# Patient Record
Sex: FEMALE | Race: WHITE | NOT HISPANIC OR LATINO | Employment: FULL TIME | URBAN - METROPOLITAN AREA
[De-identification: names, ages, dates, MRNs, and addresses within clinical notes are randomized per-mention and may not be internally consistent; named-entity substitution may affect disease eponyms.]

---

## 2017-01-01 ENCOUNTER — APPOINTMENT (OUTPATIENT)
Dept: LAB | Facility: CLINIC | Age: 62
End: 2017-01-01
Attending: INTERNAL MEDICINE
Payer: COMMERCIAL

## 2017-01-02 ENCOUNTER — APPOINTMENT (OUTPATIENT)
Dept: LAB | Facility: CLINIC | Age: 62
End: 2017-01-02
Attending: INTERNAL MEDICINE
Payer: COMMERCIAL

## 2017-01-03 ENCOUNTER — APPOINTMENT (OUTPATIENT)
Dept: LAB | Facility: CLINIC | Age: 62
End: 2017-01-03
Attending: INTERNAL MEDICINE
Payer: COMMERCIAL

## 2017-01-04 ENCOUNTER — APPOINTMENT (OUTPATIENT)
Dept: LAB | Facility: CLINIC | Age: 62
End: 2017-01-04
Attending: INTERNAL MEDICINE
Payer: COMMERCIAL

## 2017-01-04 ENCOUNTER — TELEPHONE (OUTPATIENT)
Dept: PHARMACY | Facility: CLINIC | Age: 62
End: 2017-01-04

## 2017-01-05 ENCOUNTER — RECORDS - HEALTHEAST (OUTPATIENT)
Dept: ADMINISTRATIVE | Facility: OTHER | Age: 62
End: 2017-01-05

## 2017-01-06 DIAGNOSIS — Z94.4 LIVER TRANSPLANTED (H): Primary | ICD-10-CM

## 2017-01-06 DIAGNOSIS — R19.5 LOOSE STOOLS: ICD-10-CM

## 2017-01-06 RX ORDER — SULFAMETHOXAZOLE AND TRIMETHOPRIM 400; 80 MG/1; MG/1
1 TABLET ORAL
Qty: 15 TABLET | Refills: 0 | Status: SHIPPED | OUTPATIENT
Start: 2017-01-09 | End: 2017-01-26

## 2017-01-06 NOTE — TELEPHONE ENCOUNTER
Homecare with pt this morning.  Pt was discharged without bactrim and psyllium (takes daily for bulk stools, and to decrease diarrhea).    Please send the following prescriptions to [facility] pharmacy.   Homecare reports that she has all of rest of medications.    Bactrim SS take 1 tablet on Monday, 1 tablet on Thursday   (this is dose reduction but pt is > 3months post liver tx)  Psyllium  .52 gms/cap,  Take 2 caps daily, take with a full glass of water.

## 2017-01-09 ENCOUNTER — TELEPHONE (OUTPATIENT)
Dept: PHARMACY | Facility: CLINIC | Age: 62
End: 2017-01-09

## 2017-01-09 ENCOUNTER — TELEPHONE (OUTPATIENT)
Dept: TRANSPLANT | Facility: CLINIC | Age: 62
End: 2017-01-09

## 2017-01-09 ENCOUNTER — OFFICE VISIT (OUTPATIENT)
Dept: INFUSION THERAPY | Facility: CLINIC | Age: 62
End: 2017-01-09
Attending: TRANSPLANT SURGERY
Payer: COMMERCIAL

## 2017-01-09 ENCOUNTER — TELEPHONE (OUTPATIENT)
Dept: INFUSION THERAPY | Facility: CLINIC | Age: 62
End: 2017-01-09

## 2017-01-09 VITALS
DIASTOLIC BLOOD PRESSURE: 80 MMHG | BODY MASS INDEX: 25.59 KG/M2 | TEMPERATURE: 97.9 F | RESPIRATION RATE: 18 BRPM | HEART RATE: 100 BPM | WEIGHT: 163.4 LBS | SYSTOLIC BLOOD PRESSURE: 152 MMHG | OXYGEN SATURATION: 99 %

## 2017-01-09 DIAGNOSIS — Z94.4 LIVER REPLACED BY TRANSPLANT (H): ICD-10-CM

## 2017-01-09 DIAGNOSIS — Z94.4 LIVER REPLACED BY TRANSPLANT (H): Primary | ICD-10-CM

## 2017-01-09 LAB
ALBUMIN SERPL-MCNC: 4.2 G/DL (ref 3.4–5)
ALP SERPL-CCNC: 83 U/L (ref 40–150)
ALT SERPL W P-5'-P-CCNC: 15 U/L (ref 0–50)
ANION GAP SERPL CALCULATED.3IONS-SCNC: 9 MMOL/L (ref 3–14)
AST SERPL W P-5'-P-CCNC: 15 U/L (ref 0–45)
BILIRUB DIRECT SERPL-MCNC: 0.1 MG/DL (ref 0–0.2)
BILIRUB SERPL-MCNC: 0.6 MG/DL (ref 0.2–1.3)
BUN SERPL-MCNC: 23 MG/DL (ref 7–30)
CALCIUM SERPL-MCNC: 9.3 MG/DL (ref 8.5–10.1)
CHLORIDE SERPL-SCNC: 103 MMOL/L (ref 94–109)
CO2 SERPL-SCNC: 22 MMOL/L (ref 20–32)
CREAT SERPL-MCNC: 3.03 MG/DL (ref 0.52–1.04)
ERYTHROCYTE [DISTWIDTH] IN BLOOD BY AUTOMATED COUNT: 14.4 % (ref 10–15)
GFR SERPL CREATININE-BSD FRML MDRD: 16 ML/MIN/1.7M2
GLUCOSE SERPL-MCNC: 119 MG/DL (ref 70–99)
HCT VFR BLD AUTO: 28.8 % (ref 35–47)
HGB BLD-MCNC: 9.7 G/DL (ref 11.7–15.7)
MAGNESIUM SERPL-MCNC: 2 MG/DL (ref 1.6–2.3)
MCH RBC QN AUTO: 32.9 PG (ref 26.5–33)
MCHC RBC AUTO-ENTMCNC: 33.7 G/DL (ref 31.5–36.5)
MCV RBC AUTO: 98 FL (ref 78–100)
PHOSPHATE SERPL-MCNC: 4.3 MG/DL (ref 2.5–4.5)
PLATELET # BLD AUTO: 342 10E9/L (ref 150–450)
POTASSIUM SERPL-SCNC: 4.3 MMOL/L (ref 3.4–5.3)
PROT SERPL-MCNC: 7.4 G/DL (ref 6.8–8.8)
RBC # BLD AUTO: 2.95 10E12/L (ref 3.8–5.2)
SODIUM SERPL-SCNC: 135 MMOL/L (ref 133–144)
TACROLIMUS BLD-MCNC: 12.7 UG/L (ref 5–15)
TME LAST DOSE: NORMAL H
WBC # BLD AUTO: 6.6 10E9/L (ref 4–11)

## 2017-01-09 PROCEDURE — 80076 HEPATIC FUNCTION PANEL: CPT | Performed by: INTERNAL MEDICINE

## 2017-01-09 PROCEDURE — 85027 COMPLETE CBC AUTOMATED: CPT | Performed by: INTERNAL MEDICINE

## 2017-01-09 PROCEDURE — 83735 ASSAY OF MAGNESIUM: CPT | Performed by: INTERNAL MEDICINE

## 2017-01-09 PROCEDURE — 80048 BASIC METABOLIC PNL TOTAL CA: CPT | Performed by: INTERNAL MEDICINE

## 2017-01-09 PROCEDURE — 84100 ASSAY OF PHOSPHORUS: CPT | Performed by: INTERNAL MEDICINE

## 2017-01-09 PROCEDURE — 80197 ASSAY OF TACROLIMUS: CPT | Performed by: INTERNAL MEDICINE

## 2017-01-09 PROCEDURE — 97802 MEDICAL NUTRITION INDIV IN: CPT | Mod: XU

## 2017-01-09 PROCEDURE — 99215 OFFICE O/P EST HI 40 MIN: CPT | Mod: ZF

## 2017-01-09 RX ORDER — TACROLIMUS 1 MG/1
1 CAPSULE, GELATIN COATED ORAL 2 TIMES DAILY
Qty: 240 CAPSULE | Refills: 11 | Status: SHIPPED | OUTPATIENT
Start: 2017-01-09 | End: 2017-01-17

## 2017-01-09 ASSESSMENT — PAIN SCALES - GENERAL: PAINLEVEL: SEVERE PAIN (6)

## 2017-01-09 NOTE — TELEPHONE ENCOUNTER
Spoke with patient and verified current tacrolimus dose is 4.5 mg every 12 hours, and the extra rx in the system was entered today, but it should not have been, (patient takes generic, and the 0.5 mg would be for the dose changes, and patient's insurance needs the tacrolimus to go to [facility].    Since the patient's tracrolimus level is 12.7, and the patient is 3 months post, Arlyn Duvall RN Liver Transplant Coordinator states to decrease dose to 2.5 mg every 12 hours.  Patient inquired about the continued diarrhea, and she was directed to see her PCP.  Patient voiced understanding.  Patient will call us when she is getting low on her tacrolimus supply for reorder.

## 2017-01-09 NOTE — PROGRESS NOTES
Physician Attestation  I did not see the patient but discussed the details with Tatiana; elevated creatinine ? Dehydration needs IVF    Transplant Surgery -OUTPATIENT IMMUNOSUPPRESSION PROGRESS NOTE    Date of Visit: 01/09/2017    Transplants:  8/25/2016 (Liver); Postoperative day:  137  ASSESMENT AND PLAN:  1.Graft Function: Functioning well  2.Immunosuppression Management: No change monitor labs  3.Hypertension: Well controlled stay on current meds  4.Renal Function: Creatine 3.03.  Will see nephrology.  Patient should drink more water  5.Lab frequency: twice weekly  6.Other: Diarrhea:  Patient informed to stop stop psyllium seed or imodium.  Should continue current medications.  Stool cultures completed previously and all are negative  7. Pain:  Patient was not given narcotics. Patient should take tylenol sparingly.   8. Headache:  Will monitor headache.     Date: January 9, 2017    Transplant:  [x]                             Liver [x]                              Kidney []                             Pancreas []                              Other:             Chief Complaint:Eval/Assessment    History of Present Illness: patient is a 61 year old liver transplant patient who was recently discharged from her TCU and is now at home.    Patient states she has been feeling well.  However, she has a few complaints.      Patient states she has not been sleeping well at night.  She also states she has pain all over body from doing more activity and is wondering if there stronger she can take for her pain. She also notes she has a headache and would like something stronger than tylenol for it.     Additionally patient has stopped taking her psyllium seed and imodium and now has loose stools.  She states they are small in amount.  She had multiple stool samples taken previously and there was no infection noted.      Finally, patient has not been drinking many fluids and states she has been trying to increase the amount of  water she drinks.        Patient Active Problem List   Diagnosis     Decompensation of cirrhosis of liver (H)     Liver transplanted (H)     Alcoholic hepatitis     Immunosuppression (H)     Acute kidney injury (H)     Anxiety     Alcoholic hepatitis without ascites     Enterococcus UTI     Liver transplant status (H)     Nausea & vomiting     Malnutrition (H)     Candidiasis of skin     Anemia     ACP (advance care planning)     Diarrhea     Hypothyroidism     Esophageal reflux     Major depression     Insomnia     SOCIAL /FAMILY HISTORY: [x]                  No recent change    Past Medical History   Diagnosis Date     Osteoporosis      Migraines      Asthma      Spinal stenosis in cervical region      End stage liver disease (H)      2/2 Alcohol Abuse     Liver transplanted (H)      Past Surgical History   Procedure Laterality Date     Appendectomy            Tubal ligation       laparoscopic     Sphincteroplasty       Esophagoscopy, gastroscopy, duodenoscopy (egd), combined N/A 2016     Procedure: COMBINED ESOPHAGOSCOPY, GASTROSCOPY, DUODENOSCOPY (EGD);  Surgeon: Tao Alfonso MD;  Location:  GI     Transplant liver recipient  donor N/A 2016     Procedure: TRANSPLANT LIVER RECIPIENT  DONOR;  Surgeon: Larry Dhillon MD;  Location:  OR     Bench liver N/A 2016     Procedure: BENCH LIVER;  Surgeon: Larry Dhillon MD;  Location: [facility] OR     Colonoscopy       Esophagoscopy, gastroscopy, duodenoscopy (egd), combined N/A 10/31/2016     Procedure: COMBINED ESOPHAGOSCOPY, GASTROSCOPY, DUODENOSCOPY (EGD), BIOPSY SINGLE OR MULTIPLE;  Surgeon: Ronald Bojorquez MD;  Location:  GI     Social History     Social History     Marital Status:      Spouse Name: N/A     Number of Children: N/A     Years of Education: N/A     Occupational History     Not on file.     Social History Main Topics     Smoking status: Former Smoker     Smokeless tobacco: Not on file      Alcohol Use: 4.2 oz/week     7 Standard drinks or equivalent per week      Comment: heavy use (750ml/2 days) up until 1 year ago; had cut down to 1 drink/day; none since 7/18/16     Drug Use: No     Sexual Activity: Not on file     Other Topics Concern     Not on file     Social History Narrative     Prescription Medications as of 1/9/2017             PROGRAF 1 MG PO CAPSULE Take 1 capsule (1 mg) by mouth 2 times daily Use for dose titrations.    sulfamethoxazole-trimethoprim (BACTRIM/SEPTRA) 400-80 MG per tablet Take 1 tablet by mouth twice a week Every Monday and Thursday    psyllium 0.52 G capsule Take 2 capsules (1.04 g) by mouth daily With a full glass of water.    magnesium oxide (MAG-OX) 400 MG tablet Take 1 tablet (400 mg) by mouth 2 times daily    busPIRone (BUSPAR) 10 MG tablet Take 1 tablet (10 mg) by mouth 2 times daily    sertraline (ZOLOFT) 100 MG tablet Take 1.5 tablets (150 mg) by mouth daily    traZODone (DESYREL) 100 MG tablet Take 1 tablet (100 mg) by mouth At Bedtime    azaTHIOprine 100 MG TABS Take 150 mg by mouth every evening    tacrolimus (PROGRAF - GENERIC EQUIVALENT) 0.5 MG capsule Take 9 capsules (4.5 mg) by mouth 2 times daily    cyanocobalamin 1000 MCG TABS Take 1,000 mcg by mouth daily    folic acid (FOLVITE) 1 MG tablet Take 1 tablet (1 mg) by mouth daily    simethicone (MYLICON) 80 MG chewable tablet Take 1 tablet (80 mg) by mouth every 6 hours as needed for flatulence or cramping    ursodiol (ACTIGALL) 300 MG capsule Take 1 capsule (300 mg) by mouth 2 times daily    multivitamin, therapeutic (THERA-VIT) TABS tablet Take 1 tablet by mouth every 24 hours    levothyroxine (SYNTHROID/LEVOTHROID) 88 MCG tablet Take 1 tablet (88 mcg) by mouth every morning (before breakfast)    pantoprazole (PROTONIX) 40 MG EC tablet Take 1 tablet (40 mg) by mouth every morning (before breakfast)    acetaminophen (TYLENOL) 325 MG tablet Take 2 tablets (650 mg) by mouth every 6 hours as needed for mild pain  Or fevers. Use sparingly. Limit use to no more than 2 grams (2000 mg) in 24 hours.        Benadryl; Cefaclor; Hydrocodone-acetaminophen; Oxycodone; Penicillins; and Sulfa drugs   REVIEW OF SYSTEMS (check box if normal)  [x]               GENERAL  [x]                 PULMONARY [x]                GENITOURINARY  [x]                CNS                 [x]                 CARDIAC  [x]                 ENDOCRINE  [x]                EARS,NOSE,THROAT [x]                 GASTROINTESTINAL [x]                 NEUROLOGIC    [x]                MUSCLOSKELTAL  [x]                  HEMATOLOGY      PHYSICAL EXAM (check box if normal)/78 mmHg  Pulse 107  Temp(Src) 97.9  F (36.6  C) (Oral)  Resp 18  Wt 74.118 kg (163 lb 6.4 oz)  SpO2 99%        [x]            GENERAL:    [x]       EYES:  ICTERIC   []        YES  []                    NO  [x]           EXTREMITIES: Clubbing []       Y     [x]           N    [x]           EARS, NOSE, THROAT: Membranes Moist    YES   [x]                   NO []                  [x]           LUNGS:  CLEAR    YES       [x]                  NO    []                                [x]           SKIN: Jaundice           YES       []                  NO    [x]                   Rash: YES       []                  NO    [x]                                     [x]             HEART: Regular Rate          YES       [x]                  NO    []                   Incision Clean:  YES       [x]                  NO    []                                [x]                    ABDOMEN: Organomegaly YES       []                  NO    [x]                       [x]                    NEUROLOGICAL:  Nonfocal  YES       [x]                  NO    []                       [x]                    Hernia YES       []                  NO    [x]                   PSYCHIATRIC:  Appropriate  YES       [x]                  NO    []                       OTHER:                                                                                                    PAIN SCALE:: 3

## 2017-01-09 NOTE — PROGRESS NOTES
"Phan Luque came to [facility] today for a lab and assess following a DDLT transplant on 8/25/16.      Discharge date: 9/14/16 to TCU. Patient discharged from TCU on 1/5/16  Transplant coordinator: Lea Moody  Phone number patient can be reached at: [phone]      Physical Assessment:  See physical assessment located under \"Document Flowsheets\".  Incision site: Healed, c/d/i.  Lines: N/A  Talbert: N/A  Urine clarity: Patient reports urinary frequency, normal color, No odor or burning with urination.   Hydration: Patient can't recall exact amount of fluids she has been drinking but she is trying to drink the recommended amount. Encouraged patient to drink lots of fluids.  Nutrition: Patient reports poor appetite, eating very small amounts.    Last BM: 1/8/16. Ongoing diarrhea. Restarted up after not taking the imodium and psyllium this week. Patient had multiple stool studies done that were all negative.   Pain: Patient reporting pain 6/10- generalized. \" It's not too bad right now\" per patient even though rating pain 6/10. Patient states she has been getting headaches, complains of generalized body aches and soreness. Patient taking prescribed tylenol as needed for pain.    Laboratory tests: Standard labs drawn. Cr elevated at 3.03- up from 1.39 on 1/3.     Plan of care for today:   Reviewed labs and assessment with Dr. Dhillon.   1.) Transplant checklist completed  2.) Medication card and medication list reviewed. Noted discrepancies with bactrim dosing. Patient also missing 2 prescriptions: Prograf 0.5mg caps and Psyllium Caps.  3.) Specialty pharmacy up to see patient.  4.) Dietician Xiomara up to see patient.  5.) Patient's coordinator called: Lea not here today. Palak following her patient's; Patient unable to be seen today. Patient given the coordinator phone line. Message sent via [employer] to Lea updating her on patient status and prescription issues.  6.) NP- Tatiana up to assess patient.  7.) " Patient has upcoming appointment with nephrology and was instructed to drink more fluids by surgery NP. Patient has appointment with Dr. Banegas on Wednesday 1/11 and will have labs completed then. Patient on twice weekly labs.  8.) Patient wondering about flexeril prescription. Patient states it is currently on hold until clarified with her CD treatment that she can be taking this.  9.) Prograf 1mg prescription sent- patient states that only [facility] with refill these scripts for her based out of [place]- she was going to figure out the process of getting her prescriptions.    Paged Dr. Erazo to discuss elevated Cr. Due to concern. Dr. Erazo wanted patient to go to the ER for fluids. Patient called and unable to reach her at the phone number given to writer. Patient does have a f/u appointment and labs with her liver doctor.    Medication changes: Clarified Bactrim dosing as Monday and Thursday.    Medications administered: None    Patient education:    The following teaching topics were addressed: Importance of drinking 2L of non-caffeinated fluids daily, Signs/symptoms of infection, Good handwashing, Medications (purposes, doses and times of administration), Phone numbers to call with concenrs (Transplant coordinator, [facility] [address] and [facility]) and Plan of care   Patient verbalized understanding and all questions answered.    Drug level:  Prograf level today drawn. This will be reviewed by Care Coordinator. She is on 2x weekly labs and currently receiving homecare    Discharge Plan    Pt will follow up with Primary Medical Doctor  Discharge instructions reviewed with patient: YES  Patient/Representative verbalized understanding, all questions answered: YES    Discharged from unit at 1215 with whom: self to home.    Ericka Severson, RN

## 2017-01-09 NOTE — PROGRESS NOTES
POST LIVER TRANSPLANT NUTRITION ASSESSMENT  Medical Nutrition Therapy    Visit Type: Initial Assessment    referred by Dr. Dhillon for MNT related to Liver Transplant 8/25/16    Patient accompanied by self    Nutrition Assessment:  Anthropometrics  Height: 67 in    BMI: 25.7 (Overweight)      Weight: 164 lbs       IBW: 135 lbs  % IBW: 121  Adj/Dosing wt: 142 lbs/65 kg Wt hx: Wt pre-txp was in 200's 2/2 fluid status. Pre-txp wt (stable) for several years was in the 140's.        Nutrition Prescription  Energy:  8033-5971 kcal (25-30 kcal/kg)  Justification: post txp   Protein:  65-78 grams protein (1-1.2 g/kg)  Justification: maintenance post txp with kidney disease         Fluid:   1ml/kcal or per MD        Diet Recall:  Pt d/c'd from [facility] and has been home since Thursday, ~3 days. Her appetite is improving overall, especially without a feeding tube, but still not the greatest. She ate only 1 meal yesterday. She has been instructed to follow a low phos, low K+ diet for elevated levels with kidney issues. She has been eating out a lot, at [employer], ordering pizza, etc. Yesterday she ate a frosty from [employer] but vomited shortly after eating. She has Ensure/Boost at home but hasn't had any since being home. She has been trying to drink ginger ale and water, but could benefit from higher fluid intake.      Nutrition Diagnosis:  Inadequate protein intake related to increased protein needs s/p transplant AEB diet recall.    Nutrition Intervention/Recommendations:  1. Although pt has passed the 2 month ambrose (higher calorie and protein needs), recommended drink 1-2 Ensure/day to help with getting adequate nutrition, as her eating is very irregular.   2. Discussed goal to avoid excessive weight gain or weight loss at this time and effects on the liver. Encouraged eating every few hours and avoid eating only 1-2 times/day. Provided examples and encouraged batch cooking/meal prep to prevent having to run out to fast food  restaurants. Pt reports she has SIBO (treated with abx) and MD recommended avoiding snacking/grazing for this (at least 4 hours b/t meals). Pt reports she hasn't been following this. Encouraged pairing protein with carbs and avoiding carbs by themselves (gave examples).    3. Reviewed importance of food safety and increased risk for food-borne illness post-txp. Also discussed potential need to monitor K+, CHO, and Na+ intakes d/t medication side effects.   4. Instructed pt to avoid herbal supplements and alternative medicine therapies d/t counteraction with immunosuppression and risk for rejection.     Nutrition Goals:  1. Eat >1-2x/day   2. 1-2 Ensure (or equivalent) daily  3. Weight maintenance     Nutrition Follow Up / Monitorin. Ongoing PO intake adequacy  2. Weight trends     Patient has RD contact information to call/email if needed.  Time spent with patient: 15  minutes    Bonita Peters RD, LD

## 2017-01-09 NOTE — TELEPHONE ENCOUNTER
Please call Phan about result of tacrolimus level today 12.7,   Verify her current dose, there are 2 tacrolimus doses listed on her medication list.   Decrease tacrolimus to 1/2 of current dose both morning and evening doses.  Labs repeated on Thursday with homecare.

## 2017-01-10 ENCOUNTER — TELEPHONE (OUTPATIENT)
Dept: TRANSPLANT | Facility: CLINIC | Age: 62
End: 2017-01-10

## 2017-01-10 NOTE — TELEPHONE ENCOUNTER
Pt currently admitted at [facility] with elevated creatinine. Pt's creat 3.67 on admission. Yesterday was 3.03 in clinic. Patient received IV fluids overnight and creatinine 3.11 today. KANA Serrano, from [facility] wanting plan. Per Dr Dhillon Pt to hold prograf for 2 days and continue IVF. Dr Alvarez prefers to touch base with Dr Dhillon to further discuss. Dr Alvarez will page.

## 2017-01-12 ENCOUNTER — COMMUNICATION - HEALTHEAST (OUTPATIENT)
Dept: INTERNAL MEDICINE | Facility: CLINIC | Age: 62
End: 2017-01-12

## 2017-01-12 ENCOUNTER — TELEPHONE (OUTPATIENT)
Dept: TRANSPLANT | Facility: CLINIC | Age: 62
End: 2017-01-12

## 2017-01-12 NOTE — TELEPHONE ENCOUNTER
Spoke with Phan who is being discharged currently from [facility]. Pt instructed to start prograf now at 1 mg every 12 hours. Patient asked for a dose before she leaves hospital. Pt reports that she is feeling good and ready to be discharged. Pt encouraged to drink plenty of water and not only sips.

## 2017-01-16 LAB
ALBUMIN SERPL-MCNC: 4 G/DL (ref 3.4–5)
ALP SERPL-CCNC: 71 U/L (ref 40–150)
ALT SERPL W P-5'-P-CCNC: 15 U/L (ref 0–50)
ANION GAP SERPL CALCULATED.3IONS-SCNC: 9 MMOL/L (ref 3–14)
AST SERPL W P-5'-P-CCNC: 13 U/L (ref 0–45)
BASOPHILS # BLD AUTO: 0 10E9/L (ref 0–0.2)
BASOPHILS NFR BLD AUTO: 0.1 %
BILIRUB SERPL-MCNC: 0.5 MG/DL (ref 0.2–1.3)
BUN SERPL-MCNC: 14 MG/DL (ref 7–30)
CALCIUM SERPL-MCNC: 8.8 MG/DL (ref 8.5–10.1)
CHLORIDE SERPL-SCNC: 108 MMOL/L (ref 94–109)
CO2 SERPL-SCNC: 22 MMOL/L (ref 20–32)
CREAT SERPL-MCNC: 1.48 MG/DL (ref 0.52–1.04)
DIFFERENTIAL METHOD BLD: ABNORMAL
EOSINOPHIL # BLD AUTO: 0.1 10E9/L (ref 0–0.7)
EOSINOPHIL NFR BLD AUTO: 1.9 %
ERYTHROCYTE [DISTWIDTH] IN BLOOD BY AUTOMATED COUNT: 14.5 % (ref 10–15)
GFR SERPL CREATININE-BSD FRML MDRD: 36 ML/MIN/1.7M2
GLUCOSE SERPL-MCNC: 106 MG/DL (ref 70–99)
HCT VFR BLD AUTO: 29 % (ref 35–47)
HGB BLD-MCNC: 9.4 G/DL (ref 11.7–15.7)
IMM GRANULOCYTES # BLD: 0 10E9/L (ref 0–0.4)
IMM GRANULOCYTES NFR BLD: 0.1 %
LYMPHOCYTES # BLD AUTO: 1.6 10E9/L (ref 0.8–5.3)
LYMPHOCYTES NFR BLD AUTO: 23.6 %
MCH RBC QN AUTO: 32.6 PG (ref 26.5–33)
MCHC RBC AUTO-ENTMCNC: 32.4 G/DL (ref 31.5–36.5)
MCV RBC AUTO: 101 FL (ref 78–100)
MONOCYTES # BLD AUTO: 0.3 10E9/L (ref 0–1.3)
MONOCYTES NFR BLD AUTO: 4.1 %
NEUTROPHILS # BLD AUTO: 4.8 10E9/L (ref 1.6–8.3)
NEUTROPHILS NFR BLD AUTO: 70.2 %
NRBC # BLD AUTO: 0 10*3/UL
NRBC BLD AUTO-RTO: 0 /100
PLATELET # BLD AUTO: 330 10E9/L (ref 150–450)
POTASSIUM SERPL-SCNC: 4.9 MMOL/L (ref 3.4–5.3)
PROT SERPL-MCNC: 7.3 G/DL (ref 6.8–8.8)
RBC # BLD AUTO: 2.88 10E12/L (ref 3.8–5.2)
SODIUM SERPL-SCNC: 139 MMOL/L (ref 133–144)
TACROLIMUS BLD-MCNC: ABNORMAL UG/L (ref 5–15)
TME LAST DOSE: ABNORMAL H
WBC # BLD AUTO: 6.9 10E9/L (ref 4–11)

## 2017-01-16 PROCEDURE — 80197 ASSAY OF TACROLIMUS: CPT | Performed by: TRANSPLANT SURGERY

## 2017-01-16 PROCEDURE — 80053 COMPREHEN METABOLIC PANEL: CPT | Performed by: TRANSPLANT SURGERY

## 2017-01-16 PROCEDURE — 85025 COMPLETE CBC W/AUTO DIFF WBC: CPT | Performed by: TRANSPLANT SURGERY

## 2017-01-17 ENCOUNTER — TELEPHONE (OUTPATIENT)
Dept: TRANSPLANT | Facility: CLINIC | Age: 62
End: 2017-01-17

## 2017-01-17 DIAGNOSIS — Z94.4 LIVER TRANSPLANTED (H): Primary | ICD-10-CM

## 2017-01-17 RX ORDER — TACROLIMUS 0.5 MG/1
1.5 CAPSULE ORAL 2 TIMES DAILY
Qty: 180 CAPSULE | Refills: 3 | Status: SHIPPED | OUTPATIENT
Start: 2017-01-17 | End: 2017-01-25

## 2017-01-17 NOTE — TELEPHONE ENCOUNTER
Spoke with patient who had slightly longer than 12 hour trough. Patient will increase to 1.5 mg BID. Pt repeated dose change and wrote it down.

## 2017-01-19 ENCOUNTER — OFFICE VISIT - HEALTHEAST (OUTPATIENT)
Dept: INTERNAL MEDICINE | Facility: CLINIC | Age: 62
End: 2017-01-19

## 2017-01-19 DIAGNOSIS — N18.30 ACUTE RENAL FAILURE SUPERIMPOSED ON STAGE 3 CHRONIC KIDNEY DISEASE (H): ICD-10-CM

## 2017-01-19 DIAGNOSIS — Z94.4 LIVER TRANSPLANT RECIPIENT (H): ICD-10-CM

## 2017-01-19 DIAGNOSIS — N17.9 ACUTE RENAL FAILURE SUPERIMPOSED ON STAGE 3 CHRONIC KIDNEY DISEASE (H): ICD-10-CM

## 2017-01-20 ENCOUNTER — MEDICAL CORRESPONDENCE (OUTPATIENT)
Dept: HEALTH INFORMATION MANAGEMENT | Facility: CLINIC | Age: 62
End: 2017-01-20

## 2017-01-20 LAB
ALBUMIN SERPL-MCNC: 3.9 G/DL (ref 3.4–5)
ALP SERPL-CCNC: 72 U/L (ref 40–150)
ALT SERPL W P-5'-P-CCNC: 12 U/L (ref 0–50)
ANION GAP SERPL CALCULATED.3IONS-SCNC: 8 MMOL/L (ref 3–14)
AST SERPL W P-5'-P-CCNC: 10 U/L (ref 0–45)
BILIRUB DIRECT SERPL-MCNC: 0.1 MG/DL (ref 0–0.2)
BILIRUB SERPL-MCNC: 0.4 MG/DL (ref 0.2–1.3)
BUN SERPL-MCNC: 17 MG/DL (ref 7–30)
CALCIUM SERPL-MCNC: 8.9 MG/DL (ref 8.5–10.1)
CHLORIDE SERPL-SCNC: 110 MMOL/L (ref 94–109)
CO2 SERPL-SCNC: 24 MMOL/L (ref 20–32)
CREAT SERPL-MCNC: 1.51 MG/DL (ref 0.52–1.04)
ERYTHROCYTE [DISTWIDTH] IN BLOOD BY AUTOMATED COUNT: 14.6 % (ref 10–15)
GFR SERPL CREATININE-BSD FRML MDRD: 35 ML/MIN/1.7M2
GLUCOSE SERPL-MCNC: 115 MG/DL (ref 70–99)
HCT VFR BLD AUTO: 28.5 % (ref 35–47)
HGB BLD-MCNC: 9.5 G/DL (ref 11.7–15.7)
MAGNESIUM SERPL-MCNC: 2.1 MG/DL (ref 1.6–2.3)
MCH RBC QN AUTO: 33.6 PG (ref 26.5–33)
MCHC RBC AUTO-ENTMCNC: 33.3 G/DL (ref 31.5–36.5)
MCV RBC AUTO: 101 FL (ref 78–100)
PHOSPHATE SERPL-MCNC: 4.2 MG/DL (ref 2.5–4.5)
PLATELET # BLD AUTO: 314 10E9/L (ref 150–450)
POTASSIUM SERPL-SCNC: 4.6 MMOL/L (ref 3.4–5.3)
PROT SERPL-MCNC: 7.3 G/DL (ref 6.8–8.8)
RBC # BLD AUTO: 2.83 10E12/L (ref 3.8–5.2)
SODIUM SERPL-SCNC: 142 MMOL/L (ref 133–144)
TACROLIMUS BLD-MCNC: ABNORMAL UG/L (ref 5–15)
TME LAST DOSE: ABNORMAL H
WBC # BLD AUTO: 5.8 10E9/L (ref 4–11)

## 2017-01-20 PROCEDURE — 84100 ASSAY OF PHOSPHORUS: CPT | Performed by: TRANSPLANT SURGERY

## 2017-01-20 PROCEDURE — 83735 ASSAY OF MAGNESIUM: CPT | Performed by: TRANSPLANT SURGERY

## 2017-01-20 PROCEDURE — 80197 ASSAY OF TACROLIMUS: CPT | Performed by: TRANSPLANT SURGERY

## 2017-01-20 PROCEDURE — 80076 HEPATIC FUNCTION PANEL: CPT | Performed by: TRANSPLANT SURGERY

## 2017-01-20 PROCEDURE — 85027 COMPLETE CBC AUTOMATED: CPT | Performed by: TRANSPLANT SURGERY

## 2017-01-20 PROCEDURE — 80048 BASIC METABOLIC PNL TOTAL CA: CPT | Performed by: TRANSPLANT SURGERY

## 2017-01-23 LAB
ALBUMIN SERPL-MCNC: 4 G/DL (ref 3.4–5)
ALP SERPL-CCNC: 70 U/L (ref 40–150)
ALT SERPL W P-5'-P-CCNC: 14 U/L (ref 0–50)
ANION GAP SERPL CALCULATED.3IONS-SCNC: 5 MMOL/L (ref 3–14)
AST SERPL W P-5'-P-CCNC: 13 U/L (ref 0–45)
BASOPHILS # BLD AUTO: 0 10E9/L (ref 0–0.2)
BASOPHILS NFR BLD AUTO: 0.2 %
BILIRUB DIRECT SERPL-MCNC: <0.1 MG/DL (ref 0–0.2)
BILIRUB SERPL-MCNC: 0.3 MG/DL (ref 0.2–1.3)
BUN SERPL-MCNC: 15 MG/DL (ref 7–30)
CALCIUM SERPL-MCNC: 9.1 MG/DL (ref 8.5–10.1)
CHLORIDE SERPL-SCNC: 110 MMOL/L (ref 94–109)
CO2 SERPL-SCNC: 25 MMOL/L (ref 20–32)
CREAT SERPL-MCNC: 1.23 MG/DL (ref 0.52–1.04)
DIFFERENTIAL METHOD BLD: ABNORMAL
EOSINOPHIL # BLD AUTO: 0.2 10E9/L (ref 0–0.7)
EOSINOPHIL NFR BLD AUTO: 4.9 %
ERYTHROCYTE [DISTWIDTH] IN BLOOD BY AUTOMATED COUNT: 14.7 % (ref 10–15)
GFR SERPL CREATININE-BSD FRML MDRD: 45 ML/MIN/1.7M2
GLUCOSE SERPL-MCNC: 112 MG/DL (ref 70–99)
HCT VFR BLD AUTO: 30.6 % (ref 35–47)
HGB BLD-MCNC: 10.2 G/DL (ref 11.7–15.7)
IMM GRANULOCYTES # BLD: 0 10E9/L (ref 0–0.4)
IMM GRANULOCYTES NFR BLD: 0.4 %
LYMPHOCYTES # BLD AUTO: 1 10E9/L (ref 0.8–5.3)
LYMPHOCYTES NFR BLD AUTO: 21.3 %
MCH RBC QN AUTO: 33.7 PG (ref 26.5–33)
MCHC RBC AUTO-ENTMCNC: 33.3 G/DL (ref 31.5–36.5)
MCV RBC AUTO: 101 FL (ref 78–100)
MONOCYTES # BLD AUTO: 0.4 10E9/L (ref 0–1.3)
MONOCYTES NFR BLD AUTO: 8.6 %
NEUTROPHILS # BLD AUTO: 3.2 10E9/L (ref 1.6–8.3)
NEUTROPHILS NFR BLD AUTO: 64.6 %
NRBC # BLD AUTO: 0 10*3/UL
NRBC BLD AUTO-RTO: 0 /100
PLATELET # BLD AUTO: 323 10E9/L (ref 150–450)
POTASSIUM SERPL-SCNC: 4.8 MMOL/L (ref 3.4–5.3)
PROT SERPL-MCNC: 7.6 G/DL (ref 6.8–8.8)
RBC # BLD AUTO: 3.03 10E12/L (ref 3.8–5.2)
SODIUM SERPL-SCNC: 140 MMOL/L (ref 133–144)
TACROLIMUS BLD-MCNC: ABNORMAL UG/L (ref 5–15)
TME LAST DOSE: ABNORMAL H
WBC # BLD AUTO: 4.9 10E9/L (ref 4–11)

## 2017-01-23 PROCEDURE — 85025 COMPLETE CBC W/AUTO DIFF WBC: CPT | Performed by: TRANSPLANT SURGERY

## 2017-01-23 PROCEDURE — 80048 BASIC METABOLIC PNL TOTAL CA: CPT | Performed by: TRANSPLANT SURGERY

## 2017-01-23 PROCEDURE — 80197 ASSAY OF TACROLIMUS: CPT | Performed by: TRANSPLANT SURGERY

## 2017-01-23 PROCEDURE — 80076 HEPATIC FUNCTION PANEL: CPT | Performed by: TRANSPLANT SURGERY

## 2017-01-24 ENCOUNTER — TELEPHONE (OUTPATIENT)
Dept: GASTROENTEROLOGY | Facility: CLINIC | Age: 62
End: 2017-01-24

## 2017-01-24 ENCOUNTER — TELEPHONE (OUTPATIENT)
Dept: TRANSPLANT | Facility: CLINIC | Age: 62
End: 2017-01-24

## 2017-01-24 DIAGNOSIS — Z94.4 LIVER TRANSPLANTED (H): Primary | ICD-10-CM

## 2017-01-24 NOTE — TELEPHONE ENCOUNTER
Patient contacted and reminded of upcoming appointment.  Patient confirmed they will be attending.  Patient instructed to bring updated medications list to appointment.  Patient instructed to arrive early for check-in  Yakov Mancera CMA

## 2017-01-25 RX ORDER — TACROLIMUS 0.5 MG/1
2 CAPSULE ORAL 2 TIMES DAILY
Qty: 240 CAPSULE | Refills: 11 | Status: SHIPPED | OUTPATIENT
Start: 2017-01-25 | End: 2017-01-26

## 2017-01-25 NOTE — TELEPHONE ENCOUNTER
Left detailed message for patient to increase prograf to 2 mg BID. Requested patient to call back.

## 2017-01-26 DIAGNOSIS — R14.1 FLATULENCE, ERUCTATION, AND GAS PAIN: ICD-10-CM

## 2017-01-26 DIAGNOSIS — K21.9 GASTROESOPHAGEAL REFLUX DISEASE, ESOPHAGITIS PRESENCE NOT SPECIFIED: ICD-10-CM

## 2017-01-26 DIAGNOSIS — Z94.9 TRANSPLANT, ORGAN: ICD-10-CM

## 2017-01-26 DIAGNOSIS — F41.9 ANXIETY: ICD-10-CM

## 2017-01-26 DIAGNOSIS — E46 MALNUTRITION (H): ICD-10-CM

## 2017-01-26 DIAGNOSIS — E83.42 HYPOMAGNESEMIA: ICD-10-CM

## 2017-01-26 DIAGNOSIS — Z94.4 LIVER TRANSPLANTED (H): Primary | ICD-10-CM

## 2017-01-26 DIAGNOSIS — R14.3 FLATULENCE, ERUCTATION, AND GAS PAIN: ICD-10-CM

## 2017-01-26 DIAGNOSIS — R94.6 THYROID FUNCTION TEST ABNORMAL: ICD-10-CM

## 2017-01-26 DIAGNOSIS — R14.2 FLATULENCE, ERUCTATION, AND GAS PAIN: ICD-10-CM

## 2017-01-26 DIAGNOSIS — R19.5 LOOSE STOOLS: ICD-10-CM

## 2017-01-26 LAB
ALBUMIN SERPL-MCNC: 4 G/DL (ref 3.4–5)
ALP SERPL-CCNC: 69 U/L (ref 40–150)
ALT SERPL W P-5'-P-CCNC: 14 U/L (ref 0–50)
ANION GAP SERPL CALCULATED.3IONS-SCNC: 5 MMOL/L (ref 3–14)
AST SERPL W P-5'-P-CCNC: 16 U/L (ref 0–45)
BASOPHILS # BLD AUTO: 0 10E9/L (ref 0–0.2)
BASOPHILS NFR BLD AUTO: 0 %
BILIRUB DIRECT SERPL-MCNC: <0.1 MG/DL (ref 0–0.2)
BILIRUB SERPL-MCNC: 0.3 MG/DL (ref 0.2–1.3)
BUN SERPL-MCNC: 15 MG/DL (ref 7–30)
CALCIUM SERPL-MCNC: 9.1 MG/DL (ref 8.5–10.1)
CHLORIDE SERPL-SCNC: 110 MMOL/L (ref 94–109)
CO2 SERPL-SCNC: 25 MMOL/L (ref 20–32)
CREAT SERPL-MCNC: 1.29 MG/DL (ref 0.52–1.04)
DIFFERENTIAL METHOD BLD: ABNORMAL
EOSINOPHIL # BLD AUTO: 0.3 10E9/L (ref 0–0.7)
EOSINOPHIL NFR BLD AUTO: 5.1 %
ERYTHROCYTE [DISTWIDTH] IN BLOOD BY AUTOMATED COUNT: 14.8 % (ref 10–15)
GFR SERPL CREATININE-BSD FRML MDRD: 42 ML/MIN/1.7M2
GLUCOSE SERPL-MCNC: 113 MG/DL (ref 70–99)
HCT VFR BLD AUTO: 31.1 % (ref 35–47)
HGB BLD-MCNC: 10.3 G/DL (ref 11.7–15.7)
IMM GRANULOCYTES # BLD: 0 10E9/L (ref 0–0.4)
IMM GRANULOCYTES NFR BLD: 0.3 %
LYMPHOCYTES # BLD AUTO: 1.4 10E9/L (ref 0.8–5.3)
LYMPHOCYTES NFR BLD AUTO: 21.8 %
MCH RBC QN AUTO: 33.8 PG (ref 26.5–33)
MCHC RBC AUTO-ENTMCNC: 33.1 G/DL (ref 31.5–36.5)
MCV RBC AUTO: 102 FL (ref 78–100)
MONOCYTES # BLD AUTO: 0.5 10E9/L (ref 0–1.3)
MONOCYTES NFR BLD AUTO: 8.1 %
NEUTROPHILS # BLD AUTO: 4.1 10E9/L (ref 1.6–8.3)
NEUTROPHILS NFR BLD AUTO: 64.7 %
NRBC # BLD AUTO: 0 10*3/UL
NRBC BLD AUTO-RTO: 0 /100
PLATELET # BLD AUTO: 333 10E9/L (ref 150–450)
POTASSIUM SERPL-SCNC: 4.9 MMOL/L (ref 3.4–5.3)
PROT SERPL-MCNC: 7.3 G/DL (ref 6.8–8.8)
RBC # BLD AUTO: 3.05 10E12/L (ref 3.8–5.2)
SODIUM SERPL-SCNC: 140 MMOL/L (ref 133–144)
TACROLIMUS BLD-MCNC: 4 UG/L (ref 5–15)
TME LAST DOSE: ABNORMAL H
WBC # BLD AUTO: 6.3 10E9/L (ref 4–11)

## 2017-01-26 PROCEDURE — 80048 BASIC METABOLIC PNL TOTAL CA: CPT | Performed by: TRANSPLANT SURGERY

## 2017-01-26 PROCEDURE — 80197 ASSAY OF TACROLIMUS: CPT | Performed by: TRANSPLANT SURGERY

## 2017-01-26 PROCEDURE — 85025 COMPLETE CBC W/AUTO DIFF WBC: CPT | Performed by: TRANSPLANT SURGERY

## 2017-01-26 PROCEDURE — 80076 HEPATIC FUNCTION PANEL: CPT | Performed by: TRANSPLANT SURGERY

## 2017-01-26 RX ORDER — URSODIOL 300 MG/1
300 CAPSULE ORAL 2 TIMES DAILY
Qty: 60 CAPSULE | Refills: 11 | Status: SHIPPED | OUTPATIENT
Start: 2017-01-26 | End: 2017-11-03

## 2017-01-26 RX ORDER — FOLIC ACID 1 MG/1
1 TABLET ORAL DAILY
Qty: 30 TABLET | Refills: 1 | Status: SHIPPED | OUTPATIENT
Start: 2017-01-26

## 2017-01-26 RX ORDER — TACROLIMUS 0.5 MG/1
2 CAPSULE ORAL 2 TIMES DAILY
Qty: 240 CAPSULE | Refills: 11 | Status: CANCELLED | OUTPATIENT
Start: 2017-01-26

## 2017-01-26 RX ORDER — SULFAMETHOXAZOLE AND TRIMETHOPRIM 400; 80 MG/1; MG/1
1 TABLET ORAL
Qty: 15 TABLET | Refills: 0 | Status: CANCELLED | OUTPATIENT
Start: 2017-01-26

## 2017-01-26 RX ORDER — BUSPIRONE HYDROCHLORIDE 10 MG/1
10 TABLET ORAL 2 TIMES DAILY
Qty: 60 TABLET | Refills: 1 | Status: SHIPPED | OUTPATIENT
Start: 2017-01-26 | End: 2017-08-14

## 2017-01-26 RX ORDER — SIMETHICONE 80 MG
80 TABLET,CHEWABLE ORAL EVERY 6 HOURS PRN
Qty: 180 TABLET | Refills: 1 | Status: SHIPPED | OUTPATIENT
Start: 2017-01-26 | End: 2018-04-02

## 2017-01-26 RX ORDER — LEVOTHYROXINE SODIUM 88 UG/1
88 TABLET ORAL
Qty: 30 TABLET | Refills: 1 | Status: SHIPPED | OUTPATIENT
Start: 2017-01-26 | End: 2018-03-01

## 2017-01-26 RX ORDER — MAGNESIUM OXIDE 400 MG/1
400 TABLET ORAL 2 TIMES DAILY
Qty: 60 TABLET | Refills: 11 | Status: SHIPPED
Start: 2017-01-26 | End: 2017-02-27

## 2017-01-26 RX ORDER — MULTIVITAMIN,THERAPEUTIC
1 TABLET ORAL EVERY 24 HOURS
Qty: 30 TABLET | Refills: 11 | Status: SHIPPED | OUTPATIENT
Start: 2017-01-26

## 2017-01-26 RX ORDER — SULFAMETHOXAZOLE AND TRIMETHOPRIM 400; 80 MG/1; MG/1
1 TABLET ORAL
Qty: 15 TABLET | Refills: 3 | Status: SHIPPED | OUTPATIENT
Start: 2017-01-26 | End: 2017-02-27

## 2017-01-26 RX ORDER — AZATHIOPRINE 100 MG/1
150 TABLET ORAL EVERY EVENING
Qty: 30 TABLET | Refills: 11 | Status: SHIPPED | OUTPATIENT
Start: 2017-01-26 | End: 2017-04-10

## 2017-01-26 RX ORDER — PANTOPRAZOLE SODIUM 40 MG/1
40 TABLET, DELAYED RELEASE ORAL
Qty: 30 TABLET | Refills: 11 | Status: SHIPPED | OUTPATIENT
Start: 2017-01-26 | End: 2017-11-07

## 2017-01-26 RX ORDER — AZATHIOPRINE 100 MG/1
150 TABLET ORAL EVERY EVENING
Qty: 45 TABLET | Refills: 0 | Status: CANCELLED | OUTPATIENT
Start: 2017-01-26

## 2017-01-26 RX ORDER — TACROLIMUS 0.5 MG/1
2 CAPSULE ORAL 2 TIMES DAILY
Qty: 240 CAPSULE | Refills: 11 | Status: SHIPPED | OUTPATIENT
Start: 2017-01-26 | End: 2018-02-02

## 2017-01-26 RX ORDER — SERTRALINE HYDROCHLORIDE 100 MG/1
150 TABLET, FILM COATED ORAL DAILY
Qty: 45 TABLET | Refills: 1 | Status: SHIPPED | OUTPATIENT
Start: 2017-01-26 | End: 2017-08-14

## 2017-01-26 NOTE — TELEPHONE ENCOUNTER
Pt called to determine how to order her medications because she has her meds at 3 different pharmacies and [facility] told her that she doesn't need to fill with them. Patient is asking for clarification. Had our pharmacy run a test claim and patient was able to fill at our pharmacy. Patient agreeable to use our pharmacy.

## 2017-01-26 NOTE — TELEPHONE ENCOUNTER
Drug Name: Tacrolimus 0.5mg caps  Last Fill Date: 01/04/2017  Quantity: 540    Drug Name: smz/tmp 400-80mg tabs  Last Fill Date: 01/04/2017  Quantity: 30    Drug Name: azathioprine 50mg tabs  Last Fill Date: 01/04/2017  Quantity: 90    Rhea Brandt Cpht  [facility]  [phone]

## 2017-01-27 ENCOUNTER — OFFICE VISIT (OUTPATIENT)
Dept: GASTROENTEROLOGY | Facility: CLINIC | Age: 62
End: 2017-01-27
Attending: INTERNAL MEDICINE
Payer: COMMERCIAL

## 2017-01-27 ENCOUNTER — OFFICE VISIT - HEALTHEAST (OUTPATIENT)
Dept: INTERNAL MEDICINE | Facility: CLINIC | Age: 62
End: 2017-01-27

## 2017-01-27 ENCOUNTER — RECORDS - HEALTHEAST (OUTPATIENT)
Dept: ADMINISTRATIVE | Facility: OTHER | Age: 62
End: 2017-01-27

## 2017-01-27 VITALS
DIASTOLIC BLOOD PRESSURE: 66 MMHG | HEIGHT: 67 IN | SYSTOLIC BLOOD PRESSURE: 115 MMHG | TEMPERATURE: 98.7 F | HEART RATE: 74 BPM | WEIGHT: 158.4 LBS | BODY MASS INDEX: 24.86 KG/M2

## 2017-01-27 DIAGNOSIS — Z94.4 LIVER TRANSPLANT RECIPIENT (H): ICD-10-CM

## 2017-01-27 DIAGNOSIS — R10.13 ABDOMINAL PAIN, EPIGASTRIC: ICD-10-CM

## 2017-01-27 DIAGNOSIS — Z94.4 LIVER TRANSPLANT STATUS (H): Primary | ICD-10-CM

## 2017-01-27 PROCEDURE — 99213 OFFICE O/P EST LOW 20 MIN: CPT | Mod: ZF

## 2017-01-27 RX ORDER — TRAMADOL HYDROCHLORIDE 50 MG/1
50-100 TABLET ORAL EVERY 6 HOURS PRN
Qty: 50 TABLET | Refills: 0 | Status: SHIPPED | OUTPATIENT
Start: 2017-01-27 | End: 2017-03-21

## 2017-01-27 RX ORDER — LOPERAMIDE HCL 2 MG
2 CAPSULE ORAL 4 TIMES DAILY PRN
COMMUNITY
End: 2018-03-01

## 2017-01-27 ASSESSMENT — PAIN SCALES - GENERAL: PAINLEVEL: NO PAIN (1)

## 2017-01-27 NOTE — PROGRESS NOTES
I had the pleasure of seeing Megan Luque for followup in the Liver Transplantation Clinic at the [facility] on 01/27/2017.  Ms. Luque returns for followup now 5 months status post liver transplantation for alcoholic hepatitis.  She was the first patient we transplanted for alcoholic hepatitis.      She is doing quite well, I would say, at this point in time.  She is a bit inpatient and would like to be feeling better.  She does have diffuse aches and pains that limit her activity.  She also did develop an unusual complication post-transplant of retinopathy that has not really reversed, and she has poor eyesight in her left eye.  It does affect her depth perception as well.      She denies any right upper quadrant pain.  Her incision is intact.  She does complain of some itching.  She had a past history of eczema which she feels may have to come back.  She does have fatigue.  She denies any increased abdominal girth or lower extremity edema.  She denies any fevers or chills, cough or shortness of breath.  She denies any nausea, vomiting, diarrhea or constipation.  Her appetite is improving, and her weight has been relatively stable.     Current Outpatient Prescriptions   Medication     loperamide (IMODIUM) 2 MG capsule     traMADol (ULTRAM) 50 MG tablet     ursodiol (ACTIGALL) 300 MG capsule     tacrolimus (PROGRAF - GENERIC EQUIVALENT) 0.5 MG capsule     sulfamethoxazole-trimethoprim (BACTRIM/SEPTRA) 400-80 MG per tablet     simethicone (MYLICON) 80 MG chewable tablet     sertraline (ZOLOFT) 100 MG tablet     psyllium 0.52 G capsule     pantoprazole (PROTONIX) 40 MG EC tablet     multivitamin, therapeutic (THERA-VIT) TABS tablet     magnesium oxide (MAG-OX) 400 MG tablet     levothyroxine (SYNTHROID/LEVOTHROID) 88 MCG tablet     folic acid (FOLVITE) 1 MG tablet     cyanocobalamin 1000 MCG TABS     busPIRone (BUSPAR) 10 MG tablet     azaTHIOprine 100 MG TABS     traZODone (DESYREL)  100 MG tablet     acetaminophen (TYLENOL) 325 MG tablet     No current facility-administered medications for this visit.     B/P: 115/66, T: 98.7, P: 74, R: Data Unavailable    HEENT exam shows no scleral icterus and no temporal muscle wasting.  Chest is clear.  Abdominal exam shows her incision is intact.  Her liver is 10 cm in span without left lobe enlargement.  No spleen tip is palpable, and extremity exam shows no edema.  Skin shows no stigmata of chronic pruritus, and neurologic exam is within normal limits.     Recent Results (from the past 168 hour(s))   Hepatic panel    Collection Time: 01/23/17  9:40 AM   Result Value Ref Range    Bilirubin Direct <0.1 0.0 - 0.2 mg/dL    Bilirubin Total 0.3 0.2 - 1.3 mg/dL    Albumin 4.0 3.4 - 5.0 g/dL    Protein Total 7.6 6.8 - 8.8 g/dL    Alkaline Phosphatase 70 40 - 150 U/L    ALT 14 0 - 50 U/L    AST 13 0 - 45 U/L   Basic metabolic panel    Collection Time: 01/23/17  9:40 AM   Result Value Ref Range    Sodium 140 133 - 144 mmol/L    Potassium 4.8 3.4 - 5.3 mmol/L    Chloride 110 (H) 94 - 109 mmol/L    Carbon Dioxide 25 20 - 32 mmol/L    Anion Gap 5 3 - 14 mmol/L    Glucose 112 (H) 70 - 99 mg/dL    Urea Nitrogen 15 7 - 30 mg/dL    Creatinine 1.23 (H) 0.52 - 1.04 mg/dL    GFR Estimate 45 (L) >60 mL/min/1.7m2    GFR Estimate If Black 54 (L) >60 mL/min/1.7m2    Calcium 9.1 8.5 - 10.1 mg/dL   CBC with platelets differential    Collection Time: 01/23/17  9:40 AM   Result Value Ref Range    WBC 4.9 4.0 - 11.0 10e9/L    RBC Count 3.03 (L) 3.8 - 5.2 10e12/L    Hemoglobin 10.2 (L) 11.7 - 15.7 g/dL    Hematocrit 30.6 (L) 35.0 - 47.0 %     (H) 78 - 100 fl    MCH 33.7 (H) 26.5 - 33.0 pg    MCHC 33.3 31.5 - 36.5 g/dL    RDW 14.7 10.0 - 15.0 %    Platelet Count 323 150 - 450 10e9/L    Diff Method Automated Method     % Neutrophils 64.6 %    % Lymphocytes 21.3 %    % Monocytes 8.6 %    % Eosinophils 4.9 %    % Basophils 0.2 %    % Immature Granulocytes 0.4 %    Nucleated RBCs 0  0 /100    Absolute Neutrophil 3.2 1.6 - 8.3 10e9/L    Absolute Lymphocytes 1.0 0.8 - 5.3 10e9/L    Absolute Monocytes 0.4 0.0 - 1.3 10e9/L    Absolute Eosinophils 0.2 0.0 - 0.7 10e9/L    Absolute Basophils 0.0 0.0 - 0.2 10e9/L    Abs Immature Granulocytes 0.0 0 - 0.4 10e9/L    Absolute Nucleated RBC 0.0    Tacrolimus level    Collection Time: 01/23/17  9:40 AM   Result Value Ref Range    Tacrolimus Last Dose LAST DOSE 1.22.17 AT 2200     Tacrolimus Level (L) 5.0 - 15.0 ug/L     <3.0  Tacrolimus Reference Range   Kidney Transplant   Pediatric                      ug/L     0-3 months post transplant   10-12     3-6 months post transplant   8-10     6-12 months post transplant  6-8     >12 months post transplant   4-7   Adult     0-6 months post transplant   8-10     6-12 months post transplant  6-8     >12 months post transplant   4-6     >5 years post transplant     3-5   Heart Transplant   Pediatric     0-12 months post transplant  10-15     >12 months post transplant   5-10   Adult     0-3 months post transplant   10-15     3-6 months post transplant   8-12     6-12 months post transplant  6-12     >12 months post transplant   6-10   Lung Transplant     0-12 months post transplant  10-15     >12 months post transplant   8-12   Liver Transplant   Pediatric     0-3 months post transplant   10-15     3-6 months post transplant   8-10     >6 months post transplant    6-8   Adult     0-3 months post transplant   10-12     3-6 months post transplant   8-10     >6  months post transplant    6-8   Pancreas Transplant     0-6 months post transplant   8-10     >6 months post transplant    5-8   This test was developed and its performance characteristics determined by the   [facility],  [facility]. It has   not been cleared or approved by the FDA. The laboratory is regulated under CLIA   as qualified to perform high-complexity testing. This test is used for clinical   purposes. It  should not be regarded as investigational or for research.     Hepatic panel    Collection Time: 01/26/17  9:30 AM   Result Value Ref Range    Bilirubin Direct <0.1 0.0 - 0.2 mg/dL    Bilirubin Total 0.3 0.2 - 1.3 mg/dL    Albumin 4.0 3.4 - 5.0 g/dL    Protein Total 7.3 6.8 - 8.8 g/dL    Alkaline Phosphatase 69 40 - 150 U/L    ALT 14 0 - 50 U/L    AST 16 0 - 45 U/L   Basic metabolic panel    Collection Time: 01/26/17  9:30 AM   Result Value Ref Range    Sodium 140 133 - 144 mmol/L    Potassium 4.9 3.4 - 5.3 mmol/L    Chloride 110 (H) 94 - 109 mmol/L    Carbon Dioxide 25 20 - 32 mmol/L    Anion Gap 5 3 - 14 mmol/L    Glucose 113 (H) 70 - 99 mg/dL    Urea Nitrogen 15 7 - 30 mg/dL    Creatinine 1.29 (H) 0.52 - 1.04 mg/dL    GFR Estimate 42 (L) >60 mL/min/1.7m2    GFR Estimate If Black 51 (L) >60 mL/min/1.7m2    Calcium 9.1 8.5 - 10.1 mg/dL   CBC with platelets differential    Collection Time: 01/26/17  9:30 AM   Result Value Ref Range    WBC 6.3 4.0 - 11.0 10e9/L    RBC Count 3.05 (L) 3.8 - 5.2 10e12/L    Hemoglobin 10.3 (L) 11.7 - 15.7 g/dL    Hematocrit 31.1 (L) 35.0 - 47.0 %     (H) 78 - 100 fl    MCH 33.8 (H) 26.5 - 33.0 pg    MCHC 33.1 31.5 - 36.5 g/dL    RDW 14.8 10.0 - 15.0 %    Platelet Count 333 150 - 450 10e9/L    Diff Method Automated Method     % Neutrophils 64.7 %    % Lymphocytes 21.8 %    % Monocytes 8.1 %    % Eosinophils 5.1 %    % Basophils 0.0 %    % Immature Granulocytes 0.3 %    Nucleated RBCs 0 0 /100    Absolute Neutrophil 4.1 1.6 - 8.3 10e9/L    Absolute Lymphocytes 1.4 0.8 - 5.3 10e9/L    Absolute Monocytes 0.5 0.0 - 1.3 10e9/L    Absolute Eosinophils 0.3 0.0 - 0.7 10e9/L    Absolute Basophils 0.0 0.0 - 0.2 10e9/L    Abs Immature Granulocytes 0.0 0 - 0.4 10e9/L    Absolute Nucleated RBC 0.0    Tacrolimus level    Collection Time: 01/26/17  9:30 AM   Result Value Ref Range    Tacrolimus Last Dose 01/25/2017 2200     Tacrolimus Level 4.0 (L) 5.0 - 15.0 ug/L      My impression is that Ms.  Ene is almost 5 months status post liver transplantation for alcoholic cirrhosis.  She also had severe acute kidney injury, and her kidneys now have recovered to the where her current creatinine is 1.23, which is excellent.  I will cut her atenolol in half because of some orthostatic dizziness.  We may be able to stop it altogether based on her blood pressure today.  I, otherwise, will not be making any change to her medical regimen.  She is on tacrolimus and Imuran, and we will try to continue to run her tacrolimus low while her acute kidney injury heals.  My plan will be to see her back in the clinic in 6 weeks.      Thank you very much for allowing me to participate in the care of this patient.  If you have any questions regarding my recommendations, please do not hesitate to contact me.       Hussein Banegsa MD    Professor of Medicine  [employer]    Executive Medical Director, Solid Organ Transplant Program  [facility]

## 2017-01-27 NOTE — NURSING NOTE
"Chief Complaint   Patient presents with     RECHECK     follow up with liver transplant, tzimmer cma       Initial /66 mmHg  Pulse 74  Temp(Src) 98.7  F (37.1  C) (Oral)  Ht 1.702 m (5' 7\")  Wt 71.85 kg (158 lb 6.4 oz)  BMI 24.80 kg/m2 Estimated body mass index is 24.8 kg/(m^2) as calculated from the following:    Height as of this encounter: 1.702 m (5' 7\").    Weight as of this encounter: 71.85 kg (158 lb 6.4 oz).  BP completed using cuff size: regular    "

## 2017-01-27 NOTE — MR AVS SNAPSHOT
After Visit Summary   1/27/2017    Phan Luque    MRN: [id number]           Patient Information     Date Of Birth          1955        Visit Information        Provider Department      1/27/2017 10:45 AM Hussein Banegas MD [facility] Hepatology        Today's Diagnoses     Liver transplant status (H)    -  1     Abdominal pain, epigastric            Follow-ups after your visit        Follow-up notes from your care team     Return in about 6 weeks (around 3/10/2017).      Your next 10 appointments already scheduled     Feb 13, 2017 12:00 PM   (Arrive by 11:30 AM)   RETURN ENDOCRINE with Ruth Oakley MD   [facility] Endocrinology ([facility])    [address] [place] [address]  3rd [facility] [id number] [phone]            Mar 06, 2017  3:45 PM   (Arrive by 3:30 PM)   Return Liver Transplant with Hussein Banegas MD   [facility] Hepatology ([facility])    [address] [place] [address]  3rd [facility] [id number] [phone]            Mar 13, 2017  3:30 PM   Lab with  LAB   [facility] Lab ([facility])    [address] [place] [id number] [phone]            Mar 13, 2017  4:30 PM   (Arrive by 4:00 PM)   Return Visit with Ruth Ann Erazo MD   [facility] Nephrology ([facility])    [address] [place] [id number] [phone]              Who to contact     If you have questions or need follow up information about today's clinic visit or your schedule please contact [facility] HEPATOLOGY directly at [phone].  Normal or non-critical lab and imaging results will be communicated to you by MyChart, letter or phone within 4 business days after the clinic has received the results. If you do not hear from us within 7 days, please contact the clinic through MyChart or phone. If you have a critical or abnormal lab result, we will notify  "you by phone as soon as possible.  Submit refill requests through [employer] or call your pharmacy and they will forward the refill request to us. Please allow 3 business days for your refill to be completed.          Additional Information About Your Visit        [employer] Information     [employer] gives you secure access to your electronic health record. If you see a primary care provider, you can also send messages to your care team and make appointments. If you have questions, please call your primary care clinic.  If you do not have a primary care provider, please call [phone] and they will assist you.        Care EveryWhere ID     This is your Care EveryWhere ID. This could be used by other organizations to access your [place] medical records  [id number]        Your Vitals Were     Pulse Temperature Height BMI (Body Mass Index)          74 98.7  F (37.1  C) (Oral) 1.702 m (5' 7\") 24.80 kg/m2         Blood Pressure from Last 3 Encounters:   01/27/17 115/66   01/09/17 152/80   01/05/17 146/76    Weight from Last 3 Encounters:   01/27/17 71.85 kg (158 lb 6.4 oz)   01/09/17 74.118 kg (163 lb 6.4 oz)   01/05/17 74.435 kg (164 lb 1.6 oz)              Today, you had the following     No orders found for display         Today's Medication Changes          These changes are accurate as of: 1/27/17 11:17 AM.  If you have any questions, ask your nurse or doctor.               Start taking these medicines.        Dose/Directions    traMADol 50 MG tablet   Commonly known as:  ULTRAM   Used for:  Abdominal pain, epigastric   Started by:  Hussein Banegas MD        Dose:   mg   Take 1-2 tablets ( mg) by mouth every 6 hours as needed for pain   Quantity:  50 tablet   Refills:  0            Where to get your medicines      Some of these will need a paper prescription and others can be bought over the counter.  Ask your nurse if you have questions.     Bring a paper prescription for each of these medications    - " traMADol 50 MG tablet             Primary Care Provider Office Phone # Fax #    Jahaira Mayberry [phone]       [address] [place] [address]        Thank you!     Thank you for choosing [facility]PAT[facility]  for your care. Our goal is always to provide you with excellent care. Hearing back from our patients is one way we can continue to improve our services. Please take a few minutes to complete the written survey that you may receive in the mail after your visit with us. Thank you!             Your Updated Medication List - Protect others around you: Learn how to safely use, store and throw away your medicines at www.disposemymeds.org.          This list is accurate as of: 1/27/17 11:17 AM.  Always use your most recent med list.                   Brand Name Dispense Instructions for use    acetaminophen 325 MG tablet    TYLENOL    100 tablet    Take 2 tablets (650 mg) by mouth every 6 hours as needed for mild pain Or fevers. Use sparingly. Limit use to no more than 2 grams (2000 mg) in 24 hours.       azaTHIOprine 100 MG Tabs     30 tablet    Take 150 mg by mouth every evening       busPIRone 10 MG tablet    BUSPAR    60 tablet    Take 1 tablet (10 mg) by mouth 2 times daily       cyanocobalamin 1000 MCG Tabs     30 tablet    Take 1,000 mcg by mouth daily       folic acid 1 MG tablet    FOLVITE    30 tablet    Take 1 tablet (1 mg) by mouth daily       levothyroxine 88 MCG tablet    SYNTHROID/LEVOTHROID    30 tablet    Take 1 tablet (88 mcg) by mouth every morning (before breakfast)       loperamide 2 MG capsule    IMODIUM     Take 2 mg by mouth 4 times daily as needed for diarrhea       magnesium oxide 400 MG tablet    MAG-OX    60 tablet    Take 1 tablet (400 mg) by mouth 2 times daily       multivitamin, therapeutic Tabs tablet     30 tablet    Take 1 tablet by mouth every 24 hours       pantoprazole 40 MG EC tablet    PROTONIX    30 tablet    Take 1 tablet (40 mg) by  mouth every morning (before breakfast)       psyllium 0.52 G capsule     60 capsule    Take 2 capsules (1.04 g) by mouth daily With a full glass of water.       sertraline 100 MG tablet    ZOLOFT    45 tablet    Take 1.5 tablets (150 mg) by mouth daily       simethicone 80 MG chewable tablet    MYLICON    180 tablet    Take 1 tablet (80 mg) by mouth every 6 hours as needed for flatulence or cramping       sulfamethoxazole-trimethoprim 400-80 MG per tablet    BACTRIM/SEPTRA    15 tablet    Take 1 tablet by mouth twice a week Every Monday and Thursday       tacrolimus 0.5 MG capsule    PROGRAF - GENERIC EQUIVALENT    240 capsule    Take 4 capsules (2 mg) by mouth 2 times daily       traMADol 50 MG tablet    ULTRAM    50 tablet    Take 1-2 tablets ( mg) by mouth every 6 hours as needed for pain       traZODone 100 MG tablet    DESYREL    30 tablet    Take 1 tablet (100 mg) by mouth At Bedtime       ursodiol 300 MG capsule    ACTIGALL    60 capsule    Take 1 capsule (300 mg) by mouth 2 times daily

## 2017-01-27 NOTE — Clinical Note
1/27/2017       RE: Phan Luque  [address] [facility] [address]     Dear Colleague,    Thank you for referring your patient, Phan Luque, to the [facility] HEPATOLOGY at [facility] Please see a copy of my visit note below.    I had the pleasure of seeing Phan Luque for followup in the Liver Transplantation Clinic at the [facility] on 01/27/2017.  Ms. Luque returns for followup now 5 months status post liver transplantation for alcoholic hepatitis.  She was the first patient we transplanted for alcoholic hepatitis.      She is doing quite well, I would say, at this point in time.  She is a bit inpatient and would like to be feeling better.  She does have diffuse aches and pains that limit her activity.  She also did develop an unusual complication post-transplant of retinopathy that has not really reversed, and she has poor eyesight in her left eye.  It does affect her depth perception as well.      She denies any right upper quadrant pain.  Her incision is intact.  She does complain of some itching.  She had a past history of eczema which she feels may have to come back.  She does have fatigue.  She denies any increased abdominal girth or lower extremity edema.  She denies any fevers or chills, cough or shortness of breath.  She denies any nausea, vomiting, diarrhea or constipation.  Her appetite is improving, and her weight has been relatively stable.     Current Outpatient Prescriptions   Medication     loperamide (IMODIUM) 2 MG capsule     traMADol (ULTRAM) 50 MG tablet     ursodiol (ACTIGALL) 300 MG capsule     tacrolimus (PROGRAF - GENERIC EQUIVALENT) 0.5 MG capsule     sulfamethoxazole-trimethoprim (BACTRIM/SEPTRA) 400-80 MG per tablet     simethicone (MYLICON) 80 MG chewable tablet     sertraline (ZOLOFT) 100 MG tablet     psyllium 0.52 G capsule     pantoprazole (PROTONIX) 40 MG EC tablet     multivitamin,  therapeutic (THERA-VIT) TABS tablet     magnesium oxide (MAG-OX) 400 MG tablet     levothyroxine (SYNTHROID/LEVOTHROID) 88 MCG tablet     folic acid (FOLVITE) 1 MG tablet     cyanocobalamin 1000 MCG TABS     busPIRone (BUSPAR) 10 MG tablet     azaTHIOprine 100 MG TABS     traZODone (DESYREL) 100 MG tablet     acetaminophen (TYLENOL) 325 MG tablet     No current facility-administered medications for this visit.     B/P: 115/66, T: 98.7, P: 74, R: Data Unavailable    HEENT exam shows no scleral icterus and no temporal muscle wasting.  Chest is clear.  Abdominal exam shows her incision is intact.  Her liver is 10 cm in span without left lobe enlargement.  No spleen tip is palpable, and extremity exam shows no edema.  Skin shows no stigmata of chronic pruritus, and neurologic exam is within normal limits.     Recent Results (from the past 168 hour(s))   Hepatic panel    Collection Time: 01/23/17  9:40 AM   Result Value Ref Range    Bilirubin Direct <0.1 0.0 - 0.2 mg/dL    Bilirubin Total 0.3 0.2 - 1.3 mg/dL    Albumin 4.0 3.4 - 5.0 g/dL    Protein Total 7.6 6.8 - 8.8 g/dL    Alkaline Phosphatase 70 40 - 150 U/L    ALT 14 0 - 50 U/L    AST 13 0 - 45 U/L   Basic metabolic panel    Collection Time: 01/23/17  9:40 AM   Result Value Ref Range    Sodium 140 133 - 144 mmol/L    Potassium 4.8 3.4 - 5.3 mmol/L    Chloride 110 (H) 94 - 109 mmol/L    Carbon Dioxide 25 20 - 32 mmol/L    Anion Gap 5 3 - 14 mmol/L    Glucose 112 (H) 70 - 99 mg/dL    Urea Nitrogen 15 7 - 30 mg/dL    Creatinine 1.23 (H) 0.52 - 1.04 mg/dL    GFR Estimate 45 (L) >60 mL/min/1.7m2    GFR Estimate If Black 54 (L) >60 mL/min/1.7m2    Calcium 9.1 8.5 - 10.1 mg/dL   CBC with platelets differential    Collection Time: 01/23/17  9:40 AM   Result Value Ref Range    WBC 4.9 4.0 - 11.0 10e9/L    RBC Count 3.03 (L) 3.8 - 5.2 10e12/L    Hemoglobin 10.2 (L) 11.7 - 15.7 g/dL    Hematocrit 30.6 (L) 35.0 - 47.0 %     (H) 78 - 100 fl    MCH 33.7 (H) 26.5 - 33.0 pg     MCHC 33.3 31.5 - 36.5 g/dL    RDW 14.7 10.0 - 15.0 %    Platelet Count 323 150 - 450 10e9/L    Diff Method Automated Method     % Neutrophils 64.6 %    % Lymphocytes 21.3 %    % Monocytes 8.6 %    % Eosinophils 4.9 %    % Basophils 0.2 %    % Immature Granulocytes 0.4 %    Nucleated RBCs 0 0 /100    Absolute Neutrophil 3.2 1.6 - 8.3 10e9/L    Absolute Lymphocytes 1.0 0.8 - 5.3 10e9/L    Absolute Monocytes 0.4 0.0 - 1.3 10e9/L    Absolute Eosinophils 0.2 0.0 - 0.7 10e9/L    Absolute Basophils 0.0 0.0 - 0.2 10e9/L    Abs Immature Granulocytes 0.0 0 - 0.4 10e9/L    Absolute Nucleated RBC 0.0    Tacrolimus level    Collection Time: 01/23/17  9:40 AM   Result Value Ref Range    Tacrolimus Last Dose LAST DOSE 1.22.17 AT 2200     Tacrolimus Level (L) 5.0 - 15.0 ug/L     <3.0  Tacrolimus Reference Range   Kidney Transplant   Pediatric                      ug/L     0-3 months post transplant   10-12     3-6 months post transplant   8-10     6-12 months post transplant  6-8     >12 months post transplant   4-7   Adult     0-6 months post transplant   8-10     6-12 months post transplant  6-8     >12 months post transplant   4-6     >5 years post transplant     3-5   Heart Transplant   Pediatric     0-12 months post transplant  10-15     >12 months post transplant   5-10   Adult     0-3 months post transplant   10-15     3-6 months post transplant   8-12     6-12 months post transplant  6-12     >12 months post transplant   6-10   Lung Transplant     0-12 months post transplant  10-15     >12 months post transplant   8-12   Liver Transplant   Pediatric     0-3 months post transplant   10-15     3-6 months post transplant   8-10     >6 months post transplant    6-8   Adult     0-3 months post transplant   10-12     3-6 months post transplant   8-10     >6  months post transplant    6-8   Pancreas Transplant     0-6 months post transplant   8-10     >6 months post transplant    5-8   This test was developed and its  performance characteristics determined by the   [facility],  Special Chemistry Laboratory. It has   not been cleared or approved by the FDA. The laboratory is regulated under CLIA   as qualified to perform high-complexity testing. This test is used for clinical   purposes. It should not be regarded as investigational or for research.     Hepatic panel    Collection Time: 01/26/17  9:30 AM   Result Value Ref Range    Bilirubin Direct <0.1 0.0 - 0.2 mg/dL    Bilirubin Total 0.3 0.2 - 1.3 mg/dL    Albumin 4.0 3.4 - 5.0 g/dL    Protein Total 7.3 6.8 - 8.8 g/dL    Alkaline Phosphatase 69 40 - 150 U/L    ALT 14 0 - 50 U/L    AST 16 0 - 45 U/L   Basic metabolic panel    Collection Time: 01/26/17  9:30 AM   Result Value Ref Range    Sodium 140 133 - 144 mmol/L    Potassium 4.9 3.4 - 5.3 mmol/L    Chloride 110 (H) 94 - 109 mmol/L    Carbon Dioxide 25 20 - 32 mmol/L    Anion Gap 5 3 - 14 mmol/L    Glucose 113 (H) 70 - 99 mg/dL    Urea Nitrogen 15 7 - 30 mg/dL    Creatinine 1.29 (H) 0.52 - 1.04 mg/dL    GFR Estimate 42 (L) >60 mL/min/1.7m2    GFR Estimate If Black 51 (L) >60 mL/min/1.7m2    Calcium 9.1 8.5 - 10.1 mg/dL   CBC with platelets differential    Collection Time: 01/26/17  9:30 AM   Result Value Ref Range    WBC 6.3 4.0 - 11.0 10e9/L    RBC Count 3.05 (L) 3.8 - 5.2 10e12/L    Hemoglobin 10.3 (L) 11.7 - 15.7 g/dL    Hematocrit 31.1 (L) 35.0 - 47.0 %     (H) 78 - 100 fl    MCH 33.8 (H) 26.5 - 33.0 pg    MCHC 33.1 31.5 - 36.5 g/dL    RDW 14.8 10.0 - 15.0 %    Platelet Count 333 150 - 450 10e9/L    Diff Method Automated Method     % Neutrophils 64.7 %    % Lymphocytes 21.8 %    % Monocytes 8.1 %    % Eosinophils 5.1 %    % Basophils 0.0 %    % Immature Granulocytes 0.3 %    Nucleated RBCs 0 0 /100    Absolute Neutrophil 4.1 1.6 - 8.3 10e9/L    Absolute Lymphocytes 1.4 0.8 - 5.3 10e9/L    Absolute Monocytes 0.5 0.0 - 1.3 10e9/L    Absolute Eosinophils 0.3 0.0 - 0.7 10e9/L    Absolute  Basophils 0.0 0.0 - 0.2 10e9/L    Abs Immature Granulocytes 0.0 0 - 0.4 10e9/L    Absolute Nucleated RBC 0.0    Tacrolimus level    Collection Time: 01/26/17  9:30 AM   Result Value Ref Range    Tacrolimus Last Dose 01/25/2017 2200     Tacrolimus Level 4.0 (L) 5.0 - 15.0 ug/L      My impression is that Ms. Luque is almost 5 months status post liver transplantation for alcoholic cirrhosis.  She also had severe acute kidney injury, and her kidneys now have recovered to the where her current creatinine is 1.23, which is excellent.  I will cut her atenolol in half because of some orthostatic dizziness.  We may be able to stop it altogether based on her blood pressure today.  I, otherwise, will not be making any change to her medical regimen.  She is on tacrolimus and Imuran, and we will try to continue to run her tacrolimus low while her acute kidney injury heals.  My plan will be to see her back in the clinic in 6 weeks.      Thank you very much for allowing me to participate in the care of this patient.  If you have any questions regarding my recommendations, please do not hesitate to contact me.       Hussein Banegas MD    Professor of Medicine  [employer]    Executive Medical Director, Solid Organ Transplant Program  [facility]

## 2017-01-30 LAB
ALBUMIN SERPL-MCNC: 4 G/DL (ref 3.4–5)
ALP SERPL-CCNC: 64 U/L (ref 40–150)
ALT SERPL W P-5'-P-CCNC: 14 U/L (ref 0–50)
ANION GAP SERPL CALCULATED.3IONS-SCNC: 8 MMOL/L (ref 3–14)
AST SERPL W P-5'-P-CCNC: 14 U/L (ref 0–45)
BASOPHILS # BLD AUTO: 0 10E9/L (ref 0–0.2)
BASOPHILS NFR BLD AUTO: 0.2 %
BILIRUB DIRECT SERPL-MCNC: 0.1 MG/DL (ref 0–0.2)
BILIRUB SERPL-MCNC: 0.5 MG/DL (ref 0.2–1.3)
BUN SERPL-MCNC: 19 MG/DL (ref 7–30)
CALCIUM SERPL-MCNC: 9.3 MG/DL (ref 8.5–10.1)
CHLORIDE SERPL-SCNC: 110 MMOL/L (ref 94–109)
CO2 SERPL-SCNC: 24 MMOL/L (ref 20–32)
CREAT SERPL-MCNC: 1.37 MG/DL (ref 0.52–1.04)
DIFFERENTIAL METHOD BLD: ABNORMAL
EOSINOPHIL # BLD AUTO: 0.2 10E9/L (ref 0–0.7)
EOSINOPHIL NFR BLD AUTO: 3 %
ERYTHROCYTE [DISTWIDTH] IN BLOOD BY AUTOMATED COUNT: 14.9 % (ref 10–15)
GFR SERPL CREATININE-BSD FRML MDRD: 39 ML/MIN/1.7M2
GLUCOSE SERPL-MCNC: 122 MG/DL (ref 70–99)
HCT VFR BLD AUTO: 31.9 % (ref 35–47)
HGB BLD-MCNC: 10.6 G/DL (ref 11.7–15.7)
IMM GRANULOCYTES # BLD: 0 10E9/L (ref 0–0.4)
IMM GRANULOCYTES NFR BLD: 0.2 %
LYMPHOCYTES # BLD AUTO: 1.5 10E9/L (ref 0.8–5.3)
LYMPHOCYTES NFR BLD AUTO: 25.9 %
MAGNESIUM SERPL-MCNC: 2.2 MG/DL (ref 1.6–2.3)
MCH RBC QN AUTO: 33.8 PG (ref 26.5–33)
MCHC RBC AUTO-ENTMCNC: 33.2 G/DL (ref 31.5–36.5)
MCV RBC AUTO: 102 FL (ref 78–100)
MONOCYTES # BLD AUTO: 0.4 10E9/L (ref 0–1.3)
MONOCYTES NFR BLD AUTO: 7.7 %
NEUTROPHILS # BLD AUTO: 3.6 10E9/L (ref 1.6–8.3)
NEUTROPHILS NFR BLD AUTO: 63 %
NRBC # BLD AUTO: 0 10*3/UL
NRBC BLD AUTO-RTO: 0 /100
PHOSPHATE SERPL-MCNC: 4 MG/DL (ref 2.5–4.5)
PLATELET # BLD AUTO: 355 10E9/L (ref 150–450)
POTASSIUM SERPL-SCNC: 4.9 MMOL/L (ref 3.4–5.3)
PROT SERPL-MCNC: 7.5 G/DL (ref 6.8–8.8)
RBC # BLD AUTO: 3.14 10E12/L (ref 3.8–5.2)
SODIUM SERPL-SCNC: 142 MMOL/L (ref 133–144)
TACROLIMUS BLD-MCNC: 4.1 UG/L (ref 5–15)
TME LAST DOSE: ABNORMAL H
WBC # BLD AUTO: 5.7 10E9/L (ref 4–11)

## 2017-01-30 PROCEDURE — 83735 ASSAY OF MAGNESIUM: CPT | Performed by: TRANSPLANT SURGERY

## 2017-01-30 PROCEDURE — 80048 BASIC METABOLIC PNL TOTAL CA: CPT | Performed by: TRANSPLANT SURGERY

## 2017-01-30 PROCEDURE — 80197 ASSAY OF TACROLIMUS: CPT | Performed by: TRANSPLANT SURGERY

## 2017-01-30 PROCEDURE — 84100 ASSAY OF PHOSPHORUS: CPT | Performed by: TRANSPLANT SURGERY

## 2017-01-30 PROCEDURE — 80076 HEPATIC FUNCTION PANEL: CPT | Performed by: TRANSPLANT SURGERY

## 2017-01-30 PROCEDURE — 85025 COMPLETE CBC W/AUTO DIFF WBC: CPT | Performed by: TRANSPLANT SURGERY

## 2017-01-31 ENCOUNTER — COMMUNICATION - HEALTHEAST (OUTPATIENT)
Dept: INTERNAL MEDICINE | Facility: CLINIC | Age: 62
End: 2017-01-31

## 2017-02-02 ENCOUNTER — COMMUNICATION - HEALTHEAST (OUTPATIENT)
Dept: INTERNAL MEDICINE | Facility: CLINIC | Age: 62
End: 2017-02-02

## 2017-02-02 ENCOUNTER — HOSPITAL ENCOUNTER (OUTPATIENT)
Facility: CLINIC | Age: 62
Setting detail: SPECIMEN
Discharge: HOME | End: 2017-02-02
Admitting: TRANSPLANT SURGERY
Payer: COMMERCIAL

## 2017-02-02 ENCOUNTER — TRANSFERRED RECORDS (OUTPATIENT)
Dept: HEALTH INFORMATION MANAGEMENT | Facility: CLINIC | Age: 62
End: 2017-02-02

## 2017-02-02 DIAGNOSIS — G47.00 INSOMNIA, UNSPECIFIED TYPE: Primary | ICD-10-CM

## 2017-02-02 DIAGNOSIS — G89.18 ACUTE POST-OPERATIVE PAIN: ICD-10-CM

## 2017-02-02 DIAGNOSIS — G47.00 INSOMNIA: ICD-10-CM

## 2017-02-02 LAB
ALBUMIN SERPL-MCNC: 3.7 G/DL (ref 3.4–5)
ALP SERPL-CCNC: 66 U/L (ref 40–150)
ALT SERPL W P-5'-P-CCNC: 15 U/L (ref 0–50)
ANION GAP SERPL CALCULATED.3IONS-SCNC: 5 MMOL/L (ref 3–14)
AST SERPL W P-5'-P-CCNC: 8 U/L (ref 0–45)
BASOPHILS # BLD AUTO: 0 10E9/L (ref 0–0.2)
BASOPHILS NFR BLD AUTO: 0.2 %
BILIRUB DIRECT SERPL-MCNC: <0.1 MG/DL (ref 0–0.2)
BILIRUB SERPL-MCNC: 0.4 MG/DL (ref 0.2–1.3)
BUN SERPL-MCNC: 15 MG/DL (ref 7–30)
CALCIUM SERPL-MCNC: 9.2 MG/DL (ref 8.5–10.1)
CHLORIDE SERPL-SCNC: 111 MMOL/L (ref 94–109)
CO2 SERPL-SCNC: 25 MMOL/L (ref 20–32)
CREAT SERPL-MCNC: 1.39 MG/DL (ref 0.52–1.04)
DIFFERENTIAL METHOD BLD: ABNORMAL
EOSINOPHIL # BLD AUTO: 0.1 10E9/L (ref 0–0.7)
EOSINOPHIL NFR BLD AUTO: 3.3 %
ERYTHROCYTE [DISTWIDTH] IN BLOOD BY AUTOMATED COUNT: 14.9 % (ref 10–15)
GFR SERPL CREATININE-BSD FRML MDRD: 39 ML/MIN/1.7M2
GLUCOSE SERPL-MCNC: 84 MG/DL (ref 70–99)
HCT VFR BLD AUTO: 30.7 % (ref 35–47)
HGB BLD-MCNC: 10.1 G/DL (ref 11.7–15.7)
IMM GRANULOCYTES # BLD: 0 10E9/L (ref 0–0.4)
IMM GRANULOCYTES NFR BLD: 0.5 %
LYMPHOCYTES # BLD AUTO: 1.4 10E9/L (ref 0.8–5.3)
LYMPHOCYTES NFR BLD AUTO: 32.3 %
MAGNESIUM SERPL-MCNC: 2.1 MG/DL (ref 1.6–2.3)
MCH RBC QN AUTO: 33.7 PG (ref 26.5–33)
MCHC RBC AUTO-ENTMCNC: 32.9 G/DL (ref 31.5–36.5)
MCV RBC AUTO: 102 FL (ref 78–100)
MONOCYTES # BLD AUTO: 0.4 10E9/L (ref 0–1.3)
MONOCYTES NFR BLD AUTO: 9.4 %
NEUTROPHILS # BLD AUTO: 2.3 10E9/L (ref 1.6–8.3)
NEUTROPHILS NFR BLD AUTO: 54.3 %
NRBC # BLD AUTO: 0 10*3/UL
NRBC BLD AUTO-RTO: 0 /100
PHOSPHATE SERPL-MCNC: 4 MG/DL (ref 2.5–4.5)
PLATELET # BLD AUTO: 321 10E9/L (ref 150–450)
POTASSIUM SERPL-SCNC: 4.5 MMOL/L (ref 3.4–5.3)
PROT SERPL-MCNC: 6.9 G/DL (ref 6.8–8.8)
RBC # BLD AUTO: 3 10E12/L (ref 3.8–5.2)
SODIUM SERPL-SCNC: 141 MMOL/L (ref 133–144)
TACROLIMUS BLD-MCNC: 4.2 UG/L (ref 5–15)
TME LAST DOSE: ABNORMAL H
WBC # BLD AUTO: 4.3 10E9/L (ref 4–11)

## 2017-02-02 PROCEDURE — 80197 ASSAY OF TACROLIMUS: CPT | Performed by: TRANSPLANT SURGERY

## 2017-02-02 PROCEDURE — 80076 HEPATIC FUNCTION PANEL: CPT | Performed by: TRANSPLANT SURGERY

## 2017-02-02 PROCEDURE — 83735 ASSAY OF MAGNESIUM: CPT | Performed by: TRANSPLANT SURGERY

## 2017-02-02 PROCEDURE — 85025 COMPLETE CBC W/AUTO DIFF WBC: CPT | Performed by: TRANSPLANT SURGERY

## 2017-02-02 PROCEDURE — 80048 BASIC METABOLIC PNL TOTAL CA: CPT | Performed by: TRANSPLANT SURGERY

## 2017-02-02 PROCEDURE — 84100 ASSAY OF PHOSPHORUS: CPT | Performed by: TRANSPLANT SURGERY

## 2017-02-02 RX ORDER — ACETAMINOPHEN 325 MG/1
650 TABLET ORAL EVERY 6 HOURS PRN
Qty: 100 TABLET | Refills: 0 | Status: SHIPPED
Start: 2017-02-02

## 2017-02-02 RX ORDER — TRAZODONE HYDROCHLORIDE 100 MG/1
100 TABLET ORAL AT BEDTIME
Qty: 30 TABLET | Refills: 0 | Status: SHIPPED
Start: 2017-02-02 | End: 2017-12-14

## 2017-02-02 NOTE — TELEPHONE ENCOUNTER
Drug Name: mapap 325mg  Last Fill Date: 1/4/17  Quantity: 100    Drug Name: trazodone 100mg  Last Fill Date: 1/4/17  Quantity: 30    Adalgisa Church   [facility]  [phone]

## 2017-02-06 ENCOUNTER — DOCUMENTATION ONLY (OUTPATIENT)
Dept: TRANSPLANT | Facility: CLINIC | Age: 62
End: 2017-02-06

## 2017-02-06 NOTE — PROGRESS NOTES
Transplant Social Work: Substance Use Treatment  D: Ms. Luque's transplant coordinator forwarded me a copy of the letter from [facility] in [place], confirming that Ms. Luque has begun their outpatient CD/Dual Diagnosis treatment program as of 1/5/2017. She is in their medium intensity program.   A: Ms. Luque was unable to attend treatment immediately after her transplant due to her extensive stay at [facility]. Her ongoing medical needs also interfered with admission and full participation. She should now be able to attend and complete this medically needed intervention.  P: A copy of the letter is being [facility] to [facility] for [facility]. I remain available for any needed follow up related to the above. ([phone])

## 2017-02-07 ENCOUNTER — TELEPHONE (OUTPATIENT)
Dept: TRANSPLANT | Facility: CLINIC | Age: 62
End: 2017-02-07

## 2017-02-07 DIAGNOSIS — L29.9 ITCHING: Primary | ICD-10-CM

## 2017-02-07 RX ORDER — HYDROXYZINE HYDROCHLORIDE 25 MG/1
25-50 TABLET, FILM COATED ORAL EVERY 6 HOURS PRN
Qty: 60 TABLET | Refills: 1 | Status: SHIPPED | OUTPATIENT
Start: 2017-02-07 | End: 2017-10-25

## 2017-02-07 NOTE — TELEPHONE ENCOUNTER
Pt having horrible itching. Dr Dhillon reports patient can have atarax. Pt agreeable to try and will pick it up at local pharmacy.

## 2017-02-09 ENCOUNTER — TELEPHONE (OUTPATIENT)
Dept: PHARMACY | Facility: CLINIC | Age: 62
End: 2017-02-09

## 2017-02-12 ASSESSMENT — ENCOUNTER SYMPTOMS
LEG PAIN: 1
DECREASED APPETITE: 0
BLOOD IN STOOL: 0
POOR WOUND HEALING: 0
JOINT SWELLING: 0
RECTAL PAIN: 0
BACK PAIN: 1
NECK MASS: 0
SORE THROAT: 1
MUSCLE CRAMPS: 1
SPEECH CHANGE: 0
EXERCISE INTOLERANCE: 1
POLYPHAGIA: 0
JAUNDICE: 0
CHILLS: 0
SMELL DISTURBANCE: 0
SEIZURES: 0
BOWEL INCONTINENCE: 1
ALTERED TEMPERATURE REGULATION: 1
LIGHT-HEADEDNESS: 1
HEARTBURN: 1
TACHYCARDIA: 1
WEIGHT LOSS: 0
WEAKNESS: 1
BLOATING: 0
ABDOMINAL PAIN: 1
CONSTIPATION: 0
FATIGUE: 1
PALPITATIONS: 1
NIGHT SWEATS: 0
RECTAL BLEEDING: 0
DIZZINESS: 1
NAIL CHANGES: 1
DEPRESSION: 1
HYPOTENSION: 0
ARTHRALGIAS: 1
NECK PAIN: 1
NAUSEA: 1
HALLUCINATIONS: 0
STIFFNESS: 1
INSOMNIA: 1
HEADACHES: 1
ORTHOPNEA: 0
TROUBLE SWALLOWING: 0
NUMBNESS: 1
WEIGHT GAIN: 0
VOMITING: 1
TINGLING: 1
MUSCLE WEAKNESS: 1
INCREASED ENERGY: 1
HOARSE VOICE: 1
MYALGIAS: 1
DECREASED CONCENTRATION: 0
PANIC: 0
DISTURBANCES IN COORDINATION: 0
NERVOUS/ANXIOUS: 1
FEVER: 0
SYNCOPE: 0
POLYDIPSIA: 0
TREMORS: 1
SINUS CONGESTION: 1
SKIN CHANGES: 1
LOSS OF CONSCIOUSNESS: 0
CLAUDICATION: 1
TASTE DISTURBANCE: 1
LEG SWELLING: 0
HYPERTENSION: 0
SLEEP DISTURBANCES DUE TO BREATHING: 0
SINUS PAIN: 1
PARALYSIS: 0
DIARRHEA: 1
MEMORY LOSS: 0

## 2017-02-13 ENCOUNTER — OFFICE VISIT (OUTPATIENT)
Dept: ENDOCRINOLOGY | Facility: CLINIC | Age: 62
End: 2017-02-13

## 2017-02-13 ENCOUNTER — RECORDS - HEALTHEAST (OUTPATIENT)
Dept: ADMINISTRATIVE | Facility: OTHER | Age: 62
End: 2017-02-13

## 2017-02-13 VITALS
DIASTOLIC BLOOD PRESSURE: 60 MMHG | HEART RATE: 93 BPM | BODY MASS INDEX: 25.33 KG/M2 | HEIGHT: 67 IN | SYSTOLIC BLOOD PRESSURE: 99 MMHG | WEIGHT: 161.4 LBS

## 2017-02-13 DIAGNOSIS — E03.9 ACQUIRED HYPOTHYROIDISM: Primary | ICD-10-CM

## 2017-02-13 DIAGNOSIS — E83.52 HYPERCALCEMIA: ICD-10-CM

## 2017-02-13 LAB
ALBUMIN SERPL-MCNC: 3.6 G/DL (ref 3.4–5)
ALP SERPL-CCNC: 62 U/L (ref 40–150)
ALT SERPL W P-5'-P-CCNC: 12 U/L (ref 0–50)
ANION GAP SERPL CALCULATED.3IONS-SCNC: 5 MMOL/L (ref 3–14)
AST SERPL W P-5'-P-CCNC: 16 U/L (ref 0–45)
BASOPHILS # BLD AUTO: 0 10E9/L (ref 0–0.2)
BASOPHILS NFR BLD AUTO: 0.2 %
BILIRUB DIRECT SERPL-MCNC: 0.1 MG/DL (ref 0–0.2)
BILIRUB SERPL-MCNC: 0.6 MG/DL (ref 0.2–1.3)
BUN SERPL-MCNC: 14 MG/DL (ref 7–30)
CALCIUM SERPL-MCNC: 9.1 MG/DL (ref 8.5–10.1)
CHLORIDE SERPL-SCNC: 109 MMOL/L (ref 94–109)
CO2 SERPL-SCNC: 28 MMOL/L (ref 20–32)
CREAT SERPL-MCNC: 1.29 MG/DL (ref 0.52–1.04)
DIFFERENTIAL METHOD BLD: ABNORMAL
EOSINOPHIL # BLD AUTO: 0.2 10E9/L (ref 0–0.7)
EOSINOPHIL NFR BLD AUTO: 3.8 %
ERYTHROCYTE [DISTWIDTH] IN BLOOD BY AUTOMATED COUNT: 14.7 % (ref 10–15)
GFR SERPL CREATININE-BSD FRML MDRD: 42 ML/MIN/1.7M2
GLUCOSE SERPL-MCNC: 90 MG/DL (ref 70–99)
HCT VFR BLD AUTO: 32.4 % (ref 35–47)
HGB BLD-MCNC: 10.6 G/DL (ref 11.7–15.7)
IMM GRANULOCYTES # BLD: 0 10E9/L (ref 0–0.4)
IMM GRANULOCYTES NFR BLD: 0 %
LYMPHOCYTES # BLD AUTO: 1.4 10E9/L (ref 0.8–5.3)
LYMPHOCYTES NFR BLD AUTO: 28.6 %
MAGNESIUM SERPL-MCNC: 2 MG/DL (ref 1.6–2.3)
MCH RBC QN AUTO: 33.2 PG (ref 26.5–33)
MCHC RBC AUTO-ENTMCNC: 32.7 G/DL (ref 31.5–36.5)
MCV RBC AUTO: 102 FL (ref 78–100)
MONOCYTES # BLD AUTO: 0.6 10E9/L (ref 0–1.3)
MONOCYTES NFR BLD AUTO: 11.8 %
NEUTROPHILS # BLD AUTO: 2.7 10E9/L (ref 1.6–8.3)
NEUTROPHILS NFR BLD AUTO: 55.6 %
NRBC # BLD AUTO: 0 10*3/UL
NRBC BLD AUTO-RTO: 0 /100
PHOSPHATE SERPL-MCNC: 4.5 MG/DL (ref 2.5–4.5)
PLATELET # BLD AUTO: 312 10E9/L (ref 150–450)
POTASSIUM SERPL-SCNC: 4.5 MMOL/L (ref 3.4–5.3)
PROT SERPL-MCNC: 7.1 G/DL (ref 6.8–8.8)
RBC # BLD AUTO: 3.19 10E12/L (ref 3.8–5.2)
SODIUM SERPL-SCNC: 142 MMOL/L (ref 133–144)
WBC # BLD AUTO: 4.8 10E9/L (ref 4–11)

## 2017-02-13 PROCEDURE — 84100 ASSAY OF PHOSPHORUS: CPT | Performed by: NURSE PRACTITIONER

## 2017-02-13 PROCEDURE — 80197 ASSAY OF TACROLIMUS: CPT | Performed by: NURSE PRACTITIONER

## 2017-02-13 PROCEDURE — 83735 ASSAY OF MAGNESIUM: CPT | Performed by: NURSE PRACTITIONER

## 2017-02-13 PROCEDURE — 85025 COMPLETE CBC W/AUTO DIFF WBC: CPT | Performed by: NURSE PRACTITIONER

## 2017-02-13 PROCEDURE — 80048 BASIC METABOLIC PNL TOTAL CA: CPT | Performed by: NURSE PRACTITIONER

## 2017-02-13 PROCEDURE — 80076 HEPATIC FUNCTION PANEL: CPT | Performed by: NURSE PRACTITIONER

## 2017-02-13 ASSESSMENT — PAIN SCALES - GENERAL: PAINLEVEL: NO PAIN (0)

## 2017-02-13 NOTE — MR AVS SNAPSHOT
After Visit Summary   2/13/2017    Phan Luque    MRN: [id number]           Patient Information     Date Of Birth          1955        Visit Information        Provider Department      2/13/2017 12:00 PM Ruth Oakley MD [facility]        Today's Diagnoses     Acquired hypothyroidism    -  1    Hypercalcemia          Care Instructions    Lab work with your next blood draw.   We will follow up the results.   Normal calcium intake - 2 -3 servings of diary a day.   Add Calcium Carbonate 500 mg + Vit D 400 IU oral daily (OTC)           Follow-ups after your visit        Follow-up notes from your care team     Return in about 6 months (around 8/13/2017).      Your next 10 appointments already scheduled     Mar 06, 2017  3:45 PM CST   (Arrive by 3:30 PM)   Return Liver Transplant with Hussein Banegas MD   [facility] Hepatology ([facility])    [address] [place] [address]   [phone]            Mar 13, 2017  3:30 PM CDT   Lab with  LAB   [facility] Lab ([facility])    [address] [place] [id number]   [phone]            Mar 13, 2017  4:30 PM CDT   (Arrive by 4:00 PM)   Return Visit with Ruth Ann Erazo MD   [facility] Nephrology ([facility])    [address] [place] [address]   [phone]            Aug 14, 2017  4:00 PM CDT   (Arrive by 3:45 PM)   RETURN ENDOCRINE with Ruth Oakley MD   [facility] Endocrinology ([facility])    [address] [place] [id number] [phone]              Future tests that were ordered for you today     Open Future Orders        Priority Expected Expires Ordered    TSH with free T4 reflex Routine  2/13/2018 2/13/2017    25 Hydroxyvitamin D2 and D3 Routine  2/13/2018 2/13/2017    Creatinine Routine  2/13/2018 2/13/2017  "   Calcium Routine  2/13/2018 2/13/2017    Phosphorus Routine  2/13/2018 2/13/2017    Albumin level Routine  2/13/2018 2/13/2017    Urea nitrogen Routine  2/13/2018 2/13/2017            Who to contact     Please call your clinic at [phone] to:    Ask questions about your health    Make or cancel appointments    Discuss your medicines    Learn about your test results    Speak to your doctor   If you have compliments or concerns about an experience at your clinic, or if you wish to file a complaint, please contact [facility] Patient Relations at [phone] or email us at [email or web address]         Additional Information About Your Visit        [employer] Information     [employer] gives you secure access to your electronic health record. If you see a primary care provider, you can also send messages to your care team and make appointments. If you have questions, please call your primary care clinic.  If you do not have a primary care provider, please call [phone] and they will assist you.      [employer] is an electronic gateway that provides easy, online access to your medical records. With [employer], you can request a clinic appointment, read your test results, renew a prescription or communicate with your care team.     To access your existing account, please contact your [facility] Clinic or call [phone] for assistance.        Care EveryWhere ID     This is your Care EveryWhere ID. This could be used by other organizations to access your [facility] medical records  [id number]        Your Vitals Were     Pulse Height BMI (Body Mass Index)             93 1.702 m (5' 7\") 25.28 kg/m2          Blood Pressure from Last 3 Encounters:   02/13/17 99/60   01/27/17 115/66   01/09/17 152/80    Weight from Last 3 Encounters:   02/13/17 73.2 kg (161 lb 6.4 oz)   01/27/17 71.8 kg (158 lb 6.4 oz)   01/09/17 74.1 kg (163 lb 6.4 oz)               Primary Care " Provider Office Phone # Fax #    Jahaira Mayberry [phone]       [address] [place] [address]        Thank you!     Thank you for choosing [facility]  for your care. Our goal is always to provide you with excellent care. Hearing back from our patients is one way we can continue to improve our services. Please take a few minutes to complete the written survey that you may receive in the mail after your visit with us. Thank you!             Your Updated Medication List - Protect others around you: Learn how to safely use, store and throw away your medicines at www.disposemymeds.org.          This list is accurate as of: 2/13/17  6:35 PM.  Always use your most recent med list.                   Brand Name Dispense Instructions for use    acetaminophen 325 MG tablet    TYLENOL    100 tablet    Take 2 tablets (650 mg) by mouth every 6 hours as needed for mild pain Or fevers. Use sparingly. Limit use to no more than 2 grams (2000 mg) in 24 hours. **Further refills must be obtained by primary care provider**       azaTHIOprine 100 MG Tabs     30 tablet    Take 150 mg by mouth every evening       busPIRone 10 MG tablet    BUSPAR    60 tablet    Take 1 tablet (10 mg) by mouth 2 times daily       cyanocobalamin 1000 MCG Tabs     30 tablet    Take 1,000 mcg by mouth daily       folic acid 1 MG tablet    FOLVITE    30 tablet    Take 1 tablet (1 mg) by mouth daily       hydrOXYzine 25 MG tablet    ATARAX    60 tablet    Take 1-2 tablets (25-50 mg) by mouth every 6 hours as needed for itching       levothyroxine 88 MCG tablet    SYNTHROID/LEVOTHROID    30 tablet    Take 1 tablet (88 mcg) by mouth every morning (before breakfast)       loperamide 2 MG capsule    IMODIUM     Take 2 mg by mouth 4 times daily as needed for diarrhea       magnesium oxide 400 MG tablet    MAG-OX    60 tablet    Take 1 tablet (400 mg) by mouth 2 times daily       multivitamin, therapeutic Tabs tablet      30 tablet    Take 1 tablet by mouth every 24 hours       pantoprazole 40 MG EC tablet    PROTONIX    30 tablet    Take 1 tablet (40 mg) by mouth every morning (before breakfast)       psyllium 0.52 G capsule     60 capsule    Take 2 capsules (1.04 g) by mouth daily With a full glass of water.       sertraline 100 MG tablet    ZOLOFT    45 tablet    Take 1.5 tablets (150 mg) by mouth daily       simethicone 80 MG chewable tablet    MYLICON    180 tablet    Take 1 tablet (80 mg) by mouth every 6 hours as needed for flatulence or cramping       sulfamethoxazole-trimethoprim 400-80 MG per tablet    BACTRIM/SEPTRA    15 tablet    Take 1 tablet by mouth twice a week Every Monday and Thursday       tacrolimus 0.5 MG capsule    PROGRAF - GENERIC EQUIVALENT    240 capsule    Take 4 capsules (2 mg) by mouth 2 times daily       traMADol 50 MG tablet    ULTRAM    50 tablet    Take 1-2 tablets ( mg) by mouth every 6 hours as needed for pain       traZODone 100 MG tablet    DESYREL    30 tablet    Take 1 tablet (100 mg) by mouth At Bedtime **Further refills must be obtained by primary care provider**       ursodiol 300 MG capsule    ACTIGALL    60 capsule    Take 1 capsule (300 mg) by mouth 2 times daily

## 2017-02-13 NOTE — PROGRESS NOTES
Endocrine Clinic Consult:     Reason for consult:  Date: November 14, 2016  Referring Physician: Jahaira Mayberry      HPI:     Phan Luque is a 61 year old year old female who presents for evaluation of hypercalcemia.  She has a complicated past medical history with liver transplant on immunosuppressant and malnutrition for which she has been admitted to the acute rehabilitation for tube feedings overnight.  Her Calcium levels have been mildly elevated intermittently. An evaluation performed at the time showed mild elevation of Ca 10.7.     ENDO CALCIUM LABS-[facility] Latest Ref Rng 10/6/2016   CALCIUM 8.5 - 10.1 mg/dL 10.4 (H)   CALCIUM IONIZED 4.4 - 5.2 mg/dL    CALCIUM IONIZED WHOLE BLOOD 4.4 - 5.2 mg/dL    PHOSPHOROUS 2.5 - 4.5 mg/dL 5.5 (H)   MAGNESIUM 1.6 - 2.3 mg/dL 2.2   ALBUMIN 3.4 - 5.0 g/dL 3.4   BUN 7 - 30 mg/dL 48 (H)   CREATININE 0.52 - 1.04 mg/dL 2.28 (H)   PARATHYROID HORMONE INTACT 12 - 72 pg/mL    ALKPHOS 40 - 150 U/L 68     ENDO CALCIUM LABS-UMP Latest Ref Rng 10/10/2016   CALCIUM 8.5 - 10.1 mg/dL 9.8   CALCIUM IONIZED 4.4 - 5.2 mg/dL    CALCIUM IONIZED WHOLE BLOOD 4.4 - 5.2 mg/dL    PHOSPHOROUS 2.5 - 4.5 mg/dL 3.8   MAGNESIUM 1.6 - 2.3 mg/dL    ALBUMIN 3.4 - 5.0 g/dL 3.1 (L)   BUN 7 - 30 mg/dL 22   CREATININE 0.52 - 1.04 mg/dL 1.30 (H)   PARATHYROID HORMONE INTACT 12 - 72 pg/mL 3 (L)     During this time she was seen and found to have low PTH, acute on Chronic CKD, mild hypercalcemia, mild elevation of PTHrp. The most likely reason for Ca elevation would be increased intake in the setting of acute on chronic ckd.     Her levels normalized after discontinuing calcium supplements.     Denies having felt a goiter, neck pain, difficulty in swallowing, difficulty in breathing and changes in voice.     Risk Factors:    No Family history of hyperparathyroidism or high calcium or MEN, No diagnosis of sarcoidosis, No history of prolonged immobilization, No history of thiazide diuretics use, Not on Calcium  and Vit D supplements, Not on Vit A supplements and No history of thyroid disorders or supplementation    Calcium and Vit D supplements : None  Other supplements: Off tube feeding.     ROS  Feels tired, poor sleep  No lethargy, confusion, stupor or coma associated with high calcium  She complains of Anorexia and chronic diarrhea, feels nauseous chronically.   no history of Peptic ulcers or pancreatitis.  No renal symptoms- polyuria, polydipsia  No history of renal stones  H/O Renal failure    No palpitations    No muscle weakness   Has generalized aches and pain.     Other ROS:   Vision loss on the left due to Optic neuropathy  No headache, change in vision, loss of peripheral vision  No neck pain, no other lumps in the neck  No change in breathing   No change in swallowing.   No swelling in extremities  No change in mental status.   Other ROS that were obtained were negative.     Past Medical History   Diagnosis Date     Asthma      End stage liver disease (H)      2/2 Alcohol Abuse     Liver transplanted (H)      Migraines      Osteoporosis      Spinal stenosis in cervical region      Past Surgical History   Procedure Laterality Date     Appendectomy            Tubal ligation       laparoscopic     Sphincteroplasty       Esophagoscopy, gastroscopy, duodenoscopy (egd), combined N/A 2016     Procedure: COMBINED ESOPHAGOSCOPY, GASTROSCOPY, DUODENOSCOPY (EGD);  Surgeon: Tao Alfonso MD;  Location: [facility]     Transplant liver recipient  donor N/A 2016     Procedure: TRANSPLANT LIVER RECIPIENT  DONOR;  Surgeon: Larry Dhillon MD;  Location: [facility]     Bench liver N/A 2016     Procedure: BENCH LIVER;  Surgeon: Larry Dhillon MD;  Location: [facility]     Colonoscopy       Esophagoscopy, gastroscopy, duodenoscopy (egd), combined N/A 10/31/2016     Procedure: COMBINED ESOPHAGOSCOPY, GASTROSCOPY, DUODENOSCOPY (EGD), BIOPSY SINGLE OR MULTIPLE;  Surgeon: Ronald Bojorquez MD;   "Location: [facility]     Family History   Problem Relation Age of Onset     Family History Negative       Social History     Social History     Marital status:      Spouse name: N/A     Number of children: N/A     Years of education: N/A     Occupational History     Not on file.     Social History Main Topics     Smoking status: Former Smoker     Smokeless tobacco: Not on file     Alcohol use 4.2 oz/week     7 Standard drinks or equivalent per week      Comment: heavy use (750ml/2 days) up until 1 year ago; had cut down to 1 drink/day; none since 7/18/16     Drug use: No     Sexual activity: Not on file     Other Topics Concern     Not on file     Social History Narrative        Allergies   Allergen Reactions     Benadryl [Diphenhydramine] Hives     Cefaclor Hives     Hydrocodone-Acetaminophen Itching     Oxycodone Itching     Penicillins Hives     Sulfa Drugs Hives     Current Outpatient Prescriptions   Medication     hydrOXYzine (ATARAX) 25 MG tablet     traZODone (DESYREL) 100 MG tablet     acetaminophen (TYLENOL) 325 MG tablet     loperamide (IMODIUM) 2 MG capsule     traMADol (ULTRAM) 50 MG tablet     ursodiol (ACTIGALL) 300 MG capsule     tacrolimus (PROGRAF - GENERIC EQUIVALENT) 0.5 MG capsule     sulfamethoxazole-trimethoprim (BACTRIM/SEPTRA) 400-80 MG per tablet     simethicone (MYLICON) 80 MG chewable tablet     sertraline (ZOLOFT) 100 MG tablet     psyllium 0.52 G capsule     pantoprazole (PROTONIX) 40 MG EC tablet     multivitamin, therapeutic (THERA-VIT) TABS tablet     magnesium oxide (MAG-OX) 400 MG tablet     levothyroxine (SYNTHROID/LEVOTHROID) 88 MCG tablet     folic acid (FOLVITE) 1 MG tablet     cyanocobalamin 1000 MCG TABS     busPIRone (BUSPAR) 10 MG tablet     azaTHIOprine 100 MG TABS     No current facility-administered medications for this visit.            Exam:  BP 99/60  Pulse 93  Ht 1.702 m (5' 7\")  Wt 73.2 kg (161 lb 6.4 oz)  BMI 25.28 kg/m2   Constitutional: TF in place no acute " distress, on wheelchair   Head: Normocephalic. No masses, lesions, tenderness or abnormalities  Eye loss of peripheral vision on the left +, right side is normal.   Neck: Neck supple. No adenopathy. Thyroid symmetric, normal size, Carotids without bruits.  ENT: No throat congestion, no neck nodes or sinus tenderness  Cardiovascular: regular rhythm, no tachycardia  Respiratory: Lungs clear  Gastrointestinal: Abdomen soft, non-tender. BS normal. No striae  Musculoskeletal: gait normal and normal muscle tone  Neurologic: Normal speech, reflexes normal. Tremor is present (Chronic).  Slow Lora  Psychiatric: mentation appears normal     Last Basic Metabolic Panel:  PHOS      4.9   11/14/2016  CBC RESULTS:   Recent Labs   Lab Test  02/02/17   0915   WBC  4.3   RBC  3.00*   HGB  10.1*   HCT  30.7*   MCV  102*   MCH  33.7*   MCHC  32.9   RDW  14.9   PLT  321     Recent Labs   Lab Test  02/02/17   0915  01/30/17   0920   NA  141  142   POTASSIUM  4.5  4.9   CHLORIDE  111*  110*   CO2  25  24   ANIONGAP  5  8   GLC  84  122*   BUN  15  19   CR  1.39*  1.37*   ERIC  9.2  9.3     TSH   Date Value Ref Range Status   12/15/2016 2.18 0.40 - 4.00 mU/L Final   ]      Assessment   Phan Luque is a 61 year old years old female who is here for evaluation of Intermittent hypercalcemia during rehab stay. At that time, she had history of acute on chronic renal failure which has been improving, was on cycled feeds at night [Nepro] and received calcium supplements.  She has no prior history of calcium disorders.  Workup at the time showed slight elevation of Ca at 10.4 and PTH-related protein 4.1, creatinine 1.58, suppressed PTH level.     Intermittent hypercalcemia in the setting of acute on chronic renal failure now resolved  Elevated PTH-related protein likely due to renal failure  Most likely due to high calcium intake.  Labs over the months show normal calcium levels.     History of osteopenia   Was treated with HRT in 40s for 5  years  DXA not performed yet   Normal Ca intake recommended.     Plan: Test Calcium, phosphorous and albumin, , Bone Density, TSH, Vitamin D levels.    Normal Ca intake (her intake 1-2 servings a day (milk / cheese / yogurt) but limited due to renal diet.   Add Ca + D (500/400) daily    Hypothyroidism - Acquired  Obtain thyroid function test  Continue LT4 88 mcg daily for now.     Follow up plan  Follow DXA  Maintain normal calcium intake.   Follow TFT (Question of euthyroid sick vs true hypothryoidism, however, normal tsh values after replacement may indicate true hypothyroidism)     Ruth Oakley MD  [id number]  Endocrinology Service

## 2017-02-13 NOTE — PATIENT INSTRUCTIONS
Lab work with your next blood draw.   We will follow up the results.   Normal calcium intake - 2 -3 servings of diary a day.   Add Calcium Carbonate 500 mg + Vit D 400 IU oral daily (OTC)

## 2017-02-13 NOTE — LETTER
2/13/2017       RE: Phan Luque  [address] [facility] [address]     Dear Colleague,    Thank you for referring your patient, Phan Luque, to the [facility] ENDOCRINOLOGY at [facility]. Please see a copy of my visit note below.    Endocrine Clinic Consult:     Reason for consult:  Date: November 14, 2016  Referring Physician: Jahaira Mayberry      HPI:     Phan Luque is a 61 year old year old female who presents for evaluation of hypercalcemia.  She has a complicated past medical history with liver transplant on immunosuppressant and malnutrition for which she has been admitted to the acute rehabilitation for tube feedings overnight.  Her Calcium levels have been mildly elevated intermittently. An evaluation performed at the time showed mild elevation of Ca 10.7.     ENDO CALCIUM LABS-UMP Latest Ref Rng 10/6/2016   CALCIUM 8.5 - 10.1 mg/dL 10.4 (H)   CALCIUM IONIZED 4.4 - 5.2 mg/dL    CALCIUM IONIZED WHOLE BLOOD 4.4 - 5.2 mg/dL    PHOSPHOROUS 2.5 - 4.5 mg/dL 5.5 (H)   MAGNESIUM 1.6 - 2.3 mg/dL 2.2   ALBUMIN 3.4 - 5.0 g/dL 3.4   BUN 7 - 30 mg/dL 48 (H)   CREATININE 0.52 - 1.04 mg/dL 2.28 (H)   PARATHYROID HORMONE INTACT 12 - 72 pg/mL    ALKPHOS 40 - 150 U/L 68     ENDO CALCIUM LABS-[facility] Latest Ref Rng 10/10/2016   CALCIUM 8.5 - 10.1 mg/dL 9.8   CALCIUM IONIZED 4.4 - 5.2 mg/dL    CALCIUM IONIZED WHOLE BLOOD 4.4 - 5.2 mg/dL    PHOSPHOROUS 2.5 - 4.5 mg/dL 3.8   MAGNESIUM 1.6 - 2.3 mg/dL    ALBUMIN 3.4 - 5.0 g/dL 3.1 (L)   BUN 7 - 30 mg/dL 22   CREATININE 0.52 - 1.04 mg/dL 1.30 (H)   PARATHYROID HORMONE INTACT 12 - 72 pg/mL 3 (L)     During this time she was seen and found to have low PTH, acute on Chronic CKD, mild hypercalcemia, mild elevation of PTHrp. The most likely reason for Ca elevation would be increased intake in the setting of acute on chronic ckd.     Her levels normalized after discontinuing calcium supplements.     Denies having felt a goiter, neck  pain, difficulty in swallowing, difficulty in breathing and changes in voice.     Risk Factors:    No Family history of hyperparathyroidism or high calcium or MEN, No diagnosis of sarcoidosis, No history of prolonged immobilization, No history of thiazide diuretics use, Not on Calcium and Vit D supplements, Not on Vit A supplements and No history of thyroid disorders or supplementation    Calcium and Vit D supplements : None  Other supplements: Off tube feeding.     ROS  Feels tired, poor sleep  No lethargy, confusion, stupor or coma associated with high calcium  She complains of Anorexia and chronic diarrhea, feels nauseous chronically.   no history of Peptic ulcers or pancreatitis.  No renal symptoms- polyuria, polydipsia  No history of renal stones  H/O Renal failure    No palpitations    No muscle weakness   Has generalized aches and pain.     Other ROS:   Vision loss on the left due to Optic neuropathy  No headache, change in vision, loss of peripheral vision  No neck pain, no other lumps in the neck  No change in breathing   No change in swallowing.   No swelling in extremities  No change in mental status.   Other ROS that were obtained were negative.     Past Medical History   Diagnosis Date     Asthma      End stage liver disease (H)      2/2 Alcohol Abuse     Liver transplanted (H)      Migraines      Osteoporosis      Spinal stenosis in cervical region      Past Surgical History   Procedure Laterality Date     Appendectomy            Tubal ligation       laparoscopic     Sphincteroplasty       Esophagoscopy, gastroscopy, duodenoscopy (egd), combined N/A 2016     Procedure: COMBINED ESOPHAGOSCOPY, GASTROSCOPY, DUODENOSCOPY (EGD);  Surgeon: Tao Alfonso MD;  Location:  GI     Transplant liver recipient  donor N/A 2016     Procedure: TRANSPLANT LIVER RECIPIENT  DONOR;  Surgeon: Larry Dhillon MD;  Location:  OR     Bench liver N/A 2016     Procedure: BENCH  LIVER;  Surgeon: Larry Dhillon MD;  Location: [facility]     Colonoscopy       Esophagoscopy, gastroscopy, duodenoscopy (egd), combined N/A 10/31/2016     Procedure: COMBINED ESOPHAGOSCOPY, GASTROSCOPY, DUODENOSCOPY (EGD), BIOPSY SINGLE OR MULTIPLE;  Surgeon: Ronald Bojorquez MD;  Location: [facility]     Family History   Problem Relation Age of Onset     Family History Negative       Social History     Social History     Marital status:      Spouse name: N/A     Number of children: N/A     Years of education: N/A     Occupational History     Not on file.     Social History Main Topics     Smoking status: Former Smoker     Smokeless tobacco: Not on file     Alcohol use 4.2 oz/week     7 Standard drinks or equivalent per week      Comment: heavy use (750ml/2 days) up until 1 year ago; had cut down to 1 drink/day; none since 7/18/16     Drug use: No     Sexual activity: Not on file     Other Topics Concern     Not on file     Social History Narrative        Allergies   Allergen Reactions     Benadryl [Diphenhydramine] Hives     Cefaclor Hives     Hydrocodone-Acetaminophen Itching     Oxycodone Itching     Penicillins Hives     Sulfa Drugs Hives     Current Outpatient Prescriptions   Medication     hydrOXYzine (ATARAX) 25 MG tablet     traZODone (DESYREL) 100 MG tablet     acetaminophen (TYLENOL) 325 MG tablet     loperamide (IMODIUM) 2 MG capsule     traMADol (ULTRAM) 50 MG tablet     ursodiol (ACTIGALL) 300 MG capsule     tacrolimus (PROGRAF - GENERIC EQUIVALENT) 0.5 MG capsule     sulfamethoxazole-trimethoprim (BACTRIM/SEPTRA) 400-80 MG per tablet     simethicone (MYLICON) 80 MG chewable tablet     sertraline (ZOLOFT) 100 MG tablet     psyllium 0.52 G capsule     pantoprazole (PROTONIX) 40 MG EC tablet     multivitamin, therapeutic (THERA-VIT) TABS tablet     magnesium oxide (MAG-OX) 400 MG tablet     levothyroxine (SYNTHROID/LEVOTHROID) 88 MCG tablet     folic acid (FOLVITE) 1 MG tablet      "cyanocobalamin 1000 MCG TABS     busPIRone (BUSPAR) 10 MG tablet     azaTHIOprine 100 MG TABS     No current facility-administered medications for this visit.            Exam:  BP 99/60  Pulse 93  Ht 1.702 m (5' 7\")  Wt 73.2 kg (161 lb 6.4 oz)  BMI 25.28 kg/m2   Constitutional: TF in place no acute distress, on wheelchair   Head: Normocephalic. No masses, lesions, tenderness or abnormalities  Eye loss of peripheral vision on the left +, right side is normal.   Neck: Neck supple. No adenopathy. Thyroid symmetric, normal size, Carotids without bruits.  ENT: No throat congestion, no neck nodes or sinus tenderness  Cardiovascular: regular rhythm, no tachycardia  Respiratory: Lungs clear  Gastrointestinal: Abdomen soft, non-tender. BS normal. No striae  Musculoskeletal: gait normal and normal muscle tone  Neurologic: Normal speech, reflexes normal. Tremor is present (Chronic).  Slow Lora  Psychiatric: mentation appears normal     Last Basic Metabolic Panel:  PHOS      4.9   11/14/2016  CBC RESULTS:   Recent Labs   Lab Test  02/02/17   0915   WBC  4.3   RBC  3.00*   HGB  10.1*   HCT  30.7*   MCV  102*   MCH  33.7*   MCHC  32.9   RDW  14.9   PLT  321     Recent Labs   Lab Test  02/02/17   0915  01/30/17   0920   NA  141  142   POTASSIUM  4.5  4.9   CHLORIDE  111*  110*   CO2  25  24   ANIONGAP  5  8   GLC  84  122*   BUN  15  19   CR  1.39*  1.37*   ERIC  9.2  9.3     TSH   Date Value Ref Range Status   12/15/2016 2.18 0.40 - 4.00 mU/L Final   ]      Assessment   Phan Luque is a 61 year old years old female who is here for evaluation of Intermittent hypercalcemia during rehab stay. At that time, she had history of acute on chronic renal failure which has been improving, was on cycled feeds at night [Nepro] and received calcium supplements.  She has no prior history of calcium disorders.  Workup at the time showed slight elevation of Ca at 10.4 and PTH-related protein 4.1, creatinine 1.58, suppressed PTH level. "     Intermittent hypercalcemia in the setting of acute on chronic renal failure now resolved  Elevated PTH-related protein likely due to renal failure  Most likely due to high calcium intake.  Labs over the months show normal calcium levels.     History of osteopenia   Was treated with HRT in 40s for 5 years  DXA not performed yet   Normal Ca intake recommended.     Plan: Test Calcium, phosphorous and albumin, , Bone Density, TSH, Vitamin D levels.    Normal Ca intake (her intake 1-2 servings a day (milk / cheese / yogurt) but limited due to renal diet.   Add Ca + D (500/400) daily    Hypothyroidism - Acquired  Obtain thyroid function test  Continue LT4 88 mcg daily for now.     Follow up plan  Follow DXA  Maintain normal calcium intake.   Follow TFT (Question of euthyroid sick vs true hypothryoidism, however, normal tsh values after replacement may indicate true hypothyroidism)     Ruth Oakley MD  [id number]  Endocrinology Service

## 2017-02-13 NOTE — NURSING NOTE
Chief Complaint   Patient presents with     RECHECK     F/U HYPERCALCEMIA     Jimena Castorena, [facility]  Endocrinology & Diabetes 3G

## 2017-02-14 LAB
TACROLIMUS BLD-MCNC: 4.4 UG/L (ref 5–15)
TME LAST DOSE: ABNORMAL H

## 2017-02-16 LAB
ALBUMIN SERPL-MCNC: 3.6 G/DL (ref 3.4–5)
ALP SERPL-CCNC: 70 U/L (ref 40–150)
ALT SERPL W P-5'-P-CCNC: 14 U/L (ref 0–50)
ANION GAP SERPL CALCULATED.3IONS-SCNC: 7 MMOL/L (ref 3–14)
AST SERPL W P-5'-P-CCNC: 18 U/L (ref 0–45)
BILIRUB DIRECT SERPL-MCNC: <0.1 MG/DL (ref 0–0.2)
BILIRUB SERPL-MCNC: 0.3 MG/DL (ref 0.2–1.3)
BUN SERPL-MCNC: 16 MG/DL (ref 7–30)
CALCIUM SERPL-MCNC: 8.9 MG/DL (ref 8.5–10.1)
CHLORIDE SERPL-SCNC: 108 MMOL/L (ref 94–109)
CO2 SERPL-SCNC: 27 MMOL/L (ref 20–32)
CREAT SERPL-MCNC: 1.28 MG/DL (ref 0.52–1.04)
ERYTHROCYTE [DISTWIDTH] IN BLOOD BY AUTOMATED COUNT: 14.6 % (ref 10–15)
GFR SERPL CREATININE-BSD FRML MDRD: 42 ML/MIN/1.7M2
GLUCOSE SERPL-MCNC: 82 MG/DL (ref 70–99)
HCT VFR BLD AUTO: 31.8 % (ref 35–47)
HGB BLD-MCNC: 10.6 G/DL (ref 11.7–15.7)
LDLC SERPL DIRECT ASSAY-MCNC: 88 MG/DL
MAGNESIUM SERPL-MCNC: 2.2 MG/DL (ref 1.6–2.3)
MCH RBC QN AUTO: 33.7 PG (ref 26.5–33)
MCHC RBC AUTO-ENTMCNC: 33.3 G/DL (ref 31.5–36.5)
MCV RBC AUTO: 101 FL (ref 78–100)
PHOSPHATE SERPL-MCNC: 4.1 MG/DL (ref 2.5–4.5)
PLATELET # BLD AUTO: 328 10E9/L (ref 150–450)
POTASSIUM SERPL-SCNC: 4.5 MMOL/L (ref 3.4–5.3)
PROT SERPL-MCNC: 7.1 G/DL (ref 6.8–8.8)
RBC # BLD AUTO: 3.15 10E12/L (ref 3.8–5.2)
SODIUM SERPL-SCNC: 142 MMOL/L (ref 133–144)
T4 FREE SERPL-MCNC: 0.87 NG/DL (ref 0.76–1.46)
TACROLIMUS BLD-MCNC: 5.2 UG/L (ref 5–15)
TME LAST DOSE: NORMAL H
WBC # BLD AUTO: 4.7 10E9/L (ref 4–11)

## 2017-02-16 PROCEDURE — 80076 HEPATIC FUNCTION PANEL: CPT | Performed by: TRANSPLANT SURGERY

## 2017-02-16 PROCEDURE — 84439 ASSAY OF FREE THYROXINE: CPT | Performed by: TRANSPLANT SURGERY

## 2017-02-16 PROCEDURE — 80048 BASIC METABOLIC PNL TOTAL CA: CPT | Performed by: TRANSPLANT SURGERY

## 2017-02-16 PROCEDURE — 84100 ASSAY OF PHOSPHORUS: CPT | Performed by: TRANSPLANT SURGERY

## 2017-02-16 PROCEDURE — 85027 COMPLETE CBC AUTOMATED: CPT | Performed by: TRANSPLANT SURGERY

## 2017-02-16 PROCEDURE — 83721 ASSAY OF BLOOD LIPOPROTEIN: CPT | Performed by: TRANSPLANT SURGERY

## 2017-02-16 PROCEDURE — 82306 VITAMIN D 25 HYDROXY: CPT | Performed by: TRANSPLANT SURGERY

## 2017-02-16 PROCEDURE — 83735 ASSAY OF MAGNESIUM: CPT | Performed by: TRANSPLANT SURGERY

## 2017-02-16 PROCEDURE — 80197 ASSAY OF TACROLIMUS: CPT | Performed by: TRANSPLANT SURGERY

## 2017-02-20 LAB
ALBUMIN SERPL-MCNC: 3.7 G/DL (ref 3.4–5)
ALP SERPL-CCNC: 69 U/L (ref 40–150)
ALT SERPL W P-5'-P-CCNC: 14 U/L (ref 0–50)
ANION GAP SERPL CALCULATED.3IONS-SCNC: 7 MMOL/L (ref 3–14)
AST SERPL W P-5'-P-CCNC: 17 U/L (ref 0–45)
BASOPHILS # BLD AUTO: 0 10E9/L (ref 0–0.2)
BASOPHILS NFR BLD AUTO: 0.2 %
BILIRUB DIRECT SERPL-MCNC: <0.1 MG/DL (ref 0–0.2)
BILIRUB SERPL-MCNC: 0.5 MG/DL (ref 0.2–1.3)
BUN SERPL-MCNC: 15 MG/DL (ref 7–30)
CALCIUM SERPL-MCNC: 9.5 MG/DL (ref 8.5–10.1)
CHLORIDE SERPL-SCNC: 107 MMOL/L (ref 94–109)
CO2 SERPL-SCNC: 27 MMOL/L (ref 20–32)
CREAT SERPL-MCNC: 1.36 MG/DL (ref 0.52–1.04)
DIFFERENTIAL METHOD BLD: ABNORMAL
EOSINOPHIL # BLD AUTO: 0.2 10E9/L (ref 0–0.7)
EOSINOPHIL NFR BLD AUTO: 3.2 %
ERYTHROCYTE [DISTWIDTH] IN BLOOD BY AUTOMATED COUNT: 14.3 % (ref 10–15)
GFR SERPL CREATININE-BSD FRML MDRD: 40 ML/MIN/1.7M2
GLUCOSE SERPL-MCNC: 80 MG/DL (ref 70–99)
HCT VFR BLD AUTO: 32.9 % (ref 35–47)
HGB BLD-MCNC: 10.8 G/DL (ref 11.7–15.7)
IMM GRANULOCYTES # BLD: 0 10E9/L (ref 0–0.4)
IMM GRANULOCYTES NFR BLD: 0.2 %
LYMPHOCYTES # BLD AUTO: 1.7 10E9/L (ref 0.8–5.3)
LYMPHOCYTES NFR BLD AUTO: 32.5 %
MAGNESIUM SERPL-MCNC: 2.1 MG/DL (ref 1.6–2.3)
MCH RBC QN AUTO: 32.9 PG (ref 26.5–33)
MCHC RBC AUTO-ENTMCNC: 32.8 G/DL (ref 31.5–36.5)
MCV RBC AUTO: 100 FL (ref 78–100)
MONOCYTES # BLD AUTO: 0.5 10E9/L (ref 0–1.3)
MONOCYTES NFR BLD AUTO: 9.6 %
NEUTROPHILS # BLD AUTO: 2.9 10E9/L (ref 1.6–8.3)
NEUTROPHILS NFR BLD AUTO: 54.3 %
NRBC # BLD AUTO: 0 10*3/UL
NRBC BLD AUTO-RTO: 0 /100
PHOSPHATE SERPL-MCNC: 4.6 MG/DL (ref 2.5–4.5)
PLATELET # BLD AUTO: 324 10E9/L (ref 150–450)
POTASSIUM SERPL-SCNC: 4.8 MMOL/L (ref 3.4–5.3)
PROT SERPL-MCNC: 7 G/DL (ref 6.8–8.8)
RBC # BLD AUTO: 3.28 10E12/L (ref 3.8–5.2)
SODIUM SERPL-SCNC: 141 MMOL/L (ref 133–144)
TACROLIMUS BLD-MCNC: 5.2 UG/L (ref 5–15)
TME LAST DOSE: NORMAL H
WBC # BLD AUTO: 5.3 10E9/L (ref 4–11)

## 2017-02-20 PROCEDURE — 80048 BASIC METABOLIC PNL TOTAL CA: CPT | Performed by: TRANSPLANT SURGERY

## 2017-02-20 PROCEDURE — 80197 ASSAY OF TACROLIMUS: CPT | Performed by: TRANSPLANT SURGERY

## 2017-02-20 PROCEDURE — 83735 ASSAY OF MAGNESIUM: CPT | Performed by: TRANSPLANT SURGERY

## 2017-02-20 PROCEDURE — 80076 HEPATIC FUNCTION PANEL: CPT | Performed by: TRANSPLANT SURGERY

## 2017-02-20 PROCEDURE — 84100 ASSAY OF PHOSPHORUS: CPT | Performed by: TRANSPLANT SURGERY

## 2017-02-20 PROCEDURE — 85025 COMPLETE CBC W/AUTO DIFF WBC: CPT | Performed by: TRANSPLANT SURGERY

## 2017-02-22 LAB
DEPRECATED CALCIDIOL+CALCIFEROL SERPL-MC: NORMAL UG/L (ref 20–75)
VITAMIN D2 SERPL-MCNC: <5 UG/L
VITAMIN D3 SERPL-MCNC: 37 UG/L

## 2017-02-23 ENCOUNTER — MYC MEDICAL ADVICE (OUTPATIENT)
Dept: ENDOCRINOLOGY | Facility: CLINIC | Age: 62
End: 2017-02-23

## 2017-02-23 LAB
ALBUMIN SERPL-MCNC: 3.7 G/DL (ref 3.4–5)
ALP SERPL-CCNC: 69 U/L (ref 40–150)
ALT SERPL W P-5'-P-CCNC: 14 U/L (ref 0–50)
ANION GAP SERPL CALCULATED.3IONS-SCNC: 8 MMOL/L (ref 3–14)
AST SERPL W P-5'-P-CCNC: 17 U/L (ref 0–45)
BILIRUB DIRECT SERPL-MCNC: <0.1 MG/DL (ref 0–0.2)
BILIRUB SERPL-MCNC: 0.6 MG/DL (ref 0.2–1.3)
BUN SERPL-MCNC: 15 MG/DL (ref 7–30)
CALCIUM SERPL-MCNC: 9.4 MG/DL (ref 8.5–10.1)
CHLORIDE SERPL-SCNC: 106 MMOL/L (ref 94–109)
CK SERPL-CCNC: 32 U/L (ref 30–225)
CO2 SERPL-SCNC: 27 MMOL/L (ref 20–32)
CREAT SERPL-MCNC: 1.46 MG/DL (ref 0.52–1.04)
ERYTHROCYTE [DISTWIDTH] IN BLOOD BY AUTOMATED COUNT: 14.2 % (ref 10–15)
GFR SERPL CREATININE-BSD FRML MDRD: 37 ML/MIN/1.7M2
GLUCOSE SERPL-MCNC: 102 MG/DL (ref 70–99)
HCT VFR BLD AUTO: 33.4 % (ref 35–47)
HGB BLD-MCNC: 11 G/DL (ref 11.7–15.7)
MCH RBC QN AUTO: 33 PG (ref 26.5–33)
MCHC RBC AUTO-ENTMCNC: 32.9 G/DL (ref 31.5–36.5)
MCV RBC AUTO: 100 FL (ref 78–100)
PHOSPHATE SERPL-MCNC: 4.4 MG/DL (ref 2.5–4.5)
PLATELET # BLD AUTO: 339 10E9/L (ref 150–450)
POTASSIUM SERPL-SCNC: 4.5 MMOL/L (ref 3.4–5.3)
PROT SERPL-MCNC: 7.4 G/DL (ref 6.8–8.8)
RBC # BLD AUTO: 3.33 10E12/L (ref 3.8–5.2)
SODIUM SERPL-SCNC: 141 MMOL/L (ref 133–144)
TACROLIMUS BLD-MCNC: 6.8 UG/L (ref 5–15)
TME LAST DOSE: NORMAL H
WBC # BLD AUTO: 5.3 10E9/L (ref 4–11)

## 2017-02-23 PROCEDURE — 82550 ASSAY OF CK (CPK): CPT | Performed by: TRANSPLANT SURGERY

## 2017-02-23 PROCEDURE — 85027 COMPLETE CBC AUTOMATED: CPT | Performed by: TRANSPLANT SURGERY

## 2017-02-23 PROCEDURE — 80197 ASSAY OF TACROLIMUS: CPT | Performed by: TRANSPLANT SURGERY

## 2017-02-23 PROCEDURE — 84100 ASSAY OF PHOSPHORUS: CPT | Performed by: TRANSPLANT SURGERY

## 2017-02-23 PROCEDURE — 80076 HEPATIC FUNCTION PANEL: CPT | Performed by: TRANSPLANT SURGERY

## 2017-02-23 PROCEDURE — 83721 ASSAY OF BLOOD LIPOPROTEIN: CPT | Performed by: TRANSPLANT SURGERY

## 2017-02-23 PROCEDURE — 80048 BASIC METABOLIC PNL TOTAL CA: CPT | Performed by: TRANSPLANT SURGERY

## 2017-02-24 ENCOUNTER — TELEPHONE (OUTPATIENT)
Dept: TRANSPLANT | Facility: CLINIC | Age: 62
End: 2017-02-24

## 2017-02-24 LAB — LDLC SERPL DIRECT ASSAY-MCNC: 108 MG/DL

## 2017-02-27 ENCOUNTER — OFFICE VISIT - HEALTHEAST (OUTPATIENT)
Dept: INTERNAL MEDICINE | Facility: CLINIC | Age: 62
End: 2017-02-27

## 2017-02-27 ENCOUNTER — TELEPHONE (OUTPATIENT)
Dept: TRANSPLANT | Facility: CLINIC | Age: 62
End: 2017-02-27

## 2017-02-27 DIAGNOSIS — M25.50 POLYARTHRALGIA: ICD-10-CM

## 2017-02-27 DIAGNOSIS — R53.83 FATIGUE: ICD-10-CM

## 2017-02-27 LAB
ALBUMIN SERPL-MCNC: 3.7 G/DL (ref 3.4–5)
ALP SERPL-CCNC: 62 U/L (ref 40–150)
ALT SERPL W P-5'-P-CCNC: 15 U/L (ref 0–50)
ANION GAP SERPL CALCULATED.3IONS-SCNC: 6 MMOL/L (ref 3–14)
AST SERPL W P-5'-P-CCNC: 16 U/L (ref 0–45)
BILIRUB DIRECT SERPL-MCNC: 0.2 MG/DL (ref 0–0.2)
BILIRUB SERPL-MCNC: 0.7 MG/DL (ref 0.2–1.3)
BUN SERPL-MCNC: 20 MG/DL (ref 7–30)
CALCIUM SERPL-MCNC: 9.1 MG/DL (ref 8.5–10.1)
CHLORIDE SERPL-SCNC: 109 MMOL/L (ref 94–109)
CO2 SERPL-SCNC: 28 MMOL/L (ref 20–32)
CREAT SERPL-MCNC: 1.4 MG/DL (ref 0.52–1.04)
ERYTHROCYTE [DISTWIDTH] IN BLOOD BY AUTOMATED COUNT: 13.8 % (ref 10–15)
GFR SERPL CREATININE-BSD FRML MDRD: 38 ML/MIN/1.7M2
GLUCOSE SERPL-MCNC: 82 MG/DL (ref 70–99)
HCT VFR BLD AUTO: 32 % (ref 35–47)
HGB BLD-MCNC: 10.7 G/DL (ref 11.7–15.7)
MAGNESIUM SERPL-MCNC: 1.9 MG/DL (ref 1.6–2.3)
MCH RBC QN AUTO: 33.4 PG (ref 26.5–33)
MCHC RBC AUTO-ENTMCNC: 33.4 G/DL (ref 31.5–36.5)
MCV RBC AUTO: 100 FL (ref 78–100)
PHOSPHATE SERPL-MCNC: 4.4 MG/DL (ref 2.5–4.5)
PLATELET # BLD AUTO: 303 10E9/L (ref 150–450)
POTASSIUM SERPL-SCNC: 4.4 MMOL/L (ref 3.4–5.3)
PROT SERPL-MCNC: 7.3 G/DL (ref 6.8–8.8)
RBC # BLD AUTO: 3.2 10E12/L (ref 3.8–5.2)
SODIUM SERPL-SCNC: 143 MMOL/L (ref 133–144)
TACROLIMUS BLD-MCNC: 4.9 UG/L (ref 5–15)
TME LAST DOSE: ABNORMAL H
WBC # BLD AUTO: 6.1 10E9/L (ref 4–11)

## 2017-02-27 PROCEDURE — 80076 HEPATIC FUNCTION PANEL: CPT | Performed by: TRANSPLANT SURGERY

## 2017-02-27 PROCEDURE — 80197 ASSAY OF TACROLIMUS: CPT | Performed by: TRANSPLANT SURGERY

## 2017-02-27 PROCEDURE — 83735 ASSAY OF MAGNESIUM: CPT | Performed by: TRANSPLANT SURGERY

## 2017-02-27 PROCEDURE — 80048 BASIC METABOLIC PNL TOTAL CA: CPT | Performed by: TRANSPLANT SURGERY

## 2017-02-27 PROCEDURE — 85027 COMPLETE CBC AUTOMATED: CPT | Performed by: TRANSPLANT SURGERY

## 2017-02-27 PROCEDURE — 84100 ASSAY OF PHOSPHORUS: CPT | Performed by: TRANSPLANT SURGERY

## 2017-02-28 ENCOUNTER — COMMUNICATION - HEALTHEAST (OUTPATIENT)
Dept: INTERNAL MEDICINE | Facility: CLINIC | Age: 62
End: 2017-02-28

## 2017-02-28 DIAGNOSIS — G47.00 INSOMNIA: ICD-10-CM

## 2017-02-28 LAB — ANA SER QL: 0.3 U

## 2017-03-02 ENCOUNTER — HOSPITAL ENCOUNTER (OUTPATIENT)
Facility: CLINIC | Age: 62
Setting detail: SPECIMEN
Discharge: HOME | End: 2017-03-02
Admitting: NURSE PRACTITIONER
Payer: COMMERCIAL

## 2017-03-02 LAB
ALBUMIN SERPL-MCNC: 4 G/DL (ref 3.4–5)
ALP SERPL-CCNC: 68 U/L (ref 40–150)
ALT SERPL W P-5'-P-CCNC: 15 U/L (ref 0–50)
ANION GAP SERPL CALCULATED.3IONS-SCNC: 9 MMOL/L (ref 3–14)
AST SERPL W P-5'-P-CCNC: 16 U/L (ref 0–45)
BASOPHILS # BLD AUTO: 0 10E9/L (ref 0–0.2)
BASOPHILS NFR BLD AUTO: 0.2 %
BILIRUB DIRECT SERPL-MCNC: 0.1 MG/DL (ref 0–0.2)
BILIRUB SERPL-MCNC: 0.6 MG/DL (ref 0.2–1.3)
BUN SERPL-MCNC: 15 MG/DL (ref 7–30)
CALCIUM SERPL-MCNC: 9.6 MG/DL (ref 8.5–10.1)
CHLORIDE SERPL-SCNC: 106 MMOL/L (ref 94–109)
CO2 SERPL-SCNC: 25 MMOL/L (ref 20–32)
CREAT SERPL-MCNC: 1.48 MG/DL (ref 0.52–1.04)
DIFFERENTIAL METHOD BLD: ABNORMAL
EOSINOPHIL # BLD AUTO: 0.3 10E9/L (ref 0–0.7)
EOSINOPHIL NFR BLD AUTO: 4.2 %
ERYTHROCYTE [DISTWIDTH] IN BLOOD BY AUTOMATED COUNT: 13.9 % (ref 10–15)
GFR SERPL CREATININE-BSD FRML MDRD: 36 ML/MIN/1.7M2
GLUCOSE SERPL-MCNC: 124 MG/DL (ref 70–99)
HCT VFR BLD AUTO: 31.2 % (ref 35–47)
HGB BLD-MCNC: 10.9 G/DL (ref 11.7–15.7)
IMM GRANULOCYTES # BLD: 0 10E9/L (ref 0–0.4)
IMM GRANULOCYTES NFR BLD: 0.2 %
LYMPHOCYTES # BLD AUTO: 1.8 10E9/L (ref 0.8–5.3)
LYMPHOCYTES NFR BLD AUTO: 28.1 %
MCH RBC QN AUTO: 34.6 PG (ref 26.5–33)
MCHC RBC AUTO-ENTMCNC: 34.9 G/DL (ref 31.5–36.5)
MCV RBC AUTO: 99 FL (ref 78–100)
MONOCYTES # BLD AUTO: 0.4 10E9/L (ref 0–1.3)
MONOCYTES NFR BLD AUTO: 5.8 %
NEUTROPHILS # BLD AUTO: 3.8 10E9/L (ref 1.6–8.3)
NEUTROPHILS NFR BLD AUTO: 61.5 %
NRBC # BLD AUTO: 0 10*3/UL
NRBC BLD AUTO-RTO: 0 /100
PLATELET # BLD AUTO: 329 10E9/L (ref 150–450)
POTASSIUM SERPL-SCNC: 4.7 MMOL/L (ref 3.4–5.3)
PROT SERPL-MCNC: 7.5 G/DL (ref 6.8–8.8)
RBC # BLD AUTO: 3.15 10E12/L (ref 3.8–5.2)
SODIUM SERPL-SCNC: 140 MMOL/L (ref 133–144)
TACROLIMUS BLD-MCNC: 3.9 UG/L (ref 5–15)
TME LAST DOSE: ABNORMAL H
WBC # BLD AUTO: 6.2 10E9/L (ref 4–11)

## 2017-03-02 PROCEDURE — 80048 BASIC METABOLIC PNL TOTAL CA: CPT | Performed by: NURSE PRACTITIONER

## 2017-03-02 PROCEDURE — 85025 COMPLETE CBC W/AUTO DIFF WBC: CPT | Performed by: NURSE PRACTITIONER

## 2017-03-02 PROCEDURE — 80197 ASSAY OF TACROLIMUS: CPT | Performed by: NURSE PRACTITIONER

## 2017-03-02 PROCEDURE — 80076 HEPATIC FUNCTION PANEL: CPT | Performed by: NURSE PRACTITIONER

## 2017-03-06 ENCOUNTER — RECORDS - HEALTHEAST (OUTPATIENT)
Dept: ADMINISTRATIVE | Facility: OTHER | Age: 62
End: 2017-03-06

## 2017-03-06 ENCOUNTER — OFFICE VISIT (OUTPATIENT)
Dept: GASTROENTEROLOGY | Facility: CLINIC | Age: 62
End: 2017-03-06
Attending: INTERNAL MEDICINE
Payer: COMMERCIAL

## 2017-03-06 VITALS
TEMPERATURE: 98.3 F | HEIGHT: 67 IN | OXYGEN SATURATION: 100 % | BODY MASS INDEX: 25.33 KG/M2 | DIASTOLIC BLOOD PRESSURE: 73 MMHG | HEART RATE: 86 BPM | SYSTOLIC BLOOD PRESSURE: 132 MMHG | RESPIRATION RATE: 16 BRPM | WEIGHT: 161.4 LBS

## 2017-03-06 DIAGNOSIS — Z94.4 LIVER REPLACED BY TRANSPLANT (H): Primary | ICD-10-CM

## 2017-03-06 PROCEDURE — 99212 OFFICE O/P EST SF 10 MIN: CPT | Mod: ZF

## 2017-03-06 ASSESSMENT — PAIN SCALES - GENERAL: PAINLEVEL: MODERATE PAIN (5)

## 2017-03-06 NOTE — MR AVS SNAPSHOT
After Visit Summary   3/6/2017    Phan Luque    MRN: [id number]           Patient Information     Date Of Birth          1955        Visit Information        Provider Department      3/6/2017 3:45 PM Hussein Banegas MD [facility] Hepatology         Follow-ups after your visit        Follow-up notes from your care team     Return in about 3 months (around 6/6/2017).      Your next 10 appointments already scheduled     Mar 13, 2017  3:30 PM CDT   Lab with  LAB   [facility] Lab ([facility])    [address] [place] [id number]   [phone]            Mar 13, 2017  4:30 PM CDT   (Arrive by 4:00 PM)   Return Visit with Ruth Ann Erazo MD   [facility] Nephrology ([facility])    [address] [place] [id number] [phone]            Jun 09, 2017  7:30 AM CDT   Lab with  LAB   [facility] Lab ([facility])    [address] [place] [id number]   [phone]            Jun 09, 2017  8:30 AM CDT   (Arrive by 8:15 AM)   Return Liver Transplant with Hussein Banegas MD   [facility] Hepatology ([facility])    [address] [place] [id number] [phone]            Aug 14, 2017  4:00 PM CDT   (Arrive by 3:45 PM)   RETURN ENDOCRINE with Ruth Oakley MD   [facility] Endocrinology ([facility])    [address] [place] [id number] [phone]              Who to contact     If you have questions or need follow up information about today's clinic visit or your schedule please contact [facility] HEPATOLOGY directly at [phone].  Normal or non-critical lab and imaging results will be communicated to you by MyChart, letter or phone within 4 business days after the clinic has received the results. If you do not hear from us within 7 days, please contact  "the clinic through [employer] or phone. If you have a critical or abnormal lab result, we will notify you by phone as soon as possible.  Submit refill requests through [employer] or call your pharmacy and they will forward the refill request to us. Please allow 3 business days for your refill to be completed.          Additional Information About Your Visit        [employer] Information     [employer] gives you secure access to your electronic health record. If you see a primary care provider, you can also send messages to your care team and make appointments. If you have questions, please call your primary care clinic.  If you do not have a primary care provider, please call [phone] and they will assist you.        Care EveryWhere ID     This is your Care EveryWhere ID. This could be used by other organizations to access your [place] medical records  [id number]        Your Vitals Were     Pulse Temperature Respirations Height Pulse Oximetry BMI (Body Mass Index)    86 98.3  F (36.8  C) (Oral) 16 1.702 m (5' 7.01\") 100% 25.27 kg/m2       Blood Pressure from Last 3 Encounters:   03/06/17 132/73   02/13/17 99/60   01/27/17 115/66    Weight from Last 3 Encounters:   03/06/17 73.2 kg (161 lb 6.4 oz)   02/13/17 73.2 kg (161 lb 6.4 oz)   01/27/17 71.8 kg (158 lb 6.4 oz)              Today, you had the following     No orders found for display       Primary Care Provider Office Phone # Fax #    Jahaira Mayberry [phone]       [address]   [facility] [address]        Thank you!     Thank you for choosing [facility] HEPATOLOGY  for your care. Our goal is always to provide you with excellent care. Hearing back from our patients is one way we can continue to improve our services. Please take a few minutes to complete the written survey that you may receive in the mail after your visit with us. Thank you!             Your Updated Medication List - Protect others around you: Learn how to safely " use, store and throw away your medicines at www.disposemymeds.org.          This list is accurate as of: 3/6/17  4:20 PM.  Always use your most recent med list.                   Brand Name Dispense Instructions for use    acetaminophen 325 MG tablet    TYLENOL    100 tablet    Take 2 tablets (650 mg) by mouth every 6 hours as needed for mild pain Or fevers. Use sparingly. Limit use to no more than 2 grams (2000 mg) in 24 hours. **Further refills must be obtained by primary care provider**       azaTHIOprine 100 MG Tabs     30 tablet    Take 150 mg by mouth every evening       busPIRone 10 MG tablet    BUSPAR    60 tablet    Take 1 tablet (10 mg) by mouth 2 times daily       calcium Citrate-vitamin D 500-400 MG-UNIT Chew      Take 1 tablet by mouth daily       cyanocobalamin 1000 MCG Tabs     30 tablet    Take 1,000 mcg by mouth daily       folic acid 1 MG tablet    FOLVITE    30 tablet    Take 1 tablet (1 mg) by mouth daily       hydrOXYzine 25 MG tablet    ATARAX    60 tablet    Take 1-2 tablets (25-50 mg) by mouth every 6 hours as needed for itching       levothyroxine 88 MCG tablet    SYNTHROID/LEVOTHROID    30 tablet    Take 1 tablet (88 mcg) by mouth every morning (before breakfast)       loperamide 2 MG capsule    IMODIUM     Take 2 mg by mouth 4 times daily as needed for diarrhea       multivitamin, therapeutic Tabs tablet     30 tablet    Take 1 tablet by mouth every 24 hours       pantoprazole 40 MG EC tablet    PROTONIX    30 tablet    Take 1 tablet (40 mg) by mouth every morning (before breakfast)       psyllium 0.52 G capsule     60 capsule    Take 2 capsules (1.04 g) by mouth daily With a full glass of water.       sertraline 100 MG tablet    ZOLOFT    45 tablet    Take 1.5 tablets (150 mg) by mouth daily       simethicone 80 MG chewable tablet    MYLICON    180 tablet    Take 1 tablet (80 mg) by mouth every 6 hours as needed for flatulence or cramping       tacrolimus 0.5 MG capsule    PROGRAF -  GENERIC EQUIVALENT    240 capsule    Take 4 capsules (2 mg) by mouth 2 times daily       traMADol 50 MG tablet    ULTRAM    50 tablet    Take 1-2 tablets ( mg) by mouth every 6 hours as needed for pain       traZODone 100 MG tablet    DESYREL    30 tablet    Take 1 tablet (100 mg) by mouth At Bedtime **Further refills must be obtained by primary care provider**       ursodiol 300 MG capsule    ACTIGALL    60 capsule    Take 1 capsule (300 mg) by mouth 2 times daily

## 2017-03-06 NOTE — NURSING NOTE
"Chief Complaint   Patient presents with     RECHECK     S/P Liver TX 8/25/2016       Initial /73 (BP Location: Left arm, Patient Position: Chair, Cuff Size: Adult Regular)  Pulse 86  Temp 98.3  F (36.8  C) (Oral)  Resp 16  Ht 1.702 m (5' 7.01\")  Wt 73.2 kg (161 lb 6.4 oz)  SpO2 100%  BMI 25.27 kg/m2 Estimated body mass index is 25.27 kg/(m^2) as calculated from the following:    Height as of this encounter: 1.702 m (5' 7.01\").    Weight as of this encounter: 73.2 kg (161 lb 6.4 oz).  Medication Reconciliation: complete    "

## 2017-03-06 NOTE — PROGRESS NOTES
I had the pleasure of seeing Jessica Luque for followup in the Liver Transplantation Clinic at the [facility] on 03/06/2017.  Ms. Luque returns for followup status post transplantation for alcoholic hepatitis.      She is doing fairly well.  Her major complaint is just diffuse joint achiness.  Since she has been home from the transitional care unit, she has felt as though she has become more active.  She returned to work a half day and notices it particularly at work.      She denies any abdominal pain, itching or skin rash or fatigue.  She denies any increased abdominal girth or lower extremity edema.  She denies any fevers or chills, cough or shortness of breath.  She denies any nausea or vomiting, diarrhea or constipation.  Her appetite has been good, and her weight has been stable.  There otherwise have been no other new events since she was last seen.       Current Outpatient Prescriptions   Medication     calcium Citrate-vitamin D 500-400 MG-UNIT CHEW     hydrOXYzine (ATARAX) 25 MG tablet     traZODone (DESYREL) 100 MG tablet     acetaminophen (TYLENOL) 325 MG tablet     loperamide (IMODIUM) 2 MG capsule     traMADol (ULTRAM) 50 MG tablet     ursodiol (ACTIGALL) 300 MG capsule     tacrolimus (PROGRAF - GENERIC EQUIVALENT) 0.5 MG capsule     simethicone (MYLICON) 80 MG chewable tablet     sertraline (ZOLOFT) 100 MG tablet     psyllium 0.52 G capsule     pantoprazole (PROTONIX) 40 MG EC tablet     multivitamin, therapeutic (THERA-VIT) TABS tablet     levothyroxine (SYNTHROID/LEVOTHROID) 88 MCG tablet     folic acid (FOLVITE) 1 MG tablet     cyanocobalamin 1000 MCG TABS     busPIRone (BUSPAR) 10 MG tablet     azaTHIOprine 100 MG TABS     No current facility-administered medications for this visit.      B/P: 132/73, T: 98.3, P: 86, R: 16    HEENT exam shows no scleral icterus and no temporal muscle wasting.  Her chest is clear.  Her abdominal exam shows no increase in girth.  No  masses or tenderness to palpation are present.  Her liver is 10 cm in span without left lobe enlargement.  No spleen tip is palpable.  Extremity exam shows no edema.  Incision is intact.  Skin exam shows no stigmata of chronic liver disease or chronic pruritus.  Neurologic exam shows no tremor.       Recent Results (from the past 168 hour(s))   Hepatic panel    Collection Time: 03/02/17  9:05 AM   Result Value Ref Range    Bilirubin Direct 0.1 0.0 - 0.2 mg/dL    Bilirubin Total 0.6 0.2 - 1.3 mg/dL    Albumin 4.0 3.4 - 5.0 g/dL    Protein Total 7.5 6.8 - 8.8 g/dL    Alkaline Phosphatase 68 40 - 150 U/L    ALT 15 0 - 50 U/L    AST 16 0 - 45 U/L   Basic metabolic panel    Collection Time: 03/02/17  9:05 AM   Result Value Ref Range    Sodium 140 133 - 144 mmol/L    Potassium 4.7 3.4 - 5.3 mmol/L    Chloride 106 94 - 109 mmol/L    Carbon Dioxide 25 20 - 32 mmol/L    Anion Gap 9 3 - 14 mmol/L    Glucose 124 (H) 70 - 99 mg/dL    Urea Nitrogen 15 7 - 30 mg/dL    Creatinine 1.48 (H) 0.52 - 1.04 mg/dL    GFR Estimate 36 (L) >60 mL/min/1.7m2    GFR Estimate If Black 43 (L) >60 mL/min/1.7m2    Calcium 9.6 8.5 - 10.1 mg/dL   CBC with platelets differential    Collection Time: 03/02/17  9:05 AM   Result Value Ref Range    WBC 6.2 4.0 - 11.0 10e9/L    RBC Count 3.15 (L) 3.8 - 5.2 10e12/L    Hemoglobin 10.9 (L) 11.7 - 15.7 g/dL    Hematocrit 31.2 (L) 35.0 - 47.0 %    MCV 99 78 - 100 fl    MCH 34.6 (H) 26.5 - 33.0 pg    MCHC 34.9 31.5 - 36.5 g/dL    RDW 13.9 10.0 - 15.0 %    Platelet Count 329 150 - 450 10e9/L    Diff Method Automated Method     % Neutrophils 61.5 %    % Lymphocytes 28.1 %    % Monocytes 5.8 %    % Eosinophils 4.2 %    % Basophils 0.2 %    % Immature Granulocytes 0.2 %    Nucleated RBCs 0 0 /100    Absolute Neutrophil 3.8 1.6 - 8.3 10e9/L    Absolute Lymphocytes 1.8 0.8 - 5.3 10e9/L    Absolute Monocytes 0.4 0.0 - 1.3 10e9/L    Absolute Eosinophils 0.3 0.0 - 0.7 10e9/L    Absolute Basophils 0.0 0.0 - 0.2 10e9/L     Abs Immature Granulocytes 0.0 0 - 0.4 10e9/L    Absolute Nucleated RBC 0.0    Tacrolimus level    Collection Time: 03/02/17  9:05 AM   Result Value Ref Range    Tacrolimus Last Dose Not Provided     Tacrolimus Level 3.9 (L) 5.0 - 15.0 ug/L      My impression is that Ms. Luque is 6 months status post liver transplantation for alcoholic hepatitis.  She has not had any urge to return to drinking and is doing well.  I did recommend she call in 2-3 weeks and if she still has persistent achiness, we might try some low-dose prednisone to see if that does not improve her quality of life.  She does have ongoing chronic kidney disease and we are going to try to go forward with her immunosuppression, maintaining her on azathioprine and low-dose tacrolimus.  My plan will be to see the patient back in the clinic in 3 months.      Thank you very much for allowing me to participate in the care of this patient.  If you have any questions regarding my recommendations, please do not hesitate to contact me.       Hussein Banegas MD      Professor of Medicine  [employer]      Executive Medical Director, Solid Organ Transplant Program  [facility]

## 2017-03-06 NOTE — LETTER
3/6/2017      RE: Phan Luque  [address] [id number]       I had the pleasure of seeing Jessica Luque for followup in the Liver Transplantation Clinic at the [facility] on 03/06/2017.  Ms. Luque returns for followup status post transplantation for alcoholic hepatitis.      She is doing fairly well.  Her major complaint is just diffuse joint achiness.  Since she has been home from the transitional care unit, she has felt as though she has become more active.  She returned to work a half day and notices it particularly at work.      She denies any abdominal pain, itching or skin rash or fatigue.  She denies any increased abdominal girth or lower extremity edema.  She denies any fevers or chills, cough or shortness of breath.  She denies any nausea or vomiting, diarrhea or constipation.  Her appetite has been good, and her weight has been stable.  There otherwise have been no other new events since she was last seen.       Current Outpatient Prescriptions   Medication     calcium Citrate-vitamin D 500-400 MG-UNIT CHEW     hydrOXYzine (ATARAX) 25 MG tablet     traZODone (DESYREL) 100 MG tablet     acetaminophen (TYLENOL) 325 MG tablet     loperamide (IMODIUM) 2 MG capsule     traMADol (ULTRAM) 50 MG tablet     ursodiol (ACTIGALL) 300 MG capsule     tacrolimus (PROGRAF - GENERIC EQUIVALENT) 0.5 MG capsule     simethicone (MYLICON) 80 MG chewable tablet     sertraline (ZOLOFT) 100 MG tablet     psyllium 0.52 G capsule     pantoprazole (PROTONIX) 40 MG EC tablet     multivitamin, therapeutic (THERA-VIT) TABS tablet     levothyroxine (SYNTHROID/LEVOTHROID) 88 MCG tablet     folic acid (FOLVITE) 1 MG tablet     cyanocobalamin 1000 MCG TABS     busPIRone (BUSPAR) 10 MG tablet     azaTHIOprine 100 MG TABS     No current facility-administered medications for this visit.      B/P: 132/73, T: 98.3, P: 86, R: 16    HEENT exam shows no scleral icterus and no temporal muscle  wasting.  Her chest is clear.  Her abdominal exam shows no increase in girth.  No masses or tenderness to palpation are present.  Her liver is 10 cm in span without left lobe enlargement.  No spleen tip is palpable.  Extremity exam shows no edema.  Incision is intact.  Skin exam shows no stigmata of chronic liver disease or chronic pruritus.  Neurologic exam shows no tremor.       Recent Results (from the past 168 hour(s))   Hepatic panel    Collection Time: 03/02/17  9:05 AM   Result Value Ref Range    Bilirubin Direct 0.1 0.0 - 0.2 mg/dL    Bilirubin Total 0.6 0.2 - 1.3 mg/dL    Albumin 4.0 3.4 - 5.0 g/dL    Protein Total 7.5 6.8 - 8.8 g/dL    Alkaline Phosphatase 68 40 - 150 U/L    ALT 15 0 - 50 U/L    AST 16 0 - 45 U/L   Basic metabolic panel    Collection Time: 03/02/17  9:05 AM   Result Value Ref Range    Sodium 140 133 - 144 mmol/L    Potassium 4.7 3.4 - 5.3 mmol/L    Chloride 106 94 - 109 mmol/L    Carbon Dioxide 25 20 - 32 mmol/L    Anion Gap 9 3 - 14 mmol/L    Glucose 124 (H) 70 - 99 mg/dL    Urea Nitrogen 15 7 - 30 mg/dL    Creatinine 1.48 (H) 0.52 - 1.04 mg/dL    GFR Estimate 36 (L) >60 mL/min/1.7m2    GFR Estimate If Black 43 (L) >60 mL/min/1.7m2    Calcium 9.6 8.5 - 10.1 mg/dL   CBC with platelets differential    Collection Time: 03/02/17  9:05 AM   Result Value Ref Range    WBC 6.2 4.0 - 11.0 10e9/L    RBC Count 3.15 (L) 3.8 - 5.2 10e12/L    Hemoglobin 10.9 (L) 11.7 - 15.7 g/dL    Hematocrit 31.2 (L) 35.0 - 47.0 %    MCV 99 78 - 100 fl    MCH 34.6 (H) 26.5 - 33.0 pg    MCHC 34.9 31.5 - 36.5 g/dL    RDW 13.9 10.0 - 15.0 %    Platelet Count 329 150 - 450 10e9/L    Diff Method Automated Method     % Neutrophils 61.5 %    % Lymphocytes 28.1 %    % Monocytes 5.8 %    % Eosinophils 4.2 %    % Basophils 0.2 %    % Immature Granulocytes 0.2 %    Nucleated RBCs 0 0 /100    Absolute Neutrophil 3.8 1.6 - 8.3 10e9/L    Absolute Lymphocytes 1.8 0.8 - 5.3 10e9/L    Absolute Monocytes 0.4 0.0 - 1.3 10e9/L     Absolute Eosinophils 0.3 0.0 - 0.7 10e9/L    Absolute Basophils 0.0 0.0 - 0.2 10e9/L    Abs Immature Granulocytes 0.0 0 - 0.4 10e9/L    Absolute Nucleated RBC 0.0    Tacrolimus level    Collection Time: 03/02/17  9:05 AM   Result Value Ref Range    Tacrolimus Last Dose Not Provided     Tacrolimus Level 3.9 (L) 5.0 - 15.0 ug/L      My impression is that Ms. Luque is 6 months status post liver transplantation for alcoholic hepatitis.  She has not had any urge to return to drinking and is doing well.  I did recommend she call in 2-3 weeks and if she still has persistent achiness, we might try some low-dose prednisone to see if that does not improve her quality of life.  She does have ongoing chronic kidney disease and we are going to try to go forward with her immunosuppression, maintaining her on azathioprine and low-dose tacrolimus.  My plan will be to see the patient back in the clinic in 3 months.      Thank you very much for allowing me to participate in the care of this patient.  If you have any questions regarding my recommendations, please do not hesitate to contact me.       Hussein Banegas MD      Professor of Medicine  [employer]      Executive Medical Director, Solid Organ Transplant Program  [facility]

## 2017-03-07 ENCOUNTER — OFFICE VISIT - HEALTHEAST (OUTPATIENT)
Dept: INTERNAL MEDICINE | Facility: CLINIC | Age: 62
End: 2017-03-07

## 2017-03-07 DIAGNOSIS — D64.9 ANEMIA: ICD-10-CM

## 2017-03-07 DIAGNOSIS — N18.2 CKD (CHRONIC KIDNEY DISEASE) STAGE 2, GFR 60-89 ML/MIN: Primary | ICD-10-CM

## 2017-03-07 NOTE — NURSING NOTE
Labs per clinic 2A protocol.  Follow up/CKD 2  Last OV: 12/12/16  Sujey Harper LPN  Nephrology  [facility]  [phone]

## 2017-03-08 LAB
BASOPHILS # BLD AUTO: 0 THOU/UL (ref 0–0.2)
BASOPHILS NFR BLD AUTO: 0 % (ref 0–2)
EOSINOPHIL # BLD AUTO: 0.2 THOU/UL (ref 0–0.4)
EOSINOPHIL NFR BLD AUTO: 4 % (ref 0–6)
ERYTHROCYTE [DISTWIDTH] IN BLOOD BY AUTOMATED COUNT: 13.7 % (ref 11–14.5)
HCT VFR BLD AUTO: 26 % (ref 35–47)
HGB BLD-MCNC: 8.7 G/DL (ref 12–16)
LAB AP CHARGES (HE HISTORICAL CONVERSION): NORMAL
LYMPHOCYTES # BLD AUTO: 2.1 THOU/UL (ref 0.8–4.4)
LYMPHOCYTES NFR BLD AUTO: 35 % (ref 20–40)
MCH RBC QN AUTO: 34.3 PG (ref 27–34)
MCHC RBC AUTO-ENTMCNC: 33.5 G/DL (ref 32–36)
MCV RBC AUTO: 102 FL (ref 80–100)
MONOCYTES # BLD AUTO: 0.4 THOU/UL (ref 0–0.9)
MONOCYTES NFR BLD AUTO: 6 % (ref 2–10)
NEUTROPHILS # BLD AUTO: 3.3 THOU/UL (ref 2–7.7)
NEUTROPHILS NFR BLD AUTO: 55 % (ref 50–70)
PATH REPORT.COMMENTS IMP SPEC: NORMAL
PATH REPORT.COMMENTS IMP SPEC: NORMAL
PATH REPORT.FINAL DX SPEC: NORMAL
PATH REPORT.MICROSCOPIC SPEC OTHER STN: ABNORMAL
PATH REPORT.MICROSCOPIC SPEC OTHER STN: NORMAL
PATH REPORT.RELEVANT HX SPEC: NORMAL
PLATELET # BLD AUTO: 288 THOU/UL (ref 140–440)
PMV BLD AUTO: 10.6 FL (ref 8.5–12.5)
RBC # BLD AUTO: 2.54 MILL/UL (ref 3.8–5.4)
WBC: 6 THOU/UL (ref 4–11)

## 2017-03-09 ENCOUNTER — TELEPHONE (OUTPATIENT)
Dept: PHARMACY | Facility: CLINIC | Age: 62
End: 2017-03-09

## 2017-03-13 ENCOUNTER — OFFICE VISIT (OUTPATIENT)
Dept: NEPHROLOGY | Facility: CLINIC | Age: 62
End: 2017-03-13
Attending: INTERNAL MEDICINE
Payer: COMMERCIAL

## 2017-03-13 ENCOUNTER — RECORDS - HEALTHEAST (OUTPATIENT)
Dept: ADMINISTRATIVE | Facility: OTHER | Age: 62
End: 2017-03-13

## 2017-03-13 VITALS
HEART RATE: 103 BPM | HEIGHT: 67 IN | BODY MASS INDEX: 24.52 KG/M2 | TEMPERATURE: 98.2 F | WEIGHT: 156.2 LBS | OXYGEN SATURATION: 99 % | DIASTOLIC BLOOD PRESSURE: 77 MMHG | SYSTOLIC BLOOD PRESSURE: 129 MMHG

## 2017-03-13 DIAGNOSIS — D50.9 IRON DEFICIENCY ANEMIA, UNSPECIFIED IRON DEFICIENCY ANEMIA TYPE: ICD-10-CM

## 2017-03-13 DIAGNOSIS — Z94.4 LIVER REPLACED BY TRANSPLANT (H): ICD-10-CM

## 2017-03-13 DIAGNOSIS — N18.30 CKD (CHRONIC KIDNEY DISEASE) STAGE 3, GFR 30-59 ML/MIN (H): Primary | ICD-10-CM

## 2017-03-13 DIAGNOSIS — N18.2 CKD (CHRONIC KIDNEY DISEASE) STAGE 2, GFR 60-89 ML/MIN: ICD-10-CM

## 2017-03-13 LAB
ALBUMIN SERPL-MCNC: 3.7 G/DL (ref 3.4–5)
ANION GAP SERPL CALCULATED.3IONS-SCNC: 8 MMOL/L (ref 3–14)
BUN SERPL-MCNC: 17 MG/DL (ref 7–30)
CALCIUM SERPL-MCNC: 8.7 MG/DL (ref 8.5–10.1)
CHLORIDE SERPL-SCNC: 104 MMOL/L (ref 94–109)
CO2 SERPL-SCNC: 26 MMOL/L (ref 20–32)
CREAT SERPL-MCNC: 1.53 MG/DL (ref 0.52–1.04)
CREAT UR-MCNC: 178 MG/DL
GFR SERPL CREATININE-BSD FRML MDRD: 35 ML/MIN/1.7M2
GLUCOSE SERPL-MCNC: 117 MG/DL (ref 70–99)
HGB BLD-MCNC: 9.1 G/DL (ref 11.7–15.7)
MAGNESIUM SERPL-MCNC: 1.8 MG/DL (ref 1.6–2.3)
PHOSPHATE SERPL-MCNC: 3.7 MG/DL (ref 2.5–4.5)
POTASSIUM SERPL-SCNC: 4.5 MMOL/L (ref 3.4–5.3)
PROT UR-MCNC: 0.24 G/L
PROT/CREAT 24H UR: 0.13 G/G CR (ref 0–0.2)
SODIUM SERPL-SCNC: 138 MMOL/L (ref 133–144)

## 2017-03-13 PROCEDURE — 85018 HEMOGLOBIN: CPT | Performed by: INTERNAL MEDICINE

## 2017-03-13 PROCEDURE — 83735 ASSAY OF MAGNESIUM: CPT | Performed by: INTERNAL MEDICINE

## 2017-03-13 PROCEDURE — 80069 RENAL FUNCTION PANEL: CPT | Performed by: INTERNAL MEDICINE

## 2017-03-13 PROCEDURE — 80321 ALCOHOLS BIOMARKERS 1OR 2: CPT | Performed by: INTERNAL MEDICINE

## 2017-03-13 PROCEDURE — 84156 ASSAY OF PROTEIN URINE: CPT | Performed by: INTERNAL MEDICINE

## 2017-03-13 PROCEDURE — 99213 OFFICE O/P EST LOW 20 MIN: CPT | Mod: ZF

## 2017-03-13 PROCEDURE — 36415 COLL VENOUS BLD VENIPUNCTURE: CPT | Performed by: INTERNAL MEDICINE

## 2017-03-13 ASSESSMENT — ENCOUNTER SYMPTOMS
TACHYCARDIA: 1
CHILLS: 0
NUMBNESS: 1
CLAUDICATION: 1
NIGHT SWEATS: 0
SKIN CHANGES: 0
HYPOTENSION: 0
RECTAL PAIN: 0
MEMORY LOSS: 0
CONSTIPATION: 1
DIARRHEA: 1
EXERCISE INTOLERANCE: 1
BACK PAIN: 1
WEAKNESS: 1
ALTERED TEMPERATURE REGULATION: 1
VOMITING: 1
MUSCLE CRAMPS: 1
DYSPNEA ON EXERTION: 1
FATIGUE: 1
SPUTUM PRODUCTION: 0
LEG PAIN: 1
TREMORS: 1
LOSS OF CONSCIOUSNESS: 0
COUGH DISTURBING SLEEP: 0
SYNCOPE: 0
FEVER: 0
WEIGHT LOSS: 0
BLOATING: 1
JOINT SWELLING: 0
ARTHRALGIAS: 1
SLEEP DISTURBANCES DUE TO BREATHING: 0
SHORTNESS OF BREATH: 1
INCREASED ENERGY: 1
WEIGHT GAIN: 0
ORTHOPNEA: 0
LIGHT-HEADEDNESS: 1
DIZZINESS: 1
POOR WOUND HEALING: 0
BLOOD IN STOOL: 0
POSTURAL DYSPNEA: 0
POLYDIPSIA: 0
NAIL CHANGES: 1
NECK PAIN: 1
ABDOMINAL PAIN: 1
HALLUCINATIONS: 0
TINGLING: 1
WHEEZING: 0
EYE IRRITATION: 1
POLYPHAGIA: 0
LEG SWELLING: 0
DECREASED APPETITE: 1
DISTURBANCES IN COORDINATION: 1
HEARTBURN: 1
EYE WATERING: 1
HYPERTENSION: 0
HEMOPTYSIS: 0
JAUNDICE: 0
MUSCLE WEAKNESS: 1
PALPITATIONS: 1
COUGH: 0
EYE REDNESS: 0
STIFFNESS: 1
SEIZURES: 0
NAUSEA: 1
MYALGIAS: 1
PARALYSIS: 0
SNORES LOUDLY: 0
RESPIRATORY PAIN: 0
HEADACHES: 1
DOUBLE VISION: 0
BRUISES/BLEEDS EASILY: 1
EYE PAIN: 0
BOWEL INCONTINENCE: 0

## 2017-03-13 ASSESSMENT — PAIN SCALES - GENERAL: PAINLEVEL: MILD PAIN (3)

## 2017-03-13 NOTE — LETTER
3/13/2017      RE: Phan Luque  [address]  [facility] [address]       [facility]  Nephrology Clinic follow up note    Date of encounter 03/13/2017.       Assessment and Plan:     61  year old female with PMH of acute alcoholic hepatitis, alcoholic cirrhosis complicated by oliguric GERMAIN and hypervolemia requiring dialysis from 8/2/2016 to 9/6/2016. Patient post liver transplant on 8/25/2016. Patient now with CKD , most likely from injury sustained from  GERMAIN episode which was thought to be ischemic/toxic ATN and from decompensated cirrhosis. Seen last in Nephrology clinic in 12 /2016.     Recommendations :-     1. CKD stage 3 :- serum Cr during visit was 1.53 mg/dL with eGFR 35 putting her into CKD stage 3b which is associated with more complications of CKD compared to patients with eGFR >45. Since coming off dialysis serum Cr was fluctuating between 2 - 2.6. Since past few months serum Cr ~1.3-1.5.  Patient was having  poor oral intake earlier, now appears to be improving. Now off tube feeds. Patients medications  Were adjusted and now off myfortic acid and patient on imuran  since 10/10/16 and prograf .  Prograf levels in acceptable range. Normal kidney in CT  scan abdomen from 8/2016. Patient with no  Proteinuria.    - Avoid nephrotoxins.   - renal dosing of medications.   - prn IV fluids while inpatient for nausea, vomiting , decreased oral intake or hypotension.   - monitor tacrolimus levels closely.     Consider d/c sucralfate as it contains some aluminum and may increase risk of aluminum toxicity in patient with low kidney function    2. BP :- in acceptable range without antihypertensives    3. euvolemic on exam - continue low sodium diet    4. No  acid base issues.     5. Hypomagnesemia - better but borderline with Serum Mg 1.8. Encouraged intake of food rich in Mg like green leafy vegetables. Patient now off Mg supplements. Patient used to take MgO 400 MG BID. Was discontinued  recently . Patient feels her body aches have started since then. Will  start MgO 400 mg daily . Will inform Benito Brannon to send in prescription and to inform patient. Appreciate help.     6. Anemia of CKD 3 - Hb 9.1 which is worsening from 10.9 earlier this month. She is iron deficient. Did not tolerate oral Fe in past. Will provide referral to anemia clinic to consider IV Fe therapy and enrollment into MEDARDO protocol if her hemoglobin drops to less than 9.     7. BMD :- serum Ca 8.7, serum albumin 3.7, serum phosphorus 3.7. Intact PTH 20, vitamin D 25.  PTH is lower than ideal as a suppressed PTH can be associate with adynamic bone disease.  However, calcium and phos are at goal along with vitamin D so no changes.     Discussed with Dr Gutierrez. Patient to follow up in 3 months. Patient to get renal panel, urine protein studies, Hb prior to next appointment.     Assessment and plan was discussed with patient and she voiced her understanding and agreement.    Reason for Visit:  Ms. Luque is here for follow up of CKD.     HPI:   61  year old female with PMH of acute alcoholic hepatitis, alcoholic cirrhosis complicated by oliguric GERMAIN and hypervolemia requiring dialysis from 8/2/2016 to 9/6/2016. Patient post liver transplant on 8/25/2016. Patient now with CKD , most likely from injury sustained from recent GERMAIN episode which was thought to be ischemic/toxic ATN and  From decompensated cirrhosis. Seen last in Nephrology clinic in 12/2016.       post transplant period was complicated by  nausea, vomiting, poor oral intake and diarrhea . Immunosuppressants  were adjusted by transplant team. Patient now off NGT.  Patient reported her oral intake is improving.  Patients now off myfortic acid and started on imuran. Patient also on prograf. Immunosuppression managed by liver transplant team .       No SOB. No swelling of lower extremities. Overall doing better. Reported feeling tired. Also have diffuse body pain. Total CK was  normal when checked recently. Patient feels her body aches have started since stopping Mg supplements.      patient reported her diarrhea have markedly improved. Patient on psyllium and loperamide.            ROS:   A comprehensive review of systems was obtained and negative, except as noted in the HPI or PMH.    Active Medical Problems:  Patient Active Problem List   Diagnosis     Decompensation of cirrhosis of liver (H)     Liver transplanted (H)     Alcoholic hepatitis     Immunosuppression (H)     Acute kidney injury (H)     Anxiety     Alcoholic hepatitis without ascites     Enterococcus UTI     Liver transplant status (H)     Nausea & vomiting     Malnutrition (H)     Candidiasis of skin     Anemia     ACP (advance care planning)     Diarrhea     Hypothyroidism     Esophageal reflux     Major depression     Insomnia       Personal Hx:  Social History     Social History     Marital status:      Spouse name: N/A     Number of children: N/A     Years of education: N/A     Occupational History     Not on file.     Social History Main Topics     Smoking status: Former Smoker     Smokeless tobacco: Never Used     Alcohol use No      Comment: heavy use (750ml/2 days) up until 1 year ago; had cut down to 1 drink/day; none since 7/18/16     Drug use: No     Sexual activity: Not on file     Other Topics Concern     Not on file     Social History Narrative       Allergies:  Allergies   Allergen Reactions     Benadryl [Diphenhydramine] Hives     Cefaclor Hives     Hydrocodone-Acetaminophen Itching     Oxycodone Itching     Penicillins Hives     Sulfa Drugs Hives       Medications:  Prior to Admission medications    Medication Sig Start Date End Date Taking? Authorizing Provider   hydrocortisone (CORTAID) 1 % cream  12/1/16  Yes Reported, Patient   alpha-d-galactosidase (BEANO) tablet Take 1-2 tablets by mouth 3 times daily (before meals)   Yes Reported, Patient   hydrOXYzine (ATARAX) 25 MG tablet Take 1 tablet (25  mg) by mouth every 6 hours as needed for itching 9/14/16  Yes Fast, Heena Solo PA-C   sertraline (ZOLOFT) 50 MG tablet Take 1 tablet (50 mg) by mouth daily 9/14/16  Yes Fast, Heena Solo PA-C   loperamide (IMODIUM) 2 MG capsule Take 2 capsules (4 mg) by mouth 3 times daily 9/14/16  Yes Fast, Heena Solo PA-C   valGANciclovir (VALCYTE) 50 MG/ML SOLR Take 9 mLs (450 mg) by mouth every other day 9/15/16  Yes Fast, Heena Solo PA-C   CELLCEPT 200 MG/ML PO SUSPENSION 5 mLs (1,000 mg) by ORAL OR NJ TUBE route 2 times daily 9/14/16  Yes Fast, Heena Solo PA-C   tacrolimus (PROGRAF - GENERIC EQUIVALENT) 0.5 MG capsule Take 5 capsules (2.5 mg) by mouth 2 times daily 9/14/16  Yes Fast, Heena Solo PA-C   miconazole with skin protectant (ILDA ANTIFUNGAL) 2 % CREA cream Apply topically 2 times daily 9/14/16  Yes Fast, Heena Solo PA-C   nystatin (MYCOSTATIN) cream Apply topically 2 times daily as needed (groin rash) 9/14/16  Yes Fast, Heena Solo PA-C   folic acid (FOLATE) 500 mcg/mL SOLN Take 2 mLs (1 mg) by mouth daily 9/14/16  Yes Fast, Heena Solo PA-C   cyanocobalamin 1000 MCG TABS Take 1,000 mcg by mouth daily 9/14/16  Yes Fast, Heena Solo PA-C   sulfamethoxazole-trimethoprim (BACTRIM,SEPTRA) 200-40 mg/5ml suspension 10 mLs (80 mg) by Oral or Feeding Tube route daily Dose based on TMP component. 9/14/16  Yes Fast, Heena Solo PA-C   simethicone (MYLICON) 80 MG chewable tablet Take 1 tablet (80 mg) by mouth every 6 hours as needed for cramping 9/14/16  Yes Fast, Heena Solo PA-C   ursodiol (ACTIGALL) 20 mg/mL 15 mLs (300 mg) by Oral or Feeding Tube route 3 times daily 9/14/16  Yes Fast, Heena Solo PA-C   nystatin (MYCOSTATIN) 999634 UNIT/ML suspension Take 10 mLs (1,000,000 Units) by mouth 4 times daily 9/14/16  Yes Fast, Heena Solo PA-C   multivitamins with minerals (CERTAVITE/CEROVITE) LIQD 15 mLs by Per Feeding Tube route daily 9/14/16  Yes Fast, Heena  "ZHEN Solo   pantoprazole (PROTONIX) 40 MG enteric coated tablet Take 1 tablet (40 mg) by mouth 2 times daily (before meals) 9/14/16  Yes Fast, Heena Solo PA-C   sucralfate (CARAFATE) 1 GM/10ML suspension Take 10 mLs (1 g) by mouth 3 times daily (before meals) 9/14/16  Yes Fast, Heena Solo PA-C   ONDANSETRON PO Take 4 mg by mouth every 8 hours as needed for nausea or vomiting   Yes Unknown, Entered By History       Vitals:  /77  Pulse 103  Temp 98.2  F (36.8  C) (Oral)  Ht 1.702 m (5' 7.01\")  Wt 70.9 kg (156 lb 3.2 oz)  SpO2 99%  BMI 24.46 kg/m2    Exam:   GENERAL APPEARANCE: alert and no distress  HENT: mouth without ulcers or lesions  LYMPHATICS: no cervical or supraclavicular nodes  RESP: lungs clear to auscultation - no rales, rhonchi or wheezes  CV: regular rhythm, normal rate, no rub, no murmur  EDEMA: no LE edema bilaterally  ABDOMEN: soft, nondistended, nontender, bowel sounds normal  MS: extremities normal - no gross deformities noted, no evidence of inflammation in joints, no muscle tenderness  SKIN: no rash    Results:        Electrolytes/Renal -   Recent Labs   Lab Test  03/13/17   1605  03/02/17   0905  02/27/17   0940  02/23/17   0850  02/20/17   0940   NA  138  140  143  141  141   POTASSIUM  4.5  4.7  4.4  4.5  4.8   CHLORIDE  104  106  109  106  107   CO2  26  25  28  27  27   BUN  17  15  20  15  15   CR  1.53*  1.48*  1.40*  1.46*  1.36*   GLC  117*  124*  82  102*  80   ERIC  8.7  9.6  9.1  9.4  9.5   MAG  1.8   --   1.9   --   2.1   PHOS  3.7   --   4.4  4.4  4.6*       CBC -   Recent Labs   Lab Test  03/13/17   1605  03/02/17   0905  02/27/17   0940  02/23/17   0850   WBC   --   6.2  6.1  5.3   HGB  9.1*  10.9*  10.7*  11.0*   PLT   --   329  303  339       LFTs -   Recent Labs   Lab Test  03/13/17   1605  03/02/17   0905  02/27/17   0940  02/23/17   0850   ALKPHOS   --   68  62  69   BILITOTAL   --   0.6  0.7  0.6   ALT   --   15  15  14   AST   --   16  16  17   PROTTOTAL   " --   7.5  7.3  7.4   ALBUMIN  3.7  4.0  3.7  3.7       Coags -   Recent Labs   Lab Test  09/13/16   1055  08/30/16   0740  08/29/16   0635   08/25/16   1230  08/25/16   1055  08/25/16   0850   INR  1.08  1.01  1.03   < >  1.76*  1.76*  1.87*   PTT   --    --    --    --   48*  59*  58*    < > = values in this interval not displayed.       Iron Panel -   Recent Labs   Lab Test  09/06/16   0638  08/06/16   0757   IRON  18*  25*   IRONSAT  15  27   WILL  480*  528*       Endocrine -   Recent Labs   Lab Test  12/15/16   0615  11/16/16   0640  11/04/16   0751   TSH  2.18  6.59*  8.44*        Attestation:  This patient has been seen and evaluated by me, Daya Gutierrez MD on 3/13/17 .  Discussed with the fellow or resident and agree with the findings and plan in this note.     I have reviewed  Medications, Vital Signs and Labs.    Daya Gutierrez MD  [facility]  Department of Medicine  Division of Renal Disease and Hypertension  [phone]

## 2017-03-13 NOTE — MR AVS SNAPSHOT
After Visit Summary   3/13/2017    Phan Luque    MRN: [id number]           Patient Information     Date Of Birth          1955        Visit Information        Provider Department      3/13/2017 4:30 PM Ruth Ann Erazo MD [facility] Nephrology         Follow-ups after your visit        Your next 10 appointments already scheduled     Jun 05, 2017 12:00 PM CDT   Lab with  LAB   [facility] Lab ([facility])    [address] [place] [id number]   [phone]            Jun 05, 2017  1:00 PM CDT   (Arrive by 12:30 PM)   Return Visit with Ruth Ann Erazo MD   [facility] Nephrology ([facility])    [address] [place] [id number] [phone]            Jun 09, 2017  7:30 AM CDT   Lab with  LAB   [facility] Lab ([facility])    [address] [place] [id number]   [phone]            Jun 09, 2017  8:30 AM CDT   (Arrive by 8:15 AM)   Return Liver Transplant with Hussein Banegas MD   [facility] Hepatology ([facility])    [address] [place] [id number] [phone]            Aug 14, 2017  4:00 PM CDT   (Arrive by 3:45 PM)   RETURN ENDOCRINE with Ruth Oakley MD   [facility] Endocrinology ([facility])    [address] [place] [id number] [phone]              Who to contact     If you have questions or need follow up information about today's clinic visit or your schedule please contact [facility] NEPHROLOGY directly at [phone].  Normal or non-critical lab and imaging results will be communicated to you by MyChart, letter or phone within 4 business days after the clinic has received the results. If you do not hear from us within 7 days, please contact the clinic through MyChart or phone. If you have a critical or abnormal lab result, we  "will notify you by phone as soon as possible.  Submit refill requests through [employer] or call your pharmacy and they will forward the refill request to us. Please allow 3 business days for your refill to be completed.          Additional Information About Your Visit        [employer] Information     [employer] gives you secure access to your electronic health record. If you see a primary care provider, you can also send messages to your care team and make appointments. If you have questions, please call your primary care clinic.  If you do not have a primary care provider, please call [phone] and they will assist you.        Care EveryWhere ID     This is your Care EveryWhere ID. This could be used by other organizations to access your [facility] medical records  [id number]        Your Vitals Were     Pulse Temperature Height Pulse Oximetry BMI (Body Mass Index)       103 98.2  F (36.8  C) (Oral) 1.702 m (5' 7.01\") 99% 24.46 kg/m2        Blood Pressure from Last 3 Encounters:   03/13/17 129/77   03/06/17 132/73   02/13/17 99/60    Weight from Last 3 Encounters:   03/13/17 70.9 kg (156 lb 3.2 oz)   03/06/17 73.2 kg (161 lb 6.4 oz)   02/13/17 73.2 kg (161 lb 6.4 oz)              Today, you had the following     No orders found for display       Primary Care Provider Office Phone # Fax #    Jahaira Mayberry [phone]       [address]   [facility] [address]        Thank you!     Thank you for choosing [facility]OLOG[facility]  for your care. Our goal is always to provide you with excellent care. Hearing back from our patients is one way we can continue to improve our services. Please take a few minutes to complete the written survey that you may receive in the mail after your visit with us. Thank you!             Your Updated Medication List - Protect others around you: Learn how to safely use, store and throw away your medicines at www.disposemymeds.org.          This list is accurate as " of: 3/13/17  6:16 PM.  Always use your most recent med list.                   Brand Name Dispense Instructions for use    acetaminophen 325 MG tablet    TYLENOL    100 tablet    Take 2 tablets (650 mg) by mouth every 6 hours as needed for mild pain Or fevers. Use sparingly. Limit use to no more than 2 grams (2000 mg) in 24 hours. **Further refills must be obtained by primary care provider**       azaTHIOprine 100 MG Tabs     30 tablet    Take 150 mg by mouth every evening       busPIRone 10 MG tablet    BUSPAR    60 tablet    Take 1 tablet (10 mg) by mouth 2 times daily       calcium Citrate-vitamin D 500-400 MG-UNIT Chew      Take 1 tablet by mouth daily       cyanocobalamin 1000 MCG Tabs     30 tablet    Take 1,000 mcg by mouth daily       folic acid 1 MG tablet    FOLVITE    30 tablet    Take 1 tablet (1 mg) by mouth daily       hydrOXYzine 25 MG tablet    ATARAX    60 tablet    Take 1-2 tablets (25-50 mg) by mouth every 6 hours as needed for itching       levothyroxine 88 MCG tablet    SYNTHROID/LEVOTHROID    30 tablet    Take 1 tablet (88 mcg) by mouth every morning (before breakfast)       loperamide 2 MG capsule    IMODIUM     Take 2 mg by mouth 4 times daily as needed for diarrhea       multivitamin, therapeutic Tabs tablet     30 tablet    Take 1 tablet by mouth every 24 hours       pantoprazole 40 MG EC tablet    PROTONIX    30 tablet    Take 1 tablet (40 mg) by mouth every morning (before breakfast)       psyllium 0.52 G capsule     60 capsule    Take 2 capsules (1.04 g) by mouth daily With a full glass of water.       sertraline 100 MG tablet    ZOLOFT    45 tablet    Take 1.5 tablets (150 mg) by mouth daily       simethicone 80 MG chewable tablet    MYLICON    180 tablet    Take 1 tablet (80 mg) by mouth every 6 hours as needed for flatulence or cramping       tacrolimus 0.5 MG capsule    PROGRAF - GENERIC EQUIVALENT    240 capsule    Take 4 capsules (2 mg) by mouth 2 times daily       traMADol 50 MG  tablet    ULTRAM    50 tablet    Take 1-2 tablets ( mg) by mouth every 6 hours as needed for pain       traZODone 100 MG tablet    DESYREL    30 tablet    Take 1 tablet (100 mg) by mouth At Bedtime **Further refills must be obtained by primary care provider**       ursodiol 300 MG capsule    ACTIGALL    60 capsule    Take 1 capsule (300 mg) by mouth 2 times daily

## 2017-03-13 NOTE — NURSING NOTE
"Chief Complaint   Patient presents with     RECHECK     Follow up [facility]       Initial /77  Pulse 103  Temp 98.2  F (36.8  C) (Oral)  Ht 1.702 m (5' 7.01\")  Wt 70.9 kg (156 lb 3.2 oz)  SpO2 99%  BMI 24.46 kg/m2 Estimated body mass index is 24.46 kg/(m^2) as calculated from the following:    Height as of this encounter: 1.702 m (5' 7.01\").    Weight as of this encounter: 70.9 kg (156 lb 3.2 oz).  Medication Reconciliation: complete   Faye Roa. CMA    "

## 2017-03-14 ENCOUNTER — COMMUNICATION - HEALTHEAST (OUTPATIENT)
Dept: INTERNAL MEDICINE | Facility: CLINIC | Age: 62
End: 2017-03-14

## 2017-03-14 ENCOUNTER — AMBULATORY - HEALTHEAST (OUTPATIENT)
Dept: LAB | Facility: CLINIC | Age: 62
End: 2017-03-14

## 2017-03-14 DIAGNOSIS — G47.00 INSOMNIA: ICD-10-CM

## 2017-03-14 DIAGNOSIS — Z79.899 ENCOUNTER FOR LONG-TERM (CURRENT) USE OF OTHER MEDICATIONS: ICD-10-CM

## 2017-03-14 DIAGNOSIS — Z94.4 LIVER TRANSPLANTED (H): ICD-10-CM

## 2017-03-15 ENCOUNTER — AMBULATORY - HEALTHEAST (OUTPATIENT)
Dept: LAB | Facility: CLINIC | Age: 62
End: 2017-03-15

## 2017-03-15 DIAGNOSIS — Z79.899 ENCOUNTER FOR LONG-TERM (CURRENT) USE OF OTHER MEDICATIONS: ICD-10-CM

## 2017-03-15 DIAGNOSIS — Z94.4 LIVER REPLACED BY TRANSPLANT (H): ICD-10-CM

## 2017-03-15 DIAGNOSIS — Z94.4 LIVER TRANSPLANTED (H): ICD-10-CM

## 2017-03-15 LAB — ETHYL GLUCURONIDE UR QL: NORMAL

## 2017-03-15 PROCEDURE — 80197 ASSAY OF TACROLIMUS: CPT | Performed by: INTERNAL MEDICINE

## 2017-03-16 ENCOUNTER — TELEPHONE (OUTPATIENT)
Dept: TRANSPLANT | Facility: CLINIC | Age: 62
End: 2017-03-16

## 2017-03-16 ENCOUNTER — RECORDS - HEALTHEAST (OUTPATIENT)
Dept: ADMINISTRATIVE | Facility: OTHER | Age: 62
End: 2017-03-16

## 2017-03-16 ENCOUNTER — APPOINTMENT (OUTPATIENT)
Dept: GENERAL RADIOLOGY | Facility: CLINIC | Age: 62
End: 2017-03-16
Attending: EMERGENCY MEDICINE
Payer: COMMERCIAL

## 2017-03-16 ENCOUNTER — RESULTS ONLY (OUTPATIENT)
Dept: EMERGENCY MEDICINE | Facility: CLINIC | Age: 62
End: 2017-03-16

## 2017-03-16 ENCOUNTER — HOSPITAL ENCOUNTER (EMERGENCY)
Facility: CLINIC | Age: 62
Discharge: HOME | End: 2017-03-16
Attending: EMERGENCY MEDICINE | Admitting: EMERGENCY MEDICINE
Payer: COMMERCIAL

## 2017-03-16 ENCOUNTER — COMMUNICATION - HEALTHEAST (OUTPATIENT)
Dept: SCHEDULING | Facility: CLINIC | Age: 62
End: 2017-03-16

## 2017-03-16 VITALS
TEMPERATURE: 98 F | OXYGEN SATURATION: 98 % | SYSTOLIC BLOOD PRESSURE: 125 MMHG | DIASTOLIC BLOOD PRESSURE: 72 MMHG | HEART RATE: 88 BPM | RESPIRATION RATE: 18 BRPM

## 2017-03-16 DIAGNOSIS — M25.50 ARTHRALGIA, UNSPECIFIED JOINT: ICD-10-CM

## 2017-03-16 DIAGNOSIS — R63.0 ANOREXIA: ICD-10-CM

## 2017-03-16 DIAGNOSIS — R06.02 SHORTNESS OF BREATH: ICD-10-CM

## 2017-03-16 DIAGNOSIS — N18.30 ANEMIA IN STAGE 3 CHRONIC KIDNEY DISEASE (H): Primary | ICD-10-CM

## 2017-03-16 DIAGNOSIS — Z94.4 LIVER TRANSPLANTED (H): ICD-10-CM

## 2017-03-16 DIAGNOSIS — D63.1 ANEMIA IN STAGE 3 CHRONIC KIDNEY DISEASE (H): Primary | ICD-10-CM

## 2017-03-16 LAB
ALBUMIN SERPL-MCNC: 3.7 G/DL (ref 3.4–5)
ALP SERPL-CCNC: 61 U/L (ref 40–150)
ALT SERPL W P-5'-P-CCNC: 9 U/L (ref 0–50)
ANION GAP SERPL CALCULATED.3IONS-SCNC: 9 MMOL/L (ref 3–14)
AST SERPL W P-5'-P-CCNC: 9 U/L (ref 0–45)
BASOPHILS # BLD AUTO: 0 10E9/L (ref 0–0.2)
BASOPHILS NFR BLD AUTO: 0.2 %
BILIRUB SERPL-MCNC: 0.7 MG/DL (ref 0.2–1.3)
BUN SERPL-MCNC: 16 MG/DL (ref 7–30)
CALCIUM SERPL-MCNC: 8.6 MG/DL (ref 8.5–10.1)
CHLORIDE SERPL-SCNC: 106 MMOL/L (ref 94–109)
CO2 SERPL-SCNC: 26 MMOL/L (ref 20–32)
CREAT SERPL-MCNC: 1.57 MG/DL (ref 0.52–1.04)
DIFFERENTIAL METHOD BLD: ABNORMAL
EOSINOPHIL # BLD AUTO: 0.1 10E9/L (ref 0–0.7)
EOSINOPHIL NFR BLD AUTO: 2.4 %
ERYTHROCYTE [DISTWIDTH] IN BLOOD BY AUTOMATED COUNT: 14.3 % (ref 10–15)
GFR SERPL CREATININE-BSD FRML MDRD: 34 ML/MIN/1.7M2
GLUCOSE SERPL-MCNC: 95 MG/DL (ref 70–99)
HCT VFR BLD AUTO: 26.3 % (ref 35–47)
HGB BLD-MCNC: 8.8 G/DL (ref 11.7–15.7)
IMM GRANULOCYTES # BLD: 0 10E9/L (ref 0–0.4)
IMM GRANULOCYTES NFR BLD: 0.2 %
INR PPP: 1.15 (ref 0.86–1.14)
INTERPRETATION ECG - MUSE: NORMAL
LACTATE BLD-SCNC: 0.6 MMOL/L (ref 0.7–2.1)
LYMPHOCYTES # BLD AUTO: 1.5 10E9/L (ref 0.8–5.3)
LYMPHOCYTES NFR BLD AUTO: 35.9 %
MCH RBC QN AUTO: 33.1 PG (ref 26.5–33)
MCHC RBC AUTO-ENTMCNC: 33.5 G/DL (ref 31.5–36.5)
MCV RBC AUTO: 99 FL (ref 78–100)
MONOCYTES # BLD AUTO: 0.2 10E9/L (ref 0–1.3)
MONOCYTES NFR BLD AUTO: 4.3 %
NEUTROPHILS # BLD AUTO: 2.4 10E9/L (ref 1.6–8.3)
NEUTROPHILS NFR BLD AUTO: 57 %
NRBC # BLD AUTO: 0 10*3/UL
NRBC BLD AUTO-RTO: 0 /100
PLATELET # BLD AUTO: 290 10E9/L (ref 150–450)
POTASSIUM SERPL-SCNC: 3.9 MMOL/L (ref 3.4–5.3)
PROT SERPL-MCNC: 6.7 G/DL (ref 6.8–8.8)
RBC # BLD AUTO: 2.66 10E12/L (ref 3.8–5.2)
SODIUM SERPL-SCNC: 140 MMOL/L (ref 133–144)
TACROLIMUS BLD-MCNC: 3.7 UG/L (ref 5–15)
TME LAST DOSE: ABNORMAL H
WBC # BLD AUTO: 4.2 10E9/L (ref 4–11)

## 2017-03-16 PROCEDURE — 83605 ASSAY OF LACTIC ACID: CPT | Performed by: EMERGENCY MEDICINE

## 2017-03-16 PROCEDURE — 25000128 H RX IP 250 OP 636: Performed by: EMERGENCY MEDICINE

## 2017-03-16 PROCEDURE — 85610 PROTHROMBIN TIME: CPT | Performed by: EMERGENCY MEDICINE

## 2017-03-16 PROCEDURE — 96374 THER/PROPH/DIAG INJ IV PUSH: CPT

## 2017-03-16 PROCEDURE — 71020 XR CHEST 2 VW: CPT

## 2017-03-16 PROCEDURE — 99285 EMERGENCY DEPT VISIT HI MDM: CPT | Mod: 25

## 2017-03-16 PROCEDURE — 80053 COMPREHEN METABOLIC PANEL: CPT | Performed by: EMERGENCY MEDICINE

## 2017-03-16 PROCEDURE — 25000132 ZZH RX MED GY IP 250 OP 250 PS 637: Performed by: EMERGENCY MEDICINE

## 2017-03-16 PROCEDURE — 93010 ELECTROCARDIOGRAM REPORT: CPT | Mod: Z6 | Performed by: EMERGENCY MEDICINE

## 2017-03-16 PROCEDURE — 93005 ELECTROCARDIOGRAM TRACING: CPT

## 2017-03-16 PROCEDURE — 85025 COMPLETE CBC W/AUTO DIFF WBC: CPT | Performed by: EMERGENCY MEDICINE

## 2017-03-16 PROCEDURE — 99285 EMERGENCY DEPT VISIT HI MDM: CPT | Mod: 25 | Performed by: EMERGENCY MEDICINE

## 2017-03-16 RX ORDER — TRAMADOL HYDROCHLORIDE 50 MG/1
100 TABLET ORAL ONCE
Status: COMPLETED | OUTPATIENT
Start: 2017-03-16 | End: 2017-03-16

## 2017-03-16 RX ADMIN — PROCHLORPERAZINE EDISYLATE 10 MG: 5 INJECTION INTRAMUSCULAR; INTRAVENOUS at 18:21

## 2017-03-16 RX ADMIN — TRAMADOL HYDROCHLORIDE 100 MG: 50 TABLET, COATED ORAL at 18:22

## 2017-03-16 ASSESSMENT — ENCOUNTER SYMPTOMS
SHORTNESS OF BREATH: 1
MYALGIAS: 1
DIZZINESS: 1
VOMITING: 1
DIARRHEA: 1
ABDOMINAL PAIN: 1

## 2017-03-16 NOTE — ED AVS SNAPSHOT
[facility], Emergency Department    [address] [address]    Phone:  [phone]                                       Phan Luque   MRN: [id number]    Department:  [facility], Emergency Department   Date of Visit:  3/16/2017           Patient Information     Date Of Birth          1955        Your diagnoses for this visit were:     Anorexia     Arthralgia, unspecified joint        You were seen by Shaun Wilson MD.        Discharge Instructions       Please follow-up with your primary care doctor as well as Dr. Banegas within the next week to consider treatment for your pains.  If you feel worse, please return to the emergency department  Return to the emergency department immediately for any chest pain, back pain, shortness of breath, weakness, abdominal pain nausea, vomiting, or any other concerns as given or discussed        Future Appointments        Provider Department Dept Phone Center    3/22/2017 3:30 PM Mario Saldana MD [facility] [phone] [facility]    6/5/2017 12:00 PM Lab [facility] Lab [phone] [facility]    6/5/2017 1:00 PM Ruth Ann Erazo MD [facility] Nephrology [phone] [facility]    6/9/2017 7:30 AM [facility] Lab [phone] [facility]    6/9/2017 8:30 AM Hussein Banegas MD [facility] Hepatology [phone] [facility]    8/14/2017 4:00 PM Ruth Oakley MD [facility] Endocrinology [phone] [facility]      24 Hour Appointment Hotline       To make an appointment at any [facility], call [phone] ([phone]). If you don't have a family doctor or clinic, we will help you find one. [facility] clinics are conveniently located to serve the needs of you and your family.             Review of your medicines      Our records show that you are taking the medicines listed below. If these are incorrect, please call your family doctor or clinic.        Dose / Directions Last dose taken    acetaminophen 325 MG tablet   Commonly known as:   TYLENOL   Dose:  650 mg   Quantity:  100 tablet        Take 2 tablets (650 mg) by mouth every 6 hours as needed for mild pain Or fevers. Use sparingly. Limit use to no more than 2 grams (2000 mg) in 24 hours. **Further refills must be obtained by primary care provider**   Refills:  0        azaTHIOprine 100 MG Tabs   Dose:  150 mg   Quantity:  30 tablet        Take 150 mg by mouth every evening   Refills:  11        busPIRone 10 MG tablet   Commonly known as:  BUSPAR   Dose:  10 mg   Quantity:  60 tablet        Take 1 tablet (10 mg) by mouth 2 times daily   Refills:  1        calcium Citrate-vitamin D 500-400 MG-UNIT Chew   Dose:  1 tablet        Take 1 tablet by mouth daily   Refills:  0        cyanocobalamin 1000 MCG Tabs   Dose:  1000 mcg   Quantity:  30 tablet        Take 1,000 mcg by mouth daily   Refills:  1        folic acid 1 MG tablet   Commonly known as:  FOLVITE   Dose:  1 mg   Quantity:  30 tablet        Take 1 tablet (1 mg) by mouth daily   Refills:  1        hydrOXYzine 25 MG tablet   Commonly known as:  ATARAX   Dose:  25-50 mg   Quantity:  60 tablet        Take 1-2 tablets (25-50 mg) by mouth every 6 hours as needed for itching   Refills:  1        levothyroxine 88 MCG tablet   Commonly known as:  SYNTHROID/LEVOTHROID   Dose:  88 mcg   Indication:  Underactive Thyroid   Quantity:  30 tablet        Take 1 tablet (88 mcg) by mouth every morning (before breakfast)   Refills:  1        loperamide 2 MG capsule   Commonly known as:  IMODIUM   Dose:  2 mg        Take 2 mg by mouth 4 times daily as needed for diarrhea   Refills:  0        multivitamin, therapeutic Tabs tablet   Dose:  1 tablet   Quantity:  30 tablet        Take 1 tablet by mouth every 24 hours   Refills:  11        pantoprazole 40 MG EC tablet   Commonly known as:  PROTONIX   Dose:  40 mg   Quantity:  30 tablet        Take 1 tablet (40 mg) by mouth every morning (before breakfast)   Refills:  11        psyllium 0.52 G capsule   Dose:  2  capsule   Indication:  GI motility   Quantity:  60 capsule        Take 2 capsules (1.04 g) by mouth daily With a full glass of water.   Refills:  1        sertraline 100 MG tablet   Commonly known as:  ZOLOFT   Dose:  150 mg   Quantity:  45 tablet        Take 1.5 tablets (150 mg) by mouth daily   Refills:  1        simethicone 80 MG chewable tablet   Commonly known as:  MYLICON   Dose:  80 mg   Quantity:  180 tablet        Take 1 tablet (80 mg) by mouth every 6 hours as needed for flatulence or cramping   Refills:  1        tacrolimus 0.5 MG capsule   Commonly known as:  PROGRAF - GENERIC EQUIVALENT   Dose:  2 mg   Quantity:  240 capsule        Take 4 capsules (2 mg) by mouth 2 times daily   Refills:  11        traMADol 50 MG tablet   Commonly known as:  ULTRAM   Dose:   mg   Quantity:  50 tablet        Take 1-2 tablets ( mg) by mouth every 6 hours as needed for pain   Refills:  0        traZODone 100 MG tablet   Commonly known as:  DESYREL   Dose:  100 mg   Quantity:  30 tablet        Take 1 tablet (100 mg) by mouth At Bedtime **Further refills must be obtained by primary care provider**   Refills:  0        ursodiol 300 MG capsule   Commonly known as:  ACTIGALL   Dose:  300 mg   Indication:  Rejection of Liver Transplant   Quantity:  60 capsule        Take 1 capsule (300 mg) by mouth 2 times daily   Refills:  11                Procedures and tests performed during your visit     CBC with platelets differential    Comprehensive metabolic panel    EKG 12 lead    INR    Lactic acid whole blood    XR Chest 2 Views      Orders Needing Specimen Collection     None      Pending Results     Date and Time Order Name Status Description    3/16/2017 1704 EKG 12-LEAD, TRACING ONLY Preliminary     3/16/2017 1651 EKG 12 lead Preliminary     3/16/2017 1106 TACROLIMUS LEVEL In process             Pending Culture Results     No orders found from 3/14/2017 to 3/17/2017.            Thank you for choosing [facility]        Thank you for choosing [facility] for your care. Our goal is always to provide you with excellent care. Hearing back from our patients is one way we can continue to improve our services. Please take a few minutes to complete the written survey that you may receive in the mail after you visit with us. Thank you!        [employer] Information     [employer] gives you secure access to your electronic health record. If you see a primary care provider, you can also send messages to your care team and make appointments. If you have questions, please call your primary care clinic.  If you do not have a primary care provider, please call [phone] and they will assist you.        Care EveryWhere ID     This is your Care EveryWhere ID. This could be used by other organizations to access your [facility] medical records  [id number]        After Visit Summary       This is your record. Keep this with you and show to your community pharmacist(s) and doctor(s) at your next visit.

## 2017-03-16 NOTE — DISCHARGE INSTRUCTIONS
Please follow-up with your primary care doctor as well as Dr. Banegas within the next week to consider treatment for your pains.  If you feel worse, please return to the emergency department  Return to the emergency department immediately for any chest pain, back pain, shortness of breath, weakness, abdominal pain nausea, vomiting, or any other concerns as given or discussed

## 2017-03-16 NOTE — PROGRESS NOTES
[facility]  Nephrology Clinic follow up note    Date of encounter 03/13/2017.       Assessment and Plan:     61  year old female with PMH of acute alcoholic hepatitis, alcoholic cirrhosis complicated by oliguric GERMAIN and hypervolemia requiring dialysis from 8/2/2016 to 9/6/2016. Patient post liver transplant on 8/25/2016. Patient now with CKD , most likely from injury sustained from  GERMAIN episode which was thought to be ischemic/toxic ATN and from decompensated cirrhosis. Seen last in Nephrology clinic in 12 /2016.     Recommendations :-     1. CKD stage 3 :- serum Cr during visit was 1.53 mg/dL with eGFR 35 putting her into CKD stage 3b which is associated with more complications of CKD compared to patients with eGFR >45. Since coming off dialysis serum Cr was fluctuating between 2 - 2.6. Since past few months serum Cr ~1.3-1.5.  Patient was having  poor oral intake earlier, now appears to be improving. Now off tube feeds. Patients medications  Were adjusted and now off myfortic acid and patient on imuran  since 10/10/16 and prograf .  Prograf levels in acceptable range. Normal kidney in CT  scan abdomen from 8/2016. Patient with no  Proteinuria.    - Avoid nephrotoxins.   - renal dosing of medications.   - prn IV fluids while inpatient for nausea, vomiting , decreased oral intake or hypotension.   - monitor tacrolimus levels closely.     Consider d/c sucralfate as it contains some aluminum and may increase risk of aluminum toxicity in patient with low kidney function    2. BP :- in acceptable range without antihypertensives    3. euvolemic on exam - continue low sodium diet    4. No  acid base issues.     5. Hypomagnesemia - better but borderline with Serum Mg 1.8. Encouraged intake of food rich in Mg like green leafy vegetables. Patient now off Mg supplements. Patient used to take MgO 400 MG BID. Was discontinued recently . Patient feels her body aches have started since then. Will  start MgO 400 mg  daily . Will inform Benito Brannon to send in prescription and to inform patient. Appreciate help.     6. Anemia of CKD 3 - Hb 9.1 which is worsening from 10.9 earlier this month. She is iron deficient. Did not tolerate oral Fe in past. Will provide referral to anemia clinic to consider IV Fe therapy and enrollment into MEDARDO protocol if her hemoglobin drops to less than 9.     7. BMD :- serum Ca 8.7, serum albumin 3.7, serum phosphorus 3.7. Intact PTH 20, vitamin D 25.  PTH is lower than ideal as a suppressed PTH can be associate with adynamic bone disease.  However, calcium and phos are at goal along with vitamin D so no changes.     Discussed with Dr Gutierrez. Patient to follow up in 3 months. Patient to get renal panel, urine protein studies, Hb prior to next appointment.     Assessment and plan was discussed with patient and she voiced her understanding and agreement.    Reason for Visit:  Ms. Luque is here for follow up of CKD.     HPI:   61  year old female with PMH of acute alcoholic hepatitis, alcoholic cirrhosis complicated by oliguric GERMAIN and hypervolemia requiring dialysis from 8/2/2016 to 9/6/2016. Patient post liver transplant on 8/25/2016. Patient now with CKD , most likely from injury sustained from recent GERMAIN episode which was thought to be ischemic/toxic ATN and  From decompensated cirrhosis. Seen last in Nephrology clinic in 12/2016.       post transplant period was complicated by  nausea, vomiting, poor oral intake and diarrhea . Immunosuppressants  were adjusted by transplant team. Patient now off NGT.  Patient reported her oral intake is improving.  Patients now off myfortic acid and started on imuran. Patient also on prograf. Immunosuppression managed by liver transplant team .       No SOB. No swelling of lower extremities. Overall doing better. Reported feeling tired. Also have diffuse body pain. Total CK was normal when checked recently. Patient feels her body aches have started since stopping  Mg supplements.      patient reported her diarrhea have markedly improved. Patient on psyllium and loperamide.            ROS:   A comprehensive review of systems was obtained and negative, except as noted in the HPI or PMH.    Active Medical Problems:  Patient Active Problem List   Diagnosis     Decompensation of cirrhosis of liver (H)     Liver transplanted (H)     Alcoholic hepatitis     Immunosuppression (H)     Acute kidney injury (H)     Anxiety     Alcoholic hepatitis without ascites     Enterococcus UTI     Liver transplant status (H)     Nausea & vomiting     Malnutrition (H)     Candidiasis of skin     Anemia     ACP (advance care planning)     Diarrhea     Hypothyroidism     Esophageal reflux     Major depression     Insomnia       Personal Hx:  Social History     Social History     Marital status:      Spouse name: N/A     Number of children: N/A     Years of education: N/A     Occupational History     Not on file.     Social History Main Topics     Smoking status: Former Smoker     Smokeless tobacco: Never Used     Alcohol use No      Comment: heavy use (750ml/2 days) up until 1 year ago; had cut down to 1 drink/day; none since 7/18/16     Drug use: No     Sexual activity: Not on file     Other Topics Concern     Not on file     Social History Narrative       Allergies:  Allergies   Allergen Reactions     Benadryl [Diphenhydramine] Hives     Cefaclor Hives     Hydrocodone-Acetaminophen Itching     Oxycodone Itching     Penicillins Hives     Sulfa Drugs Hives       Medications:  Prior to Admission medications    Medication Sig Start Date End Date Taking? Authorizing Provider   hydrocortisone (CORTAID) 1 % cream  12/1/16  Yes Reported, Patient   alpha-d-galactosidase (BEANO) tablet Take 1-2 tablets by mouth 3 times daily (before meals)   Yes Reported, Patient   hydrOXYzine (ATARAX) 25 MG tablet Take 1 tablet (25 mg) by mouth every 6 hours as needed for itching 9/14/16  Yes Heena Flores  ZHEN   sertraline (ZOLOFT) 50 MG tablet Take 1 tablet (50 mg) by mouth daily 9/14/16  Yes Fast, Heena Solo PA-C   loperamide (IMODIUM) 2 MG capsule Take 2 capsules (4 mg) by mouth 3 times daily 9/14/16  Yes Fast, Heena Solo PA-C   valGANciclovir (VALCYTE) 50 MG/ML SOLR Take 9 mLs (450 mg) by mouth every other day 9/15/16  Yes Fast, Heena Solo PA-C   CELLCEPT 200 MG/ML PO SUSPENSION 5 mLs (1,000 mg) by ORAL OR NJ TUBE route 2 times daily 9/14/16  Yes Fast, Heena Solo PA-C   tacrolimus (PROGRAF - GENERIC EQUIVALENT) 0.5 MG capsule Take 5 capsules (2.5 mg) by mouth 2 times daily 9/14/16  Yes Fast, Heena Solo PA-C   miconazole with skin protectant (ILDA ANTIFUNGAL) 2 % CREA cream Apply topically 2 times daily 9/14/16  Yes Fast, Heena Solo PA-C   nystatin (MYCOSTATIN) cream Apply topically 2 times daily as needed (groin rash) 9/14/16  Yes Fast, Heena Solo PA-C   folic acid (FOLATE) 500 mcg/mL SOLN Take 2 mLs (1 mg) by mouth daily 9/14/16  Yes Fast, Heena Solo PA-C   cyanocobalamin 1000 MCG TABS Take 1,000 mcg by mouth daily 9/14/16  Yes Fast, Heena Solo PA-C   sulfamethoxazole-trimethoprim (BACTRIM,SEPTRA) 200-40 mg/5ml suspension 10 mLs (80 mg) by Oral or Feeding Tube route daily Dose based on TMP component. 9/14/16  Yes Fast, Heena Solo PA-C   simethicone (MYLICON) 80 MG chewable tablet Take 1 tablet (80 mg) by mouth every 6 hours as needed for cramping 9/14/16  Yes Fast, Heena Solo PA-C   ursodiol (ACTIGALL) 20 mg/mL 15 mLs (300 mg) by Oral or Feeding Tube route 3 times daily 9/14/16  Yes Fast, Heena Solo PA-C   nystatin (MYCOSTATIN) 853322 UNIT/ML suspension Take 10 mLs (1,000,000 Units) by mouth 4 times daily 9/14/16  Yes Fast, Heena Solo PA-C   multivitamins with minerals (CERTAVITE/CEROVITE) LIQD 15 mLs by Per Feeding Tube route daily 9/14/16  Yes Fast, Heena Solo PA-C   pantoprazole (PROTONIX) 40 MG enteric coated tablet Take 1 tablet (40 mg)  "by mouth 2 times daily (before meals) 9/14/16  Yes Fast, Heena Solo PA-C   sucralfate (CARAFATE) 1 GM/10ML suspension Take 10 mLs (1 g) by mouth 3 times daily (before meals) 9/14/16  Yes Fast, Heena Solo PA-C   ONDANSETRON PO Take 4 mg by mouth every 8 hours as needed for nausea or vomiting   Yes Unknown, Entered By History       Vitals:  /77  Pulse 103  Temp 98.2  F (36.8  C) (Oral)  Ht 1.702 m (5' 7.01\")  Wt 70.9 kg (156 lb 3.2 oz)  SpO2 99%  BMI 24.46 kg/m2    Exam:   GENERAL APPEARANCE: alert and no distress  HENT: mouth without ulcers or lesions  LYMPHATICS: no cervical or supraclavicular nodes  RESP: lungs clear to auscultation - no rales, rhonchi or wheezes  CV: regular rhythm, normal rate, no rub, no murmur  EDEMA: no LE edema bilaterally  ABDOMEN: soft, nondistended, nontender, bowel sounds normal  MS: extremities normal - no gross deformities noted, no evidence of inflammation in joints, no muscle tenderness  SKIN: no rash    Results:        Electrolytes/Renal -   Recent Labs   Lab Test  03/13/17   1605  03/02/17   0905  02/27/17   0940  02/23/17   0850  02/20/17   0940   NA  138  140  143  141  141   POTASSIUM  4.5  4.7  4.4  4.5  4.8   CHLORIDE  104  106  109  106  107   CO2  26  25  28  27  27   BUN  17  15  20  15  15   CR  1.53*  1.48*  1.40*  1.46*  1.36*   GLC  117*  124*  82  102*  80   ERIC  8.7  9.6  9.1  9.4  9.5   MAG  1.8   --   1.9   --   2.1   PHOS  3.7   --   4.4  4.4  4.6*       CBC -   Recent Labs   Lab Test  03/13/17   1605  03/02/17   0905  02/27/17   0940  02/23/17   0850   WBC   --   6.2  6.1  5.3   HGB  9.1*  10.9*  10.7*  11.0*   PLT   --   329  303  339       LFTs -   Recent Labs   Lab Test  03/13/17   1605  03/02/17   0905  02/27/17   0940  02/23/17   0850   ALKPHOS   --   68  62  69   BILITOTAL   --   0.6  0.7  0.6   ALT   --   15  15  14   AST   --   16  16  17   PROTTOTAL   --   7.5  7.3  7.4   ALBUMIN  3.7  4.0  3.7  3.7       Coags -   Recent Labs   Lab Test  " 09/13/16   1055  08/30/16   0740  08/29/16   0635   08/25/16   1230  08/25/16   1055  08/25/16   0850   INR  1.08  1.01  1.03   < >  1.76*  1.76*  1.87*   PTT   --    --    --    --   48*  59*  58*    < > = values in this interval not displayed.       Iron Panel -   Recent Labs   Lab Test  09/06/16   0638  08/06/16   0757   IRON  18*  25*   IRONSAT  15  27   WILL  480*  528*       Endocrine -   Recent Labs   Lab Test  12/15/16   0615  11/16/16   0640  11/04/16   0751   TSH  2.18  6.59*  8.44*        Attestation:  This patient has been seen and evaluated by me, Daya Gutierrez MD on 3/13/17 .  Discussed with the fellow or resident and agree with the findings and plan in this note.     I have reviewed  Medications, Vital Signs and Labs.    Daya Gutierrez MD  [facility]  Department of Medicine  Division of Renal Disease and Hypertension  [phone]

## 2017-03-16 NOTE — ED NOTES
Patient drove to ED,  Has hx of anemia, post liver transplant, feeling dizzy, sob, fatigue and generalized pain since home from transplant, hematologist recommended coming to ED.      VSS

## 2017-03-16 NOTE — TELEPHONE ENCOUNTER
Pt voicing concerns of not feeling well, tired, dizzy, has an appt next week in hematology clinic,  Encouraged pt to rest, stay well hydrated and plan to attend that appt.   Pt also requesting a refill of tramadol, stating my pcp has not responded to my request.  Plan made with pt that a message would be sent to , about this request.  Reminded pt that she can take tylenol for pain.  She verbalized understanding, will wait to hear back.

## 2017-03-16 NOTE — ED PROVIDER NOTES
History     Chief Complaint   Patient presents with     Shortness of Breath     with dizziness and join-muscle pain       HPI  Phan Luque is a 61 year old female with history of liver transplant on 2016, CKD, acute alcoholic hepatitis, alcoholic cirrhosis complicated by oliguric GERMAIN and hypervolemia requiring dialysis from 2016 to 2016. Patient was in a TCU until 17.   Patient complains of generalized myalgias, abdominal tenderness, dizziness, and shortness of breath that have been progressively worsening over the past 1-2 months. Her pain is localized mostly to her bilateral hands and feet.  She has been vomiting at least once daily since she has been in the hospital, for the past several days. She also reports chronic diarrhea that has been ongoing since she was in the hospital, however, states this is controlled with Imodium.     She reports she will be followed by Dr. Santosh Salgado, a new PCP at [facility] in [place], [place], but has not yet been seen or evaluated by him. She is scheduled to follow up in the Anemia Clinic next week, sees Dr. Banegas at the       I have reviewed the Medications, Allergies, Past Medical and Surgical History, and Social History in the [employer] system.    PAST MEDICAL HISTORY:   Past Medical History   Diagnosis Date     Asthma      End stage liver disease (H)      2/2 Alcohol Abuse     Liver transplanted (H)      Migraines      Osteoporosis      Spinal stenosis in cervical region        PAST SURGICAL HISTORY:   Past Surgical History   Procedure Laterality Date     Appendectomy            Tubal ligation       laparoscopic     Sphincteroplasty       Esophagoscopy, gastroscopy, duodenoscopy (egd), combined N/A 2016     Procedure: COMBINED ESOPHAGOSCOPY, GASTROSCOPY, DUODENOSCOPY (EGD);  Surgeon: Tao Alfonso MD;  Location:  GI     Transplant liver recipient  donor N/A 2016     Procedure: TRANSPLANT LIVER RECIPIENT  DONOR;  Surgeon:  Larry Dhillon MD;  Location: [facility] OR     Bench liver N/A 8/25/2016     Procedure: BENCH LIVER;  Surgeon: Larry Dhillon MD;  Location: [facility] OR     Colonoscopy       Esophagoscopy, gastroscopy, duodenoscopy (egd), combined N/A 10/31/2016     Procedure: COMBINED ESOPHAGOSCOPY, GASTROSCOPY, DUODENOSCOPY (EGD), BIOPSY SINGLE OR MULTIPLE;  Surgeon: Ronald Bojorquez MD;  Location: [facility] GI       FAMILY HISTORY:   Family History   Problem Relation Age of Onset     Family History Negative         SOCIAL HISTORY:   Social History   Substance Use Topics     Smoking status: Former Smoker     Smokeless tobacco: Never Used     Alcohol use No      Comment: heavy use (750ml/2 days) up until 1 year ago; had cut down to 1 drink/day; none since 7/18/16         Review of Systems   Respiratory: Positive for shortness of breath.    Gastrointestinal: Positive for abdominal pain, diarrhea (chronic, unchanged) and vomiting (x1 daily, chronic).   Musculoskeletal: Positive for myalgias (generalized x1-2 months).   Neurological: Positive for dizziness.   All other systems reviewed and are negative.      Physical Exam   BP: 130/65  Pulse: 98  Temp: 98  F (36.7  C)  Resp: 18  SpO2: 98 %  Physical Exam    General: Nontoxic, overall well-appearing.  HEENT: The head is normocephalic and atraumatic. Pupils are equal round and reactive to light. Extraocular motions are intact. There is no scleral icterus. There is no facial swelling. The neck nontender and supple .   CV: Regular rate and rhythm without murmurs rubs or gallops. 2+ radial pulses bilaterally.  PULM: Clear to auscultation bilaterally.  ABD: Soft, nontender, nondistended. Well-healed scars from liver transplant Normal bowel sounds.   EXT: Full range of motion.  No edema.  NEURO: Cranial nerves II through XII are intact and symmetric. Bilateral upper and lower extremities grossly show full range of motion without any focal deficits.  SKIN: No rashes, ecchymosis, or  lacerations  PSYCH: Calm and cooperative, interactive.     ED Course     ED Course     Procedures       5:39 PM  The patient was seen and examined by Dr. Wilson in Room 19.       EKG at 17:04.    SOB at time of ECG.  ECG interpretted by me within 10 minutes of production  NSR at 85 bpm. Normal axis.  Normal intervals. Nl R-wave progress. No ST-T elevations or depressions. Pathologic T-wave inversion are absent     Interpretation: Normal ECG          Labs Ordered and Resulted from Time of ED Arrival Up to the Time of Departure from the ED   CBC WITH PLATELETS DIFFERENTIAL - Abnormal; Notable for the following:        Result Value    RBC Count 2.66 (*)     Hemoglobin 8.8 (*)     Hematocrit 26.3 (*)     MCH 33.1 (*)     All other components within normal limits   COMPREHENSIVE METABOLIC PANEL - Abnormal; Notable for the following:     Creatinine 1.57 (*)     GFR Estimate 34 (*)     GFR Estimate If Black 41 (*)     Protein Total 6.7 (*)     All other components within normal limits   LACTIC ACID WHOLE BLOOD - Abnormal; Notable for the following:     Lactic Acid 0.6 (*)     All other components within normal limits   INR - Abnormal; Notable for the following:     INR 1.15 (*)     All other components within normal limits       Assessments & Plan (with Medical Decision Making)   61 year old  female with progressively chronic body aches, restlessness, anorexia, among other problems as listed above    Differential diagnosis is extensive including infection such as UTI or pneumonia, medication related side effects, anemia, among others. Given the chronicity of her symptoms, infection is quite unlikely. She was seen by Dr. Banegas earlier in March, prednisone was suggested as a possible treatment option.     Overall the patient is actually quite well-appearing. She does not appear toxic and has no focal complaints    Tacrolimus level from earlier yesterday's pending.  Her chest x-ray is normal  ECG is nonischemic  Labs are  unremarkable. Her renal function is at baseline. Lactic acid 0.6.    Overall the likelihood of acute process such as occult infection is highly unlikely.  She doesn't manifest true symptoms of restless leg syndrome.    The patient has a follow-up with her primary physician in one week and will see Dr. Banegas within 1 week as well, per his recent note to consider whether prednisone may be appropriate she is agreeable with this plan.     - Patient ready and eager for discharge. Care plan, follow up plan, and reasons to return immediately to the ED were dicussed with the patient and summarized as noted in the discharge instructions.          This part of the document was transcribed by Keturah Husain, Medical Scribe.         I have reviewed the nursing notes.    I have reviewed the findings, diagnosis, plan and need for follow up with the patient.    New Prescriptions    No medications on file       Final diagnoses:   Anorexia   Arthralgia, unspecified joint     IElke, am serving as a trained medical scribe to document services personally performed by Shaun Wilson MD, based on the provider's statements to me.   Shaun REDDING MD, was physically present and have reviewed and verified the accuracy of this note documented by Elke Concepcion.       3/16/2017   [facility], [place], EMERGENCY DEPARTMENT     Shaun Wilson MD  03/16/17 [id number]

## 2017-03-16 NOTE — ED AVS SNAPSHOT
[facility], [place], Emergency Department    [address] [place] [address]    Phone:  [phone]                                       Phan Luque   MRN: [id number]    Department:  [facility], Emergency Department   Date of Visit:  3/16/2017           After Visit Summary Signature Page     I have received my discharge instructions, and my questions have been answered. I have discussed any challenges I see with this plan with the nurse or doctor.    ..........................................................................................................................................  Patient/Patient Representative Signature      ..........................................................................................................................................  Patient Representative Print Name and Relationship to Patient    ..................................................               ................................................  Date                                            Time    ..........................................................................................................................................  Reviewed by Signature/Title    ...................................................              ..............................................  Date                                                            Time

## 2017-03-17 ENCOUNTER — COMMUNICATION - HEALTHEAST (OUTPATIENT)
Dept: INTERNAL MEDICINE | Facility: CLINIC | Age: 62
End: 2017-03-17

## 2017-03-17 DIAGNOSIS — R52 PAIN: ICD-10-CM

## 2017-03-17 LAB — INTERPRETATION ECG - MUSE: NORMAL

## 2017-03-19 ENCOUNTER — COMMUNICATION - HEALTHEAST (OUTPATIENT)
Dept: INTERNAL MEDICINE | Facility: CLINIC | Age: 62
End: 2017-03-19

## 2017-03-20 DIAGNOSIS — E83.42 HYPOMAGNESEMIA: ICD-10-CM

## 2017-03-20 DIAGNOSIS — N18.30 CKD (CHRONIC KIDNEY DISEASE) STAGE 3, GFR 30-59 ML/MIN (H): Primary | ICD-10-CM

## 2017-03-20 NOTE — NURSING NOTE
Verbal order received for patient to start taking magnesium oxide 400MG daily. Left VM for patient to return call to discuss and confirm which pharmacy to send Rx.     Benito Brannon RN

## 2017-03-21 DIAGNOSIS — R10.13 ABDOMINAL PAIN, EPIGASTRIC: ICD-10-CM

## 2017-03-21 RX ORDER — TRAMADOL HYDROCHLORIDE 50 MG/1
50-100 TABLET ORAL EVERY 6 HOURS PRN
Qty: 50 TABLET | Refills: 0
Start: 2017-03-21 | End: 2017-10-25

## 2017-03-21 ASSESSMENT — ENCOUNTER SYMPTOMS
HALLUCINATIONS: 0
NECK PAIN: 1
WEAKNESS: 1
ABDOMINAL PAIN: 1
MYALGIAS: 1
PALPITATIONS: 1
DYSPNEA ON EXERTION: 1
DEPRESSION: 0
POLYPHAGIA: 0
HEMOPTYSIS: 0
BACK PAIN: 1
INSOMNIA: 1
FEVER: 0
SHORTNESS OF BREATH: 1
LEG SWELLING: 0
CHILLS: 1
DECREASED CONCENTRATION: 1
SPUTUM PRODUCTION: 0
SNORES LOUDLY: 0
BLOATING: 1
TACHYCARDIA: 1
WEIGHT GAIN: 0
MUSCLE WEAKNESS: 1
COUGH DISTURBING SLEEP: 0
NERVOUS/ANXIOUS: 0
DISTURBANCES IN COORDINATION: 1
TINGLING: 1
ORTHOPNEA: 0
EYE PAIN: 1
NAUSEA: 1
SPEECH CHANGE: 0
NAIL CHANGES: 1
INCREASED ENERGY: 1
WHEEZING: 0
HEADACHES: 1
RECTAL PAIN: 0
VOMITING: 1
EXERCISE INTOLERANCE: 1
COUGH: 0
SYNCOPE: 1
DIZZINESS: 1
EYE REDNESS: 0
WEIGHT LOSS: 1
CLAUDICATION: 0
NUMBNESS: 1
JAUNDICE: 0
LOSS OF CONSCIOUSNESS: 1
CONSTIPATION: 0
RESPIRATORY PAIN: 0
BLOOD IN STOOL: 0
DOUBLE VISION: 0
SKIN CHANGES: 0
PANIC: 0
JOINT SWELLING: 0
RECTAL BLEEDING: 0
ARTHRALGIAS: 1
MEMORY LOSS: 1
EYE WATERING: 1
SEIZURES: 0
EYE IRRITATION: 0
LIGHT-HEADEDNESS: 1
TREMORS: 1
DECREASED APPETITE: 1
ALTERED TEMPERATURE REGULATION: 1
NIGHT SWEATS: 0
POOR WOUND HEALING: 0
HYPOTENSION: 1
HYPERTENSION: 0
LEG PAIN: 1
POSTURAL DYSPNEA: 0
POLYDIPSIA: 0
FATIGUE: 1
BOWEL INCONTINENCE: 1
STIFFNESS: 1
SLEEP DISTURBANCES DUE TO BREATHING: 0
PARALYSIS: 0
DIARRHEA: 1
MUSCLE CRAMPS: 1
HEARTBURN: 1

## 2017-03-22 ENCOUNTER — TELEPHONE (OUTPATIENT)
Dept: PHARMACY | Facility: CLINIC | Age: 62
End: 2017-03-22

## 2017-03-22 ENCOUNTER — ONCOLOGY VISIT (OUTPATIENT)
Dept: ONCOLOGY | Facility: CLINIC | Age: 62
End: 2017-03-22
Attending: INTERNAL MEDICINE
Payer: COMMERCIAL

## 2017-03-22 VITALS
RESPIRATION RATE: 20 BRPM | HEART RATE: 86 BPM | WEIGHT: 154.9 LBS | TEMPERATURE: 98.1 F | OXYGEN SATURATION: 98 % | HEIGHT: 67 IN | DIASTOLIC BLOOD PRESSURE: 53 MMHG | SYSTOLIC BLOOD PRESSURE: 109 MMHG | BODY MASS INDEX: 24.31 KG/M2

## 2017-03-22 DIAGNOSIS — N18.30 ANEMIA OF CHRONIC RENAL FAILURE, STAGE 3 (MODERATE) (H): ICD-10-CM

## 2017-03-22 DIAGNOSIS — N18.30 ANEMIA IN STAGE 3 CHRONIC KIDNEY DISEASE (H): Primary | ICD-10-CM

## 2017-03-22 DIAGNOSIS — N18.30 ANEMIA IN STAGE 3 CHRONIC KIDNEY DISEASE (H): ICD-10-CM

## 2017-03-22 DIAGNOSIS — D63.1 ANEMIA OF CHRONIC RENAL FAILURE, STAGE 3 (MODERATE) (H): Primary | ICD-10-CM

## 2017-03-22 DIAGNOSIS — D63.1 ANEMIA OF CHRONIC RENAL FAILURE, STAGE 3 (MODERATE) (H): ICD-10-CM

## 2017-03-22 DIAGNOSIS — D63.1 ANEMIA IN STAGE 3 CHRONIC KIDNEY DISEASE (H): Primary | ICD-10-CM

## 2017-03-22 DIAGNOSIS — N18.30 CKD (CHRONIC KIDNEY DISEASE) STAGE 3, GFR 30-59 ML/MIN (H): ICD-10-CM

## 2017-03-22 DIAGNOSIS — D63.1 ANEMIA IN STAGE 3 CHRONIC KIDNEY DISEASE (H): ICD-10-CM

## 2017-03-22 DIAGNOSIS — N18.30 ANEMIA OF CHRONIC RENAL FAILURE, STAGE 3 (MODERATE) (H): Primary | ICD-10-CM

## 2017-03-22 LAB
FERRITIN SERPL-MCNC: 320 NG/ML (ref 8–252)
HCT VFR BLD AUTO: 26 % (ref 35–47)
HGB BLD-MCNC: 8.7 G/DL (ref 11.7–15.7)
IRON SATN MFR SERPL: 32 % (ref 15–46)
IRON SERPL-MCNC: 62 UG/DL (ref 35–180)
TIBC SERPL-MCNC: 197 UG/DL (ref 240–430)

## 2017-03-22 PROCEDURE — 99212 OFFICE O/P EST SF 10 MIN: CPT | Mod: ZF

## 2017-03-22 PROCEDURE — 83540 ASSAY OF IRON: CPT | Performed by: INTERNAL MEDICINE

## 2017-03-22 PROCEDURE — 85014 HEMATOCRIT: CPT | Performed by: INTERNAL MEDICINE

## 2017-03-22 PROCEDURE — 83550 IRON BINDING TEST: CPT | Performed by: INTERNAL MEDICINE

## 2017-03-22 PROCEDURE — 82728 ASSAY OF FERRITIN: CPT | Performed by: INTERNAL MEDICINE

## 2017-03-22 PROCEDURE — 99203 OFFICE O/P NEW LOW 30 MIN: CPT | Mod: ZP | Performed by: INTERNAL MEDICINE

## 2017-03-22 PROCEDURE — 85018 HEMOGLOBIN: CPT | Performed by: INTERNAL MEDICINE

## 2017-03-22 PROCEDURE — 36415 COLL VENOUS BLD VENIPUNCTURE: CPT | Performed by: INTERNAL MEDICINE

## 2017-03-22 PROCEDURE — 82668 ASSAY OF ERYTHROPOIETIN: CPT | Performed by: INTERNAL MEDICINE

## 2017-03-22 ASSESSMENT — PAIN SCALES - GENERAL: PAINLEVEL: MILD PAIN (3)

## 2017-03-22 NOTE — NURSING NOTE
Left second VM for patient to return call to discuss new Rx for magnesium oxide.     Benito Brannon RN

## 2017-03-22 NOTE — NURSING NOTE
"Phan Luque is a 61 year old female who presents for:  Chief Complaint   Patient presents with     Oncology Clinic Visit     new patient consultation related to Anemia of chronic renal failure, stage 3 (moderate)         Initial Vitals:  /53  Pulse 86  Temp 98.1  F (36.7  C) (Oral)  Resp 20  Ht 1.702 m (5' 7.01\")  Wt 70.3 kg (154 lb 14.4 oz)  SpO2 98%  BMI 24.25 kg/m2 Estimated body mass index is 24.25 kg/(m^2) as calculated from the following:    Height as of this encounter: 1.702 m (5' 7.01\").    Weight as of this encounter: 70.3 kg (154 lb 14.4 oz).. Body surface area is 1.82 meters squared. BP completed using cuff size: large  Mild Pain (3) No LMP recorded. Patient is postmenopausal. Allergies and medications reviewed.     Medications: Medication refills not needed today.  Pharmacy name entered into [employer]:    [id number] [place], [place] - [address] [place] [address] PHARMACY [place], [place] - [address] [facility] [address] [place] [address] - [address] [facility]SPE[facility] PHARMACY - [place], [place] - [address] [facility] - [place], [place] - [address]     Comments: patient mentioned minor feet and ankle pain. Patient takes tylenol PRN and Tramadol at night.    5 minutes for nursing intake (face to face time)   Saw Caro CMA        "

## 2017-03-22 NOTE — PATIENT INSTRUCTIONS
Your visit the [facility] Hematology Clinic was to discuss anemia, likely related to low erythropoetin.  This is a common problem in people who have had a injury to the kidney or who are on hemodialysis    I will plan to see you back about 1 month after your first injection.  Once the medication is sent to you, we will have you come to the clinic so you can learn how to give the injections.      If you have questions or concerns before your next appointment you can call Dr Saldana's Care Coordinator, Sheryl Nava at:  [phone]    You can also reach Dr Saldana through [employer] or at his office number at [phone]

## 2017-03-22 NOTE — LETTER
3/22/2017      RE: Phan Luque  [address] [facility] [address]           [facility] Hematology Consultation    Outpatient Visit Note:    Patient: Phan Luque  MRN: [id number]  : 1955  ERICK: 2017    Reason for Consultation:  anemia    History of Present Illness:  Phan Luque is a 61 year old woman referred by Dr Gutierrez of nephrology for evaluation and treatment of anemia. Ms Luque is a 62 YO woman with a history of liver transplantation (etoh) in 2016, with CKD secondary to acute kidney injury associated with hepatic dysfunction just prior to transplant.  It appears her new baseline creatinine is 1.53 making her CKD III.  She notes no PICA symptoms, no bleeding.    Review of her PCP's labs shows no vitamin B12 deficiency or folic acid deficiency. Ferritin is > 300.  The last ferritin we have in this system is 180 in 2016.  She has never taken erythropoetin.    Past Medical History:  Past Medical History:   Diagnosis Date     Asthma      End stage liver disease (H)     2/2 Alcohol Abuse     Liver transplanted (H)      Migraines      Osteoporosis      Spinal stenosis in cervical region        Past Surgical History:  Past Surgical History:   Procedure Laterality Date     APPENDECTOMY           BENCH LIVER N/A 2016    Procedure: BENCH LIVER;  Surgeon: Larry Dhillon MD;  Location: [facility] OR     COLONOSCOPY       ESOPHAGOSCOPY, GASTROSCOPY, DUODENOSCOPY (EGD), COMBINED N/A 2016    Procedure: COMBINED ESOPHAGOSCOPY, GASTROSCOPY, DUODENOSCOPY (EGD);  Surgeon: Tao Alfonso MD;  Location: [facility] GI     ESOPHAGOSCOPY, GASTROSCOPY, DUODENOSCOPY (EGD), COMBINED N/A 10/31/2016    Procedure: COMBINED ESOPHAGOSCOPY, GASTROSCOPY, DUODENOSCOPY (EGD), BIOPSY SINGLE OR MULTIPLE;  Surgeon: Ronald Bojorquez MD;  Location: [facility] GI     sphincteroplasty       TRANSPLANT LIVER RECIPIENT  DONOR N/A 2016    Procedure: TRANSPLANT LIVER  RECIPIENT  DONOR;  Surgeon: Larry Dhillon MD;  Location: [facility] OR     TUBAL LIGATION      laparoscopic       Medications:  Current Outpatient Prescriptions   Medication Sig Dispense Refill     traMADol (ULTRAM) 50 MG tablet Take 1-2 tablets ( mg) by mouth every 6 hours as needed for pain 50 tablet 0     calcium Citrate-vitamin D 500-400 MG-UNIT CHEW Take 1 tablet by mouth daily       hydrOXYzine (ATARAX) 25 MG tablet Take 1-2 tablets (25-50 mg) by mouth every 6 hours as needed for itching 60 tablet 1     traZODone (DESYREL) 100 MG tablet Take 1 tablet (100 mg) by mouth At Bedtime **Further refills must be obtained by primary care provider** 30 tablet 0     acetaminophen (TYLENOL) 325 MG tablet Take 2 tablets (650 mg) by mouth every 6 hours as needed for mild pain Or fevers. Use sparingly. Limit use to no more than 2 grams (2000 mg) in 24 hours. **Further refills must be obtained by primary care provider** 100 tablet 0     loperamide (IMODIUM) 2 MG capsule Take 2 mg by mouth 4 times daily as needed for diarrhea       ursodiol (ACTIGALL) 300 MG capsule Take 1 capsule (300 mg) by mouth 2 times daily 60 capsule 11     tacrolimus (PROGRAF - GENERIC EQUIVALENT) 0.5 MG capsule Take 4 capsules (2 mg) by mouth 2 times daily 240 capsule 11     simethicone (MYLICON) 80 MG chewable tablet Take 1 tablet (80 mg) by mouth every 6 hours as needed for flatulence or cramping 180 tablet 1     sertraline (ZOLOFT) 100 MG tablet Take 1.5 tablets (150 mg) by mouth daily 45 tablet 1     psyllium 0.52 G capsule Take 2 capsules (1.04 g) by mouth daily With a full glass of water. 60 capsule 1     pantoprazole (PROTONIX) 40 MG EC tablet Take 1 tablet (40 mg) by mouth every morning (before breakfast) 30 tablet 11     multivitamin, therapeutic (THERA-VIT) TABS tablet Take 1 tablet by mouth every 24 hours 30 tablet 11     levothyroxine (SYNTHROID/LEVOTHROID) 88 MCG tablet Take 1 tablet (88 mcg) by mouth every morning (before  breakfast) 30 tablet 1     folic acid (FOLVITE) 1 MG tablet Take 1 tablet (1 mg) by mouth daily 30 tablet 1     cyanocobalamin 1000 MCG TABS Take 1,000 mcg by mouth daily 30 tablet 1     busPIRone (BUSPAR) 10 MG tablet Take 1 tablet (10 mg) by mouth 2 times daily 60 tablet 1     azaTHIOprine 100 MG TABS Take 150 mg by mouth every evening 30 tablet 11        Allergies:  Allergies   Allergen Reactions     Benadryl [Diphenhydramine] Hives     Cefaclor Hives     Hydrocodone-Acetaminophen Itching     Oxycodone Itching     Penicillins Hives     Sulfa Drugs Hives       ROS:  A 14 point ROS is negative except as stated in the HPI    Social History:  Denies any tobacco use. Abstinent of alcohol use since transplant.    Family History:  None of blood disorders    Objective:  Vitals: B/P: 109/53, T: 98.1, P: 86, R: 20, Wt: 154 lbs 14.4 oz  Exam:   Gen: Appears well, no distress, but pale and appears fatigued  Ext: no edema  Skin: no rashes    Labs:  Results for PHAN FARMER (MRN [id number]) as of 3/22/2017 16:17   Ref. Range 9/6/2016 06:38   Ferritin Latest Ref Range: 8 - 252 ng/mL 480 (H)     Hgb: 3/16 8.8 MCV 99    Imaging:  none    Assessment:  In summary, Phan Farmer is a 61 year old woman with slightly macrocytic anemia and CKD who is iron replete.    Plan:  1. Majority of today's visit was spent counseling the patient regarding anemia and the relationship between kidney dysfunction and anemia.  2. Check epo level today and if low will start Erythropoetin SC *** monthly.  3. Will set up RN visit for first dose and teaching once she has received the Darbepoetin.  4. RTC 1 month after she starts for another evaluation of response    The patient is given our center's contact information and is instructed to call if she should have any further questions or concerns.      Total Time Spent:  I spent a total of 30 minutes face-to-face with Phan Farmer during today's office visit.  Over 50% of this time was spent  counseling the patient and/or coordinating care regarding anemia.      Mario Saldana MD   of Medicine  [employer] [employer]

## 2017-03-22 NOTE — PROGRESS NOTES
[facility] Hematology Consultation    Outpatient Visit Note:    Patient: Phan Luque  MRN: [id number]  : 1955  ERICK: 2017    Reason for Consultation:  anemia    History of Present Illness:  Phan Luque is a 61 year old woman referred by Dr Gutierrez of nephrology for evaluation and treatment of anemia. Ms Luque is a 62 YO woman with a history of liver transplantation (etoh) in 2016, with CKD secondary to acute kidney injury associated with hepatic dysfunction just prior to transplant.  It appears her new baseline creatinine is 1.53 making her CKD III.  She notes no PICA symptoms, no bleeding.    Review of her PCP's labs shows no vitamin B12 deficiency or folic acid deficiency. Ferritin is > 300.  The last ferritin we have in this system is 180 in 2016.  She has never taken erythropoetin.    Past Medical History:  Past Medical History:   Diagnosis Date     Asthma      End stage liver disease (H)     2/2 Alcohol Abuse     Liver transplanted (H)      Migraines      Osteoporosis      Spinal stenosis in cervical region        Past Surgical History:  Past Surgical History:   Procedure Laterality Date     APPENDECTOMY           BENCH LIVER N/A 2016    Procedure: BENCH LIVER;  Surgeon: Larry Dhillon MD;  Location: [facility]     COLONOSCOPY       ESOPHAGOSCOPY, GASTROSCOPY, DUODENOSCOPY (EGD), COMBINED N/A 2016    Procedure: COMBINED ESOPHAGOSCOPY, GASTROSCOPY, DUODENOSCOPY (EGD);  Surgeon: Tao Alfonso MD;  Location: [facility]     ESOPHAGOSCOPY, GASTROSCOPY, DUODENOSCOPY (EGD), COMBINED N/A 10/31/2016    Procedure: COMBINED ESOPHAGOSCOPY, GASTROSCOPY, DUODENOSCOPY (EGD), BIOPSY SINGLE OR MULTIPLE;  Surgeon: Ronald Bojorquez MD;  Location: [facility]     sphincteroplasty       TRANSPLANT LIVER RECIPIENT  DONOR N/A 2016    Procedure: TRANSPLANT LIVER RECIPIENT  DONOR;  Surgeon: Larry Dhillon MD;  Location: [facility] OR     TUBAL  LIGATION      laparoscopic       Medications:  Current Outpatient Prescriptions   Medication Sig Dispense Refill     traMADol (ULTRAM) 50 MG tablet Take 1-2 tablets ( mg) by mouth every 6 hours as needed for pain 50 tablet 0     calcium Citrate-vitamin D 500-400 MG-UNIT CHEW Take 1 tablet by mouth daily       hydrOXYzine (ATARAX) 25 MG tablet Take 1-2 tablets (25-50 mg) by mouth every 6 hours as needed for itching 60 tablet 1     traZODone (DESYREL) 100 MG tablet Take 1 tablet (100 mg) by mouth At Bedtime **Further refills must be obtained by primary care provider** 30 tablet 0     acetaminophen (TYLENOL) 325 MG tablet Take 2 tablets (650 mg) by mouth every 6 hours as needed for mild pain Or fevers. Use sparingly. Limit use to no more than 2 grams (2000 mg) in 24 hours. **Further refills must be obtained by primary care provider** 100 tablet 0     loperamide (IMODIUM) 2 MG capsule Take 2 mg by mouth 4 times daily as needed for diarrhea       ursodiol (ACTIGALL) 300 MG capsule Take 1 capsule (300 mg) by mouth 2 times daily 60 capsule 11     tacrolimus (PROGRAF - GENERIC EQUIVALENT) 0.5 MG capsule Take 4 capsules (2 mg) by mouth 2 times daily 240 capsule 11     simethicone (MYLICON) 80 MG chewable tablet Take 1 tablet (80 mg) by mouth every 6 hours as needed for flatulence or cramping 180 tablet 1     sertraline (ZOLOFT) 100 MG tablet Take 1.5 tablets (150 mg) by mouth daily 45 tablet 1     psyllium 0.52 G capsule Take 2 capsules (1.04 g) by mouth daily With a full glass of water. 60 capsule 1     pantoprazole (PROTONIX) 40 MG EC tablet Take 1 tablet (40 mg) by mouth every morning (before breakfast) 30 tablet 11     multivitamin, therapeutic (THERA-VIT) TABS tablet Take 1 tablet by mouth every 24 hours 30 tablet 11     levothyroxine (SYNTHROID/LEVOTHROID) 88 MCG tablet Take 1 tablet (88 mcg) by mouth every morning (before breakfast) 30 tablet 1     folic acid (FOLVITE) 1 MG tablet Take 1 tablet (1 mg) by mouth  daily 30 tablet 1     cyanocobalamin 1000 MCG TABS Take 1,000 mcg by mouth daily 30 tablet 1     busPIRone (BUSPAR) 10 MG tablet Take 1 tablet (10 mg) by mouth 2 times daily 60 tablet 1     azaTHIOprine 100 MG TABS Take 150 mg by mouth every evening 30 tablet 11        Allergies:  Allergies   Allergen Reactions     Benadryl [Diphenhydramine] Hives     Cefaclor Hives     Hydrocodone-Acetaminophen Itching     Oxycodone Itching     Penicillins Hives     Sulfa Drugs Hives       ROS:  A 14 point ROS is negative except as stated in the HPI    Social History:  Denies any tobacco use. Abstinent of alcohol use since transplant.    Family History:  None of blood disorders    Objective:  Vitals: B/P: 109/53, T: 98.1, P: 86, R: 20, Wt: 154 lbs 14.4 oz  Exam:   Gen: Appears well, no distress, but pale and appears fatigued  Ext: no edema  Skin: no rashes    Labs:  Results for PHAN FARMER (MRN [id number]) as of 3/22/2017 16:17   Ref. Range 9/6/2016 06:38   Ferritin Latest Ref Range: 8 - 252 ng/mL 480 (H)     Hgb: 3/16 8.8 MCV 99    Imaging:  none    Assessment:  In summary, Phan Farmer is a 61 year old woman with slightly macrocytic anemia and CKD who is iron replete.    Plan:  1. Majority of today's visit was spent counseling the patient regarding anemia and the relationship between kidney dysfunction and anemia.  2. Check epo level today and if low will start Erythropoetin SC with the Nephrology Anemia clinic.    The patient is given our center's contact information and is instructed to call if she should have any further questions or concerns.      Total Time Spent:  I spent a total of 30 minutes face-to-face with Phan Farmer during today's office visit.  Over 50% of this time was spent counseling the patient and/or coordinating care regarding anemia.      Mario Saldana MD   of Medicine  [facility] [employer]

## 2017-03-22 NOTE — MR AVS SNAPSHOT
After Visit Summary   3/22/2017    Phan Luque    MRN: [id number]           Patient Information     Date Of Birth          1955        Visit Information        Provider Department      3/22/2017 3:30 PM Mario Saldana MD [facility]        Today's Diagnoses     Anemia of chronic renal failure, stage 3 (moderate)          Care Instructions    Your visit the [facility] was to discuss anemia, likely related to low erythropoetin.  This is a common problem in people who have had a injury to the kidney or who are on hemodialysis    I will plan to see you back about 1 month after your first injection.  Once the medication is sent to you, we will have you come to the clinic so you can learn how to give the injections.      If you have questions or concerns before your next appointment you can call Dr Saldana's Care Coordinator, Sheryl Nava at:  [phone]    You can also reach Dr Saldana through [employer] or at his office number at [phone]          Follow-ups after your visit        Your next 10 appointments already scheduled     Jun 05, 2017 12:00 PM CDT   Lab with  LAB   [facility] ([facility])    [address] [place] [address]   [phone]            Jun 05, 2017  1:00 PM CDT   (Arrive by 12:30 PM)   Return Visit with Ruth Ann Erazo MD   [facility] Neph[facility] ([facility])    [address] [place] [address]   [phone]            Jun 09, 2017  7:30 AM CDT   Lab with  LAB   [facility] ([facility])    [address] [place] [address]   [phone]            Jun 09, 2017  8:30 AM CDT   (Arrive by 8:15 AM)   Return Liver Transplant with Hussein Banegas MD   [facility]at[facility] ([facility])    [address] [place] [address]    [phone]            Aug 14, 2017  4:00 PM CDT   (Arrive by 3:45 PM)   RETURN ENDOCRINE with Ruth Oakley MD   [facility] ([facility])    [address] [address] [phone]              Future tests that were ordered for you today     Open Standing Orders        Priority Remaining Interval Expires Ordered    Hemoglobin and hematocrit Routine 99/99 weekly 3/21/2018 3/22/2017    Iron and iron binding capacity Routine 99/99 monthly 3/21/2018 3/22/2017    Ferritin Routine 99/99 monthly 3/21/2018 3/22/2017            Who to contact     If you have questions or need follow up information about today's clinic visit or your schedule please contact [facility] directly at [phone].  Normal or non-critical lab and imaging results will be communicated to you by MyChart, letter or phone within 4 business days after the clinic has received the results. If you do not hear from us within 7 days, please contact the clinic through [employer] or phone. If you have a critical or abnormal lab result, we will notify you by phone as soon as possible.  Submit refill requests through [email or web address] or call your pharmacy and they will forward the refill request to us. Please allow 3 business days for your refill to be completed.          Additional Information About Your Visit        MyChart Information     [email or web address] gives you secure access to your electronic health record. If you see a primary care provider, you can also send messages to your care team and make appointments. If you have questions, please call your primary care clinic.  If you do not have a primary care provider, please call [phone] and they will assist you.        Care EveryWhere ID     This is your Care EveryWhere ID. This could be used by other organizations to access your [place] medical records  [id number]        Your Vitals Were     Pulse Temperature  "Respirations Height Pulse Oximetry BMI (Body Mass Index)    86 98.1  F (36.7  C) (Oral) 20 1.702 m (5' 7.01\") 98% 24.25 kg/m2       Blood Pressure from Last 3 Encounters:   03/22/17 109/53   03/16/17 125/72   03/13/17 129/77    Weight from Last 3 Encounters:   03/22/17 70.3 kg (154 lb 14.4 oz)   03/13/17 70.9 kg (156 lb 3.2 oz)   03/06/17 73.2 kg (161 lb 6.4 oz)              We Performed the Following     Erythropoietin        Primary Care Provider Office Phone # Fax #    Santosh Salgado [phone]       [place] [id number] [place] [address]        Thank you!     Thank you for choosing [facility]  for your care. Our goal is always to provide you with excellent care. Hearing back from our patients is one way we can continue to improve our services. Please take a few minutes to complete the written survey that you may receive in the mail after your visit with us. Thank you!             Your Updated Medication List - Protect others around you: Learn how to safely use, store and throw away your medicines at www.disposemymeds.org.          This list is accurate as of: 3/22/17  3:59 PM.  Always use your most recent med list.                   Brand Name Dispense Instructions for use    acetaminophen 325 MG tablet    TYLENOL    100 tablet    Take 2 tablets (650 mg) by mouth every 6 hours as needed for mild pain Or fevers. Use sparingly. Limit use to no more than 2 grams (2000 mg) in 24 hours. **Further refills must be obtained by primary care provider**       azaTHIOprine 100 MG Tabs     30 tablet    Take 150 mg by mouth every evening       busPIRone 10 MG tablet    BUSPAR    60 tablet    Take 1 tablet (10 mg) by mouth 2 times daily       calcium Citrate-vitamin D 500-400 MG-UNIT Chew      Take 1 tablet by mouth daily       cyanocobalamin 1000 MCG Tabs     30 tablet    Take 1,000 mcg by mouth daily       folic acid 1 MG tablet    FOLVITE    30 tablet    Take 1 tablet (1 " mg) by mouth daily       hydrOXYzine 25 MG tablet    ATARAX    60 tablet    Take 1-2 tablets (25-50 mg) by mouth every 6 hours as needed for itching       levothyroxine 88 MCG tablet    SYNTHROID/LEVOTHROID    30 tablet    Take 1 tablet (88 mcg) by mouth every morning (before breakfast)       loperamide 2 MG capsule    IMODIUM     Take 2 mg by mouth 4 times daily as needed for diarrhea       multivitamin, therapeutic Tabs tablet     30 tablet    Take 1 tablet by mouth every 24 hours       pantoprazole 40 MG EC tablet    PROTONIX    30 tablet    Take 1 tablet (40 mg) by mouth every morning (before breakfast)       psyllium 0.52 G capsule     60 capsule    Take 2 capsules (1.04 g) by mouth daily With a full glass of water.       sertraline 100 MG tablet    ZOLOFT    45 tablet    Take 1.5 tablets (150 mg) by mouth daily       simethicone 80 MG chewable tablet    MYLICON    180 tablet    Take 1 tablet (80 mg) by mouth every 6 hours as needed for flatulence or cramping       tacrolimus 0.5 MG capsule    PROGRAF - GENERIC EQUIVALENT    240 capsule    Take 4 capsules (2 mg) by mouth 2 times daily       traMADol 50 MG tablet    ULTRAM    50 tablet    Take 1-2 tablets ( mg) by mouth every 6 hours as needed for pain       traZODone 100 MG tablet    DESYREL    30 tablet    Take 1 tablet (100 mg) by mouth At Bedtime **Further refills must be obtained by primary care provider**       ursodiol 300 MG capsule    ACTIGALL    60 capsule    Take 1 capsule (300 mg) by mouth 2 times daily

## 2017-03-22 NOTE — LETTER
3/22/2017       RE: Phan Luque  [address] [facility] [address]     Dear Colleague,    Thank you for referring your patient, Phan Luque, to the [facility]. Please see a copy of my visit note below.        [facility] Hematology Consultation    Outpatient Visit Note:    Patient: Phan Luque  MRN: [id number]  : 1955  ERICK: 2017    Reason for Consultation:  anemia    History of Present Illness:  Phan Luque is a 61 year old woman referred by Dr Gutierrez of nephrology for evaluation and treatment of anemia. Ms Luque is a 62 YO woman with a history of liver transplantation (etoh) in 2016, with CKD secondary to acute kidney injury associated with hepatic dysfunction just prior to transplant.  It appears her new baseline creatinine is 1.53 making her CKD III.  She notes no PICA symptoms, no bleeding.    Review of her PCP's labs shows no vitamin B12 deficiency or folic acid deficiency. Ferritin is > 300.  The last ferritin we have in this system is 180 in 2016.  She has never taken erythropoetin.    Past Medical History:  Past Medical History:   Diagnosis Date     Asthma      End stage liver disease (H)     2/2 Alcohol Abuse     Liver transplanted (H)      Migraines      Osteoporosis      Spinal stenosis in cervical region        Past Surgical History:  Past Surgical History:   Procedure Laterality Date     APPENDECTOMY           BENCH LIVER N/A 2016    Procedure: BENCH LIVER;  Surgeon: Larry Dhillon MD;  Location: [facility] OR     COLONOSCOPY       ESOPHAGOSCOPY, GASTROSCOPY, DUODENOSCOPY (EGD), COMBINED N/A 2016    Procedure: COMBINED ESOPHAGOSCOPY, GASTROSCOPY, DUODENOSCOPY (EGD);  Surgeon: Tao Alfonso MD;  Location: [facility] GI     ESOPHAGOSCOPY, GASTROSCOPY, DUODENOSCOPY (EGD), COMBINED N/A 10/31/2016    Procedure: COMBINED ESOPHAGOSCOPY, GASTROSCOPY, DUODENOSCOPY (EGD), BIOPSY SINGLE OR MULTIPLE;  Surgeon:  Ronald Bojorquez MD;  Location:  GI     sphincteroplasty       TRANSPLANT LIVER RECIPIENT  DONOR N/A 2016    Procedure: TRANSPLANT LIVER RECIPIENT  DONOR;  Surgeon: Larry Dhillon MD;  Location: [facility] OR     TUBAL LIGATION      laparoscopic       Medications:  Current Outpatient Prescriptions   Medication Sig Dispense Refill     traMADol (ULTRAM) 50 MG tablet Take 1-2 tablets ( mg) by mouth every 6 hours as needed for pain 50 tablet 0     calcium Citrate-vitamin D 500-400 MG-UNIT CHEW Take 1 tablet by mouth daily       hydrOXYzine (ATARAX) 25 MG tablet Take 1-2 tablets (25-50 mg) by mouth every 6 hours as needed for itching 60 tablet 1     traZODone (DESYREL) 100 MG tablet Take 1 tablet (100 mg) by mouth At Bedtime **Further refills must be obtained by primary care provider** 30 tablet 0     acetaminophen (TYLENOL) 325 MG tablet Take 2 tablets (650 mg) by mouth every 6 hours as needed for mild pain Or fevers. Use sparingly. Limit use to no more than 2 grams (2000 mg) in 24 hours. **Further refills must be obtained by primary care provider** 100 tablet 0     loperamide (IMODIUM) 2 MG capsule Take 2 mg by mouth 4 times daily as needed for diarrhea       ursodiol (ACTIGALL) 300 MG capsule Take 1 capsule (300 mg) by mouth 2 times daily 60 capsule 11     tacrolimus (PROGRAF - GENERIC EQUIVALENT) 0.5 MG capsule Take 4 capsules (2 mg) by mouth 2 times daily 240 capsule 11     simethicone (MYLICON) 80 MG chewable tablet Take 1 tablet (80 mg) by mouth every 6 hours as needed for flatulence or cramping 180 tablet 1     sertraline (ZOLOFT) 100 MG tablet Take 1.5 tablets (150 mg) by mouth daily 45 tablet 1     psyllium 0.52 G capsule Take 2 capsules (1.04 g) by mouth daily With a full glass of water. 60 capsule 1     pantoprazole (PROTONIX) 40 MG EC tablet Take 1 tablet (40 mg) by mouth every morning (before breakfast) 30 tablet 11     multivitamin, therapeutic (THERA-VIT) TABS tablet Take  1 tablet by mouth every 24 hours 30 tablet 11     levothyroxine (SYNTHROID/LEVOTHROID) 88 MCG tablet Take 1 tablet (88 mcg) by mouth every morning (before breakfast) 30 tablet 1     folic acid (FOLVITE) 1 MG tablet Take 1 tablet (1 mg) by mouth daily 30 tablet 1     cyanocobalamin 1000 MCG TABS Take 1,000 mcg by mouth daily 30 tablet 1     busPIRone (BUSPAR) 10 MG tablet Take 1 tablet (10 mg) by mouth 2 times daily 60 tablet 1     azaTHIOprine 100 MG TABS Take 150 mg by mouth every evening 30 tablet 11        Allergies:  Allergies   Allergen Reactions     Benadryl [Diphenhydramine] Hives     Cefaclor Hives     Hydrocodone-Acetaminophen Itching     Oxycodone Itching     Penicillins Hives     Sulfa Drugs Hives       ROS:  A 14 point ROS is negative except as stated in the HPI    Social History:  Denies any tobacco use. Abstinent of alcohol use since transplant.    Family History:  None of blood disorders    Objective:  Vitals: B/P: 109/53, T: 98.1, P: 86, R: 20, Wt: 154 lbs 14.4 oz  Exam:   Gen: Appears well, no distress, but pale and appears fatigued  Ext: no edema  Skin: no rashes    Labs:  Results for PHAN FARMER (MRN [id number]) as of 3/22/2017 16:17   Ref. Range 9/6/2016 06:38   Ferritin Latest Ref Range: 8 - 252 ng/mL 480 (H)     Hgb: 3/16 8.8 MCV 99    Imaging:  none    Assessment:  In summary, Phan Farmer is a 61 year old woman with slightly macrocytic anemia and CKD who is iron replete.    Plan:  1. Majority of today's visit was spent counseling the patient regarding anemia and the relationship between kidney dysfunction and anemia.  2. Check epo level today and if low will start Erythropoetin SC *** monthly.  3. Will set up RN visit for first dose and teaching once she has received the Darbepoetin.  4. RTC 1 month after she starts for another evaluation of response    The patient is given our center's contact information and is instructed to call if she should have any further questions or  concerns.      Total Time Spent:  I spent a total of 30 minutes face-to-face with Phan Luque during today's office visit.  Over 50% of this time was spent counseling the patient and/or coordinating care regarding anemia.      Mario Saldana MD   of Medicine  [facility] [employer] [employer]

## 2017-03-22 NOTE — LETTER
3/22/2017       RE: Phan Luque  [address] [facility] [address]     Dear Colleague,    Thank you for referring your patient, Phan Luque, to the [facility] CANCER [facility]INIC at [facility]. Please see a copy of my visit note below.        [facility] Hematology Consultation    Outpatient Visit Note:    Patient: Phan Luque  MRN: [id number]  : 1955  ERICK: 2017    Reason for Consultation:  anemia    History of Present Illness:  Phan Luque is a 61 year old woman referred by Dr Gutierrez of nephrology for evaluation and treatment of anemia. Ms Luque is a 60 YO woman with a history of liver transplantation (etoh) in 2016, with CKD secondary to acute kidney injury associated with hepatic dysfunction just prior to transplant.  It appears her new baseline creatinine is 1.53 making her CKD III.  She notes no PICA symptoms, no bleeding.    Review of her PCP's labs shows no vitamin B12 deficiency or folic acid deficiency. Ferritin is > 300.  The last ferritin we have in this system is 180 in 2016.  She has never taken erythropoetin.    Past Medical History:  Past Medical History:   Diagnosis Date     Asthma      End stage liver disease (H)     2/2 Alcohol Abuse     Liver transplanted (H)      Migraines      Osteoporosis      Spinal stenosis in cervical region        Past Surgical History:  Past Surgical History:   Procedure Laterality Date     APPENDECTOMY           BENCH LIVER N/A 2016    Procedure: BENCH LIVER;  Surgeon: aLrry Dhillon MD;  Location: [facility] OR     COLONOSCOPY       ESOPHAGOSCOPY, GASTROSCOPY, DUODENOSCOPY (EGD), COMBINED N/A 2016    Procedure: COMBINED ESOPHAGOSCOPY, GASTROSCOPY, DUODENOSCOPY (EGD);  Surgeon: Tao Alfonso MD;  Location: [facility] GI     ESOPHAGOSCOPY, GASTROSCOPY, DUODENOSCOPY (EGD), COMBINED N/A 10/31/2016    Procedure: COMBINED ESOPHAGOSCOPY, GASTROSCOPY,  DUODENOSCOPY (EGD), BIOPSY SINGLE OR MULTIPLE;  Surgeon: Ronald Bojorquez MD;  Location: [facility] GI     sphincteroplasty       TRANSPLANT LIVER RECIPIENT  DONOR N/A 2016    Procedure: TRANSPLANT LIVER RECIPIENT  DONOR;  Surgeon: Larry Dhillon MD;  Location: [facility] OR     TUBAL LIGATION      laparoscopic       Medications:  Current Outpatient Prescriptions   Medication Sig Dispense Refill     traMADol (ULTRAM) 50 MG tablet Take 1-2 tablets ( mg) by mouth every 6 hours as needed for pain 50 tablet 0     calcium Citrate-vitamin D 500-400 MG-UNIT CHEW Take 1 tablet by mouth daily       hydrOXYzine (ATARAX) 25 MG tablet Take 1-2 tablets (25-50 mg) by mouth every 6 hours as needed for itching 60 tablet 1     traZODone (DESYREL) 100 MG tablet Take 1 tablet (100 mg) by mouth At Bedtime **Further refills must be obtained by primary care provider** 30 tablet 0     acetaminophen (TYLENOL) 325 MG tablet Take 2 tablets (650 mg) by mouth every 6 hours as needed for mild pain Or fevers. Use sparingly. Limit use to no more than 2 grams (2000 mg) in 24 hours. **Further refills must be obtained by primary care provider** 100 tablet 0     loperamide (IMODIUM) 2 MG capsule Take 2 mg by mouth 4 times daily as needed for diarrhea       ursodiol (ACTIGALL) 300 MG capsule Take 1 capsule (300 mg) by mouth 2 times daily 60 capsule 11     tacrolimus (PROGRAF - GENERIC EQUIVALENT) 0.5 MG capsule Take 4 capsules (2 mg) by mouth 2 times daily 240 capsule 11     simethicone (MYLICON) 80 MG chewable tablet Take 1 tablet (80 mg) by mouth every 6 hours as needed for flatulence or cramping 180 tablet 1     sertraline (ZOLOFT) 100 MG tablet Take 1.5 tablets (150 mg) by mouth daily 45 tablet 1     psyllium 0.52 G capsule Take 2 capsules (1.04 g) by mouth daily With a full glass of water. 60 capsule 1     pantoprazole (PROTONIX) 40 MG EC tablet Take 1 tablet (40 mg) by mouth every morning (before breakfast) 30 tablet 11      multivitamin, therapeutic (THERA-VIT) TABS tablet Take 1 tablet by mouth every 24 hours 30 tablet 11     levothyroxine (SYNTHROID/LEVOTHROID) 88 MCG tablet Take 1 tablet (88 mcg) by mouth every morning (before breakfast) 30 tablet 1     folic acid (FOLVITE) 1 MG tablet Take 1 tablet (1 mg) by mouth daily 30 tablet 1     cyanocobalamin 1000 MCG TABS Take 1,000 mcg by mouth daily 30 tablet 1     busPIRone (BUSPAR) 10 MG tablet Take 1 tablet (10 mg) by mouth 2 times daily 60 tablet 1     azaTHIOprine 100 MG TABS Take 150 mg by mouth every evening 30 tablet 11        Allergies:  Allergies   Allergen Reactions     Benadryl [Diphenhydramine] Hives     Cefaclor Hives     Hydrocodone-Acetaminophen Itching     Oxycodone Itching     Penicillins Hives     Sulfa Drugs Hives       ROS:  A 14 point ROS is negative except as stated in the HPI    Social History:  Denies any tobacco use. Abstinent of alcohol use since transplant.    Family History:  None of blood disorders    Objective:  Vitals: B/P: 109/53, T: 98.1, P: 86, R: 20, Wt: 154 lbs 14.4 oz  Exam:   Gen: Appears well, no distress, but pale and appears fatigued  Ext: no edema  Skin: no rashes    Labs:  Results for PHAN FARMER (MRN [id number]) as of 3/22/2017 16:17   Ref. Range 9/6/2016 06:38   Ferritin Latest Ref Range: 8 - 252 ng/mL 480 (H)     Hgb: 3/16 8.8 MCV 99    Imaging:  none    Assessment:  In summary, Phan Farmer is a 61 year old woman with slightly macrocytic anemia and CKD who is iron replete.    Plan:  1. Majority of today's visit was spent counseling the patient regarding anemia and the relationship between kidney dysfunction and anemia.  2. Check epo level today and if low will start Erythropoetin SC *** monthly.  3. Will set up RN visit for first dose and teaching once she has received the Darbepoetin.  4. RTC 1 month after she starts for another evaluation of response    The patient is given our center's contact information and is instructed  to call if she should have any further questions or concerns.      Total Time Spent:  I spent a total of 30 minutes face-to-face with Phan Luque during today's office visit.  Over 50% of this time was spent counseling the patient and/or coordinating care regarding anemia.      Mario Saldana MD   of Medicine  [facility] [employer]       Again, thank you for allowing me to participate in the care of your patient.      Sincerely,    Mario Saldana MD

## 2017-03-22 NOTE — LETTER
3/22/2017      RE: Phan Luque  [address] [facility] [address]           [facility] Hematology Consultation    Outpatient Visit Note:    Patient: Phan Luque  MRN: [id number]  : 1955  ERICK: 2017    Reason for Consultation:  anemia    History of Present Illness:  Phan Luque is a 61 year old woman referred by Dr Gutierrez of nephrology for evaluation and treatment of anemia. Ms Luque is a 60 YO woman with a history of liver transplantation (etoh) in 2016, with CKD secondary to acute kidney injury associated with hepatic dysfunction just prior to transplant.  It appears her new baseline creatinine is 1.53 making her CKD III.  She notes no PICA symptoms, no bleeding.    Review of her PCP's labs shows no vitamin B12 deficiency or folic acid deficiency. Ferritin is > 300.  The last ferritin we have in this system is 180 in 2016.  She has never taken erythropoetin.    Past Medical History:  Past Medical History:   Diagnosis Date     Asthma      End stage liver disease (H)     2/2 Alcohol Abuse     Liver transplanted (H)      Migraines      Osteoporosis      Spinal stenosis in cervical region        Past Surgical History:  Past Surgical History:   Procedure Laterality Date     APPENDECTOMY           BENCH LIVER N/A 2016    Procedure: BENCH LIVER;  Surgeon: Larry Dhillon MD;  Location: [facility] OR     COLONOSCOPY       ESOPHAGOSCOPY, GASTROSCOPY, DUODENOSCOPY (EGD), COMBINED N/A 2016    Procedure: COMBINED ESOPHAGOSCOPY, GASTROSCOPY, DUODENOSCOPY (EGD);  Surgeon: Tao Alfonso MD;  Location: [facility] GI     ESOPHAGOSCOPY, GASTROSCOPY, DUODENOSCOPY (EGD), COMBINED N/A 10/31/2016    Procedure: COMBINED ESOPHAGOSCOPY, GASTROSCOPY, DUODENOSCOPY (EGD), BIOPSY SINGLE OR MULTIPLE;  Surgeon: Ronald Bojorquez MD;  Location: [facility] GI     sphincteroplasty       TRANSPLANT LIVER RECIPIENT  DONOR N/A 2016    Procedure: TRANSPLANT LIVER  RECIPIENT  DONOR;  Surgeon: Larry Dhillon MD;  Location: [facility] OR     TUBAL LIGATION      laparoscopic       Medications:  Current Outpatient Prescriptions   Medication Sig Dispense Refill     traMADol (ULTRAM) 50 MG tablet Take 1-2 tablets ( mg) by mouth every 6 hours as needed for pain 50 tablet 0     calcium Citrate-vitamin D 500-400 MG-UNIT CHEW Take 1 tablet by mouth daily       hydrOXYzine (ATARAX) 25 MG tablet Take 1-2 tablets (25-50 mg) by mouth every 6 hours as needed for itching 60 tablet 1     traZODone (DESYREL) 100 MG tablet Take 1 tablet (100 mg) by mouth At Bedtime **Further refills must be obtained by primary care provider** 30 tablet 0     acetaminophen (TYLENOL) 325 MG tablet Take 2 tablets (650 mg) by mouth every 6 hours as needed for mild pain Or fevers. Use sparingly. Limit use to no more than 2 grams (2000 mg) in 24 hours. **Further refills must be obtained by primary care provider** 100 tablet 0     loperamide (IMODIUM) 2 MG capsule Take 2 mg by mouth 4 times daily as needed for diarrhea       ursodiol (ACTIGALL) 300 MG capsule Take 1 capsule (300 mg) by mouth 2 times daily 60 capsule 11     tacrolimus (PROGRAF - GENERIC EQUIVALENT) 0.5 MG capsule Take 4 capsules (2 mg) by mouth 2 times daily 240 capsule 11     simethicone (MYLICON) 80 MG chewable tablet Take 1 tablet (80 mg) by mouth every 6 hours as needed for flatulence or cramping 180 tablet 1     sertraline (ZOLOFT) 100 MG tablet Take 1.5 tablets (150 mg) by mouth daily 45 tablet 1     psyllium 0.52 G capsule Take 2 capsules (1.04 g) by mouth daily With a full glass of water. 60 capsule 1     pantoprazole (PROTONIX) 40 MG EC tablet Take 1 tablet (40 mg) by mouth every morning (before breakfast) 30 tablet 11     multivitamin, therapeutic (THERA-VIT) TABS tablet Take 1 tablet by mouth every 24 hours 30 tablet 11     levothyroxine (SYNTHROID/LEVOTHROID) 88 MCG tablet Take 1 tablet (88 mcg) by mouth every morning (before  breakfast) 30 tablet 1     folic acid (FOLVITE) 1 MG tablet Take 1 tablet (1 mg) by mouth daily 30 tablet 1     cyanocobalamin 1000 MCG TABS Take 1,000 mcg by mouth daily 30 tablet 1     busPIRone (BUSPAR) 10 MG tablet Take 1 tablet (10 mg) by mouth 2 times daily 60 tablet 1     azaTHIOprine 100 MG TABS Take 150 mg by mouth every evening 30 tablet 11        Allergies:  Allergies   Allergen Reactions     Benadryl [Diphenhydramine] Hives     Cefaclor Hives     Hydrocodone-Acetaminophen Itching     Oxycodone Itching     Penicillins Hives     Sulfa Drugs Hives       ROS:  A 14 point ROS is negative except as stated in the HPI    Social History:  Denies any tobacco use. Abstinent of alcohol use since transplant.    Family History:  None of blood disorders    Objective:  Vitals: B/P: 109/53, T: 98.1, P: 86, R: 20, Wt: 154 lbs 14.4 oz  Exam:   Gen: Appears well, no distress, but pale and appears fatigued  Ext: no edema  Skin: no rashes    Labs:  Results for PHAN FARMER (MRN [id number]) as of 3/22/2017 16:17   Ref. Range 9/6/2016 06:38   Ferritin Latest Ref Range: 8 - 252 ng/mL 480 (H)     Hgb: 3/16 8.8 MCV 99    Imaging:  none    Assessment:  In summary, Phan Farmer is a 61 year old woman with slightly macrocytic anemia and CKD who is iron replete.    Plan:  1. Majority of today's visit was spent counseling the patient regarding anemia and the relationship between kidney dysfunction and anemia.  2. Check epo level today and if low will start Erythropoetin SC *** monthly.  3. Will set up RN visit for first dose and teaching once she has received the Darbepoetin.  4. RTC 1 month after she starts for another evaluation of response    The patient is given our center's contact information and is instructed to call if she should have any further questions or concerns.      Total Time Spent:  I spent a total of 30 minutes face-to-face with Phan Farmer during today's office visit.  Over 50% of this time was spent  counseling the patient and/or coordinating care regarding anemia.      Mario Saldana MD   of Medicine  [facility] [employer]

## 2017-03-22 NOTE — TELEPHONE ENCOUNTER
MITCH Chisholm to call back for enrollment in anemia service.  Referral from Dr Erazo (Dr Gutierrez).  Lab orders entered.  She has appt today at 3:30.    Call date:  3/24    Molly Dumont, PharmD, [facility]  [phone]  March 22, 2017

## 2017-03-24 ENCOUNTER — TELEPHONE (OUTPATIENT)
Dept: PHARMACY | Facility: CLINIC | Age: 62
End: 2017-03-24

## 2017-03-24 LAB — EPO SERPL-ACNC: 19

## 2017-03-24 NOTE — TELEPHONE ENCOUNTER
New anemia referral.  Sent message to Dr Saldana for clarification on anemia management.  I will call patient if he wants us to continue with follow-up.    Call date: 3/31    Molly Dumont, PharmD, [facility]  [phone]  March 24, 2017

## 2017-03-27 NOTE — NURSING NOTE
Send [employer] message to patient updating her on new order for magnesium oxide.     Benito Brannon RN

## 2017-03-29 ENCOUNTER — AMBULATORY - HEALTHEAST (OUTPATIENT)
Dept: LAB | Facility: CLINIC | Age: 62
End: 2017-03-29

## 2017-03-29 ENCOUNTER — TELEPHONE (OUTPATIENT)
Dept: PHARMACY | Facility: CLINIC | Age: 62
End: 2017-03-29

## 2017-03-29 ENCOUNTER — HOSPITAL ENCOUNTER (OUTPATIENT)
Facility: CLINIC | Age: 62
Setting detail: SPECIMEN
Discharge: HOME | End: 2017-03-29
Attending: INTERNAL MEDICINE | Admitting: INTERNAL MEDICINE
Payer: COMMERCIAL

## 2017-03-29 DIAGNOSIS — Z79.899 ENCOUNTER FOR LONG-TERM (CURRENT) USE OF OTHER MEDICATIONS: ICD-10-CM

## 2017-03-29 DIAGNOSIS — Z94.4 LIVER TRANSPLANTED (H): ICD-10-CM

## 2017-03-29 DIAGNOSIS — N18.30 CKD (CHRONIC KIDNEY DISEASE) STAGE 3, GFR 30-59 ML/MIN (H): ICD-10-CM

## 2017-03-29 DIAGNOSIS — D63.1 ANEMIA IN STAGE 3 CHRONIC KIDNEY DISEASE (H): Primary | ICD-10-CM

## 2017-03-29 DIAGNOSIS — N18.30 ANEMIA IN STAGE 3 CHRONIC KIDNEY DISEASE (H): Primary | ICD-10-CM

## 2017-03-29 LAB — HEMOGLOBIN: 9.3 G/DL (ref 11.7–15.7)

## 2017-03-29 PROCEDURE — 80197 ASSAY OF TACROLIMUS: CPT | Performed by: INTERNAL MEDICINE

## 2017-03-29 NOTE — TELEPHONE ENCOUNTER
----- Message from Mario Saldana MD sent at 3/28/2017  1:40 PM CDT -----  Regarding: RE: Anemia management follow-up/clarification  Hi Molly,    I haven't actually started Phan on an MEDARDO yet because I was working on the specifics of how to order the medication (specialty pharmacy?).  Given that this seems to be your area of practice I'm more than happy to relinquish her to your care!      Should I give her a call to let her know that you will be calling her to arrange follow up?    Thanks!  Mario  ----- Message -----     From: Molly Dumont, [facility]     Sent: 3/24/2017   1:32 PM       To: Daya Gutierrez MD, Ruth Ann Erazo MD, #  Subject: Anemia management follow-up/clarification        Hi Dr Saldana,    I am looking for clarification for on-going management plan for patient.  I am pharmacist with anemia clinic. Dr Erazo (Dr Gutierrez) referred patient to me for management.  I manage and follow per collaborative practice and protocol for iron and MEDARDO.  I see you are getting her started with epo.  Do you plan to manage her epo on-going or would you like me to manage the epo/fe/labs? Please let me know if you have questions.    Thank you!    Molly Dumont, PharmD, [facility]  [phone]  March 24, 2017

## 2017-03-29 NOTE — TELEPHONE ENCOUNTER
LM for patient to call back for enrollment in anemia service.     Anemia Latest Ref Rng & Units 2/20/2017 2/23/2017 2/27/2017 3/2/2017 3/13/2017 3/16/2017 3/22/2017   Hemoglobin 11.7 - 15.7 g/dL 10.8(L) 11.0(L) 10.7(L) 10.9(L) 9.1(L) 8.8(L) 8.7(L)   TSAT 15 - 46 % - - - - - - 32   Ferritin 8 - 252 ng/mL - - - - - - 320(H)      Office visit with Dr Saldana in hematology who would like us to manage anemia with MEDARDO.    Entered lab orders  Sent Rx for aranesp 60mcg every other week to  Specialty Pharmacy to initiate process  [facility] entered [facility]    Call date: 3/30    Molly Dumont, PharmD, [facility]  [phone]  March 29, 2017

## 2017-03-30 ENCOUNTER — OFFICE VISIT - HEALTHEAST (OUTPATIENT)
Dept: INTERNAL MEDICINE | Facility: CLINIC | Age: 62
End: 2017-03-30

## 2017-03-30 ENCOUNTER — TELEPHONE (OUTPATIENT)
Dept: PHARMACY | Facility: CLINIC | Age: 62
End: 2017-03-30

## 2017-03-30 ENCOUNTER — COMMUNICATION - HEALTHEAST (OUTPATIENT)
Dept: INTERNAL MEDICINE | Facility: CLINIC | Age: 62
End: 2017-03-30

## 2017-03-30 DIAGNOSIS — D64.9 ANEMIA: ICD-10-CM

## 2017-03-30 NOTE — LETTER
[facility] MEDICATION THERAPY MANAGEMENT  [address] [address] [phone]      March 30, 2017      Phan Luque  [address] [facility] [address]        Dear Phan,  Welcome to the Anemia Clinic!  You have been referred to our clinic by Daya Gutierrez MD for the monitoring of your anemia therapy.  The Anemia Clinic is a referral clinic staffed by [facility] pharmacists.    Our goals in monitoring your anemia therapy include:       Optimizing your anemia therapy.    Providing you with appropriate education and resources concerning your anemia therapy.       Monitoring your lab values (Hemoglobin and iron) and adjusting your anemia therapy as needed.  Your Hemoglobin Goal = 9-10 g/dl      If you use an outside lab to obtain blood draws, it will be your responsibility to report all lab results to the Anemia Clinic.  Follow-up attempts will be made for one month, at that time if we have not heard back from you we will inactive your anemia prescriptions and refer your management back to the referring provider.  Please call the Anemia Clinic at [phone] if you have any questions.  Sincerely,    Molly Dumont, PharmD, BCACP  [facility]  [phone]  March 30, 2017

## 2017-03-30 NOTE — TELEPHONE ENCOUNTER
Anemia Management Note - Enrollment  SUBJECTIVE/OBJECTIVE:  Patient called today for enrollment in Anemia Management Service.  Referred by Dr. Daya Gutierrez on 3/16/2017.    Primary Diagnosis: Anemia in Chronic Kidney Disease (N18.3, D63.1)     Secondary Diagnosis:  Chronic Kidney Disease, Stage 3 (N18.3)   Hgb goal range:  9-10  Epo/Darbo:   None  Iron regimen: None  Recent MEDARDO use, transfusion, IV iron: None  No history of stroke, MI and blood clots or cancers    Anemia Latest Ref Rng & Units 2/20/2017 2/23/2017 2/27/2017 3/2/2017 3/13/2017 3/16/2017 3/22/2017   Hemoglobin 11.7 - 15.7 g/dL 10.8(L) 11.0(L) 10.7(L) 10.9(L) 9.1(L) 8.8(L) 8.7(L)   TSAT 15 - 46 % - - - - - - 32   Ferritin 8 - 252 ng/mL - - - - - - 320(H)       BP Readings from Last 3 Encounters:   03/22/17 109/53   03/16/17 125/72   03/13/17 129/77     Wt Readings from Last 2 Encounters:   03/22/17 154 lb 14.4 oz (70.3 kg)   03/13/17 156 lb 3.2 oz (70.9 kg)     Current Outpatient Prescriptions   Medication Sig Dispense Refill     darbepoetin vandana (ARANESP, ALBUMIN FREE,) 60 MCG/0.3ML injection Inject 0.3 mLs (60 mcg) Subcutaneous every 14 days As needed for hgb <10g/dL 0.6 mL 11     magnesium oxide (MAG-OX) 400 (241.3 MG) MG tablet Take 1 tablet (400 mg) by mouth daily 60 tablet 3     traMADol (ULTRAM) 50 MG tablet Take 1-2 tablets ( mg) by mouth every 6 hours as needed for pain 50 tablet 0     calcium Citrate-vitamin D 500-400 MG-UNIT CHEW Take 1 tablet by mouth daily       hydrOXYzine (ATARAX) 25 MG tablet Take 1-2 tablets (25-50 mg) by mouth every 6 hours as needed for itching 60 tablet 1     traZODone (DESYREL) 100 MG tablet Take 1 tablet (100 mg) by mouth At Bedtime **Further refills must be obtained by primary care provider** 30 tablet 0     acetaminophen (TYLENOL) 325 MG tablet Take 2 tablets (650 mg) by mouth every 6 hours as needed for mild pain Or fevers. Use sparingly. Limit use to no more than 2 grams (2000 mg) in 24 hours. **Further  refills must be obtained by primary care provider** 100 tablet 0     loperamide (IMODIUM) 2 MG capsule Take 2 mg by mouth 4 times daily as needed for diarrhea       ursodiol (ACTIGALL) 300 MG capsule Take 1 capsule (300 mg) by mouth 2 times daily 60 capsule 11     tacrolimus (PROGRAF - GENERIC EQUIVALENT) 0.5 MG capsule Take 4 capsules (2 mg) by mouth 2 times daily 240 capsule 11     simethicone (MYLICON) 80 MG chewable tablet Take 1 tablet (80 mg) by mouth every 6 hours as needed for flatulence or cramping 180 tablet 1     sertraline (ZOLOFT) 100 MG tablet Take 1.5 tablets (150 mg) by mouth daily 45 tablet 1     psyllium 0.52 G capsule Take 2 capsules (1.04 g) by mouth daily With a full glass of water. 60 capsule 1     pantoprazole (PROTONIX) 40 MG EC tablet Take 1 tablet (40 mg) by mouth every morning (before breakfast) 30 tablet 11     multivitamin, therapeutic (THERA-VIT) TABS tablet Take 1 tablet by mouth every 24 hours 30 tablet 11     levothyroxine (SYNTHROID/LEVOTHROID) 88 MCG tablet Take 1 tablet (88 mcg) by mouth every morning (before breakfast) 30 tablet 1     folic acid (FOLVITE) 1 MG tablet Take 1 tablet (1 mg) by mouth daily 30 tablet 1     cyanocobalamin 1000 MCG TABS Take 1,000 mcg by mouth daily 30 tablet 1     busPIRone (BUSPAR) 10 MG tablet Take 1 tablet (10 mg) by mouth 2 times daily 60 tablet 1     azaTHIOprine 100 MG TABS Take 150 mg by mouth every evening 30 tablet 11     ASSESSMENT:  Hgb at goal but has been below goal and she is symptomatic.  Dr Rodriguez recommends starting aranesp   Ferritin: At goal (>100ng/mL)  TSat: at goal >30%  Iron regimen recommended: None  Recommended MEDARDO regimen: 60mcg every 2 weeks    PLAN:  Discussed:  anemia overview, monitoring service and goal hemoglobin range and rationale and risks of MEDARDO blood clots, stroke and increase in blood pressure  Dose location: in clinic to see how admin is done then at home  Pharmacy:  [facility]    Phan will schedule  appt for aranesp tomorrow 3/31.  Dose documented.  Subsequent doses will be at home so will f/u on PA 4/3  NEW DRUG*PFA (ARANESP), TEST CLAIM= THIS MEDICATION MUST BE FILLED THROUGH [facility] @ [phone].  [phone] (fax)    Iron labs due:  4/21    Next call date:  4/4/17    New patient letter with medication guide was sent to patient.  Patient verbalized understanding of the plan.     Anemia Management Service  Molly Dumont,EtienneD and Zamzam Olmedo CPhT  Phone: [phone]  Fax: [phone]     I spoke to Maci at [facility] pharmacy to see if a PA was needed for the aranesp.  She said it has not been processed yet. Pending Jaqui

## 2017-03-31 ENCOUNTER — ALLIED HEALTH/NURSE VISIT (OUTPATIENT)
Dept: TRANSPLANT | Facility: CLINIC | Age: 62
End: 2017-03-31
Payer: COMMERCIAL

## 2017-03-31 ENCOUNTER — TELEPHONE (OUTPATIENT)
Dept: TRANSPLANT | Facility: CLINIC | Age: 62
End: 2017-03-31

## 2017-03-31 ENCOUNTER — COMMUNICATION - HEALTHEAST (OUTPATIENT)
Dept: INTERNAL MEDICINE | Facility: CLINIC | Age: 62
End: 2017-03-31

## 2017-03-31 VITALS — DIASTOLIC BLOOD PRESSURE: 89 MMHG | SYSTOLIC BLOOD PRESSURE: 127 MMHG

## 2017-03-31 DIAGNOSIS — N18.30 CKD (CHRONIC KIDNEY DISEASE) STAGE 3, GFR 30-59 ML/MIN (H): ICD-10-CM

## 2017-03-31 DIAGNOSIS — D63.1 ANEMIA IN STAGE 3 CHRONIC KIDNEY DISEASE (H): Primary | ICD-10-CM

## 2017-03-31 DIAGNOSIS — N18.30 ANEMIA IN STAGE 3 CHRONIC KIDNEY DISEASE (H): Primary | ICD-10-CM

## 2017-03-31 DIAGNOSIS — R52 PAIN: ICD-10-CM

## 2017-03-31 PROCEDURE — 96372 THER/PROPH/DIAG INJ SC/IM: CPT

## 2017-03-31 PROCEDURE — 63400005 ZZH RX 634: Mod: EC | Performed by: INTERNAL MEDICINE

## 2017-03-31 RX ADMIN — DARBEPOETIN ALFA 60 MCG: 60 INJECTION, SOLUTION INTRAVENOUS; SUBCUTANEOUS at 09:50

## 2017-03-31 NOTE — NURSING NOTE
Frequency: every 14 days  Last Dose: NA, this is patient's first dose  Most recent or today's HGB: 9.3   Date: 3/29/17  Date of lat dose: NA, first dose for patient  HGB associated with last dose given: NA    Blood Pressure:127/89    Diagnosis: Anemia in CKD stage 3    Ordered by: Daya Gutierrez MD  VIS Offered: yes    Double Checked by: Zainab Mooney CMA    See MAR for administration details    Pt's first name, last name and  verified prior to medication administration, injection given without complications or questions.

## 2017-03-31 NOTE — MR AVS SNAPSHOT
After Visit Summary   3/31/2017    Phan Luque    MRN: [id number]           Patient Information     Date Of Birth          1955        Visit Information        Provider Department      3/31/2017 9:00 AM Nurse, [facility] Solid Organ Transplant        Today's Diagnoses     Anemia in stage 3 chronic kidney disease    -  1    CKD (chronic kidney disease) stage 3, GFR 30-59 ml/min           Follow-ups after your visit        Your next 10 appointments already scheduled     Apr 05, 2017 12:00 PM CDT   Lab with  LAB   [facility] ([facility])    [address] [place] [id number]   [phone]            Apr 05, 2017  1:00 PM CDT   (Arrive by 12:45 PM)   Return Liver Transplant with Hussein Banegas MD   [facility] Hepatology ([facility])    [address] [place] [address]   [phone]            Apr 05, 2017  1:30 PM CDT   DX HIP/PELVIS/SPINE with [id number] [place] [address] [facility] ([facility])    [address] [place] [id number] [phone]           Please do not take any of the following 48 hours prior to your exam: vitamins, calcium tablets, antacids.            Apr 14, 2017  9:15 AM CDT   LAB with  LAB   [facility] ([facility])    [address] [place] [id number] [phone]           Patient must bring picture ID.  Patient should be prepared to give a urine specimen  Please do not eat 10-12 hours before your appointment if you are coming in fasting for labs on lipids, cholesterol, or glucose (sugar).  Pregnant women should follow their Care Team instructions. Water with medications is okay. Do not drink coffee or other fluids.   If you have concerns about taking  your medications, please ask at office or if scheduling via [employer], send a message by clicking on Secure  Messaging, Message Your Care Team.            Apr 14, 2017 10:00 AM CDT   (Arrive by 9:45 AM)   INJECTION with  Txc Nurse   [facility] Solid Organ Transplant ([facility])    [address] [address]   [phone]            Jun 05, 2017 12:00 PM CDT   Lab with  LAB   [facility] ([facility])    [address] [address]   [phone]            Jun 05, 2017  1:00 PM CDT   (Arrive by 12:30 PM)   Return Visit with Ruth Ann Erazo MD   [facility] Nephrology ([facility])    [address] [address] [phone]            Jun 09, 2017  7:30 AM CDT   Lab with  LAB   [facility] ([facility])    [address] [place] [address]   [phone]            Jun 09, 2017  8:30 AM CDT   (Arrive by 8:15 AM)   Return Liver Transplant with Hussein Banegas MD   [facility] Hepatology ([facility])    [address] [place] [id number] [phone]            Aug 14, 2017  4:00 PM CDT   (Arrive by 3:45 PM)   RETURN ENDOCRINE with Ruth Oakley MD   [facility] Endocrinology ([facility])    [address] [place] [id number] [phone]              Who to contact     If you have questions or need follow up information about today's clinic visit or your schedule please contact [facility] directly at [phone].  Normal or non-critical lab and imaging results will be communicated to you by MyChart, letter or phone within 4 business days after the clinic has received the results. If you do not hear from us within 7 days, please contact the clinic through MyChart or phone. If you have a critical or abnormal lab result, we will notify you by phone as soon as possible.  Submit refill  requests through [employer] or call your pharmacy and they will forward the refill request to us. Please allow 3 business days for your refill to be completed.          Additional Information About Your Visit        [employer] Information     [employer] gives you secure access to your electronic health record. If you see a primary care provider, you can also send messages to your care team and make appointments. If you have questions, please call your primary care clinic.  If you do not have a primary care provider, please call [phone] and they will assist you.        Care EveryWhere ID     This is your Care EveryWhere ID. This could be used by other organizations to access your [place] medical records  [id number]         Blood Pressure from Last 3 Encounters:   03/22/17 109/53   03/16/17 125/72   03/13/17 129/77    Weight from Last 3 Encounters:   03/22/17 70.3 kg (154 lb 14.4 oz)   03/13/17 70.9 kg (156 lb 3.2 oz)   03/06/17 73.2 kg (161 lb 6.4 oz)              We Performed the Following     Hematocrit     Hemoglobin     Treatment Conditions     Vital signs        Primary Care Provider Office Phone # Fax #    Santosh Salgado [phone]       [address]   [facility] [address]        Thank you!     Thank you for choosing [facility]PL[facility]  for your care. Our goal is always to provide you with excellent care. Hearing back from our patients is one way we can continue to improve our services. Please take a few minutes to complete the written survey that you may receive in the mail after your visit with us. Thank you!             Your Updated Medication List - Protect others around you: Learn how to safely use, store and throw away your medicines at www.disposemymeds.org.          This list is accurate as of: 3/31/17  9:45 AM.  Always use your most recent med list.                   Brand Name Dispense Instructions for use    acetaminophen 325 MG tablet    TYLENOL    100  tablet    Take 2 tablets (650 mg) by mouth every 6 hours as needed for mild pain Or fevers. Use sparingly. Limit use to no more than 2 grams (2000 mg) in 24 hours. **Further refills must be obtained by primary care provider**       azaTHIOprine 100 MG Tabs     30 tablet    Take 150 mg by mouth every evening       busPIRone 10 MG tablet    BUSPAR    60 tablet    Take 1 tablet (10 mg) by mouth 2 times daily       calcium Citrate-vitamin D 500-400 MG-UNIT Chew      Take 1 tablet by mouth daily       cyanocobalamin 1000 MCG Tabs     30 tablet    Take 1,000 mcg by mouth daily       darbepoetin vandana 60 MCG/0.3ML injection    ARANESP (ALBUMIN FREE)    0.6 mL    Inject 0.3 mLs (60 mcg) Subcutaneous every 14 days As needed for hgb <10g/dL       folic acid 1 MG tablet    FOLVITE    30 tablet    Take 1 tablet (1 mg) by mouth daily       hydrOXYzine 25 MG tablet    ATARAX    60 tablet    Take 1-2 tablets (25-50 mg) by mouth every 6 hours as needed for itching       levothyroxine 88 MCG tablet    SYNTHROID/LEVOTHROID    30 tablet    Take 1 tablet (88 mcg) by mouth every morning (before breakfast)       loperamide 2 MG capsule    IMODIUM     Take 2 mg by mouth 4 times daily as needed for diarrhea       magnesium oxide 400 (241.3 MG) MG tablet    MAG-OX    60 tablet    Take 1 tablet (400 mg) by mouth daily       multivitamin, therapeutic Tabs tablet     30 tablet    Take 1 tablet by mouth every 24 hours       pantoprazole 40 MG EC tablet    PROTONIX    30 tablet    Take 1 tablet (40 mg) by mouth every morning (before breakfast)       psyllium 0.52 G capsule     60 capsule    Take 2 capsules (1.04 g) by mouth daily With a full glass of water.       sertraline 100 MG tablet    ZOLOFT    45 tablet    Take 1.5 tablets (150 mg) by mouth daily       simethicone 80 MG chewable tablet    MYLICON    180 tablet    Take 1 tablet (80 mg) by mouth every 6 hours as needed for flatulence or cramping       tacrolimus 0.5 MG capsule    PROGRAF -  GENERIC EQUIVALENT    240 capsule    Take 4 capsules (2 mg) by mouth 2 times daily       traMADol 50 MG tablet    ULTRAM    50 tablet    Take 1-2 tablets ( mg) by mouth every 6 hours as needed for pain       traZODone 100 MG tablet    DESYREL    30 tablet    Take 1 tablet (100 mg) by mouth At Bedtime **Further refills must be obtained by primary care provider**       ursodiol 300 MG capsule    ACTIGALL    60 capsule    Take 1 capsule (300 mg) by mouth 2 times daily

## 2017-04-03 ENCOUNTER — TELEPHONE (OUTPATIENT)
Dept: PHARMACY | Facility: CLINIC | Age: 62
End: 2017-04-03

## 2017-04-03 ENCOUNTER — MYC MEDICAL ADVICE (OUTPATIENT)
Dept: ENDOCRINOLOGY | Facility: CLINIC | Age: 62
End: 2017-04-03

## 2017-04-03 DIAGNOSIS — N18.30 CKD (CHRONIC KIDNEY DISEASE) STAGE 3, GFR 30-59 ML/MIN (H): ICD-10-CM

## 2017-04-03 DIAGNOSIS — D63.1 ANEMIA IN STAGE 3 CHRONIC KIDNEY DISEASE (H): ICD-10-CM

## 2017-04-03 DIAGNOSIS — N18.30 ANEMIA IN STAGE 3 CHRONIC KIDNEY DISEASE (H): ICD-10-CM

## 2017-04-05 ENCOUNTER — OFFICE VISIT (OUTPATIENT)
Dept: GASTROENTEROLOGY | Facility: CLINIC | Age: 62
End: 2017-04-05
Attending: INTERNAL MEDICINE
Payer: COMMERCIAL

## 2017-04-05 ENCOUNTER — RECORDS - HEALTHEAST (OUTPATIENT)
Dept: ADMINISTRATIVE | Facility: OTHER | Age: 62
End: 2017-04-05

## 2017-04-05 VITALS
BODY MASS INDEX: 23.79 KG/M2 | RESPIRATION RATE: 22 BRPM | WEIGHT: 151.6 LBS | HEART RATE: 83 BPM | SYSTOLIC BLOOD PRESSURE: 120 MMHG | HEIGHT: 67 IN | TEMPERATURE: 98.5 F | DIASTOLIC BLOOD PRESSURE: 70 MMHG | OXYGEN SATURATION: 99 %

## 2017-04-05 DIAGNOSIS — D63.1 ANEMIA IN STAGE 3 CHRONIC KIDNEY DISEASE (H): ICD-10-CM

## 2017-04-05 DIAGNOSIS — E83.52 HYPERCALCEMIA: ICD-10-CM

## 2017-04-05 DIAGNOSIS — Z94.4 LIVER REPLACED BY TRANSPLANT (H): ICD-10-CM

## 2017-04-05 DIAGNOSIS — Z94.4 LIVER REPLACED BY TRANSPLANT (H): Primary | ICD-10-CM

## 2017-04-05 DIAGNOSIS — N18.30 ANEMIA IN STAGE 3 CHRONIC KIDNEY DISEASE (H): ICD-10-CM

## 2017-04-05 DIAGNOSIS — H46.9 OPTIC NEUROPATHY, LEFT: ICD-10-CM

## 2017-04-05 DIAGNOSIS — E03.9 ACQUIRED HYPOTHYROIDISM: ICD-10-CM

## 2017-04-05 DIAGNOSIS — N18.30 CKD (CHRONIC KIDNEY DISEASE) STAGE 3, GFR 30-59 ML/MIN (H): ICD-10-CM

## 2017-04-05 DIAGNOSIS — Z94.4 LIVER TRANSPLANTED (H): Primary | ICD-10-CM

## 2017-04-05 LAB
ALBUMIN SERPL-MCNC: 3.8 G/DL (ref 3.4–5)
ALP SERPL-CCNC: 63 U/L (ref 40–150)
ALT SERPL W P-5'-P-CCNC: 12 U/L (ref 0–50)
ANION GAP SERPL CALCULATED.3IONS-SCNC: 7 MMOL/L (ref 3–14)
AST SERPL W P-5'-P-CCNC: 13 U/L (ref 0–45)
BILIRUB DIRECT SERPL-MCNC: 0.1 MG/DL (ref 0–0.2)
BILIRUB SERPL-MCNC: 0.8 MG/DL (ref 0.2–1.3)
BUN SERPL-MCNC: 11 MG/DL (ref 7–30)
CA-I SERPL ISE-MCNC: 4.8 MG/DL (ref 4.4–5.2)
CALCIUM SERPL-MCNC: 8.5 MG/DL (ref 8.5–10.1)
CHLORIDE SERPL-SCNC: 106 MMOL/L (ref 94–109)
CO2 SERPL-SCNC: 25 MMOL/L (ref 20–32)
CORTIS SERPL-MCNC: 8 UG/DL (ref 4–22)
CREAT SERPL-MCNC: 1.39 MG/DL (ref 0.52–1.04)
ERYTHROCYTE [DISTWIDTH] IN BLOOD BY AUTOMATED COUNT: 15.4 % (ref 10–15)
FERRITIN SERPL-MCNC: 278 NG/ML (ref 8–252)
FSH SERPL-ACNC: 75.7 IU/L
GFR SERPL CREATININE-BSD FRML MDRD: 39 ML/MIN/1.7M2
GLUCOSE SERPL-MCNC: 99 MG/DL (ref 70–99)
HCT VFR BLD AUTO: 27.4 % (ref 35–47)
HGB BLD-MCNC: 9 G/DL (ref 11.7–15.7)
IRON SATN MFR SERPL: 31 % (ref 15–46)
IRON SERPL-MCNC: 56 UG/DL (ref 35–180)
LH SERPL-ACNC: 52.8 IU/L
MAGNESIUM SERPL-MCNC: 1.9 MG/DL (ref 1.6–2.3)
MCH RBC QN AUTO: 34.1 PG (ref 26.5–33)
MCHC RBC AUTO-ENTMCNC: 32.8 G/DL (ref 31.5–36.5)
MCV RBC AUTO: 104 FL (ref 78–100)
PHOSPHATE SERPL-MCNC: 3.6 MG/DL (ref 2.5–4.5)
PLATELET # BLD AUTO: 320 10E9/L (ref 150–450)
POTASSIUM SERPL-SCNC: 4.2 MMOL/L (ref 3.4–5.3)
PROLACTIN SERPL-MCNC: 9 UG/L (ref 3–27)
PROT SERPL-MCNC: 6.8 G/DL (ref 6.8–8.8)
PTH-INTACT SERPL-MCNC: 53 PG/ML (ref 12–72)
RBC # BLD AUTO: 2.64 10E12/L (ref 3.8–5.2)
SODIUM SERPL-SCNC: 139 MMOL/L (ref 133–144)
T3 SERPL-MCNC: 90 NG/DL (ref 60–181)
T4 FREE SERPL-MCNC: 0.98 NG/DL (ref 0.76–1.46)
TACROLIMUS BLD-MCNC: 5.4 UG/L (ref 5–15)
TIBC SERPL-MCNC: 179 UG/DL (ref 240–430)
TME LAST DOSE: 730 H
TSH SERPL DL<=0.005 MIU/L-ACNC: 1.26 MU/L (ref 0.4–4)
TSH SERPL DL<=0.05 MIU/L-ACNC: 1.26 MU/L (ref 0.4–4)
WBC # BLD AUTO: 3.9 10E9/L (ref 4–11)

## 2017-04-05 PROCEDURE — 83735 ASSAY OF MAGNESIUM: CPT | Performed by: INTERNAL MEDICINE

## 2017-04-05 PROCEDURE — 83550 IRON BINDING TEST: CPT | Performed by: INTERNAL MEDICINE

## 2017-04-05 PROCEDURE — 85027 COMPLETE CBC AUTOMATED: CPT | Performed by: INTERNAL MEDICINE

## 2017-04-05 PROCEDURE — 84100 ASSAY OF PHOSPHORUS: CPT | Performed by: INTERNAL MEDICINE

## 2017-04-05 PROCEDURE — 36415 COLL VENOUS BLD VENIPUNCTURE: CPT | Performed by: INTERNAL MEDICINE

## 2017-04-05 PROCEDURE — 80197 ASSAY OF TACROLIMUS: CPT | Performed by: INTERNAL MEDICINE

## 2017-04-05 PROCEDURE — 82728 ASSAY OF FERRITIN: CPT | Performed by: INTERNAL MEDICINE

## 2017-04-05 PROCEDURE — 82024 ASSAY OF ACTH: CPT | Performed by: INTERNAL MEDICINE

## 2017-04-05 PROCEDURE — 83540 ASSAY OF IRON: CPT | Performed by: INTERNAL MEDICINE

## 2017-04-05 PROCEDURE — 99212 OFFICE O/P EST SF 10 MIN: CPT | Mod: ZF

## 2017-04-05 PROCEDURE — 82248 BILIRUBIN DIRECT: CPT | Performed by: INTERNAL MEDICINE

## 2017-04-05 RX ORDER — PREDNISONE 5 MG/1
10 TABLET ORAL DAILY
Qty: 60 TABLET | Refills: 0 | Status: SHIPPED | OUTPATIENT
Start: 2017-04-05 | End: 2017-04-27

## 2017-04-05 RX ORDER — PREDNISONE 5 MG/1
10 TABLET ORAL DAILY
Qty: 180 TABLET | Refills: 3 | Status: SHIPPED | OUTPATIENT
Start: 2017-04-05 | End: 2017-05-17

## 2017-04-05 ASSESSMENT — PAIN SCALES - GENERAL: PAINLEVEL: MILD PAIN (3)

## 2017-04-05 NOTE — PROGRESS NOTES
HISTORY OF PRESENT ILLNESS:  I had the pleasure of seeing Phan Isaacs for followup in the Liver Transplantation Clinic at the [facility] on 04/05/2017.  Ms. Isaacs returns for followup now more than 7 months status post liver transplantation for alcoholic hepatitis.      She feels like she is doing fairly well.  She has returned to work part-time but still has a lot of aches and pains.  I did discuss with her the last time that we could try putting her on 10 mg of prednisone to see if that helps with this and we will go ahead and do that.        She does complain of some abdominal pain.  She denies any itching or skin rash.  She still complains of some fatigue.  She also does have some dizziness, which sounds like it is orthostatic.  She does have some decreased vision in her left eye that is ongoing.  She denies any fevers or chills, cough or shortness of breath.  She denies any nausea, vomiting, diarrhea or constipation.  Her appetite is improving and her weight is appropriate.      She was recently started on Aranesp because of ongoing anemia of chronic disease.  She does have elevated MCV and it is felt the azathioprine could be playing a role as well.       Current Outpatient Prescriptions   Medication     predniSONE (DELTASONE) 5 MG tablet     darbepoetin vandana (ARANESP, ALBUMIN FREE,) 60 MCG/0.3ML injection     magnesium oxide (MAG-OX) 400 (241.3 MG) MG tablet     traMADol (ULTRAM) 50 MG tablet     calcium Citrate-vitamin D 500-400 MG-UNIT CHEW     hydrOXYzine (ATARAX) 25 MG tablet     traZODone (DESYREL) 100 MG tablet     acetaminophen (TYLENOL) 325 MG tablet     loperamide (IMODIUM) 2 MG capsule     ursodiol (ACTIGALL) 300 MG capsule     tacrolimus (PROGRAF - GENERIC EQUIVALENT) 0.5 MG capsule     simethicone (MYLICON) 80 MG chewable tablet     sertraline (ZOLOFT) 100 MG tablet     psyllium 0.52 G capsule     pantoprazole (PROTONIX) 40 MG EC tablet     multivitamin,  therapeutic (THERA-VIT) TABS tablet     levothyroxine (SYNTHROID/LEVOTHROID) 88 MCG tablet     folic acid (FOLVITE) 1 MG tablet     cyanocobalamin 1000 MCG TABS     busPIRone (BUSPAR) 10 MG tablet     azaTHIOprine 100 MG TABS     predniSONE (DELTASONE) 5 MG tablet     No current facility-administered medications for this visit.      B/P: 120/70, T: 98.5, P: 83, R: 22    HEENT exam shows no scleral icterus and no temporal muscle wasting.  Her chest is clear.  Her abdominal exam shows some mild tenderness to palpation.  Her liver is 10 cm in span without left lobe enlargement.  Her incision is intact.  No spleen tip is palpable.  Extremity exam shows no edema.  Skin exam is also within normal limits.       Recent Results (from the past 168 hour(s))   CBC with platelets    Collection Time: 04/05/17 12:36 PM   Result Value Ref [place]    WBC 3.9 (L) 4.0 - 11.0 10e9/L    RBC Count 2.64 (L) 3.8 - 5.2 10e12/L    Hemoglobin 9.0 (L) 11.7 - 15.7 g/dL    Hematocrit 27.4 (L) 35.0 - 47.0 %     (H) 78 - 100 fl    MCH 34.1 (H) 26.5 - 33.0 pg    MCHC 32.8 31.5 - 36.5 g/dL    RDW 15.4 (H) 10.0 - 15.0 %    Platelet Count 320 150 - 450 10e9/L   Phosphorus    Collection Time: 04/05/17 12:36 PM   Result Value Ref [place]    Phosphorus 3.6 2.5 - 4.5 mg/dL   Magnesium    Collection Time: 04/05/17 12:36 PM   Result Value Ref [place]    Magnesium 1.9 1.6 - 2.3 mg/dL   T4 free    Collection Time: 04/05/17 12:36 PM   Result Value Ref [place]    T4 Free 0.98 0.76 - 1.46 ng/dL   T3 total    Collection Time: 04/05/17 12:36 PM   Result Value Ref [place]    Triiodothyronine (T3) 90 60 - 181 ng/dL   TSH    Collection Time: 04/05/17 12:36 PM   Result Value Ref [place]    TSH 1.26 0.40 - 4.00 mU/L   Comprehensive metabolic panel    Collection Time: 04/05/17 12:36 PM   Result Value Ref [place]    Sodium 139 133 - 144 mmol/L    Potassium 4.2 3.4 - 5.3 mmol/L    Chloride 106 94 - 109 mmol/L    Carbon Dioxide 25 20 - 32 mmol/L    Anion Gap 7 3 - 14 mmol/L     Glucose 99 70 - 99 mg/dL    Urea Nitrogen 11 7 - 30 mg/dL    Creatinine 1.39 (H) 0.52 - 1.04 mg/dL    GFR Estimate 39 (L) >60 mL/min/1.7m2    GFR Estimate If Black 47 (L) >60 mL/min/1.7m2    Calcium 8.5 8.5 - 10.1 mg/dL    Bilirubin Total 0.8 0.2 - 1.3 mg/dL    Albumin 3.8 3.4 - 5.0 g/dL    Protein Total 6.8 6.8 - 8.8 g/dL    Alkaline Phosphatase 63 40 - 150 U/L    ALT 12 0 - 50 U/L    AST 13 0 - 45 U/L   Parathyroid Hormone Intact    Collection Time: 04/05/17 12:36 PM   Result Value Ref [place]    Parathyroid Hormone Intact 53 12 - 72 pg/mL   Prolactin    Collection Time: 04/05/17 12:36 PM   Result Value Ref [place]    Prolactin 9 3 - 27 ug/L   Lutropin    Collection Time: 04/05/17 12:36 PM   Result Value Ref [place]    Lutropin 52.8 IU/L   Follicle stimulating hormone    Collection Time: 04/05/17 12:36 PM   Result Value Ref [place]    FSH 75.7 IU/L   Calcium ionized    Collection Time: 04/05/17 12:36 PM   Result Value Ref [place]    Calcium Ionized 4.8 4.4 - 5.2 mg/dL   TSH with free T4 reflex    Collection Time: 04/05/17 12:36 PM   Result Value Ref [place]    TSH 1.26 0.40 - 4.00 mU/L   Iron and iron binding capacity    Collection Time: 04/05/17 12:36 PM   Result Value Ref [place]    Iron 56 35 - 180 ug/dL    Iron Binding Cap 179 (L) 240 - 430 ug/dL    Iron Saturation Index 31 15 - 46 %   Ferritin    Collection Time: 04/05/17 12:36 PM   Result Value Ref [place]    Ferritin 278 (H) 8 - 252 ng/mL   Bilirubin direct    Collection Time: 04/05/17 12:36 PM   Result Value Ref [place]    Bilirubin Direct 0.1 0.0 - 0.2 mg/dL   Cortisol    Collection Time: 04/05/17 12:38 PM   Result Value Ref [place]    Cortisol Serum 8.0 4 - 22 ug/dL      My impression is that Ms. Luque is doing well status post liver transplantation for alcoholic hepatitis.  Her liver tests are excellent at this point in time.  I will begin decreasing her azathioprine to 100 mg per day and in 1 week to 50 mg per day.  I will give her 10 mg of prednisone to see if that  helps with the joint aches and joint pains.  I will follow up with the neuro-ophthalmologist about her optic neuropathy and see if anything else can be done about that.  I, otherwise, will not be making any other change to her medical regimen.  I will see her back in the clinic again in 2 months.      Thank you very much for allowing me to participate in the care of this patient.  If you have any questions regarding my recommendations, please do not hesitate to contact me.       Hussein Banegas MD      Professor of Medicine  [employer]      Executive Medical Director, Solid Organ Transplant Program  [facility]

## 2017-04-05 NOTE — MR AVS SNAPSHOT
After Visit Summary   4/5/2017    Phan Luque    MRN: [id number]           Patient Information     Date Of Birth          1955        Visit Information        Provider Department      4/5/2017 1:00 PM Hussein Banegas MD [facility] Hepatology        Today's Diagnoses     Liver replaced by transplant (H)    -  1    Optic neuropathy, left           Follow-ups after your visit        Your next 10 appointments already scheduled     Apr 14, 2017  9:15 AM CDT   LAB with  LAB   [facility] Lab ([facility])    [address]  [address] [facility] [address] [phone]           Patient must bring picture ID.  Patient should be prepared to give a urine specimen  Please do not eat 10-12 hours before your appointment if you are coming in fasting for labs on lipids, cholesterol, or glucose (sugar).  Pregnant women should follow their Care Team instructions. Water with medications is okay. Do not drink coffee or other fluids.   If you have concerns about taking  your medications, please ask at office or if scheduling via [employer], send a message by clicking on Secure Messaging, Message Your Care Team.            Apr 14, 2017 10:00 AM CDT   (Arrive by 9:45 AM)   INJECTION with  Txc Nurse   [facility] Solid Organ Transplant ([facility])    [address]  3rd Floor  [facility] [address] [phone]            Apr 20, 2017  1:30 PM CDT   NEW NEURO with Ambrose Ortega MD   Eye Clinic ([facility])    [address]  [address] [address]  [facility] [address]   [phone]            Jun 05, 2017 12:00 PM CDT   Lab with  LAB   [facility] ([facility])    [address]  [address] [facility] [address] [phone]            Jun 05, 2017  1:00 PM CDT   (Arrive by 12:30 PM)   Return Visit with Ruth Ann Erazo MD   [facility] Nephrology ([facility])    [address] [place] [address]   [phone]            Jun 09, 2017  7:30 AM CDT   Lab with  LAB   [facility] Lab ([facility])    [address] [place] [address]   [phone]            Jun 09, 2017  8:30 AM CDT   (Arrive by 8:15 AM)   Return Liver Transplant with Hussein Banegas MD   [facility] Hepatology ([facility])    [address] [place] [id number] [phone]            Aug 14, 2017  4:00 PM CDT   (Arrive by 3:45 PM)   RETURN ENDOCRINE with Ruth Oakley MD   [facility] Endocrinology ([facility])    [address] [place] [id number] [phone]              Who to contact     If you have questions or need follow up information about today's clinic visit or your schedule please contact [facility] HEPATOLOGY directly at [phone].  Normal or non-critical lab and imaging results will be communicated to you by [email or web address], letter or phone within 4 business days after the clinic has received the results. If you do not hear from us within 7 days, please contact the clinic through [employer] or phone. If you have a critical or abnormal lab result, we will notify you by phone as soon as possible.  Submit refill requests through [employer] or call your pharmacy and they will forward the refill request to us. Please allow 3 business days for your refill to be completed.          Additional Information About Your Visit        [employer] Information     [employer] gives you secure access to your electronic health record. If you see a primary care provider, you can also send messages to your care team and make appointments. If you have questions, please call your primary care clinic.  If you do not have a primary care provider, please call [phone] and they will assist you.        Care EveryWhere ID     This is your Care  "EveryWhere ID. This could be used by other organizations to access your [facility] medical records  [id number]        Your Vitals Were     Pulse Temperature Respirations Height Pulse Oximetry BMI (Body Mass Index)    83 98.5  F (36.9  C) (Oral) 22 1.702 m (5' 7.01\") 99% 23.74 kg/m2       Blood Pressure from Last 3 Encounters:   04/05/17 120/70   03/31/17 127/89   03/22/17 109/53    Weight from Last 3 Encounters:   04/05/17 68.8 kg (151 lb 9.6 oz)   03/22/17 70.3 kg (154 lb 14.4 oz)   03/13/17 70.9 kg (156 lb 3.2 oz)              We Performed the Following     OFFICE CONSULTATION,LEVEL IV          Today's Medication Changes          These changes are accurate as of: 4/5/17  1:33 PM.  If you have any questions, ask your nurse or doctor.               Start taking these medicines.        Dose/Directions    predniSONE 5 MG tablet   Commonly known as:  DELTASONE   Used for:  Liver replaced by transplant (H)   Started by:  Hussein Banegas MD        Dose:  10 mg   Take 2 tablets (10 mg) by mouth daily   Quantity:  180 tablet   Refills:  3            Where to get your medicines      These medications were sent to [facility] - [place], [place] - [address]  [address] [facility], [facility] [address]    Hours:  Mon-Fri 8:30am-5:00pm Toll Free [phone] Phone:  [phone]     predniSONE 5 MG tablet                Primary Care Provider Office Phone # Fax #    Santosh Salgado [phone]       [address]   [facility] [address]        Thank you!     Thank you for choosing [facility] HEPATOLOGY  for your care. Our goal is always to provide you with excellent care. Hearing back from our patients is one way we can continue to improve our services. Please take a few minutes to complete the written survey that you may receive in the mail after your visit with us. Thank you!             Your Updated Medication List - Protect others around you: Learn how to safely " use, store and throw away your medicines at www.disposemymeds.org.          This list is accurate as of: 4/5/17  1:33 PM.  Always use your most recent med list.                   Brand Name Dispense Instructions for use    acetaminophen 325 MG tablet    TYLENOL    100 tablet    Take 2 tablets (650 mg) by mouth every 6 hours as needed for mild pain Or fevers. Use sparingly. Limit use to no more than 2 grams (2000 mg) in 24 hours. **Further refills must be obtained by primary care provider**       azaTHIOprine 100 MG Tabs     30 tablet    Take 150 mg by mouth every evening       busPIRone 10 MG tablet    BUSPAR    60 tablet    Take 1 tablet (10 mg) by mouth 2 times daily       calcium Citrate-vitamin D 500-400 MG-UNIT Chew      Take 1 tablet by mouth daily       cyanocobalamin 1000 MCG Tabs     30 tablet    Take 1,000 mcg by mouth daily       darbepoetin vandana 60 MCG/0.3ML injection    ARANESP (ALBUMIN FREE)    0.6 mL    Inject 0.3 mLs (60 mcg) Subcutaneous every 14 days As needed for hgb <10g/dL       folic acid 1 MG tablet    FOLVITE    30 tablet    Take 1 tablet (1 mg) by mouth daily       hydrOXYzine 25 MG tablet    ATARAX    60 tablet    Take 1-2 tablets (25-50 mg) by mouth every 6 hours as needed for itching       levothyroxine 88 MCG tablet    SYNTHROID/LEVOTHROID    30 tablet    Take 1 tablet (88 mcg) by mouth every morning (before breakfast)       loperamide 2 MG capsule    IMODIUM     Take 2 mg by mouth 4 times daily as needed for diarrhea       magnesium oxide 400 (241.3 MG) MG tablet    MAG-OX    60 tablet    Take 1 tablet (400 mg) by mouth daily       multivitamin, therapeutic Tabs tablet     30 tablet    Take 1 tablet by mouth every 24 hours       pantoprazole 40 MG EC tablet    PROTONIX    30 tablet    Take 1 tablet (40 mg) by mouth every morning (before breakfast)       predniSONE 5 MG tablet    DELTASONE    180 tablet    Take 2 tablets (10 mg) by mouth daily       psyllium 0.52 G capsule     60 capsule     Take 2 capsules (1.04 g) by mouth daily With a full glass of water.       sertraline 100 MG tablet    ZOLOFT    45 tablet    Take 1.5 tablets (150 mg) by mouth daily       simethicone 80 MG chewable tablet    MYLICON    180 tablet    Take 1 tablet (80 mg) by mouth every 6 hours as needed for flatulence or cramping       tacrolimus 0.5 MG capsule    PROGRAF - GENERIC EQUIVALENT    240 capsule    Take 4 capsules (2 mg) by mouth 2 times daily       traMADol 50 MG tablet    ULTRAM    50 tablet    Take 1-2 tablets ( mg) by mouth every 6 hours as needed for pain       traZODone 100 MG tablet    DESYREL    30 tablet    Take 1 tablet (100 mg) by mouth At Bedtime **Further refills must be obtained by primary care provider**       ursodiol 300 MG capsule    ACTIGALL    60 capsule    Take 1 capsule (300 mg) by mouth 2 times daily

## 2017-04-05 NOTE — NURSING NOTE
"Chief Complaint   Patient presents with     RECHECK     S/P Liver Transplant 8/25/2016       Initial /70 (BP Location: Right arm, Patient Position: Chair, Cuff Size: Adult Regular)  Pulse 83  Temp 98.5  F (36.9  C) (Oral)  Resp 22  Ht 1.702 m (5' 7.01\")  Wt 68.8 kg (151 lb 9.6 oz)  SpO2 99%  BMI 23.74 kg/m2 Estimated body mass index is 23.74 kg/(m^2) as calculated from the following:    Height as of this encounter: 1.702 m (5' 7.01\").    Weight as of this encounter: 68.8 kg (151 lb 9.6 oz).  Medication Reconciliation: complete    "

## 2017-04-05 NOTE — LETTER
4/5/2017      RE: Phan Luque  [address] [facility] [address]       HISTORY OF PRESENT ILLNESS:  I had the pleasure of seeing Phan Isaacs for followup in the Liver Transplantation Clinic at the [facility] on 04/05/2017.  Ms. Isaacs returns for followup now more than 7 months status post liver transplantation for alcoholic hepatitis.      She feels like she is doing fairly well.  She has returned to work part-time but still has a lot of aches and pains.  I did discuss with her the last time that we could try putting her on 10 mg of prednisone to see if that helps with this and we will go ahead and do that.        She does complain of some abdominal pain.  She denies any itching or skin rash.  She still complains of some fatigue.  She also does have some dizziness, which sounds like it is orthostatic.  She does have some decreased vision in her left eye that is ongoing.  She denies any fevers or chills, cough or shortness of breath.  She denies any nausea, vomiting, diarrhea or constipation.  Her appetite is improving and her weight is appropriate.      She was recently started on Aranesp because of ongoing anemia of chronic disease.  She does have elevated MCV and it is felt the azathioprine could be playing a role as well.       Current Outpatient Prescriptions   Medication     predniSONE (DELTASONE) 5 MG tablet     darbepoetin vandana (ARANESP, ALBUMIN FREE,) 60 MCG/0.3ML injection     magnesium oxide (MAG-OX) 400 (241.3 MG) MG tablet     traMADol (ULTRAM) 50 MG tablet     calcium Citrate-vitamin D 500-400 MG-UNIT CHEW     hydrOXYzine (ATARAX) 25 MG tablet     traZODone (DESYREL) 100 MG tablet     acetaminophen (TYLENOL) 325 MG tablet     loperamide (IMODIUM) 2 MG capsule     ursodiol (ACTIGALL) 300 MG capsule     tacrolimus (PROGRAF - GENERIC EQUIVALENT) 0.5 MG capsule     simethicone (MYLICON) 80 MG chewable tablet     sertraline (ZOLOFT) 100 MG tablet     psyllium  0.52 G capsule     pantoprazole (PROTONIX) 40 MG EC tablet     multivitamin, therapeutic (THERA-VIT) TABS tablet     levothyroxine (SYNTHROID/LEVOTHROID) 88 MCG tablet     folic acid (FOLVITE) 1 MG tablet     cyanocobalamin 1000 MCG TABS     busPIRone (BUSPAR) 10 MG tablet     azaTHIOprine 100 MG TABS     predniSONE (DELTASONE) 5 MG tablet     No current facility-administered medications for this visit.      B/P: 120/70, T: 98.5, P: 83, R: 22    HEENT exam shows no scleral icterus and no temporal muscle wasting.  Her chest is clear.  Her abdominal exam shows some mild tenderness to palpation.  Her liver is 10 cm in span without left lobe enlargement.  Her incision is intact.  No spleen tip is palpable.  Extremity exam shows no edema.  Skin exam is also within normal limits.       Recent Results (from the past 168 hour(s))   CBC with platelets    Collection Time: 04/05/17 12:36 PM   Result Value Ref Range    WBC 3.9 (L) 4.0 - 11.0 10e9/L    RBC Count 2.64 (L) 3.8 - 5.2 10e12/L    Hemoglobin 9.0 (L) 11.7 - 15.7 g/dL    Hematocrit 27.4 (L) 35.0 - 47.0 %     (H) 78 - 100 fl    MCH 34.1 (H) 26.5 - 33.0 pg    MCHC 32.8 31.5 - 36.5 g/dL    RDW 15.4 (H) 10.0 - 15.0 %    Platelet Count 320 150 - 450 10e9/L   Phosphorus    Collection Time: 04/05/17 12:36 PM   Result Value Ref Range    Phosphorus 3.6 2.5 - 4.5 mg/dL   Magnesium    Collection Time: 04/05/17 12:36 PM   Result Value Ref Range    Magnesium 1.9 1.6 - 2.3 mg/dL   T4 free    Collection Time: 04/05/17 12:36 PM   Result Value Ref Range    T4 Free 0.98 0.76 - 1.46 ng/dL   T3 total    Collection Time: 04/05/17 12:36 PM   Result Value Ref Range    Triiodothyronine (T3) 90 60 - 181 ng/dL   TSH    Collection Time: 04/05/17 12:36 PM   Result Value Ref Range    TSH 1.26 0.40 - 4.00 mU/L   Comprehensive metabolic panel    Collection Time: 04/05/17 12:36 PM   Result Value Ref Range    Sodium 139 133 - 144 mmol/L    Potassium 4.2 3.4 - 5.3 mmol/L    Chloride 106 94 - 109  mmol/L    Carbon Dioxide 25 20 - 32 mmol/L    Anion Gap 7 3 - 14 mmol/L    Glucose 99 70 - 99 mg/dL    Urea Nitrogen 11 7 - 30 mg/dL    Creatinine 1.39 (H) 0.52 - 1.04 mg/dL    GFR Estimate 39 (L) >60 mL/min/1.7m2    GFR Estimate If Black 47 (L) >60 mL/min/1.7m2    Calcium 8.5 8.5 - 10.1 mg/dL    Bilirubin Total 0.8 0.2 - 1.3 mg/dL    Albumin 3.8 3.4 - 5.0 g/dL    Protein Total 6.8 6.8 - 8.8 g/dL    Alkaline Phosphatase 63 40 - 150 U/L    ALT 12 0 - 50 U/L    AST 13 0 - 45 U/L   Parathyroid Hormone Intact    Collection Time: 04/05/17 12:36 PM   Result Value Ref Range    Parathyroid Hormone Intact 53 12 - 72 pg/mL   Prolactin    Collection Time: 04/05/17 12:36 PM   Result Value Ref Range    Prolactin 9 3 - 27 ug/L   Lutropin    Collection Time: 04/05/17 12:36 PM   Result Value Ref Range    Lutropin 52.8 IU/L   Follicle stimulating hormone    Collection Time: 04/05/17 12:36 PM   Result Value Ref Range    FSH 75.7 IU/L   Calcium ionized    Collection Time: 04/05/17 12:36 PM   Result Value Ref Range    Calcium Ionized 4.8 4.4 - 5.2 mg/dL   TSH with free T4 reflex    Collection Time: 04/05/17 12:36 PM   Result Value Ref Range    TSH 1.26 0.40 - 4.00 mU/L   Iron and iron binding capacity    Collection Time: 04/05/17 12:36 PM   Result Value Ref Range    Iron 56 35 - 180 ug/dL    Iron Binding Cap 179 (L) 240 - 430 ug/dL    Iron Saturation Index 31 15 - 46 %   Ferritin    Collection Time: 04/05/17 12:36 PM   Result Value Ref Range    Ferritin 278 (H) 8 - 252 ng/mL   Bilirubin direct    Collection Time: 04/05/17 12:36 PM   Result Value Ref Range    Bilirubin Direct 0.1 0.0 - 0.2 mg/dL   Cortisol    Collection Time: 04/05/17 12:38 PM   Result Value Ref Range    Cortisol Serum 8.0 4 - 22 ug/dL      My impression is that Ms. Luque is doing well status post liver transplantation for alcoholic hepatitis.  Her liver tests are excellent at this point in time.  I will begin decreasing her azathioprine to 100 mg per day and in 1  week to 50 mg per day.  I will give her 10 mg of prednisone to see if that helps with the joint aches and joint pains.  I will follow up with the neuro-ophthalmologist about her optic neuropathy and see if anything else can be done about that.  I, otherwise, will not be making any other change to her medical regimen.  I will see her back in the clinic again in 2 months.      Thank you very much for allowing me to participate in the care of this patient.  If you have any questions regarding my recommendations, please do not hesitate to contact me.       Hussein Banegas MD      Professor of Medicine  [facility]      Executive Medical Director, Solid Organ Transplant Program  [facility]

## 2017-04-05 NOTE — TELEPHONE ENCOUNTER
Anemia Management Note  SUBJECTIVE/OBJECTIVE:  Referred by Dr. Daya Gutierrez on 3/16/2017  Primary Diagnosis: Anemia in Chronic Kidney Disease (N18.3, D63.1)    Secondary Diagnosis: Chronic Kidney Disease, Stage 3 (N18.3)   Hgb goal range: 9-10  Epo/Darbo:  Aranesp 60 mcg every 14 days As needed   RX will  on 3/28/2018  Iron regimen: None  Lab orders will  on 3/28/17    Anemia Latest Ref Rng & Units 3/2/2017 3/13/2017 3/16/2017 3/22/2017 3/29/2017 3/31/2017 2017   MEDARDO Dose - - - - - - 60 mcg -   Hemoglobin 11.7 - 15.7 g/dL 10.9(L) 9.1(L) 8.8(L) 8.7(L) 9.3(A) - 9.0(L)   TSAT 15 - 46 % - - - 32 - - 31   Ferritin 8 - 252 ng/mL - - - 320(H) - - 278(H)     BP Readings from Last 3 Encounters:   17 120/70   17 127/89   17 109/53     Wt Readings from Last 2 Encounters:   17 151 lb 9.6 oz (68.8 kg)   17 154 lb 14.4 oz (70.3 kg)     PA for aranesp is pending - faxed forms to [facility] on 17 [facility]  Next labs and aranesp dose will be due on 17    ASSESSMENT:  Hgb:at goal - received dose in clinic - recommend continue current regimen  TSat: at goal >30% Ferritin: At goal (>100ng/mL)    PLAN:  RTC for hgb then aranesp if needed in 2 week(s)    Orders needed to be renewed (for next follow-up date) in EPIC: None    Iron labs due:  5/3/17    Plan discussed with:  No call made yet  Plan provided by:  Jaqui    17: [facility] returned aranesp rx for MD's  Signature - passed on to Molly Dumont [facility]  :  Entered Rx with Dr Gutierrez's signature and e-prescribed to [facility]. Also faxed e-signed Rx.  -[facility]    NEXT FOLLOW-UP DATE:  17 to follow up on PA then  17    Anemia Management Service  Molly Dumont,EtienneD and Zamzam Olmedo CPhT  Phone: [phone]  Fax: [phone]    17: Refaxed signed RX for aranesp to [facility] Jaqui

## 2017-04-06 ENCOUNTER — COMMUNICATION - HEALTHEAST (OUTPATIENT)
Dept: INTERNAL MEDICINE | Facility: CLINIC | Age: 62
End: 2017-04-06

## 2017-04-06 DIAGNOSIS — G47.00 INSOMNIA: ICD-10-CM

## 2017-04-06 LAB
1,25(OH)2D SERPL-MCNC: 26 PG/ML (ref 19.9–79.3)
ACTH PLAS-MCNC: 16 PG/ML
IGF-I BLD-MCNC: 170 NG/ML (ref 43–241)
THYROPEROXIDASE AB SERPL-ACNC: <10 IU/ML

## 2017-04-07 ENCOUNTER — TELEPHONE (OUTPATIENT)
Dept: PHARMACY | Facility: CLINIC | Age: 62
End: 2017-04-07

## 2017-04-07 LAB
DEPRECATED CALCIDIOL+CALCIFEROL SERPL-MC: NORMAL UG/L (ref 20–75)
VITAMIN D2 SERPL-MCNC: <5 UG/L
VITAMIN D3 SERPL-MCNC: 44 UG/L

## 2017-04-10 DIAGNOSIS — Z94.4 LIVER TRANSPLANTED (H): ICD-10-CM

## 2017-04-10 RX ORDER — AZATHIOPRINE 100 MG/1
100 TABLET ORAL EVERY EVENING
Qty: 30 TABLET | Refills: 11 | Status: SHIPPED | OUTPATIENT
Start: 2017-04-10 | End: 2017-06-07

## 2017-04-10 NOTE — TELEPHONE ENCOUNTER
Patient states new dose of azathioprine is 100mg once daily please confirm and send new rx    Thank you    Adalgisa Church   [facility]  [phone]

## 2017-04-12 ENCOUNTER — AMBULATORY - HEALTHEAST (OUTPATIENT)
Dept: LAB | Facility: CLINIC | Age: 62
End: 2017-04-12

## 2017-04-12 DIAGNOSIS — Z79.899 ENCOUNTER FOR LONG-TERM (CURRENT) USE OF OTHER MEDICATIONS: ICD-10-CM

## 2017-04-12 DIAGNOSIS — Z94.4 LIVER REPLACED BY TRANSPLANT (H): ICD-10-CM

## 2017-04-12 DIAGNOSIS — Z94.4 LIVER TRANSPLANTED (H): ICD-10-CM

## 2017-04-12 PROCEDURE — 80197 ASSAY OF TACROLIMUS: CPT | Performed by: INTERNAL MEDICINE

## 2017-04-13 ENCOUNTER — TELEPHONE (OUTPATIENT)
Dept: PHARMACY | Facility: CLINIC | Age: 62
End: 2017-04-13

## 2017-04-13 LAB
PTH RELATED PROT SERPL-SCNC: 3.8 PMOL/L
TACROLIMUS BLD-MCNC: 4.6 UG/L (ref 5–15)
TME LAST DOSE: ABNORMAL H

## 2017-04-13 NOTE — TELEPHONE ENCOUNTER
Anemia Management Note  SUBJECTIVE/OBJECTIVE:  Referred by Dr. Daya Gtuierrez on 3/16/2017  Primary Diagnosis: Anemia in Chronic Kidney Disease (N18.3, D63.1)   Secondary Diagnosis: Chronic Kidney Disease, Stage 3 (N18.3)   Hgb goal range: 9-10  Epo/Darbo:  Aranesp 60 mcg every 14 days As needed   RX will  on 3/28/2018  Iron regimen: None  Lab orders will  on 3/28/17     Anemia Latest Ref Rng & Units 3/2/2017 3/13/2017 3/16/2017 3/22/2017 3/29/2017 3/31/2017 2017   MEDAROD Dose - - - - - - 60 mcg -   Hemoglobin 11.7 - 15.7 g/dL 10.9(L) 9.1(L) 8.8(L) 8.7(L) 9.3(A) - 9.0(L)   TSAT 15 - 46 % - - - 32 - - 31   Ferritin 8 - 252 ng/mL - - - 320(H) - - 278(H)     BP Readings from Last 3 Encounters:   17 120/70   17 127/89   17 109/53     Wt Readings from Last 2 Encounters:   17 151 lb 9.6 oz (68.8 kg)   17 154 lb 14.4 oz (70.3 kg)     PA for aranesp is pending - faxed forms to [facility] on 17 Sjw    ASSESSMENT:  Hgb: At goal - continue to monitor  TSat: at goal >30% Ferritin: At goal (>100ng/mL)    PLAN:  Dose with aranesp on  if Hgb is <10 and RTC for hgb then aranesp if needed in 2 week(s)    Refaxed signed forms on 17  Called [facility]  ([phone]) on  - still pending  Patient ID #[id number]  Next labs and aranesp dose will be due on 17  Next appt w/Dr Gutierrez and  lab/aranesp  is scheduled for 17 at 9:15 am    :  I called [facility] and they stated med was delivered .  Their system was down so they were not able to give me a confirmation number.  I called Phan to confirm if she had the drug but had to LM.  Either way she is due for hgb and then aranesp either in clinic or at home today. -mark    Orders needed to be renewed (for next follow-up date) in [facility]: None    Iron labs due:  5/3    Plan discussed with:  MITCH for Suemay/mark 2017  Plan provided by:  Jaqui    NEXT FOLLOW-UP DATE:  17    Anemia Management Service  Molly  Mary Alice Dumont and Zamzam Olmedo CPhT  Phone: [phone]  Fax: [phone]

## 2017-04-14 ENCOUNTER — TELEPHONE (OUTPATIENT)
Dept: PHARMACY | Facility: CLINIC | Age: 62
End: 2017-04-14

## 2017-04-14 ENCOUNTER — ALLIED HEALTH/NURSE VISIT (OUTPATIENT)
Dept: TRANSPLANT | Facility: CLINIC | Age: 62
End: 2017-04-14
Attending: INTERNAL MEDICINE
Payer: COMMERCIAL

## 2017-04-14 DIAGNOSIS — N18.30 ANEMIA IN STAGE 3 CHRONIC KIDNEY DISEASE (H): ICD-10-CM

## 2017-04-14 DIAGNOSIS — D63.1 ANEMIA IN STAGE 3 CHRONIC KIDNEY DISEASE (H): ICD-10-CM

## 2017-04-14 DIAGNOSIS — Z94.4 LIVER REPLACED BY TRANSPLANT (H): ICD-10-CM

## 2017-04-14 DIAGNOSIS — Z94.4 LIVER TRANSPLANTED (H): Primary | ICD-10-CM

## 2017-04-14 DIAGNOSIS — N18.30 CKD (CHRONIC KIDNEY DISEASE) STAGE 3, GFR 30-59 ML/MIN (H): ICD-10-CM

## 2017-04-14 LAB
ALBUMIN SERPL-MCNC: 3.8 G/DL (ref 3.4–5)
ALP SERPL-CCNC: 63 U/L (ref 40–150)
ALT SERPL W P-5'-P-CCNC: 13 U/L (ref 0–50)
ANION GAP SERPL CALCULATED.3IONS-SCNC: 8 MMOL/L (ref 3–14)
AST SERPL W P-5'-P-CCNC: 9 U/L (ref 0–45)
BILIRUB DIRECT SERPL-MCNC: 0.1 MG/DL (ref 0–0.2)
BILIRUB SERPL-MCNC: 0.5 MG/DL (ref 0.2–1.3)
BUN SERPL-MCNC: 18 MG/DL (ref 7–30)
CALCIUM SERPL-MCNC: 8.9 MG/DL (ref 8.5–10.1)
CHLORIDE SERPL-SCNC: 103 MMOL/L (ref 94–109)
CO2 SERPL-SCNC: 27 MMOL/L (ref 20–32)
CREAT SERPL-MCNC: 1.51 MG/DL (ref 0.52–1.04)
ERYTHROCYTE [DISTWIDTH] IN BLOOD BY AUTOMATED COUNT: 16.6 % (ref 10–15)
GFR SERPL CREATININE-BSD FRML MDRD: 35 ML/MIN/1.7M2
GLUCOSE SERPL-MCNC: 94 MG/DL (ref 70–99)
HCT VFR BLD AUTO: 32 % (ref 35–47)
HGB BLD-MCNC: 10.7 G/DL (ref 11.7–15.7)
MAGNESIUM SERPL-MCNC: 2.1 MG/DL (ref 1.6–2.3)
MCH RBC QN AUTO: 34.6 PG (ref 26.5–33)
MCHC RBC AUTO-ENTMCNC: 33.4 G/DL (ref 31.5–36.5)
MCV RBC AUTO: 104 FL (ref 78–100)
PHOSPHATE SERPL-MCNC: 3.4 MG/DL (ref 2.5–4.5)
PLATELET # BLD AUTO: 299 10E9/L (ref 150–450)
POTASSIUM SERPL-SCNC: 4.1 MMOL/L (ref 3.4–5.3)
PROT SERPL-MCNC: 7 G/DL (ref 6.8–8.8)
RBC # BLD AUTO: 3.09 10E12/L (ref 3.8–5.2)
SODIUM SERPL-SCNC: 138 MMOL/L (ref 133–144)
WBC # BLD AUTO: 5.7 10E9/L (ref 4–11)

## 2017-04-14 PROCEDURE — 84100 ASSAY OF PHOSPHORUS: CPT | Performed by: INTERNAL MEDICINE

## 2017-04-14 PROCEDURE — 80048 BASIC METABOLIC PNL TOTAL CA: CPT | Performed by: INTERNAL MEDICINE

## 2017-04-14 PROCEDURE — 85027 COMPLETE CBC AUTOMATED: CPT | Performed by: INTERNAL MEDICINE

## 2017-04-14 PROCEDURE — 36415 COLL VENOUS BLD VENIPUNCTURE: CPT | Performed by: INTERNAL MEDICINE

## 2017-04-14 PROCEDURE — 83735 ASSAY OF MAGNESIUM: CPT | Performed by: INTERNAL MEDICINE

## 2017-04-14 PROCEDURE — 80076 HEPATIC FUNCTION PANEL: CPT | Performed by: INTERNAL MEDICINE

## 2017-04-14 NOTE — LETTER
[facility]  Medication Monitoring Clinic         FAX             [phone]    April 14, 2017      Total Number of Pages:  7_____  ____

## 2017-04-14 NOTE — TELEPHONE ENCOUNTER
Anemia Management Note  SUBJECTIVE/OBJECTIVE:  Referred by Dr. Daya Gutierrez on 3/16/2017  Primary Diagnosis: Anemia in Chronic Kidney Disease (N18.3, D63.1)   Secondary Diagnosis: Chronic Kidney Disease, Stage 3 (N18.3)   Hgb goal range: 9-10  Epo/Darbo:  Aranesp 60 mcg every 14 days As needed - HOME  RX will  on 3/28/2018  Iron regimen: None  Lab orders will  on 3/28/17    Anemia Latest Ref Rng & Units 3/13/2017 3/16/2017 3/22/2017 3/29/2017 3/31/2017 2017 2017   MEDARDO Dose - - - - - 60 mcg - -   Hemoglobin 11.7 - 15.7 g/dL 9.1(L) 8.8(L) 8.7(L) 9.3(A) - 9.0(L) 10.7(L)   TSAT 15 - 46 % - - 32 - - 31 -   Ferritin 8 - 252 ng/mL - - 320(H) - - 278(H) -     BP Readings from Last 3 Encounters:   17 120/70   17 127/89   17 109/53     Wt Readings from Last 2 Encounters:   17 151 lb 9.6 oz (68.8 kg)   17 154 lb 14.4 oz (70.3 kg)     ASSESSMENT:  Hgb:Above goal - recommend hold dose  TSat: at goal >30% Ferritin: At goal (>100ng/mL)    PLAN:  Hold Aranesp and RTC for hgb then aranesp if needed in 2 week(s)  Faxed lab orders to [facility]  Next labs scheduled for   She has CBC every other week as part of tx labs.  Faxed standing weekly hgb just to have on hand if needed.      Orders needed to be renewed (for next follow-up date) in EPIC: None    Iron labs due:      Plan discussed with:  Phan  Plan provided by:  Molly    NEXT FOLLOW-UP DATE:      Anemia Management Service  Molly Dumont,EtienneD and Zamzam OlmedoCPAdair  Phone: [phone]  Fax: [phone]

## 2017-04-14 NOTE — PROGRESS NOTES
Called patient to check in. Pt reports that she thinks she is feeling better. She is glad that is getting the injections to help improve her hemoglobin.

## 2017-04-14 NOTE — MR AVS SNAPSHOT
After Visit Summary   4/14/2017    Phan Luque    MRN: [id number]           Patient Information     Date Of Birth          1955        Visit Information        Provider Department      4/14/2017 10:00 AM Nurse, Gavino Argueta [facility] Solid Organ Transplant        Today's Diagnoses     Liver transplanted (H)    -  1    Anemia in stage 3 chronic kidney disease           Follow-ups after your visit        Your next 10 appointments already scheduled     Apr 20, 2017  1:30 PM CDT   NEW NEURO with Ambrose Ortega MD   Eye Clinic ([facility])    [address]  [address]  [address] 9a  [facility] [address]   [phone]            Apr 28, 2017  8:00 AM CDT   (Arrive by 7:45 AM)   INJECTION with Gavino Argueta Nurse   [facility] Solid Organ Transplant ([facility])    [address] [place] [address]   [phone]            May 17, 2017  2:30 PM CDT   (Arrive by 2:15 PM)   RETURN ENDOCRINE with Ruth Oakley MD   [facility] Endocrinology ([facility])    [address] [place] [address]   [phone]            Jun 05, 2017 12:00 PM CDT   Lab with  LAB   [facility] Lab ([facility])    [address] [place] [address]   [phone]            Jun 05, 2017  1:00 PM CDT   (Arrive by 12:30 PM)   Return Visit with Ruth Ann Erazo MD   [facility] Nephrology ([facility])    [address] [place] [address]   [phone]            Jun 09, 2017  7:30 AM CDT   Lab with  LAB   [facility] Lab ([facility])    [address] [place] [address]   [phone]            Jun 09, 2017  8:30 AM CDT   (Arrive by 8:15 AM)   Return Liver Transplant with Hussein Banegas MD   [facility] Hepatology ([facility])    [address]  3rd [facility] [address] [phone]            Jun 16, 2017 11:30 AM CDT   (Arrive by 11:15 AM)   Return Visit with DONOVAN Castellanos CNP   [facility] Gastroenterology and IBD ([facility])    [address] [place] [address]  4th [facility] [address]   [phone]            Aug 14, 2017  4:00 PM CDT   (Arrive by 3:45 PM)   RETURN ENDOCRINE with Ruth Oakley MD   [facility] Endocrinology ([facility])    [address] [place] [address]  3rd [facility] [address] [phone]              Who to contact     If you have questions or need follow up information about today's clinic visit or your schedule please contact [facility] SOLID ORGAN TRANSPLANT directly at [phone].  Normal or non-critical lab and imaging results will be communicated to you by MyChart, letter or phone within 4 business days after the clinic has received the results. If you do not hear from us within 7 days, please contact the clinic through Meilishuohart or phone. If you have a critical or abnormal lab result, we will notify you by phone as soon as possible.  Submit refill requests through FaceBuzz or call your pharmacy and they will forward the refill request to us. Please allow 3 business days for your refill to be completed.          Additional Information About Your Visit        MyChart Information     FaceBuzz gives you secure access to your electronic health record. If you see a primary care provider, you can also send messages to your care team and make appointments. If you have questions, please call your primary care clinic.  If you do not have a primary care provider, please call [phone] and they will assist you.        Care EveryWhere ID     This is your Care EveryWhere ID. This could be used by other organizations to access your [place] medical records  [id number]         Blood Pressure from Last 3 Encounters:   04/05/17 120/70    03/31/17 127/89   03/22/17 109/53    Weight from Last 3 Encounters:   04/05/17 68.8 kg (151 lb 9.6 oz)   03/22/17 70.3 kg (154 lb 14.4 oz)   03/13/17 70.9 kg (156 lb 3.2 oz)              Today, you had the following     No orders found for display       Primary Care Provider Office Phone # Fax #    Santosh Salgado [phone]       [address] [place] [address]        Thank you!     Thank you for choosing [facility] SOLID ORGAN TRANSPLANT  for your care. Our goal is always to provide you with excellent care. Hearing back from our patients is one way we can continue to improve our services. Please take a few minutes to complete the written survey that you may receive in the mail after your visit with us. Thank you!             Your Updated Medication List - Protect others around you: Learn how to safely use, store and throw away your medicines at www.disposemymeds.org.          This list is accurate as of: 4/14/17  3:31 PM.  Always use your most recent med list.                   Brand Name Dispense Instructions for use    acetaminophen 325 MG tablet    TYLENOL    100 tablet    Take 2 tablets (650 mg) by mouth every 6 hours as needed for mild pain Or fevers. Use sparingly. Limit use to no more than 2 grams (2000 mg) in 24 hours. **Further refills must be obtained by primary care provider**       azaTHIOprine 100 MG Tabs     30 tablet    Take 100 mg by mouth every evening       busPIRone 10 MG tablet    BUSPAR    60 tablet    Take 1 tablet (10 mg) by mouth 2 times daily       calcium Citrate-vitamin D 500-400 MG-UNIT Chew      Take 1 tablet by mouth daily       cyanocobalamin 1000 MCG Tabs     30 tablet    Take 1,000 mcg by mouth daily       darbepoetin vandana 60 MCG/0.3ML injection    ARANESP (ALBUMIN FREE)    0.6 mL    Inject 0.3 mLs (60 mcg) Subcutaneous every 14 days As needed for hgb <10g/dL       folic acid 1 MG tablet    FOLVITE    30 tablet    Take 1 tablet (1 mg) by mouth  daily       hydrOXYzine 25 MG tablet    ATARAX    60 tablet    Take 1-2 tablets (25-50 mg) by mouth every 6 hours as needed for itching       levothyroxine 88 MCG tablet    SYNTHROID/LEVOTHROID    30 tablet    Take 1 tablet (88 mcg) by mouth every morning (before breakfast)       loperamide 2 MG capsule    IMODIUM     Take 2 mg by mouth 4 times daily as needed for diarrhea       magnesium oxide 400 (241.3 MG) MG tablet    MAG-OX    60 tablet    Take 1 tablet (400 mg) by mouth daily       multivitamin, therapeutic Tabs tablet     30 tablet    Take 1 tablet by mouth every 24 hours       pantoprazole 40 MG EC tablet    PROTONIX    30 tablet    Take 1 tablet (40 mg) by mouth every morning (before breakfast)       * predniSONE 5 MG tablet    DELTASONE    180 tablet    Take 2 tablets (10 mg) by mouth daily       * predniSONE 5 MG tablet    DELTASONE    60 tablet    Take 2 tablets (10 mg) by mouth daily       psyllium 0.52 G capsule     60 capsule    Take 2 capsules (1.04 g) by mouth daily With a full glass of water.       sertraline 100 MG tablet    ZOLOFT    45 tablet    Take 1.5 tablets (150 mg) by mouth daily       simethicone 80 MG chewable tablet    MYLICON    180 tablet    Take 1 tablet (80 mg) by mouth every 6 hours as needed for flatulence or cramping       tacrolimus 0.5 MG capsule    PROGRAF - GENERIC EQUIVALENT    240 capsule    Take 4 capsules (2 mg) by mouth 2 times daily       traMADol 50 MG tablet    ULTRAM    50 tablet    Take 1-2 tablets ( mg) by mouth every 6 hours as needed for pain       traZODone 100 MG tablet    DESYREL    30 tablet    Take 1 tablet (100 mg) by mouth At Bedtime **Further refills must be obtained by primary care provider**       ursodiol 300 MG capsule    ACTIGALL    60 capsule    Take 1 capsule (300 mg) by mouth 2 times daily       * Notice:  This list has 2 medication(s) that are the same as other medications prescribed for you. Read the directions carefully, and ask your  doctor or other care provider to review them with you.

## 2017-04-18 ENCOUNTER — MYC MEDICAL ADVICE (OUTPATIENT)
Dept: ENDOCRINOLOGY | Facility: CLINIC | Age: 62
End: 2017-04-18

## 2017-04-18 NOTE — TELEPHONE ENCOUNTER
Froylan Valladares, RN 4/18/2017 2:05 PM CDT        ----- Message -----   From: Phan Luque   Sent: 4/18/2017 12:59 PM   To: [facility] Special[facility] Endo Triage-  Subject: Osteopenia     Hello,    I'm so sorry to have missed your call. I was first diagnosed with osteopenia almost 20 years ago, and I'm curious about the location and severity of the condition.    Do you feel the May appointment is soon enough to discuss?    Thanks!

## 2017-04-19 ENCOUNTER — RECORDS - HEALTHEAST (OUTPATIENT)
Dept: ADMINISTRATIVE | Facility: OTHER | Age: 62
End: 2017-04-19

## 2017-04-19 DIAGNOSIS — H53.10 SUBJECTIVE VISUAL DISTURBANCE: Primary | ICD-10-CM

## 2017-04-20 ENCOUNTER — RECORDS - HEALTHEAST (OUTPATIENT)
Dept: ADMINISTRATIVE | Facility: OTHER | Age: 62
End: 2017-04-20

## 2017-04-20 ENCOUNTER — OFFICE VISIT (OUTPATIENT)
Dept: OPHTHALMOLOGY | Facility: CLINIC | Age: 62
End: 2017-04-20
Attending: OPHTHALMOLOGY
Payer: COMMERCIAL

## 2017-04-20 DIAGNOSIS — H47.019 AION (ACUTE ISCHEMIC OPTIC NEUROPATHY), UNSPECIFIED LATERALITY: Primary | ICD-10-CM

## 2017-04-20 DIAGNOSIS — H53.10 SUBJECTIVE VISUAL DISTURBANCE: ICD-10-CM

## 2017-04-20 DIAGNOSIS — H40.043 STEROID RESPONDERS BORDERLINE GLAUCOMA, BILATERAL: ICD-10-CM

## 2017-04-20 PROCEDURE — 99213 OFFICE O/P EST LOW 20 MIN: CPT | Mod: ZF

## 2017-04-20 PROCEDURE — 92083 EXTENDED VISUAL FIELD XM: CPT | Mod: ZF | Performed by: OPHTHALMOLOGY

## 2017-04-20 PROCEDURE — 92133 CPTRZD OPH DX IMG PST SGM ON: CPT | Mod: ZF | Performed by: OPHTHALMOLOGY

## 2017-04-20 RX ORDER — LATANOPROST 50 UG/ML
1 SOLUTION/ DROPS OPHTHALMIC AT BEDTIME
Qty: 1 BOTTLE | Refills: 11 | Status: SHIPPED | OUTPATIENT
Start: 2017-04-20 | End: 2017-09-25

## 2017-04-20 ASSESSMENT — CONF VISUAL FIELD
OD_NORMAL: 1
OS_INFERIOR_NASAL_RESTRICTION: 1
OS_SUPERIOR_NASAL_RESTRICTION: 3
OS_INFERIOR_TEMPORAL_RESTRICTION: 1
OS_SUPERIOR_TEMPORAL_RESTRICTION: 1

## 2017-04-20 ASSESSMENT — TONOMETRY
OD_IOP_MMHG: 25
OS_IOP_MMHG: 26
IOP_METHOD: ICARE

## 2017-04-20 ASSESSMENT — VISUAL ACUITY
OS_SC+: -1
OD_SC: 20/15
OS_SC: 20/125 ECC
OD_SC+: -3
METHOD: SNELLEN - LINEAR

## 2017-04-20 ASSESSMENT — SLIT LAMP EXAM - LIDS
COMMENTS: NORMAL
COMMENTS: NORMAL

## 2017-04-20 ASSESSMENT — EXTERNAL EXAM - LEFT EYE: OS_EXAM: NORMAL

## 2017-04-20 ASSESSMENT — CUP TO DISC RATIO
OD_RATIO: 0.25
OS_RATIO: 0.55

## 2017-04-20 ASSESSMENT — EXTERNAL EXAM - RIGHT EYE: OD_EXAM: NORMAL

## 2017-04-20 NOTE — NURSING NOTE
Chief Complaints and History of Present Illnesses   Patient presents with     Consult For     Otpic Neuropathy     HPI    Affected eye(s):  Both   Symptoms:     Blurred vision   Decreased vision         Do you have eye pain now?:  No      Comments:  Pt complains of a little bit of muscle fatigue or stain once in a while with both eyes but no other discomfort.  Aylin CAGE 2:02 PM April 20, 2017

## 2017-04-20 NOTE — MR AVS SNAPSHOT
After Visit Summary   4/20/2017    Phan Luque    MRN: [id number]           Patient Information     Date Of Birth          1955        Visit Information        Provider Department      4/20/2017 1:30 PM Ambrose Ortega MD [facility]        Today's Diagnoses     AION (acute ischemic optic neuropathy), unspecified laterality    -  1    Subjective visual disturbance        Steroid responders borderline glaucoma, bilateral           Follow-ups after your visit        Your next 10 appointments already scheduled     Apr 28, 2017  8:00 AM CDT   (Arrive by 7:45 AM)   INJECTION with [facility] Nurse   [facility] Solid Organ Transplant ([facility])    [address] [place] [address]  3rd [facility] [address]   [phone]            May 17, 2017  2:30 PM CDT   (Arrive by 2:15 PM)   RETURN ENDOCRINE with Ruth Oakley MD   [facility] Endocrinology ([facility])    [address] [place] [address]   [phone]            Jun 05, 2017 12:00 PM CDT   Lab with  LAB   [facility] Lab ([facility])    [address] [place] [address]   [phone]            Jun 05, 2017  1:00 PM CDT   (Arrive by 12:30 PM)   Return Visit with Ruth Ann Erazo MD   [facility] Nephrology ([facility])    [address] [place] [address]   [phone]            Jun 08, 2017  8:00 AM CDT   RETURN NEURO with Ambrose Ortega MD   Eye Clinic ([facility])    [address]  [address] [facility]  [address] 9a  [facility] [address]   [phone]            Jun 09, 2017  7:30 AM CDT   Lab with [facility] LAB   [facility] Lab ([facility])    [address] [place] [address]   [phone]            Jun 09, 2017  8:30 AM CDT   (Arrive by 8:15 AM)   Return Liver Transplant with Hussein CAMARILLO  MD KERI Banegas [facility] Hepatology ([facility])    [address] [facility]  3rd [facility] [address] [phone]            Jun 16, 2017 11:30 AM CDT   (Arrive by 11:15 AM)   Return Visit with DONOVAN Castellanos CNP   [facility] Gastroenterology and IBD ([facility])    [address] [facility]  4th [facility] [address] [phone]            Aug 14, 2017  4:00 PM CDT   (Arrive by 3:45 PM)   RETURN ENDOCRINE with MD KERI Polalck [facility] Endocrinology ([facility])    [address] [facility]  3rd [facility] [address] [phone]              Who to contact     Please call your clinic at [phone] to:    Ask questions about your health    Make or cancel appointments    Discuss your medicines    Learn about your test results    Speak to your doctor   If you have compliments or concerns about an experience at your clinic, or if you wish to file a complaint, please contact [facility] Patient Relations at [phone] or email us at [email or web address]         Additional Information About Your Visit        [email or web address] Information     Online-OR gives you secure access to your electronic health record. If you see a primary care provider, you can also send messages to your care team and make appointments. If you have questions, please call your primary care clinic.  If you do not have a primary care provider, please call [phone] and they will assist you.      Online-OR is an electronic gateway that provides easy, online access to your medical records. With Online-OR, you can request a clinic appointment, read your test results, renew a prescription or communicate with your care team.     To access your existing account, please contact your [facility] Clinic or call [phone] for assistance.        Care EveryWhere ID     This is your Care  EveryWhere ID. This could be used by other organizations to access your [place] medical records  [id number]         Blood Pressure from Last 3 Encounters:   04/05/17 120/70   03/31/17 127/89   03/22/17 109/53    Weight from Last 3 Encounters:   04/05/17 68.8 kg (151 lb 9.6 oz)   03/22/17 70.3 kg (154 lb 14.4 oz)   03/13/17 70.9 kg (156 lb 3.2 oz)              We Performed the Following     Glaucoma Top OU     IOP Measurement     OCT Optic Nerve RNFL Spectralis OU (both eyes)          Today's Medication Changes          These changes are accurate as of: 4/20/17  4:33 PM.  If you have any questions, ask your nurse or doctor.               Start taking these medicines.        Dose/Directions    latanoprost 0.005 % ophthalmic solution   Commonly known as:  XALATAN   Used for:  Steroid responders borderline glaucoma, bilateral   Started by:  Ambrose Ortega MD        Dose:  1 drop   Place 1 drop into both eyes At Bedtime   Quantity:  1 Bottle   Refills:  11            Where to get your medicines      These medications were sent to [facility] [address] [place] [address] LILLIANA GENAO AT [address] [facility] [address]     Phone:  [phone]     latanoprost 0.005 % ophthalmic solution                Primary Care Provider Office Phone # Fax #    Santosh Salgado [phone]       [address]   [facility] [address]        Thank you!     Thank you for choosing EYE CLINIC  for your care. Our goal is always to provide you with excellent care. Hearing back from our patients is one way we can continue to improve our services. Please take a few minutes to complete the written survey that you may receive in the mail after your visit with us. Thank you!             Your Updated Medication List - Protect others around you: Learn how to safely use, store and throw away your medicines at www.disposemymeds.org.          This list is accurate as of:  4/20/17  4:33 PM.  Always use your most recent med list.                   Brand Name Dispense Instructions for use    acetaminophen 325 MG tablet    TYLENOL    100 tablet    Take 2 tablets (650 mg) by mouth every 6 hours as needed for mild pain Or fevers. Use sparingly. Limit use to no more than 2 grams (2000 mg) in 24 hours. **Further refills must be obtained by primary care provider**       azaTHIOprine 100 MG Tabs     30 tablet    Take 100 mg by mouth every evening       busPIRone 10 MG tablet    BUSPAR    60 tablet    Take 1 tablet (10 mg) by mouth 2 times daily       calcium Citrate-vitamin D 500-400 MG-UNIT Chew      Take 1 tablet by mouth daily       cyanocobalamin 1000 MCG Tabs     30 tablet    Take 1,000 mcg by mouth daily       darbepoetin vandana 60 MCG/0.3ML injection    ARANESP (ALBUMIN FREE)    0.6 mL    Inject 0.3 mLs (60 mcg) Subcutaneous every 14 days As needed for hgb <10g/dL       folic acid 1 MG tablet    FOLVITE    30 tablet    Take 1 tablet (1 mg) by mouth daily       hydrOXYzine 25 MG tablet    ATARAX    60 tablet    Take 1-2 tablets (25-50 mg) by mouth every 6 hours as needed for itching       latanoprost 0.005 % ophthalmic solution    XALATAN    1 Bottle    Place 1 drop into both eyes At Bedtime       levothyroxine 88 MCG tablet    SYNTHROID/LEVOTHROID    30 tablet    Take 1 tablet (88 mcg) by mouth every morning (before breakfast)       loperamide 2 MG capsule    IMODIUM     Take 2 mg by mouth 4 times daily as needed for diarrhea       magnesium oxide 400 (241.3 MG) MG tablet    MAG-OX    60 tablet    Take 1 tablet (400 mg) by mouth daily       multivitamin, therapeutic Tabs tablet     30 tablet    Take 1 tablet by mouth every 24 hours       pantoprazole 40 MG EC tablet    PROTONIX    30 tablet    Take 1 tablet (40 mg) by mouth every morning (before breakfast)       * predniSONE 5 MG tablet    DELTASONE    180 tablet    Take 2 tablets (10 mg) by mouth daily       * predniSONE 5 MG tablet     DELTASONE    60 tablet    Take 2 tablets (10 mg) by mouth daily       psyllium 0.52 G capsule     60 capsule    Take 2 capsules (1.04 g) by mouth daily With a full glass of water.       sertraline 100 MG tablet    ZOLOFT    45 tablet    Take 1.5 tablets (150 mg) by mouth daily       simethicone 80 MG chewable tablet    MYLICON    180 tablet    Take 1 tablet (80 mg) by mouth every 6 hours as needed for flatulence or cramping       tacrolimus 0.5 MG capsule    PROGRAF - GENERIC EQUIVALENT    240 capsule    Take 4 capsules (2 mg) by mouth 2 times daily       traMADol 50 MG tablet    ULTRAM    50 tablet    Take 1-2 tablets ( mg) by mouth every 6 hours as needed for pain       traZODone 100 MG tablet    DESYREL    30 tablet    Take 1 tablet (100 mg) by mouth At Bedtime **Further refills must be obtained by primary care provider**       ursodiol 300 MG capsule    ACTIGALL    60 capsule    Take 1 capsule (300 mg) by mouth 2 times daily       * Notice:  This list has 2 medication(s) that are the same as other medications prescribed for you. Read the directions carefully, and ask your doctor or other care provider to review them with you.

## 2017-04-20 NOTE — LETTER
2017         RE:  :  MRN: Phan Luque  1955  [id number]     Dear Dr. Banegas,    Thank you for asking me to see your very pleasant patient, Phan Luque, in neuro-ophthalmic consultation.  I would like to thank you for sending your records and I have summarized them in the history of present illness.  My assessment and plan are below.  For further details, please see my attached clinic note.      Assessment & Plan     Phan Luque is a 61 year old female with the following diagnoses:   1. AION (acute ischemic optic neuropathy), unspecified laterality    2. Subjective visual disturbance    3. Steroid responders borderline glaucoma, bilateral       Acute vision loss within days of liver transplantation last August, with appearance of optic nerve elevation and cotton wool spots when elevated by Dr Canales on post op day #4. Consistent with NAION left eye. Vision has remained very good in the right eye and her RNFL is intact OD, with severe thinning OS. She remains mildly anemic, and has been started on erythropoetin.    Her intraocular pressures are elevated in both eyes today, previously normal last August, which may represent possible steroid response since starting on prednisone 10mg 2 weeks ago for bone and arthritic pain. Would recommend initiating pressure-lowering therapy at least for now given her pre-existing left optic neuropathy and her essentially monocular status.    Again, thank you for allowing me to participate in the care of your patient.      Sincerely,    Ambrose Ortega MD  Professor, Neuro-Ophthalmology  Department of Ophthalmology and Visual Neurosciences  [facility]    CC: Hussein Banegas MD  [facility]  [address] [facility] [address]  [facility] [address]  VIA In [facility]  [address] [facility]   [place] [address]  VIA Facsimile: [phone]       DX = Anterior ischemic optic neuropathy (AION), perioperative vision loss

## 2017-04-21 ENCOUNTER — TELEPHONE (OUTPATIENT)
Dept: TRANSPLANT | Facility: CLINIC | Age: 62
End: 2017-04-21

## 2017-04-21 DIAGNOSIS — Z94.4 LIVER REPLACED BY TRANSPLANT (H): Primary | ICD-10-CM

## 2017-04-21 NOTE — TELEPHONE ENCOUNTER
April 21, 2017: I left a message for the patient to call back to schedule a health psychologist:    Zulma White  [phone]    Liang Fong  [phone]   Aurora García  [phone]     Jeff Garcia  [phone]   Navya Covarrubias  [phone]      Danuta Banda  Post-Liver Transplant   [phone]

## 2017-04-21 NOTE — Clinical Note
TRINO Tipton~  Pt wants to see psychiatry to help get off depression medications, Her primary care doctor said she needs psychiatry.   Can you call to make appt.  Thanks

## 2017-04-22 ENCOUNTER — MYC MEDICAL ADVICE (OUTPATIENT)
Dept: ENDOCRINOLOGY | Facility: CLINIC | Age: 62
End: 2017-04-22

## 2017-04-26 ENCOUNTER — TELEPHONE (OUTPATIENT)
Dept: PHARMACY | Facility: CLINIC | Age: 62
End: 2017-04-26

## 2017-04-26 ENCOUNTER — HOSPITAL ENCOUNTER (OUTPATIENT)
Facility: CLINIC | Age: 62
Setting detail: SPECIMEN
Discharge: HOME | End: 2017-04-26
Attending: INTERNAL MEDICINE | Admitting: INTERNAL MEDICINE
Payer: COMMERCIAL

## 2017-04-26 ENCOUNTER — AMBULATORY - HEALTHEAST (OUTPATIENT)
Dept: LAB | Facility: CLINIC | Age: 62
End: 2017-04-26

## 2017-04-26 DIAGNOSIS — Z79.899 ENCOUNTER FOR LONG-TERM (CURRENT) USE OF OTHER MEDICATIONS: ICD-10-CM

## 2017-04-26 DIAGNOSIS — Z94.4 LIVER TRANSPLANTED (H): ICD-10-CM

## 2017-04-26 LAB
HCT VFR BLD AUTO: 34.5 %
HEMOGLOBIN: 12.1 G/DL (ref 11.7–15.7)

## 2017-04-26 PROCEDURE — 80197 ASSAY OF TACROLIMUS: CPT | Performed by: INTERNAL MEDICINE

## 2017-04-26 NOTE — TELEPHONE ENCOUNTER
Anemia Management Note  SUBJECTIVE/OBJECTIVE:  Referred by Dr. Daya Gutierrez on 3/16/2017  Primary Diagnosis: Anemia in Chronic Kidney Disease (N18.3, D63.1)   Secondary Diagnosis: Chronic Kidney Disease, Stage 3 (N18.3)   Hgb goal range: 9-10  Epo/Darbo:  Aranesp 60 mcg every 14 days As needed - HOME  RX will  on 3/28/2018  Iron regimen: None  Lab orders will  on 3/28/17    Anemia Latest Ref Rng & Units 3/16/2017 3/22/2017 3/29/2017 3/31/2017 2017 2017 2017   MEDARDO Dose - - - - 60 mcg - - -   Hemoglobin 11.7 - 15.7 g/dL 8.8(L) 8.7(L) 9.3(A) - 9.0(L) 10.7(L) 12.1   TSAT 15 - 46 % - 32 - - 31 - -   Ferritin 8 - 252 ng/mL - 320(H) - - 278(H) - -     BP Readings from Last 3 Encounters:   17 120/70   17 127/89   17 109/53     Wt Readings from Last 2 Encounters:   17 151 lb 9.6 oz (68.8 kg)   17 154 lb 14.4 oz (70.3 kg)       Alexandra from the [place] lab will fax results to the Anemia clinic    Received and documented lab results drawn on     Phan said she has 2 syringes of aranesp at home.  She will be getting her next labs drawn on     ASSESSMENT:  Hgb: Above goal - recommend hold dose  TSat: at goal >30% Ferritin: At goal (>100ng/mL)    PLAN:  Hold Aranesp and RTC for hgb then aranesp if needed in 2 week(s)    Orders needed to be renewed (for next follow-up date) in LETTERS - for external labs: None    Iron labs due:  17    Plan discussed with:  Phan  Plan provided by:  Jaqui    NEXT FOLLOW-UP DATE:  17    Anemia Management Service  Molly Dumont PharmD and Zamzam Olmedo CPhT  Phone: [phone]  Fax: [phone]

## 2017-04-27 ENCOUNTER — COMMUNICATION - HEALTHEAST (OUTPATIENT)
Dept: INTERNAL MEDICINE | Facility: CLINIC | Age: 62
End: 2017-04-27

## 2017-04-27 DIAGNOSIS — R52 PAIN: ICD-10-CM

## 2017-04-27 DIAGNOSIS — Z94.4 LIVER TRANSPLANTED (H): ICD-10-CM

## 2017-04-27 RX ORDER — PREDNISONE 5 MG/1
TABLET ORAL
Qty: 60 TABLET | Refills: 0 | Status: SHIPPED | OUTPATIENT
Start: 2017-04-27 | End: 2017-04-30

## 2017-04-28 ENCOUNTER — TELEPHONE (OUTPATIENT)
Dept: TRANSPLANT | Facility: CLINIC | Age: 62
End: 2017-04-28

## 2017-04-28 DIAGNOSIS — I10 HYPERTENSION: Primary | ICD-10-CM

## 2017-04-28 RX ORDER — AMLODIPINE BESYLATE 5 MG/1
5 TABLET ORAL DAILY
Qty: 30 TABLET | Refills: 1 | Status: SHIPPED | OUTPATIENT
Start: 2017-04-28 | End: 2017-05-26

## 2017-04-28 NOTE — TELEPHONE ENCOUNTER
Pt called with increasing blood pressures at home.   This week:   152/87   144/93   125/81   135/88   And three tries today:   161/100   150/87   166/92     Per Dr Banegas patient to start Norvasc 5mg every day

## 2017-04-30 ENCOUNTER — MYC REFILL (OUTPATIENT)
Dept: GASTROENTEROLOGY | Facility: CLINIC | Age: 62
End: 2017-04-30

## 2017-04-30 DIAGNOSIS — Z94.4 LIVER TRANSPLANTED (H): ICD-10-CM

## 2017-05-01 ENCOUNTER — AMBULATORY - HEALTHEAST (OUTPATIENT)
Dept: INTERNAL MEDICINE | Facility: CLINIC | Age: 62
End: 2017-05-01

## 2017-05-01 RX ORDER — PREDNISONE 5 MG/1
10 TABLET ORAL DAILY
Qty: 60 TABLET | Refills: 1 | Status: SHIPPED | OUTPATIENT
Start: 2017-05-01 | End: 2017-06-19

## 2017-05-01 NOTE — TELEPHONE ENCOUNTER
Message from [facility]:  Annabel Broussard LPN Mon May 1, 2017 8:00 AM        ----- Message -----   From: Phan Luque   Sent: 4/30/2017 1:38 PM   To: Hepatology Nurses-  Subject: Medication Renewal Request     Original authorizing provider: MD Phan Self would like a refill of the following medications:  predniSONE (DELTASONE) 5 MG tablet [Hussein Banegas MD]    Preferred pharmacy: [facility] - [place], [place] - [facility] [address]    Comment:

## 2017-05-03 ENCOUNTER — COMMUNICATION - HEALTHEAST (OUTPATIENT)
Dept: INTERNAL MEDICINE | Facility: CLINIC | Age: 62
End: 2017-05-03

## 2017-05-03 ASSESSMENT — ENCOUNTER SYMPTOMS
MUSCLE CRAMPS: 1
NAUSEA: 1
ABDOMINAL PAIN: 1
RECTAL BLEEDING: 0
PALPITATIONS: 0
HYPOTENSION: 0
HYPERTENSION: 1
EYE WATERING: 1
LIGHT-HEADEDNESS: 1
HEADACHES: 1
NECK MASS: 0
STIFFNESS: 1
SLEEP DISTURBANCES DUE TO BREATHING: 0
DIARRHEA: 1
SWOLLEN GLANDS: 0
SMELL DISTURBANCE: 0
BACK PAIN: 1
SINUS CONGESTION: 1
TINGLING: 1
EXERCISE INTOLERANCE: 1
LEG PAIN: 0
CLAUDICATION: 0
DOUBLE VISION: 0
HOARSE VOICE: 1
TACHYCARDIA: 0
BLOATING: 1
BRUISES/BLEEDS EASILY: 0
MYALGIAS: 1
SEIZURES: 0
WEAKNESS: 1
ORTHOPNEA: 0
NECK PAIN: 1
PARALYSIS: 0
SORE THROAT: 1
ARTHRALGIAS: 1
DISTURBANCES IN COORDINATION: 0
DIZZINESS: 1
TROUBLE SWALLOWING: 0
SYNCOPE: 0
CONSTIPATION: 1
LOSS OF CONSCIOUSNESS: 0
NUMBNESS: 1
SPEECH CHANGE: 0
JOINT SWELLING: 0
EYE IRRITATION: 1
LEG SWELLING: 0
JAUNDICE: 0
TREMORS: 1
BOWEL INCONTINENCE: 1
BLOOD IN STOOL: 0
MEMORY LOSS: 0
EYE PAIN: 0
SINUS PAIN: 0
VOMITING: 0
RECTAL PAIN: 0
TASTE DISTURBANCE: 1
MUSCLE WEAKNESS: 1
HEARTBURN: 1
EYE REDNESS: 0

## 2017-05-09 ENCOUNTER — AMBULATORY - HEALTHEAST (OUTPATIENT)
Dept: LAB | Facility: CLINIC | Age: 62
End: 2017-05-09

## 2017-05-09 ENCOUNTER — TELEPHONE (OUTPATIENT)
Dept: PHARMACY | Facility: CLINIC | Age: 62
End: 2017-05-09

## 2017-05-09 ENCOUNTER — HOSPITAL ENCOUNTER (OUTPATIENT)
Facility: CLINIC | Age: 62
Setting detail: SPECIMEN
Discharge: HOME | End: 2017-05-09
Attending: INTERNAL MEDICINE | Admitting: INTERNAL MEDICINE
Payer: COMMERCIAL

## 2017-05-09 DIAGNOSIS — Z79.899 ENCOUNTER FOR LONG-TERM (CURRENT) USE OF OTHER MEDICATIONS: ICD-10-CM

## 2017-05-09 DIAGNOSIS — Z94.4 LIVER TRANSPLANTED (H): ICD-10-CM

## 2017-05-09 LAB — HEMOGLOBIN: 12.2 G/DL (ref 11.7–15.7)

## 2017-05-09 PROCEDURE — 80197 ASSAY OF TACROLIMUS: CPT | Performed by: INTERNAL MEDICINE

## 2017-05-09 NOTE — TELEPHONE ENCOUNTER
Anemia Management Note  SUBJECTIVE/OBJECTIVE:  Referred by Dr. Daya Gutierrez on 3/16/2017  Primary Diagnosis: Anemia in Chronic Kidney Disease (N18.3, D63.1)   Secondary Diagnosis: Chronic Kidney Disease, Stage 3 (N18.3)   Hgb goal range: 9-10  Epo/Darbo:  Aranesp 60 mcg every 14 days As needed - HOME  RX will  on 3/28/2018  Iron regimen: None  Lab orders will  on 3/28/17    Anemia Latest Ref Rng & Units 3/22/2017 3/29/2017 3/31/2017 2017 2017 2017 2017   MEDARDO Dose - - - 60 mcg - - - -   Hemoglobin 11.7 - 15.7 g/dL 8.7(L) 9.3(A) - 9.0(L) 10.7(L) 12.1 12.2   TSAT 15 - 46 % 32 - - 31 - - -   Ferritin 8 - 252 ng/mL 320(H) - - 278(H) - - -      BP Readings from Last 3 Encounters:   17 120/70   17 127/89   17 109/53     Wt Readings from Last 2 Encounters:   17 151 lb 9.6 oz (68.8 kg)   17 154 lb 14.4 oz (70.3 kg)     hgb 12.2 per Megan; lab faxed results    ASSESSMENT:  Hgb:Above goal - recommend hold dose  TSat: Due for labs Ferritin: Due for labs    PLAN:  Hold Aranesp and RTC for hgb then aranesp if needed in 2 week(s)    Orders needed to be renewed (for next follow-up date) in EPIC: None    Iron labs due:  now    Plan discussed with:  Megan  Plan provided by:  Molly    NEXT FOLLOW-UP DATE:      Anemia Management Service  Molly Dumont,EtienneD and Zamzam Olmedo CPhT  Phone: [phone]  Fax: [phone]

## 2017-05-17 ENCOUNTER — OFFICE VISIT (OUTPATIENT)
Dept: ENDOCRINOLOGY | Facility: CLINIC | Age: 62
End: 2017-05-17

## 2017-05-17 ENCOUNTER — RECORDS - HEALTHEAST (OUTPATIENT)
Dept: ADMINISTRATIVE | Facility: OTHER | Age: 62
End: 2017-05-17

## 2017-05-17 VITALS
WEIGHT: 159.9 LBS | DIASTOLIC BLOOD PRESSURE: 83 MMHG | HEART RATE: 90 BPM | HEIGHT: 67 IN | BODY MASS INDEX: 25.1 KG/M2 | SYSTOLIC BLOOD PRESSURE: 142 MMHG

## 2017-05-17 DIAGNOSIS — E03.9 ACQUIRED HYPOTHYROIDISM: Primary | ICD-10-CM

## 2017-05-17 PROBLEM — M85.80 OSTEOPENIA: Status: ACTIVE | Noted: 2017-05-17

## 2017-05-17 RX ORDER — ZOLEDRONIC ACID 5 MG/100ML
5 INJECTION, SOLUTION INTRAVENOUS ONCE
Status: CANCELLED
Start: 2017-05-17 | End: 2017-05-17

## 2017-05-17 ASSESSMENT — PAIN SCALES - GENERAL: PAINLEVEL: NO PAIN (0)

## 2017-05-17 NOTE — PATIENT INSTRUCTIONS
Daily requirement about 3 servings (roughly 1000 mg a day):     Good dietary calcium can be obtained from following foods:     1. Dairy: Cheese, Yogurt or Milk    2. Sardines    3. Dark leafy greens like spinach, kale, turnips, and darlene greens    4. Fortified cereals such as Total, Raisin Bran, Corn Flakes     5. Fortified orange juice    6. Soybeans and Fortified soymilk     7. Enriched breads, grains, and waffles    If your diet does not contain daily required amount, you may take Calcium supplement : Calcium carbonate 500 mg twice a day.   or  Calcium citrate (citracal maximum) 1 tablet a day.     Daily Vitamin D requirement is about 1000 IU a day.   You can take Vit D 1000 IU daily. This is over the counter.     More info  https://medlineplus.gov/ency/article/481605.htm

## 2017-05-17 NOTE — MR AVS SNAPSHOT
After Visit Summary   5/17/2017    Phan Luque    MRN: [id number]           Patient Information     Date Of Birth          1955        Visit Information        Provider Department      5/17/2017 2:30 PM Ruth Oakley MD [facility]        Today's Diagnoses     Acquired hypothyroidism    -  1      Care Instructions    Daily requirement about 3 servings (roughly 1000 mg a day):     Good dietary calcium can be obtained from following foods:     1. Dairy: Cheese, Yogurt or Milk    2. Sardines    3. Dark leafy greens like spinach, kale, turnips, and darlene greens    4. Fortified cereals such as Total, Raisin Bran, Corn Flakes     5. Fortified orange juice    6. Soybeans and Fortified soymilk     7. Enriched breads, grains, and waffles    If your diet does not contain daily required amount, you may take Calcium supplement : Calcium carbonate 500 mg twice a day.   or  Calcium citrate (citracal maximum) 1 tablet a day.     Daily Vitamin D requirement is about 1000 IU a day.   You can take Vit D 1000 IU daily. This is over the counter.     More info  https://medlineplus.gov/ency/article/086894.htm            Follow-ups after your visit        Follow-up notes from your care team     Return in about 6 months (around 11/17/2017).      Your next 10 appointments already scheduled     Jun 05, 2017 12:00 PM CDT   Lab with [facility] ([facility])    [address] [place] [address]  [address] [facility] [address] [phone]            Jun 05, 2017  1:00 PM CDT   (Arrive by 12:30 PM)   Return Visit with Gavin Erazo MD   [facility]phrology ([facility])    [address]  3rd Floor  [facility] [address] [phone]            Jun 08, 2017  8:00 AM CDT   RETURN NEURO with Ambrose Ortega MD   [facility] ([facility])    [address]  [address] [facility]  [address] [address]  [facility]  [address]   [phone]            Jun 09, 2017  7:30 AM CDT   Lab with [facility]   [facility] ([facility])    [address] [place] [address]  [address] [facility] [address]   [phone]            Jun 09, 2017  8:30 AM CDT   (Arrive by 8:15 AM)   Return Liver Transplant with Hussein Banegas MD   [facility] Hepatology ([facility])    [address] [place] [address]  3rd [facility] [address]   [phone]            Jun 16, 2017 11:30 AM CDT   (Arrive by 11:15 AM)   Return Visit with DONOVAN Castellanos [facility]enter[facility] and IBD ([facility])    [address] [place] [address]  [address] [facility] [address]   [phone]            Aug 14, 2017  4:00 PM CDT   (Arrive by 3:45 PM)   RETURN ENDOCRINE with Ruth Oakley MD   [facility] Endocrinology ([facility])    [address] [place] [address] [phone]            Nov 20, 2017  4:30 PM CST   (Arrive by 4:15 PM)   RETURN ENDOCRINE with Ruth Oakley MD   [facility] Endocrinology ([facility])    [address] [place] [address] [phone]              Future tests that were ordered for you today     Open Future Orders        Priority Expected Expires Ordered    TSH with free T4 reflex Routine  5/12/2018 5/17/2017    T4 free Routine  5/12/2018 5/17/2017            Who to contact     Please call your clinic at [phone] to:    Ask questions about your health    Make or cancel appointments    Discuss your medicines    Learn about your test results    Speak to your doctor   If you have compliments or concerns about an experience at your clinic, or if you wish to file a complaint, please contact [facility] Patient Relations at [phone] or email us at [email or web address]         Additional Information About Your  "Visit        [employer] Information     Collect gives you secure access to your electronic health record. If you see a primary care provider, you can also send messages to your care team and make appointments. If you have questions, please call your primary care clinic.  If you do not have a primary care provider, please call [phone] and they will assist you.      Collect is an electronic gateway that provides easy, online access to your medical records. With Collect, you can request a clinic appointment, read your test results, renew a prescription or communicate with your care team.     To access your existing account, please contact your [facility] or call [phone] for assistance.        Care EveryWhere ID     This is your Care EveryWhere ID. This could be used by other organizations to access your [facility] medical records  [id number]        Your Vitals Were     Pulse Height BMI (Body Mass Index)             90 1.702 m (5' 7\") 25.04 kg/m2          Blood Pressure from Last 3 Encounters:   05/17/17 142/83   04/05/17 120/70   03/31/17 127/89    Weight from Last 3 Encounters:   05/17/17 72.5 kg (159 lb 14.4 oz)   04/05/17 68.8 kg (151 lb 9.6 oz)   03/22/17 70.3 kg (154 lb 14.4 oz)                 Today's Medication Changes          These changes are accurate as of: 5/17/17  3:12 PM.  If you have any questions, ask your nurse or doctor.               These medicines have changed or have updated prescriptions.        Dose/Directions    predniSONE 5 MG tablet   Commonly known as:  DELTASONE   This may have changed:  Another medication with the same name was removed. Continue taking this medication, and follow the directions you see here.   Used for:  Liver transplanted (H)        Dose:  10 mg   Take 2 tablets (10 mg) by mouth daily   Quantity:  60 tablet   Refills:  1                Primary Care Provider Office Phone # Fax #    Santosh SAAVEDRA Damian [phone]       " [place] [id number] [place] [address]        Thank you!     Thank you for choosing [facility]  for your care. Our goal is always to provide you with excellent care. Hearing back from our patients is one way we can continue to improve our services. Please take a few minutes to complete the written survey that you may receive in the mail after your visit with us. Thank you!             Your Updated Medication List - Protect others around you: Learn how to safely use, store and throw away your medicines at www.disposemymeds.org.          This list is accurate as of: 5/17/17  3:12 PM.  Always use your most recent med list.                   Brand Name Dispense Instructions for use    acetaminophen 325 MG tablet    TYLENOL    100 tablet    Take 2 tablets (650 mg) by mouth every 6 hours as needed for mild pain Or fevers. Use sparingly. Limit use to no more than 2 grams (2000 mg) in 24 hours. **Further refills must be obtained by primary care provider**       amLODIPine 5 MG tablet    NORVASC    30 tablet    Take 1 tablet (5 mg) by mouth daily       azaTHIOprine 100 MG Tabs     30 tablet    Take 100 mg by mouth every evening       busPIRone 10 MG tablet    BUSPAR    60 tablet    Take 1 tablet (10 mg) by mouth 2 times daily       calcium Citrate-vitamin D 500-400 MG-UNIT Chew      Take 1 tablet by mouth daily       cyanocobalamin 1000 MCG Tabs     30 tablet    Take 1,000 mcg by mouth daily       darbepoetin vandana 60 MCG/0.3ML injection    ARANESP (ALBUMIN FREE)    0.6 mL    Inject 0.3 mLs (60 mcg) Subcutaneous every 14 days As needed for hgb <10g/dL       FISH OIL PO      Take 645 mg by mouth 2 times daily       folic acid 1 MG tablet    FOLVITE    30 tablet    Take 1 tablet (1 mg) by mouth daily       hydrOXYzine 25 MG tablet    ATARAX    60 tablet    Take 1-2 tablets (25-50 mg) by mouth every 6 hours as needed for itching       latanoprost 0.005 % ophthalmic solution    XALATAN    1 Bottle     Place 1 drop into both eyes At Bedtime       levothyroxine 88 MCG tablet    SYNTHROID/LEVOTHROID    30 tablet    Take 1 tablet (88 mcg) by mouth every morning (before breakfast)       loperamide 2 MG capsule    IMODIUM     Take 2 mg by mouth 4 times daily as needed for diarrhea       magnesium oxide 400 (241.3 MG) MG tablet    MAG-OX    60 tablet    Take 1 tablet (400 mg) by mouth daily       multivitamin, therapeutic Tabs tablet     30 tablet    Take 1 tablet by mouth every 24 hours       pantoprazole 40 MG EC tablet    PROTONIX    30 tablet    Take 1 tablet (40 mg) by mouth every morning (before breakfast)       predniSONE 5 MG tablet    DELTASONE    60 tablet    Take 2 tablets (10 mg) by mouth daily       psyllium 0.52 G capsule     60 capsule    Take 2 capsules (1.04 g) by mouth daily With a full glass of water.       sertraline 100 MG tablet    ZOLOFT    45 tablet    Take 1.5 tablets (150 mg) by mouth daily       simethicone 80 MG chewable tablet    MYLICON    180 tablet    Take 1 tablet (80 mg) by mouth every 6 hours as needed for flatulence or cramping       tacrolimus 0.5 MG capsule    PROGRAF - GENERIC EQUIVALENT    240 capsule    Take 4 capsules (2 mg) by mouth 2 times daily       traMADol 50 MG tablet    ULTRAM    50 tablet    Take 1-2 tablets ( mg) by mouth every 6 hours as needed for pain       traZODone 100 MG tablet    DESYREL    30 tablet    Take 1 tablet (100 mg) by mouth At Bedtime **Further refills must be obtained by primary care provider**       ursodiol 300 MG capsule    ACTIGALL    60 capsule    Take 1 capsule (300 mg) by mouth 2 times daily

## 2017-05-17 NOTE — LETTER
5/17/2017       RE: Phan Luque  [address] [facility] [address]     Dear Colleague,    Thank you for referring your patient, Phan Luque, to the [facility] ENDOCRINOLOGY at [facility]. Please see a copy of my visit note below.    Endocrine Clinic Consult:     Reason for consult: Hypercalcemia initially seen on November 14, 2016        Assessment   Phan Luque is a 61 year old years old female who is here for evaluation of Intermittent hypercalcemia during rehab stay. At that time, she had history of acute on chronic renal failure which has been improving, was on cycled feeds at night [Nepro] and received calcium supplements.  She has no prior history of calcium disorders.  Workup at the time showed slight elevation of Ca at 10.4 and PTH-related protein 4.1, creatinine 1.58, suppressed PTH level.     Intermittent hypercalcemia in the setting of acute on chronic renal failure now resolved  Elevated PTH-related protein likely due to renal failure  Most likely due to high calcium intake.  Labs over the months show normal calcium levels.     History of osteopenia   Was treated with HRT in 40s for 5 years  DXA 4/5/2017: Negative T score of -2.2 at left femoral neck  Normal Ca and vitamin D was recommended.  She is currently on calcium and vitamin D supplements    History of chronic prednisone therapy  We will start her on Zoledronic acid infusion as she is likely to be on Prednisone 10 mg daily for a while. Discussed with Dr Banegas.     Hypothyroidism - Acquired  Obtain thyroid function test  Continue LT4 88 mcg daily for now.     Follow up plan  Follow TFT (Question of euthyroid sick vs true hypothryoidism, however, normal tsh values after replacement may indicate true hypothyroidism)     Ruth Oakley MD  [id number]  Endocrinology Service        ====================================================================        HPI:     Phan Luque is a 61 year  old year old female who presents for evaluation of hypercalcemia.  She has a complicated past medical history with liver transplant on immunosuppressant and malnutrition for which she has been admitted to the acute rehabilitation for tube feedings overnight.  Her Calcium levels were mildly elevated intermittently. An evaluation performed at the time showed mild elevation of Ca 10.7.     ENDO CALCIUM LABS-[facility] Latest Ref Rng 10/6/2016   CALCIUM 8.5 - 10.1 mg/dL 10.4 (H)   CALCIUM IONIZED 4.4 - 5.2 mg/dL    CALCIUM IONIZED WHOLE BLOOD 4.4 - 5.2 mg/dL    PHOSPHOROUS 2.5 - 4.5 mg/dL 5.5 (H)   MAGNESIUM 1.6 - 2.3 mg/dL 2.2   ALBUMIN 3.4 - 5.0 g/dL 3.4   BUN 7 - 30 mg/dL 48 (H)   CREATININE 0.52 - 1.04 mg/dL 2.28 (H)   PARATHYROID HORMONE INTACT 12 - 72 pg/mL    ALKPHOS 40 - 150 U/L 68     ENDO CALCIUM LABS-[facility] Latest Ref Rng 10/10/2016   CALCIUM 8.5 - 10.1 mg/dL 9.8   CALCIUM IONIZED 4.4 - 5.2 mg/dL    CALCIUM IONIZED WHOLE BLOOD 4.4 - 5.2 mg/dL    PHOSPHOROUS 2.5 - 4.5 mg/dL 3.8   MAGNESIUM 1.6 - 2.3 mg/dL    ALBUMIN 3.4 - 5.0 g/dL 3.1 (L)   BUN 7 - 30 mg/dL 22   CREATININE 0.52 - 1.04 mg/dL 1.30 (H)   PARATHYROID HORMONE INTACT 12 - 72 pg/mL 3 (L)     During this time she was seen and found to have low PTH, acute on Chronic CKD, mild hypercalcemia, mild elevation of PTHrp. The most likely reason for Ca elevation would be increased intake in the setting of acute on chronic ckd.     Her levels normalized after discontinuing calcium supplements.     Denies having felt a goiter, neck pain, difficulty in swallowing, difficulty in breathing and changes in voice.     Risk Factors:  None  Calcium and Vit D supplements : Taking Ca supplements with D  Other supplements: Off tube feeding.     Hypothyroidism  This was diagnosed during a hospitalization in 11 2016  At that time, TSH was 8.44 with a free T4 of 0.61  Levothyroxine was started and sees on 88  g daily  With this he has no symptoms of hypothyroidism  She is sleeping  well, normal mood, no tremors  Recent her function test as of  17 are normal      ROS  Feels tired, poor sleep  No lethargy, confusion, stupor or coma associated with high calcium  She complains of Anorexia and chronic diarrhea, feels nauseous chronically.   no history of Peptic ulcers or pancreatitis.  No renal symptoms- polyuria, polydipsia  No history of renal stones  H/O Renal failure    No palpitations    No muscle weakness   Has generalized aches and pain.     Other ROS:   Vision loss on the left due to Optic neuropathy  No headache, change in vision, loss of peripheral vision  No neck pain, no other lumps in the neck  No change in breathing   No change in swallowing.   No swelling in extremities  No change in mental status.   Other ROS that were obtained were negative.     Past Medical History:   Diagnosis Date     Asthma      End stage liver disease (H)     2/2 Alcohol Abuse     Liver transplanted (H)      Migraines      Osteoporosis      Spinal stenosis in cervical region      Strabismus      Past Surgical History:   Procedure Laterality Date     APPENDECTOMY           BENCH LIVER N/A 2016    Procedure: BENCH LIVER;  Surgeon: Larry Dhillon MD;  Location: [facility] OR     COLONOSCOPY       ESOPHAGOSCOPY, GASTROSCOPY, DUODENOSCOPY (EGD), COMBINED N/A 2016    Procedure: COMBINED ESOPHAGOSCOPY, GASTROSCOPY, DUODENOSCOPY (EGD);  Surgeon: Tao Alfonso MD;  Location: [facility] GI     ESOPHAGOSCOPY, GASTROSCOPY, DUODENOSCOPY (EGD), COMBINED N/A 10/31/2016    Procedure: COMBINED ESOPHAGOSCOPY, GASTROSCOPY, DUODENOSCOPY (EGD), BIOPSY SINGLE OR MULTIPLE;  Surgeon: Ronald Bojorquez MD;  Location: [facility] GI     sphincteroplasty       TRANSPLANT LIVER RECIPIENT  DONOR N/A 2016    Procedure: TRANSPLANT LIVER RECIPIENT  DONOR;  Surgeon: Larry Dhillon MD;  Location: [facility] OR     TUBAL LIGATION      laparoscopic     Family History   Problem Relation Age of Onset     Family  History Negative Other      Social History     Social History     Marital status:      Spouse name: N/A     Number of children: N/A     Years of education: N/A     Occupational History     Not on file.     Social History Main Topics     Smoking status: Former Smoker     Smokeless tobacco: Never Used     Alcohol use No      Comment: heavy use (750ml/2 days) up until 1 year ago; had cut down to 1 drink/day; none since 7/18/16     Drug use: No     Sexual activity: Not on file     Other Topics Concern     Not on file     Social History Narrative        Allergies   Allergen Reactions     Benadryl [Diphenhydramine] Hives     Cefaclor Hives     Hydrocodone-Acetaminophen Itching     Oxycodone Itching     Penicillins Hives     Sulfa Drugs Hives     Current Outpatient Prescriptions   Medication     Omega-3 Fatty Acids (FISH OIL PO)     predniSONE (DELTASONE) 5 MG tablet     amLODIPine (NORVASC) 5 MG tablet     latanoprost (XALATAN) 0.005 % ophthalmic solution     azaTHIOprine 100 MG TABS     darbepoetin vandana (ARANESP, ALBUMIN FREE,) 60 MCG/0.3ML injection     magnesium oxide (MAG-OX) 400 (241.3 MG) MG tablet     traMADol (ULTRAM) 50 MG tablet     calcium Citrate-vitamin D 500-400 MG-UNIT CHEW     hydrOXYzine (ATARAX) 25 MG tablet     traZODone (DESYREL) 100 MG tablet     acetaminophen (TYLENOL) 325 MG tablet     loperamide (IMODIUM) 2 MG capsule     ursodiol (ACTIGALL) 300 MG capsule     tacrolimus (PROGRAF - GENERIC EQUIVALENT) 0.5 MG capsule     simethicone (MYLICON) 80 MG chewable tablet     sertraline (ZOLOFT) 100 MG tablet     psyllium 0.52 G capsule     pantoprazole (PROTONIX) 40 MG EC tablet     multivitamin, therapeutic (THERA-VIT) TABS tablet     levothyroxine (SYNTHROID/LEVOTHROID) 88 MCG tablet     folic acid (FOLVITE) 1 MG tablet     cyanocobalamin 1000 MCG TABS     busPIRone (BUSPAR) 10 MG tablet     No current facility-administered medications for this visit.            Exam:  /83 (BP Location:  "Right arm, Patient Position: Chair, Cuff Size: Adult Regular)  Pulse 90  Ht 1.702 m (5' 7\")  Wt 72.5 kg (159 lb 14.4 oz)  BMI 25.04 kg/m2   Constitutional: pleasant female, no distress.    Head: Normocephalic. No masses, lesions, tenderness or abnormalities  Eye loss of peripheral vision on the left + Chronic, right side is normal.   Neck: Neck supple. No adenopathy. Thyroid symmetric, normal size, Carotids without bruits.  ENT: No throat congestion, no neck nodes or sinus tenderness  Cardiovascular: regular rhythm, no tachycardia  Respiratory: Lungs clear  Gastrointestinal: Abdomen soft, non-tender. BS normal. No striae  Musculoskeletal: gait normal and normal muscle tone  Neurologic: Normal speech, reflexes normal. Tremor is present (Chronic).   Psychiatric: mentation appears normal     Last Basic Metabolic Panel:  Results for PARTH FARMER (MRN [id number]) as of 5/17/2017 15:05   Ref. Range 5/9/2017 07:32   Sodium (External) Latest Ref Range: 136 - 145 mmol/L 138   Potassium (External) Latest Ref Range: 3.5 - 5.0 mmol/L 4.4   Chloride (External) Latest Ref Range: 98 - 107 mmol/L 103   CO2 (External) Latest Ref Range: 22 - 31 mmol/L 25   Urea Nitrogen (External) Latest Ref Range: 8 - 22 mg/dL 11   Creatinine (External) Latest Ref Range: 0.60 - 1.10 mg/dL 1.14 (H)   GFR Estimated (External) Latest Ref Range: >60 mL/min/1.73m2 49 (L)   Calcium (External) Latest Ref Range: 8.5 - 10.5 mg/dL 9.7   Anion Gap (External) Latest Ref Range: 5 - 18 mmol/L 10   Magnesium (External) Latest Ref Range: 1.8 - 2.6 mg/dL 1.9   Phosphorus (External) Latest Ref Range: 2.5 - 4.5 mg/dL 4.1   Albumin (External) Latest Ref Range: 3.5 - 5.0 g/dL 3.9   Protein Total (External) Latest Ref Range: 6.0 - 8.0 g/dL 6.9   Alk Phosphatase (External) Latest Ref Range: 45 - 120 U/L 77   ALT (External) Latest Ref Range: 0 - 45 U/L 13   AST (External) Latest Ref Range: 0 - 40 U/L 15   Bilirubin Direct (External) Latest Ref Range: <=0.5 mg/dL " 0.1   Bilirubin Total (External) Latest Ref Range: 0.0 - 1.0 mg/dL 0.4   Glucose (External) Latest Ref Range: 70 - 125 mg/dL 85   WBC Count (External) Latest Ref Range: 4.0 - 11.0 thou/uL 6.0   Hemoglobin (External) Latest Ref Range: 12.0 - 16.0 g/dl 12.2   Hematocrit (External) Latest Ref Range: 35.0 - 47.0 % 34.7 (L)   Platelet Count (External) Latest Ref Range: 140 - 440 thou/uL 288     TSH   Date Value Ref Range Status   04/05/2017 1.26 0.40 - 4.00 mU/L Final   04/05/2017 1.26 0.40 - 4.00 mU/L Final         Again, thank you for allowing me to participate in the care of your patient.      Sincerely,    Ruth Oakley MD

## 2017-05-17 NOTE — NURSING NOTE
"Chief Complaint   Patient presents with     RECHECK     HYPOTHYROIDISM F/U        Initial /83 (BP Location: Right arm, Patient Position: Chair, Cuff Size: Adult Regular)  Pulse 90  Ht 1.702 m (5' 7\")  Wt 72.5 kg (159 lb 14.4 oz)  BMI 25.04 kg/m2 Estimated body mass index is 25.04 kg/(m^2) as calculated from the following:    Height as of this encounter: 1.702 m (5' 7\").    Weight as of this encounter: 72.5 kg (159 lb 14.4 oz).  Medication Reconciliation: complete       Kim Holden CMA     "

## 2017-05-17 NOTE — PROGRESS NOTES
Add:   Had a recent fracture of fibula - after twisting left ankle. Plan to delay ZA infusion by 4-6 weeks.       Endocrine Clinic Consult:     Reason for consult: Hypercalcemia initially seen on November 14, 2016        Assessment   Phan Luque is a 61 year old years old female who is here for evaluation of Intermittent hypercalcemia during rehab stay. At that time, she had history of acute on chronic renal failure which has been improving, was on cycled feeds at night [Nepro] and received calcium supplements.  She has no prior history of calcium disorders.  Workup at the time showed slight elevation of Ca at 10.4 and PTH-related protein 4.1, creatinine 1.58, suppressed PTH level.     Intermittent hypercalcemia in the setting of acute on chronic renal failure now resolved  Elevated PTH-related protein likely due to renal failure  Most likely due to high calcium intake.  Labs over the months show normal calcium levels.     History of osteopenia   Was treated with HRT in 40s for 5 years  DXA 4/5/2017: Negative T score of -2.2 at left femoral neck  Normal Ca and vitamin D was recommended.  She is currently on calcium and vitamin D supplements    History of chronic prednisone therapy  We will start her on Zoledronic acid infusion as she is likely to be on Prednisone 10 mg daily for a while. Discussed with Dr Banegas.     Hypothyroidism - Acquired  Obtain thyroid function test  Continue LT4 88 mcg daily for now.     Follow up plan  Follow TFT (Question of euthyroid sick vs true hypothryoidism, however, normal tsh values after replacement may indicate true hypothyroidism)     Ruth Oakley MD  [id number]  Endocrinology Service        ====================================================================        HPI:     Phan Luque is a 61 year old year old female who presents for evaluation of hypercalcemia.  She has a complicated past medical history with liver transplant on immunosuppressant and malnutrition for  which she has been admitted to the acute rehabilitation for tube feedings overnight.  Her Calcium levels were mildly elevated intermittently. An evaluation performed at the time showed mild elevation of Ca 10.7.     ENDO CALCIUM LABS[facility] Latest Ref Rng 10/6/2016   CALCIUM 8.5 - 10.1 mg/dL 10.4 (H)   CALCIUM IONIZED 4.4 - 5.2 mg/dL    CALCIUM IONIZED WHOLE BLOOD 4.4 - 5.2 mg/dL    PHOSPHOROUS 2.5 - 4.5 mg/dL 5.5 (H)   MAGNESIUM 1.6 - 2.3 mg/dL 2.2   ALBUMIN 3.4 - 5.0 g/dL 3.4   BUN 7 - 30 mg/dL 48 (H)   CREATININE 0.52 - 1.04 mg/dL 2.28 (H)   PARATHYROID HORMONE INTACT 12 - 72 pg/mL    ALKPHOS 40 - 150 U/L 68     ENDO CALCIUM [facility] Latest Ref Rng 10/10/2016   CALCIUM 8.5 - 10.1 mg/dL 9.8   CALCIUM IONIZED 4.4 - 5.2 mg/dL    CALCIUM IONIZED WHOLE BLOOD 4.4 - 5.2 mg/dL    PHOSPHOROUS 2.5 - 4.5 mg/dL 3.8   MAGNESIUM 1.6 - 2.3 mg/dL    ALBUMIN 3.4 - 5.0 g/dL 3.1 (L)   BUN 7 - 30 mg/dL 22   CREATININE 0.52 - 1.04 mg/dL 1.30 (H)   PARATHYROID HORMONE INTACT 12 - 72 pg/mL 3 (L)     During this time she was seen and found to have low PTH, acute on Chronic CKD, mild hypercalcemia, mild elevation of PTHrp. The most likely reason for Ca elevation would be increased intake in the setting of acute on chronic ckd.     Her levels normalized after discontinuing calcium supplements.     Denies having felt a goiter, neck pain, difficulty in swallowing, difficulty in breathing and changes in voice.     Risk Factors:  None  Calcium and Vit D supplements : Taking Ca supplements with D  Other supplements: Off tube feeding.     Hypothyroidism  This was diagnosed during a hospitalization in 11 2016  At that time, TSH was 8.44 with a free T4 of 0.61  Levothyroxine was started and sees on 88  g daily  With this he has no symptoms of hypothyroidism  She is sleeping well, normal mood, no tremors  Recent her function test as of April 5, 20 17 are normal      ROS  Feels tired, poor sleep  No lethargy, confusion, stupor or coma associated with  high calcium  She complains of Anorexia and chronic diarrhea, feels nauseous chronically.   no history of Peptic ulcers or pancreatitis.  No renal symptoms- polyuria, polydipsia  No history of renal stones  H/O Renal failure    No palpitations    No muscle weakness   Has generalized aches and pain.     Other ROS:   Vision loss on the left due to Optic neuropathy  No headache, change in vision, loss of peripheral vision  No neck pain, no other lumps in the neck  No change in breathing   No change in swallowing.   No swelling in extremities  No change in mental status.   Other ROS that were obtained were negative.     Past Medical History:   Diagnosis Date     Asthma      End stage liver disease (H)     2/2 Alcohol Abuse     Liver transplanted (H)      Migraines      Osteoporosis      Spinal stenosis in cervical region      Strabismus      Past Surgical History:   Procedure Laterality Date     APPENDECTOMY           BENCH LIVER N/A 2016    Procedure: BENCH LIVER;  Surgeon: Larry Dhillon MD;  Location: [facility] OR     COLONOSCOPY       ESOPHAGOSCOPY, GASTROSCOPY, DUODENOSCOPY (EGD), COMBINED N/A 2016    Procedure: COMBINED ESOPHAGOSCOPY, GASTROSCOPY, DUODENOSCOPY (EGD);  Surgeon: Tao Alfonso MD;  Location: [facility] GI     ESOPHAGOSCOPY, GASTROSCOPY, DUODENOSCOPY (EGD), COMBINED N/A 10/31/2016    Procedure: COMBINED ESOPHAGOSCOPY, GASTROSCOPY, DUODENOSCOPY (EGD), BIOPSY SINGLE OR MULTIPLE;  Surgeon: Ronald Bojroquez MD;  Location: [facility] GI     sphincteroplasty       TRANSPLANT LIVER RECIPIENT  DONOR N/A 2016    Procedure: TRANSPLANT LIVER RECIPIENT  DONOR;  Surgeon: Larry Dhillon MD;  Location: [facility] OR     TUBAL LIGATION      laparoscopic     Family History   Problem Relation Age of Onset     Family History Negative Other      Social History     Social History     Marital status:      Spouse name: N/A     Number of children: N/A     Years of education: N/A  "    Occupational History     Not on file.     Social History Main Topics     Smoking status: Former Smoker     Smokeless tobacco: Never Used     Alcohol use No      Comment: heavy use (750ml/2 days) up until 1 year ago; had cut down to 1 drink/day; none since 7/18/16     Drug use: No     Sexual activity: Not on file     Other Topics Concern     Not on file     Social History Narrative        Allergies   Allergen Reactions     Benadryl [Diphenhydramine] Hives     Cefaclor Hives     Hydrocodone-Acetaminophen Itching     Oxycodone Itching     Penicillins Hives     Sulfa Drugs Hives     Current Outpatient Prescriptions   Medication     Omega-3 Fatty Acids (FISH OIL PO)     predniSONE (DELTASONE) 5 MG tablet     amLODIPine (NORVASC) 5 MG tablet     latanoprost (XALATAN) 0.005 % ophthalmic solution     azaTHIOprine 100 MG TABS     darbepoetin vandana (ARANESP, ALBUMIN FREE,) 60 MCG/0.3ML injection     magnesium oxide (MAG-OX) 400 (241.3 MG) MG tablet     traMADol (ULTRAM) 50 MG tablet     calcium Citrate-vitamin D 500-400 MG-UNIT CHEW     hydrOXYzine (ATARAX) 25 MG tablet     traZODone (DESYREL) 100 MG tablet     acetaminophen (TYLENOL) 325 MG tablet     loperamide (IMODIUM) 2 MG capsule     ursodiol (ACTIGALL) 300 MG capsule     tacrolimus (PROGRAF - GENERIC EQUIVALENT) 0.5 MG capsule     simethicone (MYLICON) 80 MG chewable tablet     sertraline (ZOLOFT) 100 MG tablet     psyllium 0.52 G capsule     pantoprazole (PROTONIX) 40 MG EC tablet     multivitamin, therapeutic (THERA-VIT) TABS tablet     levothyroxine (SYNTHROID/LEVOTHROID) 88 MCG tablet     folic acid (FOLVITE) 1 MG tablet     cyanocobalamin 1000 MCG TABS     busPIRone (BUSPAR) 10 MG tablet     No current facility-administered medications for this visit.            Exam:  /83 (BP Location: Right arm, Patient Position: Chair, Cuff Size: Adult Regular)  Pulse 90  Ht 1.702 m (5' 7\")  Wt 72.5 kg (159 lb 14.4 oz)  BMI 25.04 kg/m2   Constitutional: pleasant " female, no distress.    Head: Normocephalic. No masses, lesions, tenderness or abnormalities  Eye loss of peripheral vision on the left + Chronic, right side is normal.   Neck: Neck supple. No adenopathy. Thyroid symmetric, normal size, Carotids without bruits.  ENT: No throat congestion, no neck nodes or sinus tenderness  Cardiovascular: regular rhythm, no tachycardia  Respiratory: Lungs clear  Gastrointestinal: Abdomen soft, non-tender. BS normal. No striae  Musculoskeletal: gait normal and normal muscle tone  Neurologic: Normal speech, reflexes normal. Tremor is present (Chronic).   Psychiatric: mentation appears normal     Last Basic Metabolic Panel:  Results for PARTH FARMER (MRN [id number]) as of 5/17/2017 15:05   Ref. Range 5/9/2017 07:32   Sodium (External) Latest Ref Range: 136 - 145 mmol/L 138   Potassium (External) Latest Ref Range: 3.5 - 5.0 mmol/L 4.4   Chloride (External) Latest Ref Range: 98 - 107 mmol/L 103   CO2 (External) Latest Ref Range: 22 - 31 mmol/L 25   Urea Nitrogen (External) Latest Ref Range: 8 - 22 mg/dL 11   Creatinine (External) Latest Ref Range: 0.60 - 1.10 mg/dL 1.14 (H)   GFR Estimated (External) Latest Ref Range: >60 mL/min/1.73m2 49 (L)   Calcium (External) Latest Ref Range: 8.5 - 10.5 mg/dL 9.7   Anion Gap (External) Latest Ref Range: 5 - 18 mmol/L 10   Magnesium (External) Latest Ref Range: 1.8 - 2.6 mg/dL 1.9   Phosphorus (External) Latest Ref Range: 2.5 - 4.5 mg/dL 4.1   Albumin (External) Latest Ref Range: 3.5 - 5.0 g/dL 3.9   Protein Total (External) Latest Ref Range: 6.0 - 8.0 g/dL 6.9   Alk Phosphatase (External) Latest Ref Range: 45 - 120 U/L 77   ALT (External) Latest Ref Range: 0 - 45 U/L 13   AST (External) Latest Ref Range: 0 - 40 U/L 15   Bilirubin Direct (External) Latest Ref Range: <=0.5 mg/dL 0.1   Bilirubin Total (External) Latest Ref Range: 0.0 - 1.0 mg/dL 0.4   Glucose (External) Latest Ref Range: 70 - 125 mg/dL 85   WBC Count (External) Latest Ref Range:  4.0 - 11.0 thou/uL 6.0   Hemoglobin (External) Latest Ref Range: 12.0 - 16.0 g/dl 12.2   Hematocrit (External) Latest Ref Range: 35.0 - 47.0 % 34.7 (L)   Platelet Count (External) Latest Ref Range: 140 - 440 thou/uL 288     TSH   Date Value Ref Range Status   04/05/2017 1.26 0.40 - 4.00 mU/L Final   04/05/2017 1.26 0.40 - 4.00 mU/L Final   ]

## 2017-05-18 ENCOUNTER — OFFICE VISIT (OUTPATIENT)
Dept: URGENT CARE | Facility: URGENT CARE | Age: 62
End: 2017-05-18
Payer: COMMERCIAL

## 2017-05-18 VITALS
DIASTOLIC BLOOD PRESSURE: 70 MMHG | TEMPERATURE: 98.6 F | SYSTOLIC BLOOD PRESSURE: 140 MMHG | HEART RATE: 96 BPM | BODY MASS INDEX: 24.17 KG/M2 | OXYGEN SATURATION: 98 % | HEIGHT: 67 IN | WEIGHT: 154 LBS

## 2017-05-18 DIAGNOSIS — S99.912A ANKLE INJURY, LEFT, INITIAL ENCOUNTER: ICD-10-CM

## 2017-05-18 DIAGNOSIS — S09.90XA HEAD INJURY, INITIAL ENCOUNTER: Primary | ICD-10-CM

## 2017-05-18 PROCEDURE — 99213 OFFICE O/P EST LOW 20 MIN: CPT | Performed by: NURSE PRACTITIONER

## 2017-05-18 NOTE — MR AVS SNAPSHOT
After Visit Summary   5/18/2017    Phan Luque    MRN: [id number]           Patient Information     Date Of Birth          1955        Visit Information        Provider Department      5/18/2017 7:00 PM Tatiana Boyle NP [facility]        Today's Diagnoses     Head injury, initial encounter    -  1    Ankle injury, left, initial encounter           Follow-ups after your visit        Your next 10 appointments already scheduled     Jun 05, 2017 12:00 PM CDT   Lab with  LAB   [facility] Lab ([facility])    [address] [place] [address]  [address] [facility] [address]   [phone]            Jun 05, 2017  1:00 PM CDT   (Arrive by 12:30 PM)   Return Visit with Gavin Erazo MD   [facility] Nephrology ([facility])    [address] [place] [address]  [address] [facility] [address]   [phone]            Jun 08, 2017  8:00 AM CDT   RETURN NEURO with Ambrose Ortega MD   [facility] Clinic ([facility])    [address]  [address] [facility]  [facility]  [facility] [address]   [phone]            Jun 09, 2017  7:30 AM CDT   Lab with  LAB   [facility] Lab ([facility])    [address] [place] [address]   [phone]            Jun 09, 2017  8:30 AM CDT   (Arrive by 8:15 AM)   Return Liver Transplant with Hussein Banegas MD   [facility] Hepatology ([facility])    [address] [place] [address]  [address] [facility] [address]   [phone]            Jun 16, 2017 11:30 AM CDT   (Arrive by 11:15 AM)   Return Visit with DONOVAN Castellanos CNP   [facility] Gastroenterology and IBD ([facility])    [address] [place] [address]  [address] [facility] [address]   [phone]            Aug 14, 2017  4:00 PM CDT   (Arrive by 3:45 PM)   RETURN ENDOCRINE with Ruth Oakley MD   [facility] Endocrinology ([facility])    [address] [address]   [phone]            Nov 20, 2017  4:30 PM CST   (Arrive by 4:15 PM)   RETURN ENDOCRINE with Ruth Oakley MD   [facility] Endocrinology ([facility])    [address] [place] [address]   [phone]              Future tests that were ordered for you today     Open Future Orders        Priority Expected Expires Ordered    TSH with free T4 reflex Routine  5/12/2018 5/17/2017    T4 free Routine  5/12/2018 5/17/2017            Who to contact     If you have questions or need follow up information about today's clinic visit or your schedule please contact [facility] directly at [phone].  Normal or non-critical lab and imaging results will be communicated to you by [employer], letter or phone within 4 business days after the clinic has received the results. If you do not hear from us within 7 days, please contact the clinic through [employer] or phone. If you have a critical or abnormal lab result, we will notify you by phone as soon as possible.  Submit refill requests through [employer] or call your pharmacy and they will forward the refill request to us. Please allow 3 business days for your refill to be completed.          Additional Information About Your Visit        [employer] Information     [employer] gives you secure access to your electronic health record. If you see a primary care provider, you can also send messages to your care team and make appointments. If you have questions, please call your primary care clinic.  If you do not have a primary care provider, please call [phone] and they will assist you.        Care EveryWhere ID     This is your Care EveryWhere ID. This could be used by other organizations to access your [place] medical records  [id number]        Your Vitals Were     Pulse Temperature Height Pulse Oximetry BMI (Body Mass  "Index)       96 98.6  F (37  C) (Oral) 5' 7\" (1.702 m) 98% 24.12 kg/m2        Blood Pressure from Last 3 Encounters:   05/18/17 140/70   05/17/17 142/83   04/05/17 120/70    Weight from Last 3 Encounters:   05/18/17 154 lb (69.9 kg)   05/17/17 159 lb 14.4 oz (72.5 kg)   04/05/17 151 lb 9.6 oz (68.8 kg)              Today, you had the following     No orders found for display       Primary Care Provider Office Phone # Fax #    Santosh Salgado [phone]       [address] [place] [address]        Thank you!     Thank you for choosing [facility]  for your care. Our goal is always to provide you with excellent care. Hearing back from our patients is one way we can continue to improve our services. Please take a few minutes to complete the written survey that you may receive in the mail after your visit with us. Thank you!             Your Updated Medication List - Protect others around you: Learn how to safely use, store and throw away your medicines at www.disposemymeds.org.          This list is accurate as of: 5/18/17  7:58 PM.  Always use your most recent med list.                   Brand Name Dispense Instructions for use    acetaminophen 325 MG tablet    TYLENOL    100 tablet    Take 2 tablets (650 mg) by mouth every 6 hours as needed for mild pain Or fevers. Use sparingly. Limit use to no more than 2 grams (2000 mg) in 24 hours. **Further refills must be obtained by primary care provider**       amLODIPine 5 MG tablet    NORVASC    30 tablet    Take 1 tablet (5 mg) by mouth daily       azaTHIOprine 100 MG Tabs     30 tablet    Take 100 mg by mouth every evening       busPIRone 10 MG tablet    BUSPAR    60 tablet    Take 1 tablet (10 mg) by mouth 2 times daily       calcium Citrate-vitamin D 500-400 MG-UNIT Chew      Take 1 tablet by mouth daily       cyanocobalamin 1000 MCG Tabs     30 tablet    Take 1,000 mcg by mouth daily       darbepoetin vandana 60 " MCG/0.3ML injection    ARANESP (ALBUMIN FREE)    0.6 mL    Inject 0.3 mLs (60 mcg) Subcutaneous every 14 days As needed for hgb <10g/dL       FISH OIL PO      Take 645 mg by mouth 2 times daily       folic acid 1 MG tablet    FOLVITE    30 tablet    Take 1 tablet (1 mg) by mouth daily       hydrOXYzine 25 MG tablet    ATARAX    60 tablet    Take 1-2 tablets (25-50 mg) by mouth every 6 hours as needed for itching       latanoprost 0.005 % ophthalmic solution    XALATAN    1 Bottle    Place 1 drop into both eyes At Bedtime       levothyroxine 88 MCG tablet    SYNTHROID/LEVOTHROID    30 tablet    Take 1 tablet (88 mcg) by mouth every morning (before breakfast)       loperamide 2 MG capsule    IMODIUM     Take 2 mg by mouth 4 times daily as needed for diarrhea Reported on 5/18/2017       magnesium oxide 400 (241.3 MG) MG tablet    MAG-OX    60 tablet    Take 1 tablet (400 mg) by mouth daily       multivitamin, therapeutic Tabs tablet     30 tablet    Take 1 tablet by mouth every 24 hours       pantoprazole 40 MG EC tablet    PROTONIX    30 tablet    Take 1 tablet (40 mg) by mouth every morning (before breakfast)       predniSONE 5 MG tablet    DELTASONE    60 tablet    Take 2 tablets (10 mg) by mouth daily       psyllium 0.52 G capsule     60 capsule    Take 2 capsules (1.04 g) by mouth daily With a full glass of water.       sertraline 100 MG tablet    ZOLOFT    45 tablet    Take 1.5 tablets (150 mg) by mouth daily       simethicone 80 MG chewable tablet    MYLICON    180 tablet    Take 1 tablet (80 mg) by mouth every 6 hours as needed for flatulence or cramping       tacrolimus 0.5 MG capsule    PROGRAF - GENERIC EQUIVALENT    240 capsule    Take 4 capsules (2 mg) by mouth 2 times daily       traMADol 50 MG tablet    ULTRAM    50 tablet    Take 1-2 tablets ( mg) by mouth every 6 hours as needed for pain       traZODone 100 MG tablet    DESYREL    30 tablet    Take 1 tablet (100 mg) by mouth At Bedtime **Further  refills must be obtained by primary care provider**       ursodiol 300 MG capsule    ACTIGALL    60 capsule    Take 1 capsule (300 mg) by mouth 2 times daily

## 2017-05-19 ENCOUNTER — TELEPHONE (OUTPATIENT)
Dept: TRANSPLANT | Facility: CLINIC | Age: 62
End: 2017-05-19

## 2017-05-19 ENCOUNTER — MYC MEDICAL ADVICE (OUTPATIENT)
Dept: ENDOCRINOLOGY | Facility: CLINIC | Age: 62
End: 2017-05-19

## 2017-05-19 NOTE — TELEPHONE ENCOUNTER
Pt called and said that she was standing in [employer] and her ankle gave out and she went in to the urgent care and patient was told she has broken ankle. Patient will follow up with ortho.   Pt is wondering about if she should decrease prednisone dose or just leave. Will discuss with Dr vasquez.

## 2017-05-19 NOTE — PROGRESS NOTES
"HPI  Pt c/o left ankle and head injury s/p falling today. She reports that she was at [employer] and she accidentally rolled her left ankle. She fell and ended up hitting the back of her head. No LOC. Denies HA, confusion, blurred vision, n/t, n/v.     ROS  10 point ROS assessed, documented in HPI otherwise negative.     Physical Exam   Constitutional: She is oriented to person, place, and time. No distress.   HENT:   Head: Normocephalic.   Eyes: Pupils are equal, round, and reactive to light.   Neck: No tracheal deviation present.   Cardiovascular: Normal rate.    Pulmonary/Chest: Effort normal. No stridor. No respiratory distress.   Neurological: She is alert and oriented to person, place, and time. GCS score is 15.   Skin: Skin is warm and dry. She is not diaphoretic.   Psychiatric: Affect and judgment normal.       /70 (BP Location: Right arm, Patient Position: Chair, Cuff Size: Adult Regular)  Pulse 96  Temp 98.6  F (37  C) (Oral)  Ht 5' 7\" (1.702 m)  Wt 154 lb (69.9 kg)  SpO2 98%  BMI 24.12 kg/m2      MDM:  Very pleasant 62 yo female with multiple medical problems including liver transplant 8/2016, renal failure, htn, anemia. She fell and hit her head today without LOC. Due to her PMH, I really feel she needs a head CT, therefore needs to go to the ER. I discussed this with her and she is agreeable. Pt leaves in stable condition.         Dx:  Closed head injury  Left ankle injury    Tatiana Boyle    "

## 2017-05-19 NOTE — NURSING NOTE
"Phan Luque;   Chief Complaint   Patient presents with     Fall     patient was standing in [employer] and her left ankle rolled and she fell hitting head, no LOC, denies nausea/vomiting, ate dinner after fall, vision is fine      Urgent Care     Initial /70 (BP Location: Right arm, Patient Position: Chair, Cuff Size: Adult Regular)  Pulse 96  Temp 98.6  F (37  C) (Oral)  Ht 5' 7\" (1.702 m)  Wt 154 lb (69.9 kg)  SpO2 98%  BMI 24.12 kg/m2 Estimated body mass index is 24.12 kg/(m^2) as calculated from the following:    Height as of this encounter: 5' 7\" (1.702 m).    Weight as of this encounter: 154 lb (69.9 kg)..  BP completed using cuff size regular.  Trini Boyle R.N.  "

## 2017-05-20 ENCOUNTER — MYC MEDICAL ADVICE (OUTPATIENT)
Dept: ENDOCRINOLOGY | Facility: CLINIC | Age: 62
End: 2017-05-20

## 2017-05-22 ASSESSMENT — ENCOUNTER SYMPTOMS
EYE PAIN: 0
SMELL DISTURBANCE: 0
NAUSEA: 0
EYE IRRITATION: 1
SINUS CONGESTION: 0
JAUNDICE: 0
SORE THROAT: 1
STIFFNESS: 1
BLOOD IN STOOL: 0
SINUS PAIN: 0
DIARRHEA: 1
NECK PAIN: 0
TASTE DISTURBANCE: 1
BOWEL INCONTINENCE: 1
MUSCLE CRAMPS: 0
ABDOMINAL PAIN: 1
BACK PAIN: 0
MUSCLE WEAKNESS: 1
DOUBLE VISION: 0
VOMITING: 0
RECTAL PAIN: 0
HOARSE VOICE: 1
NECK MASS: 0
BLOATING: 1
JOINT SWELLING: 0
HEARTBURN: 0
CONSTIPATION: 1
MYALGIAS: 1
EYE REDNESS: 1
RECTAL BLEEDING: 0
ARTHRALGIAS: 1
TROUBLE SWALLOWING: 0
EYE WATERING: 1

## 2017-05-23 ENCOUNTER — TELEPHONE (OUTPATIENT)
Dept: PHARMACY | Facility: CLINIC | Age: 62
End: 2017-05-23

## 2017-05-23 ENCOUNTER — AMBULATORY - HEALTHEAST (OUTPATIENT)
Dept: LAB | Facility: CLINIC | Age: 62
End: 2017-05-23

## 2017-05-23 DIAGNOSIS — Z94.4 LIVER TRANSPLANTED (H): ICD-10-CM

## 2017-05-23 DIAGNOSIS — Z94.4 LIVER REPLACED BY TRANSPLANT (H): ICD-10-CM

## 2017-05-23 DIAGNOSIS — Z79.899 ENCOUNTER FOR LONG-TERM (CURRENT) USE OF OTHER MEDICATIONS: ICD-10-CM

## 2017-05-23 LAB — HEMOGLOBIN: 12.1 G/DL (ref 11.7–15.7)

## 2017-05-23 PROCEDURE — 80197 ASSAY OF TACROLIMUS: CPT | Performed by: INTERNAL MEDICINE

## 2017-05-23 NOTE — TELEPHONE ENCOUNTER
Anemia Management Note  SUBJECTIVE/OBJECTIVE:  Referred by Dr. Daya Gutierrez on 3/16/2017  Primary Diagnosis: Anemia in Chronic Kidney Disease (N18.3, D63.1)   Secondary Diagnosis: Chronic Kidney Disease, Stage 3 (N18.3)   Hgb goal range: 9-10  Epo/Darbo:  Aranesp 60 mcg every 14 days As needed - HOME  RX will  on 3/28/2018  Iron regimen: None  Lab orders will  on 3/28/17    Anemia Latest Ref Rng & Units 3/29/2017 3/31/2017 2017 2017 2017 2017 2017   MEDARDO Dose - - 60 mcg - - - - -   Hemoglobin 11.7 - 15.7 g/dL 9.3(A) - 9.0(L) 10.7(L) 12.1 12.2 12.1   TSAT 15 - 46 % - - 31 - - - -   Ferritin 8 - 252 ng/mL - - 278(H) - - - -      BP Readings from Last 3 Encounters:   17 140/70   17 142/83   17 120/70     Wt Readings from Last 2 Encounters:   17 154 lb (69.9 kg)   17 159 lb 14.4 oz (72.5 kg)     ASSESSMENT:  Hgb:Above goal - recommend hold dose  TSat: at goal >30% Ferritin: At goal (>100ng/mL)    PLAN:  RTC for hgb in 2 weeks    Orders needed to be renewed (for next follow-up date) in EPIC: None    Iron labs due:      Plan discussed with:  Phan  Plan provided by:  Molly    NEXT FOLLOW-UP DATE:      Anemia Management Service  Molly Dumont,PharmD and Zamzam Olmedo,CPhT  Phone: [phone]  Fax: [phone]

## 2017-05-24 ENCOUNTER — RECORDS - HEALTHEAST (OUTPATIENT)
Dept: ADMINISTRATIVE | Facility: OTHER | Age: 62
End: 2017-05-24

## 2017-05-24 LAB
TACROLIMUS BLD-MCNC: 3.4 UG/L (ref 5–15)
TME LAST DOSE: ABNORMAL H

## 2017-05-26 DIAGNOSIS — I10 HYPERTENSION: ICD-10-CM

## 2017-05-26 RX ORDER — AMLODIPINE BESYLATE 5 MG/1
5 TABLET ORAL DAILY
Qty: 30 TABLET | Refills: 11 | Status: SHIPPED | OUTPATIENT
Start: 2017-05-26 | End: 2017-07-17

## 2017-05-31 ASSESSMENT — PATIENT HEALTH QUESTIONNAIRE - PHQ9
10. IF YOU CHECKED OFF ANY PROBLEMS, HOW DIFFICULT HAVE THESE PROBLEMS MADE IT FOR YOU TO DO YOUR WORK, TAKE CARE OF THINGS AT HOME, OR GET ALONG WITH OTHER PEOPLE: NOT DIFFICULT AT ALL
SUM OF ALL RESPONSES TO PHQ QUESTIONS 1-9: 4
SUM OF ALL RESPONSES TO PHQ QUESTIONS 1-9: 4

## 2017-05-31 ASSESSMENT — ANXIETY QUESTIONNAIRES
5. BEING SO RESTLESS THAT IT IS HARD TO SIT STILL: SEVERAL DAYS
7. FEELING AFRAID AS IF SOMETHING AWFUL MIGHT HAPPEN: NOT AT ALL
GAD7 TOTAL SCORE: 0
4. TROUBLE RELAXING: SEVERAL DAYS
3. WORRYING TOO MUCH ABOUT DIFFERENT THINGS: NOT AT ALL
GAD7 TOTAL SCORE: 0
GAD7 TOTAL SCORE: 2
1. FEELING NERVOUS, ANXIOUS, OR ON EDGE: NOT AT ALL
6. BECOMING EASILY ANNOYED OR IRRITABLE: NOT AT ALL
2. NOT BEING ABLE TO STOP OR CONTROL WORRYING: NOT AT ALL
7. FEELING AFRAID AS IF SOMETHING AWFUL MIGHT HAPPEN: NOT AT ALL

## 2017-06-02 ENCOUNTER — COMMUNICATION - HEALTHEAST (OUTPATIENT)
Dept: INTERNAL MEDICINE | Facility: CLINIC | Age: 62
End: 2017-06-02

## 2017-06-04 ASSESSMENT — ENCOUNTER SYMPTOMS
DECREASED APPETITE: 0
BACK PAIN: 1
MYALGIAS: 1
WEAKNESS: 1
JAUNDICE: 0
BLOATING: 1
TREMORS: 1
EYE WATERING: 1
POLYDIPSIA: 0
JOINT SWELLING: 0
WEIGHT GAIN: 1
NIGHT SWEATS: 0
LEG SWELLING: 0
TINGLING: 1
SLEEP DISTURBANCES DUE TO BREATHING: 0
DOUBLE VISION: 0
CONSTIPATION: 1
WEIGHT LOSS: 0
ORTHOPNEA: 0
HALLUCINATIONS: 0
PARALYSIS: 0
CHILLS: 0
DIARRHEA: 1
HEADACHES: 0
STIFFNESS: 1
NAUSEA: 1
HYPOTENSION: 0
MUSCLE CRAMPS: 1
MEMORY LOSS: 0
RECTAL BLEEDING: 0
CLAUDICATION: 0
LOSS OF CONSCIOUSNESS: 0
NUMBNESS: 1
PALPITATIONS: 0
DISTURBANCES IN COORDINATION: 0
INCREASED ENERGY: 1
EYE REDNESS: 1
RECTAL PAIN: 0
HYPERTENSION: 1
DIZZINESS: 1
POLYPHAGIA: 0
EXERCISE INTOLERANCE: 0
SPEECH CHANGE: 0
BOWEL INCONTINENCE: 1
SEIZURES: 0
MUSCLE WEAKNESS: 1
EYE IRRITATION: 0
ABDOMINAL PAIN: 1
EYE PAIN: 0
LIGHT-HEADEDNESS: 1
NECK PAIN: 1
HEARTBURN: 0
FATIGUE: 1
VOMITING: 0
FEVER: 0
ALTERED TEMPERATURE REGULATION: 1
BLOOD IN STOOL: 0
ARTHRALGIAS: 1
SYNCOPE: 0
TACHYCARDIA: 0

## 2017-06-05 ENCOUNTER — OFFICE VISIT (OUTPATIENT)
Dept: NEPHROLOGY | Facility: CLINIC | Age: 62
End: 2017-06-05
Attending: INTERNAL MEDICINE
Payer: COMMERCIAL

## 2017-06-05 ENCOUNTER — TELEPHONE (OUTPATIENT)
Dept: OPHTHALMOLOGY | Facility: CLINIC | Age: 62
End: 2017-06-05

## 2017-06-05 ENCOUNTER — OFFICE VISIT (OUTPATIENT)
Dept: PSYCHOLOGY | Facility: CLINIC | Age: 62
End: 2017-06-05

## 2017-06-05 ENCOUNTER — RECORDS - HEALTHEAST (OUTPATIENT)
Dept: ADMINISTRATIVE | Facility: OTHER | Age: 62
End: 2017-06-05

## 2017-06-05 VITALS
WEIGHT: 164.2 LBS | OXYGEN SATURATION: 97 % | BODY MASS INDEX: 25.77 KG/M2 | HEART RATE: 80 BPM | HEIGHT: 67 IN | SYSTOLIC BLOOD PRESSURE: 124 MMHG | DIASTOLIC BLOOD PRESSURE: 75 MMHG

## 2017-06-05 DIAGNOSIS — D63.1 ANEMIA IN STAGE 3 CHRONIC KIDNEY DISEASE (H): ICD-10-CM

## 2017-06-05 DIAGNOSIS — N18.30 CKD (CHRONIC KIDNEY DISEASE) STAGE 3, GFR 30-59 ML/MIN (H): Primary | ICD-10-CM

## 2017-06-05 DIAGNOSIS — E03.9 ACQUIRED HYPOTHYROIDISM: ICD-10-CM

## 2017-06-05 DIAGNOSIS — N18.30 CKD (CHRONIC KIDNEY DISEASE) STAGE 3, GFR 30-59 ML/MIN (H): ICD-10-CM

## 2017-06-05 DIAGNOSIS — F10.10 ALCOHOL ABUSE, UNCOMPLICATED: ICD-10-CM

## 2017-06-05 DIAGNOSIS — F33.41 RECURRENT MAJOR DEPRESSIVE DISORDER, IN PARTIAL REMISSION (H): Primary | ICD-10-CM

## 2017-06-05 DIAGNOSIS — N18.30 ANEMIA IN STAGE 3 CHRONIC KIDNEY DISEASE (H): ICD-10-CM

## 2017-06-05 DIAGNOSIS — Z94.4 LIVER REPLACED BY TRANSPLANT (H): ICD-10-CM

## 2017-06-05 LAB
ALBUMIN SERPL-MCNC: 3.6 G/DL (ref 3.4–5)
ALP SERPL-CCNC: 69 U/L (ref 40–150)
ALT SERPL W P-5'-P-CCNC: 17 U/L (ref 0–50)
ANION GAP SERPL CALCULATED.3IONS-SCNC: 8 MMOL/L (ref 3–14)
AST SERPL W P-5'-P-CCNC: 14 U/L (ref 0–45)
BILIRUB DIRECT SERPL-MCNC: <0.1 MG/DL (ref 0–0.2)
BILIRUB SERPL-MCNC: 0.4 MG/DL (ref 0.2–1.3)
BUN SERPL-MCNC: 19 MG/DL (ref 7–30)
CALCIUM SERPL-MCNC: 8.8 MG/DL (ref 8.5–10.1)
CHLORIDE SERPL-SCNC: 100 MMOL/L (ref 94–109)
CO2 SERPL-SCNC: 25 MMOL/L (ref 20–32)
CREAT SERPL-MCNC: 1.19 MG/DL (ref 0.52–1.04)
CREAT UR-MCNC: 51 MG/DL
ERYTHROCYTE [DISTWIDTH] IN BLOOD BY AUTOMATED COUNT: 13.2 % (ref 10–15)
FERRITIN SERPL-MCNC: 138 NG/ML (ref 8–252)
GFR SERPL CREATININE-BSD FRML MDRD: 46 ML/MIN/1.7M2
GLUCOSE SERPL-MCNC: 104 MG/DL (ref 70–99)
HCT VFR BLD AUTO: 34 % (ref 35–47)
HGB BLD-MCNC: 11.5 G/DL (ref 11.7–15.7)
IRON SATN MFR SERPL: 24 % (ref 15–46)
IRON SERPL-MCNC: 59 UG/DL (ref 35–180)
MAGNESIUM SERPL-MCNC: 1.9 MG/DL (ref 1.6–2.3)
MCH RBC QN AUTO: 34.3 PG (ref 26.5–33)
MCHC RBC AUTO-ENTMCNC: 33.8 G/DL (ref 31.5–36.5)
MCV RBC AUTO: 102 FL (ref 78–100)
PHOSPHATE SERPL-MCNC: 3.3 MG/DL (ref 2.5–4.5)
PLATELET # BLD AUTO: 301 10E9/L (ref 150–450)
POTASSIUM SERPL-SCNC: 4.7 MMOL/L (ref 3.4–5.3)
PROT SERPL-MCNC: 7.2 G/DL (ref 6.8–8.8)
PROT UR-MCNC: 0.2 G/L
PROT/CREAT 24H UR: 0.39 G/G CR (ref 0–0.2)
RBC # BLD AUTO: 3.35 10E12/L (ref 3.8–5.2)
SODIUM SERPL-SCNC: 134 MMOL/L (ref 133–144)
T4 FREE SERPL-MCNC: 1.04 NG/DL (ref 0.76–1.46)
TIBC SERPL-MCNC: 248 UG/DL (ref 240–430)
TSH SERPL DL<=0.005 MIU/L-ACNC: 1.41 MU/L (ref 0.4–4)
WBC # BLD AUTO: 11 10E9/L (ref 4–11)

## 2017-06-05 PROCEDURE — 36415 COLL VENOUS BLD VENIPUNCTURE: CPT | Performed by: INTERNAL MEDICINE

## 2017-06-05 PROCEDURE — 85027 COMPLETE CBC AUTOMATED: CPT | Performed by: INTERNAL MEDICINE

## 2017-06-05 PROCEDURE — 80069 RENAL FUNCTION PANEL: CPT | Performed by: INTERNAL MEDICINE

## 2017-06-05 PROCEDURE — 80321 ALCOHOLS BIOMARKERS 1OR 2: CPT | Performed by: INTERNAL MEDICINE

## 2017-06-05 PROCEDURE — 84075 ASSAY ALKALINE PHOSPHATASE: CPT | Performed by: INTERNAL MEDICINE

## 2017-06-05 PROCEDURE — 82248 BILIRUBIN DIRECT: CPT | Performed by: INTERNAL MEDICINE

## 2017-06-05 PROCEDURE — 84460 ALANINE AMINO (ALT) (SGPT): CPT | Performed by: INTERNAL MEDICINE

## 2017-06-05 PROCEDURE — 83550 IRON BINDING TEST: CPT | Performed by: INTERNAL MEDICINE

## 2017-06-05 PROCEDURE — 82728 ASSAY OF FERRITIN: CPT | Performed by: INTERNAL MEDICINE

## 2017-06-05 PROCEDURE — 84450 TRANSFERASE (AST) (SGOT): CPT | Performed by: INTERNAL MEDICINE

## 2017-06-05 PROCEDURE — 84156 ASSAY OF PROTEIN URINE: CPT | Performed by: INTERNAL MEDICINE

## 2017-06-05 PROCEDURE — 83540 ASSAY OF IRON: CPT | Performed by: INTERNAL MEDICINE

## 2017-06-05 PROCEDURE — 99213 OFFICE O/P EST LOW 20 MIN: CPT | Mod: ZF

## 2017-06-05 PROCEDURE — 82247 BILIRUBIN TOTAL: CPT | Performed by: INTERNAL MEDICINE

## 2017-06-05 PROCEDURE — 84155 ASSAY OF PROTEIN SERUM: CPT | Performed by: INTERNAL MEDICINE

## 2017-06-05 PROCEDURE — 83735 ASSAY OF MAGNESIUM: CPT | Performed by: INTERNAL MEDICINE

## 2017-06-05 PROCEDURE — 80197 ASSAY OF TACROLIMUS: CPT | Performed by: INTERNAL MEDICINE

## 2017-06-05 ASSESSMENT — PAIN SCALES - GENERAL: PAINLEVEL: SEVERE PAIN (6)

## 2017-06-05 NOTE — TELEPHONE ENCOUNTER
Prior Authorization Not Required    Medication: latanoprost (XALATAN) 0.005 % ophthalmic solution- NO PA NEEDED  Approved Dose/Quantity:   Reference #: [id number]   Insurance Company: [employer] - Phone [phone] Fax [phone]  Expected CoPay:       CoPay Card Available:      Foundation Assistance Needed:    Which Pharmacy is filling the prescription (Not needed for infusion/clinic administered): [facility] - [place], [place] - [facility] [address]  Pharmacy Notified:  yes  Patient Notified:  Yes

## 2017-06-05 NOTE — LETTER
6/5/2017      RE: Phan Luque  [address]  [facility] [address]       [facility]  Nephrology Clinic follow up note    Date of encounter 6/5/2017       Assessment and Plan:     61  year old female with PMH of acute alcoholic hepatitis, alcoholic cirrhosis complicated by oliguric GERMAIN and hypervolemia requiring dialysis from 8/2/2016 to 9/6/2016. Patient post liver transplant on 8/25/2016. Patient now with CKD , most likely from injury sustained from  GERMAIN episode which was thought to be ischemic/toxic ATN and from decompensated cirrhosis. Seen last in Nephrology clinic on 3/2017.    Recommendations :-     1. CKD stage 3   :- serum Cr during visit was 1.19   mg/dL with eGFR 46  . Now with improved oral intake. Normal kidney in CT  scan abdomen from 8/2016. Patient with minimal   Proteinuria of 0.39 g.     - Avoid nephrotoxins.   - renal dosing of medications.   - monitor tacrolimus levels closely.     2. BP :- in acceptable range. Recently started on amlodipine 5 mg daily.     3. euvolemic on exam - continue low sodium diet    4. No  acid base issues.     5. Hypomagnesemia -serum Mg 1.9. Continue MgO 400 mg daily.     6. Anemia of CKD 3 - Hb 11.9. Follows with  anemia clinic. Received 1 dose of darbepoietin 60 mcg . Now held in the setting of improved Hb.       7. BMD :- serum Ca 8.8, serum albumin 3.6, serum phosphorus 3.3. Intact PTH 53,  vitamin D 25.        Discussed with Dr Gutierrez. Patient to follow up in 6 months. Patient to get renal panel, urine protein studies, Hb prior to next appointment.     Assessment and plan was discussed with patient and she voiced her understanding and agreement.    Reason for Visit:  Ms. Luque is here for follow up of CKD.     HPI:   61  year old female with PMH of acute alcoholic hepatitis, alcoholic cirrhosis complicated by oliguric GERMAIN and hypervolemia requiring dialysis from 8/2/2016 to 9/6/2016. Patient post liver transplant on 8/25/2016. Patient now  with CKD , most likely from injury sustained from recent GERMAIN episode which was thought to be ischemic/toxic ATN and  From decompensated cirrhosis. Seen last in Nephrology clinic in 12/2016.       post transplant period was complicated by  nausea, vomiting, poor oral intake and diarrhea . Immunosuppressants  were adjusted by transplant team. Patient now off NGT.  Patient reported her oral intake is improving.  Patients now off myfortic acid and started on imuran. Patient also on prograf. Immunosuppression managed by liver transplant team .  Patient had diffuse pain and started on prednisone 10 mg and patient reported her pain have improved. Recently started on amlodipine by Liver transplant team. Per transplant team note plan is to decrease imuran. Patient on 100 mg daily. Informed patient to call transplant coordinator and confirm the dose.       No SOB. No swelling of lower extremities. Overall doing better.      patient reported her diarrhea have markedly improved.             ROS:   A comprehensive review of systems was obtained and negative, except as noted in the HPI or PMH.    Active Medical Problems:  Patient Active Problem List   Diagnosis     Decompensation of cirrhosis of liver (H)     Liver transplanted (H)     Alcoholic hepatitis     Immunosuppression (H)     Anxiety     Alcoholic hepatitis without ascites     Enterococcus UTI     Liver transplant status (H)     Nausea & vomiting     Malnutrition (H)     Candidiasis of skin     Anemia     ACP (advance care planning)     Diarrhea     Hypothyroidism     Esophageal reflux     Major depression     Insomnia     CKD (chronic kidney disease) stage 3, GFR 30-59 ml/min     Anemia in stage 3 chronic kidney disease     Osteopenia       Personal Hx:  Social History     Social History     Marital status:      Spouse name: N/A     Number of children: N/A     Years of education: N/A     Occupational History     Not on file.     Social History Main Topics      Smoking status: Former Smoker     Smokeless tobacco: Never Used     Alcohol use No      Comment: heavy use (750ml/2 days) up until 1 year ago; had cut down to 1 drink/day; none since 7/18/16     Drug use: No     Sexual activity: Not on file     Other Topics Concern     Not on file     Social History Narrative       Allergies:  Allergies   Allergen Reactions     Benadryl [Diphenhydramine] Hives     Cefaclor Hives     Hydrocodone-Acetaminophen Itching     Oxycodone Itching     Penicillins Hives     Sulfa Drugs Hives       Medications:  Prior to Admission medications    Medication Sig Start Date End Date Taking? Authorizing Provider   hydrocortisone (CORTAID) 1 % cream  12/1/16  Yes Reported, Patient   alpha-d-galactosidase (BEANO) tablet Take 1-2 tablets by mouth 3 times daily (before meals)   Yes Reported, Patient   hydrOXYzine (ATARAX) 25 MG tablet Take 1 tablet (25 mg) by mouth every 6 hours as needed for itching 9/14/16  Yes Fast, Heena Solo PA-C   sertraline (ZOLOFT) 50 MG tablet Take 1 tablet (50 mg) by mouth daily 9/14/16  Yes Fast, Heena Solo PA-C   loperamide (IMODIUM) 2 MG capsule Take 2 capsules (4 mg) by mouth 3 times daily 9/14/16  Yes Fast, Heena Solo PA-C   valGANciclovir (VALCYTE) 50 MG/ML SOLR Take 9 mLs (450 mg) by mouth every other day 9/15/16  Yes Fast, Heena Solo PA-C   CELLCEPT 200 MG/ML PO SUSPENSION 5 mLs (1,000 mg) by ORAL OR NJ TUBE route 2 times daily 9/14/16  Yes Fast, Heena Solo PA-C   tacrolimus (PROGRAF - GENERIC EQUIVALENT) 0.5 MG capsule Take 5 capsules (2.5 mg) by mouth 2 times daily 9/14/16  Yes Fast, Heena Solo PA-C   miconazole with skin protectant (ILDA ANTIFUNGAL) 2 % CREA cream Apply topically 2 times daily 9/14/16  Yes Fast, Heena Solo PA-C   nystatin (MYCOSTATIN) cream Apply topically 2 times daily as needed (groin rash) 9/14/16  Yes Fast, Heena Germania, PA-C   folic acid (FOLATE) 500 mcg/mL SOLN Take 2 mLs (1 mg) by mouth daily 9/14/16  Yes  "Fast, Heena Solo PA-C   cyanocobalamin 1000 MCG TABS Take 1,000 mcg by mouth daily 9/14/16  Yes Heena Flores PA-C   sulfamethoxazole-trimethoprim (BACTRIM,SEPTRA) 200-40 mg/5ml suspension 10 mLs (80 mg) by Oral or Feeding Tube route daily Dose based on TMP component. 9/14/16  Yes Heena Flores PA-C   simethicone (MYLICON) 80 MG chewable tablet Take 1 tablet (80 mg) by mouth every 6 hours as needed for cramping 9/14/16  Yes Heena Flores PA-C   ursodiol (ACTIGALL) 20 mg/mL 15 mLs (300 mg) by Oral or Feeding Tube route 3 times daily 9/14/16  Yes Heena Flores PA-C   nystatin (MYCOSTATIN) 784167 UNIT/ML suspension Take 10 mLs (1,000,000 Units) by mouth 4 times daily 9/14/16  Yes Heena Flores PA-C   multivitamins with minerals (CERTAVITE/CEROVITE) LIQD 15 mLs by Per Feeding Tube route daily 9/14/16  Yes Heean Flores PA-C   pantoprazole (PROTONIX) 40 MG enteric coated tablet Take 1 tablet (40 mg) by mouth 2 times daily (before meals) 9/14/16  Yes Heena Flores PA-C   sucralfate (CARAFATE) 1 GM/10ML suspension Take 10 mLs (1 g) by mouth 3 times daily (before meals) 9/14/16  Yes Heena Flores PA-C   ONDANSETRON PO Take 4 mg by mouth every 8 hours as needed for nausea or vomiting   Yes Unknown, Entered By History       Vitals:  /75  Pulse 80  Ht 1.702 m (5' 7\")  Wt 74.5 kg (164 lb 3.2 oz)  SpO2 97%  BMI 25.72 kg/m2    Exam:   GENERAL APPEARANCE: alert and no distress  HENT: mouth without ulcers or lesions  LYMPHATICS: no cervical or supraclavicular nodes  RESP: lungs clear to auscultation - no rales, rhonchi or wheezes  CV: regular rhythm, normal rate, no rub, no murmur  EDEMA: no LE edema bilaterally  ABDOMEN: soft, nondistended, nontender, bowel sounds normal  MS: extremities normal - no gross deformities noted, no evidence of inflammation in joints, no muscle tenderness  SKIN: no rash    Results:        Electrolytes/Renal -   Recent Labs "   Lab Test  06/05/17   1211  04/14/17   0937  04/05/17   1236   NA  134  138  139   POTASSIUM  4.7  4.1  4.2   CHLORIDE  100  103  106   CO2  25  27  25   BUN  19  18  11   CR  1.19*  1.51*  1.39*   GLC  104*  94  99   ERIC  8.8  8.9  8.5   MAG  1.9  2.1  1.9   PHOS  3.3  3.4  3.6       CBC -   Recent Labs   Lab Test  06/05/17 1211 05/23/17 05/09/17 04/14/17   0937  04/05/17   1236   WBC  11.0   --    --    --   5.7  3.9*   HGB  11.5*  12.1  12.2   < >  10.7*  9.0*   PLT  301   --    --    --   299  320    < > = values in this interval not displayed.       LFTs -   Recent Labs   Lab Test  06/05/17 1211 04/14/17   0937  04/05/17   1236   ALKPHOS  69  63  63   BILITOTAL  0.4  0.5  0.8   ALT  17  13  12   AST  14  9  13   PROTTOTAL  7.2  7.0  6.8   ALBUMIN  3.6  3.8  3.8       Coags -   Recent Labs   Lab Test  03/16/17   1728  09/13/16   1055  08/30/16   0740   08/25/16   1230  08/25/16   1055  08/25/16   0850   INR  1.15*  1.08  1.01   < >  1.76*  1.76*  1.87*   PTT   --    --    --    --   48*  59*  58*    < > = values in this interval not displayed.       Iron Panel -   Recent Labs   Lab Test  06/05/17   1211  04/05/17   1236  03/22/17   1607   IRON  59  56  62   IRONSAT  24  31  32   WILL  138  278*  320*       Endocrine -   Recent Labs   Lab Test  06/05/17   1211  04/05/17   1236  12/15/16   0615   TSH  1.41  1.26  1.26  2.18        Attestation:  This patient has been seen and evaluated by me, Daya Gutierrez MD on 6/5/17 .  Discussed with the fellow or resident and agree with the findings and plan in this note.     I have reviewed  Medications, Vital Signs and Labs.    Daya Gutierrez MD  [facility]  [facility]  Department of Medicine  Division of Renal Disease and Hypertension  [phone]

## 2017-06-05 NOTE — NURSING NOTE
"Chief Complaint   Patient presents with     RECHECK     Follow up for CKD stage 3        Initial /75  Pulse 80  Ht 1.702 m (5' 7\")  Wt 74.5 kg (164 lb 3.2 oz)  SpO2 97%  BMI 25.72 kg/m2 Estimated body mass index is 25.72 kg/(m^2) as calculated from the following:    Height as of this encounter: 1.702 m (5' 7\").    Weight as of this encounter: 74.5 kg (164 lb 3.2 oz).  Medication Reconciliation: complete   Zainab Mooney CMA    "

## 2017-06-05 NOTE — MR AVS SNAPSHOT
After Visit Summary   6/5/2017    Phan Luque    MRN: [id number]           Patient Information     Date Of Birth          1955        Visit Information        Provider Department      6/5/2017 2:00 PM Zulma White, PhD [facility]        Today's Diagnoses     Recurrent major depressive disorder, in partial remission (H)    -  1    Alcohol abuse, uncomplicated           Follow-ups after your visit        Your next 10 appointments already scheduled     Jun 26, 2017  4:00 PM CDT   (Arrive by 3:45 PM)   Return Visit with Zulma White, PhD MEME   [facility] ([facility])    [address]  [address] [facility] [address]   [phone]            Aug 14, 2017  4:00 PM CDT   (Arrive by 3:45 PM)   RETURN ENDOCRINE with Ruth Oakley MD   [facility] Endocrinology ([facility])    [address] [place] [address]  [address] [facility] [address]   [phone]            Sep 08, 2017  7:00 AM CDT   Lab with UC LAB   [facility] ([facility])    [address] [place] [address]  [address] [facility] [address]   [phone]            Sep 08, 2017  8:00 AM CDT   (Arrive by 7:45 AM)   Return Liver Transplant with Hussein Banegas MD   [facility] Hepat[facility] ([facility])    [address] [place] [address]  [address] [facility] [address]   [phone]            Nov 20, 2017  4:30 PM CST   (Arrive by 4:15 PM)   RETURN ENDOCRINE with Ruth Oakley MD   [facility] ([facility])    [address]  [address] [facility] [address]   [phone]            Dec 05, 2017 10:00 AM CST   RETURN NEURO with Ambrose Ortega MD   [facility] ([facility])    [address]  [address]  [facility] [address]   [phone]            Dec 06, 2017  3:30 PM CST   Lab with UC  LAB   [facility] ([facility])    [address]  1[address] Floor  [facility] [address]   [phone]            Dec 06, 2017  4:30 PM CST   (Arrive by 4:00 PM)   Return Visit with Daya Gutierrez MD   [facility] Nephrology ([facility])    [address]  3rd Floor  [facility] [id number] [phone]            Dec 20, 2017  4:30 PM CST   (Arrive by 4:15 PM)   Return Visit with DONOVAN Castellanos CNP   [facility] Gastroenterology and IBD ([facility])    [address]  4th Floor  [facility] [id number] [phone]              Who to contact     Please call your clinic at [phone] to:    Ask questions about your health    Make or cancel appointments    Discuss your medicines    Learn about your test results    Speak to your doctor   If you have compliments or concerns about an experience at your clinic, or if you wish to file a complaint, please contact [facility] Patient Relations at [phone] or email us at [email or web address]         Additional Information About Your Visit        [email or web address] Information     [employer] gives you secure access to your electronic health record. If you see a primary care provider, you can also send messages to your care team and make appointments. If you have questions, please call your primary care clinic.  If you do not have a primary care provider, please call [phone] and they will assist you.      [employer] is an electronic gateway that provides easy, online access to your medical records. With [employer], you can request a clinic appointment, read your test results, renew a prescription or communicate with your care team.     To access your existing account, please contact your [facility] or call [phone] for assistance.        Care EveryWhere ID     This is your Care EveryWhere ID. This could be  used by other organizations to access your [facility] medical records  [id number]         Blood Pressure from Last 3 Encounters:   06/16/17 126/82   06/09/17 132/79   06/05/17 124/75    Weight from Last 3 Encounters:   06/16/17 76.1 kg (167 lb 12.8 oz)   06/16/17 76.1 kg (167 lb 11.2 oz)   06/09/17 74.2 kg (163 lb 9.6 oz)              Today, you had the following     No orders found for display       Primary Care Provider Office Phone # Fax #    Santosh Salgado [phone]       [facility] [address] [place] [address]   [facility] [address]        Equal Access to Services     PAULO BERRY : Hadii aad ku hadasho Sogayr, waaxda luqadaha, qaybta kaalmada adeegyada, anna schroeder. So [facility] [phone].    ATENCIÓN: Si habla español, tiene a reece disposición servicios gratuitos de asistencia lingüística. Llame al [phone].    We comply with applicable federal civil rights laws and [place] laws. We do not discriminate on the basis of race, color, national origin, age, disability sex, sexual orientation or gender identity.            Thank you!     Thank you for choosing [facility]  for your care. Our goal is always to provide you with excellent care. Hearing back from our patients is one way we can continue to improve our services. Please take a few minutes to complete the written survey that you may receive in the mail after your visit with us. Thank you!             Your Updated Medication List - Protect others around you: Learn how to safely use, store and throw away your medicines at www.disposemymeds.org.          This list is accurate as of: 6/5/17 11:59 PM.  Always use your most recent med list.                   Brand Name Dispense Instructions for use Diagnosis    acetaminophen 325 MG tablet    TYLENOL    100 tablet    Take 2 tablets (650 mg) by mouth every 6 hours as needed for mild pain Or fevers. Use sparingly. Limit use to no more than 2 grams (2000  mg) in 24 hours. **Further refills must be obtained by primary care provider**    Acute post-operative pain       amLODIPine 5 MG tablet    NORVASC    30 tablet    Take 1 tablet (5 mg) by mouth daily    Hypertension       busPIRone 10 MG tablet    BUSPAR    60 tablet    Take 1 tablet (10 mg) by mouth 2 times daily    Anxiety       calcium Citrate-vitamin D 500-400 MG-UNIT Chew      Take 1 tablet by mouth daily        cyanocobalamin 1000 MCG Tabs     30 tablet    Take 1,000 mcg by mouth daily    Liver transplanted (H)       darbepoetin vandana 60 MCG/0.3ML injection    ARANESP (ALBUMIN FREE)    0.6 mL    Inject 0.3 mLs (60 mcg) Subcutaneous every 14 days As needed for hgb <10g/dL    Anemia in stage 3 chronic kidney disease, CKD (chronic kidney disease) stage 3, GFR 30-59 ml/min       FISH OIL PO      Take 645 mg by mouth 2 times daily        folic acid 1 MG tablet    FOLVITE    30 tablet    Take 1 tablet (1 mg) by mouth daily    Malnutrition (H)       hydrOXYzine 25 MG tablet    ATARAX    60 tablet    Take 1-2 tablets (25-50 mg) by mouth every 6 hours as needed for itching    Itching       latanoprost 0.005 % ophthalmic solution    XALATAN    1 Bottle    Place 1 drop into both eyes At Bedtime    Steroid responders borderline glaucoma, bilateral       levothyroxine 88 MCG tablet    SYNTHROID/LEVOTHROID    30 tablet    Take 1 tablet (88 mcg) by mouth every morning (before breakfast)    Thyroid function test abnormal       loperamide 2 MG capsule    IMODIUM     Take 2 mg by mouth 4 times daily as needed for diarrhea Reported on 5/18/2017        magnesium oxide 400 (241.3 MG) MG tablet    MAG-OX    60 tablet    Take 1 tablet (400 mg) by mouth daily    CKD (chronic kidney disease) stage 3, GFR 30-59 ml/min, Hypomagnesemia       multivitamin, therapeutic Tabs tablet     30 tablet    Take 1 tablet by mouth every 24 hours    Malnutrition (H)       pantoprazole 40 MG EC tablet    PROTONIX    30 tablet    Take 1 tablet (40 mg) by  mouth every morning (before breakfast)    Gastroesophageal reflux disease, esophagitis presence not specified       psyllium 0.52 G capsule     60 capsule    Take 2 capsules (1.04 g) by mouth daily With a full glass of water.    Loose stools       sertraline 100 MG tablet    ZOLOFT    45 tablet    Take 1.5 tablets (150 mg) by mouth daily    Transplant, organ       simethicone 80 MG chewable tablet    MYLICON    180 tablet    Take 1 tablet (80 mg) by mouth every 6 hours as needed for flatulence or cramping    Flatulence, eructation, and gas pain       tacrolimus 0.5 MG capsule    PROGRAF - GENERIC EQUIVALENT    240 capsule    Take 4 capsules (2 mg) by mouth 2 times daily    Liver transplanted (H)       traMADol 50 MG tablet    ULTRAM    50 tablet    Take 1-2 tablets ( mg) by mouth every 6 hours as needed for pain    Abdominal pain, epigastric       traZODone 100 MG tablet    DESYREL    30 tablet    Take 1 tablet (100 mg) by mouth At Bedtime **Further refills must be obtained by primary care provider**    Insomnia, unspecified type       ursodiol 300 MG capsule    ACTIGALL    60 capsule    Take 1 capsule (300 mg) by mouth 2 times daily    Liver transplanted (H)

## 2017-06-05 NOTE — MR AVS SNAPSHOT
After Visit Summary   6/5/2017    Phan Luque    MRN: [id number]           Patient Information     Date Of Birth          1955        Visit Information        Provider Department      6/5/2017 1:00 PM Gavin Lebron MD [facility] Nephrology         Follow-ups after your visit        Follow-up notes from your care team     Return in about 6 months (around 12/5/2017).      Your next 10 appointments already scheduled     Jun 08, 2017  8:00 AM CDT   RETURN NEURO with Ambrose Ortega MD   Eye Clinic ([facility])    [address]  [address]  [facility] [address]   [phone]            Jun 09, 2017  7:30 AM CDT   Lab with ANASTACIO LAB   [facility] Lab ([facility])    [address] [place] [address]  1st [facility] [address]   [phone]            Jun 09, 2017  8:30 AM CDT   (Arrive by 8:15 AM)   Return Liver Transplant with Hussein Banegas MD   [facility] Hepatology ([facility])    [address] [place] [address]  3rd [facility] [address]   [phone]            Jun 16, 2017 11:30 AM CDT   (Arrive by 11:15 AM)   Return Visit with DONOVAN Castellanos [facility] Gastroenterology and IBD ([facility])    [address] [place] [address]  4th [facility] [address]   [phone]            Aug 14, 2017  4:00 PM CDT   (Arrive by 3:45 PM)   RETURN ENDOCRINE with Ruth Oakley MD   [facility] Endocrinology ([facility])    [address] [place] [address]  3rd [facility] [address]   [phone]            Nov 20, 2017  4:30 PM CST   (Arrive by 4:15 PM)   RETURN ENDOCRINE with Ruth Oakley MD   [facility] Endocrinology ([facility])    [address] [place] [address]   [phone]            Dec 04, 2017  3:30 PM CST   Lab with UC LAB   [facility] Lab ([facility]  "Center)    [address]  1st Floor  [facility] [id number] [phone]            Dec 04, 2017  4:30 PM CST   (Arrive by 4:00 PM)   Return Visit with MD KERI Gomez [facility] Nephrology ([facility] [facility])    [address] [facility] [address]  3rd Floor  [facility] [id number] [phone]              Who to contact     If you have questions or need follow up information about today's clinic visit or your schedule please contact [facility] NEPHROLOGY directly at [phone].  Normal or non-critical lab and imaging results will be communicated to you by Iconicfuturehart, letter or phone within 4 business days after the clinic has received the results. If you do not hear from us within 7 days, please contact the clinic through [employer] or phone. If you have a critical or abnormal lab result, we will notify you by phone as soon as possible.  Submit refill requests through CBRITE or call your pharmacy and they will forward the refill request to us. Please allow 3 business days for your refill to be completed.          Additional Information About Your Visit        Iconicfuturehart Information     CBRITE gives you secure access to your electronic health record. If you see a primary care provider, you can also send messages to your care team and make appointments. If you have questions, please call your primary care clinic.  If you do not have a primary care provider, please call [phone] and they will assist you.        Care EveryWhere ID     This is your Care EveryWhere ID. This could be used by other organizations to access your [facility] medical records  [id number]        Your Vitals Were     Pulse Height Pulse Oximetry BMI (Body Mass Index)          80 1.702 m (5' 7\") 97% 25.72 kg/m2         Blood Pressure from Last 3 Encounters:   06/05/17 124/75   05/18/17 140/70   05/17/17 142/83    Weight from Last 3 Encounters:   06/05/17 74.5 kg (164 lb 3.2 oz)   05/18/17 69.9 kg (154 lb)   05/17/17 72.5 kg " (159 lb 14.4 oz)              Today, you had the following     No orders found for display       Primary Care Provider Office Phone # Fax #    Santosh Salgado [phone]       [address] [place] [address]        Thank you!     Thank you for choosing [facility] NEPHROLOGY  for your care. Our goal is always to provide you with excellent care. Hearing back from our patients is one way we can continue to improve our services. Please take a few minutes to complete the written survey that you may receive in the mail after your visit with us. Thank you!             Your Updated Medication List - Protect others around you: Learn how to safely use, store and throw away your medicines at www.disposemymeds.org.          This list is accurate as of: 6/5/17  2:02 PM.  Always use your most recent med list.                   Brand Name Dispense Instructions for use    acetaminophen 325 MG tablet    TYLENOL    100 tablet    Take 2 tablets (650 mg) by mouth every 6 hours as needed for mild pain Or fevers. Use sparingly. Limit use to no more than 2 grams (2000 mg) in 24 hours. **Further refills must be obtained by primary care provider**       amLODIPine 5 MG tablet    NORVASC    30 tablet    Take 1 tablet (5 mg) by mouth daily       azaTHIOprine 100 MG Tabs     30 tablet    Take 100 mg by mouth every evening       busPIRone 10 MG tablet    BUSPAR    60 tablet    Take 1 tablet (10 mg) by mouth 2 times daily       calcium Citrate-vitamin D 500-400 MG-UNIT Chew      Take 1 tablet by mouth daily       cyanocobalamin 1000 MCG Tabs     30 tablet    Take 1,000 mcg by mouth daily       darbepoetin vandana 60 MCG/0.3ML injection    ARANESP (ALBUMIN FREE)    0.6 mL    Inject 0.3 mLs (60 mcg) Subcutaneous every 14 days As needed for hgb <10g/dL       FISH OIL PO      Take 645 mg by mouth 2 times daily       folic acid 1 MG tablet    FOLVITE    30 tablet    Take 1 tablet (1 mg) by mouth daily        hydrOXYzine 25 MG tablet    ATARAX    60 tablet    Take 1-2 tablets (25-50 mg) by mouth every 6 hours as needed for itching       latanoprost 0.005 % ophthalmic solution    XALATAN    1 Bottle    Place 1 drop into both eyes At Bedtime       levothyroxine 88 MCG tablet    SYNTHROID/LEVOTHROID    30 tablet    Take 1 tablet (88 mcg) by mouth every morning (before breakfast)       loperamide 2 MG capsule    IMODIUM     Take 2 mg by mouth 4 times daily as needed for diarrhea Reported on 5/18/2017       magnesium oxide 400 (241.3 MG) MG tablet    MAG-OX    60 tablet    Take 1 tablet (400 mg) by mouth daily       multivitamin, therapeutic Tabs tablet     30 tablet    Take 1 tablet by mouth every 24 hours       pantoprazole 40 MG EC tablet    PROTONIX    30 tablet    Take 1 tablet (40 mg) by mouth every morning (before breakfast)       predniSONE 5 MG tablet    DELTASONE    60 tablet    Take 2 tablets (10 mg) by mouth daily       psyllium 0.52 G capsule     60 capsule    Take 2 capsules (1.04 g) by mouth daily With a full glass of water.       sertraline 100 MG tablet    ZOLOFT    45 tablet    Take 1.5 tablets (150 mg) by mouth daily       simethicone 80 MG chewable tablet    MYLICON    180 tablet    Take 1 tablet (80 mg) by mouth every 6 hours as needed for flatulence or cramping       tacrolimus 0.5 MG capsule    PROGRAF - GENERIC EQUIVALENT    240 capsule    Take 4 capsules (2 mg) by mouth 2 times daily       traMADol 50 MG tablet    ULTRAM    50 tablet    Take 1-2 tablets ( mg) by mouth every 6 hours as needed for pain       traZODone 100 MG tablet    DESYREL    30 tablet    Take 1 tablet (100 mg) by mouth At Bedtime **Further refills must be obtained by primary care provider**       ursodiol 300 MG capsule    ACTIGALL    60 capsule    Take 1 capsule (300 mg) by mouth 2 times daily

## 2017-06-05 NOTE — PROGRESS NOTES
Health Psychology                  Clinic    Department of Medicine  Zulma White, Ph.D., L.P. [phone]                          Clinics and Surgery Center  [facility] Liang Fong, Ph.D.,  L.P. [phone]                 3rd Floor  [place] Mail [address]   Jeff Garcia, Ph.D., LI., L.P. [phone]     [address] Aurora García, Ph.D., L.P. [phone]            [place] [id number]  [place] [address]           Navya Covarrubias, Ph.D., L.P. [phone]     Confidential Summary of Psychological Evaluation*    REASON FOR CONSULTATION:  Phan Luque is a 61-year-old woman who was admitted to [facility] with acute alcoholic hepatitis on 07/28/16 and received a liver transplant on 08/25/16.  Ms. Luque is well known to me from contact during that hospital admission that actually continued until she discharged from the [facility] Transitional Care Unit on 1/5/2017. Ms Luque requested this contact to assist with some decision making about her need for antidepressant medication.      HISTORY OF PRESENT ILLNESS:  Ms Luque reports a history of low level depression that has affected her on and off over her life. She notes two episodes that were significant, one following the death of an infant son and one following the death of her father.  She acknowledges that she has experienced passive suicidal ideation, but has never had any plan or intention to harm herself.  Aside from a brief episodes of psychotherapy following the above-mentioned deaths, she has not had contact with mental health professionals until this hospitalization. During her hospital admission she did have a period of atypical anxiety that appears to have resolved.    She also has a significant history of alcohol abuse that apparently left her with no negative consequences until onset of symptoms of liver disease in 2016. She had never had any alcohol  "treatment until completing an outpatient CD treatment program following hospital discharge  in early 2017 through [facility] that was required by the transplant team.. Ms. Luque reports that she started drinking in college, and drank on and off over many years after that, stopping when she was pregnant and then restarting again.  She describes that she had, until the day of her last drink on 07/10/16, been drinking on a daily basis, up to almost a liter per day of vodka.  She is aware that anyone who knew her would have been surprised to know that she is an alcoholic.        Ms Luque had a long hospitalization including a long stay on TCU, and ws discharged to home on 1/5/2017. She completed an outpatient CD treatment program through [facility]. She is experiencing no urges to drink at all.    She returned to work on March 6, 2017. Reports that it was initially \"bumpy\" but that her work environment is supportive and she continues to feel more confident as time passes. She continues to work part-time, 25-30 hours/week.    She reports that her mood has been quite positive, though she continues to have lowered stamina and energy. Her responses to PHQ-9 on 5/31/17=4, responses to WALTER=2. When we met today I repeated the PHQ-9 and because she endorsed fatigue as an everyday occurrence, her result=6; both scores are below the cutoff for significant depression. She would like to consult with her primary care physician about tapering down and/or discontinuing her anti-depressant medications. She continues to use the medications that were current on discharge: sertraline 100 mg/day, buspirone 20 mg/day, and trazodone 100 mg/bedtime. Given her high number of essential medications, and her current positive mood and report of no anxiety, she would prefer to eliminate any medications that are not necessary.      SOCIAL HISTORY:  Ms. Luque has 3 adult children, but reportedly is in contact with only 2 " "of them and estranged from her other daughter.  Her daughter, Karishma, is very involved in her care; Maggie recently gave birth to a child.  Ms. Luque's son, Sherif, moved out of her home in [place] about a year ago.  Ms. Luque also tells me that she had another son who  in infancy, which triggered a very significant episode of depression.  Ms. Luque has been working as a  and  for [employer]. As noted above, she returned to work on 2017.      MEDICAL HISTORY:  Ms. Luque's chart reflects a history of osteoarthritis, asthma, and spinal stenosis in addition to her current medical issues.  Please see her Epic chart for more complete information on her medical history and all medications.       PSYCHIATRIC HISTORY:  As above in HPI.  Ms. Luque has a history of depression that she has apparently used alcohol to self-medicate.  She did have 2 short courses of psychotherapy, one following the death of her father, and one following the death of an infant son.  She has been abstinent of alcohol since 2016. She completed outpatient CD treatment through [facility]. Following her hospital discharge in 2017. She remains abstinent with no urges to drink No other significant psychiatric history was available at the time of this evaluation.     BEHAVIORAL IMPRESSIONS:  Ms. Luque presented for this evaluation dressed professionally and with make-up, looking quite different from how she appeared on our last contact in hospital prior to her discharge. She reported her mood as very slightly \"down\". Notes that she is not experiencing any anhedonia, hopelessness, sadness, or SI. Feels that she is reacting more to fatigue and getting back to her usual high level of functioning in her work environment. Denies all symptoms of anxiety, despite continued use of prednisone. Exhibits relaxed and positive affect. Insight and judgment appear to be " "intact.  She exhibited no evidence of distortions of thought or perception.       IMPRESSIONS AND PLAN:  Phan Luque is a 61-year-old woman who received a liver transplant on 08/25/16.  Although she had a history of depression that she had been secretly self-medicating with alcohol for many years, she was very open to ongoing contact with Health Psychology during her long hospitalization. She was also quite compliant with follow through on completing CD treatment following hospital discharge. She would like to minimize any non-essential medications and wonders if might be possible to discontinue antidepressants.    Ms Luque was able to identify her \"early warning signs\" of increase in depression as anhedonia and isolating herself. We discussed how she could monitor herself for any increase in these symptoms as she works with her PCP to taper down her psychotropic medication.    We agreed that she will contact her PCP and begin to work on this tapering. She and I will meet again on 6/26/17 to check in on progress and provide her with support through this transition.    DIAGNOSES:   1.  Alcohol abuse, uncomplicated, in early remission (F10.10).   2.  Major depressive disorder, recurrent, in partial remission(F33.41).          DANIELLA BAKER, PHD, LP      *In accordance with the Rules of the [place] Board of [employer], it is noted that psychological descriptions and scientific procedures underlying psychological evaluations have limitations.  Absolute predictions cannot be made based on information in this report.  "

## 2017-06-05 NOTE — NURSING NOTE
Labs per clinic 2A protocol.  Follow up/CKD 3  Last OV: 3/13/17  Sujey Harper LPN  Nephrology  [facility]  [phone]

## 2017-06-06 ENCOUNTER — AMBULATORY - HEALTHEAST (OUTPATIENT)
Dept: LAB | Facility: CLINIC | Age: 62
End: 2017-06-06

## 2017-06-06 ENCOUNTER — COMMUNICATION - HEALTHEAST (OUTPATIENT)
Dept: INTERNAL MEDICINE | Facility: CLINIC | Age: 62
End: 2017-06-06

## 2017-06-06 ENCOUNTER — TELEPHONE (OUTPATIENT)
Dept: PHARMACY | Facility: CLINIC | Age: 62
End: 2017-06-06

## 2017-06-06 DIAGNOSIS — Z94.4 LIVER REPLACED BY TRANSPLANT (H): ICD-10-CM

## 2017-06-06 DIAGNOSIS — Z94.4 LIVER TRANSPLANTED (H): ICD-10-CM

## 2017-06-06 DIAGNOSIS — Z79.899 ENCOUNTER FOR LONG-TERM (CURRENT) USE OF OTHER MEDICATIONS: ICD-10-CM

## 2017-06-06 LAB
TACROLIMUS BLD-MCNC: 6.4 UG/L (ref 5–15)
TME LAST DOSE: NORMAL H

## 2017-06-06 PROCEDURE — 80197 ASSAY OF TACROLIMUS: CPT | Performed by: INTERNAL MEDICINE

## 2017-06-06 NOTE — TELEPHONE ENCOUNTER
Anemia Management Note  SUBJECTIVE/OBJECTIVE:  Referred by Dr. Daya Gutierrez on 3/16/2017  Primary Diagnosis: Anemia in Chronic Kidney Disease (N18.3, D63.1)   Secondary Diagnosis: Chronic Kidney Disease, Stage 3 (N18.3)   Hgb goal range: 9-10  Epo/Darbo:  Aranesp 60 mcg every 14 days As needed - HOME  RX will  on 3/28/2018  Iron regimen: None  Lab orders will  on 3/28/18    Anemia Latest Ref Rng & Units 3/31/2017 2017 2017 2017 2017 2017 2017   MEDARDO Dose - 60 mcg - - - - - -   Hemoglobin 11.7 - 15.7 g/dL - 9.0(L) 10.7(L) 12.1 12.2 12.1 11.5(L)   TSAT 15 - 46 % - 31 - - - - 24   Ferritin 8 - 252 ng/mL - 278(H) - - - - 138     BP Readings from Last 3 Encounters:   17 124/75   17 140/70   17 142/83     Wt Readings from Last 2 Encounters:   17 164 lb 3.2 oz (74.5 kg)   17 154 lb (69.9 kg)     She does not tolerate oral iron  She has an appt w/Dr Banegas on 17    ASSESSMENT:  Hgb: Above goal - recommend hold dose  TSat: Not at goal >30% Ferritin: At goal (>100ng/mL)     PLAN:  RTC for hgb in 2 weeks     Orders needed to be renewed (for next follow-up date) in EPIC: None     Iron labs due:         Plan discussed with:  Phan  Plan provided by:  Jaqui    NEXT FOLLOW-UP DATE:  17    Anemia Management Service  Etienne KapoorD and Zamzam Olmedo CPhT  Phone: [phone]  Fax: [phone]

## 2017-06-07 ENCOUNTER — TELEPHONE (OUTPATIENT)
Dept: GASTROENTEROLOGY | Facility: CLINIC | Age: 62
End: 2017-06-07

## 2017-06-07 ENCOUNTER — TELEPHONE (OUTPATIENT)
Dept: TRANSPLANT | Facility: CLINIC | Age: 62
End: 2017-06-07

## 2017-06-07 ENCOUNTER — RECORDS - HEALTHEAST (OUTPATIENT)
Dept: ADMINISTRATIVE | Facility: OTHER | Age: 62
End: 2017-06-07

## 2017-06-07 DIAGNOSIS — Z94.4 LIVER TRANSPLANTED (H): ICD-10-CM

## 2017-06-07 LAB — ETHYL GLUCURONIDE UR QL: NORMAL

## 2017-06-07 RX ORDER — AZATHIOPRINE 100 MG/1
50 TABLET ORAL EVERY EVENING
Qty: 90 TABLET | Refills: 3 | Status: SHIPPED | OUTPATIENT
Start: 2017-06-07 | End: 2018-07-31

## 2017-06-07 NOTE — PROGRESS NOTES
Deamelissa Chisholm,     The thyroid function test were normal.     Ruth Oakley MD  2011  Endocrinology Service

## 2017-06-08 ENCOUNTER — RECORDS - HEALTHEAST (OUTPATIENT)
Dept: ADMINISTRATIVE | Facility: OTHER | Age: 62
End: 2017-06-08

## 2017-06-08 ENCOUNTER — TRANSFERRED RECORDS (OUTPATIENT)
Dept: HEALTH INFORMATION MANAGEMENT | Facility: CLINIC | Age: 62
End: 2017-06-08

## 2017-06-08 LAB
TACROLIMUS BLD-MCNC: 3.5 UG/L (ref 5–15)
TME LAST DOSE: ABNORMAL H

## 2017-06-08 NOTE — PROGRESS NOTES
[facility]  Nephrology Clinic follow up note    Date of encounter 6/5/2017       Assessment and Plan:     61  year old female with PMH of acute alcoholic hepatitis, alcoholic cirrhosis complicated by oliguric GERMAIN and hypervolemia requiring dialysis from 8/2/2016 to 9/6/2016. Patient post liver transplant on 8/25/2016. Patient now with CKD , most likely from injury sustained from  GERMAIN episode which was thought to be ischemic/toxic ATN and from decompensated cirrhosis. Seen last in Nephrology clinic on 3/2017.    Recommendations :-     1. CKD stage 3   :- serum Cr during visit was 1.19   mg/dL with eGFR 46  . Now with improved oral intake. Normal kidney in CT  scan abdomen from 8/2016. Patient with minimal   Proteinuria of 0.39 g.     - Avoid nephrotoxins.   - renal dosing of medications.   - monitor tacrolimus levels closely.     2. BP :- in acceptable range. Recently started on amlodipine 5 mg daily.     3. euvolemic on exam - continue low sodium diet    4. No  acid base issues.     5. Hypomagnesemia -serum Mg 1.9. Continue MgO 400 mg daily.     6. Anemia of CKD 3 - Hb 11.9. Follows with  anemia clinic. Received 1 dose of darbepoietin 60 mcg . Now held in the setting of improved Hb.       7. BMD :- serum Ca 8.8, serum albumin 3.6, serum phosphorus 3.3. Intact PTH 53,  vitamin D 25.        Discussed with Dr Gutierrez. Patient to follow up in 6 months. Patient to get renal panel, urine protein studies, Hb prior to next appointment.     Assessment and plan was discussed with patient and she voiced her understanding and agreement.    Reason for Visit:  Ms. Luque is here for follow up of CKD.     HPI:   61  year old female with PMH of acute alcoholic hepatitis, alcoholic cirrhosis complicated by oliguric GERMAIN and hypervolemia requiring dialysis from 8/2/2016 to 9/6/2016. Patient post liver transplant on 8/25/2016. Patient now with CKD , most likely from injury sustained from recent GERMAIN episode which was  thought to be ischemic/toxic ATN and  From decompensated cirrhosis. Seen last in Nephrology clinic in 12/2016.       post transplant period was complicated by  nausea, vomiting, poor oral intake and diarrhea . Immunosuppressants  were adjusted by transplant team. Patient now off NGT.  Patient reported her oral intake is improving.  Patients now off myfortic acid and started on imuran. Patient also on prograf. Immunosuppression managed by liver transplant team .  Patient had diffuse pain and started on prednisone 10 mg and patient reported her pain have improved. Recently started on amlodipine by Liver transplant team. Per transplant team note plan is to decrease imuran. Patient on 100 mg daily. Informed patient to call transplant coordinator and confirm the dose.       No SOB. No swelling of lower extremities. Overall doing better.      patient reported her diarrhea have markedly improved.             ROS:   A comprehensive review of systems was obtained and negative, except as noted in the HPI or PMH.    Active Medical Problems:  Patient Active Problem List   Diagnosis     Decompensation of cirrhosis of liver (H)     Liver transplanted (H)     Alcoholic hepatitis     Immunosuppression (H)     Anxiety     Alcoholic hepatitis without ascites     Enterococcus UTI     Liver transplant status (H)     Nausea & vomiting     Malnutrition (H)     Candidiasis of skin     Anemia     ACP (advance care planning)     Diarrhea     Hypothyroidism     Esophageal reflux     Major depression     Insomnia     CKD (chronic kidney disease) stage 3, GFR 30-59 ml/min     Anemia in stage 3 chronic kidney disease     Osteopenia       Personal Hx:  Social History     Social History     Marital status:      Spouse name: N/A     Number of children: N/A     Years of education: N/A     Occupational History     Not on file.     Social History Main Topics     Smoking status: Former Smoker     Smokeless tobacco: Never Used     Alcohol use  No      Comment: heavy use (750ml/2 days) up until 1 year ago; had cut down to 1 drink/day; none since 7/18/16     Drug use: No     Sexual activity: Not on file     Other Topics Concern     Not on file     Social History Narrative       Allergies:  Allergies   Allergen Reactions     Benadryl [Diphenhydramine] Hives     Cefaclor Hives     Hydrocodone-Acetaminophen Itching     Oxycodone Itching     Penicillins Hives     Sulfa Drugs Hives       Medications:  Prior to Admission medications    Medication Sig Start Date End Date Taking? Authorizing Provider   hydrocortisone (CORTAID) 1 % cream  12/1/16  Yes Reported, Patient   alpha-d-galactosidase (BEANO) tablet Take 1-2 tablets by mouth 3 times daily (before meals)   Yes Reported, Patient   hydrOXYzine (ATARAX) 25 MG tablet Take 1 tablet (25 mg) by mouth every 6 hours as needed for itching 9/14/16  Yes Fast, Heena Solo PA-C   sertraline (ZOLOFT) 50 MG tablet Take 1 tablet (50 mg) by mouth daily 9/14/16  Yes Fast, Heena Solo PA-C   loperamide (IMODIUM) 2 MG capsule Take 2 capsules (4 mg) by mouth 3 times daily 9/14/16  Yes Fast, Heena Solo PA-C   valGANciclovir (VALCYTE) 50 MG/ML SOLR Take 9 mLs (450 mg) by mouth every other day 9/15/16  Yes Fast, Heena Solo PA-C   CELLCEPT 200 MG/ML PO SUSPENSION 5 mLs (1,000 mg) by ORAL OR NJ TUBE route 2 times daily 9/14/16  Yes Fast, Heena Solo PA-C   tacrolimus (PROGRAF - GENERIC EQUIVALENT) 0.5 MG capsule Take 5 capsules (2.5 mg) by mouth 2 times daily 9/14/16  Yes Fast, Heena Solo PA-C   miconazole with skin protectant (ILDA ANTIFUNGAL) 2 % CREA cream Apply topically 2 times daily 9/14/16  Yes Fast, Heena Solo PA-C   nystatin (MYCOSTATIN) cream Apply topically 2 times daily as needed (groin rash) 9/14/16  Yes Fast, Heena Solo PA-C   folic acid (FOLATE) 500 mcg/mL SOLN Take 2 mLs (1 mg) by mouth daily 9/14/16  Yes Fast, Heena Solo PA-C   cyanocobalamin 1000 MCG TABS Take 1,000 mcg by  "mouth daily 9/14/16  Yes Fast, Heena Solo PA-C   sulfamethoxazole-trimethoprim (BACTRIM,SEPTRA) 200-40 mg/5ml suspension 10 mLs (80 mg) by Oral or Feeding Tube route daily Dose based on TMP component. 9/14/16  Yes Fast, Heena Solo PA-C   simethicone (MYLICON) 80 MG chewable tablet Take 1 tablet (80 mg) by mouth every 6 hours as needed for cramping 9/14/16  Yes Fast, Heena Solo PA-C   ursodiol (ACTIGALL) 20 mg/mL 15 mLs (300 mg) by Oral or Feeding Tube route 3 times daily 9/14/16  Yes Fast, Heena Solo PA-C   nystatin (MYCOSTATIN) 701229 UNIT/ML suspension Take 10 mLs (1,000,000 Units) by mouth 4 times daily 9/14/16  Yes Fast, Heena Solo PA-C   multivitamins with minerals (CERTAVITE/CEROVITE) LIQD 15 mLs by Per Feeding Tube route daily 9/14/16  Yes Fast, Heena Solo PA-C   pantoprazole (PROTONIX) 40 MG enteric coated tablet Take 1 tablet (40 mg) by mouth 2 times daily (before meals) 9/14/16  Yes Fast, Heena Solo PA-C   sucralfate (CARAFATE) 1 GM/10ML suspension Take 10 mLs (1 g) by mouth 3 times daily (before meals) 9/14/16  Yes Fast, Heena Solo PA-C   ONDANSETRON PO Take 4 mg by mouth every 8 hours as needed for nausea or vomiting   Yes Unknown, Entered By History       Vitals:  /75  Pulse 80  Ht 1.702 m (5' 7\")  Wt 74.5 kg (164 lb 3.2 oz)  SpO2 97%  BMI 25.72 kg/m2    Exam:   GENERAL APPEARANCE: alert and no distress  HENT: mouth without ulcers or lesions  LYMPHATICS: no cervical or supraclavicular nodes  RESP: lungs clear to auscultation - no rales, rhonchi or wheezes  CV: regular rhythm, normal rate, no rub, no murmur  EDEMA: no LE edema bilaterally  ABDOMEN: soft, nondistended, nontender, bowel sounds normal  MS: extremities normal - no gross deformities noted, no evidence of inflammation in joints, no muscle tenderness  SKIN: no rash    Results:        Electrolytes/Renal -   Recent Labs   Lab Test  06/05/17   1211  04/14/17   0937  04/05/17   1236   NA  134  138  " 139   POTASSIUM  4.7  4.1  4.2   CHLORIDE  100  103  106   CO2  25  27  25   BUN  19  18  11   CR  1.19*  1.51*  1.39*   GLC  104*  94  99   ERIC  8.8  8.9  8.5   MAG  1.9  2.1  1.9   PHOS  3.3  3.4  3.6       CBC -   Recent Labs   Lab Test  06/05/17   1211 05/23/17 05/09/17 04/14/17   0937  04/05/17   1236   WBC  11.0   --    --    --   5.7  3.9*   HGB  11.5*  12.1  12.2   < >  10.7*  9.0*   PLT  301   --    --    --   299  320    < > = values in this interval not displayed.       LFTs -   Recent Labs   Lab Test  06/05/17 1211 04/14/17   0937  04/05/17   1236   ALKPHOS  69  63  63   BILITOTAL  0.4  0.5  0.8   ALT  17  13  12   AST  14  9  13   PROTTOTAL  7.2  7.0  6.8   ALBUMIN  3.6  3.8  3.8       Coags -   Recent Labs   Lab Test  03/16/17   1728  09/13/16   1055  08/30/16   0740   08/25/16   1230  08/25/16   1055  08/25/16   0850   INR  1.15*  1.08  1.01   < >  1.76*  1.76*  1.87*   PTT   --    --    --    --   48*  59*  58*    < > = values in this interval not displayed.       Iron Panel -   Recent Labs   Lab Test  06/05/17   1211  04/05/17   1236  03/22/17   1607   IRON  59  56  62   IRONSAT  24  31  32   WILL  138  278*  320*       Endocrine -   Recent Labs   Lab Test  06/05/17   1211  04/05/17   1236  12/15/16   0615   TSH  1.41  1.26  1.26  2.18        Attestation:  This patient has been seen and evaluated by me, Daya Gutierrez MD on 6/5/17 .  Discussed with the fellow or resident and agree with the findings and plan in this note.     I have reviewed  Medications, Vital Signs and Labs.    Daya Gutierrez MD  [facility]  Department of Medicine  Division of Renal Disease and Hypertension  [phone]

## 2017-06-09 ENCOUNTER — OFFICE VISIT (OUTPATIENT)
Dept: GASTROENTEROLOGY | Facility: CLINIC | Age: 62
End: 2017-06-09
Attending: INTERNAL MEDICINE
Payer: COMMERCIAL

## 2017-06-09 ENCOUNTER — RECORDS - HEALTHEAST (OUTPATIENT)
Dept: ADMINISTRATIVE | Facility: OTHER | Age: 62
End: 2017-06-09

## 2017-06-09 ENCOUNTER — ALLIED HEALTH/NURSE VISIT (OUTPATIENT)
Dept: OPHTHALMOLOGY | Facility: CLINIC | Age: 62
End: 2017-06-09

## 2017-06-09 VITALS
SYSTOLIC BLOOD PRESSURE: 132 MMHG | HEIGHT: 67 IN | DIASTOLIC BLOOD PRESSURE: 79 MMHG | BODY MASS INDEX: 25.68 KG/M2 | OXYGEN SATURATION: 100 % | TEMPERATURE: 98.2 F | WEIGHT: 163.6 LBS | HEART RATE: 72 BPM

## 2017-06-09 DIAGNOSIS — H47.019 AION (ACUTE ISCHEMIC OPTIC NEUROPATHY): Primary | ICD-10-CM

## 2017-06-09 DIAGNOSIS — Z94.4 LIVER REPLACED BY TRANSPLANT (H): ICD-10-CM

## 2017-06-09 DIAGNOSIS — Z94.4 LIVER REPLACED BY TRANSPLANT (H): Primary | ICD-10-CM

## 2017-06-09 LAB
ALBUMIN SERPL-MCNC: 3.7 G/DL (ref 3.4–5)
ALP SERPL-CCNC: 85 U/L (ref 40–150)
ALT SERPL W P-5'-P-CCNC: 15 U/L (ref 0–50)
ANION GAP SERPL CALCULATED.3IONS-SCNC: 8 MMOL/L (ref 3–14)
AST SERPL W P-5'-P-CCNC: 14 U/L (ref 0–45)
BILIRUB DIRECT SERPL-MCNC: <0.1 MG/DL (ref 0–0.2)
BILIRUB SERPL-MCNC: 0.4 MG/DL (ref 0.2–1.3)
BUN SERPL-MCNC: 15 MG/DL (ref 7–30)
CALCIUM SERPL-MCNC: 9 MG/DL (ref 8.5–10.1)
CHLORIDE SERPL-SCNC: 100 MMOL/L (ref 94–109)
CO2 SERPL-SCNC: 29 MMOL/L (ref 20–32)
CREAT SERPL-MCNC: 1.29 MG/DL (ref 0.52–1.04)
ERYTHROCYTE [DISTWIDTH] IN BLOOD BY AUTOMATED COUNT: 13.2 % (ref 10–15)
GFR SERPL CREATININE-BSD FRML MDRD: 42 ML/MIN/1.7M2
GLUCOSE SERPL-MCNC: 97 MG/DL (ref 70–99)
HCT VFR BLD AUTO: 35.3 % (ref 35–47)
HGB BLD-MCNC: 11.8 G/DL (ref 11.7–15.7)
MAGNESIUM SERPL-MCNC: 2 MG/DL (ref 1.6–2.3)
MCH RBC QN AUTO: 34 PG (ref 26.5–33)
MCHC RBC AUTO-ENTMCNC: 33.4 G/DL (ref 31.5–36.5)
MCV RBC AUTO: 102 FL (ref 78–100)
PHOSPHATE SERPL-MCNC: 3.6 MG/DL (ref 2.5–4.5)
PLATELET # BLD AUTO: 299 10E9/L (ref 150–450)
POTASSIUM SERPL-SCNC: 3.7 MMOL/L (ref 3.4–5.3)
PROT SERPL-MCNC: 7.2 G/DL (ref 6.8–8.8)
RBC # BLD AUTO: 3.47 10E12/L (ref 3.8–5.2)
SODIUM SERPL-SCNC: 137 MMOL/L (ref 133–144)
WBC # BLD AUTO: 7.1 10E9/L (ref 4–11)

## 2017-06-09 PROCEDURE — 84100 ASSAY OF PHOSPHORUS: CPT | Performed by: INTERNAL MEDICINE

## 2017-06-09 PROCEDURE — 80048 BASIC METABOLIC PNL TOTAL CA: CPT | Performed by: INTERNAL MEDICINE

## 2017-06-09 PROCEDURE — 85027 COMPLETE CBC AUTOMATED: CPT | Performed by: INTERNAL MEDICINE

## 2017-06-09 PROCEDURE — 36415 COLL VENOUS BLD VENIPUNCTURE: CPT | Performed by: INTERNAL MEDICINE

## 2017-06-09 PROCEDURE — 80076 HEPATIC FUNCTION PANEL: CPT | Performed by: INTERNAL MEDICINE

## 2017-06-09 PROCEDURE — 99212 OFFICE O/P EST SF 10 MIN: CPT | Mod: ZF

## 2017-06-09 PROCEDURE — 83735 ASSAY OF MAGNESIUM: CPT | Performed by: INTERNAL MEDICINE

## 2017-06-09 ASSESSMENT — VISUAL ACUITY
METHOD: SNELLEN - LINEAR
OD_SC: 20/20
OS_SC: 20/70

## 2017-06-09 ASSESSMENT — TONOMETRY
IOP_METHOD: APPLANATION
OD_IOP_MMHG: 14
OS_IOP_MMHG: 14

## 2017-06-09 ASSESSMENT — PAIN SCALES - GENERAL: PAINLEVEL: SEVERE PAIN (7)

## 2017-06-09 NOTE — LETTER
6/9/2017      RE: Phan Luque  [address] [address]       I had the pleasure of seeing Phan Luque for followup in the Liver Transplantation Clinic at the [facility] on 06/09/2017.  Ms. Luque returns for followup now 9 months status post liver transplantation for alcoholic hepatitis.        She is doing fairly well at this point in time.  When I saw her last, we started her on some prednisone because she had fairly severe arthralgias.  With 10 mg of prednisone that has improved, but it has increased her appetite and she has gained some weight.       She denies any abdominal pain, itching or skin rash or fatigue.  She denies any increased abdominal girth or lower extremity edema.  She denies any fevers or chills, cough or shortness of breath.  She denies any nausea, vomiting, diarrhea or constipation.  Her appetite has been good, and as I mentioned, her weight is up 10 pounds.        She did have a left ankle fracture and has some right hip pain for which she is following with an orthopedist.       Current Outpatient Prescriptions   Medication     azaTHIOprine 100 MG TABS     amLODIPine (NORVASC) 5 MG tablet     Omega-3 Fatty Acids (FISH OIL PO)     predniSONE (DELTASONE) 5 MG tablet     latanoprost (XALATAN) 0.005 % ophthalmic solution     darbepoetin vandana (ARANESP, ALBUMIN FREE,) 60 MCG/0.3ML injection     magnesium oxide (MAG-OX) 400 (241.3 MG) MG tablet     traMADol (ULTRAM) 50 MG tablet     calcium Citrate-vitamin D 500-400 MG-UNIT CHEW     hydrOXYzine (ATARAX) 25 MG tablet     traZODone (DESYREL) 100 MG tablet     acetaminophen (TYLENOL) 325 MG tablet     loperamide (IMODIUM) 2 MG capsule     ursodiol (ACTIGALL) 300 MG capsule     tacrolimus (PROGRAF - GENERIC EQUIVALENT) 0.5 MG capsule     simethicone (MYLICON) 80 MG chewable tablet     sertraline (ZOLOFT) 100 MG tablet     psyllium 0.52 G capsule     pantoprazole (PROTONIX) 40 MG EC tablet      multivitamin, therapeutic (THERA-VIT) TABS tablet     levothyroxine (SYNTHROID/LEVOTHROID) 88 MCG tablet     folic acid (FOLVITE) 1 MG tablet     cyanocobalamin 1000 MCG TABS     busPIRone (BUSPAR) 10 MG tablet     No current facility-administered medications for this visit.      B/P: 132/79, T: 98.2, P: 72, R: Data Unavailable    HEENT exam shows no scleral icterus and no temporal muscle wasting.  Chest is clear.  Abdominal exam shows no increase in girth.  No masses or tenderness to palpation are present.  Liver is 10 cm in span without left lobe enlargement.  Her incision is intact.  No spleen tip is palpable, and extremity exam shows no edema.  Skin exam shows no suspicious lesions, and neurologic exam is nonfocal.       Recent Results (from the past 168 hour(s))   CBC with platelets    Collection Time: 06/05/17 12:11 PM   Result Value Ref Range    WBC 11.0 4.0 - 11.0 10e9/L    RBC Count 3.35 (L) 3.8 - 5.2 10e12/L    Hemoglobin 11.5 (L) 11.7 - 15.7 g/dL    Hematocrit 34.0 (L) 35.0 - 47.0 %     (H) 78 - 100 fl    MCH 34.3 (H) 26.5 - 33.0 pg    MCHC 33.8 31.5 - 36.5 g/dL    RDW 13.2 10.0 - 15.0 %    Platelet Count 301 150 - 450 10e9/L   Magnesium    Collection Time: 06/05/17 12:11 PM   Result Value Ref Range    Magnesium 1.9 1.6 - 2.3 mg/dL   Tacrolimus level    Collection Time: 06/05/17 12:11 PM   Result Value Ref Range    Tacrolimus Last Dose Not Provided     Tacrolimus Level 6.4 5.0 - 15.0 ug/L   Iron and iron binding capacity    Collection Time: 06/05/17 12:11 PM   Result Value Ref Range    Iron 59 35 - 180 ug/dL    Iron Binding Cap 248 240 - 430 ug/dL    Iron Saturation Index 24 15 - 46 %   Ferritin    Collection Time: 06/05/17 12:11 PM   Result Value Ref Range    Ferritin 138 8 - 252 ng/mL   TSH with free T4 reflex    Collection Time: 06/05/17 12:11 PM   Result Value Ref Range    TSH 1.41 0.40 - 4.00 mU/L   T4 free    Collection Time: 06/05/17 12:11 PM   Result Value Ref Range    T4 Free 1.04 0.76 -  1.46 ng/dL   Renal panel    Collection Time: 06/05/17 12:11 PM   Result Value Ref Range    Sodium 134 133 - 144 mmol/L    Potassium 4.7 3.4 - 5.3 mmol/L    Chloride 100 94 - 109 mmol/L    Carbon Dioxide 25 20 - 32 mmol/L    Anion Gap 8 3 - 14 mmol/L    Glucose 104 (H) 70 - 99 mg/dL    Urea Nitrogen 19 7 - 30 mg/dL    Creatinine 1.19 (H) 0.52 - 1.04 mg/dL    GFR Estimate 46 (L) >60 mL/min/1.7m2    GFR Estimate If Black 56 (L) >60 mL/min/1.7m2    Calcium 8.8 8.5 - 10.1 mg/dL    Phosphorus 3.3 2.5 - 4.5 mg/dL    Albumin 3.6 3.4 - 5.0 g/dL   Alkaline phosphatase    Collection Time: 06/05/17 12:11 PM   Result Value Ref Range    Alkaline Phosphatase 69 40 - 150 U/L   ALT    Collection Time: 06/05/17 12:11 PM   Result Value Ref Range    ALT 17 0 - 50 U/L   AST    Collection Time: 06/05/17 12:11 PM   Result Value Ref Range    AST 14 0 - 45 U/L   Bilirubin  total    Collection Time: 06/05/17 12:11 PM   Result Value Ref Range    Bilirubin Total 0.4 0.2 - 1.3 mg/dL   Bilirubin direct    Collection Time: 06/05/17 12:11 PM   Result Value Ref Range    Bilirubin Direct <0.1 0.0 - 0.2 mg/dL   Protein total    Collection Time: 06/05/17 12:11 PM   Result Value Ref Range    Protein Total 7.2 6.8 - 8.8 g/dL   Ethyl Glucuronide Urine    Collection Time: 06/05/17 12:14 PM   Result Value Ref Range    Ethyl Glucuronide Urine       NEGATIVE  Unit: not reported  (Note)  This specimen was screened by immunoassay. Any positive  result was confirmed by liquid chromatography with tandem  mass spectrometry (LC/MS/MS).    The following threshold concentrations were used for  this analysis:    Drug           Screening Threshold Confirmation Threshold  Ethyl Glucuronide    500 ng/mL              500 ng/mL  Ethyl Sulfate                              250 ng/mL    Alternative explanations should be explored for any  positive finding.    Please note that incidental exposure to alcohol may result  in detectable levels of ETG and/or ETS. ETG/ETS  results  should be interpreted in the context of all available  clinical and behavioral information. Technical consultation  is available; contact [facility] ([facility]) customer service at [phone].    Reference: [facility] Advisory, Spring 2012 Volume 11, Issue 2    This test was developed and its performance characteristics  determined by [employer]. It has not been cleared or approved  by the Food and  Drug Administration.    Analysis performed by [facility], [address] [place] [address]     Protein  random urine    Collection Time: 06/05/17 12:14 PM   Result Value Ref Range    Protein Random Urine 0.20 g/L    Protein Total Urine g/gr Creatinine 0.39 (H) 0 - 0.2 g/g Cr   Creatinine urine calculation only    Collection Time: 06/05/17 12:14 PM   Result Value Ref Range    Creatinine Urine 51 mg/dL   TXP External Lab Result    Collection Time: 06/06/17  7:53 AM   Result Value Ref Range    Sodium (External) 140 136 - 145 mmol/L    Potassium (External) 4.2 3.5 - 5.0 mmol/L    Chloride (External) 104 98 - 107 mmol/L    CO2 (External) 25 22 - 31 mmol/L    Anion Gap (External) 11 5 - 18 mmol/L    Glucose (External) 89 70 - 125 mg/dL    Calcium (External) 9.3 8.5 - 10.5 mg/dL    Urea Nitrogen (External) 16 8 - 22 mg/dL    Creatinine (External) 1.21 (H) 0.60 - 1.10 mg/dL    GFR Est if Black (External) 55 (L) >60 ml/min/1.73 m2    GFR Estimated (External) 45 (L) >60 ml/min/1.73 m2    Phosphorus (External) 4.7 (H) 2.5 - 4.5 mg/dL    Magnesium (External) 1.9 1.8 - 2.6 mg/dL    Bilirubin Total (External) 0.3 0.0 - 1.0 mg/dL    Bilirubin Direct (External) <0.1 <=0.5 mg/dL    Protein Total (External) 6.8 6.0 - 8.0 g/dL    Albumin (External) 3.8 3.5 - 5.0 g/dL    Alk Phosphatase (External) 73 45 - 120 U/L    AST (External) 12 0 - 40 U/L    ALT (External) 10 0 - 45 U/L    WBC Count (External) 7.0 4.0 - 11.0 thou/uL    RBC Count (External) 3.60 (L) 3.80 - 5.40 mill/uL    Hemoglobin (External) 11.9 (L) 12.0 - 16.0 g/dL    Hematocrit (External)  35.5 35.0 - 47.0 %    MCV (External) 99 80 - 100 fL    MCH (External) 33.1 27.0 - 34.0 pg    MCHC (External) 33.6 32.0 - 36.0 g/dL    RDW (External) 12.3 11.0 - 14.5 %    Platelet Count (External) 336 140 - 440 thou/uL    MPV (External) 6.8 (L) 7.0 - 10.0 fL   Tacrolimus level    Collection Time: 06/06/17  7:59 AM   Result Value Ref Range    Tacrolimus Last Dose 1945 06/05/17     Tacrolimus Level 3.5 (L) 5.0 - 15.0 ug/L   CBC with platelets    Collection Time: 06/09/17  8:02 AM   Result Value Ref Range    WBC 7.1 4.0 - 11.0 10e9/L    RBC Count 3.47 (L) 3.8 - 5.2 10e12/L    Hemoglobin 11.8 11.7 - 15.7 g/dL    Hematocrit 35.3 35.0 - 47.0 %     (H) 78 - 100 fl    MCH 34.0 (H) 26.5 - 33.0 pg    MCHC 33.4 31.5 - 36.5 g/dL    RDW 13.2 10.0 - 15.0 %    Platelet Count 299 150 - 450 10e9/L   Basic metabolic panel    Collection Time: 06/09/17  8:02 AM   Result Value Ref Range    Sodium 137 133 - 144 mmol/L    Potassium 3.7 3.4 - 5.3 mmol/L    Chloride 100 94 - 109 mmol/L    Carbon Dioxide 29 20 - 32 mmol/L    Anion Gap 8 3 - 14 mmol/L    Glucose 97 70 - 99 mg/dL    Urea Nitrogen 15 7 - 30 mg/dL    Creatinine 1.29 (H) 0.52 - 1.04 mg/dL    GFR Estimate 42 (L) >60 mL/min/1.7m2    GFR Estimate If Black 51 (L) >60 mL/min/1.7m2    Calcium 9.0 8.5 - 10.1 mg/dL   Phosphorus    Collection Time: 06/09/17  8:02 AM   Result Value Ref Range    Phosphorus 3.6 2.5 - 4.5 mg/dL   Magnesium    Collection Time: 06/09/17  8:02 AM   Result Value Ref Range    Magnesium 2.0 1.6 - 2.3 mg/dL   Hepatic panel    Collection Time: 06/09/17  8:02 AM   Result Value Ref Range    Bilirubin Direct <0.1 0.0 - 0.2 mg/dL    Bilirubin Total 0.4 0.2 - 1.3 mg/dL    Albumin 3.7 3.4 - 5.0 g/dL    Protein Total 7.2 6.8 - 8.8 g/dL    Alkaline Phosphatase 85 40 - 150 U/L    ALT 15 0 - 50 U/L    AST 14 0 - 45 U/L      My impression is that Ms. Luque is doing well now more than 9 months status post liver transplantation for alcoholic hepatitis.  She continues to  abstain from alcohol and has been very compliant with her posttransplant followup.  Right now, her major problems seem to be orthopedic.  I will lower her prednisone to 7.5 mg per day.  She is on 50 mg of Imuran.  I will not be making any other change to her medical regimen.  I will see her back in the clinic again in 3 months.      Thank you very much for allowing me to participate in the care of this patient. If you have any questions regarding my recommendations, please do not hesitate to contact me.       Hussein Banegas MD      Professor of Medicine  [employer]      Executive Medical Director, Solid Organ Transplant Program  [facility]

## 2017-06-09 NOTE — NURSING NOTE
"Chief Complaint   Patient presents with     RECHECK     Follow up alcholic hepatitis without ascites.       Initial /79  Pulse 72  Temp 98.2  F (36.8  C) (Oral)  Ht 1.702 m (5' 7\")  Wt 74.2 kg (163 lb 9.6 oz)  SpO2 100%  BMI 25.62 kg/m2 Estimated body mass index is 25.62 kg/(m^2) as calculated from the following:    Height as of this encounter: 1.702 m (5' 7\").    Weight as of this encounter: 74.2 kg (163 lb 9.6 oz).  Medication Reconciliation: complete   Faye Torres., CMA    "

## 2017-06-09 NOTE — PROGRESS NOTES
I had the pleasure of seeing Phan Luque for followup in the Liver Transplantation Clinic at the [facility] on 06/09/2017.  Ms. uLque returns for followup now 9 months status post liver transplantation for alcoholic hepatitis.        She is doing fairly well at this point in time.  When I saw her last, we started her on some prednisone because she had fairly severe arthralgias.  With 10 mg of prednisone that has improved, but it has increased her appetite and she has gained some weight.       She denies any abdominal pain, itching or skin rash or fatigue.  She denies any increased abdominal girth or lower extremity edema.  She denies any fevers or chills, cough or shortness of breath.  She denies any nausea, vomiting, diarrhea or constipation.  Her appetite has been good, and as I mentioned, her weight is up 10 pounds.        She did have a left ankle fracture and has some right hip pain for which she is following with an orthopedist.       Current Outpatient Prescriptions   Medication     azaTHIOprine 100 MG TABS     amLODIPine (NORVASC) 5 MG tablet     Omega-3 Fatty Acids (FISH OIL PO)     predniSONE (DELTASONE) 5 MG tablet     latanoprost (XALATAN) 0.005 % ophthalmic solution     darbepoetin vandana (ARANESP, ALBUMIN FREE,) 60 MCG/0.3ML injection     magnesium oxide (MAG-OX) 400 (241.3 MG) MG tablet     traMADol (ULTRAM) 50 MG tablet     calcium Citrate-vitamin D 500-400 MG-UNIT CHEW     hydrOXYzine (ATARAX) 25 MG tablet     traZODone (DESYREL) 100 MG tablet     acetaminophen (TYLENOL) 325 MG tablet     loperamide (IMODIUM) 2 MG capsule     ursodiol (ACTIGALL) 300 MG capsule     tacrolimus (PROGRAF - GENERIC EQUIVALENT) 0.5 MG capsule     simethicone (MYLICON) 80 MG chewable tablet     sertraline (ZOLOFT) 100 MG tablet     psyllium 0.52 G capsule     pantoprazole (PROTONIX) 40 MG EC tablet     multivitamin, therapeutic (THERA-VIT) TABS tablet     levothyroxine (SYNTHROID/LEVOTHROID)  88 MCG tablet     folic acid (FOLVITE) 1 MG tablet     cyanocobalamin 1000 MCG TABS     busPIRone (BUSPAR) 10 MG tablet     No current facility-administered medications for this visit.      B/P: 132/79, T: 98.2, P: 72, R: Data Unavailable    HEENT exam shows no scleral icterus and no temporal muscle wasting.  Chest is clear.  Abdominal exam shows no increase in girth.  No masses or tenderness to palpation are present.  Liver is 10 cm in span without left lobe enlargement.  Her incision is intact.  No spleen tip is palpable, and extremity exam shows no edema.  Skin exam shows no suspicious lesions, and neurologic exam is nonfocal.       Recent Results (from the past 168 hour(s))   CBC with platelets    Collection Time: 06/05/17 12:11 PM   Result Value Ref Range    WBC 11.0 4.0 - 11.0 10e9/L    RBC Count 3.35 (L) 3.8 - 5.2 10e12/L    Hemoglobin 11.5 (L) 11.7 - 15.7 g/dL    Hematocrit 34.0 (L) 35.0 - 47.0 %     (H) 78 - 100 fl    MCH 34.3 (H) 26.5 - 33.0 pg    MCHC 33.8 31.5 - 36.5 g/dL    RDW 13.2 10.0 - 15.0 %    Platelet Count 301 150 - 450 10e9/L   Magnesium    Collection Time: 06/05/17 12:11 PM   Result Value Ref Range    Magnesium 1.9 1.6 - 2.3 mg/dL   Tacrolimus level    Collection Time: 06/05/17 12:11 PM   Result Value Ref Range    Tacrolimus Last Dose Not Provided     Tacrolimus Level 6.4 5.0 - 15.0 ug/L   Iron and iron binding capacity    Collection Time: 06/05/17 12:11 PM   Result Value Ref Range    Iron 59 35 - 180 ug/dL    Iron Binding Cap 248 240 - 430 ug/dL    Iron Saturation Index 24 15 - 46 %   Ferritin    Collection Time: 06/05/17 12:11 PM   Result Value Ref Range    Ferritin 138 8 - 252 ng/mL   TSH with free T4 reflex    Collection Time: 06/05/17 12:11 PM   Result Value Ref Range    TSH 1.41 0.40 - 4.00 mU/L   T4 free    Collection Time: 06/05/17 12:11 PM   Result Value Ref Range    T4 Free 1.04 0.76 - 1.46 ng/dL   Renal panel    Collection Time: 06/05/17 12:11 PM   Result Value Ref Range     Sodium 134 133 - 144 mmol/L    Potassium 4.7 3.4 - 5.3 mmol/L    Chloride 100 94 - 109 mmol/L    Carbon Dioxide 25 20 - 32 mmol/L    Anion Gap 8 3 - 14 mmol/L    Glucose 104 (H) 70 - 99 mg/dL    Urea Nitrogen 19 7 - 30 mg/dL    Creatinine 1.19 (H) 0.52 - 1.04 mg/dL    GFR Estimate 46 (L) >60 mL/min/1.7m2    GFR Estimate If Black 56 (L) >60 mL/min/1.7m2    Calcium 8.8 8.5 - 10.1 mg/dL    Phosphorus 3.3 2.5 - 4.5 mg/dL    Albumin 3.6 3.4 - 5.0 g/dL   Alkaline phosphatase    Collection Time: 06/05/17 12:11 PM   Result Value Ref Range    Alkaline Phosphatase 69 40 - 150 U/L   ALT    Collection Time: 06/05/17 12:11 PM   Result Value Ref Range    ALT 17 0 - 50 U/L   AST    Collection Time: 06/05/17 12:11 PM   Result Value Ref Range    AST 14 0 - 45 U/L   Bilirubin  total    Collection Time: 06/05/17 12:11 PM   Result Value Ref Range    Bilirubin Total 0.4 0.2 - 1.3 mg/dL   Bilirubin direct    Collection Time: 06/05/17 12:11 PM   Result Value Ref Range    Bilirubin Direct <0.1 0.0 - 0.2 mg/dL   Protein total    Collection Time: 06/05/17 12:11 PM   Result Value Ref Range    Protein Total 7.2 6.8 - 8.8 g/dL   Ethyl Glucuronide Urine    Collection Time: 06/05/17 12:14 PM   Result Value Ref Range    Ethyl Glucuronide Urine       NEGATIVE  Unit: not reported  (Note)  This specimen was screened by immunoassay. Any positive  result was confirmed by liquid chromatography with tandem  mass spectrometry (LC/MS/MS).    The following threshold concentrations were used for  this analysis:    Drug           Screening Threshold Confirmation Threshold  Ethyl Glucuronide    500 ng/mL              500 ng/mL  Ethyl Sulfate                              250 ng/mL    Alternative explanations should be explored for any  positive finding.    Please note that incidental exposure to alcohol may result  in detectable levels of ETG and/or ETS. ETG/ETS results  should be interpreted in the context of all available  clinical and behavioral information.  Technical consultation  is available; contact [employer] customer service at [phone].    Reference: [facility] Advisory, Spring 2012 Volume 11, Issue 2    This test was developed and its performance characteristics  determined by [employer]. It has not been cleared or approved  by the Food and  Drug Administration.    Analysis performed by [employer], [address] [place] [address]     Protein  random urine    Collection Time: 06/05/17 12:14 PM   Result Value Ref Range    Protein Random Urine 0.20 g/L    Protein Total Urine g/gr Creatinine 0.39 (H) 0 - 0.2 g/g Cr   Creatinine urine calculation only    Collection Time: 06/05/17 12:14 PM   Result Value Ref Range    Creatinine Urine 51 mg/dL   TXP External Lab Result    Collection Time: 06/06/17  7:53 AM   Result Value Ref Range    Sodium (External) 140 136 - 145 mmol/L    Potassium (External) 4.2 3.5 - 5.0 mmol/L    Chloride (External) 104 98 - 107 mmol/L    CO2 (External) 25 22 - 31 mmol/L    Anion Gap (External) 11 5 - 18 mmol/L    Glucose (External) 89 70 - 125 mg/dL    Calcium (External) 9.3 8.5 - 10.5 mg/dL    Urea Nitrogen (External) 16 8 - 22 mg/dL    Creatinine (External) 1.21 (H) 0.60 - 1.10 mg/dL    GFR Est if Black (External) 55 (L) >60 ml/min/1.73 m2    GFR Estimated (External) 45 (L) >60 ml/min/1.73 m2    Phosphorus (External) 4.7 (H) 2.5 - 4.5 mg/dL    Magnesium (External) 1.9 1.8 - 2.6 mg/dL    Bilirubin Total (External) 0.3 0.0 - 1.0 mg/dL    Bilirubin Direct (External) <0.1 <=0.5 mg/dL    Protein Total (External) 6.8 6.0 - 8.0 g/dL    Albumin (External) 3.8 3.5 - 5.0 g/dL    Alk Phosphatase (External) 73 45 - 120 U/L    AST (External) 12 0 - 40 U/L    ALT (External) 10 0 - 45 U/L    WBC Count (External) 7.0 4.0 - 11.0 thou/uL    RBC Count (External) 3.60 (L) 3.80 - 5.40 mill/uL    Hemoglobin (External) 11.9 (L) 12.0 - 16.0 g/dL    Hematocrit (External) 35.5 35.0 - 47.0 %    MCV (External) 99 80 - 100 fL    MCH (External) 33.1 27.0 - 34.0 pg    MCHC  (External) 33.6 32.0 - 36.0 g/dL    RDW (External) 12.3 11.0 - 14.5 %    Platelet Count (External) 336 140 - 440 thou/uL    MPV (External) 6.8 (L) 7.0 - 10.0 fL   Tacrolimus level    Collection Time: 06/06/17  7:59 AM   Result Value Ref Range    Tacrolimus Last Dose 1945 06/05/17     Tacrolimus Level 3.5 (L) 5.0 - 15.0 ug/L   CBC with platelets    Collection Time: 06/09/17  8:02 AM   Result Value Ref Range    WBC 7.1 4.0 - 11.0 10e9/L    RBC Count 3.47 (L) 3.8 - 5.2 10e12/L    Hemoglobin 11.8 11.7 - 15.7 g/dL    Hematocrit 35.3 35.0 - 47.0 %     (H) 78 - 100 fl    MCH 34.0 (H) 26.5 - 33.0 pg    MCHC 33.4 31.5 - 36.5 g/dL    RDW 13.2 10.0 - 15.0 %    Platelet Count 299 150 - 450 10e9/L   Basic metabolic panel    Collection Time: 06/09/17  8:02 AM   Result Value Ref Range    Sodium 137 133 - 144 mmol/L    Potassium 3.7 3.4 - 5.3 mmol/L    Chloride 100 94 - 109 mmol/L    Carbon Dioxide 29 20 - 32 mmol/L    Anion Gap 8 3 - 14 mmol/L    Glucose 97 70 - 99 mg/dL    Urea Nitrogen 15 7 - 30 mg/dL    Creatinine 1.29 (H) 0.52 - 1.04 mg/dL    GFR Estimate 42 (L) >60 mL/min/1.7m2    GFR Estimate If Black 51 (L) >60 mL/min/1.7m2    Calcium 9.0 8.5 - 10.1 mg/dL   Phosphorus    Collection Time: 06/09/17  8:02 AM   Result Value Ref Range    Phosphorus 3.6 2.5 - 4.5 mg/dL   Magnesium    Collection Time: 06/09/17  8:02 AM   Result Value Ref Range    Magnesium 2.0 1.6 - 2.3 mg/dL   Hepatic panel    Collection Time: 06/09/17  8:02 AM   Result Value Ref Range    Bilirubin Direct <0.1 0.0 - 0.2 mg/dL    Bilirubin Total 0.4 0.2 - 1.3 mg/dL    Albumin 3.7 3.4 - 5.0 g/dL    Protein Total 7.2 6.8 - 8.8 g/dL    Alkaline Phosphatase 85 40 - 150 U/L    ALT 15 0 - 50 U/L    AST 14 0 - 45 U/L      My impression is that Ms. Luque is doing well now more than 9 months status post liver transplantation for alcoholic hepatitis.  She continues to abstain from alcohol and has been very compliant with her posttransplant followup.  Right now, her  major problems seem to be orthopedic.  I will lower her prednisone to 7.5 mg per day.  She is on 50 mg of Imuran.  I will not be making any other change to her medical regimen.  I will see her back in the clinic again in 3 months.      Thank you very much for allowing me to participate in the care of this patient. If you have any questions regarding my recommendations, please do not hesitate to contact me.       Hussein Banegas MD      Professor of Medicine  [employer]      Executive Medical Director, Solid Organ Transplant Program  [facility]

## 2017-06-09 NOTE — MR AVS SNAPSHOT
After Visit Summary   6/9/2017    Phan Luque    MRN: [id number]           Patient Information     Date Of Birth          1955        Visit Information        Provider Department      6/9/2017 8:30 AM Hussein Banegas MD [facility] Hepatology         Follow-ups after your visit        Follow-up notes from your care team     Return in about 3 months (around 9/9/2017).      Your next 10 appointments already scheduled     Jun 09, 2017  9:45 AM CDT   (Arrive by 9:30 AM)   TECH with  OCT   [facility] Ophthalmology ([facility])    [address] [place] [address]  [address] [facility] [address]   [phone]            Jun 16, 2017 11:30 AM CDT   (Arrive by 11:15 AM)   Return Visit with DONOVAN Castellanos [facility] Gastroenterology and IBD ([facility])    [address] [place] [address]   [phone]            Jun 26, 2017  4:00 PM CDT   (Arrive by 3:45 PM)   Return Visit with Zulma White, PhD [facility] ([facility])    [address] [place] [address]   [phone]            Aug 14, 2017  4:00 PM CDT   (Arrive by 3:45 PM)   RETURN ENDOCRINE with Ruth Oakley MD   [facility] Endocrinology ([facility])    [address] [place] [address]   [phone]            Sep 08, 2017  7:00 AM CDT   Lab with  LAB   [facility] Lab ([facility])    [address] [place] [address]   [phone]            Sep 08, 2017  8:00 AM CDT   (Arrive by 7:45 AM)   Return Liver Transplant with Hussein Banegas MD   [facility] Hepatology ([facility])    [address] [place] [address]   [phone]            Nov 20, 2017  4:30 PM CST   (Arrive by 4:15 PM)   RETURN ENDOCRINE with Ruth Argueta  MD Adin   [facility] Endocrinology ([facility])    [address] [facility]  3rd [facility] [id number] [phone]            Dec 04, 2017  3:30 PM CST   Lab with  LAB   [facility] Lab ([facility])    [address] [place] [id number] [phone]            Dec 04, 2017  4:30 PM CST   (Arrive by 4:00 PM)   Return Visit with Daya Gutierrez MD   [facility] Nephrology ([facility])    [address] [place] [address]  [address] [facility] [address] [phone]              Who to contact     If you have questions or need follow up information about today's clinic visit or your schedule please contact [facility] HEPATOLOGY directly at [phone].  Normal or non-critical lab and imaging results will be communicated to you by MyChart, letter or phone within 4 business days after the clinic has received the results. If you do not hear from us within 7 days, please contact the clinic through [employer] or phone. If you have a critical or abnormal lab result, we will notify you by phone as soon as possible.  Submit refill requests through [employer] or call your pharmacy and they will forward the refill request to us. Please allow 3 business days for your refill to be completed.          Additional Information About Your Visit        MyChart Information     [employer] gives you secure access to your electronic health record. If you see a primary care provider, you can also send messages to your care team and make appointments. If you have questions, please call your primary care clinic.  If you do not have a primary care provider, please call [phone] and they will assist you.        Care EveryWhere ID     This is your Care EveryWhere ID. This could be used by other organizations to access your [facility] medical records  [id number]        Your Vitals Were     Pulse Temperature Height Pulse Oximetry BMI (Body Mass  "Index)       72 98.2  F (36.8  C) (Oral) 1.702 m (5' 7\") 100% 25.62 kg/m2        Blood Pressure from Last 3 Encounters:   06/09/17 132/79   06/05/17 124/75   05/18/17 140/70    Weight from Last 3 Encounters:   06/09/17 74.2 kg (163 lb 9.6 oz)   06/05/17 74.5 kg (164 lb 3.2 oz)   05/18/17 69.9 kg (154 lb)              Today, you had the following     No orders found for display       Primary Care Provider Office Phone # Fax #    Santosh Salgado [phone]       [address] [place] [address]        Thank you!     Thank you for choosing [facility] HEPATOLOGY  for your care. Our goal is always to provide you with excellent care. Hearing back from our patients is one way we can continue to improve our services. Please take a few minutes to complete the written survey that you may receive in the mail after your visit with us. Thank you!             Your Updated Medication List - Protect others around you: Learn how to safely use, store and throw away your medicines at www.disposemymeds.org.          This list is accurate as of: 6/9/17  8:46 AM.  Always use your most recent med list.                   Brand Name Dispense Instructions for use    acetaminophen 325 MG tablet    TYLENOL    100 tablet    Take 2 tablets (650 mg) by mouth every 6 hours as needed for mild pain Or fevers. Use sparingly. Limit use to no more than 2 grams (2000 mg) in 24 hours. **Further refills must be obtained by primary care provider**       amLODIPine 5 MG tablet    NORVASC    30 tablet    Take 1 tablet (5 mg) by mouth daily       azaTHIOprine 100 MG Tabs     90 tablet    Take 50 mg by mouth every evening       busPIRone 10 MG tablet    BUSPAR    60 tablet    Take 1 tablet (10 mg) by mouth 2 times daily       calcium Citrate-vitamin D 500-400 MG-UNIT Chew      Take 1 tablet by mouth daily       cyanocobalamin 1000 MCG Tabs     30 tablet    Take 1,000 mcg by mouth daily       darbepoetin vandana 60 MCG/0.3ML " injection    ARANESP (ALBUMIN FREE)    0.6 mL    Inject 0.3 mLs (60 mcg) Subcutaneous every 14 days As needed for hgb <10g/dL       FISH OIL PO      Take 645 mg by mouth 2 times daily       folic acid 1 MG tablet    FOLVITE    30 tablet    Take 1 tablet (1 mg) by mouth daily       hydrOXYzine 25 MG tablet    ATARAX    60 tablet    Take 1-2 tablets (25-50 mg) by mouth every 6 hours as needed for itching       latanoprost 0.005 % ophthalmic solution    XALATAN    1 Bottle    Place 1 drop into both eyes At Bedtime       levothyroxine 88 MCG tablet    SYNTHROID/LEVOTHROID    30 tablet    Take 1 tablet (88 mcg) by mouth every morning (before breakfast)       loperamide 2 MG capsule    IMODIUM     Take 2 mg by mouth 4 times daily as needed for diarrhea Reported on 5/18/2017       magnesium oxide 400 (241.3 MG) MG tablet    MAG-OX    60 tablet    Take 1 tablet (400 mg) by mouth daily       multivitamin, therapeutic Tabs tablet     30 tablet    Take 1 tablet by mouth every 24 hours       pantoprazole 40 MG EC tablet    PROTONIX    30 tablet    Take 1 tablet (40 mg) by mouth every morning (before breakfast)       predniSONE 5 MG tablet    DELTASONE    60 tablet    Take 2 tablets (10 mg) by mouth daily       psyllium 0.52 G capsule     60 capsule    Take 2 capsules (1.04 g) by mouth daily With a full glass of water.       sertraline 100 MG tablet    ZOLOFT    45 tablet    Take 1.5 tablets (150 mg) by mouth daily       simethicone 80 MG chewable tablet    MYLICON    180 tablet    Take 1 tablet (80 mg) by mouth every 6 hours as needed for flatulence or cramping       tacrolimus 0.5 MG capsule    PROGRAF - GENERIC EQUIVALENT    240 capsule    Take 4 capsules (2 mg) by mouth 2 times daily       traMADol 50 MG tablet    ULTRAM    50 tablet    Take 1-2 tablets ( mg) by mouth every 6 hours as needed for pain       traZODone 100 MG tablet    DESYREL    30 tablet    Take 1 tablet (100 mg) by mouth At Bedtime **Further refills  must be obtained by primary care provider**       ursodiol 300 MG capsule    ACTIGALL    60 capsule    Take 1 capsule (300 mg) by mouth 2 times daily

## 2017-06-10 ENCOUNTER — COMMUNICATION - HEALTHEAST (OUTPATIENT)
Dept: INTERNAL MEDICINE | Facility: CLINIC | Age: 62
End: 2017-06-10

## 2017-06-10 DIAGNOSIS — G47.00 INSOMNIA: ICD-10-CM

## 2017-06-10 DIAGNOSIS — J45.909 ASTHMA: ICD-10-CM

## 2017-06-14 ENCOUNTER — TELEPHONE (OUTPATIENT)
Dept: OPHTHALMOLOGY | Facility: CLINIC | Age: 62
End: 2017-06-14

## 2017-06-14 ENCOUNTER — TRANSFERRED RECORDS (OUTPATIENT)
Dept: HEALTH INFORMATION MANAGEMENT | Facility: CLINIC | Age: 62
End: 2017-06-14

## 2017-06-14 NOTE — TELEPHONE ENCOUNTER
Intraocular pressure check done last week,  14 mmHg each eye  Pt calling to schedule monthly pressure checks    Will review with dr. Ortega if needs monthly Tech visits only for intraocular pressure checks.  Will review when to schedule f/u wth Dr. Ortega    Pt aware of callback after review  Alfredo Hall RN 10:43 AM 06/14/17

## 2017-06-14 NOTE — TELEPHONE ENCOUNTER
----- Message from Ameena Jones sent at 6/14/2017 10:09 AM CDT -----  Regarding: Eye Tech  Contact: [phone]  Pt is requesting a call back to schedule her monthly pressure check.     Thank you!    KI    Please DO NOT send this message and/or reply back to sender.  Call Center Representatives DO NOT respond to messages.

## 2017-06-15 ENCOUNTER — TELEPHONE (OUTPATIENT)
Dept: OPHTHALMOLOGY | Facility: CLINIC | Age: 62
End: 2017-06-15

## 2017-06-15 NOTE — TELEPHONE ENCOUNTER
Spoke to pt after dr. Ortega reviewed message  Intraocular pressure good with new eye drop  No monthly tech checks needed  Pt to f/u in 6 months in lieu of any new changes  appt made in December  Pt seemed pleased with information  Alfredo Hall RN 8:09 AM 06/15/17

## 2017-06-16 ENCOUNTER — TELEPHONE (OUTPATIENT)
Dept: ENDOCRINOLOGY | Facility: CLINIC | Age: 62
End: 2017-06-16

## 2017-06-16 ENCOUNTER — ALLIED HEALTH/NURSE VISIT (OUTPATIENT)
Dept: TRANSPLANT | Facility: CLINIC | Age: 62
End: 2017-06-16
Payer: COMMERCIAL

## 2017-06-16 ENCOUNTER — OFFICE VISIT (OUTPATIENT)
Dept: GASTROENTEROLOGY | Facility: CLINIC | Age: 62
End: 2017-06-16

## 2017-06-16 ENCOUNTER — RECORDS - HEALTHEAST (OUTPATIENT)
Dept: ADMINISTRATIVE | Facility: OTHER | Age: 62
End: 2017-06-16

## 2017-06-16 VITALS
DIASTOLIC BLOOD PRESSURE: 82 MMHG | HEART RATE: 82 BPM | SYSTOLIC BLOOD PRESSURE: 126 MMHG | HEIGHT: 67 IN | OXYGEN SATURATION: 96 % | WEIGHT: 167.8 LBS | TEMPERATURE: 98.5 F | BODY MASS INDEX: 26.34 KG/M2

## 2017-06-16 VITALS — WEIGHT: 167.7 LBS | BODY MASS INDEX: 26.27 KG/M2

## 2017-06-16 DIAGNOSIS — Z94.4 LIVER REPLACED BY TRANSPLANT (H): Primary | ICD-10-CM

## 2017-06-16 DIAGNOSIS — R19.7 DIARRHEA, UNSPECIFIED TYPE: ICD-10-CM

## 2017-06-16 DIAGNOSIS — N18.30 CKD (CHRONIC KIDNEY DISEASE) STAGE 3, GFR 30-59 ML/MIN (H): ICD-10-CM

## 2017-06-16 DIAGNOSIS — R11.2 NON-INTRACTABLE VOMITING WITH NAUSEA, UNSPECIFIED VOMITING TYPE: Primary | ICD-10-CM

## 2017-06-16 ASSESSMENT — PAIN SCALES - GENERAL: PAINLEVEL: MILD PAIN (2)

## 2017-06-16 NOTE — LETTER
6/16/2017       RE: Phan Luque  [address]  [facility] [address]     Dear Colleague,    Thank you for referring your patient, Phan Luque, to the [facility] GASTROENTEROLOGY AND IBD CLINIC at [facility]. Please see a copy of my visit note below.    Service Date: 06/16/2017      CHIEF COMPLAINT:  Followup regarding nausea and vomiting.      HISTORY OF PRESENT ILLNESS:  Phan is a 61-year-old woman last seen by myself on 12/09/2016.  She is a liver transplantation patient from 08/2016 with end-to-end choledocho-choledochostomy by Dr. Dhillon.  She has recently seen Dr. Banegas 06/09.  She continues on azathioprine, tacrolimus, low-dose prednisone, Carol and is also receiving hydroxyzine p.r.n., Zoloft, psyllium, pantoprazole.  At the last visit, we had provided treatment for SIBO, which was eventually stopped (appropriately!) We advised to trial dairy-free.  We advised to decrease Lomotil if at all possible and advised her to strictly avoid sugar between meals.  Soluble fiber was recommended.      Phan went home somewhere around 01/05 and continued on outpatient treatment for her alcoholism.  The prednisone was not started until April with a decrease to 7.5 mg at this time.  Her primary provider is now Santosh Salgado at [facility].  She was still vomiting 1 or more times daily, usually watery or clear until April.  Azathioprine was greatly decreased and is now 50 mg.  She was having diarrhea and was using loperamide and fiber for a long time for good form.  On 01/10 with illness she had acute kidney injury.      Now, Phan is having abdominal pain daily.  She continues to have vomiting and thinks that she may have vomiting from sertraline.  She did also vomit with GoLYTELY previously.      PAST MEDICAL, SURGICAL HISTORY, MEDICATIONS, ADVERSE DRUG REACTIONS:  Reviewed.      REVIEW OF SYSTEMS:  No symptoms of acute illness.  Overall feels fairly well.  She notes the  ongoing GI symptoms of nausea, abdominal pain, bloat, constipation, diarrhea and occasional fecal incontinence.  Other symptoms are ongoing as noted in her review of systems.       OBJECTIVE:   VITAL SIGNS:  Reviewed.  These are normal.  BMI 26.28.  Pain mild at 2/10.   GENERAL:  Clean, neatly groomed, pleasant woman who shows no distress.   EYES:  Clear.   RESPIRATORY:  Breathing is calm and grossly normal.   ABDOMEN:  With mild patchy tympany, nothing unusual.  No tenderness. Considerably less than at the time of her small intestinal bacterial overgrowth (SIBO) last November.     ASSESSMENT and PLAN:  A 61-year-old woman with variable stool habit continues to have some nausea and vomiting.  She does complain of large gas and occasionally develops diarrhea.  Most of all more often she has constipation, which is primary cause of gas for many people.      Parth is due for colonoscopy, which should be after she has completed a whole year out from her liver transplant -- after August.      Parth had considerable bacterial overgrowth symptoms previously, which she believes were resolved with the rifaximin and avoidance of nutrition between meals.  Again, she is advised to avoid sugar and nutrition between meals.  Also, she is advised to avoid all carbonated beverage.      Return to GI Clinic is suggested in 6 months, but if she is doing very well and with no treatment from luminal GI, she may cancel this.      Total visit 20 minutes with counseling time 15 minutes.         DONOVAN CANTRELL CNP             D: 2018   T: 2018   MT: KRISTIN      Name:     PARTH FARMER   MRN:      [id number]        Account:      [id number]   :      1955           Service Date: 2017      Document: [id number]      Sincerely,    DONOVAN Castellanos CNP

## 2017-06-16 NOTE — TELEPHONE ENCOUNTER
I called yesterday and left VM as I could not reach her.   Yes, no change in dose for now.        Voice mail left on cell phone with above message.

## 2017-06-16 NOTE — NURSING NOTE
"Chief Complaint   Patient presents with     RECHECK     nausea and vomiting        Vitals:    06/16/17 1137   BP: 126/82   BP Location: Left arm   Patient Position: Chair   Cuff Size: Adult Regular   Pulse: 82   Temp: 98.5  F (36.9  C)   SpO2: 96%   Weight: 167 lb 12.8 oz   Height: 5' 7\"       Body mass index is 26.28 kg/(m^2).    iMke Sanchez MA                          "

## 2017-06-16 NOTE — TELEPHONE ENCOUNTER
Pt left VM asking if thyroid labs are WNL if it means levothyroxine dosage dose is appropriate. Please advise at [phone]. Sent to [facility].

## 2017-06-16 NOTE — PATIENT INSTRUCTIONS
Colonoscopy planning for this year.    GI scb has sent a note regarding any additional orders for the colonoscopy.  Tentatively, this will be with sedation, but we are asking on that.    As before   Avoid sugar and nutrition between meals.    Treat your constipation, - the main cause of gas.  If you continue to have huge gas and develop diarrhea, call GI.  If diarrhea, stool studies for infection  Then breath test for possible small intestinal bacterial overgrowth (SIBO).  (the medication that you had was rifaximen)    Consider Taza brand tea  For iced or cold.  Name has spicey orange in the name  Or, Passion flavor    Return to GI Clinic in 6 months. If no treatments from luminal GI, ok to cancel.    [facility]   For appointments call [place], [phone]  Coordinator  [phone] Jodi or call -  GI Nurse Triage  [phone] for Medical Questions, # 3  Fax results to

## 2017-06-16 NOTE — PROGRESS NOTES
Met with pt briefly in clinic. She had a few questions:    - Can I take collagen hydrolysate powder ([place] brand; 2 T = 11 g protein). Pt mixes this in with her coffee. Yes ok to take.  - Do I need to restrict anything in my diet for my CKD III? Per labs 6/9, K+ 3.7 and Phos 3.6. GFR 42. Encouraged her to keep an eye on sodium intake, but no need to restrict K+/Phos-containing foods. Can be proactive and avoid excessive intakes of this and excessive protein, but pt is doing well. Provided list of K+/Phos-containing foods via My Chart Pt Instructions if pt needs these lists in the the future.     Met with pt ~5 minutes.

## 2017-06-16 NOTE — MR AVS SNAPSHOT
"              After Visit Summary   6/16/2017    Phan Luque    MRN: [id number]           Patient Information     Date Of Birth          1955        Visit Information        Provider Department      6/16/2017 11:00 AM Bonita Peters RD [facility]        Today's Diagnoses     Liver replaced by transplant (H)    -  1    CKD (chronic kidney disease) stage 3, GFR 30-59 ml/min          Care Instructions    To manage your phosphorus intake, limit/avoid the following:  Milk and milk products (including cottage cheese, ice cream, yogurt, cheese, custard, and pudding), whole-grain breads, bran, whole-grain cereal, wheat germ, hernandez's yeast, legumes (dried or canned beans, baked beans, split peas, lentils), nuts, nut butters (peanut or almond butter), seeds, chocolate and cocoa, cola (Coke, Pepsi, Dr Pepper), beer, jillian, soy products, and processed/packaged/convenience foods with added phosphorus    To manage your potassium, choose more \"low\" potassium foods and avoid/limit \"high\" potassium foods.   - Low potassium foods (1/2 cup serving): asparagus, beets (canned or pickled), broccoli (raw), cabbage, carrots (cooked), cauliflower, celery, corn, cucumber, eggplant, green or wax beans, lettuce (iceberg), mixed vegetables, mushrooms (fresh), onion, peas, peppers, spinach (raw), zucchini, apricot (canned), apples and apple juice, blueberries, cherries, cranberries, fruit cocktail, grapefruit, grapes and grape juice, mandarin oranges, peaches, pears, plums, pineapple, raspberries, rhubarb, strawberries, tangerines, watermelon  - High potassium foods: artichoke, Bamboo shoots, beets (cooked), bok shawn, broccoli (cooked), [place] sprouts, carrots (raw), mushrooms (canned), potato (sweet, white, yams), potato chips or French fries, squash or pumpkin, spinach and other greens (cooked), tomatoes and tomato products, vegetable juice, apricots (fresh or dried), avocado, banana, cantaloupe or " honeydew, dates and figs, dried fruit, grapefruit juice, kiwi, chris, nectarine, orange or orange juice, papaya, pomegranate juice, prunes or prune juice, raisins     Xiomara Peters RD, LD  Outpatient Solid Organ Transplant Dietitian  [phone]              Follow-ups after your visit        Your next 10 appointments already scheduled     Jun 16, 2017 11:30 AM CDT   (Arrive by 11:15 AM)   Return Visit with DONOVAN Castellanos CNP   [facility] Gastroenterology and IBD ([facility])    [address] [place] [address]  4th [facility] [address]   [phone]            Jun 26, 2017  4:00 PM CDT   (Arrive by 3:45 PM)   Return Visit with Zulma White, PhD [facility] ([facility])    [address] [place] [address]  3rd [facility] [address]   [phone]            Aug 14, 2017  4:00 PM CDT   (Arrive by 3:45 PM)   RETURN ENDOCRINE with Ruth Oakley MD   [facility] Endocrinology ([facility])    [address] [place] [address]  3rd [facility] [address]   [phone]            Sep 08, 2017  7:00 AM CDT   Lab with UC LAB   [facility] Lab ([facility])    [address] [place] [address]  1st [facility] [address]   [phone]            Sep 08, 2017  8:00 AM CDT   (Arrive by 7:45 AM)   Return Liver Transplant with Hussein Banegas MD   [facility] Hepatology ([facility])    [address] [place] [address]   [phone]            Nov 20, 2017  4:30 PM CST   (Arrive by 4:15 PM)   RETURN ENDOCRINE with Ruth Oakley MD   [facility] Endocrinology ([facility])    [address] [place] [address]   [phone]            Dec 04, 2017  3:30 PM CST   Lab with UC LAB   [facility] Lab ([facility])    [address] [place] [address]    [phone]            Dec 04, 2017  4:30 PM CST   (Arrive by 4:00 PM)   Return Visit with Daya Gutierrez MD   [facility] Nephrology ([facility])    [address] [facility]  3rd Floor  [facility] [id number] [phone]            Dec 05, 2017 10:00 AM CST   RETURN NEURO with Ambrose Ortega MD   Eye Clinic ([facility])    [address]  [address] [facility]  [address] [address]  [facility] [id number] [phone]              Who to contact     If you have questions or need follow up information about today's clinic visit or your schedule please contact [facility]GAN TRANSPLANT directly at [phone].  Normal or non-critical lab and imaging results will be communicated to you by MyChart, letter or phone within 4 business days after the clinic has received the results. If you do not hear from us within 7 days, please contact the clinic through MyChart or phone. If you have a critical or abnormal lab result, we will notify you by phone as soon as possible.  Submit refill requests through [employer] or call your pharmacy and they will forward the refill request to us. Please allow 3 business days for your refill to be completed.          Additional Information About Your Visit        [employer] Information     [employer] gives you secure access to your electronic health record. If you see a primary care provider, you can also send messages to your care team and make appointments. If you have questions, please call your primary care clinic.  If you do not have a primary care provider, please call [phone] and they will assist you.        Care EveryWhere ID     This is your Care EveryWhere ID. This could be used by other organizations to access your [place] medical records  [id number]        Your Vitals Were     BMI (Body Mass Index)                   26.27 kg/m2            Blood Pressure from Last 3 Encounters:   06/09/17 132/79   06/05/17 124/75   05/18/17  140/70    Weight from Last 3 Encounters:   06/16/17 76.1 kg (167 lb 11.2 oz)   06/09/17 74.2 kg (163 lb 9.6 oz)   06/05/17 74.5 kg (164 lb 3.2 oz)              Today, you had the following     No orders found for display       Primary Care Provider Office Phone # Fax #    Santosh Salgado [phone]       [address] [place] [address]        Thank you!     Thank you for choosing [facility] TRANSPLANT  for your care. Our goal is always to provide you with excellent care. Hearing back from our patients is one way we can continue to improve our services. Please take a few minutes to complete the written survey that you may receive in the mail after your visit with us. Thank you!             Your Updated Medication List - Protect others around you: Learn how to safely use, store and throw away your medicines at www.disposemymeds.org.          This list is accurate as of: 6/16/17 11:18 AM.  Always use your most recent med list.                   Brand Name Dispense Instructions for use    acetaminophen 325 MG tablet    TYLENOL    100 tablet    Take 2 tablets (650 mg) by mouth every 6 hours as needed for mild pain Or fevers. Use sparingly. Limit use to no more than 2 grams (2000 mg) in 24 hours. **Further refills must be obtained by primary care provider**       amLODIPine 5 MG tablet    NORVASC    30 tablet    Take 1 tablet (5 mg) by mouth daily       azaTHIOprine 100 MG Tabs     90 tablet    Take 50 mg by mouth every evening       busPIRone 10 MG tablet    BUSPAR    60 tablet    Take 1 tablet (10 mg) by mouth 2 times daily       calcium Citrate-vitamin D 500-400 MG-UNIT Chew      Take 1 tablet by mouth daily       cyanocobalamin 1000 MCG Tabs     30 tablet    Take 1,000 mcg by mouth daily       darbepoetin vanadna 60 MCG/0.3ML injection    ARANESP (ALBUMIN FREE)    0.6 mL    Inject 0.3 mLs (60 mcg) Subcutaneous every 14 days As needed for hgb <10g/dL       FISH OIL PO       Take 645 mg by mouth 2 times daily       folic acid 1 MG tablet    FOLVITE    30 tablet    Take 1 tablet (1 mg) by mouth daily       hydrOXYzine 25 MG tablet    ATARAX    60 tablet    Take 1-2 tablets (25-50 mg) by mouth every 6 hours as needed for itching       latanoprost 0.005 % ophthalmic solution    XALATAN    1 Bottle    Place 1 drop into both eyes At Bedtime       levothyroxine 88 MCG tablet    SYNTHROID/LEVOTHROID    30 tablet    Take 1 tablet (88 mcg) by mouth every morning (before breakfast)       loperamide 2 MG capsule    IMODIUM     Take 2 mg by mouth 4 times daily as needed for diarrhea Reported on 5/18/2017       magnesium oxide 400 (241.3 MG) MG tablet    MAG-OX    60 tablet    Take 1 tablet (400 mg) by mouth daily       multivitamin, therapeutic Tabs tablet     30 tablet    Take 1 tablet by mouth every 24 hours       pantoprazole 40 MG EC tablet    PROTONIX    30 tablet    Take 1 tablet (40 mg) by mouth every morning (before breakfast)       predniSONE 5 MG tablet    DELTASONE    60 tablet    Take 2 tablets (10 mg) by mouth daily       psyllium 0.52 G capsule     60 capsule    Take 2 capsules (1.04 g) by mouth daily With a full glass of water.       sertraline 100 MG tablet    ZOLOFT    45 tablet    Take 1.5 tablets (150 mg) by mouth daily       simethicone 80 MG chewable tablet    MYLICON    180 tablet    Take 1 tablet (80 mg) by mouth every 6 hours as needed for flatulence or cramping       tacrolimus 0.5 MG capsule    PROGRAF - GENERIC EQUIVALENT    240 capsule    Take 4 capsules (2 mg) by mouth 2 times daily       traMADol 50 MG tablet    ULTRAM    50 tablet    Take 1-2 tablets ( mg) by mouth every 6 hours as needed for pain       traZODone 100 MG tablet    DESYREL    30 tablet    Take 1 tablet (100 mg) by mouth At Bedtime **Further refills must be obtained by primary care provider**       ursodiol 300 MG capsule    ACTIGALL    60 capsule    Take 1 capsule (300 mg) by mouth 2 times daily

## 2017-06-16 NOTE — MR AVS SNAPSHOT
After Visit Summary   6/16/2017    Phan Luque    MRN: [id number]           Patient Information     Date Of Birth          1955        Visit Information        Provider Department      6/16/2017 11:30 AM Shahrzad Pena APRN CNP [facility] Gastroenterology and IBD        Care Instructions    Colonoscopy planning for this year.    GI scb has sent a note regarding any additional orders for the colonoscopy.  Tentatively, this will be with sedation, but we are asking on that.    As before   Avoid sugar and nutrition between meals.    Treat your constipation, - the main cause of gas.  If you continue to have huge gas and develop diarrhea, call GI.  If diarrhea, stool studies for infection  Then breath test for possible small intestinal bacterial overgrowth (SIBO).  (the medication that you had was rifaximen)    Consider Taza brand tea  For iced or cold.  Name has spicey orange in the name  Or, Passion flavor    Return to GI Clinic in 6 months. If no treatments from luminal GI, ok to cancel.    [facility]   For appointments call [place], [phone]  Coordinator  [phone] Jodi or call -  GI Nurse Triage  [phone] for Medical Questions, # 3  Fax results to                     Follow-ups after your visit        Follow-up notes from your care team     Return in about 6 months (around 12/16/2017).      Your next 10 appointments already scheduled     Jun 26, 2017  4:00 PM CDT   (Arrive by 3:45 PM)   Return Visit with Zulma White, PhD [facility] ([facility])    [address] [place] [address] [phone]            Aug 14, 2017  4:00 PM CDT   (Arrive by 3:45 PM)   RETURN ENDOCRINE with Ruth Oakley MD   [facility] Endocrinology ([facility])    [address] [place] [address] [phone]            Sep 08,  2017  7:00 AM CDT   Lab with  LAB   [facility] Lab ([facility])    [address] [facility]  [address] [facility] [address]   [phone]            Sep 08, 2017  8:00 AM CDT   (Arrive by 7:45 AM)   Return Liver Transplant with Hussein Banegas MD   [facility] Hepatology ([facility])    [address] [facility]  [address] [facility] [address]   [phone]            Nov 20, 2017  4:30 PM CST   (Arrive by 4:15 PM)   RETURN ENDOCRINE with Ruth Oakley MD   [facility] Endocrinology ([facility])    [address] [address]   [phone]            Dec 04, 2017  3:30 PM CST   Lab with  LAB   [facility] ([facility])    [address] [address]   [phone]            Dec 04, 2017  4:30 PM CST   (Arrive by 4:00 PM)   Return Visit with Daya Gutierrez MD   [facility] Nephrology ([facility])    [address] [address]   [phone]            Dec 05, 2017 10:00 AM CST   RETURN NEURO with Ambrose Ortega MD   Eye Clinic ([facility])    [address]  [address] [facility]  [address] [address]  [facility] [address] [phone]              Who to contact     Please call your clinic at [phone] to:    Ask questions about your health    Make or cancel appointments    Discuss your medicines    Learn about your test results    Speak to your doctor   If you have compliments or concerns about an experience at your clinic, or if you wish to file a complaint, please contact [facility] Patient Relations at [phone] or email us at [email or web address]         Additional Information About Your Visit        MyChart Information     [employer] gives you secure access to your electronic health record. If you see a primary care  "provider, you can also send messages to your care team and make appointments. If you have questions, please call your primary care clinic.  If you do not have a primary care provider, please call [phone] and they will assist you.      P[employer]ora is an electronic gateway that provides easy, online access to your medical records. With [employer], you can request a clinic appointment, read your test results, renew a prescription or communicate with your care team.     To access your existing account, please contact your [facility] or call [phone] for assistance.        Care EveryWhere ID     This is your Care EveryWhere ID. This could be used by other organizations to access your [place] medical records  [id number]        Your Vitals Were     Pulse Temperature Height Pulse Oximetry BMI (Body Mass Index)       82 98.5  F (36.9  C) 1.702 m (5' 7\") 96% 26.28 kg/m2        Blood Pressure from Last 3 Encounters:   06/16/17 126/82   06/09/17 132/79   06/05/17 124/75    Weight from Last 3 Encounters:   06/16/17 76.1 kg (167 lb 12.8 oz)   06/16/17 76.1 kg (167 lb 11.2 oz)   06/09/17 74.2 kg (163 lb 9.6 oz)              Today, you had the following     No orders found for display       Primary Care Provider Office Phone # Fax #    Santosh Salgado [phone]       [address] [place] [address]        Thank you!     Thank you for choosing [facility] GASTROENTEROLOGY AND IBD  for your care. Our goal is always to provide you with excellent care. Hearing back from our patients is one way we can continue to improve our services. Please take a few minutes to complete the written survey that you may receive in the mail after your visit with us. Thank you!             Your Updated Medication List - Protect others around you: Learn how to safely use, store and throw away your medicines at www.disposemymeds.org.          This list is accurate as of: 6/16/17 12:19 " PM.  Always use your most recent med list.                   Brand Name Dispense Instructions for use    acetaminophen 325 MG tablet    TYLENOL    100 tablet    Take 2 tablets (650 mg) by mouth every 6 hours as needed for mild pain Or fevers. Use sparingly. Limit use to no more than 2 grams (2000 mg) in 24 hours. **Further refills must be obtained by primary care provider**       amLODIPine 5 MG tablet    NORVASC    30 tablet    Take 1 tablet (5 mg) by mouth daily       azaTHIOprine 100 MG Tabs     90 tablet    Take 50 mg by mouth every evening       busPIRone 10 MG tablet    BUSPAR    60 tablet    Take 1 tablet (10 mg) by mouth 2 times daily       calcium Citrate-vitamin D 500-400 MG-UNIT Chew      Take 1 tablet by mouth daily       cyanocobalamin 1000 MCG Tabs     30 tablet    Take 1,000 mcg by mouth daily       darbepoetin vandana 60 MCG/0.3ML injection    ARANESP (ALBUMIN FREE)    0.6 mL    Inject 0.3 mLs (60 mcg) Subcutaneous every 14 days As needed for hgb <10g/dL       FISH OIL PO      Take 645 mg by mouth 2 times daily       folic acid 1 MG tablet    FOLVITE    30 tablet    Take 1 tablet (1 mg) by mouth daily       hydrOXYzine 25 MG tablet    ATARAX    60 tablet    Take 1-2 tablets (25-50 mg) by mouth every 6 hours as needed for itching       latanoprost 0.005 % ophthalmic solution    XALATAN    1 Bottle    Place 1 drop into both eyes At Bedtime       levothyroxine 88 MCG tablet    SYNTHROID/LEVOTHROID    30 tablet    Take 1 tablet (88 mcg) by mouth every morning (before breakfast)       loperamide 2 MG capsule    IMODIUM     Take 2 mg by mouth 4 times daily as needed for diarrhea Reported on 5/18/2017       magnesium oxide 400 (241.3 MG) MG tablet    MAG-OX    60 tablet    Take 1 tablet (400 mg) by mouth daily       multivitamin, therapeutic Tabs tablet     30 tablet    Take 1 tablet by mouth every 24 hours       pantoprazole 40 MG EC tablet    PROTONIX    30 tablet    Take 1 tablet (40 mg) by mouth every  morning (before breakfast)       predniSONE 5 MG tablet    DELTASONE    60 tablet    Take 2 tablets (10 mg) by mouth daily       psyllium 0.52 G capsule     60 capsule    Take 2 capsules (1.04 g) by mouth daily With a full glass of water.       sertraline 100 MG tablet    ZOLOFT    45 tablet    Take 1.5 tablets (150 mg) by mouth daily       simethicone 80 MG chewable tablet    MYLICON    180 tablet    Take 1 tablet (80 mg) by mouth every 6 hours as needed for flatulence or cramping       tacrolimus 0.5 MG capsule    PROGRAF - GENERIC EQUIVALENT    240 capsule    Take 4 capsules (2 mg) by mouth 2 times daily       traMADol 50 MG tablet    ULTRAM    50 tablet    Take 1-2 tablets ( mg) by mouth every 6 hours as needed for pain       traZODone 100 MG tablet    DESYREL    30 tablet    Take 1 tablet (100 mg) by mouth At Bedtime **Further refills must be obtained by primary care provider**       ursodiol 300 MG capsule    ACTIGALL    60 capsule    Take 1 capsule (300 mg) by mouth 2 times daily

## 2017-06-19 ENCOUNTER — OFFICE VISIT - HEALTHEAST (OUTPATIENT)
Dept: INTERNAL MEDICINE | Facility: CLINIC | Age: 62
End: 2017-06-19

## 2017-06-19 ENCOUNTER — TELEPHONE (OUTPATIENT)
Dept: PHARMACY | Facility: CLINIC | Age: 62
End: 2017-06-19

## 2017-06-19 ENCOUNTER — COMMUNICATION - HEALTHEAST (OUTPATIENT)
Dept: INTERNAL MEDICINE | Facility: CLINIC | Age: 62
End: 2017-06-19

## 2017-06-19 DIAGNOSIS — Z94.4 LIVER TRANSPLANTED (H): ICD-10-CM

## 2017-06-19 DIAGNOSIS — J45.909 ASTHMA: ICD-10-CM

## 2017-06-19 DIAGNOSIS — R52 PAIN: ICD-10-CM

## 2017-06-19 DIAGNOSIS — Z94.4 LIVER REPLACED BY TRANSPLANT (H): ICD-10-CM

## 2017-06-19 PROCEDURE — 80197 ASSAY OF TACROLIMUS: CPT | Performed by: INTERNAL MEDICINE

## 2017-06-19 RX ORDER — PREDNISONE 5 MG/1
TABLET ORAL
Qty: 60 TABLET | Refills: 1 | Status: SHIPPED | OUTPATIENT
Start: 2017-06-19 | End: 2017-06-28

## 2017-06-19 ASSESSMENT — MIFFLIN-ST. JEOR: SCORE: 1344.67

## 2017-06-20 ENCOUNTER — TELEPHONE (OUTPATIENT)
Dept: PHARMACY | Facility: CLINIC | Age: 62
End: 2017-06-20

## 2017-06-20 ENCOUNTER — AMBULATORY - HEALTHEAST (OUTPATIENT)
Dept: LAB | Facility: CLINIC | Age: 62
End: 2017-06-20

## 2017-06-20 DIAGNOSIS — Z94.4 LIVER TRANSPLANTED (H): ICD-10-CM

## 2017-06-20 DIAGNOSIS — Z79.899 ENCOUNTER FOR LONG-TERM (CURRENT) USE OF OTHER MEDICATIONS: ICD-10-CM

## 2017-06-20 LAB — HEMOGLOBIN: 12.8 G/DL (ref 11.7–15.7)

## 2017-06-20 NOTE — TELEPHONE ENCOUNTER
Anemia Management Note  SUBJECTIVE/OBJECTIVE:  Referred by Dr. Daya Gutierrez on 3/16/2017  Primary Diagnosis: Anemia in Chronic Kidney Disease (N18.3, D63.1)   Secondary Diagnosis: Chronic Kidney Disease, Stage 3 (N18.3)   Hgb goal range: 9-10  Epo/Darbo:  Aranesp 60 mcg every 14 days As needed - HOME  RX will  on 3/28/2018  Iron regimen: None  Lab orders will  on 3/28/18    Anemia Latest Ref Rng & Units 2017   MEDARDO Dose - - - - - - - -   Hemoglobin 11.7 - 15.7 g/dL 10.7(L) 12.1 12.2 12.1 11.5(L) 11.8 12.8   TSAT 15 - 46 % - - - - 24 - -   Ferritin 8 - 252 ng/mL - - - - 138 - -      BP Readings from Last 3 Encounters:   17 126/82   17 132/79   17 124/75     Wt Readings from Last 2 Encounters:   17 167 lb 12.8 oz (76.1 kg)   17 167 lb 11.2 oz (76.1 kg)     ASSESSMENT:  Hgb:at goal - continue to monitor  TSat: not at goal of >30% Ferritin: At goal (>100ng/mL). Not anemic so no need for supplement    PLAN:  Will check hgb and iron studies one last time in one month then likely DC anemia service    Orders needed to be renewed (for next follow-up date) in [facility]: None    Iron labs due:      Plan discussed with:  Phan  Plan provided by:  Molly    NEXT FOLLOW-UP DATE:      Anemia Management Service  Molly Dumont,EtienneD and Zamzam Olmedo CPhT  Phone: [phone]  Fax: [phone]

## 2017-06-20 NOTE — TELEPHONE ENCOUNTER
Follow-up with anemia management service:    LM for Phan reminding her that she will be due for Hgb labs and possibly an aranesp dose on 17    Anemia Latest Ref Rng & Units 2017   MEDARDO Dose - - - - - - - -   Hemoglobin 11.7 - 15.7 g/dL 9.0(L) 10.7(L) 12.1 12.2 12.1 11.5(L) 11.8   TSAT 15 - 46 % 31 - - - - 24 -   Ferritin 8 - 252 ng/mL 278(H) - - - - 138 -       Orders needed to be renewed (for next follow-up date) in EPIC: None   Med order expires: 3/28/18   Lab orders : 3/28/18      Follow-up call date: 17      Anemia Management Service  Molly Dumont,Mary Alice and Zamzam Olmedo CPhT  Phone: [phone]  Fax: [phone]

## 2017-06-21 ENCOUNTER — COMMUNICATION - HEALTHEAST (OUTPATIENT)
Dept: INTERNAL MEDICINE | Facility: CLINIC | Age: 62
End: 2017-06-21

## 2017-06-21 DIAGNOSIS — R52 PAIN: ICD-10-CM

## 2017-06-21 PROBLEM — F10.10 ALCOHOL ABUSE, UNCOMPLICATED: Status: ACTIVE | Noted: 2017-06-21

## 2017-06-21 LAB
TACROLIMUS BLD-MCNC: 5.8 UG/L (ref 5–15)
TME LAST DOSE: NORMAL H

## 2017-06-22 ENCOUNTER — TELEPHONE (OUTPATIENT)
Dept: GASTROENTEROLOGY | Facility: CLINIC | Age: 62
End: 2017-06-22

## 2017-06-22 DIAGNOSIS — Z12.11 SPECIAL SCREENING FOR MALIGNANT NEOPLASMS, COLON: Primary | ICD-10-CM

## 2017-06-22 NOTE — TELEPHONE ENCOUNTER
Dr. Alfonso confirmed that for the fairly well post liver transplant patient, screening colonoscopy may result after one year out from transplant.  Sent MyChart   Ordered.

## 2017-06-23 ENCOUNTER — TELEPHONE (OUTPATIENT)
Dept: GASTROENTEROLOGY | Facility: CLINIC | Age: 62
End: 2017-06-23

## 2017-06-24 ASSESSMENT — PATIENT HEALTH QUESTIONNAIRE - PHQ9
SUM OF ALL RESPONSES TO PHQ QUESTIONS 1-9: 3
SUM OF ALL RESPONSES TO PHQ QUESTIONS 1-9: 3
10. IF YOU CHECKED OFF ANY PROBLEMS, HOW DIFFICULT HAVE THESE PROBLEMS MADE IT FOR YOU TO DO YOUR WORK, TAKE CARE OF THINGS AT HOME, OR GET ALONG WITH OTHER PEOPLE: NOT DIFFICULT AT ALL

## 2017-06-25 ASSESSMENT — PATIENT HEALTH QUESTIONNAIRE - PHQ9: SUM OF ALL RESPONSES TO PHQ QUESTIONS 1-9: 3

## 2017-06-26 ENCOUNTER — TELEPHONE (OUTPATIENT)
Dept: GASTROENTEROLOGY | Facility: CLINIC | Age: 62
End: 2017-06-26

## 2017-06-26 ENCOUNTER — OFFICE VISIT (OUTPATIENT)
Dept: PSYCHOLOGY | Facility: CLINIC | Age: 62
End: 2017-06-26

## 2017-06-26 DIAGNOSIS — F10.10 ALCOHOL ABUSE, UNCOMPLICATED: ICD-10-CM

## 2017-06-26 DIAGNOSIS — F33.41 RECURRENT MAJOR DEPRESSION IN PARTIAL REMISSION (H): Primary | ICD-10-CM

## 2017-06-26 NOTE — MR AVS SNAPSHOT
After Visit Summary   6/26/2017    Phan Luque    MRN: [id number]           Patient Information     Date Of Birth          1955        Visit Information        Provider Department      6/26/2017 4:00 PM Zulma White, PhD [facility]        Today's Diagnoses     Recurrent major depression in partial remission (H)    -  1    Alcohol abuse, uncomplicated           Follow-ups after your visit        Your next 10 appointments already scheduled     Aug 07, 2017  5:00 PM CDT   (Arrive by 4:45 PM)   Return Visit with Zulma White PhD MEME   [facility] ([facility])    [address] [place] [address]  3rd [facility] [address]   [phone]            Aug 14, 2017  4:00 PM CDT   (Arrive by 3:45 PM)   RETURN ENDOCRINE with Ruth Oakley MD   [facility] Endocrinology ([facility])    [address] [place] [address]   [phone]            Sep 05, 2017   Procedure with Sarah Wilburn MD   [facility] ([facility])    [address] [facility] [address]   [phone]           The [facility] is located on the corner of [facility] and [facility] on the [facility]. It is easily accessible from virtually any point in the [facility] area, via [address] and [address].            Sep 08, 2017  7:00 AM CDT   Lab with  LAB   [facility] Lab ([facility])    [address] [place] [address]   [phone]            Sep 08, 2017  8:00 AM CDT   (Arrive by 7:45 AM)   Return Liver Transplant with Hussein Banegas MD   [facility] Hepatology ([facility])    [address] [place] [address]   [phone]            Nov 20, 2017  4:30 PM CST   (Arrive by 4:15 PM)   RETURN ENDOCRINE  with Ruth Oakley MD   [facility] Endocrinology ([facility])    [address]  3rd Floor  [facility] [place] [address] [phone]            Dec 05, 2017 10:00 AM CST   RETURN NEURO with Ambrose Ortega MD   [facility] ([facility])    [address]  9th [address]  [facility] [place] [address] [phone]            Dec 06, 2017  3:30 PM CST   Lab with  LAB   [facility] ([facility])    [address]  1st Floor  [facility] [place] [address] [phone]            Dec 06, 2017  4:30 PM CST   (Arrive by 4:00 PM)   Return Visit with Daya Gutierrez MD   [facility] Nephrology ([facility])    [address]  3rd [facility] [id number] [phone]            Dec 20, 2017  4:30 PM CST   (Arrive by 4:15 PM)   Return Visit with DONOVAN Castellanos [facility] Gastroenterology and IBD ([facility])    [address]  4th Floor  [facility] [place] [address] [phone]              Who to contact     Please call your clinic at [phone] to:    Ask questions about your health    Make or cancel appointments    Discuss your medicines    Learn about your test results    Speak to your doctor   If you have compliments or concerns about an experience at your clinic, or if you wish to file a complaint, please contact [facility] Patient Relations at [phone] or email us at [email or web address]         Additional Information About Your Visit        MyChart Information     [employer] gives you secure access to your electronic health record. If you see a primary care provider, you can also send messages to your care team and make appointments. If you have questions, please call your primary care clinic.  If you do not have a primary care provider, please call [phone] and they will assist  you.      [employer] is an electronic gateway that provides easy, online access to your medical records. With [employer], you can request a clinic appointment, read your test results, renew a prescription or communicate with your care team.     To access your existing account, please contact your [facility] or call [phone] for assistance.        Care EveryWhere ID     This is your Care EveryWhere ID. This could be used by other organizations to access your [place] medical records  [id number]         Blood Pressure from Last 3 Encounters:   06/16/17 126/82   06/09/17 132/79   06/05/17 124/75    Weight from Last 3 Encounters:   06/16/17 76.1 kg (167 lb 12.8 oz)   06/16/17 76.1 kg (167 lb 11.2 oz)   06/09/17 74.2 kg (163 lb 9.6 oz)              Today, you had the following     No orders found for display       Primary Care Provider Office Phone # Fax #    Santosh Salgado [phone]       [address]   [facility] [address]        Equal Access to Services     [facility]: Hadii aad ku hadasho Soomaali, waaxda luqadaha, qaybta kaalmada adeegyachyna, anna ambrose . So [facility] [phone].    ATENCIÓN: Si habla español, tiene a reece disposición servicios gratuitos de asistencia lingüística. [facility] [phone].    We comply with applicable federal civil rights laws and [place] laws. We do not discriminate on the basis of race, color, national origin, age, disability sex, sexual orientation or gender identity.            Thank you!     Thank you for choosing [facility]  for your care. Our goal is always to provide you with excellent care. Hearing back from our patients is one way we can continue to improve our services. Please take a few minutes to complete the written survey that you may receive in the mail after your visit with us. Thank you!             Your Updated Medication List - Protect others around you:  Learn how to safely use, store and throw away your medicines at www.disposemymeds.org.          This list is accurate as of: 6/26/17 11:59 PM.  Always use your most recent med list.                   Brand Name Dispense Instructions for use Diagnosis    acetaminophen 325 MG tablet    TYLENOL    100 tablet    Take 2 tablets (650 mg) by mouth every 6 hours as needed for mild pain Or fevers. Use sparingly. Limit use to no more than 2 grams (2000 mg) in 24 hours. **Further refills must be obtained by primary care provider**    Acute post-operative pain       amLODIPine 5 MG tablet    NORVASC    30 tablet    Take 1 tablet (5 mg) by mouth daily    Hypertension       azaTHIOprine 100 MG Tabs     90 tablet    Take 50 mg by mouth every evening    Liver transplanted (H)       busPIRone 10 MG tablet    BUSPAR    60 tablet    Take 1 tablet (10 mg) by mouth 2 times daily    Anxiety       calcium Citrate-vitamin D 500-400 MG-UNIT Chew      Take 1 tablet by mouth daily        cyanocobalamin 1000 MCG Tabs     30 tablet    Take 1,000 mcg by mouth daily    Liver transplanted (H)       darbepoetin vandana 60 MCG/0.3ML injection    ARANESP (ALBUMIN FREE)    0.6 mL    Inject 0.3 mLs (60 mcg) Subcutaneous every 14 days As needed for hgb <10g/dL    Anemia in stage 3 chronic kidney disease, CKD (chronic kidney disease) stage 3, GFR 30-59 ml/min       FISH OIL PO      Take 645 mg by mouth 2 times daily        folic acid 1 MG tablet    FOLVITE    30 tablet    Take 1 tablet (1 mg) by mouth daily    Malnutrition (H)       hydrOXYzine 25 MG tablet    ATARAX    60 tablet    Take 1-2 tablets (25-50 mg) by mouth every 6 hours as needed for itching    Itching       latanoprost 0.005 % ophthalmic solution    XALATAN    1 Bottle    Place 1 drop into both eyes At Bedtime    Steroid responders borderline glaucoma, bilateral       levothyroxine 88 MCG tablet    SYNTHROID/LEVOTHROID    30 tablet    Take 1 tablet (88 mcg) by mouth every morning (before  breakfast)    Thyroid function test abnormal       loperamide 2 MG capsule    IMODIUM     Take 2 mg by mouth 4 times daily as needed for diarrhea Reported on 5/18/2017        magnesium oxide 400 (241.3 MG) MG tablet    MAG-OX    60 tablet    Take 1 tablet (400 mg) by mouth daily    CKD (chronic kidney disease) stage 3, GFR 30-59 ml/min, Hypomagnesemia       multivitamin, therapeutic Tabs tablet     30 tablet    Take 1 tablet by mouth every 24 hours    Malnutrition (H)       pantoprazole 40 MG EC tablet    PROTONIX    30 tablet    Take 1 tablet (40 mg) by mouth every morning (before breakfast)    Gastroesophageal reflux disease, esophagitis presence not specified       psyllium 0.52 G capsule     60 capsule    Take 2 capsules (1.04 g) by mouth daily With a full glass of water.    Loose stools       sertraline 100 MG tablet    ZOLOFT    45 tablet    Take 1.5 tablets (150 mg) by mouth daily    Transplant, organ       simethicone 80 MG chewable tablet    MYLICON    180 tablet    Take 1 tablet (80 mg) by mouth every 6 hours as needed for flatulence or cramping    Flatulence, eructation, and gas pain       tacrolimus 0.5 MG capsule    PROGRAF - GENERIC EQUIVALENT    240 capsule    Take 4 capsules (2 mg) by mouth 2 times daily    Liver transplanted (H)       traMADol 50 MG tablet    ULTRAM    50 tablet    Take 1-2 tablets ( mg) by mouth every 6 hours as needed for pain    Abdominal pain, epigastric       traZODone 100 MG tablet    DESYREL    30 tablet    Take 1 tablet (100 mg) by mouth At Bedtime **Further refills must be obtained by primary care provider**    Insomnia, unspecified type       ursodiol 300 MG capsule    ACTIGALL    60 capsule    Take 1 capsule (300 mg) by mouth 2 times daily    Liver transplanted (H)

## 2017-06-26 NOTE — PROGRESS NOTES
Health Psychology                  Clinic    Department of Medicine  Zulma White, Ph.D., L.P. [phone]                          Clinics and Surgery Center  [facility] Liang Fong, Ph.D.,  L.P. [phone]                 3rd Floor  [place] Mail Code 741   Jeff Garcia, Ph.D., LUDIVINA.BRIAN.KYLE., L.P. [phone]     [address] [place] [address] Aurora García, Ph.D., L.P. [phone]            [address]  [place] [address]           Navya Covarrubias, Ph.D., L.P. [phone]     Health Psychology Follow Up    57 minutes of individual psychotherapy    Diagnosis:   1.  Alcohol abuse, uncomplicated, in early remission (F10.10).   2.  Major depressive disorder, recurrent, in partial remission (F33.41).     Pt returns for follow up after 3 week interval.  PHQ9 on 6/24 with check in was 3, down from three weeks ago.  She met with PCP but forgot to talk about tapering down from psychotropic medications. Will be in touch with him re starting taper.  Reviewed early warning signs of depression that she identified as anhedonia and social isolation. She will start 1-10 ratings of each of these to get baseline established and continue as she tapers.  Notes absolutely no urges related to alcohol    Completed treatment plan, will be scanned into pt's Epic chart    Time in: 1600  Time out: 2514

## 2017-06-27 ENCOUNTER — HOSPITAL ENCOUNTER (OUTPATIENT)
Facility: CLINIC | Age: 62
End: 2017-06-27
Attending: INTERNAL MEDICINE | Admitting: INTERNAL MEDICINE

## 2017-06-27 ENCOUNTER — TELEPHONE (OUTPATIENT)
Dept: PHARMACY | Facility: CLINIC | Age: 62
End: 2017-06-27

## 2017-06-28 ENCOUNTER — RECORDS - HEALTHEAST (OUTPATIENT)
Dept: ADMINISTRATIVE | Facility: OTHER | Age: 62
End: 2017-06-28

## 2017-06-28 DIAGNOSIS — Z94.4 LIVER TRANSPLANTED (H): ICD-10-CM

## 2017-06-28 RX ORDER — PREDNISONE 5 MG/1
7.5 TABLET ORAL DAILY
Qty: 45 TABLET | Refills: 11 | Status: SHIPPED | OUTPATIENT
Start: 2017-06-28 | End: 2017-08-11

## 2017-06-28 NOTE — TELEPHONE ENCOUNTER
Patient states dose change on prednisone to 7.5mg once daily, please confirm and send new rx    Adalgisa Church   [facility]  [phone]

## 2017-07-03 ENCOUNTER — AMBULATORY - HEALTHEAST (OUTPATIENT)
Dept: LAB | Facility: CLINIC | Age: 62
End: 2017-07-03

## 2017-07-03 ENCOUNTER — TELEPHONE (OUTPATIENT)
Dept: PHARMACY | Facility: CLINIC | Age: 62
End: 2017-07-03

## 2017-07-03 DIAGNOSIS — Z94.4 LIVER REPLACED BY TRANSPLANT (H): ICD-10-CM

## 2017-07-03 DIAGNOSIS — Z79.899 ENCOUNTER FOR LONG-TERM (CURRENT) USE OF OTHER MEDICATIONS: ICD-10-CM

## 2017-07-03 DIAGNOSIS — Z94.4 LIVER TRANSPLANTED (H): ICD-10-CM

## 2017-07-03 LAB
HCT VFR BLD AUTO: 39 %
HEMOGLOBIN: 13.1 G/DL (ref 11.7–15.7)

## 2017-07-03 PROCEDURE — 80197 ASSAY OF TACROLIMUS: CPT | Performed by: INTERNAL MEDICINE

## 2017-07-03 NOTE — TELEPHONE ENCOUNTER
Anemia Management Note  SUBJECTIVE/OBJECTIVE:  Referred by Dr. Daya Gutierrez on 3/16/2017  Primary Diagnosis: Anemia in Chronic Kidney Disease (N18.3, D63.1)   Secondary Diagnosis: Chronic Kidney Disease, Stage 3 (N18.3)   Hgb goal range: 9-10  Epo/Darbo:  Aranesp 60 mcg every 14 days As needed - HOME  RX will  on 3/28/2018  Iron regimen: None  Lab orders will  on 3/28/18    Anemia Latest Ref Rng & Units 2017 2017 2017 2017 2017 2017 7/3/2017   MEDARDO Dose - - - - - - - -   Hemoglobin 11.7 - 15.7 g/dL 12.1 12.2 12.1 11.5(L) 11.8 12.8 13.1   TSAT 15 - 46 % - - - 24 - - -   Ferritin 8 - 252 ng/mL - - - 138 - - -     BP Readings from Last 3 Encounters:   17 126/82   17 132/79   17 124/75     Wt Readings from Last 2 Encounters:   17 167 lb 12.8 oz (76.1 kg)   17 167 lb 11.2 oz (76.1 kg)     Received and documented outside lab results    ASSESSMENT:  Hgb:Above goal - recommend hold dose  TSat: not at goal of >30% Ferritin: At goal (>100ng/mL)    PLAN:  I spoke to Jessica, she will be getting her [place] labs drawn again in 2 weeks and will request the ferritin and iron labs drawn then likely DC anemia service    Orders needed to be renewed (for next follow-up date) in LETTERS - for external labs: None    Iron labs due:  now    Plan discussed with:  Phan  Plan provided by:  Jaqui    NEXT FOLLOW-UP DATE:  17    Anemia Management Service  Molly Dumont PharmD and Zamzam Olmedo CPhT  Phone: [phone]  Fax: [phone]

## 2017-07-06 LAB
TACROLIMUS BLD-MCNC: 5.9 UG/L (ref 5–15)
TME LAST DOSE: NORMAL H

## 2017-07-17 ENCOUNTER — TELEPHONE (OUTPATIENT)
Dept: PHARMACY | Facility: CLINIC | Age: 62
End: 2017-07-17

## 2017-07-17 ENCOUNTER — AMBULATORY - HEALTHEAST (OUTPATIENT)
Dept: LAB | Facility: CLINIC | Age: 62
End: 2017-07-17

## 2017-07-17 DIAGNOSIS — Z94.4 LIVER TRANSPLANTED (H): ICD-10-CM

## 2017-07-17 DIAGNOSIS — Z79.899 ENCOUNTER FOR LONG-TERM (CURRENT) USE OF OTHER MEDICATIONS: ICD-10-CM

## 2017-07-17 DIAGNOSIS — I10 HYPERTENSION: Primary | ICD-10-CM

## 2017-07-17 DIAGNOSIS — Z94.4 LIVER REPLACED BY TRANSPLANT (H): ICD-10-CM

## 2017-07-17 LAB — HEMOGLOBIN: 13.5 G/DL (ref 11.7–15.7)

## 2017-07-17 PROCEDURE — 80197 ASSAY OF TACROLIMUS: CPT | Performed by: INTERNAL MEDICINE

## 2017-07-17 RX ORDER — AMLODIPINE BESYLATE 5 MG/1
5 TABLET ORAL DAILY
Qty: 90 TABLET | Refills: 3 | Status: SHIPPED | OUTPATIENT
Start: 2017-07-17 | End: 2018-07-31

## 2017-07-17 NOTE — TELEPHONE ENCOUNTER
Follow-up with anemia management service:    LM for Phan reminding her that she is due for Hgb, ferritin and iron labs, then likely DC anemia service    Anemia Latest Ref Rng & Units 2017 2017 2017 2017 2017 2017 7/3/2017   MEDARDO Dose - - - - - - - -   Hemoglobin 11.7 - 15.7 g/dL 12.1 12.2 12.1 11.5(L) 11.8 12.8 13.1   TSAT 15 - 46 % - - - 24 - - -   Ferritin 8 - 252 ng/mL - - - 138 - - -          Orders needed to be renewed (for next follow-up date) in EPIC: None                          Med order expires: 3/28/18                          Lab orders : 3/28/18       Follow-up call date: 17    Jaqui    Anemia Management Service  Molly Dumont,Mary Alice and Zamzam Olmedo CPhT  Phone: [phone]  Fax: [phone]

## 2017-07-18 LAB
TACROLIMUS BLD-MCNC: 4.8 UG/L (ref 5–15)
TME LAST DOSE: ABNORMAL H

## 2017-07-19 NOTE — TELEPHONE ENCOUNTER
Anemia Management Note  SUBJECTIVE/OBJECTIVE:  Referred by Dr. Daya Gutierrez on 3/16/2017  Primary Diagnosis: Anemia in Chronic Kidney Disease (N18.3, D63.1)   Secondary Diagnosis: Chronic Kidney Disease, Stage 3 (N18.3)   Hgb goal range: 9-10  Epo/Darbo:  Aranesp 60 mcg every 14 days As needed - HOME  RX will  on 3/28/2018  Iron regimen: None  Lab orders will  on 3/28/18    Anemia Latest Ref Rng & Units 2017 2017 2017 2017 2017 7/3/2017 2017   MEDARDO Dose - - - - - - - -   Hemoglobin 11.7 - 15.7 g/dL 12.2 12.1 11.5(L) 11.8 12.8 13.1 13.5   TSAT 15 - 46 % - - 24 - - - -   Ferritin 8 - 252 ng/mL - - 138 - - - -     BP Readings from Last 3 Encounters:   17 126/82   17 132/79   17 124/75     Wt Readings from Last 2 Encounters:   17 167 lb 12.8 oz (76.1 kg)   17 167 lb 11.2 oz (76.1 kg)     ASSESSMENT:  Hgb:Above goal - recommend hold dose and DC anemia service as labs have been stable with no MEDARDO for 3 months  TSat: not at goal of >30% Ferritin: At goal (>100ng/mL). No need for supplement    PLAN:  DC anemia service    Plan discussed with:  [facility] [place]  Plan provided by:  Molly    Anemia Management Service  Molly Dumont,PharmD and Zamzam Olmedo CPhT  Phone: [phone]  Fax: [phone]    Cc:  Daya Gutierrez MD

## 2017-07-26 ENCOUNTER — RECORDS - HEALTHEAST (OUTPATIENT)
Dept: ADMINISTRATIVE | Facility: OTHER | Age: 62
End: 2017-07-26

## 2017-07-26 ENCOUNTER — MYC MEDICAL ADVICE (OUTPATIENT)
Dept: ENDOCRINOLOGY | Facility: CLINIC | Age: 62
End: 2017-07-26

## 2017-07-31 ENCOUNTER — AMBULATORY - HEALTHEAST (OUTPATIENT)
Dept: LAB | Facility: CLINIC | Age: 62
End: 2017-07-31

## 2017-07-31 ENCOUNTER — COMMUNICATION - HEALTHEAST (OUTPATIENT)
Dept: INTERNAL MEDICINE | Facility: CLINIC | Age: 62
End: 2017-07-31

## 2017-07-31 DIAGNOSIS — Z79.899 ENCOUNTER FOR LONG-TERM (CURRENT) USE OF OTHER MEDICATIONS: ICD-10-CM

## 2017-07-31 DIAGNOSIS — Z94.4 LIVER REPLACED BY TRANSPLANT (H): ICD-10-CM

## 2017-07-31 DIAGNOSIS — Z94.4 LIVER TRANSPLANTED (H): ICD-10-CM

## 2017-07-31 PROCEDURE — 80197 ASSAY OF TACROLIMUS: CPT | Performed by: INTERNAL MEDICINE

## 2017-08-01 LAB
TACROLIMUS BLD-MCNC: 5.3 UG/L (ref 5–15)
TME LAST DOSE: NORMAL H

## 2017-08-02 ENCOUNTER — TELEPHONE (OUTPATIENT)
Dept: TRANSPLANT | Facility: CLINIC | Age: 62
End: 2017-08-02

## 2017-08-02 NOTE — TELEPHONE ENCOUNTER
Rec'd message from pharmacy regarding pt's prednisone dose. Pt states she's taking 5 mg bid. Had previously been taking 10 mg daily, and decreased to 7.5 mg daily on 6/9/17. Left message for pt to return call for clarification.

## 2017-08-07 ENCOUNTER — OFFICE VISIT (OUTPATIENT)
Dept: PSYCHOLOGY | Facility: CLINIC | Age: 62
End: 2017-08-07

## 2017-08-07 DIAGNOSIS — F33.41 RECURRENT MAJOR DEPRESSION IN PARTIAL REMISSION (H): ICD-10-CM

## 2017-08-07 DIAGNOSIS — F10.10 ALCOHOL ABUSE, UNCOMPLICATED: Primary | ICD-10-CM

## 2017-08-07 ASSESSMENT — PATIENT HEALTH QUESTIONNAIRE - PHQ9: SUM OF ALL RESPONSES TO PHQ QUESTIONS 1-9: 7

## 2017-08-07 NOTE — PROGRESS NOTES
"  Health Psychology                  Clinic    Department of Medicine  Zulma White, Ph.D., L.P. [phone]                          Clinics and Surgery Center  [facility] Liang Fong, Ph.D.,  L.P. [phone]                 3rd Floor  [place] Mail Code 74   Jeff Garcia, Ph.D., LUDIVINA.BRIAN.KYLE., L.P. [phone]     [address] Aurora García, Ph.D., L.P. [phone]            [place] [address]           Navya Covarrubias, Ph.D., L.P. [phone]     Health Psychology Follow Up    61 minutes of individual psychotherapy    Diagnosis:   1.  Alcohol abuse, uncomplicated, in early remission (F10.10).   2.  Major depressive disorder, recurrent, in partial remission (F33.41).     Pt returns for follow up after 6 week interval.  PHQ9 today=7, up from 3 on 6/24. Still quite minimal symptoms, especially given that 6/7 are related to fatigue and desire for more sleep.    Part of the increase is due to more honesty with herself that she really does continue to be highly fatigued and to have desire to sleep excessively, whereas two months ago she was focused on belief that \"this is all going to get better.\"     Has tapered off of sertraline completely. Had bad 3-day bout of akathisia that has almost completely resolved, some dizziness. No increase in symptoms of depression.    Work is becoming quite stressful, negative but indirect feedback from HR and supervisor. Feeling that they are not understanding of issue of illness, fatigue, and need for flexibility. Questioning her ability to maintain fulltime schedule. Plans to get input from past supervisor soon as they have a longstanding relationship.    Open about having thoughts of using alcohol, responding to these by telling herself immediately, \"Not an option, no way, no how.\" Not attending aftercare or AA. States that these thoughts do not rise to he level of an urge. No concern about " possibility of drinking. Would like to get to support group but also trying to balance energy for work and decisions re whether to continue working full time before starting to ask for even more time off than she is already for medical appointments. Because of this concern an her ongoing, positive mood, she asks re my opinion that she use this individual therapy on a PRN basis. Agreed that she is doing well, good focus on self-care. Agreed that we will not continue with regularly scheduled appointments. Encouraged her to continue monitoring herself re early warning signs of increased depression (e.g., more social isolation, decreased interest in spending time with grandbaby) and if anything appears to be elevated that she call to schedule.    Shared this plan with Kate Manjarrez, MSW Liver Transplant program who will increase contact with pt for check ins.    Time in: 1652  Time out: 1753

## 2017-08-07 NOTE — MR AVS SNAPSHOT
After Visit Summary   8/7/2017    Phan Luque    MRN: [id number]           Patient Information     Date Of Birth          1955        Visit Information        Provider Department      8/7/2017 5:00 PM Zulma White, PhD [facility]        Today's Diagnoses     Alcohol abuse, uncomplicated    -  1    Recurrent major depression in partial remission (H)           Follow-ups after your visit        Your next 10 appointments already scheduled     Sep 08, 2017  7:00 AM CDT   Lab with  LAB   [facility] ([facility])    [address] [place] [id number]   [phone]            Sep 08, 2017  8:00 AM CDT   (Arrive by 7:45 AM)   Return Liver Transplant with Hussein Banegas MD   [facility] Hepatology ([facility])    [address] [place] [id number] [phone]            Sep 20, 2017  3:00 PM CDT   Lab with  LAB   [facility] ([facility])    [address] [place] [address]   [phone]            Sep 20, 2017  4:00 PM CDT   (Arrive by 3:30 PM)   Return Visit with Leslie Lara MD   [facility] Nephrology ([facility])    [address] [place] [address] [phone]            Nov 20, 2017  3:30 PM CST   Lab with  LAB   [facility] ([facility])    [address] [place] [address] [phone]            Nov 20, 2017  4:30 PM CST   (Arrive by 4:15 PM)   RETURN ENDOCRINE with Ruth Oakley MD   [facility] Endocrinology ([facility])    [address] [place] [address] [phone]            Dec 05, 2017 10:00 AM CST   RETURN NEURO with Ambrose Ortega MD   [facility] ([facility])    [address]  [address]  [facility] [address]   [phone]            Dec 06, 2017  3:30 PM CST   Lab with  LAB    Health Lab ([facility])    [address]  1st Floor  [facility] [id number] [phone]            Dec 06, 2017  4:30 PM CST   (Arrive by 4:00 PM)   Return Visit with Daya Gutierrez MD   [facility]ph[facility] ([facility])    [address]  3rd Floor  [facility] [id number] [phone]            Dec 20, 2017  4:30 PM CST   (Arrive by 4:15 PM)   Return Visit with DONOVAN Castellanos [facility] Gastroenterology and IBD ([facility])    [address]  4th Floor  [facility] [id number] [phone]              Who to contact     Please call your clinic at [phone] to:    Ask questions about your health    Make or cancel appointments    Discuss your medicines    Learn about your test results    Speak to your doctor   If you have compliments or concerns about an experience at your clinic, or if you wish to file a complaint, please contact [facility] Patient Relations at [phone] or email us at [email or web address]         Additional Information About Your Visit        "[employer]hart Information     [employer] gives you secure access to your electronic health record. If you see a primary care provider, you can also send messages to your care team and make appointments. If you have questions, please call your primary care clinic.  If you do not have a primary care provider, please call [phone] and they will assist you.      [employer] is an electronic gateway that provides easy, online access to your medical records. With [employer], you can request a clinic appointment, read your test results, renew a prescription or communicate with your care team.     To access your existing account, please contact your [facility] or call [phone]  for assistance.        Care EveryWhere ID     This is your Care EveryWhere ID. This could be used by other organizations to access your [facility] medical records  [id number]         Blood Pressure from Last 3 Encounters:   08/14/17 136/79   08/11/17 148/70   06/16/17 126/82    Weight from Last 3 Encounters:   08/14/17 80.2 kg (176 lb 11.2 oz)   08/11/17 80.3 kg (177 lb)   06/16/17 76.1 kg (167 lb 12.8 oz)              Today, you had the following     No orders found for display       Primary Care Provider Office Phone # Fax #    Santosh Salgado [phone]       [address] [facility] [address]        Equal Access to Services     PAULO BERRY : Hadii aad ku hadasho Soomaali, waaxda luqadaha, qaybta kaalmada adeegyada, anna ambrose . So [facility] [phone].    ATENCIÓN: Si habla español, tiene a reece disposición servicios gratuitos de asistencia lingüística. [facility] [phone].    We comply with applicable federal civil rights laws and [place] laws. We do not discriminate on the basis of race, color, national origin, age, disability sex, sexual orientation or gender identity.            Thank you!     Thank you for choosing [facility]  for your care. Our goal is always to provide you with excellent care. Hearing back from our patients is one way we can continue to improve our services. Please take a few minutes to complete the written survey that you may receive in the mail after your visit with us. Thank you!             Your Updated Medication List - Protect others around you: Learn how to safely use, store and throw away your medicines at www.disposemymeds.org.          This list is accurate as of: 8/7/17 11:59 PM.  Always use your most recent med list.                   Brand Name Dispense Instructions for use Diagnosis    acetaminophen 325 MG tablet    TYLENOL    100 tablet    Take 2 tablets (650 mg) by mouth every 6 hours as  needed for mild pain Or fevers. Use sparingly. Limit use to no more than 2 grams (2000 mg) in 24 hours. **Further refills must be obtained by primary care provider**    Acute post-operative pain       amLODIPine 5 MG tablet    NORVASC    90 tablet    Take 1 tablet (5 mg) by mouth daily    Hypertension       azaTHIOprine 100 MG Tabs     90 tablet    Take 50 mg by mouth every evening    Liver transplanted (H)       busPIRone 10 MG tablet    BUSPAR    60 tablet    Take 1 tablet (10 mg) by mouth 2 times daily    Anxiety       calcium Citrate-vitamin D 500-400 MG-UNIT Chew      Take 1 tablet by mouth daily        cyanocobalamin 1000 MCG Tabs     30 tablet    Take 1,000 mcg by mouth daily    Liver transplanted (H)       FISH OIL PO      Take 645 mg by mouth 2 times daily        folic acid 1 MG tablet    FOLVITE    30 tablet    Take 1 tablet (1 mg) by mouth daily    Malnutrition (H)       hydrOXYzine 25 MG tablet    ATARAX    60 tablet    Take 1-2 tablets (25-50 mg) by mouth every 6 hours as needed for itching    Itching       latanoprost 0.005 % ophthalmic solution    XALATAN    1 Bottle    Place 1 drop into both eyes At Bedtime    Steroid responders borderline glaucoma, bilateral       levothyroxine 88 MCG tablet    SYNTHROID/LEVOTHROID    30 tablet    Take 1 tablet (88 mcg) by mouth every morning (before breakfast)    Thyroid function test abnormal       loperamide 2 MG capsule    IMODIUM     Take 2 mg by mouth 4 times daily as needed for diarrhea Reported on 5/18/2017        magnesium oxide 400 (241.3 MG) MG tablet    MAG-OX    60 tablet    Take 1 tablet (400 mg) by mouth daily    CKD (chronic kidney disease) stage 3, GFR 30-59 ml/min, Hypomagnesemia       multivitamin, therapeutic Tabs tablet     30 tablet    Take 1 tablet by mouth every 24 hours    Malnutrition (H)       pantoprazole 40 MG EC tablet    PROTONIX    30 tablet    Take 1 tablet (40 mg) by mouth every morning (before breakfast)    Gastroesophageal reflux  disease, esophagitis presence not specified       psyllium 0.52 G capsule     60 capsule    Take 2 capsules (1.04 g) by mouth daily With a full glass of water.    Loose stools       sertraline 100 MG tablet    ZOLOFT    45 tablet    Take 1.5 tablets (150 mg) by mouth daily    Transplant, organ       simethicone 80 MG chewable tablet    MYLICON    180 tablet    Take 1 tablet (80 mg) by mouth every 6 hours as needed for flatulence or cramping    Flatulence, eructation, and gas pain       tacrolimus 0.5 MG capsule    GENERIC EQUIVALENT    240 capsule    Take 4 capsules (2 mg) by mouth 2 times daily    Liver transplanted (H)       traMADol 50 MG tablet    ULTRAM    50 tablet    Take 1-2 tablets ( mg) by mouth every 6 hours as needed for pain    Abdominal pain, epigastric       traZODone 100 MG tablet    DESYREL    30 tablet    Take 1 tablet (100 mg) by mouth At Bedtime **Further refills must be obtained by primary care provider**    Insomnia, unspecified type       ursodiol 300 MG capsule    ACTIGALL    60 capsule    Take 1 capsule (300 mg) by mouth 2 times daily    Liver transplanted (H)

## 2017-08-09 ASSESSMENT — ENCOUNTER SYMPTOMS
SKIN CHANGES: 1
HYPERTENSION: 1
WEIGHT GAIN: 1
MEMORY LOSS: 0
POLYPHAGIA: 0
ALTERED TEMPERATURE REGULATION: 1
STIFFNESS: 1
NAUSEA: 1
HEADACHES: 1
JOINT SWELLING: 0
BOWEL INCONTINENCE: 0
TACHYCARDIA: 0
NECK PAIN: 1
DECREASED APPETITE: 0
SYNCOPE: 0
DIARRHEA: 1
HYPOTENSION: 0
SLEEP DISTURBANCES DUE TO BREATHING: 0
LEG SWELLING: 1
INCREASED ENERGY: 1
CLAUDICATION: 1
MUSCLE CRAMPS: 1
INSOMNIA: 1
DOUBLE VISION: 0
POOR WOUND HEALING: 0
WEIGHT LOSS: 0
HEARTBURN: 1
NUMBNESS: 1
DISTURBANCES IN COORDINATION: 1
BLOOD IN STOOL: 0
TINGLING: 1
NIGHT SWEATS: 1
EYE PAIN: 1
DEPRESSION: 0
EYE WATERING: 1
EYE REDNESS: 1
FATIGUE: 1
CHILLS: 0
BLOATING: 1
LIGHT-HEADEDNESS: 1
EYE IRRITATION: 1
PANIC: 0
NAIL CHANGES: 1
RECTAL PAIN: 0
BACK PAIN: 1
TREMORS: 1
LEG PAIN: 1
ABDOMINAL PAIN: 1
DECREASED CONCENTRATION: 0
CONSTIPATION: 1
ARTHRALGIAS: 1
RECTAL BLEEDING: 0
PALPITATIONS: 0
JAUNDICE: 0
FEVER: 0
EXERCISE INTOLERANCE: 1
HALLUCINATIONS: 0
MYALGIAS: 1
ORTHOPNEA: 0
SPEECH CHANGE: 0
LOSS OF CONSCIOUSNESS: 0
MUSCLE WEAKNESS: 1
PARALYSIS: 0
NERVOUS/ANXIOUS: 0
WEAKNESS: 1
SEIZURES: 0
POLYDIPSIA: 1
VOMITING: 0
DIZZINESS: 1

## 2017-08-10 RX ORDER — ZOLEDRONIC ACID 5 MG/100ML
5 INJECTION, SOLUTION INTRAVENOUS ONCE
Status: CANCELLED
Start: 2017-08-10 | End: 2017-08-10

## 2017-08-11 ENCOUNTER — TELEPHONE (OUTPATIENT)
Dept: ENDOCRINOLOGY | Facility: CLINIC | Age: 62
End: 2017-08-11

## 2017-08-11 ENCOUNTER — APPOINTMENT (OUTPATIENT)
Dept: LAB | Facility: CLINIC | Age: 62
End: 2017-08-11
Attending: INTERNAL MEDICINE
Payer: COMMERCIAL

## 2017-08-11 ENCOUNTER — INFUSION THERAPY VISIT (OUTPATIENT)
Dept: INFUSION THERAPY | Facility: CLINIC | Age: 62
End: 2017-08-11
Attending: INTERNAL MEDICINE
Payer: COMMERCIAL

## 2017-08-11 VITALS
TEMPERATURE: 97.6 F | BODY MASS INDEX: 27.72 KG/M2 | SYSTOLIC BLOOD PRESSURE: 148 MMHG | WEIGHT: 177 LBS | HEART RATE: 78 BPM | DIASTOLIC BLOOD PRESSURE: 70 MMHG | OXYGEN SATURATION: 98 % | RESPIRATION RATE: 16 BRPM

## 2017-08-11 DIAGNOSIS — M85.80 OSTEOPENIA: Primary | ICD-10-CM

## 2017-08-11 DIAGNOSIS — Z94.4 LIVER TRANSPLANTED (H): ICD-10-CM

## 2017-08-11 DIAGNOSIS — Z94.4 LIVER REPLACED BY TRANSPLANT (H): ICD-10-CM

## 2017-08-11 DIAGNOSIS — M85.80 OSTEOPENIA, UNSPECIFIED LOCATION: ICD-10-CM

## 2017-08-11 LAB
ALBUMIN SERPL-MCNC: 3.7 G/DL (ref 3.4–5)
ALP SERPL-CCNC: 68 U/L (ref 40–150)
ALT SERPL W P-5'-P-CCNC: 15 U/L (ref 0–50)
ANION GAP SERPL CALCULATED.3IONS-SCNC: 8 MMOL/L (ref 3–14)
AST SERPL W P-5'-P-CCNC: 15 U/L (ref 0–45)
BILIRUB DIRECT SERPL-MCNC: 0.2 MG/DL (ref 0–0.2)
BILIRUB SERPL-MCNC: 0.9 MG/DL (ref 0.2–1.3)
BUN SERPL-MCNC: 18 MG/DL (ref 7–30)
CALCIUM SERPL-MCNC: 8.4 MG/DL (ref 8.5–10.1)
CALCIUM SERPL-MCNC: 8.5 MG/DL (ref 8.5–10.1)
CHLORIDE SERPL-SCNC: 104 MMOL/L (ref 94–109)
CO2 SERPL-SCNC: 25 MMOL/L (ref 20–32)
CREAT SERPL-MCNC: 1.23 MG/DL (ref 0.52–1.04)
CREAT SERPL-MCNC: 1.26 MG/DL (ref 0.52–1.04)
ERYTHROCYTE [DISTWIDTH] IN BLOOD BY AUTOMATED COUNT: 13.2 % (ref 10–15)
GFR SERPL CREATININE-BSD FRML MDRD: 43 ML/MIN/1.7M2
GFR SERPL CREATININE-BSD FRML MDRD: 44 ML/MIN/1.7M2
GLUCOSE SERPL-MCNC: 101 MG/DL (ref 70–99)
HCT VFR BLD AUTO: 38.2 % (ref 35–47)
HGB BLD-MCNC: 12.6 G/DL (ref 11.7–15.7)
MAGNESIUM SERPL-MCNC: 1.8 MG/DL (ref 1.6–2.3)
MCH RBC QN AUTO: 32.1 PG (ref 26.5–33)
MCHC RBC AUTO-ENTMCNC: 33 G/DL (ref 31.5–36.5)
MCV RBC AUTO: 97 FL (ref 78–100)
PHOSPHATE SERPL-MCNC: 3.6 MG/DL (ref 2.5–4.5)
PLATELET # BLD AUTO: 217 10E9/L (ref 150–450)
POTASSIUM SERPL-SCNC: 3.9 MMOL/L (ref 3.4–5.3)
PROT SERPL-MCNC: 7.2 G/DL (ref 6.8–8.8)
RBC # BLD AUTO: 3.92 10E12/L (ref 3.8–5.2)
SODIUM SERPL-SCNC: 137 MMOL/L (ref 133–144)
WBC # BLD AUTO: 6.6 10E9/L (ref 4–11)

## 2017-08-11 PROCEDURE — 83735 ASSAY OF MAGNESIUM: CPT | Performed by: INTERNAL MEDICINE

## 2017-08-11 PROCEDURE — 96365 THER/PROPH/DIAG IV INF INIT: CPT

## 2017-08-11 PROCEDURE — 84100 ASSAY OF PHOSPHORUS: CPT | Performed by: INTERNAL MEDICINE

## 2017-08-11 PROCEDURE — 80076 HEPATIC FUNCTION PANEL: CPT | Performed by: INTERNAL MEDICINE

## 2017-08-11 PROCEDURE — 82310 ASSAY OF CALCIUM: CPT | Performed by: INTERNAL MEDICINE

## 2017-08-11 PROCEDURE — 85027 COMPLETE CBC AUTOMATED: CPT | Performed by: INTERNAL MEDICINE

## 2017-08-11 PROCEDURE — 82565 ASSAY OF CREATININE: CPT | Performed by: INTERNAL MEDICINE

## 2017-08-11 PROCEDURE — 80048 BASIC METABOLIC PNL TOTAL CA: CPT | Performed by: INTERNAL MEDICINE

## 2017-08-11 PROCEDURE — 25000128 H RX IP 250 OP 636: Mod: ZF | Performed by: INTERNAL MEDICINE

## 2017-08-11 RX ORDER — ZOLEDRONIC ACID 5 MG/100ML
5 INJECTION, SOLUTION INTRAVENOUS ONCE
Status: COMPLETED | OUTPATIENT
Start: 2017-08-11 | End: 2017-08-11

## 2017-08-11 RX ORDER — PREDNISONE 5 MG/1
10 TABLET ORAL DAILY
Qty: 180 TABLET | Refills: 3 | Status: SHIPPED | OUTPATIENT
Start: 2017-08-11 | End: 2017-12-20

## 2017-08-11 RX ADMIN — ZOLEDRONIC ACID 5 MG: 0.05 INJECTION, SOLUTION INTRAVENOUS at 10:22

## 2017-08-11 ASSESSMENT — PAIN SCALES - GENERAL: PAINLEVEL: NO PAIN (0)

## 2017-08-11 NOTE — NURSING NOTE
Chief Complaint   Patient presents with     Blood Draw     Labs called from PIV in right wrist.  vs taken. pt checked in for appt     Labs drawn from PIV. Vitals taken. Pt checked in for appointment(s).    Germania Jacques RN

## 2017-08-11 NOTE — PATIENT INSTRUCTIONS
Patient Education    Zoledronic Acid Solution for injection    Zoledronic Acid Solution for injection [Hypercalcemia of Malignancy]    Zoledronic Acid Solution for injection [Pagets Disease]  Zoledronic Acid Solution for injection  What is this medicine?  ZOLEDRONIC ACID (SHRUTHI le dorcas ik AS id) lowers the amount of calcium loss from bone. It is used to treat Paget's disease and osteoporosis in women.  This medicine may be used for other purposes; ask your health care provider or pharmacist if you have questions.  What should I tell my health care provider before I take this medicine?  They need to know if you have any of these conditions:    aspirin-sensitive asthma    cancer, especially if you are receiving medicines used to treat cancer    dental disease or wear dentures    infection    kidney disease    low levels of calcium in the blood    past surgery on the parathyroid gland or intestines    receiving corticosteroids like dexamethasone or prednisone    an unusual or allergic reaction to zoledronic acid, other medicines, foods, dyes, or preservatives    pregnant or trying to get pregnant    breast-feeding  How should I use this medicine?  This medicine is for infusion into a vein. It is given by a health care professional in a hospital or clinic setting.  Talk to your pediatrician regarding the use of this medicine in children. This medicine is not approved for use in children.  Overdosage: If you think you have taken too much of this medicine contact a poison control center or emergency room at once.  NOTE: This medicine is only for you. Do not share this medicine with others.  What if I miss a dose?  It is important not to miss your dose. Call your doctor or health care professional if you are unable to keep an appointment.  What may interact with this medicine?    certain antibiotics given by injection    NSAIDs, medicines for pain and inflammation, like ibuprofen or naproxen    some diuretics like  bumetanide, furosemide    teriparatide  This list may not describe all possible interactions. Give your health care provider a list of all the medicines, herbs, non-prescription drugs, or dietary supplements you use. Also tell them if you smoke, drink alcohol, or use illegal drugs. Some items may interact with your medicine.  What should I watch for while using this medicine?  Visit your doctor or health care professional for regular checkups. It may be some time before you see the benefit from this medicine. Do not stop taking your medicine unless your doctor tells you to. Your doctor may order blood tests or other tests to see how you are doing.  Women should inform their doctor if they wish to become pregnant or think they might be pregnant. There is a potential for serious side effects to an unborn child. Talk to your health care professional or pharmacist for more information.  You should make sure that you get enough calcium and vitamin D while you are taking this medicine. Discuss the foods you eat and the vitamins you take with your health care professional.  Some people who take this medicine have severe bone, joint, and/or muscle pain. This medicine may also increase your risk for jaw problems or a broken thigh bone. Tell your doctor right away if you have severe pain in your jaw, bones, joints, or muscles. Tell your doctor if you have any pain that does not go away or that gets worse.  Tell your dentist and dental surgeon that you are taking this medicine. You should not have major dental surgery while on this medicine. See your dentist to have a dental exam and fix any dental problems before starting this medicine. Take good care of your teeth while on this medicine. Make sure you see your dentist for regular follow-up appointments.  What side effects may I notice from receiving this medicine?  Side effects that you should report to your doctor or health care professional as soon as possible:    allergic  reactions like skin rash, itching or hives, swelling of the face, lips, or tongue    anxiety, confusion, or depression    breathing problems    changes in vision    eye pain    feeling faint or lightheaded, falls    jaw pain, especially after dental work    mouth sores    muscle cramps, stiffness, or weakness    redness, blistering, peeling or loosening of the skin, including inside the mouth    trouble passing urine or change in the amount of urine  Side effects that usually do not require medical attention (report to your doctor or health care professional if they continue or are bothersome):    bone, joint, or muscle pain    constipation    diarrhea    fever    hair loss    irritation at site where injected    loss of appetite    nausea, vomiting    stomach upset    trouble sleeping    trouble swallowing    weak or tired  This list may not describe all possible side effects. Call your doctor for medical advice about side effects. You may report side effects to FDA at 0-416-FDA-3223.  Where should I keep my medicine?  This drug is given in a hospital or clinic and will not be stored at home.  NOTE:This sheet is a summary. It may not cover all possible information. If you have questions about this medicine, talk to your doctor, pharmacist, or health care provider. Copyright  2016 [employer]

## 2017-08-11 NOTE — MR AVS SNAPSHOT
After Visit Summary   8/11/2017    Phan Luque    MRN: [id number]           Patient Information     Date Of Birth          1955        Visit Information        Provider Department      8/11/2017 10:00 AM UC 50 ATC; UC SPEC INFUSION [facility]        Today's Diagnoses     Osteopenia    -  1    Liver replaced by transplant (H)        Osteopenia, unspecified location          Care Instructions      Patient Education    Zoledronic Acid Solution for injection    Zoledronic Acid Solution for injection [Hypercalcemia of Malignancy]    Zoledronic Acid Solution for injection [Pagets Disease]  Zoledronic Acid Solution for injection  What is this medicine?  ZOLEDRONIC ACID (SHRUTHI le dron ik AS id) lowers the amount of calcium loss from bone. It is used to treat Paget's disease and osteoporosis in women.  This medicine may be used for other purposes; ask your health care provider or pharmacist if you have questions.  What should I tell my health care provider before I take this medicine?  They need to know if you have any of these conditions:    aspirin-sensitive asthma    cancer, especially if you are receiving medicines used to treat cancer    dental disease or wear dentures    infection    kidney disease    low levels of calcium in the blood    past surgery on the parathyroid gland or intestines    receiving corticosteroids like dexamethasone or prednisone    an unusual or allergic reaction to zoledronic acid, other medicines, foods, dyes, or preservatives    pregnant or trying to get pregnant    breast-feeding  How should I use this medicine?  This medicine is for infusion into a vein. It is given by a health care professional in a hospital or clinic setting.  Talk to your pediatrician regarding the use of this medicine in children. This medicine is not approved for use in children.  Overdosage: If you think you have taken too much of this medicine  contact a poison control center or emergency room at once.  NOTE: This medicine is only for you. Do not share this medicine with others.  What if I miss a dose?  It is important not to miss your dose. Call your doctor or health care professional if you are unable to keep an appointment.  What may interact with this medicine?    certain antibiotics given by injection    NSAIDs, medicines for pain and inflammation, like ibuprofen or naproxen    some diuretics like bumetanide, furosemide    teriparatide  This list may not describe all possible interactions. Give your health care provider a list of all the medicines, herbs, non-prescription drugs, or dietary supplements you use. Also tell them if you smoke, drink alcohol, or use illegal drugs. Some items may interact with your medicine.  What should I watch for while using this medicine?  Visit your doctor or health care professional for regular checkups. It may be some time before you see the benefit from this medicine. Do not stop taking your medicine unless your doctor tells you to. Your doctor may order blood tests or other tests to see how you are doing.  Women should inform their doctor if they wish to become pregnant or think they might be pregnant. There is a potential for serious side effects to an unborn child. Talk to your health care professional or pharmacist for more information.  You should make sure that you get enough calcium and vitamin D while you are taking this medicine. Discuss the foods you eat and the vitamins you take with your health care professional.  Some people who take this medicine have severe bone, joint, and/or muscle pain. This medicine may also increase your risk for jaw problems or a broken thigh bone. Tell your doctor right away if you have severe pain in your jaw, bones, joints, or muscles. Tell your doctor if you have any pain that does not go away or that gets worse.  Tell your dentist and dental surgeon that you are taking this  medicine. You should not have major dental surgery while on this medicine. See your dentist to have a dental exam and fix any dental problems before starting this medicine. Take good care of your teeth while on this medicine. Make sure you see your dentist for regular follow-up appointments.  What side effects may I notice from receiving this medicine?  Side effects that you should report to your doctor or health care professional as soon as possible:    allergic reactions like skin rash, itching or hives, swelling of the face, lips, or tongue    anxiety, confusion, or depression    breathing problems    changes in vision    eye pain    feeling faint or lightheaded, falls    jaw pain, especially after dental work    mouth sores    muscle cramps, stiffness, or weakness    redness, blistering, peeling or loosening of the skin, including inside the mouth    trouble passing urine or change in the amount of urine  Side effects that usually do not require medical attention (report to your doctor or health care professional if they continue or are bothersome):    bone, joint, or muscle pain    constipation    diarrhea    fever    hair loss    irritation at site where injected    loss of appetite    nausea, vomiting    stomach upset    trouble sleeping    trouble swallowing    weak or tired  This list may not describe all possible side effects. Call your doctor for medical advice about side effects. You may report side effects to FDA at 0-828-FDA-3323.  Where should I keep my medicine?  This drug is given in a hospital or clinic and will not be stored at home.  NOTE:This sheet is a summary. It may not cover all possible information. If you have questions about this medicine, talk to your doctor, pharmacist, or health care provider. Copyright  2016 [employer] Standard                Follow-ups after your visit        Your next 10 appointments already scheduled     Aug 14, 2017  4:00 PM CDT   (Arrive by 3:45 PM)   RETURN ENDOCRINE  with Ruth Oakley MD   [facility] Endocrinology ([facility])    [address]  3rd [facility] [address]   [phone]            Sep 08, 2017  7:00 AM CDT   Lab with  LAB   [facility] Lab ([facility])    [address] [place] [address]  [address]st [facility] [address]   [phone]            Sep 08, 2017  8:00 AM CDT   (Arrive by 7:45 AM)   Return Liver Transplant with Hussein Banegas MD   [facility] Hepatology ([facility])    [address]  3rd Floor  [facility] [address] [phone]            Nov 20, 2017  4:30 PM CST   (Arrive by 4:15 PM)   RETURN ENDOCRINE with Ruth Oakley MD   [facility] Endocrinology ([facility])    [address] [place] [address]  [address]rd [facility] [address]   [phone]            Dec 05, 2017 10:00 AM CST   RETURN NEURO with Ambrose Ortega MD   [facility] Clinic ([facility])    [address]  [address]  [address] Clin [address]  [facility] [address] [phone]            Dec 06, 2017  3:30 PM CST   Lab with  LAB   [facility] Lab ([facility])    [address] [place] [address]  [address] [facility] [address]   [phone]            Dec 06, 2017  4:30 PM CST   (Arrive by 4:00 PM)   Return Visit with Daya Gutierrez MD   [facility] Nephrology ([facility])    [address] [place] [address]  [address] [facility] [address]   [phone]            Dec 20, 2017  4:30 PM CST   (Arrive by 4:15 PM)   Return Visit with DONOVAN Castellanos [facility] Gastroenterology and IBD ([facility])    [address] [place] [address]  [address] [facility] [id number] [phone]              Who to contact     If you have questions or need follow up information about today's clinic visit or your schedule please contact [facility] directly at [phone].  Normal or non-critical lab and imaging results will be communicated to you by [email or web address], letter or phone within 4 business days after the clinic has received the results. If you do not hear from us within 7 days, please contact the clinic through [email or web address] or phone. If you have a critical or abnormal lab result, we will notify you by phone as soon as possible.  Submit refill requests through [employer] or call your pharmacy and they will forward the refill request to us. Please allow 3 business days for your refill to be completed.          Additional Information About Your Visit        [email or web address] Information     [employer] gives you secure access to your electronic health record. If you see a primary care provider, you can also send messages to your care team and make appointments. If you have questions, please call your primary care clinic.  If you do not have a primary care provider, please call [phone] and they will assist you.        Care EveryWhere ID     This is your Care EveryWhere ID. This could be used by other organizations to access your [place] medical records  [id number]        Your Vitals Were     Pulse Temperature Respirations Pulse Oximetry BMI (Body Mass Index)       78 97.6  F (36.4  C) (Oral) 16 98% 27.72 kg/m2        Blood Pressure from Last 3 Encounters:   08/11/17 148/73   06/16/17 126/82   06/09/17 132/79    Weight from Last 3 Encounters:   08/11/17 80.3 kg (177 lb)   06/16/17 76.1 kg (167 lb 12.8 oz)   06/16/17 76.1 kg (167 lb 11.2 oz)              We Performed the Following     Basic metabolic panel     Calcium     CBC with platelets     Creatinine     Hepatic panel     Magnesium     Phosphorus          Today's Medication Changes          These changes are accurate as of: 8/11/17 10:16 AM.  If you have any questions, ask your nurse or doctor.               These medicines have changed or have updated prescriptions.        Dose/Directions    busPIRone 10  MG tablet   Commonly known as:  BUSPAR   This may have changed:  how much to take   Used for:  Anxiety        Dose:  10 mg   Take 1 tablet (10 mg) by mouth 2 times daily   Quantity:  60 tablet   Refills:  1       predniSONE 5 MG tablet   Commonly known as:  DELTASONE   This may have changed:  how much to take   Used for:  Liver transplanted (H)        Dose:  7.5 mg   Take 1.5 tablets (7.5 mg) by mouth daily   Quantity:  45 tablet   Refills:  11                Primary Care Provider Office Phone # Fax #    Santosh Salgado [phone]       [facility] [address]   [facility] [address]        Equal Access to Services     [facility]: Hadii adonis mejia hadashlore Sogary, waaxda luqcarla, qaybta kaalmada luz, anna ambrose . So [facility] [phone].    ATENCIÓN: Si habla español, tiene a reece disposición servicios gratuitos de asistencia lingüística. [facility] [phone].    We comply with applicable federal civil rights laws and [place] laws. We do not discriminate on the basis of race, color, national origin, age, disability sex, sexual orientation or gender identity.            Thank you!     Thank you for choosing [facility] SPECIALTY AND PROCEDURE  for your care. Our goal is always to provide you with excellent care. Hearing back from our patients is one way we can continue to improve our services. Please take a few minutes to complete the written survey that you may receive in the mail after your visit with us. Thank you!             Your Updated Medication List - Protect others around you: Learn how to safely use, store and throw away your medicines at www.disposemymeds.org.          This list is accurate as of: 8/11/17 10:16 AM.  Always use your most recent med list.                   Brand Name Dispense Instructions for use Diagnosis    acetaminophen 325 MG tablet    TYLENOL    100 tablet    Take 2 tablets (650 mg) by mouth every 6 hours  as needed for mild pain Or fevers. Use sparingly. Limit use to no more than 2 grams (2000 mg) in 24 hours. **Further refills must be obtained by primary care provider**    Acute post-operative pain       amLODIPine 5 MG tablet    NORVASC    90 tablet    Take 1 tablet (5 mg) by mouth daily    Hypertension       azaTHIOprine 100 MG Tabs     90 tablet    Take 50 mg by mouth every evening    Liver transplanted (H)       busPIRone 10 MG tablet    BUSPAR    60 tablet    Take 1 tablet (10 mg) by mouth 2 times daily    Anxiety       calcium Citrate-vitamin D 500-400 MG-UNIT Chew      Take 1 tablet by mouth daily        cyanocobalamin 1000 MCG Tabs     30 tablet    Take 1,000 mcg by mouth daily    Liver transplanted (H)       FISH OIL PO      Take 645 mg by mouth 2 times daily        folic acid 1 MG tablet    FOLVITE    30 tablet    Take 1 tablet (1 mg) by mouth daily    Malnutrition (H)       hydrOXYzine 25 MG tablet    ATARAX    60 tablet    Take 1-2 tablets (25-50 mg) by mouth every 6 hours as needed for itching    Itching       latanoprost 0.005 % ophthalmic solution    XALATAN    1 Bottle    Place 1 drop into both eyes At Bedtime    Steroid responders borderline glaucoma, bilateral       levothyroxine 88 MCG tablet    SYNTHROID/LEVOTHROID    30 tablet    Take 1 tablet (88 mcg) by mouth every morning (before breakfast)    Thyroid function test abnormal       loperamide 2 MG capsule    IMODIUM     Take 2 mg by mouth 4 times daily as needed for diarrhea Reported on 5/18/2017        magnesium oxide 400 (241.3 MG) MG tablet    MAG-OX    60 tablet    Take 1 tablet (400 mg) by mouth daily    CKD (chronic kidney disease) stage 3, GFR 30-59 ml/min, Hypomagnesemia       multivitamin, therapeutic Tabs tablet     30 tablet    Take 1 tablet by mouth every 24 hours    Malnutrition (H)       pantoprazole 40 MG EC tablet    PROTONIX    30 tablet    Take 1 tablet (40 mg) by mouth every morning (before breakfast)    Gastroesophageal  reflux disease, esophagitis presence not specified       predniSONE 5 MG tablet    DELTASONE    45 tablet    Take 1.5 tablets (7.5 mg) by mouth daily    Liver transplanted (H)       psyllium 0.52 G capsule     60 capsule    Take 2 capsules (1.04 g) by mouth daily With a full glass of water.    Loose stools       sertraline 100 MG tablet    ZOLOFT    45 tablet    Take 1.5 tablets (150 mg) by mouth daily    Transplant, organ       simethicone 80 MG chewable tablet    MYLICON    180 tablet    Take 1 tablet (80 mg) by mouth every 6 hours as needed for flatulence or cramping    Flatulence, eructation, and gas pain       tacrolimus 0.5 MG capsule    GENERIC EQUIVALENT    240 capsule    Take 4 capsules (2 mg) by mouth 2 times daily    Liver transplanted (H)       traMADol 50 MG tablet    ULTRAM    50 tablet    Take 1-2 tablets ( mg) by mouth every 6 hours as needed for pain    Abdominal pain, epigastric       traZODone 100 MG tablet    DESYREL    30 tablet    Take 1 tablet (100 mg) by mouth At Bedtime **Further refills must be obtained by primary care provider**    Insomnia, unspecified type       ursodiol 300 MG capsule    ACTIGALL    60 capsule    Take 1 capsule (300 mg) by mouth 2 times daily    Liver transplanted (H)

## 2017-08-11 NOTE — TELEPHONE ENCOUNTER
----- Message from Dalia Robles RN sent at 8/10/2017 10:08 AM CDT -----  Regarding: labs before reclast  Pt is coming infor reclast.  Pt has some external labs drawn on 7-31.  Creatinine 1.35 Calcium 9.1.  Can we proceed with the creatinine being slightly elevated? Or would you like the labs redrawn?    Thank you,    Dalia Robles RN   [facility]  [phone]

## 2017-08-11 NOTE — PROGRESS NOTES
Infusion Nursing Note:  Phan Luque presents today to [facility] for a reclast infusion.  During today's [facility] appointment orders from Dr. Smith were completed.  Frequency: once    Progress note:  ID verified by name and .  Assessment completed. Vitals were stable throughout time in [facility]. Patient education regarding infusion and possible side effects provided.  Patient verbalized understanding. yes    Premedications: were not ordered.    Infusion Rates: 400 ml/hr.  Infusion given over approximately 15minutes.     Labs were drawn prior to appointment with lab.    Vascular access: Peripheral IV was placed prior to appointment at lab.    Treatment Conditions:   Patient s Creatinine Clearance within range. Medication administered per order.    Patient tolerated infusion well.    Discharge Plan:   Follow up plan of care with: [facility]ngo[facility]  Discharge instructions were reviewed with patient.  Patient/representative verbalized understanding of discharge instructions and all questions answered.    [facility] in stable condition.    Daya Hull RN    Administrations This Visit     zoledronic Acid (RECLAST) infusion 5 mg     Admin Date Action Dose Rate Route Administered By          2017 New Bag 5 mg 400 mL/hr Intravenous Daya Hull RN                         /70  Pulse 78  Temp 97.6  F (36.4  C) (Oral)  Resp 16  Wt 80.3 kg (177 lb)  SpO2 98%  BMI 27.72 kg/m2

## 2017-08-14 ENCOUNTER — TELEPHONE (OUTPATIENT)
Dept: TRANSPLANT | Facility: CLINIC | Age: 62
End: 2017-08-14

## 2017-08-14 ENCOUNTER — OFFICE VISIT (OUTPATIENT)
Dept: ENDOCRINOLOGY | Facility: CLINIC | Age: 62
End: 2017-08-14

## 2017-08-14 ENCOUNTER — RECORDS - HEALTHEAST (OUTPATIENT)
Dept: ADMINISTRATIVE | Facility: OTHER | Age: 62
End: 2017-08-14

## 2017-08-14 VITALS
WEIGHT: 176.7 LBS | SYSTOLIC BLOOD PRESSURE: 136 MMHG | DIASTOLIC BLOOD PRESSURE: 79 MMHG | BODY MASS INDEX: 27.73 KG/M2 | HEART RATE: 85 BPM | HEIGHT: 67 IN

## 2017-08-14 DIAGNOSIS — R79.89 ELEVATED PTHRP LEVEL: ICD-10-CM

## 2017-08-14 DIAGNOSIS — E83.52 HYPERCALCEMIA: ICD-10-CM

## 2017-08-14 DIAGNOSIS — E03.9 ACQUIRED HYPOTHYROIDISM: Primary | ICD-10-CM

## 2017-08-14 ASSESSMENT — PAIN SCALES - GENERAL: PAINLEVEL: NO PAIN (0)

## 2017-08-14 NOTE — NURSING NOTE
Chief Complaint   Patient presents with     RECHECK     F/U HYPERCALCEMIA     Jimena Castorena, [facility]  Endocrinology & Diabetes 3G

## 2017-08-14 NOTE — MR AVS SNAPSHOT
After Visit Summary   8/14/2017    Phan Luque    MRN: [id number]           Patient Information     Date Of Birth          1955        Visit Information        Provider Department      8/14/2017 4:00 PM Ruth Oakley MD [facility]        Today's Diagnoses     Acquired hypothyroidism    -  1    Hypercalcemia        Elevated PTHrP level (H)          Care Instructions    Lab before next visit.     No changes in medications today.               Follow-ups after your visit        Follow-up notes from your care team     Return in about 6 months (around 2/14/2018).      Your next 10 appointments already scheduled     Sep 08, 2017  7:00 AM CDT   Lab with [employer] [facility]   [facility] ([facility])    [address] [place] [id number]   [phone]            Sep 08, 2017  8:00 AM CDT   (Arrive by 7:45 AM)   Return Liver Transplant with Hussein Banegas MD   [facility] Hepatology ([facility])    [address] [place] [address]   [phone]            Sep 20, 2017  3:00 PM CDT   Lab with [employer] [facility]   [facility] ([facility])    [address] [place] [address]   [phone]            Sep 20, 2017  4:00 PM CDT   (Arrive by 3:30 PM)   Return Visit with Leslie Lara MD   [facility] Nephrology ([facility])    [address] [place] [phone]   [phone]            Nov 20, 2017  3:30 PM CST   Lab with  LAB   [facility] ([facility])    [address] [place] [address]   [phone]            Nov 20, 2017  4:30 PM CST   (Arrive by 4:15 PM)   RETURN ENDOCRINE with Ruth Oakley MD   [facility] Endocrinology ([facility])    [address] [place] [id number] [phone]             Dec 05, 2017 10:00 AM CST   RETURN NEURO with Ambrose Ortega MD   [facility])    [address]  [address]  [facility] [address] [phone]            Dec 06, 2017  3:30 PM CST   Lab with [facility]   [facility] ([facility])    [address]  1st [address]  [facility] [address]   [phone]            Dec 06, 2017  4:30 PM CST   (Arrive by 4:00 PM)   Return Visit with Daya Gutierrez MD   [facility] Nephrology ([facility])    [address]  3rd Floor  [facility] [id number] [phone]            Dec 20, 2017  4:30 PM CST   (Arrive by 4:15 PM)   Return Visit with DONOVAN Castellanos [facility] ([facility])    [address]  4th Floor  [facility] [id number] [phone]              Future tests that were ordered for you today     Open Future Orders        Priority Expected Expires Ordered    PTH RELATED PEPTIDE TEST Routine  8/15/2018 8/15/2017    TSH with free T4 reflex Routine  8/9/2018 8/14/2017    T4 free Routine  8/9/2018 8/14/2017    Parathyroid Hormone Intact Routine  8/14/2018 8/14/2017    Renal panel Routine  8/9/2018 8/14/2017    Hemoglobin Routine  8/14/2018 8/14/2017    25 Hydroxyvitamin D2 and D3 Routine  8/14/2018 8/14/2017            Who to contact     Please call your clinic at [phone] to:    Ask questions about your health    Make or cancel appointments    Discuss your medicines    Learn about your test results    Speak to your doctor   If you have compliments or concerns about an experience at your clinic, or if you wish to file a complaint, please contact [facility] Patient Relations at [phone] or email us at [email or web address]         Additional Information About Your Visit        MyChart Information     [employer] gives you secure  "access to your electronic health record. If you see a primary care provider, you can also send messages to your care team and make appointments. If you have questions, please call your primary care clinic.  If you do not have a primary care provider, please call [phone] and they will assist you.      Match is an electronic gateway that provides easy, online access to your medical records. With Match, you can request a clinic appointment, read your test results, renew a prescription or communicate with your care team.     To access your existing account, please contact your [facility] or call [phone] for assistance.        Care EveryWhere ID     This is your Care EveryWhere ID. This could be used by other organizations to access your [place] medical records  [id number]        Your Vitals Were     Pulse Height BMI (Body Mass Index)             85 1.702 m (5' 7\") 27.68 kg/m2          Blood Pressure from Last 3 Encounters:   08/14/17 136/79   08/11/17 148/70   06/16/17 126/82    Weight from Last 3 Encounters:   08/14/17 80.2 kg (176 lb 11.2 oz)   08/11/17 80.3 kg (177 lb)   06/16/17 76.1 kg (167 lb 12.8 oz)                 Today's Medication Changes          These changes are accurate as of: 8/14/17 11:59 PM.  If you have any questions, ask your nurse or doctor.               Stop taking these medicines if you haven't already. Please contact your care team if you have questions.     busPIRone 10 MG tablet   Commonly known as:  BUSPAR   Stopped by:  Ruth Oakley MD           sertraline 100 MG tablet   Commonly known as:  ZOLOFT   Stopped by:  Ruth Oakley MD                    Primary Care Provider Office Phone # Fax #    Santosh SAAVEDRA Damian [phone]       [address] [place] [address]        Equal Access to Services     PAULO BERRY AH: Barby mejia hadasho Soomaali, waaxda luqadaha, qaybta kaalmada " anna escobarbrenda skinneraan ah. So [facility] [phone].    ATENCIÓN: Si yodit lopez, tiene a reece disposición servicios gratuitos de asistencia lingüística. Rhiannon al [phone].    We comply with applicable federal civil rights laws and [place] laws. We do not discriminate on the basis of race, color, national origin, age, disability sex, sexual orientation or gender identity.            Thank you!     Thank you for choosing [facility]  for your care. Our goal is always to provide you with excellent care. Hearing back from our patients is one way we can continue to improve our services. Please take a few minutes to complete the written survey that you may receive in the mail after your visit with us. Thank you!             Your Updated Medication List - Protect others around you: Learn how to safely use, store and throw away your medicines at www.disposemymeds.org.          This list is accurate as of: 8/14/17 11:59 PM.  Always use your most recent med list.                   Brand Name Dispense Instructions for use Diagnosis    acetaminophen 325 MG tablet    TYLENOL    100 tablet    Take 2 tablets (650 mg) by mouth every 6 hours as needed for mild pain Or fevers. Use sparingly. Limit use to no more than 2 grams (2000 mg) in 24 hours. **Further refills must be obtained by primary care provider**    Acute post-operative pain       amLODIPine 5 MG tablet    NORVASC    90 tablet    Take 1 tablet (5 mg) by mouth daily    Hypertension       azaTHIOprine 100 MG Tabs     90 tablet    Take 50 mg by mouth every evening    Liver transplanted (H)       calcium Citrate-vitamin D 500-400 MG-UNIT Chew      Take 1 tablet by mouth daily        cyanocobalamin 1000 MCG Tabs     30 tablet    Take 1,000 mcg by mouth daily    Liver transplanted (H)       FISH OIL PO      Take 645 mg by mouth 2 times daily        folic acid 1 MG tablet    FOLVITE    30 tablet    Take 1 tablet (1 mg) by mouth daily     Malnutrition (H)       hydrOXYzine 25 MG tablet    ATARAX    60 tablet    Take 1-2 tablets (25-50 mg) by mouth every 6 hours as needed for itching    Itching       latanoprost 0.005 % ophthalmic solution    XALATAN    1 Bottle    Place 1 drop into both eyes At Bedtime    Steroid responders borderline glaucoma, bilateral       levothyroxine 88 MCG tablet    SYNTHROID/LEVOTHROID    30 tablet    Take 1 tablet (88 mcg) by mouth every morning (before breakfast)    Thyroid function test abnormal       loperamide 2 MG capsule    IMODIUM     Take 2 mg by mouth 4 times daily as needed for diarrhea Reported on 5/18/2017        magnesium oxide 400 (241.3 MG) MG tablet    MAG-OX    60 tablet    Take 1 tablet (400 mg) by mouth daily    CKD (chronic kidney disease) stage 3, GFR 30-59 ml/min, Hypomagnesemia       multivitamin, therapeutic Tabs tablet     30 tablet    Take 1 tablet by mouth every 24 hours    Malnutrition (H)       pantoprazole 40 MG EC tablet    PROTONIX    30 tablet    Take 1 tablet (40 mg) by mouth every morning (before breakfast)    Gastroesophageal reflux disease, esophagitis presence not specified       predniSONE 5 MG tablet    DELTASONE    180 tablet    Take 2 tablets (10 mg) by mouth daily    Liver transplanted (H)       psyllium 0.52 G capsule     60 capsule    Take 2 capsules (1.04 g) by mouth daily With a full glass of water.    Loose stools       simethicone 80 MG chewable tablet    MYLICON    180 tablet    Take 1 tablet (80 mg) by mouth every 6 hours as needed for flatulence or cramping    Flatulence, eructation, and gas pain       tacrolimus 0.5 MG capsule    GENERIC EQUIVALENT    240 capsule    Take 4 capsules (2 mg) by mouth 2 times daily    Liver transplanted (H)       traMADol 50 MG tablet    ULTRAM    50 tablet    Take 1-2 tablets ( mg) by mouth every 6 hours as needed for pain    Abdominal pain, epigastric       traZODone 100 MG tablet    DESYREL    30 tablet    Take 1 tablet (100 mg) by  mouth At Bedtime **Further refills must be obtained by primary care provider**    Insomnia, unspecified type       ursodiol 300 MG capsule    ACTIGALL    60 capsule    Take 1 capsule (300 mg) by mouth 2 times daily    Liver transplanted (H)

## 2017-08-14 NOTE — TELEPHONE ENCOUNTER
Jessica called in to talk about problems she is having with weight gain. She blames Prednisone in part; she is not sure why she would gain 3 lbs overnight. She will see her internist overnight. She has upper abdominal pain and mild SOB when going up stairs. She is also seeing her PCP at [facility] tomorrow. She will call again to follow-up on what the PCP is recommending.

## 2017-08-14 NOTE — LETTER
8/14/2017       RE: Phan Luque  [address]  [facility] [address]     Dear Colleague,    Thank you for referring your patient, Phan Luque, to the [facility] ENDOCRINOLOGY at [facility]. Please see a copy of my visit note below.    Endocrine Clinic Visit. :     Reason for visit: Hypercalcemia initially seen on Initial visit November 14, 2016        Assessment   Phan Luque is a 61 year old years old female with Intermittent hypercalcemia in the setting of acute on chronic renal failure ( improved) , and Ca supplements.       Elevated PTH-related protein likely due to renal failure  FU in future labs   Follow Ca and Vit D    History of osteopenia with chronic Prednisone  She has received ZA 8/2017. This was delayed due to fibular fracture.   Was treated with HRT in 40s for 5 years  DXA 4/5/2017: Negative T score of -2.2 at left femoral neck   Ca (750 Citracal + D 400 total ) and vitamin D  supplement    History of chronic prednisone therapy (Liver Transplant)  Weight gain about 20 lbs.   Concerned about fluid retention. FU with nephrology.     Hypothyroidism - Acquired  FU thyroid function test  Continue LT4 88 mcg daily for now.       Ruth Oakley MD  [address]  Endocrinology Service        ====================================================================        HPI:     Phan Luque is a 61 year old year old female who initially presented for hypercalcemia in the setting of GERMAIN and CKD and increase Ca intake. Mild PTHrp elevation was noted at the time but felt to be in the setting of CKD. The hypercalcemia resolved with improvement of renal function.     She has history of osteopenia in the setting of Chronic Prednisone and therefore was treated with ZA.  She has a complicated past medical history with liver transplant on immunosuppressant and malnutrition. REcently she has gained significant weight.     ENDO CALCIUM LABS-P Latest Ref Rng 10/6/2016    CALCIUM 8.5 - 10.1 mg/dL 10.4 (H)   CALCIUM IONIZED 4.4 - 5.2 mg/dL    CALCIUM IONIZED WHOLE BLOOD 4.4 - 5.2 mg/dL    PHOSPHOROUS 2.5 - 4.5 mg/dL 5.5 (H)   MAGNESIUM 1.6 - 2.3 mg/dL 2.2   ALBUMIN 3.4 - 5.0 g/dL 3.4   BUN 7 - 30 mg/dL 48 (H)   CREATININE 0.52 - 1.04 mg/dL 2.28 (H)   PARATHYROID HORMONE INTACT 12 - 72 pg/mL    ALKPHOS 40 - 150 U/L 68     ENDO CALCIUM LABS-UMP Latest Ref Rng 10/10/2016   CALCIUM 8.5 - 10.1 mg/dL 9.8   CALCIUM IONIZED 4.4 - 5.2 mg/dL    CALCIUM IONIZED WHOLE BLOOD 4.4 - 5.2 mg/dL    PHOSPHOROUS 2.5 - 4.5 mg/dL 3.8   MAGNESIUM 1.6 - 2.3 mg/dL    ALBUMIN 3.4 - 5.0 g/dL 3.1 (L)   BUN 7 - 30 mg/dL 22   CREATININE 0.52 - 1.04 mg/dL 1.30 (H)   PARATHYROID HORMONE INTACT 12 - 72 pg/mL 3 (L)    Risk Factors:  None  Calcium and Vit D supplements : Taking Ca supplements with D      Hypothyroidism  This was diagnosed during a hospitalization in 11 2016  At that time, TSH was 8.44 with a free T4 of 0.61  Levothyroxine was started and sees on 88  g daily  With this he has no symptoms of hypothyroidism  She is sleeping well, normal mood, no tremors  Recent her function test as of April 5, 20 17 are normal      ROS  Weight gain of 20 lls  Feels tired, poor sleep  No lethargy, confusion, stupor or coma associated with high calcium  She complains of Anorexia and chronic diarrhea, feels nauseous chronically.   no history of Peptic ulcers or pancreatitis.  No renal symptoms- polyuria, polydipsia  No history of renal stones  H/O Renal failure, stable creatinine    No palpitations    No muscle weakness   Has generalized aches and pain.     Other ROS:   Vision loss on the left due to Optic neuropathy  No headache, change in vision, loss of peripheral vision  No neck pain, no other lumps in the neck  No change in breathing   No change in swallowing.   No swelling in extremities  No change in mental status.   Other ROS that were obtained were negative.     Past Medical History:   Diagnosis Date     Asthma       End stage liver disease (H)     2/2 Alcohol Abuse     Liver transplanted (H)      Migraines      Osteoporosis      Spinal stenosis in cervical region      Strabismus      Past Surgical History:   Procedure Laterality Date     APPENDECTOMY           BENCH LIVER N/A 2016    Procedure: BENCH LIVER;  Surgeon: Larry Dhillon MD;  Location: [facility] OR     COLONOSCOPY       ESOPHAGOSCOPY, GASTROSCOPY, DUODENOSCOPY (EGD), COMBINED N/A 2016    Procedure: COMBINED ESOPHAGOSCOPY, GASTROSCOPY, DUODENOSCOPY (EGD);  Surgeon: Tao Alfonso MD;  Location: [facility] GI     ESOPHAGOSCOPY, GASTROSCOPY, DUODENOSCOPY (EGD), COMBINED N/A 10/31/2016    Procedure: COMBINED ESOPHAGOSCOPY, GASTROSCOPY, DUODENOSCOPY (EGD), BIOPSY SINGLE OR MULTIPLE;  Surgeon: Ronald Bojorquez MD;  Location: [facility] GI     sphincteroplasty       TRANSPLANT LIVER RECIPIENT  DONOR N/A 2016    Procedure: TRANSPLANT LIVER RECIPIENT  DONOR;  Surgeon: Larry Dhillon MD;  Location: [facility] OR     TUBAL LIGATION      laparoscopic     Family History   Problem Relation Age of Onset     Family History Negative Other      Social History     Social History     Marital status:      Spouse name: N/A     Number of children: 3     Years of education: N/A     Occupational History           Social History Main Topics     Smoking status: Former Smoker     Quit date: 1996     Smokeless tobacco: Never Used     Alcohol use No      Comment: heavy use (750ml/2 days) up until 1 year ago; had cut down to 1 drink/day; none since 16     Drug use: No     Sexual activity: Not on file     Other Topics Concern     Not on file     Social History Narrative        Allergies   Allergen Reactions     Benadryl [Diphenhydramine] Hives     Cefaclor Hives     Hydrocodone-Acetaminophen Itching     Oxycodone Itching     Penicillins Hives     Sulfa Drugs Hives     Current Outpatient Prescriptions   Medication     predniSONE  "(DELTASONE) 5 MG tablet     amLODIPine (NORVASC) 5 MG tablet     azaTHIOprine 100 MG TABS     Omega-3 Fatty Acids (FISH OIL PO)     latanoprost (XALATAN) 0.005 % ophthalmic solution     magnesium oxide (MAG-OX) 400 (241.3 MG) MG tablet     traMADol (ULTRAM) 50 MG tablet     calcium Citrate-vitamin D 500-400 MG-UNIT CHEW     hydrOXYzine (ATARAX) 25 MG tablet     traZODone (DESYREL) 100 MG tablet     acetaminophen (TYLENOL) 325 MG tablet     loperamide (IMODIUM) 2 MG capsule     ursodiol (ACTIGALL) 300 MG capsule     tacrolimus (PROGRAF - GENERIC EQUIVALENT) 0.5 MG capsule     simethicone (MYLICON) 80 MG chewable tablet     psyllium 0.52 G capsule     pantoprazole (PROTONIX) 40 MG EC tablet     multivitamin, therapeutic (THERA-VIT) TABS tablet     levothyroxine (SYNTHROID/LEVOTHROID) 88 MCG tablet     folic acid (FOLVITE) 1 MG tablet     cyanocobalamin 1000 MCG TABS     No current facility-administered medications for this visit.            Exam:  /79  Pulse 85  Ht 1.702 m (5' 7\")  Wt 80.2 kg (176 lb 11.2 oz)  BMI 27.68 kg/m2   Constitutional: pleasant female, no distress.    Head: Normocephalic. No masses, lesions, tenderness or abnormalities  Eye loss of peripheral vision on the left + Chronic, right side is normal.   Neck: Neck supple. No adenopathy. Thyroid symmetric, normal size, Carotids without bruits.  ENT: No throat congestion, no neck nodes or sinus tenderness  Cardiovascular: regular rhythm, no tachycardia  Respiratory: Lungs clear  Gastrointestinal: Abdomen soft, non-tender. BS normal. No striae, No ascitis on percussion  Musculoskeletal: gait normal and normal muscle tone  Neurologic: Normal speech, reflexes normal. Tremor is present (Chronic).   Psychiatric: mentation appears normal     Last Basic Metabolic Panel:  Results for PARTH FARMER (MRN [id number]) as of 5/17/2017 15:05   Ref. Range 5/9/2017 07:32   Sodium (External) Latest Ref Range: 136 - 145 mmol/L 138   Potassium (External) " Latest Ref Range: 3.5 - 5.0 mmol/L 4.4   Chloride (External) Latest Ref Range: 98 - 107 mmol/L 103   CO2 (External) Latest Ref Range: 22 - 31 mmol/L 25   Urea Nitrogen (External) Latest Ref Range: 8 - 22 mg/dL 11   Creatinine (External) Latest Ref Range: 0.60 - 1.10 mg/dL 1.14 (H)   GFR Estimated (External) Latest Ref Range: >60 mL/min/1.73m2 49 (L)   Calcium (External) Latest Ref Range: 8.5 - 10.5 mg/dL 9.7   Anion Gap (External) Latest Ref Range: 5 - 18 mmol/L 10   Magnesium (External) Latest Ref Range: 1.8 - 2.6 mg/dL 1.9   Phosphorus (External) Latest Ref Range: 2.5 - 4.5 mg/dL 4.1   Albumin (External) Latest Ref Range: 3.5 - 5.0 g/dL 3.9   Protein Total (External) Latest Ref Range: 6.0 - 8.0 g/dL 6.9   Alk Phosphatase (External) Latest Ref Range: 45 - 120 U/L 77   ALT (External) Latest Ref Range: 0 - 45 U/L 13   AST (External) Latest Ref Range: 0 - 40 U/L 15   Bilirubin Direct (External) Latest Ref Range: <=0.5 mg/dL 0.1   Bilirubin Total (External) Latest Ref Range: 0.0 - 1.0 mg/dL 0.4   Glucose (External) Latest Ref Range: 70 - 125 mg/dL 85   WBC Count (External) Latest Ref Range: 4.0 - 11.0 thou/uL 6.0   Hemoglobin (External) Latest Ref Range: 12.0 - 16.0 g/dl 12.2   Hematocrit (External) Latest Ref Range: 35.0 - 47.0 % 34.7 (L)   Platelet Count (External) Latest Ref Range: 140 - 440 thou/uL 288     TSH   Date Value Ref Range Status   06/05/2017 1.41 0.40 - 4.00 mU/L Final       Ruth Oakley MD

## 2017-08-14 NOTE — PROGRESS NOTES
Endocrine Clinic Visit. :     Reason for visit: Hypercalcemia initially seen on Initial visit November 14, 2016        Assessment   Phan Luque is a 61 year old years old female with Intermittent hypercalcemia in the setting of acute on chronic renal failure ( improved) , and Ca supplements.       Elevated PTH-related protein likely due to renal failure  FU in future labs   Follow Ca and Vit D    History of osteopenia with chronic Prednisone  She has received ZA 8/2017. This was delayed due to fibular fracture.   Was treated with HRT in 40s for 5 years  DXA 4/5/2017: Negative T score of -2.2 at left femoral neck   Ca (750 Citracal + D 400 total ) and vitamin D  supplement    History of chronic prednisone therapy (Liver Transplant)  Weight gain about 20 lbs.   Concerned about fluid retention. FU with nephrology.     Hypothyroidism - Acquired  FU thyroid function test  Continue LT4 88 mcg daily for now.       Ruth Oakley MD  [id number]  Endocrinology Service        ====================================================================        HPI:     Phan Luque is a 61 year old year old female who initially presented for hypercalcemia in the setting of GERMAIN and CKD and increase Ca intake. Mild PTHrp elevation was noted at the time but felt to be in the setting of CKD. The hypercalcemia resolved with improvement of renal function.     She has history of osteopenia in the setting of Chronic Prednisone and therefore was treated with ZA.  She has a complicated past medical history with liver transplant on immunosuppressant and malnutrition. REcently she has gained significant weight.     END[facility] Latest Ref Rng 10/6/2016   CALCIUM 8.5 - 10.1 mg/dL 10.4 (H)   CALCIUM IONIZED 4.4 - 5.2 mg/dL    CALCIUM IONIZED WHOLE BLOOD 4.4 - 5.2 mg/dL    PHOSPHOROUS 2.5 - 4.5 mg/dL 5.5 (H)   MAGNESIUM 1.6 - 2.3 mg/dL 2.2   ALBUMIN 3.4 - 5.0 g/dL 3.4   BUN 7 - 30 mg/dL 48 (H)   CREATININE 0.52 - 1.04 mg/dL 2.28 (H)    PARATHYROID HORMONE INTACT 12 - 72 pg/mL    ALKPHOS 40 - 150 U/L 68     END[facility] CAL[facility] Latest Ref Rng 10/10/2016   CALCIUM 8.5 - 10.1 mg/dL 9.8   CALCIUM IONIZED 4.4 - 5.2 mg/dL    CALCIUM IONIZED WHOLE BLOOD 4.4 - 5.2 mg/dL    PHOSPHOROUS 2.5 - 4.5 mg/dL 3.8   MAGNESIUM 1.6 - 2.3 mg/dL    ALBUMIN 3.4 - 5.0 g/dL 3.1 (L)   BUN 7 - 30 mg/dL 22   CREATININE 0.52 - 1.04 mg/dL 1.30 (H)   PARATHYROID HORMONE INTACT 12 - 72 pg/mL 3 (L)    Risk Factors:  None  Calcium and Vit D supplements : Taking Ca supplements with D      Hypothyroidism  This was diagnosed during a hospitalization in 11 2016  At that time, TSH was 8.44 with a free T4 of 0.61  Levothyroxine was started and sees on 88  g daily  With this he has no symptoms of hypothyroidism  She is sleeping well, normal mood, no tremors  Recent her function test as of April 5, 20 17 are normal      ROS  Weight gain of 20 lls  Feels tired, poor sleep  No lethargy, confusion, stupor or coma associated with high calcium  She complains of Anorexia and chronic diarrhea, feels nauseous chronically.   no history of Peptic ulcers or pancreatitis.  No renal symptoms- polyuria, polydipsia  No history of renal stones  H/O Renal failure, stable creatinine    No palpitations    No muscle weakness   Has generalized aches and pain.     Other ROS:   Vision loss on the left due to Optic neuropathy  No headache, change in vision, loss of peripheral vision  No neck pain, no other lumps in the neck  No change in breathing   No change in swallowing.   No swelling in extremities  No change in mental status.   Other ROS that were obtained were negative.     Past Medical History:   Diagnosis Date     Asthma      End stage liver disease (H)     2/2 Alcohol Abuse     Liver transplanted (H)      Migraines      Osteoporosis      Spinal stenosis in cervical region      Strabismus      Past Surgical History:   Procedure Laterality Date     APPENDECTOMY      1962     BENCH LIVER N/A 8/25/2016     Procedure: BENCH LIVER;  Surgeon: Larry Dhillon MD;  Location: [facility] OR     COLONOSCOPY       ESOPHAGOSCOPY, GASTROSCOPY, DUODENOSCOPY (EGD), COMBINED N/A 2016    Procedure: COMBINED ESOPHAGOSCOPY, GASTROSCOPY, DUODENOSCOPY (EGD);  Surgeon: Tao Alfonso MD;  Location: [facility]     ESOPHAGOSCOPY, GASTROSCOPY, DUODENOSCOPY (EGD), COMBINED N/A 10/31/2016    Procedure: COMBINED ESOPHAGOSCOPY, GASTROSCOPY, DUODENOSCOPY (EGD), BIOPSY SINGLE OR MULTIPLE;  Surgeon: Ronald Bojorquez MD;  Location: [facility]     sphincteroplasty       TRANSPLANT LIVER RECIPIENT  DONOR N/A 2016    Procedure: TRANSPLANT LIVER RECIPIENT  DONOR;  Surgeon: Larry Dhillon MD;  Location: [facility] OR     TUBAL LIGATION      laparoscopic     Family History   Problem Relation Age of Onset     Family History Negative Other      Social History     Social History     Marital status:      Spouse name: N/A     Number of children: 3     Years of education: N/A     Occupational History           Social History Main Topics     Smoking status: Former Smoker     Quit date: 1996     Smokeless tobacco: Never Used     Alcohol use No      Comment: heavy use (750ml/2 days) up until 1 year ago; had cut down to 1 drink/day; none since 16     Drug use: No     Sexual activity: Not on file     Other Topics Concern     Not on file     Social History Narrative        Allergies   Allergen Reactions     Benadryl [Diphenhydramine] Hives     Cefaclor Hives     Hydrocodone-Acetaminophen Itching     Oxycodone Itching     Penicillins Hives     Sulfa Drugs Hives     Current Outpatient Prescriptions   Medication     predniSONE (DELTASONE) 5 MG tablet     amLODIPine (NORVASC) 5 MG tablet     azaTHIOprine 100 MG TABS     Omega-3 Fatty Acids (FISH OIL PO)     latanoprost (XALATAN) 0.005 % ophthalmic solution     magnesium oxide (MAG-OX) 400 (241.3 MG) MG tablet     traMADol (ULTRAM) 50 MG tablet     calcium  "Citrate-vitamin D 500-400 MG-UNIT CHEW     hydrOXYzine (ATARAX) 25 MG tablet     traZODone (DESYREL) 100 MG tablet     acetaminophen (TYLENOL) 325 MG tablet     loperamide (IMODIUM) 2 MG capsule     ursodiol (ACTIGALL) 300 MG capsule     tacrolimus (PROGRAF - GENERIC EQUIVALENT) 0.5 MG capsule     simethicone (MYLICON) 80 MG chewable tablet     psyllium 0.52 G capsule     pantoprazole (PROTONIX) 40 MG EC tablet     multivitamin, therapeutic (THERA-VIT) TABS tablet     levothyroxine (SYNTHROID/LEVOTHROID) 88 MCG tablet     folic acid (FOLVITE) 1 MG tablet     cyanocobalamin 1000 MCG TABS     No current facility-administered medications for this visit.            Exam:  /79  Pulse 85  Ht 1.702 m (5' 7\")  Wt 80.2 kg (176 lb 11.2 oz)  BMI 27.68 kg/m2   Constitutional: pleasant female, no distress.    Head: Normocephalic. No masses, lesions, tenderness or abnormalities  Eye loss of peripheral vision on the left + Chronic, right side is normal.   Neck: Neck supple. No adenopathy. Thyroid symmetric, normal size, Carotids without bruits.  ENT: No throat congestion, no neck nodes or sinus tenderness  Cardiovascular: regular rhythm, no tachycardia  Respiratory: Lungs clear  Gastrointestinal: Abdomen soft, non-tender. BS normal. No striae, No ascitis on percussion  Musculoskeletal: gait normal and normal muscle tone  Neurologic: Normal speech, reflexes normal. Tremor is present (Chronic).   Psychiatric: mentation appears normal     Last Basic Metabolic Panel:  Results for PARTH FARMER (MRN [id number]) as of 5/17/2017 15:05   Ref. Range 5/9/2017 07:32   Sodium (External) Latest Ref Range: 136 - 145 mmol/L 138   Potassium (External) Latest Ref Range: 3.5 - 5.0 mmol/L 4.4   Chloride (External) Latest Ref Range: 98 - 107 mmol/L 103   CO2 (External) Latest Ref Range: 22 - 31 mmol/L 25   Urea Nitrogen (External) Latest Ref Range: 8 - 22 mg/dL 11   Creatinine (External) Latest Ref Range: 0.60 - 1.10 mg/dL 1.14 (H) "   GFR Estimated (External) Latest Ref Range: >60 mL/min/1.73m2 49 (L)   Calcium (External) Latest Ref Range: 8.5 - 10.5 mg/dL 9.7   Anion Gap (External) Latest Ref Range: 5 - 18 mmol/L 10   Magnesium (External) Latest Ref Range: 1.8 - 2.6 mg/dL 1.9   Phosphorus (External) Latest Ref Range: 2.5 - 4.5 mg/dL 4.1   Albumin (External) Latest Ref Range: 3.5 - 5.0 g/dL 3.9   Protein Total (External) Latest Ref Range: 6.0 - 8.0 g/dL 6.9   Alk Phosphatase (External) Latest Ref Range: 45 - 120 U/L 77   ALT (External) Latest Ref Range: 0 - 45 U/L 13   AST (External) Latest Ref Range: 0 - 40 U/L 15   Bilirubin Direct (External) Latest Ref Range: <=0.5 mg/dL 0.1   Bilirubin Total (External) Latest Ref Range: 0.0 - 1.0 mg/dL 0.4   Glucose (External) Latest Ref Range: 70 - 125 mg/dL 85   WBC Count (External) Latest Ref Range: 4.0 - 11.0 thou/uL 6.0   Hemoglobin (External) Latest Ref Range: 12.0 - 16.0 g/dl 12.2   Hematocrit (External) Latest Ref Range: 35.0 - 47.0 % 34.7 (L)   Platelet Count (External) Latest Ref Range: 140 - 440 thou/uL 288     TSH   Date Value Ref Range Status   06/05/2017 1.41 0.40 - 4.00 mU/L Final   ]

## 2017-08-15 ENCOUNTER — HOSPITAL ENCOUNTER (OUTPATIENT)
Facility: CLINIC | Age: 62
Setting detail: SPECIMEN
Discharge: HOME | End: 2017-08-15
Attending: INTERNAL MEDICINE | Admitting: INTERNAL MEDICINE
Payer: COMMERCIAL

## 2017-08-15 ENCOUNTER — OFFICE VISIT - HEALTHEAST (OUTPATIENT)
Dept: INTERNAL MEDICINE | Facility: CLINIC | Age: 62
End: 2017-08-15

## 2017-08-15 ENCOUNTER — MYC MEDICAL ADVICE (OUTPATIENT)
Dept: GASTROENTEROLOGY | Facility: CLINIC | Age: 62
End: 2017-08-15

## 2017-08-15 ENCOUNTER — AMBULATORY - HEALTHEAST (OUTPATIENT)
Dept: LAB | Facility: CLINIC | Age: 62
End: 2017-08-15

## 2017-08-15 DIAGNOSIS — Z79.899 ENCOUNTER FOR LONG-TERM (CURRENT) USE OF OTHER MEDICATIONS: ICD-10-CM

## 2017-08-15 DIAGNOSIS — R06.02 SOB (SHORTNESS OF BREATH): ICD-10-CM

## 2017-08-15 DIAGNOSIS — Z94.4 LIVER TRANSPLANTED (H): ICD-10-CM

## 2017-08-15 LAB
ATRIAL RATE - MUSE: 80 BPM
DIASTOLIC BLOOD PRESSURE - MUSE: NORMAL MMHG
INTERPRETATION ECG - MUSE: NORMAL
P AXIS - MUSE: 62 DEGREES
PR INTERVAL - MUSE: 150 MS
QRS DURATION - MUSE: 78 MS
QT - MUSE: 370 MS
QTC - MUSE: 426 MS
R AXIS - MUSE: 14 DEGREES
SYSTOLIC BLOOD PRESSURE - MUSE: NORMAL MMHG
T AXIS - MUSE: 46 DEGREES
VENTRICULAR RATE- MUSE: 80 BPM

## 2017-08-15 PROCEDURE — 80197 ASSAY OF TACROLIMUS: CPT | Performed by: INTERNAL MEDICINE

## 2017-08-15 ASSESSMENT — MIFFLIN-ST. JEOR: SCORE: 1385.5

## 2017-08-16 ENCOUNTER — MYC MEDICAL ADVICE (OUTPATIENT)
Dept: GASTROENTEROLOGY | Facility: CLINIC | Age: 62
End: 2017-08-16

## 2017-08-16 ENCOUNTER — COMMUNICATION - HEALTHEAST (OUTPATIENT)
Dept: INTERNAL MEDICINE | Facility: CLINIC | Age: 62
End: 2017-08-16

## 2017-08-17 ENCOUNTER — COMMUNICATION - HEALTHEAST (OUTPATIENT)
Dept: INTERNAL MEDICINE | Facility: CLINIC | Age: 62
End: 2017-08-17

## 2017-08-17 DIAGNOSIS — H40.1130 PRIMARY OPEN ANGLE GLAUCOMA OF BOTH EYES, UNSPECIFIED GLAUCOMA STAGE: Primary | ICD-10-CM

## 2017-08-17 DIAGNOSIS — R52 PAIN: ICD-10-CM

## 2017-08-17 RX ORDER — BETAXOLOL HYDROCHLORIDE 5 MG/ML
1 SOLUTION/ DROPS OPHTHALMIC 2 TIMES DAILY
Qty: 3 ML | Refills: 2 | Status: SHIPPED | OUTPATIENT
Start: 2017-08-17 | End: 2017-08-22

## 2017-08-19 ENCOUNTER — HEALTH MAINTENANCE LETTER (OUTPATIENT)
Age: 62
End: 2017-08-19

## 2017-08-22 DIAGNOSIS — H40.1130 PRIMARY OPEN ANGLE GLAUCOMA OF BOTH EYES, UNSPECIFIED GLAUCOMA STAGE: ICD-10-CM

## 2017-08-22 RX ORDER — BETAXOLOL HYDROCHLORIDE 5 MG/ML
1 SOLUTION/ DROPS OPHTHALMIC 2 TIMES DAILY
Qty: 3 ML | Refills: 2 | Status: SHIPPED | OUTPATIENT
Start: 2017-08-22 | End: 2017-11-24

## 2017-08-22 NOTE — TELEPHONE ENCOUNTER
Resent dr. caban's betoptic order to [facility] [facility]  Alfredo Hall RN 5:03 PM 08/22/17

## 2017-08-23 ENCOUNTER — HOSPITAL ENCOUNTER (OUTPATIENT)
Dept: CARDIOLOGY | Facility: CLINIC | Age: 62
Discharge: HOME | End: 2017-08-23
Attending: INTERNAL MEDICINE

## 2017-08-23 DIAGNOSIS — R06.02 SOB (SHORTNESS OF BREATH): ICD-10-CM

## 2017-08-23 LAB
CV STRESS CURRENT BP HE: NORMAL
CV STRESS CURRENT HR HE: 101
CV STRESS CURRENT HR HE: 102
CV STRESS CURRENT HR HE: 107
CV STRESS CURRENT HR HE: 107
CV STRESS CURRENT HR HE: 112
CV STRESS CURRENT HR HE: 114
CV STRESS CURRENT HR HE: 115
CV STRESS CURRENT HR HE: 115
CV STRESS CURRENT HR HE: 121
CV STRESS CURRENT HR HE: 125
CV STRESS CURRENT HR HE: 130
CV STRESS CURRENT HR HE: 130
CV STRESS CURRENT HR HE: 136
CV STRESS CURRENT HR HE: 140
CV STRESS CURRENT HR HE: 143
CV STRESS CURRENT HR HE: 92
CV STRESS CURRENT HR HE: 92
CV STRESS CURRENT HR HE: 93
CV STRESS CURRENT HR HE: 95
CV STRESS CURRENT HR HE: 96
CV STRESS CURRENT HR HE: 96
CV STRESS DEVIATION TIME HE: NORMAL
CV STRESS ECHO PERCENT HR HE: NORMAL
CV STRESS EXERCISE STAGE HE: NORMAL
CV STRESS EXERCISE STAGE REACHED HE: NORMAL
CV STRESS FINAL RESTING BP HE: NORMAL
CV STRESS FINAL RESTING HR HE: 92
CV STRESS MAX HR HE: 143
CV STRESS MAX TREADMILL GRADE HE: 14
CV STRESS MAX TREADMILL SPEED HE: 3.4
CV STRESS PEAK DIA BP HE: NORMAL
CV STRESS PEAK SYS BP HE: NORMAL
CV STRESS PHASE HE: NORMAL
CV STRESS PROTOCOL HE: NORMAL
CV STRESS RESTING PT POSITION HE: NORMAL
CV STRESS RESTING PT POSITION HE: NORMAL
CV STRESS ST DEVIATION AMOUNT HE: NORMAL
CV STRESS ST DEVIATION ELEVATION HE: NORMAL
CV STRESS ST EVELATION AMOUNT HE: NORMAL
CV STRESS TEST TYPE HE: NORMAL
CV STRESS TOTAL STAGE TIME MIN 1 HE: NORMAL
STRESS ECHO BASELINE BP: NORMAL
STRESS ECHO BASELINE HR: 93
STRESS ECHO CALCULATED PERCENT HR: 89 %
STRESS ECHO LAST STRESS BP: NORMAL
STRESS ECHO LAST STRESS HR: 140
STRESS ECHO POST ESTIMATED WORKLOAD: 7.7
STRESS ECHO POST EXERCISE DUR MIN: 6
STRESS ECHO POST EXERCISE DUR SEC: 24
STRESS ECHO TARGET HR: 142.4

## 2017-08-28 ENCOUNTER — MYC MEDICAL ADVICE (OUTPATIENT)
Dept: ENDOCRINOLOGY | Facility: CLINIC | Age: 62
End: 2017-08-28

## 2017-08-29 ENCOUNTER — AMBULATORY - HEALTHEAST (OUTPATIENT)
Dept: LAB | Facility: CLINIC | Age: 62
End: 2017-08-29

## 2017-08-29 DIAGNOSIS — Z79.899 ENCOUNTER FOR LONG-TERM (CURRENT) USE OF OTHER MEDICATIONS: ICD-10-CM

## 2017-08-29 DIAGNOSIS — Z94.4 LIVER REPLACED BY TRANSPLANT (H): ICD-10-CM

## 2017-08-29 DIAGNOSIS — Z94.4 LIVER TRANSPLANTED (H): ICD-10-CM

## 2017-08-29 PROCEDURE — 80197 ASSAY OF TACROLIMUS: CPT | Performed by: INTERNAL MEDICINE

## 2017-08-30 ENCOUNTER — TRANSFERRED RECORDS (OUTPATIENT)
Dept: HEALTH INFORMATION MANAGEMENT | Facility: CLINIC | Age: 62
End: 2017-08-30

## 2017-08-30 LAB
TACROLIMUS BLD-MCNC: 6 UG/L (ref 5–15)
TME LAST DOSE: NORMAL H

## 2017-09-01 DIAGNOSIS — N18.30 CKD (CHRONIC KIDNEY DISEASE) STAGE 3, GFR 30-59 ML/MIN (H): Primary | ICD-10-CM

## 2017-09-08 ENCOUNTER — RECORDS - HEALTHEAST (OUTPATIENT)
Dept: ADMINISTRATIVE | Facility: OTHER | Age: 62
End: 2017-09-08

## 2017-09-08 ENCOUNTER — OFFICE VISIT (OUTPATIENT)
Dept: GASTROENTEROLOGY | Facility: CLINIC | Age: 62
End: 2017-09-08
Attending: INTERNAL MEDICINE
Payer: COMMERCIAL

## 2017-09-08 ENCOUNTER — MYC MEDICAL ADVICE (OUTPATIENT)
Dept: ENDOCRINOLOGY | Facility: CLINIC | Age: 62
End: 2017-09-08

## 2017-09-08 VITALS
WEIGHT: 176 LBS | BODY MASS INDEX: 27.62 KG/M2 | HEIGHT: 67 IN | OXYGEN SATURATION: 96 % | SYSTOLIC BLOOD PRESSURE: 156 MMHG | HEART RATE: 84 BPM | DIASTOLIC BLOOD PRESSURE: 84 MMHG

## 2017-09-08 DIAGNOSIS — Z94.4 LIVER REPLACED BY TRANSPLANT (H): ICD-10-CM

## 2017-09-08 DIAGNOSIS — E83.52 HYPERCALCEMIA: ICD-10-CM

## 2017-09-08 DIAGNOSIS — E83.52 HYPERCALCEMIA: Primary | ICD-10-CM

## 2017-09-08 DIAGNOSIS — G25.81 RESTLESS LEG SYNDROME: Primary | ICD-10-CM

## 2017-09-08 DIAGNOSIS — E03.9 ACQUIRED HYPOTHYROIDISM: ICD-10-CM

## 2017-09-08 DIAGNOSIS — R79.89 ELEVATED PTHRP LEVEL: ICD-10-CM

## 2017-09-08 LAB
ALBUMIN SERPL-MCNC: 3.5 G/DL (ref 3.4–5)
ALP SERPL-CCNC: 58 U/L (ref 40–150)
ALT SERPL W P-5'-P-CCNC: 16 U/L (ref 0–50)
ANION GAP SERPL CALCULATED.3IONS-SCNC: 6 MMOL/L (ref 3–14)
AST SERPL W P-5'-P-CCNC: 12 U/L (ref 0–45)
BILIRUB DIRECT SERPL-MCNC: 0.2 MG/DL (ref 0–0.2)
BILIRUB SERPL-MCNC: 0.6 MG/DL (ref 0.2–1.3)
BUN SERPL-MCNC: 14 MG/DL (ref 7–30)
CALCIUM SERPL-MCNC: 8.3 MG/DL (ref 8.5–10.1)
CHLORIDE SERPL-SCNC: 103 MMOL/L (ref 94–109)
CO2 SERPL-SCNC: 29 MMOL/L (ref 20–32)
CREAT SERPL-MCNC: 1.34 MG/DL (ref 0.52–1.04)
ERYTHROCYTE [DISTWIDTH] IN BLOOD BY AUTOMATED COUNT: 13.2 % (ref 10–15)
GFR SERPL CREATININE-BSD FRML MDRD: 40 ML/MIN/1.7M2
GLUCOSE SERPL-MCNC: 104 MG/DL (ref 70–99)
HCT VFR BLD AUTO: 38.5 % (ref 35–47)
HGB BLD-MCNC: 13 G/DL (ref 11.7–15.7)
MAGNESIUM SERPL-MCNC: 1.9 MG/DL (ref 1.6–2.3)
MCH RBC QN AUTO: 32.2 PG (ref 26.5–33)
MCHC RBC AUTO-ENTMCNC: 33.8 G/DL (ref 31.5–36.5)
MCV RBC AUTO: 95 FL (ref 78–100)
PHOSPHATE SERPL-MCNC: 2.9 MG/DL (ref 2.5–4.5)
PLATELET # BLD AUTO: 235 10E9/L (ref 150–450)
POTASSIUM SERPL-SCNC: 3.9 MMOL/L (ref 3.4–5.3)
PROT SERPL-MCNC: 7.2 G/DL (ref 6.8–8.8)
PTH-INTACT SERPL-MCNC: 117 PG/ML (ref 12–72)
RBC # BLD AUTO: 4.04 10E12/L (ref 3.8–5.2)
SODIUM SERPL-SCNC: 138 MMOL/L (ref 133–144)
T4 FREE SERPL-MCNC: 1.41 NG/DL (ref 0.76–1.46)
TACROLIMUS BLD-MCNC: 5.4 UG/L (ref 5–15)
TME LAST DOSE: NORMAL H
TSH SERPL DL<=0.005 MIU/L-ACNC: 2.74 MU/L (ref 0.4–4)
WBC # BLD AUTO: 8.5 10E9/L (ref 4–11)

## 2017-09-08 PROCEDURE — 84075 ASSAY ALKALINE PHOSPHATASE: CPT | Performed by: INTERNAL MEDICINE

## 2017-09-08 PROCEDURE — 84460 ALANINE AMINO (ALT) (SGPT): CPT | Performed by: INTERNAL MEDICINE

## 2017-09-08 PROCEDURE — 84155 ASSAY OF PROTEIN SERUM: CPT | Performed by: INTERNAL MEDICINE

## 2017-09-08 PROCEDURE — 84450 TRANSFERASE (AST) (SGOT): CPT | Performed by: INTERNAL MEDICINE

## 2017-09-08 PROCEDURE — 36415 COLL VENOUS BLD VENIPUNCTURE: CPT | Performed by: INTERNAL MEDICINE

## 2017-09-08 PROCEDURE — 85027 COMPLETE CBC AUTOMATED: CPT | Performed by: INTERNAL MEDICINE

## 2017-09-08 PROCEDURE — 80321 ALCOHOLS BIOMARKERS 1OR 2: CPT | Performed by: INTERNAL MEDICINE

## 2017-09-08 PROCEDURE — 99212 OFFICE O/P EST SF 10 MIN: CPT | Mod: ZF

## 2017-09-08 PROCEDURE — 83735 ASSAY OF MAGNESIUM: CPT | Performed by: INTERNAL MEDICINE

## 2017-09-08 PROCEDURE — 80197 ASSAY OF TACROLIMUS: CPT | Performed by: INTERNAL MEDICINE

## 2017-09-08 PROCEDURE — 82247 BILIRUBIN TOTAL: CPT | Performed by: INTERNAL MEDICINE

## 2017-09-08 PROCEDURE — 82248 BILIRUBIN DIRECT: CPT | Performed by: INTERNAL MEDICINE

## 2017-09-08 RX ORDER — PRAMIPEXOLE DIHYDROCHLORIDE 0.5 MG/1
0.5 TABLET ORAL AT BEDTIME
Qty: 90 TABLET | Refills: 3 | Status: SHIPPED | OUTPATIENT
Start: 2017-09-08 | End: 2018-07-31

## 2017-09-08 ASSESSMENT — PAIN SCALES - GENERAL: PAINLEVEL: MILD PAIN (3)

## 2017-09-08 NOTE — LETTER
9/8/2017      RE: Phan Luque  [address] [facility] [id number]       HISTORY OF PRESENT ILLNESS:  I had the pleasure of seeing Phan Luque for followup in the Liver Transplantation Clinic at the [facility] on 09/08/2017.  Ms. Luque returns for followup now more than 1 year status post liver transplantation for alcoholic hepatitis.      She is doing okay at this visit.  She does not feel particularly well and she still complains of some abdominal bloating, which she describes as congestion.  She also has noted some worsening of her restless legs since she came off Zoloft and it is affecting her ability to sleep at night.  She does not have much tolerance at work and finds it difficult to put in a full day.      She denies any itching or skin rash.  She does have fatigue and not uncommonly naps in the afternoon.  She denies any lower extremity edema.  She has not had any fevers or chills, cough or shortness of breath.  She denies any nausea, vomiting, diarrhea or constipation.  Her appetite has been good, and her weight has been creeping up.  She does report that her blood pressure is borderline high at home.     Current Outpatient Prescriptions   Medication     pramipexole (MIRAPEX) 0.5 MG tablet     betaxolol (BETOPTIC) 0.5 % ophthalmic solution     predniSONE (DELTASONE) 5 MG tablet     amLODIPine (NORVASC) 5 MG tablet     azaTHIOprine 100 MG TABS     Omega-3 Fatty Acids (FISH OIL PO)     magnesium oxide (MAG-OX) 400 (241.3 MG) MG tablet     traMADol (ULTRAM) 50 MG tablet     calcium Citrate-vitamin D 500-400 MG-UNIT CHEW     hydrOXYzine (ATARAX) 25 MG tablet     traZODone (DESYREL) 100 MG tablet     acetaminophen (TYLENOL) 325 MG tablet     loperamide (IMODIUM) 2 MG capsule     ursodiol (ACTIGALL) 300 MG capsule     tacrolimus (PROGRAF - GENERIC EQUIVALENT) 0.5 MG capsule     simethicone (MYLICON) 80 MG chewable tablet     psyllium 0.52 G capsule     pantoprazole  (PROTONIX) 40 MG EC tablet     multivitamin, therapeutic (THERA-VIT) TABS tablet     levothyroxine (SYNTHROID/LEVOTHROID) 88 MCG tablet     folic acid (FOLVITE) 1 MG tablet     cyanocobalamin 1000 MCG TABS     latanoprost (XALATAN) 0.005 % ophthalmic solution     No current facility-administered medications for this visit.      B/P: 156/84, T: Data Unavailable, P: 84, R: Data Unavailable    HEENT exam shows no scleral icterus and no temporal muscle wasting.  Her chest is clear.  Her abdominal exam shows no increase in girth.  No masses or tenderness to palpation are present.  Liver is 10 cm in span without left lobe enlargement.  Her incision is intact.  No spleen tip is palpable, and extremity exam shows no edema.  Skin exam shows no suspicious lesions and neurologic exam is nonfocal.     Recent Results (from the past 168 hour(s))   CBC with platelets    Collection Time: 09/08/17  7:23 AM   Result Value Ref Range    WBC 8.5 4.0 - 11.0 10e9/L    RBC Count 4.04 3.8 - 5.2 10e12/L    Hemoglobin 13.0 11.7 - 15.7 g/dL    Hematocrit 38.5 35.0 - 47.0 %    MCV 95 78 - 100 fl    MCH 32.2 26.5 - 33.0 pg    MCHC 33.8 31.5 - 36.5 g/dL    RDW 13.2 10.0 - 15.0 %    Platelet Count 235 150 - 450 10e9/L   Magnesium    Collection Time: 09/08/17  7:23 AM   Result Value Ref Range    Magnesium 1.9 1.6 - 2.3 mg/dL   TSH with free T4 reflex    Collection Time: 09/08/17  7:23 AM   Result Value Ref Range    TSH 2.74 0.40 - 4.00 mU/L   T4 free    Collection Time: 09/08/17  7:23 AM   Result Value Ref Range    T4 Free 1.41 0.76 - 1.46 ng/dL   Renal panel    Collection Time: 09/08/17  7:23 AM   Result Value Ref Range    Sodium 138 133 - 144 mmol/L    Potassium 3.9 3.4 - 5.3 mmol/L    Chloride 103 94 - 109 mmol/L    Carbon Dioxide 29 20 - 32 mmol/L    Anion Gap 6 3 - 14 mmol/L    Glucose 104 (H) 70 - 99 mg/dL    Urea Nitrogen 14 7 - 30 mg/dL    Creatinine 1.34 (H) 0.52 - 1.04 mg/dL    GFR Estimate 40 (L) >60 mL/min/1.7m2    GFR Estimate If Black  49 (L) >60 mL/min/1.7m2    Calcium 8.3 (L) 8.5 - 10.1 mg/dL    Phosphorus 2.9 2.5 - 4.5 mg/dL    Albumin 3.5 3.4 - 5.0 g/dL   Alkaline phosphatase    Collection Time: 09/08/17  7:23 AM   Result Value Ref Range    Alkaline Phosphatase 58 40 - 150 U/L   ALT    Collection Time: 09/08/17  7:23 AM   Result Value Ref Range    ALT 16 0 - 50 U/L   AST    Collection Time: 09/08/17  7:23 AM   Result Value Ref Range    AST 12 0 - 45 U/L   Bilirubin  total    Collection Time: 09/08/17  7:23 AM   Result Value Ref Range    Bilirubin Total 0.6 0.2 - 1.3 mg/dL   Bilirubin direct    Collection Time: 09/08/17  7:23 AM   Result Value Ref Range    Bilirubin Direct 0.2 0.0 - 0.2 mg/dL   Protein total    Collection Time: 09/08/17  7:23 AM   Result Value Ref Range    Protein Total 7.2 6.8 - 8.8 g/dL      My impression is that Ms. Luque is now more than 1 year status post liver transplantation for alcoholic hepatitis.  She has not returned to drinking and really has no cravings for return to drinking.  She is down to 5 mg of prednisone and does not feel as though the decrease from 7.5 to 5 made much difference.  I have discontinued about 5 of her medication that I do not think are needed at this point in time including folic acid, B12, magnesium, Protonix, and Atarax.  I will try Mirapex for her restless leg syndrome.  My plan will be to see her back in the clinic in 3 months.      Thank you very much for allowing me to participate in the care of this patient.  If you have any questions regarding my recommendations, please do not hesitate to contact me.       Hussein Banegas MD      Professor of Medicine  [employer]      Executive Medical Director, Solid Organ Transplant Program  [facility]     Hussein Banegas MD

## 2017-09-08 NOTE — NURSING NOTE
"Chief Complaint   Patient presents with     RECHECK     Follow up Liver transplant, decompensation of cirrhosis of liver.       Initial /84  Pulse 84  Ht 1.702 m (5' 7\")  Wt 79.8 kg (176 lb)  SpO2 96%  BMI 27.57 kg/m2 Estimated body mass index is 27.57 kg/(m^2) as calculated from the following:    Height as of this encounter: 1.702 m (5' 7\").    Weight as of this encounter: 79.8 kg (176 lb).  Medication Reconciliation: complete   Faye Torres., CMA    "

## 2017-09-08 NOTE — PROGRESS NOTES
HISTORY OF PRESENT ILLNESS:  I had the pleasure of seeing Megan Luque for followup in the Liver Transplantation Clinic at the [facility] on 09/08/2017.  Ms. Luque returns for followup now more than 1 year status post liver transplantation for alcoholic hepatitis.      She is doing okay at this visit.  She does not feel particularly well and she still complains of some abdominal bloating, which she describes as congestion.  She also has noted some worsening of her restless legs since she came off Zoloft and it is affecting her ability to sleep at night.  She does not have much tolerance at work and finds it difficult to put in a full day.      She denies any itching or skin rash.  She does have fatigue and not uncommonly naps in the afternoon.  She denies any lower extremity edema.  She has not had any fevers or chills, cough or shortness of breath.  She denies any nausea, vomiting, diarrhea or constipation.  Her appetite has been good, and her weight has been creeping up.  She does report that her blood pressure is borderline high at home.     Current Outpatient Prescriptions   Medication     pramipexole (MIRAPEX) 0.5 MG tablet     betaxolol (BETOPTIC) 0.5 % ophthalmic solution     predniSONE (DELTASONE) 5 MG tablet     amLODIPine (NORVASC) 5 MG tablet     azaTHIOprine 100 MG TABS     Omega-3 Fatty Acids (FISH OIL PO)     magnesium oxide (MAG-OX) 400 (241.3 MG) MG tablet     traMADol (ULTRAM) 50 MG tablet     calcium Citrate-vitamin D 500-400 MG-UNIT CHEW     hydrOXYzine (ATARAX) 25 MG tablet     traZODone (DESYREL) 100 MG tablet     acetaminophen (TYLENOL) 325 MG tablet     loperamide (IMODIUM) 2 MG capsule     ursodiol (ACTIGALL) 300 MG capsule     tacrolimus (PROGRAF - GENERIC EQUIVALENT) 0.5 MG capsule     simethicone (MYLICON) 80 MG chewable tablet     psyllium 0.52 G capsule     pantoprazole (PROTONIX) 40 MG EC tablet     multivitamin, therapeutic (THERA-VIT) TABS tablet      levothyroxine (SYNTHROID/LEVOTHROID) 88 MCG tablet     folic acid (FOLVITE) 1 MG tablet     cyanocobalamin 1000 MCG TABS     latanoprost (XALATAN) 0.005 % ophthalmic solution     No current facility-administered medications for this visit.      B/P: 156/84, T: Data Unavailable, P: 84, R: Data Unavailable    HEENT exam shows no scleral icterus and no temporal muscle wasting.  Her chest is clear.  Her abdominal exam shows no increase in girth.  No masses or tenderness to palpation are present.  Liver is 10 cm in span without left lobe enlargement.  Her incision is intact.  No spleen tip is palpable, and extremity exam shows no edema.  Skin exam shows no suspicious lesions and neurologic exam is nonfocal.     Recent Results (from the past 168 hour(s))   CBC with platelets    Collection Time: 09/08/17  7:23 AM   Result Value Ref Range    WBC 8.5 4.0 - 11.0 10e9/L    RBC Count 4.04 3.8 - 5.2 10e12/L    Hemoglobin 13.0 11.7 - 15.7 g/dL    Hematocrit 38.5 35.0 - 47.0 %    MCV 95 78 - 100 fl    MCH 32.2 26.5 - 33.0 pg    MCHC 33.8 31.5 - 36.5 g/dL    RDW 13.2 10.0 - 15.0 %    Platelet Count 235 150 - 450 10e9/L   Magnesium    Collection Time: 09/08/17  7:23 AM   Result Value Ref Range    Magnesium 1.9 1.6 - 2.3 mg/dL   TSH with free T4 reflex    Collection Time: 09/08/17  7:23 AM   Result Value Ref Range    TSH 2.74 0.40 - 4.00 mU/L   T4 free    Collection Time: 09/08/17  7:23 AM   Result Value Ref Range    T4 Free 1.41 0.76 - 1.46 ng/dL   Renal panel    Collection Time: 09/08/17  7:23 AM   Result Value Ref Range    Sodium 138 133 - 144 mmol/L    Potassium 3.9 3.4 - 5.3 mmol/L    Chloride 103 94 - 109 mmol/L    Carbon Dioxide 29 20 - 32 mmol/L    Anion Gap 6 3 - 14 mmol/L    Glucose 104 (H) 70 - 99 mg/dL    Urea Nitrogen 14 7 - 30 mg/dL    Creatinine 1.34 (H) 0.52 - 1.04 mg/dL    GFR Estimate 40 (L) >60 mL/min/1.7m2    GFR Estimate If Black 49 (L) >60 mL/min/1.7m2    Calcium 8.3 (L) 8.5 - 10.1 mg/dL    Phosphorus 2.9 2.5 -  4.5 mg/dL    Albumin 3.5 3.4 - 5.0 g/dL   Alkaline phosphatase    Collection Time: 09/08/17  7:23 AM   Result Value Ref Range    Alkaline Phosphatase 58 40 - 150 U/L   ALT    Collection Time: 09/08/17  7:23 AM   Result Value Ref Range    ALT 16 0 - 50 U/L   AST    Collection Time: 09/08/17  7:23 AM   Result Value Ref Range    AST 12 0 - 45 U/L   Bilirubin  total    Collection Time: 09/08/17  7:23 AM   Result Value Ref Range    Bilirubin Total 0.6 0.2 - 1.3 mg/dL   Bilirubin direct    Collection Time: 09/08/17  7:23 AM   Result Value Ref Range    Bilirubin Direct 0.2 0.0 - 0.2 mg/dL   Protein total    Collection Time: 09/08/17  7:23 AM   Result Value Ref Range    Protein Total 7.2 6.8 - 8.8 g/dL      My impression is that Ms. Luque is now more than 1 year status post liver transplantation for alcoholic hepatitis.  She has not returned to drinking and really has no cravings for return to drinking.  She is down to 5 mg of prednisone and does not feel as though the decrease from 7.5 to 5 made much difference.  I have discontinued about 5 of her medication that I do not think are needed at this point in time including folic acid, B12, magnesium, Protonix, and Atarax.  I will try Mirapex for her restless leg syndrome.  My plan will be to see her back in the clinic in 3 months.      Thank you very much for allowing me to participate in the care of this patient.  If you have any questions regarding my recommendations, please do not hesitate to contact me.       Hussein Banegas MD      Professor of Medicine  [employer]      Executive Medical Director, Solid Organ Transplant Program  [facility]

## 2017-09-09 ENCOUNTER — COMMUNICATION - HEALTHEAST (OUTPATIENT)
Dept: INTERNAL MEDICINE | Facility: CLINIC | Age: 62
End: 2017-09-09

## 2017-09-11 ENCOUNTER — COMMUNICATION - HEALTHEAST (OUTPATIENT)
Dept: INTERNAL MEDICINE | Facility: CLINIC | Age: 62
End: 2017-09-11

## 2017-09-12 ENCOUNTER — AMBULATORY - HEALTHEAST (OUTPATIENT)
Dept: LAB | Facility: CLINIC | Age: 62
End: 2017-09-12

## 2017-09-12 DIAGNOSIS — Z94.4 LIVER TRANSPLANTED (H): ICD-10-CM

## 2017-09-12 DIAGNOSIS — Z79.899 ENCOUNTER FOR LONG-TERM (CURRENT) USE OF OTHER MEDICATIONS: ICD-10-CM

## 2017-09-12 LAB
ETHYL GLUCURONIDE UR QL: NORMAL
ETHYL GLUCURONIDE UR-MCNC: NORMAL NG/ML
ETHYL SULFATE UR-MCNC: NORMAL NG/ML

## 2017-09-13 LAB
DEPRECATED CALCIDIOL+CALCIFEROL SERPL-MC: <45 UG/L (ref 20–75)
VITAMIN D2 SERPL-MCNC: <5 UG/L
VITAMIN D3 SERPL-MCNC: 40 UG/L

## 2017-09-14 LAB — PTH RELATED PROT SERPL-SCNC: 3.5 PMOL/L (ref 0–3.4)

## 2017-09-20 ENCOUNTER — TELEPHONE (OUTPATIENT)
Dept: PHARMACY | Facility: CLINIC | Age: 62
End: 2017-09-20

## 2017-09-20 ENCOUNTER — OFFICE VISIT (OUTPATIENT)
Dept: NEPHROLOGY | Facility: CLINIC | Age: 62
End: 2017-09-20
Attending: INTERNAL MEDICINE
Payer: COMMERCIAL

## 2017-09-20 VITALS
WEIGHT: 176 LBS | OXYGEN SATURATION: 99 % | HEART RATE: 92 BPM | HEIGHT: 67 IN | BODY MASS INDEX: 27.62 KG/M2 | DIASTOLIC BLOOD PRESSURE: 81 MMHG | SYSTOLIC BLOOD PRESSURE: 138 MMHG | TEMPERATURE: 98.1 F

## 2017-09-20 DIAGNOSIS — I15.1 HYPERTENSION SECONDARY TO OTHER RENAL DISORDERS: ICD-10-CM

## 2017-09-20 DIAGNOSIS — N18.30 CKD (CHRONIC KIDNEY DISEASE) STAGE 3, GFR 30-59 ML/MIN (H): ICD-10-CM

## 2017-09-20 DIAGNOSIS — Z94.4 LIVER REPLACED BY TRANSPLANT (H): ICD-10-CM

## 2017-09-20 DIAGNOSIS — N18.30 ANEMIA IN STAGE 3 CHRONIC KIDNEY DISEASE (H): Primary | ICD-10-CM

## 2017-09-20 DIAGNOSIS — D63.1 ANEMIA IN STAGE 3 CHRONIC KIDNEY DISEASE (H): Primary | ICD-10-CM

## 2017-09-20 LAB
ALBUMIN SERPL-MCNC: 3.6 G/DL (ref 3.4–5)
ALP SERPL-CCNC: 56 U/L (ref 40–150)
ALT SERPL W P-5'-P-CCNC: 16 U/L (ref 0–50)
ANION GAP SERPL CALCULATED.3IONS-SCNC: 5 MMOL/L (ref 3–14)
AST SERPL W P-5'-P-CCNC: 12 U/L (ref 0–45)
BILIRUB DIRECT SERPL-MCNC: 0.1 MG/DL (ref 0–0.2)
BILIRUB SERPL-MCNC: 0.7 MG/DL (ref 0.2–1.3)
BUN SERPL-MCNC: 11 MG/DL (ref 7–30)
CALCIUM SERPL-MCNC: 8.1 MG/DL (ref 8.5–10.1)
CHLORIDE SERPL-SCNC: 106 MMOL/L (ref 94–109)
CO2 SERPL-SCNC: 27 MMOL/L (ref 20–32)
CREAT SERPL-MCNC: 1.22 MG/DL (ref 0.52–1.04)
CREAT UR-MCNC: 64 MG/DL
DEPRECATED CALCIDIOL+CALCIFEROL SERPL-MC: 29 UG/L (ref 20–75)
ERYTHROCYTE [DISTWIDTH] IN BLOOD BY AUTOMATED COUNT: 13.3 % (ref 10–15)
GFR SERPL CREATININE-BSD FRML MDRD: 45 ML/MIN/1.7M2
GLUCOSE SERPL-MCNC: 90 MG/DL (ref 70–99)
HCT VFR BLD AUTO: 37.6 % (ref 35–47)
HGB BLD-MCNC: 12.4 G/DL (ref 11.7–15.7)
MAGNESIUM SERPL-MCNC: 2.1 MG/DL (ref 1.6–2.3)
MCH RBC QN AUTO: 32.1 PG (ref 26.5–33)
MCHC RBC AUTO-ENTMCNC: 33 G/DL (ref 31.5–36.5)
MCV RBC AUTO: 97 FL (ref 78–100)
PHOSPHATE SERPL-MCNC: 3.1 MG/DL (ref 2.5–4.5)
PLATELET # BLD AUTO: 266 10E9/L (ref 150–450)
POTASSIUM SERPL-SCNC: 4 MMOL/L (ref 3.4–5.3)
PROT SERPL-MCNC: 7.2 G/DL (ref 6.8–8.8)
PROT UR-MCNC: 0.08 G/L
PROT/CREAT 24H UR: 0.13 G/G CR (ref 0–0.2)
RBC # BLD AUTO: 3.86 10E12/L (ref 3.8–5.2)
SODIUM SERPL-SCNC: 138 MMOL/L (ref 133–144)
WBC # BLD AUTO: 5.6 10E9/L (ref 4–11)

## 2017-09-20 PROCEDURE — 85027 COMPLETE CBC AUTOMATED: CPT | Performed by: INTERNAL MEDICINE

## 2017-09-20 PROCEDURE — 82306 VITAMIN D 25 HYDROXY: CPT | Performed by: INTERNAL MEDICINE

## 2017-09-20 PROCEDURE — 84155 ASSAY OF PROTEIN SERUM: CPT | Performed by: INTERNAL MEDICINE

## 2017-09-20 PROCEDURE — 99213 OFFICE O/P EST LOW 20 MIN: CPT | Mod: ZF

## 2017-09-20 PROCEDURE — 80197 ASSAY OF TACROLIMUS: CPT | Performed by: INTERNAL MEDICINE

## 2017-09-20 PROCEDURE — 80069 RENAL FUNCTION PANEL: CPT | Performed by: INTERNAL MEDICINE

## 2017-09-20 PROCEDURE — 82248 BILIRUBIN DIRECT: CPT | Performed by: INTERNAL MEDICINE

## 2017-09-20 PROCEDURE — 84075 ASSAY ALKALINE PHOSPHATASE: CPT | Performed by: INTERNAL MEDICINE

## 2017-09-20 PROCEDURE — 83735 ASSAY OF MAGNESIUM: CPT | Performed by: INTERNAL MEDICINE

## 2017-09-20 PROCEDURE — 84450 TRANSFERASE (AST) (SGOT): CPT | Performed by: INTERNAL MEDICINE

## 2017-09-20 PROCEDURE — 82247 BILIRUBIN TOTAL: CPT | Performed by: INTERNAL MEDICINE

## 2017-09-20 PROCEDURE — 84460 ALANINE AMINO (ALT) (SGPT): CPT | Performed by: INTERNAL MEDICINE

## 2017-09-20 PROCEDURE — 84156 ASSAY OF PROTEIN URINE: CPT | Performed by: INTERNAL MEDICINE

## 2017-09-20 PROCEDURE — 36415 COLL VENOUS BLD VENIPUNCTURE: CPT | Performed by: INTERNAL MEDICINE

## 2017-09-20 ASSESSMENT — PAIN SCALES - GENERAL: PAINLEVEL: NO PAIN (0)

## 2017-09-20 NOTE — PROGRESS NOTES
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  Here for follow up  Recommendations -    1. CKD stage 3 with baseline creatinine 1.2-1.5 mg/dL and eGFR 35-45.  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  She has a lot of hemodynamic fluctuations in creatinine as expected for someone on CNI.      2. HTN - BP controlled on amlodipine 5 mg daily though given abdominal bloating, I would like to consider adding thiazide.  Before doing this, I reviewed with Dr. Oakley as thiazide may impact 24 hour urine calcium.  Note that CNI associated HTN is in theory thiazide sensitive.  - chlorthalidone 12.5 mg started after OV with plan to recheck electrolytes 1-2 weeks later    3. Hypomagnesemia -serum Mg  2.  Stop magnesium supplement    4.. BMD - h/o hypercalcemia in setting of hospital stay, better now.  She had infusion of reclast Aug 11th.  She has ordered a 24 hour urine for calcium and cortisol per endocrinology but upon further discussion with Dr. Oakley, this is not needed.  PTH is 117, just mildly elevated in setting of hypocalcemia (and appropriate PTH response) on 9/8/17.  Vitamin D levels are OK, may consider adding calcium supplement.      Daya Gutierrez MD  [facility]  Department of Medicine  Division of Renal Disease and Hypertension  [phone]     Reason for Visit:  Ms. Luque is here for follow up of CKD.     HPI:   Phan Luque is a 61 year old female with PMH of acute alcoholic hepatitis, alcoholic cirrhosis complicated by oliguric GERMAIN and hypervolemia requiring dialysis from 8/2/2016 to 9/6/2016. She had liver transplant on 8/25/2016 and came off dialysis shortly after and recovered to creatinine of 1.2-1.5 mg/dL with eGFR  35-45.  Seen last in Nephrology clinic in June 2017    She continues to have fatigue, she is sleeping poorly, no fever but does have some night sweats without need to change the sheets.  She went thru menopause almost 20 years ago.  She feels bloated with some soreness in left abdomen that radiates to back and up into left chest.  She has some trouble with breathing going up stairs.  No ankle edema but does have pruritis on lower legs despite moisturizing.  She is eating better and is gaining weight - sometimes 3 lbs overnight.  She is down to prednisone 5 mg per day.    On amlodipine her home BP is running 120's/80's.  In past she'd had DBP in 90's.    ROS:   A comprehensive review of systems was obtained and negative, except as noted in the HPI or PMH.    Active Medical Problems:  Patient Active Problem List   Diagnosis     Decompensation of cirrhosis of liver (H)     Liver transplanted (H)     Alcoholic hepatitis     Immunosuppression (H)     Alcoholic hepatitis without ascites     Enterococcus UTI     Liver transplant status (H)     Nausea & vomiting     Malnutrition (H)     Candidiasis of skin     Anemia     ACP (advance care planning)     Diarrhea     Hypothyroidism     Esophageal reflux     Recurrent major depression in partial remission (H)     Insomnia     CKD (chronic kidney disease) stage 3, GFR 30-59 ml/min     Anemia in stage 3 chronic kidney disease     Osteopenia     Alcohol abuse, uncomplicated       Personal Hx:  Social History     Social History     Marital status:      Spouse name: N/A     Number of children: 3     Years of education: N/A     Occupational History           Social History Main Topics     Smoking status: Former Smoker     Quit date: 12/13/1996     Smokeless tobacco: Never Used     Alcohol use No      Comment: heavy use (750ml/2 days) up until 1 year ago; had cut down to 1 drink/day; none since 7/18/16     Drug use: No     Sexual activity: Not on file      Other Topics Concern     Not on file     Social History Narrative       Allergies:  Allergies   Allergen Reactions     Benadryl [Diphenhydramine] Hives     Cefaclor Hives     Hydrocodone-Acetaminophen Itching     Oxycodone Itching     Penicillins Hives     Sulfa Drugs Hives       Medications:  Current Outpatient Prescriptions   Medication Sig Dispense Refill     pramipexole (MIRAPEX) 0.5 MG tablet Take 1 tablet (0.5 mg) by mouth At Bedtime 90 tablet 3     betaxolol (BETOPTIC) 0.5 % ophthalmic solution Place 1 drop into both eyes 2 times daily 3 mL 2     predniSONE (DELTASONE) 5 MG tablet Take 2 tablets (10 mg) by mouth daily (Patient taking differently: Take 5 mg by mouth daily ) 180 tablet 3     amLODIPine (NORVASC) 5 MG tablet Take 1 tablet (5 mg) by mouth daily 90 tablet 3     azaTHIOprine 100 MG TABS Take 50 mg by mouth every evening 90 tablet 3     Omega-3 Fatty Acids (FISH OIL PO) Take 645 mg by mouth 2 times daily       latanoprost (XALATAN) 0.005 % ophthalmic solution Place 1 drop into both eyes At Bedtime (Patient not taking: Reported on 9/8/2017) 1 Bottle 11     magnesium oxide (MAG-OX) 400 (241.3 MG) MG tablet Take 1 tablet (400 mg) by mouth daily 60 tablet 3     traMADol (ULTRAM) 50 MG tablet Take 1-2 tablets ( mg) by mouth every 6 hours as needed for pain 50 tablet 0     calcium Citrate-vitamin D 500-400 MG-UNIT CHEW Take 1 tablet by mouth daily       hydrOXYzine (ATARAX) 25 MG tablet Take 1-2 tablets (25-50 mg) by mouth every 6 hours as needed for itching 60 tablet 1     traZODone (DESYREL) 100 MG tablet Take 1 tablet (100 mg) by mouth At Bedtime **Further refills must be obtained by primary care provider** 30 tablet 0     acetaminophen (TYLENOL) 325 MG tablet Take 2 tablets (650 mg) by mouth every 6 hours as needed for mild pain Or fevers. Use sparingly. Limit use to no more than 2 grams (2000 mg) in 24 hours. **Further refills must be obtained by primary care provider** 100 tablet 0      "loperamide (IMODIUM) 2 MG capsule Take 2 mg by mouth 4 times daily as needed for diarrhea Reported on 5/18/2017       ursodiol (ACTIGALL) 300 MG capsule Take 1 capsule (300 mg) by mouth 2 times daily 60 capsule 11     tacrolimus (PROGRAF - GENERIC EQUIVALENT) 0.5 MG capsule Take 4 capsules (2 mg) by mouth 2 times daily 240 capsule 11     simethicone (MYLICON) 80 MG chewable tablet Take 1 tablet (80 mg) by mouth every 6 hours as needed for flatulence or cramping 180 tablet 1     psyllium 0.52 G capsule Take 2 capsules (1.04 g) by mouth daily With a full glass of water. 60 capsule 1     pantoprazole (PROTONIX) 40 MG EC tablet Take 1 tablet (40 mg) by mouth every morning (before breakfast) 30 tablet 11     multivitamin, therapeutic (THERA-VIT) TABS tablet Take 1 tablet by mouth every 24 hours 30 tablet 11     levothyroxine (SYNTHROID/LEVOTHROID) 88 MCG tablet Take 1 tablet (88 mcg) by mouth every morning (before breakfast) 30 tablet 1     folic acid (FOLVITE) 1 MG tablet Take 1 tablet (1 mg) by mouth daily 30 tablet 1     cyanocobalamin 1000 MCG TABS Take 1,000 mcg by mouth daily 30 tablet 1      Vitals:  /81  Pulse 92  Temp 98.1  F (36.7  C) (Oral)  Ht 1.702 m (5' 7\")  Wt 79.8 kg (176 lb)  SpO2 99%  BMI 27.57 kg/m2     Exam:   GENERAL APPEARANCE: alert and no distress  EYES: no scleral icterus, pupils equal  HENT: NC/AT,  mouth  without ulcers or lesions  Endocrine: no goiter, no moon facies  Lymphatics: no cervical or supraclavicular LAD  Pulmonary: normal work of breathing, no clubbing  CV: WWP - Edema none  MS: no evidence of inflammation in joints, no muscle tenderness  : no Talbert  SKIN: no rash, warm, dry, no cyanosis  NEURO: mentation intact and speech normal     Results:        Electrolytes/Renal -   Recent Labs   Lab Test  09/20/17   1211  09/08/17   0723  08/11/17   0920  08/11/17   0918   NA  138  138   --   137   POTASSIUM  4.0  3.9   --   3.9   CHLORIDE  106  103   --   104   CO2  27  29   -- "   25   BUN  11  14   --   18   CR  1.22*  1.34*  1.26*  1.23*   GLC  90  104*   --   101*   ERIC  8.1*  8.3*  8.5  8.4*   MAG  2.1  1.9   --   1.8   PHOS  3.1  2.9   --   3.6       CBC -   Recent Labs   Lab Test  09/20/17   1211  09/08/17   0723  08/11/17   0918   WBC  5.6  8.5  6.6   HGB  12.4  13.0  12.6   PLT  266  235  217       LFTs -   Recent Labs   Lab Test  09/20/17   1211 09/08/17   0723  08/11/17   0918   ALKPHOS  56  58  68   BILITOTAL  0.7  0.6  0.9   ALT  16  16  15   AST  12  12  15   PROTTOTAL  7.2  7.2  7.2   ALBUMIN  3.6  3.5  3.7       Coags -   Recent Labs   Lab Test  03/16/17   1728  09/13/16   1055  08/30/16   0740   08/25/16   1230  08/25/16   1055  08/25/16   0850   INR  1.15*  1.08  1.01   < >  1.76*  1.76*  1.87*   PTT   --    --    --    --   48*  59*  58*    < > = values in this interval not displayed.       Iron Panel -   Recent Labs   Lab Test  06/05/17 1211  04/05/17   1236  03/22/17   1607   IRON  59  56  62   IRONSAT  24  31  32   WILL  138  278*  320*       Endocrine -   Recent Labs   Lab Test  09/08/17   0723  06/05/17   1211  04/05/17   1236   TSH  2.74  1.41  1.26  1.26

## 2017-09-20 NOTE — LETTER
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  Here for follow up  Recommendations -    1. CKD stage 3 with baseline creatinine 1.2-1.5 mg/dL and eGFR 35-45.  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  She has a lot of hemodynamic fluctuations in creatinine as expected for someone on CNI.      2. HTN - BP controlled on amlodipine 5 mg daily though given abdominal bloating, I would like to consider adding thiazide.  Before doing this, I reviewed with Dr. Oakley as thiazide may impact 24 hour urine calcium.  Note that CNI associated HTN is in theory thiazide sensitive.  - chlorthalidone 12.5 mg started after OV with plan to recheck electrolytes 1-2 weeks later    3. Hypomagnesemia -serum Mg  2.  Stop magnesium supplement    4.. BMD - h/o hypercalcemia in setting of hospital stay, better now.  She had infusion of reclast Aug 11th.  She has ordered a 24 hour urine for calcium and cortisol per endocrinology but upon further discussion with Dr. Oakley, this is not needed.  PTH is 117, just mildly elevated in setting of hypocalcemia (and appropriate PTH response) on 9/8/17.  Vitamin D levels are OK, may consider adding calcium supplement.      Daya Gutierrez MD  [facility]  Department of Medicine  Division of Renal Disease and Hypertension  [phone]     Reason for Visit:  Ms. Luque is here for follow up of CKD.     HPI:   Phan Luque is a 61 year old female with PMH of acute alcoholic hepatitis, alcoholic cirrhosis complicated by oliguric GERMAIN and hypervolemia requiring dialysis from 8/2/2016 to 9/6/2016. She had liver transplant on 8/25/2016 and came off  dialysis shortly after and recovered to creatinine of 1.2-1.5 mg/dL with eGFR 35-45.  Seen last in Nephrology clinic in June 2017    She continues to have fatigue, she is sleeping poorly, no fever but does have some night sweats without need to change the sheets.  She went thru menopause almost 20 years ago.  She feels bloated with some soreness in left abdomen that radiates to back and up into left chest.  She has some trouble with breathing going up stairs.  No ankle edema but does have pruritis on lower legs despite moisturizing.  She is eating better and is gaining weight - sometimes 3 lbs overnight.  She is down to prednisone 5 mg per day.    On amlodipine her home BP is running 120's/80's.  In past she'd had DBP in 90's.    ROS:   A comprehensive review of systems was obtained and negative, except as noted in the HPI or PMH.    Active Medical Problems:  Patient Active Problem List   Diagnosis     Decompensation of cirrhosis of liver (H)     Liver transplanted (H)     Alcoholic hepatitis     Immunosuppression (H)     Alcoholic hepatitis without ascites     Enterococcus UTI     Liver transplant status (H)     Nausea & vomiting     Malnutrition (H)     Candidiasis of skin     Anemia     ACP (advance care planning)     Diarrhea     Hypothyroidism     Esophageal reflux     Recurrent major depression in partial remission (H)     Insomnia     CKD (chronic kidney disease) stage 3, GFR 30-59 ml/min     Anemia in stage 3 chronic kidney disease     Osteopenia     Alcohol abuse, uncomplicated       Personal Hx:  Social History     Social History     Marital status:      Spouse name: N/A     Number of children: 3     Years of education: N/A     Occupational History           Social History Main Topics     Smoking status: Former Smoker     Quit date: 12/13/1996     Smokeless tobacco: Never Used     Alcohol use No      Comment: heavy use (750ml/2 days) up until 1 year ago; had cut down to 1  drink/day; none since 7/18/16     Drug use: No     Sexual activity: Not on file     Other Topics Concern     Not on file     Social History Narrative       Allergies:  Allergies   Allergen Reactions     Benadryl [Diphenhydramine] Hives     Cefaclor Hives     Hydrocodone-Acetaminophen Itching     Oxycodone Itching     Penicillins Hives     Sulfa Drugs Hives       Medications:  Current Outpatient Prescriptions   Medication Sig Dispense Refill     pramipexole (MIRAPEX) 0.5 MG tablet Take 1 tablet (0.5 mg) by mouth At Bedtime 90 tablet 3     betaxolol (BETOPTIC) 0.5 % ophthalmic solution Place 1 drop into both eyes 2 times daily 3 mL 2     predniSONE (DELTASONE) 5 MG tablet Take 2 tablets (10 mg) by mouth daily (Patient taking differently: Take 5 mg by mouth daily ) 180 tablet 3     amLODIPine (NORVASC) 5 MG tablet Take 1 tablet (5 mg) by mouth daily 90 tablet 3     azaTHIOprine 100 MG TABS Take 50 mg by mouth every evening 90 tablet 3     Omega-3 Fatty Acids (FISH OIL PO) Take 645 mg by mouth 2 times daily       latanoprost (XALATAN) 0.005 % ophthalmic solution Place 1 drop into both eyes At Bedtime (Patient not taking: Reported on 9/8/2017) 1 Bottle 11     magnesium oxide (MAG-OX) 400 (241.3 MG) MG tablet Take 1 tablet (400 mg) by mouth daily 60 tablet 3     traMADol (ULTRAM) 50 MG tablet Take 1-2 tablets ( mg) by mouth every 6 hours as needed for pain 50 tablet 0     calcium Citrate-vitamin D 500-400 MG-UNIT CHEW Take 1 tablet by mouth daily       hydrOXYzine (ATARAX) 25 MG tablet Take 1-2 tablets (25-50 mg) by mouth every 6 hours as needed for itching 60 tablet 1     traZODone (DESYREL) 100 MG tablet Take 1 tablet (100 mg) by mouth At Bedtime **Further refills must be obtained by primary care provider** 30 tablet 0     acetaminophen (TYLENOL) 325 MG tablet Take 2 tablets (650 mg) by mouth every 6 hours as needed for mild pain Or fevers. Use sparingly. Limit use to no more than 2 grams (2000 mg) in 24 hours.  "**Further refills must be obtained by primary care provider** 100 tablet 0     loperamide (IMODIUM) 2 MG capsule Take 2 mg by mouth 4 times daily as needed for diarrhea Reported on 5/18/2017       ursodiol (ACTIGALL) 300 MG capsule Take 1 capsule (300 mg) by mouth 2 times daily 60 capsule 11     tacrolimus (PROGRAF - GENERIC EQUIVALENT) 0.5 MG capsule Take 4 capsules (2 mg) by mouth 2 times daily 240 capsule 11     simethicone (MYLICON) 80 MG chewable tablet Take 1 tablet (80 mg) by mouth every 6 hours as needed for flatulence or cramping 180 tablet 1     psyllium 0.52 G capsule Take 2 capsules (1.04 g) by mouth daily With a full glass of water. 60 capsule 1     pantoprazole (PROTONIX) 40 MG EC tablet Take 1 tablet (40 mg) by mouth every morning (before breakfast) 30 tablet 11     multivitamin, therapeutic (THERA-VIT) TABS tablet Take 1 tablet by mouth every 24 hours 30 tablet 11     levothyroxine (SYNTHROID/LEVOTHROID) 88 MCG tablet Take 1 tablet (88 mcg) by mouth every morning (before breakfast) 30 tablet 1     folic acid (FOLVITE) 1 MG tablet Take 1 tablet (1 mg) by mouth daily 30 tablet 1     cyanocobalamin 1000 MCG TABS Take 1,000 mcg by mouth daily 30 tablet 1      Vitals:  /81  Pulse 92  Temp 98.1  F (36.7  C) (Oral)  Ht 1.702 m (5' 7\")  Wt 79.8 kg (176 lb)  SpO2 99%  BMI 27.57 kg/m2     Exam:   GENERAL APPEARANCE: alert and no distress  EYES: no scleral icterus, pupils equal  HENT: NC/AT,  mouth  without ulcers or lesions  Endocrine: no goiter, no moon facies  Lymphatics: no cervical or supraclavicular LAD  Pulmonary: normal work of breathing, no clubbing  CV: WWP - Edema none  MS: no evidence of inflammation in joints, no muscle tenderness  : no Talbert  SKIN: no rash, warm, dry, no cyanosis  NEURO: mentation intact and speech normal     Results:        Electrolytes/Renal -   Recent Labs   Lab Test  09/20/17   1211  09/08/17   0723  08/11/17   0920  08/11/17   0918   NA  138  138   --   137 "   POTASSIUM  4.0  3.9   --   3.9   CHLORIDE  106  103   --   104   CO2  27  29   --   25   BUN  11  14   --   18   CR  1.22*  1.34*  1.26*  1.23*   GLC  90  104*   --   101*   ERIC  8.1*  8.3*  8.5  8.4*   MAG  2.1  1.9   --   1.8   PHOS  3.1  2.9   --   3.6       CBC -   Recent Labs   Lab Test  09/20/17   1211 09/08/17   0723  08/11/17   0918   WBC  5.6  8.5  6.6   HGB  12.4  13.0  12.6   PLT  266  235  217       LFTs -   Recent Labs   Lab Test  09/20/17 1211 09/08/17   0723  08/11/17   0918   ALKPHOS  56  58  68   BILITOTAL  0.7  0.6  0.9   ALT  16  16  15   AST  12  12  15   PROTTOTAL  7.2  7.2  7.2   ALBUMIN  3.6  3.5  3.7       Coags -   Recent Labs   Lab Test  03/16/17   1728  09/13/16   1055  08/30/16   0740   08/25/16   1230  08/25/16   1055  08/25/16   0850   INR  1.15*  1.08  1.01   < >  1.76*  1.76*  1.87*   PTT   --    --    --    --   48*  59*  58*    < > = values in this interval not displayed.       Iron Panel -   Recent Labs   Lab Test  06/05/17   1211  04/05/17   1236  03/22/17   1607   IRON  59  56  62   IRONSAT  24  31  32   WILL  138  278*  320*       Endocrine -   Recent Labs   Lab Test  09/08/17   0723  06/05/17   1211  04/05/17   1236   TSH  2.74  1.41  1.26  1.26            Daya Gutierrez MD

## 2017-09-20 NOTE — PATIENT INSTRUCTIONS
Dear Phan Luque      Your were seen in the [facility] for follow up.  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  Your creatinine is 1.2 and your GFR is 45.  Going forward it will be importantto control your blood pressure with a goal of less than 140/80.        We are suggesting the following medications:  Take amlodipine 5 mg at bed time  I may add thiazide diuretic (chlorthalidone or HCTZ)    Please get the following tests done:  Labs per liver transplant team    Please set up appointment with:  Daya Gutierrez MD in 6 months    Kidney Education websites:  www.aakp.org (American Association of Kidney Patients)  www.kidney.org (National Kidney Foundation)  www.kidneydiB[employer] (Stay In Touch Program- Education by [employer])  www.pkdcure.org (for patient's with Polycystic Kidney Disease)  www.nkfkls.org (National Kidney Foundation- Kidney Learning System or 1-545.723.7917)      It was a pleasure meeting with you today. Thank you for allowing me and my team the privilege of caring for you today. YOU are the reason we are here, and I truly hope we provided you with the excellent service you deserve. Please let us know if there is anything else we can do for you so that we can be sure you are leaving completely satisfied with your care experience.    You may reach a nurse by calling [phone]    Take care!  Daya Gutierrez MD  Department of Medicine  Division of Renal Diseases and Hypertension  [facility]    Email: [email or web address]

## 2017-09-20 NOTE — NURSING NOTE
"Chief Complaint   Patient presents with     RECHECK     Ckd stage 3 follow up       Initial /81  Pulse 92  Temp 98.1  F (36.7  C) (Oral)  Ht 1.702 m (5' 7\")  Wt 79.8 kg (176 lb)  SpO2 99%  BMI 27.57 kg/m2 Estimated body mass index is 27.57 kg/(m^2) as calculated from the following:    Height as of this encounter: 1.702 m (5' 7\").    Weight as of this encounter: 79.8 kg (176 lb).  Medication Reconciliation: complete TAHIR LOPEZ CMA    "

## 2017-09-20 NOTE — MR AVS SNAPSHOT
After Visit Summary   9/20/2017    Phan Luque    MRN: [id number]           Patient Information     Date Of Birth          1955        Visit Information        Provider Department      9/20/2017 1:00 PM Daya Gutierrez MD [facility] Nephrology        Care Instructions    Dear Phan Luque      Your were seen in the [facility] for follow up.  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  Your creatinine is 1.2 and your GFR is 45.  Going forward it will be importantto control your blood pressure with a goal of less than 140/80.        We are suggesting the following medications:  Take amlodipine 5 mg at bed time  I may add thiazide diuretic (chlorthalidone or HCTZ)    Please get the following tests done:  Labs per liver transplant team    Please set up appointment with:  Daya Gutierrez MD in 6 months    Kidney Education websites:  www.aakp.org (American Association of Kidney Patients)  www.kidney.org (National Kidney Foundation)  www.kidneydirections.[employer] (Stay In Touch Program- Education by [employer])  www.pkdcure.org (for patient's with Polycystic Kidney Disease)  www.nkfkls.org (National Kidney Foundation- Kidney Learning System or 1-677.893.9018)      It was a pleasure meeting with you today. Thank you for allowing me and my team the privilege of caring for you today. YOU are the reason we are here, and I truly hope we provided you with the excellent service you deserve. Please let us know if there is anything else we can do for you so that we can be sure you are leaving completely satisfied with your care experience.    You may reach a nurse by calling [phone]    Take care!  Daya Gutierrez MD  Department of Medicine  Division of Renal Diseases and Hypertension  [facility]    Email: [email or web address]                     Follow-ups after your visit        Follow-up notes from your care  team     Return in about 6 months (around 3/20/2018).      Your next 10 appointments already scheduled     Sep 25, 2017  8:30 AM CDT   (Arrive by 8:15 AM)   Office Visit with Venkata Engel RPH   [facility] Medication Therapy Management ([facility])    [address] [place] [address]  3rd [facility] [id number] [phone]           Bring a current list of meds and any records pertaining to this visit. For Physicals, please bring immunization records and any forms needing to be filled out. Please arrive 10 minutes early to complete paperwork.            Nov 20, 2017  3:30 PM CST   Lab with  LAB   [facility] Lab ([facility])    [address]  1st [facility] [id number] [phone]            Nov 20, 2017  4:30 PM CST   (Arrive by 4:15 PM)   RETURN ENDOCRINE with Ruth aOkley MD   [facility] Endocrinology ([facility])    [address] [place] [address]  3rd [facility] [id number] [phone]            Dec 05, 2017 10:00 AM CST   RETURN NEURO with Ambrose Ortega MD   [facility] ([facility])    [address]  [address]  9[address] Clin [address]  [facility] [address] [phone]            Dec 20, 2017  2:15 PM CST   Lab with  LAB   [facility] Lab ([facility])    [address] [place] [address]  1st [facility] [id number] [phone]            Dec 20, 2017  3:15 PM CST   (Arrive by 3:00 PM)   Return Liver Transplant with Hussein Banegas MD   [facility] Hepatology ([facility])    [address] [place] [address]  3rd [facility] [address]   [phone]            Dec 20, 2017  4:30 PM CST   (Arrive by 4:15 PM)   Return Visit with DONOVAN Castellanos CNP   [facility] Gastroenterology and IBD Clinic ([facility])    [address] [place] [address]  4th [facility] [address]   [phone]            Mar  "21, 2018  3:30 PM CDT   Lab with  LAB   [facility] ([facility])    [address]  1st Floor  [facility] [address] [phone]            Mar 21, 2018  4:30 PM CDT   (Arrive by 4:00 PM)   Return Visit with Daya Gutierrez MD   [facility] Nephrology ([facility])    [address]  3rd Floor  [facility] [address] [phone]              Who to contact     If you have questions or need follow up information about today's clinic visit or your schedule please contact [facility] NEPHROLOGY directly at [phone].  Normal or non-critical lab and imaging results will be communicated to you by [employer], letter or phone within 4 business days after the clinic has received the results. If you do not hear from us within 7 days, please contact the clinic through [employer] or phone. If you have a critical or abnormal lab result, we will notify you by phone as soon as possible.  Submit refill requests through [employer] or call your pharmacy and they will forward the refill request to us. Please allow 3 business days for your refill to be completed.          Additional Information About Your Visit        [employer] Information     [employer] gives you secure access to your electronic health record. If you see a primary care provider, you can also send messages to your care team and make appointments. If you have questions, please call your primary care clinic.  If you do not have a primary care provider, please call [phone] and they will assist you.        Care EveryWhere ID     This is your Care EveryWhere ID. This could be used by other organizations to access your [facility] medical records  [id number]        Your Vitals Were     Pulse Temperature Height Pulse Oximetry BMI (Body Mass Index)       92 98.1  F (36.7  C) (Oral) 1.702 m (5' 7\") 99% 27.57 kg/m2        Blood Pressure from Last 3 Encounters:   09/20/17 138/81   09/08/17 156/84   08/14/17 " 136/79    Weight from Last 3 Encounters:   09/20/17 79.8 kg (176 lb)   09/08/17 79.8 kg (176 lb)   08/14/17 80.2 kg (176 lb 11.2 oz)              Today, you had the following     No orders found for display         Today's Medication Changes          These changes are accurate as of: 9/20/17  1:46 PM.  If you have any questions, ask your nurse or doctor.               These medicines have changed or have updated prescriptions.        Dose/Directions    predniSONE 5 MG tablet   Commonly known as:  DELTASONE   This may have changed:  how much to take   Used for:  Liver transplanted (H)        Dose:  10 mg   Take 2 tablets (10 mg) by mouth daily   Quantity:  180 tablet   Refills:  3                Primary Care Provider Office Phone # Fax #    Santosh SAAVEDRA Damian [phone]       [facility] [address] [place] [address]   [place] [address]        Equal Access to Services     [facility] : Hadii adonis holland Sogary, waaxda luqadaha, qaybta kaalmada aderosa, anna ambrose . So [facility] [phone].    ATENCIÓN: Si habla español, tiene a reece disposición servicios gratuitos de asistencia lingüística. Llame al [phone].    We comply with applicable federal civil rights laws and [place] laws. We do not discriminate on the basis of race, color, national origin, age, disability sex, sexual orientation or gender identity.            Thank you!     Thank you for choosing [facility] NEPHROLOGY  for your care. Our goal is always to provide you with excellent care. Hearing back from our patients is one way we can continue to improve our services. Please take a few minutes to complete the written survey that you may receive in the mail after your visit with us. Thank you!             Your Updated Medication List - Protect others around you: Learn how to safely use, store and throw away your medicines at www.disposemymeds.org.          This list is accurate as of: 9/20/17  1:46 PM.  Always use  your most recent med list.                   Brand Name Dispense Instructions for use Diagnosis    acetaminophen 325 MG tablet    TYLENOL    100 tablet    Take 2 tablets (650 mg) by mouth every 6 hours as needed for mild pain Or fevers. Use sparingly. Limit use to no more than 2 grams (2000 mg) in 24 hours. **Further refills must be obtained by primary care provider**    Acute post-operative pain       amLODIPine 5 MG tablet    NORVASC    90 tablet    Take 1 tablet (5 mg) by mouth daily    Hypertension       azaTHIOprine 100 MG Tabs     90 tablet    Take 50 mg by mouth every evening    Liver transplanted (H)       betaxolol 0.5 % ophthalmic solution    BETOPTIC    3 mL    Place 1 drop into both eyes 2 times daily    Primary open angle glaucoma of both eyes, unspecified glaucoma stage       calcium Citrate-vitamin D 500-400 MG-UNIT Chew      Take 1 tablet by mouth daily        cyanocobalamin 1000 MCG Tabs     30 tablet    Take 1,000 mcg by mouth daily    Liver transplanted (H)       FISH OIL PO      Take 645 mg by mouth 2 times daily        folic acid 1 MG tablet    FOLVITE    30 tablet    Take 1 tablet (1 mg) by mouth daily    Malnutrition (H)       hydrOXYzine 25 MG tablet    ATARAX    60 tablet    Take 1-2 tablets (25-50 mg) by mouth every 6 hours as needed for itching    Itching       latanoprost 0.005 % ophthalmic solution    XALATAN    1 Bottle    Place 1 drop into both eyes At Bedtime    Steroid responders borderline glaucoma, bilateral       levothyroxine 88 MCG tablet    SYNTHROID/LEVOTHROID    30 tablet    Take 1 tablet (88 mcg) by mouth every morning (before breakfast)    Thyroid function test abnormal       loperamide 2 MG capsule    IMODIUM     Take 2 mg by mouth 4 times daily as needed for diarrhea Reported on 5/18/2017        magnesium oxide 400 (241.3 MG) MG tablet    MAG-OX    60 tablet    Take 1 tablet (400 mg) by mouth daily    CKD (chronic kidney disease) stage 3, GFR 30-59 ml/min, Hypomagnesemia        multivitamin, therapeutic Tabs tablet     30 tablet    Take 1 tablet by mouth every 24 hours    Malnutrition (H)       pantoprazole 40 MG EC tablet    PROTONIX    30 tablet    Take 1 tablet (40 mg) by mouth every morning (before breakfast)    Gastroesophageal reflux disease, esophagitis presence not specified       pramipexole 0.5 MG tablet    MIRAPEX    90 tablet    Take 1 tablet (0.5 mg) by mouth At Bedtime    Restless leg syndrome       predniSONE 5 MG tablet    DELTASONE    180 tablet    Take 2 tablets (10 mg) by mouth daily    Liver transplanted (H)       psyllium 0.52 G capsule     60 capsule    Take 2 capsules (1.04 g) by mouth daily With a full glass of water.    Loose stools       simethicone 80 MG chewable tablet    MYLICON    180 tablet    Take 1 tablet (80 mg) by mouth every 6 hours as needed for flatulence or cramping    Flatulence, eructation, and gas pain       tacrolimus 0.5 MG capsule    GENERIC EQUIVALENT    240 capsule    Take 4 capsules (2 mg) by mouth 2 times daily    Liver transplanted (H)       traMADol 50 MG tablet    ULTRAM    50 tablet    Take 1-2 tablets ( mg) by mouth every 6 hours as needed for pain    Abdominal pain, epigastric       traZODone 100 MG tablet    DESYREL    30 tablet    Take 1 tablet (100 mg) by mouth At Bedtime **Further refills must be obtained by primary care provider**    Insomnia, unspecified type       ursodiol 300 MG capsule    ACTIGALL    60 capsule    Take 1 capsule (300 mg) by mouth 2 times daily    Liver transplanted (H)

## 2017-09-21 LAB
TACROLIMUS BLD-MCNC: 3.4 UG/L (ref 5–15)
TME LAST DOSE: ABNORMAL H

## 2017-09-22 DIAGNOSIS — Z94.4 LIVER REPLACED BY TRANSPLANT (H): Primary | ICD-10-CM

## 2017-09-25 ENCOUNTER — TELEPHONE (OUTPATIENT)
Dept: NEPHROLOGY | Facility: CLINIC | Age: 62
End: 2017-09-25

## 2017-09-25 ENCOUNTER — OFFICE VISIT (OUTPATIENT)
Dept: PHARMACY | Facility: CLINIC | Age: 62
End: 2017-09-25
Payer: COMMERCIAL

## 2017-09-25 VITALS — SYSTOLIC BLOOD PRESSURE: 145 MMHG | DIASTOLIC BLOOD PRESSURE: 85 MMHG | HEART RATE: 68 BPM

## 2017-09-25 DIAGNOSIS — J30.2 SEASONAL ALLERGIC RHINITIS, UNSPECIFIED CHRONICITY, UNSPECIFIED TRIGGER: ICD-10-CM

## 2017-09-25 DIAGNOSIS — I15.1 HYPERTENSION SECONDARY TO OTHER RENAL DISORDERS: ICD-10-CM

## 2017-09-25 DIAGNOSIS — N18.30 CKD (CHRONIC KIDNEY DISEASE) STAGE 3, GFR 30-59 ML/MIN (H): Primary | ICD-10-CM

## 2017-09-25 DIAGNOSIS — F32.A DEPRESSION, UNSPECIFIED DEPRESSION TYPE: ICD-10-CM

## 2017-09-25 DIAGNOSIS — R53.83 FATIGUE, UNSPECIFIED TYPE: ICD-10-CM

## 2017-09-25 DIAGNOSIS — E63.9 NUTRITIONAL DEFICIENCY: ICD-10-CM

## 2017-09-25 DIAGNOSIS — K44.9 HIATUS HERNIA SYNDROME: ICD-10-CM

## 2017-09-25 DIAGNOSIS — G47.00 INSOMNIA, UNSPECIFIED TYPE: ICD-10-CM

## 2017-09-25 DIAGNOSIS — E03.9 HYPOTHYROIDISM, UNSPECIFIED TYPE: ICD-10-CM

## 2017-09-25 DIAGNOSIS — R10.84 ABDOMINAL PAIN, GENERALIZED: ICD-10-CM

## 2017-09-25 DIAGNOSIS — Z94.4 LIVER TRANSPLANT STATUS (H): Primary | ICD-10-CM

## 2017-09-25 DIAGNOSIS — R60.9 FLUID RETENTION: ICD-10-CM

## 2017-09-25 PROCEDURE — 99607 MTMS BY PHARM ADDL 15 MIN: CPT | Performed by: PHARMACIST

## 2017-09-25 PROCEDURE — 99605 MTMS BY PHARM NP 15 MIN: CPT | Performed by: PHARMACIST

## 2017-09-25 RX ORDER — CHLORTHALIDONE 25 MG/1
12.5 TABLET ORAL DAILY
Qty: 15 TABLET | Refills: 0 | Status: SHIPPED | OUTPATIENT
Start: 2017-09-25 | End: 2017-10-16

## 2017-09-25 NOTE — PATIENT INSTRUCTIONS
Recommendations from today's MTM visit:                                                        1. Start Vitamin D3. You've had a history of low vitamin D levels. Recommend taking 8622-9773 units daily.     2. Stop Hydroxyzine, start Loratadine 10mg daily. This could be making you sleepy throughout the day.  Loratadine should cover your allergies. If this is ineffective we will try another medication (Allegra or Zyrtec)    3. Take your Amlodipine at bedtime. This may help with your fatigue at night as well.     4.Leave Trazodone at 100mg at bedtime for now. We don't want to make too many changes at one time.     Next MTM visit: in 2 weeks, 10/10/17 at 12:30 PM.     To schedule another MTM appointment, please call the clinic directly or you may call the MTM scheduling line at [phone] or toll-free at [phone].     My Clinical Pharmacist's contact information:                                                      It was a pleasure seeing you today!  Please feel free to contact me with any questions or concerns you have.      Venkata Engel, PharmD  MTM Pharmacist    Phone: [phone]     You may receive a survey about the MTM services you received.  I would appreciate your feedback to help me serve you better in the future. Please fill it out and return it when you can. Your comments will be anonymous.

## 2017-09-25 NOTE — Clinical Note
Pneumovax 23 is indicated for patient. Not listed on [facility] or on our records. See note concerning changes made to improve her fatigue. Thank you!  Venkata Engel, PharmD [facility] Pharmacist  Phone: [phone]

## 2017-09-25 NOTE — PROGRESS NOTES
SUBJECTIVE/OBJECTIVE:                           Phan Luque is a 61 year old female coming in for an initial visit for Medication Therapy Management.  She was referred to me from Dr. Gutierrez.     Chief Complaint: exhausted all the time, hurt everywhere.    Allergies/ADRs: Reviewed in Epic  Tobacco: No tobacco use  Alcohol: not currently using  Caffeine: 4 cups/day of coffee, cup of decaf at night  Activity: not a whoel lot.   PMH: Reviewed in Epic    Medication Adherence/Access  The patient misses their medication 1< times per month. She is responsible for his/her own medications.   Adherence/Compliance is described as good (90-99%).  Medication barriers: no issues reported by patient.  The patient [facility] general medications at  [facility]  This is going well.   Has the patient been offered to fill at [facility]? Yes    Liver Transplant:  Current immunosuppressants include TAC 2mg BID, AZA 50 daily, Prednisone 5mg daily (since starting this her nausea has decreased), Ursodiol 300mg BID.  Pt reports no side effects  Transplant date: 8/25/16  Estimated Creatinine Clearance: 53.3 mL/min (based on Cr of 1.22).  CMV prophylaxis:  Completed   PCP prophylaxis: Completed  Antifungal Prophylaxis: completed  PPI use: Pantpraazole 40mg daily  Current supplements for electrolyte replacement: Mag Oxide 400mg daily (Dr. Banegas said she could stop this since her levels are normal), Calcium Citrate daily.   Tx Coordinator: Porfirio Daly MD: Dr. Banegas, Using Med Card: No, Carezone leonor instead,   Immunizations: annual flu shot yes, Pneumovaxx:  None on file, Prevnar 13: 2016; TDaP:  2016    Fatigue: Pt is taking Vitamin B12 1mg daily. Pt states her fatigue started in the hospital after transplant. She gets home from work and waits until it gets dark, then falls asleep around 8 pm.     Hx of Esophageal Ulcer/ Hiatus Hernia: Pt is taking Pantoprazole 40mg daily. She tried to go off of her PPI, but this led to emesis. Her ulcer was  12/2016. She does feel liek she needs this med.     Pain/ Cramps: Pt is taking APAP 650mg BID prn for pain, Tramadol 50mg daily prn unsure if this helps. She states her belly is very sore to the touch, her calves cramp all the time.  She put on socks this morning got a terrible dana horse in her foot.     Insomnia/ RLS: Current medications include: trazodone 100mg qHS, Mirapex 0.5mg qHS (she feels queasy after taking this, so she is not taking it currently), is wondering if she can increase Trazodone to 200mg. Does not take naps, does not read in bed, does not watch tv in bed. She sleeps for about 1 hour after falling asleep, then has to get up to use the bathroom. She is getting around 5-6 hours of sleep per night according her fit bit. Goes to bed around 8 and gets up around 5 AM.  Pt drinks 4 cups of coffee daily and the last cup is at 11 AM, 1 cup of decaf before bedtime. PT tried Melatonin 3mg  In hospital, did not do much there, but then again she has a lot more trouble sleeping in the hospital.     Pruritis/ Allergies: Pt is taking Hydroxyzine 25mg BID. She tried to stop this because did not help itching, but her allergies acted up. These included Sneezing ,Runny eyes, congestion, sore throat. Pt has used Allegra back in the day, does not remember if it was effective. Her itching is mostly in her lower extremities.     Supplements: Pt is taking Calcium/D daily, MVI daily, Fish oil daily. Has had history of low vitamin D, has D3 at home, but is no longer taking.     Hypertension/ CKD: Current medications include Amlodipine 5mg daily. She was dosing this in the morning, but Dr. Gutierrez moved it to the evening to provide BP control overnight. She has been taking this at suppertime.  Patient does self-monitor BP. Home BP monitoring in range of 127/78 this morning.  Patient reports the following medication side effects: fatigue.    Hypothyroidism: Patient is taking levothyroxine 88 mcg daily, takes on empty  stomach with Pantoprazole. Patient is having the following symptoms: hypothyroidism -  cold intolerance.    Depression/Anxiety:  Current medications include: used to take Sertraline and Buspirone. She states that anyone who was in her position would be depressed. Since stopping these medications she has noticed no difference in mood.    PHQ-9 SCORE 5/31/2017 6/24/2017   Total Score MyChart 4 (Minimal depression) 3 (Minimal depression)   Some encounter information is confidential and restricted. Go to Review Flowsheets activity to see all data.     Current labs include:  BP Readings from Last 3 Encounters:   09/20/17 138/81   09/08/17 156/84   08/14/17 136/79     Today's Vitals: There were no vitals taken for this visit.  No results found for: A1C.  Lab Results   Component Value Date    CHOL  08/06/2016     Interfering substances, unable to perform test  LUPE BEULAH NOTIFIED ON 6B,8/6/2016,0926,MJ       Lab Results   Component Value Date    TRIG  08/06/2016     Interfering substances, unable to perform test  LUPE RIOJAS NOTIFIED ON 6B,8/6/2016,0926,MJ       Lab Results   Component Value Date    HDL 9 08/06/2016     Lab Results   Component Value Date     02/23/2017    LDL 13 08/06/2016       Liver Function Studies -   Recent Labs   Lab Test  09/20/17   1211   PROTTOTAL  7.2   ALBUMIN  3.6   BILITOTAL  0.7   ALKPHOS  56   AST  12   ALT  16       Lab Results   Component Value Date    UCRR 64 09/20/2017       Last Basic Metabolic Panel:  Lab Results   Component Value Date     09/20/2017      Lab Results   Component Value Date    POTASSIUM 4.0 09/20/2017     Lab Results   Component Value Date    CHLORIDE 106 09/20/2017     Lab Results   Component Value Date    BUN 11 09/20/2017     Lab Results   Component Value Date    CR 1.22 09/20/2017     GFR Estimate   Date Value Ref Range Status   09/20/2017 45 (L) >60 mL/min/1.7m2 Final     Comment:     Non  GFR Calc   09/08/2017 40 (L) >60 mL/min/1.7m2  Final     Comment:     Non  GFR Calc   08/11/2017 43 (L) >60 mL/min/1.7m2 Final     Comment:     Non  GFR Calc     TSH   Date Value Ref Range Status   09/08/2017 2.74 0.40 - 4.00 mU/L Final     Most Recent Immunizations   Administered Date(s) Administered     HEPA 01/13/2009     Influenza Vaccine IM 3yrs+ 4 Valent IIV4 10/08/2016     Mantoux 10/06/2016     Pneumococcal (PCV 13) 08/19/2016     TD (ADULT, 7+) 01/21/2004     TDAP Vaccine (Adacel) 08/18/2016       ASSESSMENT:                             Current medications were reviewed today.     Medication Adherence: no issues identified    Liver Transplant:  Needs improvement. Pneumovax 23 is indicated for patient (immunocompromised <65, SOT, CKD), recommend getting it at next clinic visit.     Fatigue: Needs improvement. Hydroxyzine twice daily could be contributing to her fatigue. She is no longer taking this for itching, but for allergies rather.  A second gen Antihistamine is recommend to control these symptoms. We will start with Loratadine 10mg daily. Additionally, Amlodipine can cause fatigue in a fair amount of patients. Recommend she move this from dinnertime to bedtime. If she takes higher doses of Trazodone    Hx of Esophageal Ulcer/ Hiatus Hernia: Stable. Pt may be eligible for D/C of Pantoprazole in the future, but currently it is necessary.     Pain: Stable. We discussed trying slow magnesium (Mg chloride) in place of oxide in the future and trying Tylenol Arthritis 650mg ER tablets in place of IR tablets. She is limited to <2 of APAP daily due to liver txp.     Insomnia/ RLS: Stable. Pt may increase to 200mg of Trazodone in the future, but we cannot be sure it is not contributing to the fatigue. Trazodone and its active metabolites half lives maybe elongated in the presence of liver disease and CKD. No data regarding this in package insert. Would increase cautiously.     Pruritis/ Allergies: Needs improvement. See  Fatigue assessment. Replacing Hydroxyzine with Loratadine.     Supplements: Needs improvement. Pt to start Vitamin D3 again.     Hypertension/ CKD: Needs improvement. Pt to move Amlodipine to bedtime. BP not at goal in clinic, but is at goal at home.     Hypothyroidism: Stable. TSH WNL.     Depression/Anxiety:  Stable. Pt does not noticed difference in mood or energy since stopping Sertraline/ Buspirone.    PLAN:                            Pt to...  1. Start Vitamin D3. You've had a history of low vitamin D levels. Recommend taking 7621-9514 units daily.   2. Stop Hydroxyzine, start Loratadine 10mg daily. This could be making you sleepy throughout the day.  Loratadine should cover your allergies. If this is ineffective we will try another medication (Allegra or Zyrtec)  3. Take your Amlodipine at bedtime. This may help with your fatigue at night as well.   4.Leave Trazodone at 100mg at bedtime for now. We don't want to make too many changes at one time.     Dr. Gutierrez/ Dr. Banegas:   1. Pt is indicated for Pneumovax 23     I spent 60 minutes with this patient today. All changes were made via verbal approval with Dr. Gutierrez/ Dr. Banegas. A copy of the visit note was provided to the patient's specialty care providers.    Will follow up in 3 weeks.    The patient was given a summary of these recommendations as an after visit summary.     Venkata Engel, PharmD  MTM Pharmacist    Phone: [phone]

## 2017-09-25 NOTE — MR AVS SNAPSHOT
After Visit Summary   9/25/2017    Phan Luque    MRN: [id number]           Patient Information     Date Of Birth          1955        Visit Information        Provider Department      9/25/2017 8:30 AM Venkata Engel RPH [facility] Medication Therapy Management        Care Instructions    Recommendations from today's MTM visit:                                                        1. Start Vitamin D3. You've had a history of low vitamin D levels. Recommend taking 5191-9930 units daily.     2. Stop Hydroxyzine, start Loratadine 10mg daily. This could be making you sleepy throughout the day.  Loratadine should cover your allergies. If this is ineffective we will try another medication (Allegra or Zyrtec)    3. Take your Amlodipine at bedtime. This may help with your fatigue at night as well.     4.Leave Trazodone at 100mg at bedtime for now. We don't want to make too many changes at one time.     Next MTM visit: in 2 weeks, 10/10/17 at 12:30 PM.     To schedule another MTM appointment, please call the clinic directly or you may call the MTM scheduling line at [phone] or toll-free at [phone].     My Clinical Pharmacist's contact information:                                                      It was a pleasure seeing you today!  Please feel free to contact me with any questions or concerns you have.      Venkata Engel, PharmD  MTM Pharmacist    Phone: [phone]     You may receive a survey about the MTM services you received.  I would appreciate your feedback to help me serve you better in the future. Please fill it out and return it when you can. Your comments will be anonymous.            Follow-ups after your visit        Your next 10 appointments already scheduled     Oct 10, 2017 12:30 PM CDT   TELEMEDICINE with AKBAR Nguyen Medication Therapy Management ([facility])    [address] [facility]  3rd [facility]  [place] [address]   [phone]           Note: this is not an onsite visit; there is no need to come to the facility.            Nov 20, 2017  3:30 PM CST   Lab with UC LAB   [facility] ([facility])    [address] [place] [address]  1st [facility] [place] [facility] [address]   [phone]            Nov 20, 2017  4:30 PM CST   (Arrive by 4:15 PM)   RETURN ENDOCRINE with Ruth Oakley MD   [facility] Endocrinology ([facility])    [address] [place] [address]  3rd [facility] [address]   [phone]            Dec 05, 2017 10:00 AM CST   RETURN NEURO with Ambrose Ortega MD   Eye Clinic ([facility])    [address]  [address] [place] [address] Clin 9a  [facility] [address]   [phone]            Dec 20, 2017  2:15 PM CST   Lab with  LAB   [facility] ([facility])    [address] [place] [address]   [phone]            Dec 20, 2017  3:15 PM CST   (Arrive by 3:00 PM)   Return Liver Transplant with Hussein Banegas MD   [facility] Hepatology ([facility])    [address] [place] [address]   [phone]            Dec 20, 2017  4:30 PM CST   (Arrive by 4:15 PM)   Return Visit with DONOVAN Castellanos CNP   [facility] ([facility])    [address] [place] [address]  4th [facility] [address]   [phone]            Mar 21, 2018  3:30 PM CDT   Lab with UC LAB   [facility] ([facility])    [address] [place] [address]   [phone]            Mar 21, 2018  4:30 PM CDT   (Arrive by 4:00 PM)   Return Visit with Daya Gutierrez MD   [facility] Nephrology ([facility])    [address] [place] [address]   [phone]              Who to contact     If you have questions or  need follow up information about today's clinic visit or your schedule please contact  [facility] directly at [phone].  Normal or non-critical lab and imaging results will be communicated to you by [employer], letter or phone within 4 business days after the clinic has received the results. If you do not hear from us within 7 days, please contact the clinic through [email or web address] or phone. If you have a critical or abnormal lab result, we will notify you by phone as soon as possible.  Submit refill requests through [employer] or call your pharmacy and they will forward the refill request to us. Please allow 3 business days for your refill to be completed.          Additional Information About Your Visit        [employer] Information     [employer] gives you secure access to your electronic health record. If you see a primary care provider, you can also send messages to your care team and make appointments. If you have questions, please call your primary care clinic.  If you do not have a primary care provider, please call [phone] and they will assist you.        Care EveryWhere ID     This is your Care EveryWhere ID. This could be used by other organizations to access your [facility] medical records  [id number]        Your Vitals Were     Pulse                   68            Blood Pressure from Last 3 Encounters:   09/25/17 145/85   09/20/17 138/81   09/08/17 156/84    Weight from Last 3 Encounters:   09/20/17 176 lb (79.8 kg)   09/08/17 176 lb (79.8 kg)   08/14/17 176 lb 11.2 oz (80.2 kg)              Today, you had the following     No orders found for display         Today's Medication Changes          These changes are accurate as of: 9/25/17  9:24 AM.  If you have any questions, ask your nurse or doctor.               These medicines have changed or have updated prescriptions.        Dose/Directions    predniSONE 5 MG tablet   Commonly known as:  DELTASONE   This may have changed:  how much  to take   Used for:  Liver transplanted (H)        Dose:  10 mg   Take 2 tablets (10 mg) by mouth daily   Quantity:  180 tablet   Refills:  3         Stop taking these medicines if you haven't already. Please contact your care team if you have questions.     latanoprost 0.005 % ophthalmic solution   Commonly known as:  XALATAN                    Primary Care Provider Office Phone # Fax #    Santosh Salgado [phone]       [facility] [address] [place] [address] [address] [place] [address]        Equal Access to Services     [facility]: Hadii aad ku hadasho Soomaali, waaxda luqadaha, qaybta kaalmada adeegyada, waxay idiin hayaan adeeg kharash lagonzalez . So [facility] [phone].    ATENCIÓN: Si habla español, tiene a reece disposición servicios gratuitos de asistencia lingüística. [facility] [phone].    We comply with applicable federal civil rights laws and [place] laws. We do not discriminate on the basis of race, color, national origin, age, disability sex, sexual orientation or gender identity.            Thank you!     Thank you for choosing [facility] MEDICATION THERAPY MANAGEMENT  for your care. Our goal is always to provide you with excellent care. Hearing back from our patients is one way we can continue to improve our services. Please take a few minutes to complete the written survey that you may receive in the mail after your visit with us. Thank you!             Your Updated Medication List - Protect others around you: Learn how to safely use, store and throw away your medicines at www.disposemymeds.org.          This list is accurate as of: 9/25/17  9:24 AM.  Always use your most recent med list.                   Brand Name Dispense Instructions for use Diagnosis    acetaminophen 325 MG tablet    TYLENOL    100 tablet    Take 2 tablets (650 mg) by mouth every 6 hours as needed for mild pain Or fevers. Use sparingly. Limit use to no more than 2 grams (2000 mg) in 24 hours. **Further refills must be  obtained by primary care provider**    Acute post-operative pain       amLODIPine 5 MG tablet    NORVASC    90 tablet    Take 1 tablet (5 mg) by mouth daily    Hypertension       azaTHIOprine 100 MG Tabs     90 tablet    Take 50 mg by mouth every evening    Liver transplanted (H)       betaxolol 0.5 % ophthalmic solution    BETOPTIC    3 mL    Place 1 drop into both eyes 2 times daily    Primary open angle glaucoma of both eyes, unspecified glaucoma stage       calcium Citrate-vitamin D 500-400 MG-UNIT Chew      Take 1 tablet by mouth daily        cyanocobalamin 1000 MCG Tabs     30 tablet    Take 1,000 mcg by mouth daily    Liver transplanted (H)       FISH OIL PO      Take 645 mg by mouth 2 times daily        folic acid 1 MG tablet    FOLVITE    30 tablet    Take 1 tablet (1 mg) by mouth daily    Malnutrition (H)       hydrOXYzine 25 MG tablet    ATARAX    60 tablet    Take 1-2 tablets (25-50 mg) by mouth every 6 hours as needed for itching    Itching       levothyroxine 88 MCG tablet    SYNTHROID/LEVOTHROID    30 tablet    Take 1 tablet (88 mcg) by mouth every morning (before breakfast)    Thyroid function test abnormal       loperamide 2 MG capsule    IMODIUM     Take 2 mg by mouth 4 times daily as needed for diarrhea Reported on 5/18/2017        magnesium oxide 400 (241.3 MG) MG tablet    MAG-OX    60 tablet    Take 1 tablet (400 mg) by mouth daily    CKD (chronic kidney disease) stage 3, GFR 30-59 ml/min, Hypomagnesemia       multivitamin, therapeutic Tabs tablet     30 tablet    Take 1 tablet by mouth every 24 hours    Malnutrition (H)       pantoprazole 40 MG EC tablet    PROTONIX    30 tablet    Take 1 tablet (40 mg) by mouth every morning (before breakfast)    Gastroesophageal reflux disease, esophagitis presence not specified       pramipexole 0.5 MG tablet    MIRAPEX    90 tablet    Take 1 tablet (0.5 mg) by mouth At Bedtime    Restless leg syndrome       predniSONE 5 MG tablet    DELTASONE    180 tablet     Take 2 tablets (10 mg) by mouth daily    Liver transplanted (H)       psyllium 0.52 G capsule     60 capsule    Take 2 capsules (1.04 g) by mouth daily With a full glass of water.    Loose stools       simethicone 80 MG chewable tablet    MYLICON    180 tablet    Take 1 tablet (80 mg) by mouth every 6 hours as needed for flatulence or cramping    Flatulence, eructation, and gas pain       tacrolimus 0.5 MG capsule    GENERIC EQUIVALENT    240 capsule    Take 4 capsules (2 mg) by mouth 2 times daily    Liver transplanted (H)       traMADol 50 MG tablet    ULTRAM    50 tablet    Take 1-2 tablets ( mg) by mouth every 6 hours as needed for pain    Abdominal pain, epigastric       traZODone 100 MG tablet    DESYREL    30 tablet    Take 1 tablet (100 mg) by mouth At Bedtime **Further refills must be obtained by primary care provider**    Insomnia, unspecified type       ursodiol 300 MG capsule    ACTIGALL    60 capsule    Take 1 capsule (300 mg) by mouth 2 times daily    Liver transplanted (H)

## 2017-09-26 ENCOUNTER — AMBULATORY - HEALTHEAST (OUTPATIENT)
Dept: LAB | Facility: CLINIC | Age: 62
End: 2017-09-26

## 2017-09-26 DIAGNOSIS — Z79.899 ENCOUNTER FOR LONG-TERM (CURRENT) USE OF OTHER MEDICATIONS: ICD-10-CM

## 2017-09-26 DIAGNOSIS — Z94.4 LIVER TRANSPLANTED (H): ICD-10-CM

## 2017-09-26 NOTE — PROGRESS NOTES
Parathyroid hormone levels are elevated at this time, this may be related to renal insufficiency and/or low calcium in diet. It had been in normal range before and will keep monitoring this level.     RTS

## 2017-10-10 ENCOUNTER — ALLIED HEALTH/NURSE VISIT (OUTPATIENT)
Dept: PHARMACY | Facility: CLINIC | Age: 62
End: 2017-10-10
Payer: COMMERCIAL

## 2017-10-10 DIAGNOSIS — R53.83 FATIGUE, UNSPECIFIED TYPE: ICD-10-CM

## 2017-10-10 DIAGNOSIS — J30.2 SEASONAL ALLERGIC RHINITIS, UNSPECIFIED CHRONICITY, UNSPECIFIED TRIGGER: ICD-10-CM

## 2017-10-10 DIAGNOSIS — G47.00 INSOMNIA, UNSPECIFIED TYPE: Primary | ICD-10-CM

## 2017-10-10 DIAGNOSIS — R60.9 FLUID RETENTION: ICD-10-CM

## 2017-10-10 DIAGNOSIS — N18.30 CKD (CHRONIC KIDNEY DISEASE) STAGE 3, GFR 30-59 ML/MIN (H): ICD-10-CM

## 2017-10-10 DIAGNOSIS — I15.1 HYPERTENSION SECONDARY TO OTHER RENAL DISORDERS: ICD-10-CM

## 2017-10-10 LAB
ANION GAP SERPL CALCULATED.3IONS-SCNC: 7 MMOL/L (ref 3–14)
BUN SERPL-MCNC: 21 MG/DL (ref 7–30)
CALCIUM SERPL-MCNC: 8.5 MG/DL (ref 8.5–10.1)
CHLORIDE SERPL-SCNC: 104 MMOL/L (ref 94–109)
CO2 SERPL-SCNC: 28 MMOL/L (ref 20–32)
CREAT SERPL-MCNC: 1.46 MG/DL (ref 0.52–1.04)
GFR SERPL CREATININE-BSD FRML MDRD: 36 ML/MIN/1.7M2
GLUCOSE SERPL-MCNC: 93 MG/DL (ref 70–99)
POTASSIUM SERPL-SCNC: 3.5 MMOL/L (ref 3.4–5.3)
SODIUM SERPL-SCNC: 139 MMOL/L (ref 133–144)

## 2017-10-10 PROCEDURE — 80048 BASIC METABOLIC PNL TOTAL CA: CPT | Performed by: INTERNAL MEDICINE

## 2017-10-10 PROCEDURE — 99606 MTMS BY PHARM EST 15 MIN: CPT | Performed by: PHARMACIST

## 2017-10-10 PROCEDURE — 36415 COLL VENOUS BLD VENIPUNCTURE: CPT | Performed by: INTERNAL MEDICINE

## 2017-10-10 NOTE — PATIENT INSTRUCTIONS
Recommendations from today's MTM visit:                                                    MTM (medication therapy management) is a service provided by a clinical pharmacist designed to help you get the most of out of your medicines.     1. You may try Melatonin 1 to 5 mg before bedtime. See if this improves your sleep. If you would like to increase your Trazodone, talk with the prescribing physician before hand.      Next MTM visit: 6-12 months    To schedule another MTM appointment, please call the clinic directly or you may call the MTM scheduling line at [phone] or toll-free at [phone].     My Clinical Pharmacist's contact information:                                                      It was a pleasure seeing you today!  Please feel free to contact me with any questions or concerns you have.      Venkata Engel, PharmD  MTM Pharmacist    Phone: [phone]     You may receive a survey about the MTM services you received.  I would appreciate your feedback to help me serve you better in the future. Please fill it out and return it when you can. Your comments will be anonymous.

## 2017-10-10 NOTE — MR AVS SNAPSHOT
After Visit Summary   10/10/2017    Phan Luque    MRN: [id number]           Patient Information     Date Of Birth          1955        Visit Information        Provider Department      10/10/2017 12:30 PM Venkata Engel, [facility] Medication Therapy Management        Today's Diagnoses     Insomnia, unspecified type    -  1    Seasonal allergic rhinitis, unspecified chronicity, unspecified trigger        Fatigue, unspecified type        Hypertension secondary to other renal disorders          Care Instructions    Recommendations from today's MTM visit:                                                    MTM (medication therapy management) is a service provided by a clinical pharmacist designed to help you get the most of out of your medicines.     1. You may try Melatonin 1 to 5 mg before bedtime. See if this improves your sleep. If you would like to increase your Trazodone, talk with the prescribing physician before hand.      Next MTM visit: 6-12 months    To schedule another MTM appointment, please call the clinic directly or you may call the MTM scheduling line at [phone] or toll-free at [phone].     My Clinical Pharmacist's contact information:                                                      It was a pleasure seeing you today!  Please feel free to contact me with any questions or concerns you have.      Venkata Engel, PharmD  MTM Pharmacist    Phone: [phone]     You may receive a survey about the MTM services you received.  I would appreciate your feedback to help me serve you better in the future. Please fill it out and return it when you can. Your comments will be anonymous.                Follow-ups after your visit        Your next 10 appointments already scheduled     Nov 20, 2017  3:30 PM CST   Lab with [facility] ([facility])    [address] [place] [address] [phone]             Nov 20, 2017  4:30 PM CST   (Arrive by 4:15 PM)   RETURN ENDOCRINE with Ruth Oakley MD   [facility] Endocrinology ([facility])    [address] [facility]  3rd [facility] [address] [phone]            Dec 05, 2017 10:00 AM CST   RETURN NEURO with Ambrose Ortega MD   Eye Clinic ([facility])    [address]  [address] [facility] [address]  9th Fl Clin 9a  [facility] [id number] [phone]            Dec 20, 2017  2:15 PM CST   Lab with  LAB    Health Lab ([facility])    [address] [facility] [address]  1st [facility] [address] [phone]            Dec 20, 2017  3:15 PM CST   (Arrive by 3:00 PM)   Return Liver Transplant with Hussein Banegas MD   [facility] Hepatology ([facility])    [address] [place] [address]  3rd [facility] [address] [phone]            Dec 20, 2017  4:30 PM CST   (Arrive by 4:15 PM)   Return Visit with DONOVAN Castellanos CNP   [facility] Gastroenterology and IBD Clinic ([facility])    [address] [facility] [address]  4th Floor  [facility] [address] [phone]            Mar 21, 2018  3:30 PM CDT   Lab with  LAB   [facility] Lab ([facility])    [address] [place] [address]  1st [facility] [address] [phone]            Mar 21, 2018  4:30 PM CDT   (Arrive by 4:00 PM)   Return Visit with Daya Gutierrez MD   [facility] Nephrology ([facility])    [address] [place] [address]  3rd [facility] [address] [phone]              Who to contact     If you have questions or need follow up information about today's clinic visit or your schedule please contact [facility] MEDICATION THERAPY MANAGEMENT directly at [phone].  Normal or non-critical lab and imaging results will be communicated to you by MyChart, letter or phone within 4 business days after the clinic has received the  results. If you do not hear from us within 7 days, please contact the clinic through [employer] or phone. If you have a critical or abnormal lab result, we will notify you by phone as soon as possible.  Submit refill requests through [employer] or call your pharmacy and they will forward the refill request to us. Please allow 3 business days for your refill to be completed.          Additional Information About Your Visit        [employer] Information     [employer] gives you secure access to your electronic health record. If you see a primary care provider, you can also send messages to your care team and make appointments. If you have questions, please call your primary care clinic.  If you do not have a primary care provider, please call [phone] and they will assist you.        Care EveryWhere ID     This is your Care EveryWhere ID. This could be used by other organizations to access your [facility] medical records  [id number]         Blood Pressure from Last 3 Encounters:   09/25/17 145/85   09/20/17 138/81   09/08/17 156/84    Weight from Last 3 Encounters:   09/20/17 176 lb (79.8 kg)   09/08/17 176 lb (79.8 kg)   08/14/17 176 lb 11.2 oz (80.2 kg)              Today, you had the following     No orders found for display         Today's Medication Changes          These changes are accurate as of: 10/10/17  1:01 PM.  If you have any questions, ask your nurse or doctor.               These medicines have changed or have updated prescriptions.        Dose/Directions    predniSONE 5 MG tablet   Commonly known as:  DELTASONE   This may have changed:  how much to take   Used for:  Liver transplanted (H)        Dose:  10 mg   Take 2 tablets (10 mg) by mouth daily   Quantity:  180 tablet   Refills:  3                Primary Care Provider Office Phone # Fax #    Santosh Salgado [phone]       [address] [place] [address]        Equal Access to Services     PAULO SAAVEDRA: Barby mejia  skyler Roberson, wajoaochyna mcfarlanecarla, qaliz kaaustin escobar, anna larsen castrobrenda conradfabio alexandre. So [facility] [phone].    ATENCIÓN: Si yodit lopez, tiene a reece disposición servicios gratuitos de asistencia lingüística. Rhiannon al [phone].    We comply with applicable federal civil rights laws and [place] laws. We do not discriminate on the basis of race, color, national origin, age, disability, sex, sexual orientation, or gender identity.            Thank you!     Thank you for choosing [facility] MEDICATION THERAPY MANAGEMENT  for your care. Our goal is always to provide you with excellent care. Hearing back from our patients is one way we can continue to improve our services. Please take a few minutes to complete the written survey that you may receive in the mail after your visit with us. Thank you!             Your Updated Medication List - Protect others around you: Learn how to safely use, store and throw away your medicines at www.disposemymeds.org.          This list is accurate as of: 10/10/17  1:01 PM.  Always use your most recent med list.                   Brand Name Dispense Instructions for use Diagnosis    acetaminophen 325 MG tablet    TYLENOL    100 tablet    Take 2 tablets (650 mg) by mouth every 6 hours as needed for mild pain Or fevers. Use sparingly. Limit use to no more than 2 grams (2000 mg) in 24 hours. **Further refills must be obtained by primary care provider**    Acute post-operative pain       amLODIPine 5 MG tablet    NORVASC    90 tablet    Take 1 tablet (5 mg) by mouth daily    Hypertension       azaTHIOprine 100 MG Tabs     90 tablet    Take 50 mg by mouth every evening    Liver transplanted (H)       betaxolol 0.5 % ophthalmic solution    BETOPTIC    3 mL    Place 1 drop into both eyes 2 times daily    Primary open angle glaucoma of both eyes, unspecified glaucoma stage       calcium Citrate-vitamin D 500-400 MG-UNIT Chew      Take 1 tablet by mouth daily         chlorthalidone 25 MG tablet    HYGROTON    15 tablet    Take 0.5 tablets (12.5 mg) by mouth daily    CKD (chronic kidney disease) stage 3, GFR 30-59 ml/min, Fluid retention       cyanocobalamin 1000 MCG Tabs     30 tablet    Take 1,000 mcg by mouth daily    Liver transplanted (H)       FISH OIL PO      Take 645 mg by mouth 2 times daily        folic acid 1 MG tablet    FOLVITE    30 tablet    Take 1 tablet (1 mg) by mouth daily    Malnutrition (H)       hydrOXYzine 25 MG tablet    ATARAX    60 tablet    Take 1-2 tablets (25-50 mg) by mouth every 6 hours as needed for itching    Itching       levothyroxine 88 MCG tablet    SYNTHROID/LEVOTHROID    30 tablet    Take 1 tablet (88 mcg) by mouth every morning (before breakfast)    Thyroid function test abnormal       loperamide 2 MG capsule    IMODIUM     Take 2 mg by mouth 4 times daily as needed for diarrhea Reported on 5/18/2017        magnesium oxide 400 (241.3 MG) MG tablet    MAG-OX    60 tablet    Take 1 tablet (400 mg) by mouth daily    CKD (chronic kidney disease) stage 3, GFR 30-59 ml/min, Hypomagnesemia       multivitamin, therapeutic Tabs tablet     30 tablet    Take 1 tablet by mouth every 24 hours    Malnutrition (H)       pantoprazole 40 MG EC tablet    PROTONIX    30 tablet    Take 1 tablet (40 mg) by mouth every morning (before breakfast)    Gastroesophageal reflux disease, esophagitis presence not specified       pramipexole 0.5 MG tablet    MIRAPEX    90 tablet    Take 1 tablet (0.5 mg) by mouth At Bedtime    Restless leg syndrome       predniSONE 5 MG tablet    DELTASONE    180 tablet    Take 2 tablets (10 mg) by mouth daily    Liver transplanted (H)       psyllium 0.52 G capsule     60 capsule    Take 2 capsules (1.04 g) by mouth daily With a full glass of water.    Loose stools       simethicone 80 MG chewable tablet    MYLICON    180 tablet    Take 1 tablet (80 mg) by mouth every 6 hours as needed for flatulence or cramping    Flatulence,  eructation, and gas pain       tacrolimus 0.5 MG capsule    GENERIC EQUIVALENT    240 capsule    Take 4 capsules (2 mg) by mouth 2 times daily    Liver transplanted (H)       traMADol 50 MG tablet    ULTRAM    50 tablet    Take 1-2 tablets ( mg) by mouth every 6 hours as needed for pain    Abdominal pain, epigastric       traZODone 100 MG tablet    DESYREL    30 tablet    Take 1 tablet (100 mg) by mouth At Bedtime **Further refills must be obtained by primary care provider**    Insomnia, unspecified type       ursodiol 300 MG capsule    ACTIGALL    60 capsule    Take 1 capsule (300 mg) by mouth 2 times daily    Liver transplanted (H)

## 2017-10-10 NOTE — PROGRESS NOTES
SUBJECTIVE/OBJECTIVE:                           Phan Luque is a 61 year old female coming in for a follow up visit for Medication Therapy Management.  She was referred to me from Dr. Gutierrez.     Chief Complaint: Fatigue.     Allergies/ADRs: Reviewed in Epic  Tobacco: No tobacco use  Alcohol: not currently using  Caffeine: 4 cups/day of coffee, cup of decaf at night  Activity: not a whoel lot.   PMH: Reviewed in Epic    Medication Adherence/Access  The patient misses their medication 1< times per month. She is responsible for his/her own medications.   Adherence/Compliance is described as good (90-99%).  Medication barriers: no issues reported by patient.  The patient fills general medications at  [facility].  This is going well.   Has the patient been offered to fill at [facility]? Yes    Fatigue: Pt is taking Vitamin B12 1mg daily. Pt states her fatigue started in the hospital after transplant. She gets home from work and waits until it gets dark, then falls asleep around 8 pm  Update 10/10/17: Pt has not noticed a change in fatigue despite medication changes.     Insomnia/ RLS: Current medications include: trazodone 150mg qHS, Mirapex 0.5mg qHS (she felt queasy after taking this, but tried it again and it has been going well. She is wondering if she can increase Trazodone to 200mg. Does not take naps, does not read in bed, does not watch tv in bed. She sleeps for about 1 hour after falling asleep, then has to get up to use the bathroom. She is getting around 5-6 hours of sleep per night according her fit bit. Goes to bed around 8 and gets up around 5 AM. PT tried Melatonin 3mg  In hospital, did not do much there, but then again she has a lot more trouble sleeping in the hospital. Patient has been holding her coffee for now.   Update 10/10/17: pt cut out all coffee, has been drinking herbal teas in its place. Insomnia has not improved or worsened at this point.    Pruritis/ Allergies: Pt is take Loratadine 10mg  daily. She tried to stop this because did not help itching, but her allergies acted up. These included Sneezing ,Runny eyes, congestion, sore throat. Pt has used Allegra back in the day, does not remember if it was effective. Her itching is mostly in her lower extremities.   Update 10/10/17: Pt stopped Hydroxyzine last visit, is now taking Loratadine without issues. Does not think stopping Hydroxyzine improved energy. She has symptoms of sneezing, coughing, and congestion while on Loratadine.     Hypertension/ CKD: Current medications include Amlodipine 5mg QPM, Chlorthalidone 12.5mg daily. She was dosing this in the morning, but Dr. Gutierrez moved it to the evening to provide BP control overnight. She has been taking this at suppertime.  Patient does self-monitor BP. Home BP monitoring in range of 127/78 this morning.  Patient reports the following medication side effects: fatigue.  Update: 10/10/17: pt moved Amlodipine to the evening, it had no effect on her Fatigue. Dr. Gutierrez started Chlorthalidone, pt is wondering if she should take this in the morning or evening.    Current labs include:  BP Readings from Last 3 Encounters:   09/25/17 145/85   09/20/17 138/81   09/08/17 156/84     Today's Vitals: There were no vitals taken for this visit.  No results found for: A1C.  Lab Results   Component Value Date    CHOL  08/06/2016     Interfering substances, unable to perform test  LUPE RIOJAS NOTIFIED ON 6B,8/6/2016,0926,MJ       Lab Results   Component Value Date    TRIG  08/06/2016     Interfering substances, unable to perform test  LUPE RIOJAS NOTIFIED ON 6B,8/6/2016,0926,MJ       Lab Results   Component Value Date    HDL 9 08/06/2016     Lab Results   Component Value Date     02/23/2017    LDL 13 08/06/2016       Liver Function Studies -   Recent Labs   Lab Test  09/20/17   1211   PROTTOTAL  7.2   ALBUMIN  3.6   BILITOTAL  0.7   ALKPHOS  56   AST  12   ALT  16       Lab Results   Component Value Date     UCRR 64 09/20/2017       Last Basic Metabolic Panel:  Lab Results   Component Value Date     09/20/2017      Lab Results   Component Value Date    POTASSIUM 4.0 09/20/2017     Lab Results   Component Value Date    CHLORIDE 106 09/20/2017     Lab Results   Component Value Date    BUN 11 09/20/2017     Lab Results   Component Value Date    CR 1.22 09/20/2017     GFR Estimate   Date Value Ref Range Status   09/20/2017 45 (L) >60 mL/min/1.7m2 Final     Comment:     Non  GFR Calc   09/08/2017 40 (L) >60 mL/min/1.7m2 Final     Comment:     Non  GFR Calc   08/11/2017 43 (L) >60 mL/min/1.7m2 Final     Comment:     Non  GFR Calc     TSH   Date Value Ref Range Status   09/08/2017 2.74 0.40 - 4.00 mU/L Final     Most Recent Immunizations   Administered Date(s) Administered     HEPA 01/13/2009     Influenza Vaccine IM 3yrs+ 4 Valent IIV4 10/08/2016     Mantoux 10/06/2016     Pneumococcal (PCV 13) 08/19/2016     TD (ADULT, 7+) 01/21/2004     TDAP Vaccine (Adacel) 08/18/2016       ASSESSMENT:                             Current medications were reviewed today.     Medication Adherence: no issues identified.     Fatigue: Med changes had little to no effect on energy. Fatigue could be related to patient's insomnia or her chronic diseases (ie. Liver transplant, CKD etc). Last HGB was WNL. No medication changes recommended at this time.    Insomnia/ RLS: Needs improvement. Changes did not affect her sleep. She may try Melatonin or increase Trazodone dose (after speaking with prescribing MD).    Pruritis/ Allergies: Stable. Pt wants to finish her Loratadine bottles off. In the future she may try Zyrtec and Allegra for her allergy symptoms.     Hypertension/ CKD: Stable. Changes did not effect energy/fatigue. Last BP not at goal, but started on chlorthalidone as well.      PLAN:                            Pt to...  1. Try Melatonin or speak with MD to increase Trazodone.     I spent  15 minutes with this patient today. All changes were made via verbal approval with Dr. Gutierrez/ Dr. Banegas. A copy of the visit note was provided to the patient's specialty care provider.    Will follow up in 6-12 months.    The patient was sent via myC a summary of these recommendations as an after visit summary.     Etienne MondragonD  [facility] Pharmacist    Phone: [phone]

## 2017-10-16 DIAGNOSIS — R60.9 FLUID RETENTION: ICD-10-CM

## 2017-10-16 DIAGNOSIS — N18.30 CKD (CHRONIC KIDNEY DISEASE) STAGE 3, GFR 30-59 ML/MIN (H): ICD-10-CM

## 2017-10-16 RX ORDER — CHLORTHALIDONE 25 MG/1
12.5 TABLET ORAL DAILY
Qty: 15 TABLET | Refills: 0 | Status: SHIPPED | OUTPATIENT
Start: 2017-10-16 | End: 2017-11-21

## 2017-10-16 NOTE — TELEPHONE ENCOUNTER
Drug Name: chlorthalidone 25mg  Last Fill Date: 9/27/17  Quantity: 15    Adalgisa Church   [facility]  [phone]

## 2017-10-18 ENCOUNTER — COMMUNICATION - HEALTHEAST (OUTPATIENT)
Dept: INTERNAL MEDICINE | Facility: CLINIC | Age: 62
End: 2017-10-18

## 2017-10-18 DIAGNOSIS — R52 PAIN: ICD-10-CM

## 2017-10-19 ENCOUNTER — HOSPITAL ENCOUNTER (EMERGENCY)
Facility: CLINIC | Age: 62
Discharge: HOME | End: 2017-10-19
Attending: EMERGENCY MEDICINE | Admitting: EMERGENCY MEDICINE
Payer: COMMERCIAL

## 2017-10-19 ENCOUNTER — RECORDS - HEALTHEAST (OUTPATIENT)
Dept: ADMINISTRATIVE | Facility: OTHER | Age: 62
End: 2017-10-19

## 2017-10-19 VITALS
RESPIRATION RATE: 16 BRPM | TEMPERATURE: 98.2 F | SYSTOLIC BLOOD PRESSURE: 144 MMHG | OXYGEN SATURATION: 99 % | DIASTOLIC BLOOD PRESSURE: 89 MMHG
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
## 2017-10-19 DIAGNOSIS — M54.9 DORSALGIA: ICD-10-CM

## 2017-10-19 DIAGNOSIS — Z94.4 LIVER REPLACED BY TRANSPLANT (H): ICD-10-CM

## 2017-10-19 PROCEDURE — 93308 TTE F-UP OR LMTD: CPT | Performed by: EMERGENCY MEDICINE

## 2017-10-19 PROCEDURE — 93308 TTE F-UP OR LMTD: CPT | Mod: 26 | Performed by: EMERGENCY MEDICINE

## 2017-10-19 PROCEDURE — 76705 ECHO EXAM OF ABDOMEN: CPT | Mod: 26 | Performed by: EMERGENCY MEDICINE

## 2017-10-19 PROCEDURE — 99284 EMERGENCY DEPT VISIT MOD MDM: CPT | Mod: 25 | Performed by: EMERGENCY MEDICINE

## 2017-10-19 PROCEDURE — 76705 ECHO EXAM OF ABDOMEN: CPT | Performed by: EMERGENCY MEDICINE
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
## 2017-10-20 ENCOUNTER — RECORDS - HEALTHEAST (OUTPATIENT)
Dept: ADMINISTRATIVE | Facility: OTHER | Age: 62
End: 2017-10-20

## 2017-10-23 ENCOUNTER — COMMUNICATION - HEALTHEAST (OUTPATIENT)
Dept: INTERNAL MEDICINE | Facility: CLINIC | Age: 62
End: 2017-10-23

## 2017-10-23 ENCOUNTER — OFFICE VISIT - HEALTHEAST (OUTPATIENT)
Dept: INTERNAL MEDICINE | Facility: CLINIC | Age: 62
End: 2017-10-23

## 2017-10-23 DIAGNOSIS — M54.2 NECK PAIN: ICD-10-CM

## 2017-10-25 ENCOUNTER — HOSPITAL ENCOUNTER (EMERGENCY)
Facility: CLINIC | Age: 62
Discharge: HOME | End: 2017-10-25
Attending: EMERGENCY MEDICINE | Admitting: EMERGENCY MEDICINE
Payer: COMMERCIAL

## 2017-10-25 ENCOUNTER — APPOINTMENT (OUTPATIENT)
Dept: GENERAL RADIOLOGY | Facility: CLINIC | Age: 62
End: 2017-10-25
Attending: EMERGENCY MEDICINE
Payer: COMMERCIAL

## 2017-10-25 ENCOUNTER — RECORDS - HEALTHEAST (OUTPATIENT)
Dept: ADMINISTRATIVE | Facility: OTHER | Age: 62
End: 2017-10-25

## 2017-10-25 VITALS
DIASTOLIC BLOOD PRESSURE: 80 MMHG | WEIGHT: 180 LBS | HEART RATE: 99 BPM | OXYGEN SATURATION: 98 % | RESPIRATION RATE: 16 BRPM | TEMPERATURE: 97.8 F | BODY MASS INDEX: 28.25 KG/M2 | SYSTOLIC BLOOD PRESSURE: 158 MMHG | HEIGHT: 67 IN

## 2017-10-25 DIAGNOSIS — S13.9XXA NECK SPRAIN, INITIAL ENCOUNTER: ICD-10-CM
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
## 2017-10-25 DIAGNOSIS — S29.012A UPPER BACK STRAIN, INITIAL ENCOUNTER: ICD-10-CM
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
## 2017-10-25 PROCEDURE — 99284 EMERGENCY DEPT VISIT MOD MDM: CPT | Mod: Z6 | Performed by: EMERGENCY MEDICINE

## 2017-10-25 PROCEDURE — 99283 EMERGENCY DEPT VISIT LOW MDM: CPT | Mod: 25 | Performed by: EMERGENCY MEDICINE

## 2017-10-25 PROCEDURE — 71020 XR CHEST 2 VW: CPT

## 2017-10-25 RX ORDER — NAPROXEN 500 MG/1
500 TABLET ORAL 2 TIMES DAILY WITH MEALS
Qty: 16 TABLET | Refills: 0 | Status: SHIPPED | OUTPATIENT
Start: 2017-10-25 | End: 2017-11-02

## 2017-10-25 RX ORDER — TRAMADOL HYDROCHLORIDE 50 MG/1
50-100 TABLET ORAL EVERY 6 HOURS PRN
Qty: 15 TABLET | Refills: 0 | Status: SHIPPED | OUTPATIENT
Start: 2017-10-25 | End: 2018-04-02

## 2017-10-25 ASSESSMENT — ENCOUNTER SYMPTOMS
COLOR CHANGE: 0
CHILLS: 0
LIGHT-HEADEDNESS: 0
NECK STIFFNESS: 0
COUGH: 0
SHORTNESS OF BREATH: 0
AGITATION: 0
VOMITING: 0
DIFFICULTY URINATING: 0
ABDOMINAL PAIN: 0
ADENOPATHY: 0
BACK PAIN: 1
BRUISES/BLEEDS EASILY: 0
FEVER: 0
NAUSEA: 0
POLYDIPSIA: 0
NECK PAIN: 1
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
## 2017-10-25 NOTE — ED AVS SNAPSHOT
[facility], [place], Emergency Department    [address] [place] [address]    Phone:  [phone]                                       Phan Luque   MRN: [id number]    Department:  [facility], Emergency Department   Date of Visit:  10/25/2017           After Visit Summary Signature Page     I have received my discharge instructions, and my questions have been answered. I have discussed any challenges I see with this plan with the nurse or doctor.    ..........................................................................................................................................  Patient/Patient Representative Signature      ..........................................................................................................................................  Patient Representative Print Name and Relationship to Patient    ..................................................               ................................................  Date                                            Time    ..........................................................................................................................................  Reviewed by Signature/Title    ...................................................              ..............................................  Date                                                            Time
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
## 2017-10-25 NOTE — LETTER
To Whom it may concern:      Phan Luque was seen in our Emergency Department today, 10/25/17. She may return to work on October 230, 2017.    Sincerely,        Zoran Miranda MD
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
## 2017-10-28 ENCOUNTER — TRANSFERRED RECORDS (OUTPATIENT)
Dept: HEALTH INFORMATION MANAGEMENT | Facility: CLINIC | Age: 62
End: 2017-10-28

## 2017-11-03 ENCOUNTER — MYC MEDICAL ADVICE (OUTPATIENT)
Dept: ENDOCRINOLOGY | Facility: CLINIC | Age: 62
End: 2017-11-03

## 2017-11-03 ENCOUNTER — COMMUNICATION - HEALTHEAST (OUTPATIENT)
Dept: INTERNAL MEDICINE | Facility: CLINIC | Age: 62
End: 2017-11-03

## 2017-11-03 DIAGNOSIS — Z94.4 LIVER TRANSPLANTED (H): ICD-10-CM

## 2017-11-03 DIAGNOSIS — K21.9 GASTROESOPHAGEAL REFLUX DISEASE, ESOPHAGITIS PRESENCE NOT SPECIFIED: ICD-10-CM

## 2017-11-03 RX ORDER — PANTOPRAZOLE SODIUM 40 MG/1
40 TABLET, DELAYED RELEASE ORAL
Qty: 30 TABLET | Refills: 11 | Status: CANCELLED | OUTPATIENT
Start: 2017-11-03

## 2017-11-03 RX ORDER — URSODIOL 300 MG/1
300 CAPSULE ORAL 2 TIMES DAILY
Qty: 60 CAPSULE | Refills: 11 | Status: SHIPPED | OUTPATIENT
Start: 2017-11-03 | End: 2018-10-25

## 2017-11-03 NOTE — TELEPHONE ENCOUNTER
Drug: Ursodiol  Last Fill Date: 8/29/2017  Quantity: 60        Drug: Pantoprazole  Last Fill Date: 8/29/2017  Quantity: 30

## 2017-11-06 ENCOUNTER — TELEPHONE (OUTPATIENT)
Dept: TRANSPLANT | Facility: CLINIC | Age: 62
End: 2017-11-06

## 2017-11-06 NOTE — TELEPHONE ENCOUNTER
Patient called  Pt needs some documentation sent to her for her Critical Insurance Policy.  Patient will explain what she needs

## 2017-11-07 RX ORDER — PANTOPRAZOLE SODIUM 40 MG/1
40 TABLET, DELAYED RELEASE ORAL
Qty: 90 TABLET | Refills: 4 | Status: SHIPPED | OUTPATIENT
Start: 2017-11-07 | End: 2019-01-25

## 2017-11-09 ENCOUNTER — RECORDS - HEALTHEAST (OUTPATIENT)
Dept: ADMINISTRATIVE | Facility: OTHER | Age: 62
End: 2017-11-09

## 2017-11-09 ENCOUNTER — TRANSFERRED RECORDS (OUTPATIENT)
Dept: HEALTH INFORMATION MANAGEMENT | Facility: CLINIC | Age: 62
End: 2017-11-09

## 2017-11-12 ENCOUNTER — COMMUNICATION - HEALTHEAST (OUTPATIENT)
Dept: INTERNAL MEDICINE | Facility: CLINIC | Age: 62
End: 2017-11-12

## 2017-11-12 DIAGNOSIS — R52 PAIN: ICD-10-CM

## 2017-11-17 ASSESSMENT — ENCOUNTER SYMPTOMS
BLOOD IN STOOL: 0
DIARRHEA: 1
EYE IRRITATION: 1
SMELL DISTURBANCE: 0
EYE PAIN: 0
EYE WATERING: 1
HEARTBURN: 0
MYALGIAS: 1
JAUNDICE: 0
SKIN CHANGES: 0
EYE REDNESS: 1
NECK PAIN: 1
HALLUCINATIONS: 0
CONSTIPATION: 1
VOMITING: 0
JOINT SWELLING: 0
TASTE DISTURBANCE: 0
RECTAL PAIN: 0
WEIGHT LOSS: 0
STIFFNESS: 0
NIGHT SWEATS: 0
SINUS CONGESTION: 1
MUSCLE CRAMPS: 1
INCREASED ENERGY: 1
ARTHRALGIAS: 1
NERVOUS/ANXIOUS: 1
DECREASED CONCENTRATION: 1
MUSCLE WEAKNESS: 0
HOARSE VOICE: 1
POLYPHAGIA: 0
POLYDIPSIA: 0
ALTERED TEMPERATURE REGULATION: 1
FEVER: 0
BOWEL INCONTINENCE: 0
NAUSEA: 0
INSOMNIA: 1
SORE THROAT: 1
TROUBLE SWALLOWING: 0
ABDOMINAL PAIN: 1
WEIGHT GAIN: 1
DOUBLE VISION: 0
BACK PAIN: 0
DECREASED APPETITE: 0
NECK MASS: 0
FATIGUE: 1
POOR WOUND HEALING: 0
NAIL CHANGES: 0
PANIC: 1
CHILLS: 0
DEPRESSION: 0
BLOATING: 1
SINUS PAIN: 1

## 2017-11-20 ENCOUNTER — OFFICE VISIT (OUTPATIENT)
Dept: ENDOCRINOLOGY | Facility: CLINIC | Age: 62
End: 2017-11-20

## 2017-11-20 ENCOUNTER — RECORDS - HEALTHEAST (OUTPATIENT)
Dept: ADMINISTRATIVE | Facility: OTHER | Age: 62
End: 2017-11-20

## 2017-11-20 VITALS
DIASTOLIC BLOOD PRESSURE: 79 MMHG | HEIGHT: 67 IN | HEART RATE: 93 BPM | SYSTOLIC BLOOD PRESSURE: 137 MMHG | BODY MASS INDEX: 30.1 KG/M2 | WEIGHT: 191.8 LBS

## 2017-11-20 DIAGNOSIS — E03.9 HYPOTHYROIDISM, UNSPECIFIED TYPE: ICD-10-CM

## 2017-11-20 DIAGNOSIS — Z94.4 LIVER REPLACED BY TRANSPLANT (H): ICD-10-CM

## 2017-11-20 DIAGNOSIS — M85.80 OSTEOPENIA, UNSPECIFIED LOCATION: Primary | ICD-10-CM

## 2017-11-20 LAB
ALBUMIN SERPL-MCNC: 3.6 G/DL (ref 3.4–5)
ALP SERPL-CCNC: 41 U/L (ref 40–150)
ALT SERPL W P-5'-P-CCNC: 20 U/L (ref 0–50)
ANION GAP SERPL CALCULATED.3IONS-SCNC: 9 MMOL/L (ref 3–14)
AST SERPL W P-5'-P-CCNC: 10 U/L (ref 0–45)
BILIRUB DIRECT SERPL-MCNC: 0.1 MG/DL (ref 0–0.2)
BILIRUB SERPL-MCNC: 0.6 MG/DL (ref 0.2–1.3)
BUN SERPL-MCNC: 19 MG/DL (ref 7–30)
CALCIUM SERPL-MCNC: 8.8 MG/DL (ref 8.5–10.1)
CHLORIDE SERPL-SCNC: 104 MMOL/L (ref 94–109)
CO2 SERPL-SCNC: 26 MMOL/L (ref 20–32)
CREAT SERPL-MCNC: 1.23 MG/DL (ref 0.52–1.04)
ERYTHROCYTE [DISTWIDTH] IN BLOOD BY AUTOMATED COUNT: 13.5 % (ref 10–15)
GFR SERPL CREATININE-BSD FRML MDRD: 44 ML/MIN/1.7M2
GLUCOSE SERPL-MCNC: 99 MG/DL (ref 70–99)
HCT VFR BLD AUTO: 38.8 % (ref 35–47)
HGB BLD-MCNC: 12.9 G/DL (ref 11.7–15.7)
MAGNESIUM SERPL-MCNC: 1.9 MG/DL (ref 1.6–2.3)
MCH RBC QN AUTO: 33.7 PG (ref 26.5–33)
MCHC RBC AUTO-ENTMCNC: 33.2 G/DL (ref 31.5–36.5)
MCV RBC AUTO: 101 FL (ref 78–100)
PLATELET # BLD AUTO: 280 10E9/L (ref 150–450)
POTASSIUM SERPL-SCNC: 3.5 MMOL/L (ref 3.4–5.3)
PROT SERPL-MCNC: 7 G/DL (ref 6.8–8.8)
RBC # BLD AUTO: 3.83 10E12/L (ref 3.8–5.2)
SODIUM SERPL-SCNC: 139 MMOL/L (ref 133–144)
WBC # BLD AUTO: 7.1 10E9/L (ref 4–11)

## 2017-11-20 PROCEDURE — 80197 ASSAY OF TACROLIMUS: CPT | Performed by: INTERNAL MEDICINE

## 2017-11-20 PROCEDURE — 36415 COLL VENOUS BLD VENIPUNCTURE: CPT | Performed by: INTERNAL MEDICINE

## 2017-11-20 PROCEDURE — 80076 HEPATIC FUNCTION PANEL: CPT | Performed by: INTERNAL MEDICINE

## 2017-11-20 PROCEDURE — 80048 BASIC METABOLIC PNL TOTAL CA: CPT | Performed by: INTERNAL MEDICINE

## 2017-11-20 PROCEDURE — 83735 ASSAY OF MAGNESIUM: CPT | Performed by: INTERNAL MEDICINE

## 2017-11-20 PROCEDURE — 85027 COMPLETE CBC AUTOMATED: CPT | Performed by: INTERNAL MEDICINE

## 2017-11-20 ASSESSMENT — PAIN SCALES - GENERAL: PAINLEVEL: NO PAIN (0)

## 2017-11-20 NOTE — MR AVS SNAPSHOT
After Visit Summary   11/20/2017    Phan Luque    MRN: [id number]           Patient Information     Date Of Birth          1955        Visit Information        Provider Department      11/20/2017 4:30 PM Ruth Oakley MD [facility]        Today's Diagnoses     Osteopenia, unspecified location    -  1    Hypothyroidism, unspecified type          Care Instructions    No changes in medications,     Total Vitamin D 2000 IU per day. Please check all the supplements.     Repeat labs in 6 months a week before next visit.           Follow-ups after your visit        Follow-up notes from your care team     Return in about 6 months (around 5/20/2018).      Your next 10 appointments already scheduled     Dec 05, 2017 10:00 AM CST   RETURN NEURO with Ambrose Ortega MD   Eye Clinic ([facility])    [facility]  [address] [place] [address]  9[facility] [address] [place] [id number]   [phone]            Dec 20, 2017  2:15 PM CST   Lab with  LAB   [facility] ([facility])    [address] [place] [address]  [address] [facility] [address]   [phone]            Dec 20, 2017  3:15 PM CST   (Arrive by 3:00 PM)   Return Liver Transplant with Hussein Banegas MD   [facility] Hepatology ([facility])    [address] [place] [address]  [address] [facility] [address]   [phone]            Dec 20, 2017  4:30 PM CST   (Arrive by 4:15 PM)   Return Visit with DONOVAN Castellanos [facility] ([facility])    [address] [place] [address]  [address] [facility] [address]   [phone]            Dec 28, 2017  9:55 AM CST   (Arrive by 9:40 AM)   New Patient Visit with Arlyn Sanchez MD   [facility] ([facility])    [address] [place] [id number]   [phone]            Mar 21, 2018  3:30 PM CDT    Lab with  LAB   [facility] ([facility])    [address]  [address] [facility] [id number] [phone]            Mar 21, 2018  4:30 PM CDT   (Arrive by 4:00 PM)   Return Visit with Daya Gutierrez MD   [facility] Nephrology ([facility])    [address]  [address] [facility] [id number] [phone]            May 21, 2018  3:00 PM CDT   (Arrive by 2:45 PM)   RETURN ENDOCRINE with Ruth [place] MD   [facility] Endocrinology ([facility])    [address]  [address] [facility] [address] [phone]              Future tests that were ordered for you today     Open Future Orders        Priority Expected Expires Ordered    TSH Routine 5/20/2018 11/20/2018 11/20/2017    T4 free Routine 5/20/2018 11/20/2018 11/20/2017    Parathyroid Hormone Intact Routine 5/20/2018 11/20/2018 11/20/2017    Calcium Routine 5/20/2018 11/20/2018 11/20/2017    Phosphorus Routine 5/20/2018 11/20/2018 11/20/2017    Albumin level Routine 5/20/2018 11/20/2018 11/20/2017    PTH Related Peptide Test Routine 5/20/2018 11/20/2018 11/20/2017    Creatinine Routine 5/20/2018 11/20/2018 11/20/2017            Who to contact     Please call your clinic at [phone] to:    Ask questions about your health    Make or cancel appointments    Discuss your medicines    Learn about your test results    Speak to your doctor   If you have compliments or concerns about an experience at your clinic, or if you wish to file a complaint, please contact [facility] Patient Relations at [phone] or email us at [email or web address]         Additional Information About Your Visit        MyChart Information     [employer] gives you secure access to your electronic health record. If you see a primary care provider, you can also send messages to your care team and make appointments. If you have  "questions, please call your primary care clinic.  If you do not have a primary care provider, please call [phone] and they will assist you.      [employer] is an electronic gateway that provides easy, online access to your medical records. With [employer], you can request a clinic appointment, read your test results, renew a prescription or communicate with your care team.     To access your existing account, please contact your [facility] or call [phone] for assistance.        Care EveryWhere ID     This is your Care EveryWhere ID. This could be used by other organizations to access your [facility] medical records  [id number]        Your Vitals Were     Pulse Height BMI (Body Mass Index)             93 1.702 m (5' 7\") 30.04 kg/m2          Blood Pressure from Last 3 Encounters:   11/20/17 137/79   10/25/17 158/80   10/19/17 144/89    Weight from Last 3 Encounters:   11/20/17 87 kg (191 lb 12.8 oz)   10/25/17 81.6 kg (180 lb)   09/20/17 79.8 kg (176 lb)                 Today's Medication Changes          These changes are accurate as of: 11/20/17  5:05 PM.  If you have any questions, ask your nurse or doctor.               These medicines have changed or have updated prescriptions.        Dose/Directions    predniSONE 5 MG tablet   Commonly known as:  DELTASONE   This may have changed:  how much to take   Used for:  Liver transplanted (H)        Dose:  10 mg   Take 2 tablets (10 mg) by mouth daily   Quantity:  180 tablet   Refills:  3                Primary Care Provider Office Phone # Fax #    Santosh KERI Salgado [phone]       [address]   [facility] [address]        Equal Access to Services     ALLY BERRY : Hadangelo Roberson, lucille orellana, qaybanna claudio. So [facility] [phone].    ATENCIÓN: Si habla español, tiene a reece disposición servicios gratuitos de asistencia lingüística. " Rhiannon hernandez [phone].    We comply with applicable federal civil rights laws and [place] laws. We do not discriminate on the basis of race, color, national origin, age, disability, sex, sexual orientation, or gender identity.            Thank you!     Thank you for choosing [facility]  for your care. Our goal is always to provide you with excellent care. Hearing back from our patients is one way we can continue to improve our services. Please take a few minutes to complete the written survey that you may receive in the mail after your visit with us. Thank you!             Your Updated Medication List - Protect others around you: Learn how to safely use, store and throw away your medicines at www.disposemymeds.org.          This list is accurate as of: 11/20/17  5:05 PM.  Always use your most recent med list.                   Brand Name Dispense Instructions for use Diagnosis    acetaminophen 325 MG tablet    TYLENOL    100 tablet    Take 2 tablets (650 mg) by mouth every 6 hours as needed for mild pain Or fevers. Use sparingly. Limit use to no more than 2 grams (2000 mg) in 24 hours. **Further refills must be obtained by primary care provider**    Acute post-operative pain       amLODIPine 5 MG tablet    NORVASC    90 tablet    Take 1 tablet (5 mg) by mouth daily    Hypertension       azaTHIOprine 100 MG Tabs     90 tablet    Take 50 mg by mouth every evening    Liver transplanted (H)       betaxolol 0.5 % ophthalmic solution    BETOPTIC    3 mL    Place 1 drop into both eyes 2 times daily    Primary open angle glaucoma of both eyes, unspecified glaucoma stage       calcium Citrate-vitamin D 500-400 MG-UNIT Chew      Take 1 tablet by mouth daily        chlorthalidone 25 MG tablet    HYGROTON    15 tablet    Take 0.5 tablets (12.5 mg) by mouth daily    CKD (chronic kidney disease) stage 3, GFR 30-59 ml/min, Fluid retention       cyanocobalamin 1000 MCG Tabs     30 tablet    Take 1,000 mcg by mouth  daily    Liver transplanted (H)       FISH OIL PO      Take 645 mg by mouth 2 times daily        folic acid 1 MG tablet    FOLVITE    30 tablet    Take 1 tablet (1 mg) by mouth daily    Malnutrition (H)       levothyroxine 88 MCG tablet    SYNTHROID/LEVOTHROID    30 tablet    Take 1 tablet (88 mcg) by mouth every morning (before breakfast)    Thyroid function test abnormal       loperamide 2 MG capsule    IMODIUM     Take 2 mg by mouth 4 times daily as needed for diarrhea Reported on 5/18/2017        magnesium oxide 400 (241.3 MG) MG tablet    MAG-OX    60 tablet    Take 1 tablet (400 mg) by mouth daily    CKD (chronic kidney disease) stage 3, GFR 30-59 ml/min, Hypomagnesemia       METHYLPREDNISOLONE PO           multivitamin, therapeutic Tabs tablet     30 tablet    Take 1 tablet by mouth every 24 hours    Malnutrition (H)       pantoprazole 40 MG EC tablet    PROTONIX    90 tablet    Take 1 tablet (40 mg) by mouth every morning (before breakfast)    Gastroesophageal reflux disease, esophagitis presence not specified       pramipexole 0.5 MG tablet    MIRAPEX    90 tablet    Take 1 tablet (0.5 mg) by mouth At Bedtime    Restless leg syndrome       predniSONE 5 MG tablet    DELTASONE    180 tablet    Take 2 tablets (10 mg) by mouth daily    Liver transplanted (H)       psyllium 0.52 G capsule     60 capsule    Take 2 capsules (1.04 g) by mouth daily With a full glass of water.    Loose stools       simethicone 80 MG chewable tablet    MYLICON    180 tablet    Take 1 tablet (80 mg) by mouth every 6 hours as needed for flatulence or cramping    Flatulence, eructation, and gas pain       tacrolimus 0.5 MG capsule    GENERIC EQUIVALENT    240 capsule    Take 4 capsules (2 mg) by mouth 2 times daily    Liver transplanted (H)       TIZANIDINE HCL PO      Take 4 mg by mouth At Bedtime        traMADol 50 MG tablet    ULTRAM    15 tablet    Take 1-2 tablets ( mg) by mouth every 6 hours as needed for pain         traZODone 100 MG tablet    DESYREL    30 tablet    Take 1 tablet (100 mg) by mouth At Bedtime **Further refills must be obtained by primary care provider**    Insomnia, unspecified type       ursodiol 300 MG capsule    ACTIGALL    60 capsule    Take 1 capsule (300 mg) by mouth 2 times daily    Liver transplanted (H)
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
## 2017-11-20 NOTE — PATIENT INSTRUCTIONS
No changes in medications,     Total Vitamin D 2000 IU per day. Please check all the supplements.     Repeat labs in 6 months a week before next visit.
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
## 2017-11-21 ENCOUNTER — TELEPHONE (OUTPATIENT)
Dept: NEPHROLOGY | Facility: CLINIC | Age: 62
End: 2017-11-21

## 2017-11-21 DIAGNOSIS — N18.30 CKD (CHRONIC KIDNEY DISEASE) STAGE 3, GFR 30-59 ML/MIN (H): ICD-10-CM

## 2017-11-21 DIAGNOSIS — R60.9 FLUID RETENTION: ICD-10-CM

## 2017-11-21 LAB
TACROLIMUS BLD-MCNC: 3.7 UG/L (ref 5–15)
TME LAST DOSE: ABNORMAL H

## 2017-11-21 RX ORDER — CHLORTHALIDONE 25 MG/1
12.5 TABLET ORAL DAILY
Qty: 45 TABLET | Refills: 3 | Status: SHIPPED | OUTPATIENT
Start: 2017-11-21 | End: 2018-10-25

## 2017-11-24 DIAGNOSIS — H40.1130 PRIMARY OPEN ANGLE GLAUCOMA OF BOTH EYES, UNSPECIFIED GLAUCOMA STAGE: ICD-10-CM

## 2017-11-27 RX ORDER — BETAXOLOL HYDROCHLORIDE 5 MG/ML
1 SOLUTION/ DROPS OPHTHALMIC 2 TIMES DAILY
Qty: 5 ML | Refills: 2 | Status: SHIPPED | OUTPATIENT
Start: 2017-11-27 | End: 2019-04-25

## 2017-11-27 NOTE — TELEPHONE ENCOUNTER
Last Written Prescription Date:  8/22/17  Last Fill Quantity: 3ML,   # refills: 2  Last Office Visit : 4/20/17  Future Office visit:  12/15/17    Routing refill request to provider for review/approval because: RF / CONT. MED NOT IN LAST VISIT NOTE

## 2017-12-03 ENCOUNTER — COMMUNICATION - HEALTHEAST (OUTPATIENT)
Dept: INTERNAL MEDICINE | Facility: CLINIC | Age: 62
End: 2017-12-03

## 2017-12-05 ENCOUNTER — OFFICE VISIT (OUTPATIENT)
Dept: OPHTHALMOLOGY | Facility: CLINIC | Age: 62
End: 2017-12-05
Attending: OPHTHALMOLOGY
Payer: COMMERCIAL

## 2017-12-05 DIAGNOSIS — H47.012 ACUTE ISCHEMIC OPTIC NEUROPATHY OF LEFT EYE: Primary | ICD-10-CM

## 2017-12-05 DIAGNOSIS — H53.10 SUBJECTIVE VISUAL DISTURBANCE: Primary | ICD-10-CM

## 2017-12-05 DIAGNOSIS — H53.10 SUBJECTIVE VISUAL DISTURBANCE: ICD-10-CM

## 2017-12-05 PROCEDURE — 99212 OFFICE O/P EST SF 10 MIN: CPT | Mod: ZF | Performed by: TECHNICIAN/TECHNOLOGIST

## 2017-12-05 PROCEDURE — 92083 EXTENDED VISUAL FIELD XM: CPT | Mod: ZF | Performed by: OPHTHALMOLOGY

## 2017-12-05 ASSESSMENT — TONOMETRY
IOP_METHOD: ICARE
OD_IOP_MMHG: 17
OS_IOP_MMHG: 19

## 2017-12-05 ASSESSMENT — CUP TO DISC RATIO
OD_RATIO: 0.25
OS_RATIO: 0.55

## 2017-12-05 ASSESSMENT — CONF VISUAL FIELD
OS_INFERIOR_TEMPORAL_RESTRICTION: 1
METHOD: COUNTING FINGERS
OS_SUPERIOR_TEMPORAL_RESTRICTION: 3
OS_INFERIOR_NASAL_RESTRICTION: 1
OD_NORMAL: 1

## 2017-12-05 ASSESSMENT — SLIT LAMP EXAM - LIDS
COMMENTS: NORMAL
COMMENTS: NORMAL

## 2017-12-05 ASSESSMENT — EXTERNAL EXAM - LEFT EYE: OS_EXAM: NORMAL

## 2017-12-05 ASSESSMENT — VISUAL ACUITY
OD_SC: 20/20
METHOD: SNELLEN - LINEAR
OD_SC+: -1
OS_SC+: -1
OS_SC: 20/80

## 2017-12-05 ASSESSMENT — EXTERNAL EXAM - RIGHT EYE: OD_EXAM: NORMAL

## 2017-12-05 NOTE — PROGRESS NOTES
Assessment & Plan     Phan Luque is a 61 year old female with the following diagnoses:   1. 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 Subjective visual disturbance      Patient here for f/u for AION left eye after liver transplantation 8/2016. Vision is stable, slightly better compared to last visit. Taking prednisone 2.5 mg until the end of the week. Using betaxolol both eyes twice a day. Her IOP is better today after starting betaxolol. Her visual fields show some improvement of her central island.    Has hypertension 2/2 chronic kidney disease. Takes amlodipine at night time.   No diabetes. Discuss taking amlodipine at day time instead.      Overall, she is stable to better.  Continue monocular precautions.  Will stop betaxalol and have intraocular pressure check in 4-6 weeks.  Follow up with me in 1 year sooner as needed for worsening symptoms.        Isrrael Ortega MD  [id number]      Attending Physician Attestation:  Complete documentation of historical and exam elements from today's encounter can be found in the full encounter summary report (not reduplicated in this progress note).  I personally obtained the chief complaint(s) and history of present illness.  I confirmed and edited as necessary the review of systems, past medical/surgical history, family history, social history, and examination findings as documented by others; and I examined the patient myself.  I personally reviewed the relevant tests, images, and reports as documented above.  I formulated and edited as necessary the assessment and plan and discussed the findings and management plan with the patient and family. I personally reviewed the ophthalmic test(s) associated with this encounter, agree with the interpretation(s) as documented by the resident/fellow, and have edited the corresponding report(s) as necessary.  - Ambrose Ortega MD

## 2017-12-05 NOTE — MR AVS SNAPSHOT
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    -  1    Subjective visual disturbance           Follow-ups after your visit        Your next 10 appointments already scheduled     Dec 20, 2017  2:15 PM CST   Lab with  LAB   [facility] ([facility])    [address] [place] [address]  1st [facility] [address]   [phone]            Dec 20, 2017  3:15 PM CST   (Arrive by 3:00 PM)   Return Liver Transplant with Hussein Banegas MD   [facility] ([facility])    [address] [place] [address]   [phone]            Dec 20, 2017  4:30 PM CST   (Arrive by 4:15 PM)   Return Visit with DONOVAN Castellanos [facility] ([facility])    [address] [place] [address]   [phone]            Dec 28, 2017  9:55 AM CST   (Arrive by 9:40 AM)   New Patient Visit with Arlyn Sanchez MD   [facility] ([facility])    [address] [place] [address]   [phone]            Feb 05, 2018  4:30 PM CST   (Arrive by 4:15 PM)   New Patient Visit with Demetria Barger PA-C   [facility] ([facility])    [address] [place] [address]  3rd [facility] [address]   [phone]            Mar 21, 2018  3:30 PM CDT   Lab with  LAB   [facility] ([facility])    [address] [place] [address]   [phone]            Mar 21, 2018  4:30 PM CDT   (Arrive by 4:00 PM)   Return Visit with Daya Gutierrez MD     [facility] ([facility])    [address] [facility]  [address] [facility] [address] [phone]            May 21, 2018  3:00 PM CDT   (Arrive by 2:45 PM)   RETURN ENDOCRINE with Ruth Oakley MD   [facility] ([facility])    [address] [facility]  [address] [facility] [address] [phone]              Future tests that were ordered for you today     Open Future Orders        Priority Expected Expires Ordered    DILATED FUNDUS EXAM Routine  6/8/2019 12/5/2017            Who to contact     Please call your clinic at [phone] to:    Ask questions about your health    Make or cancel appointments    Discuss your medicines    Learn about your test results    Speak to your doctor   If you have compliments or concerns about an experience at your clinic, or if you wish to file a complaint, please contact [facility] Patient Relations at [phone] or email us at [email or web address]         Additional Information About Your Visit        [employer] Information     [employer] gives you secure access to your electronic health record. If you see a primary care provider, you can also send messages to your care team and make appointments. If you have questions, please call your primary care clinic.  If you do not have a primary care provider, please call [phone] and they will assist you.      [employer] is an electronic gateway that provides easy, online access to your medical records. With [employer], you can request a clinic appointment, read your test results, renew a prescription or communicate with your care team.     To access your existing account, please contact your [facility] or call [phone] for assistance.        Care EveryWhere ID     This is your Care EveryWhere ID. This could be used by other organizations to access your [facility]  records  [id number]         Blood Pressure from Last 3 Encounters:   11/20/17 137/79   10/25/17 158/80   10/19/17 144/89    Weight from Last 3 Encounters:   11/20/17 87 kg (191 lb 12.8 oz)   10/25/17 81.6 kg (180 lb)   09/20/17 79.8 kg (176 lb)              We Performed the Following     IOP Measurement     Low Vision Central OU          Today's Medication Changes          These changes are accurate as of: 12/5/17 12:23 PM.  If you have any questions, ask your nurse or doctor.               These medicines have changed or have updated prescriptions.        Dose/Directions    predniSONE 5 MG tablet   Commonly known as:  DELTASONE   This may have changed:  how much to take   Used for:  Liver transplanted (H)        Dose:  10 mg   Take 2 tablets (10 mg) by mouth daily   Quantity:  180 tablet   Refills:  3                Primary Care Provider Office Phone # Fax #    Santosh SAAVEDRA Damian [phone]       [address]   [facility] [address]        Equal Access to Services     ALLY BERRY AH: Hadii adonis mejia hadasho Soomaali, waaxda luqadaha, qaybta kaalmada adeegyada, waxay enrique schroeder. So [facility] [phone].    ATENCIÓN: Si habla español, tiene a reece disposición servicios gratuitos de asistencia lingüística. Llame al [phone].    We comply with applicable federal civil rights laws and [place] laws. We do not discriminate on the basis of race, color, national origin, age, disability, sex, sexual orientation, or gender identity.            Thank you!     Thank you for choosing [facility]  for your care. Our goal is always to provide you with excellent care. Hearing back from our patients is one way we can continue to improve our services. Please take a few minutes to complete the written survey that you may receive in the mail after your visit with us. Thank you!             Your Updated Medication List - Protect others around you: Learn how to safely use, store and  throw away your medicines at www.disposemymeds.org.          This list is accurate as of: 12/5/17 12:23 PM.  Always use your most recent med list.                   Brand Name Dispense Instructions for use Diagnosis    acetaminophen 325 MG tablet    TYLENOL    100 tablet    Take 2 tablets (650 mg) by mouth every 6 hours as needed for mild pain Or fevers. Use sparingly. Limit use to no more than 2 grams (2000 mg) in 24 hours. **Further refills must be obtained by primary care provider**    Acute post-operative pain       amLODIPine 5 MG tablet    NORVASC    90 tablet    Take 1 tablet (5 mg) by mouth daily    Hypertension       azaTHIOprine 100 MG Tabs     90 tablet    Take 50 mg by mouth every evening    Liver transplanted (H)       betaxolol 0.5 % ophthalmic solution    BETOPTIC    5 mL    Place 1 drop into both eyes 2 times daily    Primary open angle glaucoma of both eyes, unspecified glaucoma stage       calcium Citrate-vitamin D 500-400 MG-UNIT Chew      Take 1 tablet by mouth daily        chlorthalidone 25 MG tablet    HYGROTON    45 tablet    Take 0.5 tablets (12.5 mg) by mouth daily    CKD (chronic kidney disease) stage 3, GFR 30-59 ml/min, Fluid retention       cyanocobalamin 1000 MCG Tabs     30 tablet    Take 1,000 mcg by mouth daily    Liver transplanted (H)       FISH OIL PO      Take 645 mg by mouth 2 times daily        folic acid 1 MG tablet    FOLVITE    30 tablet    Take 1 tablet (1 mg) by mouth daily    Malnutrition (H)       levothyroxine 88 MCG tablet    SYNTHROID/LEVOTHROID    30 tablet    Take 1 tablet (88 mcg) by mouth every morning (before breakfast)    Thyroid function test abnormal       loperamide 2 MG capsule    IMODIUM     Take 2 mg by mouth 4 times daily as needed for diarrhea Reported on 5/18/2017        magnesium oxide 400 (241.3 MG) MG tablet    MAG-OX    60 tablet    Take 1 tablet (400 mg) by mouth daily    CKD (chronic kidney disease) stage 3, GFR 30-59 ml/min, Hypomagnesemia        METHYLPREDNISOLONE PO           multivitamin, therapeutic Tabs tablet     30 tablet    Take 1 tablet by mouth every 24 hours    Malnutrition (H)       pantoprazole 40 MG EC tablet    PROTONIX    90 tablet    Take 1 tablet (40 mg) by mouth every morning (before breakfast)    Gastroesophageal reflux disease, esophagitis presence not specified       pramipexole 0.5 MG tablet    MIRAPEX    90 tablet    Take 1 tablet (0.5 mg) by mouth At Bedtime    Restless leg syndrome       predniSONE 5 MG tablet    DELTASONE    180 tablet    Take 2 tablets (10 mg) by mouth daily    Liver transplanted (H)       psyllium 0.52 G capsule     60 capsule    Take 2 capsules (1.04 g) by mouth daily With a full glass of water.    Loose stools       simethicone 80 MG chewable tablet    MYLICON    180 tablet    Take 1 tablet (80 mg) by mouth every 6 hours as needed for flatulence or cramping    Flatulence, eructation, and gas pain       tacrolimus 0.5 MG capsule    GENERIC EQUIVALENT    240 capsule    Take 4 capsules (2 mg) by mouth 2 times daily    Liver transplanted (H)       TIZANIDINE HCL PO      Take 4 mg by mouth At Bedtime        traMADol 50 MG tablet    ULTRAM    15 tablet    Take 1-2 tablets ( mg) by mouth every 6 hours as needed for pain        traZODone 100 MG tablet    DESYREL    30 tablet    Take 1 tablet (100 mg) by mouth At Bedtime **Further refills must be obtained by primary care provider**    Insomnia, unspecified type       ursodiol 300 MG capsule    ACTIGALL    60 capsule    Take 1 capsule (300 mg) by mouth 2 times daily    Liver transplanted (H)

## 2017-12-05 NOTE — NURSING NOTE
Chief Complaints and History of Present Illnesses   Patient presents with     Follow Up For     AION     HPI    Symptoms:              Comments:  Almost off on prednisone.   Vision stable, not decompensating. Patient states more few floaters than usual.   No distance glasses, only wear progressive lenses.   AGUSTÍN Arroyo 12/5/2017 11:02 AM

## 2017-12-06 ENCOUNTER — TRANSFERRED RECORDS (OUTPATIENT)
Dept: HEALTH INFORMATION MANAGEMENT | Facility: CLINIC | Age: 62
End: 2017-12-06

## 2017-12-07 ENCOUNTER — HOSPITAL ENCOUNTER (OUTPATIENT)
Dept: MAMMOGRAPHY | Facility: CLINIC | Age: 62
Discharge: HOME | End: 2017-12-07
Attending: OBSTETRICS & GYNECOLOGY

## 2017-12-07 DIAGNOSIS — Z12.31 VISIT FOR SCREENING MAMMOGRAM: ICD-10-CM

## 2017-12-11 DIAGNOSIS — Z94.4 LIVER REPLACED BY TRANSPLANT (H): Primary | ICD-10-CM

## 2017-12-14 ENCOUNTER — OFFICE VISIT (OUTPATIENT)
Dept: PSYCHIATRY | Facility: CLINIC | Age: 62
End: 2017-12-14
Attending: NURSE PRACTITIONER
Payer: COMMERCIAL

## 2017-12-14 VITALS
HEART RATE: 89 BPM | DIASTOLIC BLOOD PRESSURE: 88 MMHG | SYSTOLIC BLOOD PRESSURE: 148 MMHG | BODY MASS INDEX: 30.89 KG/M2 | WEIGHT: 197.2 LBS

## 2017-12-14 DIAGNOSIS — F41.1 GAD (GENERALIZED ANXIETY DISORDER): Primary | ICD-10-CM

## 2017-12-14 DIAGNOSIS — G47.00 INSOMNIA, UNSPECIFIED TYPE: ICD-10-CM

## 2017-12-14 DIAGNOSIS — Z94.4 LIVER REPLACED BY TRANSPLANT (H): ICD-10-CM

## 2017-12-14 PROCEDURE — 99212 OFFICE O/P EST SF 10 MIN: CPT | Mod: ZF

## 2017-12-14 RX ORDER — BUSPIRONE HYDROCHLORIDE 10 MG/1
10 TABLET ORAL 2 TIMES DAILY
Qty: 60 TABLET | Refills: 1 | Status: SHIPPED | OUTPATIENT
Start: 2017-12-14 | End: 2018-01-26

## 2017-12-14 RX ORDER — TRAZODONE HYDROCHLORIDE 100 MG/1
150 TABLET ORAL AT BEDTIME
Qty: 30 TABLET | Refills: 1 | Status: SHIPPED | OUTPATIENT
Start: 2017-12-14 | End: 2018-01-26
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
## 2017-12-14 NOTE — MR AVS SNAPSHOT
After Visit Summary   12/14/2017    Phan Luque    MRN: [id number]           Patient Information     Date Of Birth          1955        Visit Information        Provider Department      12/14/2017 8:00 AM David Vu APRN CNP [facility]        Today's Diagnoses     WALTER (generalized anxiety disorder)    -  1    Liver replaced by transplant (H)        Insomnia, unspecified type           Follow-ups after your visit        Your next 10 appointments already scheduled     Dec 27, 2017  7:30 AM CST   Lab visit with [facility] ([facility])    [address] [place] [address]  [address]  [facility] [address] [phone]           Please do not eat 10-12 hours before your appointment if you are coming in fasting for labs on lipids, cholesterol, or glucose (sugar). Does not apply to pregnant women.  Water with medications is okay. Do not drink coffee or other fluids.  If you have concerns about taking your medications, please send a message by clicking on Secure Messaging, Message Your Care Team.            Dec 28, 2017  9:55 AM CST   (Arrive by 9:40 AM)   New Patient Visit with Arlyn Sanchez MD   [facility] ([facility])    [address] [place] [address]  4th Floor  [facility] [id number] [phone]            Jan 26, 2018  3:30 PM CST   Adult Med Follow UP with DONOVAN Blake CNP   Psychiatry Clinic ([facility])    [place] [id number]  [address] [facility] [address] [phone]            Feb 05, 2018  4:30 PM CST   (Arrive by 4:15 PM)   New Patient Visit with Demetria Barger PA-C   [facility] Dermatology ([facility])    [address] [place] [address]  3rd Floor  [facility] [address] [phone]            Mar 21, 2018  3:30 PM CDT   Lab with  LAB   [facility] Lab ([facility])    [address] [place] [address]  [facility] [address]   [phone]            Mar 21, 2018  4:30 PM CDT   (Arrive by 4:00 PM)   Return Visit with Daya Gutierrez MD   [facility] Nephrology ([facility])    [address] [place] [address] [phone]            Apr 04, 2018  2:45 PM CDT   Lab with  LAB   [facility] Lab ([facility])    [address] [place] [address]   [phone]            Apr 04, 2018  3:45 PM CDT   (Arrive by 3:30 PM)   Return Liver Transplant with Hussein Banegas MD   [facility] Hepatology ([facility])    [address] [place] [address]   [phone]            May 21, 2018  3:00 PM CDT   (Arrive by 2:45 PM)   RETURN ENDOCRINE with Ruth Oakley MD   [facility] Endocrinology ([facility])    [address] [place] [address] [phone]              Who to contact     Please call your clinic at [phone] to:    Ask questions about your health    Make or cancel appointments    Discuss your medicines    Learn about your test results    Speak to your doctor   If you have compliments or concerns about an experience at your clinic, or if you wish to file a complaint, please contact [facility] Patient Relations at [phone] or email us at [email or web address]         Additional Information About Your Visit        [employer] Information     [employer] gives you secure access to your electronic health record. If you see a primary care provider, you can also send messages to your care team and make appointments. If you have questions, please call your primary care clinic.  If you do not have a primary care provider, please call [phone] and they will assist you.      [employer] is an electronic gateway that provides easy, online access to your medical records. With  To, you can request a clinic appointment, read your test results, renew a prescription or communicate with your care team.     To access your existing account, please contact your [facility] or call [phone] for assistance.        Care EveryWhere ID     This is your Care EveryWhere ID. This could be used by other organizations to access your [facility] medical records  [id number]        Your Vitals Were     Pulse BMI (Body Mass Index)                89 30.89 kg/m2           Blood Pressure from Last 3 Encounters:   12/20/17 134/79   12/20/17 118/77   12/14/17 148/88    Weight from Last 3 Encounters:   12/20/17 90.1 kg (198 lb 9.6 oz)   12/20/17 90.1 kg (198 lb 9.6 oz)   12/14/17 89.4 kg (197 lb 3.2 oz)              Today, you had the following     No orders found for display         Today's Medication Changes          These changes are accurate as of: 12/14/17 11:59 PM.  If you have any questions, ask your nurse or doctor.               Start taking these medicines.        Dose/Directions    busPIRone 10 MG tablet   Commonly known as:  BUSPAR   Used for:  WALTER (generalized anxiety disorder)   Started by:  David Vu APRN CNP        Dose:  10 mg   Take 1 tablet (10 mg) by mouth 2 times daily   Quantity:  60 tablet   Refills:  1         These medicines have changed or have updated prescriptions.        Dose/Directions    traZODone 100 MG tablet   Commonly known as:  DESYREL   This may have changed:  how much to take   Used for:  Insomnia, unspecified type   Changed by:  David Vu APRN CNP        Dose:  150 mg   Take 1.5 tablets (150 mg) by mouth At Bedtime **Further refills must be obtained by primary care provider**   Quantity:  30 tablet   Refills:  1            Where to get your medicines      These medications were sent to [facility] - [place], [place] - [address]  [address], [facility] [address]    Hours:  Mon-Fri  8:30am-5:00pm Toll Free [phone] Phone:  [phone]     busPIRone 10 MG tablet    traZODone 100 MG tablet                Primary Care Provider Office Phone # Fax #    Santosh Salgado [phone]       [facility] [address] [place] [address]        Equal Access to Services     [facility]: Hadii aad ku hadasho Soomaali, waaxda luqadaha, qaybta kaalmada adeegyada, waxay idiin hayaan adeeg kharash la'aan . So [facility] [phone].    ATENCIÓN: Si habla español, tiene a reece disposición servicios gratuitos de asistencia lingüística. Kathiame al [phone].    We comply with applicable federal civil rights laws and [place] laws. We do not discriminate on the basis of race, color, national origin, age, disability, sex, sexual orientation, or gender identity.            Thank you!     Thank you for choosing [facility]  for your care. Our goal is always to provide you with excellent care. Hearing back from our patients is one way we can continue to improve our services. Please take a few minutes to complete the written survey that you may receive in the mail after your visit with us. Thank you!             Your Updated Medication List - Protect others around you: Learn how to safely use, store and throw away your medicines at www.disposemymeds.org.          This list is accurate as of: 12/14/17 11:59 PM.  Always use your most recent med list.                   Brand Name Dispense Instructions for use Diagnosis    acetaminophen 325 MG tablet    TYLENOL    100 tablet    Take 2 tablets (650 mg) by mouth every 6 hours as needed for mild pain Or fevers. Use sparingly. Limit use to no more than 2 grams (2000 mg) in 24 hours. **Further refills must be obtained by primary care provider**    Acute post-operative pain       amLODIPine 5 MG tablet    NORVASC    90 tablet    Take 1 tablet (5 mg) by mouth daily    Hypertension       azaTHIOprine 100 MG Tabs     90 tablet    Take 50 mg by mouth  every evening    Liver transplanted (H)       betaxolol 0.5 % ophthalmic solution    BETOPTIC    5 mL    Place 1 drop into both eyes 2 times daily    Primary open angle glaucoma of both eyes, unspecified glaucoma stage       busPIRone 10 MG tablet    BUSPAR    60 tablet    Take 1 tablet (10 mg) by mouth 2 times daily    WALTER (generalized anxiety disorder)       calcium Citrate-vitamin D 500-400 MG-UNIT Chew      Take 1 tablet by mouth daily        chlorthalidone 25 MG tablet    HYGROTON    45 tablet    Take 0.5 tablets (12.5 mg) by mouth daily    CKD (chronic kidney disease) stage 3, GFR 30-59 ml/min, Fluid retention       cyanocobalamin 1000 MCG Tabs     30 tablet    Take 1,000 mcg by mouth daily    Liver transplanted (H)       FISH OIL PO      Take 645 mg by mouth 2 times daily        folic acid 1 MG tablet    FOLVITE    30 tablet    Take 1 tablet (1 mg) by mouth daily    Malnutrition (H)       levothyroxine 88 MCG tablet    SYNTHROID/LEVOTHROID    30 tablet    Take 1 tablet (88 mcg) by mouth every morning (before breakfast)    Thyroid function test abnormal       loperamide 2 MG capsule    IMODIUM     Take 2 mg by mouth 4 times daily as needed for diarrhea Reported on 5/18/2017        multivitamin, therapeutic Tabs tablet     30 tablet    Take 1 tablet by mouth every 24 hours    Malnutrition (H)       pantoprazole 40 MG EC tablet    PROTONIX    90 tablet    Take 1 tablet (40 mg) by mouth every morning (before breakfast)    Gastroesophageal reflux disease, esophagitis presence not specified       pramipexole 0.5 MG tablet    MIRAPEX    90 tablet    Take 1 tablet (0.5 mg) by mouth At Bedtime    Restless leg syndrome       psyllium 0.52 G capsule     60 capsule    Take 2 capsules (1.04 g) by mouth daily With a full glass of water.    Loose stools       simethicone 80 MG chewable tablet    MYLICON    180 tablet    Take 1 tablet (80 mg) by mouth every 6 hours as needed for flatulence or cramping    Flatulence,  eructation, and gas pain       tacrolimus 0.5 MG capsule    GENERIC EQUIVALENT    240 capsule    Take 4 capsules (2 mg) by mouth 2 times daily    Liver transplanted (H)       TIZANIDINE HCL PO      Take 4 mg by mouth At Bedtime        traMADol 50 MG tablet    ULTRAM    15 tablet    Take 1-2 tablets ( mg) by mouth every 6 hours as needed for pain        traZODone 100 MG tablet    DESYREL    30 tablet    Take 1.5 tablets (150 mg) by mouth At Bedtime **Further refills must be obtained by primary care provider**    Insomnia, unspecified type       ursodiol 300 MG capsule    ACTIGALL    60 capsule    Take 1 capsule (300 mg) by mouth 2 times daily    Liver transplanted (H)

## 2017-12-14 NOTE — NURSING NOTE
Chief Complaint   Patient presents with     Eval/Assessment     MDD     Reviewed allergies, smoking status, and pharmacy preference  Administered abuse screening questions   Obtained weight, blood pressure and heart rate

## 2017-12-20 ENCOUNTER — OFFICE VISIT (OUTPATIENT)
Dept: GASTROENTEROLOGY | Facility: CLINIC | Age: 62
End: 2017-12-20
Attending: INTERNAL MEDICINE
Payer: COMMERCIAL

## 2017-12-20 ENCOUNTER — ALLIED HEALTH/NURSE VISIT (OUTPATIENT)
Dept: TRANSPLANT | Facility: CLINIC | Age: 62
End: 2017-12-20

## 2017-12-20 ENCOUNTER — OFFICE VISIT (OUTPATIENT)
Dept: GASTROENTEROLOGY | Facility: CLINIC | Age: 62
End: 2017-12-20
Payer: COMMERCIAL

## 2017-12-20 ENCOUNTER — RECORDS - HEALTHEAST (OUTPATIENT)
Dept: ADMINISTRATIVE | Facility: OTHER | Age: 62
End: 2017-12-20

## 2017-12-20 VITALS
SYSTOLIC BLOOD PRESSURE: 118 MMHG | OXYGEN SATURATION: 97 % | HEIGHT: 67 IN | TEMPERATURE: 97.9 F | DIASTOLIC BLOOD PRESSURE: 77 MMHG | RESPIRATION RATE: 18 BRPM | HEART RATE: 90 BPM | WEIGHT: 198.6 LBS | BODY MASS INDEX: 31.17 KG/M2

## 2017-12-20 VITALS
OXYGEN SATURATION: 97 % | TEMPERATURE: 98.5 F | BODY MASS INDEX: 31.17 KG/M2 | DIASTOLIC BLOOD PRESSURE: 79 MMHG | HEIGHT: 67 IN | SYSTOLIC BLOOD PRESSURE: 134 MMHG | HEART RATE: 94 BPM | WEIGHT: 198.6 LBS

## 2017-12-20 DIAGNOSIS — Z94.4 LIVER REPLACED BY TRANSPLANT (H): Primary | ICD-10-CM

## 2017-12-20 DIAGNOSIS — Z94.4 LIVER REPLACED BY TRANSPLANT (H): ICD-10-CM

## 2017-12-20 DIAGNOSIS — Z94.4 STATUS POST LIVER TRANSPLANTATION (H): Primary | ICD-10-CM

## 2017-12-20 DIAGNOSIS — K21.9 GASTROESOPHAGEAL REFLUX DISEASE, ESOPHAGITIS PRESENCE NOT SPECIFIED: ICD-10-CM

## 2017-12-20 DIAGNOSIS — K59.1 FUNCTIONAL DIARRHEA: Primary | ICD-10-CM

## 2017-12-20 LAB
ALBUMIN SERPL-MCNC: 3.4 G/DL (ref 3.4–5)
ALBUMIN UR-MCNC: NEGATIVE MG/DL
ALP SERPL-CCNC: 54 U/L (ref 40–150)
ALT SERPL W P-5'-P-CCNC: 20 U/L (ref 0–50)
ANION GAP SERPL CALCULATED.3IONS-SCNC: 7 MMOL/L (ref 3–14)
APPEARANCE UR: CLEAR
AST SERPL W P-5'-P-CCNC: 22 U/L (ref 0–45)
BACTERIA #/AREA URNS HPF: ABNORMAL /HPF
BILIRUB DIRECT SERPL-MCNC: <0.1 MG/DL (ref 0–0.2)
BILIRUB SERPL-MCNC: 0.4 MG/DL (ref 0.2–1.3)
BILIRUB UR QL STRIP: NEGATIVE
BUN SERPL-MCNC: 11 MG/DL (ref 7–30)
CALCIUM SERPL-MCNC: 8.6 MG/DL (ref 8.5–10.1)
CHLORIDE SERPL-SCNC: 103 MMOL/L (ref 94–109)
CHOLEST SERPL-MCNC: 147 MG/DL
CO2 SERPL-SCNC: 29 MMOL/L (ref 20–32)
COLOR UR AUTO: YELLOW
CREAT SERPL-MCNC: 1.25 MG/DL (ref 0.52–1.04)
CREAT UR-MCNC: 56 MG/DL
ERYTHROCYTE [DISTWIDTH] IN BLOOD BY AUTOMATED COUNT: 12.9 % (ref 10–15)
GFR SERPL CREATININE-BSD FRML MDRD: 44 ML/MIN/1.7M2
GLUCOSE SERPL-MCNC: 85 MG/DL (ref 70–99)
GLUCOSE UR STRIP-MCNC: NEGATIVE MG/DL
HCT VFR BLD AUTO: 36 % (ref 35–47)
HDLC SERPL-MCNC: 51 MG/DL
HGB BLD-MCNC: 12 G/DL (ref 11.7–15.7)
HGB UR QL STRIP: NEGATIVE
KETONES UR STRIP-MCNC: NEGATIVE MG/DL
LDLC SERPL CALC-MCNC: 49 MG/DL
LEUKOCYTE ESTERASE UR QL STRIP: NEGATIVE
MAGNESIUM SERPL-MCNC: 1.6 MG/DL (ref 1.6–2.3)
MCH RBC QN AUTO: 32.9 PG (ref 26.5–33)
MCHC RBC AUTO-ENTMCNC: 33.3 G/DL (ref 31.5–36.5)
MCV RBC AUTO: 99 FL (ref 78–100)
MUCOUS THREADS #/AREA URNS LPF: PRESENT /LPF
NITRATE UR QL: NEGATIVE
NONHDLC SERPL-MCNC: 96 MG/DL
PH UR STRIP: 7 PH (ref 5–7)
PLATELET # BLD AUTO: 265 10E9/L (ref 150–450)
POTASSIUM SERPL-SCNC: 4.1 MMOL/L (ref 3.4–5.3)
PROT SERPL-MCNC: 6.9 G/DL (ref 6.8–8.8)
PROT UR-MCNC: 0.06 G/L
PROT/CREAT 24H UR: 0.12 G/G CR (ref 0–0.2)
RBC # BLD AUTO: 3.65 10E12/L (ref 3.8–5.2)
RBC #/AREA URNS AUTO: 2 /HPF (ref 0–2)
SODIUM SERPL-SCNC: 140 MMOL/L (ref 133–144)
SOURCE: ABNORMAL
SP GR UR STRIP: 1.01 (ref 1–1.03)
TRIGL SERPL-MCNC: 230 MG/DL
UROBILINOGEN UR STRIP-MCNC: 0 MG/DL (ref 0–2)
WBC # BLD AUTO: 5.6 10E9/L (ref 4–11)
WBC #/AREA URNS AUTO: 6 /HPF (ref 0–2)

## 2017-12-20 PROCEDURE — 80321 ALCOHOLS BIOMARKERS 1OR 2: CPT | Performed by: INTERNAL MEDICINE

## 2017-12-20 PROCEDURE — 99213 OFFICE O/P EST LOW 20 MIN: CPT

## 2017-12-20 PROCEDURE — 36415 COLL VENOUS BLD VENIPUNCTURE: CPT | Performed by: INTERNAL MEDICINE

## 2017-12-20 PROCEDURE — 80048 BASIC METABOLIC PNL TOTAL CA: CPT | Performed by: INTERNAL MEDICINE

## 2017-12-20 PROCEDURE — 80061 LIPID PANEL: CPT | Performed by: INTERNAL MEDICINE

## 2017-12-20 PROCEDURE — 83735 ASSAY OF MAGNESIUM: CPT | Performed by: INTERNAL MEDICINE

## 2017-12-20 PROCEDURE — 84156 ASSAY OF PROTEIN URINE: CPT | Performed by: INTERNAL MEDICINE

## 2017-12-20 PROCEDURE — 80076 HEPATIC FUNCTION PANEL: CPT | Performed by: INTERNAL MEDICINE

## 2017-12-20 PROCEDURE — 85027 COMPLETE CBC AUTOMATED: CPT | Performed by: INTERNAL MEDICINE

## 2017-12-20 PROCEDURE — 81001 URINALYSIS AUTO W/SCOPE: CPT | Performed by: INTERNAL MEDICINE

## 2017-12-20 RX ORDER — LOPERAMIDE HCL 2 MG
2 CAPSULE ORAL 2 TIMES DAILY PRN
Qty: 40 CAPSULE | Refills: 3 | Status: SHIPPED | OUTPATIENT
Start: 2017-12-20 | End: 2018-03-01

## 2017-12-20 ASSESSMENT — ENCOUNTER SYMPTOMS
JAUNDICE: 0
FATIGUE: 1
MUSCLE WEAKNESS: 1
ALTERED TEMPERATURE REGULATION: 1
STIFFNESS: 1
NECK MASS: 0
SINUS PAIN: 1
BLOATING: 1
ABDOMINAL PAIN: 1
DEPRESSION: 0
DECREASED APPETITE: 0
SMELL DISTURBANCE: 0
BACK PAIN: 1
INSOMNIA: 1
NIGHT SWEATS: 0
BOWEL INCONTINENCE: 0
MYALGIAS: 1
POLYDIPSIA: 0
TASTE DISTURBANCE: 0
JOINT SWELLING: 0
WEIGHT LOSS: 0
SINUS CONGESTION: 1
SORE THROAT: 0
NAUSEA: 1
BLOOD IN STOOL: 0
INCREASED ENERGY: 0
DIARRHEA: 1
NERVOUS/ANXIOUS: 1
HALLUCINATIONS: 0
NECK PAIN: 1
PANIC: 0
DECREASED CONCENTRATION: 0
ARTHRALGIAS: 1
RECTAL PAIN: 0
HOARSE VOICE: 0
TROUBLE SWALLOWING: 0
CONSTIPATION: 1
VOMITING: 1
FEVER: 0
HEARTBURN: 0
CHILLS: 0
WEIGHT GAIN: 1
POLYPHAGIA: 0
MUSCLE CRAMPS: 1

## 2017-12-20 ASSESSMENT — PAIN SCALES - GENERAL
PAINLEVEL: MODERATE PAIN (5)
PAINLEVEL: NO PAIN (0)

## 2017-12-20 NOTE — NURSING NOTE
"Chief Complaint   Patient presents with     RECHECK     S/P Liver TX 8/25/2016       Initial /77 (BP Location: Right arm, Patient Position: Sitting, Cuff Size: Adult Large)  Pulse 90  Temp 97.9  F (36.6  C) (Oral)  Resp 18  Ht 1.702 m (5' 7.01\")  Wt 90.1 kg (198 lb 9.6 oz)  SpO2 97%  BMI 31.1 kg/m2 Estimated body mass index is 31.1 kg/(m^2) as calculated from the following:    Height as of this encounter: 1.702 m (5' 7.01\").    Weight as of this encounter: 90.1 kg (198 lb 9.6 oz).  Medication Reconciliation: [facility]ren [facility]  12/20/2017 3:29 PM          "

## 2017-12-20 NOTE — LETTER
12/20/2017       RE: Phan Luque  [address]  [facility] [address]     Dear Colleague,    Thank you for referring your patient, Phan Luque, to the [facility] at [facility]. Please see a copy of my visit note below.    CHIEF COMPLAINT:  Followup regarding abdominal pain and diarrhea in post-transplant woman.      HISTORY OF PRESENT ILLNESS:  Phan is a 52-year-old woman whom I met in 11/2016 primarily regarding diarrhea.  It improved considerably both with prescription rifaximin (Xifaxan) and when she had the addition of Benefiber or psyllium while on tube feedings in long-term care.  Indeed, she had developed constipation at the time of the last visit and intermittent diarrhea.  She was given some advice on managing constipation which was likely the cause of her gas, advised to call if she had persistent diarrhea to have stool studies for infection, consider breath testing if stool studies were negative and she had gassy diarrhea.  Long ago, she did have benefit from rifaximin.  We briefly recalled GERD precautions, and she was continuing on Protonix.      Phan had several medications stopped at her September visit including the Protonix.  Within a couple of weeks, she had return of her daily morning vomiting.  After 3 steady days of vomiting, she restarted Protonix and the vomiting stopped again.  Thus, it was not the prednisone that had caused or stopped her vomiting.      Phan also has stopped her magnesium and has infrequent or seldom diarrhea.  She is no longer regularly using any loperamide.  If she needs it, she sometimes needs 1 and then hour and a half later will have diarrhea and take a second.  She has not yet tried not taking the second loperamide (Imodium) when at home.      Phan has nausea which is random.  Yesterday, she it had at all day and does not know why.  She did not have diarrhea or constipation  yesterday to her awareness.  Gassiness does not occur with her diarrhea, though the diarrhea is somewhat urgent.  She seldom uses simethicone nowadays for gassiness.      MEDICAL HISTORY:     1.  Liver transplant for acute alcoholic hepatitis 08/25/2016.   2.  GERD, hypothyroidism, osteopenia, CKD, depression, insomnia.      MEDICATIONS:  Reviewed and somewhat further updated.      ADVERSE DRUG REACTIONS:  Reviewed.      FAMILY HISTORY:  Negative for colon cancer.      SOCIAL HISTORY:  Working at [employer].  Feels she returned to work too early but is managing okay now.  She has 3 children and some grandchildren.      Phan has been invited by her donor family to visit them on a special weekend 01/20 - 01/21 when other recipients will be present for a special celebration.  There will probably be media present at their [employer] in [place].  Phan is allowed to have any of her family come with her.      REVIEW OF SYSTEMS:  Overall feeling well despite the fatigue.  Questionnaire reviewed and discussed.  GI symptoms are not frequent and not necessarily within the last 2 weeks, she confirms.      OBJECTIVE:   VITAL SIGNS:  Reviewed.  Overall, normal.  BMI 31.   GENERAL:  Clean, pleasant, woman with some style who appears well-nourished.  Her eyes are clear and bright.  Her speech is fluent and logical.   RESPIRATIONS:  Breathing is calm and grossly normal.   ABDOMEN:  She gestures -- nontender abdomen.      RESULTS:  Reviewed.  Curious that her MCV is that variable when she is on a steady dose of azathioprine.  Note slight drop in RBC count at 3.65.      ASSESSMENT:  A 62-year-old woman 16 months after liver transplant for acute alcoholic hepatitis who is overall much improved regarding her GI complaints.  She is due for colonoscopy 2017 and has not managed this thus far.  She is hoping to consolidate care in the [facility] system.  She is also planning to schedule her colonoscopy for this spring when her  son will visit home and can bring her to and from the procedure.        Phan provewd that she does better with Protonix, preventing daily morning vomiting.  She proved earlier doing better with soluble fiber and has demonstrated some understanding of how to use the loperamide, though often runs into constipation if she uses the 2 tablets over 2 hours.      PLAN:   1.  Continue the infrequent use of loperamide, 1/2 caplet or 1, attempt not to use the repeat tablet sometimes when she is safe not to use it.   2.  Continue on acid-reducing medication.   3.  Soluble fiber remains recommended -- she is using psyllium capsules which are not covered by insurance.   4.  She believes she is considerably better without the magnesium, though it was not high dose.   5.  Any time she has urgent loose stool, record ingestions of the previous 24 hours and bowel habit of the previous 3 days.   6.  Please ask her primary provider to order a screening colonoscopy for her.   7.  Return to GI Clinic only as needed.      We reviewed Phan's symptom history, medication trials, results, current status.  Written notes were provided for instructions.  She demonstrated understanding and remembering the majority of these.      Total visit 20 minutes with counseling time 15 minutes.     D: 2017 19:17   T: 2017 12:45   MT: ALEX      Name:     PHAN FARMER   MRN:      [id number]        Account:      [id number]   :      1955           Service Date: 2017      Document: [id number]        Again, thank you for allowing me to participate in the care of your patient.      Sincerely,    DONOVAN Castellanos CNP

## 2017-12-20 NOTE — MR AVS SNAPSHOT
After Visit Summary   12/20/2017    Phan Luque    MRN: [id number]           Patient Information     Date Of Birth          1955        Visit Information        Provider Department      12/20/2017 3:15 PM Hussein Banegas MD [facility] Hepatology         Follow-ups after your visit        Follow-up notes from your care team     Return in about 3 months (around 3/20/2018).      Your next 10 appointments already scheduled     Dec 20, 2017  4:30 PM CST   (Arrive by 4:15 PM)   Return Visit with DONOVAN Castellanos CNP   [facility] Gastroenterology and IBD Clinic ([facility])    [address] [place] [address]  [address] [facility] [address]   [phone]            Dec 28, 2017  9:55 AM CST   (Arrive by 9:40 AM)   New Patient Visit with Arlyn Sanchez MD   [facility] Primary Care Clinic ([facility])    [address] [place] [address]  [address] [facility] [address]   [phone]            Jan 26, 2018  3:30 PM CST   Adult Med Follow UP with DONOVAN Blake CNP   Psychiatry Clinic ([facility])    [place] [id number]  [address] [facility] [address]   [phone]            Feb 05, 2018  4:30 PM CST   (Arrive by 4:15 PM)   New Patient Visit with Demetria Barger PA-C   [facility] Dermatology ([facility])    [address] [place] [address]  [address] [facility] [address]   [phone]            Mar 21, 2018  3:30 PM CDT   Lab with [facility]   [facility] Lab ([facility])    [address] [place] [address]  [address] [facility] [address]   [phone]            Mar 21, 2018  4:30 PM CDT   (Arrive by 4:00 PM)   Return Visit with Daya Gutierrez MD   [facility] Nephrology ([facility])    [address] [place] [address]  [address] [facility] [address]   [phone]            Apr 04, 2018  2:45 PM CDT   Lab with [facility]    Health Lab ([facility])    [address] [facility]  [address] [facility] [address]   [phone]            Apr 04, 2018  3:45 PM CDT   (Arrive by 3:30 PM)   Return Liver Transplant with Hussein Banegas MD   [facility] Hepatology ([facility])    [address] [place] [address]  [address] [facility] [id number] [phone]            May 21, 2018  3:00 PM CDT   (Arrive by 2:45 PM)   RETURN ENDOCRINE with Ruth Oakley MD   [facility] Endocrinology ([facility])    [address]  [address] [facility] [id number] [phone]              Future tests that were ordered for you today     Open Future Orders        Priority Expected Expires Ordered    Tacrolimus level Routine  12/20/2018 12/20/2017            Who to contact     If you have questions or need follow up information about today's clinic visit or your schedule please contact [facility] HEPATOLOGY directly at [phone].  Normal or non-critical lab and imaging results will be communicated to you by Alphionhart, letter or phone within 4 business days after the clinic has received the results. If you do not hear from us within 7 days, please contact the clinic through [employer] or phone. If you have a critical or abnormal lab result, we will notify you by phone as soon as possible.  Submit refill requests through [facility] [email or web address] or call your pharmacy and they will forward the refill request to us. Please allow 3 business days for your refill to be completed.          Additional Information About Your Visit        [facility] [email or web address] Information     [facility] [email or web address] gives you secure access to your electronic health record. If you see a primary care provider, you can also send messages to your care team and make appointments. If you have questions, please call your primary care clinic.  If you do not have a primary care provider, please call [phone] and they will assist you.        Care EveryWhere ID     This is your Care  "EveryWhere ID. This could be used by other organizations to access your [place] medical records  [id number]        Your Vitals Were     Pulse Temperature Respirations Height Pulse Oximetry BMI (Body Mass Index)    90 97.9  F (36.6  C) (Oral) 18 1.702 m (5' 7.01\") 97% 31.1 kg/m2       Blood Pressure from Last 3 Encounters:   12/20/17 118/77   11/20/17 137/79   10/25/17 158/80    Weight from Last 3 Encounters:   12/20/17 90.1 kg (198 lb 9.6 oz)   11/20/17 87 kg (191 lb 12.8 oz)   10/25/17 81.6 kg (180 lb)              Today, you had the following     No orders found for display       Primary Care Provider Office Phone # Fax #    Santosh Salgado [phone]       [facility] [address] [facility] [address]        Equal Access to Services     ALLY BERRY AH: Hadii aad ku hadasho Soomaali, waaxda luqadaha, qaybta kaalmada adeegyada, waxay kobein haygrzegorzn kay ambrose . So [facility] [phone].    ATENCIÓN: Si louisla español, tiene a reece disposición servicios gratuitos de asistencia lingüística. Llame al [phone].    We comply with applicable federal civil rights laws and [place] laws. We do not discriminate on the basis of race, color, national origin, age, disability, sex, sexual orientation, or gender identity.            Thank you!     Thank you for choosing [facility] HEPATOLOGY  for your care. Our goal is always to provide you with excellent care. Hearing back from our patients is one way we can continue to improve our services. Please take a few minutes to complete the written survey that you may receive in the mail after your visit with us. Thank you!             Your Updated Medication List - Protect others around you: Learn how to safely use, store and throw away your medicines at www.disposemymeds.org.          This list is accurate as of: 12/20/17  4:09 PM.  Always use your most recent med list.                   Brand Name Dispense Instructions for use Diagnosis    " acetaminophen 325 MG tablet    TYLENOL    100 tablet    Take 2 tablets (650 mg) by mouth every 6 hours as needed for mild pain Or fevers. Use sparingly. Limit use to no more than 2 grams (2000 mg) in 24 hours. **Further refills must be obtained by primary care provider**    Acute post-operative pain       amLODIPine 5 MG tablet    NORVASC    90 tablet    Take 1 tablet (5 mg) by mouth daily    Hypertension       azaTHIOprine 100 MG Tabs     90 tablet    Take 50 mg by mouth every evening    Liver transplanted (H)       betaxolol 0.5 % ophthalmic solution    BETOPTIC    5 mL    Place 1 drop into both eyes 2 times daily    Primary open angle glaucoma of both eyes, unspecified glaucoma stage       busPIRone 10 MG tablet    BUSPAR    60 tablet    Take 1 tablet (10 mg) by mouth 2 times daily    WALTER (generalized anxiety disorder)       calcium Citrate-vitamin D 500-400 MG-UNIT Chew      Take 1 tablet by mouth daily        chlorthalidone 25 MG tablet    HYGROTON    45 tablet    Take 0.5 tablets (12.5 mg) by mouth daily    CKD (chronic kidney disease) stage 3, GFR 30-59 ml/min, Fluid retention       cyanocobalamin 1000 MCG Tabs     30 tablet    Take 1,000 mcg by mouth daily    Liver transplanted (H)       FISH OIL PO      Take 645 mg by mouth 2 times daily        folic acid 1 MG tablet    FOLVITE    30 tablet    Take 1 tablet (1 mg) by mouth daily    Malnutrition (H)       levothyroxine 88 MCG tablet    SYNTHROID/LEVOTHROID    30 tablet    Take 1 tablet (88 mcg) by mouth every morning (before breakfast)    Thyroid function test abnormal       loperamide 2 MG capsule    IMODIUM     Take 2 mg by mouth 4 times daily as needed for diarrhea Reported on 5/18/2017        magnesium oxide 400 (241.3 MG) MG tablet    MAG-OX    60 tablet    Take 1 tablet (400 mg) by mouth daily    CKD (chronic kidney disease) stage 3, GFR 30-59 ml/min, Hypomagnesemia       multivitamin, therapeutic Tabs tablet     30 tablet    Take 1 tablet by mouth  every 24 hours    Malnutrition (H)       pantoprazole 40 MG EC tablet    PROTONIX    90 tablet    Take 1 tablet (40 mg) by mouth every morning (before breakfast)    Gastroesophageal reflux disease, esophagitis presence not specified       pramipexole 0.5 MG tablet    MIRAPEX    90 tablet    Take 1 tablet (0.5 mg) by mouth At Bedtime    Restless leg syndrome       psyllium 0.52 G capsule     60 capsule    Take 2 capsules (1.04 g) by mouth daily With a full glass of water.    Loose stools       simethicone 80 MG chewable tablet    MYLICON    180 tablet    Take 1 tablet (80 mg) by mouth every 6 hours as needed for flatulence or cramping    Flatulence, eructation, and gas pain       tacrolimus 0.5 MG capsule    GENERIC EQUIVALENT    240 capsule    Take 4 capsules (2 mg) by mouth 2 times daily    Liver transplanted (H)       TIZANIDINE HCL PO      Take 4 mg by mouth At Bedtime        traMADol 50 MG tablet    ULTRAM    15 tablet    Take 1-2 tablets ( mg) by mouth every 6 hours as needed for pain        traZODone 100 MG tablet    DESYREL    30 tablet    Take 1.5 tablets (150 mg) by mouth At Bedtime **Further refills must be obtained by primary care provider**    Insomnia, unspecified type       ursodiol 300 MG capsule    ACTIGALL    60 capsule    Take 1 capsule (300 mg) by mouth 2 times daily    Liver transplanted (H)

## 2017-12-20 NOTE — LETTER
12/20/2017       RE: Phan Luque  [address] [facility] [address]     Dear Colleague,    Thank you for referring your patient, Phan Luque, to the [facility]OLOG[facility] at [facility]. Please see a copy of my visit note below.    HISTORY OF PRESENT ILLNESS:  I had the pleasure of seeing Phan Luque for followup in the Liver Transplantation Clinic at the [facility] on 12/20/2017.  Ms. Luque returns for followup now 16 months status post liver transplantation for alcoholic hepatitis.        Unfortunately, she has experienced a slip in her alcohol use.  This was actually picked up by an ethyl glucuronide study which was fortunate.  She actually did enter alcohol treatment, which we are really quite gratified about and she has not had any alcohol now for approximately 3 months.      She feels relatively well.  She denies any abdominal pain, itching or skin rash.  She does complain of some fatigue.  She denies any increased abdominal girth or lower extremity edema.  She denies any fevers or chills, cough or shortness of breath.  She denies any nausea, vomiting, diarrhea or constipation.  Her appetite has been good and she is trying to watch her weight.        She does complain of multiple joint aches and joint pains since coming off the prednisone, and she may need to go back on some prednisone for that reason.       Current Outpatient Prescriptions   Medication     busPIRone (BUSPAR) 10 MG tablet     traZODone (DESYREL) 100 MG tablet     betaxolol (BETOPTIC) 0.5 % ophthalmic solution     chlorthalidone (HYGROTON) 25 MG tablet     pantoprazole (PROTONIX) 40 MG EC tablet     ursodiol (ACTIGALL) 300 MG capsule     TIZANIDINE HCL PO     traMADol (ULTRAM) 50 MG tablet     pramipexole (MIRAPEX) 0.5 MG tablet     amLODIPine (NORVASC) 5 MG tablet     azaTHIOprine 100 MG TABS     Omega-3 Fatty Acids (FISH OIL PO)     calcium Citrate-vitamin D  500-400 MG-UNIT CHEW     acetaminophen (TYLENOL) 325 MG tablet     loperamide (IMODIUM) 2 MG capsule     tacrolimus (PROGRAF - GENERIC EQUIVALENT) 0.5 MG capsule     simethicone (MYLICON) 80 MG chewable tablet     psyllium 0.52 G capsule     multivitamin, therapeutic (THERA-VIT) TABS tablet     levothyroxine (SYNTHROID/LEVOTHROID) 88 MCG tablet     folic acid (FOLVITE) 1 MG tablet     cyanocobalamin 1000 MCG TABS     loperamide (IMODIUM) 2 MG capsule     No current facility-administered medications for this visit.      B/P: 118/77, T: 97.9, P: 90, R: 18    HEENT exam shows no scleral icterus and no temporal muscle wasting.  Chest is clear.  Abdominal exam shows no increase in girth.  No masses or tenderness to palpation are present.  Liver is 10 cm in span without left lobe enlargement.  No spleen tip is palpable, and extremity exam shows no edema.  Skin exam shows no stigmata of chronic liver disease.  Neurologic exam is nonfocal.       Recent Results (from the past 168 hour(s))   CBC with platelets    Collection Time: 12/20/17  2:43 PM   Result Value Ref Range    WBC 5.6 4.0 - 11.0 10e9/L    RBC Count 3.65 (L) 3.8 - 5.2 10e12/L    Hemoglobin 12.0 11.7 - 15.7 g/dL    Hematocrit 36.0 35.0 - 47.0 %    MCV 99 78 - 100 fl    MCH 32.9 26.5 - 33.0 pg    MCHC 33.3 31.5 - 36.5 g/dL    RDW 12.9 10.0 - 15.0 %    Platelet Count 265 150 - 450 10e9/L   Basic metabolic panel    Collection Time: 12/20/17  2:43 PM   Result Value Ref Range    Sodium 140 133 - 144 mmol/L    Potassium 4.1 3.4 - 5.3 mmol/L    Chloride 103 94 - 109 mmol/L    Carbon Dioxide 29 20 - 32 mmol/L    Anion Gap 7 3 - 14 mmol/L    Glucose 85 70 - 99 mg/dL    Urea Nitrogen 11 7 - 30 mg/dL    Creatinine 1.25 (H) 0.52 - 1.04 mg/dL    GFR Estimate 44 (L) >60 mL/min/1.7m2    GFR Estimate If Black 53 (L) >60 mL/min/1.7m2    Calcium 8.6 8.5 - 10.1 mg/dL   Magnesium    Collection Time: 12/20/17  2:43 PM   Result Value Ref Range    Magnesium 1.6 1.6 - 2.3 mg/dL    Hepatic panel    Collection Time: 12/20/17  2:43 PM   Result Value Ref Range    Bilirubin Direct <0.1 0.0 - 0.2 mg/dL    Bilirubin Total 0.4 0.2 - 1.3 mg/dL    Albumin 3.4 3.4 - 5.0 g/dL    Protein Total 6.9 6.8 - 8.8 g/dL    Alkaline Phosphatase 54 40 - 150 U/L    ALT 20 0 - 50 U/L    AST 22 0 - 45 U/L   Lipid Profile    Collection Time: 12/20/17  2:43 PM   Result Value Ref Range    Cholesterol 147 <200 mg/dL    Triglycerides 230 (H) <150 mg/dL    HDL Cholesterol 51 >49 mg/dL    LDL Cholesterol Calculated 49 <100 mg/dL    Non HDL Cholesterol 96 <130 mg/dL   Protein  random urine with Creat Ratio    Collection Time: 12/20/17  2:50 PM   Result Value Ref Range    Protein Random Urine 0.06 g/L    Protein Total Urine g/gr Creatinine 0.12 0 - 0.2 g/g Cr   UA with Microscopic    Collection Time: 12/20/17  2:50 PM   Result Value Ref Range    Color Urine Yellow     Appearance Urine Clear     Glucose Urine Negative NEG^Negative mg/dL    Bilirubin Urine Negative NEG^Negative    Ketones Urine Negative NEG^Negative mg/dL    Specific Gravity Urine 1.014 1.003 - 1.035    Blood Urine Negative NEG^Negative    pH Urine 7.0 5.0 - 7.0 pH    Protein Albumin Urine Negative NEG^Negative mg/dL    Urobilinogen mg/dL 0.0 0.0 - 2.0 mg/dL    Nitrite Urine Negative NEG^Negative    Leukocyte Esterase Urine Negative NEG^Negative    Source Midstream Urine     WBC Urine 6 (H) 0 - 2 /HPF    RBC Urine 2 0 - 2 /HPF    Bacteria Urine Many (A) NEG^Negative /HPF    Mucous Urine Present (A) NEG^Negative /LPF   Creatinine urine calculation only    Collection Time: 12/20/17  2:50 PM   Result Value Ref Range    Creatinine Urine 56 mg/dL      My impression is that Ms. Luque is doing well now and back on an alcohol treatment program with 3 months of sobriety of which she was congratulated on.  Her liver function tests are completely normal.  As I mentioned above, she will need back on prednisone for what appears to be some post-steroid withdrawal  rheumatism.  She is otherwise up-to-date on her vaccines and cancer screening, and my plan will be to see her back in the clinic again in 3 months.      Thank you very much for allowing me to participate in the care of this patient.  If you have any questions regarding my recommendations, please do not hesitate to contact me.     Hussein Banegas MD

## 2017-12-20 NOTE — PATIENT INSTRUCTIONS
"You are over all doing very well.  You proved that the PPI medication  Pantoprazole (Protonix) is necessary for you not to vomit in the morning.    You have significantly less diarrhea since stopping the magnesium. If you don't need magnesium, great.    If loperamide (Imodium) , try taking one and not repeating it.  Another time, try taking only 1/2 of one. Repeat as needed.    Also,   Any time there is urgent loose stool, write down what you have eaten and drunk in the previous 24 hours and note what your bowel habit has been the 3 days before. Keep these records for review to see whether a food or food group is causing the episode or whether stool overload is causing a natural \"clean out\".    Good, you know how to use the psyllium. This may always be taken two times daily or three times daily, as powder or capsule, increased in quantity, as needed.    PLEASE ask your primary provider to order a screening colonoscopy.  Yes, best when you can have your son or someone home to be with you.    Return to GI Clinic as needed.      [facility]  Until Feb 22 lidia   For appointments call [place] [phone]  For GI Nurse Triage  [phone], then, \"For Medical Questions, option 3\"  Fax results to         "

## 2017-12-20 NOTE — NURSING NOTE
"Chief Complaint   Patient presents with     RECHECK     F/U       Vitals:    12/20/17 1638   BP: 134/79   BP Location: Left arm   Patient Position: Chair   Cuff Size: Adult Regular   Pulse: 94   Temp: 98.5  F (36.9  C)   SpO2: 97%   Weight: 198 lb 9.6 oz   Height: 5' 7.01\"       Body mass index is 31.1 kg/(m^2).      Stephanie Mcdowell                          "
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
## 2017-12-20 NOTE — MR AVS SNAPSHOT
"              After Visit Summary   12/20/2017    Phan Luque    MRN: [id number]           Patient Information     Date Of Birth          1955        Visit Information        Provider Department      12/20/2017 4:30 PM Shahrzad Pena APRN CNP [facility]        Today's Diagnoses     Functional diarrhea    -  1      Care Instructions    You are over all doing very well.  You proved that the PPI medication  Pantoprazole (Protonix) is necessary for you not to vomit in the morning.    You have significantly less diarrhea since stopping the magnesium. If you don't need magnesium, great.    If loperamide (Imodium) , try taking one and not repeating it.  Another time, try taking only 1/2 of one. Repeat as needed.    Also,   Any time there is urgent loose stool, write down what you have eaten and drunk in the previous 24 hours and note what your bowel habit has been the 3 days before. Keep these records for review to see whether a food or food group is causing the episode or whether stool overload is causing a natural \"clean out\".    Good, you know how to use the psyllium. This may always be taken two times daily or three times daily, as powder or capsule, increased in quantity, as needed.    PLEASE ask your primary provider to order a screening colonoscopy.  Yes, best when you can have your son or someone home to be with you.    Return to GI Clinic as needed.      [facility]  Until Feb 22 lidia   For appointments call [place] [phone]  For GI Nurse Triage  [phone], then, \"For Medical Questions, option 3\"  Fax results to                 Follow-ups after your visit        Follow-up notes from your care team     Return if symptoms worsen or fail to improve.      Your next 10 appointments already scheduled     Dec 27, 2017  7:30 AM CST   Lab visit with EA LAB   [facility] ([facility]    [address] [place] [address] " Drive  [address]  [facility] [address] [phone]           Please do not eat 10-12 hours before your appointment if you are coming in fasting for labs on lipids, cholesterol, or glucose (sugar). Does not apply to pregnant women.  Water with medications is okay. Do not drink coffee or other fluids.  If you have concerns about taking your medications, please send a message by clicking on Secure Messaging, Message Your Care Team.            Dec 28, 2017  9:55 AM CST   (Arrive by 9:40 AM)   New Patient Visit with Arlyn Sanchez MD   [facility] Primary Care Clinic ([facility])    [address] [place] [address]  4th [facility] [address]   [phone]            Jan 26, 2018  3:30 PM CST   Adult Med Follow UP with DONOVAN Blake [facility]   Psychiatry Clinic ([facility])    [place] [id number]  [address] [facility] [address]   [phone]            Feb 05, 2018  4:30 PM CST   (Arrive by 4:15 PM)   New Patient Visit with Demetria Barger PA-C   [facility] Dermatology ([facility])    [address] [place] [address]  3rd [facility] [address]   [phone]            Mar 21, 2018  3:30 PM CDT   Lab with  LAB   [facility] Lab ([facility])    [address] [place] [address]  1st [facility] [address]   [phone]            Mar 21, 2018  4:30 PM CDT   (Arrive by 4:00 PM)   Return Visit with Daya Gutierrez MD   [facility] Nephrology ([facility])    [address] [place] [address]  [address] [facility] [address]   [phone]            Apr 04, 2018  2:45 PM CDT   Lab with  LAB   [facility] Lab ([facility])    [address] [place] [address]  [address] [facility] [address]   [phone]            Apr 04, 2018  3:45 PM CDT   (Arrive by 3:30 PM)   Return Liver Transplant with Hussein Banegas MD   [facility] Hepatology ([facility])     [address] [address] [phone]            May 21, 2018  3:00 PM CDT   (Arrive by 2:45 PM)   RETURN ENDOCRINE with Ruth Oakley MD   [facility] Endocrinology ([facility])    [address] [id number] [phone]              Future tests that were ordered for you today     Open Future Orders        Priority Expected Expires Ordered    Tacrolimus level Routine  12/20/2018 12/20/2017            Who to contact     Please call your clinic at [phone] to:    Ask questions about your health    Make or cancel appointments    Discuss your medicines    Learn about your test results    Speak to your doctor   If you have compliments or concerns about an experience at your clinic, or if you wish to file a complaint, please contact [facility] Patient Relations at [phone] or email us at [email or web address]         Additional Information About Your Visit        [employer] Information     [employer] gives you secure access to your electronic health record. If you see a primary care provider, you can also send messages to your care team and make appointments. If you have questions, please call your primary care clinic.  If you do not have a primary care provider, please call [phone] and they will assist you.      [employer] is an electronic gateway that provides easy, online access to your medical records. With [employer], you can request a clinic appointment, read your test results, renew a prescription or communicate with your care team.     To access your existing account, please contact your [facility] or call [phone] for assistance.        Care EveryWhere ID     This is your Care EveryWhere ID. This could be used by other organizations to access your [place] medical records  [id number]        Your Vitals Were     Pulse Temperature  "Height Pulse Oximetry BMI (Body Mass Index)       94 98.5  F (36.9  C) 1.702 m (5' 7.01\") 97% 31.1 kg/m2        Blood Pressure from Last 3 Encounters:   12/20/17 134/79   12/20/17 118/77   11/20/17 137/79    Weight from Last 3 Encounters:   12/20/17 90.1 kg (198 lb 9.6 oz)   12/20/17 90.1 kg (198 lb 9.6 oz)   11/20/17 87 kg (191 lb 12.8 oz)              Today, you had the following     No orders found for display         Today's Medication Changes          These changes are accurate as of: 12/20/17  5:34 PM.  If you have any questions, ask your nurse or doctor.               These medicines have changed or have updated prescriptions.        Dose/Directions    * loperamide 2 MG capsule   Commonly known as:  IMODIUM   This may have changed:  Another medication with the same name was added. Make sure you understand how and when to take each.   Changed by:  Hussein Banegas MD        Dose:  2 mg   Take 2 mg by mouth 4 times daily as needed for diarrhea Reported on 5/18/2017   Refills:  0       * loperamide 2 MG capsule   Commonly known as:  IMODIUM   This may have changed:  You were already taking a medication with the same name, and this prescription was added. Make sure you understand how and when to take each.   Used for:  Functional diarrhea   Changed by:  Shahrzad Pena APRN CNP        Dose:  2 mg   Take 1 capsule (2 mg) by mouth 2 times daily as needed for diarrhea   Quantity:  40 capsule   Refills:  3       * Notice:  This list has 2 medication(s) that are the same as other medications prescribed for you. Read the directions carefully, and ask your doctor or other care provider to review them with you.         Where to get your medicines      These medications were sent to [facility] - [place], [place] - [address] [place] [address] AVE   [address] [facility] [address] [address] [facility] [address]    Hours:  Mon-Fri 8:30am-5:00pm Toll Free [phone] Phone:  [phone]     loperamide 2 MG capsule                " Primary Care Provider Office Phone # Fax #    Santosh Salgado [phone]       [address]   [place] [address]        Equal Access to Services     FARHADALLY FARRMARQUISE : Hadangelo lamb ku hadzenobiao Sokateali, waaxda luqadaha, qaybta kaalmada adebrendayada, anna conradfabio alexandre. So [facility] [phone].    ATENCIÓN: Si habla español, tiene a reece disposición servicios gratuitos de asistencia lingüística. Kathiame al [phone].    We comply with applicable federal civil rights laws and [place] laws. We do not discriminate on the basis of race, color, national origin, age, disability, sex, sexual orientation, or gender identity.            Thank you!     Thank you for choosing [facility]  for your care. Our goal is always to provide you with excellent care. Hearing back from our patients is one way we can continue to improve our services. Please take a few minutes to complete the written survey that you may receive in the mail after your visit with us. Thank you!             Your Updated Medication List - Protect others around you: Learn how to safely use, store and throw away your medicines at www.disposemymeds.org.          This list is accurate as of: 12/20/17  5:34 PM.  Always use your most recent med list.                   Brand Name Dispense Instructions for use Diagnosis    acetaminophen 325 MG tablet    TYLENOL    100 tablet    Take 2 tablets (650 mg) by mouth every 6 hours as needed for mild pain Or fevers. Use sparingly. Limit use to no more than 2 grams (2000 mg) in 24 hours. **Further refills must be obtained by primary care provider**    Acute post-operative pain       amLODIPine 5 MG tablet    NORVASC    90 tablet    Take 1 tablet (5 mg) by mouth daily    Hypertension       azaTHIOprine 100 MG Tabs     90 tablet    Take 50 mg by mouth every evening    Liver transplanted (H)       betaxolol 0.5 % ophthalmic solution    BETOPTIC    5 mL     Place 1 drop into both eyes 2 times daily    Primary open angle glaucoma of both eyes, unspecified glaucoma stage       busPIRone 10 MG tablet    BUSPAR    60 tablet    Take 1 tablet (10 mg) by mouth 2 times daily    WALTER (generalized anxiety disorder)       calcium Citrate-vitamin D 500-400 MG-UNIT Chew      Take 1 tablet by mouth daily        chlorthalidone 25 MG tablet    HYGROTON    45 tablet    Take 0.5 tablets (12.5 mg) by mouth daily    CKD (chronic kidney disease) stage 3, GFR 30-59 ml/min, Fluid retention       cyanocobalamin 1000 MCG Tabs     30 tablet    Take 1,000 mcg by mouth daily    Liver transplanted (H)       FISH OIL PO      Take 645 mg by mouth 2 times daily        folic acid 1 MG tablet    FOLVITE    30 tablet    Take 1 tablet (1 mg) by mouth daily    Malnutrition (H)       levothyroxine 88 MCG tablet    SYNTHROID/LEVOTHROID    30 tablet    Take 1 tablet (88 mcg) by mouth every morning (before breakfast)    Thyroid function test abnormal       * loperamide 2 MG capsule    IMODIUM     Take 2 mg by mouth 4 times daily as needed for diarrhea Reported on 5/18/2017        * loperamide 2 MG capsule    IMODIUM    40 capsule    Take 1 capsule (2 mg) by mouth 2 times daily as needed for diarrhea    Functional diarrhea       multivitamin, therapeutic Tabs tablet     30 tablet    Take 1 tablet by mouth every 24 hours    Malnutrition (H)       pantoprazole 40 MG EC tablet    PROTONIX    90 tablet    Take 1 tablet (40 mg) by mouth every morning (before breakfast)    Gastroesophageal reflux disease, esophagitis presence not specified       pramipexole 0.5 MG tablet    MIRAPEX    90 tablet    Take 1 tablet (0.5 mg) by mouth At Bedtime    Restless leg syndrome       psyllium 0.52 G capsule     60 capsule    Take 2 capsules (1.04 g) by mouth daily With a full glass of water.    Loose stools       simethicone 80 MG chewable tablet    MYLICON    180 tablet    Take 1 tablet (80 mg) by mouth every 6 hours as needed  for flatulence or cramping    Flatulence, eructation, and gas pain       tacrolimus 0.5 MG capsule    GENERIC EQUIVALENT    240 capsule    Take 4 capsules (2 mg) by mouth 2 times daily    Liver transplanted (H)       TIZANIDINE HCL PO      Take 4 mg by mouth At Bedtime        traMADol 50 MG tablet    ULTRAM    15 tablet    Take 1-2 tablets ( mg) by mouth every 6 hours as needed for pain        traZODone 100 MG tablet    DESYREL    30 tablet    Take 1.5 tablets (150 mg) by mouth At Bedtime **Further refills must be obtained by primary care provider**    Insomnia, unspecified type       ursodiol 300 MG capsule    ACTIGALL    60 capsule    Take 1 capsule (300 mg) by mouth 2 times daily    Liver transplanted (H)       * Notice:  This list has 2 medication(s) that are the same as other medications prescribed for you. Read the directions carefully, and ask your doctor or other care provider to review them with you.

## 2017-12-20 NOTE — MR AVS SNAPSHOT
After Visit Summary   12/20/2017    Phan Luque    MRN: [id number]           Patient Information     Date Of Birth          1955        Visit Information        Provider Department      12/20/2017 4:23 PM Annie Manjarrez LICSW [facility] Solid Organ Transplant        Today's Diagnoses     Status post liver transplantation (H)    -  1       Follow-ups after your visit        Your next 10 appointments already scheduled     Mar 21, 2018  3:30 PM CDT   Lab with UC LAB   [facility] Lab ([facility])    [address] [place] [address]  1st [facility] [address]   [phone]            Mar 21, 2018  4:30 PM CDT   (Arrive by 4:00 PM)   Return Visit with Daya Gutierrez MD   [facility] Nephrology ([facility])    [address] [place] [address]  [address]  [facility] [address]   [phone]            Mar 30, 2018  3:30 PM CDT   Adult Med Follow UP with DONOVAN Blake [facility]   [facility]    [place] [id number]  [address] [place] [address] [phone]            Apr 02, 2018  9:00 AM CDT   (Arrive by 8:45 AM)   Return Visit with Arlyn Sanchez MD   [facility] Primary Care Clinic ([facility])    [address] [place] [address]  4th [facility] [address] [phone]            Apr 03, 2018  4:20 PM CDT   (Arrive by 4:05 PM)   Return Visit with Donnell Duvall MD   [facility] Orthopaedic Clinic ([facility])    [address] [place] [address]  4th Floor  [facility] [address] [phone]            Apr 04, 2018  7:30 AM CDT   LAB with EA LAB   [facility]    [address] [place] [address]  [address]  [facility] [id number] [phone]           Please do not eat 10-12 hours before your appointment if you are coming in fasting for labs on lipids, cholesterol, or glucose (sugar). This does not  apply to pregnant women. Water, hot tea and black coffee (with nothing added) are okay. Do not drink other fluids, diet soda or chew gum.            Apr 04, 2018  3:45 PM CDT   (Arrive by 3:30 PM)   Return Liver Transplant with Hussein Banegas MD   [facility] Hepatology ([facility])    [address] [place] [address]  [address]  [facility] [address] [phone]            May 21, 2018  3:00 PM CDT   (Arrive by 2:45 PM)   RETURN ENDOCRINE with Ruth Oakley MD   [facility] Endocrinology ([facility])    [address] [place] [address]  3rd Floor  [facility] [address] [phone]            Josue 15, 2018   Procedure with Tao Alfonso MD   [facility] ([facility])    [address] [place] [address]  5th Floor  [facility] [address] [phone]           Located in the Clinics and Surgery Center at [address] [place] [address].   parking is very convenient and highly recommended.  is a $6 flat rate fee.  Both  and self parkers should enter the main arrival plaza from [facility]; parking attendants will direct you based on your parking preference.              Future tests that were ordered for you today     Open Future Orders        Priority Expected Expires Ordered    Hemoglobin Routine 3/21/2018 6/17/2018 3/19/2018    Protein  random urine with Creat Ratio Routine 3/21/2018 6/17/2018 3/19/2018    Renal panel Routine 3/21/2018 6/17/2018 3/19/2018            Who to contact     If you have questions or need follow up information about today's clinic visit or your schedule please contact [facility] directly at [phone].  Normal or non-critical lab and imaging results will be communicated to you by MyChart, letter or phone within 4 business days after the clinic has received the results. If you do not hear from us within 7 days, please contact the clinic through [employer] or  phone. If you have a critical or abnormal lab result, we will notify you by phone as soon as possible.  Submit refill requests through [employer] or call your pharmacy and they will forward the refill request to us. Please allow 3 business days for your refill to be completed.          Additional Information About Your Visit        [employer] Information     [employer] gives you secure access to your electronic health record. If you see a primary care provider, you can also send messages to your care team and make appointments. If you have questions, please call your primary care clinic.  If you do not have a primary care provider, please call [phone] and they will assist you.        Care EveryWhere ID     This is your Care EveryWhere ID. This could be used by other organizations to access your [place] medical records  [id number]         Blood Pressure from Last 3 Encounters:   03/01/18 148/82   03/01/18 148/82   12/20/17 134/79    Weight from Last 3 Encounters:   03/01/18 93.9 kg (207 lb)   03/01/18 93.9 kg (207 lb 1.6 oz)   12/20/17 90.1 kg (198 lb 9.6 oz)              Today, you had the following     No orders found for display         Today's Medication Changes          These changes are accurate as of 12/20/17 11:59 PM.  If you have any questions, ask your nurse or doctor.               Start taking these medicines.        Dose/Directions    loperamide 2 MG capsule   Commonly known as:  IMODIUM   Used for:  Functional diarrhea   Started by:  Shahrzad Pena APRN CNP        Dose:  2 mg   Take 1 capsule (2 mg) by mouth 2 times daily as needed for diarrhea   Quantity:  40 capsule   Refills:  3            Where to get your medicines      These medications were sent to [facility] - [place], [place] - [address] [place] [address] AVE   [address] [place] [address], [facility] [address]    Hours:  Mon-Fri 8:30am-5:00pm Toll Free [phone] Phone:  [phone]     loperamide 2 MG capsule                 Primary Care Provider Office Phone # Fax #    Santosh Salgado [phone]       [address]   [place] [address]        Equal Access to Services     FARHADALLY FARRMARQUISE : Hadangelo lamb ku hadzenobiao Soomaali, waaxda luqadaha, qaybta kaalmada adebrendayada, anna conradfabio alexandre. So [facility] [phone].    ATENCIÓN: Si habla español, tiene a reece disposición servicios gratuitos de asistencia lingüística. Kathiame al [phone].    We comply with applicable federal civil rights laws and [place] laws. We do not discriminate on the basis of race, color, national origin, age, disability, sex, sexual orientation, or gender identity.            Thank you!     Thank you for choosing [facility]  for your care. Our goal is always to provide you with excellent care. Hearing back from our patients is one way we can continue to improve our services. Please take a few minutes to complete the written survey that you may receive in the mail after your visit with us. Thank you!             Your Updated Medication List - Protect others around you: Learn how to safely use, store and throw away your medicines at www.disposemymeds.org.          This list is accurate as of 12/20/17 11:59 PM.  Always use your most recent med list.                   Brand Name Dispense Instructions for use Diagnosis    acetaminophen 325 MG tablet    TYLENOL    100 tablet    Take 2 tablets (650 mg) by mouth every 6 hours as needed for mild pain Or fevers. Use sparingly. Limit use to no more than 2 grams (2000 mg) in 24 hours. **Further refills must be obtained by primary care provider**    Acute post-operative pain       amLODIPine 5 MG tablet    NORVASC    90 tablet    Take 1 tablet (5 mg) by mouth daily    Hypertension       azaTHIOprine 100 MG Tabs     90 tablet    Take 50 mg by mouth every evening    Liver transplanted (H)       betaxolol 0.5 % ophthalmic solution    BETOPTIC    5 mL    Place 1  drop into both eyes 2 times daily    Primary open angle glaucoma of both eyes, unspecified glaucoma stage       calcium Citrate-vitamin D 500-400 MG-UNIT Chew      Take 1 tablet by mouth daily        chlorthalidone 25 MG tablet    HYGROTON    45 tablet    Take 0.5 tablets (12.5 mg) by mouth daily    CKD (chronic kidney disease) stage 3, GFR 30-59 ml/min, Fluid retention       folic acid 1 MG tablet    FOLVITE    30 tablet    Take 1 tablet (1 mg) by mouth daily    Malnutrition (H)       loperamide 2 MG capsule    IMODIUM    40 capsule    Take 1 capsule (2 mg) by mouth 2 times daily as needed for diarrhea    Functional diarrhea       multivitamin, therapeutic Tabs tablet     30 tablet    Take 1 tablet by mouth every 24 hours    Malnutrition (H)       pantoprazole 40 MG EC tablet    PROTONIX    90 tablet    Take 1 tablet (40 mg) by mouth every morning (before breakfast)    Gastroesophageal reflux disease, esophagitis presence not specified       pramipexole 0.5 MG tablet    MIRAPEX    90 tablet    Take 1 tablet (0.5 mg) by mouth At Bedtime    Restless leg syndrome       psyllium 0.52 G capsule     60 capsule    Take 2 capsules (1.04 g) by mouth daily With a full glass of water.    Loose stools       simethicone 80 MG chewable tablet    MYLICON    180 tablet    Take 1 tablet (80 mg) by mouth every 6 hours as needed for flatulence or cramping    Flatulence, eructation, and gas pain       traMADol 50 MG tablet    ULTRAM    15 tablet    Take 1-2 tablets ( mg) by mouth every 6 hours as needed for pain        ursodiol 300 MG capsule    ACTIGALL    60 capsule    Take 1 capsule (300 mg) by mouth 2 times daily    Liver transplanted (H)

## 2017-12-21 NOTE — PROGRESS NOTES
CHIEF COMPLAINT:  Followup regarding abdominal pain and diarrhea in post-transplant woman.      HISTORY OF PRESENT ILLNESS:  Phan is a 52-year-old woman whom I met in 11/2016 primarily regarding diarrhea.  It improved considerably both with prescription rifaximin (Xifaxan) and when she had the addition of Benefiber or psyllium while on tube feedings in long-term care.  Indeed, she had developed constipation at the time of the last visit and intermittent diarrhea.  She was given some advice on managing constipation which was likely the cause of her gas, advised to call if she had persistent diarrhea to have stool studies for infection, consider breath testing if stool studies were negative and she had gassy diarrhea.  Long ago, she did have benefit from rifaximin.  We briefly recalled GERD precautions, and she was continuing on Protonix.      Phan had several medications stopped at her September visit including the Protonix.  Within a couple of weeks, she had return of her daily morning vomiting.  After 3 steady days of vomiting, she restarted Protonix and the vomiting stopped again.  Thus, it was not the prednisone that had caused or stopped her vomiting.      Phan also has stopped her magnesium and has infrequent or seldom diarrhea.  She is no longer regularly using any loperamide.  If she needs it, she sometimes needs 1 and then hour and a half later will have diarrhea and take a second.  She has not yet tried not taking the second loperamide (Imodium) when at home.      Phan has nausea which is random.  Yesterday, she it had at all day and does not know why.  She did not have diarrhea or constipation yesterday to her awareness.  Gassiness does not occur with her diarrhea, though the diarrhea is somewhat urgent.  She seldom uses simethicone nowadays for gassiness.      MEDICAL HISTORY:     1.  Liver transplant for acute alcoholic hepatitis 08/25/2016.   2.  GERD, hypothyroidism, osteopenia, CKD,  depression, insomnia.      MEDICATIONS:  Reviewed and somewhat further updated.      ADVERSE DRUG REACTIONS:  Reviewed.      FAMILY HISTORY:  Negative for colon cancer.      SOCIAL HISTORY:  Working at [employer].  Feels she returned to work too early but is managing okay now.  She has 3 children and some grandchildren.      Phan has been invited by her donor family to visit them on a special weekend 01/20 - 01/21 when other recipients will be present for a special celebration.  There will probably be media present at their [employer] in [place].  Phan is allowed to have any of her family come with her.      REVIEW OF SYSTEMS:  Overall feeling well despite the fatigue.  Questionnaire reviewed and discussed.  GI symptoms are not frequent and not necessarily within the last 2 weeks, she confirms.      OBJECTIVE:   VITAL SIGNS:  Reviewed.  Overall, normal.  BMI 31.   GENERAL:  Clean, pleasant, woman with some style who appears well-nourished.  Her eyes are clear and bright.  Her speech is fluent and logical.   RESPIRATIONS:  Breathing is calm and grossly normal.   ABDOMEN:  She gestures -- nontender abdomen.      RESULTS:  Reviewed.  Curious that her MCV is that variable when she is on a steady dose of azathioprine.  Note slight drop in RBC count at 3.65.      ASSESSMENT:  A 62-year-old woman 16 months after liver transplant for acute alcoholic hepatitis who is overall much improved regarding her GI complaints.  She is due for colonoscopy 2017 and has not managed this thus far.  She is hoping to consolidate care in the [facility] system.  She is also planning to schedule her colonoscopy for this spring when her son will visit home and can bring her to and from the procedure.        Phan provewd that she does better with Protonix, preventing daily morning vomiting.  She proved earlier doing better with soluble fiber and has demonstrated some understanding of how to use the loperamide, though often runs  into constipation if she uses the 2 tablets over 2 hours.      PLAN:   1.  Continue the infrequent use of loperamide, 1/2 caplet or 1, attempt not to use the repeat tablet sometimes when she is safe not to use it.   2.  Continue on acid-reducing medication.   3.  Soluble fiber remains recommended -- she is using psyllium capsules which are not covered by insurance.   4.  She believes she is considerably better without the magnesium, though it was not high dose.   5.  Any time she has urgent loose stool, record ingestions of the previous 24 hours and bowel habit of the previous 3 days.   6.  Please ask her primary provider to order a screening colonoscopy for her.   7.  Return to GI Clinic only as needed.      We reviewed Phan's symptom history, medication trials, results, current status.  Written notes were provided for instructions.  She demonstrated understanding and remembering the majority of these.      Total visit 20 minutes with counseling time 15 minutes.         DONOVAN CANTRELL CNP             D: 2017 19:17   T: 2017 12:45   MT: ALEX      Name:     PHAN FARMER   MRN:      [id number]        Account:      [id number]   :      1955           Service Date: 2017      Document: [id number]

## 2017-12-21 NOTE — PROGRESS NOTES
HISTORY OF PRESENT ILLNESS:  I had the pleasure of seeing Megan Luque for followup in the Liver Transplantation Clinic at the [facility] on 12/20/2017.  Ms. Luque returns for followup now 16 months status post liver transplantation for alcoholic hepatitis.        Unfortunately, she has experienced a slip in her alcohol use.  This was actually picked up by an ethyl glucuronide study which was fortunate.  She actually did enter alcohol treatment, which we are really quite gratified about and she has not had any alcohol now for approximately 3 months.      She feels relatively well.  She denies any abdominal pain, itching or skin rash.  She does complain of some fatigue.  She denies any increased abdominal girth or lower extremity edema.  She denies any fevers or chills, cough or shortness of breath.  She denies any nausea, vomiting, diarrhea or constipation.  Her appetite has been good and she is trying to watch her weight.        She does complain of multiple joint aches and joint pains since coming off the prednisone, and she may need to go back on some prednisone for that reason.       Current Outpatient Prescriptions   Medication     busPIRone (BUSPAR) 10 MG tablet     traZODone (DESYREL) 100 MG tablet     betaxolol (BETOPTIC) 0.5 % ophthalmic solution     chlorthalidone (HYGROTON) 25 MG tablet     pantoprazole (PROTONIX) 40 MG EC tablet     ursodiol (ACTIGALL) 300 MG capsule     TIZANIDINE HCL PO     traMADol (ULTRAM) 50 MG tablet     pramipexole (MIRAPEX) 0.5 MG tablet     amLODIPine (NORVASC) 5 MG tablet     azaTHIOprine 100 MG TABS     Omega-3 Fatty Acids (FISH OIL PO)     calcium Citrate-vitamin D 500-400 MG-UNIT CHEW     acetaminophen (TYLENOL) 325 MG tablet     loperamide (IMODIUM) 2 MG capsule     tacrolimus (PROGRAF - GENERIC EQUIVALENT) 0.5 MG capsule     simethicone (MYLICON) 80 MG chewable tablet     psyllium 0.52 G capsule     multivitamin, therapeutic (THERA-VIT) TABS  tablet     levothyroxine (SYNTHROID/LEVOTHROID) 88 MCG tablet     folic acid (FOLVITE) 1 MG tablet     cyanocobalamin 1000 MCG TABS     loperamide (IMODIUM) 2 MG capsule     No current facility-administered medications for this visit.      B/P: 118/77, T: 97.9, P: 90, R: 18    HEENT exam shows no scleral icterus and no temporal muscle wasting.  Chest is clear.  Abdominal exam shows no increase in girth.  No masses or tenderness to palpation are present.  Liver is 10 cm in span without left lobe enlargement.  No spleen tip is palpable, and extremity exam shows no edema.  Skin exam shows no stigmata of chronic liver disease.  Neurologic exam is nonfocal.       Recent Results (from the past 168 hour(s))   CBC with platelets    Collection Time: 12/20/17  2:43 PM   Result Value Ref Range    WBC 5.6 4.0 - 11.0 10e9/L    RBC Count 3.65 (L) 3.8 - 5.2 10e12/L    Hemoglobin 12.0 11.7 - 15.7 g/dL    Hematocrit 36.0 35.0 - 47.0 %    MCV 99 78 - 100 fl    MCH 32.9 26.5 - 33.0 pg    MCHC 33.3 31.5 - 36.5 g/dL    RDW 12.9 10.0 - 15.0 %    Platelet Count 265 150 - 450 10e9/L   Basic metabolic panel    Collection Time: 12/20/17  2:43 PM   Result Value Ref Range    Sodium 140 133 - 144 mmol/L    Potassium 4.1 3.4 - 5.3 mmol/L    Chloride 103 94 - 109 mmol/L    Carbon Dioxide 29 20 - 32 mmol/L    Anion Gap 7 3 - 14 mmol/L    Glucose 85 70 - 99 mg/dL    Urea Nitrogen 11 7 - 30 mg/dL    Creatinine 1.25 (H) 0.52 - 1.04 mg/dL    GFR Estimate 44 (L) >60 mL/min/1.7m2    GFR Estimate If Black 53 (L) >60 mL/min/1.7m2    Calcium 8.6 8.5 - 10.1 mg/dL   Magnesium    Collection Time: 12/20/17  2:43 PM   Result Value Ref Range    Magnesium 1.6 1.6 - 2.3 mg/dL   Hepatic panel    Collection Time: 12/20/17  2:43 PM   Result Value Ref Range    Bilirubin Direct <0.1 0.0 - 0.2 mg/dL    Bilirubin Total 0.4 0.2 - 1.3 mg/dL    Albumin 3.4 3.4 - 5.0 g/dL    Protein Total 6.9 6.8 - 8.8 g/dL    Alkaline Phosphatase 54 40 - 150 U/L    ALT 20 0 - 50 U/L    AST  22 0 - 45 U/L   Lipid Profile    Collection Time: 12/20/17  2:43 PM   Result Value Ref Range    Cholesterol 147 <200 mg/dL    Triglycerides 230 (H) <150 mg/dL    HDL Cholesterol 51 >49 mg/dL    LDL Cholesterol Calculated 49 <100 mg/dL    Non HDL Cholesterol 96 <130 mg/dL   Protein  random urine with Creat Ratio    Collection Time: 12/20/17  2:50 PM   Result Value Ref Range    Protein Random Urine 0.06 g/L    Protein Total Urine g/gr Creatinine 0.12 0 - 0.2 g/g Cr   UA with Microscopic    Collection Time: 12/20/17  2:50 PM   Result Value Ref Range    Color Urine Yellow     Appearance Urine Clear     Glucose Urine Negative NEG^Negative mg/dL    Bilirubin Urine Negative NEG^Negative    Ketones Urine Negative NEG^Negative mg/dL    Specific Gravity Urine 1.014 1.003 - 1.035    Blood Urine Negative NEG^Negative    pH Urine 7.0 5.0 - 7.0 pH    Protein Albumin Urine Negative NEG^Negative mg/dL    Urobilinogen mg/dL 0.0 0.0 - 2.0 mg/dL    Nitrite Urine Negative NEG^Negative    Leukocyte Esterase Urine Negative NEG^Negative    Source Midstream Urine     WBC Urine 6 (H) 0 - 2 /HPF    RBC Urine 2 0 - 2 /HPF    Bacteria Urine Many (A) NEG^Negative /HPF    Mucous Urine Present (A) NEG^Negative /LPF   Creatinine urine calculation only    Collection Time: 12/20/17  2:50 PM   Result Value Ref Range    Creatinine Urine 56 mg/dL      My impression is that Ms. Luque is doing well now and back on an alcohol treatment program with 3 months of sobriety of which she was congratulated on.  Her liver function tests are completely normal.  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  She is otherwise up-to-date on her vaccines and cancer screening, and my plan will be to see her back in the clinic again in 3 months.      Thank you very much for allowing me to participate in the care of this patient.  If you have any questions regarding my recommendations, please do  not hesitate to contact me.       Hussein Banegas MD      Professor of Medicine  [employer]      Executive Medical Director, Solid Organ Transplant Program  [facility]

## 2017-12-22 LAB — ETHYL GLUCURONIDE UR QL: NEGATIVE

## 2017-12-26 ASSESSMENT — PATIENT HEALTH QUESTIONNAIRE - PHQ9: SUM OF ALL RESPONSES TO PHQ QUESTIONS 1-9: 8

## 2017-12-27 DIAGNOSIS — Z94.4 LIVER REPLACED BY TRANSPLANT (H): ICD-10-CM

## 2017-12-27 LAB
TACROLIMUS BLD-MCNC: 5.3 UG/L (ref 5–15)
TME LAST DOSE: NORMAL H

## 2017-12-27 PROCEDURE — 36415 COLL VENOUS BLD VENIPUNCTURE: CPT | Performed by: INTERNAL MEDICINE

## 2017-12-27 PROCEDURE — 80197 ASSAY OF TACROLIMUS: CPT | Performed by: INTERNAL MEDICINE

## 2017-12-28 ASSESSMENT — ENCOUNTER SYMPTOMS
CONSTIPATION: 1
RECTAL PAIN: 0
BLOATING: 1
DIARRHEA: 1
HEARTBURN: 1
VOMITING: 1
JAUNDICE: 0
NAUSEA: 1
BOWEL INCONTINENCE: 1
ABDOMINAL PAIN: 1
BLOOD IN STOOL: 0

## 2018-01-09 ENCOUNTER — COMMUNICATION - HEALTHEAST (OUTPATIENT)
Dept: INTERNAL MEDICINE | Facility: CLINIC | Age: 63
End: 2018-01-09

## 2018-01-09 DIAGNOSIS — R52 PAIN: ICD-10-CM

## 2018-01-11 ENCOUNTER — TELEPHONE (OUTPATIENT)
Dept: TRANSPLANT | Facility: CLINIC | Age: 63
End: 2018-01-11

## 2018-01-11 NOTE — TELEPHONE ENCOUNTER
Transplant Social Work Services Phone Call      Data: I received a call from Ms. Luque, as she wanted to discuss her feelings related to meeting her donor's family on 1/19/18.  Intervention: Supportive counseling related to the above, and validation of her feelings. Reminder of the liver transplant support group as a potential place to discuss her concerns.   Assessment: Ms. Luque has some anxiety related to meeting her donor's family. Most of this centers on the possibility of media being present.     Plan:I remain available for counseling related to the above, and other psychosocial needs that may arise.     Annie Manjarrez,[facility]  [phone]

## 2018-01-18 ENCOUNTER — TELEPHONE (OUTPATIENT)
Dept: TRANSPLANT | Facility: CLINIC | Age: 63
End: 2018-01-18

## 2018-01-18 NOTE — TELEPHONE ENCOUNTER
LM for pt letting her know we don't recommend any decongestants over others and to just be aware of any alcohol or acetaminophen in them. Suggested she talk with the pharmacist at the store when she goes to purchase these medications to check for any drug interactions. Provided call back number for any further questions or concerns.

## 2018-01-18 NOTE — TELEPHONE ENCOUNTER
Pt called- Upper Respiratory  infections going around her work.  Feels like she is coming down with it and is planning to go out of town tomorrow.  What decongestants can she buy.

## 2018-01-26 ENCOUNTER — OFFICE VISIT (OUTPATIENT)
Dept: PSYCHIATRY | Facility: CLINIC | Age: 63
End: 2018-01-26
Attending: NURSE PRACTITIONER
Payer: COMMERCIAL

## 2018-01-26 VITALS
HEART RATE: 96 BPM | DIASTOLIC BLOOD PRESSURE: 83 MMHG | SYSTOLIC BLOOD PRESSURE: 137 MMHG | WEIGHT: 198.6 LBS | BODY MASS INDEX: 31.1 KG/M2

## 2018-01-26 DIAGNOSIS — G47.00 INSOMNIA, UNSPECIFIED TYPE: ICD-10-CM

## 2018-01-26 DIAGNOSIS — F41.1 GAD (GENERALIZED ANXIETY DISORDER): Primary | ICD-10-CM

## 2018-01-26 PROCEDURE — G0463 HOSPITAL OUTPT CLINIC VISIT: HCPCS | Mod: ZF

## 2018-01-26 RX ORDER — HYDROXYZINE HYDROCHLORIDE 25 MG/1
TABLET, FILM COATED ORAL
COMMUNITY
Start: 2018-01-26 | End: 2018-03-01

## 2018-01-26 RX ORDER — TRAZODONE HYDROCHLORIDE 100 MG/1
150 TABLET ORAL AT BEDTIME
Qty: 30 TABLET | Refills: 1 | Status: SHIPPED | OUTPATIENT
Start: 2018-01-26 | End: 2018-02-28

## 2018-01-26 RX ORDER — BUSPIRONE HYDROCHLORIDE 10 MG/1
10 TABLET ORAL 2 TIMES DAILY
Qty: 60 TABLET | Refills: 1 | Status: SHIPPED | OUTPATIENT
Start: 2018-01-26 | End: 2018-03-30

## 2018-01-26 ASSESSMENT — PAIN SCALES - GENERAL: PAINLEVEL: MILD PAIN (3)

## 2018-01-26 NOTE — MR AVS SNAPSHOT
After Visit Summary   1/26/2018    Phan Luque    MRN: [id number]           Patient Information     Date Of Birth          1955        Visit Information        Provider Department      1/26/2018 3:30 PM David Vu APRN CNP Psychiatry Clinic        Today's Diagnoses     WALTER (generalized anxiety disorder)    -  1    Insomnia, unspecified type           Follow-ups after your visit        Your next 10 appointments already scheduled     Feb 20, 2018  7:20 AM CST   LAB with EA LAB   [facility]    [address] [place] [address]  [address]  [facility] [address] [phone]           Please do not eat 10-12 hours before your appointment if you are coming in fasting for labs on lipids, cholesterol, or glucose (sugar). This does not apply to pregnant women. Water, hot tea and black coffee (with nothing added) are okay. Do not drink other fluids, diet soda or chew gum.            Feb 23, 2018  3:30 PM CST   Adult Med Follow UP with DONOVAN Blake CNP   [facility] ([facility])    [place] [id number]  [address] [facility] [address]   [phone]            Mar 01, 2018  9:55 AM CST   (Arrive by 9:40 AM)   New Patient Visit with Arlyn Sanchez MD   [facility] ([facility])    [address]  4th Floor  [facility] [id number] [phone]            Mar 21, 2018  3:30 PM CDT   Lab with UC LAB   [facility] Lab ([facility])    [address]  1st Floor  [facility] [address]   [phone]            Mar 21, 2018  4:30 PM CDT   (Arrive by 4:00 PM)   Return Visit with Daya Gutierrez MD   [facility] Nephrology ([facility])    [address] [place] [address]  [address]  [facility] [address]   [phone]            Apr 04, 2018  7:30 AM CDT   LAB with EA LAB   [facility] ([place]  [facility])    [address] [place] [address]  [address]  [facility] [address] [phone]           Please do not eat 10-12 hours before your appointment if you are coming in fasting for labs on lipids, cholesterol, or glucose (sugar). This does not apply to pregnant women. Water, hot tea and black coffee (with nothing added) are okay. Do not drink other fluids, diet soda or chew gum.            Apr 04, 2018  2:45 PM CDT   Lab with  LAB   [facility] ([facility])    [address]  1st Floor  [facility] [id number] [phone]            Apr 04, 2018  3:45 PM CDT   (Arrive by 3:30 PM)   Return Liver Transplant with Hussein Banegas MD   [facility] Hepatology ([facility])    [address] [place] [address]  [address]  [facility] [address] [phone]            May 21, 2018  3:00 PM CDT   (Arrive by 2:45 PM)   RETURN ENDOCRINE with Ruth Oakley MD   [facility] Endocrinology ([facility])    [address] [place] [address]  [address] [facility] [id number] [phone]              Who to contact     Please call your clinic at [phone] to:    Ask questions about your health    Make or cancel appointments    Discuss your medicines    Learn about your test results    Speak to your doctor            Additional Information About Your Visit        [employer] Information     [employer] gives you secure access to your electronic health record. If you see a primary care provider, you can also send messages to your care team and make appointments. If you have questions, please call your primary care clinic.  If you do not have a primary care provider, please call [phone] and they will assist you.      [employer] is an electronic gateway that provides easy, online access to your medical records. With [employer], you can request a clinic appointment, read your test results, renew a prescription or communicate with your care team.     To  access your existing account, please contact your [facility] or call [phone] for assistance.        Care EveryWhere ID     This is your Care EveryWhere ID. This could be used by other organizations to access your [facility] medical records  [id number]        Your Vitals Were     Pulse BMI (Body Mass Index)                96 31.1 kg/m2           Blood Pressure from Last 3 Encounters:   01/26/18 137/83   12/20/17 134/79   12/20/17 118/77    Weight from Last 3 Encounters:   01/26/18 90.1 kg (198 lb 9.6 oz)   12/20/17 90.1 kg (198 lb 9.6 oz)   12/20/17 90.1 kg (198 lb 9.6 oz)              Today, you had the following     No orders found for display         Where to get your medicines      These medications were sent to [facility] [address] [place] [address] LILLIANA GENAO AT [address] [facility] [address]     Phone:  [phone]     busPIRone 10 MG tablet    traZODone 100 MG tablet          Primary Care Provider Office Phone # Fax #    Santosh Salgado [phone]       [facility] [address] [place] [address]   [facility] [address]        Equal Access to Services     PAULO BERRY AH: Hadii aad ku hadasho Soomaali, waaxda luqadaha, qaybta kaalmada adeegyada, waxay idiin hayaan kay khararaffi lagonzalez schroeder. So [facility] [phone].    ATENCIÓN: Si habla español, tiene a reece disposición servicios gratuitos de asistencia lingüística. eryn al [phone].    We comply with applicable federal civil rights laws and [place] laws. We do not discriminate on the basis of race, color, national origin, age, disability, sex, sexual orientation, or gender identity.            Thank you!     Thank you for choosing [facility]  for your care. Our goal is always to provide you with excellent care. Hearing back from our patients is one way we can continue to improve our services. Please take a few minutes to complete the written survey that you may receive  in the mail after your visit with us. Thank you!             Your Updated Medication List - Protect others around you: Learn how to safely use, store and throw away your medicines at www.disposemymeds.org.          This list is accurate as of 1/26/18 11:59 PM.  Always use your most recent med list.                   Brand Name Dispense Instructions for use Diagnosis    acetaminophen 325 MG tablet    TYLENOL    100 tablet    Take 2 tablets (650 mg) by mouth every 6 hours as needed for mild pain Or fevers. Use sparingly. Limit use to no more than 2 grams (2000 mg) in 24 hours. **Further refills must be obtained by primary care provider**    Acute post-operative pain       amLODIPine 5 MG tablet    NORVASC    90 tablet    Take 1 tablet (5 mg) by mouth daily    Hypertension       azaTHIOprine 100 MG Tabs     90 tablet    Take 50 mg by mouth every evening    Liver transplanted (H)       betaxolol 0.5 % ophthalmic solution    BETOPTIC    5 mL    Place 1 drop into both eyes 2 times daily    Primary open angle glaucoma of both eyes, unspecified glaucoma stage       busPIRone 10 MG tablet    BUSPAR    60 tablet    Take 1 tablet (10 mg) by mouth 2 times daily    WALTER (generalized anxiety disorder)       calcium Citrate-vitamin D 500-400 MG-UNIT Chew      Take 1 tablet by mouth daily        chlorthalidone 25 MG tablet    HYGROTON    45 tablet    Take 0.5 tablets (12.5 mg) by mouth daily    CKD (chronic kidney disease) stage 3, GFR 30-59 ml/min, Fluid retention       cyanocobalamin 1000 MCG Tabs     30 tablet    Take 1,000 mcg by mouth daily    Liver transplanted (H)       FISH OIL PO      Take 645 mg by mouth 2 times daily        folic acid 1 MG tablet    FOLVITE    30 tablet    Take 1 tablet (1 mg) by mouth daily    Malnutrition (H)       hydrOXYzine 25 MG tablet    ATARAX     Take 1-2 tablets (25-50mg) every 6 hours as needed for itching    WALTER (generalized anxiety disorder)       levothyroxine 88 MCG tablet     SYNTHROID/LEVOTHROID    30 tablet    Take 1 tablet (88 mcg) by mouth every morning (before breakfast)    Thyroid function test abnormal       * loperamide 2 MG capsule    IMODIUM     Take 2 mg by mouth 4 times daily as needed for diarrhea Reported on 5/18/2017        * loperamide 2 MG capsule    IMODIUM    40 capsule    Take 1 capsule (2 mg) by mouth 2 times daily as needed for diarrhea    Functional diarrhea       multivitamin, therapeutic Tabs tablet     30 tablet    Take 1 tablet by mouth every 24 hours    Malnutrition (H)       pantoprazole 40 MG EC tablet    PROTONIX    90 tablet    Take 1 tablet (40 mg) by mouth every morning (before breakfast)    Gastroesophageal reflux disease, esophagitis presence not specified       pramipexole 0.5 MG tablet    MIRAPEX    90 tablet    Take 1 tablet (0.5 mg) by mouth At Bedtime    Restless leg syndrome       psyllium 0.52 G capsule     60 capsule    Take 2 capsules (1.04 g) by mouth daily With a full glass of water.    Loose stools       simethicone 80 MG chewable tablet    MYLICON    180 tablet    Take 1 tablet (80 mg) by mouth every 6 hours as needed for flatulence or cramping    Flatulence, eructation, and gas pain       TIZANIDINE HCL PO      Take 4 mg by mouth At Bedtime        traMADol 50 MG tablet    ULTRAM    15 tablet    Take 1-2 tablets ( mg) by mouth every 6 hours as needed for pain        traZODone 100 MG tablet    DESYREL    30 tablet    Take 1.5 tablets (150 mg) by mouth At Bedtime **Further refills must be obtained by primary care provider**    Insomnia, unspecified type       ursodiol 300 MG capsule    ACTIGALL    60 capsule    Take 1 capsule (300 mg) by mouth 2 times daily    Liver transplanted (H)       * Notice:  This list has 2 medication(s) that are the same as other medications prescribed for you. Read the directions carefully, and ask your doctor or other care provider to review them with you.

## 2018-01-26 NOTE — NURSING NOTE
Chief Complaint   Patient presents with     Recheck Medication     WALTER     Obtained weight, blood pressure and heart rate, pain level, and smoking status   Reviewed allergies, medications, and pharmacy preference  Administered abuse screening questions
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
## 2018-02-02 ENCOUNTER — COMMUNICATION - HEALTHEAST (OUTPATIENT)
Dept: INTERNAL MEDICINE | Facility: CLINIC | Age: 63
End: 2018-02-02

## 2018-02-02 ENCOUNTER — TELEPHONE (OUTPATIENT)
Dept: OPHTHALMOLOGY | Facility: CLINIC | Age: 63
End: 2018-02-02

## 2018-02-02 DIAGNOSIS — L29.9 ITCHING: ICD-10-CM

## 2018-02-02 DIAGNOSIS — H40.1130 PRIMARY OPEN ANGLE GLAUCOMA OF BOTH EYES, UNSPECIFIED GLAUCOMA STAGE: ICD-10-CM

## 2018-02-02 DIAGNOSIS — Z94.4 LIVER TRANSPLANTED (H): ICD-10-CM

## 2018-02-02 DIAGNOSIS — E03.9 HYPOTHYROIDISM: ICD-10-CM

## 2018-02-02 DIAGNOSIS — R52 PAIN: ICD-10-CM

## 2018-02-02 RX ORDER — TACROLIMUS 0.5 MG/1
2 CAPSULE ORAL 2 TIMES DAILY
Qty: 240 CAPSULE | Refills: 11 | Status: SHIPPED | OUTPATIENT
Start: 2018-02-02 | End: 2018-04-06

## 2018-02-06 ENCOUNTER — OFFICE VISIT - HEALTHEAST (OUTPATIENT)
Dept: FAMILY MEDICINE | Facility: CLINIC | Age: 63
End: 2018-02-06

## 2018-02-06 ENCOUNTER — COMMUNICATION - HEALTHEAST (OUTPATIENT)
Dept: INTERNAL MEDICINE | Facility: CLINIC | Age: 63
End: 2018-02-06

## 2018-02-06 DIAGNOSIS — J02.9 SORE THROAT: ICD-10-CM

## 2018-02-06 DIAGNOSIS — J32.9 SINUSITIS: ICD-10-CM

## 2018-02-06 DIAGNOSIS — R30.0 DYSURIA: ICD-10-CM

## 2018-02-06 DIAGNOSIS — G47.00 INSOMNIA: ICD-10-CM

## 2018-02-06 LAB
ALBUMIN UR-MCNC: NEGATIVE MG/DL
APPEARANCE UR: CLEAR
BACTERIA #/AREA URNS HPF: ABNORMAL HPF
BILIRUB UR QL STRIP: NEGATIVE
COLOR UR AUTO: YELLOW
DEPRECATED S PYO AG THROAT QL EIA: NORMAL
FLUAV AG SPEC QL IA: NORMAL
FLUBV AG SPEC QL IA: NORMAL
GLUCOSE UR STRIP-MCNC: NEGATIVE MG/DL
HGB UR QL STRIP: NEGATIVE
KETONES UR STRIP-MCNC: NEGATIVE MG/DL
LEUKOCYTE ESTERASE UR QL STRIP: ABNORMAL
NITRATE UR QL: NEGATIVE
PH UR STRIP: 6 [PH] (ref 5–8)
RBC #/AREA URNS AUTO: ABNORMAL HPF
SP GR UR STRIP: 1.01 (ref 1–1.03)
SQUAMOUS #/AREA URNS AUTO: ABNORMAL LPF
UROBILINOGEN UR STRIP-ACNC: ABNORMAL
WBC #/AREA URNS AUTO: ABNORMAL HPF

## 2018-02-07 LAB — GROUP A STREP BY PCR: NORMAL

## 2018-02-07 RX ORDER — BETAXOLOL HYDROCHLORIDE 5 MG/ML
1 SOLUTION/ DROPS OPHTHALMIC 2 TIMES DAILY
Qty: 5 ML | Refills: 2 | OUTPATIENT
Start: 2018-02-07

## 2018-02-07 NOTE — TELEPHONE ENCOUNTER
Medication was discontinued at last visit-- left messages with pt if has been seen by local eye doctor for update on any new changes with intraocular pressure with no call back by 2-7-19

## 2018-02-09 ENCOUNTER — OFFICE VISIT (OUTPATIENT)
Dept: DERMATOLOGY | Facility: CLINIC | Age: 63
End: 2018-02-09
Payer: COMMERCIAL

## 2018-02-09 ENCOUNTER — RECORDS - HEALTHEAST (OUTPATIENT)
Dept: ADMINISTRATIVE | Facility: OTHER | Age: 63
End: 2018-02-09

## 2018-02-09 ENCOUNTER — COMMUNICATION - HEALTHEAST (OUTPATIENT)
Dept: FAMILY MEDICINE | Facility: CLINIC | Age: 63
End: 2018-02-09

## 2018-02-09 DIAGNOSIS — L82.1 SEBORRHEIC KERATOSIS: ICD-10-CM

## 2018-02-09 DIAGNOSIS — Z80.8 FAMILY HISTORY OF MELANOMA: ICD-10-CM

## 2018-02-09 DIAGNOSIS — D22.9 MULTIPLE BENIGN NEVI: ICD-10-CM

## 2018-02-09 DIAGNOSIS — D18.01 CHERRY ANGIOMA: ICD-10-CM

## 2018-02-09 DIAGNOSIS — Z12.83 SKIN CANCER SCREENING: Primary | ICD-10-CM

## 2018-02-09 LAB — BACTERIA SPEC CULT: ABNORMAL

## 2018-02-09 ASSESSMENT — PAIN SCALES - GENERAL: PAINLEVEL: NO PAIN (0)

## 2018-02-09 NOTE — NURSING NOTE
Dermatology Rooming Note    Phan Luque's goals for this visit include:   Chief Complaint   Patient presents with     Skin Check     Skin check, Phan notes lesions of concern on her face, notes family hx of melanoma.     Candida Lagunas LPN

## 2018-02-09 NOTE — MR AVS SNAPSHOT
After Visit Summary   2/9/2018    Phan Luque    MRN: [id number]           Patient Information     Date Of Birth          1955        Visit Information        Provider Department      2/9/2018 4:30 PM Demetria Barger PA-C [facility] D[facility]at[facility]        Today's Diagnoses     Skin cancer screening    -  1    Seborrheic keratosis        Cherry angioma        Family history of melanoma        Multiple benign nevi           Follow-ups after your visit        Your next 10 appointments already scheduled     Feb 20, 2018  7:20 AM CST   [facility]    [address] [place] [address]  [address]  [facility] [address] [phone]           Please do not eat 10-12 hours before your appointment if you are coming in fasting for labs on lipids, cholesterol, or glucose (sugar). This does not apply to pregnant women. Water, hot tea and black coffee (with nothing added) are okay. Do not drink other fluids, diet soda or chew gum.            Feb 23, 2018  3:30 PM CST   Adult Med Follow UP with DONOVAN [facility]   Psychiatry Clinic ([facility])    [place] [id number]  [address] [facility] [address] [phone]            Mar 01, 2018  9:55 AM CST   (Arrive by 9:40 AM)   New Patient Visit with Arlyn Sanchez MD   [facility] ([facility])    [address] [place] [address]  4th Floor  [facility] [id number] [phone]            Mar 21, 2018  3:30 PM CDT   Lab with  LAB   [facility] Lab ([facility])    [address] [place] [address]  1st Floor  [facility] [address]   [phone]            Mar 21, 2018  4:30 PM CDT   (Arrive by 4:00 PM)   Return Visit with Daya Gutierrez MD   [facility] Nephrology ([facility])    [address] [place] [address]  [address]  [facility] [address]   [phone]            Apr 04, 2018   7:30 AM CDT   LAB with [facility]   [facility]    [address]  [address]  [facility] [address] [phone]           Please do not eat 10-12 hours before your appointment if you are coming in fasting for labs on lipids, cholesterol, or glucose (sugar). This does not apply to pregnant women. Water, hot tea and black coffee (with nothing added) are okay. Do not drink other fluids, diet soda or chew gum.            Apr 04, 2018  2:45 PM CDT   Lab with UC LAB   [facility] ([facility] [place])    [address]  1st Floor  [facility] [id number] [phone]            Apr 04, 2018  3:45 PM CDT   (Arrive by 3:30 PM)   Return Liver Transplant with Hussein Banegas MD   [facility] Hepatology ([facility] [place])    [address]  [address]  [facility] [id number] [phone]            May 21, 2018  3:00 PM CDT   (Arrive by 2:45 PM)   RETURN ENDOCRINE with Ruth Oakley MD   [facility] Endocrinology ([facility] [place])    [address]  3rd Floor  [facility] [id number] [phone]              Who to contact     Please call your clinic at [phone] to:    Ask questions about your health    Make or cancel appointments    Discuss your medicines    Learn about your test results    Speak to your doctor            Additional Information About Your Visit        Row44 Information     Row44 gives you secure access to your electronic health record. If you see a primary care provider, you can also send messages to your care team and make appointments. If you have questions, please call your primary care clinic.  If you do not have a primary care provider, please call [phone] and they will assist you.      Row44 is an electronic gateway that provides easy, online access to your medical records. With Row44, you can request a clinic appointment, read your test results,  renew a prescription or communicate with your care team.     To access your existing account, please contact your [facility] or call [phone] for assistance.        Care EveryWhere ID     This is your Care EveryWhere ID. This could be used by other organizations to access your [place] medical records  [id number]         Blood Pressure from Last 3 Encounters:   12/20/17 134/79   12/20/17 118/77   11/20/17 137/79    Weight from Last 3 Encounters:   12/20/17 90.1 kg (198 lb 9.6 oz)   12/20/17 90.1 kg (198 lb 9.6 oz)   11/20/17 87 kg (191 lb 12.8 oz)              Today, you had the following     No orders found for display       Primary Care Provider Office Phone # Fax #    Santosh Salgado [phone]       [facility] [address]   [place] [address]        Equal Access to Services     [facility] : Hadii aad ku hadasho Soomaali, waaxda luqadaha, qaybta kaalmada adeegyada, waxay idiin hayaan kay khjoleen conradn . So [facility] [phone].    ATENCIÓN: Si habla español, tiene a reece disposición servicios gratuitos de asistencia lingüística. Rhiannon al [phone].    We comply with applicable federal civil rights laws and [place] laws. We do not discriminate on the basis of race, color, national origin, age, disability, sex, sexual orientation, or gender identity.            Thank you!     Thank you for choosing [facility]  for your care. Our goal is always to provide you with excellent care. Hearing back from our patients is one way we can continue to improve our services. Please take a few minutes to complete the written survey that you may receive in the mail after your visit with us. Thank you!             Your Updated Medication List - Protect others around you: Learn how to safely use, store and throw away your medicines at www.disposemymeds.org.          This list is accurate as of 2/9/18  4:54 PM.  Always use your most recent med list.                    Brand Name Dispense Instructions for use Diagnosis    acetaminophen 325 MG tablet    TYLENOL    100 tablet    Take 2 tablets (650 mg) by mouth every 6 hours as needed for mild pain Or fevers. Use sparingly. Limit use to no more than 2 grams (2000 mg) in 24 hours. **Further refills must be obtained by primary care provider**    Acute post-operative pain       amLODIPine 5 MG tablet    NORVASC    90 tablet    Take 1 tablet (5 mg) by mouth daily    Hypertension       azaTHIOprine 100 MG Tabs     90 tablet    Take 50 mg by mouth every evening    Liver transplanted (H)       betaxolol 0.5 % ophthalmic solution    BETOPTIC    5 mL    Place 1 drop into both eyes 2 times daily    Primary open angle glaucoma of both eyes, unspecified glaucoma stage       busPIRone 10 MG tablet    BUSPAR    60 tablet    Take 1 tablet (10 mg) by mouth 2 times daily    WALTER (generalized anxiety disorder)       calcium Citrate-vitamin D 500-400 MG-UNIT Chew      Take 1 tablet by mouth daily        chlorthalidone 25 MG tablet    HYGROTON    45 tablet    Take 0.5 tablets (12.5 mg) by mouth daily    CKD (chronic kidney disease) stage 3, GFR 30-59 ml/min, Fluid retention       cyanocobalamin 1000 MCG Tabs     30 tablet    Take 1,000 mcg by mouth daily    Liver transplanted (H)       FISH OIL PO      Take 645 mg by mouth 2 times daily        folic acid 1 MG tablet    FOLVITE    30 tablet    Take 1 tablet (1 mg) by mouth daily    Malnutrition (H)       hydrOXYzine 25 MG tablet    ATARAX     Take 1-2 tablets (25-50mg) every 6 hours as needed for itching    WALTER (generalized anxiety disorder)       levothyroxine 88 MCG tablet    SYNTHROID/LEVOTHROID    30 tablet    Take 1 tablet (88 mcg) by mouth every morning (before breakfast)    Thyroid function test abnormal       * loperamide 2 MG capsule    IMODIUM     Take 2 mg by mouth 4 times daily as needed for diarrhea Reported on 5/18/2017        * loperamide 2 MG capsule    IMODIUM    40 capsule     Take 1 capsule (2 mg) by mouth 2 times daily as needed for diarrhea    Functional diarrhea       multivitamin, therapeutic Tabs tablet     30 tablet    Take 1 tablet by mouth every 24 hours    Malnutrition (H)       pantoprazole 40 MG EC tablet    PROTONIX    90 tablet    Take 1 tablet (40 mg) by mouth every morning (before breakfast)    Gastroesophageal reflux disease, esophagitis presence not specified       pramipexole 0.5 MG tablet    MIRAPEX    90 tablet    Take 1 tablet (0.5 mg) by mouth At Bedtime    Restless leg syndrome       psyllium 0.52 G capsule     60 capsule    Take 2 capsules (1.04 g) by mouth daily With a full glass of water.    Loose stools       simethicone 80 MG chewable tablet    MYLICON    180 tablet    Take 1 tablet (80 mg) by mouth every 6 hours as needed for flatulence or cramping    Flatulence, eructation, and gas pain       tacrolimus 0.5 MG capsule    GENERIC EQUIVALENT    240 capsule    Take 4 capsules (2 mg) by mouth 2 times daily    Liver transplanted (H)       TIZANIDINE HCL PO      Take 4 mg by mouth At Bedtime        traMADol 50 MG tablet    ULTRAM    15 tablet    Take 1-2 tablets ( mg) by mouth every 6 hours as needed for pain        traZODone 100 MG tablet    DESYREL    30 tablet    Take 1.5 tablets (150 mg) by mouth At Bedtime **Further refills must be obtained by primary care provider**    Insomnia, unspecified type       ursodiol 300 MG capsule    ACTIGALL    60 capsule    Take 1 capsule (300 mg) by mouth 2 times daily    Liver transplanted (H)       * Notice:  This list has 2 medication(s) that are the same as other medications prescribed for you. Read the directions carefully, and ask your doctor or other care provider to review them with you.

## 2018-02-09 NOTE — PROGRESS NOTES
[facility] Dermatology Note      Dermatology Problem List:   1. History of liver transplant at [facility], 2016- currently on tacrolimus     Encounter Date: 2018    CC:  Chief Complaint   Patient presents with     Skin Check     Skin check, Phan notes lesions of concern on her face, notes family hx of melanoma.         History of Present Illness:  Ms. Phan Luque is a 62 year old female who presents for a skin exam. The patient states her mother  from retinal melanoma () and is therefore very diligent about her skin and eye exams. She has previously had lesions removed but none have returned cancerous. Patient explains she does have several white spots on her face that have been present for many years but seem to have become more small and hard in nature.     The patient otherwise denies any areas of change or concern including areas of bleeding or tenderness.     Past Medical History:   Patient Active Problem List   Diagnosis     Decompensation of cirrhosis of liver (H)     Liver transplanted (H)     Alcoholic hepatitis     Immunosuppression (H)     Alcoholic hepatitis without ascites     Enterococcus UTI     Liver transplant status     Nausea & vomiting     Malnutrition (H)     Candidiasis of skin     Anemia     ACP (advance care planning)     Diarrhea     Hypothyroidism     Esophageal reflux     Recurrent major depression in partial remission (H)     Insomnia     CKD (chronic kidney disease) stage 3, GFR 30-59 ml/min     Anemia in stage 3 chronic kidney disease     Osteopenia     Alcohol abuse, uncomplicated     Past Medical History:   Diagnosis Date     Asthma      Chronic kidney disease      End stage liver disease (H)     2/2 Alcohol Abuse     Liver transplanted (H)      Migraines      Osteoporosis      Spinal stenosis in cervical region      Strabismus      Past Surgical History:   Procedure Laterality Date     APPENDECTOMY           BENCH LIVER N/A 2016     Procedure: BENCH LIVER;  Surgeon: Larry Dhillon MD;  Location: [facility]     COLONOSCOPY       ESOPHAGOSCOPY, GASTROSCOPY, DUODENOSCOPY (EGD), COMBINED N/A 2016    Procedure: COMBINED ESOPHAGOSCOPY, GASTROSCOPY, DUODENOSCOPY (EGD);  Surgeon: Tao Alfosno MD;  Location: [facility]     ESOPHAGOSCOPY, GASTROSCOPY, DUODENOSCOPY (EGD), COMBINED N/A 10/31/2016    Procedure: COMBINED ESOPHAGOSCOPY, GASTROSCOPY, DUODENOSCOPY (EGD), BIOPSY SINGLE OR MULTIPLE;  Surgeon: Ronald Bojorquez MD;  Location: [facility]     sphincteroplasty       TRANSPLANT LIVER RECIPIENT  DONOR N/A 2016    Procedure: TRANSPLANT LIVER RECIPIENT  DONOR;  Surgeon: Larry Dhillon MD;  Location: [facility] OR     TUBAL LIGATION      laparoscopic       Social History:  The patient works for medical insurance. The patient denies use of tanning beds. Patient is originally from [place].     Family History:  There is a family history of melanoma in the patient's mother (retinal, thought to have been present since birth, passed in ).    Medications:  Current Outpatient Prescriptions   Medication Sig Dispense Refill     tacrolimus (GENERIC EQUIVALENT) 0.5 MG capsule Take 4 capsules (2 mg) by mouth 2 times daily 240 capsule 11     hydrOXYzine (ATARAX) 25 MG tablet Take 1-2 tablets (25-50mg) every 6 hours as needed for itching       busPIRone (BUSPAR) 10 MG tablet Take 1 tablet (10 mg) by mouth 2 times daily 60 tablet 1     traZODone (DESYREL) 100 MG tablet Take 1.5 tablets (150 mg) by mouth At Bedtime **Further refills must be obtained by primary care provider** 30 tablet 1     loperamide (IMODIUM) 2 MG capsule Take 1 capsule (2 mg) by mouth 2 times daily as needed for diarrhea 40 capsule 3     betaxolol (BETOPTIC) 0.5 % ophthalmic solution Place 1 drop into both eyes 2 times daily 5 mL 2     chlorthalidone (HYGROTON) 25 MG tablet Take 0.5 tablets (12.5 mg) by mouth daily 45 tablet 3     pantoprazole (PROTONIX) 40 MG EC tablet  Take 1 tablet (40 mg) by mouth every morning (before breakfast) 90 tablet 4     ursodiol (ACTIGALL) 300 MG capsule Take 1 capsule (300 mg) by mouth 2 times daily 60 capsule 11     TIZANIDINE HCL PO Take 4 mg by mouth At Bedtime       traMADol (ULTRAM) 50 MG tablet Take 1-2 tablets ( mg) by mouth every 6 hours as needed for pain 15 tablet 0     pramipexole (MIRAPEX) 0.5 MG tablet Take 1 tablet (0.5 mg) by mouth At Bedtime 90 tablet 3     amLODIPine (NORVASC) 5 MG tablet Take 1 tablet (5 mg) by mouth daily 90 tablet 3     azaTHIOprine 100 MG TABS Take 50 mg by mouth every evening 90 tablet 3     Omega-3 Fatty Acids (FISH OIL PO) Take 645 mg by mouth 2 times daily       calcium Citrate-vitamin D 500-400 MG-UNIT CHEW Take 1 tablet by mouth daily       acetaminophen (TYLENOL) 325 MG tablet Take 2 tablets (650 mg) by mouth every 6 hours as needed for mild pain Or fevers. Use sparingly. Limit use to no more than 2 grams (2000 mg) in 24 hours. **Further refills must be obtained by primary care provider** 100 tablet 0     loperamide (IMODIUM) 2 MG capsule Take 2 mg by mouth 4 times daily as needed for diarrhea Reported on 5/18/2017       simethicone (MYLICON) 80 MG chewable tablet Take 1 tablet (80 mg) by mouth every 6 hours as needed for flatulence or cramping 180 tablet 1     psyllium 0.52 G capsule Take 2 capsules (1.04 g) by mouth daily With a full glass of water. 60 capsule 1     multivitamin, therapeutic (THERA-VIT) TABS tablet Take 1 tablet by mouth every 24 hours 30 tablet 11     levothyroxine (SYNTHROID/LEVOTHROID) 88 MCG tablet Take 1 tablet (88 mcg) by mouth every morning (before breakfast) 30 tablet 1     folic acid (FOLVITE) 1 MG tablet Take 1 tablet (1 mg) by mouth daily 30 tablet 1     cyanocobalamin 1000 MCG TABS Take 1,000 mcg by mouth daily 30 tablet 1       Allergies   Allergen Reactions     Benadryl [Diphenhydramine] Hives     Cefaclor Hives     Hydrocodone-Acetaminophen Itching     Oxycodone  Itching     Penicillins Hives     Sulfa Drugs Hives       Review of Systems:  -Skin/Heme New Pt: The patient denies frequent sun exposure. The patient denies excessive scarring or problems healing except as per HPI. The patient denies excessive bleeding.  -Constitutional: The patient denies fatigue, fevers, chills, unintended weight loss, and night sweats.  -HEENT: Patient denies nonhealing oral sores.  -Skin: As above in HPI. No additional skin concerns.    Physical exam:  Vitals: There were no vitals taken for this visit.  GEN: This is a well developed, well-nourished female in no acute distress, in a pleasant mood.    SKIN: Full skin, which includes the head/face, both arms, chest, back, abdomen,both legs, genitalia and/or groin buttocks, digits and/or nails, was examined.  -Multiple regular brown pigmented macules and papules are identified on the trunk and extremities.   -There are waxy stuck on tan to brown papules on the face, trunk, and extremities.  -There are dome shaped bright red papules on the trunk.   - Faint erythema to bilateral cheeks with mild telangiectasia  -No other lesions of concern on areas examined.       Impression/Plan:  1. Multiple clinically benign nevi on the trunk and extremities     ABCDs of melanoma were discussed and self skin checks were advised.     Sun precaution was advised including the use of sun screens of SPF 30 or higher, sun protective clothing, and avoidance of tanning beds.    2. Cherry angioma(s)    No further intervention required. Patient to report changes.     3. Seborrheic keratosis, non irritated    No further intervention required. Patient to report changes.     4. History of liver transplant- currently on immunosuppressants     Given patient is on immune suppressing drugs, and with family history of melanoma, recommended skin check every 6 months     Follow-up in 6 months, earlier for new or changing lesions.       Staff Involved:  Scribe/Staff    Scribe  Disclosure:   I, Tarah Nusrat, am serving as a scribe to document services personally performed by Demetria Barger PA-C, based on data collection and the provider's statements to me.    Provider Disclosure:   The documentation recorded by the scribe accurately reflects the services I personally performed and the decisions made by me.    All risks, benefits and alternatives were discussed with patient.  Patient is in agreement and understands the assessment and plan.  All questions were answered.  Sun Screen Education was given.   Return to Clinic in 6 months or sooner as needed.   Demetria Barger PA-C   [facility]

## 2018-02-09 NOTE — LETTER
2018       RE: Phan Luque  [address]  [facility] [address]     Dear Colleague,    Thank you for referring your patient, Phan Luque, to the [facility] DERMATOLOGY at [facility]. Please see a copy of my visit note below.    [facility] Dermatology Note      Dermatology Problem List:   1. History of liver transplant at [facility], 2016- currently on tacrolimus     Encounter Date: 2018    CC:  Chief Complaint   Patient presents with     Skin Check     Skin check, Phan notes lesions of concern on her face, notes family hx of melanoma.         History of Present Illness:  Ms. Phan Luque is a 62 year old female who presents for a skin exam. The patient states her mother  from retinal melanoma () and is therefore very diligent about her skin and eye exams. She has previously had lesions removed but none have returned cancerous. Patient explains she does have several white spots on her face that have been present for many years but seem to have become more small and hard in nature.     The patient otherwise denies any areas of change or concern including areas of bleeding or tenderness.     Past Medical History:   Patient Active Problem List   Diagnosis     Decompensation of cirrhosis of liver (H)     Liver transplanted (H)     Alcoholic hepatitis     Immunosuppression (H)     Alcoholic hepatitis without ascites     Enterococcus UTI     Liver transplant status     Nausea & vomiting     Malnutrition (H)     Candidiasis of skin     Anemia     ACP (advance care planning)     Diarrhea     Hypothyroidism     Esophageal reflux     Recurrent major depression in partial remission (H)     Insomnia     CKD (chronic kidney disease) stage 3, GFR 30-59 ml/min     Anemia in stage 3 chronic kidney disease     Osteopenia     Alcohol abuse, uncomplicated     Past Medical History:   Diagnosis Date     Asthma      Chronic kidney disease      End  stage liver disease (H)     2/2 Alcohol Abuse     Liver transplanted (H)      Migraines      Osteoporosis      Spinal stenosis in cervical region      Strabismus      Past Surgical History:   Procedure Laterality Date     APPENDECTOMY           BENCH LIVER N/A 2016    Procedure: BENCH LIVER;  Surgeon: Larry Dhillon MD;  Location: [facility]     COLONOSCOPY       ESOPHAGOSCOPY, GASTROSCOPY, DUODENOSCOPY (EGD), COMBINED N/A 2016    Procedure: COMBINED ESOPHAGOSCOPY, GASTROSCOPY, DUODENOSCOPY (EGD);  Surgeon: Tao Alfonso MD;  Location: [facility]     ESOPHAGOSCOPY, GASTROSCOPY, DUODENOSCOPY (EGD), COMBINED N/A 10/31/2016    Procedure: COMBINED ESOPHAGOSCOPY, GASTROSCOPY, DUODENOSCOPY (EGD), BIOPSY SINGLE OR MULTIPLE;  Surgeon: Ronald Bojorquez MD;  Location: [facility]     sphincteroplasty       TRANSPLANT LIVER RECIPIENT  DONOR N/A 2016    Procedure: TRANSPLANT LIVER RECIPIENT  DONOR;  Surgeon: Larry Dhillon MD;  Location: [facility]     TUBAL LIGATION      laparoscopic       Social History:  The patient works for medical insurance. The patient denies use of tanning beds. Patient is originally from [place].     Family History:  There is a family history of melanoma in the patient's mother (retinal, thought to have been present since birth, passed in ).    Medications:  Current Outpatient Prescriptions   Medication Sig Dispense Refill     tacrolimus (GENERIC EQUIVALENT) 0.5 MG capsule Take 4 capsules (2 mg) by mouth 2 times daily 240 capsule 11     hydrOXYzine (ATARAX) 25 MG tablet Take 1-2 tablets (25-50mg) every 6 hours as needed for itching       busPIRone (BUSPAR) 10 MG tablet Take 1 tablet (10 mg) by mouth 2 times daily 60 tablet 1     traZODone (DESYREL) 100 MG tablet Take 1.5 tablets (150 mg) by mouth At Bedtime **Further refills must be obtained by primary care provider** 30 tablet 1     loperamide (IMODIUM) 2 MG capsule Take 1 capsule (2 mg) by mouth 2 times daily as  needed for diarrhea 40 capsule 3     betaxolol (BETOPTIC) 0.5 % ophthalmic solution Place 1 drop into both eyes 2 times daily 5 mL 2     chlorthalidone (HYGROTON) 25 MG tablet Take 0.5 tablets (12.5 mg) by mouth daily 45 tablet 3     pantoprazole (PROTONIX) 40 MG EC tablet Take 1 tablet (40 mg) by mouth every morning (before breakfast) 90 tablet 4     ursodiol (ACTIGALL) 300 MG capsule Take 1 capsule (300 mg) by mouth 2 times daily 60 capsule 11     TIZANIDINE HCL PO Take 4 mg by mouth At Bedtime       traMADol (ULTRAM) 50 MG tablet Take 1-2 tablets ( mg) by mouth every 6 hours as needed for pain 15 tablet 0     pramipexole (MIRAPEX) 0.5 MG tablet Take 1 tablet (0.5 mg) by mouth At Bedtime 90 tablet 3     amLODIPine (NORVASC) 5 MG tablet Take 1 tablet (5 mg) by mouth daily 90 tablet 3     azaTHIOprine 100 MG TABS Take 50 mg by mouth every evening 90 tablet 3     Omega-3 Fatty Acids (FISH OIL PO) Take 645 mg by mouth 2 times daily       calcium Citrate-vitamin D 500-400 MG-UNIT CHEW Take 1 tablet by mouth daily       acetaminophen (TYLENOL) 325 MG tablet Take 2 tablets (650 mg) by mouth every 6 hours as needed for mild pain Or fevers. Use sparingly. Limit use to no more than 2 grams (2000 mg) in 24 hours. **Further refills must be obtained by primary care provider** 100 tablet 0     loperamide (IMODIUM) 2 MG capsule Take 2 mg by mouth 4 times daily as needed for diarrhea Reported on 5/18/2017       simethicone (MYLICON) 80 MG chewable tablet Take 1 tablet (80 mg) by mouth every 6 hours as needed for flatulence or cramping 180 tablet 1     psyllium 0.52 G capsule Take 2 capsules (1.04 g) by mouth daily With a full glass of water. 60 capsule 1     multivitamin, therapeutic (THERA-VIT) TABS tablet Take 1 tablet by mouth every 24 hours 30 tablet 11     levothyroxine (SYNTHROID/LEVOTHROID) 88 MCG tablet Take 1 tablet (88 mcg) by mouth every morning (before breakfast) 30 tablet 1     folic acid (FOLVITE) 1 MG tablet  Take 1 tablet (1 mg) by mouth daily 30 tablet 1     cyanocobalamin 1000 MCG TABS Take 1,000 mcg by mouth daily 30 tablet 1       Allergies   Allergen Reactions     Benadryl [Diphenhydramine] Hives     Cefaclor Hives     Hydrocodone-Acetaminophen Itching     Oxycodone Itching     Penicillins Hives     Sulfa Drugs Hives       Review of Systems:  -Skin/Heme New Pt: The patient denies frequent sun exposure. The patient denies excessive scarring or problems healing except as per HPI. The patient denies excessive bleeding.  -Constitutional: The patient denies fatigue, fevers, chills, unintended weight loss, and night sweats.  -HEENT: Patient denies nonhealing oral sores.  -Skin: As above in HPI. No additional skin concerns.    Physical exam:  Vitals: There were no vitals taken for this visit.  GEN: This is a well developed, well-nourished female in no acute distress, in a pleasant mood.    SKIN: Full skin, which includes the head/face, both arms, chest, back, abdomen,both legs, genitalia and/or groin buttocks, digits and/or nails, was examined.  -Multiple regular brown pigmented macules and papules are identified on the trunk and extremities.   -There are waxy stuck on tan to brown papules on the face, trunk, and extremities.  -There are dome shaped bright red papules on the trunk.   - Faint erythema to bilateral cheeks with mild telangiectasia  -No other lesions of concern on areas examined.       Impression/Plan:  1. Multiple clinically benign nevi on the trunk and extremities     ABCDs of melanoma were discussed and self skin checks were advised.     Sun precaution was advised including the use of sun screens of SPF 30 or higher, sun protective clothing, and avoidance of tanning beds.    2. Cherry angioma(s)    No further intervention required. Patient to report changes.     3. Seborrheic keratosis, non irritated    No further intervention required. Patient to report changes.     4. History of liver transplant-  currently on immunosuppressants     Given patient is on immune suppressing drugs, and with family history of melanoma, recommended skin check every 6 months     Follow-up in 6 months, earlier for new or changing lesions.       Staff Involved:  Scribe/Staff    Scribe Disclosure:   I, Tarah Silverio, am serving as a scribe to document services personally performed by Demetria Barger PA-C, based on data collection and the provider's statements to me.    Provider Disclosure:   The documentation recorded by the scribe accurately reflects the services I personally performed and the decisions made by me.    All risks, benefits and alternatives were discussed with patient.  Patient is in agreement and understands the assessment and plan.  All questions were answered.  Sun Screen Education was given.   Return to Clinic in 6 months or sooner as needed.   Demetria Barger PA-C   [facility]

## 2018-02-21 DIAGNOSIS — Z94.4 LIVER REPLACED BY TRANSPLANT (H): ICD-10-CM

## 2018-02-21 LAB
ALBUMIN SERPL-MCNC: 3.7 G/DL (ref 3.4–5)
ALP SERPL-CCNC: 51 U/L (ref 40–150)
ALT SERPL W P-5'-P-CCNC: 16 U/L (ref 0–50)
ANION GAP SERPL CALCULATED.3IONS-SCNC: 7 MMOL/L (ref 3–14)
AST SERPL W P-5'-P-CCNC: 14 U/L (ref 0–45)
BILIRUB DIRECT SERPL-MCNC: 0.2 MG/DL (ref 0–0.2)
BILIRUB SERPL-MCNC: 0.7 MG/DL (ref 0.2–1.3)
BUN SERPL-MCNC: 23 MG/DL (ref 7–30)
CALCIUM SERPL-MCNC: 9.1 MG/DL (ref 8.5–10.1)
CHLORIDE SERPL-SCNC: 103 MMOL/L (ref 94–109)
CO2 SERPL-SCNC: 30 MMOL/L (ref 20–32)
CREAT SERPL-MCNC: 1.3 MG/DL (ref 0.52–1.04)
ERYTHROCYTE [DISTWIDTH] IN BLOOD BY AUTOMATED COUNT: 12.1 % (ref 10–15)
GFR SERPL CREATININE-BSD FRML MDRD: 42 ML/MIN/1.7M2
GLUCOSE SERPL-MCNC: 87 MG/DL (ref 70–99)
HCT VFR BLD AUTO: 39.3 % (ref 35–47)
HGB BLD-MCNC: 12.8 G/DL (ref 11.7–15.7)
MAGNESIUM SERPL-MCNC: 1.8 MG/DL (ref 1.6–2.3)
MCH RBC QN AUTO: 31.8 PG (ref 26.5–33)
MCHC RBC AUTO-ENTMCNC: 32.6 G/DL (ref 31.5–36.5)
MCV RBC AUTO: 98 FL (ref 78–100)
PLATELET # BLD AUTO: 284 10E9/L (ref 150–450)
POTASSIUM SERPL-SCNC: 3.7 MMOL/L (ref 3.4–5.3)
PROT SERPL-MCNC: 7.1 G/DL (ref 6.8–8.8)
RBC # BLD AUTO: 4.02 10E12/L (ref 3.8–5.2)
SODIUM SERPL-SCNC: 140 MMOL/L (ref 133–144)
TACROLIMUS BLD-MCNC: 4.7 UG/L (ref 5–15)
TME LAST DOSE: ABNORMAL H
WBC # BLD AUTO: 7.4 10E9/L (ref 4–11)

## 2018-02-21 PROCEDURE — 80076 HEPATIC FUNCTION PANEL: CPT | Performed by: INTERNAL MEDICINE

## 2018-02-21 PROCEDURE — 36415 COLL VENOUS BLD VENIPUNCTURE: CPT | Performed by: INTERNAL MEDICINE

## 2018-02-21 PROCEDURE — 80197 ASSAY OF TACROLIMUS: CPT | Performed by: INTERNAL MEDICINE

## 2018-02-21 PROCEDURE — 85027 COMPLETE CBC AUTOMATED: CPT | Performed by: INTERNAL MEDICINE

## 2018-02-21 PROCEDURE — 80048 BASIC METABOLIC PNL TOTAL CA: CPT | Performed by: INTERNAL MEDICINE

## 2018-02-21 PROCEDURE — 83735 ASSAY OF MAGNESIUM: CPT | Performed by: INTERNAL MEDICINE

## 2018-02-22 ASSESSMENT — ENCOUNTER SYMPTOMS
NERVOUS/ANXIOUS: 1
DYSPNEA ON EXERTION: 1
DECREASED CONCENTRATION: 0
NIGHT SWEATS: 0
HOARSE VOICE: 1
NECK MASS: 0
FLANK PAIN: 1
SMELL DISTURBANCE: 0
SLEEP DISTURBANCES DUE TO BREATHING: 0
INSOMNIA: 1
MYALGIAS: 1
POLYPHAGIA: 0
SPUTUM PRODUCTION: 0
SNORES LOUDLY: 0
DECREASED APPETITE: 0
SORE THROAT: 1
POLYDIPSIA: 0
INCREASED ENERGY: 1
SKIN CHANGES: 0
NAIL CHANGES: 0
MUSCLE WEAKNESS: 1
SHORTNESS OF BREATH: 1
HALLUCINATIONS: 0
BACK PAIN: 1
HYPOTENSION: 0
PANIC: 0
SINUS CONGESTION: 1
TROUBLE SWALLOWING: 0
LIGHT-HEADEDNESS: 0
STIFFNESS: 1
ALTERED TEMPERATURE REGULATION: 1
WEIGHT GAIN: 1
PALPITATIONS: 0
EXERCISE INTOLERANCE: 1
POOR WOUND HEALING: 0
DIFFICULTY URINATING: 0
WEIGHT LOSS: 0
ARTHRALGIAS: 1
SINUS PAIN: 1
DYSURIA: 0
WHEEZING: 1
HEMATURIA: 0
FEVER: 0
MUSCLE CRAMPS: 1
HEMOPTYSIS: 0
JOINT SWELLING: 0
DEPRESSION: 0
SYNCOPE: 0
LEG PAIN: 1
NECK PAIN: 1
FATIGUE: 1
CHILLS: 0
ORTHOPNEA: 0
COUGH DISTURBING SLEEP: 0
HYPERTENSION: 1
TASTE DISTURBANCE: 0
COUGH: 1
POSTURAL DYSPNEA: 0

## 2018-02-26 NOTE — PROGRESS NOTES
Service Date: 06/16/2017      CHIEF COMPLAINT:  Followup regarding nausea and vomiting.      HISTORY OF PRESENT ILLNESS:  Phan is a 61-year-old woman last seen by myself on 12/09/2016.  She is a liver transplantation patient from 08/2016 with end-to-end choledocho-choledochostomy by Dr. Dhillon.  She has recently seen Dr. Banegas 06/09.  She continues on azathioprine, tacrolimus, low-dose prednisone, Carol and is also receiving hydroxyzine p.r.n., Zoloft, psyllium, pantoprazole.  At the last visit, we had provided treatment for SIBO, which was eventually stopped (appropriately!) We advised to trial dairy-free.  We advised to decrease Lomotil if at all possible and advised her to strictly avoid sugar between meals.  Soluble fiber was recommended.      Phan went home somewhere around 01/05 and continued on outpatient treatment for her alcoholism.  The prednisone was not started until April with a decrease to 7.5 mg at this time.  Her primary provider is now Santosh Salgado at [facility].  She was still vomiting 1 or more times daily, usually watery or clear until April.  Azathioprine was greatly decreased and is now 50 mg.  She was having diarrhea and was using loperamide and fiber for a long time for good form.  On 01/10 with illness she had acute kidney injury.      Now, Phan is having abdominal pain daily.  She continues to have vomiting and thinks that she may have vomiting from sertraline.  She did also vomit with GoLYTELY previously.      PAST MEDICAL, SURGICAL HISTORY, MEDICATIONS, ADVERSE DRUG REACTIONS:  Reviewed.      REVIEW OF SYSTEMS:  No symptoms of acute illness.  Overall feels fairly well.  She notes the ongoing GI symptoms of nausea, abdominal pain, bloat, constipation, diarrhea and occasional fecal incontinence.  Other symptoms are ongoing as noted in her review of systems.       OBJECTIVE:   VITAL SIGNS:  Reviewed.  These are normal.  BMI 26.28.  Pain mild at 2/10.   GENERAL:  Clean, neatly  groomed, pleasant woman who shows no distress.   EYES:  Clear.   RESPIRATORY:  Breathing is calm and grossly normal.   ABDOMEN:  With mild patchy tympany, nothing unusual.  No tenderness. Considerably less than at the time of her small intestinal bacterial overgrowth (SIBO) last November.     ASSESSMENT and PLAN:  A 61-year-old woman with variable stool habit continues to have some nausea and vomiting.  She does complain of large gas and occasionally develops diarrhea.  Most of all more often she has constipation, which is primary cause of gas for many people.      Parth is due for colonoscopy, which should be after she has completed a whole year out from her liver transplant -- after August.      Parth had considerable bacterial overgrowth symptoms previously, which she believes were resolved with the rifaximin and avoidance of nutrition between meals.  Again, she is advised to avoid sugar and nutrition between meals.  Also, she is advised to avoid all carbonated beverage.      Return to GI Clinic is suggested in 6 months, but if she is doing very well and with no treatment from luminal GI, she may cancel this.      Total visit 20 minutes with counseling time 15 minutes.         DONOVAN CANTRELL CNP             D: 2018   T: 2018   MT: KRISTIN      Name:     PARTH FARMER   MRN:      [id number]        Account:      [id number]   :      1955           Service Date: 2017      Document: [id number]

## 2018-02-28 ENCOUNTER — OFFICE VISIT (OUTPATIENT)
Dept: PSYCHIATRY | Facility: CLINIC | Age: 63
End: 2018-02-28
Attending: NURSE PRACTITIONER
Payer: COMMERCIAL

## 2018-02-28 VITALS
DIASTOLIC BLOOD PRESSURE: 78 MMHG | BODY MASS INDEX: 32.57 KG/M2 | WEIGHT: 208 LBS | SYSTOLIC BLOOD PRESSURE: 121 MMHG | HEART RATE: 96 BPM

## 2018-02-28 DIAGNOSIS — G47.00 INSOMNIA, UNSPECIFIED TYPE: ICD-10-CM

## 2018-02-28 PROCEDURE — G0463 HOSPITAL OUTPT CLINIC VISIT: HCPCS | Mod: ZF

## 2018-02-28 RX ORDER — TRAZODONE HYDROCHLORIDE 100 MG/1
150 TABLET ORAL AT BEDTIME
Qty: 30 TABLET | Refills: 1 | Status: SHIPPED | OUTPATIENT
Start: 2018-02-28 | End: 2018-03-06

## 2018-02-28 ASSESSMENT — PAIN SCALES - GENERAL: PAINLEVEL: NO PAIN (1)
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
## 2018-02-28 NOTE — NURSING NOTE
Chief Complaint   Patient presents with     Recheck Medication     WALTER     Reviewed allergies, medications, pharmacy and smoking status.    Obtained weight, pain level, blood pressure and heart rate

## 2018-02-28 NOTE — MR AVS SNAPSHOT
After Visit Summary   2/28/2018    Phan Luque    MRN: [id number]           Patient Information     Date Of Birth          1955        Visit Information        Provider Department      2/28/2018 3:30 PM David Vu APRN CNP Psychiatry Clinic        Today's Diagnoses     Insomnia, unspecified type           Follow-ups after your visit        Your next 10 appointments already scheduled     Mar 21, 2018  3:30 PM CDT   Lab with  LAB   [facility] ([facility])    [address] [place] [address]  [facility] [address]   [phone]            Mar 21, 2018  4:30 PM CDT   (Arrive by 4:00 PM)   Return Visit with Daya Gutierrez MD   [facility] Nephrology ([facility])    [address] [place] [address]  [address]  [facility] [address] [phone]            Mar 30, 2018  3:30 PM CDT   Adult Med Follow UP with DONOVAN Blake CNP   Psychiatry Clinic ([facility])    [place] [id number]  [address] [place] [address] [phone]            Apr 02, 2018  9:00 AM CDT   (Arrive by 8:45 AM)   Return Visit with Arlyn Sanchez MD   [facility] ([facility])    [address] [place] [address]  [facility] [address] [phone]            Apr 03, 2018  4:20 PM CDT   (Arrive by 4:05 PM)   Return Visit with Donnell Duvall MD   [facility]opa[facility] ([facility])    [address] [place] [address]  [facility] [address] [phone]            Apr 04, 2018  7:30 AM CDT   LAB with  LAB   [facility] ([facility])    [address] [place] [address]  [address]  [facility] [id number] [phone]           Please do not eat 10-12 hours before your appointment if you are coming in fasting for labs on lipids, cholesterol, or glucose (sugar). This does not apply to pregnant women. Water,  hot tea and black coffee (with nothing added) are okay. Do not drink other fluids, diet soda or chew gum.            Apr 04, 2018  3:45 PM CDT   (Arrive by 3:30 PM)   Return Liver Transplant with MD KERI Wilson [facility] Hep[facility] ([facility] [place])    [address]  [address]  [facility] [address]   [phone]            May 21, 2018  3:00 PM CDT   (Arrive by 2:45 PM)   RETURN ENDOCRINE with Ruth Oakley MD   [facility] ([facility] [place])    [address]  3rd Floor  [facility] [address]   [phone]            Josue 15, 2018   Procedure with Tao Alfonso MD   [facility] ([facility])    [address] [facility] [address] [place] [id number] [phone]           The [facility] is located on the corner of [address] and [address] on the [address]. It is easily accessible from virtually any point in the [facility] area, via [employer] and [employer].              Who to contact     Please call your clinic at [phone] to:    Ask questions about your health    Make or cancel appointments    Discuss your medicines    Learn about your test results    Speak to your doctor            Additional Information About Your Visit        NumberFour Information     NumberFour gives you secure access to your electronic health record. If you see a primary care provider, you can also send messages to your care team and make appointments. If you have questions, please call your primary care clinic.  If you do not have a primary care provider, please call [phone] and they will assist you.      NumberFour is an electronic gateway that provides easy, online access to your medical records. With [employer], you can request a clinic appointment, read your test results, renew a prescription or communicate with your care team.     To access your existing account, please  contact your [facility] or call [phone] for assistance.        Care EveryWhere ID     This is your Care EveryWhere ID. This could be used by other organizations to access your [facility] medical records  [id number]        Your Vitals Were     Pulse BMI (Body Mass Index)                96 32.57 kg/m2           Blood Pressure from Last 3 Encounters:   03/01/18 148/82   03/01/18 148/82   02/28/18 121/78    Weight from Last 3 Encounters:   03/01/18 93.9 kg (207 lb)   03/01/18 93.9 kg (207 lb 1.6 oz)   02/28/18 94.3 kg (208 lb)              Today, you had the following     No orders found for display         Today's Medication Changes          These changes are accurate as of 2/28/18 11:59 PM.  If you have any questions, ask your nurse or doctor.               These medicines have changed or have updated prescriptions.        Dose/Directions    traZODone 100 MG tablet   Commonly known as:  DESYREL   This may have changed:  additional instructions   Used for:  Insomnia, unspecified type   Changed by:  David Vu, APRN CNP        Dose:  150 mg   Take 1.5 tablets (150 mg) by mouth At Bedtime   Quantity:  30 tablet   Refills:  1            Where to get your medicines      These medications were sent to [facility] - [address] [address]    Hours:  Mon-Fri 8:30am-5:00pm Toll Free [phone] Phone:  [phone]     traZODone 100 MG tablet                Primary Care Provider Office Phone # Fax #    Santosh SAAVEDRA Damian [phone]       [address]   [facility] [address]        Equal Access to Services     ALLY BERRY AH: Hadii adonis holland Sogary, waaxda luqadaha, qaybta kaalmada adeegyada, anna schroeder. So [facility] [phone].    ATENCIÓN: Si habla español, tiene a reece disposición servicios gratuitos de asistencia lingüística. Llame al  [phone].    We comply with applicable federal civil rights laws and [place] laws. We do not discriminate on the basis of race, color, national origin, age, disability, sex, sexual orientation, or gender identity.            Thank you!     Thank you for choosing PSYCHIATRY CLINIC  for your care. Our goal is always to provide you with excellent care. Hearing back from our patients is one way we can continue to improve our services. Please take a few minutes to complete the written survey that you may receive in the mail after your visit with us. Thank you!             Your Updated Medication List - Protect others around you: Learn how to safely use, store and throw away your medicines at www.disposemymeds.org.          This list is accurate as of 2/28/18 11:59 PM.  Always use your most recent med list.                   Brand Name Dispense Instructions for use Diagnosis    acetaminophen 325 MG tablet    TYLENOL    100 tablet    Take 2 tablets (650 mg) by mouth every 6 hours as needed for mild pain Or fevers. Use sparingly. Limit use to no more than 2 grams (2000 mg) in 24 hours. **Further refills must be obtained by primary care provider**    Acute post-operative pain       amLODIPine 5 MG tablet    NORVASC    90 tablet    Take 1 tablet (5 mg) by mouth daily    Hypertension       azaTHIOprine 100 MG Tabs     90 tablet    Take 50 mg by mouth every evening    Liver transplanted (H)       betaxolol 0.5 % ophthalmic solution    BETOPTIC    5 mL    Place 1 drop into both eyes 2 times daily    Primary open angle glaucoma of both eyes, unspecified glaucoma stage       busPIRone 10 MG tablet    BUSPAR    60 tablet    Take 1 tablet (10 mg) by mouth 2 times daily    WALTER (generalized anxiety disorder)       calcium Citrate-vitamin D 500-400 MG-UNIT Chew      Take 1 tablet by mouth daily        chlorthalidone 25 MG tablet    HYGROTON    45 tablet    Take 0.5 tablets (12.5 mg) by mouth daily    CKD (chronic kidney disease)  stage 3, GFR 30-59 ml/min, Fluid retention       folic acid 1 MG tablet    FOLVITE    30 tablet    Take 1 tablet (1 mg) by mouth daily    Malnutrition (H)       melatonin 5 MG tablet      Take 1 tablet (5 mg) by mouth nightly as needed for sleep        multivitamin, therapeutic Tabs tablet     30 tablet    Take 1 tablet by mouth every 24 hours    Malnutrition (H)       pantoprazole 40 MG EC tablet    PROTONIX    90 tablet    Take 1 tablet (40 mg) by mouth every morning (before breakfast)    Gastroesophageal reflux disease, esophagitis presence not specified       pramipexole 0.5 MG tablet    MIRAPEX    90 tablet    Take 1 tablet (0.5 mg) by mouth At Bedtime    Restless leg syndrome       psyllium 0.52 G capsule     60 capsule    Take 2 capsules (1.04 g) by mouth daily With a full glass of water.    Loose stools       simethicone 80 MG chewable tablet    MYLICON    180 tablet    Take 1 tablet (80 mg) by mouth every 6 hours as needed for flatulence or cramping    Flatulence, eructation, and gas pain       tacrolimus 0.5 MG capsule    GENERIC EQUIVALENT    240 capsule    Take 4 capsules (2 mg) by mouth 2 times daily    Liver transplanted (H)       traMADol 50 MG tablet    ULTRAM    15 tablet    Take 1-2 tablets ( mg) by mouth every 6 hours as needed for pain        traZODone 100 MG tablet    DESYREL    30 tablet    Take 1.5 tablets (150 mg) by mouth At Bedtime    Insomnia, unspecified type       ursodiol 300 MG capsule    ACTIGALL    60 capsule    Take 1 capsule (300 mg) by mouth 2 times daily    Liver transplanted (H)

## 2018-03-01 ENCOUNTER — OFFICE VISIT (OUTPATIENT)
Dept: INTERNAL MEDICINE | Facility: CLINIC | Age: 63
End: 2018-03-01
Payer: COMMERCIAL

## 2018-03-01 ENCOUNTER — OFFICE VISIT (OUTPATIENT)
Dept: ORTHOPEDICS | Facility: CLINIC | Age: 63
End: 2018-03-01
Payer: COMMERCIAL

## 2018-03-01 ENCOUNTER — RECORDS - HEALTHEAST (OUTPATIENT)
Dept: ADMINISTRATIVE | Facility: OTHER | Age: 63
End: 2018-03-01

## 2018-03-01 ENCOUNTER — TELEPHONE (OUTPATIENT)
Dept: GASTROENTEROLOGY | Facility: CLINIC | Age: 63
End: 2018-03-01

## 2018-03-01 ENCOUNTER — RADIANT APPOINTMENT (OUTPATIENT)
Dept: GENERAL RADIOLOGY | Facility: CLINIC | Age: 63
End: 2018-03-01
Attending: PREVENTIVE MEDICINE
Payer: COMMERCIAL

## 2018-03-01 VITALS
OXYGEN SATURATION: 96 % | RESPIRATION RATE: 20 BRPM | SYSTOLIC BLOOD PRESSURE: 148 MMHG | BODY MASS INDEX: 32.49 KG/M2 | DIASTOLIC BLOOD PRESSURE: 82 MMHG | HEIGHT: 67 IN | WEIGHT: 207 LBS

## 2018-03-01 VITALS
HEART RATE: 89 BPM | WEIGHT: 207.1 LBS | BODY MASS INDEX: 32.43 KG/M2 | DIASTOLIC BLOOD PRESSURE: 82 MMHG | RESPIRATION RATE: 20 BRPM | OXYGEN SATURATION: 96 % | SYSTOLIC BLOOD PRESSURE: 148 MMHG

## 2018-03-01 DIAGNOSIS — R30.0 DYSURIA: ICD-10-CM

## 2018-03-01 DIAGNOSIS — M25.562 CHRONIC PAIN OF LEFT KNEE: ICD-10-CM

## 2018-03-01 DIAGNOSIS — G89.29 CHRONIC PAIN OF LEFT KNEE: Primary | ICD-10-CM

## 2018-03-01 DIAGNOSIS — R94.6 THYROID FUNCTION TEST ABNORMAL: ICD-10-CM

## 2018-03-01 DIAGNOSIS — F41.1 GAD (GENERALIZED ANXIETY DISORDER): ICD-10-CM

## 2018-03-01 DIAGNOSIS — Z00.00 ROUTINE GENERAL MEDICAL EXAMINATION AT A HEALTH CARE FACILITY: ICD-10-CM

## 2018-03-01 DIAGNOSIS — M17.12 PATELLOFEMORAL ARTHRITIS OF LEFT KNEE: ICD-10-CM

## 2018-03-01 DIAGNOSIS — G89.29 CHRONIC PAIN OF LEFT KNEE: ICD-10-CM

## 2018-03-01 DIAGNOSIS — M25.562 CHRONIC PAIN OF LEFT KNEE: Primary | ICD-10-CM

## 2018-03-01 DIAGNOSIS — M54.12 CERVICAL RADICULAR PAIN: ICD-10-CM

## 2018-03-01 DIAGNOSIS — Z12.11 SCREEN FOR COLON CANCER: Primary | ICD-10-CM

## 2018-03-01 LAB
ALBUMIN UR-MCNC: NEGATIVE MG/DL
APPEARANCE UR: CLEAR
BILIRUB UR QL STRIP: NEGATIVE
COLOR UR AUTO: YELLOW
GLUCOSE UR STRIP-MCNC: NEGATIVE MG/DL
HGB UR QL STRIP: NEGATIVE
KETONES UR STRIP-MCNC: NEGATIVE MG/DL
LEUKOCYTE ESTERASE UR QL STRIP: NEGATIVE
NITRATE UR QL: NEGATIVE
PH UR STRIP: 6 PH (ref 5–7)
RBC #/AREA URNS AUTO: 1 /HPF (ref 0–2)
SOURCE: NORMAL
SP GR UR STRIP: 1.01 (ref 1–1.03)
UROBILINOGEN UR STRIP-MCNC: 0 MG/DL (ref 0–2)
WBC #/AREA URNS AUTO: 1 /HPF (ref 0–5)

## 2018-03-01 RX ORDER — LEVOTHYROXINE SODIUM 88 UG/1
88 TABLET ORAL
Qty: 90 TABLET | Refills: 2 | Status: SHIPPED | OUTPATIENT
Start: 2018-03-01 | End: 2018-10-22

## 2018-03-01 RX ORDER — PREDNISONE 20 MG/1
20 TABLET ORAL DAILY
Qty: 5 TABLET | Refills: 0 | Status: SHIPPED | OUTPATIENT
Start: 2018-03-01 | End: 2018-04-02

## 2018-03-01 RX ORDER — PREDNISONE 10 MG/1
10 TABLET ORAL DAILY
COMMUNITY
Start: 2018-03-01 | End: 2018-04-04 | Stop reason: DRUGHIGH

## 2018-03-01 RX ORDER — HYDROXYZINE HYDROCHLORIDE 25 MG/1
TABLET, FILM COATED ORAL
Qty: 60 TABLET | Refills: 2 | Status: SHIPPED | OUTPATIENT
Start: 2018-03-01 | End: 2018-10-22

## 2018-03-01 ASSESSMENT — PAIN SCALES - GENERAL: PAINLEVEL: MILD PAIN (3)

## 2018-03-01 NOTE — MR AVS SNAPSHOT
After Visit Summary   3/1/2018    Phan Luque    MRN: [id number]           Patient Information     Date Of Birth          1955        Visit Information        Provider Department      3/1/2018 9:55 AM Arlyn Sanchez MD [facility]        Today's Diagnoses     Screen for colon cancer    -  1    Routine general medical examination at a health care facility        WALTER (generalized anxiety disorder)        Thyroid function test abnormal        Dysuria        Chronic pain of left knee          Care Instructions    Primary Care Center: [phone]     Primary Care Center Medication Refill Request Information:  * Please contact your pharmacy regarding ANY request for medication refills.  ** [facility] Prescription Fax = [phone]  * Please allow 3 business days for routine medication refills.  * Please allow 5 business days for controlled substance medication refills.     Primary Care Center Test Result notification information:  *You will be notified with in 7-10 days of your appointment day regarding the results of your test.  If you are on MyChart you will be notified as soon as the provider has reviewed the results and signed off on them.      Gastroenterology [phone] ([facility] [place], 4th Floor S)                     Follow-ups after your visit        Additional Services     GASTROENTEROLOGY ADULT REF PROCEDURE ONLY (pt requests Dr. Alfonso)       Last Lab Result: Creatinine (mg/dL)       Date                     Value                 02/21/2018               1.30 (H)         ----------  Body mass index is 32.43 kg/(m^2).     Needed:  No  Language:  English    Patient will be contacted to schedule procedure.     Please be aware that coverage of these services is subject to the terms and limitations of your health insurance plan.  Call member services at your health plan with any benefit or coverage questions.  Any procedures must be performed at a [facility] OR  coordinated by your clinic's referral office.    Please bring the following with you to your appointment:    (1) Any X-Rays, CTs or MRIs which have been performed.  Contact the facility where they were done to arrange for  prior to your scheduled appointment.    (2) List of current medications   (3) This referral request   (4) Any documents/labs given to you for this referral            SPORTS MEDICINE REFERRAL       Your provider has referred you to:  [facility]: Sports Medicine Clinic [facility] [phone]   [email or web address]    Please be aware that coverage of these services is subject to the terms and limitations of your health insurance plan.  Call member services at your health plan with any benefit or coverage questions.      Please bring the following to your appointment:    >>   Any x-rays, CTs or MRIs which have been performed.  Contact the facility where they were done to arrange for  prior to your scheduled appointment.    >>   List of current medications   >>   This referral request   >>   Any documents/labs given to you for this referral                  Follow-up notes from your care team     Return in about 4 weeks (around 3/29/2018) for Routine Visit.      Your next 10 appointments already scheduled     Mar 21, 2018  3:30 PM CDT   Lab with  LAB   [facility] ([facility])    [address]  1st Floor  [facility] [address] [phone]            Mar 21, 2018  4:30 PM CDT   (Arrive by 4:00 PM)   Return Visit with Daya Gutierrez MD   [facility] ([facility])    [address]  [address]  [facility] [id number] [phone]            Mar 30, 2018  3:30 PM CDT   Adult Med Follow UP with DONOVAN Blake CNP   Psychiatry Clinic ([facility])    [place] [id number] [id number] [phone]             Apr 02, 2018  9:00 AM CDT   (Arrive by 8:45 AM)   Return Visit with Arlyn Sanchez MD   [facility] ([facility])    [address]  4th Floor  [facility] [id number] [phone]            Apr 04, 2018  7:30 AM CDT   LAB with  LAB   [facility] ([facility])    [address]  [address]  [facility] [id number] [phone]           Please do not eat 10-12 hours before your appointment if you are coming in fasting for labs on lipids, cholesterol, or glucose (sugar). This does not apply to pregnant women. Water, hot tea and black coffee (with nothing added) are okay. Do not drink other fluids, diet soda or chew gum.            Apr 04, 2018  2:45 PM CDT   Lab with  LAB   [facility] ([facility])    [address]  1st Floor  [facility] [id number] [phone]            Apr 04, 2018  3:45 PM CDT   (Arrive by 3:30 PM)   Return Liver Transplant with Hussein Banegas MD   [facility] Hepatology ([facility])    [address]  [address]  [facility] [id number] [phone]            May 21, 2018  3:00 PM CDT   (Arrive by 2:45 PM)   RETURN ENDOCRINE with Ruth Oakley MD   [facility] Endocrinology ([facility])    [address]  3rd Floor  [facility] [id number] [phone]              Future tests that were ordered for you today     Open Future Orders        Priority Expected Expires Ordered    UA with Micro reflex to Culture Routine 3/1/2018 3/1/2019 3/1/2018            Who to contact     Please call your clinic at [phone] to:    Ask questions about your health    Make or cancel appointments    Discuss your medicines    Learn about your test results    Speak to your doctor            Additional Information About Your Visit        MyChart Information     MyChart gives you secure access to your  electronic health record. If you see a primary care provider, you can also send messages to your care team and make appointments. If you have questions, please call your primary care clinic.  If you do not have a primary care provider, please call [phone] and they will assist you.      [employer] is an electronic gateway that provides easy, online access to your medical records. With [employer], you can request a clinic appointment, read your test results, renew a prescription or communicate with your care team.     To access your existing account, please contact your [facility] or call [phone] for assistance.        Care EveryWhere ID     This is your Care EveryWhere ID. This could be used by other organizations to access your [place] medical records  [id number]        Your Vitals Were     Pulse Respirations Pulse Oximetry BMI (Body Mass Index)          89 20 96% 32.43 kg/m2         Blood Pressure from Last 3 Encounters:   03/01/18 148/82   12/20/17 134/79   12/20/17 118/77    Weight from Last 3 Encounters:   03/01/18 93.9 kg (207 lb 1.6 oz)   12/20/17 90.1 kg (198 lb 9.6 oz)   12/20/17 90.1 kg (198 lb 9.6 oz)              We Performed the Following     GASTROENTEROLOGY ADULT REF PROCEDURE ONLY (pt requests Dr. Alfonso)     SPORTS MEDICINE REFERRAL          Where to get your medicines      [facility] [address] [place] [address] [place] AT [address] [place] [address]     Phone:  [phone]     hydrOXYzine 25 MG tablet    levothyroxine 88 MCG tablet          Primary Care Provider Office Phone # Fax #    Santosh SAAVEDRA Damian [phone]       [facility] [address] [place] [address]        Equal Access to Services     PAULO BERRY : Barby fryo Sogary, waaxda luqadaha, qaybta kaalmada adeegyada, anna schroeder. So [facility]  [phone].    ATENCIÓN: Si yodit lopez, tiene a reece disposición servicios gratuitos de asistencia lingüística. Rhiannon hernandez [phone].    We comply with applicable federal civil rights laws and [place] laws. We do not discriminate on the basis of race, color, national origin, age, disability, sex, sexual orientation, or gender identity.            Thank you!     Thank you for choosing [facility]  for your care. Our goal is always to provide you with excellent care. Hearing back from our patients is one way we can continue to improve our services. Please take a few minutes to complete the written survey that you may receive in the mail after your visit with us. Thank you!             Your Updated Medication List - Protect others around you: Learn how to safely use, store and throw away your medicines at www.disposemymeds.org.          This list is accurate as of 3/1/18 10:58 AM.  Always use your most recent med list.                   Brand Name Dispense Instructions for use Diagnosis    acetaminophen 325 MG tablet    TYLENOL    100 tablet    Take 2 tablets (650 mg) by mouth every 6 hours as needed for mild pain Or fevers. Use sparingly. Limit use to no more than 2 grams (2000 mg) in 24 hours. **Further refills must be obtained by primary care provider**    Acute post-operative pain       amLODIPine 5 MG tablet    NORVASC    90 tablet    Take 1 tablet (5 mg) by mouth daily    Hypertension       azaTHIOprine 100 MG Tabs     90 tablet    Take 50 mg by mouth every evening    Liver transplanted (H)       betaxolol 0.5 % ophthalmic solution    BETOPTIC    5 mL    Place 1 drop into both eyes 2 times daily    Primary open angle glaucoma of both eyes, unspecified glaucoma stage       busPIRone 10 MG tablet    BUSPAR    60 tablet    Take 1 tablet (10 mg) by mouth 2 times daily    WALTER (generalized anxiety disorder)       calcium Citrate-vitamin D 500-400 MG-UNIT Chew      Take 1 tablet by mouth daily         chlorthalidone 25 MG tablet    HYGROTON    45 tablet    Take 0.5 tablets (12.5 mg) by mouth daily    CKD (chronic kidney disease) stage 3, GFR 30-59 ml/min, Fluid retention       cyanocobalamin 1000 MCG Tabs     30 tablet    Take 1,000 mcg by mouth daily    Liver transplanted (H)       FISH OIL PO      Take 645 mg by mouth 2 times daily        folic acid 1 MG tablet    FOLVITE    30 tablet    Take 1 tablet (1 mg) by mouth daily    Malnutrition (H)       hydrOXYzine 25 MG tablet    ATARAX    60 tablet    Take 1-2 tablets (25-50mg) every 6 hours as needed for itching    WALTER (generalized anxiety disorder)       levothyroxine 88 MCG tablet    SYNTHROID/LEVOTHROID    90 tablet    Take 1 tablet (88 mcg) by mouth every morning (before breakfast)    Thyroid function test abnormal       * loperamide 2 MG capsule    IMODIUM     Take 2 mg by mouth 4 times daily as needed for diarrhea Reported on 5/18/2017        * loperamide 2 MG capsule    IMODIUM    40 capsule    Take 1 capsule (2 mg) by mouth 2 times daily as needed for diarrhea    Functional diarrhea       melatonin 5 MG tablet      Take 1 tablet (5 mg) by mouth nightly as needed for sleep        multivitamin, therapeutic Tabs tablet     30 tablet    Take 1 tablet by mouth every 24 hours    Malnutrition (H)       pantoprazole 40 MG EC tablet    PROTONIX    90 tablet    Take 1 tablet (40 mg) by mouth every morning (before breakfast)    Gastroesophageal reflux disease, esophagitis presence not specified       pramipexole 0.5 MG tablet    MIRAPEX    90 tablet    Take 1 tablet (0.5 mg) by mouth At Bedtime    Restless leg syndrome       predniSONE 10 MG tablet    DELTASONE     Take 1 tablet (10 mg) by mouth daily        psyllium 0.52 G capsule     60 capsule    Take 2 capsules (1.04 g) by mouth daily With a full glass of water.    Loose stools       simethicone 80 MG chewable tablet    MYLICON    180 tablet    Take 1 tablet (80 mg) by mouth every 6 hours as needed for  flatulence or cramping    Flatulence, eructation, and gas pain       tacrolimus 0.5 MG capsule    GENERIC EQUIVALENT    240 capsule    Take 4 capsules (2 mg) by mouth 2 times daily    Liver transplanted (H)       TIZANIDINE HCL PO      Take 4 mg by mouth At Bedtime        traMADol 50 MG tablet    ULTRAM    15 tablet    Take 1-2 tablets ( mg) by mouth every 6 hours as needed for pain        traZODone 100 MG tablet    DESYREL    30 tablet    Take 1.5 tablets (150 mg) by mouth At Bedtime    Insomnia, unspecified type       ursodiol 300 MG capsule    ACTIGALL    60 capsule    Take 1 capsule (300 mg) by mouth 2 times daily    Liver transplanted (H)       * Notice:  This list has 2 medication(s) that are the same as other medications prescribed for you. Read the directions carefully, and ask your doctor or other care provider to review them with you.

## 2018-03-01 NOTE — PROGRESS NOTES
HISTORY OF PRESENT ILLNESS  Ms. Luque is a pleasant 62 year old year old female who presents to clinic today with left knee pain  Phan explains that her knee started bothering her over the past 2 months with more stairs and bending and kneeling  Location: left knee  Quality:  achy pain    Severity: 5/10 at worst    Duration: 2 months  Timing: occurs intermittently  Modifying factors:  resting and non-use makes it better, movement and use makes it worse  Associated signs & symptoms: no significant swelling  Previous similar pain: yes    Additional history: as documented    MEDICAL HISTORY  Patient Active Problem List   Diagnosis     Decompensation of cirrhosis of liver (H)     Liver transplanted (H)     Alcoholic hepatitis     Immunosuppression (H)     Alcoholic hepatitis without ascites     Enterococcus UTI     Liver transplant status     Nausea & vomiting     Malnutrition (H)     Candidiasis of skin     Anemia     ACP (advance care planning)     Diarrhea     Hypothyroidism     Esophageal reflux     Recurrent major depression in partial remission (H)     Insomnia     CKD (chronic kidney disease) stage 3, GFR 30-59 ml/min     Anemia in stage 3 chronic kidney disease     Osteopenia     Alcohol abuse, uncomplicated       Current Outpatient Prescriptions   Medication Sig Dispense Refill     predniSONE (DELTASONE) 10 MG tablet Take 1 tablet (10 mg) by mouth daily       hydrOXYzine (ATARAX) 25 MG tablet Take 1-2 tablets (25-50mg) every 6 hours as needed for itching 60 tablet 2     levothyroxine (SYNTHROID/LEVOTHROID) 88 MCG tablet Take 1 tablet (88 mcg) by mouth every morning (before breakfast) 90 tablet 2     melatonin 5 MG tablet Take 1 tablet (5 mg) by mouth nightly as needed for sleep       traZODone (DESYREL) 100 MG tablet Take 1.5 tablets (150 mg) by mouth At Bedtime 30 tablet 1     tacrolimus (GENERIC EQUIVALENT) 0.5 MG capsule Take 4 capsules (2 mg) by mouth 2 times daily 240 capsule 11     busPIRone (BUSPAR)  10 MG tablet Take 1 tablet (10 mg) by mouth 2 times daily 60 tablet 1     betaxolol (BETOPTIC) 0.5 % ophthalmic solution Place 1 drop into both eyes 2 times daily 5 mL 2     chlorthalidone (HYGROTON) 25 MG tablet Take 0.5 tablets (12.5 mg) by mouth daily 45 tablet 3     pantoprazole (PROTONIX) 40 MG EC tablet Take 1 tablet (40 mg) by mouth every morning (before breakfast) 90 tablet 4     ursodiol (ACTIGALL) 300 MG capsule Take 1 capsule (300 mg) by mouth 2 times daily 60 capsule 11     traMADol (ULTRAM) 50 MG tablet Take 1-2 tablets ( mg) by mouth every 6 hours as needed for pain 15 tablet 0     pramipexole (MIRAPEX) 0.5 MG tablet Take 1 tablet (0.5 mg) by mouth At Bedtime 90 tablet 3     amLODIPine (NORVASC) 5 MG tablet Take 1 tablet (5 mg) by mouth daily 90 tablet 3     azaTHIOprine 100 MG TABS Take 50 mg by mouth every evening 90 tablet 3     calcium Citrate-vitamin D 500-400 MG-UNIT CHEW Take 1 tablet by mouth daily       acetaminophen (TYLENOL) 325 MG tablet Take 2 tablets (650 mg) by mouth every 6 hours as needed for mild pain Or fevers. Use sparingly. Limit use to no more than 2 grams (2000 mg) in 24 hours. **Further refills must be obtained by primary care provider** 100 tablet 0     simethicone (MYLICON) 80 MG chewable tablet Take 1 tablet (80 mg) by mouth every 6 hours as needed for flatulence or cramping 180 tablet 1     psyllium 0.52 G capsule Take 2 capsules (1.04 g) by mouth daily With a full glass of water. 60 capsule 1     multivitamin, therapeutic (THERA-VIT) TABS tablet Take 1 tablet by mouth every 24 hours 30 tablet 11     folic acid (FOLVITE) 1 MG tablet Take 1 tablet (1 mg) by mouth daily 30 tablet 1     [DISCONTINUED] levothyroxine (SYNTHROID/LEVOTHROID) 88 MCG tablet Take 1 tablet (88 mcg) by mouth every morning (before breakfast) 30 tablet 1       Allergies   Allergen Reactions     Benadryl [Diphenhydramine] Hives     Cefaclor Hives     Hydrocodone-Acetaminophen Itching     Oxycodone  "Itching     Penicillins Hives     Sulfa Drugs Hives       Family History   Problem Relation Age of Onset     Family History Negative Other      Melanoma Mother           HEART DISEASE Father      CHF       Additional medical/Social/Surgical histories reviewed in [facility] and updated as appropriate.     REVIEW OF SYSTEMS (3/1/2018)  10 point ROS of systems including Constitutional, Eyes, Respiratory, Cardiovascular, Gastroenterology, Genitourinary, Integumentary, Musculoskeletal, Psychiatric were all negative except for pertinent positives noted in my HPI.     PHYSICAL EXAM  Vitals:    18 1110   BP: 148/82   Resp: 20   SpO2: 96%   Weight: 93.9 kg (207 lb)   Height: 1.702 m (5' 7.01\")     Vital Signs: /82  Resp 20  Ht 1.702 m (5' 7.01\")  Wt 93.9 kg (207 lb)  SpO2 96%  BMI 32.41 kg/m2 Patient declined being weighed. Body mass index is 32.41 kg/(m^2).    General  - normal appearance, in no obvious distress, overweight  CV  - normal popliteal pulse  Pulm  - normal respiratory pattern, non-labored  Musculoskeletal - left knee  - stance: mildly antalgic gait, genu varum  - inspection: NO generalized swelling, trace effusion  - palpation: NO medial joint line tenderness, patella tender at lateral patellar facet PF joint and patellar tendon non-tender, normal popliteal pulse  - ROM: 100 degrees flexion, 0 degrees extension, painful active ROM with full flexion  - strength: 5/5 in flexion, 5/5 in extension  - neuro: no sensory or motor deficit  - special tests:  (-) Lachman  (-) anterior drawer  (-) posterior drawer  (-) pivot shift  (-) Oscar  (-) Thessaly  (-) varus at 0 and 30 degrees flexion  (-) valgus at 0 and 30 degrees flexion  (+) Alex s compression test  (-) patellar apprehension   Neuro  - no sensory or motor deficit, grossly normal coordination, normal muscle tone  Skin  - no ecchymosis, erythema, warmth, or induration, no obvious rash  Psych  - interactive, appropriate, normal mood and " affect    ASSESSMENT & PLAN  63 yo female with left knee patellofemoral arthritis  Given HEP, PT order for pool  Reviewed xrays of knee, moderate arthritis, medial and PF compartment  Ice PRN  Prednisone 20mg daily x 5 days  voltaren gel PRN  aspercreme patches PRN  F/u in 1 month can review her cervical and lumbar spine history as well    Donnell Duvall MD, CAQSM

## 2018-03-01 NOTE — LETTER
3/1/2018       RE: Phan Luque  [address] [facility] [address]     Dear Colleague,    Thank you for referring your patient, Phan Luque, to the [facility]THOP[facility] CLINIC at [facility]. Please see a copy of my visit note below.          SPORTS & ORTHOPEDIC WALK-IN VISIT 3/1/2018    Primary Care Physician: Dr. Sanchez    Reason for visit:     What part of your body is injured / painful?  Left lateral knee and left shoulder    What caused the injury /pain? No inciting event     How long ago did your injury occur or pain begin? 2 months for left knee and left shoulder pain for 6-8 weeks    What are your most bothersome symptoms? Pain    How would you characterize your symptom?  stabbing and shooting    What makes your symptoms better? Other: knee extension    What makes your symptoms worse? Standing    Have you been previously seen for this problem? No    Medical History:    Any recent changes to your medical history? No    Any new medication prescribed since last visit? No    Have you had surgery on this body part before? No    Social History:    Occupation: [employer]/ [employer] job    Handedness: Right    Exercise: None    Review of Systems:    Do you have fever, chills, weight loss? No    Do you have any vision problems? No    Do you have any chest pain or edema? No    Do you have any shortness of breath or wheezing?  No    Do you have stomach problems? No    Do you have any numbness or focal weakness? Yes, peripheral neuropathy and cervical spinal stenosis    Do you have diabetes? No    Do you have problems with bleeding or clotting? No    Do you have an rashes or other skin lesions? No           HISTORY OF PRESENT ILLNESS  Ms. Luque is a pleasant 62 year old year old female who presents to clinic today with left knee pain  Phan explains that her knee started bothering her over the past 2 months with more stairs and bending and  kneeling  Location: left knee  Quality:  achy pain    Severity: 5/10 at worst    Duration: 2 months  Timing: occurs intermittently  Modifying factors:  resting and non-use makes it better, movement and use makes it worse  Associated signs & symptoms: no significant swelling  Previous similar pain: yes    Additional history: as documented    MEDICAL HISTORY  Patient Active Problem List   Diagnosis     Decompensation of cirrhosis of liver (H)     Liver transplanted (H)     Alcoholic hepatitis     Immunosuppression (H)     Alcoholic hepatitis without ascites     Enterococcus UTI     Liver transplant status     Nausea & vomiting     Malnutrition (H)     Candidiasis of skin     Anemia     ACP (advance care planning)     Diarrhea     Hypothyroidism     Esophageal reflux     Recurrent major depression in partial remission (H)     Insomnia     CKD (chronic kidney disease) stage 3, GFR 30-59 ml/min     Anemia in stage 3 chronic kidney disease     Osteopenia     Alcohol abuse, uncomplicated       Current Outpatient Prescriptions   Medication Sig Dispense Refill     predniSONE (DELTASONE) 10 MG tablet Take 1 tablet (10 mg) by mouth daily       hydrOXYzine (ATARAX) 25 MG tablet Take 1-2 tablets (25-50mg) every 6 hours as needed for itching 60 tablet 2     levothyroxine (SYNTHROID/LEVOTHROID) 88 MCG tablet Take 1 tablet (88 mcg) by mouth every morning (before breakfast) 90 tablet 2     melatonin 5 MG tablet Take 1 tablet (5 mg) by mouth nightly as needed for sleep       traZODone (DESYREL) 100 MG tablet Take 1.5 tablets (150 mg) by mouth At Bedtime 30 tablet 1     tacrolimus (GENERIC EQUIVALENT) 0.5 MG capsule Take 4 capsules (2 mg) by mouth 2 times daily 240 capsule 11     busPIRone (BUSPAR) 10 MG tablet Take 1 tablet (10 mg) by mouth 2 times daily 60 tablet 1     betaxolol (BETOPTIC) 0.5 % ophthalmic solution Place 1 drop into both eyes 2 times daily 5 mL 2     chlorthalidone (HYGROTON) 25 MG tablet Take 0.5 tablets (12.5 mg)  by mouth daily 45 tablet 3     pantoprazole (PROTONIX) 40 MG EC tablet Take 1 tablet (40 mg) by mouth every morning (before breakfast) 90 tablet 4     ursodiol (ACTIGALL) 300 MG capsule Take 1 capsule (300 mg) by mouth 2 times daily 60 capsule 11     traMADol (ULTRAM) 50 MG tablet Take 1-2 tablets ( mg) by mouth every 6 hours as needed for pain 15 tablet 0     pramipexole (MIRAPEX) 0.5 MG tablet Take 1 tablet (0.5 mg) by mouth At Bedtime 90 tablet 3     amLODIPine (NORVASC) 5 MG tablet Take 1 tablet (5 mg) by mouth daily 90 tablet 3     azaTHIOprine 100 MG TABS Take 50 mg by mouth every evening 90 tablet 3     calcium Citrate-vitamin D 500-400 MG-UNIT CHEW Take 1 tablet by mouth daily       acetaminophen (TYLENOL) 325 MG tablet Take 2 tablets (650 mg) by mouth every 6 hours as needed for mild pain Or fevers. Use sparingly. Limit use to no more than 2 grams (2000 mg) in 24 hours. **Further refills must be obtained by primary care provider** 100 tablet 0     simethicone (MYLICON) 80 MG chewable tablet Take 1 tablet (80 mg) by mouth every 6 hours as needed for flatulence or cramping 180 tablet 1     psyllium 0.52 G capsule Take 2 capsules (1.04 g) by mouth daily With a full glass of water. 60 capsule 1     multivitamin, therapeutic (THERA-VIT) TABS tablet Take 1 tablet by mouth every 24 hours 30 tablet 11     folic acid (FOLVITE) 1 MG tablet Take 1 tablet (1 mg) by mouth daily 30 tablet 1     [DISCONTINUED] levothyroxine (SYNTHROID/LEVOTHROID) 88 MCG tablet Take 1 tablet (88 mcg) by mouth every morning (before breakfast) 30 tablet 1       Allergies   Allergen Reactions     Benadryl [Diphenhydramine] Hives     Cefaclor Hives     Hydrocodone-Acetaminophen Itching     Oxycodone Itching     Penicillins Hives     Sulfa Drugs Hives       Family History   Problem Relation Age of Onset     Family History Negative Other      Melanoma Mother           HEART DISEASE Father      CHF       Additional  "medical/Social/Surgical histories reviewed in [facility] and updated as appropriate.     REVIEW OF SYSTEMS (3/1/2018)  10 point ROS of systems including Constitutional, Eyes, Respiratory, Cardiovascular, Gastroenterology, Genitourinary, Integumentary, Musculoskeletal, Psychiatric were all negative except for pertinent positives noted in my HPI.     PHYSICAL EXAM  Vitals:    03/01/18 1110   BP: 148/82   Resp: 20   SpO2: 96%   Weight: 93.9 kg (207 lb)   Height: 1.702 m (5' 7.01\")     Vital Signs: /82  Resp 20  Ht 1.702 m (5' 7.01\")  Wt 93.9 kg (207 lb)  SpO2 96%  BMI 32.41 kg/m2 Patient declined being weighed. Body mass index is 32.41 kg/(m^2).    General  - normal appearance, in no obvious distress, overweight  CV  - normal popliteal pulse  Pulm  - normal respiratory pattern, non-labored  Musculoskeletal - left knee  - stance: mildly antalgic gait, genu varum  - inspection: NO generalized swelling, trace effusion  - palpation: NO medial joint line tenderness, patella tender at lateral patellar facet PF joint and patellar tendon non-tender, normal popliteal pulse  - ROM: 100 degrees flexion, 0 degrees extension, painful active ROM with full flexion  - strength: 5/5 in flexion, 5/5 in extension  - neuro: no sensory or motor deficit  - special tests:  (-) Lachman  (-) anterior drawer  (-) posterior drawer  (-) pivot shift  (-) Oscar  (-) Thessaly  (-) varus at 0 and 30 degrees flexion  (-) valgus at 0 and 30 degrees flexion  (+) Alex s compression test  (-) patellar apprehension   Neuro  - no sensory or motor deficit, grossly normal coordination, normal muscle tone  Skin  - no ecchymosis, erythema, warmth, or induration, no obvious rash  Psych  - interactive, appropriate, normal mood and affect    ASSESSMENT & PLAN  61 yo female with left knee patellofemoral arthritis  Given HEP, PT order for pool  Reviewed xrays of knee, moderate arthritis, medial and PF compartment  Ice PRN  Prednisone 20mg daily x 5 " days  voltaren gel PRN  aspercreme patches PRN  F/u in 1 month can review her cervical and lumbar spine history as well    Donnell Duvall MD, CAQSM

## 2018-03-01 NOTE — MR AVS SNAPSHOT
After Visit Summary   3/1/2018    Phan Luque    MRN: [id number]           Patient Information     Date Of Birth          1955        Visit Information        Provider Department      3/1/2018 11:10 AM Donnell Duvall MD [facility] Sports and Orthopaedic Walk In [facility]        Today's Diagnoses     Chronic pain of left knee    -  1    Cervical radicular pain        Patellofemoral arthritis of left knee           Follow-ups after your visit        Additional Services     PHYSICAL THERAPY REFERRAL (External-Prints)       Physical Therapy Referral                  Your next 10 appointments already scheduled     Mar 21, 2018  3:30 PM CDT   Lab with [facility]   [facility] Lab ([facility])    [address]  1st Floor  [place] [address]   [phone]            Mar 21, 2018  4:30 PM CDT   (Arrive by 4:00 PM)   Return Visit with Daya Gutierrez MD   [facility] Nephrology ([facility])    [address]  [address]  [place] [id number] [phone]            Mar 30, 2018  3:30 PM CDT   Adult Med Follow UP with DONOVAN Blake CNP   Psychiatry Clinic ([facility])    [place] [id number]  [address] [facility] [address] [phone]            Mar 30, 2018  4:00 PM CDT   (Arrive by 3:45 PM)   Return Visit with Donnell Duvall MD   [facility] Orthopaedic Clinic ([facility])    [address]  4th Floor  [facility] [place] [facility] [place] [id number] [phone]            Apr 02, 2018  9:00 AM CDT   (Arrive by 8:45 AM)   Return Visit with Arlyn Sanchez MD   [facility] Primary Care Clinic ([facility])    [address]  4th Floor  [place] [facility] [place] [id number] [phone]            Apr 04, 2018  7:30 AM CDT   LAB with [facility]   [facility]    [address] [place] [address]  [address]  [place]  [place] [address]   [phone]           Please do not eat 10-12 hours before your appointment if you are coming in fasting for labs on lipids, cholesterol, or glucose (sugar). This does not apply to pregnant women. Water, hot tea and black coffee (with nothing added) are okay. Do not drink other fluids, diet soda or chew gum.            Apr 04, 2018  2:45 PM CDT   Lab with  LAB   [facility] ([facility])    [address]  1st Floor  [facility] [id number] [phone]            Apr 04, 2018  3:45 PM CDT   (Arrive by 3:30 PM)   Return Liver Transplant with Hussein Banegas MD   [facility] Hepatology ([facility])    [address] [place] [address]  [address]  [facility] [address] [phone]            May 21, 2018  3:00 PM CDT   (Arrive by 2:45 PM)   RETURN ENDOCRINE with Ruth Oakley MD   [facility] Endocrinology ([facility])    [address] [place] [address]  3rd Floor  [facility] [id number] [phone]              Future tests that were ordered for you today     Open Future Orders        Priority Expected Expires Ordered    UA with Micro reflex to Culture Routine 3/1/2018 3/1/2019 3/1/2018            Who to contact     Please call your clinic at [phone] to:    Ask questions about your health    Make or cancel appointments    Discuss your medicines    Learn about your test results    Speak to your doctor            Additional Information About Your Visit        [employer] Information     [employer] gives you secure access to your electronic health record. If you see a primary care provider, you can also send messages to your care team and make appointments. If you have questions, please call your primary care clinic.  If you do not have a primary care provider, please call [phone] and they will assist you.      [employer] is an electronic gateway that provides easy, online access to your medical records. With [employer], you  "can request a clinic appointment, read your test results, renew a prescription or communicate with your care team.     To access your existing account, please contact your [facility] or call [phone] for assistance.        Care EveryWhere ID     This is your Care EveryWhere ID. This could be used by other organizations to access your [place] medical records  [id number]        Your Vitals Were     Respirations Height Pulse Oximetry BMI (Body Mass Index)          20 1.702 m (5' 7.01\") 96% 32.41 kg/m2         Blood Pressure from Last 3 Encounters:   03/01/18 148/82   03/01/18 148/82   12/20/17 134/79    Weight from Last 3 Encounters:   03/01/18 93.9 kg (207 lb)   03/01/18 93.9 kg (207 lb 1.6 oz)   12/20/17 90.1 kg (198 lb 9.6 oz)              We Performed the Following     PHYSICAL THERAPY REFERRAL (External-Prints)          Today's Medication Changes          These changes are accurate as of 3/1/18 11:56 AM.  If you have any questions, ask your nurse or doctor.               Start taking these medicines.        Dose/Directions    diclofenac 1 % Gel topical gel   Commonly known as:  VOLTAREN   Used for:  Chronic pain of left knee   Started by:  Donnell Duvall MD        Apply 4 grams to knees or 2 grams to hands four times daily using enclosed dosing card.   Quantity:  100 g   Refills:  1         These medicines have changed or have updated prescriptions.        Dose/Directions    * predniSONE 10 MG tablet   Commonly known as:  DELTASONE   This may have changed:  Another medication with the same name was added. Make sure you understand how and when to take each.   Changed by:  Donnell Duvall MD        Dose:  10 mg   Take 1 tablet (10 mg) by mouth daily   Refills:  0       * predniSONE 20 MG tablet   Commonly known as:  DELTASONE   This may have changed:  You were already taking a medication with the same name, and this prescription was added. Make sure you understand " how and when to take each.   Used for:  Cervical radicular pain, Patellofemoral arthritis of left knee   Changed by:  Donnell Duvall MD        Dose:  20 mg   Take 1 tablet (20 mg) by mouth daily   Quantity:  5 tablet   Refills:  0       * Notice:  This list has 2 medication(s) that are the same as other medications prescribed for you. Read the directions carefully, and ask your doctor or other care provider to review them with you.         Where to get your medicines      These medications were sent to [facility] [address] [place] [address] [address] [place] AT [facility]  [address] [facility] [place] [address]     Phone:  [phone]     diclofenac 1 % Gel topical gel    hydrOXYzine 25 MG tablet    levothyroxine 88 MCG tablet    predniSONE 20 MG tablet                Primary Care Provider Office Phone # Fax #    Santosh Salgado [phone]       [facility] [address]   [facility] [address]        Equal Access to Services     ALLY BERRY AH: Hadii aad ku hadasho Soomaali, waaxda luqadaha, qaybta kaalmada adeegyada, waxay idiin hayaan kay ambrose . So [facility] [phone].    ATENCIÓN: Si habla español, tiene a reece disposición servicios gratuitos de asistencia lingüística. [facility] [phone].    We comply with applicable federal civil rights laws and [place] laws. We do not discriminate on the basis of race, color, national origin, age, disability, sex, sexual orientation, or gender identity.            Thank you!     Thank you for choosing [facility] SPORTS AND ORTHOPAEDIC WALK IN CLINIC  for your care. Our goal is always to provide you with excellent care. Hearing back from our patients is one way we can continue to improve our services. Please take a few minutes to complete the written survey that you may receive in the mail after your visit with us. Thank you!             Your Updated Medication List - Protect others around you: Learn how to safely  use, store and throw away your medicines at www.disposemymeds.org.          This list is accurate as of 3/1/18 11:56 AM.  Always use your most recent med list.                   Brand Name Dispense Instructions for use Diagnosis    acetaminophen 325 MG tablet    TYLENOL    100 tablet    Take 2 tablets (650 mg) by mouth every 6 hours as needed for mild pain Or fevers. Use sparingly. Limit use to no more than 2 grams (2000 mg) in 24 hours. **Further refills must be obtained by primary care provider**    Acute post-operative pain       amLODIPine 5 MG tablet    NORVASC    90 tablet    Take 1 tablet (5 mg) by mouth daily    Hypertension       azaTHIOprine 100 MG Tabs     90 tablet    Take 50 mg by mouth every evening    Liver transplanted (H)       betaxolol 0.5 % ophthalmic solution    BETOPTIC    5 mL    Place 1 drop into both eyes 2 times daily    Primary open angle glaucoma of both eyes, unspecified glaucoma stage       busPIRone 10 MG tablet    BUSPAR    60 tablet    Take 1 tablet (10 mg) by mouth 2 times daily    WALTER (generalized anxiety disorder)       calcium Citrate-vitamin D 500-400 MG-UNIT Chew      Take 1 tablet by mouth daily        chlorthalidone 25 MG tablet    HYGROTON    45 tablet    Take 0.5 tablets (12.5 mg) by mouth daily    CKD (chronic kidney disease) stage 3, GFR 30-59 ml/min, Fluid retention       diclofenac 1 % Gel topical gel    VOLTAREN    100 g    Apply 4 grams to knees or 2 grams to hands four times daily using enclosed dosing card.    Chronic pain of left knee       folic acid 1 MG tablet    FOLVITE    30 tablet    Take 1 tablet (1 mg) by mouth daily    Malnutrition (H)       hydrOXYzine 25 MG tablet    ATARAX    60 tablet    Take 1-2 tablets (25-50mg) every 6 hours as needed for itching    WALTER (generalized anxiety disorder)       levothyroxine 88 MCG tablet    SYNTHROID/LEVOTHROID    90 tablet    Take 1 tablet (88 mcg) by mouth every morning (before breakfast)    Thyroid function test  abnormal       melatonin 5 MG tablet      Take 1 tablet (5 mg) by mouth nightly as needed for sleep        multivitamin, therapeutic Tabs tablet     30 tablet    Take 1 tablet by mouth every 24 hours    Malnutrition (H)       pantoprazole 40 MG EC tablet    PROTONIX    90 tablet    Take 1 tablet (40 mg) by mouth every morning (before breakfast)    Gastroesophageal reflux disease, esophagitis presence not specified       pramipexole 0.5 MG tablet    MIRAPEX    90 tablet    Take 1 tablet (0.5 mg) by mouth At Bedtime    Restless leg syndrome       * predniSONE 10 MG tablet    DELTASONE     Take 1 tablet (10 mg) by mouth daily        * predniSONE 20 MG tablet    DELTASONE    5 tablet    Take 1 tablet (20 mg) by mouth daily    Cervical radicular pain, Patellofemoral arthritis of left knee       psyllium 0.52 G capsule     60 capsule    Take 2 capsules (1.04 g) by mouth daily With a full glass of water.    Loose stools       simethicone 80 MG chewable tablet    MYLICON    180 tablet    Take 1 tablet (80 mg) by mouth every 6 hours as needed for flatulence or cramping    Flatulence, eructation, and gas pain       tacrolimus 0.5 MG capsule    GENERIC EQUIVALENT    240 capsule    Take 4 capsules (2 mg) by mouth 2 times daily    Liver transplanted (H)       traMADol 50 MG tablet    ULTRAM    15 tablet    Take 1-2 tablets ( mg) by mouth every 6 hours as needed for pain        traZODone 100 MG tablet    DESYREL    30 tablet    Take 1.5 tablets (150 mg) by mouth At Bedtime    Insomnia, unspecified type       ursodiol 300 MG capsule    ACTIGALL    60 capsule    Take 1 capsule (300 mg) by mouth 2 times daily    Liver transplanted (H)       * Notice:  This list has 2 medication(s) that are the same as other medications prescribed for you. Read the directions carefully, and ask your doctor or other care provider to review them with you.

## 2018-03-01 NOTE — NURSING NOTE
Chief Complaint   Patient presents with     Physical     annual physical   Marlena Salgado LPN 10:12 AM on 3/1/2018      Rooming Note  Health Maintenance   Health Maintenance Due   Topic Date Due     COLON CANCER SCREEN (SYSTEM ASSIGNED)  12/31/2017    All health maintenance items discussed and pended.  Marlena Salgado LPN 10:14 AM on 3/1/2018

## 2018-03-01 NOTE — PROGRESS NOTES
SPORTS & ORTHOPEDIC WALK-IN VISIT 3/1/2018    Primary Care Physician: Dr. Sanchez    Reason for visit:     What part of your body is injured / painful?  Left lateral knee and left shoulder    What caused the injury /pain? No inciting event     How long ago did your injury occur or pain begin? 2 months for left knee and left shoulder pain for 6-8 weeks    What are your most bothersome symptoms? Pain    How would you characterize your symptom?  stabbing and shooting    What makes your symptoms better? Other: knee extension    What makes your symptoms worse? Standing    Have you been previously seen for this problem? No    Medical History:    Any recent changes to your medical history? No    Any new medication prescribed since last visit? No    Have you had surgery on this body part before? 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 No    Do you have any vision problems? No    Do you have any chest pain or edema? No    Do you have any shortness of breath or wheezing?  No    Do you have stomach problems? No    Do you have any numbness or focal weakness? Yes, peripheral neuropathy and cervical spinal stenosis    Do you have diabetes? No    Do you have problems with bleeding or clotting? No    Do you have an rashes or other skin lesions? No

## 2018-03-01 NOTE — PROGRESS NOTES
Ms. Luque is a 62 year old female here for physical.    History of Present Illness:  Phan is a pleasant 61 yo F with past medical history of liver transplant related to alcoholic hepatitis, anxiety, HTN, CKD, IBS here for physical.    She reports general good health, however, does report shortness of breath and tightness across chest, ongoing intermittently for several weeks. No cough, wheeze, pleurisy, chest pain.  Recently had UTI and took cipro recently for UTI.  Urinary symptoms cleared up initially. Now having tingling with urination. No stomach pain, diarrhea, constipation, gas. No ascites.  Weight has been increasing steadily over last year by 45 lbs. Does not exercise.    Blood pressure well controlled at home.  Usually 125/85 at home.    She also requested a refill of tramadol at the very end of visit. States she takes is for L knee pain. Has not seen anyone for knee pain.  Does not use daily.    Gyn Hx:  Dr. 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, one )  Living son bicuspid aortic valve  Daughter has UPJ defects  No menses  Last [place]       We reviewed the following health maintenance topics:  Immunizations  Cancer Screenings (Pap, mammogram, colonoscopy, prostate, skin, lung)  Lipids, STDs, HIV/Hep C screening if applicable  AAA screening, Dexa screening if applicable  Lifestyle factors/Saftey (diet, exercise, weight, stress, seat belts, sun protection)  Risk behaviors (tobacco, alcohol, illicit substances, abuse/violence, depression)  Advanced directive planning if applicable        A full 10-pt Review of Systems was performed, verified and is negative except as documented in the HPI.  All health questionnaires were reviewed, verified and relevant information documented above.    Past Medical History:  Past Medical History:   Diagnosis Date     Asthma      Chronic kidney disease      End  stage liver disease (H)     2/2 Alcohol Abuse     Liver transplanted (H)      Migraines      Osteoporosis      Spinal stenosis in cervical region      Strabismus        Past Surgical History:  Past Surgical History:   Procedure Laterality Date     APPENDECTOMY           BENCH LIVER N/A 2016    Procedure: BENCH LIVER;  Surgeon: Larry Dhillon MD;  Location: [facility] OR     CATARACT IOL, RT/LT       COLONOSCOPY       ESOPHAGOSCOPY, GASTROSCOPY, DUODENOSCOPY (EGD), COMBINED N/A 2016    Procedure: COMBINED ESOPHAGOSCOPY, GASTROSCOPY, DUODENOSCOPY (EGD);  Surgeon: Tao Alfonso MD;  Location: [facility] GI     ESOPHAGOSCOPY, GASTROSCOPY, DUODENOSCOPY (EGD), COMBINED N/A 10/31/2016    Procedure: COMBINED ESOPHAGOSCOPY, GASTROSCOPY, DUODENOSCOPY (EGD), BIOPSY SINGLE OR MULTIPLE;  Surgeon: Ronald Bojorquez MD;  Location: [facility]     sphincteroplasty       TRANSPLANT LIVER RECIPIENT  DONOR N/A 2016    Procedure: TRANSPLANT LIVER RECIPIENT  DONOR;  Surgeon: Larry Dhillon MD;  Location: [facility] OR     TUBAL LIGATION      laparoscopic       Active Meds:  Current Outpatient Prescriptions   Medication     predniSONE (DELTASONE) 10 MG tablet     hydrOXYzine (ATARAX) 25 MG tablet     levothyroxine (SYNTHROID/LEVOTHROID) 88 MCG tablet     melatonin 5 MG tablet     traZODone (DESYREL) 100 MG tablet     tacrolimus (GENERIC EQUIVALENT) 0.5 MG capsule     busPIRone (BUSPAR) 10 MG tablet     betaxolol (BETOPTIC) 0.5 % ophthalmic solution     chlorthalidone (HYGROTON) 25 MG tablet     pantoprazole (PROTONIX) 40 MG EC tablet     ursodiol (ACTIGALL) 300 MG capsule     traMADol (ULTRAM) 50 MG tablet     pramipexole (MIRAPEX) 0.5 MG tablet     amLODIPine (NORVASC) 5 MG tablet     azaTHIOprine 100 MG TABS     calcium Citrate-vitamin D 500-400 MG-UNIT CHEW     acetaminophen (TYLENOL) 325 MG tablet     simethicone (MYLICON) 80 MG chewable tablet     psyllium 0.52 G capsule     multivitamin, therapeutic  (THERA-VIT) TABS tablet     folic acid (FOLVITE) 1 MG tablet     diclofenac (VOLTAREN) 1 % GEL topical gel     predniSONE (DELTASONE) 20 MG tablet     [DISCONTINUED] levothyroxine (SYNTHROID/LEVOTHROID) 88 MCG tablet     No current facility-administered medications for this visit.         Allergies:  Benadryl [diphenhydramine]; Cefaclor; Hydrocodone-acetaminophen; Oxycodone; Penicillins; and Sulfa drugs    Family History:  family history includes Family History Negative in an other family member; HEART DISEASE in her father; Melanoma in her mother.    Social History:  Social History   Substance Use Topics     Smoking status: Former Smoker     Packs/day: 2.50     Years: 20.00     Quit date: 12/13/1996     Smokeless tobacco: Never Used     Alcohol use 4.2 oz/week     7 Standard drinks or equivalent per week      Comment: heavy use (750ml/2 days) up until 1 year ago; had cut down to 1 drink/day; none since 7/18/16       Physical Exam:  Vitals: /82 (BP Location: Right arm, Patient Position: Sitting, Cuff Size: Adult Regular)  Pulse 89  Resp 20  Wt 93.9 kg (207 lb 1.6 oz)  SpO2 96%  BMI 32.43 kg/m2  Constitutional: Alert, oriented, pleasant, no acute distress, obese  Head: Normocephalic, atraumatic  Eyes: Extra-ocular movements intact, pupils equally round and reactive bilaterally, no scleral icterus  ENT: Oropharynx clear, moist mucus membranes, good dentition  Neck: Supple, no lymphadenopathy, no thyromegaly  Cardiovascular: Regular rate and rhythm, no murmurs, rubs or gallops, peripheral pulses full/symmetric  Respiratory: Good air movement bilaterally, lungs clear, no wheezes/rales/rhonchi  GI: Abdomen soft, obese, bowel sounds present, nondistended, nontender, no organomegaly or masses, no rebound/guarding, no fluid wave  Musculoskeletal: No edema, normal muscle tone, normal gait  Neurologic: Alert and oriented, cranial nerves 2-12 intact  Psychiatric: normal mentation, affect and mood        Assessment  and Plan:    Phan was seen today for physical.    Diagnoses and all orders for this visit:    Routine general medical examination at a health care facility  Otherwise up to date in health care maintenance.    Screen for colon cancer  -     GASTROENTEROLOGY ADULT REF PROCEDURE ONLY (pt requests Dr. Alfonso)    WALTER (generalized anxiety disorder)  -     hydrOXYzine (ATARAX) 25 MG tablet; Take 1-2 tablets (25-50mg) every 6 hours as needed for itching    Thyroid function test abnormal  -     levothyroxine (SYNTHROID/LEVOTHROID) 88 MCG tablet; Take 1 tablet (88 mcg) by mouth every morning (before breakfast)    Dysuria  -     UA with Micro reflex to Culture; Future    Chronic pain of left knee  This was mentioned at the very end of the appointment. I told her I wasn't able to provide controlled substances without a better understanding of the diagnosis and symptoms. There was not enough time to evaluate this today.  -     SPORTS MEDICINE REFERRAL      #Routine Health Maintenence:  Immunizations (zoster, pneumovax, flu, Tdap, Hep A/B):   Most Recent Immunizations   Administered Date(s) Administered     HEPA 01/13/2009     Influenza Vaccine IM 3yrs+ 4 Valent IIV4 10/18/2017     Mantoux Tuberculin Skin Test 10/06/2016     Pneumo Conj 13-V (2010&after) 08/19/2016     TD (ADULT, 7+) 01/21/2004     TDAP Vaccine (Adacel) 08/18/2016     Lipids:   Recent Labs   Lab Test  12/20/17   1443  02/23/17   0850   08/06/16   0757   CHOL  147   --    --   Interfering substances, unable to perform test  LUPE RIOJAS NOTIFIED ON 6B,8/6/2016,0926,MJ     HDL  51   --    --   9*   LDL  49  108*   < >  13   TRIG  230*   --    --   Interfering substances, unable to perform test  LUPE RIOJAS NOTIFIED ON 6B,8/6/2016,0926,MJ      < > = values in this interval not displayed.     PSA (50-75 yrs): No results found for: PSA   Lung Ca Screening (>30 pk age 55-79 or >20 py age 50-79 + RF): does not qualify  Colonoscopy (50-75 yrs): ordered  Dexa (>65W or  70M yrs): 4/17 had reclast  Mammogram (40-75 yrs): 12/17 [facility], normal  Pap (21-65 yrs): pt reports up to date  Pelvic/Breast: up to date  Safety/Lifestyle: reviewed  Tob/EtOH: screen neg  Depression: reviewed  Advanced Directive: deferred      Return to clinic: 3-4 weeks if needed to discuss chronic knee pain    Arlyn Sanchez MD  Internal Medicine              Answers for HPI/ROS submitted by the patient on 2/22/2018   General Symptoms: Yes  Skin Symptoms: Yes  HENT Symptoms: Yes  EYE SYMPTOMS: No  HEART SYMPTOMS: Yes  GYNECOLOGIC SYMPTOMS: No  BREAST SYMPTOMS: No  SKELETAL SYMPTOMS: Yes  BLOOD SYMPTOMS: No  NERVOUS SYSTEM SYMPTOMS: No  MENTAL HEALTH SYMPTOMS: Yes  Fever: No  Loss of appetite: No  Weight loss: No  Weight gain: Yes  Fatigue: Yes  Night sweats: No  Chills: No  Increased stress: Yes  Excessive hunger: No  Excessive thirst: No  Feeling hot or cold when others believe the temperature is normal: Yes  Loss of height: No  Post-operative complications: No  Surgical site pain: No  Hallucinations: No  Hyperactivity: No  Confusion: No  Changes in hair: No  Changes in moles/birth marks: No  Itching: Yes  Changes in nails: No  Acne: No  Hair in places you don't want it: No  Change in facial hair: No  Warts: No  Non-healing sores: No  Scarring: No  Flaking of skin: Yes  Color changes of hands/feet in cold : No  Sun sensitivity: No  Skin thickening: No  Ear discharge: No  Hearing loss: No  Tinnitus: No  Nosebleeds: No  Congestion: Yes  Sinus pain: Yes  Trouble swallowing: No  Mouth sores: No  Sore throat: Yes  Gum tenderness: No  Bleeding gums: No  Change in taste: No  Change in sense of smell: No  Dry mouth: Yes  Hearing aid used: No  Neck lump: No  Chest pain or pressure: Yes  Fast or irregular heartbeat: No  Pain in legs with walking: Yes  Fingers or toes appear blue: No  Fainting: No  Murmurs: No  Pacemaker: No  Varicose veins: No  Edema or swelling: Yes  Wake up at night with shortness of breath:  No  Light-headedness: No  Exercise intolerance: Yes  Back pain: Yes  Muscle aches: Yes  Neck pain: Yes  Swollen joints: No  Joint pain: Yes  Bone pain: Yes  Muscle cramps: Yes  Muscle weakness: Yes  Joint stiffness: Yes  Bone fracture: No  Nervous or Anxious: Yes  Depression: No  Trouble sleeping: Yes  Mood changes: No  Panic attacks: No  Cough: Yes  Sputum or phlegm: No  Coughing up blood: No  Difficulty breating or shortness of breath: Yes  Snoring: No  Wheezing: Yes  Difficulty breathing on exertion: Yes  Nighttime Cough: No  Difficulty breathing when lying flat: No  Trouble holding urine or incontinence: Yes  Pain or burning: No  Trouble starting or stopping: No  Increased frequency of urination: No  Blood in urine: No  Decreased frequency of urination: No  Frequent nighttime urination: Yes  Flank pain: Yes  Difficulty emptying bladder: No    Conflicting answers have been found for some questions. Please document the patient's answers manually.

## 2018-03-02 ENCOUNTER — TRANSFERRED RECORDS (OUTPATIENT)
Dept: HEALTH INFORMATION MANAGEMENT | Facility: CLINIC | Age: 63
End: 2018-03-02

## 2018-03-05 ENCOUNTER — MYC MEDICAL ADVICE (OUTPATIENT)
Dept: ORTHOPEDICS | Facility: CLINIC | Age: 63
End: 2018-03-05

## 2018-03-05 ENCOUNTER — TELEPHONE (OUTPATIENT)
Dept: GASTROENTEROLOGY | Facility: CLINIC | Age: 63
End: 2018-03-05

## 2018-03-05 DIAGNOSIS — F41.1 GENERALIZED ANXIETY DISORDER: Primary | ICD-10-CM

## 2018-03-05 RX ORDER — PROPRANOLOL HYDROCHLORIDE 10 MG/1
10 TABLET ORAL 3 TIMES DAILY
Qty: 90 TABLET | Refills: 0 | Status: SHIPPED | OUTPATIENT
Start: 2018-03-05 | End: 2018-08-06

## 2018-03-06 ENCOUNTER — TELEPHONE (OUTPATIENT)
Dept: GASTROENTEROLOGY | Facility: CLINIC | Age: 63
End: 2018-03-06

## 2018-03-08 ENCOUNTER — TELEPHONE (OUTPATIENT)
Dept: ORTHOPEDICS | Facility: CLINIC | Age: 63
End: 2018-03-08

## 2018-03-08 NOTE — TELEPHONE ENCOUNTER
The patient was contacted today and a voicemail was left. I was calling the patient to discuss her option for the Voltaren gel that was denied by her insurance.

## 2018-03-09 ENCOUNTER — HOSPITAL ENCOUNTER (OUTPATIENT)
Facility: CLINIC | Age: 63
End: 2018-03-09
Attending: INTERNAL MEDICINE | Admitting: INTERNAL MEDICINE

## 2018-03-09 ENCOUNTER — TELEPHONE (OUTPATIENT)
Dept: OCCUPATIONAL THERAPY | Facility: CLINIC | Age: 63
End: 2018-03-09

## 2018-03-09 NOTE — TELEPHONE ENCOUNTER
The patient returned my call about her denied Voltaren Gel. The patient states she has tried the OTC Aspercreme patches, she is getting some relief. She is still interested in getting the Voltaren gel. She is going to contact her insurance and get back to me, for the next steps.

## 2018-03-14 ASSESSMENT — ENCOUNTER SYMPTOMS
MEMORY LOSS: 0
POLYDIPSIA: 0
MUSCLE CRAMPS: 1
JOINT SWELLING: 0
SEIZURES: 0
WEAKNESS: 0
LOSS OF CONSCIOUSNESS: 0
HEMATURIA: 0
DISTURBANCES IN COORDINATION: 0
FLANK PAIN: 0
PARALYSIS: 0
ALTERED TEMPERATURE REGULATION: 0
STIFFNESS: 1
WEIGHT LOSS: 0
DECREASED APPETITE: 0
DIZZINESS: 0
INSOMNIA: 1
FATIGUE: 1
MUSCLE WEAKNESS: 0
DECREASED CONCENTRATION: 0
NERVOUS/ANXIOUS: 1
TINGLING: 1
SPEECH CHANGE: 0
HEADACHES: 1
NIGHT SWEATS: 0
ARTHRALGIAS: 1
DYSURIA: 1
PANIC: 0
NECK PAIN: 0
INCREASED ENERGY: 0
MYALGIAS: 1
WEIGHT GAIN: 1
HALLUCINATIONS: 0
FEVER: 0
DIFFICULTY URINATING: 0
POLYPHAGIA: 0
TREMORS: 0
CHILLS: 0
BACK PAIN: 0
DEPRESSION: 0

## 2018-03-19 DIAGNOSIS — N18.30 CKD (CHRONIC KIDNEY DISEASE) STAGE 3, GFR 30-59 ML/MIN (H): Primary | ICD-10-CM

## 2018-03-21 ENCOUNTER — RECORDS - HEALTHEAST (OUTPATIENT)
Dept: ADMINISTRATIVE | Facility: OTHER | Age: 63
End: 2018-03-21

## 2018-03-21 ENCOUNTER — OFFICE VISIT (OUTPATIENT)
Dept: NEPHROLOGY | Facility: CLINIC | Age: 63
End: 2018-03-21
Attending: INTERNAL MEDICINE
Payer: COMMERCIAL

## 2018-03-21 VITALS
TEMPERATURE: 98.4 F | SYSTOLIC BLOOD PRESSURE: 121 MMHG | OXYGEN SATURATION: 94 % | WEIGHT: 209 LBS | HEIGHT: 67 IN | DIASTOLIC BLOOD PRESSURE: 79 MMHG | HEART RATE: 81 BPM | BODY MASS INDEX: 32.8 KG/M2

## 2018-03-21 DIAGNOSIS — N18.30 CKD (CHRONIC KIDNEY DISEASE) STAGE 3, GFR 30-59 ML/MIN (H): ICD-10-CM

## 2018-03-21 DIAGNOSIS — Z94.4 LIVER REPLACED BY TRANSPLANT (H): ICD-10-CM

## 2018-03-21 DIAGNOSIS — G25.81 RESTLESS LEG SYNDROME: Primary | ICD-10-CM

## 2018-03-21 LAB
ALBUMIN SERPL-MCNC: 3.7 G/DL (ref 3.4–5)
ANION GAP SERPL CALCULATED.3IONS-SCNC: 9 MMOL/L (ref 3–14)
BUN SERPL-MCNC: 35 MG/DL (ref 7–30)
CALCIUM SERPL-MCNC: 8.9 MG/DL (ref 8.5–10.1)
CHLORIDE SERPL-SCNC: 100 MMOL/L (ref 94–109)
CO2 SERPL-SCNC: 26 MMOL/L (ref 20–32)
CREAT SERPL-MCNC: 1.21 MG/DL (ref 0.52–1.04)
CREAT UR-MCNC: 33 MG/DL
FERRITIN SERPL-MCNC: 50 NG/ML (ref 8–252)
GFR SERPL CREATININE-BSD FRML MDRD: 45 ML/MIN/1.7M2
GLUCOSE SERPL-MCNC: 121 MG/DL (ref 70–99)
HGB BLD-MCNC: 13.2 G/DL (ref 11.7–15.7)
PHOSPHATE SERPL-MCNC: 4.2 MG/DL (ref 2.5–4.5)
POTASSIUM SERPL-SCNC: 4.7 MMOL/L (ref 3.4–5.3)
PROT UR-MCNC: 0.05 G/L
PROT/CREAT 24H UR: 0.16 G/G CR (ref 0–0.2)
SODIUM SERPL-SCNC: 135 MMOL/L (ref 133–144)

## 2018-03-21 PROCEDURE — 85018 HEMOGLOBIN: CPT | Performed by: INTERNAL MEDICINE

## 2018-03-21 PROCEDURE — 82728 ASSAY OF FERRITIN: CPT | Performed by: INTERNAL MEDICINE

## 2018-03-21 PROCEDURE — 84156 ASSAY OF PROTEIN URINE: CPT | Performed by: INTERNAL MEDICINE

## 2018-03-21 PROCEDURE — G0463 HOSPITAL OUTPT CLINIC VISIT: HCPCS | Mod: ZF

## 2018-03-21 PROCEDURE — 36415 COLL VENOUS BLD VENIPUNCTURE: CPT | Performed by: INTERNAL MEDICINE

## 2018-03-21 PROCEDURE — 80069 RENAL FUNCTION PANEL: CPT | Performed by: INTERNAL MEDICINE

## 2018-03-21 PROCEDURE — 80321 ALCOHOLS BIOMARKERS 1OR 2: CPT | Performed by: INTERNAL MEDICINE

## 2018-03-21 ASSESSMENT — PAIN SCALES - GENERAL: PAINLEVEL: NO PAIN (0)

## 2018-03-21 NOTE — MR AVS SNAPSHOT
After Visit Summary   3/21/2018    Phan Luque    MRN: [id number]           Patient Information     Date Of Birth          1955        Visit Information        Provider Department      3/21/2018 4:30 PM Daya Gutierrez MD [facility] Nephrology        Today's Diagnoses     Restless leg syndrome    -  1      Care Instructions    Dear Phan Luque      Your were seen in the [facility] for follow up.  Your kidney disease is from kidney failure with liver failure and is stable.  Your creatinine is 1.2 and your GFR is 45.      We are suggesting the following medications:  No change    Please get the following tests done:  Labs with clinic visit or per transplant    Please set up appointment with:  Daya Gutierrez MD in one year      Sujey Harper LPN care coordinator - [phone]  Benito Brannon RN care coordinator [phone]    Take care!  Daya Gutierrez MD  Department of Medicine  Division of Renal Diseases and Hypertension  [facility]    Email: [email or web address]                     Follow-ups after your visit        Follow-up notes from your care team     Return in about 1 year (around 3/21/2019).      Your next 10 appointments already scheduled     Mar 30, 2018  3:30 PM CDT   Adult Med Follow UP with DONOVAN [facility]   Psychiatry Clinic ([facility])    [place] [id number]  [address] [place] [address]   [phone]            Apr 02, 2018  9:00 AM CDT   (Arrive by 8:45 AM)   Return Visit with Arlyn Sanchez MD   [facility] ([facility])    [address] [place] [id number]   [phone]            Apr 03, 2018  4:20 PM CDT   (Arrive by 4:05 PM)   Return Visit with Donnell Duvall MD   [facility] Orthopaedic Clinic ([facility])    [address] [place] [address]  [facility] [id number] [phone]            Apr 04, 2018  7:30 AM CDT   LAB with  LAB   [facility] ([facility])    [address]  [address]  [place] [address] [phone]           Please do not eat 10-12 hours before your appointment if you are coming in fasting for labs on lipids, cholesterol, or glucose (sugar). This does not apply to pregnant women. Water, hot tea and black coffee (with nothing added) are okay. Do not drink other fluids, diet soda or chew gum.            Apr 04, 2018  3:45 PM CDT   (Arrive by 3:30 PM)   Return Liver Transplant with Hussein Banegas MD   [facility] Hepatology ([facility])    [address] [place] [address]  [address]  [facility] [address] [phone]            May 21, 2018  3:00 PM CDT   (Arrive by 2:45 PM)   RETURN ENDOCRINE with Ruth Oakley MD   [facility] Endocrinology ([facility])    [address] [place] [address]  3rd Floor  [facility] [address] [phone]            Josue 15, 2018   Procedure with Tao Alfonso MD   [facility] ([facility])    [address] [place] [address]  5th Floor  [facility] [address] [phone]           Located in the Clinics and Surgery Center at [address] [place] [address].   parking is very convenient and highly recommended.  is a $6 flat rate fee.  Both  and self parkers should enter the main arrival plaza from [facility]; parking attendants will direct you based on your parking preference.            Mar 20, 2019  3:30 PM CDT   Lab with [facility]   [facility] Lab ([facility])    [address] [place] [address]  1st Floor  [facility] [address] [phone]            Mar 20, 2019  4:30 PM CDT   (Arrive by 4:00 PM)   Return Visit with Daya Gutierrez MD   [facility] Nephrology ([facility])    [address] [place] [address]  "  [address]  [facility] [id number] [phone]              Future tests that were ordered for you today     Open Future Orders        Priority Expected Expires Ordered    Ferritin Add-On  4/20/2018 3/21/2018            Who to contact     If you have questions or need follow up information about today's clinic visit or your schedule please contact [facility] NEPHROLOGY directly at [phone].  Normal or non-critical lab and imaging results will be communicated to you by Jammithart, letter or phone within 4 business days after the clinic has received the results. If you do not hear from us within 7 days, please contact the clinic through Fiiilingt or phone. If you have a critical or abnormal lab result, we will notify you by phone as soon as possible.  Submit refill requests through [employer] or call your pharmacy and they will forward the refill request to us. Please allow 3 business days for your refill to be completed.          Additional Information About Your Visit        JammitharAlertMe Information     [employer] gives you secure access to your electronic health record. If you see a primary care provider, you can also send messages to your care team and make appointments. If you have questions, please call your primary care clinic.  If you do not have a primary care provider, please call [phone] and they will assist you.        Care EveryWhere ID     This is your Care EveryWhere ID. This could be used by other organizations to access your [place] medical records  [id number]        Your Vitals Were     Pulse Temperature Height Pulse Oximetry BMI (Body Mass Index)       81 98.4  F (36.9  C) (Oral) 1.702 m (5' 7.01\") 94% 32.72 kg/m2        Blood Pressure from Last 3 Encounters:   03/21/18 121/79   03/01/18 148/82   03/01/18 148/82    Weight from Last 3 Encounters:   03/21/18 94.8 kg (209 lb)   03/01/18 93.9 kg (207 lb)   03/01/18 93.9 kg (207 lb 1.6 oz)               Primary Care Provider Office Phone # Fax #    " Santosh Salgado [phone]       [facility] [address]   [facility] [address]        Equal Access to Services     PAULO BERRY : Barby adonis mejia skyler Roberson, wajoaoda luqcarla, qaashleyta kaaustin escobar, anna schroeder. So [facility] [phone].    ATENCIÓN: Si habla español, tiene a reece disposición servicios gratuitos de asistencia lingüística. Llame al [phone].    We comply with applicable federal civil rights laws and [place] laws. We do not discriminate on the basis of race, color, national origin, age, disability, sex, sexual orientation, or gender identity.            Thank you!     Thank you for choosing [facility] NEPHROLOGY  for your care. Our goal is always to provide you with excellent care. Hearing back from our patients is one way we can continue to improve our services. Please take a few minutes to complete the written survey that you may receive in the mail after your visit with us. Thank you!             Your Updated Medication List - Protect others around you: Learn how to safely use, store and throw away your medicines at www.disposemymeds.org.          This list is accurate as of 3/21/18  4:33 PM.  Always use your most recent med list.                   Brand Name Dispense Instructions for use Diagnosis    acetaminophen 325 MG tablet    TYLENOL    100 tablet    Take 2 tablets (650 mg) by mouth every 6 hours as needed for mild pain Or fevers. Use sparingly. Limit use to no more than 2 grams (2000 mg) in 24 hours. **Further refills must be obtained by primary care provider**    Acute post-operative pain       amLODIPine 5 MG tablet    NORVASC    90 tablet    Take 1 tablet (5 mg) by mouth daily    Hypertension       azaTHIOprine 100 MG Tabs     90 tablet    Take 50 mg by mouth every evening    Liver transplanted (H)       betaxolol 0.5 % ophthalmic solution    BETOPTIC    5 mL    Place 1 drop into both eyes 2 times daily    Primary open angle  glaucoma of both eyes, unspecified glaucoma stage       busPIRone 10 MG tablet    BUSPAR    60 tablet    Take 1 tablet (10 mg) by mouth 2 times daily    WALTER (generalized anxiety disorder)       calcium Citrate-vitamin D 500-400 MG-UNIT Chew      Take 1 tablet by mouth daily        chlorthalidone 25 MG tablet    HYGROTON    45 tablet    Take 0.5 tablets (12.5 mg) by mouth daily    CKD (chronic kidney disease) stage 3, GFR 30-59 ml/min, Fluid retention       diclofenac 1 % Gel topical gel    VOLTAREN    100 g    Apply 4 grams to knees or 2 grams to hands four times daily using enclosed dosing card.    Chronic pain of left knee       folic acid 1 MG tablet    FOLVITE    30 tablet    Take 1 tablet (1 mg) by mouth daily    Malnutrition (H)       hydrOXYzine 25 MG tablet    ATARAX    60 tablet    Take 1-2 tablets (25-50mg) every 6 hours as needed for itching    WLATER (generalized anxiety disorder)       levothyroxine 88 MCG tablet    SYNTHROID/LEVOTHROID    90 tablet    Take 1 tablet (88 mcg) by mouth every morning (before breakfast)    Thyroid function test abnormal       melatonin 5 MG tablet      Take 1 tablet (5 mg) by mouth nightly as needed for sleep        multivitamin, therapeutic Tabs tablet     30 tablet    Take 1 tablet by mouth every 24 hours    Malnutrition (H)       pantoprazole 40 MG EC tablet    PROTONIX    90 tablet    Take 1 tablet (40 mg) by mouth every morning (before breakfast)    Gastroesophageal reflux disease, esophagitis presence not specified       pramipexole 0.5 MG tablet    MIRAPEX    90 tablet    Take 1 tablet (0.5 mg) by mouth At Bedtime    Restless leg syndrome       * predniSONE 10 MG tablet    DELTASONE     Take 1 tablet (10 mg) by mouth daily        * predniSONE 20 MG tablet    DELTASONE    5 tablet    Take 1 tablet (20 mg) by mouth daily    Cervical radicular pain, Patellofemoral arthritis of left knee       propranolol 10 MG tablet    INDERAL    90 tablet    Take 1 tablet (10 mg) by  mouth 3 times daily As needed for anxiety    Generalized anxiety disorder       psyllium 0.52 G capsule     60 capsule    Take 2 capsules (1.04 g) by mouth daily With a full glass of water.    Loose stools       simethicone 80 MG chewable tablet    MYLICON    180 tablet    Take 1 tablet (80 mg) by mouth every 6 hours as needed for flatulence or cramping    Flatulence, eructation, and gas pain       tacrolimus 0.5 MG capsule    GENERIC EQUIVALENT    240 capsule    Take 4 capsules (2 mg) by mouth 2 times daily    Liver transplanted (H)       traMADol 50 MG tablet    ULTRAM    15 tablet    Take 1-2 tablets ( mg) by mouth every 6 hours as needed for pain        traZODone 100 MG tablet    DESYREL    135 tablet    Take 1.5 tablets (150 mg) by mouth At Bedtime    Insomnia, unspecified type       ursodiol 300 MG capsule    ACTIGALL    60 capsule    Take 1 capsule (300 mg) by mouth 2 times daily    Liver transplanted (H)       * Notice:  This list has 2 medication(s) that are the same as other medications prescribed for you. Read the directions carefully, and ask your doctor or other care provider to review them with you.

## 2018-03-21 NOTE — PATIENT INSTRUCTIONS
Dear Phan Luque      Your were seen in the [facility] for follow up.  Your kidney disease is from kidney failure with liver failure and is stable.  Your creatinine is 1.2 and your GFR is 45.      We are suggesting the following medications:  No change    Please get the following tests done:  Labs with clinic visit or per transplant    Please set up appointment with:  Daya Gutierrez MD in one year      Sujey Harper LPN care coordinator - [phone]  Benito Brannon RN care coordinator [phone]    Take care!  Daya Gutierrez MD  Department of Medicine  Division of Renal Diseases and Hypertension  [facility]    Email: [email or web address]

## 2018-03-21 NOTE — PROGRESS NOTES
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  Here for follow up  Recommendations -    1. CKD stage 3 with baseline creatinine 1.2-1.5 mg/dL and eGFR 35-45.  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  She has a lot of hemodynamic fluctuations in creatinine as expected for someone on CNI.      2. HTN - BP controlled on amlodipine 5 mg daily along with chlorthalidone 12.5 mg daily.  No change    3. Hypomagnesemia  - last months magnesium level was 1.8, she had opted to restart oral magnesium due to some leg cramping    4.. BMD - calcium remains normal.  No change    5. Whole body restlessness - new symptoms. not just restless leg syndrome.  I ordered ferritin as iron deficiency is associated with RLS.  Ferritin is 50 which is below goal of >100.  Advised on ferrous sulfate 325 mg bid if tolerated.    RTC one year      Daya Gutierrez MD  [facility]  Department of Medicine  Division of Renal Disease and Hypertension  [phone]     Reason for Visit:  Ms. Luque is here for follow up of CKD.     HPI:   Phan Luque is a 62 year old female with liver transplant on 8/25/2016 for alcoholic liver disease.  Course was complicated by GERMAIN requiring dialysis and she came off dialysis shortly after transplant. She recovered to creatinine of 1.2-1.5 mg/dL with eGFR 35-45.  Seen last in Nephrology clinic in Sept 2017.      Note that she had had relapse in EtOH dx via ETG positivity on 9/8/17, follow up level 12/20/17 was negative.  She has returned to work.    Since last visit, she has had knee pain for which she is using voltaren gel but without much benefit.  She will probably not continue using this.    We added chlorthalidone 12.5 mg daily due to  abdominal bloating, she also remains on amlodipine and home blood pressures run generally 120-135 systolic, occasionally down to 115 systolic.    She has whole body restlessness, happens a lot at night.  Not just restless leg.  She is reviewing with her psych providers as well as other health care professionals.    ROS:   A comprehensive review of systems was obtained and negative, except as noted in the HPI or PMH.    Active Medical Problems:  Patient Active Problem List   Diagnosis     Decompensation of cirrhosis of liver (H)     Liver transplanted (H)     Alcoholic hepatitis     Immunosuppression (H)     Alcoholic hepatitis without ascites     Enterococcus UTI     Liver transplant status     Nausea & vomiting     Malnutrition (H)     Candidiasis of skin     Anemia     ACP (advance care planning)     Diarrhea     Hypothyroidism     Esophageal reflux     Recurrent major depression in partial remission (H)     Insomnia     CKD (chronic kidney disease) stage 3, GFR 30-59 ml/min     Anemia in stage 3 chronic kidney disease     Osteopenia     Alcohol abuse, uncomplicated       Personal Hx:  Social History     Social History     Marital status:      Spouse name: N/A     Number of children: 3     Years of education: N/A     Occupational History           Social History Main Topics     Smoking status: Former Smoker     Packs/day: 2.50     Years: 20.00     Quit date: 12/13/1996     Smokeless tobacco: Never Used     Alcohol use 4.2 oz/week     7 Standard drinks or equivalent per week      Comment: heavy use (750ml/2 days) up until 1 year ago; had cut down to 1 drink/day; none since 7/18/16     Drug use: No     Sexual activity: Not on file     Other Topics Concern     Not on file     Social History Narrative    Works as  for [employer], 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       Allergies:  Allergies   Allergen Reactions     Benadryl [Diphenhydramine] Hives      Cefaclor Hives     Hydrocodone-Acetaminophen Itching     Oxycodone Itching     Penicillins Hives     Sulfa Drugs Hives       Medications:  Current Outpatient Prescriptions   Medication Sig Dispense Refill     traZODone (DESYREL) 100 MG tablet Take 1.5 tablets (150 mg) by mouth At Bedtime 135 tablet 0     propranolol (INDERAL) 10 MG tablet Take 1 tablet (10 mg) by mouth 3 times daily As needed for anxiety 90 tablet 0     predniSONE (DELTASONE) 10 MG tablet Take 1 tablet (10 mg) by mouth daily       hydrOXYzine (ATARAX) 25 MG tablet Take 1-2 tablets (25-50mg) every 6 hours as needed for itching 60 tablet 2     levothyroxine (SYNTHROID/LEVOTHROID) 88 MCG tablet Take 1 tablet (88 mcg) by mouth every morning (before breakfast) 90 tablet 2     diclofenac (VOLTAREN) 1 % GEL topical gel Apply 4 grams to knees or 2 grams to hands four times daily using enclosed dosing card. 100 g 1     predniSONE (DELTASONE) 20 MG tablet Take 1 tablet (20 mg) by mouth daily 5 tablet 0     melatonin 5 MG tablet Take 1 tablet (5 mg) by mouth nightly as needed for sleep       tacrolimus (GENERIC EQUIVALENT) 0.5 MG capsule Take 4 capsules (2 mg) by mouth 2 times daily 240 capsule 11     busPIRone (BUSPAR) 10 MG tablet Take 1 tablet (10 mg) by mouth 2 times daily 60 tablet 1     betaxolol (BETOPTIC) 0.5 % ophthalmic solution Place 1 drop into both eyes 2 times daily 5 mL 2     chlorthalidone (HYGROTON) 25 MG tablet Take 0.5 tablets (12.5 mg) by mouth daily 45 tablet 3     pantoprazole (PROTONIX) 40 MG EC tablet Take 1 tablet (40 mg) by mouth every morning (before breakfast) 90 tablet 4     ursodiol (ACTIGALL) 300 MG capsule Take 1 capsule (300 mg) by mouth 2 times daily 60 capsule 11     traMADol (ULTRAM) 50 MG tablet Take 1-2 tablets ( mg) by mouth every 6 hours as needed for pain 15 tablet 0     pramipexole (MIRAPEX) 0.5 MG tablet Take 1 tablet (0.5 mg) by mouth At Bedtime 90 tablet 3     amLODIPine (NORVASC) 5 MG tablet Take 1 tablet (5  "mg) by mouth daily 90 tablet 3     azaTHIOprine 100 MG TABS Take 50 mg by mouth every evening 90 tablet 3     calcium Citrate-vitamin D 500-400 MG-UNIT CHEW Take 1 tablet by mouth daily       acetaminophen (TYLENOL) 325 MG tablet Take 2 tablets (650 mg) by mouth every 6 hours as needed for mild pain Or fevers. Use sparingly. Limit use to no more than 2 grams (2000 mg) in 24 hours. **Further refills must be obtained by primary care provider** 100 tablet 0     simethicone (MYLICON) 80 MG chewable tablet Take 1 tablet (80 mg) by mouth every 6 hours as needed for flatulence or cramping 180 tablet 1     psyllium 0.52 G capsule Take 2 capsules (1.04 g) by mouth daily With a full glass of water. 60 capsule 1     multivitamin, therapeutic (THERA-VIT) TABS tablet Take 1 tablet by mouth every 24 hours 30 tablet 11     folic acid (FOLVITE) 1 MG tablet Take 1 tablet (1 mg) by mouth daily 30 tablet 1         Vitals:  /77  Pulse 81  Temp 98.4  F (36.9  C) (Oral)  Ht 1.702 m (5' 7.01\")  Wt 94.8 kg (209 lb)  SpO2 94%  BMI 32.72 kg/m2     Exam:   GENERAL APPEARANCE: alert and no distress  EYES: no scleral icterus, pupils equal  HENT: NC/AT,  mouth  without ulcers or lesions  Endocrine: no goiter, no moon facies  Lymphatics: no cervical or supraclavicular LAD  Pulmonary: normal work of breathing, no clubbing  CV: WWP - Edema none  MS: no evidence of inflammation in joints, no muscle tenderness  : no Talbert  SKIN: no rash, warm, dry, no cyanosis  NEURO: mentation intact and speech normal     Results:        Electrolytes/Renal -   Recent Labs   Lab Test  02/21/18   0728  12/20/17   1443  11/20/17   0731   09/20/17   1211  09/08/17   0723   08/11/17   0918   NA  140  140  139   < >  138  138   --   137   POTASSIUM  3.7  4.1  3.5   < >  4.0  3.9   --   3.9   CHLORIDE  103  103  104   < >  106  103   --   104   CO2  30  29  26   < >  27  29   --   25   BUN  23  11  19   < >  11  14   --   18   CR  1.30*  1.25*  1.23*   < >  " 1.22*  1.34*   < >  1.23*   GLC  87  85  99   < >  90  104*   --   101*   ERIC  9.1  8.6  8.8   < >  8.1*  8.3*   < >  8.4*   MAG  1.8  1.6  1.9   --   2.1  1.9   --   1.8   PHOS   --    --    --    --   3.1  2.9   --   3.6    < > = values in this interval not displayed.       CBC -   Recent Labs   Lab Test  02/21/18   0728  12/20/17   1443  11/20/17   0731   WBC  7.4  5.6  7.1   HGB  12.8  12.0  12.9   PLT  284  265  280       LFTs -   Recent Labs   Lab Test  02/21/18 0728  12/20/17   1443  11/20/17   0731   ALKPHOS  51  54  41   BILITOTAL  0.7  0.4  0.6   ALT  16  20  20   AST  14  22  10   PROTTOTAL  7.1  6.9  7.0   ALBUMIN  3.7  3.4  3.6       Coags -   Recent Labs   Lab Test  03/16/17   1728  09/13/16   1055  08/30/16   0740   08/25/16   1230  08/25/16   1055  08/25/16   0850   INR  1.15*  1.08  1.01   < >  1.76*  1.76*  1.87*   PTT   --    --    --    --   48*  59*  58*    < > = values in this interval not displayed.       Iron Panel -   Recent Labs   Lab Test  06/05/17   1211  04/05/17   1236  03/22/17   1607   IRON  59  56  62   IRONSAT  24  31  32   WILL  138  278*  320*       Endocrine -   Recent Labs   Lab Test  09/08/17   0723  06/05/17   1211  04/05/17   1236   TSH  2.74  1.41  1.26  1.26          Answers for HPI/ROS submitted by the patient on 3/14/2018   General Symptoms: Yes  Skin Symptoms: No  HENT Symptoms: No  EYE SYMPTOMS: No  HEART SYMPTOMS: No  LUNG SYMPTOMS: No  INTESTINAL SYMPTOMS: No  URINARY SYMPTOMS: Yes  GYNECOLOGIC SYMPTOMS: No  BREAST SYMPTOMS: No  SKELETAL SYMPTOMS: Yes  BLOOD SYMPTOMS: No  NERVOUS SYSTEM SYMPTOMS: Yes  MENTAL HEALTH SYMPTOMS: Yes  Fever: No  Loss of appetite: No  Weight loss: No  Weight gain: Yes  Fatigue: Yes  Night sweats: No  Chills: No  Increased stress: Yes  Excessive hunger: No  Excessive thirst: No  Feeling hot or cold when others believe the temperature is normal: No  Loss of height: No  Post-operative complications: No  Surgical site pain:  No  Hallucinations: No  Change in or Loss of Energy: No  Hyperactivity: No  Confusion: No  Trouble holding urine or incontinence: No  Pain or burning: Yes  Trouble starting or stopping: No  Increased frequency of urination: No  Blood in urine: No  Decreased frequency of urination: No  Frequent nighttime urination: Yes  Flank pain: No  Difficulty emptying bladder: No  Back pain: No  Muscle aches: Yes  Neck pain: No  Swollen joints: No  Joint pain: Yes  Bone pain: Yes  Muscle cramps: Yes  Muscle weakness: No  Joint stiffness: Yes  Bone fracture: No  Trouble with coordination: No  Dizziness or trouble with balance: No  Fainting or black-out spells: No  Memory loss: No  Headache: Yes  Seizures: No  Speech problems: No  Tingling: Yes  Tremor: No  Weakness: No  Difficulty walking: No  Paralysis: No  Nervous or Anxious: Yes  Depression: No  Trouble sleeping: Yes  Trouble thinking or concentrating: No  Mood changes: No  Panic attacks: No

## 2018-03-21 NOTE — NURSING NOTE
"Chief Complaint   Patient presents with     RECHECK     CKD        Initial /77  Pulse 81  Temp 98.4  F (36.9  C) (Oral)  Ht 1.702 m (5' 7.01\")  Wt 94.8 kg (209 lb)  SpO2 94%  BMI 32.72 kg/m2 Estimated body mass index is 32.72 kg/(m^2) as calculated from the following:    Height as of this encounter: 1.702 m (5' 7.01\").    Weight as of this encounter: 94.8 kg (209 lb).  Medication Reconciliation: complete   Lee Lala, KARL      "

## 2018-03-21 NOTE — LETTER
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  Here for follow up  Recommendations -    1. CKD stage 3 with baseline creatinine 1.2-1.5 mg/dL and eGFR 35-45.  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  She has a lot of hemodynamic fluctuations in creatinine as expected for someone on CNI.      2. HTN - BP controlled on amlodipine 5 mg daily along with chlorthalidone 12.5 mg daily.  No change    3. Hypomagnesemia  - last months magnesium level was 1.8, she had opted to restart oral magnesium due to some leg cramping    4.. BMD - calcium remains normal.  No change    5. Whole body restlessness - new symptoms. not just restless leg syndrome.  I ordered ferritin as iron deficiency is associated with RLS.  Ferritin is 50 which is below goal of >100.  Advised on ferrous sulfate 325 mg bid if tolerated.    RTC one year      Daya Gutierrez MD  [facility]  Department of Medicine  Division of Renal Disease and Hypertension  [phone]     Reason for Visit:  Ms. Luque is here for follow up of CKD.     HPI:   Phan Luque is a 62 year old female with liver transplant on 8/25/2016 for alcoholic liver disease.  Course was complicated by GERMAIN requiring dialysis and she came off dialysis shortly after transplant. She recovered to creatinine of 1.2-1.5 mg/dL with eGFR 35-45.  Seen last in Nephrology clinic in Sept 2017.      Note that she had had relapse in EtOH dx via ETG positivity on 9/8/17, follow up level 12/20/17 was negative.  She has returned to work.    Since last visit, she has had knee pain for which she is using voltaren gel but without much benefit.  She will  probably not continue using this.    We added chlorthalidone 12.5 mg daily due to abdominal bloating, she also remains on amlodipine and home blood pressures run generally 120-135 systolic, occasionally down to 115 systolic.    She has whole body restlessness, happens a lot at night.  Not just restless leg.  She is reviewing with her psych providers as well as other health care professionals.    ROS:   A comprehensive review of systems was obtained and negative, except as noted in the HPI or PMH.    Active Medical Problems:  Patient Active Problem List   Diagnosis     Decompensation of cirrhosis of liver (H)     Liver transplanted (H)     Alcoholic hepatitis     Immunosuppression (H)     Alcoholic hepatitis without ascites     Enterococcus UTI     Liver transplant status     Nausea & vomiting     Malnutrition (H)     Candidiasis of skin     Anemia     ACP (advance care planning)     Diarrhea     Hypothyroidism     Esophageal reflux     Recurrent major depression in partial remission (H)     Insomnia     CKD (chronic kidney disease) stage 3, GFR 30-59 ml/min     Anemia in stage 3 chronic kidney disease     Osteopenia     Alcohol abuse, uncomplicated       Personal Hx:  Social History     Social History     Marital status:      Spouse name: N/A     Number of children: 3     Years of education: N/A     Occupational History           Social History Main Topics     Smoking status: Former Smoker     Packs/day: 2.50     Years: 20.00     Quit date: 12/13/1996     Smokeless tobacco: Never Used     Alcohol use 4.2 oz/week     7 Standard drinks or equivalent per week      Comment: heavy use (750ml/2 days) up until 1 year ago; had cut down to 1 drink/day; none since 7/18/16     Drug use: No     Sexual activity: Not on file     Other Topics Concern     Not on file     Social History Narrative    Works as  for [employer], 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        Allergies:  Allergies   Allergen Reactions     Benadryl [Diphenhydramine] Hives     Cefaclor Hives     Hydrocodone-Acetaminophen Itching     Oxycodone Itching     Penicillins Hives     Sulfa Drugs Hives       Medications:  Current Outpatient Prescriptions   Medication Sig Dispense Refill     traZODone (DESYREL) 100 MG tablet Take 1.5 tablets (150 mg) by mouth At Bedtime 135 tablet 0     propranolol (INDERAL) 10 MG tablet Take 1 tablet (10 mg) by mouth 3 times daily As needed for anxiety 90 tablet 0     predniSONE (DELTASONE) 10 MG tablet Take 1 tablet (10 mg) by mouth daily       hydrOXYzine (ATARAX) 25 MG tablet Take 1-2 tablets (25-50mg) every 6 hours as needed for itching 60 tablet 2     levothyroxine (SYNTHROID/LEVOTHROID) 88 MCG tablet Take 1 tablet (88 mcg) by mouth every morning (before breakfast) 90 tablet 2     diclofenac (VOLTAREN) 1 % GEL topical gel Apply 4 grams to knees or 2 grams to hands four times daily using enclosed dosing card. 100 g 1     predniSONE (DELTASONE) 20 MG tablet Take 1 tablet (20 mg) by mouth daily 5 tablet 0     melatonin 5 MG tablet Take 1 tablet (5 mg) by mouth nightly as needed for sleep       tacrolimus (GENERIC EQUIVALENT) 0.5 MG capsule Take 4 capsules (2 mg) by mouth 2 times daily 240 capsule 11     busPIRone (BUSPAR) 10 MG tablet Take 1 tablet (10 mg) by mouth 2 times daily 60 tablet 1     betaxolol (BETOPTIC) 0.5 % ophthalmic solution Place 1 drop into both eyes 2 times daily 5 mL 2     chlorthalidone (HYGROTON) 25 MG tablet Take 0.5 tablets (12.5 mg) by mouth daily 45 tablet 3     pantoprazole (PROTONIX) 40 MG EC tablet Take 1 tablet (40 mg) by mouth every morning (before breakfast) 90 tablet 4     ursodiol (ACTIGALL) 300 MG capsule Take 1 capsule (300 mg) by mouth 2 times daily 60 capsule 11     traMADol (ULTRAM) 50 MG tablet Take 1-2 tablets ( mg) by mouth every 6 hours as needed for pain 15 tablet 0     pramipexole (MIRAPEX) 0.5 MG tablet Take 1 tablet (0.5  "mg) by mouth At Bedtime 90 tablet 3     amLODIPine (NORVASC) 5 MG tablet Take 1 tablet (5 mg) by mouth daily 90 tablet 3     azaTHIOprine 100 MG TABS Take 50 mg by mouth every evening 90 tablet 3     calcium Citrate-vitamin D 500-400 MG-UNIT CHEW Take 1 tablet by mouth daily       acetaminophen (TYLENOL) 325 MG tablet Take 2 tablets (650 mg) by mouth every 6 hours as needed for mild pain Or fevers. Use sparingly. Limit use to no more than 2 grams (2000 mg) in 24 hours. **Further refills must be obtained by primary care provider** 100 tablet 0     simethicone (MYLICON) 80 MG chewable tablet Take 1 tablet (80 mg) by mouth every 6 hours as needed for flatulence or cramping 180 tablet 1     psyllium 0.52 G capsule Take 2 capsules (1.04 g) by mouth daily With a full glass of water. 60 capsule 1     multivitamin, therapeutic (THERA-VIT) TABS tablet Take 1 tablet by mouth every 24 hours 30 tablet 11     folic acid (FOLVITE) 1 MG tablet Take 1 tablet (1 mg) by mouth daily 30 tablet 1         Vitals:  /77  Pulse 81  Temp 98.4  F (36.9  C) (Oral)  Ht 1.702 m (5' 7.01\")  Wt 94.8 kg (209 lb)  SpO2 94%  BMI 32.72 kg/m2     Exam:   GENERAL APPEARANCE: alert and no distress  EYES: no scleral icterus, pupils equal  HENT: NC/AT,  mouth  without ulcers or lesions  Endocrine: no goiter, no moon facies  Lymphatics: no cervical or supraclavicular LAD  Pulmonary: normal work of breathing, no clubbing  CV: WWP - Edema none  MS: no evidence of inflammation in joints, no muscle tenderness  : no Talbert  SKIN: no rash, warm, dry, no cyanosis  NEURO: mentation intact and speech normal     Results:        Electrolytes/Renal -   Recent Labs   Lab Test  02/21/18   0728  12/20/17   1443  11/20/17   0731   09/20/17   1211  09/08/17   0723   08/11/17   0918   NA  140  140  139   < >  138  138   --   137   POTASSIUM  3.7  4.1  3.5   < >  4.0  3.9   --   3.9   CHLORIDE  103  103  104   < >  106  103   --   104   CO2  30  29  26   < >  27  " 29   --   25   BUN  23  11  19   < >  11  14   --   18   CR  1.30*  1.25*  1.23*   < >  1.22*  1.34*   < >  1.23*   GLC  87  85  99   < >  90  104*   --   101*   ERIC  9.1  8.6  8.8   < >  8.1*  8.3*   < >  8.4*   MAG  1.8  1.6  1.9   --   2.1  1.9   --   1.8   PHOS   --    --    --    --   3.1  2.9   --   3.6    < > = values in this interval not displayed.       CBC -   Recent Labs   Lab Test  02/21/18   0728  12/20/17   1443  11/20/17   0731   WBC  7.4  5.6  7.1   HGB  12.8  12.0  12.9   PLT  284  265  280       LFTs -   Recent Labs   Lab Test  02/21/18   0728  12/20/17   1443  11/20/17   0731   ALKPHOS  51  54  41   BILITOTAL  0.7  0.4  0.6   ALT  16  20  20   AST  14  22  10   PROTTOTAL  7.1  6.9  7.0   ALBUMIN  3.7  3.4  3.6       Coags -   Recent Labs   Lab Test  03/16/17   1728  09/13/16   1055  08/30/16   0740   08/25/16   1230  08/25/16   1055  08/25/16   0850   INR  1.15*  1.08  1.01   < >  1.76*  1.76*  1.87*   PTT   --    --    --    --   48*  59*  58*    < > = values in this interval not displayed.       Iron Panel -   Recent Labs   Lab Test  06/05/17   1211  04/05/17   1236  03/22/17   1607   IRON  59  56  62   IRONSAT  24  31  32   WILL  138  278*  320*       Endocrine -   Recent Labs   Lab Test  09/08/17   0723  06/05/17   1211  04/05/17   1236   TSH  2.74  1.41  1.26  1.26       Daya Gutierrez MD

## 2018-03-23 LAB — ETHYL GLUCURONIDE UR QL: NEGATIVE

## 2018-03-29 ASSESSMENT — PATIENT HEALTH QUESTIONNAIRE - PHQ9: SUM OF ALL RESPONSES TO PHQ QUESTIONS 1-9: 6

## 2018-03-30 ENCOUNTER — OFFICE VISIT (OUTPATIENT)
Dept: PSYCHIATRY | Facility: CLINIC | Age: 63
End: 2018-03-30
Attending: NURSE PRACTITIONER
Payer: COMMERCIAL

## 2018-03-30 VITALS
HEART RATE: 87 BPM | BODY MASS INDEX: 32.85 KG/M2 | SYSTOLIC BLOOD PRESSURE: 128 MMHG | DIASTOLIC BLOOD PRESSURE: 77 MMHG | WEIGHT: 209.8 LBS

## 2018-03-30 DIAGNOSIS — G47.00 INSOMNIA, UNSPECIFIED TYPE: ICD-10-CM

## 2018-03-30 DIAGNOSIS — F41.1 GAD (GENERALIZED ANXIETY DISORDER): ICD-10-CM

## 2018-03-30 PROCEDURE — G0463 HOSPITAL OUTPT CLINIC VISIT: HCPCS | Mod: ZF

## 2018-03-30 RX ORDER — BUSPIRONE HYDROCHLORIDE 10 MG/1
10 TABLET ORAL 2 TIMES DAILY
Qty: 60 TABLET | Refills: 3 | Status: SHIPPED | OUTPATIENT
Start: 2018-03-30 | End: 2018-06-26

## 2018-03-30 RX ORDER — TRAZODONE HYDROCHLORIDE 100 MG/1
150 TABLET ORAL AT BEDTIME
Qty: 45 TABLET | Refills: 3 | Status: SHIPPED | OUTPATIENT
Start: 2018-03-30 | End: 2018-06-26

## 2018-03-30 ASSESSMENT — PAIN SCALES - GENERAL: PAINLEVEL: NO PAIN (0)
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
## 2018-03-30 NOTE — NURSING NOTE
Chief Complaint   Patient presents with     Recheck Medication      Generalized anxiety disorder      Reviewed Allergies, Medications, Smoking Status, and Pain Level     Obtained Weight, Blood Pressure, Heart Rate

## 2018-03-30 NOTE — MR AVS SNAPSHOT
After Visit Summary   3/30/2018    Phan Luque    MRN: [id number]           Patient Information     Date Of Birth          1955        Visit Information        Provider Department      3/30/2018 3:30 PM David Vu, DONOVAN [facility]        Today's Diagnoses     Insomnia, unspecified type        WALTER (generalized anxiety disorder)           Follow-ups after your visit        Your next 10 appointments already scheduled     Apr 20, 2018  7:30 AM CDT   [facility] visit with  LAB   [facility]    [address] [place] [address]  [address]  [facility] [address]   [phone]           Please do not eat 10-12 hours before your appointment if you are coming in fasting for labs on lipids, cholesterol, or glucose (sugar). Does not apply to pregnant women.  Water with medications is okay. Do not drink coffee or other fluids.  If you have concerns about taking your medications, please send a message by clicking on Secure Messaging, Message Your Care Team.            May 08, 2018  4:00 PM CDT   (Arrive by 3:45 PM)   Return Visit with Donnell Duvall MD   [facility] ([facility])    [address] [place] [address]  [address] [facility] [address] [phone]            May 21, 2018  3:00 PM CDT   (Arrive by 2:45 PM)   RETURN ENDOCRINE with Ruth Oakley MD   [facility] Endocrinology ([facility])    [address] [place] [address]  [address] [facility] [address] [phone]            Josue 15, 2018   Procedure with Tao Alfonso MD   [facility] ([facility])    [address] [place] [address]  [address] [facility] [address] [phone]           Located in the Clinics and Surgery Center at [address] [place] [address].   parking is very convenient and highly recommended.  is a $6 flat rate fee.  Both  and self  parkers should enter the main arrival plaza from [facility]; parking attendants will direct you based on your parking preference.            Jun 26, 2018  5:00 PM CDT   Adult Med Follow UP with DONOVAN [facility]   Psychiatry Clinic ([facility])    [address]  [address] [address] [phone]            Oct 01, 2018  4:45 PM CDT   (Arrive by 4:30 PM)   Return Liver Transplant with Hussein Banegas MD   [facility] Hepatology ([facility])    [address] [facility]  [address]  [facility] [id number] [phone]            Oct 22, 2018  7:30 AM CDT   (Arrive by 7:15 AM)   Return Visit with Arlyn Sanchez MD   [facility] Primary Care Clinic ([facility])    [address] [place] [address]  4th Floor  [facility] [id number] [phone]            Mar 20, 2019  3:30 PM CDT   Lab with  LAB   [facility] Lab ([facility])    [address] [place] [address]  1st Floor  [facility] [id number] [phone]            Mar 20, 2019  4:30 PM CDT   (Arrive by 4:00 PM)   Return Visit with Daya Gutierrez MD   [facility] Nephrology ([facility])    [address] [place] [address]  [address]  [facility] [id number] [phone]              Who to contact     Please call your clinic at [phone] to:    Ask questions about your health    Make or cancel appointments    Discuss your medicines    Learn about your test results    Speak to your doctor            Additional Information About Your Visit        [employer] Information     [employer] gives you secure access to your electronic health record. If you see a primary care provider, you can also send messages to your care team and make appointments. If you have questions, please call your primary care clinic.  If you do not have a primary care provider, please call [phone] and they will assist you.      [employer] is an electronic  gateway that provides easy, online access to your medical records. With [employer], you can request a clinic appointment, read your test results, renew a prescription or communicate with your care team.     To access your existing account, please contact your [facility] or call [phone] for assistance.        Care EveryWhere ID     This is your Care EveryWhere ID. This could be used by other organizations to access your [facility] medical records  [id number]        Your Vitals Were     Pulse BMI (Body Mass Index)                87 32.85 kg/m2           Blood Pressure from Last 3 Encounters:   04/04/18 140/83   04/02/18 120/77   03/30/18 128/77    Weight from Last 3 Encounters:   04/04/18 95.7 kg (211 lb)   04/03/18 94.8 kg (209 lb)   03/30/18 95.2 kg (209 lb 12.8 oz)              Today, you had the following     No orders found for display         Where to get your medicines      These medications were sent to [facility] - [facility] [email or web address]  [address] [facility] [address]    Hours:  Mon-Fri 8:30am-5:00pm Toll Free [phone] Phone:  [phone]     busPIRone 10 MG tablet    traZODone 100 MG tablet          Primary Care Provider Office Phone # Fax #    Santosh Salgado [phone]       [address]   [facility] [address]        Equal Access to Services     PAULO BERRY AH: Hadii adonis mejia hadasho Soomaali, waaxda luqadaha, qaybta kaalmada adeegyachyna, anna schroeder. So [facility] [phone].    ATENCIÓN: Si habla español, tiene a reece disposición servicios gratuitos de asistencia lingüística. Llame al [phone].    We comply with applicable federal civil rights laws and [place] laws. We do not discriminate on the basis of race, color, national origin, age, disability, sex, sexual orientation, or gender identity.            Thank you!     Thank you for choosing  [facility]  for your care. Our goal is always to provide you with excellent care. Hearing back from our patients is one way we can continue to improve our services. Please take a few minutes to complete the written survey that you may receive in the mail after your visit with us. Thank you!             Your Updated Medication List - Protect others around you: Learn how to safely use, store and throw away your medicines at www.disposemymeds.org.          This list is accurate as of 3/30/18 11:59 PM.  Always use your most recent med list.                   Brand Name Dispense Instructions for use Diagnosis    acetaminophen 325 MG tablet    TYLENOL    100 tablet    Take 2 tablets (650 mg) by mouth every 6 hours as needed for mild pain Or fevers. Use sparingly. Limit use to no more than 2 grams (2000 mg) in 24 hours. **Further refills must be obtained by primary care provider**    Acute post-operative pain       amLODIPine 5 MG tablet    NORVASC    90 tablet    Take 1 tablet (5 mg) by mouth daily    Hypertension       azaTHIOprine 100 MG Tabs     90 tablet    Take 50 mg by mouth every evening    Liver transplanted (H)       betaxolol 0.5 % ophthalmic solution    BETOPTIC    5 mL    Place 1 drop into both eyes 2 times daily    Primary open angle glaucoma of both eyes, unspecified glaucoma stage       busPIRone 10 MG tablet    BUSPAR    60 tablet    Take 1 tablet (10 mg) by mouth 2 times daily    WALTER (generalized anxiety disorder)       calcium Citrate-vitamin D 500-400 MG-UNIT Chew      Take 1 tablet by mouth daily        chlorthalidone 25 MG tablet    HYGROTON    45 tablet    Take 0.5 tablets (12.5 mg) by mouth daily    CKD (chronic kidney disease) stage 3, GFR 30-59 ml/min, Fluid retention       diclofenac 1 % Gel topical gel    VOLTAREN    100 g    Apply 4 grams to knees or 2 grams to hands four times daily using enclosed dosing card.    Chronic pain of left knee       folic acid 1 MG tablet    FOLVITE    30  tablet    Take 1 tablet (1 mg) by mouth daily    Malnutrition (H)       hydrOXYzine 25 MG tablet    ATARAX    60 tablet    Take 1-2 tablets (25-50mg) every 6 hours as needed for itching    WALTER (generalized anxiety disorder)       levothyroxine 88 MCG tablet    SYNTHROID/LEVOTHROID    90 tablet    Take 1 tablet (88 mcg) by mouth every morning (before breakfast)    Thyroid function test abnormal       melatonin 5 MG tablet      Take 1 tablet (5 mg) by mouth nightly as needed for sleep        multivitamin, therapeutic Tabs tablet     30 tablet    Take 1 tablet by mouth every 24 hours    Malnutrition (H)       pantoprazole 40 MG EC tablet    PROTONIX    90 tablet    Take 1 tablet (40 mg) by mouth every morning (before breakfast)    Gastroesophageal reflux disease, esophagitis presence not specified       pramipexole 0.5 MG tablet    MIRAPEX    90 tablet    Take 1 tablet (0.5 mg) by mouth At Bedtime    Restless leg syndrome       * predniSONE 10 MG tablet    DELTASONE     Take 1 tablet (10 mg) by mouth daily        * predniSONE 20 MG tablet    DELTASONE    5 tablet    Take 1 tablet (20 mg) by mouth daily    Cervical radicular pain, Patellofemoral arthritis of left knee       propranolol 10 MG tablet    INDERAL    90 tablet    Take 1 tablet (10 mg) by mouth 3 times daily As needed for anxiety    Generalized anxiety disorder       psyllium 0.52 G capsule     60 capsule    Take 2 capsules (1.04 g) by mouth daily With a full glass of water.    Loose stools       simethicone 80 MG chewable tablet    MYLICON    180 tablet    Take 1 tablet (80 mg) by mouth every 6 hours as needed for flatulence or cramping    Flatulence, eructation, and gas pain       tacrolimus 0.5 MG capsule    GENERIC EQUIVALENT    240 capsule    Take 4 capsules (2 mg) by mouth 2 times daily    Liver transplanted (H)       traMADol 50 MG tablet    ULTRAM    15 tablet    Take 1-2 tablets ( mg) by mouth every 6 hours as needed for pain        traZODone  100 MG tablet    DESYREL    45 tablet    Take 1.5 tablets (150 mg) by mouth At Bedtime    Insomnia, unspecified type       ursodiol 300 MG capsule    ACTIGALL    60 capsule    Take 1 capsule (300 mg) by mouth 2 times daily    Liver transplanted (H)       * Notice:  This list has 2 medication(s) that are the same as other medications prescribed for you. Read the directions carefully, and ask your doctor or other care provider to review them with you.

## 2018-04-01 ASSESSMENT — ENCOUNTER SYMPTOMS
BACK PAIN: 0
TREMORS: 0
DISTURBANCES IN COORDINATION: 0
ARTHRALGIAS: 1
MEMORY LOSS: 0
TINGLING: 0
PARALYSIS: 0
HEADACHES: 0
JOINT SWELLING: 0
LOSS OF CONSCIOUSNESS: 0
SEIZURES: 0
MUSCLE WEAKNESS: 1
STIFFNESS: 1
MUSCLE CRAMPS: 1
NECK PAIN: 1
WEAKNESS: 0
DIZZINESS: 0
MYALGIAS: 1
NUMBNESS: 1
SPEECH CHANGE: 0

## 2018-04-02 ENCOUNTER — OFFICE VISIT (OUTPATIENT)
Dept: INTERNAL MEDICINE | Facility: CLINIC | Age: 63
End: 2018-04-02
Payer: COMMERCIAL

## 2018-04-02 VITALS — RESPIRATION RATE: 16 BRPM | HEART RATE: 85 BPM | DIASTOLIC BLOOD PRESSURE: 77 MMHG | SYSTOLIC BLOOD PRESSURE: 120 MMHG

## 2018-04-02 DIAGNOSIS — G89.29 OTHER CHRONIC PAIN: ICD-10-CM

## 2018-04-02 DIAGNOSIS — Z79.52 ON PREDNISONE THERAPY: ICD-10-CM

## 2018-04-02 DIAGNOSIS — E66.3 OVERWEIGHT: ICD-10-CM

## 2018-04-02 DIAGNOSIS — M25.562 CHRONIC PAIN OF LEFT KNEE: Primary | ICD-10-CM

## 2018-04-02 DIAGNOSIS — Z94.4 LIVER TRANSPLANTED (H): ICD-10-CM

## 2018-04-02 DIAGNOSIS — G89.29 CHRONIC PAIN OF LEFT KNEE: Primary | ICD-10-CM

## 2018-04-02 ASSESSMENT — PAIN SCALES - GENERAL: PAINLEVEL: MODERATE PAIN (4)

## 2018-04-02 NOTE — NURSING NOTE
Chief Complaint   Patient presents with     Knee Pain     recheck knee pain     DAVID BACON at 9:11 AM on 4/2/2018.

## 2018-04-02 NOTE — MR AVS SNAPSHOT
After Visit Summary   4/2/2018    Phan Luque    MRN: [id number]           Patient Information     Date Of Birth          1955        Visit Information        Provider Department      4/2/2018 9:00 AM Arlyn Sanchez MD [facility]        Today's Diagnoses     Chronic pain of left knee    -  1    Overweight        Liver transplanted (H)        On prednisone therapy        Other chronic pain          Care Instructions    Primary Care Center: [phone]     Primary Care Center Medication Refill Request Information:  * Please contact your pharmacy regarding ANY request for medication refills.  ** [facility] Prescription Fax = [phone]  * Please allow 3 business days for routine medication refills.  * Please allow 5 business days for controlled substance medication refills.     Primary Care Center Test Result notification information:  *You will be notified with in 7-10 days of your appointment day regarding the results of your test.  If you are on MyChart you will be notified as soon as the provider has reviewed the results and signed off on them.        Try to taper prednisone by 1 mg per month.  Ask Dr. Banegas what is the minimum amount of prednisone you should take.              Follow-ups after your visit        Follow-up notes from your care team     Return in about 6 months (around 10/2/2018) for Routine Visit.      Your next 10 appointments already scheduled     Apr 03, 2018  4:20 PM CDT   (Arrive by 4:05 PM)   Return Visit with Donnell Duvall MD   [facility] ([facility])    [address] [place] [id number] [phone]            Apr 04, 2018  7:30 AM CDT   LAB with EA LAB   [facility] ([facility])    [address] [place] [address] [phone]           Please do not eat 10-12 hours before your appointment if you are coming in  fasting for labs on lipids, cholesterol, or glucose (sugar). This does not apply to pregnant women. Water, hot tea and black coffee (with nothing added) are okay. Do not drink other fluids, diet soda or chew gum.            Apr 04, 2018  3:45 PM CDT   (Arrive by 3:30 PM)   Return Liver Transplant with Hussein Banegas MD   [facility] Hepat[facility] ([facility])    [address] [place] [address]  [address]  [facility] [address] [phone]            Apr 20, 2018  7:30 AM CDT   Lab visit with EA LAB   [facility] ([facility])    [address]  [address]  [facility] [address] [phone]           Please do not eat 10-12 hours before your appointment if you are coming in fasting for labs on lipids, cholesterol, or glucose (sugar). Does not apply to pregnant women.  Water with medications is okay. Do not drink coffee or other fluids.  If you have concerns about taking your medications, please send a message by clicking on Secure Messaging, Message Your Care Team.            May 21, 2018  3:00 PM CDT   (Arrive by 2:45 PM)   RETURN ENDOCRINE with Ruth Oakley MD   [facility] Endocrinology ([facility] [place])    [address] [place] [address]  3rd Floor  [place] [address] [phone]            Josue 15, 2018   Procedure with Tao Alfonso MD   [facility] ([facility])    [address] [place] [address]  5th Floor  [facility] [address] [phone]           Located in the Clinics and Surgery Center at [address] [place] [address].   parking is very convenient and highly recommended.  is a $6 flat rate fee.  Both  and self parkers should enter the main arrival plaza from [facility]; parking attendants will direct you based on your parking preference.            Jun 26, 2018  5:00 PM CDT   Adult Med Follow UP with ODNOVAN [facility]   Psychiatry Clinic  ([facility])    [address]  [address]  [facility] [address]   [phone]            Oct 22, 2018  7:30 AM CDT   (Arrive by 7:15 AM)   Return Visit with Arlyn Sanchez MD   [facility] ([facility])    [address]  4th Floor  [facility] [id number] [phone]            Mar 20, 2019  3:30 PM CDT   Lab with  LAB   [facility] Lab ([facility])    [address]  1st Floor  [facility] [id number] [phone]            Mar 20, 2019  4:30 PM CDT   (Arrive by 4:00 PM)   Return Visit with Daya Gutierrez MD   [facility] Nephrology ([facility])    [address]  [address]  [facility] [id number] [phone]              Who to contact     Please call your clinic at [phone] to:    Ask questions about your health    Make or cancel appointments    Discuss your medicines    Learn about your test results    Speak to your doctor            Additional Information About Your Visit        Drizly Information     Drizly gives you secure access to your electronic health record. If you see a primary care provider, you can also send messages to your care team and make appointments. If you have questions, please call your primary care clinic.  If you do not have a primary care provider, please call [phone] and they will assist you.      Drizly is an electronic gateway that provides easy, online access to your medical records. With Drizly, you can request a clinic appointment, read your test results, renew a prescription or communicate with your care team.     To access your existing account, please contact your [facility] or call [phone] for assistance.        Care EveryWhere ID     This is your Care EveryWhere ID. This could be used by other organizations to access your [facility]  records  [id number]        Your Vitals Were     Pulse Respirations                85 16           Blood Pressure from Last 3 Encounters:   04/02/18 120/77   03/21/18 121/79   03/01/18 148/82    Weight from Last 3 Encounters:   03/21/18 94.8 kg (209 lb)   03/01/18 93.9 kg (207 lb)   03/01/18 93.9 kg (207 lb 1.6 oz)              Today, you had the following     No orders found for display         Today's Medication Changes          These changes are accurate as of 4/2/18  9:49 AM.  If you have any questions, ask your nurse or doctor.               These medicines have changed or have updated prescriptions.        Dose/Directions    predniSONE 10 MG tablet   Commonly known as:  DELTASONE   This may have changed:  Another medication with the same name was removed. Continue taking this medication, and follow the directions you see here.   Changed by:  Arlyn Sanchez MD        Dose:  10 mg   Take 1 tablet (10 mg) by mouth daily   Refills:  0         Stop taking these medicines if you haven't already. Please contact your care team if you have questions.     simethicone 80 MG chewable tablet   Commonly known as:  MYLICON   Stopped by:  Arlyn Sanchez MD           traMADol 50 MG tablet   Commonly known as:  ULTRAM   Stopped by:  Arlyn Sanchez MD                    Primary Care Provider Office Phone # Fax #    Santosh Salgado [phone]       [facility] [address] [place] [address]        Equal Access to Services     [facility] AH: Hadii adonis ku hadasho Soomaali, waaxda luqadaha, qaybta kaalmada adeegyada, wax idiin hayaan adebrenda evans la'grzegorzn ah. So [facility] [phone].    ATENCIÓN: Si habla español, tiene a reece disposición servicios gratuitos de asistencia lingüística. Llame al [phone].    We comply with applicable federal civil rights laws and [place] laws. We do not discriminate on the basis of race, color, national origin, age, disability, sex, sexual  orientation, or gender identity.            Thank you!     Thank you for choosing [facility]  for your care. Our goal is always to provide you with excellent care. Hearing back from our patients is one way we can continue to improve our services. Please take a few minutes to complete the written survey that you may receive in the mail after your visit with us. Thank you!             Your Updated Medication List - Protect others around you: Learn how to safely use, store and throw away your medicines at www.disposemymeds.org.          This list is accurate as of 4/2/18  9:49 AM.  Always use your most recent med list.                   Brand Name Dispense Instructions for use Diagnosis    acetaminophen 325 MG tablet    TYLENOL    100 tablet    Take 2 tablets (650 mg) by mouth every 6 hours as needed for mild pain Or fevers. Use sparingly. Limit use to no more than 2 grams (2000 mg) in 24 hours. **Further refills must be obtained by primary care provider**    Acute post-operative pain       amLODIPine 5 MG tablet    NORVASC    90 tablet    Take 1 tablet (5 mg) by mouth daily    Hypertension       azaTHIOprine 100 MG Tabs     90 tablet    Take 50 mg by mouth every evening    Liver transplanted (H)       betaxolol 0.5 % ophthalmic solution    BETOPTIC    5 mL    Place 1 drop into both eyes 2 times daily    Primary open angle glaucoma of both eyes, unspecified glaucoma stage       busPIRone 10 MG tablet    BUSPAR    60 tablet    Take 1 tablet (10 mg) by mouth 2 times daily    WALTER (generalized anxiety disorder)       calcium Citrate-vitamin D 500-400 MG-UNIT Chew      Take 1 tablet by mouth daily        chlorthalidone 25 MG tablet    HYGROTON    45 tablet    Take 0.5 tablets (12.5 mg) by mouth daily    CKD (chronic kidney disease) stage 3, GFR 30-59 ml/min, Fluid retention       diclofenac 1 % Gel topical gel    VOLTAREN    100 g    Apply 4 grams to knees or 2 grams to hands four times daily using  enclosed dosing card.    Chronic pain of left knee       folic acid 1 MG tablet    FOLVITE    30 tablet    Take 1 tablet (1 mg) by mouth daily    Malnutrition (H)       hydrOXYzine 25 MG tablet    ATARAX    60 tablet    Take 1-2 tablets (25-50mg) every 6 hours as needed for itching    WALTER (generalized anxiety disorder)       levothyroxine 88 MCG tablet    SYNTHROID/LEVOTHROID    90 tablet    Take 1 tablet (88 mcg) by mouth every morning (before breakfast)    Thyroid function test abnormal       melatonin 5 MG tablet      Take 1 tablet (5 mg) by mouth nightly as needed for sleep        multivitamin, therapeutic Tabs tablet     30 tablet    Take 1 tablet by mouth every 24 hours    Malnutrition (H)       pantoprazole 40 MG EC tablet    PROTONIX    90 tablet    Take 1 tablet (40 mg) by mouth every morning (before breakfast)    Gastroesophageal reflux disease, esophagitis presence not specified       pramipexole 0.5 MG tablet    MIRAPEX    90 tablet    Take 1 tablet (0.5 mg) by mouth At Bedtime    Restless leg syndrome       predniSONE 10 MG tablet    DELTASONE     Take 1 tablet (10 mg) by mouth daily        propranolol 10 MG tablet    INDERAL    90 tablet    Take 1 tablet (10 mg) by mouth 3 times daily As needed for anxiety    Generalized anxiety disorder       psyllium 0.52 G capsule     60 capsule    Take 2 capsules (1.04 g) by mouth daily With a full glass of water.    Loose stools       tacrolimus 0.5 MG capsule    GENERIC EQUIVALENT    240 capsule    Take 4 capsules (2 mg) by mouth 2 times daily    Liver transplanted (H)       traZODone 100 MG tablet    DESYREL    45 tablet    Take 1.5 tablets (150 mg) by mouth At Bedtime    Insomnia, unspecified type       ursodiol 300 MG capsule    ACTIGALL    60 capsule    Take 1 capsule (300 mg) by mouth 2 times daily    Liver transplanted (H)

## 2018-04-02 NOTE — PROGRESS NOTES
[facility]  Primary Care Center   Arlyn Sanchez MD  04/02/2018      Chief Complaint:   Knee Pain       History of Present Illness:   Phan Luque is a 62 year old female with a history of liver transplant related to alcoholic hepatitis, anxiety, hypertension, chronic kidney disease, and irritable bowel syndrome who returns to clinic for evaluation of left knee pain. She described 2-3 months of intermittent, but persistent left knee pain described as an achiness that stays ir[place] 5/10 in severity. Her knee pain is exacerbated by weightbearing and any movement, improved with non-use/rest. She followed up in sports medicine where they felt this was consistent with patellofemoral arthritis. At that time, she was given an addituional Prednisone burst at 20 mg for a 5 day period (she is on 10 mg chronically after her liver transplant), which she felt was very helpful. Additionally, she is using Lidocain patches, Voltaren gel, Tylenol 600 mg, and occasional tramadol. She is in Pool therapy as well. Currently, she is not taping or bracing the knee. Overall, she feels that her knee pain has improved in the past month, since starting the above therapies, but states that the increased activity in therapy is now starting to affect her right knee as well. She is wondering about longer term medications for her arthritis. She already has a follow up appointment scheduled in sports medicine.       Health Maintenance:  Mammogram (females age 40 - 75): Up to Date, normal on 12/2017 ([facility])  Pap (females age 21 - 65): Up to Date, completed 12/2017   Colon Cancer screening (age 50 - 75): Due, scheduled for 6/15/18.  Lung Cancer screening: Does not qualify  Dexa (females age ? 65, males age ? 70): every 2 years - done 4/2017, had Reclast  Glucose: every 5 years, yearly if risk factors? - Up to Date  Lipid panel: every 5 years, yearly if risk factors - Up to Date  Recent Labs   Lab Test  12/20/17   1443  02/23/17   0850    08/06/16   0757   CHOL  147   --    --   Interfering substances, unable to perform test  LUPE RIOJAS NOTIFIED ON 6B,8/6/2016,0926,MJ     HDL  51   --    --   9*   LDL  49  108*   < >  13   TRIG  230*   --    --   Interfering substances, unable to perform test  LUPE RIOJAS NOTIFIED ON 6B,8/6/2016,0926,MJ      < > = values in this interval not displayed.     Immunizations (Tetanus every 10 years, Influenza yearly, Pneumonia PPV-23 and PCV-13 age ? 65 or sooner if risk factors) - Below.   Immunization History   Administered Date(s) Administered     HEPA 01/13/2009     Influenza Vaccine IM 3yrs+ 4 Valent IIV4 10/08/2016, 10/18/2017     Mantoux Tuberculin Skin Test 09/15/2016, 10/06/2016     Pneumo Conj 13-V (2010&after) 08/19/2016     TD (ADULT, 7+) 01/21/2004     TDAP Vaccine (Adacel) 08/18/2016      The following health maintenance issues were also reviewed and addressed as needed:  Blood pressure (>18 years annually)  Depression - PHQ-2 Score: Reviewed  Lifestyle habits (including exercise, diet, weight): Reviewed   Health risk behaviors (sexual history, alcohol, tobacco, drug use, partner violence)  Routine eye, dental, hearing, and skin exams     Review of Systems:   Pertinent items are noted in HPI, remainder of complete ROS is negative.      Active Medications:     Current Outpatient Prescriptions:      traZODone (DESYREL) 100 MG tablet, Take 1.5 tablets (150 mg) by mouth At Bedtime, Disp: 45 tablet, Rfl: 3     busPIRone (BUSPAR) 10 MG tablet, Take 1 tablet (10 mg) by mouth 2 times daily, Disp: 60 tablet, Rfl: 3     propranolol (INDERAL) 10 MG tablet, Take 1 tablet (10 mg) by mouth 3 times daily As needed for anxiety, Disp: 90 tablet, Rfl: 0     predniSONE (DELTASONE) 10 MG tablet, Take 1 tablet (10 mg) by mouth daily, Disp: , Rfl:      hydrOXYzine (ATARAX) 25 MG tablet, Take 1-2 tablets (25-50mg) every 6 hours as needed for itching, Disp: 60 tablet, Rfl: 2     levothyroxine (SYNTHROID/LEVOTHROID) 88 MCG tablet,  Take 1 tablet (88 mcg) by mouth every morning (before breakfast), Disp: 90 tablet, Rfl: 2     diclofenac (VOLTAREN) 1 % GEL topical gel, Apply 4 grams to knees or 2 grams to hands four times daily using enclosed dosing card., Disp: 100 g, Rfl: 1     melatonin 5 MG tablet, Take 1 tablet (5 mg) by mouth nightly as needed for sleep, Disp: , Rfl:      tacrolimus (GENERIC EQUIVALENT) 0.5 MG capsule, Take 4 capsules (2 mg) by mouth 2 times daily, Disp: 240 capsule, Rfl: 11     betaxolol (BETOPTIC) 0.5 % ophthalmic solution, Place 1 drop into both eyes 2 times daily, Disp: 5 mL, Rfl: 2     chlorthalidone (HYGROTON) 25 MG tablet, Take 0.5 tablets (12.5 mg) by mouth daily, Disp: 45 tablet, Rfl: 3     pantoprazole (PROTONIX) 40 MG EC tablet, Take 1 tablet (40 mg) by mouth every morning (before breakfast), Disp: 90 tablet, Rfl: 4     ursodiol (ACTIGALL) 300 MG capsule, Take 1 capsule (300 mg) by mouth 2 times daily, Disp: 60 capsule, Rfl: 11     pramipexole (MIRAPEX) 0.5 MG tablet, Take 1 tablet (0.5 mg) by mouth At Bedtime, Disp: 90 tablet, Rfl: 3     amLODIPine (NORVASC) 5 MG tablet, Take 1 tablet (5 mg) by mouth daily, Disp: 90 tablet, Rfl: 3     azaTHIOprine 100 MG TABS, Take 50 mg by mouth every evening, Disp: 90 tablet, Rfl: 3     calcium Citrate-vitamin D 500-400 MG-UNIT CHEW, Take 1 tablet by mouth daily, Disp: , Rfl:      acetaminophen (TYLENOL) 325 MG tablet, Take 2 tablets (650 mg) by mouth every 6 hours as needed for mild pain Or fevers. Use sparingly. Limit use to no more than 2 grams (2000 mg) in 24 hours. **Further refills must be obtained by primary care provider**, Disp: 100 tablet, Rfl: 0     psyllium 0.52 G capsule, Take 2 capsules (1.04 g) by mouth daily With a full glass of water., Disp: 60 capsule, Rfl: 1     multivitamin, therapeutic (THERA-VIT) TABS tablet, Take 1 tablet by mouth every 24 hours, Disp: 30 tablet, Rfl: 11     folic acid (FOLVITE) 1 MG tablet, Take 1 tablet (1 mg) by mouth daily, Disp: 30  tablet, Rfl: 1      Allergies:   Benadryl [diphenhydramine]; Cefaclor; Hydrocodone-acetaminophen; Oxycodone; Penicillins; and Sulfa drugs      Past Medical History:  End state liver disease, secondary to history of alcohol abuse  Immunocompromised status, s/p liver transplant   Chronic kidney disease  Migraines   Asthma  Osteoporosis   Spinal stenosis   Strabismus      Past Surgical History:  Liver transplant, 17  Appendectomy  Tubal ligation   Cataracts   Sphincteroplasty      Family History:   Mother with a history of melanoma, .  Father with history of congestive heart failure.      Social History:   Former smoker of 2.5 packs/day for 25 years; quit in 1996.  History of alcohol abuse, none since 17.     Physical Exam:   /77  Pulse 85  Resp 16   Constitutional: Alert, oriented, pleasant, no acute distress  Head: Normocephalic, atraumatic  Eyes: Extra-ocular movements intact, no scleral icterus  ENT: Oropharynx clear, moist mucus membranes, good dentition  Cardiovascular: Regular rate and rhythm, no murmurs, rubs or gallops, peripheral pulses full/symmetric  Respiratory: Good air movement bilaterally, lungs clear, no wheezes/rales/rhonchi  Musculoskeletal: No edema, normal muscle tone, normal gait. Left knee with no gross abnormalities, crepitus present, tender to palpation superior to the patella.   Neurologic: Alert and oriented, cranial nerves 2-12 intact.  Psychiatric: normal mentation, affect and mood     Assessment and Plan:    Chronic pain of left knee  She saw sports medicine and they felt it was consistent with patellofemoral arthritis. She will continue physical therapy. I told her that I would authorize sparing use of Tramadol for pain flare-ups, but that I would not recommend daily use.     Overweight  We briefly reviewed lifestyle strategies and other options for weight management.     Liver transplanted (H), on prednisone therapy  She remains on 10 mg Prednisone daily  after her liver transplant. This is not ideal given her weight and low bone density. I encouraged her to discuss tapering down on this with Dr. Banegas. If pain becomes an issue, she can schedule a visit to discuss pain management.         Follow-up: keyona        Scribe Disclosure:   I, Ellie Landeros, am serving as a scribe to document services personally performed by Arlyn Sanchez MD at this visit, based upon the provider's statements to me. All documentation has been reviewed by the aforementioned provider prior to being entered into the official medical record.     Portions of this medical record were completed by a scribe. UPON MY REVIEW AND AUTHENTICATION BY ELECTRONIC SIGNATURE, this confirms (a) I performed the applicable clinical services, and (b) the record is accurate.   Arlyn Sanchez MD  Internal Medicine

## 2018-04-02 NOTE — PATIENT INSTRUCTIONS
[facility]: [phone]     [facility] Medication Refill Request Information:  * Please contact your pharmacy regarding ANY request for medication refills.  ** [facility] Prescription Fax = [phone]  * Please allow 3 business days for routine medication refills.  * Please allow 5 business days for controlled substance medication refills.     [facility] Test Result notification information:  *You will be notified with in 7-10 days of your appointment day regarding the results of your test.  If you are on MyChart you will be notified as soon as the provider has reviewed the results and signed off on them.        Try to taper prednisone by 1 mg per month.  Ask Dr. Banegas what is the minimum amount of prednisone you should take.

## 2018-04-03 ENCOUNTER — RECORDS - HEALTHEAST (OUTPATIENT)
Dept: ADMINISTRATIVE | Facility: OTHER | Age: 63
End: 2018-04-03

## 2018-04-03 ENCOUNTER — OFFICE VISIT (OUTPATIENT)
Dept: ORTHOPEDICS | Facility: CLINIC | Age: 63
End: 2018-04-03
Payer: COMMERCIAL

## 2018-04-03 VITALS — BODY MASS INDEX: 32.8 KG/M2 | WEIGHT: 209 LBS | HEIGHT: 67 IN

## 2018-04-03 DIAGNOSIS — M17.12 PATELLOFEMORAL ARTHRITIS OF LEFT KNEE: Primary | ICD-10-CM

## 2018-04-03 NOTE — LETTER
4/3/2018       RE: Phan Luque  [address]  [facility] [place] [address]     Dear Colleague,    Thank you for referring your patient, Phan Luque, to the [facility] at [facility]. Please see a copy of my visit note below.    Phan returns requesting a left knee cortisone injection as previously discussed. She has started PT which helps some, tried the voltaren which did not help that much, and conflicted with her kidney function slightly.     Diagnosis: left knee patellofemoral arthritis  PROCEDURE:         left   Knee joint injection   NDC 1700-0257-26 kenalog     The patient was apprised of the risks and the benefits of the procedure and consented and a written consent was signed.   The patient s  KNEE was sterilely prepped with chloraprep.   40 mg of triamcinolone suspension was drawn up into a 5 mL syringe with 2 mL of 1% lidocaine  The patient was injected with a 1.5-inch 22-gauge needle at the_superiorlateral aspect of their knee joint  There were no complications. The patient tolerated the procedure well. There was minimal bleeding.   The patient was instructed to ice the knee upon leaving clinic and refrain from overuse over the next 3 days.   The patient was instructed to go to the emergency room with any usual pain, swelling, or redness occurred in the injected area.   The patient was given a followup appointment to evaluate response to the injection to their increased range of motion and reduction of pain.  Follow up in 1 month after completing more PT and consider hyaluronic acid injections    Dr Donnell Duvall

## 2018-04-03 NOTE — PROGRESS NOTES
Phan returns requesting a left knee cortisone injection as previously discussed. She has started PT which helps some, tried the voltaren which did not help that much, and conflicted with her kidney function slightly.     Diagnosis: left knee patellofemoral arthritis  PROCEDURE:         left   Knee joint injection   NDC 1733-2310-70 kenalog     The patient was apprised of the risks and the benefits of the procedure and consented and a written consent was signed.   The patient s  KNEE was sterilely prepped with chloraprep.   40 mg of triamcinolone suspension was drawn up into a 5 mL syringe with 2 mL of 1% lidocaine  The patient was injected with a 1.5-inch 22-gauge needle at the_superiorlateral aspect of their knee joint  There were no complications. The patient tolerated the procedure well. There was minimal bleeding.   The patient was instructed to ice the knee upon leaving clinic and refrain from overuse over the next 3 days.   The patient was instructed to go to the emergency room with any usual pain, swelling, or redness occurred in the injected area.   The patient was given a followup appointment to evaluate response to the injection to their increased range of motion and reduction of pain.  Follow up in 1 month after completing more PT and consider hyaluronic acid injections  Dr Donnell Duvall  Answers for HPI/ROS submitted by the patient on 4/1/2018   General Symptoms: No  Skin Symptoms: No  HENT Symptoms: No  EYE SYMPTOMS: No  HEART SYMPTOMS: No  LUNG SYMPTOMS: No  INTESTINAL SYMPTOMS: No  URINARY SYMPTOMS: No  GYNECOLOGIC SYMPTOMS: No  BREAST SYMPTOMS: No  SKELETAL SYMPTOMS: Yes  BLOOD SYMPTOMS: No  NERVOUS SYSTEM SYMPTOMS: Yes  MENTAL HEALTH SYMPTOMS: No  Back pain: No  Muscle aches: Yes  Neck pain: Yes  Swollen joints: No  Joint pain: Yes  Bone pain: Yes  Muscle cramps: Yes  Muscle weakness: Yes  Joint stiffness: Yes  Bone fracture: No  Trouble with coordination: No  Dizziness or trouble with balance:  No  Fainting or black-out spells: No  Memory loss: No  Headache: No  Seizures: No  Speech problems: No  Tingling: No  Tremor: No  Weakness: No  Difficulty walking: No  Paralysis: No  Numbness: Yes

## 2018-04-03 NOTE — MR AVS SNAPSHOT
After Visit Summary   4/3/2018    Phan Luque    MRN: [id number]           Patient Information     Date Of Birth          1955        Visit Information        Provider Department      4/3/2018 4:20 PM Donnell Duvall MD [facility]        Today's Diagnoses     Patellofemoral arthritis of left knee    -  1       Follow-ups after your visit        Your next 10 appointments already scheduled     Apr 04, 2018  7:30 AM CDT   LAB with EA LAB   [facility] ([facility])    [address] [place] [address]   [phone]           Please do not eat 10-12 hours before your appointment if you are coming in fasting for labs on lipids, cholesterol, or glucose (sugar). This does not apply to pregnant women. Water, hot tea and black coffee (with nothing added) are okay. Do not drink other fluids, diet soda or chew gum.            Apr 04, 2018  3:45 PM CDT   (Arrive by 3:30 PM)   Return Liver Transplant with Hussein Banegas MD   [facility] ([facility])    [address] [place] [address] [phone]            Apr 20, 2018  7:30 AM CDT   Lab visit with EA LAB   [facility] ([facility])    [address] [place] [address] [phone]           Please do not eat 10-12 hours before your appointment if you are coming in fasting for labs on lipids, cholesterol, or glucose (sugar). Does not apply to pregnant women.  Water with medications is okay. Do not drink coffee or other fluids.  If you have concerns about taking your medications, please send a message by clicking on Secure Messaging, Message Your Care Team.            May 08, 2018  4:00 PM CDT   (Arrive by 3:45 PM)   Return Visit with Donnell Duvall MD   [facility] ([facility])    [address] [place] [address]  [facility] [address]   [phone]            May 21, 2018  3:00 PM CDT   (Arrive by 2:45 PM)   RETURN ENDOCRINE with Ruth Oakley MD   [facility] ([facility])    [address] [place] [address]  3rd [facility] [address] [phone]            Josue 15, 2018   Procedure with Tao Alfonso MD   [facility] ([facility])    [address] [place] [address]  5th Floor  [facility] [address] [phone]           Located in the Clinics and Surgery Center at [address] [place] [address].   parking is very convenient and highly recommended.  is a $6 flat rate fee.  Both  and self parkers should enter the main arrival plaza from [facility]; parking attendants will direct you based on your parking preference.            Jun 26, 2018  5:00 PM CDT   Adult Med Follow UP with DONOVAN Blake [facility]   Psychiatry Clinic ([facility])    [place] [address]  [address] [address] [phone]            Oct 22, 2018  7:30 AM CDT   (Arrive by 7:15 AM)   Return Visit with Arlyn Sanchez MD   [facility] ([facility])    [address] [place] [address]  4th Floor  [facility] [address] [phone]            Mar 20, 2019  3:30 PM CDT   Lab with  LAB   [facility] Lab ([facility])    [address] [place] [address]  1st Floor  [facility] [address] [phone]            Mar 20, 2019  4:30 PM CDT   (Arrive by 4:00 PM)   Return Visit with Daya Gutierrez MD   [facility] Nephrology ([facility])    [address] [place] [address]  [address]  [facility] [address] [phone]              Who to contact     Please call your clinic at [phone] to:    Ask questions about your health    Make or cancel appointments    Discuss your medicines    Learn about  "your test results    Speak to your doctor            Additional Information About Your Visit        [employer] Information     [employer] gives you secure access to your electronic health record. If you see a primary care provider, you can also send messages to your care team and make appointments. If you have questions, please call your primary care clinic.  If you do not have a primary care provider, please call [phone] and they will assist you.      [employer] is an electronic gateway that provides easy, online access to your medical records. With [employer], you can request a clinic appointment, read your test results, renew a prescription or communicate with your care team.     To access your existing account, please contact your [facility] or call [phone] for assistance.        Care EveryWhere ID     This is your Care EveryWhere ID. This could be used by other organizations to access your [facility] medical records  [id number]        Your Vitals Were     Height BMI (Body Mass Index)                1.702 m (5' 7\") 32.73 kg/m2           Blood Pressure from Last 3 Encounters:   04/02/18 120/77   03/21/18 121/79   03/01/18 148/82    Weight from Last 3 Encounters:   04/03/18 94.8 kg (209 lb)   03/21/18 94.8 kg (209 lb)   03/01/18 93.9 kg (207 lb)              We Performed the Following     DRAIN/INJECT LARGE JOINT/BURSA     KENALOG PER 10 MG        Primary Care Provider Office Phone # Fax #    Santosh SAAVEDRA Damian [phone]       [address]   [facility] [address]        Equal Access to Services     [facility] Center[facility] AH: Hadii aad ku hadasho Soomaali, waaxda luqadaha, qaybta kaalmada adeegyada, anna ambrose . So [facility] [phone].    ATENCIÓN: Si habla español, tiene a reece disposición servicios gratuitos de asistencia lingüística. Llame al [phone].    We comply with applicable federal civil rights laws and [place] laws. We do " not discriminate on the basis of race, color, national origin, age, disability, sex, sexual orientation, or gender identity.            Thank you!     Thank you for choosing [facility]  for your care. Our goal is always to provide you with excellent care. Hearing back from our patients is one way we can continue to improve our services. Please take a few minutes to complete the written survey that you may receive in the mail after your visit with us. Thank you!             Your Updated Medication List - Protect others around you: Learn how to safely use, store and throw away your medicines at www.disposemymeds.org.          This list is accurate as of 4/3/18  5:09 PM.  Always use your most recent med list.                   Brand Name Dispense Instructions for use Diagnosis    acetaminophen 325 MG tablet    TYLENOL    100 tablet    Take 2 tablets (650 mg) by mouth every 6 hours as needed for mild pain Or fevers. Use sparingly. Limit use to no more than 2 grams (2000 mg) in 24 hours. **Further refills must be obtained by primary care provider**    Acute post-operative pain       amLODIPine 5 MG tablet    NORVASC    90 tablet    Take 1 tablet (5 mg) by mouth daily    Hypertension       azaTHIOprine 100 MG Tabs     90 tablet    Take 50 mg by mouth every evening    Liver transplanted (H)       betaxolol 0.5 % ophthalmic solution    BETOPTIC    5 mL    Place 1 drop into both eyes 2 times daily    Primary open angle glaucoma of both eyes, unspecified glaucoma stage       busPIRone 10 MG tablet    BUSPAR    60 tablet    Take 1 tablet (10 mg) by mouth 2 times daily    WALTER (generalized anxiety disorder)       calcium Citrate-vitamin D 500-400 MG-UNIT Chew      Take 1 tablet by mouth daily        chlorthalidone 25 MG tablet    HYGROTON    45 tablet    Take 0.5 tablets (12.5 mg) by mouth daily    CKD (chronic kidney disease) stage 3, GFR 30-59 ml/min, Fluid retention       diclofenac 1 % Gel topical gel     VOLTAREN    100 g    Apply 4 grams to knees or 2 grams to hands four times daily using enclosed dosing card.    Chronic pain of left knee       folic acid 1 MG tablet    FOLVITE    30 tablet    Take 1 tablet (1 mg) by mouth daily    Malnutrition (H)       hydrOXYzine 25 MG tablet    ATARAX    60 tablet    Take 1-2 tablets (25-50mg) every 6 hours as needed for itching    WALTER (generalized anxiety disorder)       levothyroxine 88 MCG tablet    SYNTHROID/LEVOTHROID    90 tablet    Take 1 tablet (88 mcg) by mouth every morning (before breakfast)    Thyroid function test abnormal       melatonin 5 MG tablet      Take 1 tablet (5 mg) by mouth nightly as needed for sleep        multivitamin, therapeutic Tabs tablet     30 tablet    Take 1 tablet by mouth every 24 hours    Malnutrition (H)       pantoprazole 40 MG EC tablet    PROTONIX    90 tablet    Take 1 tablet (40 mg) by mouth every morning (before breakfast)    Gastroesophageal reflux disease, esophagitis presence not specified       pramipexole 0.5 MG tablet    MIRAPEX    90 tablet    Take 1 tablet (0.5 mg) by mouth At Bedtime    Restless leg syndrome       predniSONE 10 MG tablet    DELTASONE     Take 1 tablet (10 mg) by mouth daily        propranolol 10 MG tablet    INDERAL    90 tablet    Take 1 tablet (10 mg) by mouth 3 times daily As needed for anxiety    Generalized anxiety disorder       psyllium 0.52 G capsule     60 capsule    Take 2 capsules (1.04 g) by mouth daily With a full glass of water.    Loose stools       tacrolimus 0.5 MG capsule    GENERIC EQUIVALENT    240 capsule    Take 4 capsules (2 mg) by mouth 2 times daily    Liver transplanted (H)       traZODone 100 MG tablet    DESYREL    45 tablet    Take 1.5 tablets (150 mg) by mouth At Bedtime    Insomnia, unspecified type       ursodiol 300 MG capsule    ACTIGALL    60 capsule    Take 1 capsule (300 mg) by mouth 2 times daily    Liver transplanted (H)

## 2018-04-03 NOTE — NURSING NOTE
[facility]  [address] [place] [address]  Dept: [phone]  ______________________________________________________________________________    Patient: Phan Luque   : 1955   MRN: [id number]   April 3, 2018    INVASIVE PROCEDURE SAFETY CHECKLIST    Date: 4/3/2018   Procedure: left knee joint injection  Patient Name: Phan Luque  MRN: [id number]  YOB: 1955    Action: Complete sections as appropriate. Any discrepancy results in a HARD COPY until resolved.     PRE PROCEDURE:  Patient ID verified with 2 identifiers (name and  or MRN): Yes  Procedure and site verified with patient/designee (when able): Yes  Accurate consent documentation in medical record: Yes  H&P (or appropriate assessment) documented in medical record: Yes  H&P must be up to 20 days prior to procedure and updates within 24 hours of procedure as applicable: Yes  Relevant diagnostic and radiology test results appropriately labeled and displayed as applicable: Yes  Procedure site(s) marked with provider initials: Yes    TIMEOUT:  Time-Out performed immediately prior to starting procedure, including verbal and active participation of all team members addressing the following:Yes  * Correct patient identify  * Confirmed that the correct side and site are marked  * An accurate procedure consent form  * Agreement on the procedure to be done  * Correct patient position  * Relevant images and results are properly labeled and appropriately displayed  * The need to administer antibiotics or fluids for irrigation purposes during the procedure as applicable   * Safety precautions based on patient history or medication use    DURING PROCEDURE: Verification of correct person, site, and procedures any time the responsibility for care of the patient is transferred to another member of the care team.     The following medication was given:     MEDICATION:  Kenalog 40 mg  ROUTE:  IA  SITE: left knee  DOSE: 1 cc  LOT #: [id number]  : [employer]  EXPIRATION DATE: 06/2019  NDC#: 6431-8197-94   Was there drug waste? No    MEDICATION:  Lidocaine without epinephrine  ROUTE: IA  SITE: Left knee   DOSE: 6 cc  LOT #: [id number]  : [employer]  EXPIRATION DATE: 12/2019  NDC#: 1215-2091-83   Was there drug waste? Yes  Amount of drug waste (mL): 14.  Reason for waste:  Single use vial    Aleyda Wang, ATC  April 3, 2018

## 2018-04-04 ENCOUNTER — RECORDS - HEALTHEAST (OUTPATIENT)
Dept: ADMINISTRATIVE | Facility: OTHER | Age: 63
End: 2018-04-04

## 2018-04-04 ENCOUNTER — OFFICE VISIT (OUTPATIENT)
Dept: GASTROENTEROLOGY | Facility: CLINIC | Age: 63
End: 2018-04-04
Attending: INTERNAL MEDICINE
Payer: COMMERCIAL

## 2018-04-04 VITALS
RESPIRATION RATE: 18 BRPM | BODY MASS INDEX: 33.12 KG/M2 | SYSTOLIC BLOOD PRESSURE: 140 MMHG | OXYGEN SATURATION: 95 % | DIASTOLIC BLOOD PRESSURE: 83 MMHG | HEIGHT: 67 IN | TEMPERATURE: 98.4 F | WEIGHT: 211 LBS | HEART RATE: 102 BPM

## 2018-04-04 DIAGNOSIS — Z94.4 LIVER REPLACED BY TRANSPLANT (H): Primary | ICD-10-CM

## 2018-04-04 DIAGNOSIS — Z94.4 LIVER REPLACED BY TRANSPLANT (H): ICD-10-CM

## 2018-04-04 DIAGNOSIS — G25.81 RESTLESS LEG SYNDROME: ICD-10-CM

## 2018-04-04 LAB
ALBUMIN SERPL-MCNC: 3.9 G/DL (ref 3.4–5)
ALP SERPL-CCNC: 49 U/L (ref 40–150)
ALT SERPL W P-5'-P-CCNC: 16 U/L (ref 0–50)
ANION GAP SERPL CALCULATED.3IONS-SCNC: 8 MMOL/L (ref 3–14)
AST SERPL W P-5'-P-CCNC: 15 U/L (ref 0–45)
BILIRUB DIRECT SERPL-MCNC: 0.1 MG/DL (ref 0–0.2)
BILIRUB SERPL-MCNC: 0.5 MG/DL (ref 0.2–1.3)
BUN SERPL-MCNC: 21 MG/DL (ref 7–30)
CALCIUM SERPL-MCNC: 8.8 MG/DL (ref 8.5–10.1)
CHLORIDE SERPL-SCNC: 105 MMOL/L (ref 94–109)
CO2 SERPL-SCNC: 24 MMOL/L (ref 20–32)
CREAT SERPL-MCNC: 1.17 MG/DL (ref 0.52–1.04)
ERYTHROCYTE [DISTWIDTH] IN BLOOD BY AUTOMATED COUNT: 12.5 % (ref 10–15)
FERRITIN SERPL-MCNC: 57 NG/ML (ref 8–252)
GFR SERPL CREATININE-BSD FRML MDRD: 47 ML/MIN/1.7M2
GLUCOSE SERPL-MCNC: 125 MG/DL (ref 70–99)
HCT VFR BLD AUTO: 41.5 % (ref 35–47)
HGB BLD-MCNC: 13.9 G/DL (ref 11.7–15.7)
MAGNESIUM SERPL-MCNC: 2.2 MG/DL (ref 1.6–2.3)
MCH RBC QN AUTO: 31.3 PG (ref 26.5–33)
MCHC RBC AUTO-ENTMCNC: 33.5 G/DL (ref 31.5–36.5)
MCV RBC AUTO: 94 FL (ref 78–100)
PLATELET # BLD AUTO: 320 10E9/L (ref 150–450)
POTASSIUM SERPL-SCNC: 4.1 MMOL/L (ref 3.4–5.3)
PROT SERPL-MCNC: 7.4 G/DL (ref 6.8–8.8)
RBC # BLD AUTO: 4.44 10E12/L (ref 3.8–5.2)
SODIUM SERPL-SCNC: 137 MMOL/L (ref 133–144)
WBC # BLD AUTO: 17 10E9/L (ref 4–11)

## 2018-04-04 PROCEDURE — 36415 COLL VENOUS BLD VENIPUNCTURE: CPT | Performed by: INTERNAL MEDICINE

## 2018-04-04 PROCEDURE — 85027 COMPLETE CBC AUTOMATED: CPT | Performed by: INTERNAL MEDICINE

## 2018-04-04 PROCEDURE — 80076 HEPATIC FUNCTION PANEL: CPT | Performed by: INTERNAL MEDICINE

## 2018-04-04 PROCEDURE — 80048 BASIC METABOLIC PNL TOTAL CA: CPT | Performed by: INTERNAL MEDICINE

## 2018-04-04 PROCEDURE — 83735 ASSAY OF MAGNESIUM: CPT | Performed by: INTERNAL MEDICINE

## 2018-04-04 PROCEDURE — 82728 ASSAY OF FERRITIN: CPT | Performed by: INTERNAL MEDICINE

## 2018-04-04 PROCEDURE — G0463 HOSPITAL OUTPT CLINIC VISIT: HCPCS | Mod: ZF

## 2018-04-04 RX ORDER — PREDNISONE 1 MG/1
4 TABLET ORAL DAILY
Qty: 360 TABLET | Refills: 3 | Status: SHIPPED | OUTPATIENT
Start: 2018-04-04 | End: 2020-05-04

## 2018-04-04 RX ORDER — PREDNISONE 5 MG/1
5 TABLET ORAL DAILY
Qty: 90 TABLET | Refills: 3 | Status: SHIPPED | OUTPATIENT
Start: 2018-04-04 | End: 2020-05-04

## 2018-04-04 RX ORDER — LANOLIN ALCOHOL/MO/W.PET/CERES
1000 CREAM (GRAM) TOPICAL DAILY
COMMUNITY

## 2018-04-04 ASSESSMENT — PAIN SCALES - GENERAL: PAINLEVEL: SEVERE PAIN (6)

## 2018-04-04 NOTE — PATIENT INSTRUCTIONS
Preventive Care:    Colorectal Cancer Screening: During our visit today, we discussed that it is recommended you receive colorectal cancer screening. Please call or make an appointment with your primary care provider to discuss this. You may also call the MyCheck scheduling line ([phone]) to set up a colonoscopy appointment.    Scheduled on 6/15/2018

## 2018-04-04 NOTE — MR AVS SNAPSHOT
After Visit Summary   4/4/2018    Phan Luque    MRN: [id number]           Patient Information     Date Of Birth          1955        Visit Information        Provider Department      4/4/2018 3:45 PM Hussein Banegas MD [facility] Hep[facility]        Today's Diagnoses     Liver replaced by transplant (H)    -  1      Care Instructions    Preventive Care:    Colorectal Cancer Screening: During our visit today, we discussed that it is recommended you receive colorectal cancer screening. Please call or make an appointment with your primary care provider to discuss this. You may also call the [facility] scheduling line ([phone]) to set up a colonoscopy appointment.    Scheduled on 6/15/2018              Follow-ups after your visit        Follow-up notes from your care team     Return in about 6 months (around 10/4/2018).      Your next 10 appointments already scheduled     Apr 20, 2018  7:30 AM CDT   Lab visit with  LAB   [facility] ([facility])    [address] [place] [id number] [phone]           Please do not eat 10-12 hours before your appointment if you are coming in fasting for labs on lipids, cholesterol, or glucose (sugar). Does not apply to pregnant women.  Water with medications is okay. Do not drink coffee or other fluids.  If you have concerns about taking your medications, please send a message by clicking on Secure Messaging, Message Your Care Team.            May 08, 2018  4:00 PM CDT   (Arrive by 3:45 PM)   Return Visit with Donnell Duvall MD   [facility] ([facility])    [address] [place] [address]  4th [facility] [address] [phone]            May 21, 2018  3:00 PM CDT   (Arrive by 2:45 PM)   RETURN ENDOCRINE with Ruth Oakley MD   [facility] Endocrin[facility] ([facility])    [address] [place] [address]  3rd [facility]  [id number] [phone]            Josue 15, 2018   Procedure with Tao Alfonso MD   [facility] ([facility])    [address] [place] [address]  5th [facility] [address]   [phone]           Located in the Clinics and Surgery Center at [address] [place], [place] [id number].   parking is very convenient and highly recommended.  is a $6 flat rate fee.  Both  and self parkers should enter the main arrival plaza from [facility]; parking attendants will direct you based on your parking preference.            Jun 26, 2018  5:00 PM CDT   Adult Med Follow UP with ODNOVAN [facility]   Psychiatry Clinic ([facility])    [place] [id number]  [address] [place] [id number] [phone]            Oct 01, 2018  4:45 PM CDT   (Arrive by 4:30 PM)   Return Liver Transplant with Hussein Banegas MD   [facility] ([facility])    [address] [place] [address]  [address]  [facility] [address]   [phone]            Oct 22, 2018  7:30 AM CDT   (Arrive by 7:15 AM)   Return Visit with Arlyn Sanchez MD   [facility] ([facility])    [address] [place] [address]  4th [facility] [address]   [phone]            Mar 20, 2019  3:30 PM CDT   Lab with  LAB   [facility] ([facility])    [address] [place] [address]  1st [facility] [address]   [phone]            Mar 20, 2019  4:30 PM CDT   (Arrive by 4:00 PM)   Return Visit with Daya Gutierrez MD   [facility] Nephrology ([facility])    [address] [place] [address]  [address]  [facility] [address]   [phone]              Future tests that were ordered for you today     Open Future Orders        Priority Expected Expires Ordered    TSH Routine  4/5/2019 4/5/2018    T4 free Routine  4/5/2019 4/5/2018    Parathyroid Hormone  "Intact Routine  4/5/2019 4/5/2018    Albumin level Routine  4/5/2019 4/5/2018    Calcium Routine  4/5/2019 4/5/2018    Phosphorus Routine  4/5/2019 4/5/2018    PTH Related Peptide Test Routine  4/5/2019 4/5/2018            Who to contact     If you have questions or need follow up information about today's clinic visit or your schedule please contact [facility]PAT[facility] directly at [phone].  Normal or non-critical lab and imaging results will be communicated to you by Storyzhart, letter or phone within 4 business days after the clinic has received the results. If you do not hear from us within 7 days, please contact the clinic through [employer] or phone. If you have a critical or abnormal lab result, we will notify you by phone as soon as possible.  Submit refill requests through [employer] or call your pharmacy and they will forward the refill request to us. Please allow 3 business days for your refill to be completed.          Additional Information About Your Visit        [employer] Information     [employer] gives you secure access to your electronic health record. If you see a primary care provider, you can also send messages to your care team and make appointments. If you have questions, please call your primary care clinic.  If you do not have a primary care provider, please call [phone] and they will assist you.        Care EveryWhere ID     This is your Care EveryWhere ID. This could be used by other organizations to access your [place] medical records  [id number]        Your Vitals Were     Pulse Temperature Respirations Height Pulse Oximetry BMI (Body Mass Index)    102 98.4  F (36.9  C) (Oral) 18 1.702 m (5' 7\") 95% 33.05 kg/m2       Blood Pressure from Last 3 Encounters:   04/04/18 140/83   04/02/18 120/77   03/21/18 121/79    Weight from Last 3 Encounters:   04/04/18 95.7 kg (211 lb)   04/03/18 94.8 kg (209 lb)   03/21/18 94.8 kg (209 lb)              Today, you had the following     No orders found for " display         Today's Medication Changes          These changes are accurate as of 4/4/18 11:59 PM.  If you have any questions, ask your nurse or doctor.               These medicines have changed or have updated prescriptions.        Dose/Directions    * predniSONE 5 MG tablet   Commonly known as:  DELTASONE   This may have changed:    - medication strength  - how much to take   Used for:  Liver replaced by transplant (H)   Changed by:  Hussein Banegas MD        Dose:  5 mg   Take 1 tablet (5 mg) by mouth daily   Quantity:  90 tablet   Refills:  3       * predniSONE 1 MG tablet   Commonly known as:  DELTASONE   This may have changed:  You were already taking a medication with the same name, and this prescription was added. Make sure you understand how and when to take each.   Used for:  Liver replaced by transplant (H)   Changed by:  Hussein Banegas MD        Dose:  4 mg   Take 4 tablets (4 mg) by mouth daily   Quantity:  360 tablet   Refills:  3       * Notice:  This list has 2 medication(s) that are the same as other medications prescribed for you. Read the directions carefully, and ask your doctor or other care provider to review them with you.      Stop taking these medicines if you haven't already. Please contact your care team if you have questions.     diclofenac 1 % Gel topical gel   Commonly known as:  VOLTAREN   Stopped by:  Hussein Banegas MD                Where to get your medicines      These medications were sent to [facility] - [place], [place] - [address]  [address] [facility] [address]    Hours:  Mon-Fri 8:30am-5:00pm Toll Free [phone] Phone:  [phone]     predniSONE 1 MG tablet    predniSONE 5 MG tablet                Primary Care Provider Office Phone # Fax #    Santosh KERI Salgado [phone]       [address] [place] [address]        Equal Access to Services     PAULO BERRY AH: Barby holland  Ailyngary, xiangda luqadaha, qaashleyta kaaustin escobar, anna conradfabio alexandre. So [facility] [phone].    ATENCIÓN: Si yodit lopez, tiene a reece disposición servicios gratuitos de asistencia lingüística. Rhiannon al [phone].    We comply with applicable federal civil rights laws and [place] laws. We do not discriminate on the basis of race, color, national origin, age, disability, sex, sexual orientation, or gender identity.            Thank you!     Thank you for choosing [facility] HEPATOLOG[facility]  for your care. Our goal is always to provide you with excellent care. Hearing back from our patients is one way we can continue to improve our services. Please take a few minutes to complete the written survey that you may receive in the mail after your visit with us. Thank you!             Your Updated Medication List - Protect others around you: Learn how to safely use, store and throw away your medicines at www.disposemymeds.org.          This list is accurate as of 4/4/18 11:59 PM.  Always use your most recent med list.                   Brand Name Dispense Instructions for use Diagnosis    acetaminophen 325 MG tablet    TYLENOL    100 tablet    Take 2 tablets (650 mg) by mouth every 6 hours as needed for mild pain Or fevers. Use sparingly. Limit use to no more than 2 grams (2000 mg) in 24 hours. **Further refills must be obtained by primary care provider**    Acute post-operative pain       amLODIPine 5 MG tablet    NORVASC    90 tablet    Take 1 tablet (5 mg) by mouth daily    Hypertension       azaTHIOprine 100 MG Tabs     90 tablet    Take 50 mg by mouth every evening    Liver transplanted (H)       betaxolol 0.5 % ophthalmic solution    BETOPTIC    5 mL    Place 1 drop into both eyes 2 times daily    Primary open angle glaucoma of both eyes, unspecified glaucoma stage       busPIRone 10 MG tablet    BUSPAR    60 tablet    Take 1 tablet (10 mg) by mouth 2 times daily    WALTER (generalized anxiety  disorder)       calcium Citrate-vitamin D 500-400 MG-UNIT Chew      Take 1 tablet by mouth daily        chlorthalidone 25 MG tablet    HYGROTON    45 tablet    Take 0.5 tablets (12.5 mg) by mouth daily    CKD (chronic kidney disease) stage 3, GFR 30-59 ml/min, Fluid retention       cholecalciferol 400 UNIT Tabs tablet    vitamin D3     Take 400 Units by mouth daily        cyanocobalamin 1000 MCG tablet    vitamin  B-12     Take 1,000 mcg by mouth daily        folic acid 1 MG tablet    FOLVITE    30 tablet    Take 1 tablet (1 mg) by mouth daily    Malnutrition (H)       hydrOXYzine 25 MG tablet    ATARAX    60 tablet    Take 1-2 tablets (25-50mg) every 6 hours as needed for itching    WALTER (generalized anxiety disorder)       levothyroxine 88 MCG tablet    SYNTHROID/LEVOTHROID    90 tablet    Take 1 tablet (88 mcg) by mouth every morning (before breakfast)    Thyroid function test abnormal       melatonin 5 MG tablet      Take 1 tablet (5 mg) by mouth nightly as needed for sleep        multivitamin, therapeutic Tabs tablet     30 tablet    Take 1 tablet by mouth every 24 hours    Malnutrition (H)       OMEGA-3 FISH OIL EX ST PO      Take 1 capsule by mouth 2 times daily 1290 (fish oil)-900 (omega 3)        pantoprazole 40 MG EC tablet    PROTONIX    90 tablet    Take 1 tablet (40 mg) by mouth every morning (before breakfast)    Gastroesophageal reflux disease, esophagitis presence not specified       pramipexole 0.5 MG tablet    MIRAPEX    90 tablet    Take 1 tablet (0.5 mg) by mouth At Bedtime    Restless leg syndrome       * predniSONE 5 MG tablet    DELTASONE    90 tablet    Take 1 tablet (5 mg) by mouth daily    Liver replaced by transplant (H)       * predniSONE 1 MG tablet    DELTASONE    360 tablet    Take 4 tablets (4 mg) by mouth daily    Liver replaced by transplant (H)       propranolol 10 MG tablet    INDERAL    90 tablet    Take 1 tablet (10 mg) by mouth 3 times daily As needed for anxiety    Generalized  anxiety disorder       psyllium 0.52 G capsule     60 capsule    Take 2 capsules (1.04 g) by mouth daily With a full glass of water.    Loose stools       tacrolimus 0.5 MG capsule    GENERIC EQUIVALENT    240 capsule    Take 4 capsules (2 mg) by mouth 2 times daily    Liver transplanted (H)       traZODone 100 MG tablet    DESYREL    45 tablet    Take 1.5 tablets (150 mg) by mouth At Bedtime    Insomnia, unspecified type       ursodiol 300 MG capsule    ACTIGALL    60 capsule    Take 1 capsule (300 mg) by mouth 2 times daily    Liver transplanted (H)       * Notice:  This list has 2 medication(s) that are the same as other medications prescribed for you. Read the directions carefully, and ask your doctor or other care provider to review them with you.

## 2018-04-04 NOTE — NURSING NOTE
"Chief Complaint   Patient presents with     RECHECK     S/P Liver TX       Initial /83 (BP Location: Right arm, Patient Position: Sitting, Cuff Size: Adult Large)  Pulse 102  Temp 98.4  F (36.9  C) (Oral)  Resp 18  Ht 1.702 m (5' 7\")  Wt 95.7 kg (211 lb)  SpO2 95%  BMI 33.05 kg/m2 Estimated body mass index is 33.05 kg/(m^2) as calculated from the following:    Height as of this encounter: 1.702 m (5' 7\").    Weight as of this encounter: 95.7 kg (211 lb).  Medication Reconciliation: complete     [facility]  4/4/2018 3:50 PM          "

## 2018-04-04 NOTE — LETTER
4/4/2018      RE: Phan Luque  [address] [facility] [address]       I had the pleasure of seeing Phan Luque for followup in the Liver Transplantation Clinic at the [facility] on 04/04/2018.  Ms. Luque returns for followup now more than 1-1/2 years status post liver transplantation for alcoholic hepatitis.      She is doing well at this visit.  She denies any abdominal pain, itching or skin rash, but still has some fatigue.  She denies any increased abdominal girth or lower extremity edema.  She denies any fevers or chills, cough or shortness of breath.  She denies any nausea, vomiting, diarrhea or constipation.  Her appetite has been good.  Unfortunately, she does need to lose some weight.      Most of her complaints revolve around joint aches and joint pains.  She is on low-dose prednisone for that at 10 mg per day and did recently have a steroid injection in her knee which has provided some benefit.     Current Outpatient Prescriptions   Medication     Omega-3 Fatty Acids (OMEGA-3 FISH OIL EX ST PO)     cyanocobalamin (VITAMIN  B-12) 1000 MCG tablet     cholecalciferol (VITAMIN D3) 400 UNIT TABS tablet     predniSONE (DELTASONE) 5 MG tablet     predniSONE (DELTASONE) 1 MG tablet     traZODone (DESYREL) 100 MG tablet     busPIRone (BUSPAR) 10 MG tablet     propranolol (INDERAL) 10 MG tablet     hydrOXYzine (ATARAX) 25 MG tablet     levothyroxine (SYNTHROID/LEVOTHROID) 88 MCG tablet     melatonin 5 MG tablet     tacrolimus (GENERIC EQUIVALENT) 0.5 MG capsule     betaxolol (BETOPTIC) 0.5 % ophthalmic solution     chlorthalidone (HYGROTON) 25 MG tablet     pantoprazole (PROTONIX) 40 MG EC tablet     ursodiol (ACTIGALL) 300 MG capsule     pramipexole (MIRAPEX) 0.5 MG tablet     amLODIPine (NORVASC) 5 MG tablet     azaTHIOprine 100 MG TABS     calcium Citrate-vitamin D 500-400 MG-UNIT CHEW     acetaminophen (TYLENOL) 325 MG tablet     psyllium 0.52 G capsule      multivitamin, therapeutic (THERA-VIT) TABS tablet     folic acid (FOLVITE) 1 MG tablet     No current facility-administered medications for this visit.      B/P: 140/83, T: 98.4, P: 102, R: 18    HEENT exam shows no scleral icterus and no temporal muscle wasting.  Her chest is clear.  Her abdominal exam shows no increase in girth.  No masses or tenderness to palpation are present.  Liver is 10 cm in span without left lobe enlargement.  No spleen tip is palpable.  Extremity exam shows no edema.  Skin exam shows no stigmata of chronic liver disease, and neurologic exam is nonfocal.     Recent Results (from the past 168 hour(s))   CBC with platelets    Collection Time: 04/04/18  8:23 AM   Result Value Ref Range    WBC 17.0 (H) 4.0 - 11.0 10e9/L    RBC Count 4.44 3.8 - 5.2 10e12/L    Hemoglobin 13.9 11.7 - 15.7 g/dL    Hematocrit 41.5 35.0 - 47.0 %    MCV 94 78 - 100 fl    MCH 31.3 26.5 - 33.0 pg    MCHC 33.5 31.5 - 36.5 g/dL    RDW 12.5 10.0 - 15.0 %    Platelet Count 320 150 - 450 10e9/L   Basic metabolic panel    Collection Time: 04/04/18  8:23 AM   Result Value Ref Range    Sodium 137 133 - 144 mmol/L    Potassium 4.1 3.4 - 5.3 mmol/L    Chloride 105 94 - 109 mmol/L    Carbon Dioxide 24 20 - 32 mmol/L    Anion Gap 8 3 - 14 mmol/L    Glucose 125 (H) 70 - 99 mg/dL    Urea Nitrogen 21 7 - 30 mg/dL    Creatinine 1.17 (H) 0.52 - 1.04 mg/dL    GFR Estimate 47 (L) >60 mL/min/1.7m2    GFR Estimate If Black 57 (L) >60 mL/min/1.7m2    Calcium 8.8 8.5 - 10.1 mg/dL   Magnesium    Collection Time: 04/04/18  8:23 AM   Result Value Ref Range    Magnesium 2.2 1.6 - 2.3 mg/dL   Hepatic panel    Collection Time: 04/04/18  8:23 AM   Result Value Ref Range    Bilirubin Direct 0.1 0.0 - 0.2 mg/dL    Bilirubin Total 0.5 0.2 - 1.3 mg/dL    Albumin 3.9 3.4 - 5.0 g/dL    Protein Total 7.4 6.8 - 8.8 g/dL    Alkaline Phosphatase 49 40 - 150 U/L    ALT 16 0 - 50 U/L    AST 15 0 - 45 U/L   Ferritin    Collection Time: 04/04/18  8:23 AM   Result  Value Ref Range    Ferritin 57 8 - 252 ng/mL      My impression is that Ms. Luque is doing very well now more than 1-1/2 years status post liver transplantation for alcoholic hepatitis.  She is abstaining from alcohol, which is good.  I will begin to try to taper down her prednisone and go to 9 mg per day and go down 1 mg a month and see how she does in terms of joint aches and joint pains.  Her liver function and kidney function are both excellent.  My plan will be to see her back in the clinic in 6 months.  I have encouraged her to lose weight and to remain as active as possible.      Thank you very much for allowing me to participate in the care of this patient.  If you have any questions regarding my recommendations, please do not hesitate to contact me.       Hussein Banegas MD      Professor of Medicine  [employer]      Executive Medical Director, Solid Organ Transplant Program  [facility]

## 2018-04-05 ENCOUNTER — MYC MEDICAL ADVICE (OUTPATIENT)
Dept: INTERNAL MEDICINE | Facility: CLINIC | Age: 63
End: 2018-04-05

## 2018-04-05 DIAGNOSIS — M85.80 OSTEOPENIA, UNSPECIFIED LOCATION: ICD-10-CM

## 2018-04-05 DIAGNOSIS — E03.9 HYPOTHYROIDISM, UNSPECIFIED TYPE: ICD-10-CM

## 2018-04-05 DIAGNOSIS — Z94.4 LIVER REPLACED BY TRANSPLANT (H): ICD-10-CM

## 2018-04-05 PROCEDURE — 36415 COLL VENOUS BLD VENIPUNCTURE: CPT | Performed by: INTERNAL MEDICINE

## 2018-04-05 PROCEDURE — 80197 ASSAY OF TACROLIMUS: CPT | Performed by: INTERNAL MEDICINE

## 2018-04-05 NOTE — PROGRESS NOTES
I had the pleasure of seeing Phan Luque for followup in the Liver Transplantation Clinic at the [facility] on 04/04/2018.  Ms. Luque returns for followup now more than 1-1/2 years status post liver transplantation for alcoholic hepatitis.      She is doing well at this visit.  She denies any abdominal pain, itching or skin rash, but still has some fatigue.  She denies any increased abdominal girth or lower extremity edema.  She denies any fevers or chills, cough or shortness of breath.  She denies any nausea, vomiting, diarrhea or constipation.  Her appetite has been good.  Unfortunately, she does need to lose some weight.      Most of her complaints revolve around joint aches and joint pains.  She is on low-dose prednisone for that at 10 mg per day and did recently have a steroid injection in her knee which has provided some benefit.     Current Outpatient Prescriptions   Medication     Omega-3 Fatty Acids (OMEGA-3 FISH OIL EX ST PO)     cyanocobalamin (VITAMIN  B-12) 1000 MCG tablet     cholecalciferol (VITAMIN D3) 400 UNIT TABS tablet     predniSONE (DELTASONE) 5 MG tablet     predniSONE (DELTASONE) 1 MG tablet     traZODone (DESYREL) 100 MG tablet     busPIRone (BUSPAR) 10 MG tablet     propranolol (INDERAL) 10 MG tablet     hydrOXYzine (ATARAX) 25 MG tablet     levothyroxine (SYNTHROID/LEVOTHROID) 88 MCG tablet     melatonin 5 MG tablet     tacrolimus (GENERIC EQUIVALENT) 0.5 MG capsule     betaxolol (BETOPTIC) 0.5 % ophthalmic solution     chlorthalidone (HYGROTON) 25 MG tablet     pantoprazole (PROTONIX) 40 MG EC tablet     ursodiol (ACTIGALL) 300 MG capsule     pramipexole (MIRAPEX) 0.5 MG tablet     amLODIPine (NORVASC) 5 MG tablet     azaTHIOprine 100 MG TABS     calcium Citrate-vitamin D 500-400 MG-UNIT CHEW     acetaminophen (TYLENOL) 325 MG tablet     psyllium 0.52 G capsule     multivitamin, therapeutic (THERA-VIT) TABS tablet     folic acid (FOLVITE) 1 MG tablet      No current facility-administered medications for this visit.      B/P: 140/83, T: 98.4, P: 102, R: 18    HEENT exam shows no scleral icterus and no temporal muscle wasting.  Her chest is clear.  Her abdominal exam shows no increase in girth.  No masses or tenderness to palpation are present.  Liver is 10 cm in span without left lobe enlargement.  No spleen tip is palpable.  Extremity exam shows no edema.  Skin exam shows no stigmata of chronic liver disease, and neurologic exam is nonfocal.     Recent Results (from the past 168 hour(s))   CBC with platelets    Collection Time: 04/04/18  8:23 AM   Result Value Ref Range    WBC 17.0 (H) 4.0 - 11.0 10e9/L    RBC Count 4.44 3.8 - 5.2 10e12/L    Hemoglobin 13.9 11.7 - 15.7 g/dL    Hematocrit 41.5 35.0 - 47.0 %    MCV 94 78 - 100 fl    MCH 31.3 26.5 - 33.0 pg    MCHC 33.5 31.5 - 36.5 g/dL    RDW 12.5 10.0 - 15.0 %    Platelet Count 320 150 - 450 10e9/L   Basic metabolic panel    Collection Time: 04/04/18  8:23 AM   Result Value Ref Range    Sodium 137 133 - 144 mmol/L    Potassium 4.1 3.4 - 5.3 mmol/L    Chloride 105 94 - 109 mmol/L    Carbon Dioxide 24 20 - 32 mmol/L    Anion Gap 8 3 - 14 mmol/L    Glucose 125 (H) 70 - 99 mg/dL    Urea Nitrogen 21 7 - 30 mg/dL    Creatinine 1.17 (H) 0.52 - 1.04 mg/dL    GFR Estimate 47 (L) >60 mL/min/1.7m2    GFR Estimate If Black 57 (L) >60 mL/min/1.7m2    Calcium 8.8 8.5 - 10.1 mg/dL   Magnesium    Collection Time: 04/04/18  8:23 AM   Result Value Ref Range    Magnesium 2.2 1.6 - 2.3 mg/dL   Hepatic panel    Collection Time: 04/04/18  8:23 AM   Result Value Ref Range    Bilirubin Direct 0.1 0.0 - 0.2 mg/dL    Bilirubin Total 0.5 0.2 - 1.3 mg/dL    Albumin 3.9 3.4 - 5.0 g/dL    Protein Total 7.4 6.8 - 8.8 g/dL    Alkaline Phosphatase 49 40 - 150 U/L    ALT 16 0 - 50 U/L    AST 15 0 - 45 U/L   Ferritin    Collection Time: 04/04/18  8:23 AM   Result Value Ref Range    Ferritin 57 8 - 252 ng/mL      My impression is that Ms. Luque is  doing very well now more than 1-1/2 years status post liver transplantation for alcoholic hepatitis.  She is abstaining from alcohol, which is good.  I will begin to try to taper down her prednisone and go to 9 mg per day and go down 1 mg a month and see how she does in terms of joint aches and joint pains.  Her liver function and kidney function are both excellent.  My plan will be to see her back in the clinic in 6 months.  I have encouraged her to lose weight and to remain as active as possible.      Thank you very much for allowing me to participate in the care of this patient.  If you have any questions regarding my recommendations, please do not hesitate to contact me.       Hussein Banegas MD      Professor of Medicine  [employer]      Executive Medical Director, Solid Organ Transplant Program  [facility]

## 2018-04-06 ENCOUNTER — TELEPHONE (OUTPATIENT)
Dept: TRANSPLANT | Facility: CLINIC | Age: 63
End: 2018-04-06

## 2018-04-06 DIAGNOSIS — Z94.4 LIVER TRANSPLANTED (H): ICD-10-CM

## 2018-04-06 LAB
TACROLIMUS BLD-MCNC: 3.6 UG/L (ref 5–15)
TME LAST DOSE: ABNORMAL H

## 2018-04-06 NOTE — TELEPHONE ENCOUNTER
Call pt to increase her tacrolimus dose to 2.5mg Q 12 hours, from current dose 2mg Q 12 hours.     Recheck labs in 1-2 weeks to verify dose change.

## 2018-04-06 NOTE — TELEPHONE ENCOUNTER
LM for pt instructing her to increase tacrolimus to 2.5 mg Q 12 hours and repeat labs in 1-2 weeks. Requested return phone call to confirm dose change.

## 2018-04-09 ENCOUNTER — MYC MEDICAL ADVICE (OUTPATIENT)
Dept: NEPHROLOGY | Facility: CLINIC | Age: 63
End: 2018-04-09

## 2018-04-09 DIAGNOSIS — E61.1 IRON DEFICIENCY: Primary | ICD-10-CM

## 2018-04-09 RX ORDER — FERROUS SULFATE 325(65) MG
1 TABLET ORAL 2 TIMES DAILY
Qty: 100 TABLET | COMMUNITY
Start: 2018-04-09

## 2018-04-10 ENCOUNTER — OFFICE VISIT (OUTPATIENT)
Dept: URGENT CARE | Facility: URGENT CARE | Age: 63
End: 2018-04-10
Payer: COMMERCIAL

## 2018-04-10 VITALS
OXYGEN SATURATION: 96 % | DIASTOLIC BLOOD PRESSURE: 80 MMHG | SYSTOLIC BLOOD PRESSURE: 128 MMHG | HEART RATE: 83 BPM | TEMPERATURE: 97.3 F

## 2018-04-10 DIAGNOSIS — H60.392 BACTERIAL EXTERNAL EAR INFECTION, LEFT: Primary | ICD-10-CM

## 2018-04-10 PROCEDURE — 99213 OFFICE O/P EST LOW 20 MIN: CPT | Performed by: FAMILY MEDICINE

## 2018-04-10 RX ORDER — CLINDAMYCIN HCL 300 MG
300 CAPSULE ORAL 4 TIMES DAILY
Qty: 40 CAPSULE | Refills: 0 | Status: SHIPPED | OUTPATIENT
Start: 2018-04-10 | End: 2018-04-20

## 2018-04-10 ASSESSMENT — PATIENT HEALTH QUESTIONNAIRE - PHQ9: SUM OF ALL RESPONSES TO PHQ QUESTIONS 1-9: 5

## 2018-04-10 NOTE — PATIENT INSTRUCTIONS
Cellulitis  Cellulitis is an infection of the deep layers of skin. A break in the skin, such as a cut or scratch, can let bacteria under the skin. If the bacteria get to deep layers of the skin, it can be serious. If not treated, cellulitis can get into the bloodstream and lymph nodes. The infection can then spread throughout the body. This causes serious illness.  Cellulitis causes the affected skin to become red, swollen, warm, and sore. The reddened areas have a visible border. An open sore may leak fluid (pus). You may have a fever, chills, and pain.  Cellulitis is treated with antibiotics taken for 7 to 10 days. An open sore may be cleaned and covered with cool wet gauze. Symptoms should get better 1 to 2 days after treatment is started. Make sure to take all the antibiotics for the full number of days until they are gone. Keep taking the medicine even if your symptoms go away.  Home care  Follow these tips:    Limit the use of the part of your body with cellulitis.     If the infection is on your leg, keep your leg raised while sitting. This will help to reduce swelling.    Take all of the antibiotic medicine exactly as directed until it is gone. Do not miss any doses, especially during the first 7 days. Don t stop taking the medicine when your symptoms get better.    Keep the affected area clean and dry.    Wash your hands with soap and warm water before and after touching your skin. Anyone else who touches your skin should also wash his or her hands. Don't share towels.  Follow-up care  Follow up with your healthcare provider, or as advised. If your infection does not go away on the first antibiotic, your healthcare provider will prescribe a different one.  When to seek medical advice  Call your healthcare provider right away if any of these occur:    Red areas that spread    Swelling or pain that gets worse    Fluid leaking from the skin (pus)    Fever higher of 100.4  F (38.0  C) or higher after 2 days  on antibiotics  Date Last Reviewed: 9/1/2016 2000-2017 [employer]. [address] [place] [address], [place] [address]. All rights reserved. This information is not intended as a substitute for professional medical care. Always follow your healthcare professional's instructions.

## 2018-04-10 NOTE — MR AVS SNAPSHOT
After Visit Summary   4/10/2018    Phan Luque    MRN: [id number]           Patient Information     Date Of Birth          1955        Visit Information        Provider Department      4/10/2018 4:30 PM Magen Delacruz MD [facility]        Today's Diagnoses     Bacterial external ear infection, left    -  1      Care Instructions      Cellulitis  Cellulitis is an infection of the deep layers of skin. A break in the skin, such as a cut or scratch, can let bacteria under the skin. If the bacteria get to deep layers of the skin, it can be serious. If not treated, cellulitis can get into the bloodstream and lymph nodes. The infection can then spread throughout the body. This causes serious illness.  Cellulitis causes the affected skin to become red, swollen, warm, and sore. The reddened areas have a visible border. An open sore may leak fluid (pus). You may have a fever, chills, and pain.  Cellulitis is treated with antibiotics taken for 7 to 10 days. An open sore may be cleaned and covered with cool wet gauze. Symptoms should get better 1 to 2 days after treatment is started. Make sure to take all the antibiotics for the full number of days until they are gone. Keep taking the medicine even if your symptoms go away.  Home care  Follow these tips:    Limit the use of the part of your body with cellulitis.     If the infection is on your leg, keep your leg raised while sitting. This will help to reduce swelling.    Take all of the antibiotic medicine exactly as directed until it is gone. Do not miss any doses, especially during the first 7 days. Don t stop taking the medicine when your symptoms get better.    Keep the affected area clean and dry.    Wash your hands with soap and warm water before and after touching your skin. Anyone else who touches your skin should also wash his or her hands. Don't share towels.  Follow-up care  Follow up with your healthcare provider, or as  advised. If your infection does not go away on the first antibiotic, your healthcare provider will prescribe a different one.  When to seek medical advice  Call your healthcare provider right away if any of these occur:    Red areas that spread    Swelling or pain that gets worse    Fluid leaking from the skin (pus)    Fever higher of 100.4  F (38.0  C) or higher after 2 days on antibiotics  Date Last Reviewed: 9/1/2016 2000-2017 [employer]. [address] [place] [address]. All rights reserved. This information is not intended as a substitute for professional medical care. Always follow your healthcare professional's instructions.                Follow-ups after your visit        Follow-up notes from your care team     Return if symptoms worsen or fail to improve.      Your next 10 appointments already scheduled     Apr 20, 2018  7:30 AM CDT   Lab visit with  LAB   [facility] ([facility])    [address] [place] [address]  [address]  [facility] [address]   [phone]           Please do not eat 10-12 hours before your appointment if you are coming in fasting for labs on lipids, cholesterol, or glucose (sugar). Does not apply to pregnant women.  Water with medications is okay. Do not drink coffee or other fluids.  If you have concerns about taking your medications, please send a message by clicking on Secure Messaging, Message Your Care Team.            May 08, 2018  4:00 PM CDT   (Arrive by 3:45 PM)   Return Visit with Donnell Duvall MD   [facility] ([facility])    [address]  [address] [facility] [id number] [phone]            May 21, 2018  3:00 PM CDT   (Arrive by 2:45 PM)   RETURN ENDOCRINE with Ruth Oakley MD   [facility] Endocrinology ([facility] [place])    [address]  [address] [facility] [id number] [phone]            Josue 15, 2018    Procedure with Tao Alfonso MD   [facility] ([facility] [place])    [address] [place] [address]  [address] [facility] [address]   [phone]           Located in the Clinics and Surgery Center at [address] [place] [address].   parking is very convenient and highly recommended.  is a $6 flat rate fee.  Both  and self parkers should enter the main arrival plaza from [facility]; parking attendants will direct you based on your parking preference.            Jun 26, 2018  5:00 PM CDT   Adult Med Follow UP with DONOVAN Blake [facility]   Psychiatry Clinic ([facility])    [place] [id number]  [address] [address] [phone]            Oct 01, 2018  4:45 PM CDT   (Arrive by 4:30 PM)   Return Liver Transplant with Hussein Banegas MD   [facility] Hepatology ([facility])    [address] [place] [address]  [address]  [facility] [address]   [phone]            Oct 22, 2018  7:30 AM CDT   (Arrive by 7:15 AM)   Return Visit with Arlyn Sanchez MD   [facility] Primary Care [facility] ([facility])    [address] [place] [address]  4th Floor  [facility] [address]   [phone]            Mar 20, 2019  3:30 PM CDT   Lab with  LAB   [facility] Lab ([facility])    [address] [place] [address]  [address] [facility] [address]   [phone]            Mar 20, 2019  4:30 PM CDT   (Arrive by 4:00 PM)   Return Visit with Daya Gutierrez MD   [facility] Nephrology ([facility] [place])    [address] [place] [address]  [address]  [facility] [address]   [phone]              Future tests that were ordered for you today     Open Future Orders        Priority Expected Expires Ordered    Ferritin Routine 7/9/2018 9/9/2018 4/9/2018            Who to contact     If you have questions or need follow up information about today's  clinic visit or your schedule please contact [facility] directly at [phone].  Normal or non-critical lab and imaging results will be communicated to you by [employer], letter or phone within 4 business days after the clinic has received the results. If you do not hear from us within 7 days, please contact the clinic through [employer] or phone. If you have a critical or abnormal lab result, we will notify you by phone as soon as possible.  Submit refill requests through [employer] or call your pharmacy and they will forward the refill request to us. Please allow 3 business days for your refill to be completed.          Additional Information About Your Visit        [employer] Information     [employer] gives you secure access to your electronic health record. If you see a primary care provider, you can also send messages to your care team and make appointments. If you have questions, please call your primary care clinic.  If you do not have a primary care provider, please call [phone] and they will assist you.        Care EveryWhere ID     This is your Care EveryWhere ID. This could be used by other organizations to access your [place] medical records  [id number]        Your Vitals Were     Pulse Temperature Pulse Oximetry             83 97.3  F (36.3  C) (Tympanic) 96%          Blood Pressure from Last 3 Encounters:   04/10/18 128/80   04/04/18 140/83   04/02/18 120/77    Weight from Last 3 Encounters:   04/04/18 211 lb (95.7 kg)   04/03/18 209 lb (94.8 kg)   03/21/18 209 lb (94.8 kg)              Today, you had the following     No orders found for display         Today's Medication Changes          These changes are accurate as of 4/10/18  6:24 PM.  If you have any questions, ask your nurse or doctor.               Start taking these medicines.        Dose/Directions    clindamycin 300 MG capsule   Commonly known as:  CLEOCIN   Used for:  Bacterial external ear infection, left        Dose:  300 mg    Take 1 capsule (300 mg) by mouth 4 times daily for 10 days   Quantity:  40 capsule   Refills:  0            Where to get your medicines      These medications were sent to [id number] - [place], [place] - [address]  AT [facility]  [address] [place] [address]     Phone:  [phone]     clindamycin 300 MG capsule                Primary Care Provider Office Phone # Fax #    Santosh Salgado [phone]       [facility] [address]   [place] [address]        Equal Access to Services     [facility]: Hadii aad ku hadasho Soomaali, waaxda luqadaha, qaybta kaalmada adeegyada, waxay kobein hayaan adebrenda ambrose . So [facility] [phone].    ATENCIÓN: Si habla español, tiene a reece disposición servicios gratuitos de asistencia lingüística. [facility] [phone].    We comply with applicable federal civil rights laws and [place] laws. We do not discriminate on the basis of race, color, national origin, age, disability, sex, sexual orientation, or gender identity.            Thank you!     Thank you for choosing [facility]  for your care. Our goal is always to provide you with excellent care. Hearing back from our patients is one way we can continue to improve our services. Please take a few minutes to complete the written survey that you may receive in the mail after your visit with us. Thank you!             Your Updated Medication List - Protect others around you: Learn how to safely use, store and throw away your medicines at www.disposemymeds.org.          This list is accurate as of 4/10/18  6:24 PM.  Always use your most recent med list.                   Brand Name Dispense Instructions for use Diagnosis    acetaminophen 325 MG tablet    TYLENOL    100 tablet    Take 2 tablets (650 mg) by mouth every 6 hours as needed for mild pain Or fevers. Use sparingly. Limit use to no more than 2 grams (2000 mg) in 24 hours.  **Further refills must be obtained by primary care provider**    Acute post-operative pain       amLODIPine 5 MG tablet    NORVASC    90 tablet    Take 1 tablet (5 mg) by mouth daily    Hypertension       azaTHIOprine 100 MG Tabs     90 tablet    Take 50 mg by mouth every evening    Liver transplanted (H)       betaxolol 0.5 % ophthalmic solution    BETOPTIC    5 mL    Place 1 drop into both eyes 2 times daily    Primary open angle glaucoma of both eyes, unspecified glaucoma stage       busPIRone 10 MG tablet    BUSPAR    60 tablet    Take 1 tablet (10 mg) by mouth 2 times daily    WALTER (generalized anxiety disorder)       calcium Citrate-vitamin D 500-400 MG-UNIT Chew      Take 1 tablet by mouth daily        chlorthalidone 25 MG tablet    HYGROTON    45 tablet    Take 0.5 tablets (12.5 mg) by mouth daily    CKD (chronic kidney disease) stage 3, GFR 30-59 ml/min, Fluid retention       cholecalciferol 400 UNIT Tabs tablet    vitamin D3     Take 400 Units by mouth daily        clindamycin 300 MG capsule    CLEOCIN    40 capsule    Take 1 capsule (300 mg) by mouth 4 times daily for 10 days    Bacterial external ear infection, left       cyanocobalamin 1000 MCG tablet    vitamin  B-12     Take 1,000 mcg by mouth daily        ferrous sulfate 325 (65 Fe) MG tablet    IRON    100 tablet    Take 1 tablet (325 mg) by mouth 2 times daily    Iron deficiency       folic acid 1 MG tablet    FOLVITE    30 tablet    Take 1 tablet (1 mg) by mouth daily    Malnutrition (H)       hydrOXYzine 25 MG tablet    ATARAX    60 tablet    Take 1-2 tablets (25-50mg) every 6 hours as needed for itching    WALTER (generalized anxiety disorder)       levothyroxine 88 MCG tablet    SYNTHROID/LEVOTHROID    90 tablet    Take 1 tablet (88 mcg) by mouth every morning (before breakfast)    Thyroid function test abnormal       melatonin 5 MG tablet      Take 1 tablet (5 mg) by mouth nightly as needed for sleep        multivitamin, therapeutic Tabs tablet      30 tablet    Take 1 tablet by mouth every 24 hours    Malnutrition (H)       OMEGA-3 FISH OIL EX ST PO      Take 1 capsule by mouth 2 times daily 1290 (fish oil)-900 (omega 3)        pantoprazole 40 MG EC tablet    PROTONIX    90 tablet    Take 1 tablet (40 mg) by mouth every morning (before breakfast)    Gastroesophageal reflux disease, esophagitis presence not specified       pramipexole 0.5 MG tablet    MIRAPEX    90 tablet    Take 1 tablet (0.5 mg) by mouth At Bedtime    Restless leg syndrome       * predniSONE 5 MG tablet    DELTASONE    90 tablet    Take 1 tablet (5 mg) by mouth daily    Liver replaced by transplant (H)       * predniSONE 1 MG tablet    DELTASONE    360 tablet    Take 4 tablets (4 mg) by mouth daily    Liver replaced by transplant (H)       propranolol 10 MG tablet    INDERAL    90 tablet    Take 1 tablet (10 mg) by mouth 3 times daily As needed for anxiety    Generalized anxiety disorder       psyllium 0.52 g capsule     60 capsule    Take 2 capsules (1.04 g) by mouth daily With a full glass of water.    Loose stools       tacrolimus 0.5 MG capsule    GENERIC EQUIVALENT    240 capsule    Take 4 capsules (2 mg) by mouth 2 times daily    Liver transplanted (H)       traZODone 100 MG tablet    DESYREL    45 tablet    Take 1.5 tablets (150 mg) by mouth At Bedtime    Insomnia, unspecified type       ursodiol 300 MG capsule    ACTIGALL    60 capsule    Take 1 capsule (300 mg) by mouth 2 times daily    Liver transplanted (H)       * Notice:  This list has 2 medication(s) that are the same as other medications prescribed for you. Read the directions carefully, and ask your doctor or other care provider to review them with you.

## 2018-04-10 NOTE — PROGRESS NOTES
SUBJECTIVE:   Phan Luque is a 62 year old female presenting with a chief complaint of ear pain left and redness.  Patient had obtained new ear rings on the pinna, slightly bigger that previous ones.  Denies any drainage from site but noticed redness and more swelling.  No fever.  Had minimal itchiness, denies any history or metal contact allergy  Onset of symptoms was 3 day(s) ago.  Course of illness is worsening.    Severity moderate  Current and Associated symptoms: rash and pain  Treatment measures tried include soaking and spray with hydrogen peroxide.  Predisposing factors include immunosuppression.    Past Medical History:   Diagnosis Date     Asthma      Chronic kidney disease      End stage liver disease (H)     2/2 Alcohol Abuse     Liver transplanted (H)      Migraines      Osteoporosis      Spinal stenosis in cervical region      Strabismus      Current Outpatient Prescriptions   Medication Sig Dispense Refill     ferrous sulfate (IRON) 325 (65 FE) MG tablet Take 1 tablet (325 mg) by mouth 2 times daily 100 tablet      Omega-3 Fatty Acids (OMEGA-3 FISH OIL EX ST PO) Take 1 capsule by mouth 2 times daily 1290 (fish oil)-900 (omega 3)       cyanocobalamin (VITAMIN  B-12) 1000 MCG tablet Take 1,000 mcg by mouth daily       cholecalciferol (VITAMIN D3) 400 UNIT TABS tablet Take 400 Units by mouth daily       predniSONE (DELTASONE) 5 MG tablet Take 1 tablet (5 mg) by mouth daily 90 tablet 3     predniSONE (DELTASONE) 1 MG tablet Take 4 tablets (4 mg) by mouth daily 360 tablet 3     traZODone (DESYREL) 100 MG tablet Take 1.5 tablets (150 mg) by mouth At Bedtime 45 tablet 3     busPIRone (BUSPAR) 10 MG tablet Take 1 tablet (10 mg) by mouth 2 times daily 60 tablet 3     propranolol (INDERAL) 10 MG tablet Take 1 tablet (10 mg) by mouth 3 times daily As needed for anxiety 90 tablet 0     hydrOXYzine (ATARAX) 25 MG tablet Take 1-2 tablets (25-50mg) every 6 hours as needed for itching 60 tablet 2      levothyroxine (SYNTHROID/LEVOTHROID) 88 MCG tablet Take 1 tablet (88 mcg) by mouth every morning (before breakfast) 90 tablet 2     melatonin 5 MG tablet Take 1 tablet (5 mg) by mouth nightly as needed for sleep       tacrolimus (GENERIC EQUIVALENT) 0.5 MG capsule Take 4 capsules (2 mg) by mouth 2 times daily 240 capsule 11     betaxolol (BETOPTIC) 0.5 % ophthalmic solution Place 1 drop into both eyes 2 times daily 5 mL 2     chlorthalidone (HYGROTON) 25 MG tablet Take 0.5 tablets (12.5 mg) by mouth daily 45 tablet 3     pantoprazole (PROTONIX) 40 MG EC tablet Take 1 tablet (40 mg) by mouth every morning (before breakfast) 90 tablet 4     ursodiol (ACTIGALL) 300 MG capsule Take 1 capsule (300 mg) by mouth 2 times daily 60 capsule 11     pramipexole (MIRAPEX) 0.5 MG tablet Take 1 tablet (0.5 mg) by mouth At Bedtime 90 tablet 3     amLODIPine (NORVASC) 5 MG tablet Take 1 tablet (5 mg) by mouth daily 90 tablet 3     azaTHIOprine 100 MG TABS Take 50 mg by mouth every evening 90 tablet 3     calcium Citrate-vitamin D 500-400 MG-UNIT CHEW Take 1 tablet by mouth daily       acetaminophen (TYLENOL) 325 MG tablet Take 2 tablets (650 mg) by mouth every 6 hours as needed for mild pain Or fevers. Use sparingly. Limit use to no more than 2 grams (2000 mg) in 24 hours. **Further refills must be obtained by primary care provider** 100 tablet 0     psyllium 0.52 G capsule Take 2 capsules (1.04 g) by mouth daily With a full glass of water. 60 capsule 1     multivitamin, therapeutic (THERA-VIT) TABS tablet Take 1 tablet by mouth every 24 hours 30 tablet 11     folic acid (FOLVITE) 1 MG tablet Take 1 tablet (1 mg) by mouth daily 30 tablet 1     Social History   Substance Use Topics     Smoking status: Former Smoker     Packs/day: 2.50     Years: 20.00     Quit date: 12/13/1996     Smokeless tobacco: Never Used     Alcohol use No      Comment: heavy use (750ml/2 days) up until 1 year ago; had cut down to 1 drink/day; none since 7/18/16        ROS:  Review of systems negative except as stated above.    OBJECTIVE:  /80 (BP Location: Right arm, Patient Position: Chair, Cuff Size: Adult Regular)  Pulse 83  Temp 97.3  F (36.3  C) (Tympanic)  SpO2 96%  GENERAL APPEARANCE: healthy, alert and no distress  HENT: ear canals and TM's normal, left pinna with mild erythema.  Nose and mouth without ulcers, erythema or lesions  SKIN: left ear pinna with mild erythema  PSYCH: mentation appears normal and affect normal/bright    ASSESSMENT/PLAN:  (H60.392) Bacterial external ear infection, left  (primary encounter diagnosis)  Plan: clindamycin (CLEOCIN) 300 MG capsule            Empiric treatment for skin infection on left ear pinna, RX clindamycin given.  Recommend to removed ear rings that may be causing problems.  Okay to continue with ear soak if helpful.    Return to clinic if no resolution of symptoms.    Magen Delacruz MD  April 10, 2018 6:23 PM

## 2018-04-11 RX ORDER — TACROLIMUS 0.5 MG/1
2.5 CAPSULE ORAL 2 TIMES DAILY
Qty: 300 CAPSULE | Refills: 11 | Status: SHIPPED | OUTPATIENT
Start: 2018-04-11 | End: 2018-06-25

## 2018-04-20 DIAGNOSIS — M85.80 OSTEOPENIA, UNSPECIFIED LOCATION: ICD-10-CM

## 2018-04-20 DIAGNOSIS — Z94.4 LIVER REPLACED BY TRANSPLANT (H): ICD-10-CM

## 2018-04-20 DIAGNOSIS — E03.9 HYPOTHYROIDISM, UNSPECIFIED TYPE: ICD-10-CM

## 2018-04-20 LAB
ALBUMIN SERPL-MCNC: 3.6 G/DL (ref 3.4–5)
ALP SERPL-CCNC: 48 U/L (ref 40–150)
ALT SERPL W P-5'-P-CCNC: 14 U/L (ref 0–50)
ANION GAP SERPL CALCULATED.3IONS-SCNC: 5 MMOL/L (ref 3–14)
AST SERPL W P-5'-P-CCNC: 12 U/L (ref 0–45)
BILIRUB DIRECT SERPL-MCNC: 0.2 MG/DL (ref 0–0.2)
BILIRUB SERPL-MCNC: 0.7 MG/DL (ref 0.2–1.3)
BUN SERPL-MCNC: 18 MG/DL (ref 7–30)
CALCIUM SERPL-MCNC: 8.7 MG/DL (ref 8.5–10.1)
CHLORIDE SERPL-SCNC: 107 MMOL/L (ref 94–109)
CO2 SERPL-SCNC: 28 MMOL/L (ref 20–32)
CREAT SERPL-MCNC: 1.19 MG/DL (ref 0.52–1.04)
ERYTHROCYTE [DISTWIDTH] IN BLOOD BY AUTOMATED COUNT: 13.2 % (ref 10–15)
GFR SERPL CREATININE-BSD FRML MDRD: 46 ML/MIN/1.7M2
GLUCOSE SERPL-MCNC: 101 MG/DL (ref 70–99)
HCT VFR BLD AUTO: 41.1 % (ref 35–47)
HGB BLD-MCNC: 13.4 G/DL (ref 11.7–15.7)
MAGNESIUM SERPL-MCNC: 1.9 MG/DL (ref 1.6–2.3)
MCH RBC QN AUTO: 31.2 PG (ref 26.5–33)
MCHC RBC AUTO-ENTMCNC: 32.6 G/DL (ref 31.5–36.5)
MCV RBC AUTO: 96 FL (ref 78–100)
PHOSPHATE SERPL-MCNC: 3.1 MG/DL (ref 2.5–4.5)
PLATELET # BLD AUTO: 244 10E9/L (ref 150–450)
POTASSIUM SERPL-SCNC: 3.8 MMOL/L (ref 3.4–5.3)
PROT SERPL-MCNC: 7 G/DL (ref 6.8–8.8)
PTH-INTACT SERPL-MCNC: 90 PG/ML (ref 18–80)
RBC # BLD AUTO: 4.29 10E12/L (ref 3.8–5.2)
SODIUM SERPL-SCNC: 140 MMOL/L (ref 133–144)
T4 FREE SERPL-MCNC: 1.15 NG/DL (ref 0.76–1.46)
TACROLIMUS BLD-MCNC: 4.8 UG/L (ref 5–15)
TME LAST DOSE: ABNORMAL H
TSH SERPL DL<=0.005 MIU/L-ACNC: 3.41 MU/L (ref 0.4–4)
WBC # BLD AUTO: 7.5 10E9/L (ref 4–11)

## 2018-04-20 PROCEDURE — 84443 ASSAY THYROID STIM HORMONE: CPT | Performed by: INTERNAL MEDICINE

## 2018-04-20 PROCEDURE — 83970 ASSAY OF PARATHORMONE: CPT | Performed by: INTERNAL MEDICINE

## 2018-04-20 PROCEDURE — 80076 HEPATIC FUNCTION PANEL: CPT | Performed by: INTERNAL MEDICINE

## 2018-04-20 PROCEDURE — 99000 SPECIMEN HANDLING OFFICE-LAB: CPT | Performed by: INTERNAL MEDICINE

## 2018-04-20 PROCEDURE — 80197 ASSAY OF TACROLIMUS: CPT | Performed by: INTERNAL MEDICINE

## 2018-04-20 PROCEDURE — 84100 ASSAY OF PHOSPHORUS: CPT | Performed by: INTERNAL MEDICINE

## 2018-04-20 PROCEDURE — 85027 COMPLETE CBC AUTOMATED: CPT | Performed by: INTERNAL MEDICINE

## 2018-04-20 PROCEDURE — 82542 COL CHROMOTOGRAPHY QUAL/QUAN: CPT | Mod: 90 | Performed by: INTERNAL MEDICINE

## 2018-04-20 PROCEDURE — 83735 ASSAY OF MAGNESIUM: CPT | Performed by: INTERNAL MEDICINE

## 2018-04-20 PROCEDURE — 80048 BASIC METABOLIC PNL TOTAL CA: CPT | Performed by: INTERNAL MEDICINE

## 2018-04-20 PROCEDURE — 84439 ASSAY OF FREE THYROXINE: CPT | Performed by: INTERNAL MEDICINE

## 2018-04-20 PROCEDURE — 36415 COLL VENOUS BLD VENIPUNCTURE: CPT | Performed by: INTERNAL MEDICINE

## 2018-04-29 LAB — PTH RELATED PROT SERPL-SCNC: 2.3 PMOL/L (ref 0–3.4)

## 2018-04-29 NOTE — PROGRESS NOTES
Dear Jessica,     PTHrP is normal, PTH is slightly elevated but stable likely related to elevated creatinine. , normal thyroid function testing.     Best Regards,   Ruth Oakley MD  [id number]  Endocrinology Service

## 2018-05-03 ENCOUNTER — TELEPHONE (OUTPATIENT)
Dept: INTERNAL MEDICINE | Facility: CLINIC | Age: 63
End: 2018-05-03

## 2018-05-03 NOTE — TELEPHONE ENCOUNTER
[facility] Call Center    Phone Message    May a detailed message be left on voicemail: yes    Reason for Call: Other: Patient has had severe diarrhea and was in ED this weekend.  She had labs done and there is no infection but symptoms have not improved.  Please call to discuss.      Action Taken: Message routed to:  [facility] ([facility]): [facility]

## 2018-05-04 NOTE — TELEPHONE ENCOUNTER
Spoke with patient.  Her symptoms have been improving. Diarrhea ongoing for 1 week. Was seen in ED and given fluids for dehydration.  Is using immodium 4 times daily, simethicone PRN for cramping and following a BRAT diet.  Denies fever.  Noticeable strong odor of stool.  Patietn will continue to monitor and follow up Monday if worsens.  Annabel [place] LPN

## 2018-05-04 NOTE — TELEPHONE ENCOUNTER
[facility] Call Center    Phone Message    May a detailed message be left on voicemail: yes    Reason for Call: Other: Pt is returning nurse Annabel's calls, in regards to her stomach pain.  Please follow up with pt.     Action Taken: Other: [facility]

## 2018-05-07 DIAGNOSIS — M17.12 PRIMARY OSTEOARTHRITIS OF LEFT KNEE: Primary | ICD-10-CM

## 2018-05-07 ASSESSMENT — ENCOUNTER SYMPTOMS
HEARTBURN: 1
VOMITING: 1
BLOOD IN STOOL: 0
STIFFNESS: 1
CONSTIPATION: 0
NAUSEA: 1
STIFFNESS: 1
BACK PAIN: 0
MUSCLE WEAKNESS: 0
HEARTBURN: 1
MUSCLE CRAMPS: 1
VOMITING: 1
DIARRHEA: 1
JAUNDICE: 0
BACK PAIN: 0
ARTHRALGIAS: 1
MYALGIAS: 1
MYALGIAS: 1
BLOATING: 1
BOWEL INCONTINENCE: 1
MUSCLE WEAKNESS: 0
RECTAL PAIN: 0
ABDOMINAL PAIN: 1
JAUNDICE: 0
MUSCLE CRAMPS: 1
BOWEL INCONTINENCE: 1
NECK PAIN: 1
JOINT SWELLING: 0
RECTAL PAIN: 0
JOINT SWELLING: 0
BLOOD IN STOOL: 0
CONSTIPATION: 0
ARTHRALGIAS: 1
NECK PAIN: 1
BLOATING: 1
NAUSEA: 1
DIARRHEA: 1
ABDOMINAL PAIN: 1

## 2018-05-08 ENCOUNTER — OFFICE VISIT (OUTPATIENT)
Dept: ORTHOPEDICS | Facility: CLINIC | Age: 63
End: 2018-05-08
Payer: COMMERCIAL

## 2018-05-08 ENCOUNTER — RADIANT APPOINTMENT (OUTPATIENT)
Dept: GENERAL RADIOLOGY | Facility: CLINIC | Age: 63
End: 2018-05-08
Attending: PREVENTIVE MEDICINE
Payer: COMMERCIAL

## 2018-05-08 VITALS — HEIGHT: 67 IN | WEIGHT: 211 LBS | BODY MASS INDEX: 33.12 KG/M2

## 2018-05-08 DIAGNOSIS — M25.572 LEFT ANKLE PAIN: ICD-10-CM

## 2018-05-08 DIAGNOSIS — M25.572 ACUTE LEFT ANKLE PAIN: Primary | ICD-10-CM

## 2018-05-08 DIAGNOSIS — M19.079 ARTHRITIS OF ANKLE JOINT: ICD-10-CM

## 2018-05-08 DIAGNOSIS — M54.16 LUMBAR RADICULAR PAIN: ICD-10-CM

## 2018-05-08 DIAGNOSIS — S93.492A SPRAIN OF ANTERIOR TALOFIBULAR LIGAMENT OF LEFT ANKLE, INITIAL ENCOUNTER: ICD-10-CM

## 2018-05-08 RX ORDER — TRAMADOL HYDROCHLORIDE 50 MG/1
50 TABLET ORAL EVERY 6 HOURS PRN
Qty: 20 TABLET | Refills: 0 | Status: SHIPPED | OUTPATIENT
Start: 2018-05-08 | End: 2018-08-29

## 2018-05-08 NOTE — LETTER
5/8/2018       RE: Phan Luque  [address]  [id number]     Dear Colleague,    Thank you for referring your patient, Phan Luque, to the [facility]. Please see a copy of my visit note below.    HISTORY OF PRESENT ILLNESS  Ms. Luque is a pleasant 62 year old year old female who presents to clinic today for followup for left leg and low back pain. Has also recently had an ankle sprain and her left ankle is painful as well as her low back  She has been using a brace for the ankle and knows that she has arthritis in the ankle and a previous fracture.    MEDICAL HISTORY  Patient Active Problem List   Diagnosis     Decompensation of cirrhosis of liver (H)     Liver transplanted (H)     Alcoholic hepatitis     Immunosuppression (H)     Alcoholic hepatitis without ascites     Enterococcus UTI     Liver transplant status     Nausea & vomiting     Malnutrition (H)     Candidiasis of skin     Anemia     ACP (advance care planning)     Diarrhea     Hypothyroidism     Esophageal reflux     Recurrent major depression in partial remission (H)     Insomnia     CKD (chronic kidney disease) stage 3, GFR 30-59 ml/min     Anemia in stage 3 chronic kidney disease     Osteopenia     Alcohol abuse, uncomplicated       Current Outpatient Prescriptions   Medication Sig Dispense Refill     acetaminophen (TYLENOL) 325 MG tablet Take 2 tablets (650 mg) by mouth every 6 hours as needed for mild pain Or fevers. Use sparingly. Limit use to no more than 2 grams (2000 mg) in 24 hours. **Further refills must be obtained by primary care provider** 100 tablet 0     amLODIPine (NORVASC) 5 MG tablet Take 1 tablet (5 mg) by mouth daily 90 tablet 3     azaTHIOprine 100 MG TABS Take 50 mg by mouth every evening 90 tablet 3     betaxolol (BETOPTIC) 0.5 % ophthalmic solution Place 1 drop into both eyes 2 times daily 5 mL 2     busPIRone (BUSPAR) 10 MG tablet Take 1  tablet (10 mg) by mouth 2 times daily 60 tablet 3     calcium Citrate-vitamin D 500-400 MG-UNIT CHEW Take 1 tablet by mouth daily       chlorthalidone (HYGROTON) 25 MG tablet Take 0.5 tablets (12.5 mg) by mouth daily 45 tablet 3     cholecalciferol (VITAMIN D3) 400 UNIT TABS tablet Take 400 Units by mouth daily       cyanocobalamin (VITAMIN  B-12) 1000 MCG tablet Take 1,000 mcg by mouth daily       ferrous sulfate (IRON) 325 (65 FE) MG tablet Take 1 tablet (325 mg) by mouth 2 times daily 100 tablet      folic acid (FOLVITE) 1 MG tablet Take 1 tablet (1 mg) by mouth daily 30 tablet 1     hydrOXYzine (ATARAX) 25 MG tablet Take 1-2 tablets (25-50mg) every 6 hours as needed for itching 60 tablet 2     levothyroxine (SYNTHROID/LEVOTHROID) 88 MCG tablet Take 1 tablet (88 mcg) by mouth every morning (before breakfast) 90 tablet 2     melatonin 5 MG tablet Take 1 tablet (5 mg) by mouth nightly as needed for sleep       multivitamin, therapeutic (THERA-VIT) TABS tablet Take 1 tablet by mouth every 24 hours 30 tablet 11     Omega-3 Fatty Acids (OMEGA-3 FISH OIL EX ST PO) Take 1 capsule by mouth 2 times daily 1290 (fish oil)-900 (omega 3)       pantoprazole (PROTONIX) 40 MG EC tablet Take 1 tablet (40 mg) by mouth every morning (before breakfast) 90 tablet 4     pramipexole (MIRAPEX) 0.5 MG tablet Take 1 tablet (0.5 mg) by mouth At Bedtime 90 tablet 3     predniSONE (DELTASONE) 1 MG tablet Take 4 tablets (4 mg) by mouth daily 360 tablet 3     predniSONE (DELTASONE) 5 MG tablet Take 1 tablet (5 mg) by mouth daily 90 tablet 3     propranolol (INDERAL) 10 MG tablet Take 1 tablet (10 mg) by mouth 3 times daily As needed for anxiety 90 tablet 0     psyllium 0.52 G capsule Take 2 capsules (1.04 g) by mouth daily With a full glass of water. 60 capsule 1     tacrolimus (GENERIC EQUIVALENT) 0.5 MG capsule Take 5 capsules (2.5 mg) by mouth 2 times daily 300 capsule 11     traZODone (DESYREL) 100 MG tablet Take 1.5 tablets (150 mg) by  "mouth At Bedtime 45 tablet 3     ursodiol (ACTIGALL) 300 MG capsule Take 1 capsule (300 mg) by mouth 2 times daily 60 capsule 11       Allergies   Allergen Reactions     Benadryl [Diphenhydramine] Hives     Cefaclor Hives     Hydrocodone-Acetaminophen Itching     Oxycodone Itching     Penicillins Hives     Sulfa Drugs Hives       Family History   Problem Relation Age of Onset     Family History Negative Other      Melanoma Mother           HEART DISEASE Father      CHF       Additional medical/Social/Surgical histories reviewed in [facility] and updated as appropriate.     REVIEW OF SYSTEMS (2018)  10 point ROS of systems including Constitutional, Eyes, Respiratory, Cardiovascular, Gastroenterology, Genitourinary, Integumentary, Musculoskeletal, Psychiatric were all negative except for pertinent positives noted in my HPI.     PHYSICAL EXAM  Vitals:    18 1608   Weight: 95.7 kg (211 lb)   Height: 1.702 m (5' 7\")     Vital Signs: Ht 1.702 m (5' 7\")  Wt 95.7 kg (211 lb)  BMI 33.05 kg/m2 Patient declined being weighed. Body mass index is 33.05 kg/(m^2).    General  - normal appearance, in no obvious distress  CV  - normal pulses at posterior tib and dorsalis pedis  Pulm  - normal respiratory pattern, non-labored  Musculoskeletal - left ankle  - stance: gait slightly favors affected side, NOT reluctant to bear weight  - inspection: moderate swelling laterally, normal bone and joint alignment, no obvious deformity  - palpation: tenderness over ATFL and anterior ankle joint, no tenderness over lateral or medial malleoli, navicular, or base of 5th MT  - ROM: intact globally but NOT limited secondary to pain  - strength: 4/5 in eversion, 5/5 in all other planes  - special tests:  Some pain with inversion stress  (-) anterior drawer  (-) talar tilt  (-) Tinel's  (-) squeeze test  (-) Campbell test  Neuro  - no sensory or motor deficit, grossly normal coordination, normal muscle tone  Skin  - NO ecchymosis overlying " lateral foot-ankle junction, no warmth or induration, no obvious rash  Psych  - interactive, appropriate, normal mood and affect  Lumbar spine: has some pain that radiates into left leg with lumbar extension and flexion, positive SLR on left       ASSESSMENT & PLAN  63 yo female with lumbar radicular pain due to ddd, and left ankle arthritis and current sprain  Reviewed xrays of ankle and lumbar spine, and ordered lumbar MRI due to ddd, and radicular symptoms  Cont. Ankle brace  Given tramadol to use PRN  F/u after lumbar MRI to consider RICHI      Again, thank you for allowing me to participate in the care of your patient.      Sincerely,    Donnell Duvall MD

## 2018-05-08 NOTE — PROGRESS NOTES
HISTORY OF PRESENT ILLNESS  Ms. Luque is a pleasant 62 year old year old female who presents to clinic today for followup for left leg and low back pain. Has also recently had an ankle sprain and her left ankle is painful as well as her low back  She has been using a brace for the ankle and knows that she has arthritis in the ankle and a previous fracture.    MEDICAL HISTORY  Patient Active Problem List   Diagnosis     Decompensation of cirrhosis of liver (H)     Liver transplanted (H)     Alcoholic hepatitis     Immunosuppression (H)     Alcoholic hepatitis without ascites     Enterococcus UTI     Liver transplant status     Nausea & vomiting     Malnutrition (H)     Candidiasis of skin     Anemia     ACP (advance care planning)     Diarrhea     Hypothyroidism     Esophageal reflux     Recurrent major depression in partial remission (H)     Insomnia     CKD (chronic kidney disease) stage 3, GFR 30-59 ml/min     Anemia in stage 3 chronic kidney disease     Osteopenia     Alcohol abuse, uncomplicated       Current Outpatient Prescriptions   Medication Sig Dispense Refill     acetaminophen (TYLENOL) 325 MG tablet Take 2 tablets (650 mg) by mouth every 6 hours as needed for mild pain Or fevers. Use sparingly. Limit use to no more than 2 grams (2000 mg) in 24 hours. **Further refills must be obtained by primary care provider** 100 tablet 0     amLODIPine (NORVASC) 5 MG tablet Take 1 tablet (5 mg) by mouth daily 90 tablet 3     azaTHIOprine 100 MG TABS Take 50 mg by mouth every evening 90 tablet 3     betaxolol (BETOPTIC) 0.5 % ophthalmic solution Place 1 drop into both eyes 2 times daily 5 mL 2     busPIRone (BUSPAR) 10 MG tablet Take 1 tablet (10 mg) by mouth 2 times daily 60 tablet 3     calcium Citrate-vitamin D 500-400 MG-UNIT CHEW Take 1 tablet by mouth daily       chlorthalidone (HYGROTON) 25 MG tablet Take 0.5 tablets (12.5 mg) by mouth daily 45 tablet 3     cholecalciferol (VITAMIN D3) 400 UNIT TABS tablet  Take 400 Units by mouth daily       cyanocobalamin (VITAMIN  B-12) 1000 MCG tablet Take 1,000 mcg by mouth daily       ferrous sulfate (IRON) 325 (65 FE) MG tablet Take 1 tablet (325 mg) by mouth 2 times daily 100 tablet      folic acid (FOLVITE) 1 MG tablet Take 1 tablet (1 mg) by mouth daily 30 tablet 1     hydrOXYzine (ATARAX) 25 MG tablet Take 1-2 tablets (25-50mg) every 6 hours as needed for itching 60 tablet 2     levothyroxine (SYNTHROID/LEVOTHROID) 88 MCG tablet Take 1 tablet (88 mcg) by mouth every morning (before breakfast) 90 tablet 2     melatonin 5 MG tablet Take 1 tablet (5 mg) by mouth nightly as needed for sleep       multivitamin, therapeutic (THERA-VIT) TABS tablet Take 1 tablet by mouth every 24 hours 30 tablet 11     Omega-3 Fatty Acids (OMEGA-3 FISH OIL EX ST PO) Take 1 capsule by mouth 2 times daily 1290 (fish oil)-900 (omega 3)       pantoprazole (PROTONIX) 40 MG EC tablet Take 1 tablet (40 mg) by mouth every morning (before breakfast) 90 tablet 4     pramipexole (MIRAPEX) 0.5 MG tablet Take 1 tablet (0.5 mg) by mouth At Bedtime 90 tablet 3     predniSONE (DELTASONE) 1 MG tablet Take 4 tablets (4 mg) by mouth daily 360 tablet 3     predniSONE (DELTASONE) 5 MG tablet Take 1 tablet (5 mg) by mouth daily 90 tablet 3     propranolol (INDERAL) 10 MG tablet Take 1 tablet (10 mg) by mouth 3 times daily As needed for anxiety 90 tablet 0     psyllium 0.52 G capsule Take 2 capsules (1.04 g) by mouth daily With a full glass of water. 60 capsule 1     tacrolimus (GENERIC EQUIVALENT) 0.5 MG capsule Take 5 capsules (2.5 mg) by mouth 2 times daily 300 capsule 11     traZODone (DESYREL) 100 MG tablet Take 1.5 tablets (150 mg) by mouth At Bedtime 45 tablet 3     ursodiol (ACTIGALL) 300 MG capsule Take 1 capsule (300 mg) by mouth 2 times daily 60 capsule 11       Allergies   Allergen Reactions     Benadryl [Diphenhydramine] Hives     Cefaclor Hives     Hydrocodone-Acetaminophen Itching     Oxycodone Itching      "Penicillins Hives     Sulfa Drugs Hives       Family History   Problem Relation Age of Onset     Family History Negative Other      Melanoma Mother           HEART DISEASE Father      CHF       Additional medical/Social/Surgical histories reviewed in [facility] and updated as appropriate.     REVIEW OF SYSTEMS (2018)  10 point ROS of systems including Constitutional, Eyes, Respiratory, Cardiovascular, Gastroenterology, Genitourinary, Integumentary, Musculoskeletal, Psychiatric were all negative except for pertinent positives noted in my HPI.     PHYSICAL EXAM  Vitals:    18 1608   Weight: 95.7 kg (211 lb)   Height: 1.702 m (5' 7\")     Vital Signs: Ht 1.702 m (5' 7\")  Wt 95.7 kg (211 lb)  BMI 33.05 kg/m2 Patient declined being weighed. Body mass index is 33.05 kg/(m^2).    General  - normal appearance, in no obvious distress  CV  - normal pulses at posterior tib and dorsalis pedis  Pulm  - normal respiratory pattern, non-labored  Musculoskeletal - left ankle  - stance: gait slightly favors affected side, NOT reluctant to bear weight  - inspection: moderate swelling laterally, normal bone and joint alignment, no obvious deformity  - palpation: tenderness over ATFL and anterior ankle joint, no tenderness over lateral or medial malleoli, navicular, or base of 5th MT  - ROM: intact globally but NOT limited secondary to pain  - strength: 4/5 in eversion, 5/5 in all other planes  - special tests:  Some pain with inversion stress  (-) anterior drawer  (-) talar tilt  (-) Tinel's  (-) squeeze test  (-) Campbell test  Neuro  - no sensory or motor deficit, grossly normal coordination, normal muscle tone  Skin  - NO ecchymosis overlying lateral foot-ankle junction, no warmth or induration, no obvious rash  Psych  - interactive, appropriate, normal mood and affect  Lumbar spine: has some pain that radiates into left leg with lumbar extension and flexion, positive SLR on left       ASSESSMENT & PLAN  63 yo female with " lumbar radicular pain due to ddd, and left ankle arthritis and current sprain  Reviewed xrays of ankle and lumbar spine, and ordered lumbar MRI due to ddd, and radicular symptoms  Cont. Ankle brace  Given tramadol to use PRN  F/u after lumbar MRI to consider RICHI      Donnell Duvall MD, CAQSM

## 2018-05-14 ASSESSMENT — ENCOUNTER SYMPTOMS
STIFFNESS: 1
DEPRESSION: 1
PANIC: 0
NAUSEA: 1
MUSCLE CRAMPS: 1
RECTAL PAIN: 0
BACK PAIN: 0
DIARRHEA: 1
BOWEL INCONTINENCE: 1
JOINT SWELLING: 0
INSOMNIA: 1
MUSCLE WEAKNESS: 1
ABDOMINAL PAIN: 1
NECK PAIN: 0
CONSTIPATION: 0
BLOOD IN STOOL: 0
BLOATING: 1
JAUNDICE: 0
NERVOUS/ANXIOUS: 0
HEARTBURN: 1
VOMITING: 1
MYALGIAS: 1
DECREASED CONCENTRATION: 0
ARTHRALGIAS: 1

## 2018-05-15 ENCOUNTER — RADIANT APPOINTMENT (OUTPATIENT)
Dept: MRI IMAGING | Facility: CLINIC | Age: 63
End: 2018-05-15
Attending: PREVENTIVE MEDICINE
Payer: COMMERCIAL

## 2018-05-15 DIAGNOSIS — M25.572 ACUTE LEFT ANKLE PAIN: ICD-10-CM

## 2018-05-15 DIAGNOSIS — M54.16 LUMBAR RADICULAR PAIN: ICD-10-CM

## 2018-05-21 ENCOUNTER — OFFICE VISIT (OUTPATIENT)
Dept: ENDOCRINOLOGY | Facility: CLINIC | Age: 63
End: 2018-05-21
Payer: COMMERCIAL

## 2018-05-21 VITALS
WEIGHT: 216.8 LBS | DIASTOLIC BLOOD PRESSURE: 74 MMHG | SYSTOLIC BLOOD PRESSURE: 125 MMHG | HEART RATE: 75 BPM | BODY MASS INDEX: 33.96 KG/M2

## 2018-05-21 DIAGNOSIS — E03.9 HYPOTHYROIDISM, UNSPECIFIED TYPE: Primary | ICD-10-CM

## 2018-05-21 DIAGNOSIS — M85.80 OSTEOPENIA, UNSPECIFIED LOCATION: ICD-10-CM

## 2018-05-21 RX ORDER — ZOLEDRONIC ACID 5 MG/100ML
5 INJECTION, SOLUTION INTRAVENOUS ONCE
Status: CANCELLED
Start: 2018-08-01 | End: 2018-08-01

## 2018-05-21 ASSESSMENT — PAIN SCALES - GENERAL: PAINLEVEL: NO PAIN (0)

## 2018-05-21 NOTE — PROGRESS NOTES
Endocrine Consult Follow Up   Date of Service: 5/21/2018     Phan Luque is a 62 year old female with Intermittent hypercalcemia in the setting of acute on chronic renal failure ( improved)  and hypothyroidism. She also has a history of osteopenia due to chronic Prednisone use with T score of -2.2 at the left femoral neck in 4/5/2017. She received ZA in 8/2017.   Subjective:  Patient reports feeling okay.   Patient is on 1200mg of Calcium and 1800 IU of Vitamin D.   She is on 9mg daily of Prednisone, that is supposed to be tapered by 1mg every month. She has been told that it will be tapered to as low a dose as she is comfortable with.   Patient continues to be on 88microg of Levothyroxine that she is taking everyday. She reports feeling hot occasionally but with no sweating. No chest pain, SOB, palpitations. No tremors.     She has had diarrhea with gas for the last 6 weeks after a medication change. No skin or hair changes. No leg swelling or facial puffiness. She reports that she has been losing a little weight recently. No nausea, vomiting.   She has no history of falls or fractures. She reports no loss of height.  She has left sided knee pain and ankle pain and is seeing an Orthopedician.     Medications:  Current Outpatient Prescriptions   Medication Sig Dispense Refill     acetaminophen (TYLENOL) 325 MG tablet Take 2 tablets (650 mg) by mouth every 6 hours as needed for mild pain Or fevers. Use sparingly. Limit use to no more than 2 grams (2000 mg) in 24 hours. **Further refills must be obtained by primary care provider** 100 tablet 0     amLODIPine (NORVASC) 5 MG tablet Take 1 tablet (5 mg) by mouth daily 90 tablet 3     azaTHIOprine 100 MG TABS Take 50 mg by mouth every evening 90 tablet 3     betaxolol (BETOPTIC) 0.5 % ophthalmic solution Place 1 drop into both eyes 2 times daily 5 mL 2     busPIRone (BUSPAR) 10 MG tablet Take 1 tablet (10 mg) by mouth 2 times daily 60 tablet 3     calcium  Citrate-vitamin D 500-400 MG-UNIT CHEW Take 1 tablet by mouth daily       chlorthalidone (HYGROTON) 25 MG tablet Take 0.5 tablets (12.5 mg) by mouth daily 45 tablet 3     cholecalciferol (VITAMIN D3) 400 UNIT TABS tablet Take 400 Units by mouth daily       cyanocobalamin (VITAMIN  B-12) 1000 MCG tablet Take 1,000 mcg by mouth daily       ferrous sulfate (IRON) 325 (65 FE) MG tablet Take 1 tablet (325 mg) by mouth 2 times daily 100 tablet      folic acid (FOLVITE) 1 MG tablet Take 1 tablet (1 mg) by mouth daily 30 tablet 1     hydrOXYzine (ATARAX) 25 MG tablet Take 1-2 tablets (25-50mg) every 6 hours as needed for itching 60 tablet 2     levothyroxine (SYNTHROID/LEVOTHROID) 88 MCG tablet Take 1 tablet (88 mcg) by mouth every morning (before breakfast) 90 tablet 2     melatonin 5 MG tablet Take 1 tablet (5 mg) by mouth nightly as needed for sleep       multivitamin, therapeutic (THERA-VIT) TABS tablet Take 1 tablet by mouth every 24 hours 30 tablet 11     Omega-3 Fatty Acids (OMEGA-3 FISH OIL EX ST PO) Take 1 capsule by mouth 2 times daily 1290 (fish oil)-900 (omega 3)       pantoprazole (PROTONIX) 40 MG EC tablet Take 1 tablet (40 mg) by mouth every morning (before breakfast) 90 tablet 4     pramipexole (MIRAPEX) 0.5 MG tablet Take 1 tablet (0.5 mg) by mouth At Bedtime 90 tablet 3     predniSONE (DELTASONE) 1 MG tablet Take 4 tablets (4 mg) by mouth daily 360 tablet 3     predniSONE (DELTASONE) 5 MG tablet Take 1 tablet (5 mg) by mouth daily 90 tablet 3     propranolol (INDERAL) 10 MG tablet Take 1 tablet (10 mg) by mouth 3 times daily As needed for anxiety 90 tablet 0     psyllium 0.52 G capsule Take 2 capsules (1.04 g) by mouth daily With a full glass of water. 60 capsule 1     tacrolimus (GENERIC EQUIVALENT) 0.5 MG capsule Take 5 capsules (2.5 mg) by mouth 2 times daily 300 capsule 11     traMADol (ULTRAM) 50 MG tablet Take 1 tablet (50 mg) by mouth every 6 hours as needed for severe pain 20 tablet 0     traZODone  (DESYREL) 100 MG tablet Take 1.5 tablets (150 mg) by mouth At Bedtime 45 tablet 3     ursodiol (ACTIGALL) 300 MG capsule Take 1 capsule (300 mg) by mouth 2 times daily 60 capsule 11          Physical Examination:  /74  Pulse 75  Wt 98.3 kg (216 lb 12.8 oz)  BMI 33.96 kg/m2   GEN: Awake, alert, no distress.  HEENT: EOMI.  NECK: Thyroid not palpable. No cervical or clavicular LAD.   CV: RRR with no murmur.   RESP: CTA bilaterally.   ABD: Soft, tenderness in the left hypochondrium on deep palpation, and non-distended, with normal BS. No rebound.   EXT: No peripheral edema.   SKIN: Normal skin temperature and turgor with no lesions.   NEURO: Normoactive reflexes. No tremor.     Labs:   As of 4/20/2018, Ca is 8.7, Albumin is 3.6, ALP is 48, PTH was 90 , PTHrP-2.3,TSH-3.41, FT4-1.15   Vitamin D was 26 as on 4/5/17    Assessment:  Phan is a 61 year old female with Intermittent hypercalcemia in the setting of acute on chronic renal failure ( improved)  and hypothyroidism. She also has a history of osteopenia due to chronic Prednisone use with T score of -2.2 at the left femoral neck in 4/5/2017. She received ZA in 8/2017.  1. Hypercalcemia  Levels of calcium, albumin, PTHrP and Vitamin D are normal as checked in April, indicating good response to treatment. No change is thus proposed. Her PTH is elevated, most likely related to her chronic kidney disease, given that her Vitamin D levels are normal. There is no need to treat that given that she has no manifestations of the elevated hormone levels.     2. Thyroid disease  She is on Levothyroxine 88 microg which she is taking regularly. She seems euthyroid according to her symptoms and her labs. No change is proposed.     3. Osteopenia  As the patient is on Prednisone 9mg, and will continue to be on steroid for the foreseeable future, it is necessary to treat her for the same. Her last dose of ZA was in 8/17 and is scheduled for the next dose in August this year. If  she were to come off the steroid, treatment may possibly be stopped.   Laboratory investigations may be repeated when she receives her ZA next followed by every 6 months.   Follow up is necessary every year.     Brigitte Smith  Student, Endocrinology    Answers for HPI/ROS submitted by the patient on 5/7/2018     INTESTINAL SYMPTOMS: Yes  SKELETAL SYMPTOMS: Yes  Heart burn or indigestion: Yes  Nausea: Yes  Vomiting: Yes  Abdominal pain: Yes  Bloating: Yes  Constipation: No  Diarrhea: Yes  Blood in stool: No  Black stools: Yes  Rectal or Anal pain: No  Fecal incontinence: Yes  Yellowing of skin or eyes: No  Vomit with blood: No  Change in stools: Yes  Back pain: No  Muscle aches: Yes  Neck pain: Yes  Swollen joints: No  Joint pain: Yes  Bone pain: Yes  Muscle cramps: Yes  Muscle weakness: No  Joint stiffness: Yes  Bone fracture: Yes

## 2018-05-21 NOTE — MR AVS SNAPSHOT
After Visit Summary   5/21/2018    Phan Luque    MRN: [id number]           Patient Information     Date Of Birth          1955        Visit Information        Provider Department      5/21/2018 3:00 PM Ruth Oakley MD [facility]        Today's Diagnoses     Hypothyroidism, unspecified type    -  1    Osteopenia, unspecified location           Follow-ups after your visit        Follow-up notes from your care team     Return in about 1 year (around 5/21/2019).      Your next 10 appointments already scheduled     May 25, 2018  3:40 PM CDT   (Arrive by 3:25 PM)   Return Visit with Donnell Duvall MD   [facility] ([facility])    [address] [place] [address]  4th Floor  [facility] [address] [phone]            Josue 15, 2018   Procedure with Tao Alfonso MD   [facility] ([facility])    [address] [place] [address]  5th Floor  [facility] [id number] [phone]           Located in the Clinics and Surgery Center at [address] [place] [address].   parking is very convenient and highly recommended.  is a $6 flat rate fee.  Both  and self parkers should enter the main arrival plaza from [facility]; parking attendants will direct you based on your parking preference.            Jun 26, 2018  5:00 PM CDT   Adult Med Follow UP with DONOVAN [facility]   Psychiatry Clinic ([facility])    [place] [id number]  [address] [address] [phone]            Oct 01, 2018  4:45 PM CDT   (Arrive by 4:30 PM)   Return Liver Transplant with Hussein Banegas MD   [facility] ([facility])    [address] [place] [address]  [address]  [facility] [address] [phone]            Oct 22, 2018  7:30 AM CDT   (Arrive by 7:15 AM)   Return Visit with Arlyn Sanchez MD     [facility] ([facility])    [address]  4th Floor  [facility] [id number] [phone]            Mar 20, 2019  3:30 PM CDT   Lab with ANASTACIO LAB   [facility] ([facility])    [address]  1st Floor  [facility] [id number] [phone]            Mar 20, 2019  4:30 PM CDT   (Arrive by 4:00 PM)   Return Visit with Daya Gutierrez MD   [facility] ([facility])    [address]  [address]  [facility] [id number] [phone]              Future tests that were ordered for you today     Open Future Orders        Priority Expected Expires Ordered    Calcium Routine 8/21/2018 5/21/2019 5/21/2018    Phosphorus Routine 8/21/2018 5/21/2019 5/21/2018    Parathyroid Hormone Intact Routine 8/21/2018 5/21/2019 5/21/2018    Albumin level Routine 8/21/2018 5/21/2019 5/21/2018    25 Hydroxyvitamin D2 and D3 Routine 8/21/2018 5/21/2019 5/21/2018    Alkaline phosphatase Routine 8/21/2018 5/21/2019 5/21/2018            Who to contact     Please call your clinic at [phone] to:    Ask questions about your health    Make or cancel appointments    Discuss your medicines    Learn about your test results    Speak to your doctor            Additional Information About Your Visit        [employer] Information     [employer] gives you secure access to your electronic health record. If you see a primary care provider, you can also send messages to your care team and make appointments. If you have questions, please call your primary care clinic.  If you do not have a primary care provider, please call [phone] and they will assist you.      [employer] is an electronic gateway that provides easy, online access to your medical records. With [employer], you can request a clinic appointment, read your test results, renew a prescription or communicate with your care team.     To access your existing account, please  contact your [facility] or call [phone] for assistance.        Care EveryWhere ID     This is your Care EveryWhere ID. This could be used by other organizations to access your [place] medical records  [id number]        Your Vitals Were     Pulse BMI (Body Mass Index)                75 33.96 kg/m2           Blood Pressure from Last 3 Encounters:   05/21/18 125/74   04/10/18 128/80   04/04/18 140/83    Weight from Last 3 Encounters:   05/21/18 98.3 kg (216 lb 12.8 oz)   05/08/18 95.7 kg (211 lb)   04/04/18 95.7 kg (211 lb)               Primary Care Provider Office Phone # Fax #    Arlyn Sanchez [phone]       [address] [address]        Equal Access to Services     ALLY [employer] : Hadii adonis fryo Sogary, waaxda luqadaha, qaybta kaalmada adebrendayada, anna ambrose . So [facility] [phone].    ATENCIÓN: Si habla español, tiene a reece disposición servicios gratuitos de asistencia lingüística. Llame al [phone].    We comply with applicable federal civil rights laws and [place] laws. We do not discriminate on the basis of race, color, national origin, age, disability, sex, sexual orientation, or gender identity.            Thank you!     Thank you for choosing [facility]  for your care. Our goal is always to provide you with excellent care. Hearing back from our patients is one way we can continue to improve our services. Please take a few minutes to complete the written survey that you may receive in the mail after your visit with us. Thank you!             Your Updated Medication List - Protect others around you: Learn how to safely use, store and throw away your medicines at www.disposemymeds.org.          This list is accurate as of 5/21/18  3:43 PM.  Always use your most recent med list.                   Brand Name Dispense Instructions for use Diagnosis    acetaminophen 325 MG  tablet    TYLENOL    100 tablet    Take 2 tablets (650 mg) by mouth every 6 hours as needed for mild pain Or fevers. Use sparingly. Limit use to no more than 2 grams (2000 mg) in 24 hours. **Further refills must be obtained by primary care provider**    Acute post-operative pain       amLODIPine 5 MG tablet    NORVASC    90 tablet    Take 1 tablet (5 mg) by mouth daily    Hypertension       azaTHIOprine 100 MG Tabs     90 tablet    Take 50 mg by mouth every evening    Liver transplanted (H)       betaxolol 0.5 % ophthalmic solution    BETOPTIC    5 mL    Place 1 drop into both eyes 2 times daily    Primary open angle glaucoma of both eyes, unspecified glaucoma stage       busPIRone 10 MG tablet    BUSPAR    60 tablet    Take 1 tablet (10 mg) by mouth 2 times daily    WALTER (generalized anxiety disorder)       calcium Citrate-vitamin D 500-400 MG-UNIT Chew      Take 1 tablet by mouth daily        chlorthalidone 25 MG tablet    HYGROTON    45 tablet    Take 0.5 tablets (12.5 mg) by mouth daily    CKD (chronic kidney disease) stage 3, GFR 30-59 ml/min, Fluid retention       cholecalciferol 400 UNIT Tabs tablet    vitamin D3     Take 400 Units by mouth daily        cyanocobalamin 1000 MCG tablet    vitamin  B-12     Take 1,000 mcg by mouth daily        ferrous sulfate 325 (65 Fe) MG tablet    IRON    100 tablet    Take 1 tablet (325 mg) by mouth 2 times daily    Iron deficiency       folic acid 1 MG tablet    FOLVITE    30 tablet    Take 1 tablet (1 mg) by mouth daily    Malnutrition (H)       hydrOXYzine 25 MG tablet    ATARAX    60 tablet    Take 1-2 tablets (25-50mg) every 6 hours as needed for itching    WALTER (generalized anxiety disorder)       levothyroxine 88 MCG tablet    SYNTHROID/LEVOTHROID    90 tablet    Take 1 tablet (88 mcg) by mouth every morning (before breakfast)    Thyroid function test abnormal       melatonin 5 MG tablet      Take 1 tablet (5 mg) by mouth nightly as needed for sleep        multivitamin,  therapeutic Tabs tablet     30 tablet    Take 1 tablet by mouth every 24 hours    Malnutrition (H)       OMEGA-3 FISH OIL EX ST PO      Take 1 capsule by mouth 2 times daily 1290 (fish oil)-900 (omega 3)        pantoprazole 40 MG EC tablet    PROTONIX    90 tablet    Take 1 tablet (40 mg) by mouth every morning (before breakfast)    Gastroesophageal reflux disease, esophagitis presence not specified       pramipexole 0.5 MG tablet    MIRAPEX    90 tablet    Take 1 tablet (0.5 mg) by mouth At Bedtime    Restless leg syndrome       * predniSONE 5 MG tablet    DELTASONE    90 tablet    Take 1 tablet (5 mg) by mouth daily    Liver replaced by transplant (H)       * predniSONE 1 MG tablet    DELTASONE    360 tablet    Take 4 tablets (4 mg) by mouth daily    Liver replaced by transplant (H)       propranolol 10 MG tablet    INDERAL    90 tablet    Take 1 tablet (10 mg) by mouth 3 times daily As needed for anxiety    Generalized anxiety disorder       psyllium 0.52 g capsule     60 capsule    Take 2 capsules (1.04 g) by mouth daily With a full glass of water.    Loose stools       tacrolimus 0.5 MG capsule    GENERIC EQUIVALENT    300 capsule    Take 5 capsules (2.5 mg) by mouth 2 times daily    Liver transplanted (H)       traMADol 50 MG tablet    ULTRAM    20 tablet    Take 1 tablet (50 mg) by mouth every 6 hours as needed for severe pain    Acute left ankle pain, Lumbar radicular pain       traZODone 100 MG tablet    DESYREL    45 tablet    Take 1.5 tablets (150 mg) by mouth At Bedtime    Insomnia, unspecified type       ursodiol 300 MG capsule    ACTIGALL    60 capsule    Take 1 capsule (300 mg) by mouth 2 times daily    Liver transplanted (H)       * Notice:  This list has 2 medication(s) that are the same as other medications prescribed for you. Read the directions carefully, and ask your doctor or other care provider to review them with you.

## 2018-05-21 NOTE — PROGRESS NOTES
Endocrine Clinic Visit. :     Reason for visit: Hypercalcemia initially seen on Initial visit Nov 2016        Assessment   Phan Luque is a 61 / female with Intermittent hypercalcemia in the setting of acute on chronic renal failure ( improved) , and Ca supplements.       Elevated PTH-related protein likely due to renal failure  Mild elevation of PTH. Normal suppression of PTH noted in past. Possible secondary hyperpara d/t CKD.   Normal levels in the last labs.   Follow Ca, Vit D and PTH levels.     History of osteopenia on chronic Prednisone  She received ZA 8/2017.   Was treated with HRT in 40s for 5 years  DXA 4/5/2017: Negative T score of -2.2 at left femoral neck   Ca (750 Citracal + D 400 total ) and vitamin D  Supplement (MTM to review D dose - goal 2000 IU)     History of chronic prednisone therapy (Liver Transplant and pain)  Prednisone reduced to 7.5 mg, she has been retaining fluid and is now on Chlorthalidone.   Further wt gain noted     Hypothyroidism - Acquired  FU thyroid function test  Continue LT4 88 mcg daily for now.       Ruth Oakley MD  [id number]  Endocrinology Service        ====================================================================    Interval history.       HPI:     Phan is a 62 year old female who initially presented for hypercalcemia in the setting of GERMAIN and CKD and increase Ca intake. Mild PTHrp elevation was noted at the time but felt to be in the setting of CKD. The hypercalcemia / PTH rp levels normalized with improvement of renal function.     She has history of osteopenia in the setting of Chronic Prednisone and therefore was treated with ZA.  She has a complicated past medical history with liver transplant on immunosuppressant and malnutrition. she has gained most of her weight back.     ENDO CALCIUM LABS[facility] Latest Ref Rng 10/6/2016   CALCIUM 8.5 - 10.1 mg/dL 10.4 (H)   CALCIUM IONIZED 4.4 - 5.2 mg/dL    CALCIUM IONIZED WHOLE BLOOD 4.4 - 5.2 mg/dL    PHOSPHOROUS  2.5 - 4.5 mg/dL 5.5 (H)   MAGNESIUM 1.6 - 2.3 mg/dL 2.2   ALBUMIN 3.4 - 5.0 g/dL 3.4   BUN 7 - 30 mg/dL 48 (H)   CREATININE 0.52 - 1.04 mg/dL 2.28 (H)   PARATHYROID HORMONE INTACT 12 - 72 pg/mL    ALKPHOS 40 - 150 U/L 68     ENDO CALCIUM [facility] Latest Ref Rng 10/10/2016   CALCIUM 8.5 - 10.1 mg/dL 9.8   CALCIUM IONIZED 4.4 - 5.2 mg/dL    CALCIUM IONIZED WHOLE BLOOD 4.4 - 5.2 mg/dL    PHOSPHOROUS 2.5 - 4.5 mg/dL 3.8   MAGNESIUM 1.6 - 2.3 mg/dL    ALBUMIN 3.4 - 5.0 g/dL 3.1 (L)   BUN 7 - 30 mg/dL 22   CREATININE 0.52 - 1.04 mg/dL 1.30 (H)   PARATHYROID HORMONE INTACT 12 - 72 pg/mL 3 (L)      Risk Factors:  None  Calcium and Vit D supplements : Taking Ca supplements with D      Hypothyroidism  This was diagnosed during a hospitalization in 11/2016  At that time, TSH was 8.44 with a free T4 of 0.61  Levothyroxine was started and sees on 88  g daily  With this he has no symptoms of hypothyroidism  She is sleeping well, normal mood, no tremors  TFTs on April 5, 2018 were normal      ROS  Further weight gain noted.  She has started low-carb and low-salt diet  Wt Readings from Last 4 Encounters:   05/08/18 95.7 kg (211 lb)   04/04/18 95.7 kg (211 lb)   04/03/18 94.8 kg (209 lb)   03/21/18 94.8 kg (209 lb)     No lethargy, confusion, stupor or coma associated with high calcium  She complains of Anorexia and chronic diarrhea, feels nauseous chronically.   Vision loss on the left due to Optic neuropathy  No headache, change in vision, loss of peripheral vision  No neck pain, no other lumps in the neck  No change in breathing   No change in swallowing.   No swelling in extremities  No change in mental status.   Complete ROS that were obtained were negative.     Past Medical History:   Diagnosis Date     Asthma      Chronic kidney disease      End stage liver disease (H)     2/2 Alcohol Abuse     Liver transplanted (H)      Migraines      Osteoporosis      Spinal stenosis in cervical region      Strabismus      Past Surgical  History:   Procedure Laterality Date     APPENDECTOMY           BENCH LIVER N/A 2016    Procedure: BENCH LIVER;  Surgeon: Larry Dhillon MD;  Location: [facility] OR     CATARACT IOL, RT/LT       COLONOSCOPY       ESOPHAGOSCOPY, GASTROSCOPY, DUODENOSCOPY (EGD), COMBINED N/A 2016    Procedure: COMBINED ESOPHAGOSCOPY, GASTROSCOPY, DUODENOSCOPY (EGD);  Surgeon: Tao Alfonso MD;  Location: [facility] GI     ESOPHAGOSCOPY, GASTROSCOPY, DUODENOSCOPY (EGD), COMBINED N/A 10/31/2016    Procedure: COMBINED ESOPHAGOSCOPY, GASTROSCOPY, DUODENOSCOPY (EGD), BIOPSY SINGLE OR MULTIPLE;  Surgeon: Ronald Bojorquez MD;  Location: [facility] GI     sphincteroplasty       TRANSPLANT LIVER RECIPIENT  DONOR N/A 2016    Procedure: TRANSPLANT LIVER RECIPIENT  DONOR;  Surgeon: Larry Dhillon MD;  Location: [facility] OR     TUBAL LIGATION      laparoscopic     Family History   Problem Relation Age of Onset     Family History Negative Other      Melanoma Mother           HEART DISEASE Father      CHF     Social History     Social History     Marital status:      Spouse name: N/A     Number of children: 3     Years of education: N/A     Occupational History           Social History Main Topics     Smoking status: Former Smoker     Packs/day: 2.50     Years: 20.00     Quit date: 1996     Smokeless tobacco: Never Used     Alcohol use No      Comment: heavy use (750ml/2 days) up until 1 year ago; had cut down to 1 drink/day; none since 17     Drug use: No     Sexual activity: Not on file     Other Topics Concern     Not on file     Social History Narrative    Works as  for [employer], 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        Allergies   Allergen Reactions     Benadryl [Diphenhydramine] Hives     Cefaclor Hives     Hydrocodone-Acetaminophen Itching     Oxycodone Itching     Penicillins Hives     Sulfa Drugs Hives     Current Outpatient Prescriptions    Medication     acetaminophen (TYLENOL) 325 MG tablet     amLODIPine (NORVASC) 5 MG tablet     azaTHIOprine 100 MG TABS     betaxolol (BETOPTIC) 0.5 % ophthalmic solution     busPIRone (BUSPAR) 10 MG tablet     calcium Citrate-vitamin D 500-400 MG-UNIT CHEW     chlorthalidone (HYGROTON) 25 MG tablet     cholecalciferol (VITAMIN D3) 400 UNIT TABS tablet     cyanocobalamin (VITAMIN  B-12) 1000 MCG tablet     ferrous sulfate (IRON) 325 (65 FE) MG tablet     folic acid (FOLVITE) 1 MG tablet     hydrOXYzine (ATARAX) 25 MG tablet     levothyroxine (SYNTHROID/LEVOTHROID) 88 MCG tablet     melatonin 5 MG tablet     multivitamin, therapeutic (THERA-VIT) TABS tablet     Omega-3 Fatty Acids (OMEGA-3 FISH OIL EX ST PO)     pantoprazole (PROTONIX) 40 MG EC tablet     pramipexole (MIRAPEX) 0.5 MG tablet     predniSONE (DELTASONE) 1 MG tablet     predniSONE (DELTASONE) 5 MG tablet     propranolol (INDERAL) 10 MG tablet     psyllium 0.52 G capsule     tacrolimus (GENERIC EQUIVALENT) 0.5 MG capsule     traMADol (ULTRAM) 50 MG tablet     traZODone (DESYREL) 100 MG tablet     ursodiol (ACTIGALL) 300 MG capsule     No current facility-administered medications for this visit.        Exam:   /74  Pulse 75  Wt 98.3 kg (216 lb 12.8 oz)  BMI 33.96 kg/m2   Constitutional: pleasant female, no distress.    Head: Normocephalic. No masses, lesions, tenderness or abnormalities  Eye loss of peripheral vision on the left + Chronic, right side is normal  Neck: Neck supple. No adenopathy. Thyroid symmetric, normal size, Carotids without bruits.  ENT: No throat congestion, no neck nodes or sinus tenderness  Cardiovascular: regular rhythm, no tachycardia  Respiratory: Lungs clear  Gastrointestinal: Abdomen soft, mild LUQ tenderness without rebound. BS normal.   Musculoskeletal: gait normal and normal muscle tone  Neurologic: Normal speech, reflexes normal. Tremor is present (Chronic).   Psychiatric: mentation appears normal     Component       Latest Ref Rng & Units 4/20/2018   Sodium      133 - 144 mmol/L 140   Potassium      3.4 - 5.3 mmol/L 3.8   Chloride      94 - 109 mmol/L 107   Carbon Dioxide      20 - 32 mmol/L 28   Anion Gap      3 - 14 mmol/L 5   Glucose      70 - 99 mg/dL 101 (H)   Urea Nitrogen      7 - 30 mg/dL 18   Creatinine      0.52 - 1.04 mg/dL 1.19 (H)   GFR Estimate      >60 mL/min/1.7m2 46 (L)   GFR Estimate If Black      >60 mL/min/1.7m2 56 (L)   Calcium      8.5 - 10.1 mg/dL 8.7   WBC      4.0 - 11.0 10e9/L 7.5   RBC Count      3.8 - 5.2 10e12/L 4.29   Hemoglobin      11.7 - 15.7 g/dL 13.4   Hematocrit      35.0 - 47.0 % 41.1   Platelet Count      150 - 450 10e9/L 244   Bilirubin Direct      0.0 - 0.2 mg/dL 0.2   Bilirubin Total      0.2 - 1.3 mg/dL 0.7   Albumin      3.4 - 5.0 g/dL 3.6   Protein Total      6.8 - 8.8 g/dL 7.0   Alkaline Phosphatase      40 - 150 U/L 48   ALT      0 - 50 U/L 14   AST      0 - 45 U/L 12   Tacrolimus Last Dose       04/19/2018 @ 1930   Tacrolimus Level      5.0 - 15.0 ug/L 4.8 (L)   Magnesium      1.6 - 2.3 mg/dL 1.9   TSH      0.40 - 4.00 mU/L 3.41   T4 Free      0.76 - 1.46 ng/dL 1.15   Parathyroid Hormone Intact      18 - 80 pg/mL 90 (H)   Phosphorus      2.5 - 4.5 mg/dL 3.1   PTH Related Peptide Test      0.0 - 3.4 pmol/L 2.3

## 2018-05-21 NOTE — LETTER
5/21/2018       RE: Phan Luque  [id number]  [facility] [address]     Dear Colleague,    Thank you for referring your patient, Phan Luque, to the [facility] ENDOCRINOLOGY at [facility]. Please see a copy of my visit note below.    Endocrine Clinic Visit. :     Reason for visit: Hypercalcemia initially seen on Initial visit Nov 2016        Assessment   Phan Luque is a 61 / female with Intermittent hypercalcemia in the setting of acute on chronic renal failure ( improved) , and Ca supplements.       Elevated PTH-related protein likely due to renal failure  Mild elevation of PTH. Normal suppression of PTH noted in past. Possible secondary hyperpara d/t CKD.   Normal levels in the last labs.   Follow Ca, Vit D and PTH levels.     History of osteopenia on chronic Prednisone  She received ZA 8/2017.   Was treated with HRT in 40s for 5 years  DXA 4/5/2017: Negative T score of -2.2 at left femoral neck   Ca (750 Citracal + D 400 total ) and vitamin D  Supplement (MTM to review D dose - goal 2000 IU)     History of chronic prednisone therapy (Liver Transplant and pain)  Prednisone reduced to 7.5 mg, she has been retaining fluid and is now on Chlorthalidone.   Further wt gain noted     Hypothyroidism - Acquired  FU thyroid function test  Continue LT4 88 mcg daily for now.       Ruth Oakley MD  [id number]  Endocrinology Service        ====================================================================    Interval history.       HPI:     Phan is a 62 year old female who initially presented for hypercalcemia in the setting of GERMAIN and CKD and increase Ca intake. Mild PTHrp elevation was noted at the time but felt to be in the setting of CKD. The hypercalcemia / PTH rp levels normalized with improvement of renal function.     She has history of osteopenia in the setting of Chronic Prednisone and therefore was treated with ZA.  She has a complicated past medical history  with liver transplant on immunosuppressant and malnutrition. she has gained most of her weight back.     ENDO CALCIUM LABS-[facility] Latest Ref Rng 10/6/2016   CALCIUM 8.5 - 10.1 mg/dL 10.4 (H)   CALCIUM IONIZED 4.4 - 5.2 mg/dL    CALCIUM IONIZED WHOLE BLOOD 4.4 - 5.2 mg/dL    PHOSPHOROUS 2.5 - 4.5 mg/dL 5.5 (H)   MAGNESIUM 1.6 - 2.3 mg/dL 2.2   ALBUMIN 3.4 - 5.0 g/dL 3.4   BUN 7 - 30 mg/dL 48 (H)   CREATININE 0.52 - 1.04 mg/dL 2.28 (H)   PARATHYROID HORMONE INTACT 12 - 72 pg/mL    ALKPHOS 40 - 150 U/L 68     ENDO CALCIUM LABS-[facility] Latest Ref Rng 10/10/2016   CALCIUM 8.5 - 10.1 mg/dL 9.8   CALCIUM IONIZED 4.4 - 5.2 mg/dL    CALCIUM IONIZED WHOLE BLOOD 4.4 - 5.2 mg/dL    PHOSPHOROUS 2.5 - 4.5 mg/dL 3.8   MAGNESIUM 1.6 - 2.3 mg/dL    ALBUMIN 3.4 - 5.0 g/dL 3.1 (L)   BUN 7 - 30 mg/dL 22   CREATININE 0.52 - 1.04 mg/dL 1.30 (H)   PARATHYROID HORMONE INTACT 12 - 72 pg/mL 3 (L)      Risk Factors:  None  Calcium and Vit D supplements : Taking Ca supplements with D      Hypothyroidism  This was diagnosed during a hospitalization in 11/2016  At that time, TSH was 8.44 with a free T4 of 0.61  Levothyroxine was started and sees on 88  g daily  With this he has no symptoms of hypothyroidism  She is sleeping well, normal mood, no tremors  TFTs on April 5, 2018 were normal      ROS  Further weight gain noted.  She has started low-carb and low-salt diet  Wt Readings from Last 4 Encounters:   05/08/18 95.7 kg (211 lb)   04/04/18 95.7 kg (211 lb)   04/03/18 94.8 kg (209 lb)   03/21/18 94.8 kg (209 lb)     No lethargy, confusion, stupor or coma associated with high calcium  She complains of Anorexia and chronic diarrhea, feels nauseous chronically.   Vision loss on the left due to Optic neuropathy  No headache, change in vision, loss of peripheral vision  No neck pain, no other lumps in the neck  No change in breathing   No change in swallowing.   No swelling in extremities  No change in mental status.   Complete ROS that were obtained were  negative.     Past Medical History:   Diagnosis Date     Asthma      Chronic kidney disease      End stage liver disease (H)     2/2 Alcohol Abuse     Liver transplanted (H)      Migraines      Osteoporosis      Spinal stenosis in cervical region      Strabismus      Past Surgical History:   Procedure Laterality Date     APPENDECTOMY           BENCH LIVER N/A 2016    Procedure: BENCH LIVER;  Surgeon: Larry Dhillon MD;  Location: [facility] OR     CATARACT IOL, RT/LT       COLONOSCOPY       ESOPHAGOSCOPY, GASTROSCOPY, DUODENOSCOPY (EGD), COMBINED N/A 2016    Procedure: COMBINED ESOPHAGOSCOPY, GASTROSCOPY, DUODENOSCOPY (EGD);  Surgeon: Tao Alfonso MD;  Location: [facility] GI     ESOPHAGOSCOPY, GASTROSCOPY, DUODENOSCOPY (EGD), COMBINED N/A 10/31/2016    Procedure: COMBINED ESOPHAGOSCOPY, GASTROSCOPY, DUODENOSCOPY (EGD), BIOPSY SINGLE OR MULTIPLE;  Surgeon: Ronald Bojorquez MD;  Location: [facility] GI     sphincteroplasty       TRANSPLANT LIVER RECIPIENT  DONOR N/A 2016    Procedure: TRANSPLANT LIVER RECIPIENT  DONOR;  Surgeon: Larry Dhillon MD;  Location: [facility] OR     TUBAL LIGATION      laparoscopic     Family History   Problem Relation Age of Onset     Family History Negative Other      Melanoma Mother           HEART DISEASE Father      CHF     Social History     Social History     Marital status:      Spouse name: N/A     Number of children: 3     Years of education: N/A     Occupational History           Social History Main Topics     Smoking status: Former Smoker     Packs/day: 2.50     Years: 20.00     Quit date: 1996     Smokeless tobacco: Never Used     Alcohol use No      Comment: heavy use (750ml/2 days) up until 1 year ago; had cut down to 1 drink/day; none since 17     Drug use: No     Sexual activity: Not on file     Other Topics Concern     Not on file     Social History Marcelino    Works as  for [employer], 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        Allergies   Allergen Reactions     Benadryl [Diphenhydramine] Hives     Cefaclor Hives     Hydrocodone-Acetaminophen Itching     Oxycodone Itching     Penicillins Hives     Sulfa Drugs Hives     Current Outpatient Prescriptions   Medication     acetaminophen (TYLENOL) 325 MG tablet     amLODIPine (NORVASC) 5 MG tablet     azaTHIOprine 100 MG TABS     betaxolol (BETOPTIC) 0.5 % ophthalmic solution     busPIRone (BUSPAR) 10 MG tablet     calcium Citrate-vitamin D 500-400 MG-UNIT CHEW     chlorthalidone (HYGROTON) 25 MG tablet     cholecalciferol (VITAMIN D3) 400 UNIT TABS tablet     cyanocobalamin (VITAMIN  B-12) 1000 MCG tablet     ferrous sulfate (IRON) 325 (65 FE) MG tablet     folic acid (FOLVITE) 1 MG tablet     hydrOXYzine (ATARAX) 25 MG tablet     levothyroxine (SYNTHROID/LEVOTHROID) 88 MCG tablet     melatonin 5 MG tablet     multivitamin, therapeutic (THERA-VIT) TABS tablet     Omega-3 Fatty Acids (OMEGA-3 FISH OIL EX ST PO)     pantoprazole (PROTONIX) 40 MG EC tablet     pramipexole (MIRAPEX) 0.5 MG tablet     predniSONE (DELTASONE) 1 MG tablet     predniSONE (DELTASONE) 5 MG tablet     propranolol (INDERAL) 10 MG tablet     psyllium 0.52 G capsule     tacrolimus (GENERIC EQUIVALENT) 0.5 MG capsule     traMADol (ULTRAM) 50 MG tablet     traZODone (DESYREL) 100 MG tablet     ursodiol (ACTIGALL) 300 MG capsule     No current facility-administered medications for this visit.        Exam:   /74  Pulse 75  Wt 98.3 kg (216 lb 12.8 oz)  BMI 33.96 kg/m2   Constitutional: pleasant female, no distress.    Head: Normocephalic. No masses, lesions, tenderness or abnormalities  Eye loss of peripheral vision on the left + Chronic, right side is normal  Neck: Neck supple. No adenopathy. Thyroid symmetric, normal size, Carotids without bruits.  ENT: No throat congestion, no neck nodes or sinus tenderness  Cardiovascular: regular rhythm, no  tachycardia  Respiratory: Lungs clear  Gastrointestinal: Abdomen soft, mild LUQ tenderness without rebound. BS normal.   Musculoskeletal: gait normal and normal muscle tone  Neurologic: Normal speech, reflexes normal. Tremor is present (Chronic).   Psychiatric: mentation appears normal     Component      Latest Ref Rng & Units 4/20/2018   Sodium      133 - 144 mmol/L 140   Potassium      3.4 - 5.3 mmol/L 3.8   Chloride      94 - 109 mmol/L 107   Carbon Dioxide      20 - 32 mmol/L 28   Anion Gap      3 - 14 mmol/L 5   Glucose      70 - 99 mg/dL 101 (H)   Urea Nitrogen      7 - 30 mg/dL 18   Creatinine      0.52 - 1.04 mg/dL 1.19 (H)   GFR Estimate      >60 mL/min/1.7m2 46 (L)   GFR Estimate If Black      >60 mL/min/1.7m2 56 (L)   Calcium      8.5 - 10.1 mg/dL 8.7   WBC      4.0 - 11.0 10e9/L 7.5   RBC Count      3.8 - 5.2 10e12/L 4.29   Hemoglobin      11.7 - 15.7 g/dL 13.4   Hematocrit      35.0 - 47.0 % 41.1   Platelet Count      150 - 450 10e9/L 244   Bilirubin Direct      0.0 - 0.2 mg/dL 0.2   Bilirubin Total      0.2 - 1.3 mg/dL 0.7   Albumin      3.4 - 5.0 g/dL 3.6   Protein Total      6.8 - 8.8 g/dL 7.0   Alkaline Phosphatase      40 - 150 U/L 48   ALT      0 - 50 U/L 14   AST      0 - 45 U/L 12   Tacrolimus Last Dose       04/19/2018 @ 1930   Tacrolimus Level      5.0 - 15.0 ug/L 4.8 (L)   Magnesium      1.6 - 2.3 mg/dL 1.9   TSH      0.40 - 4.00 mU/L 3.41   T4 Free      0.76 - 1.46 ng/dL 1.15   Parathyroid Hormone Intact      18 - 80 pg/mL 90 (H)   Phosphorus      2.5 - 4.5 mg/dL 3.1   PTH Related Peptide Test      0.0 - 3.4 pmol/L 2.3     Endocrine Consult Follow Up   Date of Service: 5/21/2018     Phan Luque is a 62 year old female with Intermittent hypercalcemia in the setting of acute on chronic renal failure ( improved)  and hypothyroidism. She also has a history of osteopenia due to chronic Prednisone use with T score of -2.2 at the left femoral neck in 4/5/2017. She received ZA in 8/2017.    Subjective:  Patient reports feeling okay.   Patient is on 1200mg of Calcium and 1800 IU of Vitamin D.   She is on 9mg daily of Prednisone, that is supposed to be tapered by 1mg every month. She has been told that it will be tapered to as low a dose as she is comfortable with.   Patient continues to be on 88microg of Levothyroxine that she is taking everyday. She reports feeling hot occasionally but with no sweating. No chest pain, SOB, palpitations. No tremors.     She has had diarrhea with gas for the last 6 weeks after a medication change. No skin or hair changes. No leg swelling or facial puffiness. She reports that she has been losing a little weight recently. No nausea, vomiting.   She has no history of falls or fractures. She reports no loss of height.  She has left sided knee pain and ankle pain and is seeing an Orthopedician.     Medications:  Current Outpatient Prescriptions   Medication Sig Dispense Refill     acetaminophen (TYLENOL) 325 MG tablet Take 2 tablets (650 mg) by mouth every 6 hours as needed for mild pain Or fevers. Use sparingly. Limit use to no more than 2 grams (2000 mg) in 24 hours. **Further refills must be obtained by primary care provider** 100 tablet 0     amLODIPine (NORVASC) 5 MG tablet Take 1 tablet (5 mg) by mouth daily 90 tablet 3     azaTHIOprine 100 MG TABS Take 50 mg by mouth every evening 90 tablet 3     betaxolol (BETOPTIC) 0.5 % ophthalmic solution Place 1 drop into both eyes 2 times daily 5 mL 2     busPIRone (BUSPAR) 10 MG tablet Take 1 tablet (10 mg) by mouth 2 times daily 60 tablet 3     calcium Citrate-vitamin D 500-400 MG-UNIT CHEW Take 1 tablet by mouth daily       chlorthalidone (HYGROTON) 25 MG tablet Take 0.5 tablets (12.5 mg) by mouth daily 45 tablet 3     cholecalciferol (VITAMIN D3) 400 UNIT TABS tablet Take 400 Units by mouth daily       cyanocobalamin (VITAMIN  B-12) 1000 MCG tablet Take 1,000 mcg by mouth daily       ferrous sulfate (IRON) 325 (65 FE) MG  tablet Take 1 tablet (325 mg) by mouth 2 times daily 100 tablet      folic acid (FOLVITE) 1 MG tablet Take 1 tablet (1 mg) by mouth daily 30 tablet 1     hydrOXYzine (ATARAX) 25 MG tablet Take 1-2 tablets (25-50mg) every 6 hours as needed for itching 60 tablet 2     levothyroxine (SYNTHROID/LEVOTHROID) 88 MCG tablet Take 1 tablet (88 mcg) by mouth every morning (before breakfast) 90 tablet 2     melatonin 5 MG tablet Take 1 tablet (5 mg) by mouth nightly as needed for sleep       multivitamin, therapeutic (THERA-VIT) TABS tablet Take 1 tablet by mouth every 24 hours 30 tablet 11     Omega-3 Fatty Acids (OMEGA-3 FISH OIL EX ST PO) Take 1 capsule by mouth 2 times daily 1290 (fish oil)-900 (omega 3)       pantoprazole (PROTONIX) 40 MG EC tablet Take 1 tablet (40 mg) by mouth every morning (before breakfast) 90 tablet 4     pramipexole (MIRAPEX) 0.5 MG tablet Take 1 tablet (0.5 mg) by mouth At Bedtime 90 tablet 3     predniSONE (DELTASONE) 1 MG tablet Take 4 tablets (4 mg) by mouth daily 360 tablet 3     predniSONE (DELTASONE) 5 MG tablet Take 1 tablet (5 mg) by mouth daily 90 tablet 3     propranolol (INDERAL) 10 MG tablet Take 1 tablet (10 mg) by mouth 3 times daily As needed for anxiety 90 tablet 0     psyllium 0.52 G capsule Take 2 capsules (1.04 g) by mouth daily With a full glass of water. 60 capsule 1     tacrolimus (GENERIC EQUIVALENT) 0.5 MG capsule Take 5 capsules (2.5 mg) by mouth 2 times daily 300 capsule 11     traMADol (ULTRAM) 50 MG tablet Take 1 tablet (50 mg) by mouth every 6 hours as needed for severe pain 20 tablet 0     traZODone (DESYREL) 100 MG tablet Take 1.5 tablets (150 mg) by mouth At Bedtime 45 tablet 3     ursodiol (ACTIGALL) 300 MG capsule Take 1 capsule (300 mg) by mouth 2 times daily 60 capsule 11      Physical Examination:  /74  Pulse 75  Wt 98.3 kg (216 lb 12.8 oz)  BMI 33.96 kg/m2   GEN: Awake, alert, no distress.  HEENT: EOMI.  NECK: Thyroid not palpable. No cervical or  clavicular LAD.   CV: RRR with no murmur.   RESP: CTA bilaterally.   ABD: Soft, tenderness in the left hypochondrium on deep palpation, and non-distended, with normal BS. No rebound.   EXT: No peripheral edema.   SKIN: Normal skin temperature and turgor with no lesions.   NEURO: Normoactive reflexes. No tremor.     Labs:   As of 4/20/2018, Ca is 8.7, Albumin is 3.6, ALP is 48, PTH was 90 , PTHrP-2.3,TSH-3.41, FT4-1.15   Vitamin D was 26 as on 4/5/17    Assessment:  Phan is a 61 year old female with Intermittent hypercalcemia in the setting of acute on chronic renal failure ( improved)  and hypothyroidism. She also has a history of osteopenia due to chronic Prednisone use with T score of -2.2 at the left femoral neck in 4/5/2017. She received ZA in 8/2017.  1. Hypercalcemia  Levels of calcium, albumin, PTHrP and Vitamin D are normal as checked in April, indicating good response to treatment. No change is thus proposed. Her PTH is elevated, most likely related to her chronic kidney disease, given that her Vitamin D levels are normal. There is no need to treat that given that she has no manifestations of the elevated hormone levels.     2. Thyroid disease  She is on Levothyroxine 88 microg which she is taking regularly. She seems euthyroid according to her symptoms and her labs. No change is proposed.     3. Osteopenia  As the patient is on Prednisone 9mg, and will continue to be on steroid for the foreseeable future, it is necessary to treat her for the same. Her last dose of ZA was in 8/17 and is scheduled for the next dose in August this year. If she were to come off the steroid, treatment may possibly be stopped.   Laboratory investigations may be repeated when she receives her ZA next followed by every 6 months.   Follow up is necessary every year.   Brigitte Smith  Student, Endocrinology      Again, thank you for allowing me to participate in the care of your patient.      Sincerely,    Ruth Oakley,  MD

## 2018-05-25 ENCOUNTER — OFFICE VISIT (OUTPATIENT)
Dept: ORTHOPEDICS | Facility: CLINIC | Age: 63
End: 2018-05-25
Payer: COMMERCIAL

## 2018-05-25 VITALS — BODY MASS INDEX: 33.9 KG/M2 | HEIGHT: 67 IN | WEIGHT: 216 LBS

## 2018-05-25 DIAGNOSIS — M54.16 LUMBAR RADICULAR PAIN: Primary | ICD-10-CM

## 2018-05-25 NOTE — MR AVS SNAPSHOT
After Visit Summary   5/25/2018    Phan Luque    MRN: [id number]           Patient Information     Date Of Birth          1955        Visit Information        Provider Department      5/25/2018 3:40 PM Donnell Duvall MD [facility]        Today's Diagnoses     Lumbar radicular pain    -  1       Follow-ups after your visit        Your next 10 appointments already scheduled     Josue 15, 2018   Procedure with Tao Alfonso MD   [facility] ([facility])    [address] [place] [address]  5th Floor  [facility] [id number] [phone]           Located in the Clinics and Surgery Center at [address] [place] [address].   parking is very convenient and highly recommended.  is a $6 flat rate fee.  Both  and self parkers should enter the main arrival plaza from [facility]; parking attendants will direct you based on your parking preference.            Jun 20, 2018  2:30 PM CDT   XR LUMBAR TRANSLAMINAR INJECTION with ANASTACIOXR3,  IMAGING NURSE, ANASTACIO NEURO RAD   [facility] ([facility])    [address] [place] [address]  1st [facility] [id number] [phone]           For nerve root injection, please send or bring copies of any MRIs or other scans you have had.  Bring a list of your current medicines to your exam. (Include vitamins, minerals and over-the-counter medicines.) Leave your valuables at home.  Plan to have someone drive you home afterward.  Stop taking the following medicines (but talk to your doctor first):   If you take blood thinners, you may need to stop taking them a few days before treatment. Talk to your doctor before stopping these medicines.Stop taking Coumadin (warfarin) 3 days before treatment. Restart the day after treatment.   If you take Plavix, Ticlid, Pletal or Persantine, please ask your doctor if you should stop these medicines. You  may need extra tests on the morning of your scan.   If you take Xarelto (Rivaroxaban), you may need to stop taking it 24 hours before treatment. Talk to your doctor before stopping this medicine. Restart the day after treatment.  You may take your other medicines as normal.  Stop all food and drink (including water) 3 hours before your test or treatment.  Please tell the doctor:   If you are allergic to X-ray dye (contrast fluid).   If you may be pregnant.  Injections take about 30 to 45 minutes. Most people spend up to 2 hours in the clinic or hospital.  Please call the Imaging Department at your exam site with any questions.            Jun 26, 2018  5:00 PM CDT   Adult Med Follow UP with DONOVAN [facility]   Psychiatry Clinic ([facility])    [place] [id number]  [address] [place] [address]   [phone]            Jul 10, 2018  4:00 PM CDT   (Arrive by 3:45 PM)   Return Visit with Donnell Duvall MD   [facility] ([facility])    [address] [place] [address]  4th [facility] [address]   [phone]            Oct 01, 2018  4:45 PM CDT   (Arrive by 4:30 PM)   Return Liver Transplant with Hussein Banegas MD   [facility] Hepat[facility] ([facility])    [address] [place] [address]  [address]  [facility] [address]   [phone]            Oct 22, 2018  7:30 AM CDT   (Arrive by 7:15 AM)   Return Visit with Arlyn Sanchez MD   [facility] ([facility])    [address] [place] [address]  4th [facility] [address]   [phone]            Mar 20, 2019  3:30 PM CDT   Lab with  LAB   [facility] Lab ([facility])    [address] [place] [address]   [phone]            Mar 20, 2019  4:30 PM CDT   (Arrive by 4:00 PM)   Return Visit with Daya Gutierrez MD   [facility] Nephrology ([facility]  "[facility])    [address] [facility]  [address]  [facility] [address] [phone]              Future tests that were ordered for you today     Open Future Orders        Priority Expected Expires Ordered    X-ray Translaminar lumbar single inj Routine 5/25/2018 5/25/2019 5/25/2018            Who to contact     Please call your clinic at [phone] to:    Ask questions about your health    Make or cancel appointments    Discuss your medicines    Learn about your test results    Speak to your doctor            Additional Information About Your Visit        M[employer] Information     Bubbles gives you secure access to your electronic health record. If you see a primary care provider, you can also send messages to your care team and make appointments. If you have questions, please call your primary care clinic.  If you do not have a primary care provider, please call [phone] and they will assist you.      Bubbles is an electronic gateway that provides easy, online access to your medical records. With [employer]ubbles, you can request a clinic appointment, read your test results, renew a prescription or communicate with your care team.     To access your existing account, please contact your [facility] or call [phone] for assistance.        Care EveryWhere ID     This is your Care EveryWhere ID. This could be used by other organizations to access your [facility] medical records  [id number]        Your Vitals Were     Height BMI (Body Mass Index)                1.702 m (5' 7\") 33.83 kg/m2           Blood Pressure from Last 3 Encounters:   05/21/18 125/74   04/10/18 128/80   04/04/18 140/83    Weight from Last 3 Encounters:   05/25/18 98 kg (216 lb)   05/21/18 98.3 kg (216 lb 12.8 oz)   05/08/18 95.7 kg (211 lb)               Primary Care Provider Office Phone # Fax #    Arlyn Sanchez [phone]       [address] [address]      "   Equal Access to Services     [facility]: Hadii adonis mejia skyler Roberson, wajoaoda luqadaha, qaybta kaaustin castropolyanna clementeelisabetraffi schroeder. So [facility] [phone].    ATENCIÓN: Si louisla jessica, tiene a reece disposición servicios gratuitos de asistencia lingüística. Kathiame al [phone].    We comply with applicable federal civil rights laws and [place] laws. We do not discriminate on the basis of race, color, national origin, age, disability, sex, sexual orientation, or gender identity.            Thank you!     Thank you for choosing [facility]  for your care. Our goal is always to provide you with excellent care. Hearing back from our patients is one way we can continue to improve our services. Please take a few minutes to complete the written survey that you may receive in the mail after your visit with us. Thank you!             Your Updated Medication List - Protect others around you: Learn how to safely use, store and throw away your medicines at www.disposemymeds.org.          This list is accurate as of 5/25/18  4:32 PM.  Always use your most recent med list.                   Brand Name Dispense Instructions for use Diagnosis    acetaminophen 325 MG tablet    TYLENOL    100 tablet    Take 2 tablets (650 mg) by mouth every 6 hours as needed for mild pain Or fevers. Use sparingly. Limit use to no more than 2 grams (2000 mg) in 24 hours. **Further refills must be obtained by primary care provider**    Acute post-operative pain       amLODIPine 5 MG tablet    NORVASC    90 tablet    Take 1 tablet (5 mg) by mouth daily    Hypertension       azaTHIOprine 100 MG Tabs     90 tablet    Take 50 mg by mouth every evening    Liver transplanted (H)       betaxolol 0.5 % ophthalmic solution    BETOPTIC    5 mL    Place 1 drop into both eyes 2 times daily    Primary open angle glaucoma of both eyes, unspecified glaucoma stage       busPIRone 10 MG tablet    BUSPAR    60 tablet     Take 1 tablet (10 mg) by mouth 2 times daily    WALTER (generalized anxiety disorder)       calcium Citrate-vitamin D 500-400 MG-UNIT Chew      Take 1 tablet by mouth daily        chlorthalidone 25 MG tablet    HYGROTON    45 tablet    Take 0.5 tablets (12.5 mg) by mouth daily    CKD (chronic kidney disease) stage 3, GFR 30-59 ml/min, Fluid retention       cholecalciferol 400 UNIT Tabs tablet    vitamin D3     Take 400 Units by mouth daily        cyanocobalamin 1000 MCG tablet    vitamin  B-12     Take 1,000 mcg by mouth daily        ferrous sulfate 325 (65 Fe) MG tablet    IRON    100 tablet    Take 1 tablet (325 mg) by mouth 2 times daily    Iron deficiency       folic acid 1 MG tablet    FOLVITE    30 tablet    Take 1 tablet (1 mg) by mouth daily    Malnutrition (H)       hydrOXYzine 25 MG tablet    ATARAX    60 tablet    Take 1-2 tablets (25-50mg) every 6 hours as needed for itching    WALTER (generalized anxiety disorder)       levothyroxine 88 MCG tablet    SYNTHROID/LEVOTHROID    90 tablet    Take 1 tablet (88 mcg) by mouth every morning (before breakfast)    Thyroid function test abnormal       melatonin 5 MG tablet      Take 1 tablet (5 mg) by mouth nightly as needed for sleep        multivitamin, therapeutic Tabs tablet     30 tablet    Take 1 tablet by mouth every 24 hours    Malnutrition (H)       OMEGA-3 FISH OIL EX ST PO      Take 1 capsule by mouth 2 times daily 1290 (fish oil)-900 (omega 3)        pantoprazole 40 MG EC tablet    PROTONIX    90 tablet    Take 1 tablet (40 mg) by mouth every morning (before breakfast)    Gastroesophageal reflux disease, esophagitis presence not specified       pramipexole 0.5 MG tablet    MIRAPEX    90 tablet    Take 1 tablet (0.5 mg) by mouth At Bedtime    Restless leg syndrome       * predniSONE 5 MG tablet    DELTASONE    90 tablet    Take 1 tablet (5 mg) by mouth daily    Liver replaced by transplant (H)       * predniSONE 1 MG tablet    DELTASONE    360 tablet    Take 4  tablets (4 mg) by mouth daily    Liver replaced by transplant (H)       propranolol 10 MG tablet    INDERAL    90 tablet    Take 1 tablet (10 mg) by mouth 3 times daily As needed for anxiety    Generalized anxiety disorder       psyllium 0.52 g capsule     60 capsule    Take 2 capsules (1.04 g) by mouth daily With a full glass of water.    Loose stools       tacrolimus 0.5 MG capsule    GENERIC EQUIVALENT    300 capsule    Take 5 capsules (2.5 mg) by mouth 2 times daily    Liver transplanted (H)       traMADol 50 MG tablet    ULTRAM    20 tablet    Take 1 tablet (50 mg) by mouth every 6 hours as needed for severe pain    Acute left ankle pain, Lumbar radicular pain       traZODone 100 MG tablet    DESYREL    45 tablet    Take 1.5 tablets (150 mg) by mouth At Bedtime    Insomnia, unspecified type       ursodiol 300 MG capsule    ACTIGALL    60 capsule    Take 1 capsule (300 mg) by mouth 2 times daily    Liver transplanted (H)       * Notice:  This list has 2 medication(s) that are the same as other medications prescribed for you. Read the directions carefully, and ask your doctor or other care provider to review them with you.

## 2018-05-25 NOTE — LETTER
5/25/2018       RE: Phan Luque  [address]  [facility] [facility] [address]     Dear Colleague,    Thank you for referring your patient, Phan Luque, to the [facility] at [facility]. Please see a copy of my visit note below.    HISTORY OF PRESENT ILLNESS  Ms. Luque is a pleasant 62 year old year old female who presents to clinic today for follow up for lumbar radicular pain and after having lumbar MRI  She continues to have pain in her low back and in legs    MEDICAL HISTORY  Patient Active Problem List   Diagnosis     Decompensation of cirrhosis of liver (H)     Liver transplanted (H)     Alcoholic hepatitis     Immunosuppression (H)     Alcoholic hepatitis without ascites     Enterococcus UTI     Liver transplant status     Nausea & vomiting     Malnutrition (H)     Candidiasis of skin     Anemia     ACP (advance care planning)     Diarrhea     Hypothyroidism     Esophageal reflux     Recurrent major depression in partial remission (H)     Insomnia     CKD (chronic kidney disease) stage 3, GFR 30-59 ml/min     Anemia in stage 3 chronic kidney disease     Osteopenia     Alcohol abuse, uncomplicated       Current Outpatient Prescriptions   Medication Sig Dispense Refill     acetaminophen (TYLENOL) 325 MG tablet Take 2 tablets (650 mg) by mouth every 6 hours as needed for mild pain Or fevers. Use sparingly. Limit use to no more than 2 grams (2000 mg) in 24 hours. **Further refills must be obtained by primary care provider** 100 tablet 0     amLODIPine (NORVASC) 5 MG tablet Take 1 tablet (5 mg) by mouth daily 90 tablet 3     azaTHIOprine 100 MG TABS Take 50 mg by mouth every evening 90 tablet 3     betaxolol (BETOPTIC) 0.5 % ophthalmic solution Place 1 drop into both eyes 2 times daily 5 mL 2     busPIRone (BUSPAR) 10 MG tablet Take 1 tablet (10 mg) by mouth 2 times daily 60 tablet 3     calcium Citrate-vitamin D 500-400 MG-UNIT CHEW Take 1 tablet by mouth  daily       chlorthalidone (HYGROTON) 25 MG tablet Take 0.5 tablets (12.5 mg) by mouth daily 45 tablet 3     cholecalciferol (VITAMIN D3) 400 UNIT TABS tablet Take 400 Units by mouth daily       cyanocobalamin (VITAMIN  B-12) 1000 MCG tablet Take 1,000 mcg by mouth daily       ferrous sulfate (IRON) 325 (65 FE) MG tablet Take 1 tablet (325 mg) by mouth 2 times daily 100 tablet      folic acid (FOLVITE) 1 MG tablet Take 1 tablet (1 mg) by mouth daily 30 tablet 1     hydrOXYzine (ATARAX) 25 MG tablet Take 1-2 tablets (25-50mg) every 6 hours as needed for itching 60 tablet 2     levothyroxine (SYNTHROID/LEVOTHROID) 88 MCG tablet Take 1 tablet (88 mcg) by mouth every morning (before breakfast) 90 tablet 2     melatonin 5 MG tablet Take 1 tablet (5 mg) by mouth nightly as needed for sleep       multivitamin, therapeutic (THERA-VIT) TABS tablet Take 1 tablet by mouth every 24 hours 30 tablet 11     Omega-3 Fatty Acids (OMEGA-3 FISH OIL EX ST PO) Take 1 capsule by mouth 2 times daily 1290 (fish oil)-900 (omega 3)       pantoprazole (PROTONIX) 40 MG EC tablet Take 1 tablet (40 mg) by mouth every morning (before breakfast) 90 tablet 4     pramipexole (MIRAPEX) 0.5 MG tablet Take 1 tablet (0.5 mg) by mouth At Bedtime 90 tablet 3     predniSONE (DELTASONE) 1 MG tablet Take 4 tablets (4 mg) by mouth daily 360 tablet 3     predniSONE (DELTASONE) 5 MG tablet Take 1 tablet (5 mg) by mouth daily 90 tablet 3     propranolol (INDERAL) 10 MG tablet Take 1 tablet (10 mg) by mouth 3 times daily As needed for anxiety 90 tablet 0     psyllium 0.52 G capsule Take 2 capsules (1.04 g) by mouth daily With a full glass of water. 60 capsule 1     tacrolimus (GENERIC EQUIVALENT) 0.5 MG capsule Take 5 capsules (2.5 mg) by mouth 2 times daily 300 capsule 11     traMADol (ULTRAM) 50 MG tablet Take 1 tablet (50 mg) by mouth every 6 hours as needed for severe pain 20 tablet 0     traZODone (DESYREL) 100 MG tablet Take 1.5 tablets (150 mg) by mouth At  "Bedtime 45 tablet 3     ursodiol (ACTIGALL) 300 MG capsule Take 1 capsule (300 mg) by mouth 2 times daily 60 capsule 11       Allergies   Allergen Reactions     Benadryl [Diphenhydramine] Hives     Cefaclor Hives     Hydrocodone-Acetaminophen Itching     Oxycodone Itching     Penicillins Hives     Sulfa Drugs Hives       Family History   Problem Relation Age of Onset     Family History Negative Other      Melanoma Mother           HEART DISEASE Father      CHF       Additional medical/Social/Surgical histories reviewed in [facility] and updated as appropriate.     REVIEW OF SYSTEMS (2018)  10 point ROS of systems including Constitutional, Eyes, Respiratory, Cardiovascular, Gastroenterology, Genitourinary, Integumentary, Musculoskeletal, Psychiatric were all negative except for pertinent positives noted in my HPI.     PHYSICAL EXAM  Vitals:    18 1540   Weight: 98 kg (216 lb)   Height: 1.702 m (5' 7\")     Vital Signs: Ht 1.702 m (5' 7\")  Wt 98 kg (216 lb)  BMI 33.83 kg/m2 Patient declined being weighed. Body mass index is 33.83 kg/(m^2).    General  - normal appearance, in no obvious distress  CV  - normal peripheral perfusion  Pulm  - normal respiratory pattern, non-labored  Musculoskeletal - lumbar spine  - stance: normal gait without limp, no obvious leg length discrepancy, normal heel and toe walk  - inspection: normal bone and joint alignment, no obvious scoliosis  - palpation: no paravertebral or bony tenderness  - ROM: flexion exacerbates pain, abnormal extension, sidebending, rotation, all cause pain  - strength: lower extremities 5/5 in all planes  - special tests:  (+) straight leg raise- bilateral  (+) slump test- low back  Neuro  - patellar and Achilles DTRs 2+ bilaterally, bilateral lower extremity sensory deficit throughout L5 distribution, grossly normal coordination, normal muscle tone  Skin  - no ecchymosis, erythema, warmth, or induration, no obvious rash  Psych  - interactive, " appropriate, normal mood and affect    ASSESSMENT & PLAN  61 yo female with lumbar radicular pain and ddd  Reviewed lumbar MRI  Ordered RICHI  Consider pool PT  F/u after RICHI      Donnell Duvall MD, CAQSM

## 2018-05-30 NOTE — PROGRESS NOTES
HISTORY OF PRESENT ILLNESS  Ms. Luque is a pleasant 62 year old year old female who presents to clinic today for follow up for lumbar radicular pain and after having lumbar MRI  She continues to have pain in her low back and in legs    MEDICAL HISTORY  Patient Active Problem List   Diagnosis     Decompensation of cirrhosis of liver (H)     Liver transplanted (H)     Alcoholic hepatitis     Immunosuppression (H)     Alcoholic hepatitis without ascites     Enterococcus UTI     Liver transplant status     Nausea & vomiting     Malnutrition (H)     Candidiasis of skin     Anemia     ACP (advance care planning)     Diarrhea     Hypothyroidism     Esophageal reflux     Recurrent major depression in partial remission (H)     Insomnia     CKD (chronic kidney disease) stage 3, GFR 30-59 ml/min     Anemia in stage 3 chronic kidney disease     Osteopenia     Alcohol abuse, uncomplicated       Current Outpatient Prescriptions   Medication Sig Dispense Refill     acetaminophen (TYLENOL) 325 MG tablet Take 2 tablets (650 mg) by mouth every 6 hours as needed for mild pain Or fevers. Use sparingly. Limit use to no more than 2 grams (2000 mg) in 24 hours. **Further refills must be obtained by primary care provider** 100 tablet 0     amLODIPine (NORVASC) 5 MG tablet Take 1 tablet (5 mg) by mouth daily 90 tablet 3     azaTHIOprine 100 MG TABS Take 50 mg by mouth every evening 90 tablet 3     betaxolol (BETOPTIC) 0.5 % ophthalmic solution Place 1 drop into both eyes 2 times daily 5 mL 2     busPIRone (BUSPAR) 10 MG tablet Take 1 tablet (10 mg) by mouth 2 times daily 60 tablet 3     calcium Citrate-vitamin D 500-400 MG-UNIT CHEW Take 1 tablet by mouth daily       chlorthalidone (HYGROTON) 25 MG tablet Take 0.5 tablets (12.5 mg) by mouth daily 45 tablet 3     cholecalciferol (VITAMIN D3) 400 UNIT TABS tablet Take 400 Units by mouth daily       cyanocobalamin (VITAMIN  B-12) 1000 MCG tablet Take 1,000 mcg by mouth daily       ferrous  sulfate (IRON) 325 (65 FE) MG tablet Take 1 tablet (325 mg) by mouth 2 times daily 100 tablet      folic acid (FOLVITE) 1 MG tablet Take 1 tablet (1 mg) by mouth daily 30 tablet 1     hydrOXYzine (ATARAX) 25 MG tablet Take 1-2 tablets (25-50mg) every 6 hours as needed for itching 60 tablet 2     levothyroxine (SYNTHROID/LEVOTHROID) 88 MCG tablet Take 1 tablet (88 mcg) by mouth every morning (before breakfast) 90 tablet 2     melatonin 5 MG tablet Take 1 tablet (5 mg) by mouth nightly as needed for sleep       multivitamin, therapeutic (THERA-VIT) TABS tablet Take 1 tablet by mouth every 24 hours 30 tablet 11     Omega-3 Fatty Acids (OMEGA-3 FISH OIL EX ST PO) Take 1 capsule by mouth 2 times daily 1290 (fish oil)-900 (omega 3)       pantoprazole (PROTONIX) 40 MG EC tablet Take 1 tablet (40 mg) by mouth every morning (before breakfast) 90 tablet 4     pramipexole (MIRAPEX) 0.5 MG tablet Take 1 tablet (0.5 mg) by mouth At Bedtime 90 tablet 3     predniSONE (DELTASONE) 1 MG tablet Take 4 tablets (4 mg) by mouth daily 360 tablet 3     predniSONE (DELTASONE) 5 MG tablet Take 1 tablet (5 mg) by mouth daily 90 tablet 3     propranolol (INDERAL) 10 MG tablet Take 1 tablet (10 mg) by mouth 3 times daily As needed for anxiety 90 tablet 0     psyllium 0.52 G capsule Take 2 capsules (1.04 g) by mouth daily With a full glass of water. 60 capsule 1     tacrolimus (GENERIC EQUIVALENT) 0.5 MG capsule Take 5 capsules (2.5 mg) by mouth 2 times daily 300 capsule 11     traMADol (ULTRAM) 50 MG tablet Take 1 tablet (50 mg) by mouth every 6 hours as needed for severe pain 20 tablet 0     traZODone (DESYREL) 100 MG tablet Take 1.5 tablets (150 mg) by mouth At Bedtime 45 tablet 3     ursodiol (ACTIGALL) 300 MG capsule Take 1 capsule (300 mg) by mouth 2 times daily 60 capsule 11       Allergies   Allergen Reactions     Benadryl [Diphenhydramine] Hives     Cefaclor Hives     Hydrocodone-Acetaminophen Itching     Oxycodone Itching      "Penicillins Hives     Sulfa Drugs Hives       Family History   Problem Relation Age of Onset     Family History Negative Other      Melanoma Mother           HEART DISEASE Father      CHF       Additional medical/Social/Surgical histories reviewed in [facility] and updated as appropriate.     REVIEW OF SYSTEMS (2018)  10 point ROS of systems including Constitutional, Eyes, Respiratory, Cardiovascular, Gastroenterology, Genitourinary, Integumentary, Musculoskeletal, Psychiatric were all negative except for pertinent positives noted in my HPI.     PHYSICAL EXAM  Vitals:    18 1540   Weight: 98 kg (216 lb)   Height: 1.702 m (5' 7\")     Vital Signs: Ht 1.702 m (5' 7\")  Wt 98 kg (216 lb)  BMI 33.83 kg/m2 Patient declined being weighed. Body mass index is 33.83 kg/(m^2).    General  - normal appearance, in no obvious distress  CV  - normal peripheral perfusion  Pulm  - normal respiratory pattern, non-labored  Musculoskeletal - lumbar spine  - stance: normal gait without limp, no obvious leg length discrepancy, normal heel and toe walk  - inspection: normal bone and joint alignment, no obvious scoliosis  - palpation: no paravertebral or bony tenderness  - ROM: flexion exacerbates pain, abnormal extension, sidebending, rotation, all cause pain  - strength: lower extremities 5/5 in all planes  - special tests:  (+) straight leg raise- bilateral  (+) slump test- low back  Neuro  - patellar and Achilles DTRs 2+ bilaterally, bilateral lower extremity sensory deficit throughout L5 distribution, grossly normal coordination, normal muscle tone  Skin  - no ecchymosis, erythema, warmth, or induration, no obvious rash  Psych  - interactive, appropriate, normal mood and affect    ASSESSMENT & PLAN  63 yo female with lumbar radicular pain and ddd  Reviewed lumbar MRI  Ordered RICHI  Consider pool PT  F/u after RICHI      Donnell Duvall MD, CAQSM    Answers for HPI/ROS submitted by the patient on 2018   General Symptoms: " No  Skin Symptoms: No  HENT Symptoms: No  EYE SYMPTOMS: No  HEART SYMPTOMS: No  LUNG SYMPTOMS: No  INTESTINAL SYMPTOMS: Yes  URINARY SYMPTOMS: No  GYNECOLOGIC SYMPTOMS: No  BREAST SYMPTOMS: No  SKELETAL SYMPTOMS: Yes  BLOOD SYMPTOMS: No  NERVOUS SYSTEM SYMPTOMS: No  MENTAL HEALTH SYMPTOMS: Yes  Heart burn or indigestion: Yes  Nausea: Yes  Vomiting: Yes  Abdominal pain: Yes  Bloating: Yes  Constipation: No  Diarrhea: Yes  Blood in stool: No  Black stools: No  Rectal or Anal pain: No  Fecal incontinence: Yes  Yellowing of skin or eyes: No  Vomit with blood: No  Change in stools: Yes  Back pain: No  Muscle aches: Yes  Neck pain: No  Swollen joints: No  Joint pain: Yes  Bone pain: Yes  Muscle cramps: Yes  Muscle weakness: Yes  Joint stiffness: Yes  Bone fracture: No  Nervous or Anxious: No  Depression: Yes  Trouble sleeping: Yes  Trouble thinking or concentrating: No  Mood changes: No  Panic attacks: No

## 2018-06-02 ENCOUNTER — MYC MEDICAL ADVICE (OUTPATIENT)
Dept: INTERNAL MEDICINE | Facility: CLINIC | Age: 63
End: 2018-06-02

## 2018-06-08 ENCOUNTER — TELEPHONE (OUTPATIENT)
Dept: GASTROENTEROLOGY | Facility: CLINIC | Age: 63
End: 2018-06-08

## 2018-06-11 ENCOUNTER — TELEPHONE (OUTPATIENT)
Dept: GASTROENTEROLOGY | Facility: CLINIC | Age: 63
End: 2018-06-11

## 2018-06-11 DIAGNOSIS — Z12.11 ENCOUNTER FOR SCREENING COLONOSCOPY: Primary | ICD-10-CM

## 2018-06-11 NOTE — TELEPHONE ENCOUNTER
Patient scheduled for colonoscopy     Indication for procedure. Screen for colon cancer     Referring Provider. Arlyn Sanchez MD    ? No     Arrival time verified? Patient to arrive at 6:30 am     Facility location verified? [address] [place] [address], 5th floor     Instructions given regarding prep and procedure. Denied prep review. Transportation policy reviewed; verbalized understanding.     Prep Type Golytely.     Are you taking any anticoagulants or blood thinners? Denies     Instructions given? Yes     Electronic implanted devices? Denies     Pre procedure teaching completed? Yes    Transportation from procedure? Yes     H&P / Pre op physical completed? N/A    Cristhian Edgar RN

## 2018-06-13 ENCOUNTER — OFFICE VISIT (OUTPATIENT)
Dept: INTERNAL MEDICINE | Facility: CLINIC | Age: 63
End: 2018-06-13
Payer: COMMERCIAL

## 2018-06-13 VITALS
BODY MASS INDEX: 34.08 KG/M2 | DIASTOLIC BLOOD PRESSURE: 83 MMHG | SYSTOLIC BLOOD PRESSURE: 133 MMHG | WEIGHT: 217.6 LBS | RESPIRATION RATE: 16 BRPM | HEART RATE: 53 BPM

## 2018-06-13 DIAGNOSIS — T14.8XXA HEMATOMA OF SKIN: Primary | ICD-10-CM

## 2018-06-13 ASSESSMENT — PAIN SCALES - GENERAL: PAINLEVEL: MILD PAIN (2)

## 2018-06-13 NOTE — MR AVS SNAPSHOT
After Visit Summary   6/13/2018    Phan Luque    MRN: [id number]           Patient Information     Date Of Birth          1955        Visit Information        Provider Department      6/13/2018 2:25 PM Vera Dunn MD [facility]        Today's Diagnoses     Hematoma of skin    -  1      Care Instructions    Primary Care Center Phone Number [phone]  Primary Care Center Medication Refill Request Information:  * Please contact your pharmacy regarding ANY request for medication refills.  ** [facility] Prescription Fax = [phone]  * Please allow 3 business days for routine medication refills.  * Please allow 5 business days for controlled substance medication refills.     Primary Care Center Test Result notification information:  *You will be notified with in 7-10 days of your appointment day regarding the results of your test.  If you are on MyChart you will be notified as soon as the provider has reviewed the results and signed off on them.                  Follow-ups after your visit        Your next 10 appointments already scheduled     Jun 20, 2018  2:30 PM CDT   XR LUMBAR TRANSLAMINAR INJECTION with ANASTACIOXR3,  IMAGING NURSE,  NEURO RAD   [facility] ([facility])    [address] [place] [address] [phone]           For nerve root injection, please send or bring copies of any MRIs or other scans you have had.  Bring a list of your current medicines to your exam. (Include vitamins, minerals and over-the-counter medicines.) Leave your valuables at home.  Plan to have someone drive you home afterward.  Stop taking the following medicines (but talk to your doctor first):   If you take blood thinners, you may need to stop taking them a few days before treatment. Talk to your doctor before stopping these medicines.Stop taking Coumadin (warfarin) 3 days before treatment. Restart the day after  treatment.   If you take Plavix, Ticlid, Pletal or Persantine, please ask your doctor if you should stop these medicines. You may need extra tests on the morning of your scan.   If you take Xarelto (Rivaroxaban), you may need to stop taking it 24 hours before treatment. Talk to your doctor before stopping this medicine. Restart the day after treatment.  You may take your other medicines as normal.  Stop all food and drink (including water) 3 hours before your test or treatment.  Please tell the doctor:   If you are allergic to X-ray dye (contrast fluid).   If you may be pregnant.  Injections take about 30 to 45 minutes. Most people spend up to 2 hours in the clinic or hospital.  Please call the Imaging Department at your exam site with any questions.            Jun 22, 2018  7:30 AM CDT   Lab visit with  LAB   [facility] ([facility])    [address] [place] [address]  [address]  [facility] [address] [phone]           Please do not eat 10-12 hours before your appointment if you are coming in fasting for labs on lipids, cholesterol, or glucose (sugar). Does not apply to pregnant women.  Water with medications is okay. Do not drink coffee or other fluids.  If you have concerns about taking your medications, please send a message by clicking on Secure Messaging, Message Your Care Team.            Jun 26, 2018  5:00 PM CDT   Adult Med Follow UP with DONOVAN [facility]   Psychiatry Clinic ([facility])    [address]  [address] [id number] [phone]            Jul 10, 2018  4:00 PM CDT   (Arrive by 3:45 PM)   Return Visit with Donnell Duvall MD   [facility] ([facility])    [address] [facility]  [address]th [facility] [id number] [phone]            Oct 01, 2018  4:45 PM CDT   (Arrive by 4:30 PM)   Return Liver Transplant with Hussein Banegas MD   [facility]  ([facility])    [address]  [address]  [facility] [address]   [phone]            Oct 22, 2018  7:30 AM CDT   (Arrive by 7:15 AM)   Return Visit with Arlyn Sanchez MD   [facility] ([facility])    [address]  4th Floor  [facility] [id number] [phone]            Mar 20, 2019  3:30 PM CDT   Lab with  LAB   [facility] ([facility])    [address] [place] [address]  1st Floor  [facility] [address]   [phone]            Mar 20, 2019  4:30 PM CDT   (Arrive by 4:00 PM)   Return Visit with Daya Gutierrez MD   [facility] Nephrology ([facility])    [address] [place] [address]  [address]  [facility] [id number] [phone]              Who to contact     Please call your clinic at [phone] to:    Ask questions about your health    Make or cancel appointments    Discuss your medicines    Learn about your test results    Speak to your doctor            Additional Information About Your Visit        [employer] Information     [employer] gives you secure access to your electronic health record. If you see a primary care provider, you can also send messages to your care team and make appointments. If you have questions, please call your primary care clinic.  If you do not have a primary care provider, please call [phone] and they will assist you.      [employer] is an electronic gateway that provides easy, online access to your medical records. With [employer], you can request a clinic appointment, read your test results, renew a prescription or communicate with your care team.     To access your existing account, please contact your [facility] or call [phone] for assistance.        Care EveryWhere ID     This is your Care EveryWhere ID. This could be used by other organizations to access your [place] medical records  [id number]         Your Vitals Were     Pulse Respirations Breastfeeding? BMI (Body Mass Index)          53 16 No 34.08 kg/m2         Blood Pressure from Last 3 Encounters:   06/15/18 120/54   06/13/18 133/83   05/21/18 125/74    Weight from Last 3 Encounters:   06/15/18 96.6 kg (213 lb)   06/13/18 98.7 kg (217 lb 9.6 oz)   05/25/18 98 kg (216 lb)              Today, you had the following     No orders found for display       Primary Care Provider Office Phone # Fax #    Arlyn Sanchez [phone]       [address] [address]        Equal Access to Services     PAULO BERRY : Barby Roberson, lucille orellana, qaashleyta kaalmada luz, anna schroeder. So [facility] [phone].    ATENCIÓN: Si habla español, tiene a reece disposición servicios gratuitos de asistencia lingüística. Llame al [phone].    We comply with applicable federal civil rights laws and [place] laws. We do not discriminate on the basis of race, color, national origin, age, disability, sex, sexual orientation, or gender identity.            Thank you!     Thank you for choosing [facility]  for your care. Our goal is always to provide you with excellent care. Hearing back from our patients is one way we can continue to improve our services. Please take a few minutes to complete the written survey that you may receive in the mail after your visit with us. Thank you!             Your Updated Medication List - Protect others around you: Learn how to safely use, store and throw away your medicines at www.disposemymeds.org.          This list is accurate as of 6/13/18 11:59 PM.  Always use your most recent med list.                   Brand Name Dispense Instructions for use Diagnosis    acetaminophen 325 MG tablet    TYLENOL    100 tablet    Take 2 tablets (650 mg) by mouth every 6 hours as needed for mild pain Or fevers. Use sparingly. Limit use to no more than 2  grams (2000 mg) in 24 hours. **Further refills must be obtained by primary care provider**    Acute post-operative pain       amLODIPine 5 MG tablet    NORVASC    90 tablet    Take 1 tablet (5 mg) by mouth daily    Hypertension       azaTHIOprine 100 MG Tabs     90 tablet    Take 50 mg by mouth every evening    Liver transplanted (H)       betaxolol 0.5 % ophthalmic solution    BETOPTIC    5 mL    Place 1 drop into both eyes 2 times daily    Primary open angle glaucoma of both eyes, unspecified glaucoma stage       busPIRone 10 MG tablet    BUSPAR    60 tablet    Take 1 tablet (10 mg) by mouth 2 times daily    WALTER (generalized anxiety disorder)       calcium Citrate-vitamin D 500-400 MG-UNIT Chew      Take 1 tablet by mouth daily        chlorthalidone 25 MG tablet    HYGROTON    45 tablet    Take 0.5 tablets (12.5 mg) by mouth daily    CKD (chronic kidney disease) stage 3, GFR 30-59 ml/min, Fluid retention       cholecalciferol 400 UNIT Tabs tablet    vitamin D3     Take 400 Units by mouth daily        cyanocobalamin 1000 MCG tablet    vitamin  B-12     Take 1,000 mcg by mouth daily        ferrous sulfate 325 (65 Fe) MG tablet    IRON    100 tablet    Take 1 tablet (325 mg) by mouth 2 times daily    Iron deficiency       folic acid 1 MG tablet    FOLVITE    30 tablet    Take 1 tablet (1 mg) by mouth daily    Malnutrition (H)       hydrOXYzine 25 MG tablet    ATARAX    60 tablet    Take 1-2 tablets (25-50mg) every 6 hours as needed for itching    WALTER (generalized anxiety disorder)       levothyroxine 88 MCG tablet    SYNTHROID/LEVOTHROID    90 tablet    Take 1 tablet (88 mcg) by mouth every morning (before breakfast)    Thyroid function test abnormal       melatonin 5 MG tablet      Take 1 tablet (5 mg) by mouth nightly as needed for sleep        multivitamin, therapeutic Tabs tablet     30 tablet    Take 1 tablet by mouth every 24 hours    Malnutrition (H)       OMEGA-3 FISH OIL EX ST PO      Take 1 capsule by mouth  2 times daily 1290 (fish oil)-900 (omega 3)        pantoprazole 40 MG EC tablet    PROTONIX    90 tablet    Take 1 tablet (40 mg) by mouth every morning (before breakfast)    Gastroesophageal reflux disease, esophagitis presence not specified       pramipexole 0.5 MG tablet    MIRAPEX    90 tablet    Take 1 tablet (0.5 mg) by mouth At Bedtime    Restless leg syndrome       * predniSONE 5 MG tablet    DELTASONE    90 tablet    Take 1 tablet (5 mg) by mouth daily    Liver replaced by transplant (H)       * predniSONE 1 MG tablet    DELTASONE    360 tablet    Take 4 tablets (4 mg) by mouth daily    Liver replaced by transplant (H)       propranolol 10 MG tablet    INDERAL    90 tablet    Take 1 tablet (10 mg) by mouth 3 times daily As needed for anxiety    Generalized anxiety disorder       psyllium 0.52 g capsule     60 capsule    Take 2 capsules (1.04 g) by mouth daily With a full glass of water.    Loose stools       tacrolimus 0.5 MG capsule    GENERIC EQUIVALENT    300 capsule    Take 5 capsules (2.5 mg) by mouth 2 times daily    Liver transplanted (H)       traMADol 50 MG tablet    ULTRAM    20 tablet    Take 1 tablet (50 mg) by mouth every 6 hours as needed for severe pain    Acute left ankle pain, Lumbar radicular pain       traZODone 100 MG tablet    DESYREL    45 tablet    Take 1.5 tablets (150 mg) by mouth At Bedtime    Insomnia, unspecified type       ursodiol 300 MG capsule    ACTIGALL    60 capsule    Take 1 capsule (300 mg) by mouth 2 times daily    Liver transplanted (H)       * Notice:  This list has 2 medication(s) that are the same as other medications prescribed for you. Read the directions carefully, and ask your doctor or other care provider to review them with you.

## 2018-06-13 NOTE — PROGRESS NOTES
HPI:  This 62-year-old woman who is status post liver transplant, comes in today with a chief complaint of a painful lump on her lower right leg.  On 06/02 she was traveling to [place] to visit her grandchildren and she noticed that the mid right lower leg was swollen and painful during the night.  She does not recall any injury.  When she woke up in the morning it was bruised and swollen and quite painful.  She was active during the visit including a lot of walking and when she returned to [place], went to [facility] ED on 06/10.  There they did an ultrasound that did not show any evidence of a DVT and they also did labs.  Her CBC was normal, INR was normal, and comp panel indicated only slightly worsening of her chronic kidney disease with a creatinine of 1.44.  She continues to be concerned about the lump, although over this period it has gradually reduced in size and become less painful.  She is not taking aspirin or NSAIDs.      She otherwise feels well without any fever or chills, her appetite is good, and she has no GI or chest distress.     ROS:  Constitutional: no fevers, night sweats or unintentional weight change   Eyes: no vision change, diplopia or red eyes   Ears, Nose, Mouth, Throat: no tinnitus or hearing change, no oral lesions, throat clear   Cardiovascular: no chest pain, palpitations,orthopnea or PND   Respiratory: no dyspnea, cough, shortness of breath or wheezing   GI: no nausea, vomiting, diarrhea or constipation, no abdominal pain   : no change in urine, no dysuria or hematuria   Musculoskeletal: see HPI     Patient Active Problem List   Diagnosis     Decompensation of cirrhosis of liver (H)     Liver transplanted (H)     Alcoholic hepatitis     Immunosuppression (H)     Alcoholic hepatitis without ascites     Enterococcus UTI     Liver transplant status     Nausea & vomiting     Malnutrition (H)     Candidiasis of skin     Anemia     ACP (advance care planning)     Diarrhea      Hypothyroidism     Esophageal reflux     Recurrent major depression in partial remission (H)     Insomnia     CKD (chronic kidney disease) stage 3, GFR 30-59 ml/min     Anemia in stage 3 chronic kidney disease     Osteopenia     Alcohol abuse, uncomplicated     Past Medical History:   Diagnosis Date     Asthma      Chronic kidney disease      End stage liver disease (H)     2/2 Alcohol Abuse     Liver transplanted (H)      Migraines      Osteoporosis      Spinal stenosis in cervical region      Strabismus      Past Surgical History:   Procedure Laterality Date     APPENDECTOMY           BENCH LIVER N/A 2016    Procedure: BENCH LIVER;  Surgeon: Larry Dhillon MD;  Location: [facility] OR     CATARACT IOL, RT/LT       COLONOSCOPY       ESOPHAGOSCOPY, GASTROSCOPY, DUODENOSCOPY (EGD), COMBINED N/A 2016    Procedure: COMBINED ESOPHAGOSCOPY, GASTROSCOPY, DUODENOSCOPY (EGD);  Surgeon: Tao Alfonso MD;  Location: [facility] GI     ESOPHAGOSCOPY, GASTROSCOPY, DUODENOSCOPY (EGD), COMBINED N/A 10/31/2016    Procedure: COMBINED ESOPHAGOSCOPY, GASTROSCOPY, DUODENOSCOPY (EGD), BIOPSY SINGLE OR MULTIPLE;  Surgeon: Ronald Bojorquez MD;  Location: [facility]     sphincteroplasty       TRANSPLANT LIVER RECIPIENT  DONOR N/A 2016    Procedure: TRANSPLANT LIVER RECIPIENT  DONOR;  Surgeon: Larry Dhillon MD;  Location: [facility] OR     TUBAL LIGATION      laparoscopic     Social History   Substance Use Topics     Smoking status: Former Smoker     Packs/day: 2.50     Years: 20.00     Quit date: 1996     Smokeless tobacco: Never Used     Alcohol use No      Comment: heavy use (750ml/2 days) up until 1 year ago; had cut down to 1 drink/day; none since 16     Family History   Problem Relation Age of Onset     Family History Negative Other      Melanoma Mother           HEART DISEASE Father      CHF     Allergies   Allergen Reactions     Benadryl [Diphenhydramine] Hives     Cefaclor Hives      Hydrocodone-Acetaminophen Itching     Oxycodone Itching     Penicillins Hives     Sulfa Drugs Hives     Current Outpatient Prescriptions   Medication Sig Dispense Refill     acetaminophen (TYLENOL) 325 MG tablet Take 2 tablets (650 mg) by mouth every 6 hours as needed for mild pain Or fevers. Use sparingly. Limit use to no more than 2 grams (2000 mg) in 24 hours. **Further refills must be obtained by primary care provider** 100 tablet 0     amLODIPine (NORVASC) 5 MG tablet Take 1 tablet (5 mg) by mouth daily 90 tablet 3     azaTHIOprine 100 MG TABS Take 50 mg by mouth every evening 90 tablet 3     betaxolol (BETOPTIC) 0.5 % ophthalmic solution Place 1 drop into both eyes 2 times daily 5 mL 2     busPIRone (BUSPAR) 10 MG tablet Take 1 tablet (10 mg) by mouth 2 times daily 60 tablet 3     calcium Citrate-vitamin D 500-400 MG-UNIT CHEW Take 1 tablet by mouth daily       chlorthalidone (HYGROTON) 25 MG tablet Take 0.5 tablets (12.5 mg) by mouth daily 45 tablet 3     cholecalciferol (VITAMIN D3) 400 UNIT TABS tablet Take 400 Units by mouth daily       cyanocobalamin (VITAMIN  B-12) 1000 MCG tablet Take 1,000 mcg by mouth daily       ferrous sulfate (IRON) 325 (65 FE) MG tablet Take 1 tablet (325 mg) by mouth 2 times daily 100 tablet      folic acid (FOLVITE) 1 MG tablet Take 1 tablet (1 mg) by mouth daily 30 tablet 1     hydrOXYzine (ATARAX) 25 MG tablet Take 1-2 tablets (25-50mg) every 6 hours as needed for itching 60 tablet 2     levothyroxine (SYNTHROID/LEVOTHROID) 88 MCG tablet Take 1 tablet (88 mcg) by mouth every morning (before breakfast) 90 tablet 2     melatonin 5 MG tablet Take 1 tablet (5 mg) by mouth nightly as needed for sleep       multivitamin, therapeutic (THERA-VIT) TABS tablet Take 1 tablet by mouth every 24 hours 30 tablet 11     Omega-3 Fatty Acids (OMEGA-3 FISH OIL EX ST PO) Take 1 capsule by mouth 2 times daily 1290 (fish oil)-900 (omega 3)       pantoprazole (PROTONIX) 40 MG EC tablet Take 1 tablet  (40 mg) by mouth every morning (before breakfast) 90 tablet 4     pramipexole (MIRAPEX) 0.5 MG tablet Take 1 tablet (0.5 mg) by mouth At Bedtime 90 tablet 3     predniSONE (DELTASONE) 1 MG tablet Take 4 tablets (4 mg) by mouth daily 360 tablet 3     predniSONE (DELTASONE) 5 MG tablet Take 1 tablet (5 mg) by mouth daily 90 tablet 3     propranolol (INDERAL) 10 MG tablet Take 1 tablet (10 mg) by mouth 3 times daily As needed for anxiety 90 tablet 0     psyllium 0.52 G capsule Take 2 capsules (1.04 g) by mouth daily With a full glass of water. 60 capsule 1     tacrolimus (GENERIC EQUIVALENT) 0.5 MG capsule Take 5 capsules (2.5 mg) by mouth 2 times daily 300 capsule 11     traMADol (ULTRAM) 50 MG tablet Take 1 tablet (50 mg) by mouth every 6 hours as needed for severe pain 20 tablet 0     traZODone (DESYREL) 100 MG tablet Take 1.5 tablets (150 mg) by mouth At Bedtime 45 tablet 3     ursodiol (ACTIGALL) 300 MG capsule Take 1 capsule (300 mg) by mouth 2 times daily 60 capsule 11     /83  Pulse 53  Resp 16  Wt 98.7 kg (217 lb 9.6 oz)  Breastfeeding? No  BMI 34.08 kg/m2    PHYSICAL EXAM:  Constitutional: no distress, comfortable, pleasant   Cardiovascular: regular rate and rhythm, normal S1 and S2, no murmurs, rubs or gallops  Respiratory: clear to auscultation, no wheezes or crackles, normal breath sounds    Musculoskeletal: There is a hematoma over the medial aspect of her right calf to the anterior of the leg.  It is in evolution with some yellow and green discoloration, there is also some pulled pigment discoloration around the ankle and heel that is nontender.  The right lower leg is slightly larger than the left and firm.  It is not warm and does not look inflamed.   Psychological: appropriate mood   Lymphatic: no cervical lymphadenopathy    A/P:    1.  Hematoma, right lower leg.  It appears that this is resolving gradually and there are no red flags with respect to infection, weakness or numbness.  I  encouraged her to continue to be active, elevate the leg at rest and return if there would be any change in the pattern of improvement.     Total time spent 15 minutes.  More than 50% of the time spent with Ms. Luque on counseling / coordinating her care.

## 2018-06-13 NOTE — NURSING NOTE
Chief Complaint   Patient presents with     Hospital F/U     pt is here for a hospital follow up        Lissette Nuñez CMA at 2:14 PM on 6/13/2018

## 2018-06-13 NOTE — PATIENT INSTRUCTIONS
History  Chief Complaint   Patient presents with    Headache     pt with a HA for a couple of days  c/o nausea  50-year-old female presents for evaluation chief complaint of a frontal headache that started on Sunday  No similar symptoms in the past except she states many years ago  Patient's past medical history significant for anemia but she takes only an iron supplement and daily multivitamin for medications  No fevers, chills, nausea or vomiting  Patient does have some photophobia  Patient does report occasional occipital head pain as well  Patient took Aleve at home without any improvement  No recent trauma or inciting event to bring this headache on  No sinus congestion or facial pain  History provided by:  Patient   used: No    Headache   Pain location:  Frontal  Quality:  Dull  Radiates to:  Does not radiate  Severity currently:  10/10  Severity at highest:  10/10  Onset quality:  Gradual  Duration:  4 days  Timing:  Constant  Progression:  Waxing and waning  Chronicity:  New  Similar to prior headaches: no    Context: bright light    Relieved by:  Nothing  Worsened by:  Light  Ineffective treatments:  NSAIDs  Associated symptoms: no blurred vision, no diarrhea, no dizziness, no eye pain, no fever, no focal weakness, no nausea, no neck pain, no paresthesias, no seizures, no sinus pressure, no tingling, no vomiting and no weakness        Prior to Admission Medications   Prescriptions Last Dose Informant Patient Reported? Taking? Multiple Vitamins-Minerals (MULTIVITAMIN WITH MINERALS) tablet   Yes Yes   Sig: Take 1 tablet by mouth daily   ferrous gluconate (FERGON) 324 mg tablet   Yes Yes   Sig: Take 27 mg by mouth daily with breakfast      Facility-Administered Medications: None       Past Medical History:   Diagnosis Date    Anemia     Heart murmur        History reviewed  No pertinent surgical history  History reviewed  No pertinent family history    I have [facility] Phone Number [phone]  [facility] Medication Refill Request Information:  * Please contact your pharmacy regarding ANY request for medication refills.  ** [facility] Prescription Fax = [phone]  * Please allow 3 business days for routine medication refills.  * Please allow 5 business days for controlled substance medication refills.     [facility] Test Result notification information:  *You will be notified with in 7-10 days of your appointment day regarding the results of your test.  If you are on MyChart you will be notified as soon as the provider has reviewed the results and signed off on them.           reviewed and agree with the history as documented  Social History   Substance Use Topics    Smoking status: Never Smoker    Smokeless tobacco: Never Used    Alcohol use No        Review of Systems   Constitutional: Negative for chills, diaphoresis and fever  HENT: Negative for sinus pressure  Eyes: Negative for blurred vision and pain  Respiratory: Negative for shortness of breath  Cardiovascular: Negative for chest pain and palpitations  Gastrointestinal: Negative for diarrhea, nausea and vomiting  Genitourinary: Negative for dysuria and frequency  Musculoskeletal: Negative for neck pain  Skin: Negative for rash  Neurological: Positive for headaches  Negative for dizziness, focal weakness, seizures, weakness and paresthesias  All other systems reviewed and are negative  Physical Exam  ED Triage Vitals [04/12/18 1311]   Temperature Pulse Respirations Blood Pressure SpO2   97 8 °F (36 6 °C) 60 18 121/55 98 %      Temp Source Heart Rate Source Patient Position - Orthostatic VS BP Location FiO2 (%)   Oral Monitor Sitting Left arm --      Pain Score       Worst Possible Pain           Orthostatic Vital Signs  Vitals:    04/12/18 1311 04/12/18 1330 04/12/18 1400   BP: 121/55 111/55 102/55   Pulse: 60 58 (!) 52   Patient Position - Orthostatic VS: Sitting         Physical Exam   Constitutional: She is oriented to person, place, and time  She appears well-developed and well-nourished  She appears distressed (mild)  HENT:   Head: Normocephalic and atraumatic  Mouth/Throat: Abnormal dentition  Eyes: EOM are normal  Pupils are equal, round, and reactive to light  Neck: Normal range of motion  No JVD present  Cardiovascular: Normal rate, regular rhythm, normal heart sounds and intact distal pulses  Exam reveals no gallop and no friction rub  No murmur heard  Pulmonary/Chest: Effort normal and breath sounds normal  No respiratory distress  She has no wheezes  She has no rales   She exhibits no tenderness  Musculoskeletal: Normal range of motion  She exhibits no tenderness  Neurological: She is alert and oriented to person, place, and time  No cranial nerve deficit or sensory deficit  She exhibits normal muscle tone  Coordination normal    Skin: Skin is warm and dry  Psychiatric: She has a normal mood and affect  Her behavior is normal  Judgment and thought content normal    Nursing note and vitals reviewed  ED Medications  Medications   sodium chloride 0 9 % bolus 1,000 mL (1,000 mL Intravenous New Bag 4/12/18 1444)   magnesium sulfate 2 g/50 mL IVPB (premix) 2 g (2 g Intravenous New Bag 4/12/18 1451)   diphenhydrAMINE (BENADRYL) injection 25 mg (25 mg Intravenous Given 4/12/18 1447)   metoclopramide (REGLAN) injection 10 mg (10 mg Intravenous Given 4/12/18 1449)   ketorolac (TORADOL) injection 30 mg (30 mg Intravenous Given 4/12/18 1450)       Diagnostic Studies  Results Reviewed     None                 CT head wo contrast   Final Result by Rosalie Bryant MD (04/12 1520)      No acute intracranial abnormality  Workstation performed: [id number]                    Procedures  Procedures       Phone Contacts  ED Phone Contact    ED Course  ED Course as of Apr 12 1539   Thu Apr 12, 2018   1538 Patient is feeling much better  There is only a small amount of magnesium left to infuse  Will discharge patient home after infusion is complete  MDM  Number of Diagnoses or Management Options  Frontal headache: new and requires workup  Diagnosis management comments: 53 y o  female presents with chief complaint of a severe headache  This headache is not similar to prior headaches but was gradual in onset  No fevers, chills, meningismus  No confusion or focal neurological deficits  Will CT to rule out intracranial pathology as cause but will treat with migraine cocktail - adjusted for patient's allergies and prior effective treatments  Amount and/or Complexity of Data Reviewed  Tests in the radiology section of CPT®: ordered and reviewed    Risk of Complications, Morbidity, and/or Mortality  Diagnostic procedures: high  Management options: high    Patient Progress  Patient progress: stable    CritCare Time    Disposition  Final diagnoses:   Frontal headache - acute severe     Time reflects when diagnosis was documented in both MDM as applicable and the Disposition within this note     Time User Action Codes Description Comment    4/12/2018  3:38 PM Rosalinda Sullivan Add [R51] Frontal headache     4/12/2018  3:38 PM Rosalinda Sullivan Modify [R51] Frontal headache acute severe      ED Disposition     ED Disposition Condition Comment    Discharge  Marco Ramirez discharge to home/self care  Condition at discharge: Good        Follow-up Information    None       Patient's Medications   Discharge Prescriptions    No medications on file     No discharge procedures on file      ED Provider  Electronically Signed by           Michael Hagan MD  04/12/18 26 [id number]

## 2018-06-15 ENCOUNTER — HOSPITAL ENCOUNTER (OUTPATIENT)
Facility: AMBULATORY SURGERY CENTER | Age: 63
End: 2018-06-15
Attending: INTERNAL MEDICINE
Payer: COMMERCIAL

## 2018-06-15 ENCOUNTER — SURGERY (OUTPATIENT)
Age: 63
End: 2018-06-15

## 2018-06-15 VITALS
HEIGHT: 67 IN | BODY MASS INDEX: 33.43 KG/M2 | OXYGEN SATURATION: 95 % | TEMPERATURE: 96.8 F | HEART RATE: 80 BPM | SYSTOLIC BLOOD PRESSURE: 120 MMHG | WEIGHT: 213 LBS | DIASTOLIC BLOOD PRESSURE: 54 MMHG | RESPIRATION RATE: 16 BRPM

## 2018-06-15 LAB — COLONOSCOPY: NORMAL

## 2018-06-15 RX ORDER — ONDANSETRON 2 MG/ML
4 INJECTION INTRAMUSCULAR; INTRAVENOUS
Status: DISCONTINUED | OUTPATIENT
Start: 2018-06-15 | End: 2018-06-16 | Stop reason: HOSPADM

## 2018-06-15 RX ORDER — LIDOCAINE 40 MG/G
CREAM TOPICAL
Status: DISCONTINUED | OUTPATIENT
Start: 2018-06-15 | End: 2018-06-16 | Stop reason: HOSPADM

## 2018-06-15 RX ORDER — FENTANYL CITRATE 50 UG/ML
INJECTION, SOLUTION INTRAMUSCULAR; INTRAVENOUS PRN
Status: DISCONTINUED | OUTPATIENT
Start: 2018-06-15 | End: 2018-06-15 | Stop reason: HOSPADM

## 2018-06-15 RX ADMIN — FENTANYL CITRATE 50 MCG: 50 INJECTION, SOLUTION INTRAMUSCULAR; INTRAVENOUS at 07:42

## 2018-06-15 NOTE — OR NURSING
Colonoscopy with polypectomy x2 via cold snare.  Pt tolerated well with conscious sedation and on 2L nc oxygen.

## 2018-06-15 NOTE — IP AVS SNAPSHOT
[facility]    [address] [place] [id number]    Phone:  [phone]    Fax:  [phone]                                       After Visit Summary   6/15/2018    Phan Luque    MRN: [id number]           After Visit Summary Signature Page     I have received my discharge instructions, and my questions have been answered. I have discussed any challenges I see with this plan with the nurse or doctor.    ..........................................................................................................................................  Patient/Patient Representative Signature      ..........................................................................................................................................  Patient Representative Print Name and Relationship to Patient    ..................................................               ................................................  Date                                            Time    ..........................................................................................................................................  Reviewed by Signature/Title    ...................................................              ..............................................  Date                                                            Time

## 2018-06-15 NOTE — IP AVS SNAPSHOT
MRN:[id number]                      After Visit Summary   6/15/2018    Phan Luque    MRN: [id number]           Thank you!     Thank you for choosing [facility] for your care. Our goal is always to provide you with excellent care. Hearing back from our patients is one way we can continue to improve our services. Please take a few minutes to complete the written survey that you may receive in the mail after you visit with us. Thank you!        Patient Information     Date Of Birth          1955        About your hospital stay     You were admitted on:  Mildred 15, 2018 You last received care in the:  [facility]    You were discharged on:  Mildred 15, 2018       Who to Call     For medical emergencies, please call 911.  For non-urgent questions about your medical care, please call your primary care provider or clinic, [phone]  For questions related to your surgery, please call your surgery clinic        Attending Provider     Provider Specialty    Tao Alfonso MD INTERNAL MEDICINE, HEPATOLOGY       Primary Care Provider Office Phone # Fax #    Arlyn Sanchez [phone]      Your next 10 appointments already scheduled     Jun 20, 2018  2:30 PM CDT   XR LUMBAR TRANSLAMINAR INJECTION with UCXR3,  IMAGING NURSE, UC NEURO RAD   [facility] ([facility])    [address] [place] [address] [phone]           For nerve root injection, please send or bring copies of any MRIs or other scans you have had.  Bring a list of your current medicines to your exam. (Include vitamins, minerals and over-the-counter medicines.) Leave your valuables at home.  Plan to have someone drive you home afterward.  Stop taking the following medicines (but talk to your doctor first):   If you take blood thinners, you may need to stop taking them a few days before treatment. Talk to your doctor  before stopping these medicines.Stop taking Coumadin (warfarin) 3 days before treatment. Restart the day after treatment.   If you take Plavix, Ticlid, Pletal or Persantine, please ask your doctor if you should stop these medicines. You may need extra tests on the morning of your scan.   If you take Xarelto (Rivaroxaban), you may need to stop taking it 24 hours before treatment. Talk to your doctor before stopping this medicine. Restart the day after treatment.  You may take your other medicines as normal.  Stop all food and drink (including water) 3 hours before your test or treatment.  Please tell the doctor:   If you are allergic to X-ray dye (contrast fluid).   If you may be pregnant.  Injections take about 30 to 45 minutes. Most people spend up to 2 hours in the clinic or hospital.  Please call the Imaging Department at your exam site with any questions.            Jun 22, 2018  7:30 AM CDT   Lab visit with [facility] ([facility])    [address]  [address]  [facility] [id number] [phone]           Please do not eat 10-12 hours before your appointment if you are coming in fasting for labs on lipids, cholesterol, or glucose (sugar). Does not apply to pregnant women.  Water with medications is okay. Do not drink coffee or other fluids.  If you have concerns about taking your medications, please send a message by clicking on Secure Messaging, Message Your Care Team.            Jun 26, 2018  5:00 PM CDT   Adult Med Follow UP with DONOV[facility]   Psychiatry Clinic ([facility])    [address]  [address] [id number] [phone]            Jul 10, 2018  4:00 PM CDT   (Arrive by 3:45 PM)   Return Visit with Donnell Duvall MD   [facility] ([facility])    [address]  4th [facility] [id number] [phone]            Oct  01, 2018  4:45 PM CDT   (Arrive by 4:30 PM)   Return Liver Transplant with Hussein Banegas MD   [facility] Hepatology ([facility])    [address]  [address]  [facility] [address]   [phone]            Oct 22, 2018  7:30 AM CDT   (Arrive by 7:15 AM)   Return Visit with Arlyn Sanchez MD   [facility] ([facility])    [address] [place] [address]  4th Floor  [facility] [address]   [phone]            Mar 20, 2019  3:30 PM CDT   Lab with  LAB   [facility] Lab ([facility])    [address] [place] [address]  1st Floor  [facility] [address]   [phone]            Mar 20, 2019  4:30 PM CDT   (Arrive by 4:00 PM)   Return Visit with Daya Gutierrez MD   [facility] Nephrology ([facility])    [address] [place] [address]  [address]  [facility] [address]   [phone]              Further instructions from your care team       Discharge Instructions after Colonoscopy        Today you had a  Colonoscopy       Activity and Diet   You were given medicine for pain. You may be dizzy or sleepy.   For 24 hours:     Do not drive or use heavy equipment.     Do not make important decisions.     Do not drink any alcohol.   You may return to your normal diet and medicines.       Discomfort     Air was placed in your colon during the exam in order to see it. Walking helps to pass the air.     You may take Tylenol (acetaminophen) for pain unless your doctor has told you not to.   Do not take aspirin or ibuprofen (Advil, Motrin, or other anti-inflammatory   drugs) for _____ days.       Follow-up   ____ We took small tissue samples or polyps to study. Your doctor will call you with the results within two weeks.       When to call:       Call right away if you have:     Unusual pain in belly or chest pain not relieved with passing air.     More than 1 to 2 Tablespoons of bleeding from your rectum.     Fever above  "100.6  F (37.5  C).       If you have severe pain, bleeding, or shortness of breath, go to an emergency room.       If you have questions, call:   Monday to Friday, 7 a.m. to 4:30 p.m.   Endoscopy: [phone] (We may have to call you back)       After hours   Hospital: [phone] (Ask for the GI fellow on call)     Pending Results     No orders found from 6/13/2018 to 6/16/2018.            Admission Information     Date & Time Provider Department Dept. Phone    6/15/2018 Tao Alfonso MD [facility] [phone]      Your Vitals Were     Blood Pressure Pulse Temperature Respirations Height Weight    144/88 80 98.4  F (36.9  C) (Oral) 20 1.702 m (5' 7\") 96.6 kg (213 lb)    Pulse Oximetry BMI (Body Mass Index)                96% 33.36 kg/m2          Boardvote Information     Boardvote gives you secure access to your electronic health record. If you see a primary care provider, you can also send messages to your care team and make appointments. If you have questions, please call your primary care clinic.  If you do not have a primary care provider, please call [phone] and they will assist you.      Boardvote is an electronic gateway that provides easy, online access to your medical records. With [employer]vote, you can request a clinic appointment, read your test results, renew a prescription or communicate with your care team.     To access your existing account, please contact your [facility] or call [phone] for assistance.        Care EveryWhere ID     This is your Care EveryWhere ID. This could be used by other organizations to access your [facility] medical records  [id number]        Equal Access to Services     PAULO BERRY : Barby Roberson, lucille orellana, negrita kaalmada luz, anna schroedre. So [facility] [phone].    ATENCIÓN: Si habla español, tiene a reece disposición servicios gratuitos de asistencia " lingüísticaCderic Jurado al [phone].    We comply with applicable federal civil rights laws and [place] laws. We do not discriminate on the basis of race, color, national origin, age, disability, sex, sexual orientation, or gender identity.               Review of your medicines      UNREVIEWED medicines. Ask your doctor about these medicines        Dose / Directions    acetaminophen 325 MG tablet   Commonly known as:  TYLENOL   Used for:  Acute post-operative pain        Dose:  650 mg   Take 2 tablets (650 mg) by mouth every 6 hours as needed for mild pain Or fevers. Use sparingly. Limit use to no more than 2 grams (2000 mg) in 24 hours. **Further refills must be obtained by primary care provider**   Quantity:  100 tablet   Refills:  0       amLODIPine 5 MG tablet   Commonly known as:  NORVASC   Used for:  Hypertension        Dose:  5 mg   Take 1 tablet (5 mg) by mouth daily   Quantity:  90 tablet   Refills:  3       azaTHIOprine 100 MG Tabs   Used for:  Liver transplanted (H)        Dose:  50 mg   Take 50 mg by mouth every evening   Quantity:  90 tablet   Refills:  3       betaxolol 0.5 % ophthalmic solution   Commonly known as:  BETOPTIC   Used for:  Primary open angle glaucoma of both eyes, unspecified glaucoma stage        Dose:  1 drop   Place 1 drop into both eyes 2 times daily   Quantity:  5 mL   Refills:  2       busPIRone 10 MG tablet   Commonly known as:  BUSPAR   Used for:  WALTER (generalized anxiety disorder)        Dose:  10 mg   Take 1 tablet (10 mg) by mouth 2 times daily   Quantity:  60 tablet   Refills:  3       calcium Citrate-vitamin D 500-400 MG-UNIT Chew        Dose:  1 tablet   Take 1 tablet by mouth daily   Refills:  0       chlorthalidone 25 MG tablet   Commonly known as:  HYGROTON   Used for:  CKD (chronic kidney disease) stage 3, GFR 30-59 ml/min, Fluid retention        Dose:  12.5 mg   Take 0.5 tablets (12.5 mg) by mouth daily   Quantity:  45 tablet   Refills:  3       cholecalciferol 400  UNIT Tabs tablet   Commonly known as:  vitamin D3        Dose:  400 Units   Take 400 Units by mouth daily   Refills:  0       cyanocobalamin 1000 MCG tablet   Commonly known as:  vitamin  B-12        Dose:  1000 mcg   Take 1,000 mcg by mouth daily   Refills:  0       ferrous sulfate 325 (65 Fe) MG tablet   Commonly known as:  IRON   Used for:  Iron deficiency        Dose:  1 tablet   Take 1 tablet (325 mg) by mouth 2 times daily   Quantity:  100 tablet   Refills:  0       folic acid 1 MG tablet   Commonly known as:  FOLVITE   Used for:  Malnutrition (H)        Dose:  1 mg   Take 1 tablet (1 mg) by mouth daily   Quantity:  30 tablet   Refills:  1       hydrOXYzine 25 MG tablet   Commonly known as:  ATARAX   Used for:  WALTER (generalized anxiety disorder)        Take 1-2 tablets (25-50mg) every 6 hours as needed for itching   Quantity:  60 tablet   Refills:  2       levothyroxine 88 MCG tablet   Commonly known as:  SYNTHROID/LEVOTHROID   Indication:  Underactive Thyroid   Used for:  Thyroid function test abnormal        Dose:  88 mcg   Take 1 tablet (88 mcg) by mouth every morning (before breakfast)   Quantity:  90 tablet   Refills:  2       melatonin 5 MG tablet        Dose:  5 mg   Take 1 tablet (5 mg) by mouth nightly as needed for sleep   Refills:  0       multivitamin, therapeutic Tabs tablet   Used for:  Malnutrition (H)        Dose:  1 tablet   Take 1 tablet by mouth every 24 hours   Quantity:  30 tablet   Refills:  11       OMEGA-3 FISH OIL EX ST PO        Dose:  1 capsule   Take 1 capsule by mouth 2 times daily 1290 (fish oil)-900 (omega 3)   Refills:  0       pantoprazole 40 MG EC tablet   Commonly known as:  PROTONIX   Used for:  Gastroesophageal reflux disease, esophagitis presence not specified        Dose:  40 mg   Take 1 tablet (40 mg) by mouth every morning (before breakfast)   Quantity:  90 tablet   Refills:  4       pramipexole 0.5 MG tablet   Commonly known as:  MIRAPEX   Used for:  Restless leg  syndrome        Dose:  0.5 mg   Take 1 tablet (0.5 mg) by mouth At Bedtime   Quantity:  90 tablet   Refills:  3       * predniSONE 5 MG tablet   Commonly known as:  DELTASONE   Used for:  Liver replaced by transplant (H)        Dose:  5 mg   Take 1 tablet (5 mg) by mouth daily   Quantity:  90 tablet   Refills:  3       * predniSONE 1 MG tablet   Commonly known as:  DELTASONE   Used for:  Liver replaced by transplant (H)        Dose:  4 mg   Take 4 tablets (4 mg) by mouth daily   Quantity:  360 tablet   Refills:  3       propranolol 10 MG tablet   Commonly known as:  INDERAL   Used for:  Generalized anxiety disorder        Dose:  10 mg   Take 1 tablet (10 mg) by mouth 3 times daily As needed for anxiety   Quantity:  90 tablet   Refills:  0       psyllium 0.52 g capsule   Indication:  GI motility   Used for:  Loose stools        Dose:  2 capsule   Take 2 capsules (1.04 g) by mouth daily With a full glass of water.   Quantity:  60 capsule   Refills:  1       tacrolimus 0.5 MG capsule   Commonly known as:  GENERIC EQUIVALENT   Used for:  Liver transplanted (H)        Dose:  2.5 mg   Take 5 capsules (2.5 mg) by mouth 2 times daily   Quantity:  300 capsule   Refills:  11       traMADol 50 MG tablet   Commonly known as:  ULTRAM   Used for:  Acute left ankle pain, Lumbar radicular pain        Dose:  50 mg   Take 1 tablet (50 mg) by mouth every 6 hours as needed for severe pain   Quantity:  20 tablet   Refills:  0       traZODone 100 MG tablet   Commonly known as:  DESYREL   Used for:  Insomnia, unspecified type        Dose:  150 mg   Take 1.5 tablets (150 mg) by mouth At Bedtime   Quantity:  45 tablet   Refills:  3       ursodiol 300 MG capsule   Commonly known as:  ACTIGALL   Indication:  Rejection of Liver Transplant   Used for:  Liver transplanted (H)        Dose:  300 mg   Take 1 capsule (300 mg) by mouth 2 times daily   Quantity:  60 capsule   Refills:  11       * Notice:  This list has 2 medication(s) that are the  same as other medications prescribed for you. Read the directions carefully, and ask your doctor or other care provider to review them with you.             Protect others around you: Learn how to safely use, store and throw away your medicines at www.disposemymeds.org.             Medication List: This is a list of all your medications and when to take them. Check marks below indicate your daily home schedule. Keep this list as a reference.      Medications           Morning Afternoon Evening Bedtime As Needed    acetaminophen 325 MG tablet   Commonly known as:  TYLENOL   Take 2 tablets (650 mg) by mouth every 6 hours as needed for mild pain Or fevers. Use sparingly. Limit use to no more than 2 grams (2000 mg) in 24 hours. **Further refills must be obtained by primary care provider**                                amLODIPine 5 MG tablet   Commonly known as:  NORVASC   Take 1 tablet (5 mg) by mouth daily                                azaTHIOprine 100 MG Tabs   Take 50 mg by mouth every evening                                betaxolol 0.5 % ophthalmic solution   Commonly known as:  BETOPTIC   Place 1 drop into both eyes 2 times daily                                busPIRone 10 MG tablet   Commonly known as:  BUSPAR   Take 1 tablet (10 mg) by mouth 2 times daily                                calcium Citrate-vitamin D 500-400 MG-UNIT Chew   Take 1 tablet by mouth daily                                chlorthalidone 25 MG tablet   Commonly known as:  HYGROTON   Take 0.5 tablets (12.5 mg) by mouth daily                                cholecalciferol 400 UNIT Tabs tablet   Commonly known as:  vitamin D3   Take 400 Units by mouth daily                                cyanocobalamin 1000 MCG tablet   Commonly known as:  vitamin  B-12   Take 1,000 mcg by mouth daily                                ferrous sulfate 325 (65 Fe) MG tablet   Commonly known as:  IRON   Take 1 tablet (325 mg) by mouth 2 times daily                                 folic acid 1 MG tablet   Commonly known as:  FOLVITE   Take 1 tablet (1 mg) by mouth daily                                hydrOXYzine 25 MG tablet   Commonly known as:  ATARAX   Take 1-2 tablets (25-50mg) every 6 hours as needed for itching                                levothyroxine 88 MCG tablet   Commonly known as:  SYNTHROID/LEVOTHROID   Take 1 tablet (88 mcg) by mouth every morning (before breakfast)                                melatonin 5 MG tablet   Take 1 tablet (5 mg) by mouth nightly as needed for sleep                                multivitamin, therapeutic Tabs tablet   Take 1 tablet by mouth every 24 hours                                OMEGA-3 FISH OIL EX ST PO   Take 1 capsule by mouth 2 times daily 1290 (fish oil)-900 (omega 3)                                pantoprazole 40 MG EC tablet   Commonly known as:  PROTONIX   Take 1 tablet (40 mg) by mouth every morning (before breakfast)                                pramipexole 0.5 MG tablet   Commonly known as:  MIRAPEX   Take 1 tablet (0.5 mg) by mouth At Bedtime                                * predniSONE 5 MG tablet   Commonly known as:  DELTASONE   Take 1 tablet (5 mg) by mouth daily                                * predniSONE 1 MG tablet   Commonly known as:  DELTASONE   Take 4 tablets (4 mg) by mouth daily                                propranolol 10 MG tablet   Commonly known as:  INDERAL   Take 1 tablet (10 mg) by mouth 3 times daily As needed for anxiety                                psyllium 0.52 g capsule   Take 2 capsules (1.04 g) by mouth daily With a full glass of water.                                tacrolimus 0.5 MG capsule   Commonly known as:  GENERIC EQUIVALENT   Take 5 capsules (2.5 mg) by mouth 2 times daily                                traMADol 50 MG tablet   Commonly known as:  ULTRAM   Take 1 tablet (50 mg) by mouth every 6 hours as needed for severe pain                                traZODone 100 MG  tablet   Commonly known as:  DESYREL   Take 1.5 tablets (150 mg) by mouth At Bedtime                                ursodiol 300 MG capsule   Commonly known as:  ACTIGALL   Take 1 capsule (300 mg) by mouth 2 times daily                                * Notice:  This list has 2 medication(s) that are the same as other medications prescribed for you. Read the directions carefully, and ask your doctor or other care provider to review them with you.

## 2018-06-18 LAB — COPATH REPORT: NORMAL

## 2018-06-20 ENCOUNTER — RADIANT APPOINTMENT (OUTPATIENT)
Dept: GENERAL RADIOLOGY | Facility: CLINIC | Age: 63
End: 2018-06-20
Attending: PREVENTIVE MEDICINE
Payer: COMMERCIAL

## 2018-06-20 VITALS
TEMPERATURE: 98.2 F | DIASTOLIC BLOOD PRESSURE: 86 MMHG | RESPIRATION RATE: 16 BRPM | HEART RATE: 79 BPM | SYSTOLIC BLOOD PRESSURE: 156 MMHG

## 2018-06-20 DIAGNOSIS — M54.16 LUMBAR RADICULAR PAIN: ICD-10-CM

## 2018-06-20 RX ORDER — BUPIVACAINE HYDROCHLORIDE 5 MG/ML
10 INJECTION, SOLUTION EPIDURAL; INTRACAUDAL ONCE
Status: COMPLETED | OUTPATIENT
Start: 2018-06-20 | End: 2018-06-20

## 2018-06-20 RX ORDER — LIDOCAINE HYDROCHLORIDE 10 MG/ML
30 INJECTION, SOLUTION EPIDURAL; INFILTRATION; INTRACAUDAL; PERINEURAL ONCE
Status: COMPLETED | OUTPATIENT
Start: 2018-06-20 | End: 2018-06-20

## 2018-06-20 RX ORDER — IOPAMIDOL 408 MG/ML
20 INJECTION, SOLUTION INTRATHECAL ONCE
Status: COMPLETED | OUTPATIENT
Start: 2018-06-20 | End: 2018-06-20

## 2018-06-20 RX ORDER — METHYLPREDNISOLONE ACETATE 80 MG/ML
80 INJECTION, SUSPENSION INTRA-ARTICULAR; INTRALESIONAL; INTRAMUSCULAR; SOFT TISSUE ONCE
Status: COMPLETED | OUTPATIENT
Start: 2018-06-20 | End: 2018-06-20

## 2018-06-20 RX ADMIN — BUPIVACAINE HYDROCHLORIDE 2 ML: 5 INJECTION, SOLUTION EPIDURAL; INTRACAUDAL at 14:59

## 2018-06-20 RX ADMIN — LIDOCAINE HYDROCHLORIDE 5 ML: 10 INJECTION, SOLUTION EPIDURAL; INFILTRATION; INTRACAUDAL; PERINEURAL at 14:59

## 2018-06-20 RX ADMIN — METHYLPREDNISOLONE ACETATE 80 MG: 80 INJECTION, SUSPENSION INTRA-ARTICULAR; INTRALESIONAL; INTRAMUSCULAR; SOFT TISSUE at 14:59

## 2018-06-20 RX ADMIN — IOPAMIDOL 2 ML: 408 INJECTION, SOLUTION INTRATHECAL at 14:59

## 2018-06-20 NOTE — PROGRESS NOTES
Pt did well with the procedure.  No complications. VSS. AVS given and pt escorted to the waiting room.    Ping, Imaging Nurse

## 2018-06-20 NOTE — MR AVS SNAPSHOT
MRN:[id number]                      After Visit Summary   6/20/2018    Phan Luque    MRN: [id number]           Visit Information        Provider Department      6/20/2018 2:30 PM  NEURO RAD;  IMAGING NURSE; XR3 [facility] Xray           Review of your medicines          These changes are accurate as of 6/20/18  2:54 PM.  If you have any questions, ask your nurse or doctor.               CONTINUE these medicines which have NOT CHANGED        Dose / Directions    acetaminophen 325 MG tablet   Commonly known as:  TYLENOL   Used for:  Acute post-operative pain        Dose:  650 mg   Take 2 tablets (650 mg) by mouth every 6 hours as needed for mild pain Or fevers. Use sparingly. Limit use to no more than 2 grams (2000 mg) in 24 hours. **Further refills must be obtained by primary care provider**   Quantity:  100 tablet   Refills:  0       amLODIPine 5 MG tablet   Commonly known as:  NORVASC   Used for:  Hypertension        Dose:  5 mg   Take 1 tablet (5 mg) by mouth daily   Quantity:  90 tablet   Refills:  3       azaTHIOprine 100 MG Tabs   Used for:  Liver transplanted (H)        Dose:  50 mg   Take 50 mg by mouth every evening   Quantity:  90 tablet   Refills:  3       betaxolol 0.5 % ophthalmic solution   Commonly known as:  BETOPTIC   Used for:  Primary open angle glaucoma of both eyes, unspecified glaucoma stage        Dose:  1 drop   Place 1 drop into both eyes 2 times daily   Quantity:  5 mL   Refills:  2       busPIRone 10 MG tablet   Commonly known as:  BUSPAR   Used for:  WALTER (generalized anxiety disorder)        Dose:  10 mg   Take 1 tablet (10 mg) by mouth 2 times daily   Quantity:  60 tablet   Refills:  3       calcium Citrate-vitamin D 500-400 MG-UNIT Chew        Dose:  1 tablet   Take 1 tablet by mouth daily   Refills:  0       chlorthalidone 25 MG tablet   Commonly known as:  HYGROTON   Used for:  CKD (chronic kidney disease) stage 3, GFR 30-59 ml/min, Fluid  retention        Dose:  12.5 mg   Take 0.5 tablets (12.5 mg) by mouth daily   Quantity:  45 tablet   Refills:  3       cholecalciferol 400 UNIT Tabs tablet   Commonly known as:  vitamin D3        Dose:  400 Units   Take 400 Units by mouth daily   Refills:  0       cyanocobalamin 1000 MCG tablet   Commonly known as:  vitamin  B-12        Dose:  1000 mcg   Take 1,000 mcg by mouth daily   Refills:  0       ferrous sulfate 325 (65 Fe) MG tablet   Commonly known as:  IRON   Used for:  Iron deficiency        Dose:  1 tablet   Take 1 tablet (325 mg) by mouth 2 times daily   Quantity:  100 tablet   Refills:  0       folic acid 1 MG tablet   Commonly known as:  FOLVITE   Used for:  Malnutrition (H)        Dose:  1 mg   Take 1 tablet (1 mg) by mouth daily   Quantity:  30 tablet   Refills:  1       hydrOXYzine 25 MG tablet   Commonly known as:  ATARAX   Used for:  WALTER (generalized anxiety disorder)        Take 1-2 tablets (25-50mg) every 6 hours as needed for itching   Quantity:  60 tablet   Refills:  2       levothyroxine 88 MCG tablet   Commonly known as:  SYNTHROID/LEVOTHROID   Indication:  Underactive Thyroid   Used for:  Thyroid function test abnormal        Dose:  88 mcg   Take 1 tablet (88 mcg) by mouth every morning (before breakfast)   Quantity:  90 tablet   Refills:  2       melatonin 5 MG tablet        Dose:  5 mg   Take 1 tablet (5 mg) by mouth nightly as needed for sleep   Refills:  0       multivitamin, therapeutic Tabs tablet   Used for:  Malnutrition (H)        Dose:  1 tablet   Take 1 tablet by mouth every 24 hours   Quantity:  30 tablet   Refills:  11       OMEGA-3 FISH OIL EX ST PO        Dose:  1 capsule   Take 1 capsule by mouth 2 times daily 1290 (fish oil)-900 (omega 3)   Refills:  0       pantoprazole 40 MG EC tablet   Commonly known as:  PROTONIX   Used for:  Gastroesophageal reflux disease, esophagitis presence not specified        Dose:  40 mg   Take 1 tablet (40 mg) by mouth every morning (before  breakfast)   Quantity:  90 tablet   Refills:  4       pramipexole 0.5 MG tablet   Commonly known as:  MIRAPEX   Used for:  Restless leg syndrome        Dose:  0.5 mg   Take 1 tablet (0.5 mg) by mouth At Bedtime   Quantity:  90 tablet   Refills:  3       * predniSONE 5 MG tablet   Commonly known as:  DELTASONE   Used for:  Liver replaced by transplant (H)        Dose:  5 mg   Take 1 tablet (5 mg) by mouth daily   Quantity:  90 tablet   Refills:  3       * predniSONE 1 MG tablet   Commonly known as:  DELTASONE   Used for:  Liver replaced by transplant (H)        Dose:  4 mg   Take 4 tablets (4 mg) by mouth daily   Quantity:  360 tablet   Refills:  3       propranolol 10 MG tablet   Commonly known as:  INDERAL   Used for:  Generalized anxiety disorder        Dose:  10 mg   Take 1 tablet (10 mg) by mouth 3 times daily As needed for anxiety   Quantity:  90 tablet   Refills:  0       psyllium 0.52 g capsule   Indication:  GI motility   Used for:  Loose stools        Dose:  2 capsule   Take 2 capsules (1.04 g) by mouth daily With a full glass of water.   Quantity:  60 capsule   Refills:  1       tacrolimus 0.5 MG capsule   Commonly known as:  GENERIC EQUIVALENT   Used for:  Liver transplanted (H)        Dose:  2.5 mg   Take 5 capsules (2.5 mg) by mouth 2 times daily   Quantity:  300 capsule   Refills:  11       traMADol 50 MG tablet   Commonly known as:  ULTRAM   Used for:  Acute left ankle pain, Lumbar radicular pain        Dose:  50 mg   Take 1 tablet (50 mg) by mouth every 6 hours as needed for severe pain   Quantity:  20 tablet   Refills:  0       traZODone 100 MG tablet   Commonly known as:  DESYREL   Used for:  Insomnia, unspecified type        Dose:  150 mg   Take 1.5 tablets (150 mg) by mouth At Bedtime   Quantity:  45 tablet   Refills:  3       ursodiol 300 MG capsule   Commonly known as:  ACTIGALL   Indication:  Rejection of Liver Transplant   Used for:  Liver transplanted (H)        Dose:  300 mg   Take 1  capsule (300 mg) by mouth 2 times daily   Quantity:  60 capsule   Refills:  11       * Notice:  This list has 2 medication(s) that are the same as other medications prescribed for you. Read the directions carefully, and ask your doctor or other care provider to review them with you.             Protect others around you: Learn how to safely use, store and throw away your medicines at www.disposemymeds.org.         Follow-ups after your visit        Your next 10 appointments already scheduled     Jun 22, 2018  7:30 AM CDT   Lab visit with  LAB   [facility]    [address] [place] [address]  [address]  [facility] [id number] [phone]           Please do not eat 10-12 hours before your appointment if you are coming in fasting for labs on lipids, cholesterol, or glucose (sugar). Does not apply to pregnant women.  Water with medications is okay. Do not drink coffee or other fluids.  If you have concerns about taking your medications, please send a message by clicking on Secure Messaging, Message Your Care Team.            Jun 26, 2018  5:00 PM CDT   Adult Med Follow UP with DONOVAN Blake [facility]   Psychiatry Clinic ([facility])    [place] [id number]  [address] [address] [phone]            Jul 10, 2018  4:00 PM CDT   (Arrive by 3:45 PM)   Return Visit with Donnell Duvall MD   [facility] ([facility])    [address]  4th Floor  [facility] [address] [phone]            Oct 01, 2018  4:45 PM CDT   (Arrive by 4:30 PM)   Return Liver Transplant with Hussein Banegas MD   [facility] ([facility])    [address]  [address]  [facility] [address] [phone]            Oct 22, 2018  7:30 AM CDT   (Arrive by 7:15 AM)   Return Visit with Arlyn Sanchez MD   [facility] ([facility])    [address]  4th Floor  [place] [address]   [phone]            Mar 20, 2019  3:30 PM CDT   Lab with [facility]   [facility] ([facility])    [address]  1st Floor  [place] [address]   [phone]            Mar 20, 2019  4:30 PM CDT   (Arrive by 4:00 PM)   Return Visit with Daya Gutierrez MD   [facility] ([facility])    [address]  [address]  [facility] [place] [address]   [phone]               Care Instructions        Further instructions from your care team       Discharge Instructions for Epidural Steroid Injection/Nerve Block    Care of injection site:    If you have new numbness down your leg after the injection, this may last up to 4-6 hours, but should go away. You may need help with walking until your leg feels normal.    Over the next 24 to 48 hours, pain at the injection site may increase before it gets better.    For the next 48 hours, use ice packs for 15 minutes,3-4 times a day for pain relief.    If you have a bandage, you may remove it the next morning     Do not submerge injection site in water for 24 hours, (no baths. pools, hot tubs). Showers are OK.            Activity:    You should relax today and then you may return to your regular activity tomorrow.    You may eat a normal diet.    Medicines:    Restart all your medicines tomorrow at your regular dose, including Coumadin (Warfarin).    If you are restarting Coumadin, talk to your doctor about having your INR checked.    If you received steroids: The steroid should help reduce swelling and pain. This may take from 3-14 days. Pain from the shot will be mild and go away in 2-3 days.     Common side effects may include:    Insomnia    Heartburn    Flushed face    Water retention    Increased appetite    Increased blood sugar      If you have diabetes, watch your blood sugar closely, If needed, call your doctor to  help control your blood sugar.    Some patients will get lasting relief from a single injection. Others may require additional injections to get results.     Call your doctor or go to the Emergency Room if you have new severe pain or fever.     Additional Information About Your Visit        [email or web address] Information     [employer] gives you secure access to your electronic health record. If you see a primary care provider, you can also send messages to your care team and make appointments. If you have questions, please call your primary care clinic.  If you do not have a primary care provider, please call [phone] and they will assist you.      [employer] is an electronic gateway that provides easy, online access to your medical records. With [employer], you can request a clinic appointment, read your test results, renew a prescription or communicate with your care team.     To access your existing account, please contact your [facility] or call [phone] for assistance.        Care EveryWhere ID     This is your Care EveryWhere ID. This could be used by other organizations to access your [facility] medical records  [id number]         Primary Care Provider Office Phone # Fax #    Arlyn Sanchez [phone]      Equal Access to Services     [facility] Hospital[facility]QU[facility] : Hadii adonis Roberson, wajoaoda reyna, qaybta kaalmachyna escobar, anna ambrose . So [facility] [phone].    ATENCIÓN: Si habla español, tiene a reece disposición servicios gratuitos de asistencia lingüística. Llame al [phone].    We comply with applicable federal civil rights laws and [place] laws. We do not discriminate on the basis of race, color, national origin, age, disability, sex, sexual orientation, or gender identity.            Thank you!     Thank you for choosing [facility] for your care. Our goal is always to provide you with excellent care. Hearing back from our patients  is one way we can continue to improve our services. Please take a few minutes to complete the written survey that you may receive in the mail after you visit with us. Thank you!             Medication List: This is a list of all your medications and when to take them. Check marks below indicate your daily home schedule. Keep this list as a reference.          This list is accurate as of 6/20/18  2:54 PM.  Always use your most recent med list.               Medications           Morning Afternoon Evening Bedtime As Needed    acetaminophen 325 MG tablet   Commonly known as:  TYLENOL   Take 2 tablets (650 mg) by mouth every 6 hours as needed for mild pain Or fevers. Use sparingly. Limit use to no more than 2 grams (2000 mg) in 24 hours. **Further refills must be obtained by primary care provider**                                amLODIPine 5 MG tablet   Commonly known as:  NORVASC   Take 1 tablet (5 mg) by mouth daily                                azaTHIOprine 100 MG Tabs   Take 50 mg by mouth every evening                                betaxolol 0.5 % ophthalmic solution   Commonly known as:  BETOPTIC   Place 1 drop into both eyes 2 times daily                                busPIRone 10 MG tablet   Commonly known as:  BUSPAR   Take 1 tablet (10 mg) by mouth 2 times daily                                calcium Citrate-vitamin D 500-400 MG-UNIT Chew   Take 1 tablet by mouth daily                                chlorthalidone 25 MG tablet   Commonly known as:  HYGROTON   Take 0.5 tablets (12.5 mg) by mouth daily                                cholecalciferol 400 UNIT Tabs tablet   Commonly known as:  vitamin D3   Take 400 Units by mouth daily                                cyanocobalamin 1000 MCG tablet   Commonly known as:  vitamin  B-12   Take 1,000 mcg by mouth daily                                ferrous sulfate 325 (65 Fe) MG tablet   Commonly known as:  IRON   Take 1 tablet (325 mg) by mouth 2 times daily                                 folic acid 1 MG tablet   Commonly known as:  FOLVITE   Take 1 tablet (1 mg) by mouth daily                                hydrOXYzine 25 MG tablet   Commonly known as:  ATARAX   Take 1-2 tablets (25-50mg) every 6 hours as needed for itching                                levothyroxine 88 MCG tablet   Commonly known as:  SYNTHROID/LEVOTHROID   Take 1 tablet (88 mcg) by mouth every morning (before breakfast)                                melatonin 5 MG tablet   Take 1 tablet (5 mg) by mouth nightly as needed for sleep                                multivitamin, therapeutic Tabs tablet   Take 1 tablet by mouth every 24 hours                                OMEGA-3 FISH OIL EX ST PO   Take 1 capsule by mouth 2 times daily 1290 (fish oil)-900 (omega 3)                                pantoprazole 40 MG EC tablet   Commonly known as:  PROTONIX   Take 1 tablet (40 mg) by mouth every morning (before breakfast)                                pramipexole 0.5 MG tablet   Commonly known as:  MIRAPEX   Take 1 tablet (0.5 mg) by mouth At Bedtime                                * predniSONE 5 MG tablet   Commonly known as:  DELTASONE   Take 1 tablet (5 mg) by mouth daily                                * predniSONE 1 MG tablet   Commonly known as:  DELTASONE   Take 4 tablets (4 mg) by mouth daily                                propranolol 10 MG tablet   Commonly known as:  INDERAL   Take 1 tablet (10 mg) by mouth 3 times daily As needed for anxiety                                psyllium 0.52 g capsule   Take 2 capsules (1.04 g) by mouth daily With a full glass of water.                                tacrolimus 0.5 MG capsule   Commonly known as:  GENERIC EQUIVALENT   Take 5 capsules (2.5 mg) by mouth 2 times daily                                traMADol 50 MG tablet   Commonly known as:  ULTRAM   Take 1 tablet (50 mg) by mouth every 6 hours as needed for severe pain                                traZODone  100 MG tablet   Commonly known as:  DESYREL   Take 1.5 tablets (150 mg) by mouth At Bedtime                                ursodiol 300 MG capsule   Commonly known as:  ACTIGALL   Take 1 capsule (300 mg) by mouth 2 times daily                                * Notice:  This list has 2 medication(s) that are the same as other medications prescribed for you. Read the directions carefully, and ask your doctor or other care provider to review them with you.

## 2018-06-22 ENCOUNTER — TELEPHONE (OUTPATIENT)
Dept: TRANSPLANT | Facility: CLINIC | Age: 63
End: 2018-06-22

## 2018-06-22 DIAGNOSIS — Z94.4 LIVER REPLACED BY TRANSPLANT (H): ICD-10-CM

## 2018-06-22 DIAGNOSIS — Z94.4 LIVER REPLACED BY TRANSPLANT (H): Primary | ICD-10-CM

## 2018-06-22 LAB
ALBUMIN SERPL-MCNC: 3.7 G/DL (ref 3.4–5)
ALP SERPL-CCNC: 51 U/L (ref 40–150)
ALT SERPL W P-5'-P-CCNC: 20 U/L (ref 0–50)
ANION GAP SERPL CALCULATED.3IONS-SCNC: 9 MMOL/L (ref 3–14)
AST SERPL W P-5'-P-CCNC: 11 U/L (ref 0–45)
BILIRUB DIRECT SERPL-MCNC: 0.1 MG/DL (ref 0–0.2)
BILIRUB SERPL-MCNC: 0.5 MG/DL (ref 0.2–1.3)
BUN SERPL-MCNC: 22 MG/DL (ref 7–30)
CALCIUM SERPL-MCNC: 8.8 MG/DL (ref 8.5–10.1)
CHLORIDE SERPL-SCNC: 103 MMOL/L (ref 94–109)
CO2 SERPL-SCNC: 27 MMOL/L (ref 20–32)
CREAT SERPL-MCNC: 1.32 MG/DL (ref 0.52–1.04)
ERYTHROCYTE [DISTWIDTH] IN BLOOD BY AUTOMATED COUNT: 13.7 % (ref 10–15)
GFR SERPL CREATININE-BSD FRML MDRD: 41 ML/MIN/1.7M2
GLUCOSE SERPL-MCNC: 124 MG/DL (ref 70–99)
HCT VFR BLD AUTO: 39.7 % (ref 35–47)
HGB BLD-MCNC: 13.1 G/DL (ref 11.7–15.7)
MAGNESIUM SERPL-MCNC: 1.7 MG/DL (ref 1.6–2.3)
MCH RBC QN AUTO: 31.2 PG (ref 26.5–33)
MCHC RBC AUTO-ENTMCNC: 33 G/DL (ref 31.5–36.5)
MCV RBC AUTO: 95 FL (ref 78–100)
PLATELET # BLD AUTO: 282 10E9/L (ref 150–450)
POTASSIUM SERPL-SCNC: 3.7 MMOL/L (ref 3.4–5.3)
PROT SERPL-MCNC: 7.1 G/DL (ref 6.8–8.8)
RBC # BLD AUTO: 4.2 10E12/L (ref 3.8–5.2)
SODIUM SERPL-SCNC: 139 MMOL/L (ref 133–144)
WBC # BLD AUTO: 10.4 10E9/L (ref 4–11)

## 2018-06-22 PROCEDURE — 80321 ALCOHOLS BIOMARKERS 1OR 2: CPT | Mod: 90 | Performed by: INTERNAL MEDICINE

## 2018-06-22 PROCEDURE — 83735 ASSAY OF MAGNESIUM: CPT | Performed by: INTERNAL MEDICINE

## 2018-06-22 PROCEDURE — 80076 HEPATIC FUNCTION PANEL: CPT | Performed by: INTERNAL MEDICINE

## 2018-06-22 PROCEDURE — 99000 SPECIMEN HANDLING OFFICE-LAB: CPT | Performed by: INTERNAL MEDICINE

## 2018-06-22 PROCEDURE — 36415 COLL VENOUS BLD VENIPUNCTURE: CPT | Performed by: INTERNAL MEDICINE

## 2018-06-22 PROCEDURE — 85027 COMPLETE CBC AUTOMATED: CPT | Performed by: INTERNAL MEDICINE

## 2018-06-22 PROCEDURE — 80197 ASSAY OF TACROLIMUS: CPT | Performed by: INTERNAL MEDICINE

## 2018-06-22 PROCEDURE — 80048 BASIC METABOLIC PNL TOTAL CA: CPT | Performed by: INTERNAL MEDICINE

## 2018-06-22 NOTE — TELEPHONE ENCOUNTER
Patient Call: Transplant Lab/Orders  Route to LPN  Post Transplant Days: 666  When patient is less than 60 days post-transplant, route high priority    Reason for Call: needs her labs updated in epic for her Tacrolimus level labs were taken today.  Callback needed? No

## 2018-06-23 LAB — ETHYL GLUCURONIDE UR QL: NEGATIVE

## 2018-06-24 LAB
TACROLIMUS BLD-MCNC: 7.3 UG/L (ref 5–15)
TME LAST DOSE: NORMAL H

## 2018-06-25 ENCOUNTER — TELEPHONE (OUTPATIENT)
Dept: TRANSPLANT | Facility: CLINIC | Age: 63
End: 2018-06-25

## 2018-06-25 DIAGNOSIS — Z94.4 LIVER TRANSPLANTED (H): ICD-10-CM

## 2018-06-25 RX ORDER — TACROLIMUS 0.5 MG/1
CAPSULE ORAL
Qty: 270 CAPSULE | Refills: 11 | Status: SHIPPED | OUTPATIENT
Start: 2018-06-25 | End: 2018-08-22

## 2018-06-26 ENCOUNTER — OFFICE VISIT (OUTPATIENT)
Dept: PSYCHIATRY | Facility: CLINIC | Age: 63
End: 2018-06-26
Attending: NURSE PRACTITIONER
Payer: COMMERCIAL

## 2018-06-26 VITALS
WEIGHT: 220.2 LBS | HEART RATE: 80 BPM | BODY MASS INDEX: 34.49 KG/M2 | SYSTOLIC BLOOD PRESSURE: 144 MMHG | DIASTOLIC BLOOD PRESSURE: 85 MMHG

## 2018-06-26 DIAGNOSIS — G47.00 INSOMNIA, UNSPECIFIED TYPE: ICD-10-CM

## 2018-06-26 PROCEDURE — G0463 HOSPITAL OUTPT CLINIC VISIT: HCPCS | Mod: ZF

## 2018-06-26 RX ORDER — TRAZODONE HYDROCHLORIDE 100 MG/1
150 TABLET ORAL AT BEDTIME
Qty: 45 TABLET | Refills: 3 | Status: SHIPPED | OUTPATIENT
Start: 2018-06-26 | End: 2018-09-26

## 2018-06-26 ASSESSMENT — PAIN SCALES - GENERAL: PAINLEVEL: NO PAIN (0)

## 2018-06-26 NOTE — MR AVS SNAPSHOT
After Visit Summary   6/26/2018    Phan Luque    MRN: [id number]           Patient Information     Date Of Birth          1955        Visit Information        Provider Department      6/26/2018 5:00 PM David Vu APRN CNP [facility]iatry Clinic        Today's Diagnoses     Insomnia, unspecified type           Follow-ups after your visit        Your next 10 appointments already scheduled     Jul 10, 2018  4:00 PM CDT   (Arrive by 3:45 PM)   Return Visit with Donnell Duvall MD   [facility] ([facility])    [address] [place] [address]  [address] [id number] [phone]            Sep 26, 2018  5:00 PM CDT   Adult Med Follow UP with DONOVAN Blake CNP   Psychiatry Clinic ([facility])    [place] [id number]  [address] [place] [id number] [phone]            Oct 01, 2018  4:45 PM CDT   (Arrive by 4:30 PM)   Return Liver Transplant with Hussein Banegas MD   [facility] Hepatology ([facility])    [address] [place] [address]  [address] [place] [id number] [phone]            Oct 22, 2018  7:30 AM CDT   (Arrive by 7:15 AM)   Return Visit with Arlyn Sanchez MD   [facility] Primary Care [facility] ([facility])    [address] [place] [address]  [address] [id number] [phone]            Mar 20, 2019  3:30 PM CDT   Lab with  LAB   [facility] Lab ([facility])    [address] [place] [id number] [phone]            Mar 20, 2019  4:30 PM CDT   (Arrive by 4:00 PM)   Return Visit with Daya Gutierrez MD   [facility] Nephrology ([facility])    [address] [place] [address]  [address] [place] [id number] [phone]              Who to contact     Please call your clinic at [phone] to:    Ask questions about your health    Make or cancel  appointments    Discuss your medicines    Learn about your test results    Speak to your doctor            Additional Information About Your Visit        [employer] Information     [employer] gives you secure access to your electronic health record. If you see a primary care provider, you can also send messages to your care team and make appointments. If you have questions, please call your primary care clinic.  If you do not have a primary care provider, please call [phone] and they will assist you.      [employer] is an electronic gateway that provides easy, online access to your medical records. With [employer], you can request a clinic appointment, read your test results, renew a prescription or communicate with your care team.     To access your existing account, please contact your [facility] or call [phone] for assistance.        Care EveryWhere ID     This is your Care EveryWhere ID. This could be used by other organizations to access your [facility] medical records  [id number]        Your Vitals Were     Pulse BMI (Body Mass Index)                80 34.49 kg/m2           Blood Pressure from Last 3 Encounters:   06/26/18 144/85   06/20/18 156/86   06/15/18 120/54    Weight from Last 3 Encounters:   06/26/18 99.9 kg (220 lb 3.2 oz)   06/15/18 96.6 kg (213 lb)   06/13/18 98.7 kg (217 lb 9.6 oz)              Today, you had the following     No orders found for display         Where to get your medicines      These medications were sent to [facility] - [place], [place] - [address]  [address] [facility] [address]    Hours:  Mon-Fri 8:30am-5:00pm Toll Free [phone] Phone:  [phone]     traZODone 100 MG tablet          Primary Care Provider Office Phone # Fax #    Arlyn Sanchez [phone]       [address] [address]        Equal Access to Services     PAULO BERRY AH: Barby mejia  skyler Roberson, lucille mcfarlanecarla, qaashleyta kaaustin escobar, anna kobein hayaan castrobrenda jairjoleen lamikeyfabio alexandre. So [facility] [phone].    ATENCIÓN: Si yodit lopez, tiene a reece disposición servicios gratuitos de asistencia lingüística. Rhiannon al [phone].    We comply with applicable federal civil rights laws and [place] laws. We do not discriminate on the basis of race, color, national origin, age, disability, sex, sexual orientation, or gender identity.            Thank you!     Thank you for choosing [facility]INIC  for your care. Our goal is always to provide you with excellent care. Hearing back from our patients is one way we can continue to improve our services. Please take a few minutes to complete the written survey that you may receive in the mail after your visit with us. Thank you!             Your Updated Medication List - Protect others around you: Learn how to safely use, store and throw away your medicines at www.disposemymeds.org.          This list is accurate as of 6/26/18 11:59 PM.  Always use your most recent med list.                   Brand Name Dispense Instructions for use Diagnosis    acetaminophen 325 MG tablet    TYLENOL    100 tablet    Take 2 tablets (650 mg) by mouth every 6 hours as needed for mild pain Or fevers. Use sparingly. Limit use to no more than 2 grams (2000 mg) in 24 hours. **Further refills must be obtained by primary care provider**    Acute post-operative pain       amLODIPine 5 MG tablet    NORVASC    90 tablet    Take 1 tablet (5 mg) by mouth daily    Hypertension       azaTHIOprine 100 MG Tabs     90 tablet    Take 50 mg by mouth every evening    Liver transplanted (H)       betaxolol 0.5 % ophthalmic solution    BETOPTIC    5 mL    Place 1 drop into both eyes 2 times daily    Primary open angle glaucoma of both eyes, unspecified glaucoma stage       calcium Citrate-vitamin D 500-400 MG-UNIT Chew      Take 1 tablet by mouth daily        chlorthalidone 25 MG tablet     HYGROTON    45 tablet    Take 0.5 tablets (12.5 mg) by mouth daily    CKD (chronic kidney disease) stage 3, GFR 30-59 ml/min, Fluid retention       cholecalciferol 400 UNIT Tabs tablet    vitamin D3     Take 400 Units by mouth daily        cyanocobalamin 1000 MCG tablet    vitamin  B-12     Take 1,000 mcg by mouth daily        ferrous sulfate 325 (65 Fe) MG tablet    IRON    100 tablet    Take 1 tablet (325 mg) by mouth 2 times daily    Iron deficiency       folic acid 1 MG tablet    FOLVITE    30 tablet    Take 1 tablet (1 mg) by mouth daily    Malnutrition (H)       hydrOXYzine 25 MG tablet    ATARAX    60 tablet    Take 1-2 tablets (25-50mg) every 6 hours as needed for itching    WALTER (generalized anxiety disorder)       levothyroxine 88 MCG tablet    SYNTHROID/LEVOTHROID    90 tablet    Take 1 tablet (88 mcg) by mouth every morning (before breakfast)    Thyroid function test abnormal       melatonin 5 MG tablet      Take 1 tablet (5 mg) by mouth nightly as needed for sleep        multivitamin, therapeutic Tabs tablet     30 tablet    Take 1 tablet by mouth every 24 hours    Malnutrition (H)       OMEGA-3 FISH OIL EX ST PO      Take 1 capsule by mouth 2 times daily 1290 (fish oil)-900 (omega 3)        pantoprazole 40 MG EC tablet    PROTONIX    90 tablet    Take 1 tablet (40 mg) by mouth every morning (before breakfast)    Gastroesophageal reflux disease, esophagitis presence not specified       pramipexole 0.5 MG tablet    MIRAPEX    90 tablet    Take 1 tablet (0.5 mg) by mouth At Bedtime    Restless leg syndrome       * predniSONE 5 MG tablet    DELTASONE    90 tablet    Take 1 tablet (5 mg) by mouth daily    Liver replaced by transplant (H)       * predniSONE 1 MG tablet    DELTASONE    360 tablet    Take 4 tablets (4 mg) by mouth daily    Liver replaced by transplant (H)       propranolol 10 MG tablet    INDERAL    90 tablet    Take 1 tablet (10 mg) by mouth 3 times daily As needed for anxiety    Generalized  anxiety disorder       psyllium 0.52 g capsule     60 capsule    Take 2 capsules (1.04 g) by mouth daily With a full glass of water.    Loose stools       tacrolimus 0.5 MG capsule    GENERIC EQUIVALENT    270 capsule    2.5 mg AM and 2 mg PM    Liver transplanted (H)       traMADol 50 MG tablet    ULTRAM    20 tablet    Take 1 tablet (50 mg) by mouth every 6 hours as needed for severe pain    Acute left ankle pain, Lumbar radicular pain       traZODone 100 MG tablet    DESYREL    45 tablet    Take 1.5 tablets (150 mg) by mouth At Bedtime    Insomnia, unspecified type       ursodiol 300 MG capsule    ACTIGALL    60 capsule    Take 1 capsule (300 mg) by mouth 2 times daily    Liver transplanted (H)       * Notice:  This list has 2 medication(s) that are the same as other medications prescribed for you. Read the directions carefully, and ask your doctor or other care provider to review them with you.

## 2018-06-26 NOTE — PROGRESS NOTES
Psychiatry Clinic Progress Note                                                                   Phan Luque is a 62 year old female who returns to the clinic for continued care.   Therapist: Germania Bethea @ [facility].  PCP: Santosh Salgado  Other Providers: Hussein Banegas MD    Pertinent Background:  Liver Transplant on 9/25/16 and Stage 3 kidney disease.  Psych critical item history includes SUBSTANCE USE: alcohol.     Interim History                                                                                                             4, 4     The patient is a good historian, reports good treatment adherence and was last seen 3/30/18. Since the last visit, Phan reports her mental health continues to be well managed in spite of forgetting to load Buspar into her weekly med reminder.  She has not noticed any increase in anxiety.  Buspar will not be restarted.  Phan continues to not have taken the Propranolol as it is unneeded. Sleep quality remains unchanged but she is unconcerned.  Sleep may be impacted by prednisone.  Continues to take Melatonin 5mg and Trazodone 150mg to help with sleep.  Phan reports the two year anniversary of her liver transplant is approaching with no concerns from providers.        3/30/18:  Phan tried decreased Trazodone and the change significantly impacted her sleep.  She has resumed taking Trazodone 150mg qHS. Phan also recently saw nephrologist who prescribed Iron supplementation to possibly help manage restless leg syndrome.       Phan has not taken propranolol as she feels her anxiety is well managed.  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  Discussed longer duration between appointments due to symptoms being well managed.        Recent Symptoms:   Depression:  anhedonia, low energy and insomnia  Elevated:  none  Psychosis:  none  Anxiety:  anxiety intensifies when driving  Panic Attack:  none  Trauma Related:  none     Recent  Substance Use:  none reported        Social/ Family History                                  [per patient report]                                     1ea, 1ea   CHILDREN- 3 adult children       TRAUMA HISTORY (self-report)- None  FEELS SAFE AT HOME- Yes    Medical / Surgical History                                                                                                                  Patient Active Problem List   Diagnosis     Decompensation of cirrhosis of liver (H)     Liver transplanted (H)     Alcoholic hepatitis     Immunosuppression (H)     Alcoholic hepatitis without ascites     Enterococcus UTI     Liver transplant status     Nausea & vomiting     Malnutrition (H)     Candidiasis of skin     Anemia     ACP (advance care planning)     Diarrhea     Hypothyroidism     Esophageal reflux     Recurrent major depression in partial remission (H)     Insomnia     CKD (chronic kidney disease) stage 3, GFR 30-59 ml/min     Anemia in stage 3 chronic kidney disease     Osteopenia     Alcohol abuse, uncomplicated       Past Surgical History:   Procedure Laterality Date     APPENDECTOMY           BENCH LIVER N/A 2016    Procedure: BENCH LIVER;  Surgeon: Larry Dhillon MD;  Location: [facility] OR     CATARACT IOL, RT/LT       COLONOSCOPY       ESOPHAGOSCOPY, GASTROSCOPY, DUODENOSCOPY (EGD), COMBINED N/A 2016    Procedure: COMBINED ESOPHAGOSCOPY, GASTROSCOPY, DUODENOSCOPY (EGD);  Surgeon: Tao Alfonso MD;  Location: [facility] GI     ESOPHAGOSCOPY, GASTROSCOPY, DUODENOSCOPY (EGD), COMBINED N/A 10/31/2016    Procedure: COMBINED ESOPHAGOSCOPY, GASTROSCOPY, DUODENOSCOPY (EGD), BIOPSY SINGLE OR MULTIPLE;  Surgeon: Ronald Bojorquez MD;  Location: [facility] GI     sphincteroplasty       TRANSPLANT LIVER RECIPIENT  DONOR N/A 2016    Procedure: TRANSPLANT LIVER RECIPIENT  DONOR;  Surgeon: Larry Dhillon MD;  Location: [facility] OR     TUBAL LIGATION      laparoscopic      Medical Review of  Systems                                                                                                        2,10   A comprehensive review of systems was performed and is negative other than noted in the HPI.  Pregnant- No    Breastfeeding- No    Contraception- No  Allergy                                Benadryl [diphenhydramine]; Cefaclor; Hydrocodone-acetaminophen; Oxycodone; Penicillins; and Sulfa drugs  Current Medications                                                                                                       Current Outpatient Prescriptions   Medication Sig Dispense Refill     acetaminophen (TYLENOL) 325 MG tablet Take 2 tablets (650 mg) by mouth every 6 hours as needed for mild pain Or fevers. Use sparingly. Limit use to no more than 2 grams (2000 mg) in 24 hours. **Further refills must be obtained by primary care provider** 100 tablet 0     amLODIPine (NORVASC) 5 MG tablet Take 1 tablet (5 mg) by mouth daily 90 tablet 3     azaTHIOprine 100 MG TABS Take 50 mg by mouth every evening 90 tablet 3     betaxolol (BETOPTIC) 0.5 % ophthalmic solution Place 1 drop into both eyes 2 times daily 5 mL 2     calcium Citrate-vitamin D 500-400 MG-UNIT CHEW Take 1 tablet by mouth daily       chlorthalidone (HYGROTON) 25 MG tablet Take 0.5 tablets (12.5 mg) by mouth daily 45 tablet 3     cholecalciferol (VITAMIN D3) 400 UNIT TABS tablet Take 400 Units by mouth daily       cyanocobalamin (VITAMIN  B-12) 1000 MCG tablet Take 1,000 mcg by mouth daily       ferrous sulfate (IRON) 325 (65 FE) MG tablet Take 1 tablet (325 mg) by mouth 2 times daily 100 tablet      folic acid (FOLVITE) 1 MG tablet Take 1 tablet (1 mg) by mouth daily 30 tablet 1     hydrOXYzine (ATARAX) 25 MG tablet Take 1-2 tablets (25-50mg) every 6 hours as needed for itching 60 tablet 2     levothyroxine (SYNTHROID/LEVOTHROID) 88 MCG tablet Take 1 tablet (88 mcg) by mouth every morning (before breakfast) 90 tablet 2     melatonin 5 MG tablet Take 1  tablet (5 mg) by mouth nightly as needed for sleep       multivitamin, therapeutic (THERA-VIT) TABS tablet Take 1 tablet by mouth every 24 hours 30 tablet 11     Omega-3 Fatty Acids (OMEGA-3 FISH OIL EX ST PO) Take 1 capsule by mouth 2 times daily 1290 (fish oil)-900 (omega 3)       pantoprazole (PROTONIX) 40 MG EC tablet Take 1 tablet (40 mg) by mouth every morning (before breakfast) 90 tablet 4     pramipexole (MIRAPEX) 0.5 MG tablet Take 1 tablet (0.5 mg) by mouth At Bedtime 90 tablet 3     predniSONE (DELTASONE) 1 MG tablet Take 4 tablets (4 mg) by mouth daily 360 tablet 3     predniSONE (DELTASONE) 5 MG tablet Take 1 tablet (5 mg) by mouth daily 90 tablet 3     propranolol (INDERAL) 10 MG tablet Take 1 tablet (10 mg) by mouth 3 times daily As needed for anxiety 90 tablet 0     psyllium 0.52 G capsule Take 2 capsules (1.04 g) by mouth daily With a full glass of water. 60 capsule 1     tacrolimus (GENERIC EQUIVALENT) 0.5 MG capsule 2.5 mg AM and 2 mg  capsule 11     traMADol (ULTRAM) 50 MG tablet Take 1 tablet (50 mg) by mouth every 6 hours as needed for severe pain 20 tablet 0     traZODone (DESYREL) 100 MG tablet Take 1.5 tablets (150 mg) by mouth At Bedtime 45 tablet 3     ursodiol (ACTIGALL) 300 MG capsule Take 1 capsule (300 mg) by mouth 2 times daily 60 capsule 11     busPIRone (BUSPAR) 10 MG tablet Take 1 tablet (10 mg) by mouth 2 times daily (Patient not taking: Reported on 6/26/2018) 60 tablet 3     Vitals                                                                                                                            3, 3   /85  Pulse 80  Wt 99.9 kg (220 lb 3.2 oz)  BMI 34.49 kg/m2   Mental Status Exam                                                                                        9, 14 cog gs     Alertness: alert  and oriented  Appearance: casually groomed  Behavior/Demeanor: cooperative, pleasant and calm, with good  eye contact   Speech: regular rate and  rhythm  Language: good  Psychomotor: normal or unremarkable  Mood: description consistent with euthymia  Affect: full range and appropriate; was congruent to mood; was congruent to content  Thought Process/Associations: unremarkable  Thought Content:  Reports none;  Denies suicidal ideation and violent ideation  Perception:  Reports none;  Denies auditory hallucinations and visual hallucinations  Insight: good  Judgment: good  Cognition: (6) does  appear grossly intact; formal cognitive testing was not done  Gait/Station and/or Muscle Strength/Tone: unremarkable    Labs and Data                                                                                                                 Rating Scales:  PHQ9    PHQ9 Today:  3  PHQ-9 SCORE 12/14/2017 2/28/2018 3/30/2018   Total Score MyChart - - -   Total Score 8 6 5         Diagnosis and Assessment                                                                                  m2, h3     Today the following issues were addressed:    1) Major Depressive Disorder, recurrent, mild  2) Generalized Anxiety Disorder    [place] Prescription Monitoring Program [] was not checked today:  will be checked next visit.         Plan                                                                                                                         m2, h3      1) Mood management    Therapy- Continue  Medication-   Continue Trazodone 150mg qHS for sleep assistance which Suemay correlates with her mood.   2) Anxiety Management  Therapy- Continue  Medication-   Discontinue Buspar 10mg BID as Suemay inadvertently stopped taking with no increases symptoms.   Continue Hydroxyzine as needed (initially prescribed for itching, but educated/advised that can be used to manage periodic anxiety)  Continue Propranolol 10mg TID PRN    RTC: 3 months    CRISIS NUMBERS:   Provided routinely in AVS.    Treatment Risk Statement:  The patient understands the risks, benefits, adverse effects and  alternatives. Agrees to treatment with the capacity to do so. No medical contraindications to treatment. Agrees to call clinic for any problems. The patient understands to call 911 or go to the nearest ED if life threatening or urgent symptoms occur.      PROVIDER:  DONOVAN Hanna CNP

## 2018-07-09 ASSESSMENT — ENCOUNTER SYMPTOMS
STIFFNESS: 0
MEMORY LOSS: 0
TREMORS: 0
BACK PAIN: 1
MUSCLE CRAMPS: 1
MUSCLE WEAKNESS: 1
NAIL CHANGES: 0
NUMBNESS: 1
NECK PAIN: 1
ARTHRALGIAS: 1
SPEECH CHANGE: 0
TINGLING: 1
WEAKNESS: 1
SKIN CHANGES: 0
HEADACHES: 0
DIZZINESS: 0
LOSS OF CONSCIOUSNESS: 0
JOINT SWELLING: 0
PARALYSIS: 0
MYALGIAS: 1
POOR WOUND HEALING: 0
DISTURBANCES IN COORDINATION: 0
SEIZURES: 0

## 2018-07-10 ENCOUNTER — OFFICE VISIT (OUTPATIENT)
Dept: ORTHOPEDICS | Facility: CLINIC | Age: 63
End: 2018-07-10
Payer: COMMERCIAL

## 2018-07-10 VITALS — BODY MASS INDEX: 33.43 KG/M2 | HEIGHT: 67 IN | WEIGHT: 213 LBS

## 2018-07-10 DIAGNOSIS — M51.26 LUMBAR HERNIATED DISC: ICD-10-CM

## 2018-07-10 DIAGNOSIS — M54.16 LUMBAR RADICULAR PAIN: Primary | ICD-10-CM

## 2018-07-10 NOTE — LETTER
7/10/2018       RE: Phan Luque  [address]  [facility] [address]     Dear Colleague,    Thank you for referring your patient, Phan Luque, to [facility]. Please see a copy of my visit note below.    HISTORY OF PRESENT ILLNESS  Ms. Luque is a pleasant 62 year old year old female who presents to clinic today for followup after treatments for her lumbar spine. She has some improvement, and willing to start PT, but not improved overall. She wants to know what else she can do.  She has had an EMG for LE a long time ago, but maybe wants a new one before she makes any more choices about her lumbar spine treatments      MEDICAL HISTORY  Patient Active Problem List   Diagnosis     Decompensation of cirrhosis of liver (H)     Liver transplanted (H)     Alcoholic hepatitis     Immunosuppression (H)     Alcoholic hepatitis without ascites     Enterococcus UTI     Liver transplant status     Nausea & vomiting     Malnutrition (H)     Candidiasis of skin     Anemia     ACP (advance care planning)     Diarrhea     Hypothyroidism     Esophageal reflux     Recurrent major depression in partial remission (H)     Insomnia     CKD (chronic kidney disease) stage 3, GFR 30-59 ml/min     Anemia in stage 3 chronic kidney disease     Osteopenia     Alcohol abuse, uncomplicated       Current Outpatient Prescriptions   Medication Sig Dispense Refill     acetaminophen (TYLENOL) 325 MG tablet Take 2 tablets (650 mg) by mouth every 6 hours as needed for mild pain Or fevers. Use sparingly. Limit use to no more than 2 grams (2000 mg) in 24 hours. **Further refills must be obtained by primary care provider** 100 tablet 0     amLODIPine (NORVASC) 5 MG tablet Take 1 tablet (5 mg) by mouth daily 90 tablet 3     azaTHIOprine 100 MG TABS Take 50 mg by mouth every evening 90 tablet 3     betaxolol (BETOPTIC) 0.5 % ophthalmic solution Place 1 drop into both eyes 2  times daily 5 mL 2     calcium Citrate-vitamin D 500-400 MG-UNIT CHEW Take 1 tablet by mouth daily       chlorthalidone (HYGROTON) 25 MG tablet Take 0.5 tablets (12.5 mg) by mouth daily 45 tablet 3     cholecalciferol (VITAMIN D3) 400 UNIT TABS tablet Take 400 Units by mouth daily       cyanocobalamin (VITAMIN  B-12) 1000 MCG tablet Take 1,000 mcg by mouth daily       ferrous sulfate (IRON) 325 (65 FE) MG tablet Take 1 tablet (325 mg) by mouth 2 times daily 100 tablet      folic acid (FOLVITE) 1 MG tablet Take 1 tablet (1 mg) by mouth daily 30 tablet 1     hydrOXYzine (ATARAX) 25 MG tablet Take 1-2 tablets (25-50mg) every 6 hours as needed for itching 60 tablet 2     levothyroxine (SYNTHROID/LEVOTHROID) 88 MCG tablet Take 1 tablet (88 mcg) by mouth every morning (before breakfast) 90 tablet 2     melatonin 5 MG tablet Take 1 tablet (5 mg) by mouth nightly as needed for sleep       multivitamin, therapeutic (THERA-VIT) TABS tablet Take 1 tablet by mouth every 24 hours 30 tablet 11     Omega-3 Fatty Acids (OMEGA-3 FISH OIL EX ST PO) Take 1 capsule by mouth 2 times daily 1290 (fish oil)-900 (omega 3)       pantoprazole (PROTONIX) 40 MG EC tablet Take 1 tablet (40 mg) by mouth every morning (before breakfast) 90 tablet 4     pramipexole (MIRAPEX) 0.5 MG tablet Take 1 tablet (0.5 mg) by mouth At Bedtime 90 tablet 3     predniSONE (DELTASONE) 1 MG tablet Take 4 tablets (4 mg) by mouth daily 360 tablet 3     predniSONE (DELTASONE) 5 MG tablet Take 1 tablet (5 mg) by mouth daily 90 tablet 3     propranolol (INDERAL) 10 MG tablet Take 1 tablet (10 mg) by mouth 3 times daily As needed for anxiety 90 tablet 0     psyllium 0.52 G capsule Take 2 capsules (1.04 g) by mouth daily With a full glass of water. 60 capsule 1     tacrolimus (GENERIC EQUIVALENT) 0.5 MG capsule 2.5 mg AM and 2 mg  capsule 11     traMADol (ULTRAM) 50 MG tablet Take 1 tablet (50 mg) by mouth every 6 hours as needed for severe pain 20 tablet 0      "traZODone (DESYREL) 100 MG tablet Take 1.5 tablets (150 mg) by mouth At Bedtime 45 tablet 3     ursodiol (ACTIGALL) 300 MG capsule Take 1 capsule (300 mg) by mouth 2 times daily 60 capsule 11       Allergies   Allergen Reactions     Benadryl [Diphenhydramine] Hives     Cefaclor Hives     Hydrocodone-Acetaminophen Itching     Oxycodone Itching     Penicillins Hives     Sulfa Drugs Hives       Family History   Problem Relation Age of Onset     Family History Negative Other      Melanoma Mother           HEART DISEASE Father      CHF       Additional medical/Social/S[facility] and updated as appropriate.     REVIEW OF SYSTEMS (7/10/2018)  10 point ROS of systems including Constitutional, Eyes, Respiratory, Cardiovascular, Gastroenterology, Genitourinary, Integumentary, Musculoskeletal, Psychiatric were all negative except for pertinent positives noted in my HPI.     PHYSICAL EXAM  Vitals:    07/10/18 1606   Weight: 96.6 kg (213 lb)   Height: 1.702 m (5' 7\")     Vital Signs: Ht 1.702 m (5' 7\")  Wt 96.6 kg (213 lb)  BMI 33.36 kg/m2 Patient declined being weighed. Body mass index is 33.36 kg/(m^2).    General  - normal appearance, in no obvious distress  CV  - normal peripheral perfusion  Pulm  - normal respiratory pattern, non-labored  Musculoskeletal - lumbar spine  - stance: normal gait without limp, no obvious leg length discrepancy, normal heel and toe walk  - inspection: normal bone and joint alignment, no obvious scoliosis  - palpation: no paravertebral or bony tenderness  - ROM: flexion exacerbates pain, normal extension, sidebending, rotation  - strength: lower extremities 5/5 in all planes  - special tests:  (+) straight leg raise- bilateral  (+) slump test  Neuro  - patellar and Achilles DTRs 2+ bilaterally, bilateral lower extremity sensory deficit throughout L5 distribution, grossly normal coordination, normal muscle tone  Skin  - no ecchymosis, erythema, warmth, or induration, " no obvious rash  Psych  - interactive, appropriate, normal mood and affect    ASSESSMENT & PLAN  63 yo female with lumbar radicular pain and ddd, improved, not resolved  Reviewed her previous treatments, and will start PT for her back  Ordered LE EMG  Will consider spine surgery referral at her request      Donnell Duvall MD, CAM

## 2018-07-10 NOTE — PROGRESS NOTES
HISTORY OF PRESENT ILLNESS  Ms. Luque is a pleasant 62 year old year old female who presents to clinic today for followup after treatments for her lumbar spine. She has some improvement, and willing to start PT, but not improved overall. She wants to know what else she can do.  She has had an EMG for LE a long time ago, but maybe wants a new one before she makes any more choices about her lumbar spine treatments      MEDICAL HISTORY  Patient Active Problem List   Diagnosis     Decompensation of cirrhosis of liver (H)     Liver transplanted (H)     Alcoholic hepatitis     Immunosuppression (H)     Alcoholic hepatitis without ascites     Enterococcus UTI     Liver transplant status     Nausea & vomiting     Malnutrition (H)     Candidiasis of skin     Anemia     ACP (advance care planning)     Diarrhea     Hypothyroidism     Esophageal reflux     Recurrent major depression in partial remission (H)     Insomnia     CKD (chronic kidney disease) stage 3, GFR 30-59 ml/min     Anemia in stage 3 chronic kidney disease     Osteopenia     Alcohol abuse, uncomplicated       Current Outpatient Prescriptions   Medication Sig Dispense Refill     acetaminophen (TYLENOL) 325 MG tablet Take 2 tablets (650 mg) by mouth every 6 hours as needed for mild pain Or fevers. Use sparingly. Limit use to no more than 2 grams (2000 mg) in 24 hours. **Further refills must be obtained by primary care provider** 100 tablet 0     amLODIPine (NORVASC) 5 MG tablet Take 1 tablet (5 mg) by mouth daily 90 tablet 3     azaTHIOprine 100 MG TABS Take 50 mg by mouth every evening 90 tablet 3     betaxolol (BETOPTIC) 0.5 % ophthalmic solution Place 1 drop into both eyes 2 times daily 5 mL 2     calcium Citrate-vitamin D 500-400 MG-UNIT CHEW Take 1 tablet by mouth daily       chlorthalidone (HYGROTON) 25 MG tablet Take 0.5 tablets (12.5 mg) by mouth daily 45 tablet 3     cholecalciferol (VITAMIN D3) 400 UNIT TABS tablet Take 400 Units by mouth daily        cyanocobalamin (VITAMIN  B-12) 1000 MCG tablet Take 1,000 mcg by mouth daily       ferrous sulfate (IRON) 325 (65 FE) MG tablet Take 1 tablet (325 mg) by mouth 2 times daily 100 tablet      folic acid (FOLVITE) 1 MG tablet Take 1 tablet (1 mg) by mouth daily 30 tablet 1     hydrOXYzine (ATARAX) 25 MG tablet Take 1-2 tablets (25-50mg) every 6 hours as needed for itching 60 tablet 2     levothyroxine (SYNTHROID/LEVOTHROID) 88 MCG tablet Take 1 tablet (88 mcg) by mouth every morning (before breakfast) 90 tablet 2     melatonin 5 MG tablet Take 1 tablet (5 mg) by mouth nightly as needed for sleep       multivitamin, therapeutic (THERA-VIT) TABS tablet Take 1 tablet by mouth every 24 hours 30 tablet 11     Omega-3 Fatty Acids (OMEGA-3 FISH OIL EX ST PO) Take 1 capsule by mouth 2 times daily 1290 (fish oil)-900 (omega 3)       pantoprazole (PROTONIX) 40 MG EC tablet Take 1 tablet (40 mg) by mouth every morning (before breakfast) 90 tablet 4     pramipexole (MIRAPEX) 0.5 MG tablet Take 1 tablet (0.5 mg) by mouth At Bedtime 90 tablet 3     predniSONE (DELTASONE) 1 MG tablet Take 4 tablets (4 mg) by mouth daily 360 tablet 3     predniSONE (DELTASONE) 5 MG tablet Take 1 tablet (5 mg) by mouth daily 90 tablet 3     propranolol (INDERAL) 10 MG tablet Take 1 tablet (10 mg) by mouth 3 times daily As needed for anxiety 90 tablet 0     psyllium 0.52 G capsule Take 2 capsules (1.04 g) by mouth daily With a full glass of water. 60 capsule 1     tacrolimus (GENERIC EQUIVALENT) 0.5 MG capsule 2.5 mg AM and 2 mg  capsule 11     traMADol (ULTRAM) 50 MG tablet Take 1 tablet (50 mg) by mouth every 6 hours as needed for severe pain 20 tablet 0     traZODone (DESYREL) 100 MG tablet Take 1.5 tablets (150 mg) by mouth At Bedtime 45 tablet 3     ursodiol (ACTIGALL) 300 MG capsule Take 1 capsule (300 mg) by mouth 2 times daily 60 capsule 11       Allergies   Allergen Reactions     Benadryl [Diphenhydramine] Hives     Cefaclor Hives      "Hydrocodone-Acetaminophen Itching     Oxycodone Itching     Penicillins Hives     Sulfa Drugs Hives       Family History   Problem Relation Age of Onset     Family History Negative Other      Melanoma Mother           HEART DISEASE Father      CHF       Additional medical/Social/Surgical histories reviewed in [facility] and updated as appropriate.     REVIEW OF SYSTEMS (7/10/2018)  10 point ROS of systems including Constitutional, Eyes, Respiratory, Cardiovascular, Gastroenterology, Genitourinary, Integumentary, Musculoskeletal, Psychiatric were all negative except for pertinent positives noted in my HPI.     PHYSICAL EXAM  Vitals:    07/10/18 1606   Weight: 96.6 kg (213 lb)   Height: 1.702 m (5' 7\")     Vital Signs: Ht 1.702 m (5' 7\")  Wt 96.6 kg (213 lb)  BMI 33.36 kg/m2 Patient declined being weighed. Body mass index is 33.36 kg/(m^2).    General  - normal appearance, in no obvious distress  CV  - normal peripheral perfusion  Pulm  - normal respiratory pattern, non-labored  Musculoskeletal - lumbar spine  - stance: normal gait without limp, no obvious leg length discrepancy, normal heel and toe walk  - inspection: normal bone and joint alignment, no obvious scoliosis  - palpation: no paravertebral or bony tenderness  - ROM: flexion exacerbates pain, normal extension, sidebending, rotation  - strength: lower extremities 5/5 in all planes  - special tests:  (+) straight leg raise- bilateral  (+) slump test  Neuro  - patellar and Achilles DTRs 2+ bilaterally, bilateral lower extremity sensory deficit throughout L5 distribution, grossly normal coordination, normal muscle tone  Skin  - no ecchymosis, erythema, warmth, or induration, no obvious rash  Psych  - interactive, appropriate, normal mood and affect    ASSESSMENT & PLAN  63 yo female with lumbar radicular pain and ddd, improved, not resolved  Reviewed her previous treatments, and will start PT for her back  Ordered LE EMG  Will consider spine surgery referral " at her request      Donnell Duvall MD, CAQSM    Answers for HPI/ROS submitted by the patient on 7/9/2018   General Symptoms: No  Skin Symptoms: Yes  HENT Symptoms: No  EYE SYMPTOMS: No  HEART SYMPTOMS: No  LUNG SYMPTOMS: No  INTESTINAL SYMPTOMS: No  URINARY SYMPTOMS: No  GYNECOLOGIC SYMPTOMS: No  BREAST SYMPTOMS: No  SKELETAL SYMPTOMS: Yes  BLOOD SYMPTOMS: No  NERVOUS SYSTEM SYMPTOMS: Yes  MENTAL HEALTH SYMPTOMS: No  Changes in hair: No  Changes in moles/birth marks: No  Itching: Yes  Rashes: No  Changes in nails: No  Acne: No  Hair in places you don't want it: No  Change in facial hair: No  Warts: No  Non-healing sores: No  Scarring: No  Flaking of skin: No  Color changes of hands/feet in cold : No  Sun sensitivity: No  Skin thickening: No  Back pain: Yes  Muscle aches: Yes  Neck pain: Yes  Swollen joints: No  Joint pain: Yes  Bone pain: No  Muscle cramps: Yes  Muscle weakness: Yes  Joint stiffness: No  Bone fracture: No  Trouble with coordination: No  Dizziness or trouble with balance: No  Fainting or black-out spells: No  Memory loss: No  Headache: No  Seizures: No  Speech problems: No  Tingling: Yes  Tremor: No  Weakness: Yes  Difficulty walking: No  Paralysis: No  Numbness: Yes

## 2018-07-10 NOTE — MR AVS SNAPSHOT
After Visit Summary   7/10/2018    Phan Luque    MRN: [id number]           Patient Information     Date Of Birth          1955        Visit Information        Provider Department      7/10/2018 4:00 PM Donnell Duvall MD [facility]        Today's Diagnoses     Lumbar radicular pain    -  1    Lumbar herniated disc           Follow-ups after your visit        Additional Services     PHYSICAL THERAPY REFERRAL (External-Prints)       Physical Therapy Referral                  Your next 10 appointments already scheduled     Jul 25, 2018  7:45 AM CDT   (Arrive by 7:30 AM)   EMG with Daya Schwartz MD   [facility] EMG ([facility] [place])    [address]  3rd Floor  [facility] [address] [phone]           Do not use lotions or creams on the area to be tested. If you are on blood thinners (Warfarin, Coumadin, Lovenox, etc), please contact your primary care physician to check if it is safe to stop them 3 days prior to testing. If you have anxiety, please check with your referring physician to obtain anti-anxiety medication for the procedure.            Aug 10, 2018  4:00 PM CDT   (Arrive by 3:45 PM)   Return Visit with Donnell Duvall MD   [facility] ([facility] [place])    [address]  4th Floor  [facility] [address] [phone]            Sep 26, 2018  5:00 PM CDT   Adult Med Follow UP with DONOVAN [facility]   Psychiatry Clinic ([facility])    [address]  [address] [address] [phone]            Oct 01, 2018  4:45 PM CDT   (Arrive by 4:30 PM)   Return Liver Transplant with Hussein Banegas MD   [facility] Hepatology ([facility] [place])    [address]  [address]  [facility] [address] [phone]            Oct 22, 2018  7:30 AM CDT   (Arrive by 7:15 AM)   Return Visit with  "Arlyn Sanchez MD   [facility] ([facility])    [address]  4th Floor  [facility] [id number] [phone]            Mar 20, 2019  3:30 PM CDT   Lab with  LAB   [facility] ([facility])    [address]  1st Floor  [facility] [id number] [phone]            Mar 20, 2019  4:30 PM CDT   (Arrive by 4:00 PM)   Return Visit with Daya Gutierrez MD   [facility] Nephrology ([facility])    [address]  [address]  [facility] [id number] [phone]              Who to contact     Please call your clinic at [phone] to:    Ask questions about your health    Make or cancel appointments    Discuss your medicines    Learn about your test results    Speak to your doctor            Additional Information About Your Visit        [employer] Information     [employer] gives you secure access to your electronic health record. If you see a primary care provider, you can also send messages to your care team and make appointments. If you have questions, please call your primary care clinic.  If you do not have a primary care provider, please call [phone] and they will assist you.      [employer] is an electronic gateway that provides easy, online access to your medical records. With [employer], you can request a clinic appointment, read your test results, renew a prescription or communicate with your care team.     To access your existing account, please contact your [facility] or call [phone] for assistance.        Care EveryWhere ID     This is your Care EveryWhere ID. This could be used by other organizations to access your [place] medical records  [id number]        Your Vitals Were     Height BMI (Body Mass Index)                1.702 m (5' 7\") 33.36 kg/m2           Blood Pressure from Last 3 Encounters:   06/20/18 156/86   06/15/18 120/54   06/13/18 " 133/83    Weight from Last 3 Encounters:   07/10/18 96.6 kg (213 lb)   06/15/18 96.6 kg (213 lb)   06/13/18 98.7 kg (217 lb 9.6 oz)              We Performed the Following     PHYSICAL THERAPY REFERRAL (External-Prints)        Primary Care Provider Office Phone # Fax #    Arlyn Sanchez [phone]       [address] [address]        Equal Access to Services     PAULO BERRY : Hadii aad ku hadasho Soomaali, waaxda luqadaha, qaybta kaalmada adeegyada, waxay idiin hayaan adeeg jairararaffi lagonzalez . So [facility] [phone].    ATENCIÓN: Si habla español, tiene a reece disposición servicios gratuitos de asistencia lingüística. [facility] [phone].    We comply with applicable federal civil rights laws and [place] laws. We do not discriminate on the basis of race, color, national origin, age, disability, sex, sexual orientation, or gender identity.            Thank you!     Thank you for choosing [facility]  for your care. Our goal is always to provide you with excellent care. Hearing back from our patients is one way we can continue to improve our services. Please take a few minutes to complete the written survey that you may receive in the mail after your visit with us. Thank you!             Your Updated Medication List - Protect others around you: Learn how to safely use, store and throw away your medicines at www.disposemymeds.org.          This list is accurate as of 7/10/18 11:59 PM.  Always use your most recent med list.                   Brand Name Dispense Instructions for use Diagnosis    acetaminophen 325 MG tablet    TYLENOL    100 tablet    Take 2 tablets (650 mg) by mouth every 6 hours as needed for mild pain Or fevers. Use sparingly. Limit use to no more than 2 grams (2000 mg) in 24 hours. **Further refills must be obtained by primary care provider**    Acute post-operative pain       amLODIPine 5 MG tablet    NORVASC    90 tablet    Take 1 tablet (5  mg) by mouth daily    Hypertension       azaTHIOprine 100 MG Tabs     90 tablet    Take 50 mg by mouth every evening    Liver transplanted (H)       betaxolol 0.5 % ophthalmic solution    BETOPTIC    5 mL    Place 1 drop into both eyes 2 times daily    Primary open angle glaucoma of both eyes, unspecified glaucoma stage       calcium Citrate-vitamin D 500-400 MG-UNIT Chew      Take 1 tablet by mouth daily        chlorthalidone 25 MG tablet    HYGROTON    45 tablet    Take 0.5 tablets (12.5 mg) by mouth daily    CKD (chronic kidney disease) stage 3, GFR 30-59 ml/min, Fluid retention       cholecalciferol 400 UNIT Tabs tablet    vitamin D3     Take 400 Units by mouth daily        cyanocobalamin 1000 MCG tablet    vitamin  B-12     Take 1,000 mcg by mouth daily        ferrous sulfate 325 (65 Fe) MG tablet    IRON    100 tablet    Take 1 tablet (325 mg) by mouth 2 times daily    Iron deficiency       folic acid 1 MG tablet    FOLVITE    30 tablet    Take 1 tablet (1 mg) by mouth daily    Malnutrition (H)       hydrOXYzine 25 MG tablet    ATARAX    60 tablet    Take 1-2 tablets (25-50mg) every 6 hours as needed for itching    WALTER (generalized anxiety disorder)       levothyroxine 88 MCG tablet    SYNTHROID/LEVOTHROID    90 tablet    Take 1 tablet (88 mcg) by mouth every morning (before breakfast)    Thyroid function test abnormal       melatonin 5 MG tablet      Take 1 tablet (5 mg) by mouth nightly as needed for sleep        multivitamin, therapeutic Tabs tablet     30 tablet    Take 1 tablet by mouth every 24 hours    Malnutrition (H)       OMEGA-3 FISH OIL EX ST PO      Take 1 capsule by mouth 2 times daily 1290 (fish oil)-900 (omega 3)        pantoprazole 40 MG EC tablet    PROTONIX    90 tablet    Take 1 tablet (40 mg) by mouth every morning (before breakfast)    Gastroesophageal reflux disease, esophagitis presence not specified       pramipexole 0.5 MG tablet    MIRAPEX    90 tablet    Take 1 tablet (0.5 mg) by  mouth At Bedtime    Restless leg syndrome       * predniSONE 5 MG tablet    DELTASONE    90 tablet    Take 1 tablet (5 mg) by mouth daily    Liver replaced by transplant (H)       * predniSONE 1 MG tablet    DELTASONE    360 tablet    Take 4 tablets (4 mg) by mouth daily    Liver replaced by transplant (H)       propranolol 10 MG tablet    INDERAL    90 tablet    Take 1 tablet (10 mg) by mouth 3 times daily As needed for anxiety    Generalized anxiety disorder       psyllium 0.52 g capsule     60 capsule    Take 2 capsules (1.04 g) by mouth daily With a full glass of water.    Loose stools       tacrolimus 0.5 MG capsule    GENERIC EQUIVALENT    270 capsule    2.5 mg AM and 2 mg PM    Liver transplanted (H)       traMADol 50 MG tablet    ULTRAM    20 tablet    Take 1 tablet (50 mg) by mouth every 6 hours as needed for severe pain    Acute left ankle pain, Lumbar radicular pain       traZODone 100 MG tablet    DESYREL    45 tablet    Take 1.5 tablets (150 mg) by mouth At Bedtime    Insomnia, unspecified type       ursodiol 300 MG capsule    ACTIGALL    60 capsule    Take 1 capsule (300 mg) by mouth 2 times daily    Liver transplanted (H)       * Notice:  This list has 2 medication(s) that are the same as other medications prescribed for you. Read the directions carefully, and ask your doctor or other care provider to review them with you.

## 2018-07-11 ASSESSMENT — PATIENT HEALTH QUESTIONNAIRE - PHQ9: SUM OF ALL RESPONSES TO PHQ QUESTIONS 1-9: 3

## 2018-07-12 ENCOUNTER — OFFICE VISIT (OUTPATIENT)
Dept: URGENT CARE | Facility: URGENT CARE | Age: 63
End: 2018-07-12
Payer: COMMERCIAL

## 2018-07-12 VITALS
SYSTOLIC BLOOD PRESSURE: 122 MMHG | DIASTOLIC BLOOD PRESSURE: 70 MMHG | OXYGEN SATURATION: 96 % | HEART RATE: 86 BPM | TEMPERATURE: 98.5 F

## 2018-07-12 DIAGNOSIS — S40.021A ARM BRUISE, RIGHT, INITIAL ENCOUNTER: Primary | ICD-10-CM

## 2018-07-12 PROCEDURE — 99213 OFFICE O/P EST LOW 20 MIN: CPT | Performed by: FAMILY MEDICINE

## 2018-07-12 NOTE — PROGRESS NOTES
SUBJECTIVE:  Chief Complaint   Patient presents with     Urgent Care     Circulation Problems     patient donated platelet on saturday,right arm is painful, a lot of bruising. Patient states should not normally look like it.      Phan Luque is a 62 year old female who presents with a chief complaint of right arm pain, swelling and bruising.    Patient had donated plasma/platelets last Saturday, developed a small bruise on the right antecubital fossa.  Patient states that usually bruises easily so this was not unusual for her.  Patient states that noticed worsening bruising and is now circled her arm/elbow, did have to use a compression bandage to area for several hours.  Patient is having some numbness and tingling so got worried.  Patient wondering if has a blood clot.    Past Medical History:   Diagnosis Date     Asthma      Chronic kidney disease      End stage liver disease (H)     2/2 Alcohol Abuse     Liver transplanted (H)      Migraines      Osteoporosis      Spinal stenosis in cervical region      Strabismus      Current Outpatient Prescriptions   Medication Sig Dispense Refill     acetaminophen (TYLENOL) 325 MG tablet Take 2 tablets (650 mg) by mouth every 6 hours as needed for mild pain Or fevers. Use sparingly. Limit use to no more than 2 grams (2000 mg) in 24 hours. **Further refills must be obtained by primary care provider** 100 tablet 0     amLODIPine (NORVASC) 5 MG tablet Take 1 tablet (5 mg) by mouth daily 90 tablet 3     azaTHIOprine 100 MG TABS Take 50 mg by mouth every evening 90 tablet 3     betaxolol (BETOPTIC) 0.5 % ophthalmic solution Place 1 drop into both eyes 2 times daily 5 mL 2     calcium Citrate-vitamin D 500-400 MG-UNIT CHEW Take 1 tablet by mouth daily       chlorthalidone (HYGROTON) 25 MG tablet Take 0.5 tablets (12.5 mg) by mouth daily 45 tablet 3     cholecalciferol (VITAMIN D3) 400 UNIT TABS tablet Take 400 Units by mouth daily       cyanocobalamin (VITAMIN  B-12) 1000 MCG  tablet Take 1,000 mcg by mouth daily       ferrous sulfate (IRON) 325 (65 FE) MG tablet Take 1 tablet (325 mg) by mouth 2 times daily 100 tablet      folic acid (FOLVITE) 1 MG tablet Take 1 tablet (1 mg) by mouth daily 30 tablet 1     hydrOXYzine (ATARAX) 25 MG tablet Take 1-2 tablets (25-50mg) every 6 hours as needed for itching 60 tablet 2     levothyroxine (SYNTHROID/LEVOTHROID) 88 MCG tablet Take 1 tablet (88 mcg) by mouth every morning (before breakfast) 90 tablet 2     melatonin 5 MG tablet Take 1 tablet (5 mg) by mouth nightly as needed for sleep       multivitamin, therapeutic (THERA-VIT) TABS tablet Take 1 tablet by mouth every 24 hours 30 tablet 11     Omega-3 Fatty Acids (OMEGA-3 FISH OIL EX ST PO) Take 1 capsule by mouth 2 times daily 1290 (fish oil)-900 (omega 3)       pantoprazole (PROTONIX) 40 MG EC tablet Take 1 tablet (40 mg) by mouth every morning (before breakfast) 90 tablet 4     pramipexole (MIRAPEX) 0.5 MG tablet Take 1 tablet (0.5 mg) by mouth At Bedtime 90 tablet 3     predniSONE (DELTASONE) 1 MG tablet Take 4 tablets (4 mg) by mouth daily 360 tablet 3     predniSONE (DELTASONE) 5 MG tablet Take 1 tablet (5 mg) by mouth daily 90 tablet 3     propranolol (INDERAL) 10 MG tablet Take 1 tablet (10 mg) by mouth 3 times daily As needed for anxiety 90 tablet 0     psyllium 0.52 G capsule Take 2 capsules (1.04 g) by mouth daily With a full glass of water. 60 capsule 1     tacrolimus (GENERIC EQUIVALENT) 0.5 MG capsule 2.5 mg AM and 2 mg  capsule 11     traMADol (ULTRAM) 50 MG tablet Take 1 tablet (50 mg) by mouth every 6 hours as needed for severe pain 20 tablet 0     traZODone (DESYREL) 100 MG tablet Take 1.5 tablets (150 mg) by mouth At Bedtime 45 tablet 3     ursodiol (ACTIGALL) 300 MG capsule Take 1 capsule (300 mg) by mouth 2 times daily 60 capsule 11     Social History   Substance Use Topics     Smoking status: Former Smoker     Packs/day: 2.50     Years: 20.00     Quit date: 12/13/1996      Smokeless tobacco: Never Used     Alcohol use No      Comment: heavy use (750ml/2 days) up until 1 year ago; had cut down to 1 drink/day; none since 7/18/16       ROS:  Review of systems negative except as stated above.    EXAM:   /70 (BP Location: Left arm, Patient Position: Chair, Cuff Size: Adult Large)  Pulse 86  Temp 98.5  F (36.9  C) (Tympanic)  SpO2 96%  M/S Exam:right arm - ecchymosis patch, some areas more purplish, some more yellowed/greenish.  slight swelling.  ROM intact at elbow.  No forearm edema.  no induration, no vesicle, no blisters.  Extremity warm, pulses strong  GENERAL APPEARANCE: healthy, alert and no distress  NEURO: Normal strength and tone, sensory exam grossly normal, mentation intact and speech normal    X-RAY was not done.    ASSESSMENT/PLAN:  (S40.021A) Arm bruise, right, initial encounter  (primary encounter diagnosis)  Plan: symptomatic treatment      Reassurance given, reviewed with patient that has significant bruising, probable due to compression wrap to arm and dependent movement of the bruise to elbow area.  Arm is warm, well perfused with strong pulse and no significant edema that would be worrisome for blood clot.  Encourage to elevate, warm compress to area to help with bruising.    Follow up with primary if no resolution of symptoms.    Magen Delacruz MD  July 12, 2018 8:18 PM

## 2018-07-12 NOTE — MR AVS SNAPSHOT
After Visit Summary   7/12/2018    Phan Luque    MRN: [id number]           Patient Information     Date Of Birth          1955        Visit Information        Provider Department      7/12/2018 4:25 PM Magen Delacruz MD [facility]        Today's Diagnoses     Arm bruise, right, initial encounter    -  1       Follow-ups after your visit        Follow-up notes from your care team     Return if symptoms worsen or fail to improve.      Your next 10 appointments already scheduled     Jul 25, 2018  7:45 AM CDT   (Arrive by 7:30 AM)   EMG with Daya Schwartz MD   [facility] EMG ([facility] [place])    [address]  3rd Floor  [facility] [id number] [phone]           Do not use lotions or creams on the area to be tested. If you are on blood thinners (Warfarin, Coumadin, Lovenox, etc), please contact your primary care physician to check if it is safe to stop them 3 days prior to testing. If you have anxiety, please check with your referring physician to obtain anti-anxiety medication for the procedure.            Aug 10, 2018  4:00 PM CDT   (Arrive by 3:45 PM)   Return Visit with Donnell Duvall MD   [facility] ([facility] [place])    [address]  4th Floor  [facility] [address] [phone]            Sep 26, 2018  5:00 PM CDT   Adult Med Follow UP with DONOVAN Blake [facility]   Psychiatry Clinic ([facility])    [address]  [address] [address] [phone]            Oct 01, 2018  4:45 PM CDT   (Arrive by 4:30 PM)   Return Liver Transplant with Hussein Banegas MD   [facility] Hepatology ([facility] [place])    [address] [place] [address]  [address]  [facility] [address] [phone]            Oct 22, 2018  7:30 AM CDT   (Arrive by 7:15 AM)   Return Visit with Arlyn Sanchez MD   [facility] Primary Care  Clinic ([facility])    [address]  4th Floor  [facility] [id number] [phone]            Mar 20, 2019  3:30 PM CDT   Lab with  LAB   [facility] ([facility])    [address]  1st Floor  [facility] [id number] [phone]            Mar 20, 2019  4:30 PM CDT   (Arrive by 4:00 PM)   Return Visit with Daya Gutierrez MD   [facility] Nephrology ([facility])    [address]  [address]  [facility] [id number] [phone]              Who to contact     If you have questions or need follow up information about today's clinic visit or your schedule please contact [facility] directly at [phone].  Normal or non-critical lab and imaging results will be communicated to you by MyChart, letter or phone within 4 business days after the clinic has received the results. If you do not hear from us within 7 days, please contact the clinic through [employer] or phone. If you have a critical or abnormal lab result, we will notify you by phone as soon as possible.  Submit refill requests through [employer] or call your pharmacy and they will forward the refill request to us. Please allow 3 business days for your refill to be completed.          Additional Information About Your Visit        MyChart Information     [employer] gives you secure access to your electronic health record. If you see a primary care provider, you can also send messages to your care team and make appointments. If you have questions, please call your primary care clinic.  If you do not have a primary care provider, please call [phone] and they will assist you.        Care EveryWhere ID     This is your Care EveryWhere ID. This could be used by other organizations to access your [place] medical records  [id number]        Your Vitals Were     Pulse Temperature Pulse Oximetry             86 98.5  F (36.9  C) (Tympanic) 96%           Blood Pressure from Last 3 Encounters:   07/12/18 122/70   06/20/18 156/86   06/15/18 120/54    Weight from Last 3 Encounters:   07/10/18 213 lb (96.6 kg)   06/15/18 213 lb (96.6 kg)   06/13/18 217 lb 9.6 oz (98.7 kg)              Today, you had the following     No orders found for display       Primary Care Provider Office Phone # Fax #    Arlyn Sanchez [phone]        [address] [address]        Equal Access to Services     [facility]: Hadii adonis mejia hadnathen Sogary, washruthi orellana, qaashleyta kaalmada luz, anna ambrose . So [facility] [phone].    ATENCIÓN: Si habla español, tiene a reece disposición servicios gratuitos de asistencia lingüística. Ll[facility] [phone].    We comply with applicable federal civil rights laws and [place] laws. We do not discriminate on the basis of race, color, national origin, age, disability, sex, sexual orientation, or gender identity.            Thank you!     Thank you for choosing [facility]  for your care. Our goal is always to provide you with excellent care. Hearing back from our patients is one way we can continue to improve our services. Please take a few minutes to complete the written survey that you may receive in the mail after your visit with us. Thank you!             Your Updated Medication List - Protect others around you: Learn how to safely use, store and throw away your medicines at www.disposemymeds.org.          This list is accurate as of 7/12/18  8:19 PM.  Always use your most recent med list.                   Brand Name Dispense Instructions for use Diagnosis    acetaminophen 325 MG tablet    TYLENOL    100 tablet    Take 2 tablets (650 mg) by mouth every 6 hours as needed for mild pain Or fevers. Use sparingly. Limit use to no more than 2 grams (2000 mg) in 24 hours. **Further refills must be obtained by primary care provider**    Acute  post-operative pain       amLODIPine 5 MG tablet    NORVASC    90 tablet    Take 1 tablet (5 mg) by mouth daily    Hypertension       azaTHIOprine 100 MG Tabs     90 tablet    Take 50 mg by mouth every evening    Liver transplanted (H)       betaxolol 0.5 % ophthalmic solution    BETOPTIC    5 mL    Place 1 drop into both eyes 2 times daily    Primary open angle glaucoma of both eyes, unspecified glaucoma stage       calcium Citrate-vitamin D 500-400 MG-UNIT Chew      Take 1 tablet by mouth daily        chlorthalidone 25 MG tablet    HYGROTON    45 tablet    Take 0.5 tablets (12.5 mg) by mouth daily    CKD (chronic kidney disease) stage 3, GFR 30-59 ml/min, Fluid retention       cholecalciferol 400 UNIT Tabs tablet    vitamin D3     Take 400 Units by mouth daily        cyanocobalamin 1000 MCG tablet    vitamin  B-12     Take 1,000 mcg by mouth daily        ferrous sulfate 325 (65 Fe) MG tablet    IRON    100 tablet    Take 1 tablet (325 mg) by mouth 2 times daily    Iron deficiency       folic acid 1 MG tablet    FOLVITE    30 tablet    Take 1 tablet (1 mg) by mouth daily    Malnutrition (H)       hydrOXYzine 25 MG tablet    ATARAX    60 tablet    Take 1-2 tablets (25-50mg) every 6 hours as needed for itching    WALTER (generalized anxiety disorder)       levothyroxine 88 MCG tablet    SYNTHROID/LEVOTHROID    90 tablet    Take 1 tablet (88 mcg) by mouth every morning (before breakfast)    Thyroid function test abnormal       melatonin 5 MG tablet      Take 1 tablet (5 mg) by mouth nightly as needed for sleep        multivitamin, therapeutic Tabs tablet     30 tablet    Take 1 tablet by mouth every 24 hours    Malnutrition (H)       OMEGA-3 FISH OIL EX ST PO      Take 1 capsule by mouth 2 times daily 1290 (fish oil)-900 (omega 3)        pantoprazole 40 MG EC tablet    PROTONIX    90 tablet    Take 1 tablet (40 mg) by mouth every morning (before breakfast)    Gastroesophageal reflux disease, esophagitis presence not  specified       pramipexole 0.5 MG tablet    MIRAPEX    90 tablet    Take 1 tablet (0.5 mg) by mouth At Bedtime    Restless leg syndrome       * predniSONE 5 MG tablet    DELTASONE    90 tablet    Take 1 tablet (5 mg) by mouth daily    Liver replaced by transplant (H)       * predniSONE 1 MG tablet    DELTASONE    360 tablet    Take 4 tablets (4 mg) by mouth daily    Liver replaced by transplant (H)       propranolol 10 MG tablet    INDERAL    90 tablet    Take 1 tablet (10 mg) by mouth 3 times daily As needed for anxiety    Generalized anxiety disorder       psyllium 0.52 g capsule     60 capsule    Take 2 capsules (1.04 g) by mouth daily With a full glass of water.    Loose stools       tacrolimus 0.5 MG capsule    GENERIC EQUIVALENT    270 capsule    2.5 mg AM and 2 mg PM    Liver transplanted (H)       traMADol 50 MG tablet    ULTRAM    20 tablet    Take 1 tablet (50 mg) by mouth every 6 hours as needed for severe pain    Acute left ankle pain, Lumbar radicular pain       traZODone 100 MG tablet    DESYREL    45 tablet    Take 1.5 tablets (150 mg) by mouth At Bedtime    Insomnia, unspecified type       ursodiol 300 MG capsule    ACTIGALL    60 capsule    Take 1 capsule (300 mg) by mouth 2 times daily    Liver transplanted (H)       * Notice:  This list has 2 medication(s) that are the same as other medications prescribed for you. Read the directions carefully, and ask your doctor or other care provider to review them with you.

## 2018-07-19 ENCOUNTER — OFFICE VISIT (OUTPATIENT)
Dept: URGENT CARE | Facility: URGENT CARE | Age: 63
End: 2018-07-19
Payer: OTHER MISCELLANEOUS

## 2018-07-19 ENCOUNTER — RADIANT APPOINTMENT (OUTPATIENT)
Dept: GENERAL RADIOLOGY | Facility: CLINIC | Age: 63
End: 2018-07-19
Attending: PHYSICIAN ASSISTANT
Payer: OTHER MISCELLANEOUS

## 2018-07-19 VITALS
WEIGHT: 213 LBS | BODY MASS INDEX: 33.36 KG/M2 | DIASTOLIC BLOOD PRESSURE: 70 MMHG | HEART RATE: 93 BPM | OXYGEN SATURATION: 95 % | TEMPERATURE: 97.9 F | SYSTOLIC BLOOD PRESSURE: 126 MMHG

## 2018-07-19 DIAGNOSIS — M25.562 ACUTE PAIN OF LEFT KNEE: Primary | ICD-10-CM

## 2018-07-19 DIAGNOSIS — M25.562 ACUTE PAIN OF LEFT KNEE: ICD-10-CM

## 2018-07-19 PROCEDURE — 73560 X-RAY EXAM OF KNEE 1 OR 2: CPT | Mod: LT

## 2018-07-19 PROCEDURE — 99213 OFFICE O/P EST LOW 20 MIN: CPT | Performed by: PHYSICIAN ASSISTANT

## 2018-07-19 NOTE — PROGRESS NOTES
SUBJECTIVE:  Chief Complaint   Patient presents with     Urgent Care     Knee Pain     PT states x 2 days ago fell and hurt left knee. States has some bruising, numb and tingling.      Phan Luque is a 62 year old female who presents with a chief complaint of left knee pain.  2 days ago patient was walking, missed the curb and fell landing on her left knee.  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  She had immediate pain to her left knee.  Now she notes bruising and some tingling sensation.     Pain exacerbated by walking Relieved by rest.  She treated it initially with ice. This is not the first time this type of injury has occurred to this patient.     Past Medical History:   Diagnosis Date     Asthma      Chronic kidney disease      End stage liver disease (H)     2/2 Alcohol Abuse     Liver transplanted (H)      Migraines      Osteoporosis      Spinal stenosis in cervical region      Strabismus      Current Outpatient Prescriptions   Medication Sig Dispense Refill     amLODIPine (NORVASC) 5 MG tablet Take 1 tablet (5 mg) by mouth daily 90 tablet 3     azaTHIOprine 100 MG TABS Take 50 mg by mouth every evening 90 tablet 3     betaxolol (BETOPTIC) 0.5 % ophthalmic solution Place 1 drop into both eyes 2 times daily 5 mL 2     calcium Citrate-vitamin D 500-400 MG-UNIT CHEW Take 1 tablet by mouth daily       chlorthalidone (HYGROTON) 25 MG tablet Take 0.5 tablets (12.5 mg) by mouth daily 45 tablet 3     cholecalciferol (VITAMIN D3) 400 UNIT TABS tablet Take 400 Units by mouth daily       cyanocobalamin (VITAMIN  B-12) 1000 MCG tablet Take 1,000 mcg by mouth daily       ferrous sulfate (IRON) 325 (65 FE) MG tablet Take 1 tablet (325 mg) by mouth 2 times daily 100 tablet      folic acid (FOLVITE) 1 MG tablet Take 1 tablet (1 mg) by mouth daily 30 tablet 1     hydrOXYzine (ATARAX) 25 MG tablet Take 1-2 tablets (25-50mg) every 6 hours as needed  for itching 60 tablet 2     levothyroxine (SYNTHROID/LEVOTHROID) 88 MCG tablet Take 1 tablet (88 mcg) by mouth every morning (before breakfast) 90 tablet 2     melatonin 5 MG tablet Take 1 tablet (5 mg) by mouth nightly as needed for sleep       multivitamin, therapeutic (THERA-VIT) TABS tablet Take 1 tablet by mouth every 24 hours 30 tablet 11     Omega-3 Fatty Acids (OMEGA-3 FISH OIL EX ST PO) Take 1 capsule by mouth 2 times daily 1290 (fish oil)-900 (omega 3)       pantoprazole (PROTONIX) 40 MG EC tablet Take 1 tablet (40 mg) by mouth every morning (before breakfast) 90 tablet 4     pramipexole (MIRAPEX) 0.5 MG tablet Take 1 tablet (0.5 mg) by mouth At Bedtime 90 tablet 3     predniSONE (DELTASONE) 1 MG tablet Take 4 tablets (4 mg) by mouth daily 360 tablet 3     predniSONE (DELTASONE) 5 MG tablet Take 1 tablet (5 mg) by mouth daily 90 tablet 3     propranolol (INDERAL) 10 MG tablet Take 1 tablet (10 mg) by mouth 3 times daily As needed for anxiety 90 tablet 0     psyllium 0.52 G capsule Take 2 capsules (1.04 g) by mouth daily With a full glass of water. 60 capsule 1     tacrolimus (GENERIC EQUIVALENT) 0.5 MG capsule 2.5 mg AM and 2 mg  capsule 11     traMADol (ULTRAM) 50 MG tablet Take 1 tablet (50 mg) by mouth every 6 hours as needed for severe pain 20 tablet 0     traZODone (DESYREL) 100 MG tablet Take 1.5 tablets (150 mg) by mouth At Bedtime 45 tablet 3     ursodiol (ACTIGALL) 300 MG capsule Take 1 capsule (300 mg) by mouth 2 times daily 60 capsule 11     acetaminophen (TYLENOL) 325 MG tablet Take 2 tablets (650 mg) by mouth every 6 hours as needed for mild pain Or fevers. Use sparingly. Limit use to no more than 2 grams (2000 mg) in 24 hours. **Further refills must be obtained by primary care provider** 100 tablet 0     Social History   Substance Use Topics     Smoking status: Former Smoker     Packs/day: 2.50     Years: 20.00     Quit date: 12/13/1996     Smokeless tobacco: Never Used     Alcohol use  No      Comment: heavy use (750ml/2 days) up until 1 year ago; had cut down to 1 drink/day; none since 7/18/16       ROS:  Review of systems negative except as stated above.    EXAM:   /70 (BP Location: Right arm, Patient Position: Chair, Cuff Size: Adult Large)  Pulse 93  Temp 97.9  F (36.6  C) (Tympanic)  Wt 213 lb (96.6 kg)  SpO2 95%  BMI 33.36 kg/m2  M/S Exam:L knee has erythema, swelling, tenderness to palpation and FROMGENERAL APPEARANCE: healthy, alert and no distress  EXTREMITIES: peripheral pulses normal  SKIN: no suspicious lesions or rashes  NEURO: Normal strength and tone, sensory exam grossly normal, mentation intact and speech normal    X-RAY was done -- I independently visualized the xray: no fracture noted      ASSESSMENT / PLAN:  1. Acute pain of left knee  Patient presents after direct trauma to her knee.  Complains of continued mild swelling and bruising.  Discussed x-ray results with patient if pain still present in the next week follow up with orthopedist.  Tylenol for pain and tramadol for severe pain for which she already has a prescription.  Patient agrees with treatment plan.  - XR Knee Standing AP Bilat & Lateral Left; Future  - order for DME; Equipment being ordered: knee sleeve  Dispense: 1 Device; Refill: 0    Nafisa Sexton PA-C

## 2018-07-19 NOTE — PATIENT INSTRUCTIONS
Knee Pain  Knee pain is very common. It s especially common in active people who put a lot of pressure on their knees, like runners. It affects women more often than men.  Your kneecap (patella) is a thick, round bone. It covers and protects the front portion of your knee joint. It moves along a groove in your thighbone (femur) as part of the patellofemoral joint. A layer of cartilage surrounds the underside of your kneecap. This layer protects it from grinding against your femur.  When this cartilage softens and breaks down, it can cause knee pain. This is partly because of repetitive stress. The stress irritates the lining of the joint. This causes pain in the underlying bone.  What causes knee pain?  Many things can cause knee pain. You may have more than one cause. Some of these include:    Overuse of the knee joint    The kneecap doesn t line up with the tissue around it    Damage to small nerves in the area    Damage to the ligament-like structure that holds the kneecap in place (retinaculum)    Breakdown of the bone under the cartilage    Swelling in the soft tissues around the kneecap    Injury  You might be more likely to have knee pain if you:    Exercise a lot    Recently increased the intensity of your workouts    Have a body mass index (BMI) greater than 25    Have poor alignment of your kneecap    Walk with your feet turned overly outward or inward    Have weakness in surrounding muscle groups (inner quad or hip adductor muscles)    Have too much tightness in surrounding muscle groups (hamstrings or iliotibial band)    Have a recent history of injury to the area    Are female  Symptoms of knee pain  This type of knee pain is a dull, aching pain in the front of the knee in the area under and around the kneecap. This pain may start quickly or slowly. Your pain might be worse when you squat, run, or sit for a long time. You might also sometimes feel like your knee is giving out. You may have symptoms in  one or both of your knees.  Diagnosing knee pain  Your healthcare provider will ask about your medical history and your symptoms. Be sure to describe any activities that make your knee pain worse. He or she will look at your knee. This will include tests of your range of motion, strength, and areas of pain of your knee. Your knee alignment will be checked.  Your healthcare provider will need to rule out other causes of your knee pain, such as arthritis. You may need an imaging test, such as an X-ray or MRI.  Treatment for knee pain  Treatments that can help ease your symptoms may include:    Avoiding activities for a while that make your pain worse, returning to activity over time    Icing the outside of your knee when it causes you pain    Taking over-the-counter pain medicine    Wearing a knee brace or taping your knee to support it    Wearing special shoe inserts to help keep your feet in the proper alignment    Doing special exercises to stretch and strengthen the muscles around your hip and your knee  These steps help most people manage knee pain. But some cases of knee pain need to be treated with surgery. You may need surgery right away. Or you may need it later if other treatments don t work. Your healthcare provider may refer you to an orthopedic surgeon. He or she will talk with you about your choices.  Preventing knee pain  Losing weight and correcting excess muscle tightness or muscle weakness may help lower your risk.  In some cases, you can prevent knee pain. To help prevent a flare-up of knee pain, you do these things:    Regularly do all the exercises your doctor or physical therapist advises    Support your knee as advised by your doctor or physical therapist    Increase training gradually, and ease up on training when needed    Have an expert check your gait for running or other sporting activities    Stretch properly before and after exercise    Replace your running shoes regularly    Lose excess  weight     When to call your healthcare provider  Call your healthcare provider right away if:    Your symptoms don t get better after a few weeks of treatment    You have any new symptoms   Date Last Reviewed: 4/1/2017 2000-2017 [employer] [address] [place] [address], [place] [address]. All rights reserved. This information is not intended as a substitute for professional medical care. Always follow your healthcare professional's instructions.

## 2018-07-19 NOTE — MR AVS SNAPSHOT
After Visit Summary   7/19/2018    Phan Luque    MRN: [id number]           Patient Information     Date Of Birth          1955        Visit Information        Provider Department      7/19/2018 4:45 PM Nafisa Sexton PA-C [facility]        Today's Diagnoses     Acute pain of left knee    -  1      Care Instructions      Knee Pain  Knee pain is very common. It s especially common in active people who put a lot of pressure on their knees, like runners. It affects women more often than men.  Your kneecap (patella) is a thick, round bone. It covers and protects the front portion of your knee joint. It moves along a groove in your thighbone (femur) as part of the patellofemoral joint. A layer of cartilage surrounds the underside of your kneecap. This layer protects it from grinding against your femur.  When this cartilage softens and breaks down, it can cause knee pain. This is partly because of repetitive stress. The stress irritates the lining of the joint. This causes pain in the underlying bone.  What causes knee pain?  Many things can cause knee pain. You may have more than one cause. Some of these include:    Overuse of the knee joint    The kneecap doesn t line up with the tissue around it    Damage to small nerves in the area    Damage to the ligament-like structure that holds the kneecap in place (retinaculum)    Breakdown of the bone under the cartilage    Swelling in the soft tissues around the kneecap    Injury  You might be more likely to have knee pain if you:    Exercise a lot    Recently increased the intensity of your workouts    Have a body mass index (BMI) greater than 25    Have poor alignment of your kneecap    Walk with your feet turned overly outward or inward    Have weakness in surrounding muscle groups (inner quad or hip adductor muscles)    Have too much tightness in surrounding muscle groups (hamstrings or iliotibial band)    Have a recent  history of injury to the area    Are female  Symptoms of knee pain  This type of knee pain is a dull, aching pain in the front of the knee in the area under and around the kneecap. This pain may start quickly or slowly. Your pain might be worse when you squat, run, or sit for a long time. You might also sometimes feel like your knee is giving out. You may have symptoms in one or both of your knees.  Diagnosing knee pain  Your healthcare provider will ask about your medical history and your symptoms. Be sure to describe any activities that make your knee pain worse. He or she will look at your knee. This will include tests of your range of motion, strength, and areas of pain of your knee. Your knee alignment will be checked.  Your healthcare provider will need to rule out other causes of your knee pain, such as arthritis. You may need an imaging test, such as an X-ray or MRI.  Treatment for knee pain  Treatments that can help ease your symptoms may include:    Avoiding activities for a while that make your pain worse, returning to activity over time    Icing the outside of your knee when it causes you pain    Taking over-the-counter pain medicine    Wearing a knee brace or taping your knee to support it    Wearing special shoe inserts to help keep your feet in the proper alignment    Doing special exercises to stretch and strengthen the muscles around your hip and your knee  These steps help most people manage knee pain. But some cases of knee pain need to be treated with surgery. You may need surgery right away. Or you may need it later if other treatments don t work. Your healthcare provider may refer you to an orthopedic surgeon. He or she will talk with you about your choices.  Preventing knee pain  Losing weight and correcting excess muscle tightness or muscle weakness may help lower your risk.  In some cases, you can prevent knee pain. To help prevent a flare-up of knee pain, you do these things:    Regularly  do all the exercises your doctor or physical therapist advises    Support your knee as advised by your doctor or physical therapist    Increase training gradually, and ease up on training when needed    Have an expert check your gait for running or other sporting activities    Stretch properly before and after exercise    Replace your running shoes regularly    Lose excess weight     When to call your healthcare provider  Call your healthcare provider right away if:    Your symptoms don t get better after a few weeks of treatment    You have any new symptoms   Date Last Reviewed: 4/1/2017 2000-2017 [employer]. [address] [place] [address]. All rights reserved. This information is not intended as a substitute for professional medical care. Always follow your healthcare professional's instructions.                Follow-ups after your visit        Your next 10 appointments already scheduled     Jul 25, 2018  7:45 AM CDT   (Arrive by 7:30 AM)   EMG with Daya Schwartz MD   [facility] EMG ([facility])    [address] [place] [address]  3rd [facility] [id number] [phone]           Do not use lotions or creams on the area to be tested. If you are on blood thinners (Warfarin, Coumadin, Lovenox, etc), please contact your primary care physician to check if it is safe to stop them 3 days prior to testing. If you have anxiety, please check with your referring physician to obtain anti-anxiety medication for the procedure.            Aug 10, 2018  4:00 PM CDT   (Arrive by 3:45 PM)   Return Visit with Donnell Duvall MD   [facility] ([facility])    [address] [place] [address]  4th [facility] [id number] [phone]            Sep 26, 2018  5:00 PM CDT   Adult Med Follow UP with DONOVAN Blake [facility]   Psychiatry Clinic ([facility])    [place] [id number]  [facility] [address]   [phone]            Oct 01, 2018  4:45 PM CDT   (Arrive by 4:30 PM)   Return Liver Transplant with Hussein Banegas MD   [facility] Hepatology ([facility])    [address] [place] [address]  [address]  [facility] [address]   [phone]            Oct 22, 2018  7:30 AM CDT   (Arrive by 7:15 AM)   Return Visit with Arlyn Sanchez MD   [facility] Primary Care Clinic ([facility])    [address] [place] [address]  4th Floor  [facility] [address]   [phone]            Mar 20, 2019  3:30 PM CDT   Lab with  LAB   [facility] Lab ([facility])    [address] [place] [address]  1st Floor  [facility] [address]   [phone]            Mar 20, 2019  4:30 PM CDT   (Arrive by 4:00 PM)   Return Visit with Daya Gutierrez MD   [facility] Nephrology ([facility])    [address] [place] [address]  [address]  [facility] [address]   [phone]              Who to contact     If you have questions or need follow up information about today's clinic visit or your schedule please contact [facility] directly at [phone].  Normal or non-critical lab and imaging results will be communicated to you by [email or web address], letter or phone within 4 business days after the clinic has received the results. If you do not hear from us within 7 days, please contact the clinic through [email or web address] or phone. If you have a critical or abnormal lab result, we will notify you by phone as soon as possible.  Submit refill requests through [employer] or call your pharmacy and they will forward the refill request to us. Please allow 3 business days for your refill to be completed.          Additional Information About Your Visit        [email or web address] Information     [employer] gives you secure access to your electronic health record. If you see a primary care provider, you can also send messages to your care team and make appointments. If you  have questions, please call your primary care clinic.  If you do not have a primary care provider, please call [phone] and they will assist you.        Care EveryWhere ID     This is your Care EveryWhere ID. This could be used by other organizations to access your [facility] medical records  [id number]        Your Vitals Were     Pulse Temperature Pulse Oximetry BMI (Body Mass Index)          93 97.9  F (36.6  C) (Tympanic) 95% 33.36 kg/m2         Blood Pressure from Last 3 Encounters:   07/19/18 126/70   07/12/18 122/70   06/20/18 156/86    Weight from Last 3 Encounters:   07/19/18 213 lb (96.6 kg)   07/10/18 213 lb (96.6 kg)   06/15/18 213 lb (96.6 kg)               Primary Care Provider Office Phone # Fax #    Arlyn Sanchez [phone]       [address] [address]        Equal Access to Services     ALLY BERRY : Hadii aad ku hadasho Soomaali, waaxda luqadaha, qaybta kaalmada adeegyada, waxay kobein haygrzegorzn kay ambrose . So [facility] [phone].    ATENCIÓN: Si habla español, tiene a reece disposición servicios gratuitos de asistencia lingüística. [facility] [phone].    We comply with applicable federal civil rights laws and [place] laws. We do not discriminate on the basis of race, color, national origin, age, disability, sex, sexual orientation, or gender identity.            Thank you!     Thank you for choosing [facility]  for your care. Our goal is always to provide you with excellent care. Hearing back from our patients is one way we can continue to improve our services. Please take a few minutes to complete the written survey that you may receive in the mail after your visit with us. Thank you!             Your Updated Medication List - Protect others around you: Learn how to safely use, store and throw away your medicines at www.disposemymeds.org.          This list is accurate as of 7/19/18  6:03 PM.  Always use your most recent  med list.                   Brand Name Dispense Instructions for use Diagnosis    acetaminophen 325 MG tablet    TYLENOL    100 tablet    Take 2 tablets (650 mg) by mouth every 6 hours as needed for mild pain Or fevers. Use sparingly. Limit use to no more than 2 grams (2000 mg) in 24 hours. **Further refills must be obtained by primary care provider**    Acute post-operative pain       amLODIPine 5 MG tablet    NORVASC    90 tablet    Take 1 tablet (5 mg) by mouth daily    Hypertension       azaTHIOprine 100 MG Tabs     90 tablet    Take 50 mg by mouth every evening    Liver transplanted (H)       betaxolol 0.5 % ophthalmic solution    BETOPTIC    5 mL    Place 1 drop into both eyes 2 times daily    Primary open angle glaucoma of both eyes, unspecified glaucoma stage       calcium Citrate-vitamin D 500-400 MG-UNIT Chew      Take 1 tablet by mouth daily        chlorthalidone 25 MG tablet    HYGROTON    45 tablet    Take 0.5 tablets (12.5 mg) by mouth daily    CKD (chronic kidney disease) stage 3, GFR 30-59 ml/min, Fluid retention       cholecalciferol 400 UNIT Tabs tablet    vitamin D3     Take 400 Units by mouth daily        cyanocobalamin 1000 MCG tablet    vitamin  B-12     Take 1,000 mcg by mouth daily        ferrous sulfate 325 (65 Fe) MG tablet    IRON    100 tablet    Take 1 tablet (325 mg) by mouth 2 times daily    Iron deficiency       folic acid 1 MG tablet    FOLVITE    30 tablet    Take 1 tablet (1 mg) by mouth daily    Malnutrition (H)       hydrOXYzine 25 MG tablet    ATARAX    60 tablet    Take 1-2 tablets (25-50mg) every 6 hours as needed for itching    WALTER (generalized anxiety disorder)       levothyroxine 88 MCG tablet    SYNTHROID/LEVOTHROID    90 tablet    Take 1 tablet (88 mcg) by mouth every morning (before breakfast)    Thyroid function test abnormal       melatonin 5 MG tablet      Take 1 tablet (5 mg) by mouth nightly as needed for sleep        multivitamin, therapeutic Tabs tablet     30  tablet    Take 1 tablet by mouth every 24 hours    Malnutrition (H)       OMEGA-3 FISH OIL EX ST PO      Take 1 capsule by mouth 2 times daily 1290 (fish oil)-900 (omega 3)        pantoprazole 40 MG EC tablet    PROTONIX    90 tablet    Take 1 tablet (40 mg) by mouth every morning (before breakfast)    Gastroesophageal reflux disease, esophagitis presence not specified       pramipexole 0.5 MG tablet    MIRAPEX    90 tablet    Take 1 tablet (0.5 mg) by mouth At Bedtime    Restless leg syndrome       * predniSONE 5 MG tablet    DELTASONE    90 tablet    Take 1 tablet (5 mg) by mouth daily    Liver replaced by transplant (H)       * predniSONE 1 MG tablet    DELTASONE    360 tablet    Take 4 tablets (4 mg) by mouth daily    Liver replaced by transplant (H)       propranolol 10 MG tablet    INDERAL    90 tablet    Take 1 tablet (10 mg) by mouth 3 times daily As needed for anxiety    Generalized anxiety disorder       psyllium 0.52 g capsule     60 capsule    Take 2 capsules (1.04 g) by mouth daily With a full glass of water.    Loose stools       tacrolimus 0.5 MG capsule    GENERIC EQUIVALENT    270 capsule    2.5 mg AM and 2 mg PM    Liver transplanted (H)       traMADol 50 MG tablet    ULTRAM    20 tablet    Take 1 tablet (50 mg) by mouth every 6 hours as needed for severe pain    Acute left ankle pain, Lumbar radicular pain       traZODone 100 MG tablet    DESYREL    45 tablet    Take 1.5 tablets (150 mg) by mouth At Bedtime    Insomnia, unspecified type       ursodiol 300 MG capsule    ACTIGALL    60 capsule    Take 1 capsule (300 mg) by mouth 2 times daily    Liver transplanted (H)       * Notice:  This list has 2 medication(s) that are the same as other medications prescribed for you. Read the directions carefully, and ask your doctor or other care provider to review them with you.

## 2018-07-24 ENCOUNTER — OFFICE VISIT (OUTPATIENT)
Dept: ORTHOPEDICS | Facility: CLINIC | Age: 63
End: 2018-07-24
Payer: COMMERCIAL

## 2018-07-24 VITALS
DIASTOLIC BLOOD PRESSURE: 89 MMHG | HEART RATE: 97 BPM | HEIGHT: 67 IN | SYSTOLIC BLOOD PRESSURE: 152 MMHG | WEIGHT: 213 LBS | BODY MASS INDEX: 33.43 KG/M2

## 2018-07-24 DIAGNOSIS — M79.605 ACUTE PAIN OF LEFT LOWER EXTREMITY: Primary | ICD-10-CM

## 2018-07-24 NOTE — MR AVS SNAPSHOT
After Visit Summary   7/24/2018    Phan Luque    MRN: [id number]           Patient Information     Date Of Birth          1955        Visit Information        Provider Department      7/24/2018 5:40 PM Donnell Gorman DO [facility]        Today's Diagnoses     Acute pain of left lower extremity    -  1       Follow-ups after your visit        Your next 10 appointments already scheduled     Jul 31, 2018  7:00 AM CDT   Return Walk In Ortho with DO KERI Alonzo [facility] ([facility] [place])    [address] [place] [address]  4th [facility] [address]   [phone]            Aug 09, 2018  8:00 AM CDT   (Arrive by 7:45 AM)   EMG with Shaun Sandoval MD   [facility] EMG ([facility])    [address] [place] [address]  3rd [facility] [address]   [phone]            Aug 10, 2018  4:00 PM CDT   (Arrive by 3:45 PM)   Return Visit with Donnell Duvall MD   [facility] ([facility] [place])    [address] [place] [address]  4th [facility] [address]   [phone]            Sep 26, 2018  5:00 PM CDT   Adult Med Follow UP with DONOVAN Blake [facility]   Psychiatry Clinic ([facility])    [place] [id number]  [address] [place] [address]   [phone]            Oct 01, 2018  4:45 PM CDT   (Arrive by 4:30 PM)   Return Liver Transplant with Hussein Banegas MD   [facility] Hepatology ([facility])    [address] [place] [address]  [address]  [facility] [address]   [phone]            Oct 22, 2018  7:30 AM CDT   (Arrive by 7:15 AM)   Return Visit with Arlyn Sanchez MD   [facility] ([facility])    [address] [place] [address]  4th [facility] [address]   [phone]            Mar 20, 2019  3:30 PM CDT   Lab with  LAB     "Health Lab ([facility])    [address]  1st Floor  [facility] [id number] [phone]            Mar 20, 2019  4:30 PM CDT   (Arrive by 4:00 PM)   Return Visit with Daya Gutierrez MD   [facility] Neph[facility] ([facility])    [address]  [address]  [facility] [id number] [phone]              Future tests that were ordered for you today     Open Future Orders        Priority Expected Expires Ordered    MR Knee Left w/o Contrast Routine  7/24/2019 7/24/2018    MR Tibia Fibula Lower Leg Left wo Contrast Routine  7/24/2019 7/24/2018            Who to contact     Please call your clinic at [phone] to:    Ask questions about your health    Make or cancel appointments    Discuss your medicines    Learn about your test results    Speak to your doctor            Additional Information About Your Visit        Voxy Information     Voxy gives you secure access to your electronic health record. If you see a primary care provider, you can also send messages to your care team and make appointments. If you have questions, please call your primary care clinic.  If you do not have a primary care provider, please call [phone] and they will assist you.      Voxy is an electronic gateway that provides easy, online access to your medical records. With Voxy, you can request a clinic appointment, read your test results, renew a prescription or communicate with your care team.     To access your existing account, please contact your [facility] or call [phone] for assistance.        Care EveryWhere ID     This is your Care EveryWhere ID. This could be used by other organizations to access your [place] medical records  [id number]        Your Vitals Were     Pulse Height BMI (Body Mass Index)             97 1.702 m (5' 7\") 33.36 kg/m2          Blood Pressure from Last 3 Encounters:   07/24/18 152/89 "   07/19/18 126/70   07/12/18 122/70    Weight from Last 3 Encounters:   07/24/18 96.6 kg (213 lb)   07/19/18 96.6 kg (213 lb)   07/10/18 96.6 kg (213 lb)               Primary Care Provider Office Phone # Fax #    HesperiaMichelle Sanchez [phone] [address] [address]        Equal Access to Services     PAULO BERRY : Hadii aad ku hadasho Soomaali, waaxda luqadaha, qaybta kaalmada adeegyada, waxay idiin hayaan adeeg kharash lagonzalez . So [facility] [phone].    ATENCIÓN: Si habla español, tiene a reece disposición servicios gratuitos de asistencia lingüística. [facility] [phone].    We comply with applicable federal civil rights laws and [place] laws. We do not discriminate on the basis of race, color, national origin, age, disability, sex, sexual orientation, or gender identity.            Thank you!     Thank you for choosing [facility]THOP[facility] W[facility] CLIN[facility]  for your care. Our goal is always to provide you with excellent care. Hearing back from our patients is one way we can continue to improve our services. Please take a few minutes to complete the written survey that you may receive in the mail after your visit with us. Thank you!             Your Updated Medication List - Protect others around you: Learn how to safely use, store and throw away your medicines at www.disposemymeds.org.          This list is accurate as of 7/24/18 11:59 PM.  Always use your most recent med list.                   Brand Name Dispense Instructions for use Diagnosis    acetaminophen 325 MG tablet    TYLENOL    100 tablet    Take 2 tablets (650 mg) by mouth every 6 hours as needed for mild pain Or fevers. Use sparingly. Limit use to no more than 2 grams (2000 mg) in 24 hours. **Further refills must be obtained by primary care provider**    Acute post-operative pain       amLODIPine 5 MG tablet    NORVASC    90 tablet    Take 1 tablet (5 mg) by mouth daily    Hypertension        azaTHIOprine 100 MG Tabs     90 tablet    Take 50 mg by mouth every evening    Liver transplanted (H)       betaxolol 0.5 % ophthalmic solution    BETOPTIC    5 mL    Place 1 drop into both eyes 2 times daily    Primary open angle glaucoma of both eyes, unspecified glaucoma stage       calcium Citrate-vitamin D 500-400 MG-UNIT Chew      Take 1 tablet by mouth daily        chlorthalidone 25 MG tablet    HYGROTON    45 tablet    Take 0.5 tablets (12.5 mg) by mouth daily    CKD (chronic kidney disease) stage 3, GFR 30-59 ml/min, Fluid retention       cholecalciferol 400 UNIT Tabs tablet    vitamin D3     Take 400 Units by mouth daily        cyanocobalamin 1000 MCG tablet    vitamin  B-12     Take 1,000 mcg by mouth daily        ferrous sulfate 325 (65 Fe) MG tablet    IRON    100 tablet    Take 1 tablet (325 mg) by mouth 2 times daily    Iron deficiency       folic acid 1 MG tablet    FOLVITE    30 tablet    Take 1 tablet (1 mg) by mouth daily    Malnutrition (H)       hydrOXYzine 25 MG tablet    ATARAX    60 tablet    Take 1-2 tablets (25-50mg) every 6 hours as needed for itching    WALTER (generalized anxiety disorder)       levothyroxine 88 MCG tablet    SYNTHROID/LEVOTHROID    90 tablet    Take 1 tablet (88 mcg) by mouth every morning (before breakfast)    Thyroid function test abnormal       melatonin 5 MG tablet      Take 1 tablet (5 mg) by mouth nightly as needed for sleep        multivitamin, therapeutic Tabs tablet     30 tablet    Take 1 tablet by mouth every 24 hours    Malnutrition (H)       OMEGA-3 FISH OIL EX ST PO      Take 1 capsule by mouth 2 times daily 1290 (fish oil)-900 (omega 3)        order for DME     1 Device    Equipment being ordered: knee sleeve    Acute pain of left knee       pantoprazole 40 MG EC tablet    PROTONIX    90 tablet    Take 1 tablet (40 mg) by mouth every morning (before breakfast)    Gastroesophageal reflux disease, esophagitis presence not specified       pramipexole 0.5 MG  tablet    MIRAPEX    90 tablet    Take 1 tablet (0.5 mg) by mouth At Bedtime    Restless leg syndrome       * predniSONE 5 MG tablet    DELTASONE    90 tablet    Take 1 tablet (5 mg) by mouth daily    Liver replaced by transplant (H)       * predniSONE 1 MG tablet    DELTASONE    360 tablet    Take 4 tablets (4 mg) by mouth daily    Liver replaced by transplant (H)       propranolol 10 MG tablet    INDERAL    90 tablet    Take 1 tablet (10 mg) by mouth 3 times daily As needed for anxiety    Generalized anxiety disorder       psyllium 0.52 g capsule     60 capsule    Take 2 capsules (1.04 g) by mouth daily With a full glass of water.    Loose stools       tacrolimus 0.5 MG capsule    GENERIC EQUIVALENT    270 capsule    2.5 mg AM and 2 mg PM    Liver transplanted (H)       traMADol 50 MG tablet    ULTRAM    20 tablet    Take 1 tablet (50 mg) by mouth every 6 hours as needed for severe pain    Acute left ankle pain, Lumbar radicular pain       traZODone 100 MG tablet    DESYREL    45 tablet    Take 1.5 tablets (150 mg) by mouth At Bedtime    Insomnia, unspecified type       ursodiol 300 MG capsule    ACTIGALL    60 capsule    Take 1 capsule (300 mg) by mouth 2 times daily    Liver transplanted (H)       * Notice:  This list has 2 medication(s) that are the same as other medications prescribed for you. Read the directions carefully, and ask your doctor or other care provider to review them with you.

## 2018-07-24 NOTE — PROGRESS NOTES
"CHIEF COMPLAINT:  Pain of the Left Ankle and Pain of the Left Knee       HISTORY OF PRESENT ILLNESS  Ms. Luque is a pleasant 62 year old year old female who presents to clinic today with pain of left knee.  Phan explains that she has a history of liver transplant and CKD.  Patient was walking out of a building on 7/16 and fell off of a curb.  She fell on to the medial aspect of her left knee.  Immediate bruising and swelling of knee and calf into foot.  Denies ankle pain associated, despite swelling accumulating in foot over last several days.      Seen and evaluated by urgent care facility who believed this was merely a contusion. Xrays were negative at that time.  Since this visit she was also seen by ED due to persisting pain and swelling.  In ED, a doppler US performed and negative for DVT.  Recommended that she ice, elevate and use a compression stocking for assisting swelling.      Continued pain and swelling.  Pain keeps her up at night. Using tramadol and tylenol.  Sharp/stabbing and burning in nature.  No tingling/numbness of foot.  Not currently \"burning\".      Additional history: as documented    MEDICAL HISTORY  Patient Active Problem List   Diagnosis     Decompensation of cirrhosis of liver (H)     Liver transplanted (H)     Alcoholic hepatitis     Immunosuppression (H)     Alcoholic hepatitis without ascites     Enterococcus UTI     Liver transplant status     Nausea & vomiting     Malnutrition (H)     Candidiasis of skin     Anemia     ACP (advance care planning)     Diarrhea     Hypothyroidism     Esophageal reflux     Recurrent major depression in partial remission (H)     Insomnia     CKD (chronic kidney disease) stage 3, GFR 30-59 ml/min     Anemia in stage 3 chronic kidney disease     Osteopenia     Alcohol abuse, uncomplicated       Current Outpatient Prescriptions   Medication Sig Dispense Refill     acetaminophen (TYLENOL) 325 MG tablet Take 2 tablets (650 mg) by mouth every 6 hours as " needed for mild pain Or fevers. Use sparingly. Limit use to no more than 2 grams (2000 mg) in 24 hours. **Further refills must be obtained by primary care provider** 100 tablet 0     amLODIPine (NORVASC) 5 MG tablet Take 1 tablet (5 mg) by mouth daily 90 tablet 3     azaTHIOprine 100 MG TABS Take 50 mg by mouth every evening 90 tablet 3     betaxolol (BETOPTIC) 0.5 % ophthalmic solution Place 1 drop into both eyes 2 times daily 5 mL 2     calcium Citrate-vitamin D 500-400 MG-UNIT CHEW Take 1 tablet by mouth daily       chlorthalidone (HYGROTON) 25 MG tablet Take 0.5 tablets (12.5 mg) by mouth daily 45 tablet 3     cholecalciferol (VITAMIN D3) 400 UNIT TABS tablet Take 400 Units by mouth daily       cyanocobalamin (VITAMIN  B-12) 1000 MCG tablet Take 1,000 mcg by mouth daily       ferrous sulfate (IRON) 325 (65 FE) MG tablet Take 1 tablet (325 mg) by mouth 2 times daily 100 tablet      folic acid (FOLVITE) 1 MG tablet Take 1 tablet (1 mg) by mouth daily 30 tablet 1     hydrOXYzine (ATARAX) 25 MG tablet Take 1-2 tablets (25-50mg) every 6 hours as needed for itching 60 tablet 2     levothyroxine (SYNTHROID/LEVOTHROID) 88 MCG tablet Take 1 tablet (88 mcg) by mouth every morning (before breakfast) 90 tablet 2     melatonin 5 MG tablet Take 1 tablet (5 mg) by mouth nightly as needed for sleep       multivitamin, therapeutic (THERA-VIT) TABS tablet Take 1 tablet by mouth every 24 hours 30 tablet 11     Omega-3 Fatty Acids (OMEGA-3 FISH OIL EX ST PO) Take 1 capsule by mouth 2 times daily 1290 (fish oil)-900 (omega 3)       order for DME Equipment being ordered: knee sleeve 1 Device 0     pantoprazole (PROTONIX) 40 MG EC tablet Take 1 tablet (40 mg) by mouth every morning (before breakfast) 90 tablet 4     pramipexole (MIRAPEX) 0.5 MG tablet Take 1 tablet (0.5 mg) by mouth At Bedtime 90 tablet 3     predniSONE (DELTASONE) 1 MG tablet Take 4 tablets (4 mg) by mouth daily 360 tablet 3     predniSONE (DELTASONE) 5 MG tablet  "Take 1 tablet (5 mg) by mouth daily 90 tablet 3     propranolol (INDERAL) 10 MG tablet Take 1 tablet (10 mg) by mouth 3 times daily As needed for anxiety 90 tablet 0     psyllium 0.52 G capsule Take 2 capsules (1.04 g) by mouth daily With a full glass of water. 60 capsule 1     tacrolimus (GENERIC EQUIVALENT) 0.5 MG capsule 2.5 mg AM and 2 mg  capsule 11     traMADol (ULTRAM) 50 MG tablet Take 1 tablet (50 mg) by mouth every 6 hours as needed for severe pain 20 tablet 0     traZODone (DESYREL) 100 MG tablet Take 1.5 tablets (150 mg) by mouth At Bedtime 45 tablet 3     ursodiol (ACTIGALL) 300 MG capsule Take 1 capsule (300 mg) by mouth 2 times daily 60 capsule 11       Allergies   Allergen Reactions     Benadryl [Diphenhydramine] Hives     Cefaclor Hives     Hydrocodone-Acetaminophen Itching     Oxycodone Itching     Penicillins Hives     Sulfa Drugs Hives       Family History   Problem Relation Age of Onset     Family History Negative Other      Melanoma Mother           HEART DISEASE Father      CHF       Additional medical/Social/Surgical histories reviewed in [facility] and updated as appropriate.     REVIEW OF SYSTEMS (2018)  CONSTITUTIONAL: Denies fever and weight loss  EYES: Denies acute vision changes  ENT: Denies hearing changes or difficulty swallowing  CARDIAC: Denies chest pain or edema  RESPIRATORY: Denies dyspnea, cough or wheeze  GASTROINTESTINAL: Denies abdominal pain, nausea, vomiting  MUSCULOSKELETAL: See HPI  SKIN: Denies any recent rash or lesion  NEUROLOGICAL: Denies numbness or focal weakness  PSYCHIATRIC: No history of psychiatric symptoms or problems  ENDOCRINE: CKD3  HEMATOLOGY: Denies episodes of easy bleeding. No thinners. Liver function tests recently NL.     PHYSICAL EXAM  /89  Pulse 97  Ht 1.702 m (5' 7\")  Wt 96.6 kg (213 lb)  BMI 33.36 kg/m2    General  - normal appearance, in no obvious distress  CV  - normal dorsalis pedis pulse +2,   Pulm  - normal respiratory " pattern, non-labored  Musculoskeletal - Left knee  - stance: normal gait without limp, normal single leg squat, no obvious leg length discrepancy  - inspection: Swelling of left calf and foot diffusely.  Ecchymosis of medial aspect of left knee, small abrasion of left patellar region, and ecchymosis tracking into calf and foot.    - palpation: TTP of medial femoral condyle of left knee.  Additional tenderness of anterior tibial region and medial aspect of calf along border of soleus. Calf is soft, supple without firmness or tightness.    - ROM: Grossly intact. Ankle ROM intact. Tightness with dorsiflexion of ankle.   - strength: 5/5 in flexion, 5/5 in extension of knee.No significant pain of calf plantarflexion resisted.   - special tests:  (-) Lachman  (-) anterior drawer  (-) posterior drawer  (-) Oscar  (-) Thessaly  (-) varus at 0 and 30 degrees flexion  (-) valgus at 0 and 30 degrees flexion  Neuro  - no sensory or motor deficit, grossly normal coordination, normal muscle tone  Skin  - no ecchymosis, erythema, warmth, or induration, no obvious rash  Psych  - interactive, appropriate, normal mood and affect    IMAGING : XR knee left  COMPARISON: None.     FINDINGS: Mild degenerative changes bilaterally with mild loss of the  femoral tibial joint spaces and mild marginal osteophytic spurring.  Small left-sided joint effusion. No fracture visualized. No  dislocation. There are no worrisome bony lesions.         IMPRESSION: No acute osseous abnormality demonstrated.     ANABELLA ALICEA MD     ASSESSMENT & PLAN  Ms. Luque is a 62 year old year old female who presents to clinic today with acute pain and persistent lower extremity swelling since mechanical fall on 7/16/18.  Possibly secondary to contusion of knee and soft tissues vs. Subtle fracture of knee. Less likely compartment syndrome, although this remains on differential and will monitor closely.    Diagnosis:   Left knee pain  Left calf hematoma    -MRI  left knee and calf  -Compression with ACE wrap, elevation  -Ice to calf and knee  -Tramadol/tylenol PRN  -Red flags for compartments syndrome discussed  -Follow up after mRI    It was a pleasure seeing Phan today.    Donnell Gorman DO, CAQSM  Primary Care Sports Medicine

## 2018-07-24 NOTE — PROGRESS NOTES
SPORTS & ORTHOPEDIC WALK-IN VISIT 7/24/2018    Primary Care Physician: Dr. Sanchez    US and xray negative. Fell coming out of a building, stepped off of the curb without seeing it. Landed on knee. Bruised down into foot. Swollen. History of OA    Reason for visit:     What part of your body is injured / painful?  left knee and left ankle    What caused the injury /pain? Fall    How long ago did your injury occur or pain begin? 7/16    What are your most bothersome symptoms? Pain and Swelling    How would you characterize your symptom?  Burning, stabbing    What makes your symptoms better? Nothing - tylenol and tramadol aren't helping    What makes your symptoms worse? Other: heat, palpation    Have you been previously seen for this problem? Yes, UC and ED    Medical History:    Any recent changes to your medical history? No    Any new medication prescribed since last visit? No    Have you had surgery on this body part before? No    Social History:    Occupation: [employer]     Handedness: Right    Exercise: None    Review of Systems:    Do you have fever, chills, weight loss? No    Do you have any vision problems? No    Do you have any chest pain or edema? No    Do you have any shortness of breath or wheezing?  No    Do you have stomach problems? No    Do you have any numbness or focal weakness? Yes, peripheral neuropathy and cervical spine stenosis     Do you have diabetes? No    Do you have problems with bleeding or clotting? No    Do you have an rashes or other skin lesions? No

## 2018-07-24 NOTE — LETTER
7/24/2018       RE: Phan Luque  [address] [facility] [id number]     Dear Colleague,    Thank you for referring your patient, Phan Luque, to the [facility] SPORTS AND ORTHOPAEDIC WALK IN CLINIC at [facility]. Please see a copy of my visit note below.          SPORTS & ORTHOPEDIC WALK-IN VISIT 7/24/2018    Primary Care Physician: Dr. Sanchez    US and xray negative. Fell coming out of a building, stepped off of the curb without seeing it. Landed on knee. Bruised down into foot. Swollen. History of OA    Reason for visit:     What part of your body is injured / painful?  left knee and left ankle    What caused the injury /pain? Fall    How long ago did your injury occur or pain begin? 7/16    What are your most bothersome symptoms? Pain and Swelling    How would you characterize your symptom?  Burning, stabbing    What makes your symptoms better? Nothing - tylenol and tramadol aren't helping    What makes your symptoms worse? Other: heat, palpation    Have you been previously seen for this problem? Yes, UC and ED    Medical History:    Any recent changes to your medical history? No    Any new medication prescribed since last visit? No    Have you had surgery on this body part before? No    Social History:    Occupation: [employer]     Handedness: Right    Exercise: None    Review of Systems:    Do you have fever, chills, weight loss? No    Do you have any vision problems? No    Do you have any chest pain or edema? No    Do you have any shortness of breath or wheezing?  No    Do you have stomach problems? No    Do you have any numbness or focal weakness? Yes, peripheral neuropathy and cervical spine stenosis     Do you have diabetes? No    Do you have problems with bleeding or clotting? No    Do you have an rashes or other skin lesions? No         CHIEF COMPLAINT:  Pain of the Left Ankle and Pain of the Left Knee       HISTORY OF PRESENT ILLNESS  Ms. Luque  "is a pleasant 62 year old year old female who presents to clinic today with pain of left knee.  Phan explains that she has a history of liver transplant and CKD.  Patient was walking out of a building on 7/16 and fell off of a curb.  She fell on to the medial aspect of her left knee.  Immediate bruising and swelling of knee and calf into foot.  Denies ankle pain associated, despite swelling accumulating in foot over last several days.      Seen and evaluated by urgent care facility who believed this was merely a contusion. Xrays were negative at that time.  Since this visit she was also seen by ED due to persisting pain and swelling.  In ED, a doppler US performed and negative for DVT.  Recommended that she ice, elevate and use a compression stocking for assisting swelling.      Continued pain and swelling.  Pain keeps her up at night. Using tramadol and tylenol.  Sharp/stabbing and burning in nature.  No tingling/numbness of foot.  Not currently \"burning\".      Additional history: as documented    MEDICAL HISTORY  Patient Active Problem List   Diagnosis     Decompensation of cirrhosis of liver (H)     Liver transplanted (H)     Alcoholic hepatitis     Immunosuppression (H)     Alcoholic hepatitis without ascites     Enterococcus UTI     Liver transplant status     Nausea & vomiting     Malnutrition (H)     Candidiasis of skin     Anemia     ACP (advance care planning)     Diarrhea     Hypothyroidism     Esophageal reflux     Recurrent major depression in partial remission (H)     Insomnia     CKD (chronic kidney disease) stage 3, GFR 30-59 ml/min     Anemia in stage 3 chronic kidney disease     Osteopenia     Alcohol abuse, uncomplicated       Current Outpatient Prescriptions   Medication Sig Dispense Refill     acetaminophen (TYLENOL) 325 MG tablet Take 2 tablets (650 mg) by mouth every 6 hours as needed for mild pain Or fevers. Use sparingly. Limit use to no more than 2 grams (2000 mg) in 24 hours. **Further " refills must be obtained by primary care provider** 100 tablet 0     amLODIPine (NORVASC) 5 MG tablet Take 1 tablet (5 mg) by mouth daily 90 tablet 3     azaTHIOprine 100 MG TABS Take 50 mg by mouth every evening 90 tablet 3     betaxolol (BETOPTIC) 0.5 % ophthalmic solution Place 1 drop into both eyes 2 times daily 5 mL 2     calcium Citrate-vitamin D 500-400 MG-UNIT CHEW Take 1 tablet by mouth daily       chlorthalidone (HYGROTON) 25 MG tablet Take 0.5 tablets (12.5 mg) by mouth daily 45 tablet 3     cholecalciferol (VITAMIN D3) 400 UNIT TABS tablet Take 400 Units by mouth daily       cyanocobalamin (VITAMIN  B-12) 1000 MCG tablet Take 1,000 mcg by mouth daily       ferrous sulfate (IRON) 325 (65 FE) MG tablet Take 1 tablet (325 mg) by mouth 2 times daily 100 tablet      folic acid (FOLVITE) 1 MG tablet Take 1 tablet (1 mg) by mouth daily 30 tablet 1     hydrOXYzine (ATARAX) 25 MG tablet Take 1-2 tablets (25-50mg) every 6 hours as needed for itching 60 tablet 2     levothyroxine (SYNTHROID/LEVOTHROID) 88 MCG tablet Take 1 tablet (88 mcg) by mouth every morning (before breakfast) 90 tablet 2     melatonin 5 MG tablet Take 1 tablet (5 mg) by mouth nightly as needed for sleep       multivitamin, therapeutic (THERA-VIT) TABS tablet Take 1 tablet by mouth every 24 hours 30 tablet 11     Omega-3 Fatty Acids (OMEGA-3 FISH OIL EX ST PO) Take 1 capsule by mouth 2 times daily 1290 (fish oil)-900 (omega 3)       order for DME Equipment being ordered: knee sleeve 1 Device 0     pantoprazole (PROTONIX) 40 MG EC tablet Take 1 tablet (40 mg) by mouth every morning (before breakfast) 90 tablet 4     pramipexole (MIRAPEX) 0.5 MG tablet Take 1 tablet (0.5 mg) by mouth At Bedtime 90 tablet 3     predniSONE (DELTASONE) 1 MG tablet Take 4 tablets (4 mg) by mouth daily 360 tablet 3     predniSONE (DELTASONE) 5 MG tablet Take 1 tablet (5 mg) by mouth daily 90 tablet 3     propranolol (INDERAL) 10 MG tablet Take 1 tablet (10 mg) by mouth  "3 times daily As needed for anxiety 90 tablet 0     psyllium 0.52 G capsule Take 2 capsules (1.04 g) by mouth daily With a full glass of water. 60 capsule 1     tacrolimus (GENERIC EQUIVALENT) 0.5 MG capsule 2.5 mg AM and 2 mg  capsule 11     traMADol (ULTRAM) 50 MG tablet Take 1 tablet (50 mg) by mouth every 6 hours as needed for severe pain 20 tablet 0     traZODone (DESYREL) 100 MG tablet Take 1.5 tablets (150 mg) by mouth At Bedtime 45 tablet 3     ursodiol (ACTIGALL) 300 MG capsule Take 1 capsule (300 mg) by mouth 2 times daily 60 capsule 11       Allergies   Allergen Reactions     Benadryl [Diphenhydramine] Hives     Cefaclor Hives     Hydrocodone-Acetaminophen Itching     Oxycodone Itching     Penicillins Hives     Sulfa Drugs Hives       Family History   Problem Relation Age of Onset     Family History Negative Other      Melanoma Mother           HEART DISEASE Father      CHF       Additional medical/Social/Surgical histories reviewed in [facility] and updated as appropriate.     REVIEW OF SYSTEMS (2018)  CONSTITUTIONAL: Denies fever and weight loss  EYES: Denies acute vision changes  ENT: Denies hearing changes or difficulty swallowing  CARDIAC: Denies chest pain or edema  RESPIRATORY: Denies dyspnea, cough or wheeze  GASTROINTESTINAL: Denies abdominal pain, nausea, vomiting  MUSCULOSKELETAL: See HPI  SKIN: Denies any recent rash or lesion  NEUROLOGICAL: Denies numbness or focal weakness  PSYCHIATRIC: No history of psychiatric symptoms or problems  ENDOCRINE: CKD3  HEMATOLOGY: Denies episodes of easy bleeding. No thinners. Liver function tests recently NL.     PHYSICAL EXAM  /89  Pulse 97  Ht 1.702 m (5' 7\")  Wt 96.6 kg (213 lb)  BMI 33.36 kg/m2    General  - normal appearance, in no obvious distress  CV  - normal dorsalis pedis pulse +2,   Pulm  - normal respiratory pattern, non-labored  Musculoskeletal - Left knee  - stance: normal gait without limp, normal single leg squat, no obvious " leg length discrepancy  - inspection: Swelling of left calf and foot diffusely.  Ecchymosis of medial aspect of left knee, small abrasion of left patellar region, and ecchymosis tracking into calf and foot.    - palpation: TTP of medial femoral condyle of left knee.  Additional tenderness of anterior tibial region and medial aspect of calf along border of soleus. Calf is soft, supple without firmness or tightness.    - ROM: Grossly intact. Ankle ROM intact. Tightness with dorsiflexion of ankle.   - strength: 5/5 in flexion, 5/5 in extension of knee.No significant pain of calf plantarflexion resisted.   - special tests:  (-) Lachman  (-) anterior drawer  (-) posterior drawer  (-) Oscar  (-) Thessaly  (-) varus at 0 and 30 degrees flexion  (-) valgus at 0 and 30 degrees flexion  Neuro  - no sensory or motor deficit, grossly normal coordination, normal muscle tone  Skin  - no ecchymosis, erythema, warmth, or induration, no obvious rash  Psych  - interactive, appropriate, normal mood and affect    IMAGING : XR knee left  COMPARISON: None.     FINDINGS: Mild degenerative changes bilaterally with mild loss of the  femoral tibial joint spaces and mild marginal osteophytic spurring.  Small left-sided joint effusion. No fracture visualized. No  dislocation. There are no worrisome bony lesions.         IMPRESSION: No acute osseous abnormality demonstrated.     ANABELLA ALICEA MD     ASSESSMENT & PLAN  Ms. Luque is a 62 year old year old female who presents to clinic today with acute pain and persistent lower extremity swelling since mechanical fall on 7/16/18.  Possibly secondary to contusion of knee and soft tissues vs. Subtle fracture of knee. Less likely compartment syndrome, although this remains on differential and will monitor closely.    Diagnosis:   Left knee pain  Left calf hematoma    -MRI left knee and calf  -Compression with ACE wrap, elevation  -Ice to calf and knee  -Tramadol/tylenol PRN  -Red flags for  compartments syndrome discussed  -Follow up after mRI    It was a pleasure seeing Nielsy today.    Again, thank you for allowing me to participate in the care of your patient.      Sincerely,    Donnell Gorman, DO

## 2018-07-25 ENCOUNTER — RECORDS - HEALTHEAST (OUTPATIENT)
Dept: ADMINISTRATIVE | Facility: OTHER | Age: 63
End: 2018-07-25

## 2018-07-25 ENCOUNTER — HOSPITAL ENCOUNTER (OUTPATIENT)
Dept: MRI IMAGING | Facility: CLINIC | Age: 63
Discharge: HOME | End: 2018-07-25

## 2018-07-25 ENCOUNTER — TRANSFERRED RECORDS (OUTPATIENT)
Dept: HEALTH INFORMATION MANAGEMENT | Facility: CLINIC | Age: 63
End: 2018-07-25

## 2018-07-25 DIAGNOSIS — M79.605 ACUTE PAIN OF LEFT LOWER EXTREMITY: ICD-10-CM

## 2018-07-27 ENCOUNTER — NURSE TRIAGE (OUTPATIENT)
Dept: NURSING | Facility: CLINIC | Age: 63
End: 2018-07-27

## 2018-07-27 ENCOUNTER — HOSPITAL ENCOUNTER (EMERGENCY)
Facility: CLINIC | Age: 63
Discharge: HOME | End: 2018-07-28
Attending: EMERGENCY MEDICINE | Admitting: EMERGENCY MEDICINE
Payer: OTHER MISCELLANEOUS

## 2018-07-27 VITALS
OXYGEN SATURATION: 96 % | TEMPERATURE: 98.1 F | BODY MASS INDEX: 35.08 KG/M2 | WEIGHT: 224 LBS | SYSTOLIC BLOOD PRESSURE: 154 MMHG | DIASTOLIC BLOOD PRESSURE: 66 MMHG

## 2018-07-27 DIAGNOSIS — S82.142A CLOSED FRACTURE OF LEFT TIBIAL PLATEAU, INITIAL ENCOUNTER: ICD-10-CM

## 2018-07-27 PROCEDURE — 99283 EMERGENCY DEPT VISIT LOW MDM: CPT | Mod: Z6 | Performed by: EMERGENCY MEDICINE

## 2018-07-27 PROCEDURE — 99282 EMERGENCY DEPT VISIT SF MDM: CPT | Performed by: EMERGENCY MEDICINE

## 2018-07-27 RX ORDER — OXYCODONE HYDROCHLORIDE 5 MG/1
5 TABLET ORAL EVERY 6 HOURS PRN
Qty: 12 TABLET | Refills: 0 | Status: SHIPPED | OUTPATIENT
Start: 2018-07-27 | End: 2018-08-29

## 2018-07-27 NOTE — ED TRIAGE NOTES
Pt presents to ED with c/o tibia fx. Pt states she slipped off a curb and has been in pain since. Pt states she had an MRI done at [facility] Wed night, pt states she never heard back about results and pain was not getting better. Pt called today to follow up, imaging report states tibial fx. Vitally stable in triage

## 2018-07-27 NOTE — LETTER
July 27, 2018      To Whom It May Concern:      Phan Luque was seen in our Emergency Department today, 07/27/18.  I expect her condition to improve over the next 5 days.  She may return to work/school when improved.    Sincerely,        Sherif Huffman MD

## 2018-07-27 NOTE — TELEPHONE ENCOUNTER
Pt fell and injured knee on 7/17 (10 d ago) Seen at  7/19 and x-ray showed no fx. Advised to use pain meds, ice, etc. And see ortho if not improved in 1 wk. Seen at [facility] ED on Sat 7/21 for increased swelling & bruising.  ED ruled out DVT, advised pain meds, ice, etc.  Went to [facility] ortho walk in clinic 7/24 where the doctor ordered MRI. Today pt somehow received a copy of her MRI report and states that it shows a fracture. We do not have this MRI report in EHR yet - done out of system. Pt c/o severe pain in knee, says she is in parking lot at [facility] ED. Advised pt to be evaluated in ED now due to her severe pain. Pt voiced understanding and agreement. Leeanna Rosenberg RN/FNA       Reason for Disposition    [1] SEVERE pain AND [2] not improved 2 hours after pain medicine/ice packs    Additional Information    Negative: Serious injury with multiple fractures    Negative: [1] Major bleeding (e.g., actively dripping or spurting) AND [2] can't be stopped    Negative: Looks like a dislocated joint or knee cap (crooked or deformed)    Negative: Bullet wound, stabbed by knife, or other serious penetrating wound    Negative: Sounds like a life-threatening emergency to the triager    Negative: Wound looks infected    Negative: Can't stand (bear weight) or walk    Negative: Skin is split open or gaping  (or length > 1/2 inch or 12 mm)    Negative: [1] Bleeding AND [2] won't stop after 10 minutes of direct pressure (using correct technique)    Negative: [1] Dirt in the wound AND [2] not removed with 15 minutes of scrubbing    Negative: Sounds like a serious injury to the triager    Protocols used: KNEE INJURY-ADULT-

## 2018-07-27 NOTE — ED AVS SNAPSHOT
[facility], Emergency Department    [address] [facility] [address]    Phone:  [phone]                                       Phan Luque   MRN: [id number]    Department:  [facility], Emergency Department   Date of Visit:  7/27/2018           Patient Information     Date Of Birth          1955        Your diagnoses for this visit were:     Closed fracture of left tibial plateau, initial encounter        You were seen by Sherif Huffman MD.        Discharge Instructions       TODAY'S VISIT:  -You were seen today for tibial plateau fracture.  Orthopedics was consulted they recommend knee immobilizer.  Strict nonweightbearing of the left leg.      FOLLOW-UP:  Please make an appointment to follow up with:  - Orthopedics (phone: [phone]) in 5-7 days.  Orthopedics will follow up to schedule appointment, if you do not hear from them please call them by Monday afternoon.    PRESCRIPTIONS / MEDICATIONS:  -Percocet as prescribed    OTHER INSTRUCTIONS:  Opioid Medication Information    You have been given a prescription for an opioid (narcotic) pain medicine and/or have received a pain medicine while here in the Emergency Department. These medicines can make you drowsy or impaired. You must not drive, operate dangerous equipment, or engage in any other dangerous activities while taking these medications. If you drive while taking these medications, you could be arrested for DUI, or driving under the influence. Do not drink any alcohol while you are taking these medications.   Opioid pain medications can cause addiction. If you have a history of chemical dependency of any type, you are at a higher risk of becoming addicted to pain medications.  Only take these prescribed medications to treat your pain when all other options have been tried. Take it for as short a time and as few doses as possible. Store your pain pills in a secure place, as they are frequently stolen and provide a  dangerous opportunity for children or visitors in your house to start abusing these powerful medications. We will not replace any lost or stolen medicine.  As soon as your pain is better, you should flush all your remaining medication.   Many prescription pain medications contain Tylenol  (acetaminophen), including Vicodin , Tylenol #3 , Norco , Lortab , and Percocet .  You should not take any extra pills of Tylenol  if you are using these prescription medications or you can get very sick.  Do not ever take more than 4000 mg of acetaminophen in any 24 hour period.  All opioids tend to cause constipation. Drink plenty of water and eat foods that have a lot of fiber, such as fruits, vegetables, prune juice, apple juice and high fiber cereal.  Take a laxative if you don t move your bowels at least every other day. Miralax , Milk of Magnesia, Colace , or Senna  can be used to keep you regular.            RETURN TO THE EMERGENCY DEPARTMENT  Return to the Emergency Department at any time for new/worsening symptoms. Especially worsening leg pain, calf pain, cool foot.       Your next 10 appointments already scheduled     Jul 31, 2018  7:00 AM CDT   Return Walk In Ortho with Donnell Gorman DO   [facility] ([facility] [place])    [address] [place] [address]   [phone]            Aug 09, 2018  8:00 AM CDT   (Arrive by 7:45 AM)   EMG with Shaun Sandoval MD   [facility] EMG ([facility] [place])    [address] [place] [address]   [phone]            Aug 10, 2018  4:00 PM CDT   (Arrive by 3:45 PM)   Return Visit with Donnell Duvall MD   [facility] Orthopaedic [facility] ([facility])    [address] [place] [address]   [phone]            Sep 26, 2018  5:00 PM CDT   Adult Med Follow UP with DONOVAN [facility]   Psychiatry Clinic  ([facility])    [address]  [address] [facility] [address]   [phone]            Oct 01, 2018  4:45 PM CDT   (Arrive by 4:30 PM)   Return Liver Transplant with Hussein Banegas MD   [facility] Hepatology ([facility])    [address]  [address]  [facility] [address]   [phone]            Oct 22, 2018  7:30 AM CDT   (Arrive by 7:15 AM)   Return Visit with Arlyn Sanchez MD   [facility] Primary Care Clinic ([facility])    [address]  4th Floor  [facility] [address]   [phone]            Mar 20, 2019  3:30 PM CDT   Lab with  LAB   [facility] Lab ([facility])    [address] [place] [address]  1st Floor  [facility] [address]   [phone]            Mar 20, 2019  4:30 PM CDT   (Arrive by 4:00 PM)   Return Visit with Daya Gutierrez MD   [facility] Nephrology ([facility])    [address]  [address]  [facility] [address]   [phone]              24 Hour Appointment Hotline       To make an appointment at any [facility], call [phone] ([phone]). If you don't have a family doctor or clinic, we will help you find one. [facility] are conveniently located to serve the needs of you and your family.          ED Discharge Orders     Crutches       Use gait belt during crutch training.            DME REFERRAL       Please be aware that coverage of these services is subject to the terms and limitations of your health insurance plan.  Call member services at your health plan with any benefit or coverage questions.      Please bring the following to your appointment:  Any x-rays, CTs or MRIs which have been performed.  Contact the facility where they were done to arrange for  prior to your scheduled appointment.    List of current medications   This referral request   Any documents/labs given to you for  this referral            Knee immobilizer Universal                    Review of your medicines      START taking        Dose / Directions Last dose taken    oxyCODONE IR 5 MG tablet   Commonly known as:  ROXICODONE   Dose:  5 mg   Quantity:  12 tablet        Take 1 tablet (5 mg) by mouth every 6 hours as needed for pain   Refills:  0          Our records show that you are taking the medicines listed below. If these are incorrect, please call your family doctor or clinic.        Dose / Directions Last dose taken    acetaminophen 325 MG tablet   Commonly known as:  TYLENOL   Dose:  650 mg   Quantity:  100 tablet        Take 2 tablets (650 mg) by mouth every 6 hours as needed for mild pain Or fevers. Use sparingly. Limit use to no more than 2 grams (2000 mg) in 24 hours. **Further refills must be obtained by primary care provider**   Refills:  0        amLODIPine 5 MG tablet   Commonly known as:  NORVASC   Dose:  5 mg   Quantity:  90 tablet        Take 1 tablet (5 mg) by mouth daily   Refills:  3        azaTHIOprine 100 MG Tabs   Dose:  50 mg   Quantity:  90 tablet        Take 50 mg by mouth every evening   Refills:  3        betaxolol 0.5 % ophthalmic solution   Commonly known as:  BETOPTIC   Dose:  1 drop   Quantity:  5 mL        Place 1 drop into both eyes 2 times daily   Refills:  2        calcium Citrate-vitamin D 500-400 MG-UNIT Chew   Dose:  1 tablet        Take 1 tablet by mouth daily   Refills:  0        chlorthalidone 25 MG tablet   Commonly known as:  HYGROTON   Dose:  12.5 mg   Quantity:  45 tablet        Take 0.5 tablets (12.5 mg) by mouth daily   Refills:  3        cholecalciferol 400 UNIT Tabs tablet   Commonly known as:  vitamin D3   Dose:  400 Units        Take 400 Units by mouth daily   Refills:  0        cyanocobalamin 1000 MCG tablet   Commonly known as:  vitamin  B-12   Dose:  1000 mcg        Take 1,000 mcg by mouth daily   Refills:  0        ferrous sulfate 325 (65 Fe) MG tablet   Commonly known  as:  IRON   Dose:  1 tablet   Quantity:  100 tablet        Take 1 tablet (325 mg) by mouth 2 times daily   Refills:  0        folic acid 1 MG tablet   Commonly known as:  FOLVITE   Dose:  1 mg   Quantity:  30 tablet        Take 1 tablet (1 mg) by mouth daily   Refills:  1        hydrOXYzine 25 MG tablet   Commonly known as:  ATARAX   Quantity:  60 tablet        Take 1-2 tablets (25-50mg) every 6 hours as needed for itching   Refills:  2        levothyroxine 88 MCG tablet   Commonly known as:  SYNTHROID/LEVOTHROID   Dose:  88 mcg   Indication:  Underactive Thyroid   Quantity:  90 tablet        Take 1 tablet (88 mcg) by mouth every morning (before breakfast)   Refills:  2        melatonin 5 MG tablet   Dose:  5 mg        Take 1 tablet (5 mg) by mouth nightly as needed for sleep   Refills:  0        multivitamin, therapeutic Tabs tablet   Dose:  1 tablet   Quantity:  30 tablet        Take 1 tablet by mouth every 24 hours   Refills:  11        OMEGA-3 FISH OIL EX ST PO   Dose:  1 capsule        Take 1 capsule by mouth 2 times daily 1290 (fish oil)-900 (omega 3)   Refills:  0        order for DME   Quantity:  1 Device        Equipment being ordered: knee sleeve   Refills:  0        pantoprazole 40 MG EC tablet   Commonly known as:  PROTONIX   Dose:  40 mg   Quantity:  90 tablet        Take 1 tablet (40 mg) by mouth every morning (before breakfast)   Refills:  4        pramipexole 0.5 MG tablet   Commonly known as:  MIRAPEX   Dose:  0.5 mg   Quantity:  90 tablet        Take 1 tablet (0.5 mg) by mouth At Bedtime   Refills:  3        * predniSONE 5 MG tablet   Commonly known as:  DELTASONE   Dose:  5 mg   Quantity:  90 tablet        Take 1 tablet (5 mg) by mouth daily   Refills:  3        * predniSONE 1 MG tablet   Commonly known as:  DELTASONE   Dose:  4 mg   Quantity:  360 tablet        Take 4 tablets (4 mg) by mouth daily   Refills:  3        propranolol 10 MG tablet   Commonly known as:  INDERAL   Dose:  10 mg    Quantity:  90 tablet        Take 1 tablet (10 mg) by mouth 3 times daily As needed for anxiety   Refills:  0        psyllium 0.52 g capsule   Dose:  2 capsule   Indication:  GI motility   Quantity:  60 capsule        Take 2 capsules (1.04 g) by mouth daily With a full glass of water.   Refills:  1        tacrolimus 0.5 MG capsule   Commonly known as:  GENERIC EQUIVALENT   Quantity:  270 capsule        2.5 mg AM and 2 mg PM   Refills:  11        traMADol 50 MG tablet   Commonly known as:  ULTRAM   Dose:  50 mg   Quantity:  20 tablet        Take 1 tablet (50 mg) by mouth every 6 hours as needed for severe pain   Refills:  0        traZODone 100 MG tablet   Commonly known as:  DESYREL   Dose:  150 mg   Quantity:  45 tablet        Take 1.5 tablets (150 mg) by mouth At Bedtime   Refills:  3        ursodiol 300 MG capsule   Commonly known as:  ACTIGALL   Dose:  300 mg   Indication:  Rejection of Liver Transplant   Quantity:  60 capsule        Take 1 capsule (300 mg) by mouth 2 times daily   Refills:  11        * Notice:  This list has 2 medication(s) that are the same as other medications prescribed for you. Read the directions carefully, and ask your doctor or other care provider to review them with you.            Information about OPIOIDS     PRESCRIPTION OPIOIDS: WHAT YOU NEED TO KNOW   We gave you an opioid (narcotic) pain medicine. It is important to manage your pain, but opioids are not always the best choice. You should first try all the other options your care team gave you. Take this medicine for as short a time (and as few doses) as possible.     These medicines have risks:    DO NOT drive when on new or higher doses of pain medicine. These medicines can affect your alertness and reaction times, and you could be arrested for driving under the influence (DUI). If you need to use opioids long-term, talk to your care team about driving.    DO NOT operate heave machinery    DO NOT do any other dangerous  activities while taking these medicines.     DO NOT drink any alcohol while taking these medicines.      If the opioid prescribed includes acetaminophen, DO NOT take with any other medicines that contain acetaminophen. Read all labels carefully. Look for the word  acetaminophen  or  Tylenol.  Ask your pharmacist if you have questions or are unsure.    You can get addicted to pain medicines, especially if you have a history of addiction (chemical, alcohol or substance dependence). Talk to your care team about ways to reduce this risk.    Store your pills in a secure place, locked if possible. We will not replace any lost or stolen medicine. If you don t finish your medicine, please throw away (dispose) as directed by your pharmacist. The [place] [employer]ution [employer] has more information about safe disposal: https://www.pca.[facility].[place]/living-green/managing-unwanted-medications.     All opioids tend to cause constipation. Drink plenty of water and eat foods that have a lot of fiber, such as fruits, vegetables, prune juice, apple juice and high-fiber cereal. Take a laxative (Miralax, milk of magnesia, Colace, Senna) if you don t move your bowels at least every other day.         Prescriptions were sent or printed at these locations (1 Prescription)                   Other Prescriptions                Printed at Department/Unit printer (1 of 1)         oxyCODONE IR (ROXICODONE) 5 MG tablet                Orders Needing Specimen Collection     None      Pending Results     No orders found from 7/25/2018 to 7/28/2018.            Pending Culture Results     No orders found from 7/25/2018 to 7/28/2018.            Pending Results Instructions     If you had any lab results that were not finalized at the time of your Discharge, you can call the ED Lab Result RN at [phone]. You will be contacted by this team for any positive Lab results or changes in treatment. The nurses are available 7 days a week from 10A to  6:30P.  You can leave a message 24 hours per day and they will return your call.        Thank you for choosing [facility]       Thank you for choosing [facility] for your care. Our goal is always to provide you with excellent care. Hearing back from our patients is one way we can continue to improve our services. Please take a few minutes to complete the written survey that you may receive in the mail after you visit with us. Thank you!        [employer] Information     [employer] gives you secure access to your electronic health record. If you see a primary care provider, you can also send messages to your care team and make appointments. If you have questions, please call your primary care clinic.  If you do not have a primary care provider, please call [phone] and they will assist you.        Care EveryWhere ID     This is your Care EveryWhere ID. This could be used by other organizations to access your [facility] medical records  [id number]        Equal Access to Services     PAULO BERRY : Barby Roberson, lucille orellana, negrita escobar, anna ambrose . So [facility] [phone].    ATENCIÓN: Si habla español, tiene a reece disposición servicios gratuitos de asistencia lingüística. Llame al [phone].    We comply with applicable federal civil rights laws and [place] laws. We do not discriminate on the basis of race, color, national origin, age, disability, sex, sexual orientation, or gender identity.            After Visit Summary       This is your record. Keep this with you and show to your community pharmacist(s) and doctor(s) at your next visit.

## 2018-07-27 NOTE — ED AVS SNAPSHOT
[facility] [place], Emergency Department    [address] [place] [address]    Phone:  [phone]                                       Phan Luque   MRN: [id number]    Department:  [facility], Emergency Department   Date of Visit:  7/27/2018           After Visit Summary Signature Page     I have received my discharge instructions, and my questions have been answered. I have discussed any challenges I see with this plan with the nurse or doctor.    ..........................................................................................................................................  Patient/Patient Representative Signature      ..........................................................................................................................................  Patient Representative Print Name and Relationship to Patient    ..................................................               ................................................  Date                                            Time    ..........................................................................................................................................  Reviewed by Signature/Title    ...................................................              ..............................................  Date                                                            Time

## 2018-07-28 NOTE — DISCHARGE INSTRUCTIONS
TODAY'S VISIT:  -You were seen today for tibial plateau fracture.  Orthopedics was consulted they recommend knee immobilizer.  Strict nonweightbearing of the left leg.      FOLLOW-UP:  Please make an appointment to follow up with:  - Orthopedics (phone: [phone]) in 5-7 days.  Orthopedics will follow up to schedule appointment, if you do not hear from them please call them by Monday afternoon.    PRESCRIPTIONS / MEDICATIONS:  -Percocet as prescribed    OTHER INSTRUCTIONS:  Opioid Medication Information    You have been given a prescription for an opioid (narcotic) pain medicine and/or have received a pain medicine while here in the Emergency Department. These medicines can make you drowsy or impaired. You must not drive, operate dangerous equipment, or engage in any other dangerous activities while taking these medications. If you drive while taking these medications, you could be arrested for DUI, or driving under the influence. Do not drink any alcohol while you are taking these medications.   Opioid pain medications can cause addiction. If you have a history of chemical dependency of any type, you are at a higher risk of becoming addicted to pain medications.  Only take these prescribed medications to treat your pain when all other options have been tried. Take it for as short a time and as few doses as possible. Store your pain pills in a secure place, as they are frequently stolen and provide a dangerous opportunity for children or visitors in your house to start abusing these powerful medications. We will not replace any lost or stolen medicine.  As soon as your pain is better, you should flush all your remaining medication.   Many prescription pain medications contain Tylenol  (acetaminophen), including Vicodin , Tylenol #3 , Norco , Lortab , and Percocet .  You should not take any extra pills of Tylenol  if you are using these prescription medications or you can get very sick.  Do not ever take more  than 4000 mg of acetaminophen in any 24 hour period.  All opioids tend to cause constipation. Drink plenty of water and eat foods that have a lot of fiber, such as fruits, vegetables, prune juice, apple juice and high fiber cereal.  Take a laxative if you don t move your bowels at least every other day. Miralax , Milk of Magnesia, Colace , or Senna  can be used to keep you regular.            RETURN TO THE EMERGENCY DEPARTMENT  Return to the Emergency Department at any time for new/worsening symptoms. Especially worsening leg pain, calf pain, cool foot.

## 2018-07-28 NOTE — ED PROVIDER NOTES
History     Chief Complaint   Patient presents with     Leg Injury     HPI  Phan Luque is a 62 year old female with a history of alcoholic hepatitis, cirrhosis of the liver s/p liver transplant (08/25/18), GERD, and CKD stage 3 who presents for evaluation of a left leg injury. Patient reports she tripped off a curb on Monday 07/16/18 (11 days ago) sustaining direct trauma to her left knee. She complains of pain and swelling on her left knee with associated bruising. She reports she was seen and evaluated at the [facility] ED on 07/21/18 for her left knee pain at which time she had a normal LLE ultrasound and a normal x-ray. She then followed up and had an MRI of the left knee which revealed an acute microtrabecular fracture in the anterior portion of the lateral tibial plateau. She complains she has continued to have left knee pain as well as left lower leg pain with associated bruising that has spread throughout her left lower leg. She has been taking Tramadol for her pain without improvement. She has been ambulating on the left lower extremity, though with pain.     MRI Left Tibia/Fibula @ [facility] (07/25/18) Impression:  1.  Area of acute microtrabecular fracture in the anterior portion of the lateral tibial plateau.  2.  Moderately advanced chondromalacia patella.  3.  Mild degenerative changes in the medial and lateral compartments.  4.  Small curvilinear subacute hematoma along the lateral and anterolateral aspects of the knee and proximal calf.    MRI Left Knee @ [facility] (07/25/18) Impression:  1.  Area of acute microtrabecular fracture in the anterior portion of the lateral tibial plateau.  2.  Moderately advanced chondromalacia patella.  3.  Mild degenerative changes in the medial and lateral compartments.  4.  Small curvilinear subacute hematoma along the lateral and anterolateral aspects of the knee and proximal calf.    I have reviewed the Medications, Allergies, Past Medical  and Surgical History, and Social History in the [facility] system.  Past Medical History:   Diagnosis Date     Asthma      Chronic kidney disease      End stage liver disease (H)     2/2 Alcohol Abuse     Liver transplanted (H)      Migraines      Osteoporosis      Spinal stenosis in cervical region      Strabismus        Past Surgical History:   Procedure Laterality Date     APPENDECTOMY           BENCH LIVER N/A 2016    Procedure: BENCH LIVER;  Surgeon: Larry Dhillon MD;  Location: [facility] OR     CATARACT IOL, RT/LT       COLONOSCOPY       ESOPHAGOSCOPY, GASTROSCOPY, DUODENOSCOPY (EGD), COMBINED N/A 2016    Procedure: COMBINED ESOPHAGOSCOPY, GASTROSCOPY, DUODENOSCOPY (EGD);  Surgeon: Tao Alfonso MD;  Location: [facility] GI     ESOPHAGOSCOPY, GASTROSCOPY, DUODENOSCOPY (EGD), COMBINED N/A 10/31/2016    Procedure: COMBINED ESOPHAGOSCOPY, GASTROSCOPY, DUODENOSCOPY (EGD), BIOPSY SINGLE OR MULTIPLE;  Surgeon: Ronald Bojorquez MD;  Location: [facility] GI     sphincteroplasty       TRANSPLANT LIVER RECIPIENT  DONOR N/A 2016    Procedure: TRANSPLANT LIVER RECIPIENT  DONOR;  Surgeon: Larry Dhillon MD;  Location: [facility] OR     TUBAL LIGATION      laparoscopic       Family History   Problem Relation Age of Onset     Family History Negative Other      Melanoma Mother           HEART DISEASE Father      CHF       Social History   Substance Use Topics     Smoking status: Former Smoker     Packs/day: 2.50     Years: 20.00     Quit date: 1996     Smokeless tobacco: Never Used     Alcohol use No      Comment: heavy use (750ml/2 days) up until 1 year ago; had cut down to 1 drink/day; none since 16       No current facility-administered medications for this encounter.      Current Outpatient Prescriptions   Medication     oxyCODONE IR (ROXICODONE) 5 MG tablet     acetaminophen (TYLENOL) 325 MG tablet     amLODIPine (NORVASC) 5 MG tablet     azaTHIOprine 100 MG TABS     betaxolol (BETOPTIC)  0.5 % ophthalmic solution     calcium Citrate-vitamin D 500-400 MG-UNIT CHEW     chlorthalidone (HYGROTON) 25 MG tablet     cholecalciferol (VITAMIN D3) 400 UNIT TABS tablet     cyanocobalamin (VITAMIN  B-12) 1000 MCG tablet     ferrous sulfate (IRON) 325 (65 FE) MG tablet     folic acid (FOLVITE) 1 MG tablet     hydrOXYzine (ATARAX) 25 MG tablet     levothyroxine (SYNTHROID/LEVOTHROID) 88 MCG tablet     melatonin 5 MG tablet     multivitamin, therapeutic (THERA-VIT) TABS tablet     Omega-3 Fatty Acids (OMEGA-3 FISH OIL EX ST PO)     order for DME     pantoprazole (PROTONIX) 40 MG EC tablet     pramipexole (MIRAPEX) 0.5 MG tablet     predniSONE (DELTASONE) 1 MG tablet     predniSONE (DELTASONE) 5 MG tablet     propranolol (INDERAL) 10 MG tablet     psyllium 0.52 G capsule     tacrolimus (GENERIC EQUIVALENT) 0.5 MG capsule     traMADol (ULTRAM) 50 MG tablet     traZODone (DESYREL) 100 MG tablet     ursodiol (ACTIGALL) 300 MG capsule        Allergies   Allergen Reactions     Benadryl [Diphenhydramine] Hives     Cefaclor Hives     Hydrocodone-Acetaminophen Itching     Oxycodone Itching     Penicillins Hives     Sulfa Drugs Hives       Review of Systems   Musculoskeletal:        Positive for left knee and left lower extremity pain w/swelling and bruising       Physical Exam   BP: 154/66  Temp: 98.1  F (36.7  C)  Weight: 101.6 kg (224 lb)  SpO2: 96 %      Physical Exam  General: awake, alert, NAD  Head: normal cephalic  HEENT: pupils equal, conjugate gaze in tact  Neck: Supple  CV: regular rate and rhythm without murmur  Lungs: clear to ascultation  Abd: soft, non-tender, no guarding, no peritoneal signs  EXT: Left extremity with old ecchymosis from the knee down through the foot.  Tenderness to palpation over the proximal tibia.  Calf is tender.  Peripheral pulses are intact.  Sensation light touch intact, including in the first anterior web space.  No pain with passive extension of the big toe.  Neuro: awake, answers  questions appropriately. No focal deficits noted     ED Course     ED Course     Procedures       9:40 PM  The patient was seen and examined by Dr. Huffman in Room [facility].        Labs Ordered and Resulted from Time of ED Arrival Up to the Time of Departure from the ED - No data to display         Assessments & Plan (with Medical Decision Making)   Phan is a 62-year-old female who presents with knee pain and an MRI demonstrating tibial plateau fracture.  No signs or symptoms of compartment syndrome or other acute complications on exam.  Images were uploaded, consulted orthopedics they reviewed images.  They recommended putting the patient in a long-leg splint and having her strict nonweightbearing on her left lower extremity with orthopedic follow-up next week.    Will discharge home with a prescription for Percocet.  I did discuss risks and benefits with the patient especially with her history of alcohol abuse patient reports understanding of this.  Patient was given all the standard narcotic precautions.  She was discharged home.    I have reviewed the nursing notes.    I have reviewed the findings, diagnosis, plan and need for follow up with the patient.    New Prescriptions    OXYCODONE IR (ROXICODONE) 5 MG TABLET    Take 1 tablet (5 mg) by mouth every 6 hours as needed for pain       Final diagnoses:   Closed fracture of left tibial plateau, initial encounter   I, Elke Concepcion, am serving as a trained medical scribe to document services personally performed by Sherif Huffman MD, based on the provider's statements to me.   Sherif REDDING MD, was physically present and have reviewed and verified the accuracy of this note documented by Elke Concepcion.      7/27/2018   [facility], [place], EMERGENCY DEPARTMENT     Sherif Huffman MD  07/28/18 [id number]

## 2018-07-30 ENCOUNTER — TELEPHONE (OUTPATIENT)
Dept: ORTHOPEDICS | Facility: CLINIC | Age: 63
End: 2018-07-30

## 2018-07-30 NOTE — TELEPHONE ENCOUNTER
Phan called regarding her tibial plateau fracture. She was concerned about the immobilizer that was given to her at the ED was not staying in put and is heavy. She is wondering if she should come in today, or wait until her appointment tomorrow. I advised her, after consulting with Dr. Sharp, that she is okay to wait until tomorrow. She was also told that if she feels more comfortable coming in today, she can. She will come in tomorrow at her scheduled appt time of 7am. Pt verbalized understanding and had no further questions.  Velma Tamez, ATC

## 2018-07-31 ENCOUNTER — MYC MEDICAL ADVICE (OUTPATIENT)
Dept: OPHTHALMOLOGY | Facility: CLINIC | Age: 63
End: 2018-07-31

## 2018-07-31 ENCOUNTER — OFFICE VISIT (OUTPATIENT)
Dept: ORTHOPEDICS | Facility: CLINIC | Age: 63
End: 2018-07-31
Payer: COMMERCIAL

## 2018-07-31 ENCOUNTER — TELEPHONE (OUTPATIENT)
Dept: PSYCHIATRY | Facility: CLINIC | Age: 63
End: 2018-07-31

## 2018-07-31 VITALS
WEIGHT: 224 LBS | SYSTOLIC BLOOD PRESSURE: 140 MMHG | HEIGHT: 67 IN | BODY MASS INDEX: 35.16 KG/M2 | DIASTOLIC BLOOD PRESSURE: 78 MMHG

## 2018-07-31 DIAGNOSIS — Z94.4 LIVER TRANSPLANTED (H): ICD-10-CM

## 2018-07-31 DIAGNOSIS — G25.81 RESTLESS LEG SYNDROME: ICD-10-CM

## 2018-07-31 DIAGNOSIS — I10 HYPERTENSION: ICD-10-CM

## 2018-07-31 DIAGNOSIS — S82.143D TIBIAL PLATEAU FRACTURE, UNSPECIFIED LATERALITY, CLOSED, WITH ROUTINE HEALING, SUBSEQUENT ENCOUNTER: Primary | ICD-10-CM

## 2018-07-31 RX ORDER — AMLODIPINE BESYLATE 5 MG/1
5 TABLET ORAL DAILY
Qty: 90 TABLET | Refills: 3 | Status: SHIPPED | OUTPATIENT
Start: 2018-07-31 | End: 2019-07-17

## 2018-07-31 RX ORDER — AZATHIOPRINE 50 MG/1
50 TABLET ORAL EVERY MORNING
Qty: 90 TABLET | Refills: 3 | Status: SHIPPED | OUTPATIENT
Start: 2018-07-31 | End: 2019-07-17

## 2018-07-31 RX ORDER — PRAMIPEXOLE DIHYDROCHLORIDE 0.5 MG/1
0.5 TABLET ORAL AT BEDTIME
Qty: 90 TABLET | Refills: 3 | Status: SHIPPED | OUTPATIENT
Start: 2018-07-31 | End: 2019-04-15

## 2018-07-31 NOTE — PROGRESS NOTES
SPORTS & ORTHOPEDIC WALK-IN FOLLOW-UP VISIT 7/31/2018    Interval History:     Follow up reason: Left knee MRI    Date of injury: 7/16/18    Date last seen: 7/24/18    Following Therapeutic Plan: Yes     Pain: Improving    Function: Improving    Interval History: Went to ED on 7/27 and received a knee immobilizer per the on call ortho resident. She is here today to follow up. She still has bruising in her toes and swelling in her knee. She does not have too much pain and feels the swelling is going down. She states that the blood is traveling down to her ankle and toes.    Medical History:    Any recent changes to your medical history? No    Any new medication prescribed since last visit? No    Review of Systems:    Do you have fever, chills, weight loss? No    Do you have any vision problems? No    Do you have any chest pain or edema? No    Do you have any shortness of breath or wheezing?  No    Do you have stomach problems? No    Do you have any numbness or focal weakness? Yes, due to peripheral neuropathy and cervical stenosis    Do you have diabetes? No    Do you have problems with bleeding or clotting? No    Do you have an rashes or other skin lesions? No

## 2018-07-31 NOTE — LETTER
July 31, 2018      Phan Luque  [address] [address]        To Whom It May Concern:    Phan Luque was seen in our clinic and treated for her injury affecting her left knee.  She will be essentially non-weightbearing or partial weightbearing for an additional 4 weeks.  I have recommended that she work from home to minimize need for weightbearing or crutch use which has made a condition in her upper extremities worse.  Please make necessary accommodations so that she may work from home as able.  Thank you very much.      Sincerely,          Donnell Gorman, DO

## 2018-07-31 NOTE — MR AVS SNAPSHOT
After Visit Summary   7/31/2018    Phan Luque    MRN: [id number]           Patient Information     Date Of Birth          1955        Visit Information        Provider Department      7/31/2018 7:00 AM Donnell Gorman DO M [facility] Sports and Orthopaedic Walk In [facility]         Follow-ups after your visit        Your next 10 appointments already scheduled     Aug 09, 2018  8:00 AM CDT   (Arrive by 7:45 AM)   EMG with Shaun Sandoval MD   [facility] EMG ([email or web address] [place])    [address] [place] [address]  3rd [facility] [address] [phone]            Aug 10, 2018  4:00 PM CDT   (Arrive by 3:45 PM)   Return Visit with Donnell Duvall MD   [facility] Orthopaedic Clinic ([facility])    [address] [place] [id number] [phone]            Aug 14, 2018  7:00 AM CDT   Return Walk In Ortho with DO KERI Alonzo [facility] ([facility])    [address] [place] [address] [phone]            Aug 21, 2018  7:30 AM CDT   Lab visit with EA LAB   [facility] ([facility])    [address] [place] [address]  [address] [place] [id number] [phone]           Please do not eat 10-12 hours before your appointment if you are coming in fasting for labs on lipids, cholesterol, or glucose (sugar). Does not apply to pregnant women.  Water with medications is okay. Do not drink coffee or other fluids.  If you have concerns about taking your medications, please send a message by clicking on Secure Messaging, Message Your Care Team.            Sep 26, 2018  5:00 PM CDT   Adult Med Follow UP with DONOVAN [facility]   Psychiatry Clinic ([facility])    [place] [id number]  [address] [id number] [phone]            Oct 01, 2018  4:45 PM CDT   (Arrive  by 4:30 PM)   Return Liver Transplant with Hussein Banegas MD   [facility] Hepatology ([facility])    [address]  [address]  [facility] [id number] [phone]            Oct 22, 2018  7:30 AM CDT   (Arrive by 7:15 AM)   Return Visit with Arlyn Sanchez MD   [facility] Primary Care Clinic ([facility])    [address]  4th Floor  [facility] [id number] [phone]            Mar 20, 2019  3:30 PM CDT   Lab with  LAB   [facility] Lab ([facility])    [address]  1st Floor  [facility] [id number] [phone]            Mar 20, 2019  4:30 PM CDT   (Arrive by 4:00 PM)   Return Visit with Daya Gutierrez MD   [facility] Nephrology ([facility])    [address]  [address]  [facility] [id number] [phone]              Who to contact     Please call your clinic at [phone] to:    Ask questions about your health    Make or cancel appointments    Discuss your medicines    Learn about your test results    Speak to your doctor            Additional Information About Your Visit        [employer] Information     [employer] gives you secure access to your electronic health record. If you see a primary care provider, you can also send messages to your care team and make appointments. If you have questions, please call your primary care clinic.  If you do not have a primary care provider, please call [phone] and they will assist you.      [employer] is an electronic gateway that provides easy, online access to your medical records. With [employer], you can request a clinic appointment, read your test results, renew a prescription or communicate with your care team.     To access your existing account, please contact your [facility] or call [phone] for assistance.        Care EveryWhere ID     This is your Care EveryWhere ID. This could be  "used by other organizations to access your [facility] medical records  [id number]        Your Vitals Were     Height BMI (Body Mass Index)                1.702 m (5' 7\") 35.08 kg/m2           Blood Pressure from Last 3 Encounters:   07/31/18 140/78   07/27/18 154/66   07/24/18 152/89    Weight from Last 3 Encounters:   07/31/18 101.6 kg (224 lb)   07/27/18 101.6 kg (224 lb)   07/24/18 96.6 kg (213 lb)              Today, you had the following     No orders found for display       Primary Care Provider Office Phone # Fax #    Arlyn Sanchez [phone]       [address] [address]        Equal Access to Services     PAULO BERRY : Barby holland Sogary, waaxda luqadaha, qaybta kaalmada adeegyachyna, anna ambrose . So [facility] [phone].    ATENCIÓN: Si habla español, tiene a reece disposición servicios gratuitos de asistencia lingüística. Llame al [phone].    We comply with applicable federal civil rights laws and [place] laws. We do not discriminate on the basis of race, color, national origin, age, disability, sex, sexual orientation, or gender identity.            Thank you!     Thank you for choosing [facility] SPORTS AND ORTHOPAEDIC WALK IN CLINIC  for your care. Our goal is always to provide you with excellent care. Hearing back from our patients is one way we can continue to improve our services. Please take a few minutes to complete the written survey that you may receive in the mail after your visit with us. Thank you!             Your Updated Medication List - Protect others around you: Learn how to safely use, store and throw away your medicines at www.disposemymeds.org.          This list is accurate as of 7/31/18  7:51 AM.  Always use your most recent med list.                   Brand Name Dispense Instructions for use Diagnosis    acetaminophen 325 MG tablet    TYLENOL    100 tablet    Take 2 tablets (650 mg) by mouth every " 6 hours as needed for mild pain Or fevers. Use sparingly. Limit use to no more than 2 grams (2000 mg) in 24 hours. **Further refills must be obtained by primary care provider**    Acute post-operative pain       amLODIPine 5 MG tablet    NORVASC    90 tablet    Take 1 tablet (5 mg) by mouth daily    Hypertension       azaTHIOprine 100 MG Tabs     90 tablet    Take 50 mg by mouth every evening    Liver transplanted (H)       betaxolol 0.5 % ophthalmic solution    BETOPTIC    5 mL    Place 1 drop into both eyes 2 times daily    Primary open angle glaucoma of both eyes, unspecified glaucoma stage       calcium Citrate-vitamin D 500-400 MG-UNIT Chew      Take 1 tablet by mouth daily        chlorthalidone 25 MG tablet    HYGROTON    45 tablet    Take 0.5 tablets (12.5 mg) by mouth daily    CKD (chronic kidney disease) stage 3, GFR 30-59 ml/min, Fluid retention       cholecalciferol 400 UNIT Tabs tablet    vitamin D3     Take 400 Units by mouth daily        cyanocobalamin 1000 MCG tablet    vitamin  B-12     Take 1,000 mcg by mouth daily        ferrous sulfate 325 (65 Fe) MG tablet    IRON    100 tablet    Take 1 tablet (325 mg) by mouth 2 times daily    Iron deficiency       folic acid 1 MG tablet    FOLVITE    30 tablet    Take 1 tablet (1 mg) by mouth daily    Malnutrition (H)       hydrOXYzine 25 MG tablet    ATARAX    60 tablet    Take 1-2 tablets (25-50mg) every 6 hours as needed for itching    WALTER (generalized anxiety disorder)       levothyroxine 88 MCG tablet    SYNTHROID/LEVOTHROID    90 tablet    Take 1 tablet (88 mcg) by mouth every morning (before breakfast)    Thyroid function test abnormal       melatonin 5 MG tablet      Take 1 tablet (5 mg) by mouth nightly as needed for sleep        multivitamin, therapeutic Tabs tablet     30 tablet    Take 1 tablet by mouth every 24 hours    Malnutrition (H)       OMEGA-3 FISH OIL EX ST PO      Take 1 capsule by mouth 2 times daily 1290 (fish oil)-900 (omega 3)         order for DME     1 Device    Equipment being ordered: knee sleeve    Acute pain of left knee       oxyCODONE IR 5 MG tablet    ROXICODONE    12 tablet    Take 1 tablet (5 mg) by mouth every 6 hours as needed for pain        pantoprazole 40 MG EC tablet    PROTONIX    90 tablet    Take 1 tablet (40 mg) by mouth every morning (before breakfast)    Gastroesophageal reflux disease, esophagitis presence not specified       pramipexole 0.5 MG tablet    MIRAPEX    90 tablet    Take 1 tablet (0.5 mg) by mouth At Bedtime    Restless leg syndrome       * predniSONE 5 MG tablet    DELTASONE    90 tablet    Take 1 tablet (5 mg) by mouth daily    Liver replaced by transplant (H)       * predniSONE 1 MG tablet    DELTASONE    360 tablet    Take 4 tablets (4 mg) by mouth daily    Liver replaced by transplant (H)       propranolol 10 MG tablet    INDERAL    90 tablet    Take 1 tablet (10 mg) by mouth 3 times daily As needed for anxiety    Generalized anxiety disorder       psyllium 0.52 g capsule     60 capsule    Take 2 capsules (1.04 g) by mouth daily With a full glass of water.    Loose stools       tacrolimus 0.5 MG capsule    GENERIC EQUIVALENT    270 capsule    2.5 mg AM and 2 mg PM    Liver transplanted (H)       traMADol 50 MG tablet    ULTRAM    20 tablet    Take 1 tablet (50 mg) by mouth every 6 hours as needed for severe pain    Acute left ankle pain, Lumbar radicular pain       traZODone 100 MG tablet    DESYREL    45 tablet    Take 1.5 tablets (150 mg) by mouth At Bedtime    Insomnia, unspecified type       ursodiol 300 MG capsule    ACTIGALL    60 capsule    Take 1 capsule (300 mg) by mouth 2 times daily    Liver transplanted (H)       * Notice:  This list has 2 medication(s) that are the same as other medications prescribed for you. Read the directions carefully, and ask your doctor or other care provider to review them with you.

## 2018-07-31 NOTE — TELEPHONE ENCOUNTER
- Returned a call to patient regarding a refill for Buspar   - Patient not looking for a refill and is not currently on the medication   - Refill sent by pharmacy on accident   - No further action needed by the writer

## 2018-07-31 NOTE — LETTER
7/31/2018       RE: Phan Luque  [address]  [facility] [address]     Dear Colleague,    Thank you for referring your patient, Phan Luque, to the [facility] SPORTS AND ORTHOPAEDIC WALK IN CLINIC at [facility]. Please see a copy of my visit note below.          SPORTS & ORTHOPEDIC WALK-IN FOLLOW-UP VISIT 7/31/2018    Interval History:     Follow up reason: Left knee MRI    Date of injury: 7/16/18    Date last seen: 7/24/18    Following Therapeutic Plan: Yes     Pain: Improving    Function: Improving    Interval History: Went to ED on 7/27 and received a knee immobilizer per the on call ortho resident. She is here today to follow up. She still has bruising in her toes and swelling in her knee. She does not have too much pain and feels the swelling is going down. She states that the blood is traveling down to her ankle and toes.    Medical History:    Any recent changes to your medical history? No    Any new medication prescribed since last visit? No    Review of Systems:    Do you have fever, chills, weight loss? No    Do you have any vision problems? No    Do you have any chest pain or edema? No    Do you have any shortness of breath or wheezing?  No    Do you have stomach problems? No    Do you have any numbness or focal weakness? Yes, due to peripheral neuropathy and cervical stenosis    Do you have diabetes? No    Do you have problems with bleeding or clotting? No    Do you have an rashes or other skin lesions? No             ESTABLISHED PATIENT FOLLOW-UP:  RECHECK of the Left Knee       HISTORY OF PRESENT ILLNESS  Ms. Luque is a pleasant 62 year old year old female who presents to clinic today for follow-up of left knee MRI.     Date of injury: 7/16/18 fall onto left knee  Date last seen: 7/24/18  Following Therapeutic Plan: Yes  Pain: Improving over the last 3 days  Function: NA  Interval History: Patient has been wearing knee immobilizer over the last 4  "days.  She reviewed her MRI and called RN line, recommending she follow up in ED after hours.  In ED images reviewed and she received a knee immobilizer.  Since minimizing her weight bearing, improved swelling of her left calf. Pain has also improved. Using tramadol and tylenol.  Ice/heat caused more pain.  Swelling has decreased, but increased in dorsum of foot and toes.      Additional medical/Social/Surgical histories reviewed in [facility] and updated as appropriate.    REVIEW OF SYSTEMS (7/31/2018)  CONSTITUTIONAL: Denies fever and weight loss  GASTROINTESTINAL: Denies abdominal pain, nausea, vomiting  MUSCULOSKELETAL: See HPI  SKIN: Denies any recent rash or lesion  NEUROLOGICAL: Denies numbness or focal weakness     PHYSICAL EXAM  /78  Ht 1.702 m (5' 7\")  Wt 101.6 kg (224 lb)  BMI 35.08 kg/m2      General  - normal appearance, in no obvious distress  CV  - normal dorsalis pedis pulse +2,   Pulm  - normal respiratory pattern, non-labored  Musculoskeletal - Left knee  - stance: Antalgic gait with limp  - inspection: Swelling of left calf and foot, mild in calf, moderate dorsum of foot.  Ecchymosis of medial aspect of left knee resolving, small abrasion of left patellar region with scab, and ecchymosis tracking into calf and foot.    - palpation: TTP of medial femoral condyle of left knee, lateral tibial plateau exquisitely. Tenderness at ecchymotic region at dorsum of foot. Calf is soft, supple without firmness or tightness.    - ROM: Grossly intact. Ankle ROM intact. Tightness with dorsiflexion of ankle.   - strength: 5/5 in flexion, 5/5 in extension of knee.No significant pain of calf plantarflexion resisted.   - special tests:  (-) anterior drawer  (-) posterior drawer  (-) varus at 0 and 30 degrees flexion  (-) valgus at 0 and 30 degrees flexion  Neuro  - no sensory or motor deficit, grossly normal coordination, normal muscle tone  Skin  - no ecchymosis, erythema, warmth, or induration, no obvious " rash  Psych  - interactive, appropriate, normal mood and affect    IMAGING :  MRI 7/25/18  CONCLUSION:  1.  Area of acute microtrabecular fracture in the anterior portion of the lateral tibial plateau.  2.  Moderately advanced chondromalacia patella.  3.  Mild degenerative changes in the medial and lateral compartments.  4.  Small curvilinear subacute hematoma along the lateral and anterolateral aspects of the knee and proximal calf.    ASSESSMENT & PLAN  Ms. Luque is a 62 year old year old female who presents to clinic today with RECHECK of the Left Knee    Diagnosis: Lateral Tibial plateau fracture of left knee    -Nonweightbearing and progress to TTWB   -Elevation of leg, ACE wrap  -May discontinue immobilizer as uncomfortable  -Tramadol, tylenol, no refill today  -Handicapped placard already  -Follow up 2 weeks    It was a pleasure seeing Phan.    Donnell Gorman DO, CAQSM  Primary Care Sports Medicine

## 2018-07-31 NOTE — TELEPHONE ENCOUNTER
Refill Request (Buspirone)            David Vu, APRN CNP   You 3 minutes ago (3:43 PM)                 Pepito Jay,     Yes. Buspar was discontinued.  If she calls and requests let me know     Thanks     David (Routing comment)

## 2018-07-31 NOTE — TELEPHONE ENCOUNTER
- Patient called at [phone] to gain more information  - office visit from 6/26/18 Buspar was discontinued due to patient inadvertently stopping it   - Will route to provider

## 2018-07-31 NOTE — PROGRESS NOTES
"ESTABLISHED PATIENT FOLLOW-UP:  RECHECK of the Left Knee       HISTORY OF PRESENT ILLNESS  Ms. Luque is a pleasant 62 year old year old female who presents to clinic today for follow-up of left knee MRI.     Date of injury: 7/16/18 fall onto left knee  Date last seen: 7/24/18  Following Therapeutic Plan: Yes  Pain: Improving over the last 3 days  Function: NA  Interval History: Patient has been wearing knee immobilizer over the last 4 days.  She reviewed her MRI and called RN line, recommending she follow up in ED after hours.  In ED images reviewed and she received a knee immobilizer.  Since minimizing her weight bearing, improved swelling of her left calf. Pain has also improved. Using tramadol and tylenol.  Ice/heat caused more pain.  Swelling has decreased, but increased in dorsum of foot and toes.      Additional medical/Social/Surgical histories reviewed in [facility] and updated as appropriate.    REVIEW OF SYSTEMS (7/31/2018)  CONSTITUTIONAL: Denies fever and weight loss  GASTROINTESTINAL: Denies abdominal pain, nausea, vomiting  MUSCULOSKELETAL: See HPI  SKIN: Denies any recent rash or lesion  NEUROLOGICAL: Denies numbness or focal weakness     PHYSICAL EXAM  /78  Ht 1.702 m (5' 7\")  Wt 101.6 kg (224 lb)  BMI 35.08 kg/m2      General  - normal appearance, in no obvious distress  CV  - normal dorsalis pedis pulse +2,   Pulm  - normal respiratory pattern, non-labored  Musculoskeletal - Left knee  - stance: Antalgic gait with limp  - inspection: Swelling of left calf and foot, mild in calf, moderate dorsum of foot.  Ecchymosis of medial aspect of left knee resolving, small abrasion of left patellar region with scab, and ecchymosis tracking into calf and foot.    - palpation: TTP of medial femoral condyle of left knee, lateral tibial plateau exquisitely. Tenderness at ecchymotic region at dorsum of foot. Calf is soft, supple without firmness or tightness.    - ROM: Grossly intact. Ankle ROM intact. " Tightness with dorsiflexion of ankle.   - strength: 5/5 in flexion, 5/5 in extension of knee.No significant pain of calf plantarflexion resisted.   - special tests:  (-) anterior drawer  (-) posterior drawer  (-) varus at 0 and 30 degrees flexion  (-) valgus at 0 and 30 degrees flexion  Neuro  - no sensory or motor deficit, grossly normal coordination, normal muscle tone  Skin  - no ecchymosis, erythema, warmth, or induration, no obvious rash  Psych  - interactive, appropriate, normal mood and affect    IMAGING :  MRI 7/25/18  CONCLUSION:  1.  Area of acute microtrabecular fracture in the anterior portion of the lateral tibial plateau.  2.  Moderately advanced chondromalacia patella.  3.  Mild degenerative changes in the medial and lateral compartments.  4.  Small curvilinear subacute hematoma along the lateral and anterolateral aspects of the knee and proximal calf.    ASSESSMENT & PLAN  Ms. Luque is a 62 year old year old female who presents to clinic today with RECHECK of the Left Knee    Diagnosis: Lateral Tibial plateau fracture of left knee    -Nonweightbearing and progress to TTWB   -Elevation of leg, ACE wrap  -May discontinue immobilizer as uncomfortable  -Tramadol, tylenol, no refill today  -Handicapped placard already  -Follow up 2 weeks    It was a pleasure seeing Phan.    Donnell Gorman DO, [facility]  Primary Care Sports Medicine

## 2018-07-31 NOTE — TELEPHONE ENCOUNTER
Buspirone 10MG  Qty 60  Last Fill 05/08/2018    Thank you  Zainab Anderson [facility]

## 2018-08-02 DIAGNOSIS — F41.1 GENERALIZED ANXIETY DISORDER: ICD-10-CM

## 2018-08-02 RX ORDER — PROPRANOLOL HYDROCHLORIDE 10 MG/1
10 TABLET ORAL 3 TIMES DAILY
Qty: 90 TABLET | Refills: 0 | Status: CANCELLED | OUTPATIENT
Start: 2018-08-02

## 2018-08-02 NOTE — TELEPHONE ENCOUNTER
Medication requested:  propranolol (INDERAL) 10 MG   Last refilled:  3/7/18  Qty: 90    Last seen: 6/26/18  RTC: 3 MOS  Cancel: 0  No-show: 0  Next appt: 9/26/18   Refill decision: Refill pended and routed  for review/determination due to: CLARIFY IF PT USING/TAKING MED.  6/26/18 note '  Suemay continues to not have taken the Propranolol as it is unneeded.  ALSO: Continue Propranolol 10mg TID PRN    LAST RF 3/7/18

## 2018-08-03 NOTE — TELEPHONE ENCOUNTER
-Writer called pt at the number in the demographics section. No answer. LVM requesting a c/b if medication does need to be refilled.

## 2018-08-06 NOTE — TELEPHONE ENCOUNTER
-Writer discontinued medication and removed refill request per pt's phone call message in prior note

## 2018-08-08 ASSESSMENT — ENCOUNTER SYMPTOMS
MUSCLE WEAKNESS: 1
JOINT SWELLING: 1
BACK PAIN: 1
MEMORY LOSS: 0
WEAKNESS: 1
HEADACHES: 0
NECK PAIN: 1
DISTURBANCES IN COORDINATION: 0
TREMORS: 0
SPEECH CHANGE: 0
MUSCLE CRAMPS: 1
DIZZINESS: 0
ARTHRALGIAS: 1
STIFFNESS: 1
NUMBNESS: 1
MYALGIAS: 1
TINGLING: 1

## 2018-08-09 ENCOUNTER — OFFICE VISIT (OUTPATIENT)
Dept: NEUROLOGY | Facility: CLINIC | Age: 63
End: 2018-08-09
Payer: COMMERCIAL

## 2018-08-09 ENCOUNTER — OFFICE VISIT (OUTPATIENT)
Dept: OPHTHALMOLOGY | Facility: CLINIC | Age: 63
End: 2018-08-09
Attending: OPHTHALMOLOGY
Payer: COMMERCIAL

## 2018-08-09 DIAGNOSIS — M51.26 LUMBAR HERNIATED DISC: ICD-10-CM

## 2018-08-09 DIAGNOSIS — H47.012 AION (ACUTE ISCHEMIC OPTIC NEUROPATHY), LEFT: ICD-10-CM

## 2018-08-09 DIAGNOSIS — H47.012 AION (ACUTE ISCHEMIC OPTIC NEUROPATHY), LEFT: Primary | ICD-10-CM

## 2018-08-09 DIAGNOSIS — M54.16 LUMBAR RADICULAR PAIN: ICD-10-CM

## 2018-08-09 PROCEDURE — 92083 EXTENDED VISUAL FIELD XM: CPT | Mod: ZF | Performed by: OPHTHALMOLOGY

## 2018-08-09 PROCEDURE — 92133 CPTRZD OPH DX IMG PST SGM ON: CPT | Mod: ZF | Performed by: OPHTHALMOLOGY

## 2018-08-09 PROCEDURE — G0463 HOSPITAL OUTPT CLINIC VISIT: HCPCS | Mod: ZF | Performed by: TECHNICIAN/TECHNOLOGIST

## 2018-08-09 ASSESSMENT — VISUAL ACUITY
METHOD: SNELLEN - LINEAR
OD_SC: 20/20
OS_SC: 20/70

## 2018-08-09 ASSESSMENT — SLIT LAMP EXAM - LIDS
COMMENTS: NORMAL
COMMENTS: NORMAL

## 2018-08-09 ASSESSMENT — TONOMETRY
IOP_METHOD: ICARE
OD_IOP_MMHG: 17
OS_IOP_MMHG: 17

## 2018-08-09 ASSESSMENT — CONF VISUAL FIELD
OS_INFERIOR_TEMPORAL_RESTRICTION: 1
OS_INFERIOR_NASAL_RESTRICTION: 1
OS_SUPERIOR_TEMPORAL_RESTRICTION: 3
OD_NORMAL: 1
METHOD: COUNTING FINGERS
OS_SUPERIOR_NASAL_RESTRICTION: 3

## 2018-08-09 ASSESSMENT — CUP TO DISC RATIO
OD_RATIO: 0.25
OS_RATIO: 0.55

## 2018-08-09 ASSESSMENT — EXTERNAL EXAM - LEFT EYE: OS_EXAM: NORMAL

## 2018-08-09 ASSESSMENT — EXTERNAL EXAM - RIGHT EYE: OD_EXAM: NORMAL

## 2018-08-09 NOTE — MR AVS SNAPSHOT
After Visit Summary   8/9/2018    Phan Luque    MRN: [id number]           Patient Information     Date Of Birth          1955        Visit Information        Provider Department      8/9/2018 8:00 AM Shaun Sandoval MD [facility] EM[facility]        Today's Diagnoses     Lumbar herniated disc        Lumbar radicular pain           Follow-ups after your visit        Your next 10 appointments already scheduled     Aug 09, 2018 10:00 AM CDT   RETURN NEURO with Ambrose Ortega MD   [facility] ([facility])    [address]  [address] [address]  [facility] [address]   [phone]            Aug 10, 2018  4:00 PM CDT   (Arrive by 3:45 PM)   Return Visit with Donnlel Duvall MD   [facility] ([facility])    [address] [place] [address] [phone]            Aug 14, 2018  7:00 AM CDT   Return Walk In Ortho with Donnell Gorman DO   [facility] Sports and Orthopaedic Walk In Clinic ([facility])    [address] [place] [address] [phone]            Aug 21, 2018  7:30 AM CDT   Lab visit with EA LAB   [facility] ([facility])    [address]  [address]  [facility] [id number] [phone]           Please do not eat 10-12 hours before your appointment if you are coming in fasting for labs on lipids, cholesterol, or glucose (sugar). Does not apply to pregnant women.  Water with medications is okay. Do not drink coffee or other fluids.  If you have concerns about taking your medications, please send a message by clicking on Secure Messaging, Message Your Care Team.            Sep 26, 2018  5:00 PM CDT   Adult Med Follow UP with DONOVAN Blake CNP   Psychiatry Clinic ([facility])    [place] [id number]  [address] [address]    [phone]            Oct 01, 2018  4:45 PM CDT   (Arrive by 4:30 PM)   Return Liver Transplant with Hussein Banegas MD   [facility]at[facility] ([facility])    [address]  [address]  [facility] [address]   [phone]            Oct 22, 2018  7:30 AM CDT   (Arrive by 7:15 AM)   Return Visit with Arlyn Sanchez MD   [facility] ([facility])    [address] [place] [address]  4th Floor  [facility] [address]   [phone]            Mar 20, 2019  3:30 PM CDT   Lab with  LAB   [facility] ([facility])    [address] [place] [address]  1st Floor  [facility] [address]   [phone]            Mar 20, 2019  4:30 PM CDT   (Arrive by 4:00 PM)   Return Visit with Daya Gutierrez MD   [facility] Nephrology ([facility])    [address] [place] [address]  [address]  [facility] [address]   [phone]              Future tests that were ordered for you today     Open Future Orders        Priority Expected Expires Ordered    Needle EMG Each Extremity w/Paraspinal Area Complete (26423) Routine  8/9/2019 8/9/2018    Needle EMG Each Extremity w/Paraspinal Area Limited (80193) Routine  8/9/2019 8/9/2018    IOP Measurement Routine  10/8/2018 8/9/2018    Color Vision - Screening OU (both eyes) Routine  10/8/2018 8/9/2018    DILATED FUNDUS EXAM Routine  10/8/2018 8/9/2018    Glaucoma Top OU Routine  10/8/2018 8/9/2018    OCT Optic Nerve RNFL Spectralis OU (both eyes) Routine  10/8/2018 8/9/2018            Who to contact     Please call your clinic at [phone] to:    Ask questions about your health    Make or cancel appointments    Discuss your medicines    Learn about your test results    Speak to your doctor            Additional Information About Your Visit        MyChart Information     MyChart gives you secure access to your electronic health record. If you see a primary care provider, you can also  send messages to your care team and make appointments. If you have questions, please call your primary care clinic.  If you do not have a primary care provider, please call [phone] and they will assist you.      [employer] is an electronic gateway that provides easy, online access to your medical records. With [employer], you can request a clinic appointment, read your test results, renew a prescription or communicate with your care team.     To access your existing account, please contact your [facility] or call [phone] for assistance.        Care EveryWhere ID     This is your Care EveryWhere ID. This could be used by other organizations to access your [facility] medical records  [id number]         Blood Pressure from Last 3 Encounters:   07/31/18 140/78   07/27/18 154/66   07/24/18 152/89    Weight from Last 3 Encounters:   07/31/18 101.6 kg (224 lb)   07/27/18 101.6 kg (224 lb)   07/24/18 96.6 kg (213 lb)              We Performed the Following     EMG     NCS Motor with or without F-Wave, 7-8 nerves (30930)        Primary Care Provider Office Phone # Fax #    Arlyn Sanchez [phone]       [address] [address]        Equal Access to Services     PAULO BERRY : Hadii adonis ku hadasho Soomaali, waaxda luqadaha, qaybta kaalmada adeegyada, waxay idiin haygrzegorzn kay schroeder. So [facility] [phone].    ATENCIÓN: Si habla español, tiene a reece disposición servicios gratuitos de asistencia lingüística. Llame al [phone].    We comply with applicable federal civil rights laws and [place] laws. We do not discriminate on the basis of race, color, national origin, age, disability, sex, sexual orientation, or gender identity.            Thank you!     Thank you for choosing [facility]  for your care. Our goal is always to provide you with excellent care. Hearing back from our patients is one way we can continue to improve our  services. Please take a few minutes to complete the written survey that you may receive in the mail after your visit with us. Thank you!             Your Updated Medication List - Protect others around you: Learn how to safely use, store and throw away your medicines at www.disposemymeds.org.          This list is accurate as of 8/9/18  8:56 AM.  Always use your most recent med list.                   Brand Name Dispense Instructions for use Diagnosis    acetaminophen 325 MG tablet    TYLENOL    100 tablet    Take 2 tablets (650 mg) by mouth every 6 hours as needed for mild pain Or fevers. Use sparingly. Limit use to no more than 2 grams (2000 mg) in 24 hours. **Further refills must be obtained by primary care provider**    Acute post-operative pain       amLODIPine 5 MG tablet    NORVASC    90 tablet    Take 1 tablet (5 mg) by mouth daily    Hypertension       azaTHIOprine 50 MG tablet    IMURAN    90 tablet    Take 1 tablet (50 mg) by mouth every morning    Liver transplanted (H)       betaxolol 0.5 % ophthalmic solution    BETOPTIC    5 mL    Place 1 drop into both eyes 2 times daily    Primary open angle glaucoma of both eyes, unspecified glaucoma stage       calcium Citrate-vitamin D 500-400 MG-UNIT Chew      Take 1 tablet by mouth daily        chlorthalidone 25 MG tablet    HYGROTON    45 tablet    Take 0.5 tablets (12.5 mg) by mouth daily    CKD (chronic kidney disease) stage 3, GFR 30-59 ml/min, Fluid retention       cholecalciferol 400 UNIT Tabs tablet    vitamin D3     Take 400 Units by mouth daily        cyanocobalamin 1000 MCG tablet    vitamin  B-12     Take 1,000 mcg by mouth daily        ferrous sulfate 325 (65 Fe) MG tablet    IRON    100 tablet    Take 1 tablet (325 mg) by mouth 2 times daily    Iron deficiency       folic acid 1 MG tablet    FOLVITE    30 tablet    Take 1 tablet (1 mg) by mouth daily    Malnutrition (H)       hydrOXYzine 25 MG tablet    ATARAX    60 tablet    Take 1-2 tablets  (25-50mg) every 6 hours as needed for itching    WALTER (generalized anxiety disorder)       levothyroxine 88 MCG tablet    SYNTHROID/LEVOTHROID    90 tablet    Take 1 tablet (88 mcg) by mouth every morning (before breakfast)    Thyroid function test abnormal       melatonin 5 MG tablet      Take 1 tablet (5 mg) by mouth nightly as needed for sleep        multivitamin, therapeutic Tabs tablet     30 tablet    Take 1 tablet by mouth every 24 hours    Malnutrition (H)       OMEGA-3 FISH OIL EX ST PO      Take 1 capsule by mouth 2 times daily 1290 (fish oil)-900 (omega 3)        order for DME     1 Device    Equipment being ordered: knee sleeve    Acute pain of left knee       oxyCODONE IR 5 MG tablet    ROXICODONE    12 tablet    Take 1 tablet (5 mg) by mouth every 6 hours as needed for pain        pantoprazole 40 MG EC tablet    PROTONIX    90 tablet    Take 1 tablet (40 mg) by mouth every morning (before breakfast)    Gastroesophageal reflux disease, esophagitis presence not specified       pramipexole 0.5 MG tablet    MIRAPEX    90 tablet    Take 1 tablet (0.5 mg) by mouth At Bedtime    Restless leg syndrome       * predniSONE 5 MG tablet    DELTASONE    90 tablet    Take 1 tablet (5 mg) by mouth daily    Liver replaced by transplant (H)       * predniSONE 1 MG tablet    DELTASONE    360 tablet    Take 4 tablets (4 mg) by mouth daily    Liver replaced by transplant (H)       psyllium 0.52 g capsule     60 capsule    Take 2 capsules (1.04 g) by mouth daily With a full glass of water.    Loose stools       tacrolimus 0.5 MG capsule    GENERIC EQUIVALENT    270 capsule    2.5 mg AM and 2 mg PM    Liver transplanted (H)       traMADol 50 MG tablet    ULTRAM    20 tablet    Take 1 tablet (50 mg) by mouth every 6 hours as needed for severe pain    Acute left ankle pain, Lumbar radicular pain       traZODone 100 MG tablet    DESYREL    45 tablet    Take 1.5 tablets (150 mg) by mouth At Bedtime    Insomnia, unspecified type        ursodiol 300 MG capsule    ACTIGALL    60 capsule    Take 1 capsule (300 mg) by mouth 2 times daily    Liver transplanted (H)       * Notice:  This list has 2 medication(s) that are the same as other medications prescribed for you. Read the directions carefully, and ask your doctor or other care provider to review them with you.

## 2018-08-09 NOTE — MR AVS SNAPSHOT
After Visit Summary   8/9/2018    Phan Luque    MRN: [id number]           Patient Information     Date Of Birth          1955        Visit Information        Provider Department      8/9/2018 10:00 AM Ambrose [facility]ga [facility]        Today's Diagnoses     AION (acute ischemic optic neuropathy), left           Follow-ups after your visit        Your next 10 appointments already scheduled     Aug 10, 2018  4:00 PM CDT   (Arrive by 3:45 PM)   Return Visit with Donnell Duvall MD   [facility] ([facility])    [address] [place] [id number] [phone]            Aug 14, 2018  7:00 AM CDT   Return Walk In Ortho with Donnell Gorman DO   [facility] ([facility])    [address] [place] [id number] [phone]            Aug 21, 2018  7:30 AM CDT   Lab visit with  LAB   [facility] ([facility])    [address] [place] [address]  [address]  [facility] [id number] [phone]           Please do not eat 10-12 hours before your appointment if you are coming in fasting for labs on lipids, cholesterol, or glucose (sugar). Does not apply to pregnant women.  Water with medications is okay. Do not drink coffee or other fluids.  If you have concerns about taking your medications, please send a message by clicking on Secure Messaging, Message Your Care Team.            Sep 26, 2018  5:00 PM CDT   Adult Med Follow UP with DONOVAN [facility]   Psychiatry [facility] ([facility])    [address]  [address] [address] [phone]            Oct 01, 2018  4:45 PM CDT   (Arrive by 4:30 PM)   Return Liver Transplant with Hussein Banegas MD   [facility] Hepatology ([facility])    [address] [place] [address]  [address]  [facility]  [address]   [phone]            Oct 22, 2018  7:30 AM CDT   (Arrive by 7:15 AM)   Return Visit with Arlyn Sanchez MD   [facility] ([facility])    [address]  4th Floor  [facility] [id number] [phone]            Mar 20, 2019  3:30 PM CDT   Lab with  LAB   [facility] ([facility])    [address] [place] [address]  1st Floor  [facility] [address]   [phone]            Mar 20, 2019  4:30 PM CDT   (Arrive by 4:00 PM)   Return Visit with Daya Gutierrez MD   [facility] Nephrology ([facility])    [address] [place] [address]  [address]  [facility] [address]   [phone]              Future tests that were ordered for you today     Open Future Orders        Priority Expected Expires Ordered    Needle EMG Each Extremity w/Paraspinal Area Complete (30485) Routine  8/9/2019 8/9/2018    Needle EMG Each Extremity w/Paraspinal Area Limited (87557) Routine  8/9/2019 8/9/2018    DILATED FUNDUS EXAM Routine  10/8/2018 8/9/2018            Who to contact     Please call your clinic at [phone] to:    Ask questions about your health    Make or cancel appointments    Discuss your medicines    Learn about your test results    Speak to your doctor            Additional Information About Your Visit        [employer] Information     [employer] gives you secure access to your electronic health record. If you see a primary care provider, you can also send messages to your care team and make appointments. If you have questions, please call your primary care clinic.  If you do not have a primary care provider, please call [phone] and they will assist you.      [employer] is an electronic gateway that provides easy, online access to your medical records. With [employer], you can request a clinic appointment, read your test results, renew a prescription or communicate with your care team.     To access your existing  account, please contact your [facility] or call [phone] for assistance.        Care EveryWhere ID     This is your Care EveryWhere ID. This could be used by other organizations to access your [facility] medical records  [id number]         Blood Pressure from Last 3 Encounters:   07/31/18 140/78   07/27/18 154/66   07/24/18 152/89    Weight from Last 3 Encounters:   07/31/18 101.6 kg (224 lb)   07/27/18 101.6 kg (224 lb)   07/24/18 96.6 kg (213 lb)              We Performed the Following     Glaucoma Top OU     IOP Measurement     OCT Optic Nerve RNFL Spectralis OU (both eyes)        Primary Care Provider Office Phone # Fax #    Arlyn Sanchez [phone]       [address] [address]        Equal Access to Services     PAULO BERRY : Hadii aad ku hadasho Sogary, waaxda luqadaha, qaybta kaalmada adeegyada, anna nunez haydouglas ambrose . So [facility] [phone].    ATENCIÓN: Si habla español, tiene a reece disposición servicios gratuitos de asistencia lingüística. Llame al [phone].    We comply with applicable federal civil rights laws and [place] laws. We do not discriminate on the basis of race, color, national origin, age, disability, sex, sexual orientation, or gender identity.            Thank you!     Thank you for choosing [facility]  for your care. Our goal is always to provide you with excellent care. Hearing back from our patients is one way we can continue to improve our services. Please take a few minutes to complete the written survey that you may receive in the mail after your visit with us. Thank you!             Your Updated Medication List - Protect others around you: Learn how to safely use, store and throw away your medicines at www.disposemymeds.org.          This list is accurate as of 8/9/18 11:14 AM.  Always use your most recent med list.                   Brand Name Dispense Instructions for use  Diagnosis    acetaminophen 325 MG tablet    TYLENOL    100 tablet    Take 2 tablets (650 mg) by mouth every 6 hours as needed for mild pain Or fevers. Use sparingly. Limit use to no more than 2 grams (2000 mg) in 24 hours. **Further refills must be obtained by primary care provider**    Acute post-operative pain       amLODIPine 5 MG tablet    NORVASC    90 tablet    Take 1 tablet (5 mg) by mouth daily    Hypertension       azaTHIOprine 50 MG tablet    IMURAN    90 tablet    Take 1 tablet (50 mg) by mouth every morning    Liver transplanted (H)       betaxolol 0.5 % ophthalmic solution    BETOPTIC    5 mL    Place 1 drop into both eyes 2 times daily    Primary open angle glaucoma of both eyes, unspecified glaucoma stage       calcium Citrate-vitamin D 500-400 MG-UNIT Chew      Take 1 tablet by mouth daily        chlorthalidone 25 MG tablet    HYGROTON    45 tablet    Take 0.5 tablets (12.5 mg) by mouth daily    CKD (chronic kidney disease) stage 3, GFR 30-59 ml/min, Fluid retention       cholecalciferol 400 UNIT Tabs tablet    vitamin D3     Take 400 Units by mouth daily        cyanocobalamin 1000 MCG tablet    vitamin  B-12     Take 1,000 mcg by mouth daily        ferrous sulfate 325 (65 Fe) MG tablet    IRON    100 tablet    Take 1 tablet (325 mg) by mouth 2 times daily    Iron deficiency       folic acid 1 MG tablet    FOLVITE    30 tablet    Take 1 tablet (1 mg) by mouth daily    Malnutrition (H)       hydrOXYzine 25 MG tablet    ATARAX    60 tablet    Take 1-2 tablets (25-50mg) every 6 hours as needed for itching    WALTER (generalized anxiety disorder)       levothyroxine 88 MCG tablet    SYNTHROID/LEVOTHROID    90 tablet    Take 1 tablet (88 mcg) by mouth every morning (before breakfast)    Thyroid function test abnormal       melatonin 5 MG tablet      Take 1 tablet (5 mg) by mouth nightly as needed for sleep        multivitamin, therapeutic Tabs tablet     30 tablet    Take 1 tablet by mouth every 24 hours     Malnutrition (H)       OMEGA-3 FISH OIL EX ST PO      Take 1 capsule by mouth 2 times daily 1290 (fish oil)-900 (omega 3)        order for DME     1 Device    Equipment being ordered: knee sleeve    Acute pain of left knee       oxyCODONE IR 5 MG tablet    ROXICODONE    12 tablet    Take 1 tablet (5 mg) by mouth every 6 hours as needed for pain        pantoprazole 40 MG EC tablet    PROTONIX    90 tablet    Take 1 tablet (40 mg) by mouth every morning (before breakfast)    Gastroesophageal reflux disease, esophagitis presence not specified       pramipexole 0.5 MG tablet    MIRAPEX    90 tablet    Take 1 tablet (0.5 mg) by mouth At Bedtime    Restless leg syndrome       * predniSONE 5 MG tablet    DELTASONE    90 tablet    Take 1 tablet (5 mg) by mouth daily    Liver replaced by transplant (H)       * predniSONE 1 MG tablet    DELTASONE    360 tablet    Take 4 tablets (4 mg) by mouth daily    Liver replaced by transplant (H)       psyllium 0.52 g capsule     60 capsule    Take 2 capsules (1.04 g) by mouth daily With a full glass of water.    Loose stools       tacrolimus 0.5 MG capsule    GENERIC EQUIVALENT    270 capsule    2.5 mg AM and 2 mg PM    Liver transplanted (H)       traMADol 50 MG tablet    ULTRAM    20 tablet    Take 1 tablet (50 mg) by mouth every 6 hours as needed for severe pain    Acute left ankle pain, Lumbar radicular pain       traZODone 100 MG tablet    DESYREL    45 tablet    Take 1.5 tablets (150 mg) by mouth At Bedtime    Insomnia, unspecified type       ursodiol 300 MG capsule    ACTIGALL    60 capsule    Take 1 capsule (300 mg) by mouth 2 times daily    Liver transplanted (H)       * Notice:  This list has 2 medication(s) that are the same as other medications prescribed for you. Read the directions carefully, and ask your doctor or other care provider to review them with you.

## 2018-08-09 NOTE — PROGRESS NOTES
Assessment & Plan     Phan Luque is a 62 year old female with the following diagnoses:   1. 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 Last visit was December 2017.  At that time, took her off betaxolol.  Repeat intraocular pressure was 20 both eyes and was restarted at once a day.  Patient feels no change in her vision.  Is on 7 mg prednisone for arthritis and is supposed to get off at some point.      Her visual acuity is 20/20 RIGHT eye and 20/70 LEFT eye (stable).  Her visual field and optical coherence tomography are stable.  Her exam is stable.      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 The RIGHT eye is normal.  Follow up 1 year sooner as needed for worsening symptoms.           Attending Physician Attestation:  Complete documentation of historical and exam elements from today's encounter can be found in the full encounter summary report (not reduplicated in this progress note).  I personally obtained the chief complaint(s) and history of present illness.  I confirmed and edited as necessary the review of systems, past medical/surgical history, family history, social history, and examination findings as documented by others; and I examined the patient myself.  I personally reviewed the relevant tests, images, and reports as documented above.  I formulated and edited as necessary the assessment and plan and discussed the findings and management plan with the patient and family. - Ambrose Ortega MD

## 2018-08-09 NOTE — PROGRESS NOTES
[facility]  Electrodiagnostic Laboratory    Nerve Conduction & EMG Report          Patient:       Phan Luque  Patient ID:    [id number]  Gender:        Female  YOB: 1955  Age:           62 Years 9 Months        History & Examination: Ms. Dean is a 62-year-old female with leg weakness and numbness in the feet.  She is sent for evaluation for lumbar radiculopathy or peripheral neuropathy.  Evaluation of both legs was requested.      Techniques: Motor conduction studies were done with surface recording electrodes. Sensory conduction studies were performed with surface electrodes, unless indicated otherwise by (n), designating the use of subdermal recording electrodes. Temperature was monitored and recorded throughout the study. Upper extremities were maintained at a temperature of 32 degrees Centigrade or higher.  Lower extremities were maintained at a temperature of 31degrees Centigrade or higher. EMG was done with a concentric needle electrode.        Results: The left sural sensory nerve action potential was minimally reduced in amplitude but this may have been  technical in nature.  All other sensory nerve action potentials in the legs were normal.  Motor nerve conduction studies in the legs were normal.  All compound motor unit action potential amplitudes were normal and conduction velocities and distal latencies were normal.  Needle exam revealed long duration motor unit potentials in distal leg muscles with minimal fibrillation in the right gastrocnemius.  Proximal leg muscles were normal.      Interpretation: The EMG is abnormal.  The EMG abnormalities would be most consistent with a mild distal motor-predominant peripheral neuropathy.      EMG Physician: Shaun Sandoval MD         Sensory NCS      Nerve / Sites Rec. Site Onset Peak NP Amp Ref. PP Amp Dist Louis Ref. Temp     ms ms  V  V  V cm m/s m/s  C   R SURAL - Lat Mall 60      Calf Ankle 2.55 3.75 7.3 5.0 4.7 13 50.9  38.0 31.7   L SURAL - Lat Mall 60      Calf Ankle 3.13 4.01 4.4 5.0 5.2 14 44.8 38.0 32      Calf Ankle 3.18 3.96 4.3 5.0 5.4 14 44.1  32      Calf Ankle 3.28 4.11 4.5 5.0 4.7 14 42.7  32   R SUP PERONEAL      Lat Leg Heath 3.07 3.80 4.0  4.0 12.5 40.7 38.0 31.9   L SUP PERONEAL      Lat Leg Heath 2.71 3.39 3.7  3.8 12.5 46.2 38.0 31.9       Motor NCS      Nerve / Sites Rec. Site Lat Ref. Amp Ref. Rel Amp Dist Louis Ref. Dur. Area Temp.     ms ms mV mV % cm m/s m/s ms %  C   L DEEP PERONEAL - EDB      Ankle EDB 3.44 6.00 4.2 2.5 100 8   5.68 100 31.9   R DEEP PERONEAL - EDB 60      Ankle EDB 3.39 6.00 6.7 2.0 100 8   5.78 100 32.2      FibHead EDB 12.19  6.6  97.6 34 38.6 38.0 6.09 107 32.2      Pop Fos EDB 14.06  6.1  90.2 9 48.0 38.0 5.78 102 32.2   R TIBIAL - AH      Ankle AH 3.65 6.00 8.0 4.0 100 8   6.51 100 32.1      Pop Fos AH 13.18  6.7  83.3 39 40.9 38.0 7.55 90 32.1   L TIBIAL - AH      Ankle AH 3.44 6.00 7.2 4.0 100 8   7.24 100 31.9       F  Wave      Nerve Min F Lat Max F Lat Mean FLat Temp.    ms ms ms  C   R TIBIAL 49.69 64.53 60.18 32.1       Needle EMG (W)      EMG Summary Table     Spontaneous MUAP Recruitment    IA Fib PSW Fasc H.F. Amp Dur. PPP Pattern   R. TIB ANTERIOR N None None None None N 1+ 1+ N   R. GASTROCN (MED) Increased 1+ None None None 1+ 1+ 1+ N   R. TIB POSTERIOR N None None None None 1+ 1+ N N   R. VAST LATERALIS N None None None None N N N N   R. T FASCIA MANN N None None None None N N N N   L. TIB ANTERIOR N None None None None 1+ 1+ N N   L. GASTROCN (MED) N None None None None 1+ 1+ N N   R. GLUTEUS MAX N None None None None N N N N

## 2018-08-10 ENCOUNTER — OFFICE VISIT (OUTPATIENT)
Dept: ORTHOPEDICS | Facility: CLINIC | Age: 63
End: 2018-08-10
Payer: COMMERCIAL

## 2018-08-10 VITALS — BODY MASS INDEX: 35.16 KG/M2 | HEIGHT: 67 IN | WEIGHT: 224 LBS

## 2018-08-10 DIAGNOSIS — G62.9 PERIPHERAL POLYNEUROPATHY: ICD-10-CM

## 2018-08-10 DIAGNOSIS — M79.662 PAIN OF LEFT LOWER LEG: Primary | ICD-10-CM

## 2018-08-10 RX ORDER — TRAMADOL HYDROCHLORIDE 50 MG/1
50 TABLET ORAL EVERY 6 HOURS PRN
Qty: 20 TABLET | Refills: 0 | Status: SHIPPED | OUTPATIENT
Start: 2018-08-10 | End: 2019-07-26

## 2018-08-10 RX ORDER — GABAPENTIN 100 MG/1
100 CAPSULE ORAL 3 TIMES DAILY
Qty: 90 CAPSULE | Refills: 1 | Status: SHIPPED | OUTPATIENT
Start: 2018-08-10 | End: 2019-03-27

## 2018-08-10 NOTE — PROGRESS NOTES
HISTORY OF PRESENT ILLNESS  Ms. Luque is a pleasant 62 year old year old female who presents to clinic today for followup after EMG  She continues to have pain in her left leg from her healing tibial plateau fracture  She has numbness in both legs due to peripheral neuropathy symptoms    MEDICAL HISTORY  Patient Active Problem List   Diagnosis     Decompensation of cirrhosis of liver (H)     Liver transplanted (H)     Alcoholic hepatitis     Immunosuppression (H)     Alcoholic hepatitis without ascites     Enterococcus UTI     Liver transplant status     Nausea & vomiting     Malnutrition (H)     Candidiasis of skin     Anemia     ACP (advance care planning)     Diarrhea     Hypothyroidism     Esophageal reflux     Recurrent major depression in partial remission (H)     Insomnia     CKD (chronic kidney disease) stage 3, GFR 30-59 ml/min     Anemia in stage 3 chronic kidney disease     Osteopenia     Alcohol abuse, uncomplicated       Current Outpatient Prescriptions   Medication Sig Dispense Refill     gabapentin (NEURONTIN) 100 MG capsule Take 1 capsule (100 mg) by mouth 3 times daily 90 capsule 1     traMADol (ULTRAM) 50 MG tablet Take 1 tablet (50 mg) by mouth every 6 hours as needed for severe pain 20 tablet 0     acetaminophen (TYLENOL) 325 MG tablet Take 2 tablets (650 mg) by mouth every 6 hours as needed for mild pain Or fevers. Use sparingly. Limit use to no more than 2 grams (2000 mg) in 24 hours. **Further refills must be obtained by primary care provider** 100 tablet 0     amLODIPine (NORVASC) 5 MG tablet Take 1 tablet (5 mg) by mouth daily 90 tablet 3     azaTHIOprine (IMURAN) 50 MG tablet Take 1 tablet (50 mg) by mouth every morning 90 tablet 3     betaxolol (BETOPTIC) 0.5 % ophthalmic solution Place 1 drop into both eyes 2 times daily 5 mL 2     calcium Citrate-vitamin D 500-400 MG-UNIT CHEW Take 1 tablet by mouth daily       chlorthalidone (HYGROTON) 25 MG tablet Take 0.5 tablets (12.5 mg) by mouth  daily 45 tablet 3     cholecalciferol (VITAMIN D3) 400 UNIT TABS tablet Take 400 Units by mouth daily       cyanocobalamin (VITAMIN  B-12) 1000 MCG tablet Take 1,000 mcg by mouth daily       ferrous sulfate (IRON) 325 (65 FE) MG tablet Take 1 tablet (325 mg) by mouth 2 times daily 100 tablet      folic acid (FOLVITE) 1 MG tablet Take 1 tablet (1 mg) by mouth daily 30 tablet 1     hydrOXYzine (ATARAX) 25 MG tablet Take 1-2 tablets (25-50mg) every 6 hours as needed for itching 60 tablet 2     levothyroxine (SYNTHROID/LEVOTHROID) 88 MCG tablet Take 1 tablet (88 mcg) by mouth every morning (before breakfast) 90 tablet 2     melatonin 5 MG tablet Take 1 tablet (5 mg) by mouth nightly as needed for sleep       multivitamin, therapeutic (THERA-VIT) TABS tablet Take 1 tablet by mouth every 24 hours 30 tablet 11     Omega-3 Fatty Acids (OMEGA-3 FISH OIL EX ST PO) Take 1 capsule by mouth 2 times daily 1290 (fish oil)-900 (omega 3)       order for DME Equipment being ordered: knee sleeve 1 Device 0     oxyCODONE IR (ROXICODONE) 5 MG tablet Take 1 tablet (5 mg) by mouth every 6 hours as needed for pain 12 tablet 0     pantoprazole (PROTONIX) 40 MG EC tablet Take 1 tablet (40 mg) by mouth every morning (before breakfast) 90 tablet 4     pramipexole (MIRAPEX) 0.5 MG tablet Take 1 tablet (0.5 mg) by mouth At Bedtime 90 tablet 3     predniSONE (DELTASONE) 1 MG tablet Take 4 tablets (4 mg) by mouth daily 360 tablet 3     predniSONE (DELTASONE) 5 MG tablet Take 1 tablet (5 mg) by mouth daily 90 tablet 3     psyllium 0.52 G capsule Take 2 capsules (1.04 g) by mouth daily With a full glass of water. 60 capsule 1     tacrolimus (GENERIC EQUIVALENT) 0.5 MG capsule 2.5 mg AM and 2 mg  capsule 11     traMADol (ULTRAM) 50 MG tablet Take 1 tablet (50 mg) by mouth every 6 hours as needed for severe pain 20 tablet 0     traZODone (DESYREL) 100 MG tablet Take 1.5 tablets (150 mg) by mouth At Bedtime 45 tablet 3     ursodiol (ACTIGALL) 300  "MG capsule Take 1 capsule (300 mg) by mouth 2 times daily 60 capsule 11       Allergies   Allergen Reactions     Benadryl [Diphenhydramine] Hives     Cefaclor Hives     Hydrocodone-Acetaminophen Itching     Oxycodone Itching     Penicillins Hives     Sulfa Drugs Hives       Family History   Problem Relation Age of Onset     Family History Negative Other      Melanoma Mother           HEART DISEASE Father      CHF       Additional medical/Social/Surgical histories reviewed in [facility] and updated as appropriate.     REVIEW OF SYSTEMS (8/10/2018)  10 point ROS of systems including Constitutional, Eyes, Respiratory, Cardiovascular, Gastroenterology, Genitourinary, Integumentary, Musculoskeletal, Psychiatric were all negative except for pertinent positives noted in my HPI.     PHYSICAL EXAM  Vitals:    08/10/18 1536   Weight: 101.6 kg (224 lb)   Height: 1.702 m (5' 7\")     Vital Signs: Ht 1.702 m (5' 7\")  Wt 101.6 kg (224 lb)  BMI 35.08 kg/m2 Patient declined being weighed. Body mass index is 35.08 kg/(m^2).    General  - normal appearance, in no obvious distress, overweight  CV  - normal peripheral perfusion  Pulm  - normal respiratory pattern, non-labored  Musculoskeletal - lumbar spine  - stance: normal gait without limp, no obvious leg length discrepancy, normal heel and toe walk  - inspection: normal bone and joint alignment, no obvious scoliosis  - palpation: no paravertebral or bony tenderness  - ROM: flexion today does not exacerbates pain, normal extension, sidebending, rotation  - strength: lower extremities 5/5 in all planes  - special tests:  (-) straight leg raise  (+) slump test- some back pain  Neuro  - patellar and Achilles DTRs 2+ bilaterally, bilateral lower extremity sensory deficit throughout L5 distribution, grossly normal coordination, normal muscle tone  Skin  - no ecchymosis, erythema, warmth, or induration, no obvious rash  Psych  - interactive, appropriate, normal mood and " affect    ASSESSMENT & PLAN  61 yo female with bilateral leg numbness and tingling due to peripheral neuropathy, and lumbar ddd  Reviewed her EMG and due to the result of peripheral neuropathy, will refer to neurology  Will not pursue treatments on lumbar discs  Will refill tramadol  Will consider starting gabapentin   F/u after neurology referral        Donnell Duvall MD, CAQSM

## 2018-08-10 NOTE — LETTER
8/10/2018       RE: Phan Luque  [address]  [facility] [address]     Dear Colleague,    Thank you for referring your patient, Phan Luque, to the [facility] at [facility]. Please see a copy of my visit note below.    HISTORY OF PRESENT ILLNESS  Ms. Luque is a pleasant 62 year old year old female who presents to clinic today for followup after EMG  She continues to have pain in her left leg from her healing tibial plateau fracture  She has numbness in both legs due to peripheral neuropathy symptoms    MEDICAL HISTORY  Patient Active Problem List   Diagnosis     Decompensation of cirrhosis of liver (H)     Liver transplanted (H)     Alcoholic hepatitis     Immunosuppression (H)     Alcoholic hepatitis without ascites     Enterococcus UTI     Liver transplant status     Nausea & vomiting     Malnutrition (H)     Candidiasis of skin     Anemia     ACP (advance care planning)     Diarrhea     Hypothyroidism     Esophageal reflux     Recurrent major depression in partial remission (H)     Insomnia     CKD (chronic kidney disease) stage 3, GFR 30-59 ml/min     Anemia in stage 3 chronic kidney disease     Osteopenia     Alcohol abuse, uncomplicated       Current Outpatient Prescriptions   Medication Sig Dispense Refill     gabapentin (NEURONTIN) 100 MG capsule Take 1 capsule (100 mg) by mouth 3 times daily 90 capsule 1     traMADol (ULTRAM) 50 MG tablet Take 1 tablet (50 mg) by mouth every 6 hours as needed for severe pain 20 tablet 0     acetaminophen (TYLENOL) 325 MG tablet Take 2 tablets (650 mg) by mouth every 6 hours as needed for mild pain Or fevers. Use sparingly. Limit use to no more than 2 grams (2000 mg) in 24 hours. **Further refills must be obtained by primary care provider** 100 tablet 0     amLODIPine (NORVASC) 5 MG tablet Take 1 tablet (5 mg) by mouth daily 90 tablet 3     azaTHIOprine (IMURAN) 50 MG tablet Take 1 tablet (50 mg) by mouth  every morning 90 tablet 3     betaxolol (BETOPTIC) 0.5 % ophthalmic solution Place 1 drop into both eyes 2 times daily 5 mL 2     calcium Citrate-vitamin D 500-400 MG-UNIT CHEW Take 1 tablet by mouth daily       chlorthalidone (HYGROTON) 25 MG tablet Take 0.5 tablets (12.5 mg) by mouth daily 45 tablet 3     cholecalciferol (VITAMIN D3) 400 UNIT TABS tablet Take 400 Units by mouth daily       cyanocobalamin (VITAMIN  B-12) 1000 MCG tablet Take 1,000 mcg by mouth daily       ferrous sulfate (IRON) 325 (65 FE) MG tablet Take 1 tablet (325 mg) by mouth 2 times daily 100 tablet      folic acid (FOLVITE) 1 MG tablet Take 1 tablet (1 mg) by mouth daily 30 tablet 1     hydrOXYzine (ATARAX) 25 MG tablet Take 1-2 tablets (25-50mg) every 6 hours as needed for itching 60 tablet 2     levothyroxine (SYNTHROID/LEVOTHROID) 88 MCG tablet Take 1 tablet (88 mcg) by mouth every morning (before breakfast) 90 tablet 2     melatonin 5 MG tablet Take 1 tablet (5 mg) by mouth nightly as needed for sleep       multivitamin, therapeutic (THERA-VIT) TABS tablet Take 1 tablet by mouth every 24 hours 30 tablet 11     Omega-3 Fatty Acids (OMEGA-3 FISH OIL EX ST PO) Take 1 capsule by mouth 2 times daily 1290 (fish oil)-900 (omega 3)       order for DME Equipment being ordered: knee sleeve 1 Device 0     oxyCODONE IR (ROXICODONE) 5 MG tablet Take 1 tablet (5 mg) by mouth every 6 hours as needed for pain 12 tablet 0     pantoprazole (PROTONIX) 40 MG EC tablet Take 1 tablet (40 mg) by mouth every morning (before breakfast) 90 tablet 4     pramipexole (MIRAPEX) 0.5 MG tablet Take 1 tablet (0.5 mg) by mouth At Bedtime 90 tablet 3     predniSONE (DELTASONE) 1 MG tablet Take 4 tablets (4 mg) by mouth daily 360 tablet 3     predniSONE (DELTASONE) 5 MG tablet Take 1 tablet (5 mg) by mouth daily 90 tablet 3     psyllium 0.52 G capsule Take 2 capsules (1.04 g) by mouth daily With a full glass of water. 60 capsule 1     tacrolimus (GENERIC EQUIVALENT) 0.5 MG  "capsule 2.5 mg AM and 2 mg  capsule 11     traMADol (ULTRAM) 50 MG tablet Take 1 tablet (50 mg) by mouth every 6 hours as needed for severe pain 20 tablet 0     traZODone (DESYREL) 100 MG tablet Take 1.5 tablets (150 mg) by mouth At Bedtime 45 tablet 3     ursodiol (ACTIGALL) 300 MG capsule Take 1 capsule (300 mg) by mouth 2 times daily 60 capsule 11       Allergies   Allergen Reactions     Benadryl [Diphenhydramine] Hives     Cefaclor Hives     Hydrocodone-Acetaminophen Itching     Oxycodone Itching     Penicillins Hives     Sulfa Drugs Hives       Family History   Problem Relation Age of Onset     Family History Negative Other      Melanoma Mother           HEART DISEASE Father      CHF       Additional medical/Social/Surgical histories reviewed in [facility] and updated as appropriate.     REVIEW OF SYSTEMS (8/10/2018)  10 point ROS of systems including Constitutional, Eyes, Respiratory, Cardiovascular, Gastroenterology, Genitourinary, Integumentary, Musculoskeletal, Psychiatric were all negative except for pertinent positives noted in my HPI.     PHYSICAL EXAM  Vitals:    08/10/18 1536   Weight: 101.6 kg (224 lb)   Height: 1.702 m (5' 7\")     Vital Signs: Ht 1.702 m (5' 7\")  Wt 101.6 kg (224 lb)  BMI 35.08 kg/m2 Patient declined being weighed. Body mass index is 35.08 kg/(m^2).    General  - normal appearance, in no obvious distress, overweight  CV  - normal peripheral perfusion  Pulm  - normal respiratory pattern, non-labored  Musculoskeletal - lumbar spine  - stance: normal gait without limp, no obvious leg length discrepancy, normal heel and toe walk  - inspection: normal bone and joint alignment, no obvious scoliosis  - palpation: no paravertebral or bony tenderness  - ROM: flexion today does not exacerbates pain, normal extension, sidebending, rotation  - strength: lower extremities 5/5 in all planes  - special tests:  (-) straight leg raise  (+) slump test- some back pain  Neuro  - patellar and " Achilles DTRs 2+ bilaterally, bilateral lower extremity sensory deficit throughout L5 distribution, grossly normal coordination, normal muscle tone  Skin  - no ecchymosis, erythema, warmth, or induration, no obvious rash  Psych  - interactive, appropriate, normal mood and affect    ASSESSMENT & PLAN  61 yo female with bilateral leg numbness and tingling due to peripheral neuropathy, and lumbar ddd  Reviewed her EMG and due to the result of peripheral neuropathy, will refer to neurology  Will not pursue treatments on lumbar discs  Will refill tramadol  Will consider starting gabapentin   F/u after neurology referral        Donnell Duvall MD, CAQSM      Again, thank you for allowing me to participate in the care of your patient.      Sincerely,    Donnell Duvall MD

## 2018-08-10 NOTE — MR AVS SNAPSHOT
After Visit Summary   8/10/2018    Phan Luque    MRN: [id number]           Patient Information     Date Of Birth          1955        Visit Information        Provider Department      8/10/2018 4:00 PM Donnell Duvall MD [facility]        Today's Diagnoses     Pain of left lower leg    -  1    Peripheral polyneuropathy           Follow-ups after your visit        Additional Services     NEUROLOGY ADULT REFERRAL       Your provider has referred you for the following:   Consult at [facility]: Neurology Clinic - [place] [phone]   [email or web address]  Peripheral neuropathy    Please be aware that coverage of these services is subject to the terms and limitations of your health insurance plan.  Call member services at your health plan with any benefit or coverage questions.      Please bring the following with you to your appointment:    (1) Any X-Rays, CTs or MRIs which have been performed.  Contact the facility where they were done to arrange for  prior to your scheduled appointment.    (2) List of current medications  (3) This referral request   (4) Any documents/labs given to you for this referral                  Your next 10 appointments already scheduled     Aug 14, 2018  7:00 AM CDT   Return Walk In Ortho with Donnell Gorman DO   [facility] ([facility])    [address]  4th Floor  [facility] [address] [phone]            Aug 21, 2018  7:30 AM CDT   Lab visit with  LAB   [facility] ([facility])    [address]  [address]  [facility] [address] [phone]           Please do not eat 10-12 hours before your appointment if you are coming in fasting for labs on lipids, cholesterol, or glucose (sugar). Does not apply to pregnant women.  Water with medications is okay. Do not drink coffee or other  fluids.  If you have concerns about taking your medications, please send a message by clicking on Secure Messaging, Message Your Care Team.            Aug 31, 2018  7:30 AM CDT   (Arrive by 7:15 AM)   New Patient Visit with Barber Ortiz MD   [facility] Neurology ([facility])    [address] [place] [address]  3rd Floor  [facility] [address]   [phone]            Sep 26, 2018  5:00 PM CDT   Adult Med Follow UP with DONOVAN Blake [facility]   Psychiatry Clinic ([facility])    [address]  [address] [address]   [phone]            Oct 01, 2018  4:45 PM CDT   (Arrive by 4:30 PM)   Return Liver Transplant with Hussein Banegas MD   [facility] Hepatology ([facility])    [address] [place] [address]  [address]  [facility] [address]   [phone]            Oct 22, 2018  7:30 AM CDT   (Arrive by 7:15 AM)   Return Visit with Arlyn Sanchez MD   [facility] Primary Care Clinic ([facility])    [address] [place] [address]  4th Floor  [facility] [address]   [phone]            Mar 20, 2019  3:30 PM CDT   Lab with  LAB   [facility] Lab ([facility])    [address] [place] [address]  1st Floor  [facility] [address]   [phone]            Mar 20, 2019  4:30 PM CDT   (Arrive by 4:00 PM)   Return Visit with Daya Gutierrez MD   [facility] Nephrology ([facility])    [address] [place] [address]  [address]  [facility] [address]   [phone]              Future tests that were ordered for you today     Open Future Orders        Priority Expected Expires Ordered    Needle EMG Each Extremity w/Paraspinal Area Complete (45588) Routine  8/9/2019 8/9/2018    Needle EMG Each Extremity w/Paraspinal Area Limited (25148) Routine  8/9/2019 8/9/2018    DILATED FUNDUS EXAM Routine  10/8/2018 8/9/2018            Who to contact     Please call your clinic at  "[phone] to:    Ask questions about your health    Make or cancel appointments    Discuss your medicines    Learn about your test results    Speak to your doctor            Additional Information About Your Visit        Disruption [employer] Information     [employer] gives you secure access to your electronic health record. If you see a primary care provider, you can also send messages to your care team and make appointments. If you have questions, please call your primary care clinic.  If you do not have a primary care provider, please call [phone] and they will assist you.      [employer] is an electronic gateway that provides easy, online access to your medical records. With [employer], you can request a clinic appointment, read your test results, renew a prescription or communicate with your care team.     To access your existing account, please contact your [facility] or call [phone] for assistance.        Care EveryWhere ID     This is your Care EveryWhere ID. This could be used by other organizations to access your [place] medical records  [id number]        Your Vitals Were     Height BMI (Body Mass Index)                1.702 m (5' 7\") 35.08 kg/m2           Blood Pressure from Last 3 Encounters:   07/31/18 140/78   07/27/18 154/66   07/24/18 152/89    Weight from Last 3 Encounters:   08/10/18 101.6 kg (224 lb)   07/31/18 101.6 kg (224 lb)   07/27/18 101.6 kg (224 lb)              We Performed the Following     NEUROLOGY ADULT REFERRAL          Today's Medication Changes          These changes are accurate as of 8/10/18  7:25 PM.  If you have any questions, ask your nurse or doctor.               Start taking these medicines.        Dose/Directions    gabapentin 100 MG capsule   Commonly known as:  NEURONTIN   Used for:  Peripheral polyneuropathy   Started by:  Donnell Duvall MD        Dose:  100 mg   Take 1 capsule (100 mg) by mouth 3 times daily   Quantity:  90 capsule "   Refills:  1         These medicines have changed or have updated prescriptions.        Dose/Directions    * traMADol 50 MG tablet   Commonly known as:  ULTRAM   This may have changed:  Another medication with the same name was added. Make sure you understand how and when to take each.   Used for:  Acute left ankle pain, Lumbar radicular pain   Changed by:  Donnell Duvall MD        Dose:  50 mg   Take 1 tablet (50 mg) by mouth every 6 hours as needed for severe pain   Quantity:  20 tablet   Refills:  0       * traMADol 50 MG tablet   Commonly known as:  ULTRAM   This may have changed:  You were already taking a medication with the same name, and this prescription was added. Make sure you understand how and when to take each.   Used for:  Pain of left lower leg   Changed by:  Donnell Duvall MD        Dose:  50 mg   Take 1 tablet (50 mg) by mouth every 6 hours as needed for severe pain   Quantity:  20 tablet   Refills:  0       * Notice:  This list has 2 medication(s) that are the same as other medications prescribed for you. Read the directions carefully, and ask your doctor or other care provider to review them with you.         Where to get your medicines      These medications were sent to [facility] - [place], [place] - [address] [place] [address]  [address] [place] [address]    Hours:  Mon-Fri 8:30am-5:00pm Toll Free [phone] Phone:  [phone]     gabapentin 100 MG capsule         Some of these will need a paper prescription and others can be bought over the counter.  Ask your nurse if you have questions.     Bring a paper prescription for each of these medications     traMADol 50 MG tablet               Information about OPIOIDS     PRESCRIPTION OPIOIDS: WHAT YOU NEED TO KNOW   We gave you an opioid (narcotic) pain medicine. It is important to manage your pain, but opioids are not always the best choice. You should first try all the other options your  care team gave you. Take this medicine for as short a time (and as few doses) as possible.    Some activities can increase your pain, such as bandage changes or therapy sessions. It may help to take your pain medicine 30 to 60 minutes before these activities. Reduce your stress by getting enough sleep, working on hobbies you enjoy and practicing relaxation or meditation. Talk to your care team about ways to manage your pain beyond prescription opioids.    These medicines have risks:    DO NOT drive when on new or higher doses of pain medicine. These medicines can affect your alertness and reaction times, and you could be arrested for driving under the influence (DUI). If you need to use opioids long-term, talk to your care team about driving.    DO NOT operate heavy machinery    DO NOT do any other dangerous activities while taking these medicines.    DO NOT drink any alcohol while taking these medicines.     If the opioid prescribed includes acetaminophen, DO NOT take with any other medicines that contain acetaminophen. Read all labels carefully. Look for the word  acetaminophen  or  Tylenol.  Ask your pharmacist if you have questions or are unsure.    You can get addicted to pain medicines, especially if you have a history of addiction (chemical, alcohol or substance dependence). Talk to your care team about ways to reduce this risk.    All opioids tend to cause constipation. Drink plenty of water and eat foods that have a lot of fiber, such as fruits, vegetables, prune juice, apple juice and high-fiber cereal. Take a laxative (Miralax, milk of magnesia, Colace, Senna) if you don t move your bowels at least every other day. Other side effects include upset stomach, sleepiness, dizziness, throwing up, tolerance (needing more of the medicine to have the same effect), physical dependence and slowed breathing.    Store your pills in a secure place, locked if possible. We will not replace any lost or stolen medicine.  If you don t finish your medicine, please throw away (dispose) as directed by your pharmacist. The [place] Pollution [employer] has more information about safe disposal: https://www.pca.state.[place]/living-green/managing-unwanted-medications         Primary Care Provider Office Phone # Fax #    Arlyn Sanchez [phone]       [address] [address]        Equal Access to Services     PAULO BERRY : Hadii aad ku hadasho Soomaali, waaxda luqadaha, qaybta kaalmada adeegyada, waxay idiin hayaan adeeg kharash la'aan . So [facility] [phone].    ATENCIÓN: Si habla español, tiene a reece disposición servicios gratuitos de asistencia lingüística. [facility] [phone].    We comply with applicable federal civil rights laws and [place] laws. We do not discriminate on the basis of race, color, national origin, age, disability, sex, sexual orientation, or gender identity.            Thank you!     Thank you for choosing [facility]  for your care. Our goal is always to provide you with excellent care. Hearing back from our patients is one way we can continue to improve our services. Please take a few minutes to complete the written survey that you may receive in the mail after your visit with us. Thank you!             Your Updated Medication List - Protect others around you: Learn how to safely use, store and throw away your medicines at www.disposemymeds.org.          This list is accurate as of 8/10/18  7:25 PM.  Always use your most recent med list.                   Brand Name Dispense Instructions for use Diagnosis    acetaminophen 325 MG tablet    TYLENOL    100 tablet    Take 2 tablets (650 mg) by mouth every 6 hours as needed for mild pain Or fevers. Use sparingly. Limit use to no more than 2 grams (2000 mg) in 24 hours. **Further refills must be obtained by primary care provider**    Acute post-operative pain       amLODIPine 5 MG tablet    NORVASC    90  tablet    Take 1 tablet (5 mg) by mouth daily    Hypertension       azaTHIOprine 50 MG tablet    IMURAN    90 tablet    Take 1 tablet (50 mg) by mouth every morning    Liver transplanted (H)       betaxolol 0.5 % ophthalmic solution    BETOPTIC    5 mL    Place 1 drop into both eyes 2 times daily    Primary open angle glaucoma of both eyes, unspecified glaucoma stage       calcium Citrate-vitamin D 500-400 MG-UNIT Chew      Take 1 tablet by mouth daily        chlorthalidone 25 MG tablet    HYGROTON    45 tablet    Take 0.5 tablets (12.5 mg) by mouth daily    CKD (chronic kidney disease) stage 3, GFR 30-59 ml/min, Fluid retention       cholecalciferol 400 UNIT Tabs tablet    vitamin D3     Take 400 Units by mouth daily        cyanocobalamin 1000 MCG tablet    vitamin  B-12     Take 1,000 mcg by mouth daily        ferrous sulfate 325 (65 Fe) MG tablet    IRON    100 tablet    Take 1 tablet (325 mg) by mouth 2 times daily    Iron deficiency       folic acid 1 MG tablet    FOLVITE    30 tablet    Take 1 tablet (1 mg) by mouth daily    Malnutrition (H)       gabapentin 100 MG capsule    NEURONTIN    90 capsule    Take 1 capsule (100 mg) by mouth 3 times daily    Peripheral polyneuropathy       hydrOXYzine 25 MG tablet    ATARAX    60 tablet    Take 1-2 tablets (25-50mg) every 6 hours as needed for itching    WALTER (generalized anxiety disorder)       levothyroxine 88 MCG tablet    SYNTHROID/LEVOTHROID    90 tablet    Take 1 tablet (88 mcg) by mouth every morning (before breakfast)    Thyroid function test abnormal       melatonin 5 MG tablet      Take 1 tablet (5 mg) by mouth nightly as needed for sleep        multivitamin, therapeutic Tabs tablet     30 tablet    Take 1 tablet by mouth every 24 hours    Malnutrition (H)       OMEGA-3 FISH OIL EX ST PO      Take 1 capsule by mouth 2 times daily 1290 (fish oil)-900 (omega 3)        order for DME     1 Device    Equipment being ordered: knee sleeve    Acute pain of left knee        oxyCODONE IR 5 MG tablet    ROXICODONE    12 tablet    Take 1 tablet (5 mg) by mouth every 6 hours as needed for pain        pantoprazole 40 MG EC tablet    PROTONIX    90 tablet    Take 1 tablet (40 mg) by mouth every morning (before breakfast)    Gastroesophageal reflux disease, esophagitis presence not specified       pramipexole 0.5 MG tablet    MIRAPEX    90 tablet    Take 1 tablet (0.5 mg) by mouth At Bedtime    Restless leg syndrome       * predniSONE 5 MG tablet    DELTASONE    90 tablet    Take 1 tablet (5 mg) by mouth daily    Liver replaced by transplant (H)       * predniSONE 1 MG tablet    DELTASONE    360 tablet    Take 4 tablets (4 mg) by mouth daily    Liver replaced by transplant (H)       psyllium 0.52 g capsule     60 capsule    Take 2 capsules (1.04 g) by mouth daily With a full glass of water.    Loose stools       tacrolimus 0.5 MG capsule    GENERIC EQUIVALENT    270 capsule    2.5 mg AM and 2 mg PM    Liver transplanted (H)       * traMADol 50 MG tablet    ULTRAM    20 tablet    Take 1 tablet (50 mg) by mouth every 6 hours as needed for severe pain    Acute left ankle pain, Lumbar radicular pain       * traMADol 50 MG tablet    ULTRAM    20 tablet    Take 1 tablet (50 mg) by mouth every 6 hours as needed for severe pain    Pain of left lower leg       traZODone 100 MG tablet    DESYREL    45 tablet    Take 1.5 tablets (150 mg) by mouth At Bedtime    Insomnia, unspecified type       ursodiol 300 MG capsule    ACTIGALL    60 capsule    Take 1 capsule (300 mg) by mouth 2 times daily    Liver transplanted (H)       * Notice:  This list has 4 medication(s) that are the same as other medications prescribed for you. Read the directions carefully, and ask your doctor or other care provider to review them with you.

## 2018-08-14 ENCOUNTER — OFFICE VISIT (OUTPATIENT)
Dept: ORTHOPEDICS | Facility: CLINIC | Age: 63
End: 2018-08-14
Payer: COMMERCIAL

## 2018-08-14 ENCOUNTER — TELEPHONE (OUTPATIENT)
Dept: ORTHOPEDICS | Facility: CLINIC | Age: 63
End: 2018-08-14

## 2018-08-14 VITALS
HEIGHT: 67 IN | WEIGHT: 224 LBS | HEART RATE: 84 BPM | DIASTOLIC BLOOD PRESSURE: 83 MMHG | BODY MASS INDEX: 35.16 KG/M2 | SYSTOLIC BLOOD PRESSURE: 131 MMHG

## 2018-08-14 DIAGNOSIS — S82.142D TIBIAL PLATEAU FRACTURE, LEFT, CLOSED, WITH ROUTINE HEALING, SUBSEQUENT ENCOUNTER: Primary | ICD-10-CM

## 2018-08-14 NOTE — MR AVS SNAPSHOT
After Visit Summary   8/14/2018    Phan Luque    MRN: [id number]           Patient Information     Date Of Birth          1955        Visit Information        Provider Department      8/14/2018 7:00 AM Donnell Gorman DO [facility]        Today's Diagnoses     Tibial plateau fracture, left, closed, with routine healing, subsequent encounter    -  1       Follow-ups after your visit        Additional Services     ORTHOTICS REFERRAL       **This referral order prints off in the [facility]  (Orthotics & Prosthetics) Central Scheduling Office**    The [facility] Central Scheduling Staff will contact the patient to schedule appointments.     Central Scheduling Contact Information: [phone] ([place])    Orthotics: Left Knee Brace -  Lateral  brace for left knee tibial plateau fracture.    Please be aware that coverage of these services is subject to the terms and limitations of your health insurance plan.  Call member services at your health plan with any benefit or coverage questions.      Please bring the following to your appointment:    >>   Any x-rays, CTs or MRIs which have been performed.  Contact the facility where they were done to arrange for  prior to your scheduled appointment.    >>   List of current medications   >>   This referral request   >>   Any documents/labs given to you for this referral                  Your next 10 appointments already scheduled     Aug 21, 2018  7:30 AM CDT   Lab visit with [facility]   [facility] ([facility])    [address] [place] [address] [phone]           Please do not eat 10-12 hours before your appointment if you are coming in fasting for labs on lipids, cholesterol, or glucose (sugar). Does not apply to pregnant women.  Water with medications is okay. Do not drink coffee or other fluids.  If you have concerns  about taking your medications, please send a message by clicking on Secure Messaging, Message Your Care Team.            Aug 31, 2018  7:30 AM CDT   (Arrive by 7:15 AM)   New Patient Visit with Barber Ortiz MD   [facility] ([facility])    [address] [place] [address]  3rd [facility] [address]   [phone]            Sep 16, 2018 11:00 AM CDT   Return Walk In Ortho with Donnell Gorman DO   [facility] ([facility])    [address] [place] [address]  4th [facility] [address]   [phone]            Sep 26, 2018  5:00 PM CDT   Adult Med Follow UP with DONOVAN [facility]   Psychiatry Clinic ([facility])    [place] [id number]  [address] [place] [address]   [phone]            Oct 01, 2018  4:45 PM CDT   (Arrive by 4:30 PM)   Return Liver Transplant with Hussein Banegas MD   [facility] Hepatology ([facility])    [address] [place] [address]  [address]  [facility] [address]   [phone]            Oct 22, 2018  7:30 AM CDT   (Arrive by 7:15 AM)   Return Visit with Arlyn Sanchez MD   [facility] ([facility])    [address] [place] [address]  4th [facility] [address]   [phone]            Mar 20, 2019  3:30 PM CDT   Lab with  LAB   [facility] ([facility])    [address] [place] [address]  1st [facility] [address]   [phone]            Mar 20, 2019  4:30 PM CDT   (Arrive by 4:00 PM)   Return Visit with Daya Gutierrez MD   [facility] Nephrology ([facility])    [address] [place] [address]  [address]  [facility] [address]   [phone]            Aug 14, 2019  7:45 AM CDT   (Arrive by 7:30 AM)   RETURN NEURO with Ambrose Ortega MD   [facility] Ophthalmology ([facility])    [address] [place] [address]  "[address]  4th [facility] [address] [phone]              Who to contact     Please call your clinic at [phone] to:    Ask questions about your health    Make or cancel appointments    Discuss your medicines    Learn about your test results    Speak to your doctor            Additional Information About Your Visit        MyChart Information     [employer] gives you secure access to your electronic health record. If you see a primary care provider, you can also send messages to your care team and make appointments. If you have questions, please call your primary care clinic.  If you do not have a primary care provider, please call [phone] and they will assist you.      [employer] is an electronic gateway that provides easy, online access to your medical records. With [employer], you can request a clinic appointment, read your test results, renew a prescription or communicate with your care team.     To access your existing account, please contact your [facility] or call [phone] for assistance.        Care EveryWhere ID     This is your Care EveryWhere ID. This could be used by other organizations to access your [place] medical records  [id number]        Your Vitals Were     Pulse Height BMI (Body Mass Index)             84 1.702 m (5' 7\") 35.08 kg/m2          Blood Pressure from Last 3 Encounters:   08/14/18 131/83   07/31/18 140/78   07/27/18 154/66    Weight from Last 3 Encounters:   08/14/18 101.6 kg (224 lb)   08/10/18 101.6 kg (224 lb)   07/31/18 101.6 kg (224 lb)              We Performed the Following     ORTHOTICS REFERRAL        Primary Care Provider Office Phone # Fax #    Arlyn Sanchez [phone]       [address] [address]        Equal Access to Services     PAULO BERRY : lucille Haines, anna jean-baptiste. So wa " [phone].    ATENCIÓN: Si yodit lopez, tiene a reece disposición servicios gratuitos de asistencia lingüística. Rhiannon hernandez [phone].    We comply with applicable federal civil rights laws and [place] laws. We do not discriminate on the basis of race, color, national origin, age, disability, sex, sexual orientation, or gender identity.            Thank you!     Thank you for choosing [facility] SPORTS AND ORTHOPAEDIC WALK IN CLINIC  for your care. Our goal is always to provide you with excellent care. Hearing back from our patients is one way we can continue to improve our services. Please take a few minutes to complete the written survey that you may receive in the mail after your visit with us. Thank you!             Your Updated Medication List - Protect others around you: Learn how to safely use, store and throw away your medicines at www.disposemymeds.org.          This list is accurate as of 8/14/18  7:29 AM.  Always use your most recent med list.                   Brand Name Dispense Instructions for use Diagnosis    acetaminophen 325 MG tablet    TYLENOL    100 tablet    Take 2 tablets (650 mg) by mouth every 6 hours as needed for mild pain Or fevers. Use sparingly. Limit use to no more than 2 grams (2000 mg) in 24 hours. **Further refills must be obtained by primary care provider**    Acute post-operative pain       amLODIPine 5 MG tablet    NORVASC    90 tablet    Take 1 tablet (5 mg) by mouth daily    Hypertension       azaTHIOprine 50 MG tablet    IMURAN    90 tablet    Take 1 tablet (50 mg) by mouth every morning    Liver transplanted (H)       betaxolol 0.5 % ophthalmic solution    BETOPTIC    5 mL    Place 1 drop into both eyes 2 times daily    Primary open angle glaucoma of both eyes, unspecified glaucoma stage       calcium Citrate-vitamin D 500-400 MG-UNIT Chew      Take 1 tablet by mouth daily        chlorthalidone 25 MG tablet    HYGROTON    45 tablet    Take 0.5 tablets (12.5 mg) by mouth  daily    CKD (chronic kidney disease) stage 3, GFR 30-59 ml/min, Fluid retention       cholecalciferol 400 UNIT Tabs tablet    vitamin D3     Take 400 Units by mouth daily        cyanocobalamin 1000 MCG tablet    vitamin  B-12     Take 1,000 mcg by mouth daily        ferrous sulfate 325 (65 Fe) MG tablet    IRON    100 tablet    Take 1 tablet (325 mg) by mouth 2 times daily    Iron deficiency       folic acid 1 MG tablet    FOLVITE    30 tablet    Take 1 tablet (1 mg) by mouth daily    Malnutrition (H)       gabapentin 100 MG capsule    NEURONTIN    90 capsule    Take 1 capsule (100 mg) by mouth 3 times daily    Peripheral polyneuropathy       hydrOXYzine 25 MG tablet    ATARAX    60 tablet    Take 1-2 tablets (25-50mg) every 6 hours as needed for itching    WALTER (generalized anxiety disorder)       levothyroxine 88 MCG tablet    SYNTHROID/LEVOTHROID    90 tablet    Take 1 tablet (88 mcg) by mouth every morning (before breakfast)    Thyroid function test abnormal       melatonin 5 MG tablet      Take 1 tablet (5 mg) by mouth nightly as needed for sleep        multivitamin, therapeutic Tabs tablet     30 tablet    Take 1 tablet by mouth every 24 hours    Malnutrition (H)       OMEGA-3 FISH OIL EX ST PO      Take 1 capsule by mouth 2 times daily 1290 (fish oil)-900 (omega 3)        order for DME     1 Device    Equipment being ordered: knee sleeve    Acute pain of left knee       oxyCODONE IR 5 MG tablet    ROXICODONE    12 tablet    Take 1 tablet (5 mg) by mouth every 6 hours as needed for pain        pantoprazole 40 MG EC tablet    PROTONIX    90 tablet    Take 1 tablet (40 mg) by mouth every morning (before breakfast)    Gastroesophageal reflux disease, esophagitis presence not specified       pramipexole 0.5 MG tablet    MIRAPEX    90 tablet    Take 1 tablet (0.5 mg) by mouth At Bedtime    Restless leg syndrome       * predniSONE 5 MG tablet    DELTASONE    90 tablet    Take 1 tablet (5 mg) by mouth daily    Liver  replaced by transplant (H)       * predniSONE 1 MG tablet    DELTASONE    360 tablet    Take 4 tablets (4 mg) by mouth daily    Liver replaced by transplant (H)       psyllium 0.52 g capsule     60 capsule    Take 2 capsules (1.04 g) by mouth daily With a full glass of water.    Loose stools       tacrolimus 0.5 MG capsule    GENERIC EQUIVALENT    270 capsule    2.5 mg AM and 2 mg PM    Liver transplanted (H)       * traMADol 50 MG tablet    ULTRAM    20 tablet    Take 1 tablet (50 mg) by mouth every 6 hours as needed for severe pain    Acute left ankle pain, Lumbar radicular pain       * traMADol 50 MG tablet    ULTRAM    20 tablet    Take 1 tablet (50 mg) by mouth every 6 hours as needed for severe pain    Pain of left lower leg       traZODone 100 MG tablet    DESYREL    45 tablet    Take 1.5 tablets (150 mg) by mouth At Bedtime    Insomnia, unspecified type       ursodiol 300 MG capsule    ACTIGALL    60 capsule    Take 1 capsule (300 mg) by mouth 2 times daily    Liver transplanted (H)       * Notice:  This list has 4 medication(s) that are the same as other medications prescribed for you. Read the directions carefully, and ask your doctor or other care provider to review them with you.

## 2018-08-14 NOTE — PROGRESS NOTES
"ESTABLISHED PATIENT FOLLOW-UP:  Pain and RECHECK of the Left Knee       HISTORY OF PRESENT ILLNESS  Ms. Luque is a pleasant 62 year old year old female who presents to clinic today for follow-up of tibial plateau fracture of left knee.    Date of injury: 7/16/18  Date last seen: 7/31/18  Following Therapeutic Plan: Yes  Pain: Improving  Function: Improving  Interval History: Patient doing well overall.  Marked improvement in pain, swelling and ecchymosis over the last 2 weeks.  Diligent about PWB with crutch and has transitioned from NWB.  Of note, patient also being treated by Dr. Duvall for tingling of lower extremities and has been referred to neurology for peripheral neuropathy.      Additional medical/Social/Surgical histories reviewed in EPIC and updated as appropriate.    REVIEW OF SYSTEMS (8/14/2018)  CONSTITUTIONAL: Denies fever and weight loss  GASTROINTESTINAL: Denies abdominal pain, nausea, vomiting  MUSCULOSKELETAL: See HPI  SKIN: Denies any recent rash or lesion  NEUROLOGICAL: Denies numbness or focal weakness     PHYSICAL EXAM  /83  Pulse 84  Ht 1.702 m (5' 7\")  Wt 101.6 kg (224 lb)  BMI 35.08 kg/m2    General  - normal appearance, in no obvious distress  CV  - normal popliteal pulse  Pulm  - normal respiratory pattern, non-labored  Musculoskeletal - Left knee  - stance: mildly antalgic gait, genu valgum  - inspection: Swelling of left knee, calf and foot substantially improved.  Trace knee swelling. No calf or foot swelling.  - palpation: medial joint line tenderness, lateral joint line tenderness at tibial plateau especially, patella and patellar tendon non-tender, normal popliteal pulse  - ROM: 120 degrees flexion, 0 degrees extension  - strength: 5/5 in flexion, 5/5 in extension  - neuro: no sensory or motor deficit  - special tests:  (-) anterior drawer  (-) posterior drawer  (-) Oscar  (-) varus at 0 and 30 degrees flexion  (-) valgus at 0 and 30 degrees flexion  Neuro  - no " sensory or motor deficit, grossly normal coordination, normal muscle tone  Skin  - no ecchymosis, erythema, warmth, or induration, no obvious rash  Psych  - interactive, appropriate, normal mood and affect     ASSESSMENT & PLAN  Ms. Luque is a 62 year old year old female who presents to clinic today with Pain and RECHECK of the Left Knee    Diagnosis: Lateral Tibial plateau fracture of left knee with routine healing.    -Continue PWB then transition to pain free WB in brace  -Orthotics referral for lateral  knee brace  -Tramadol PRN refilled last week, continue PRN  -Work note provided; work remotely x 4 weeks; complete work comp forms  -Follow up 4 weeks    It was a pleasure seeing Phan.    Donnell Gorman DO, CAQSM  Primary Care Sports Medicine

## 2018-08-14 NOTE — LETTER
8/14/2018       RE: Phan Luque  [address]  [facility] [address]     Dear Colleague,    Thank you for referring your patient, Phan Luque, to the [facility] SP[facility]TS AND ORTHOPAEDIC WALK IN CLINIC at [facility]. Please see a copy of my visit note below.          SPORTS & ORTHOPEDIC WALK-IN FOLLOW-UP VISIT 8/14/2018    Interval History:     Follow up reason: Check left knee fracture    Date of injury: 7/16/18    Date last seen: 7/31/18    Following Therapeutic Plan: Yes     Pain: Improving    Function: Improving    Interval History: 60%    Medical History:    Any recent changes to your medical history? No    Any new medication prescribed since last visit? No    Review of Systems:    Do you have fever, chills, weight loss? No    Do you have any vision problems? No    Do you have any chest pain or edema? No    Do you have any shortness of breath or wheezing?  No    Do you have stomach problems? No    Do you have any numbness or focal weakness? Yes, peripheral neuropathy and cervical stenosis    Do you have diabetes? No    Do you have problems with bleeding or clotting? No    Do you have an rashes or other skin lesions? No             ESTABLISHED PATIENT FOLLOW-UP:  Pain and RECHECK of the Left Knee       HISTORY OF PRESENT ILLNESS  Ms. Luque is a pleasant 62 year old year old female who presents to clinic today for follow-up of tibial plateau fracture of left knee.    Date of injury: 7/16/18  Date last seen: 7/31/18  Following Therapeutic Plan: Yes  Pain: Improving  Function: Improving  Interval History: Patient doing well overall.  Marked improvement in pain, swelling and ecchymosis over the last 2 weeks.  Diligent about PWB with crutch and has transitioned from NWB.  Of note, patient also being treated by Dr. Duvall for tingling of lower extremities and has been referred to neurology for peripheral neuropathy.      Additional medical/Social/Surgical histories  "reviewed in [facility] and updated as appropriate.    REVIEW OF SYSTEMS (8/14/2018)  CONSTITUTIONAL: Denies fever and weight loss  GASTROINTESTINAL: Denies abdominal pain, nausea, vomiting  MUSCULOSKELETAL: See HPI  SKIN: Denies any recent rash or lesion  NEUROLOGICAL: Denies numbness or focal weakness     PHYSICAL EXAM  /83  Pulse 84  Ht 1.702 m (5' 7\")  Wt 101.6 kg (224 lb)  BMI 35.08 kg/m2    General  - normal appearance, in no obvious distress  CV  - normal popliteal pulse  Pulm  - normal respiratory pattern, non-labored  Musculoskeletal - Left knee  - stance: mildly antalgic gait, genu valgum  - inspection: Swelling of left knee, calf and foot substantially improved.  Trace knee swelling. No calf or foot swelling.  - palpation: medial joint line tenderness, lateral joint line tenderness at tibial plateau especially, patella and patellar tendon non-tender, normal popliteal pulse  - ROM: 120 degrees flexion, 0 degrees extension  - strength: 5/5 in flexion, 5/5 in extension  - neuro: no sensory or motor deficit  - special tests:  (-) anterior drawer  (-) posterior drawer  (-) Oscar  (-) varus at 0 and 30 degrees flexion  (-) valgus at 0 and 30 degrees flexion  Neuro  - no sensory or motor deficit, grossly normal coordination, normal muscle tone  Skin  - no ecchymosis, erythema, warmth, or induration, no obvious rash  Psych  - interactive, appropriate, normal mood and affect     ASSESSMENT & PLAN  Ms. Luque is a 62 year old year old female who presents to clinic today with Pain and RECHECK of the Left Knee    Diagnosis: Lateral Tibial plateau fracture of left knee with routine healing.    -Continue PWB then transition to pain free WB in brace  -Orthotics referral for lateral  knee brace  -Tramadol PRN refilled last week, continue PRN  -Work note provided; work remotely x 4 weeks; complete work comp forms  -Follow up 4 weeks    It was a pleasure seeing Phan.    Again, thank you for allowing me to " participate in the care of your patient.      Sincerely,    Donnell Gorman, DO

## 2018-08-14 NOTE — PROGRESS NOTES
SPORTS & ORTHOPEDIC WALK-IN FOLLOW-UP VISIT 8/14/2018    Interval History:     Follow up reason: Check left knee fracture    Date of injury: 7/16/18    Date last seen: 7/31/18    Following Therapeutic Plan: Yes     Pain: Improving    Function: Improving    Interval History: 60%    Medical History:    Any recent changes to your medical history? No    Any new medication prescribed since last visit? No    Review of Systems:    Do you have fever, chills, weight loss? No    Do you have any vision problems? No    Do you have any chest pain or edema? No    Do you have any shortness of breath or wheezing?  No    Do you have stomach problems? No    Do you have any numbness or focal weakness? Yes, peripheral neuropathy and cervical stenosis    Do you have diabetes? No    Do you have problems with bleeding or clotting? No    Do you have an rashes or other skin lesions? No

## 2018-08-14 NOTE — TELEPHONE ENCOUNTER
The patient was contacted today. Dr. Duvall talked with the patients neurologist and they agree that the patient should start taking the prescribed gabapentin. The patient is agreeable and will start the medication.  The patient was given our call back number is she has any additional questions or concerns.

## 2018-08-14 NOTE — LETTER
Verification of Appointment  2018     Seen today: yes    Patient:  Phan Luque  :   1955  MRN:     [id number]  Physician: DONNELL GRAY    Phan Luque was seen today in clinic for re-evaluation of her left knee injury. Please allow her to continue to work from home for the next four weeks. She will be seen again in clinic at that time for additional instruction.         Donnell Gray, DO

## 2018-08-21 DIAGNOSIS — M85.80 OSTEOPENIA, UNSPECIFIED LOCATION: ICD-10-CM

## 2018-08-21 DIAGNOSIS — Z94.4 LIVER REPLACED BY TRANSPLANT (H): ICD-10-CM

## 2018-08-21 DIAGNOSIS — E61.1 IRON DEFICIENCY: ICD-10-CM

## 2018-08-21 LAB
ALBUMIN SERPL-MCNC: 3.8 G/DL (ref 3.4–5)
ALP SERPL-CCNC: 38 U/L (ref 40–150)
ALT SERPL W P-5'-P-CCNC: 19 U/L (ref 0–50)
ANION GAP SERPL CALCULATED.3IONS-SCNC: 9 MMOL/L (ref 3–14)
AST SERPL W P-5'-P-CCNC: 16 U/L (ref 0–45)
BILIRUB DIRECT SERPL-MCNC: 0.1 MG/DL (ref 0–0.2)
BILIRUB SERPL-MCNC: 0.8 MG/DL (ref 0.2–1.3)
BUN SERPL-MCNC: 17 MG/DL (ref 7–30)
CALCIUM SERPL-MCNC: 8.9 MG/DL (ref 8.5–10.1)
CHLORIDE SERPL-SCNC: 103 MMOL/L (ref 94–109)
CO2 SERPL-SCNC: 26 MMOL/L (ref 20–32)
CREAT SERPL-MCNC: 1.41 MG/DL (ref 0.52–1.04)
ERYTHROCYTE [DISTWIDTH] IN BLOOD BY AUTOMATED COUNT: 12.9 % (ref 10–15)
FERRITIN SERPL-MCNC: 84 NG/ML (ref 8–252)
GFR SERPL CREATININE-BSD FRML MDRD: 38 ML/MIN/1.7M2
GLUCOSE SERPL-MCNC: 101 MG/DL (ref 70–99)
HCT VFR BLD AUTO: 40.2 % (ref 35–47)
HGB BLD-MCNC: 13.5 G/DL (ref 11.7–15.7)
MCH RBC QN AUTO: 32.1 PG (ref 26.5–33)
MCHC RBC AUTO-ENTMCNC: 33.6 G/DL (ref 31.5–36.5)
MCV RBC AUTO: 96 FL (ref 78–100)
PHOSPHATE SERPL-MCNC: 3.6 MG/DL (ref 2.5–4.5)
PLATELET # BLD AUTO: 268 10E9/L (ref 150–450)
POTASSIUM SERPL-SCNC: 3.8 MMOL/L (ref 3.4–5.3)
PROT SERPL-MCNC: 6.8 G/DL (ref 6.8–8.8)
PTH-INTACT SERPL-MCNC: 66 PG/ML (ref 18–80)
RBC # BLD AUTO: 4.21 10E12/L (ref 3.8–5.2)
SODIUM SERPL-SCNC: 138 MMOL/L (ref 133–144)
TACROLIMUS BLD-MCNC: 6.9 UG/L (ref 5–15)
TME LAST DOSE: NORMAL H
WBC # BLD AUTO: 7.5 10E9/L (ref 4–11)

## 2018-08-21 PROCEDURE — 82728 ASSAY OF FERRITIN: CPT | Performed by: INTERNAL MEDICINE

## 2018-08-21 PROCEDURE — 84100 ASSAY OF PHOSPHORUS: CPT | Performed by: INTERNAL MEDICINE

## 2018-08-21 PROCEDURE — 85027 COMPLETE CBC AUTOMATED: CPT | Performed by: INTERNAL MEDICINE

## 2018-08-21 PROCEDURE — 80197 ASSAY OF TACROLIMUS: CPT | Performed by: INTERNAL MEDICINE

## 2018-08-21 PROCEDURE — 80048 BASIC METABOLIC PNL TOTAL CA: CPT | Performed by: INTERNAL MEDICINE

## 2018-08-21 PROCEDURE — 82306 VITAMIN D 25 HYDROXY: CPT | Performed by: INTERNAL MEDICINE

## 2018-08-21 PROCEDURE — 36415 COLL VENOUS BLD VENIPUNCTURE: CPT | Performed by: INTERNAL MEDICINE

## 2018-08-21 PROCEDURE — 83970 ASSAY OF PARATHORMONE: CPT | Performed by: INTERNAL MEDICINE

## 2018-08-21 PROCEDURE — 80076 HEPATIC FUNCTION PANEL: CPT | Performed by: INTERNAL MEDICINE

## 2018-08-22 DIAGNOSIS — Z94.4 LIVER TRANSPLANTED (H): ICD-10-CM

## 2018-08-22 RX ORDER — TACROLIMUS 0.5 MG/1
2 CAPSULE ORAL EVERY 12 HOURS
Qty: 240 CAPSULE | Refills: 11 | Status: SHIPPED | OUTPATIENT
Start: 2018-08-22 | End: 2019-05-29

## 2018-08-22 NOTE — TELEPHONE ENCOUNTER
Tacrolimus level 1.4.    Please instruct patient to decrease tacrolimus dose to 2 mg in the morning and 2 mg in the evening.   Encourage Jessica May to increase fluid intake.

## 2018-08-22 NOTE — TELEPHONE ENCOUNTER
Phan's Tac was 6.9 and left message for her to decrease tac to 2 mg BID now. Advised her to call if she has questions

## 2018-08-29 ENCOUNTER — ALLIED HEALTH/NURSE VISIT (OUTPATIENT)
Dept: PHARMACY | Facility: CLINIC | Age: 63
End: 2018-08-29
Payer: COMMERCIAL

## 2018-08-29 DIAGNOSIS — K21.9 GASTROESOPHAGEAL REFLUX DISEASE, ESOPHAGITIS PRESENCE NOT SPECIFIED: ICD-10-CM

## 2018-08-29 DIAGNOSIS — N18.30 ANEMIA IN STAGE 3 CHRONIC KIDNEY DISEASE (H): ICD-10-CM

## 2018-08-29 DIAGNOSIS — I15.1 HYPERTENSION SECONDARY TO OTHER RENAL DISORDERS: ICD-10-CM

## 2018-08-29 DIAGNOSIS — M85.80 OSTEOPENIA, UNSPECIFIED LOCATION: ICD-10-CM

## 2018-08-29 DIAGNOSIS — G25.81 RESTLESS LEGS SYNDROME (RLS): ICD-10-CM

## 2018-08-29 DIAGNOSIS — G47.00 INSOMNIA, UNSPECIFIED TYPE: ICD-10-CM

## 2018-08-29 DIAGNOSIS — K59.00 CONSTIPATION, UNSPECIFIED CONSTIPATION TYPE: ICD-10-CM

## 2018-08-29 DIAGNOSIS — Z94.4 LIVER TRANSPLANTED (H): Primary | ICD-10-CM

## 2018-08-29 DIAGNOSIS — R10.84 ABDOMINAL PAIN, GENERALIZED: ICD-10-CM

## 2018-08-29 DIAGNOSIS — L29.9 PRURITIC DISORDER: ICD-10-CM

## 2018-08-29 DIAGNOSIS — D63.1 ANEMIA IN STAGE 3 CHRONIC KIDNEY DISEASE (H): ICD-10-CM

## 2018-08-29 PROCEDURE — 99607 MTMS BY PHARM ADDL 15 MIN: CPT | Performed by: PHARMACIST

## 2018-08-29 PROCEDURE — 99605 MTMS BY PHARM NP 15 MIN: CPT | Performed by: PHARMACIST

## 2018-08-29 NOTE — PROGRESS NOTES
SUBJECTIVE/OBJECTIVE:                           Phan Luque is a 62 year old female called for an initial visit for Medication Therapy Management.  She was referred to me from Dr. Gutierrez.    Chief Complaint: yearly med review. Seeing a neurologist for her peripheral neuropathy.    Allergies/ADRs: Reviewed in Epic  Tobacco: No tobacco use   Alcohol: not currently using  Caffeine: 2 cups/day of coffee  Activity: Fracture of tibia on July 16th. Has to keep pressure off of it for 4 months.   PMH: Reviewed in Epic    Medication Adherence/Access:  Patient uses pill box(es). She fills it every Saturday morning. Does use alarms for the TAC.   Patient takes medications 4 time(s) per day.   Per patient, misses medication 0 times per week. Doses do get delayed sometimes, but she doesn't miss them.   The patient fills medications at [facility]: YES.    Liver Transplant:  Current immunosuppressants include TAC 2mg qAM & 2mg qPM, Prednisone 6mg qAM (tapering off of this from 10mg, started for generalized discomfort/pain), and AZA 50 mg at bedtime (~0.5 mg/kg).  Pt reports no side effects  Transplant date: 8/25/16  Estimated Creatinine Clearance: 51.3 mL/min (based on Cr of 1.41).  CMV prophylaxis:  Completed   PCP prophylaxis: completed for 6 months post txp  Antifungal Prophylaxis: completed  PPI use: yes  Current supplements for electrolyte replacement: Calcium and magnesium  Magnesium   Date Value Ref Range Status   06/22/2018 1.7 1.6 - 2.3 mg/dL Final   Tx Coordinator: Porfirio Jensen MD: Dr. Banegas, Using Med Card: No  Recent Infections: Rejection episode.   Recent Hospitalizations: none  Date last skin check: Derm every 6 months, does use sunscreen  Immunizations: annual flu shot yearly, Catheiopau20:  Due, may have got at [facility]; Prevnar 13: UTD, TDaP:  UTD    GFR Estimate   Date Value Ref Range Status   08/21/2018 38 (L) >60 mL/min/1.7m2 Final     Comment:     Non  GFR Calc   06/22/2018 41 (L) >60  "mL/min/1.7m2 Final     Comment:     Non  GFR Calc   04/20/2018 46 (L) >60 mL/min/1.7m2 Final     Comment:     Non  GFR Calc     Hypertension: Current medications include Chlorthalidone 12.5mg at bedtime, Amlodipine 5mg qAM, Dr. Ortega wants her taking this in the AM to avoid another AION episode and BP dropping too low overnight. Patient does self-monitor BP. 120/70-80.  Patient reports no current medication side effects.    Constipation: Swings between diarrhea and constipation. Takes Psyllium daily for this. It is fairly effective at keeping her regular.     Pain: Pt is taking APAP ER 650mg prn rarely, Tramadol 50mg prn, Gabapentin 100mg TID, Prednisone is for pain per patient.   Pain is described as Stabbing and different at different times. It is a generalized pain since the transplant and Pred has helped. How is your pain? Tolerable with discomfort.  Are you able to do your normal activities? Can do most things, but pain gets in the way some of the time.  Are you able to sleep? Normal sleep. Patient reports the following side effects:  Denies Side Effects. Pain is from joints, generalized, can have a random stabbing pain, muscles can cramp, and neuropathy in lower extremities. Pt has Diclofenac gel states it is \"okay\", but not great. Same with Lidocaine.      Pruritis: Pt is taking Hydroxyzine 25mg prn itching. Pt uses it TID. Pt has tried Allegra, Claritin and Zyrtec, did not help with itching. But it works for her seasonal allergies.     Insomnia: Current medications include: melatonin 5mg at bedtime,Trazodone 150mg qHS . Pt reports no issues. Effective therapies.    GERD: Current medications include: Protonix (pantoprazole) 40 once daily. Pt c/o emesis, which stopped after taking prednisone. Patient feels that current regimen is effective.    RLS: Pt is taking Pramipexole 0.5mg at bedtime. This is effective.     Anemia: Pt is taking Ferrous Sulfate 325mg BID.   Hemoglobin   Date " Value Ref Range Status   08/21/2018 13.5 11.7 - 15.7 g/dL Final     Osteopenia: Pt is taking Calcium citrate 250mg TID, MVI with 300mg of Calcium Carb, Reclast annually while on prednisone. Low dairy intake. Has had hypercalcemia in the past.     Today's Vitals: There were no vitals taken for this visit.      ASSESSMENT:                             Current medications were reviewed today.     Medication Adherence: good, no issues identified    Liver Transplant:  After reviewing her vaccinations it appears she is due for Pneumovax 23, then again in 5 years. She is unsure if [facility] gave her this one. She will check. Not record in the [facility].     Hypertension: Stable. BP at goal at home.     Constipation: Stable.     Pain: Pt could consider Duloxetine as a possible treatment option for her generalized pain. Would have to monitor CrCl, make sure it stays >30ml/min. If it drops below consider switch to Venlafaxine. Same class, but does not have the indication.      Pruritis: Stable.     Insomnia: Stable.     GERD: Stable. Could consider DC if prednisone is tapered off, if she does not have the same symptoms of Emesis etc.     RLS: Stable.     Anemia: Stable.     Osteopenia: Stable. Discussed patient should be getting between 2113-9066 mg of calcium daily. Currently getting around 1000 mg not including dietary sources as it is minimal.      PLAN:                            1. Duloxetine may be a good choice for your generalized pain. We will want to make sure you creatinine clearance does not dip below 30 while on this drug. This correlates with a Serum Creatinine between 1.9 and 2mg/dL. Discuss with pain management provider.   2. Due to your osteopenia, you should stay between 1000-1300mg of Calcium daily and between 7372-3060 units of Vitamin D a day.   3. You may be due for a Pneumovax 23 (pneumo-poly) vaccination. Check [facility] records at [facility]. This is different than the Prevnar 13 (pneumo-conjugated), which you  already had and do not need again.      I spent 60 minutes with this patient today. All changes were made via collaborative practice agreement with Dr. Sanchez. A copy of the visit note was provided to the patient's primary care provider.    Will follow up in 1 year.    The patient was sent via [employer] a summary of these recommendations as an after visit summary.     Venkata Engel, PharmD  [facility] Pharmacist    Phone: [phone]

## 2018-08-29 NOTE — MR AVS SNAPSHOT
After Visit Summary   8/29/2018    Phan Luque    MRN: [id number]           Patient Information     Date Of Birth          1955        Visit Information        Provider Department      8/29/2018 2:30 PM [facility]        Today's Diagnoses     Liver transplanted (H)    -  1    Hypertension secondary to other renal disorders        Abdominal pain, generalized        Insomnia, unspecified type        Anemia in stage 3 chronic kidney disease        Gastroesophageal reflux disease, esophagitis presence not specified        Constipation, unspecified constipation type        Restless legs syndrome (RLS)        Osteopenia, unspecified location        Pruritic disorder          Care Instructions    Recommendations from today's MTM visit:                                                      1. Duloxetine may be a good choice for your generalized pain. We will want to make sure you creatinine clearance does not dip below 30 while on this drug. This correlates with a Serum Creatinine between 1.9 and 2mg/dL.     2. Due to your osteopenia, you should stay between 1000-1300mg of Calcium daily and between 8101-6475 units of Vitamin D a day.     3. You may be due for a Pneumovax 23 (pneumo-poly) vaccination. Check your records at [facility]. This is different than the Prevnar 13 (pneumo-conjugated), which you already had and do not need again.      Next MTM visit: 1 year or as needed.     To schedule another MTM appointment, please call the clinic directly or you may call the MTM scheduling line at [phone] or toll-free at [phone].     My Clinical Pharmacist's contact information:                                                      It was a pleasure seeing you today!  Please feel free to contact me with any questions or concerns you have.      Venkata Engel, PharmD  [facility] Pharmacist    Phone: [phone]     You may receive a survey about the  MTM services you received.  I would appreciate your feedback to help me serve you better in the future. Please fill it out and return it when you can. Your comments will be anonymous.              Follow-ups after your visit        Your next 10 appointments already scheduled     Aug 31, 2018  7:30 AM CDT   (Arrive by 7:15 AM)   New Patient Visit with Barber Ortiz MD   [facility] Neurology ([facility])    [address]  3rd Floor  [facility] [address]   [phone]            Sep 07, 2018  6:00 PM CDT   Return Walk In Ortho with Donnell Gorman DO   [facility] Sports and Orthopaedic Walk In Clinic ([facility])    [address]  4th Floor  [place] [address]   [phone]            Sep 26, 2018  5:00 PM CDT   Adult Med Follow UP with DONOVAN [facility]   Psychiatry Clinic ([facility])    [place] [id number]  [address] [address]   [phone]            Oct 01, 2018  4:45 PM CDT   (Arrive by 4:30 PM)   Return Liver Transplant with Hussein Banegas MD   [facility] Hepatology ([facility])    [address] [place] [address]  [address]  [facility] [address]   [phone]            Oct 22, 2018  7:30 AM CDT   (Arrive by 7:15 AM)   Return Visit with Arlyn Sanchez MD   [facility] Primary Care Clinic ([facility])    [address] [place] [address]  4th Floor  [facility] [address]   [phone]            Mar 20, 2019  3:30 PM CDT   Lab with  LAB   [facility] Lab ([facility])    [address] [place] [address]  1st Floor  [facility] [address]   [phone]            Mar 20, 2019  4:30 PM CDT   (Arrive by 4:00 PM)   Return Visit with Daya Gutierrez MD   [facility] Nephrology ([facility])    [address] [place] [address]  [address]  [facility] [address]   [phone]            Aug 14, 2019  7:45 AM  CDT   (Arrive by 7:30 AM)   RETURN NEURO with Ambrose Ortega MD   [facility] Ophthalmology ([facility])    [address]  4th Floor  [facility] [address] [phone]              Who to contact     If you have questions or need follow up information about today's clinic visit or your schedule please contact [facility] MEDICATION THERAPY MANAGEMENT directly at [phone].  Normal or non-critical lab and imaging results will be communicated to you by [employer], letter or phone within 4 business days after the clinic has received the results. If you do not hear from us within 7 days, please contact the clinic through [employer] or phone. If you have a critical or abnormal lab result, we will notify you by phone as soon as possible.  Submit refill requests through [employer] or call your pharmacy and they will forward the refill request to us. Please allow 3 business days for your refill to be completed.          Additional Information About Your Visit        [employer] Information     [employer] gives you secure access to your electronic health record. If you see a primary care provider, you can also send messages to your care team and make appointments. If you have questions, please call your primary care clinic.  If you do not have a primary care provider, please call [phone] and they will assist you.        Care EveryWhere ID     This is your Care EveryWhere ID. This could be used by other organizations to access your [facility] medical records  [id number]         Blood Pressure from Last 3 Encounters:   08/14/18 131/83   07/31/18 140/78   07/27/18 154/66    Weight from Last 3 Encounters:   08/14/18 224 lb (101.6 kg)   08/10/18 224 lb (101.6 kg)   07/31/18 224 lb (101.6 kg)              Today, you had the following     No orders found for display         Today's Medication Changes          These changes are accurate as of 8/29/18 11:59 PM.  If you have any questions, ask your  nurse or doctor.               These medicines have changed or have updated prescriptions.        Dose/Directions    betaxolol 0.5 % ophthalmic solution   Commonly known as:  BETOPTIC   This may have changed:  when to take this   Used for:  Primary open angle glaucoma of both eyes, unspecified glaucoma stage        Dose:  1 drop   Place 1 drop into both eyes 2 times daily   Quantity:  5 mL   Refills:  2       traMADol 50 MG tablet   Commonly known as:  ULTRAM   This may have changed:  Another medication with the same name was removed. Continue taking this medication, and follow the directions you see here.   Used for:  Pain of left lower leg   Changed by:  Venkata Engel, [facility]        Dose:  50 mg   Take 1 tablet (50 mg) by mouth every 6 hours as needed for severe pain   Quantity:  20 tablet   Refills:  0                Primary Care Provider Office Phone # Fax #    Arlyn Sanchez [phone] [address] [address]        Equal Access to Services     [facility] : Hadii adonis mejia hadasho Sogary, waaxda luqadaha, qaybta kaalmada adeegyada, waxay enrique ambrose . So [facility] [phone].    ATENCIÓN: Si habla español, tiene a reece disposición servicios gratuitos de asistencia lingüística. Rhiannon al [phone].    We comply with applicable federal civil rights laws and [place] laws. We do not discriminate on the basis of race, color, national origin, age, disability, sex, sexual orientation, or gender identity.            Thank you!     Thank you for choosing [facility] MEDICATION THERAPY MANAGEMENT  for your care. Our goal is always to provide you with excellent care. Hearing back from our patients is one way we can continue to improve our services. Please take a few minutes to complete the written survey that you may receive in the mail after your visit with us. Thank you!             Your Updated Medication List - Protect others around you:  Learn how to safely use, store and throw away your medicines at www.disposemymeds.org.          This list is accurate as of 8/29/18 11:59 PM.  Always use your most recent med list.                   Brand Name Dispense Instructions for use Diagnosis    acetaminophen 325 MG tablet    TYLENOL    100 tablet    Take 2 tablets (650 mg) by mouth every 6 hours as needed for mild pain Or fevers. Use sparingly. Limit use to no more than 2 grams (2000 mg) in 24 hours. **Further refills must be obtained by primary care provider**    Acute post-operative pain       amLODIPine 5 MG tablet    NORVASC    90 tablet    Take 1 tablet (5 mg) by mouth daily    Hypertension       azaTHIOprine 50 MG tablet    IMURAN    90 tablet    Take 1 tablet (50 mg) by mouth every morning    Liver transplanted (H)       betaxolol 0.5 % ophthalmic solution    BETOPTIC    5 mL    Place 1 drop into both eyes 2 times daily    Primary open angle glaucoma of both eyes, unspecified glaucoma stage       calcium Citrate-vitamin D 500-400 MG-UNIT Chew      Take 1 tablet by mouth daily        chlorthalidone 25 MG tablet    HYGROTON    45 tablet    Take 0.5 tablets (12.5 mg) by mouth daily    CKD (chronic kidney disease) stage 3, GFR 30-59 ml/min, Fluid retention       cholecalciferol 400 UNIT Tabs tablet    vitamin D3     Take 400 Units by mouth daily        cyanocobalamin 1000 MCG tablet    vitamin  B-12     Take 1,000 mcg by mouth daily        diclofenac 1 % Gel topical gel    VOLTAREN     Apply 2 g topically 4 times daily as needed for moderate pain        ferrous sulfate 325 (65 Fe) MG tablet    IRON    100 tablet    Take 1 tablet (325 mg) by mouth 2 times daily    Iron deficiency       folic acid 1 MG tablet    FOLVITE    30 tablet    Take 1 tablet (1 mg) by mouth daily    Malnutrition (H)       gabapentin 100 MG capsule    NEURONTIN    90 capsule    Take 1 capsule (100 mg) by mouth 3 times daily    Peripheral polyneuropathy       hydrOXYzine 25 MG tablet     ATARAX    60 tablet    Take 1-2 tablets (25-50mg) every 6 hours as needed for itching    WALTER (generalized anxiety disorder)       levothyroxine 88 MCG tablet    SYNTHROID/LEVOTHROID    90 tablet    Take 1 tablet (88 mcg) by mouth every morning (before breakfast)    Thyroid function test abnormal       MAGNESIUM OXIDE PO      Take 400 mg by mouth daily        melatonin 5 MG tablet      Take 1 tablet (5 mg) by mouth nightly as needed for sleep        multivitamin, therapeutic Tabs tablet     30 tablet    Take 1 tablet by mouth every 24 hours    Malnutrition (H)       OMEGA-3 FISH OIL EX ST PO      Take 1 capsule by mouth 2 times daily 1290 (fish oil)-900 (omega 3)        order for DME     1 Device    Equipment being ordered: knee sleeve    Acute pain of left knee       pantoprazole 40 MG EC tablet    PROTONIX    90 tablet    Take 1 tablet (40 mg) by mouth every morning (before breakfast)    Gastroesophageal reflux disease, esophagitis presence not specified       pramipexole 0.5 MG tablet    MIRAPEX    90 tablet    Take 1 tablet (0.5 mg) by mouth At Bedtime    Restless leg syndrome       * predniSONE 5 MG tablet    DELTASONE    90 tablet    Take 1 tablet (5 mg) by mouth daily    Liver replaced by transplant (H)       * predniSONE 1 MG tablet    DELTASONE    360 tablet    Take 4 tablets (4 mg) by mouth daily    Liver replaced by transplant (H)       psyllium 0.52 g capsule     60 capsule    Take 2 capsules (1.04 g) by mouth daily With a full glass of water.    Loose stools       tacrolimus 0.5 MG capsule    GENERIC EQUIVALENT    240 capsule    Take 4 capsules (2 mg) by mouth every 12 hours    Liver transplanted (H)       traMADol 50 MG tablet    ULTRAM    20 tablet    Take 1 tablet (50 mg) by mouth every 6 hours as needed for severe pain    Pain of left lower leg       traZODone 100 MG tablet    DESYREL    45 tablet    Take 1.5 tablets (150 mg) by mouth At Bedtime    Insomnia, unspecified type       ursodiol 300 MG  capsule    ACTIGALL    60 capsule    Take 1 capsule (300 mg) by mouth 2 times daily    Liver transplanted (H)       * Notice:  This list has 2 medication(s) that are the same as other medications prescribed for you. Read the directions carefully, and ask your doctor or other care provider to review them with you.

## 2018-08-29 NOTE — PATIENT INSTRUCTIONS
Recommendations from today's MTM visit:                                                      1. Duloxetine may be a good choice for your generalized pain. We will want to make sure you creatinine clearance does not dip below 30 while on this drug. This correlates with a Serum Creatinine between 1.9 and 2mg/dL.     2. Due to your osteopenia, you should stay between 1000-1300mg of Calcium daily and between 8642-9181 units of Vitamin D a day.     3. You may be due for a Pneumovax 23 (pneumo-poly) vaccination. Check [facility] records at [facility]. This is different than the Prevnar 13 (pneumo-conjugated), which you already had and do not need again.      Next MTM visit: 1 year or as needed.     To schedule another MTM appointment, please call the clinic directly or you may call the MTM scheduling line at [phone] or toll-free at [phone].     My Clinical Pharmacist's contact information:                                                      It was a pleasure seeing you today!  Please feel free to contact me with any questions or concerns you have.      Venkata Engel, PharmD  [facility] Pharmacist    Phone: [phone]     You may receive a survey about the MTM services you received.  I would appreciate your feedback to help me serve you better in the future. Please fill it out and return it when you can. Your comments will be anonymous.

## 2018-08-29 NOTE — TELEPHONE ENCOUNTER
FUTURE VISIT INFORMATION      FUTURE VISIT INFORMATION:    Date: 08/31/2018     Time: 07:30 am     Location: [facility]   REFERRAL INFORMATION:    Referring provider:  DENISE GORE    Referring providers clinic:      Reason for visit/diagnosis        NOTES (FOR ALL VISITS) STATUS DETAILS   OFFICE NOTE from referring provider Internal 08/10/2018   OFFICE NOTE from other specialist Internal 08/09/2018   DISCHARGE SUMMARY from hospital Care Everywhere  Internal  03/26/2018, 01/09/2017, 10/31/2016, 10/31/2016, 09/14/2016, 07/28/2016, 07/27/2016, 07/20/2016, 07/12/2016   DISCHARGE REPORT from the ER Internal  Care Everywhere  07/27/2018, 07/21/2018, 06/11/2018, 04/29/2018, 10/25/2017, 10/19/2017, 05/18/2017, 03/16/2017, 12/29/2012, 02/15/2011, 05/09/2010   OPERATIVE REPORT Internal 06/15/2018, 10/31/2016, 08/25/2016, 08/17/2016   MEDICATION LIST Internal    IMAGING  (FOR ALL VISITS)     EMG Internal 08/09/2018, 08/14/2018, 08/10/2018, 08/09/2018, 07/31/2018, 07/10/2018,    EEG N/A    ECT N/A    MRI (HEAD, NECK, SPINE) Internal  PACS 07/25/2018, 05/15/2018, 06/14/2017,    CT (HEAD, NECK, SPINE) Care Everywhere  Internal   PACS  09/14/2016, 07/28/2016,    OTHER     X-ray Translaminar lumbar single inj  XR Lumbar Spine 2/3 Views    Reports Only   XRAY LUMBOSACRAL SPINE  XRAY THORACIC SPINE SUMMIT     PACS   Internal  06/20/2018, 05/08/2018,  10/28/2017

## 2018-08-31 ENCOUNTER — PRE VISIT (OUTPATIENT)
Dept: NEUROLOGY | Facility: CLINIC | Age: 63
End: 2018-08-31

## 2018-08-31 ENCOUNTER — OFFICE VISIT (OUTPATIENT)
Dept: NEUROLOGY | Facility: CLINIC | Age: 63
End: 2018-08-31
Payer: COMMERCIAL

## 2018-08-31 VITALS
HEIGHT: 67 IN | WEIGHT: 224 LBS | DIASTOLIC BLOOD PRESSURE: 61 MMHG | BODY MASS INDEX: 35.16 KG/M2 | SYSTOLIC BLOOD PRESSURE: 126 MMHG | HEART RATE: 74 BPM

## 2018-08-31 DIAGNOSIS — D17.79 EPIDURAL LIPOMATOSIS: ICD-10-CM

## 2018-08-31 DIAGNOSIS — M48.02 CERVICAL STENOSIS OF SPINAL CANAL: Primary | ICD-10-CM

## 2018-08-31 DIAGNOSIS — G60.3 IDIOPATHIC PROGRESSIVE POLYNEUROPATHY: ICD-10-CM

## 2018-08-31 DIAGNOSIS — M48.02 CERVICAL STENOSIS OF SPINAL CANAL: ICD-10-CM

## 2018-08-31 LAB — T PALLIDUM AB SER QL: NONREACTIVE

## 2018-08-31 ASSESSMENT — ENCOUNTER SYMPTOMS
JOINT SWELLING: 0
ALTERED TEMPERATURE REGULATION: 1
ABDOMINAL PAIN: 1
DECREASED APPETITE: 0
SEIZURES: 0
BOWEL INCONTINENCE: 0
TREMORS: 1
NAUSEA: 0
DIARRHEA: 1
ARTHRALGIAS: 1
BLOATING: 1
FEVER: 0
RECTAL PAIN: 0
WEIGHT GAIN: 0
CHILLS: 0
WEAKNESS: 1
TINGLING: 1
DISTURBANCES IN COORDINATION: 1
HEADACHES: 0
STIFFNESS: 1
SPEECH CHANGE: 0
POLYPHAGIA: 0
NUMBNESS: 1
LOSS OF CONSCIOUSNESS: 0
HALLUCINATIONS: 0
NIGHT SWEATS: 0
INCREASED ENERGY: 0
VOMITING: 0
CONSTIPATION: 1
HEARTBURN: 0
MYALGIAS: 1
MUSCLE CRAMPS: 1
NECK PAIN: 1
PARALYSIS: 0
JAUNDICE: 0
DIZZINESS: 1
MEMORY LOSS: 0
MUSCLE WEAKNESS: 1
WEIGHT LOSS: 0
POLYDIPSIA: 0
BLOOD IN STOOL: 0
FATIGUE: 1
BACK PAIN: 1

## 2018-08-31 ASSESSMENT — PAIN SCALES - GENERAL: PAINLEVEL: MILD PAIN (3)

## 2018-08-31 NOTE — LETTER
"2018       RE: Phan Luque  [address] [facility] [address]     Dear Colleague,    Thank you for referring your patient, Phan Luque, to the [facility] NEUROLOGY at [facility]. Please see a copy of my visit note below.    Service Date: 2018      Donnell Duvall MD   [facility]    [address]   [place] [address]      RE: Phan Luque   MRN: [id number]   : 1955      Dear Dr. Duvall:      Thank you for referring Phan Luque for neurologic consultation on 2018.  The patient is a 62-year-old woman who comes with a chief complaint of a neuropathic process.      The patient developed paresthesias at least 15 years ago involving her hands and feet.  She saw a neurologist 10 years ago who said she had a \"neuropathy.\"  He was evidently not concerned about it and she said she was just told to \"live with it.\"  The patient said that initially it did start in her toes and over a period of months spread up to her ankles.  It does not seem constant but it did seem to me more persistent after she ended up having liver transplantation.  She was in the hospital 6 months and she did suffer issues from chronic bed rest and fatigue issues.  She did eventually recover.  She noted that sometime later after her feet started it did start in her fingers and has stayed there.  She did end up having a left tibial fracture.  She said that this occurred on 2018.  She bled into the leg and had significant ecchymosis.  She did have trouble with her knees before that and she had an injection by you in May involving the same knee that she injured which did help initially.  She described her feet as having a sensation that typically is not painful, but if it is up to 3-4 times per month it will be on the order of 2/10.  She had a hard time describing this and it possibly had a burning quality.  She said it has never been nocturnal.  It has " "only lasted for minutes when she has had it.  The patient did note that the paresthesias did seem to increase to her knees.  She did also indicate that she was treated by you with gabapentin and this has markedly helped her complaints.  She has never noticed any actual weakness including her hand grasp, nor has she ever had a foot drop.  Prior to having her liver transplantation, she may have had what sounds like asterixis.  There is much better, although she said it was worse when she was in the hospital for a prolonged hospital stay following the transplant.  She has had some pain in her neck the last 3 weeks.  She has not had this before.  She described this pain as 4/10 and being short-lived.  She has this daily 30-60 seconds.  She has not had Lhermitte sign.  She denied radicular symptoms.  The patient denied any decreased perirectal or genital sensation.  She has been able to control her urine and stool.  The patient did have some chronic issues in the past related to an obstetrical tear into her rectum.  She said she had anal surgery in 2008, and this did help some earlier incontinence.  She has much better control now.  She described some pain in her back as a \"fatigue, low back issue\", again not being radicular.  She has never been exposed to any toxins other than alcohol.  She has not used any illicit drugs.  She never injected herself.  She said she has had serologic tests which were normal or negative.  She probably has not taken extra zinc or B6.  The patient has a normal diet and is not a vegan.  She said her weight has been stable.  The patient did note that her weight has been up and down and she did lose 92 pounds with [employer] in the 1990s.  She said that she is attempting to do portion control on her own now.  She is 5 feet 7 inches tall.  She said before she started on prednisone after the transplant she weighed 140, but now weighs 219 pounds.  She is not certain that any of her symptoms " seem to improve if she has used a cart.  She is going to try to watch whether her leg symptoms are worse if she stands or if she ambulates or improve with the use of a cart with a bent posture.        The patient did have studies done.  She had an EMG study on 08/09/2018 by Dr. Sandoval.  He said the EMG abnormalities would be consistent with a mild distal motor predominantly peripheral neuropathy.      The patient did have evaluation on 06/14/2017 at the [facility] for neck pain.  At that time she had at C3-C4 moderate spinal canal stenosis and moderate right neural foraminal stenosis.  At C4-C5, she had moderate spinal canal stenosis and moderate to severe bilateral neural foraminal stenosis and at C5-C6 she had severe spinal canal stenosis, severe right and moderate left neural foraminal stenosis and at C7-T1 she had a right foraminal disk protrusion which may contact the exiting C8 nerve root with associated moderate right neural foraminal stenosis.  She downplayed to me why she went to the [facility] then.      The patient did have on 05/15/2018 an MRI scan of the lumbar spine.  I reviewed this actual study with her.  She did not have the previous MRI scan from [facility].  This showed multilevel facet hypertrophy and she had at L5-S1 prominent epidural fat in the spinal canal.  She also had mild atrophy of the paraspinous musculature and partially visualized gluteal muscles, according to Dr. Blanco.  She was felt to have mild spinal canal narrowing at L3-L4.      The patient does have a history of alcoholic hepatitis and cirrhosis and has had liver transplant.  She also has esophageal reflux disorder and stage III renal insufficiency.  The patient does have a history of asthma.  She underwent liver transplantation in 08/2016.  She did recall having jaundice before her transplant.  She said forthrightly she would be drinking bourbon or vodka daily for years and typically would drink up  to 8 ounces.  She also has a history of migraine and she said that this basically has left and she has almost no pain now.  She still gets scintillating scotomata about once per month.  She had strabismus at age 6 and grew out of it with exercises.  She has known osteoporosis.  The patient has had also tubal ligation.  Her family history is such that her mother  of melanoma at 67.  She herself is followed closely by Derm.  Her father  at 69 of COPD and heart failure.  She lost 1 child at age 2 days to a hypoplastic heart.  She has 2 children who are living and well.  One has bicuspid valve.  She has had 3 marriages and  last in .  She has been celibate for 5 years.  She quit smoking in .      CURRENT MEDICATIONS:   The patient is currently on:   1.  Amlodipine.     2.  Azathioprine.     3.  Betoptic.   4.  Calcium citrate and vitamin D.     5.  Chlorthalidone.     6.  Vitamin D3.     7.  B12.     8.  Ferrous sulfate.     9.  Folvite.   10.  Atarax 25 mg daily for longstanding pruritus which works.     11.  Levothyroxine.     12.  Melatonin.     13.  Multivitamin.     14.  Omega-3 fatty acids.     15.  Protonix.     16.  Pramipexole for restless legs and she has taken 0.5 mg daily the last 1-1/2 years.  She said this works and she has had no compulsions including gambling.     17.  6 mg of prednisone daily.   18.  Inderal.   19.  Tacrolimus.   20.  Trazodone 100 mg at bedtime.   21.  Actigall.        ALLERGIES:   She has allergies or intolerance to Benadryl, cefaclor, hydrocodone, oxycodone, penicillins and sulfa drugs.      The patient did have laboratory tests that I reviewed with her.  Her metabolic profile on 2018 showed a creatinine of 1.41 and glucose of 101.  Her complete blood count was normal except for an MCV of 96.  She had a B12 level on 2017 of 1935 picograms.  The patient did have a TSH level on 2018 of 3.41.  On 2016, her B12 level was 510 picograms and  her folic acid level 9.7.  She had negative C serology on 08/23/2016 and in 2016 had negative HIV testing.        The patient noted in addition to her other medicine she is taking gabapentin 100 mg t.i.d.  She has had no trouble taking the medicine whatsoever including daytime sedation.      The patient said she was never aware that she had any weakness, but she said it was brought to her attention by her physical therapist and by you that she was weak in her left leg.      Neurologic examination revealed a pleasant, obese woman.  Her blood pressure was 126/61 with a pulse of 74.  She walked normally but had trouble with tandem.  She had negative Romberg.  Muscle stretch reflexes were hypoactive at the biceps and brachioradialis, normal at the triceps and absent in the legs.  Her toes were downward going to plantar stimulation.  Strength testing was all normal except for moderate weakness of the left toe extensors, cranial nerve examination, superficial and cortical sensory testing were basically unremarkable except for decreased sensation to light touch involving her fingers and toes.      IMPRESSION:   1.  Polyneuropathy with mainly sensory findings except for distal left foot weakness.   2.  Lipomatosis of the lumbar spinal canal with findings to suggest lumbar stenosis related to this condition.   3.  Prior findings of significant cervical stenosis with more current neck pain.      The patient is going to go ahead now and get her studies and records from [facility] for our review.  This would include a CD of her MRI scan.  I have asked that she have a followup MRI scan of her cervical spine here.  I reviewed with her her lipomatosis and did strongly urge that she try to continue to lose weight.  She is going to look into whether using a cart makes her symptoms better.  The patient is going to have neurologic followup with me after these tests are done.        Sincerely yours,       Barber GOMEZ  MD Angel      I spent 1 hour with the patient.  Over 50% of the time this involved counseling and coordination of care.  A complete review of medical systems was done and a positive review is listed in the report above.      cc:   Arlyn Sanchez MD   [address]   [place] [address]        D: 2018   T: 2018   MT: AKA   Name:     PARTH FARMER   MRN:      [id number]        Account:      [id number]   :      1955           Service Date: 2018   Document: [id number]

## 2018-08-31 NOTE — MR AVS SNAPSHOT
After Visit Summary   8/31/2018    Phan Luque    MRN: [id number]           Patient Information     Date Of Birth          1955        Visit Information        Provider Department      8/31/2018 7:30 AM Barber Ortiz MD [facility] Neurology        Today's Diagnoses     Cervical stenosis of spinal canal    -  1    Epidural lipomatosis        Idiopathic progressive polyneuropathy           Follow-ups after your visit        Follow-up notes from your care team     Return in about 5 weeks (around 10/5/2018).      Your next 10 appointments already scheduled     Aug 31, 2018  9:15 AM CDT   LAB with  LAB   [facility] Lab ([facility])    [address] [id number] [phone]           Please do not eat 10-12 hours before your appointment if you are coming in fasting for labs on lipids, cholesterol, or glucose (sugar). This does not apply to pregnant women. Water, hot tea and black coffee (with nothing added) are okay. Do not drink other fluids, diet soda or chew gum.            Sep 05, 2018  1:45 PM CDT   MR CERVICAL SPINE W/O CONTRAST with [facility] MRI ([facility])    [address] [id number] [phone]           Take your medicines as usual, unless your doctor tells you not to. Bring a list of your current medicines to your exam (including vitamins, minerals and over-the-counter drugs). Also bring the results of similar scans you may have had.  Please remove any body piercings and hair extensions before you arrive.  Follow your doctor s orders. If you do not, we may have to postpone your exam.  You may or may not receive IV contrast for this exam pending the discretion of the Radiologist.  You do not need to do anything special to prepare.  The MRI machine uses a strong magnet. Please wear clothes without metal (snaps, zippers). A sweatsuit  works well, or we may give you a hospital gown.   **IMPORTANT** THE INSTRUCTIONS BELOW ARE ONLY FOR THOSE PATIENTS WHO HAVE BEEN PRESCRIBED SEDATION OR GENERAL ANESTHESIA DURING THEIR MRI PROCEDURE:  IF YOUR DOCTOR PRESCRIBED ORAL SEDATION (take medicine to help you relax during your exam):   You must get the medicine from your doctor (oral medication) before you arrive. Bring the medicine to the exam. Do not take it at home. You ll be told when to take it upon arriving for your exam.   Arrive one hour early. Bring someone who can take you home after the test. Your medicine will make you sleepy. After the exam, you may not drive, take a bus or take a taxi by yourself.  IF YOUR DOCTOR PRESCRIBED IV SEDATION:   Arrive one hour early. Bring someone who can take you home after the test. Your medicine will make you sleepy. After the exam, you may not drive, take a bus or take a taxi by yourself.   No eating 6 hours before your exam. You may have clear liquids up until 4 hours before your exam. (Clear liquids include water, clear tea, black coffee and fruit juice without pulp.)  IF YOUR DOCTOR PRESCRIBED ANESTHESIA (be asleep for your exam):   Arrive 1 1/2 hours early. Bring someone who can take you home after the test. You may not drive, take a bus or take a taxi by yourself.   No eating 8 hours before your exam. You may have clear liquids up until 4 hours before your exam. (Clear liquids include water, clear tea, black coffee and fruit juice without pulp.)   You will spend four to five hours in the recovery room.  Please call the Imaging Department at your exam site with any questions.            Sep 07, 2018  6:00 PM CDT   Return Walk In Ortho with Donnell Gorman DO   [facility] ([facility])    [address] [address] [phone]            Sep 26, 2018  5:00 PM CDT   Adult Med Follow UP with DONOVAN Blake CNP    Psychiatry Clinic ([facility])    [address]  [address] [address]   [phone]            Oct 01, 2018  4:45 PM CDT   (Arrive by 4:30 PM)   Return Liver Transplant with Hussein Banegas MD   [facility] Hepatology ([facility])    [address] [facility]  [address]  [facility] [address]   [phone]            Oct 05, 2018  4:30 PM CDT   (Arrive by 4:15 PM)   Return Visit with Barber Ortiz MD   [facility] Neurology ([facility])    [address] [facility]  3rd Floor  [facility] [address]   [phone]            Oct 22, 2018  7:30 AM CDT   (Arrive by 7:15 AM)   Return Visit with Arlyn Sanchez MD   [facility] Primary Care Clinic ([facility])    [address] [facility]  4th Floor  [facility] [address]   [phone]            Mar 20, 2019  3:30 PM CDT   Lab with  LAB   [facility] Lab ([facility])    [address] [facility]  1st Floor  [facility] [address]   [phone]            Mar 20, 2019  4:30 PM CDT   (Arrive by 4:00 PM)   Return Visit with Daya Gutierrez MD   [facility] Nephrology ([facility])    [address] [facility] [address]  [address]  [facility] [address]   [phone]            Aug 14, 2019  7:45 AM CDT   (Arrive by 7:30 AM)   RETURN NEURO with Ambrose Ortega MD   [facility] Ophthalmology ([facility])    [address] [facility]  4th Floor  [facility] [address] [phone]              Future tests that were ordered for you today     Open Future Orders        Priority Expected Expires Ordered    Protein Immunofixation Serum Routine 8/31/2018 8/31/2019 8/31/2018    Protein immunofixation urine Routine 8/31/2018 8/31/2019 8/31/2018    Paraneoplastic antibody Routine 8/31/2018 8/31/2019 8/31/2018    Treponema Abs w Reflex to RPR and Titer Routine  8/31/2019 8/31/2018  "   MRI Cervical spine w/o contrast Routine 8/31/2018 8/31/2019 8/31/2018            Who to contact     Please call your clinic at [phone] to:    Ask questions about your health    Make or cancel appointments    Discuss your medicines    Learn about your test results    Speak to your doctor            Additional Information About Your Visit        [employer] Information     [employer] gives you secure access to your electronic health record. If you see a primary care provider, you can also send messages to your care team and make appointments. If you have questions, please call your primary care clinic.  If you do not have a primary care provider, please call [phone] and they will assist you.      [employer] is an electronic gateway that provides easy, online access to your medical records. With [employer], you can request a clinic appointment, read your test results, renew a prescription or communicate with your care team.     To access your existing account, please contact your [facility] or call [phone] for assistance.        Care EveryWhere ID     This is your Care EveryWhere ID. This could be used by other organizations to access your [facility] medical records  [id number]        Your Vitals Were     Pulse Height BMI (Body Mass Index)             74 1.702 m (5' 7\") 35.08 kg/m2          Blood Pressure from Last 3 Encounters:   08/31/18 126/61   08/14/18 131/83   07/31/18 140/78    Weight from Last 3 Encounters:   08/31/18 101.6 kg (224 lb)   08/14/18 101.6 kg (224 lb)   08/10/18 101.6 kg (224 lb)                 Today's Medication Changes          These changes are accurate as of 8/31/18  9:01 AM.  If you have any questions, ask your nurse or doctor.               These medicines have changed or have updated prescriptions.        Dose/Directions    betaxolol 0.5 % ophthalmic solution   Commonly known as:  BETOPTIC   This may have changed:  when to take this   Used for:  Primary " open angle glaucoma of both eyes, unspecified glaucoma stage        Dose:  1 drop   Place 1 drop into both eyes 2 times daily   Quantity:  5 mL   Refills:  2                Primary Care Provider Office Phone # Fax #    Arlyn Sanchez [phone]       [address] [address]        Equal Access to Services     [facility]HADALLY [facility] : Hadii aad ku hadasho Soomaali, waaxda luqadaha, qaybta kaalmada adeegyada, waxay idiin hayaan adebrenda evans lamikeyfabio . So [facility] [phone].    ATENCIÓN: Si habla español, tiene a reece disposición servicios gratuitos de asistencia lingüística. Llame al [phone].    We comply with applicable federal civil rights laws and [place] laws. We do not discriminate on the basis of race, color, national origin, age, disability, sex, sexual orientation, or gender identity.            Thank you!     Thank you for choosing [facility] NEUROLOGY  for your care. Our goal is always to provide you with excellent care. Hearing back from our patients is one way we can continue to improve our services. Please take a few minutes to complete the written survey that you may receive in the mail after your visit with us. Thank you!             Your Updated Medication List - Protect others around you: Learn how to safely use, store and throw away your medicines at www.disposemymeds.org.          This list is accurate as of 8/31/18  9:01 AM.  Always use your most recent med list.                   Brand Name Dispense Instructions for use Diagnosis    acetaminophen 325 MG tablet    TYLENOL    100 tablet    Take 2 tablets (650 mg) by mouth every 6 hours as needed for mild pain Or fevers. Use sparingly. Limit use to no more than 2 grams (2000 mg) in 24 hours. **Further refills must be obtained by primary care provider**    Acute post-operative pain       amLODIPine 5 MG tablet    NORVASC    90 tablet    Take 1 tablet (5 mg) by mouth daily    Hypertension       azaTHIOprine 50 MG  tablet    IMURAN    90 tablet    Take 1 tablet (50 mg) by mouth every morning    Liver transplanted (H)       betaxolol 0.5 % ophthalmic solution    BETOPTIC    5 mL    Place 1 drop into both eyes 2 times daily    Primary open angle glaucoma of both eyes, unspecified glaucoma stage       calcium Citrate-vitamin D 500-400 MG-UNIT Chew      Take 1 tablet by mouth daily        chlorthalidone 25 MG tablet    HYGROTON    45 tablet    Take 0.5 tablets (12.5 mg) by mouth daily    CKD (chronic kidney disease) stage 3, GFR 30-59 ml/min, Fluid retention       cholecalciferol 400 UNIT Tabs tablet    vitamin D3     Take 400 Units by mouth daily        cyanocobalamin 1000 MCG tablet    vitamin  B-12     Take 1,000 mcg by mouth daily        diclofenac 1 % Gel topical gel    VOLTAREN     Apply 2 g topically 4 times daily as needed for moderate pain        ferrous sulfate 325 (65 Fe) MG tablet    IRON    100 tablet    Take 1 tablet (325 mg) by mouth 2 times daily    Iron deficiency       folic acid 1 MG tablet    FOLVITE    30 tablet    Take 1 tablet (1 mg) by mouth daily    Malnutrition (H)       gabapentin 100 MG capsule    NEURONTIN    90 capsule    Take 1 capsule (100 mg) by mouth 3 times daily    Peripheral polyneuropathy       hydrOXYzine 25 MG tablet    ATARAX    60 tablet    Take 1-2 tablets (25-50mg) every 6 hours as needed for itching    WALTER (generalized anxiety disorder)       levothyroxine 88 MCG tablet    SYNTHROID/LEVOTHROID    90 tablet    Take 1 tablet (88 mcg) by mouth every morning (before breakfast)    Thyroid function test abnormal       MAGNESIUM OXIDE PO      Take 400 mg by mouth daily        melatonin 5 MG tablet      Take 1 tablet (5 mg) by mouth nightly as needed for sleep        multivitamin, therapeutic Tabs tablet     30 tablet    Take 1 tablet by mouth every 24 hours    Malnutrition (H)       OMEGA-3 FISH OIL EX ST PO      Take 1 capsule by mouth 2 times daily 1290 (fish oil)-900 (omega 3)        order  for DME     1 Device    Equipment being ordered: knee sleeve    Acute pain of left knee       pantoprazole 40 MG EC tablet    PROTONIX    90 tablet    Take 1 tablet (40 mg) by mouth every morning (before breakfast)    Gastroesophageal reflux disease, esophagitis presence not specified       pramipexole 0.5 MG tablet    MIRAPEX    90 tablet    Take 1 tablet (0.5 mg) by mouth At Bedtime    Restless leg syndrome       * predniSONE 5 MG tablet    DELTASONE    90 tablet    Take 1 tablet (5 mg) by mouth daily    Liver replaced by transplant (H)       * predniSONE 1 MG tablet    DELTASONE    360 tablet    Take 4 tablets (4 mg) by mouth daily    Liver replaced by transplant (H)       psyllium 0.52 g capsule     60 capsule    Take 2 capsules (1.04 g) by mouth daily With a full glass of water.    Loose stools       tacrolimus 0.5 MG capsule    GENERIC EQUIVALENT    240 capsule    Take 4 capsules (2 mg) by mouth every 12 hours    Liver transplanted (H)       traMADol 50 MG tablet    ULTRAM    20 tablet    Take 1 tablet (50 mg) by mouth every 6 hours as needed for severe pain    Pain of left lower leg       traZODone 100 MG tablet    DESYREL    45 tablet    Take 1.5 tablets (150 mg) by mouth At Bedtime    Insomnia, unspecified type       ursodiol 300 MG capsule    ACTIGALL    60 capsule    Take 1 capsule (300 mg) by mouth 2 times daily    Liver transplanted (H)       * Notice:  This list has 2 medication(s) that are the same as other medications prescribed for you. Read the directions carefully, and ask your doctor or other care provider to review them with you.

## 2018-09-04 LAB
IGA SERPL-MCNC: 338 MG/DL (ref 70–380)
IGG SERPL-MCNC: 954 MG/DL (ref 695–1620)
IGM SERPL-MCNC: 52 MG/DL (ref 60–265)
PROT ELPH PNL UR ELPH: NORMAL
PROT PATTERN SERPL IFE-IMP: ABNORMAL

## 2018-09-04 NOTE — PROGRESS NOTES
"Service Date: 2018      Donnell Duvall MD   [facility]    [address]   [place] [address]      RE: Phan Luque   MRN: [id number]   : 1955      Dear Dr. Duvall:      Thank you for referring Phan Luque for neurologic consultation on 2018.  The patient is a 62-year-old woman who comes with a chief complaint of a neuropathic process.      The patient developed paresthesias at least 15 years ago involving her hands and feet.  She saw a neurologist 10 years ago who said she had a \"neuropathy.\"  He was evidently not concerned about it and she said she was just told to \"live with it.\"  The patient said that initially it did start in her toes and over a period of months spread up to her ankles.  It does not seem constant but it did seem to me more persistent after she ended up having liver transplantation.  She was in the hospital 6 months and she did suffer issues from chronic bed rest and fatigue issues.  She did eventually recover.  She noted that sometime later after her feet started it did start in her fingers and has stayed there.  She did end up having a left tibial fracture.  She said that this occurred on 2018.  She bled into the leg and had significant ecchymosis.  She did have trouble with her knees before that and she had an injection by you in May involving the same knee that she injured which did help initially.  She described her feet as having a sensation that typically is not painful, but if it is up to 3-4 times per month it will be on the order of 2/10.  She had a hard time describing this and it possibly had a burning quality.  She said it has never been nocturnal.  It has only lasted for minutes when she has had it.  The patient did note that the paresthesias did seem to increase to her knees.  She did also indicate that she was treated by you with gabapentin and this has markedly helped her complaints.  She has never noticed any actual weakness " "including her hand grasp, nor has she ever had a foot drop.  Prior to having her liver transplantation, she may have had what sounds like asterixis.  There is much better, although she said it was worse when she was in the hospital for a prolonged hospital stay following the transplant.  She has had some pain in her neck the last 3 weeks.  She has not had this before.  She described this pain as 4/10 and being short-lived.  She has this daily 30-60 seconds.  She has not had Lhermitte sign.  She denied radicular symptoms.  The patient denied any decreased perirectal or genital sensation.  She has been able to control her urine and stool.  The patient did have some chronic issues in the past related to an obstetrical tear into her rectum.  She said she had anal surgery in 2008, and this did help some earlier incontinence.  She has much better control now.  She described some pain in her back as a \"fatigue, low back issue\", again not being radicular.  She has never been exposed to any toxins other than alcohol.  She has not used any illicit drugs.  She never injected herself.  She said she has had serologic tests which were normal or negative.  She probably has not taken extra zinc or B6.  The patient has a normal diet and is not a vegan.  She said her weight has been stable.  The patient did note that her weight has been up and down and she did lose 92 pounds with [employer] in the 1990s.  She said that she is attempting to do portion control on her own now.  She is 5 feet 7 inches tall.  She said before she started on prednisone after the transplant she weighed 140, but now weighs 219 pounds.  She is not certain that any of her symptoms seem to improve if she has used a cart.  She is going to try to watch whether her leg symptoms are worse if she stands or if she ambulates or improve with the use of a cart with a bent posture.        The patient did have studies done.  She had an EMG study on 08/09/2018 by " Lori.  He said the EMG abnormalities would be consistent with a mild distal motor predominantly peripheral neuropathy.      The patient did have evaluation on 06/14/2017 at the [facility] for neck pain.  At that time she had at C3-C4 moderate spinal canal stenosis and moderate right neural foraminal stenosis.  At C4-C5, she had moderate spinal canal stenosis and moderate to severe bilateral neural foraminal stenosis and at C5-C6 she had severe spinal canal stenosis, severe right and moderate left neural foraminal stenosis and at C7-T1 she had a right foraminal disk protrusion which may contact the exiting C8 nerve root with associated moderate right neural foraminal stenosis.  She downplayed to me why she went to the [facility] then.      The patient did have on 05/15/2018 an MRI scan of the lumbar spine.  I reviewed this actual study with her.  She did not have the previous MRI scan from [facility].  This showed multilevel facet hypertrophy and she had at L5-S1 prominent epidural fat in the spinal canal.  She also had mild atrophy of the paraspinous musculature and partially visualized gluteal muscles, according to Dr. Blanco.  She was felt to have mild spinal canal narrowing at L3-L4.      The patient does have a history of alcoholic hepatitis and cirrhosis and has had liver transplant.  She also has esophageal reflux disorder and stage III renal insufficiency.  The patient does have a history of asthma.  She underwent liver transplantation in 08/2016.  She did recall having jaundice before her transplant.  She said forthrightly she would be drinking bourbon or vodka daily for years and typically would drink up to 8 ounces.  She also has a history of migraine and she said that this basically has left and she has almost no pain now.  She still gets scintillating scotomata about once per month.  She had strabismus at age 6 and grew out of it with exercises.  She has known osteoporosis.   The patient has had also tubal ligation.  Her family history is such that her mother  of melanoma at 67.  She herself is followed closely by [facility].  Her father  at 69 of COPD and heart failure.  She lost 1 child at age 2 days to a hypoplastic heart.  She has 2 children who are living and well.  One has bicuspid valve.  She has had 3 marriages and  last in .  She has been celibate for 5 years.  She quit smoking in .      CURRENT MEDICATIONS:   The patient is currently on:   1.  Amlodipine.     2.  Azathioprine.     3.  Betoptic.   4.  Calcium citrate and vitamin D.     5.  Chlorthalidone.     6.  Vitamin D3.     7.  B12.     8.  Ferrous sulfate.     9.  Folvite.   10.  Atarax 25 mg daily for longstanding pruritus which works.     11.  Levothyroxine.     12.  Melatonin.     13.  Multivitamin.     14.  Omega-3 fatty acids.     15.  Protonix.     16.  Pramipexole for restless legs and she has taken 0.5 mg daily the last 1-1/2 years.  She said this works and she has had no compulsions including gambling.     17.  6 mg of prednisone daily.   18.  Inderal.   19.  Tacrolimus.   20.  Trazodone 100 mg at bedtime.   21.  Actigall.        ALLERGIES:   She has allergies or intolerance to Benadryl, cefaclor, hydrocodone, oxycodone, penicillins and sulfa drugs.      The patient did have laboratory tests that I reviewed with her.  Her metabolic profile on 2018 showed a creatinine of 1.41 and glucose of 101.  Her complete blood count was normal except for an MCV of 96.  She had a B12 level on 2017 of 1935 picograms.  The patient did have a TSH level on 2018 of 3.41.  On 2016, her B12 level was 510 picograms and her folic acid level 9.7.  She had negative C serology on 2016 and in  had negative HIV testing.        The patient noted in addition to her other medicine she is taking gabapentin 100 mg t.i.d.  She has had no trouble taking the medicine whatsoever including daytime  sedation.      The patient said she was never aware that she had any weakness, but she said it was brought to her attention by her physical therapist and by you that she was weak in her left leg.      Neurologic examination revealed a pleasant, obese woman.  Her blood pressure was 126/61 with a pulse of 74.  She walked normally but had trouble with tandem.  She had negative Romberg.  Muscle stretch reflexes were hypoactive at the biceps and brachioradialis, normal at the triceps and absent in the legs.  Her toes were downward going to plantar stimulation.  Strength testing was all normal except for moderate weakness of the left toe extensors, cranial nerve examination, superficial and cortical sensory testing were basically unremarkable except for decreased sensation to light touch involving her fingers and toes.      IMPRESSION:   1.  Polyneuropathy with mainly sensory findings except for distal left foot weakness.   2.  Lipomatosis of the lumbar spinal canal with findings to suggest lumbar stenosis related to this condition.   3.  Prior findings of significant cervical stenosis with more current neck pain.      The patient is going to go ahead now and get her studies and records from [facility] for our review.  This would include a CD of her MRI scan.  I have asked that she have a followup MRI scan of her cervical spine here.  I reviewed with her her lipomatosis and did strongly urge that she try to continue to lose weight.  She is going to look into whether using a cart makes her symptoms better.  The patient is going to have neurologic followup with me after these tests are done.        Sincerely yours,       Barber Ortiz MD      I spent 1 hour with the patient.  Over 50% of the time this involved counseling and coordination of care.  A complete review of medical systems was done and a positive review is listed in the report above.      cc:   Arlyn Sanchez MD   [address] [address]  [facility] [address]   [place] [address]         LIZZIE CARRASCO MD             D: 2018   T: 2018   MT: AKA      Name:     PARTH FARMER   MRN:      [id number]        Account:      [id number]   :      1955           Service Date: 2018      Document: [id number]

## 2018-09-05 ENCOUNTER — RADIANT APPOINTMENT (OUTPATIENT)
Dept: MRI IMAGING | Facility: CLINIC | Age: 63
End: 2018-09-05
Attending: PSYCHIATRY & NEUROLOGY
Payer: COMMERCIAL

## 2018-09-05 DIAGNOSIS — D17.79 EPIDURAL LIPOMATOSIS: ICD-10-CM

## 2018-09-05 DIAGNOSIS — G60.3 IDIOPATHIC PROGRESSIVE POLYNEUROPATHY: ICD-10-CM

## 2018-09-05 DIAGNOSIS — M48.02 CERVICAL STENOSIS OF SPINAL CANAL: ICD-10-CM

## 2018-09-07 ENCOUNTER — OFFICE VISIT (OUTPATIENT)
Dept: ORTHOPEDICS | Facility: CLINIC | Age: 63
End: 2018-09-07
Payer: OTHER MISCELLANEOUS

## 2018-09-07 VITALS — HEIGHT: 67 IN | WEIGHT: 224 LBS | BODY MASS INDEX: 35.16 KG/M2

## 2018-09-07 DIAGNOSIS — S82.142D TIBIAL PLATEAU FRACTURE, LEFT, CLOSED, WITH ROUTINE HEALING, SUBSEQUENT ENCOUNTER: Primary | ICD-10-CM

## 2018-09-07 NOTE — LETTER
"9/7/2018       RE: Phan Luque  [address]  [facility] [address]     Dear Colleague,    Thank you for referring your patient, Phan Luque, to the [facility]TS [facility]THOP[facility]ED[facility] WALK [facility] CLINIC at [facility]. Please see a copy of my visit note below.    ESTABLISHED PATIENT FOLLOW-UP:  RECHECK of the Left Knee       HISTORY OF PRESENT ILLNESS  Ms. Luque is a pleasant 62 year old year old female who presents to clinic today for follow-up of left knee. Patient has transitioned to full WB this week and has finally received her lateral  brace.  No pain with walking.  Frustrated with the way in which brace fits.  Has been to orthotics x 2 for fittings and adjustment without great result.      No swelling, no numbness or tingling.    Date of injury: 7/16/18  Date last seen: 8/14/18  Following Therapeutic Plan: Yes  Pain: Improving  Function: Improving  Interval History: above    Additional medical/Social/Surgical histories reviewed in [facility] and updated as appropriate.    REVIEW OF SYSTEMS (9/7/2018)  CONSTITUTIONAL: Denies fever and weight loss  GASTROINTESTINAL: Denies abdominal pain, nausea, vomiting  MUSCULOSKELETAL: See HPI  SKIN: Denies any recent rash or lesion  NEUROLOGICAL: Denies numbness or focal weakness     PHYSICAL EXAM  Ht 1.702 m (5' 7\")  Wt 101.6 kg (224 lb)  BMI 35.08 kg/m2      General  - normal appearance, in no obvious distress  CV  - normal popliteal pulse  Pulm  - normal respiratory pattern, non-labored  Musculoskeletal - Left knee  - stance: mildly antalgic gait, genu valgum  - inspection: No knee swelling.  No calf or foot swelling.  - palpation: medial joint line tenderness, lateral joint line tenderness at tibial plateau especially, patella and patellar tendon non-tender, normal popliteal pulse  - ROM: 120 degrees flexion, 0 degrees extension  - strength: 5/5 in flexion, 5/5 in extension  - neuro: no sensory or motor deficit  Neuro  - " no sensory or motor deficit, grossly normal coordination, normal muscle tone  Skin  - no ecchymosis, erythema, warmth, or induration, no obvious rash  Psych  - interactive, appropriate, normal mood and affect     ASSESSMENT & PLAN  Ms. Luque is a 62 year old year old female who presents to clinic today for reevaluation of left lateral tibial microtrabecular fracture. Currently 7 weeks from injury, pain free.      Diagnosis: Lateral Tibial plateau fracture of left knee with routine healing.    -WBAT with lateral  brace  -Return to crutches for PWB if painful  -Work note provided, begin 1/2 days at office then at 9 weeks unrestricted.  -May increase activity gradually starting next week.  -Follow up 4 weeks - consider increasing activity unrestricted walking etc.     It was a pleasure seeing Phan.            SPORTS & ORTHOPEDIC WALK-IN FOLLOW-UP VISIT 9/7/2018    Interval History:     Follow up reason: Left tibial plateau fx    Date of injury: 7/16    Date last seen: 8/14/18    Following Therapeutic Plan: Yes     Pain: Improving    Function: Improving    Interval History:  She has no pain, but never had too much pain. She is frustrated with her brace.    Medical History:    Any recent changes to your medical history? No    Any new medication prescribed since last visit? No    Review of Systems:    Do you have fever, chills, weight loss? No    Do you have any vision problems? No    Do you have any chest pain or edema? No    Do you have any shortness of breath or wheezing?  No    Do you have stomach problems? No    Do you have any numbness or focal weakness? Yes, peripheral neuropathy    Do you have diabetes? No    Do you have problems with bleeding or clotting? No    Do you have an rashes or other skin lesions? No             Again, thank you for allowing me to participate in the care of your patient.      Sincerely,    Donnell Gorman, DO

## 2018-09-07 NOTE — PROGRESS NOTES
SPORTS & ORTHOPEDIC WALK-IN FOLLOW-UP VISIT 9/7/2018    Interval History:     Follow up reason: Left tibial plateau fx    Date of injury: 7/16    Date last seen: 8/14/18    Following Therapeutic Plan: Yes     Pain: Improving    Function: Improving    Interval History:  She has no pain, but never had too much pain. She is frustrated with her brace.    Medical History:    Any recent changes to your medical history? No    Any new medication prescribed since last visit? No    Review of Systems:    Do you have fever, chills, weight loss? No    Do you have any vision problems? No    Do you have any chest pain or edema? No    Do you have any shortness of breath or wheezing?  No    Do you have stomach problems? No    Do you have any numbness or focal weakness? Yes, peripheral neuropathy    Do you have diabetes? No    Do you have problems with bleeding or clotting? No    Do you have an rashes or other skin lesions? No

## 2018-09-07 NOTE — MR AVS SNAPSHOT
After Visit Summary   9/7/2018    Phan Luque    MRN: [id number]           Patient Information     Date Of Birth          1955        Visit Information        Provider Department      9/7/2018 6:00 PM Donnell Gorman DO M [facility] Sports and Orthopaedic Walk In [facility]         Follow-ups after your visit        Your next 10 appointments already scheduled     Sep 26, 2018  5:00 PM CDT   Adult Med Follow UP with DONOVAN [facility]   Psychiatry Clinic ([facility])    [place] [id number]  [address] [address]   [phone]            Oct 01, 2018  4:45 PM CDT   (Arrive by 4:30 PM)   Return Liver Transplant with Hussein Banegas MD   [facility] Hepatology ([facility])    [address] [place] [address]  [address]  [facility] [address]   [phone]            Oct 05, 2018  4:30 PM CDT   (Arrive by 4:15 PM)   Return Visit with Barber Ortiz MD   [facility] Neurology ([facility])    [address] [place] [address]  3rd [facility] [address]   [phone]            Oct 09, 2018  7:00 AM CDT   Return Walk In Ortho with DO KERI Alonzo [facility] Sports [facility] Orthopaedic Walk In [facility] ([facility])    [address] [place] [address]  [address] [facility] [address] [phone]            Oct 22, 2018  7:30 AM CDT   (Arrive by 7:15 AM)   Return Visit with Arlyn Sanchez MD   [facility] Primary Care Clinic ([facility])    [address] [place] [address]  4[address] [facility] [id number] [phone]            Mar 20, 2019  3:30 PM CDT   Lab with  LAB   [facility] Lab ([facility])    [address] [place] [address]  [address] [facility] [id number] [phone]            Mar 20, 2019  4:30 PM CDT   (Arrive by 4:00 PM)   Return Visit with Daay Gutierrez MD   [facility] Nephrology ([facility])    [address] [place] [address]  [address]  [address]  [facility] [id number] [phone]            Aug 14, 2019  7:45 AM CDT   (Arrive by 7:30 AM)   RETURN NEURO with Ambrose Ortega MD   [facility] Ophthalmology ([facility])    [address]  4[address]  [facility] [id number] [phone]              Who to contact     Please call your clinic at [phone] to:    Ask questions about your health    Make or cancel appointments    Discuss your medicines    Learn about your test results    Speak to your doctor            Additional Information About Your Visit        [employer] Information     [employer] gives you secure access to your electronic health record. If you see a primary care provider, you can also send messages to your care team and make appointments. If you have questions, please call your primary care clinic.  If you do not have a primary care provider, please call [phone] and they will assist you.      [employer] is an electronic gateway that provides easy, online access to your medical records. With [employer], you can request a clinic appointment, read your test results, renew a prescription or communicate with your care team.     To access your existing account, please contact your [facility] or call [phone] for assistance.        Care EveryWhere ID     This is your Care EveryWhere ID. This could be used by other organizations to access your [place] medical records  [id number]        Your Vitals Were     Height BMI (Body Mass Index)                1.702 m (5' 7\") 35.08 kg/m2           Blood Pressure from Last 3 Encounters:   08/31/18 126/61   08/14/18 131/83   07/31/18 140/78    Weight from Last 3 Encounters:   09/07/18 101.6 kg (224 lb)   08/31/18 101.6 kg (224 lb)   08/14/18 101.6 kg (224 lb)              Today, you had the following     No orders found for display         Today's Medication Changes          These changes are accurate as of 9/7/18  " 6:23 PM.  If you have any questions, ask your nurse or doctor.               These medicines have changed or have updated prescriptions.        Dose/Directions    betaxolol 0.5 % ophthalmic solution   Commonly known as:  BETOPTIC   This may have changed:  when to take this   Used for:  Primary open angle glaucoma of both eyes, unspecified glaucoma stage        Dose:  1 drop   Place 1 drop into both eyes 2 times daily   Quantity:  5 mL   Refills:  2                Primary Care Provider Office Phone # Fax #    Arlyn Sanchez [phone]       [address] [address]        Equal Access to Services     [facility]: Hadii adonis mejia hadasho Soomaali, waaxda luqadaha, qaybta kaalmada luz, anna ambrose . So [facility] [phone].    ATENCIÓN: Si habla español, tiene a reece disposición servicios gratuitos de asistencia lingüística. [facility] [phone].    We comply with applicable federal civil rights laws and [place] laws. We do not discriminate on the basis of race, color, national origin, age, disability, sex, sexual orientation, or gender identity.            Thank you!     Thank you for choosing [facility] SPORTS AND ORTHOPAEDIC WALK IN CLINIC  for your care. Our goal is always to provide you with excellent care. Hearing back from our patients is one way we can continue to improve our services. Please take a few minutes to complete the written survey that you may receive in the mail after your visit with us. Thank you!             Your Updated Medication List - Protect others around you: Learn how to safely use, store and throw away your medicines at www.disposemymeds.org.          This list is accurate as of 9/7/18  6:23 PM.  Always use your most recent med list.                   Brand Name Dispense Instructions for use Diagnosis    acetaminophen 325 MG tablet    TYLENOL    100 tablet    Take 2 tablets (650 mg) by mouth every 6 hours as needed  for mild pain Or fevers. Use sparingly. Limit use to no more than 2 grams (2000 mg) in 24 hours. **Further refills must be obtained by primary care provider**    Acute post-operative pain       amLODIPine 5 MG tablet    NORVASC    90 tablet    Take 1 tablet (5 mg) by mouth daily    Hypertension       azaTHIOprine 50 MG tablet    IMURAN    90 tablet    Take 1 tablet (50 mg) by mouth every morning    Liver transplanted (H)       betaxolol 0.5 % ophthalmic solution    BETOPTIC    5 mL    Place 1 drop into both eyes 2 times daily    Primary open angle glaucoma of both eyes, unspecified glaucoma stage       calcium Citrate-vitamin D 500-400 MG-UNIT Chew      Take 1 tablet by mouth daily        chlorthalidone 25 MG tablet    HYGROTON    45 tablet    Take 0.5 tablets (12.5 mg) by mouth daily    CKD (chronic kidney disease) stage 3, GFR 30-59 ml/min, Fluid retention       cholecalciferol 400 UNIT Tabs tablet    vitamin D3     Take 400 Units by mouth daily        cyanocobalamin 1000 MCG tablet    vitamin  B-12     Take 1,000 mcg by mouth daily        diclofenac 1 % Gel topical gel    VOLTAREN     Apply 2 g topically 4 times daily as needed for moderate pain        ferrous sulfate 325 (65 Fe) MG tablet    IRON    100 tablet    Take 1 tablet (325 mg) by mouth 2 times daily    Iron deficiency       folic acid 1 MG tablet    FOLVITE    30 tablet    Take 1 tablet (1 mg) by mouth daily    Malnutrition (H)       gabapentin 100 MG capsule    NEURONTIN    90 capsule    Take 1 capsule (100 mg) by mouth 3 times daily    Peripheral polyneuropathy       hydrOXYzine 25 MG tablet    ATARAX    60 tablet    Take 1-2 tablets (25-50mg) every 6 hours as needed for itching    WALTER (generalized anxiety disorder)       levothyroxine 88 MCG tablet    SYNTHROID/LEVOTHROID    90 tablet    Take 1 tablet (88 mcg) by mouth every morning (before breakfast)    Thyroid function test abnormal       MAGNESIUM OXIDE PO      Take 400 mg by mouth daily         melatonin 5 MG tablet      Take 1 tablet (5 mg) by mouth nightly as needed for sleep        multivitamin, therapeutic Tabs tablet     30 tablet    Take 1 tablet by mouth every 24 hours    Malnutrition (H)       OMEGA-3 FISH OIL EX ST PO      Take 1 capsule by mouth 2 times daily 1290 (fish oil)-900 (omega 3)        order for DME     1 Device    Equipment being ordered: knee sleeve    Acute pain of left knee       pantoprazole 40 MG EC tablet    PROTONIX    90 tablet    Take 1 tablet (40 mg) by mouth every morning (before breakfast)    Gastroesophageal reflux disease, esophagitis presence not specified       pramipexole 0.5 MG tablet    MIRAPEX    90 tablet    Take 1 tablet (0.5 mg) by mouth At Bedtime    Restless leg syndrome       * predniSONE 5 MG tablet    DELTASONE    90 tablet    Take 1 tablet (5 mg) by mouth daily    Liver replaced by transplant (H)       * predniSONE 1 MG tablet    DELTASONE    360 tablet    Take 4 tablets (4 mg) by mouth daily    Liver replaced by transplant (H)       psyllium 0.52 g capsule     60 capsule    Take 2 capsules (1.04 g) by mouth daily With a full glass of water.    Loose stools       tacrolimus 0.5 MG capsule    GENERIC EQUIVALENT    240 capsule    Take 4 capsules (2 mg) by mouth every 12 hours    Liver transplanted (H)       traMADol 50 MG tablet    ULTRAM    20 tablet    Take 1 tablet (50 mg) by mouth every 6 hours as needed for severe pain    Pain of left lower leg       traZODone 100 MG tablet    DESYREL    45 tablet    Take 1.5 tablets (150 mg) by mouth At Bedtime    Insomnia, unspecified type       ursodiol 300 MG capsule    ACTIGALL    60 capsule    Take 1 capsule (300 mg) by mouth 2 times daily    Liver transplanted (H)       * Notice:  This list has 2 medication(s) that are the same as other medications prescribed for you. Read the directions carefully, and ask your doctor or other care provider to review them with you.

## 2018-09-07 NOTE — PROGRESS NOTES
"ESTABLISHED PATIENT FOLLOW-UP:  RECHECK of the Left Knee       HISTORY OF PRESENT ILLNESS  Ms. Luque is a pleasant 62 year old year old female who presents to clinic today for follow-up of left knee. Patient has transitioned to full WB this week and has finally received her lateral  brace.  No pain with walking.  Frustrated with the way in which brace fits.  Has been to orthotics x 2 for fittings and adjustment without great result.      No swelling, no numbness or tingling.    Date of injury: 7/16/18  Date last seen: 8/14/18  Following Therapeutic Plan: Yes  Pain: Improving  Function: Improving  Interval History: above    Additional medical/Social/Surgical histories reviewed in [facility] and updated as appropriate.    REVIEW OF SYSTEMS (9/7/2018)  CONSTITUTIONAL: Denies fever and weight loss  GASTROINTESTINAL: Denies abdominal pain, nausea, vomiting  MUSCULOSKELETAL: See HPI  SKIN: Denies any recent rash or lesion  NEUROLOGICAL: Denies numbness or focal weakness     PHYSICAL EXAM  Ht 1.702 m (5' 7\")  Wt 101.6 kg (224 lb)  BMI 35.08 kg/m2      General  - normal appearance, in no obvious distress  CV  - normal popliteal pulse  Pulm  - normal respiratory pattern, non-labored  Musculoskeletal - Left knee  - stance: mildly antalgic gait, genu valgum  - inspection: No knee swelling.  No calf or foot swelling.  - palpation: medial joint line tenderness, lateral joint line tenderness at tibial plateau especially, patella and patellar tendon non-tender, normal popliteal pulse  - ROM: 120 degrees flexion, 0 degrees extension  - strength: 5/5 in flexion, 5/5 in extension  - neuro: no sensory or motor deficit  Neuro  - no sensory or motor deficit, grossly normal coordination, normal muscle tone  Skin  - no ecchymosis, erythema, warmth, or induration, no obvious rash  Psych  - interactive, appropriate, normal mood and affect     ASSESSMENT & PLAN  Ms. Luque is a 62 year old year old female who presents to clinic today " for reevaluation of left lateral tibial microtrabecular fracture. Currently 7 weeks from injury, pain free.      Diagnosis: Lateral Tibial plateau fracture of left knee with routine healing.    -WBAT with lateral  brace  -Return to crutches for PWB if painful  -Work note provided, begin 1/2 days at office then at 9 weeks unrestricted.  -May increase activity gradually starting next week.  -Follow up 4 weeks - consider increasing activity unrestricted walking etc.     It was a pleasure seeing Phan.    Donnell Gorman DO, CAQSM  Primary Care Sports Medicine

## 2018-09-10 LAB — PNP ABY SERUM: NORMAL

## 2018-09-17 DIAGNOSIS — Z94.4 LIVER REPLACED BY TRANSPLANT (H): Primary | ICD-10-CM

## 2018-09-26 ENCOUNTER — OFFICE VISIT (OUTPATIENT)
Dept: PSYCHIATRY | Facility: CLINIC | Age: 63
End: 2018-09-26
Attending: NURSE PRACTITIONER
Payer: COMMERCIAL

## 2018-09-26 VITALS
BODY MASS INDEX: 33.74 KG/M2 | SYSTOLIC BLOOD PRESSURE: 134 MMHG | WEIGHT: 215.4 LBS | DIASTOLIC BLOOD PRESSURE: 81 MMHG | HEART RATE: 82 BPM

## 2018-09-26 DIAGNOSIS — G47.00 INSOMNIA, UNSPECIFIED TYPE: ICD-10-CM

## 2018-09-26 PROCEDURE — G0463 HOSPITAL OUTPT CLINIC VISIT: HCPCS | Mod: ZF

## 2018-09-26 RX ORDER — TRAZODONE HYDROCHLORIDE 100 MG/1
150 TABLET ORAL AT BEDTIME
Qty: 135 TABLET | Refills: 1 | Status: SHIPPED | OUTPATIENT
Start: 2018-09-26 | End: 2019-04-15

## 2018-09-26 ASSESSMENT — PAIN SCALES - GENERAL: PAINLEVEL: NO PAIN (0)

## 2018-09-26 NOTE — PROGRESS NOTES
Psychiatry Clinic Progress Note                                                                   Phan Luque is a 62 year old female who returns to the clinic for continued care.   Therapist: Germania Bethea @ [facility].  PCP: Santosh Salgado  Other Providers: Hussein Banegas MD    Pertinent Background:  Liver Transplant on 9/25/16 and Stage 3 kidney disease.  Psych critical item history includes SUBSTANCE USE: alcohol.     Interim History                                                                                                             4, 4     The patient is a good historian, reports good treatment adherence and was last seen 6/26/18. Since the last visit, Phan reports the following changes to medications:  Started Gabapentin 100mg TID which was started by orthopedics, prednisone was decreased to 5mg, and folic acid was stopped.  No significant psychiatric symptoms reported.  She does report increase restlessness at night but does not believe it is anxiety.  Restless legs have also gotten worse which has further impeded sleep.  Iron supplementation started to help with RLS.      6/26/18: Phan reports her mental health continues to be well managed in spite of forgetting to load Buspar into her weekly med reminder.  She has not noticed any increase in anxiety.  Buspar will not be restarted.  Phan continues to not have taken the Propranolol as it is unneeded. Sleep quality remains unchanged but she is unconcerned.  Sleep may be impacted by prednisone.  Continues to take Melatonin 5mg and Trazodone 150mg to help with sleep.  Phan reports the two year anniversary of her liver transplant is approaching with no concerns from providers.        3/30/18:  Phan tried decreased Trazodone and the change significantly impacted her sleep.  She has resumed taking Trazodone 150mg qHS. Phan also recently saw nephrologist who prescribed Iron supplementation to possibly help manage restless leg syndrome.        Phan has not taken propranolol as she feels her anxiety is well managed.  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  Discussed longer duration between appointments due to symptoms being well managed.        Recent Symptoms:   Depression:  low energy and insomnia  Elevated:  none  Psychosis:  none  Anxiety:  anxiety intensifies when driving  Panic Attack:  none  Trauma Related:  none     Recent Substance Use:  none reported        Social/ Family History                                  [per patient report]                                     1ea, 1ea   CHILDREN- 3 adult children       TRAUMA HISTORY (self-report)- None  FEELS SAFE AT HOME- Yes    Medical / Surgical History                                                                                                                  Patient Active Problem List   Diagnosis     Decompensation of cirrhosis of liver (H)     Liver transplanted (H)     Alcoholic hepatitis     Immunosuppression (H)     Alcoholic hepatitis without ascites     Enterococcus UTI     Liver transplant status     Nausea & vomiting     Malnutrition (H)     Candidiasis of skin     Anemia     ACP (advance care planning)     Diarrhea     Hypothyroidism     Esophageal reflux     Recurrent major depression in partial remission (H)     Insomnia     CKD (chronic kidney disease) stage 3, GFR 30-59 ml/min     Anemia in stage 3 chronic kidney disease     Osteopenia     Alcohol abuse, uncomplicated       Past Surgical History:   Procedure Laterality Date     APPENDECTOMY      1962     BENCH LIVER N/A 8/25/2016    Procedure: BENCH LIVER;  Surgeon: Larry Dhillon MD;  Location: [facility] OR     CATARACT IOL, RT/LT       COLONOSCOPY       ESOPHAGOSCOPY, GASTROSCOPY, DUODENOSCOPY (EGD), COMBINED N/A 8/17/2016    Procedure: COMBINED ESOPHAGOSCOPY, GASTROSCOPY, DUODENOSCOPY (EGD);  Surgeon: Tao Alfonso MD;  [facility]     ESOPHAGOSCOPY, GASTROSCOPY, DUODENOSCOPY (EGD),  COMBINED N/A 10/31/2016    Procedure: COMBINED ESOPHAGOSCOPY, GASTROSCOPY, DUODENOSCOPY (EGD), BIOPSY SINGLE OR MULTIPLE;  Surgeon: Ronald Bojorquez MD;  Location: [facility]     sphincteroplasty       TRANSPLANT LIVER RECIPIENT  DONOR N/A 2016    Procedure: TRANSPLANT LIVER RECIPIENT  DONOR;  Surgeon: Larry Dhillon MD;  Location: [facility] OR     TUBAL LIGATION      laparoscopic      Medical Review of Systems                                                                                                        2,10   A comprehensive review of systems was performed and is negative other than noted in the HPI.  Pregnant- No    Breastfeeding- No    Contraception- No  Allergy                                Benadryl [diphenhydramine]; Cefaclor; Hydrocodone-acetaminophen; Oxycodone; Penicillins; and Sulfa drugs  Current Medications                                                                                                       Current Outpatient Prescriptions   Medication Sig Dispense Refill     acetaminophen (TYLENOL) 325 MG tablet Take 2 tablets (650 mg) by mouth every 6 hours as needed for mild pain Or fevers. Use sparingly. Limit use to no more than 2 grams (2000 mg) in 24 hours. **Further refills must be obtained by primary care provider** 100 tablet 0     amLODIPine (NORVASC) 5 MG tablet Take 1 tablet (5 mg) by mouth daily 90 tablet 3     azaTHIOprine (IMURAN) 50 MG tablet Take 1 tablet (50 mg) by mouth every morning 90 tablet 3     betaxolol (BETOPTIC) 0.5 % ophthalmic solution Place 1 drop into both eyes 2 times daily (Patient taking differently: Place 1 drop into both eyes daily ) 5 mL 2     calcium Citrate-vitamin D 500-400 MG-UNIT CHEW Take 1 tablet by mouth daily       chlorthalidone (HYGROTON) 25 MG tablet Take 0.5 tablets (12.5 mg) by mouth daily 45 tablet 3     cholecalciferol (VITAMIN D3) 400 UNIT TABS tablet Take 400 Units by mouth daily       cyanocobalamin (VITAMIN  B-12)  1000 MCG tablet Take 1,000 mcg by mouth daily       diclofenac (VOLTAREN) 1 % GEL topical gel Apply 2 g topically 4 times daily as needed for moderate pain       ferrous sulfate (IRON) 325 (65 FE) MG tablet Take 1 tablet (325 mg) by mouth 2 times daily 100 tablet      folic acid (FOLVITE) 1 MG tablet Take 1 tablet (1 mg) by mouth daily 30 tablet 1     gabapentin (NEURONTIN) 100 MG capsule Take 1 capsule (100 mg) by mouth 3 times daily 90 capsule 1     hydrOXYzine (ATARAX) 25 MG tablet Take 1-2 tablets (25-50mg) every 6 hours as needed for itching 60 tablet 2     levothyroxine (SYNTHROID/LEVOTHROID) 88 MCG tablet Take 1 tablet (88 mcg) by mouth every morning (before breakfast) 90 tablet 2     MAGNESIUM OXIDE PO Take 400 mg by mouth daily       melatonin 5 MG tablet Take 1 tablet (5 mg) by mouth nightly as needed for sleep       multivitamin, therapeutic (THERA-VIT) TABS tablet Take 1 tablet by mouth every 24 hours 30 tablet 11     Omega-3 Fatty Acids (OMEGA-3 FISH OIL EX ST PO) Take 1 capsule by mouth 2 times daily 1290 (fish oil)-900 (omega 3)       order for DME Equipment being ordered: knee sleeve 1 Device 0     pantoprazole (PROTONIX) 40 MG EC tablet Take 1 tablet (40 mg) by mouth every morning (before breakfast) 90 tablet 4     pramipexole (MIRAPEX) 0.5 MG tablet Take 1 tablet (0.5 mg) by mouth At Bedtime 90 tablet 3     predniSONE (DELTASONE) 5 MG tablet Take 1 tablet (5 mg) by mouth daily 90 tablet 3     psyllium 0.52 G capsule Take 2 capsules (1.04 g) by mouth daily With a full glass of water. 60 capsule 1     tacrolimus (GENERIC EQUIVALENT) 0.5 MG capsule Take 4 capsules (2 mg) by mouth every 12 hours 240 capsule 11     traMADol (ULTRAM) 50 MG tablet Take 1 tablet (50 mg) by mouth every 6 hours as needed for severe pain 20 tablet 0     traZODone (DESYREL) 100 MG tablet Take 1.5 tablets (150 mg) by mouth At Bedtime 45 tablet 3     ursodiol (ACTIGALL) 300 MG capsule Take 1 capsule (300 mg) by mouth 2 times  daily 60 capsule 11     predniSONE (DELTASONE) 1 MG tablet Take 4 tablets (4 mg) by mouth daily (Patient not taking: Reported on 9/26/2018) 360 tablet 3     Vitals                                                                                                                            3, 3   /81  Pulse 82  Wt 97.7 kg (215 lb 6.4 oz)  BMI 33.74 kg/m2   Mental Status Exam                                                                                        9, 14 cog gs     Alertness: alert  and oriented  Appearance: casually groomed  Behavior/Demeanor: cooperative, pleasant and calm, with good  eye contact   Speech: regular rate and rhythm  Language: no obvious problem  Psychomotor: normal or unremarkable  Mood: description consistent with euthymia  Affect: appropriate; was congruent to mood; was congruent to content  Thought Process/Associations: unremarkable  Thought Content:  Reports none;  Denies suicidal ideation and violent ideation  Perception:  Reports none;  Denies auditory hallucinations and visual hallucinations  Insight: good  Judgment: good  Cognition: (6) does  appear grossly intact; formal cognitive testing was not done  Gait/Station and/or Muscle Strength/Tone: unremarkable    Labs and Data                                                                                                                 Rating Scales:  PHQ9    PHQ9 Today:  7  PHQ-9 SCORE 2/28/2018 3/30/2018 6/26/2018   Total Score MyChart - - -   Total Score 6 5 3         Diagnosis and Assessment                                                                                  m2, h3     Today the following issues were addressed:    1) Major Depressive Disorder, recurrent, mild  2) Generalized Anxiety Disorder    [place] Prescription Monitoring Program [] was not checked today:  will be checked next visit.         Plan                                                                                                                          m2, h3      1) Sleep/mood management    Therapy- Continue  Medication-   Continue Trazodone 150mg qHS for sleep assistance which Suemay correlates with her mood.     Consider duloxetine for mood/anxiety in future as may be helpful for pain as well    Will try Propranolol at night to help sleep and possibly help with RLS  2) Anxiety Management  Therapy- Continue  Medication-   Anxiety medications discontinued as symptoms have resolved.  Continues to be symptom free without medication    RTC: 3 months.  Consider transfer of care to PCP     CRISIS NUMBERS:   Provided routinely in AVS.    Treatment Risk Statement:  The patient understands the risks, benefits, adverse effects and alternatives. Agrees to treatment with the capacity to do so. No medical contraindications to treatment. Agrees to call clinic for any problems. The patient understands to call 911 or go to the nearest ED if life threatening or urgent symptoms occur.      PROVIDER:  DONOVAN Hanna CNP

## 2018-09-26 NOTE — MR AVS SNAPSHOT
After Visit Summary   9/26/2018    Phan Luque    MRN: [id number]           Patient Information     Date Of Birth          1955        Visit Information        Provider Department      9/26/2018 5:00 PM David Vu APRN CNP [facility]        Today's Diagnoses     Insomnia, unspecified type           Follow-ups after your visit        Your next 10 appointments already scheduled     Oct 18, 2018  1:30 PM CDT   (Arrive by 1:15 PM)   Return Visit with Barber Ortiz MD   [facility] ([facility])    [address] [place] [address]  3rd Floor  [facility] [address]   [phone]            Oct 22, 2018  7:30 AM CDT   (Arrive by 7:15 AM)   Return Visit with Arlyn Sanchez MD   [facility] ([facility])    [address] [place] [address]  4th Floor  [facility] [address]   [phone]            Mar 20, 2019  3:30 PM CDT   Lab with  LAB   [facility] ([facility])    [address] [place] [address]  1st Floor  [facility] [address]   [phone]            Mar 20, 2019  4:30 PM CDT   (Arrive by 4:00 PM)   Return Visit with Daya Gutierrez MD   [facility] ([facility])    [address] [place] [address]  [address]  [facility] [address]   [phone]            Mar 27, 2019  5:00 PM CDT   Adult Med Follow UP with DONOVAN Blake CNP   [facility] ([facility])    [place] [id number]  [address] [address]   [phone]            Apr 01, 2019  4:30 PM CDT   (Arrive by 4:15 PM)   Return Liver Transplant with Hussein Banegas MD   [facility] ([facility])    [address] [place] [address]  [address]  [facility] [address]   [phone]            Aug 14, 2019  7:45 AM CDT   (Arrive by 7:30 AM)   RETURN NEURO with Ambrose Ortega MD   [facility]    [address] [facility]  4th [facility] [address] [phone]              Who to contact     Please call your clinic at [phone] to:    Ask questions about your health    Make or cancel appointments    Discuss your medicines    Learn about your test results    Speak to your doctor            Additional Information About Your Visit        MyChart Information     [employer] gives you secure access to your electronic health record. If you see a primary care provider, you can also send messages to your care team and make appointments. If you have questions, please call your primary care clinic.  If you do not have a primary care provider, please call [phone] and they will assist you.      [employer] is an electronic gateway that provides easy, online access to your medical records. With [employer], you can request a clinic appointment, read your test results, renew a prescription or communicate with your care team.     To access your existing account, please contact your [facility] or call [phone] for assistance.        Care EveryWhere ID     This is your Care EveryWhere ID. This could be used by other organizations to access your [facility] medical records  [id number]        Your Vitals Were     Pulse BMI (Body Mass Index)                82 33.74 kg/m2           Blood Pressure from Last 3 Encounters:   10/09/18 145/87   10/04/18 128/70   10/01/18 135/77    Weight from Last 3 Encounters:   10/09/18 97.5 kg (215 lb)   10/04/18 98 kg (216 lb)   10/01/18 97.7 kg (215 lb 6.4 oz)              Today, you had the following     No orders found for display         Today's Medication Changes          These changes are accurate as of 9/26/18 11:59 PM.  If you have any questions, ask your nurse or doctor.               These medicines have changed or have updated prescriptions.        Dose/Directions    betaxolol 0.5 % ophthalmic solution   Commonly  known as:  BETOPTIC   This may have changed:  when to take this   Used for:  Primary open angle glaucoma of both eyes, unspecified glaucoma stage        Dose:  1 drop   Place 1 drop into both eyes 2 times daily   Quantity:  5 mL   Refills:  2            Where to get your medicines      These medications were sent to [facility] - [place], [place] - [address]   [address] , [facility] [address]    Hours:  Mon-Fri 8:30am-5:00pm Toll Free [phone] Phone:  [phone]     traZODone 100 MG tablet                Primary Care Provider Office Phone # Fax #    Arlyn Sanchez [phone]       [address] [address]        Equal Access to Services     PAULO BERRY : Barby fryo Sogary, waaxda luqadaha, qaybta kaalmada adeegyada, anna ambrose . So [facility] [phone].    ATENCIÓN: Si habla español, tiene a reece disposición servicios gratuitos de asistencia lingüística. Llame al [phone].    We comply with applicable federal civil rights laws and [place] laws. We do not discriminate on the basis of race, color, national origin, age, disability, sex, sexual orientation, or gender identity.            Thank you!     Thank you for choosing [facility]  for your care. Our goal is always to provide you with excellent care. Hearing back from our patients is one way we can continue to improve our services. Please take a few minutes to complete the written survey that you may receive in the mail after your visit with us. Thank you!             Your Updated Medication List - Protect others around you: Learn how to safely use, store and throw away your medicines at www.disposemymeds.org.          This list is accurate as of 9/26/18 11:59 PM.  Always use your most recent med list.                   Brand Name Dispense Instructions for use Diagnosis    acetaminophen 325 MG tablet    TYLENOL    100  tablet    Take 2 tablets (650 mg) by mouth every 6 hours as needed for mild pain Or fevers. Use sparingly. Limit use to no more than 2 grams (2000 mg) in 24 hours. **Further refills must be obtained by primary care provider**    Acute post-operative pain       amLODIPine 5 MG tablet    NORVASC    90 tablet    Take 1 tablet (5 mg) by mouth daily    Hypertension       azaTHIOprine 50 MG tablet    IMURAN    90 tablet    Take 1 tablet (50 mg) by mouth every morning    Liver transplanted (H)       betaxolol 0.5 % ophthalmic solution    BETOPTIC    5 mL    Place 1 drop into both eyes 2 times daily    Primary open angle glaucoma of both eyes, unspecified glaucoma stage       calcium Citrate-vitamin D 500-400 MG-UNIT Chew      Take 1 tablet by mouth daily        chlorthalidone 25 MG tablet    HYGROTON    45 tablet    Take 0.5 tablets (12.5 mg) by mouth daily    CKD (chronic kidney disease) stage 3, GFR 30-59 ml/min (H), Fluid retention       cholecalciferol 400 UNIT Tabs tablet    vitamin D3     Take 400 Units by mouth daily        cyanocobalamin 1000 MCG tablet    vitamin  B-12     Take 1,000 mcg by mouth daily        diclofenac 1 % Gel topical gel    VOLTAREN     Apply 2 g topically 4 times daily as needed for moderate pain        ferrous sulfate 325 (65 Fe) MG tablet    IRON    100 tablet    Take 1 tablet (325 mg) by mouth 2 times daily    Iron deficiency       folic acid 1 MG tablet    FOLVITE    30 tablet    Take 1 tablet (1 mg) by mouth daily    Malnutrition (H)       gabapentin 100 MG capsule    NEURONTIN    90 capsule    Take 1 capsule (100 mg) by mouth 3 times daily    Peripheral polyneuropathy       hydrOXYzine 25 MG tablet    ATARAX    60 tablet    Take 1-2 tablets (25-50mg) every 6 hours as needed for itching    WALTER (generalized anxiety disorder)       levothyroxine 88 MCG tablet    SYNTHROID/LEVOTHROID    90 tablet    Take 1 tablet (88 mcg) by mouth every morning (before breakfast)    Thyroid function test  abnormal       MAGNESIUM OXIDE PO      Take 400 mg by mouth daily        melatonin 5 MG tablet      Take 1 tablet (5 mg) by mouth nightly as needed for sleep        multivitamin, therapeutic Tabs tablet     30 tablet    Take 1 tablet by mouth every 24 hours    Malnutrition (H)       OMEGA-3 FISH OIL EX ST PO      Take 1 capsule by mouth 2 times daily 1290 (fish oil)-900 (omega 3)        order for DME     1 Device    Equipment being ordered: knee sleeve    Acute pain of left knee       pantoprazole 40 MG EC tablet    PROTONIX    90 tablet    Take 1 tablet (40 mg) by mouth every morning (before breakfast)    Gastroesophageal reflux disease, esophagitis presence not specified       pramipexole 0.5 MG tablet    MIRAPEX    90 tablet    Take 1 tablet (0.5 mg) by mouth At Bedtime    Restless leg syndrome       * predniSONE 5 MG tablet    DELTASONE    90 tablet    Take 1 tablet (5 mg) by mouth daily    Liver replaced by transplant (H)       * predniSONE 1 MG tablet    DELTASONE    360 tablet    Take 4 tablets (4 mg) by mouth daily    Liver replaced by transplant (H)       psyllium 0.52 g capsule     60 capsule    Take 2 capsules (1.04 g) by mouth daily With a full glass of water.    Loose stools       tacrolimus 0.5 MG capsule    GENERIC EQUIVALENT    240 capsule    Take 4 capsules (2 mg) by mouth every 12 hours    Liver transplanted (H)       traMADol 50 MG tablet    ULTRAM    20 tablet    Take 1 tablet (50 mg) by mouth every 6 hours as needed for severe pain    Pain of left lower leg       traZODone 100 MG tablet    DESYREL    135 tablet    Take 1.5 tablets (150 mg) by mouth At Bedtime    Insomnia, unspecified type       ursodiol 300 MG capsule    ACTIGALL    60 capsule    Take 1 capsule (300 mg) by mouth 2 times daily    Liver transplanted (H)       * Notice:  This list has 2 medication(s) that are the same as other medications prescribed for you. Read the directions carefully, and ask your doctor or other care provider  to review them with you.

## 2018-10-01 ENCOUNTER — RECORDS - HEALTHEAST (OUTPATIENT)
Dept: ADMINISTRATIVE | Facility: OTHER | Age: 63
End: 2018-10-01

## 2018-10-01 ENCOUNTER — OFFICE VISIT (OUTPATIENT)
Dept: GASTROENTEROLOGY | Facility: CLINIC | Age: 63
End: 2018-10-01
Attending: INTERNAL MEDICINE
Payer: COMMERCIAL

## 2018-10-01 VITALS
DIASTOLIC BLOOD PRESSURE: 77 MMHG | WEIGHT: 215.4 LBS | HEART RATE: 84 BPM | OXYGEN SATURATION: 95 % | BODY MASS INDEX: 33.74 KG/M2 | TEMPERATURE: 98.4 F | SYSTOLIC BLOOD PRESSURE: 135 MMHG

## 2018-10-01 DIAGNOSIS — R52 GENERALIZED PAIN: Primary | ICD-10-CM

## 2018-10-01 DIAGNOSIS — Z94.4 LIVER REPLACED BY TRANSPLANT (H): ICD-10-CM

## 2018-10-01 PROCEDURE — 25000128 H RX IP 250 OP 636: Mod: ZF | Performed by: INTERNAL MEDICINE

## 2018-10-01 PROCEDURE — G0009 ADMIN PNEUMOCOCCAL VACCINE: HCPCS | Mod: ZF

## 2018-10-01 PROCEDURE — G0463 HOSPITAL OUTPT CLINIC VISIT: HCPCS | Mod: 25,ZF

## 2018-10-01 PROCEDURE — 90732 PPSV23 VACC 2 YRS+ SUBQ/IM: CPT | Mod: ZF | Performed by: INTERNAL MEDICINE

## 2018-10-01 RX ORDER — DULOXETIN HYDROCHLORIDE 20 MG/1
20 CAPSULE, DELAYED RELEASE ORAL 2 TIMES DAILY
Qty: 180 CAPSULE | Refills: 3 | Status: SHIPPED | OUTPATIENT
Start: 2018-10-01 | End: 2018-10-22 | Stop reason: ALTCHOICE

## 2018-10-01 RX ADMIN — PNEUMOCOCCAL VACCINE POLYVALENT 0.5 ML
25; 25; 25; 25; 25; 25; 25; 25; 25; 25; 25; 25; 25; 25; 25; 25; 25; 25; 25; 25; 25; 25; 25 INJECTION, SOLUTION INTRAMUSCULAR; SUBCUTANEOUS at 17:19

## 2018-10-01 ASSESSMENT — PAIN SCALES - GENERAL: PAINLEVEL: MODERATE PAIN (5)

## 2018-10-01 NOTE — LETTER
10/1/2018      RE: Phan Luque  [address] [facility] [address]       I had the pleasure of seeing Phan Luque for followup in the Liver Transplantation Clinic at the [facility] on 10/01/2018.  Ms. Luque returns for followup more than 2 years status post liver transplantation for alcoholic hepatitis.  She remains our only patient who was transplanted for alcoholic hepatitis thus far.  Except for 1 slip, she has maintained on her sobriety.      She does have a number of aches and pains.  They are really diffuse aches and pains, bordering on fibromyalgia, but more in her shoulders, elbows and knees.  I did put her on 10 mg of prednisone at one time and that markedly improved things, but she gained a fair amount of weight and would like to try to get back down.  She recently decreased to 5 mg per day and her symptoms returned.      She denies any abdominal pain, itching or skin rash, she does have a moderate amount of fatigue.  She denies any increased abdominal girth or lower extremity edema.  She denies any fevers or chills, cough or shortness of breath.  She denies any nausea or vomiting and has alternating diarrhea and constipation.  She is taking 5-6 fiber capsules per day.  Weight is actually down 10 pounds since she did decrease the dose of prednisone.       Current Outpatient Prescriptions   Medication     acetaminophen (TYLENOL) 325 MG tablet     amLODIPine (NORVASC) 5 MG tablet     azaTHIOprine (IMURAN) 50 MG tablet     betaxolol (BETOPTIC) 0.5 % ophthalmic solution     calcium Citrate-vitamin D 500-400 MG-UNIT CHEW     chlorthalidone (HYGROTON) 25 MG tablet     cholecalciferol (VITAMIN D3) 400 UNIT TABS tablet     cyanocobalamin (VITAMIN  B-12) 1000 MCG tablet     diclofenac (VOLTAREN) 1 % GEL topical gel     DULoxetine (CYMBALTA) 20 MG EC capsule     ferrous sulfate (IRON) 325 (65 FE) MG tablet     folic acid (FOLVITE) 1 MG tablet     gabapentin (NEURONTIN) 100  MG capsule     hydrOXYzine (ATARAX) 25 MG tablet     levothyroxine (SYNTHROID/LEVOTHROID) 88 MCG tablet     MAGNESIUM OXIDE PO     melatonin 5 MG tablet     multivitamin, therapeutic (THERA-VIT) TABS tablet     Omega-3 Fatty Acids (OMEGA-3 FISH OIL EX ST PO)     order for DME     pantoprazole (PROTONIX) 40 MG EC tablet     pramipexole (MIRAPEX) 0.5 MG tablet     predniSONE (DELTASONE) 5 MG tablet     psyllium 0.52 G capsule     tacrolimus (GENERIC EQUIVALENT) 0.5 MG capsule     traMADol (ULTRAM) 50 MG tablet     traZODone (DESYREL) 100 MG tablet     ursodiol (ACTIGALL) 300 MG capsule     predniSONE (DELTASONE) 1 MG tablet     Current Facility-Administered Medications   Medication     pneumococcal vaccine (PNEUMOVAX 23-kareem) injection 0.5 mL     B/P: 135/77, T: 98.4, P: 84, R: Data Unavailable    In general she looks quite good.  HEENT exam shows no scleral icterus or temporal muscle wasting.  Her chest is clear.  Her abdominal exam shows no increase in girth.  No masses or tenderness to palpation are present.  Her liver is 10 cm in span without left lobe enlargement.  No spleen tip is palpable.  Extremity exam shows no edema.  Skin exam shows no suspicious lesions.  Neurologic exam is nonfocal.         Her most recent laboratory studies show her white count is 7.5, hemoglobin 13.5, platelets are 268,000.  Her Tacrolimus level is 6.9.  Her sodium is 138, potassium 3.8, BUN is 17, creatinine 1.4.  AST is 16, ALT is 19, alkaline phosphatase 38, albumin is 3.8, with total protein of 6.8, total bilirubin is 0.8.      IMPRESSION:  Ms. Luque is doing well 2 years status post liver transplantation.  To try to better manage her fibromyalgia and chronic joint aches and joint pains, I will see her back in 7.5 mg of prednisone to see how she does with that.  Hopefully, she can maintain her weight loss and still get decent pain control.  She will get a pneumococcal vaccination today and recommend she have a flu shot from her  local physician.  She will not get a bone density until 3 years post-transplant.  She otherwise is up-to-date with regard to the rest of her cancer screening.  I will see her back in the clinic again in 6 months.      Thank you very much for allowing me to participate in the care of this patient.  If you have any questions regarding my recommendations, please do not hesitate to contact me.         Hussein Banegas MD

## 2018-10-01 NOTE — NURSING NOTE
Chief Complaint   Patient presents with     RECHECK     f/u liver tx     /77  Pulse 84  Temp 98.4  F (36.9  C) (Oral)  Wt 97.7 kg (215 lb 6.4 oz)  SpO2 95%  BMI 33.74 kg/m2  Jeanine Goodman MA

## 2018-10-01 NOTE — PROGRESS NOTES
I had the pleasure of seeing Phan Luque for followup in the Liver Transplantation Clinic at the [facility] on 10/01/2018.  Ms. Luque returns for followup more than 2 years status post liver transplantation for alcoholic hepatitis.  She remains our only patient who was transplanted for alcoholic hepatitis thus far.  Except for 1 slip, she has maintained on her sobriety.      She does have a number of aches and pains.  They are really diffuse aches and pains, bordering on fibromyalgia, but more in her shoulders, elbows and knees.  I did put her on 10 mg of prednisone at one time and that markedly improved things, but she gained a fair amount of weight and would like to try to get back down.  She recently decreased to 5 mg per day and her symptoms returned.      She denies any abdominal pain, itching or skin rash, she does have a moderate amount of fatigue.  She denies any increased abdominal girth or lower extremity edema.  She denies any fevers or chills, cough or shortness of breath.  She denies any nausea or vomiting and has alternating diarrhea and constipation.  She is taking 5-6 fiber capsules per day.  Weight is actually down 10 pounds since she did decrease the dose of prednisone.       Current Outpatient Prescriptions   Medication     acetaminophen (TYLENOL) 325 MG tablet     amLODIPine (NORVASC) 5 MG tablet     azaTHIOprine (IMURAN) 50 MG tablet     betaxolol (BETOPTIC) 0.5 % ophthalmic solution     calcium Citrate-vitamin D 500-400 MG-UNIT CHEW     chlorthalidone (HYGROTON) 25 MG tablet     cholecalciferol (VITAMIN D3) 400 UNIT TABS tablet     cyanocobalamin (VITAMIN  B-12) 1000 MCG tablet     diclofenac (VOLTAREN) 1 % GEL topical gel     DULoxetine (CYMBALTA) 20 MG EC capsule     ferrous sulfate (IRON) 325 (65 FE) MG tablet     folic acid (FOLVITE) 1 MG tablet     gabapentin (NEURONTIN) 100 MG capsule     hydrOXYzine (ATARAX) 25 MG tablet     levothyroxine  (SYNTHROID/LEVOTHROID) 88 MCG tablet     MAGNESIUM OXIDE PO     melatonin 5 MG tablet     multivitamin, therapeutic (THERA-VIT) TABS tablet     Omega-3 Fatty Acids (OMEGA-3 FISH OIL EX ST PO)     order for DME     pantoprazole (PROTONIX) 40 MG EC tablet     pramipexole (MIRAPEX) 0.5 MG tablet     predniSONE (DELTASONE) 5 MG tablet     psyllium 0.52 G capsule     tacrolimus (GENERIC EQUIVALENT) 0.5 MG capsule     traMADol (ULTRAM) 50 MG tablet     traZODone (DESYREL) 100 MG tablet     ursodiol (ACTIGALL) 300 MG capsule     predniSONE (DELTASONE) 1 MG tablet     Current Facility-Administered Medications   Medication     pneumococcal vaccine (PNEUMOVAX 23-kareem) injection 0.5 mL     B/P: 135/77, T: 98.4, P: 84, R: Data Unavailable    In general she looks quite good.  HEENT exam shows no scleral icterus or temporal muscle wasting.  Her chest is clear.  Her abdominal exam shows no increase in girth.  No masses or tenderness to palpation are present.  Her liver is 10 cm in span without left lobe enlargement.  No spleen tip is palpable.  Extremity exam shows no edema.  Skin exam shows no suspicious lesions.  Neurologic exam is nonfocal.         Her most recent laboratory studies show her white count is 7.5, hemoglobin 13.5, platelets are 268,000.  Her Tacrolimus level is 6.9.  Her sodium is 138, potassium 3.8, BUN is 17, creatinine 1.4.  AST is 16, ALT is 19, alkaline phosphatase 38, albumin is 3.8, with total protein of 6.8, total bilirubin is 0.8.      IMPRESSION:  Ms. Luque is doing well 2 years status post liver transplantation.  To try to better manage her fibromyalgia and chronic joint aches and joint pains, I will see her back in 7.5 mg of prednisone to see how she does with that.  Hopefully, she can maintain her weight loss and still get decent pain control.  She will get a pneumococcal vaccination today and recommend she have a flu shot from her local physician.  She will not get a bone density until 3 years  post-transplant.  She otherwise is up-to-date with regard to the rest of her cancer screening.  I will see her back in the clinic again in 6 months.      Thank you very much for allowing me to participate in the care of this patient.  If you have any questions regarding my recommendations, please do not hesitate to contact me.

## 2018-10-01 NOTE — MR AVS SNAPSHOT
After Visit Summary   10/1/2018    Phan Luque    MRN: [id number]           Patient Information     Date Of Birth          1955        Visit Information        Provider Department      10/1/2018 4:45 PM Hussein Banegas MD [facility] Hepatology        Today's Diagnoses     Generalized pain    -  1    Liver replaced by transplant (H)           Follow-ups after your visit        Follow-up notes from your care team     Return in about 6 months (around 4/1/2019).      Your next 10 appointments already scheduled     Oct 05, 2018  4:30 PM CDT   (Arrive by 4:15 PM)   Return Visit with Barber Ortiz MD   [facility] Neurology ([facility] [place])    [address]  3rd Floor  [facility] [address] [phone]            Oct 09, 2018  7:00 AM CDT   Return Walk In Ortho with Torin Ruffin MD   [facility] ([facility])    [address]  4th Floor  [facility] [address] [phone]            Oct 22, 2018  7:30 AM CDT   (Arrive by 7:15 AM)   Return Visit with Arlyn Sanchez MD   [facility] ([facility])    [address]  4th Floor  [facility] [address] [phone]            Mar 20, 2019  3:30 PM CDT   Lab with  LAB   [facility] Lab ([facility])    [address]  1st Floor  [facility] [address]   [phone]            Mar 20, 2019  4:30 PM CDT   (Arrive by 4:00 PM)   Return Visit with Daya Gutierrez MD   [facility] Nephrology ([facility])    [address] [place] [address]  [address]  [facility] [address]   [phone]            Mar 27, 2019  5:00 PM CDT   Adult Med Follow UP with DONOVAN [facility]   Psychiatry Clinic ([facility])    [place] [id number]  [address] [id number] [phone]            Apr  01, 2019  4:30 PM CDT   (Arrive by 4:15 PM)   Return Liver Transplant with Hussein Banegas MD   [facility] Hepatology ([facility])    [address]  [address]  [facility] [address] [phone]            Aug 14, 2019  7:45 AM CDT   (Arrive by 7:30 AM)   RETURN NEURO with Ambrose Ortega MD   [facility] Ophthalmology ([facility])    [address]  4th Floor  [facility] [address] [phone]              Who to contact     If you have questions or need follow up information about today's clinic visit or your schedule please contact [facility] HEPATOLOGY directly at [phone].  Normal or non-critical lab and imaging results will be communicated to you by [employer], letter or phone within 4 business days after the clinic has received the results. If you do not hear from us within 7 days, please contact the clinic through [employer] or phone. If you have a critical or abnormal lab result, we will notify you by phone as soon as possible.  Submit refill requests through Book[employer]ub or call your pharmacy and they will forward the refill request to us. Please allow 3 business days for your refill to be completed.          Additional Information About Your Visit        BookBub Information     Book[employer]ub gives you secure access to your electronic health record. If you see a primary care provider, you can also send messages to your care team and make appointments. If you have questions, please call your primary care clinic.  If you do not have a primary care provider, please call [phone] and they will assist you.        Care EveryWhere ID     This is your Care EveryWhere ID. This could be used by other organizations to access your Normal medical records  [id number]        Your Vitals Were     Pulse Temperature Pulse Oximetry BMI (Body Mass Index)          84 98.4  F (36.9  C) (Oral) 95% 33.74 kg/m2         Blood Pressure from Last 3 Encounters:    10/01/18 135/77   08/31/18 126/61   08/14/18 131/83    Weight from Last 3 Encounters:   10/01/18 97.7 kg (215 lb 6.4 oz)   09/07/18 101.6 kg (224 lb)   08/31/18 101.6 kg (224 lb)              Today, you had the following     No orders found for display         Today's Medication Changes          These changes are accurate as of 10/1/18  5:21 PM.  If you have any questions, ask your nurse or doctor.               Start taking these medicines.        Dose/Directions    DULoxetine 20 MG EC capsule   Commonly known as:  CYMBALTA   Used for:  Generalized pain   Started by:  Hussein Banegas MD        Dose:  20 mg   Take 1 capsule (20 mg) by mouth 2 times daily   Quantity:  180 capsule   Refills:  3         These medicines have changed or have updated prescriptions.        Dose/Directions    betaxolol 0.5 % ophthalmic solution   Commonly known as:  BETOPTIC   This may have changed:  when to take this   Used for:  Primary open angle glaucoma of both eyes, unspecified glaucoma stage        Dose:  1 drop   Place 1 drop into both eyes 2 times daily   Quantity:  5 mL   Refills:  2            Where to get your medicines      These medications were sent to [address] [place] [address] [place], [place] [id number]    Hours:  TRANSPLANT PHONE NUMBER [phone] Phone:  [phone]     DULoxetine 20 MG EC capsule                Primary Care Provider Office Phone # Fax #    Arlyn Sanchez [phone]       [address] [place] [address]        Equal Access to Services     PAULO BERRY AH: Hadii adonis fryo Soomaali, waaxda luqadaha, qaybta kaalmada adeegnigel, anna idilucia schroeder. So [facility] [phone].    ATENCIÓN: Si habla español, tiene a reece disposición servicios gratuitos de asistencia lingüística. Llame al [phone].    We comply with applicable federal civil rights laws and [place] laws.  We do not discriminate on the basis of race, color, national origin, age, disability, sex, sexual orientation, or gender identity.            Thank you!     Thank you for choosing [facility] HEPATOLOGY  for your care. Our goal is always to provide you with excellent care. Hearing back from our patients is one way we can continue to improve our services. Please take a few minutes to complete the written survey that you may receive in the mail after your visit with us. Thank you!             Your Updated Medication List - Protect others around you: Learn how to safely use, store and throw away your medicines at www.disposemymeds.org.          This list is accurate as of 10/1/18  5:21 PM.  Always use your most recent med list.                   Brand Name Dispense Instructions for use Diagnosis    acetaminophen 325 MG tablet    TYLENOL    100 tablet    Take 2 tablets (650 mg) by mouth every 6 hours as needed for mild pain Or fevers. Use sparingly. Limit use to no more than 2 grams (2000 mg) in 24 hours. **Further refills must be obtained by primary care provider**    Acute post-operative pain       amLODIPine 5 MG tablet    NORVASC    90 tablet    Take 1 tablet (5 mg) by mouth daily    Hypertension       azaTHIOprine 50 MG tablet    IMURAN    90 tablet    Take 1 tablet (50 mg) by mouth every morning    Liver transplanted (H)       betaxolol 0.5 % ophthalmic solution    BETOPTIC    5 mL    Place 1 drop into both eyes 2 times daily    Primary open angle glaucoma of both eyes, unspecified glaucoma stage       calcium Citrate-vitamin D 500-400 MG-UNIT Chew      Take 1 tablet by mouth daily        chlorthalidone 25 MG tablet    HYGROTON    45 tablet    Take 0.5 tablets (12.5 mg) by mouth daily    CKD (chronic kidney disease) stage 3, GFR 30-59 ml/min (H), Fluid retention       cholecalciferol 400 UNIT Tabs tablet    vitamin D3     Take 400 Units by mouth daily        cyanocobalamin 1000 MCG tablet    vitamin  B-12     Take  1,000 mcg by mouth daily        diclofenac 1 % Gel topical gel    VOLTAREN     Apply 2 g topically 4 times daily as needed for moderate pain        DULoxetine 20 MG EC capsule    CYMBALTA    180 capsule    Take 1 capsule (20 mg) by mouth 2 times daily    Generalized pain       ferrous sulfate 325 (65 Fe) MG tablet    IRON    100 tablet    Take 1 tablet (325 mg) by mouth 2 times daily    Iron deficiency       folic acid 1 MG tablet    FOLVITE    30 tablet    Take 1 tablet (1 mg) by mouth daily    Malnutrition (H)       gabapentin 100 MG capsule    NEURONTIN    90 capsule    Take 1 capsule (100 mg) by mouth 3 times daily    Peripheral polyneuropathy       hydrOXYzine 25 MG tablet    ATARAX    60 tablet    Take 1-2 tablets (25-50mg) every 6 hours as needed for itching    WALTER (generalized anxiety disorder)       levothyroxine 88 MCG tablet    SYNTHROID/LEVOTHROID    90 tablet    Take 1 tablet (88 mcg) by mouth every morning (before breakfast)    Thyroid function test abnormal       MAGNESIUM OXIDE PO      Take 400 mg by mouth daily        melatonin 5 MG tablet      Take 1 tablet (5 mg) by mouth nightly as needed for sleep        multivitamin, therapeutic Tabs tablet     30 tablet    Take 1 tablet by mouth every 24 hours    Malnutrition (H)       OMEGA-3 FISH OIL EX ST PO      Take 1 capsule by mouth 2 times daily 1290 (fish oil)-900 (omega 3)        order for DME     1 Device    Equipment being ordered: knee sleeve    Acute pain of left knee       pantoprazole 40 MG EC tablet    PROTONIX    90 tablet    Take 1 tablet (40 mg) by mouth every morning (before breakfast)    Gastroesophageal reflux disease, esophagitis presence not specified       pramipexole 0.5 MG tablet    MIRAPEX    90 tablet    Take 1 tablet (0.5 mg) by mouth At Bedtime    Restless leg syndrome       * predniSONE 5 MG tablet    DELTASONE    90 tablet    Take 1 tablet (5 mg) by mouth daily    Liver replaced by transplant (H)       * predniSONE 1 MG tablet     DELTASONE    360 tablet    Take 4 tablets (4 mg) by mouth daily    Liver replaced by transplant (H)       psyllium 0.52 g capsule     60 capsule    Take 2 capsules (1.04 g) by mouth daily With a full glass of water.    Loose stools       tacrolimus 0.5 MG capsule    GENERIC EQUIVALENT    240 capsule    Take 4 capsules (2 mg) by mouth every 12 hours    Liver transplanted (H)       traMADol 50 MG tablet    ULTRAM    20 tablet    Take 1 tablet (50 mg) by mouth every 6 hours as needed for severe pain    Pain of left lower leg       traZODone 100 MG tablet    DESYREL    135 tablet    Take 1.5 tablets (150 mg) by mouth At Bedtime    Insomnia, unspecified type       ursodiol 300 MG capsule    ACTIGALL    60 capsule    Take 1 capsule (300 mg) by mouth 2 times daily    Liver transplanted (H)       * Notice:  This list has 2 medication(s) that are the same as other medications prescribed for you. Read the directions carefully, and ask your doctor or other care provider to review them with you.

## 2018-10-04 ENCOUNTER — OFFICE VISIT (OUTPATIENT)
Dept: URGENT CARE | Facility: URGENT CARE | Age: 63
End: 2018-10-04
Payer: COMMERCIAL

## 2018-10-04 VITALS
TEMPERATURE: 98.2 F | HEART RATE: 96 BPM | DIASTOLIC BLOOD PRESSURE: 70 MMHG | RESPIRATION RATE: 12 BRPM | BODY MASS INDEX: 33.83 KG/M2 | OXYGEN SATURATION: 97 % | SYSTOLIC BLOOD PRESSURE: 128 MMHG | WEIGHT: 216 LBS

## 2018-10-04 DIAGNOSIS — L03.031 CELLULITIS OF RIGHT TOE: Primary | ICD-10-CM

## 2018-10-04 PROCEDURE — 99213 OFFICE O/P EST LOW 20 MIN: CPT | Performed by: FAMILY MEDICINE

## 2018-10-04 RX ORDER — CEPHALEXIN 500 MG/1
500 CAPSULE ORAL 3 TIMES DAILY
Qty: 30 CAPSULE | Refills: 0 | Status: SHIPPED | OUTPATIENT
Start: 2018-10-04 | End: 2018-10-14

## 2018-10-04 NOTE — PROGRESS NOTES
Chief Complaint   Patient presents with     Urgent Care     Toe Pain     Sore big toe, right foot.  She tried soaking it last night.  Toe is red is red and tender today.  She is going out of town Saturday morning and doesnt want to have a problem.          SUBJECTIVE:   Phan Luque is a 62 year old female presenting with a chief complaint of right big toe tip soreness with redness and pain   She did have a pedicure recently but per pt her symptoms were there prior to that. She denies any trauma to the foot   She denies any similar sore elsewhere .  Onset of symptoms was 2 week(s) ago.  Course of illness is waxing and waning.    Severity moderate  Current and Associated symptoms: pain and redness   Treatment measures tried include warm soaks .  Predisposing factors include None.    Past Medical History:   Diagnosis Date     Asthma      Chronic kidney disease      End stage liver disease (H)     2/2 Alcohol Abuse     Liver transplanted (H)      Migraines      Osteoporosis      Spinal stenosis in cervical region      Strabismus      Current Outpatient Prescriptions   Medication Sig Dispense Refill     cephALEXin (KEFLEX) 500 MG capsule Take 1 capsule (500 mg) by mouth 3 times daily for 10 days 30 capsule 0     acetaminophen (TYLENOL) 325 MG tablet Take 2 tablets (650 mg) by mouth every 6 hours as needed for mild pain Or fevers. Use sparingly. Limit use to no more than 2 grams (2000 mg) in 24 hours. **Further refills must be obtained by primary care provider** 100 tablet 0     amLODIPine (NORVASC) 5 MG tablet Take 1 tablet (5 mg) by mouth daily 90 tablet 3     azaTHIOprine (IMURAN) 50 MG tablet Take 1 tablet (50 mg) by mouth every morning 90 tablet 3     betaxolol (BETOPTIC) 0.5 % ophthalmic solution Place 1 drop into both eyes 2 times daily (Patient taking differently: Place 1 drop into both eyes daily ) 5 mL 2     calcium Citrate-vitamin D 500-400 MG-UNIT CHEW Take 1 tablet by mouth daily       chlorthalidone  (HYGROTON) 25 MG tablet Take 0.5 tablets (12.5 mg) by mouth daily 45 tablet 3     cholecalciferol (VITAMIN D3) 400 UNIT TABS tablet Take 400 Units by mouth daily       cyanocobalamin (VITAMIN  B-12) 1000 MCG tablet Take 1,000 mcg by mouth daily       diclofenac (VOLTAREN) 1 % GEL topical gel Apply 2 g topically 4 times daily as needed for moderate pain       DULoxetine (CYMBALTA) 20 MG EC capsule Take 1 capsule (20 mg) by mouth 2 times daily 180 capsule 3     ferrous sulfate (IRON) 325 (65 FE) MG tablet Take 1 tablet (325 mg) by mouth 2 times daily 100 tablet      folic acid (FOLVITE) 1 MG tablet Take 1 tablet (1 mg) by mouth daily 30 tablet 1     gabapentin (NEURONTIN) 100 MG capsule Take 1 capsule (100 mg) by mouth 3 times daily 90 capsule 1     hydrOXYzine (ATARAX) 25 MG tablet Take 1-2 tablets (25-50mg) every 6 hours as needed for itching 60 tablet 2     levothyroxine (SYNTHROID/LEVOTHROID) 88 MCG tablet Take 1 tablet (88 mcg) by mouth every morning (before breakfast) 90 tablet 2     MAGNESIUM OXIDE PO Take 400 mg by mouth daily       melatonin 5 MG tablet Take 1 tablet (5 mg) by mouth nightly as needed for sleep       multivitamin, therapeutic (THERA-VIT) TABS tablet Take 1 tablet by mouth every 24 hours 30 tablet 11     Omega-3 Fatty Acids (OMEGA-3 FISH OIL EX ST PO) Take 1 capsule by mouth 2 times daily 1290 (fish oil)-900 (omega 3)       order for DME Equipment being ordered: knee sleeve 1 Device 0     pantoprazole (PROTONIX) 40 MG EC tablet Take 1 tablet (40 mg) by mouth every morning (before breakfast) 90 tablet 4     pramipexole (MIRAPEX) 0.5 MG tablet Take 1 tablet (0.5 mg) by mouth At Bedtime 90 tablet 3     predniSONE (DELTASONE) 1 MG tablet Take 4 tablets (4 mg) by mouth daily (Patient not taking: Reported on 9/26/2018) 360 tablet 3     predniSONE (DELTASONE) 5 MG tablet Take 1 tablet (5 mg) by mouth daily 90 tablet 3     psyllium 0.52 G capsule Take 2 capsules (1.04 g) by mouth daily With a full  glass of water. 60 capsule 1     tacrolimus (GENERIC EQUIVALENT) 0.5 MG capsule Take 4 capsules (2 mg) by mouth every 12 hours 240 capsule 11     traMADol (ULTRAM) 50 MG tablet Take 1 tablet (50 mg) by mouth every 6 hours as needed for severe pain 20 tablet 0     traZODone (DESYREL) 100 MG tablet Take 1.5 tablets (150 mg) by mouth At Bedtime 135 tablet 1     ursodiol (ACTIGALL) 300 MG capsule Take 1 capsule (300 mg) by mouth 2 times daily 60 capsule 11     Social History   Substance Use Topics     Smoking status: Former Smoker     Packs/day: 2.50     Years: 20.00     Quit date: 12/13/1996     Smokeless tobacco: Never Used     Alcohol use No      Comment: heavy use (750ml/2 days) up until 1 year ago; had cut down to 1 drink/day; none since 7/18/16       ROS:  10 point ROS of systems including Constitutional, Eyes, Respiratory, Cardiovascular, Gastroenterology, Genitourinary, Muscularskeletal, Psychiatric were all negative except for pertinent positives noted in my HPI           OBJECTIVE:  /70  Pulse 96  Temp 98.2  F (36.8  C) (Oral)  Resp 12  Wt 216 lb (98 kg)  SpO2 97%  BMI 33.83 kg/m2  GENERAL APPEARANCE: healthy, alert and no distress  EYES: EOMI,  PERRL, conjunctiva clear  RESP: good air entry  CV: regular rates and rhythm  SKIN: redness and painful noted at the tip of the  Right big toe , Rom is WNL   PSYCH: mentation appears normal    ASSESSMENT:  Phan was seen today for urgent care and toe pain.    Diagnoses and all orders for this visit:    Cellulitis of right toe  -     cephALEXin (KEFLEX) 500 MG capsule; Take 1 capsule (500 mg) by mouth 3 times daily for 10 days          PLAN:  Tylenol  For the pain  Continue doing warm soaking   Apply topical bacitracin   If notices any worsening pain or swelling should follow up  Follow up if  symptoms fail to improve or worsens   Pt understood and agreed with plan     See orders in Epic

## 2018-10-04 NOTE — MR AVS SNAPSHOT
After Visit Summary   10/4/2018    Phan Luque    MRN: [id number]           Patient Information     Date Of Birth          1955        Visit Information        Provider Department      10/4/2018 4:50 PM Natalee Hill MD [facility]        Today's Diagnoses     Cellulitis of right toe    -  1       Follow-ups after your visit        Your next 10 appointments already scheduled     Oct 09, 2018  7:00 AM CDT   Return Walk In Ortho with Torin Ruffin MD   [facility] Sports and Orthopaedic Walk In Clinic ([facility])    [address] [place] [address]  4th [facility] [address]   [phone]            Oct 18, 2018  1:30 PM CDT   (Arrive by 1:15 PM)   Return Visit with Barber Ortiz MD   [facility] Neurology ([facility])    [address] [place] [address]  3rd [facility] [address]   [phone]            Oct 22, 2018  7:30 AM CDT   (Arrive by 7:15 AM)   Return Visit with Arlyn Sanchez MD   [facility] Primary Care Clinic ([facility])    [address] [place] [address]  4th [facility] [address] [phone]            Mar 20, 2019  3:30 PM CDT   Lab with  LAB   [facility] Lab ([facility])    [address] [place] [address]  1st [facility] [address]   [phone]            Mar 20, 2019  4:30 PM CDT   (Arrive by 4:00 PM)   Return Visit with Daya Gutierrez MD   [facility] Nephrology ([facility])    [address] [place] [address]  [address]  [facility] [address]   [phone]            Mar 27, 2019  5:00 PM CDT   Adult Med Follow UP with DONOVAN Blake [facility]   Psychiatry Clinic ([facility])    [place] [id number]  [address] [address] [phone]            Apr 01, 2019  4:30 PM CDT   (Arrive by 4:15 PM)   Return Liver Transplant with Hussein Banegas MD   [facility] Hepatology  ([facility] [place])    [address]  [address]  [facility] [id number] [phone]            Aug 14, 2019  7:45 AM CDT   (Arrive by 7:30 AM)   RETURN NEURO with Ambrose Ortega MD   [facility] Ophthalmology ([facility])    [address]  4th Floor  [facility] [address] [phone]              Who to contact     If you have questions or need follow up information about today's clinic visit or your schedule please contact [facility] URGENT CARE directly at [phone].  Normal or non-critical lab and imaging results will be communicated to you by Talenzhart, letter or phone within 4 business days after the clinic has received the results. If you do not hear from us within 7 days, please contact the clinic through Lingohubt or phone. If you have a critical or abnormal lab result, we will notify you by phone as soon as possible.  Submit refill requests through InstrumentLife or call your pharmacy and they will forward the refill request to us. Please allow 3 business days for your refill to be completed.          Additional Information About Your Visit        Talenzhart Information     Instr[employer] gives you secure access to your electronic health record. If you see a primary care provider, you can also send messages to your care team and make appointments. If you have questions, please call your primary care clinic.  If you do not have a primary care provider, please call [phone] and they will assist you.        Care EveryWhere ID     This is your Care EveryWhere ID. This could be used by other organizations to access your [facility] medical records  [id number]        Your Vitals Were     Pulse Temperature Respirations Pulse Oximetry BMI (Body Mass Index)       96 98.2  F (36.8  C) (Oral) 12 97% 33.83 kg/m2        Blood Pressure from Last 3 Encounters:   10/04/18 128/70   10/01/18 135/77   08/31/18 126/61    Weight from Last 3 Encounters:   10/04/18  216 lb (98 kg)   10/01/18 215 lb 6.4 oz (97.7 kg)   09/07/18 224 lb (101.6 kg)              Today, you had the following     No orders found for display         Today's Medication Changes          These changes are accurate as of 10/4/18  5:23 PM.  If you have any questions, ask your nurse or doctor.               Start taking these medicines.        Dose/Directions    cephALEXin 500 MG capsule   Commonly known as:  KEFLEX   Used for:  Cellulitis of right toe   Started by:  Natalee Hill MD        Dose:  500 mg   Take 1 capsule (500 mg) by mouth 3 times daily for 10 days   Quantity:  30 capsule   Refills:  0         These medicines have changed or have updated prescriptions.        Dose/Directions    betaxolol 0.5 % ophthalmic solution   Commonly known as:  BETOPTIC   This may have changed:  when to take this   Used for:  Primary open angle glaucoma of both eyes, unspecified glaucoma stage        Dose:  1 drop   Place 1 drop into both eyes 2 times daily   Quantity:  5 mL   Refills:  2            Where to get your medicines      These medications were sent to [facility] - [address]   [address]  [address], [place] [address]     Phone:  [phone]     cephALEXin 500 MG capsule                Primary Care Provider Office Phone # Fax #    Arlyn Sanchez [phone]       [address] [address]        Equal Access to Services     PAULO BERRY AH: Hadii adonis mejia hadasho Sogary, waaxda luqadaha, qaybta kaalmada luz, anna schroeder. So [facility] [phone].    ATENCIÓN: Si habla español, tiene a reece disposición servicios gratuitos de asistencia lingüística. Rhiannon al [phone].    We comply with applicable federal civil rights laws and [place] laws. We do not discriminate on the basis of race, color, national origin, age, disability, sex, sexual orientation, or gender identity.             Thank you!     Thank you for choosing [facility] URGENT CARE  for your care. Our goal is always to provide you with excellent care. Hearing back from our patients is one way we can continue to improve our services. Please take a few minutes to complete the written survey that you may receive in the mail after your visit with us. Thank you!             Your Updated Medication List - Protect others around you: Learn how to safely use, store and throw away your medicines at www.disposemymeds.org.          This list is accurate as of 10/4/18  5:23 PM.  Always use your most recent med list.                   Brand Name Dispense Instructions for use Diagnosis    acetaminophen 325 MG tablet    TYLENOL    100 tablet    Take 2 tablets (650 mg) by mouth every 6 hours as needed for mild pain Or fevers. Use sparingly. Limit use to no more than 2 grams (2000 mg) in 24 hours. **Further refills must be obtained by primary care provider**    Acute post-operative pain       amLODIPine 5 MG tablet    NORVASC    90 tablet    Take 1 tablet (5 mg) by mouth daily    Hypertension       azaTHIOprine 50 MG tablet    IMURAN    90 tablet    Take 1 tablet (50 mg) by mouth every morning    Liver transplanted (H)       betaxolol 0.5 % ophthalmic solution    BETOPTIC    5 mL    Place 1 drop into both eyes 2 times daily    Primary open angle glaucoma of both eyes, unspecified glaucoma stage       calcium Citrate-vitamin D 500-400 MG-UNIT Chew      Take 1 tablet by mouth daily        cephALEXin 500 MG capsule    KEFLEX    30 capsule    Take 1 capsule (500 mg) by mouth 3 times daily for 10 days    Cellulitis of right toe       chlorthalidone 25 MG tablet    HYGROTON    45 tablet    Take 0.5 tablets (12.5 mg) by mouth daily    CKD (chronic kidney disease) stage 3, GFR 30-59 ml/min (H), Fluid retention       cholecalciferol 400 UNIT Tabs tablet    vitamin D3     Take 400 Units by mouth daily        cyanocobalamin 1000 MCG tablet    vitamin  B-12      Take 1,000 mcg by mouth daily        diclofenac 1 % Gel topical gel    VOLTAREN     Apply 2 g topically 4 times daily as needed for moderate pain        DULoxetine 20 MG EC capsule    CYMBALTA    180 capsule    Take 1 capsule (20 mg) by mouth 2 times daily    Generalized pain       ferrous sulfate 325 (65 Fe) MG tablet    IRON    100 tablet    Take 1 tablet (325 mg) by mouth 2 times daily    Iron deficiency       folic acid 1 MG tablet    FOLVITE    30 tablet    Take 1 tablet (1 mg) by mouth daily    Malnutrition (H)       gabapentin 100 MG capsule    NEURONTIN    90 capsule    Take 1 capsule (100 mg) by mouth 3 times daily    Peripheral polyneuropathy       hydrOXYzine 25 MG tablet    ATARAX    60 tablet    Take 1-2 tablets (25-50mg) every 6 hours as needed for itching    WALTER (generalized anxiety disorder)       levothyroxine 88 MCG tablet    SYNTHROID/LEVOTHROID    90 tablet    Take 1 tablet (88 mcg) by mouth every morning (before breakfast)    Thyroid function test abnormal       MAGNESIUM OXIDE PO      Take 400 mg by mouth daily        melatonin 5 MG tablet      Take 1 tablet (5 mg) by mouth nightly as needed for sleep        multivitamin, therapeutic Tabs tablet     30 tablet    Take 1 tablet by mouth every 24 hours    Malnutrition (H)       OMEGA-3 FISH OIL EX ST PO      Take 1 capsule by mouth 2 times daily 1290 (fish oil)-900 (omega 3)        order for DME     1 Device    Equipment being ordered: knee sleeve    Acute pain of left knee       pantoprazole 40 MG EC tablet    PROTONIX    90 tablet    Take 1 tablet (40 mg) by mouth every morning (before breakfast)    Gastroesophageal reflux disease, esophagitis presence not specified       pramipexole 0.5 MG tablet    MIRAPEX    90 tablet    Take 1 tablet (0.5 mg) by mouth At Bedtime    Restless leg syndrome       * predniSONE 5 MG tablet    DELTASONE    90 tablet    Take 1 tablet (5 mg) by mouth daily    Liver replaced by transplant (H)       * predniSONE 1  MG tablet    DELTASONE    360 tablet    Take 4 tablets (4 mg) by mouth daily    Liver replaced by transplant (H)       psyllium 0.52 g capsule     60 capsule    Take 2 capsules (1.04 g) by mouth daily With a full glass of water.    Loose stools       tacrolimus 0.5 MG capsule    GENERIC EQUIVALENT    240 capsule    Take 4 capsules (2 mg) by mouth every 12 hours    Liver transplanted (H)       traMADol 50 MG tablet    ULTRAM    20 tablet    Take 1 tablet (50 mg) by mouth every 6 hours as needed for severe pain    Pain of left lower leg       traZODone 100 MG tablet    DESYREL    135 tablet    Take 1.5 tablets (150 mg) by mouth At Bedtime    Insomnia, unspecified type       ursodiol 300 MG capsule    ACTIGALL    60 capsule    Take 1 capsule (300 mg) by mouth 2 times daily    Liver transplanted (H)       * Notice:  This list has 2 medication(s) that are the same as other medications prescribed for you. Read the directions carefully, and ask your doctor or other care provider to review them with you.

## 2018-10-04 NOTE — NURSING NOTE
"Chief Complaint   Patient presents with     Urgent Care     Toe Pain     Sore big toe, right foot.  She tried soaking it last night.  Toe is red is red and tender today.  She is going out of town Saturday morning and doesnt want to have a problem.      Initial /70  Pulse 96  Temp 98.2  F (36.8  C) (Oral)  Resp 12  Wt 216 lb (98 kg)  SpO2 97%  BMI 33.83 kg/m2 Estimated body mass index is 33.83 kg/(m^2) as calculated from the following:    Height as of 9/7/18: 5' 7\" (1.702 m).    Weight as of this encounter: 216 lb (98 kg)..  BP completed using cuff size: jessica Abad R.N.    "

## 2018-10-09 ENCOUNTER — OFFICE VISIT (OUTPATIENT)
Dept: ORTHOPEDICS | Facility: CLINIC | Age: 63
End: 2018-10-09
Payer: OTHER MISCELLANEOUS

## 2018-10-09 VITALS
BODY MASS INDEX: 33.67 KG/M2 | DIASTOLIC BLOOD PRESSURE: 87 MMHG | SYSTOLIC BLOOD PRESSURE: 145 MMHG | WEIGHT: 215 LBS | HEART RATE: 86 BPM

## 2018-10-09 DIAGNOSIS — S82.142D TIBIAL PLATEAU FRACTURE, LEFT, CLOSED, WITH ROUTINE HEALING, SUBSEQUENT ENCOUNTER: Primary | ICD-10-CM

## 2018-10-09 NOTE — MR AVS SNAPSHOT
After Visit Summary   10/9/2018    Phan Luque    MRN: [id number]           Patient Information     Date Of Birth          1955        Visit Information        Provider Department      10/9/2018 7:00 AM Torin Ruffin MD [facility]opa[facility] Walk [facility]        Today's Diagnoses     Tibial plateau fracture, left, closed, with routine healing, subsequent encounter    -  1       Follow-ups after your visit        Additional Services     PHYSICAL THERAPY REFERRAL (External-Prints)       Physical Therapy Referral                  Your next 10 appointments already scheduled     Oct 18, 2018  1:30 PM CDT   (Arrive by 1:15 PM)   Return Visit with Barber Ortiz MD   [facility] Neurology ([facility])    [address] [place] [address]  3rd Floor  [facility] [address]   [phone]            Oct 22, 2018  7:30 AM CDT   (Arrive by 7:15 AM)   Return Visit with Arlyn Sanchez MD   [facility] Primary Care Clinic ([facility])    [address] [place] [address]  4th Floor  [facility] [address]   [phone]            Mar 20, 2019  3:30 PM CDT   Lab with  LAB   [facility] Lab ([facility])    [address] [place] [address]  1st Floor  [facility] [address]   [phone]            Mar 20, 2019  4:30 PM CDT   (Arrive by 4:00 PM)   Return Visit with Daya Gutierrez MD   [facility] Nephrology ([facility])    [address] [place] [address]  [address]  [facility] [address]   [phone]            Mar 27, 2019  5:00 PM CDT   Adult Med Follow UP with DONOVAN Blake CNP   Psychiatry Clinic ([facility])    [address]  [address] [place] [address]   [phone]            Apr 01, 2019  4:30 PM CDT   (Arrive by 4:15 PM)   Return Liver Transplant with Hussein Banegas MD   [facility] Hepatology ([facility])    [address] [place] [address]  [address]  [address]  [facility] [address] [phone]            Aug 14, 2019  7:45 AM CDT   (Arrive by 7:30 AM)   RETURN NEURO with Ambrose Ortega MD   [facility] Ophthalmology ([facility] [place] [place])    [address]  4th Floor  [facility] [id number] [phone]              Future tests that were ordered for you today     Open Future Orders        Priority Expected Expires Ordered    PHYSICAL THERAPY REFERRAL (External-Prints) Routine  10/9/2019 10/9/2018            Who to contact     Please call your clinic at [phone] to:    Ask questions about your health    Make or cancel appointments    Discuss your medicines    Learn about your test results    Speak to your doctor            Additional Information About Your Visit        Alma Johns Information     Alma Johns gives you secure access to your electronic health record. If you see a primary care provider, you can also send messages to your care team and make appointments. If you have questions, please call your primary care clinic.  If you do not have a primary care provider, please call [phone] and they will assist you.      Alma Johns is an electronic gateway that provides easy, online access to your medical records. With Alma Johns, you can request a clinic appointment, read your test results, renew a prescription or communicate with your care team.     To access your existing account, please contact your [facility] or call [phone] for assistance.        Care EveryWhere ID     This is your Care EveryWhere ID. This could be used by other organizations to access your [facility] medical records  [id number]        Your Vitals Were     Pulse BMI (Body Mass Index)                86 33.67 kg/m2           Blood Pressure from Last 3 Encounters:   10/09/18 145/87   10/04/18 128/70   10/01/18 135/77    Weight from Last 3 Encounters:   10/09/18 97.5 kg (215 lb)   10/04/18 98 kg (216 lb)    10/01/18 97.7 kg (215 lb 6.4 oz)                 Today's Medication Changes          These changes are accurate as of 10/9/18  7:49 AM.  If you have any questions, ask your nurse or doctor.               These medicines have changed or have updated prescriptions.        Dose/Directions    betaxolol 0.5 % ophthalmic solution   Commonly known as:  BETOPTIC   This may have changed:  when to take this   Used for:  Primary open angle glaucoma of both eyes, unspecified glaucoma stage        Dose:  1 drop   Place 1 drop into both eyes 2 times daily   Quantity:  5 mL   Refills:  2                Primary Care Provider Office Phone # Fax #    Arlyn Sanchez [phone]       [address] [address]        Equal Access to Services     PAULO BERRY : Barby Roberson, waaxda luqadaha, qaybta kaalmada adeegyachyna, anna schroeder. So [facility] [phone].    ATENCIÓN: Si habla español, tiene a reece disposición servicios gratuitos de asistencia lingüística. [facility] [phone].    We comply with applicable federal civil rights laws and [place] laws. We do not discriminate on the basis of race, color, national origin, age, disability, sex, sexual orientation, or gender identity.            Thank you!     Thank you for choosing [facility] SPORTS AND ORTHOPAEDIC WALK IN CLINIC  for your care. Our goal is always to provide you with excellent care. Hearing back from our patients is one way we can continue to improve our services. Please take a few minutes to complete the written survey that you may receive in the mail after your visit with us. Thank you!             Your Updated Medication List - Protect others around you: Learn how to safely use, store and throw away your medicines at www.disposemymeds.org.          This list is accurate as of 10/9/18  7:49 AM.  Always use your most recent med list.                   Brand Name Dispense Instructions for  use Diagnosis    acetaminophen 325 MG tablet    TYLENOL    100 tablet    Take 2 tablets (650 mg) by mouth every 6 hours as needed for mild pain Or fevers. Use sparingly. Limit use to no more than 2 grams (2000 mg) in 24 hours. **Further refills must be obtained by primary care provider**    Acute post-operative pain       amLODIPine 5 MG tablet    NORVASC    90 tablet    Take 1 tablet (5 mg) by mouth daily    Hypertension       azaTHIOprine 50 MG tablet    IMURAN    90 tablet    Take 1 tablet (50 mg) by mouth every morning    Liver transplanted (H)       betaxolol 0.5 % ophthalmic solution    BETOPTIC    5 mL    Place 1 drop into both eyes 2 times daily    Primary open angle glaucoma of both eyes, unspecified glaucoma stage       calcium Citrate-vitamin D 500-400 MG-UNIT Chew      Take 1 tablet by mouth daily        cephALEXin 500 MG capsule    KEFLEX    30 capsule    Take 1 capsule (500 mg) by mouth 3 times daily for 10 days    Cellulitis of right toe       chlorthalidone 25 MG tablet    HYGROTON    45 tablet    Take 0.5 tablets (12.5 mg) by mouth daily    CKD (chronic kidney disease) stage 3, GFR 30-59 ml/min (H), Fluid retention       cholecalciferol 400 UNIT Tabs tablet    vitamin D3     Take 400 Units by mouth daily        cyanocobalamin 1000 MCG tablet    vitamin  B-12     Take 1,000 mcg by mouth daily        diclofenac 1 % Gel topical gel    VOLTAREN     Apply 2 g topically 4 times daily as needed for moderate pain        DULoxetine 20 MG EC capsule    CYMBALTA    180 capsule    Take 1 capsule (20 mg) by mouth 2 times daily    Generalized pain       ferrous sulfate 325 (65 Fe) MG tablet    IRON    100 tablet    Take 1 tablet (325 mg) by mouth 2 times daily    Iron deficiency       folic acid 1 MG tablet    FOLVITE    30 tablet    Take 1 tablet (1 mg) by mouth daily    Malnutrition (H)       gabapentin 100 MG capsule    NEURONTIN    90 capsule    Take 1 capsule (100 mg) by mouth 3 times daily    Peripheral  polyneuropathy       hydrOXYzine 25 MG tablet    ATARAX    60 tablet    Take 1-2 tablets (25-50mg) every 6 hours as needed for itching    WALTER (generalized anxiety disorder)       levothyroxine 88 MCG tablet    SYNTHROID/LEVOTHROID    90 tablet    Take 1 tablet (88 mcg) by mouth every morning (before breakfast)    Thyroid function test abnormal       MAGNESIUM OXIDE PO      Take 400 mg by mouth daily        melatonin 5 MG tablet      Take 1 tablet (5 mg) by mouth nightly as needed for sleep        multivitamin, therapeutic Tabs tablet     30 tablet    Take 1 tablet by mouth every 24 hours    Malnutrition (H)       OMEGA-3 FISH OIL EX ST PO      Take 1 capsule by mouth 2 times daily 1290 (fish oil)-900 (omega 3)        order for DME     1 Device    Equipment being ordered: knee sleeve    Acute pain of left knee       pantoprazole 40 MG EC tablet    PROTONIX    90 tablet    Take 1 tablet (40 mg) by mouth every morning (before breakfast)    Gastroesophageal reflux disease, esophagitis presence not specified       pramipexole 0.5 MG tablet    MIRAPEX    90 tablet    Take 1 tablet (0.5 mg) by mouth At Bedtime    Restless leg syndrome       * predniSONE 5 MG tablet    DELTASONE    90 tablet    Take 1 tablet (5 mg) by mouth daily    Liver replaced by transplant (H)       * predniSONE 1 MG tablet    DELTASONE    360 tablet    Take 4 tablets (4 mg) by mouth daily    Liver replaced by transplant (H)       psyllium 0.52 g capsule     60 capsule    Take 2 capsules (1.04 g) by mouth daily With a full glass of water.    Loose stools       tacrolimus 0.5 MG capsule    GENERIC EQUIVALENT    240 capsule    Take 4 capsules (2 mg) by mouth every 12 hours    Liver transplanted (H)       traMADol 50 MG tablet    ULTRAM    20 tablet    Take 1 tablet (50 mg) by mouth every 6 hours as needed for severe pain    Pain of left lower leg       traZODone 100 MG tablet    DESYREL    135 tablet    Take 1.5 tablets (150 mg) by mouth At Bedtime     Insomnia, unspecified type       ursodiol 300 MG capsule    ACTIGALL    60 capsule    Take 1 capsule (300 mg) by mouth 2 times daily    Liver transplanted (H)       * Notice:  This list has 2 medication(s) that are the same as other medications prescribed for you. Read the directions carefully, and ask your doctor or other care provider to review them with you.

## 2018-10-09 NOTE — PROGRESS NOTES
SPORTS & ORTHOPEDIC WALK-IN FOLLOW-UP VISIT 10/9/2018    Interval History:     Follow up reason: L knee tibial plateau fx    Date of injury: 7/16/18    Date last seen: 9/7/18-     Following Therapeutic Plan: Yes     Pain: Unchanged- changes of pain on medial side    Function: Unchanged    Interval History: Since last visit has not been wearing brace.  Has not used crutches.  Has returned to work full-time.  Does have some pain with walking occasionally especially with stairs.  Overall pain is been tolerable.  Occasionally will have achy soreness at night.  Does have some new pain on the medial side of the knee.    Medical History:    Any recent changes to your medical history? No    Any new medication prescribed since last visit? No    Review of Systems:    Do you have fever, chills, weight loss? No    Do you have any vision problems? No    Do you have any chest pain or edema? No    Do you have any shortness of breath or wheezing?  No    Do you have stomach problems? No    Do you have any numbness or focal weakness? Yes, peripheral neuropathy    Do you have diabetes? No    Do you have problems with bleeding or clotting? No    Do you have an rashes or other skin lesions? No         Past Medical History, Current Medications, and Allergies are reviewed in the electronic medical record as appropriate.       EXAM:/87  Pulse 86  Wt 97.5 kg (215 lb)  BMI 33.67 kg/m2    Patient is alert, No acute distress, pleasant and conversational.    Gait: nonantalgic. Normal heel toe gait.    left knee:   Skin intact. No erythema or ecchymosis.  No effusion or soft tissue swelling.    AROM: Zero to approximately 135  without restriction or reported pain.    Palpation:   TTP over the anterior lateral tibial plateau and anterior medial joint line.   No posterior medial or posterior lateral joint line tenderness     Full Isometric quad strength, extensor mechanism in place     Neurovascularly intact in the lower  extremity    Imaging: no new imaging    Assessment: Patient is a 62 year old female with left lateral tibial plateau fracture, doing well with full weightbearing for the past month.  Suspect that pain, particularly in the medial compartment, is related to her osteoarthritis and increase in activity over the past month.    Recommendations:   Reviewed assessment with the patient in detail  Could consider using the sleeve for comfort.  Encouraged her to try diclofenac gel.  Given referral for pool therapy  Follow-up PRN.    Torin Ruffin MD

## 2018-10-09 NOTE — LETTER
10/9/2018       RE: Phan Luque  [address]  [facility] [address]     Dear Colleague,    Thank you for referring your patient, Phan Luque, to the [facility] SPORTS AND ORTHOPAEDIC WALK IN CLINIC at [facility]. Please see a copy of my visit note below.          SPORTS & ORTHOPEDIC WALK-IN FOLLOW-UP VISIT 10/9/2018    Interval History:     Follow up reason: L knee tibial plateau fx    Date of injury: 7/16/18    Date last seen: 9/7/18-     Following Therapeutic Plan: Yes     Pain: Unchanged- changes of pain on medial side    Function: Unchanged    Interval History: Since last visit has not been wearing brace.  Has not used crutches.  Has returned to work full-time.  Does have some pain with walking occasionally especially with stairs.  Overall pain is been tolerable.  Occasionally will have achy soreness at night.  Does have some new pain on the medial side of the knee.    Medical History:    Any recent changes to your medical history? No    Any new medication prescribed since last visit? No    Review of Systems:    Do you have fever, chills, weight loss? No    Do you have any vision problems? No    Do you have any chest pain or edema? No    Do you have any shortness of breath or wheezing?  No    Do you have stomach problems? No    Do you have any numbness or focal weakness? Yes, peripheral neuropathy    Do you have diabetes? No    Do you have problems with bleeding or clotting? No    Do you have an rashes or other skin lesions? No         Past Medical History, Current Medications, and Allergies are reviewed in the electronic medical record as appropriate.       EXAM:/87  Pulse 86  Wt 97.5 kg (215 lb)  BMI 33.67 kg/m2    Patient is alert, No acute distress, pleasant and conversational.    Gait: nonantalgic. Normal heel toe gait.    left knee:   Skin intact. No erythema or ecchymosis.  No effusion or soft tissue swelling.    AROM: Zero to approximately  135  without restriction or reported pain.    Palpation:   TTP over the anterior lateral tibial plateau and anterior medial joint line.   No posterior medial or posterior lateral joint line tenderness     Full Isometric quad strength, extensor mechanism in place     Neurovascularly intact in the lower extremity    Imaging: no new imaging    Assessment: Patient is a 62 year old female with left lateral tibial plateau fracture, doing well with full weightbearing for the past month.  Suspect that pain, particularly in the medial compartment, is related to her osteoarthritis and increase in activity over the past month.    Recommendations:   Reviewed assessment with the patient in detail  Could consider using the sleeve for comfort.  Encouraged her to try diclofenac gel.  Given referral for pool therapy  Follow-up PRN.    Torin Ruffin MD

## 2018-10-16 ASSESSMENT — PATIENT HEALTH QUESTIONNAIRE - PHQ9: SUM OF ALL RESPONSES TO PHQ QUESTIONS 1-9: 7

## 2018-10-18 ENCOUNTER — OFFICE VISIT (OUTPATIENT)
Dept: NEUROLOGY | Facility: CLINIC | Age: 63
End: 2018-10-18
Payer: COMMERCIAL

## 2018-10-18 VITALS
BODY MASS INDEX: 33.98 KG/M2 | SYSTOLIC BLOOD PRESSURE: 151 MMHG | DIASTOLIC BLOOD PRESSURE: 72 MMHG | OXYGEN SATURATION: 95 % | HEART RATE: 86 BPM | WEIGHT: 216.5 LBS | TEMPERATURE: 98.3 F | HEIGHT: 67 IN

## 2018-10-18 DIAGNOSIS — M48.02 CERVICAL STENOSIS OF SPINAL CANAL: ICD-10-CM

## 2018-10-18 DIAGNOSIS — M54.2 CERVICAL PAIN: Primary | ICD-10-CM

## 2018-10-18 ASSESSMENT — PAIN SCALES - GENERAL: PAINLEVEL: MILD PAIN (3)

## 2018-10-18 NOTE — NURSING NOTE
Chief Complaint   Patient presents with     RECHECK     [facility] RETURN 5 WK F/U       Ariana Amador, EMT

## 2018-10-18 NOTE — MR AVS SNAPSHOT
After Visit Summary   10/18/2018    Phan Luque    MRN: [id number]           Patient Information     Date Of Birth          1955        Visit Information        Provider Department      10/18/2018 1:30 PM Barber Ortiz MD [facility]        Today's Diagnoses     Cervical pain    -  1    Cervical stenosis of spinal canal           Follow-ups after your visit        Additional Services     Other Specialty Referral       OSI Kameron Jones RPT eval and treat [facility]                  Your next 10 appointments already scheduled     Oct 22, 2018  7:30 AM CDT   (Arrive by 7:15 AM)   Return Visit with Arlyn Sanchez MD   [facility] ([facility])    [address]  4th Floor  [facility] [id number] [phone]            Mar 20, 2019  3:30 PM CDT   Lab with  LAB   [facility] ([facility])    [address] [place] [address]  1st Floor  [facility] [address] [phone]            Mar 20, 2019  4:30 PM CDT   (Arrive by 4:00 PM)   Return Visit with Daya Gutierrez MD   [facility] Nephrology ([facility])    [address]  [address]  [place] [address] [phone]            Mar 27, 2019  5:00 PM CDT   Adult Med Follow UP with DONOVAN Blake [facility]   Psychiatry Clinic ([facility])    [place] [id number]  [address] [address] [phone]            Apr 01, 2019  4:30 PM CDT   (Arrive by 4:15 PM)   Return Liver Transplant with Hussein Banegas MD   [facility] Hepatology ([facility])    [address]  [address]  [place] [facility] [address] [phone]            Aug 14, 2019  7:45 AM CDT   (Arrive by 7:30 AM)   RETURN NEURO with Ambrose Ortega MD   [facility] Ophthalmology ([facility])    [address]  4th Floor  [place] [facility] [address]  "  [phone]              Who to contact     Please call your clinic at [phone] to:    Ask questions about your health    Make or cancel appointments    Discuss your medicines    Learn about your test results    Speak to your doctor            Additional Information About Your Visit        [employer] Information     [employer] gives you secure access to your electronic health record. If you see a primary care provider, you can also send messages to your care team and make appointments. If you have questions, please call your primary care clinic.  If you do not have a primary care provider, please call [phone] and they will assist you.      [employer] is an electronic gateway that provides easy, online access to your medical records. With [employer], you can request a clinic appointment, read your test results, renew a prescription or communicate with your care team.     To access your existing account, please contact your [facility] or call [phone] for assistance.        Care EveryWhere ID     This is your Care EveryWhere ID. This could be used by other organizations to access your [place] medical records  [id number]        Your Vitals Were     Pulse Temperature Height Pulse Oximetry Breastfeeding? BMI (Body Mass Index)    86 98.3  F (36.8  C) (Oral) 1.689 m (5' 6.5\") 95% No 34.42 kg/m2       Blood Pressure from Last 3 Encounters:   10/18/18 151/72   10/09/18 145/87   10/04/18 128/70    Weight from Last 3 Encounters:   10/18/18 98.2 kg (216 lb 8 oz)   10/09/18 97.5 kg (215 lb)   10/04/18 98 kg (216 lb)              We Performed the Following     Other Specialty Referral          Today's Medication Changes          These changes are accurate as of 10/18/18  2:20 PM.  If you have any questions, ask your nurse or doctor.               These medicines have changed or have updated prescriptions.        Dose/Directions    betaxolol 0.5 % ophthalmic solution   Commonly known as:  " BETOPTIC   This may have changed:  when to take this   Used for:  Primary open angle glaucoma of both eyes, unspecified glaucoma stage        Dose:  1 drop   Place 1 drop into both eyes 2 times daily   Quantity:  5 mL   Refills:  2                Primary Care Provider Office Phone # Fax #    Arlyn Sanchez [phone] [address] [address]        Equal Access to Services     [facility] : Hadii aad ku hadasho Soomaali, waaxda luqadaha, qaybta kaalmada adeegyada, waxay idiin haygrzegorzn adeeg cristina lamikeyfabio . So [facility] [phone].    ATENCIÓN: Si yodit lopez, tiene a reece disposición servicios gratuitos de asistencia lingüística. Llame al [phone].    We comply with applicable federal civil rights laws and [place] laws. We do not discriminate on the basis of race, color, national origin, age, disability, sex, sexual orientation, or gender identity.            Thank you!     Thank you for choosing [facility]UR[facility]  for your care. Our goal is always to provide you with excellent care. Hearing back from our patients is one way we can continue to improve our services. Please take a few minutes to complete the written survey that you may receive in the mail after your visit with us. Thank you!             Your Updated Medication List - Protect others around you: Learn how to safely use, store and throw away your medicines at www.disposemymeds.org.          This list is accurate as of 10/18/18  2:20 PM.  Always use your most recent med list.                   Brand Name Dispense Instructions for use Diagnosis    acetaminophen 325 MG tablet    TYLENOL    100 tablet    Take 2 tablets (650 mg) by mouth every 6 hours as needed for mild pain Or fevers. Use sparingly. Limit use to no more than 2 grams (2000 mg) in 24 hours. **Further refills must be obtained by primary care provider**    Acute post-operative pain       amLODIPine 5 MG tablet    NORVASC    90 tablet     Take 1 tablet (5 mg) by mouth daily    Hypertension       azaTHIOprine 50 MG tablet    IMURAN    90 tablet    Take 1 tablet (50 mg) by mouth every morning    Liver transplanted (H)       betaxolol 0.5 % ophthalmic solution    BETOPTIC    5 mL    Place 1 drop into both eyes 2 times daily    Primary open angle glaucoma of both eyes, unspecified glaucoma stage       calcium Citrate-vitamin D 500-400 MG-UNIT Chew      Take 1 tablet by mouth daily        chlorthalidone 25 MG tablet    HYGROTON    45 tablet    Take 0.5 tablets (12.5 mg) by mouth daily    CKD (chronic kidney disease) stage 3, GFR 30-59 ml/min (H), Fluid retention       cholecalciferol 400 UNIT Tabs tablet    vitamin D3     Take 400 Units by mouth daily        cyanocobalamin 1000 MCG tablet    vitamin  B-12     Take 1,000 mcg by mouth daily        diclofenac 1 % Gel topical gel    VOLTAREN     Apply 2 g topically 4 times daily as needed for moderate pain        DULoxetine 20 MG EC capsule    CYMBALTA    180 capsule    Take 1 capsule (20 mg) by mouth 2 times daily    Generalized pain       ferrous sulfate 325 (65 Fe) MG tablet    IRON    100 tablet    Take 1 tablet (325 mg) by mouth 2 times daily    Iron deficiency       folic acid 1 MG tablet    FOLVITE    30 tablet    Take 1 tablet (1 mg) by mouth daily    Malnutrition (H)       gabapentin 100 MG capsule    NEURONTIN    90 capsule    Take 1 capsule (100 mg) by mouth 3 times daily    Peripheral polyneuropathy       hydrOXYzine 25 MG tablet    ATARAX    60 tablet    Take 1-2 tablets (25-50mg) every 6 hours as needed for itching    WALTER (generalized anxiety disorder)       levothyroxine 88 MCG tablet    SYNTHROID/LEVOTHROID    90 tablet    Take 1 tablet (88 mcg) by mouth every morning (before breakfast)    Thyroid function test abnormal       MAGNESIUM OXIDE PO      Take 400 mg by mouth daily        melatonin 5 MG tablet      Take 1 tablet (5 mg) by mouth nightly as needed for sleep        multivitamin,  therapeutic Tabs tablet     30 tablet    Take 1 tablet by mouth every 24 hours    Malnutrition (H)       OMEGA-3 FISH OIL EX ST PO      Take 1 capsule by mouth 2 times daily 1290 (fish oil)-900 (omega 3)        order for DME     1 Device    Equipment being ordered: knee sleeve    Acute pain of left knee       pantoprazole 40 MG EC tablet    PROTONIX    90 tablet    Take 1 tablet (40 mg) by mouth every morning (before breakfast)    Gastroesophageal reflux disease, esophagitis presence not specified       pramipexole 0.5 MG tablet    MIRAPEX    90 tablet    Take 1 tablet (0.5 mg) by mouth At Bedtime    Restless leg syndrome       * predniSONE 5 MG tablet    DELTASONE    90 tablet    Take 1 tablet (5 mg) by mouth daily    Liver replaced by transplant (H)       * predniSONE 1 MG tablet    DELTASONE    360 tablet    Take 4 tablets (4 mg) by mouth daily    Liver replaced by transplant (H)       psyllium 0.52 g capsule     60 capsule    Take 2 capsules (1.04 g) by mouth daily With a full glass of water.    Loose stools       tacrolimus 0.5 MG capsule    GENERIC EQUIVALENT    240 capsule    Take 4 capsules (2 mg) by mouth every 12 hours    Liver transplanted (H)       traMADol 50 MG tablet    ULTRAM    20 tablet    Take 1 tablet (50 mg) by mouth every 6 hours as needed for severe pain    Pain of left lower leg       traZODone 100 MG tablet    DESYREL    135 tablet    Take 1.5 tablets (150 mg) by mouth At Bedtime    Insomnia, unspecified type       ursodiol 300 MG capsule    ACTIGALL    60 capsule    Take 1 capsule (300 mg) by mouth 2 times daily    Liver transplanted (H)       * Notice:  This list has 2 medication(s) that are the same as other medications prescribed for you. Read the directions carefully, and ask your doctor or other care provider to review them with you.

## 2018-10-18 NOTE — LETTER
10/18/2018       RE: Phan Luque  [address]  [facility] [facility] [address]     Dear Colleague,    Thank you for referring your patient, Phan Luque, to the [facility] NEUROLOGY at [facility]. Please see a copy of my visit note below.    Service Date: 10/18/2018      RE: Phan Luque   MRN: [id number]   : 1955      Dear Dr. Sanchez:      This is in regard to followup on Phan Luque.  The patient returned today with a chief complaint of neuropathy as well as cervical stenosis.      The patient did undergo testing that I recommended.  I made sure she had these results.  She did have a negative RPR and a normal ELP of the blood and urine.  Her paraneoplastic panel was unremarkable.  The patient's glucose was just recently 101 on 2018 and her creatinine was 1.41.  She was aware of these findings.  She now had normal liver function tests.  The patient did undergo MRI scan testing at my suggestion of the cervical spine.  This was compared to the previous study done on 2017 and performed on 2018.  The patient did have findings to suggest moderate spinal canal stenosis at C4-C5 and C5-C6 with cervical spondylosis.  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  The patient does have some spinal cord contour deformity and there was thought to be some hyperintense signals at C3-C4, C4-C5 and C5-C6.  The patient had had some neck pain.  All of this seems to have lessened.  I have talked with her about these findings in general.  The patient did not have any significant change from previous studies.  It may benefit the patient to have neurologic consultation through our Neurosurgical Department and I will discuss this with her.  She feels that she is about the same and really has not had any significant change in her complaints now.  I think the patient will continue to ask for conservative care. I did review again symptoms to look for to  suggest spinal cord compression and to try to avoid falls and limit lifting. She seems to have good understanding of this including need to have immediate follow up if any new or worsening sx's.  The patient does have epidural lipomatosis and I did discuss how this could influence her complaints because of these findings on lumbar MRI.  I have talked with her again about weight loss.  She assured me that she has total abstinence from alcohol.  She has had no trouble with her transplant.  The patient has been asked to have neurologic followup with me in 6-12 months and on a p.r.n. basis.        I spent 20 minutes with the patient today.  Over 50% of the time this involved counseling and coordination of care.         D: 10/23/2018   T: 10/23/2018   MT: AKA      Name:     PARTH FARMER   MRN:      [id number]        Account:      [id number]   :      1955           Service Date: 10/18/2018      Document: [id number]        Again, thank you for allowing me to participate in the care of your patient.      Sincerely,    Barber Ortiz MD    CC:  Arlyn Sanchez MD   [address]   [place] [address]

## 2018-10-19 NOTE — PROGRESS NOTES
"[facility] [place]   Arlyn Sanchez MD  10/22/2018      Chief Complaint:   Recheck Medication     History of Present Illness:   Phan Luque is a 62 year old female with a history of  history of liver transplant (2016) related to alcoholic hepatitis, anxiety, hypertension, chronic kidney disease, and irritable bowel syndrome who presents for 6 month follow up.     Follow-up after fall  The patient reports that she fell off the curb as she lacks good depth perception, and sustained a fracture to her tibial plateau fracture, which kept her at home for six weeks. She did not hit her head. Now, her knee is fine.     Mediation Recheck   In April, the patient was still on 10mg Prednisone daily after her liver transplant, which was less than ideal given bone density and her weight. She is currently on 4mg after working to wean down from 10mg. Because the patient has generalized pain--primarily in her muscle joints and from her neuropathy--she had been hesitant to wean off the Prednisone. She states that her neuropathy has gotten worse, and feels weak as well, which contributes to her falls/injuries. Therefore, Dr. Banegas, of Gastroenterology, placed her on Duloxetine, which she does not like. She would like to try another medication.    Cervical stenosis   The patient saw Dr. Ortiz in Neurology for evaluation for neuropathy, and was told that she had \"fat deposits in her spine\", which she states was discouraging as she was working on losing weight. She has lost 15 pounds since then by counting calories. Additionally, they discussed her cervical stenosis at length, as well as things she can do to not exacerbate this including not going to the chiropractor, not having car accidents, etc. She states that she does not actively have neck pain, and denies any numbness and tingling in her extremities.      Other concerns discussed:  1) Bruising - She reports extensive bruising from her right shin into her " right ankle and foot from her daughter's large dog. She went to urgent care and was on a course of Keflex for this because the area was tender, red, and swollen. She has finished the Keflex, but states that there is still some soreness with palpation and would like to have this looked at today, particularly on her right first toe.      Review of Systems:   Pertinent items are noted in HPI, remainder of complete ROS is negative.      Active Medications:     Current Outpatient Prescriptions:      acetaminophen (TYLENOL) 325 MG tablet, Take 2 tablets (650 mg) by mouth every 6 hours as needed for mild pain Or fevers. Use sparingly. Limit use to no more than 2 grams (2000 mg) in 24 hours. **Further refills must be obtained by primary care provider**, Disp: 100 tablet, Rfl: 0     amLODIPine (NORVASC) 5 MG tablet, Take 1 tablet (5 mg) by mouth daily, Disp: 90 tablet, Rfl: 3     azaTHIOprine (IMURAN) 50 MG tablet, Take 1 tablet (50 mg) by mouth every morning, Disp: 90 tablet, Rfl: 3     betaxolol (BETOPTIC) 0.5 % ophthalmic solution, Place 1 drop into both eyes 2 times daily (Patient taking differently: Place 1 drop into both eyes daily ), Disp: 5 mL, Rfl: 2     calcium Citrate-vitamin D 500-400 MG-UNIT CHEW, Take 1 tablet by mouth daily, Disp: , Rfl:      chlorthalidone (HYGROTON) 25 MG tablet, Take 0.5 tablets (12.5 mg) by mouth daily, Disp: 45 tablet, Rfl: 3     cholecalciferol (VITAMIN D3) 400 UNIT TABS tablet, Take 400 Units by mouth daily, Disp: , Rfl:      cyanocobalamin (VITAMIN  B-12) 1000 MCG tablet, Take 1,000 mcg by mouth daily, Disp: , Rfl:      diclofenac (VOLTAREN) 1 % GEL topical gel, Apply 2 g topically 4 times daily as needed for moderate pain, Disp: , Rfl:      DULoxetine (CYMBALTA) 20 MG EC capsule, Take 1 capsule (20 mg) by mouth 2 times daily, Disp: 180 capsule, Rfl: 3     ferrous sulfate (IRON) 325 (65 FE) MG tablet, Take 1 tablet (325 mg) by mouth 2 times daily, Disp: 100 tablet, Rfl:      folic acid  (FOLVITE) 1 MG tablet, Take 1 tablet (1 mg) by mouth daily, Disp: 30 tablet, Rfl: 1     gabapentin (NEURONTIN) 100 MG capsule, Take 1 capsule (100 mg) by mouth 3 times daily, Disp: 90 capsule, Rfl: 1     hydrOXYzine (ATARAX) 25 MG tablet, Take 1-2 tablets (25-50mg) every 6 hours as needed for itching, Disp: 60 tablet, Rfl: 2     levothyroxine (SYNTHROID/LEVOTHROID) 88 MCG tablet, Take 1 tablet (88 mcg) by mouth every morning (before breakfast), Disp: 90 tablet, Rfl: 2     MAGNESIUM OXIDE PO, Take 400 mg by mouth daily, Disp: , Rfl:      melatonin 5 MG tablet, Take 1 tablet (5 mg) by mouth nightly as needed for sleep, Disp: , Rfl:      multivitamin, therapeutic (THERA-VIT) TABS tablet, Take 1 tablet by mouth every 24 hours, Disp: 30 tablet, Rfl: 11     Omega-3 Fatty Acids (OMEGA-3 FISH OIL EX ST PO), Take 1 capsule by mouth 2 times daily 1290 (fish oil)-900 (omega 3), Disp: , Rfl:      pantoprazole (PROTONIX) 40 MG EC tablet, Take 1 tablet (40 mg) by mouth every morning (before breakfast), Disp: 90 tablet, Rfl: 4     pramipexole (MIRAPEX) 0.5 MG tablet, Take 1 tablet (0.5 mg) by mouth At Bedtime, Disp: 90 tablet, Rfl: 3     predniSONE (DELTASONE) 1 MG tablet, Take 4 tablets (4 mg) by mouth daily, Disp: 360 tablet, Rfl: 3     psyllium 0.52 G capsule, Take 2 capsules (1.04 g) by mouth daily With a full glass of water., Disp: 60 capsule, Rfl: 1     tacrolimus (GENERIC EQUIVALENT) 0.5 MG capsule, Take 4 capsules (2 mg) by mouth every 12 hours, Disp: 240 capsule, Rfl: 11     traMADol (ULTRAM) 50 MG tablet, Take 1 tablet (50 mg) by mouth every 6 hours as needed for severe pain, Disp: 20 tablet, Rfl: 0     traZODone (DESYREL) 100 MG tablet, Take 1.5 tablets (150 mg) by mouth At Bedtime, Disp: 135 tablet, Rfl: 1     ursodiol (ACTIGALL) 300 MG capsule, Take 1 capsule (300 mg) by mouth 2 times daily, Disp: 60 capsule, Rfl: 11     Allergies:   Benadryl [diphenhydramine]; Cefaclor; Hydrocodone-acetaminophen; Oxycodone;  Penicillins; and Sulfa drugs      Past Medical History:  Asthma  Chronic Kidney Disease   End stage liver disease  Alcohol abuse   Liver transplanted  Migraines  Osteoporosis   Spinal stenosis in cervical region  Strabismus   Decompensation of cirrhosis of liver  Alcoholic hepatitis   Malnutrition  Candidiasis of skin  Anemia  Enterococcus UTI   Advance care planning   Osteopenia   Esophageal reflux      Past Surgical History:  Appendectomy    Bench liver  Cataract IOL  Colonoscopy  EGD Combined   Sphincteroplasty   Transplant liver recipient  donor  Tubal ligation    Family History:   Melanoma,  - Mother   Congestive heart failure - Father       Social History:   The patient was alone.  Smoking Status: Former smoker 2.5 PPD for 20 years   Smokeless Tobacco: Never   Alcohol Use: No; none since 2016       Physical Exam:   /73 (BP Location: Right arm, Patient Position: Sitting, Cuff Size: Adult Large)  Pulse 80  Temp 98.1  F (36.7  C) (Oral)  Resp 16  Wt 97.7 kg (215 lb 6.4 oz)  Breastfeeding? No  BMI 34.25 kg/m2     Constitutional: Alert, oriented, pleasant, no acute distress  Head: Normocephalic, atraumatic  Eyes: Extra-ocular movements intact, no scleral icterus  Cardiovascular: Regular rate and rhythm, no murmurs, rubs or gallops, peripheral pulses full/symmetric  Respiratory: Good air movement bilaterally, lungs clear, no wheezes/rales/rhonchi  Musculoskeletal: No edema, normal muscle tone, normal gait  Neurologic: Alert and oriented, cranial nerves 2-12 intact.  Skin: No rashes/lesions. Right distal great toe mildly erythematous without warmth or fluctuance, dystrophic toe nail.   Psychiatric: normal mentation, affect and mood      Assessment and Plan:  Fibromyalgia  We discussed venlafaxine for her fibromyalgia, in place of the duloxetine. If she does not like this, we discussed trying Lyrica as well. We agreed to start her on venlafaxine; right now, she will wean down her  duloxetine to 20mg once daily, rather than twice, and then eventually 20mg every other day. She will ideally stop the duloxetine within the next 2-3 weeks, and then start the venlafaxine. I counseled her on the side effects of the venlafaxine. If she has any concerns with this, she will contact clinic via [employer].   - venlafaxine (EFFEXOR XR) 37.5 MG 24 hr capsule  Dispense: 60 capsule; Refill: 2    Liver transplanted (H)  - FLU VACCINE, AGE >= 3 YR    Immunosuppression (H)  - FLU VACCINE, AGE >= 3 YR    Need for influenza vaccination  Given today.   - FLU VACCINE, AGE >= 3 YR    CKD (chronic kidney disease) stage 3, GFR 30-59 ml/min (H)  Reviewed labs. Renal function was stable.    Osteopenia, unspecified location  Will order a bone DEXA scan in 2019 to monitor her osteopenia, given her long-term use of Prednisone. Her last was in April of 2017 with the most negative and valid T score of -2.2 at the level of left femoral neck, showing low bone density.     Closed fracture of tibial plateau, unspecified laterality, sequela  She is recovering well.     Right lower leg hematoma, mild paronychia   Advised soaks and proper nail care.    Follow-up: Return in about 2 months (around 12/22/2018) for Routine Visit.         Scribe Disclosure:   I, Jennie French, am serving as a scribe to document services personally performed by Arlyn Sanchez MD at this visit, based upon the provider's statements to me. All documentation has been reviewed by the aforementioned provider prior to being entered into the official medical record.     Portions of this medical record were completed by a scribe. UPON MY REVIEW AND AUTHENTICATION BY ELECTRONIC SIGNATURE, this confirms (a) I performed the applicable clinical services, and (b) the record is accurate.   Arlyn Sanchez MD  Internal Medicine    25 min spent face to face, of which >50% time spent on counseling/coordinating care exclusive of any procedure time

## 2018-10-22 ENCOUNTER — OFFICE VISIT (OUTPATIENT)
Dept: INTERNAL MEDICINE | Facility: CLINIC | Age: 63
End: 2018-10-22
Payer: COMMERCIAL

## 2018-10-22 VITALS
SYSTOLIC BLOOD PRESSURE: 133 MMHG | WEIGHT: 215.4 LBS | TEMPERATURE: 98.1 F | DIASTOLIC BLOOD PRESSURE: 73 MMHG | RESPIRATION RATE: 16 BRPM | BODY MASS INDEX: 34.25 KG/M2 | HEART RATE: 80 BPM

## 2018-10-22 DIAGNOSIS — F41.1 GAD (GENERALIZED ANXIETY DISORDER): ICD-10-CM

## 2018-10-22 DIAGNOSIS — Z94.4 LIVER TRANSPLANTED (H): ICD-10-CM

## 2018-10-22 DIAGNOSIS — Z23 NEED FOR INFLUENZA VACCINATION: ICD-10-CM

## 2018-10-22 DIAGNOSIS — M85.80 OSTEOPENIA, UNSPECIFIED LOCATION: ICD-10-CM

## 2018-10-22 DIAGNOSIS — R94.6 THYROID FUNCTION TEST ABNORMAL: ICD-10-CM

## 2018-10-22 DIAGNOSIS — D84.9 IMMUNOSUPPRESSION (H): ICD-10-CM

## 2018-10-22 DIAGNOSIS — M79.7 FIBROMYALGIA: Primary | ICD-10-CM

## 2018-10-22 DIAGNOSIS — N18.30 CKD (CHRONIC KIDNEY DISEASE) STAGE 3, GFR 30-59 ML/MIN (H): ICD-10-CM

## 2018-10-22 DIAGNOSIS — S82.143S CLOSED FRACTURE OF TIBIAL PLATEAU, UNSPECIFIED LATERALITY, SEQUELA: ICD-10-CM

## 2018-10-22 RX ORDER — LEVOTHYROXINE SODIUM 88 UG/1
88 TABLET ORAL
Qty: 90 TABLET | Refills: 2 | Status: SHIPPED | OUTPATIENT
Start: 2018-10-22 | End: 2019-07-14

## 2018-10-22 RX ORDER — HYDROXYZINE HYDROCHLORIDE 25 MG/1
TABLET, FILM COATED ORAL
Qty: 60 TABLET | Refills: 2 | Status: SHIPPED | OUTPATIENT
Start: 2018-10-22 | End: 2018-12-08

## 2018-10-22 RX ORDER — VENLAFAXINE HYDROCHLORIDE 37.5 MG/1
37.5 CAPSULE, EXTENDED RELEASE ORAL DAILY
Qty: 60 CAPSULE | Refills: 2 | Status: SHIPPED | OUTPATIENT
Start: 2018-10-22 | End: 2019-04-15

## 2018-10-22 ASSESSMENT — PAIN SCALES - GENERAL: PAINLEVEL: MILD PAIN (2)

## 2018-10-22 NOTE — NURSING NOTE
Phan Luque    1.  Has the patient received the information for the influenza vaccine? YES    2.  Does the patient have any of the following contraindications?     Allergy to eggs? No     Allergic reaction to previous influenza vaccines? No     Any other problems to previous influenza vaccines? No     Paralyzed by Guillain-Arcadia syndrome? No     Currently pregnant? NO     Current moderate or severe illness? No     Allergy to contact lens solution? No    3.  The vaccine has been administered in the usual fashion and the patient was instructed to wait 20 minutes before leaving the building in the event of an allergic reaction: YES    Vaccination given by Lissette Nuñez CMA at 8:54 AM on 10/22/2018  Recorded by Lissette Nuñez CMA

## 2018-10-22 NOTE — NURSING NOTE
Chief Complaint   Patient presents with     Recheck Medication     Patient is here for routine 6 months follow up and medication recheck; duloxetine and prednisone       Richard Walter CMA (AAMA) at 7:23 AM on 10/22/2018

## 2018-10-22 NOTE — MR AVS SNAPSHOT
After Visit Summary   10/22/2018    Phan Luque    MRN: [id number]           Patient Information     Date Of Birth          1955        Visit Information        Provider Department      10/22/2018 7:30 AM Arlyn Sanchez MD [facility]        Today's Diagnoses     Fibromyalgia    -  1    Liver transplanted (H)        Immunosuppression (H)        Need for influenza vaccination        CKD (chronic kidney disease) stage 3, GFR 30-59 ml/min (H)        Osteopenia, unspecified location        Closed fracture of tibial plateau, unspecified laterality, sequela           Follow-ups after your visit        Follow-up notes from your care team     Return in about 2 months (around 12/22/2018) for Routine Visit.      Your next 10 appointments already scheduled     Mar 20, 2019  3:30 PM CDT   Lab with [facility]   [facility] ([facility])    [address] [place] [address]  1st Floor  [facility] [id number] [phone]            Mar 20, 2019  4:30 PM CDT   (Arrive by 4:00 PM)   Return Visit with Daya Gutierrez MD   [facility] Nephrology ([facility])    [address] [place] [address]  [address]  [facility] [id number] [phone]            Mar 27, 2019  5:00 PM CDT   Adult Med Follow UP with DONOVAN [facility]   Psychiatry Clinic ([place] [id number]  [address] [place] [id number] [phone]            Apr 01, 2019  4:30 PM CDT   (Arrive by 4:15 PM)   Return Liver Transplant with Hussein Banegas MD   [facility] Hepatology ([facility])    [address] [place] [address]  [address]  [facility] [id number] [phone]            Aug 14, 2019  7:45 AM CDT   (Arrive by 7:30 AM)   RETURN NEURO with Ambrose Ortega MD   [facility] Ophthalmology ([facility])    [address] [place] [address]  4th Floor  [facility] [id number] [phone]               Who to contact     Please call your clinic at [phone] to:    Ask questions about your health    Make or cancel appointments    Discuss your medicines    Learn about your test results    Speak to your doctor            Additional Information About Your Visit        [employer] Information     [employer] gives you secure access to your electronic health record. If you see a primary care provider, you can also send messages to your care team and make appointments. If you have questions, please call your primary care clinic.  If you do not have a primary care provider, please call [phone] and they will assist you.      [employer] is an electronic gateway that provides easy, online access to your medical records. With [employer], you can request a clinic appointment, read your test results, renew a prescription or communicate with your care team.     To access your existing account, please contact your [facility] or call [phone] for assistance.        Care EveryWhere ID     This is your Care EveryWhere ID. This could be used by other organizations to access your [place] medical records  [id number]        Your Vitals Were     Pulse Temperature Respirations Breastfeeding? BMI (Body Mass Index)       80 98.1  F (36.7  C) (Oral) 16 No 34.25 kg/m2        Blood Pressure from Last 3 Encounters:   10/22/18 133/73   10/18/18 151/72   10/09/18 145/87    Weight from Last 3 Encounters:   10/22/18 97.7 kg (215 lb 6.4 oz)   10/18/18 98.2 kg (216 lb 8 oz)   10/09/18 97.5 kg (215 lb)              We Performed the Following     FLU VACCINE, AGE >= 3 YR          Today's Medication Changes          These changes are accurate as of 10/22/18  8:32 AM.  If you have any questions, ask your nurse or doctor.               Start taking these medicines.        Dose/Directions    venlafaxine 37.5 MG 24 hr capsule   Commonly known as:  EFFEXOR XR   Used for:  Fibromyalgia   Started by:  Arlyn Sanchez  MD Michelle        Dose:  37.5 mg   Take 1 capsule (37.5 mg) by mouth daily If tolerating may increase to 2 caps after 2 weeks   Quantity:  60 capsule   Refills:  2         These medicines have changed or have updated prescriptions.        Dose/Directions    betaxolol 0.5 % ophthalmic solution   Commonly known as:  BETOPTIC   This may have changed:  when to take this   Used for:  Primary open angle glaucoma of both eyes, unspecified glaucoma stage        Dose:  1 drop   Place 1 drop into both eyes 2 times daily   Quantity:  5 mL   Refills:  2         Stop taking these medicines if you haven't already. Please contact your care team if you have questions.     DULoxetine 20 MG EC capsule   Commonly known as:  CYMBALTA   Stopped by:  Arlyn Sanchez MD                Where to get your medicines      These medications were sent to [facility] - [place], [place] - [address]  [address] [facility] [address]    Hours:  Mon-Fri 8:30am-5:00pm Toll Free [phone] Phone:  [phone]     venlafaxine 37.5 MG 24 hr capsule                Primary Care Provider Office Phone # Fax #    Arlyn Sanchez [phone]       [address] [address]        Equal Access to Services     PAULO BERRY AH: Barby mejia hadasho Soomaali, waaxda luqadaha, qaybta kaalmada adeegyada, waxay kobein haygrzegorzn kay schroeder. So [facility] [phone].    ATENCIÓN: Si habla español, tiene a reece disposición servicios gratuitos de asistencia lingüística. Llame al [phone].    We comply with applicable federal civil rights laws and [place] laws. We do not discriminate on the basis of race, color, national origin, age, disability, sex, sexual orientation, or gender identity.            Thank you!     Thank you for choosing [facility]  for your care. Our goal is always to provide you with excellent care. Hearing back from our patients is  one way we can continue to improve our services. Please take a few minutes to complete the written survey that you may receive in the mail after your visit with us. Thank you!             Your Updated Medication List - Protect others around you: Learn how to safely use, store and throw away your medicines at www.disposemymeds.org.          This list is accurate as of 10/22/18  8:32 AM.  Always use your most recent med list.                   Brand Name Dispense Instructions for use Diagnosis    acetaminophen 325 MG tablet    TYLENOL    100 tablet    Take 2 tablets (650 mg) by mouth every 6 hours as needed for mild pain Or fevers. Use sparingly. Limit use to no more than 2 grams (2000 mg) in 24 hours. **Further refills must be obtained by primary care provider**    Acute post-operative pain       amLODIPine 5 MG tablet    NORVASC    90 tablet    Take 1 tablet (5 mg) by mouth daily    Hypertension       azaTHIOprine 50 MG tablet    IMURAN    90 tablet    Take 1 tablet (50 mg) by mouth every morning    Liver transplanted (H)       betaxolol 0.5 % ophthalmic solution    BETOPTIC    5 mL    Place 1 drop into both eyes 2 times daily    Primary open angle glaucoma of both eyes, unspecified glaucoma stage       calcium Citrate-vitamin D 500-400 MG-UNIT Chew      Take 1 tablet by mouth daily        chlorthalidone 25 MG tablet    HYGROTON    45 tablet    Take 0.5 tablets (12.5 mg) by mouth daily    CKD (chronic kidney disease) stage 3, GFR 30-59 ml/min (H), Fluid retention       cholecalciferol 400 UNIT Tabs tablet    vitamin D3     Take 400 Units by mouth daily        cyanocobalamin 1000 MCG tablet    vitamin  B-12     Take 1,000 mcg by mouth daily        diclofenac 1 % Gel topical gel    VOLTAREN     Apply 2 g topically 4 times daily as needed for moderate pain        ferrous sulfate 325 (65 Fe) MG tablet    IRON    100 tablet    Take 1 tablet (325 mg) by mouth 2 times daily    Iron deficiency       folic acid 1 MG tablet     FOLVITE    30 tablet    Take 1 tablet (1 mg) by mouth daily    Malnutrition (H)       gabapentin 100 MG capsule    NEURONTIN    90 capsule    Take 1 capsule (100 mg) by mouth 3 times daily    Peripheral polyneuropathy       hydrOXYzine 25 MG tablet    ATARAX    60 tablet    Take 1-2 tablets (25-50mg) every 6 hours as needed for itching    WALTER (generalized anxiety disorder)       levothyroxine 88 MCG tablet    SYNTHROID/LEVOTHROID    90 tablet    Take 1 tablet (88 mcg) by mouth every morning (before breakfast)    Thyroid function test abnormal       MAGNESIUM OXIDE PO      Take 400 mg by mouth daily        melatonin 5 MG tablet      Take 1 tablet (5 mg) by mouth nightly as needed for sleep        multivitamin, therapeutic Tabs tablet     30 tablet    Take 1 tablet by mouth every 24 hours    Malnutrition (H)       OMEGA-3 FISH OIL EX ST PO      Take 1 capsule by mouth 2 times daily 1290 (fish oil)-900 (omega 3)        order for DME     1 Device    Equipment being ordered: knee sleeve    Acute pain of left knee       pantoprazole 40 MG EC tablet    PROTONIX    90 tablet    Take 1 tablet (40 mg) by mouth every morning (before breakfast)    Gastroesophageal reflux disease, esophagitis presence not specified       pramipexole 0.5 MG tablet    MIRAPEX    90 tablet    Take 1 tablet (0.5 mg) by mouth At Bedtime    Restless leg syndrome       * predniSONE 5 MG tablet    DELTASONE    90 tablet    Take 1 tablet (5 mg) by mouth daily    Liver replaced by transplant (H)       * predniSONE 1 MG tablet    DELTASONE    360 tablet    Take 4 tablets (4 mg) by mouth daily    Liver replaced by transplant (H)       psyllium 0.52 g capsule     60 capsule    Take 2 capsules (1.04 g) by mouth daily With a full glass of water.    Loose stools       tacrolimus 0.5 MG capsule    GENERIC EQUIVALENT    240 capsule    Take 4 capsules (2 mg) by mouth every 12 hours    Liver transplanted (H)       traMADol 50 MG tablet    ULTRAM    20 tablet     Take 1 tablet (50 mg) by mouth every 6 hours as needed for severe pain    Pain of left lower leg       traZODone 100 MG tablet    DESYREL    135 tablet    Take 1.5 tablets (150 mg) by mouth At Bedtime    Insomnia, unspecified type       ursodiol 300 MG capsule    ACTIGALL    60 capsule    Take 1 capsule (300 mg) by mouth 2 times daily    Liver transplanted (H)       venlafaxine 37.5 MG 24 hr capsule    EFFEXOR XR    60 capsule    Take 1 capsule (37.5 mg) by mouth daily If tolerating may increase to 2 caps after 2 weeks    Fibromyalgia       * Notice:  This list has 2 medication(s) that are the same as other medications prescribed for you. Read the directions carefully, and ask your doctor or other care provider to review them with you.

## 2018-10-23 NOTE — PROGRESS NOTES
Service Date: 10/18/2018      Arlyn Sanchez MD   [address]   [place] [address]      RE: Phan Luque   MRN: [id number]   : 1955      Dear Dr. Sanchez:      This is in regard to followup on Phan Luque.  The patient returned today with a chief complaint of neuropathy as well as cervical stenosis.      The patient did undergo testing that I recommended.  I made sure she had these results.  She did have a negative RPR and a normal ELP of the blood and urine.  Her paraneoplastic panel was unremarkable.  The patient's glucose was just recently 101 on 2018 and her creatinine was 1.41.  She was aware of these findings.  She now had normal liver function tests.  The patient did undergo MRI scan testing at my suggestion of the cervical spine.  This was compared to the previous study done on 2017 and performed on 2018.  The patient did have findings to suggest moderate spinal canal stenosis at C4-C5 and C5-C6 with cervical spondylosis.  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  The patient does have some spinal cord contour deformity and there was thought to be some hyperintense signals at C3-C4, C4-C5 and C5-C6.  The patient had had some neck pain.  All of this seems to have lessened.  I have talked with her about these findings in general.  The patient did not have any significant change from previous studies.  It may benefit the patient to have neurologic consultation through our Neurosurgical Department and I will discuss this with her.  She feels that she is about the same and really has not had any significant change in her complaints now.  I think the patient will continue to ask for conservative care. I did review again symptoms to look for to suggest spinal cord compression and to try to avoid falls and limit lifting. She seems to have good understanding of this including need to have immediate follow up if any new or  worsening sx's.  The patient does have epidural lipomatosis and I did discuss how this could influence her complaints because of these findings on lumbar MRI.  I have talked with her again about weight loss.  She assured me that she has total abstinence from alcohol.  She has had no trouble with her transplant.  The patient has been asked to have neurologic followup with me in 6-12 months and on a p.r.n. basis.        Thank you for allowing me to see this patient.       Sincerely yours,       Lizzie Ortiz MD      I spent 20 minutes with the patient today.  Over 50% of the time this involved counseling and coordination of care.         LIZZIE ORTIZ MD             D: 10/23/2018   T: 10/23/2018   MT: AKA      Name:     PARTH FARMER   MRN:      [id number]        Account:      [id number]   :      1955           Service Date: 10/18/2018      Document: [id number]

## 2018-10-25 ENCOUNTER — TELEPHONE (OUTPATIENT)
Dept: NEUROLOGY | Facility: CLINIC | Age: 63
End: 2018-10-25

## 2018-10-25 DIAGNOSIS — R60.9 FLUID RETENTION: ICD-10-CM

## 2018-10-25 DIAGNOSIS — M48.02 CERVICAL STENOSIS OF SPINAL CANAL: Primary | ICD-10-CM

## 2018-10-25 DIAGNOSIS — Z94.4 LIVER TRANSPLANTED (H): ICD-10-CM

## 2018-10-25 DIAGNOSIS — N18.30 CKD (CHRONIC KIDNEY DISEASE) STAGE 3, GFR 30-59 ML/MIN (H): ICD-10-CM

## 2018-10-25 RX ORDER — URSODIOL 300 MG/1
300 CAPSULE ORAL EVERY 12 HOURS
Qty: 60 CAPSULE | Refills: 11 | Status: SHIPPED | OUTPATIENT
Start: 2018-10-25 | End: 2021-09-16

## 2018-10-25 RX ORDER — CHLORTHALIDONE 25 MG/1
12.5 TABLET ORAL DAILY
Qty: 45 TABLET | Refills: 3 | Status: SHIPPED | OUTPATIENT
Start: 2018-10-25 | End: 2019-07-22

## 2018-11-01 DIAGNOSIS — Z94.4 LIVER REPLACED BY TRANSPLANT (H): ICD-10-CM

## 2018-11-01 LAB
ALBUMIN SERPL-MCNC: 3.7 G/DL (ref 3.4–5)
ALP SERPL-CCNC: 55 U/L (ref 40–150)
ALT SERPL W P-5'-P-CCNC: 22 U/L (ref 0–50)
ANION GAP SERPL CALCULATED.3IONS-SCNC: 10 MMOL/L (ref 3–14)
AST SERPL W P-5'-P-CCNC: 14 U/L (ref 0–45)
BILIRUB DIRECT SERPL-MCNC: 0.1 MG/DL (ref 0–0.2)
BILIRUB SERPL-MCNC: 0.4 MG/DL (ref 0.2–1.3)
BUN SERPL-MCNC: 19 MG/DL (ref 7–30)
CALCIUM SERPL-MCNC: 8.9 MG/DL (ref 8.5–10.1)
CHLORIDE SERPL-SCNC: 103 MMOL/L (ref 94–109)
CO2 SERPL-SCNC: 25 MMOL/L (ref 20–32)
CREAT SERPL-MCNC: 1.18 MG/DL (ref 0.52–1.04)
ERYTHROCYTE [DISTWIDTH] IN BLOOD BY AUTOMATED COUNT: 13.9 % (ref 10–15)
GFR SERPL CREATININE-BSD FRML MDRD: 46 ML/MIN/1.7M2
GLUCOSE SERPL-MCNC: 108 MG/DL (ref 70–99)
HCT VFR BLD AUTO: 39.5 % (ref 35–47)
HGB BLD-MCNC: 13.2 G/DL (ref 11.7–15.7)
MCH RBC QN AUTO: 31.2 PG (ref 26.5–33)
MCHC RBC AUTO-ENTMCNC: 33.4 G/DL (ref 31.5–36.5)
MCV RBC AUTO: 93 FL (ref 78–100)
PLATELET # BLD AUTO: 283 10E9/L (ref 150–450)
POTASSIUM SERPL-SCNC: 3.7 MMOL/L (ref 3.4–5.3)
PROT SERPL-MCNC: 7 G/DL (ref 6.8–8.8)
RBC # BLD AUTO: 4.23 10E12/L (ref 3.8–5.2)
SODIUM SERPL-SCNC: 138 MMOL/L (ref 133–144)
TACROLIMUS BLD-MCNC: 5.7 UG/L (ref 5–15)
TME LAST DOSE: NORMAL H
WBC # BLD AUTO: 7.6 10E9/L (ref 4–11)

## 2018-11-01 PROCEDURE — 80197 ASSAY OF TACROLIMUS: CPT | Performed by: INTERNAL MEDICINE

## 2018-11-01 PROCEDURE — 80076 HEPATIC FUNCTION PANEL: CPT | Performed by: INTERNAL MEDICINE

## 2018-11-01 PROCEDURE — 80048 BASIC METABOLIC PNL TOTAL CA: CPT | Performed by: INTERNAL MEDICINE

## 2018-11-01 PROCEDURE — 85027 COMPLETE CBC AUTOMATED: CPT | Performed by: INTERNAL MEDICINE

## 2018-11-01 PROCEDURE — 36415 COLL VENOUS BLD VENIPUNCTURE: CPT | Performed by: INTERNAL MEDICINE

## 2018-11-02 ENCOUNTER — APPOINTMENT (OUTPATIENT)
Dept: LAB | Facility: CLINIC | Age: 63
End: 2018-11-02
Attending: INTERNAL MEDICINE
Payer: COMMERCIAL

## 2018-11-02 ENCOUNTER — INFUSION THERAPY VISIT (OUTPATIENT)
Dept: INFUSION THERAPY | Facility: CLINIC | Age: 63
End: 2018-11-02
Attending: INTERNAL MEDICINE
Payer: COMMERCIAL

## 2018-11-02 VITALS
HEART RATE: 86 BPM | SYSTOLIC BLOOD PRESSURE: 137 MMHG | DIASTOLIC BLOOD PRESSURE: 77 MMHG | RESPIRATION RATE: 16 BRPM | OXYGEN SATURATION: 95 % | WEIGHT: 219.3 LBS | TEMPERATURE: 97.6 F | HEIGHT: 66 IN | BODY MASS INDEX: 35.24 KG/M2

## 2018-11-02 DIAGNOSIS — N18.30 ANEMIA IN STAGE 3 CHRONIC KIDNEY DISEASE (H): ICD-10-CM

## 2018-11-02 DIAGNOSIS — N18.30 CKD (CHRONIC KIDNEY DISEASE) STAGE 3, GFR 30-59 ML/MIN (H): ICD-10-CM

## 2018-11-02 DIAGNOSIS — M85.80 OSTEOPENIA, UNSPECIFIED LOCATION: Primary | ICD-10-CM

## 2018-11-02 DIAGNOSIS — D63.1 ANEMIA IN STAGE 3 CHRONIC KIDNEY DISEASE (H): ICD-10-CM

## 2018-11-02 LAB
CALCIUM SERPL-MCNC: 8.6 MG/DL (ref 8.5–10.1)
CREAT SERPL-MCNC: 1.17 MG/DL (ref 0.52–1.04)
GFR SERPL CREATININE-BSD FRML MDRD: 47 ML/MIN/1.7M2

## 2018-11-02 PROCEDURE — 82310 ASSAY OF CALCIUM: CPT | Performed by: INTERNAL MEDICINE

## 2018-11-02 PROCEDURE — 25000128 H RX IP 250 OP 636: Mod: ZF | Performed by: INTERNAL MEDICINE

## 2018-11-02 PROCEDURE — 82565 ASSAY OF CREATININE: CPT | Performed by: INTERNAL MEDICINE

## 2018-11-02 PROCEDURE — 96365 THER/PROPH/DIAG IV INF INIT: CPT

## 2018-11-02 RX ORDER — ZOLEDRONIC ACID 5 MG/100ML
5 INJECTION, SOLUTION INTRAVENOUS ONCE
Status: CANCELLED
Start: 2018-11-02 | End: 2018-11-02

## 2018-11-02 RX ORDER — ZOLEDRONIC ACID 5 MG/100ML
5 INJECTION, SOLUTION INTRAVENOUS ONCE
Status: COMPLETED | OUTPATIENT
Start: 2018-11-02 | End: 2018-11-02

## 2018-11-02 RX ADMIN — ZOLEDRONIC ACID 5 MG: 0.05 INJECTION, SOLUTION INTRAVENOUS at 13:14

## 2018-11-02 ASSESSMENT — PAIN SCALES - GENERAL: PAINLEVEL: NO PAIN (0)

## 2018-11-02 NOTE — PATIENT INSTRUCTIONS
Dear Phan Luque    Thank you for choosing [facility] ([facility]) for your infusion.  The following information is a summary of our appointment as well as important reminders.       We look forward in seeing you on your next appointment here at [facility].  Please don t hesitate to call us at [phone] to reschedule any of your appointments or to speak with one of the [facility] registered nurses.  It was a pleasure taking care of you today.    Sincerely,    [facility]  Special[facility]  [address] [place] [address]  [address]  Phone:  [phone]

## 2018-11-02 NOTE — PROGRESS NOTES
"Nursing Note  Phan Luque presents today [facility] for:   Chief Complaint   Patient presents with     Blood Draw     IV placed by MA; NO LAB ORDERS; Purple and Green JIC tubes sent     Infusion     Reclast     During today's [facility] appointment, orders from Dr. Dipti Oakley  were completed.  Frequency: yearly    Progress note:  Patient identification verified by name and date of birth.  Assessment completed.  Vitals recorded in Doc Flowsheets.  Patient was provided with education regarding infusion and possible side effects.  Patient verbalized understanding.      needed: No  Premedications: were not ordered.  Infusion Rates: 400 ml/hr.  Approximate Infusion length:30 minutes.   Labs: were drawn prior to appointment on 11/2/18.  Vascular access: peripheral IV placed today at lab appt.  Treatment Conditions: patient s Creatinine Clearance is within paramaters to administer medication per orders.  Patient tolerated infusion: well.    Discharge Plan:   Follow up plan of care with: primary medical doctor.  Discharge instructions were reviewed with patient.  Patient/representative verbalized understanding of discharge instructions and all questions answered.  Patient discharged from [facility] in stable condition.    Michaelle Rodriguez RN       Administrations This Visit     zoledronic Acid (RECLAST) infusion 5 mg     Admin Date Action Dose Rate Route Administered By          11/02/2018 New Bag 5 mg 400 mL/hr Intravenous Michaelle Rodriguez RN                         /71  Pulse 86  Temp 97.6  F (36.4  C) (Oral)  Resp 16  Ht 1.676 m (5' 6\")  Wt 99.5 kg (219 lb 4.8 oz)  SpO2 95%  BMI 35.4 kg/m2    "

## 2018-11-02 NOTE — MR AVS SNAPSHOT
After Visit Summary   11/2/2018    Phan Luque    MRN: [id number]           Patient Information     Date Of Birth          1955        Visit Information        Provider Department      11/2/2018 1:00 PM UC 44 ATC;  SPEC [facility] [place] Special[facility]        Today's Diagnoses     Osteopenia, unspecified location    -  1    Anemia in stage 3 chronic kidney disease (H)        CKD (chronic kidney disease) stage 3, GFR 30-59 ml/min (H)          Care Instructions    Dear Phan Luque    Thank you for choosing [facility] ([facility]) for your infusion.  The following information is a summary of our appointment as well as important reminders.       We look forward in seeing you on your next appointment here at [facility].  Please don t hesitate to call us at [phone] to reschedule any of your appointments or to speak with one of the [facility] registered nurses.  It was a pleasure taking care of you today.    Sincerely,    [facility]  Specialty Infusion & Procedure Center  [address] [place] [address]  [address]  Phone:  [phone]            Follow-ups after your visit        Your next 10 appointments already scheduled     Nov 05, 2018  7:20 AM CST   Lab visit with  LAB   [facility] ([facility])    [address] [place] [address]  [address] [place] [id number] [phone]           Please do not eat 10-12 hours before your appointment if you are coming in fasting for labs on lipids, cholesterol, or glucose (sugar). Does not apply to pregnant women.  Water with medications is okay. Do not drink coffee or other fluids.  If you have concerns about taking your medications, please send a message by clicking on Secure Messaging, Message Your Care Team.            Dec 20, 2018  8:25 AM CST   (Arrive by 8:10 AM)   Return Visit with Arlyn Martinez  MD Laura   [facility] ([facility])    [address]  4th Floor  [facility] [id number] [phone]            Mar 20, 2019  3:30 PM CDT   Lab with UC LAB   [facility] Lab ([facility])    [address]  1st Floor  [facility] [id number] [phone]            Mar 20, 2019  4:30 PM CDT   (Arrive by 4:00 PM)   Return Visit with Daya Gutierrez MD   [facility] Nephrology ([facility])    [address]  [address]  [facility] [id number] [phone]            Mar 27, 2019  5:00 PM CDT   Adult Med Follow UP with DONOVAN [facility]   Psychiatry Clinic ([facility] [address] [facility])    [place] [id number]  [address] [place] [id number] [phone]            Apr 01, 2019  4:30 PM CDT   (Arrive by 4:15 PM)   Return Liver Transplant with Hussein Banegas MD   [facility] Hepatology ([facility])    [address]  [address]  [facility] [id number] [phone]            Aug 14, 2019  7:45 AM CDT   (Arrive by 7:30 AM)   RETURN NEURO with Ambrose Ortega MD   [facility] Ophthalmology ([facility])    [address]  4th Floor  [facility] [id number] [phone]              Who to contact     If you have questions or need follow up information about today's clinic visit or your schedule please contact [facility] [place] SPECIALTY AND PROCEDURE directly at [phone].  Normal or non-critical lab and imaging results will be communicated to you by MyChart, letter or phone within 4 business days after the clinic has received the results. If you do not hear from us within 7 days, please contact the clinic through MyChart or phone. If you have a critical or abnormal lab result, we will notify you by phone as soon as possible.  Submit refill requests through [facility] or call your  "pharmacy and they will forward the refill request to us. Please allow 3 business days for your refill to be completed.          Additional Information About Your Visit        MyChart Information     [facility] gives you secure access to your electronic health record. If you see a primary care provider, you can also send messages to your care team and make appointments. If you have questions, please call your primary care clinic.  If you do not have a primary care provider, please call [phone] and they will assist you.        Care EveryWhere ID     This is your Care EveryWhere ID. This could be used by other organizations to access your [place] medical records  [id number]        Your Vitals Were     Pulse Temperature Respirations Height Pulse Oximetry BMI (Body Mass Index)    86 97.6  F (36.4  C) (Oral) 16 1.676 m (5' 6\") 95% 35.4 kg/m2       Blood Pressure from Last 3 Encounters:   11/02/18 137/77   10/22/18 133/73   10/18/18 151/72    Weight from Last 3 Encounters:   11/02/18 99.5 kg (219 lb 4.8 oz)   10/22/18 97.7 kg (215 lb 6.4 oz)   10/18/18 98.2 kg (216 lb 8 oz)              We Performed the Following     Calcium     Creatinine          Today's Medication Changes          These changes are accurate as of 11/2/18  1:34 PM.  If you have any questions, ask your nurse or doctor.               These medicines have changed or have updated prescriptions.        Dose/Directions    betaxolol 0.5 % ophthalmic solution   Commonly known as:  BETOPTIC   This may have changed:  when to take this   Used for:  Primary open angle glaucoma of both eyes, unspecified glaucoma stage        Dose:  1 drop   Place 1 drop into both eyes 2 times daily   Quantity:  5 mL   Refills:  2                Primary Care Provider Office Phone # Fax #    Arlyn Sanchez [phone]       [address] [address]        Equal Access to Services     PAULO BERRY AH: lucille Haines " reyna mohamudliz wolffanna gallegos. [facility] [phone].    ATENCIÓN: Si yodit lopez, tiene a reece disposición servicios gratuitos de asistencia lingüística. Rhiannon al [phone].    We comply with applicable federal civil rights laws and [place] laws. We do not discriminate on the basis of race, color, national origin, age, disability, sex, sexual orientation, or gender identity.            Thank you!     Thank you for choosing [facility] SPECIALTY AND PROCEDURE  for your care. Our goal is always to provide you with excellent care. Hearing back from our patients is one way we can continue to improve our services. Please take a few minutes to complete the written survey that you may receive in the mail after your visit with us. Thank you!             Your Updated Medication List - Protect others around you: Learn how to safely use, store and throw away your medicines at www.disposemymeds.org.          This list is accurate as of 11/2/18  1:34 PM.  Always use your most recent med list.                   Brand Name Dispense Instructions for use Diagnosis    acetaminophen 325 MG tablet    TYLENOL    100 tablet    Take 2 tablets (650 mg) by mouth every 6 hours as needed for mild pain Or fevers. Use sparingly. Limit use to no more than 2 grams (2000 mg) in 24 hours. **Further refills must be obtained by primary care provider**    Acute post-operative pain       amLODIPine 5 MG tablet    NORVASC    90 tablet    Take 1 tablet (5 mg) by mouth daily    Hypertension       azaTHIOprine 50 MG tablet    IMURAN    90 tablet    Take 1 tablet (50 mg) by mouth every morning    Liver transplanted (H)       betaxolol 0.5 % ophthalmic solution    BETOPTIC    5 mL    Place 1 drop into both eyes 2 times daily    Primary open angle glaucoma of both eyes, unspecified glaucoma stage       calcium Citrate-vitamin D 500-400 MG-UNIT Chew      Take 1 tablet by mouth daily         chlorthalidone 25 MG tablet    HYGROTON    45 tablet    Take 0.5 tablets (12.5 mg) by mouth daily    CKD (chronic kidney disease) stage 3, GFR 30-59 ml/min (H), Fluid retention       cholecalciferol 400 UNIT Tabs tablet    vitamin D3     Take 400 Units by mouth daily        cyanocobalamin 1000 MCG tablet    vitamin  B-12     Take 1,000 mcg by mouth daily        diclofenac 1 % Gel topical gel    VOLTAREN     Apply 2 g topically 4 times daily as needed for moderate pain        ferrous sulfate 325 (65 Fe) MG tablet    IRON    100 tablet    Take 1 tablet (325 mg) by mouth 2 times daily    Iron deficiency       folic acid 1 MG tablet    FOLVITE    30 tablet    Take 1 tablet (1 mg) by mouth daily    Malnutrition (H)       gabapentin 100 MG capsule    NEURONTIN    90 capsule    Take 1 capsule (100 mg) by mouth 3 times daily    Peripheral polyneuropathy       hydrOXYzine 25 MG tablet    ATARAX    60 tablet    Take 1-2 tablets (25-50mg) every 6 hours as needed for itching    WALTER (generalized anxiety disorder)       levothyroxine 88 MCG tablet    SYNTHROID/LEVOTHROID    90 tablet    Take 1 tablet (88 mcg) by mouth every morning (before breakfast)    Thyroid function test abnormal       MAGNESIUM OXIDE PO      Take 400 mg by mouth daily        melatonin 5 MG tablet      Take 1 tablet (5 mg) by mouth nightly as needed for sleep        multivitamin, therapeutic Tabs tablet     30 tablet    Take 1 tablet by mouth every 24 hours    Malnutrition (H)       OMEGA-3 FISH OIL EX ST PO      Take 1 capsule by mouth 2 times daily 1290 (fish oil)-900 (omega 3)        order for DME     1 Device    Equipment being ordered: knee sleeve    Acute pain of left knee       pantoprazole 40 MG EC tablet    PROTONIX    90 tablet    Take 1 tablet (40 mg) by mouth every morning (before breakfast)    Gastroesophageal reflux disease, esophagitis presence not specified       pramipexole 0.5 MG tablet    MIRAPEX    90 tablet    Take 1 tablet (0.5  mg) by mouth At Bedtime    Restless leg syndrome       * predniSONE 5 MG tablet    DELTASONE    90 tablet    Take 1 tablet (5 mg) by mouth daily    Liver replaced by transplant (H)       * predniSONE 1 MG tablet    DELTASONE    360 tablet    Take 4 tablets (4 mg) by mouth daily    Liver replaced by transplant (H)       psyllium 0.52 g capsule     60 capsule    Take 2 capsules (1.04 g) by mouth daily With a full glass of water.    Loose stools       tacrolimus 0.5 MG capsule    GENERIC EQUIVALENT    240 capsule    Take 4 capsules (2 mg) by mouth every 12 hours    Liver transplanted (H)       traMADol 50 MG tablet    ULTRAM    20 tablet    Take 1 tablet (50 mg) by mouth every 6 hours as needed for severe pain    Pain of left lower leg       traZODone 100 MG tablet    DESYREL    135 tablet    Take 1.5 tablets (150 mg) by mouth At Bedtime    Insomnia, unspecified type       ursodiol 300 MG capsule    ACTIGALL    60 capsule    Take 1 capsule (300 mg) by mouth every 12 hours    Liver transplanted (H)       venlafaxine 37.5 MG 24 hr capsule    EFFEXOR XR    60 capsule    Take 1 capsule (37.5 mg) by mouth daily If tolerating may increase to 2 caps after 2 weeks    Fibromyalgia       * Notice:  This list has 2 medication(s) that are the same as other medications prescribed for you. Read the directions carefully, and ask your doctor or other care provider to review them with you.

## 2018-11-02 NOTE — NURSING NOTE
Chief Complaint   Patient presents with     Blood Draw     IV placed by MA; NO LAB ORDERS; Purple and Green JIC tubes sent   Vitals done and labs drawn, see flow sheets.  DOREEN Palacio

## 2018-11-05 DIAGNOSIS — Z94.4 LIVER REPLACED BY TRANSPLANT (H): ICD-10-CM

## 2018-11-05 LAB
CHOLEST SERPL-MCNC: 165 MG/DL
HDLC SERPL-MCNC: 45 MG/DL
LDLC SERPL CALC-MCNC: 75 MG/DL
NONHDLC SERPL-MCNC: 120 MG/DL
TRIGL SERPL-MCNC: 224 MG/DL

## 2018-11-05 PROCEDURE — 80061 LIPID PANEL: CPT | Performed by: INTERNAL MEDICINE

## 2018-11-05 PROCEDURE — 36415 COLL VENOUS BLD VENIPUNCTURE: CPT | Performed by: INTERNAL MEDICINE

## 2018-11-15 NOTE — ED AVS SNAPSHOT
[facility] [place], Emergency Department    [address] [place] [address]    Phone:  [phone]                                       Phan Luque   MRN: [id number]    Department:  [facility], Emergency Department   Date of Visit:  10/19/2017           After Visit Summary Signature Page     I have received my discharge instructions, and my questions have been answered. I have discussed any challenges I see with this plan with the nurse or doctor.    ..........................................................................................................................................  Patient/Patient Representative Signature      ..........................................................................................................................................  Patient Representative Print Name and Relationship to Patient    ..................................................               ................................................  Date                                            Time    ..........................................................................................................................................  Reviewed by Signature/Title    ...................................................              ..............................................  Date                                                            Time           [Very Good] : ~his/her~  mood as very good [One fall no injury in past year] : Patient reported one fall in the past year without injury [0] : 2) Feeling down, depressed, or hopeless: Not at all (0) [HIV test declined] : HIV test declined [Hepatitis C test declined] : Hepatitis C test declined [None] : None [With Significant Other] : lives with significant other [Retired] : retired [Graduate School] : graduate school [] :  [# Of Children ___] : has [unfilled] children [Feels Safe at Home] : Feels safe at home [Fully functional (bathing, dressing, toileting, transferring, walking, feeding)] : Fully functional (bathing, dressing, toileting, transferring, walking, feeding) [Fully functional (using the telephone, shopping, preparing meals, housekeeping, doing laundry, using] : Fully functional and needs no help or supervision to perform IADLs (using the telephone, shopping, preparing meals, housekeeping, doing laundry, using transportation, managing medications and managing finances) [Reports changes in vision] : Reports changes in vision [Smoke Detector] : smoke detector [Seat Belt] :  uses seat belt [Sunscreen] : uses sunscreen [Discussed at today's visit] : Advance Directives Discussed at today's visit [Designated Healthcare Proxy] : Designated healthcare proxy [FreeTextEntry1] : feels she needs cataract surgery left- sometimes lacks "zip" [] : No [de-identified] : NO ED or hosp [de-identified] : Dr Sierra; Derm; Dr Reyna- optho- cataracts [de-identified] : drinks 2 glasses or wine with dinner- most nights [de-identified] : March 2018- tripped over dogs leash; April 2018- misse step at home [MET5Sudhp] : 0 [Change in mental status noted] : No change in mental status noted [Language] : denies difficulty with language [Behavior] : denies difficulty with behavior [Learning/Retaining New Information] : denies difficulty learning/retaining new information [Handling Complex Tasks] : denies difficulty handling complex tasks [Reasoning] : denies difficulty with reasoning [Spatial Ability and Orientation] : denies difficulty with spatial ability and orientation [Reports changes in hearing] : Reports no changes in hearing [Reports changes in dental health] : Reports no changes in dental health [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

## 2018-11-28 ENCOUNTER — TELEPHONE (OUTPATIENT)
Dept: TRANSPLANT | Facility: CLINIC | Age: 63
End: 2018-11-28

## 2018-11-28 DIAGNOSIS — Z94.4 LIVER REPLACED BY TRANSPLANT (H): Primary | ICD-10-CM

## 2018-11-28 NOTE — TELEPHONE ENCOUNTER
Pt has started itching and not feeling well. Will start with having patient complete labs and then review with Dr Banegas.

## 2018-11-29 ENCOUNTER — OFFICE VISIT (OUTPATIENT)
Dept: NEUROSURGERY | Facility: CLINIC | Age: 63
End: 2018-11-29
Payer: COMMERCIAL

## 2018-11-29 VITALS
OXYGEN SATURATION: 96 % | WEIGHT: 221 LBS | BODY MASS INDEX: 35.52 KG/M2 | HEIGHT: 66 IN | DIASTOLIC BLOOD PRESSURE: 63 MMHG | HEART RATE: 74 BPM | SYSTOLIC BLOOD PRESSURE: 152 MMHG

## 2018-11-29 DIAGNOSIS — Z94.4 LIVER REPLACED BY TRANSPLANT (H): ICD-10-CM

## 2018-11-29 DIAGNOSIS — M54.2 CERVICALGIA: Primary | ICD-10-CM

## 2018-11-29 LAB
ALBUMIN SERPL-MCNC: 3.6 G/DL (ref 3.4–5)
ALBUMIN UR-MCNC: NEGATIVE MG/DL
ALP SERPL-CCNC: 56 U/L (ref 40–150)
ALT SERPL W P-5'-P-CCNC: 20 U/L (ref 0–50)
ANION GAP SERPL CALCULATED.3IONS-SCNC: 8 MMOL/L (ref 3–14)
APPEARANCE UR: CLEAR
AST SERPL W P-5'-P-CCNC: 13 U/L (ref 0–45)
BILIRUB DIRECT SERPL-MCNC: <0.1 MG/DL (ref 0–0.2)
BILIRUB SERPL-MCNC: 0.4 MG/DL (ref 0.2–1.3)
BILIRUB UR QL STRIP: NEGATIVE
BUN SERPL-MCNC: 21 MG/DL (ref 7–30)
CALCIUM SERPL-MCNC: 8.4 MG/DL (ref 8.5–10.1)
CHLORIDE SERPL-SCNC: 105 MMOL/L (ref 94–109)
CO2 SERPL-SCNC: 25 MMOL/L (ref 20–32)
COLOR UR AUTO: YELLOW
CREAT SERPL-MCNC: 1.22 MG/DL (ref 0.52–1.04)
CREAT UR-MCNC: 60 MG/DL
ERYTHROCYTE [DISTWIDTH] IN BLOOD BY AUTOMATED COUNT: 13.7 % (ref 10–15)
GFR SERPL CREATININE-BSD FRML MDRD: 45 ML/MIN/1.7M2
GLUCOSE SERPL-MCNC: 116 MG/DL (ref 70–99)
GLUCOSE UR STRIP-MCNC: NEGATIVE MG/DL
HCT VFR BLD AUTO: 38 % (ref 35–47)
HGB BLD-MCNC: 12.9 G/DL (ref 11.7–15.7)
HGB UR QL STRIP: NEGATIVE
KETONES UR STRIP-MCNC: NEGATIVE MG/DL
LEUKOCYTE ESTERASE UR QL STRIP: NEGATIVE
MCH RBC QN AUTO: 30.9 PG (ref 26.5–33)
MCHC RBC AUTO-ENTMCNC: 33.9 G/DL (ref 31.5–36.5)
MCV RBC AUTO: 91 FL (ref 78–100)
MUCOUS THREADS #/AREA URNS LPF: PRESENT /LPF
NITRATE UR QL: NEGATIVE
PH UR STRIP: 6 PH (ref 5–7)
PLATELET # BLD AUTO: 276 10E9/L (ref 150–450)
POTASSIUM SERPL-SCNC: 3.8 MMOL/L (ref 3.4–5.3)
PROT SERPL-MCNC: 7 G/DL (ref 6.8–8.8)
PROT UR-MCNC: 0.06 G/L
PROT/CREAT 24H UR: 0.11 G/G CR (ref 0–0.2)
RBC # BLD AUTO: 4.18 10E12/L (ref 3.8–5.2)
RBC #/AREA URNS AUTO: <1 /HPF (ref 0–2)
SODIUM SERPL-SCNC: 139 MMOL/L (ref 133–144)
SOURCE: ABNORMAL
SP GR UR STRIP: 1.01 (ref 1–1.03)
SQUAMOUS #/AREA URNS AUTO: <1 /HPF (ref 0–1)
UROBILINOGEN UR STRIP-MCNC: 0 MG/DL (ref 0–2)
WBC # BLD AUTO: 6.6 10E9/L (ref 4–11)
WBC #/AREA URNS AUTO: <1 /HPF (ref 0–5)

## 2018-11-29 ASSESSMENT — ENCOUNTER SYMPTOMS
SKIN CHANGES: 0
BLOOD IN STOOL: 0
PARALYSIS: 0
WEAKNESS: 1
NECK PAIN: 1
MUSCLE WEAKNESS: 1
ARTHRALGIAS: 1
NUMBNESS: 1
DISTURBANCES IN COORDINATION: 1
STIFFNESS: 1
BLOATING: 1
HEADACHES: 1
SPEECH CHANGE: 0
CONSTIPATION: 1
DIZZINESS: 1
JOINT SWELLING: 0
NAIL CHANGES: 0
RECTAL PAIN: 0
NAUSEA: 0
BOWEL INCONTINENCE: 0
DIARRHEA: 1
TREMORS: 0
VOMITING: 0
BACK PAIN: 0
MYALGIAS: 1
JAUNDICE: 0
MEMORY LOSS: 0
MUSCLE CRAMPS: 1
POOR WOUND HEALING: 1
TINGLING: 1
LOSS OF CONSCIOUSNESS: 0
SEIZURES: 0
ABDOMINAL PAIN: 1
HEARTBURN: 0

## 2018-11-29 ASSESSMENT — PAIN SCALES - GENERAL: PAINLEVEL: MILD PAIN (2)

## 2018-11-29 NOTE — LETTER
11/29/2018       RE: Phan Luque  [address] [facility] [address]     Dear Colleague,    Thank you for referring your patient, Phan Luque, to the [facility] NEUROSURGERY at [facility]. Please see a copy of my visit note below.    Service Date: 11/29/2018      It was my pleasure to meet Ms. Luque in today's clinic visit.  In brief, Ms. Luque is a 63-year-old woman with a past medical history of a liver transplantation, currently on chronic anticoagulation for which she was hospitalized for 6 months who presents with chronic and diffuse neurologic complaints in the setting of MRI finding of a cervical central canal mild stenosis.  After her hospitalization, Ms. Luque complains of bilateral lower extremity tingling and burning in the feet up to the level of knee that is nonpainful.  Additionally, she reports neck pain that she has endured intermittently for several months, which is new.  She states that the neck pain is relatively mild and lasts approximately 30 seconds once per day.  Additionally, she reports some difficulty controlling her stool and urine, which she attributes to a past history of obstetrical tear into her rectum, status post anal surgery in 2008.  Additionally, the patient endorses the sensation of a cold ache in her hands bilaterally and clumsiness, which is also new.  Several months ago, Ms. Luque reports that she turned over in bed and subsequently experienced dizziness for approximately 6 months which is not present at the time of the examination today.      With regard to the patient's neck pain, Ms. Luque states that Tylenol does not help, tramadol helps slightly, gabapentin improves her pain, but makes her tired, and venlafaxine results in improvement of her pain, but a sensation of altered mental status.      She has previously undergone an EMG in 08/2018 by Dr. Sandoval, notable for mild distal motor predominantly peripheral  neuropathy.      The patient's past medical history is also notable for gastroesophageal reflux disorder, stage III renal insufficiency, asthma, osteoporosis.        PHYSICAL EXAMINATION:     HEENT:  Normocephalic, no gross abnormalities.   GENERAL:  Well-appearing woman appearing stated age, seated comfortably in examination room, pleasantly conversant.     NEUROLOGIC:    MENTAL STATUS:  Alert and oriented to name, location and time without any overt speech abnormalities.   Cranial Nerves:  Cranial nerves II-XII intact.     Motor:  No weakness.  The shoulder shrug, equal bilaterally.  5/5 strength throughout the bilateral upper and lower extremities.     Sensory:  Sensation intact to light touch in all 3 distributions of the trigeminal nerve bilaterally.  Sensation intact to light touch throughout the bilateral upper and lower extremities.     Reflexes:  Reflexes are 1+ bilaterally in the biceps and triceps.  We are unable to elicit brachioradialis, 1+ bilaterally in the patella.  Unable to elicit Achilles.  Matilde sign negative, no ankle clonus.        IMAGING:  MRI of the cervical spine dated 09/05/2018:  Most notable for moderate spinal canal stenosis at cervical 4 through 6 with cervical spondylosis, possibly congenital borderline narrow spinal canal.       ASSESSMENT:  Diffuse neurologic complaints associated with cervical central stenosis.      PLAN:  At the conclusion of today's visit, Dr. Still was unable to identify an anatomic correlate that would clearly cause Ms. Luque's diffuse neurologic symptoms.  Dr. Still recommended that Ms. Luque follow up in our clinic as needed in the future should she develop new neurologic issues, questions or concerns.  Ms. Luque expressed understanding and was amenable to proceed with this plan.      Again, it is a pleasure to meet Ms. Luque during today's clinic visit.      Dr. Still spent approximately 20 minutes in conversation with the patient during this  encounter.  The majority of that time was spent in counseling and care coordination.   Dictated by Hanna Alfredo MD, Neurosurgery, PGY-1.         LUIS LAZAR MD       As dictated by HANNA ALFREDO MD            D: 2018   T: 2018   MT: KRISTIN      Name:     PARTH FARMER   MRN:      [id number]        Account:      [id number]   :      1955           Service Date: 2018      Document: [id number]      Service Date: 2018      It is my pleasure to meet Ms. Farmer during today's clinic visit.      Ms. Farmer is a 63-year-old woman with a past medical history of alcoholic hepatitis, status post liver transplant in , currently on chronic immunosuppression, chronic kidney disease, irritable bowel syndrome, anxiety and hypertension, who presents to our clinic today with diffuse neurologic complaints and cervical stenosis noted on MRI.      Ms. Farmer's primary complaints today regard an abnormal sensation in her feet that extends up to the level of her knees, qualified by burning and tingling.  However, she does state that she has had pain in her neck for several weeks, which she has not experienced before.  This pain is intermittent and lasts approximately 30 seconds when it occurs on a daily basis.  Additionally, she states that she has had difficulty with bowel and bladder incontinence, which she attributes to an obstetrical tear into her rectum, status post anal surgery in .  She has taken gabapentin in the past, which reportedly improved her neck pain and abnormal bilateral lower extremity sensations.      Ms. Farmer had undergone an EMG study in 2018 by Dr. Sandoval, which was most notable for mild distal motor predominantly peripheral neuropathy.      The patient's past medical history, aside from the aforementioned events, is notable for gastroesophageal reflux disorder, stage 3 renal insufficiency, asthma, osteoporosis and migraine.      PHYSICAL EXAMINATION:      GENERAL:  A well-appearing woman appearing stated age, seated comfortably in examination room, pleasantly conversant.   HEENT:  Normocephalic, no gross abnormalities.        NEUROLOGIC EXAMINATION:     MENTAL STATUS:  Alert and oriented to name, location and time.  No overt speech abnormalities.   CRANIAL NERVES:  Cranial nerves II-XII intact.     MOTOR:  No weakness to shoulder shrug, equal bilaterally.  5/5 strength throughout the bilateral upper and lower extremities.     SENSORY:  Sensation intact to light touch throughout all 3 distributions of the trigeminal nerve bilaterally.  Sensation intact to light touch throughout the bilateral upper and lower extremities.     REFLEXES:  1+ bilaterally in the biceps and triceps.  Unable to elicit brachioradialis.  1+ bilaterally in the patellae, Achilles muted.  Matilde negative, no ankle clonus.      IMAGING:     MRI of the cervical spine dated 09/05/2018:  Most notable for moderate spinal canal stenosis at cervical 4-6 with cervical spondylosis, likely a congenital borderline narrow spinal canal.      ASSESSMENT:  Diffuse neurologic complaints without clear anatomic correlate on imaging.      PLAN:  At the conclusion of today's clinic visit, Dr. Still did not offer surgical intervention to Ms. Luque, as there was no clear anatomic correlate for the diffuse nature of her symptoms.  Dr. Still recommended that Ms. Luque follow up in our clinic as necessary should her symptoms progress with focal neurologic findings.  Ms. Luque expressed understanding and was amenable to proceed with this plan.      Again, it is my pleasure to meet Ms. Luque during today's clinic visit.  She may feel free to call our clinic at any time in the future with further issues, questions or concerns.      Dr. Still spent approximately 30 minutes in conversation with the patient during this encounter.  The majority of that time was spent in counseling and care coordination.         D:  2018   T: 2018   MT: hermila      Name:     PARTH FARMER   MRN:      [id number]        Account:      [id number]   :      1955           Service Date: 2018      Document: [id number]         Palak Still MD

## 2018-11-30 ENCOUNTER — OFFICE VISIT (OUTPATIENT)
Dept: DERMATOLOGY | Facility: CLINIC | Age: 63
End: 2018-11-30
Payer: COMMERCIAL

## 2018-11-30 DIAGNOSIS — Z12.83 SKIN CANCER SCREENING: ICD-10-CM

## 2018-11-30 DIAGNOSIS — D22.9 MULTIPLE BENIGN NEVI: ICD-10-CM

## 2018-11-30 DIAGNOSIS — L30.0 NUMMULAR ECZEMA: Primary | ICD-10-CM

## 2018-11-30 DIAGNOSIS — L91.0 HYPERTROPHIC SCAR: ICD-10-CM

## 2018-11-30 DIAGNOSIS — L82.1 SEBORRHEIC KERATOSIS: ICD-10-CM

## 2018-11-30 DIAGNOSIS — D84.9 IMMUNOSUPPRESSED STATUS (H): ICD-10-CM

## 2018-11-30 DIAGNOSIS — D18.01 CHERRY ANGIOMA: ICD-10-CM

## 2018-11-30 RX ORDER — CLOBETASOL PROPIONATE 0.5 MG/G
OINTMENT TOPICAL 2 TIMES DAILY
Qty: 60 G | Refills: 1 | Status: SHIPPED | OUTPATIENT
Start: 2018-11-30 | End: 2019-11-18

## 2018-11-30 ASSESSMENT — PAIN SCALES - GENERAL: PAINLEVEL: NO PAIN (0)

## 2018-11-30 NOTE — PROGRESS NOTES
[facility] Dermatology Note      Dermatology Problem List:   1. History of liver transplant at [facility] , 2016- currently on tacrolimus   2. Nummular dermatitis  - clobetasol 0.05% ointment  3. Skin cancer screening, 11/30/18    Encounter Date: Nov 30, 2018    CC:  Chief Complaint   Patient presents with     Derm Problem     Phan is here for a skin check. SHe would like to discuss eczema.        History of Present Illness:  Ms. Phan Luque is a 63 year old female on immunosuppressants, who presents today for a skin check. The patient was last seen in the dermatology clinic on 02/09/18 during which her total body skin exam was unremarkable.     Today she reports that her skin is fine- except her eczema is flaring. Previously she has used topical steroids to control her eczema during the winter months. The one topical that really seemed to control her symptoms was topical clobetasol. Her eczema used to be isolated to her shins, and now she reports it is more diffuse on her lower legs.     She also states that her old appendectomy scar has gotten kind of weird. Occasionally this area gets sensitive and her clothes will irritate it. In these times, she applies Bacitracin without much improvement.     The patient otherwise denies any areas of change or concern including areas of bleeding or tenderness.     Past Medical History:   Patient Active Problem List   Diagnosis     Decompensation of cirrhosis of liver (H)     Liver transplanted (H)     Alcoholic hepatitis     Immunosuppression (H)     Alcoholic hepatitis without ascites     Enterococcus UTI     Liver transplant status (H)     Nausea & vomiting     Malnutrition (H)     Candidiasis of skin     Anemia     ACP (advance care planning)     Diarrhea     Hypothyroidism     Esophageal reflux     Recurrent major depression in partial remission (H)     Insomnia     CKD (chronic kidney disease) stage 3, GFR 30-59 ml/min (H)     Anemia in stage 3  chronic kidney disease (H)     Osteopenia     Alcohol abuse, uncomplicated     Past Medical History:   Diagnosis Date     Asthma      Chronic kidney disease      End stage liver disease (H)     2/2 Alcohol Abuse     Liver transplanted (H)      Migraines      Osteoporosis      Spinal stenosis in cervical region      Strabismus      Past Surgical History:   Procedure Laterality Date     APPENDECTOMY           BENCH LIVER N/A 2016    Procedure: BENCH LIVER;  Surgeon: Larry Dhillon MD;  Location: [facility] OR     CATARACT IOL, RT/LT       COLONOSCOPY       ESOPHAGOSCOPY, GASTROSCOPY, DUODENOSCOPY (EGD), COMBINED N/A 2016    Procedure: COMBINED ESOPHAGOSCOPY, GASTROSCOPY, DUODENOSCOPY (EGD);  Surgeon: Tao Alfonso MD;  Location: [facility] GI     ESOPHAGOSCOPY, GASTROSCOPY, DUODENOSCOPY (EGD), COMBINED N/A 10/31/2016    Procedure: COMBINED ESOPHAGOSCOPY, GASTROSCOPY, DUODENOSCOPY (EGD), BIOPSY SINGLE OR MULTIPLE;  Surgeon: Ronald Bojorquez MD;  Location: [facility] GI     sphincteroplasty       TRANSPLANT LIVER RECIPIENT  DONOR N/A 2016    Procedure: TRANSPLANT LIVER RECIPIENT  DONOR;  Surgeon: Larry Dhillon MD;  Location: [facility] OR     TUBAL LIGATION      laparoscopic       Social History:  The patient works for medical insurance. The patient denies use of tanning beds. Patient is originally from [place].     Family History:  There is a family history of melanoma in the patient's mother (retinal, thought to have been present since birth, passed in ).    Medications:  Current Outpatient Prescriptions   Medication Sig Dispense Refill     acetaminophen (TYLENOL) 325 MG tablet Take 2 tablets (650 mg) by mouth every 6 hours as needed for mild pain Or fevers. Use sparingly. Limit use to no more than 2 grams (2000 mg) in 24 hours. **Further refills must be obtained by primary care provider** 100 tablet 0     amLODIPine (NORVASC) 5 MG tablet Take 1 tablet (5 mg) by mouth daily 90 tablet 3      azaTHIOprine (IMURAN) 50 MG tablet Take 1 tablet (50 mg) by mouth every morning 90 tablet 3     betaxolol (BETOPTIC) 0.5 % ophthalmic solution Place 1 drop into both eyes 2 times daily (Patient taking differently: Place 1 drop into both eyes daily ) 5 mL 2     calcium Citrate-vitamin D 500-400 MG-UNIT CHEW Take 1 tablet by mouth daily       chlorthalidone (HYGROTON) 25 MG tablet Take 0.5 tablets (12.5 mg) by mouth daily 45 tablet 3     cholecalciferol (VITAMIN D3) 400 UNIT TABS tablet Take 400 Units by mouth daily       cyanocobalamin (VITAMIN  B-12) 1000 MCG tablet Take 1,000 mcg by mouth daily       diclofenac (VOLTAREN) 1 % GEL topical gel Apply 2 g topically 4 times daily as needed for moderate pain       ferrous sulfate (IRON) 325 (65 FE) MG tablet Take 1 tablet (325 mg) by mouth 2 times daily 100 tablet      folic acid (FOLVITE) 1 MG tablet Take 1 tablet (1 mg) by mouth daily 30 tablet 1     gabapentin (NEURONTIN) 100 MG capsule Take 1 capsule (100 mg) by mouth 3 times daily 90 capsule 1     hydrOXYzine (ATARAX) 25 MG tablet Take 1-2 tablets (25-50mg) every 6 hours as needed for itching 60 tablet 2     levothyroxine (SYNTHROID/LEVOTHROID) 88 MCG tablet Take 1 tablet (88 mcg) by mouth every morning (before breakfast) 90 tablet 2     MAGNESIUM OXIDE PO Take 400 mg by mouth daily       melatonin 5 MG tablet Take 1 tablet (5 mg) by mouth nightly as needed for sleep       multivitamin, therapeutic (THERA-VIT) TABS tablet Take 1 tablet by mouth every 24 hours 30 tablet 11     Omega-3 Fatty Acids (OMEGA-3 FISH OIL EX ST PO) Take 1 capsule by mouth 2 times daily 1290 (fish oil)-900 (omega 3)       order for DME Equipment being ordered: knee sleeve 1 Device 0     pantoprazole (PROTONIX) 40 MG EC tablet Take 1 tablet (40 mg) by mouth every morning (before breakfast) 90 tablet 4     pramipexole (MIRAPEX) 0.5 MG tablet Take 1 tablet (0.5 mg) by mouth At Bedtime 90 tablet 3     predniSONE (DELTASONE) 1 MG tablet Take 4  tablets (4 mg) by mouth daily (Patient taking differently: Take 2 mg by mouth daily ) 360 tablet 3     predniSONE (DELTASONE) 5 MG tablet Take 1 tablet (5 mg) by mouth daily 90 tablet 3     psyllium 0.52 G capsule Take 2 capsules (1.04 g) by mouth daily With a full glass of water. 60 capsule 1     tacrolimus (GENERIC EQUIVALENT) 0.5 MG capsule Take 4 capsules (2 mg) by mouth every 12 hours 240 capsule 11     traMADol (ULTRAM) 50 MG tablet Take 1 tablet (50 mg) by mouth every 6 hours as needed for severe pain 20 tablet 0     traZODone (DESYREL) 100 MG tablet Take 1.5 tablets (150 mg) by mouth At Bedtime 135 tablet 1     ursodiol (ACTIGALL) 300 MG capsule Take 1 capsule (300 mg) by mouth every 12 hours 60 capsule 11     venlafaxine (EFFEXOR XR) 37.5 MG 24 hr capsule Take 1 capsule (37.5 mg) by mouth daily If tolerating may increase to 2 caps after 2 weeks 60 capsule 2       Allergies   Allergen Reactions     Codeine Hives     Benadryl [Diphenhydramine] Hives     Cefaclor Hives     Hydrocodone-Acetaminophen Itching     Oxycodone Itching     Penicillins Hives     Sulfa Drugs Hives       Review of Systems:  -Constitutional: The patient denies fatigue, fevers, chills, unintended weight loss, and night sweats. She is feeling in her usual state of health.  -Skin: As above in HPI. No additional skin concerns.    Physical exam:  Vitals: There were no vitals taken for this visit.  GEN: This is a well developed, well-nourished female in no acute distress, in a pleasant mood.    SKIN: Full skin, which includes the head/face, both arms, chest, back, abdomen,both legs, genitalia and/or groin buttocks, digits and/or nails, was examined. Significant for:     -Multiple regular brown pigmented macules and papules are identified on the trunk and extremities.   -There are waxy stuck on tan to brown papules on the face, trunk, and extremities.  -There are dome shaped bright red papules on the trunk.   - Faint erythema to bilateral cheeks  with mild telangiectasia  - There is a linear hypertrophic scar on right lateral lower abdomen, at previous appendectomy site  - There are nummular pink scaly plaques to bilateral lower legs  -No other lesions of concern on areas examined.     Impression/Plan:  1. Nummular dermatitis     Start clobetasol 0.05% ointment, apply to affected areas of eczema while flaring.    Steroid ed given, use to to 2 weeks at a time.     2. Hypertrophic scar, at appendectomy site    OK to use clobetasol on this area as needed if symptomatic, up to 2 weeks at a time.     3. Multiple clinically benign nevi on the trunk and extremities     ABCDs of melanoma were discussed and self skin checks were advised.     Sun precaution was advised including the use of sun screens of SPF 30 or higher, sun protective clothing, and avoidance of tanning beds.    4. Cherry angioma(s)    No further intervention required. Patient to report changes.     5. Seborrheic keratosis, non irritated    No further intervention required. Patient to report changes.     6. History of liver transplant- currently on immunosuppressants     Given patient is on immune suppressing drugs, and with family history of melanoma, recommended skin check every 6 months     Follow-up in 6 months, earlier for new or changing lesions.       Staff Involved:  Scribe/Staff    Scribe Disclosure:   MILADIS, Racquel Cowan, am serving as a scribe to document services personally performed by Demetria Barger PA-C, based on data collection and the provider's statements to me.    Provider Disclosure:   The documentation recorded by the scribe accurately reflects the services I personally performed and the decisions made by me.    All risks, benefits and alternatives were discussed with patient.  Patient is in agreement and understands the assessment and plan.  All questions were answered.  Sun Screen Education was given.   Return to Clinic in 6 months or sooner as needed.   Demetria Barger PA-C    [facility]

## 2018-11-30 NOTE — LETTER
11/30/2018       RE: Phan Luque  [address]  [facility] [address]     Dear Colleague,    Thank you for referring your patient, Phan Luque, to the [facility] DERMATOLOGY at [facility]. Please see a copy of my visit note below.    [facility] Dermatology Note      Dermatology Problem List:   1. History of liver transplant at [facility], 2016- currently on tacrolimus   2. Nummular dermatitis  - clobetasol 0.05% ointment  3. Skin cancer screening, 11/30/18    Encounter Date: Nov 30, 2018    CC:  Chief Complaint   Patient presents with     Derm Problem     Phan is here for a skin check. SHe would like to discuss eczema.        History of Present Illness:  Ms. Phan Luque is a 63 year old female on immunosuppressants, who presents today for a skin check. The patient was last seen in the dermatology clinic on 02/09/18 during which her total body skin exam was unremarkable.     Today she reports that her skin is fine- except her eczema is flaring. Previously she has used topical steroids to control her eczema during the winter months. The one topical that really seemed to control her symptoms was topical clobetasol. Her eczema used to be isolated to her shins, and now she reports it is more diffuse on her lower legs.     She also states that her old appendectomy scar has gotten kind of weird. Occasionally this area gets sensitive and her clothes will irritate it. In these times, she applies Bacitracin without much improvement.     The patient otherwise denies any areas of change or concern including areas of bleeding or tenderness.     Past Medical History:   Patient Active Problem List   Diagnosis     Decompensation of cirrhosis of liver (H)     Liver transplanted (H)     Alcoholic hepatitis     Immunosuppression (H)     Alcoholic hepatitis without ascites     Enterococcus UTI     Liver transplant status (H)     Nausea & vomiting     Malnutrition  (H)     Candidiasis of skin     Anemia     ACP (advance care planning)     Diarrhea     Hypothyroidism     Esophageal reflux     Recurrent major depression in partial remission (H)     Insomnia     CKD (chronic kidney disease) stage 3, GFR 30-59 ml/min (H)     Anemia in stage 3 chronic kidney disease (H)     Osteopenia     Alcohol abuse, uncomplicated     Past Medical History:   Diagnosis Date     Asthma      Chronic kidney disease      End stage liver disease (H)     2/2 Alcohol Abuse     Liver transplanted (H)      Migraines      Osteoporosis      Spinal stenosis in cervical region      Strabismus      Past Surgical History:   Procedure Laterality Date     APPENDECTOMY           BENCH LIVER N/A 2016    Procedure: BENCH LIVER;  Surgeon: Larry Dhillon MD;  Location: [facility]     CATARACT IOL, RT/LT       COLONOSCOPY       ESOPHAGOSCOPY, GASTROSCOPY, DUODENOSCOPY (EGD), COMBINED N/A 2016    Procedure: COMBINED ESOPHAGOSCOPY, GASTROSCOPY, DUODENOSCOPY (EGD);  Surgeon: Tao Alfonso MD;  Location: [facility] GI     ESOPHAGOSCOPY, GASTROSCOPY, DUODENOSCOPY (EGD), COMBINED N/A 10/31/2016    Procedure: COMBINED ESOPHAGOSCOPY, GASTROSCOPY, DUODENOSCOPY (EGD), BIOPSY SINGLE OR MULTIPLE;  Surgeon: Ronald Bojorquez MD;  Location: [facility] GI     sphincteroplasty       TRANSPLANT LIVER RECIPIENT  DONOR N/A 2016    Procedure: TRANSPLANT LIVER RECIPIENT  DONOR;  Surgeon: Larry Dhillon MD;  Location: [facility] OR     TUBAL LIGATION      laparoscopic       Social History:  The patient works for medical insurance. The patient denies use of tanning beds. Patient is originally from [place].     Family History:  There is a family history of melanoma in the patient's mother (retinal, thought to have been present since birth, passed in ).    Medications:  Current Outpatient Prescriptions   Medication Sig Dispense Refill     acetaminophen (TYLENOL) 325 MG tablet Take 2 tablets (650 mg) by mouth every  6 hours as needed for mild pain Or fevers. Use sparingly. Limit use to no more than 2 grams (2000 mg) in 24 hours. **Further refills must be obtained by primary care provider** 100 tablet 0     amLODIPine (NORVASC) 5 MG tablet Take 1 tablet (5 mg) by mouth daily 90 tablet 3     azaTHIOprine (IMURAN) 50 MG tablet Take 1 tablet (50 mg) by mouth every morning 90 tablet 3     betaxolol (BETOPTIC) 0.5 % ophthalmic solution Place 1 drop into both eyes 2 times daily (Patient taking differently: Place 1 drop into both eyes daily ) 5 mL 2     calcium Citrate-vitamin D 500-400 MG-UNIT CHEW Take 1 tablet by mouth daily       chlorthalidone (HYGROTON) 25 MG tablet Take 0.5 tablets (12.5 mg) by mouth daily 45 tablet 3     cholecalciferol (VITAMIN D3) 400 UNIT TABS tablet Take 400 Units by mouth daily       cyanocobalamin (VITAMIN  B-12) 1000 MCG tablet Take 1,000 mcg by mouth daily       diclofenac (VOLTAREN) 1 % GEL topical gel Apply 2 g topically 4 times daily as needed for moderate pain       ferrous sulfate (IRON) 325 (65 FE) MG tablet Take 1 tablet (325 mg) by mouth 2 times daily 100 tablet      folic acid (FOLVITE) 1 MG tablet Take 1 tablet (1 mg) by mouth daily 30 tablet 1     gabapentin (NEURONTIN) 100 MG capsule Take 1 capsule (100 mg) by mouth 3 times daily 90 capsule 1     hydrOXYzine (ATARAX) 25 MG tablet Take 1-2 tablets (25-50mg) every 6 hours as needed for itching 60 tablet 2     levothyroxine (SYNTHROID/LEVOTHROID) 88 MCG tablet Take 1 tablet (88 mcg) by mouth every morning (before breakfast) 90 tablet 2     MAGNESIUM OXIDE PO Take 400 mg by mouth daily       melatonin 5 MG tablet Take 1 tablet (5 mg) by mouth nightly as needed for sleep       multivitamin, therapeutic (THERA-VIT) TABS tablet Take 1 tablet by mouth every 24 hours 30 tablet 11     Omega-3 Fatty Acids (OMEGA-3 FISH OIL EX ST PO) Take 1 capsule by mouth 2 times daily 1290 (fish oil)-900 (omega 3)       order for DME Equipment being ordered: knee  sleeve 1 Device 0     pantoprazole (PROTONIX) 40 MG EC tablet Take 1 tablet (40 mg) by mouth every morning (before breakfast) 90 tablet 4     pramipexole (MIRAPEX) 0.5 MG tablet Take 1 tablet (0.5 mg) by mouth At Bedtime 90 tablet 3     predniSONE (DELTASONE) 1 MG tablet Take 4 tablets (4 mg) by mouth daily (Patient taking differently: Take 2 mg by mouth daily ) 360 tablet 3     predniSONE (DELTASONE) 5 MG tablet Take 1 tablet (5 mg) by mouth daily 90 tablet 3     psyllium 0.52 G capsule Take 2 capsules (1.04 g) by mouth daily With a full glass of water. 60 capsule 1     tacrolimus (GENERIC EQUIVALENT) 0.5 MG capsule Take 4 capsules (2 mg) by mouth every 12 hours 240 capsule 11     traMADol (ULTRAM) 50 MG tablet Take 1 tablet (50 mg) by mouth every 6 hours as needed for severe pain 20 tablet 0     traZODone (DESYREL) 100 MG tablet Take 1.5 tablets (150 mg) by mouth At Bedtime 135 tablet 1     ursodiol (ACTIGALL) 300 MG capsule Take 1 capsule (300 mg) by mouth every 12 hours 60 capsule 11     venlafaxine (EFFEXOR XR) 37.5 MG 24 hr capsule Take 1 capsule (37.5 mg) by mouth daily If tolerating may increase to 2 caps after 2 weeks 60 capsule 2       Allergies   Allergen Reactions     Codeine Hives     Benadryl [Diphenhydramine] Hives     Cefaclor Hives     Hydrocodone-Acetaminophen Itching     Oxycodone Itching     Penicillins Hives     Sulfa Drugs Hives       Review of Systems:  -Constitutional: The patient denies fatigue, fevers, chills, unintended weight loss, and night sweats. She is feeling in her usual state of health.  -Skin: As above in HPI. No additional skin concerns.    Physical exam:  Vitals: There were no vitals taken for this visit.  GEN: This is a well developed, well-nourished female in no acute distress, in a pleasant mood.    SKIN: Full skin, which includes the head/face, both arms, chest, back, abdomen,both legs, genitalia and/or groin buttocks, digits and/or nails, was examined. Significant for:      -Multiple regular brown pigmented macules and papules are identified on the trunk and extremities.   -There are waxy stuck on tan to brown papules on the face, trunk, and extremities.  -There are dome shaped bright red papules on the trunk.   - Faint erythema to bilateral cheeks with mild telangiectasia  - There is a linear hypertrophic scar on right lateral lower abdomen, at previous appendectomy site  - There are nummular pink scaly plaques to bilateral lower legs  -No other lesions of concern on areas examined.     Impression/Plan:  1. Nummular dermatitis     Start clobetasol 0.05% ointment, apply to affected areas of eczema while flaring.    Steroid ed given, use to to 2 weeks at a time.     2. Hypertrophic scar, at appendectomy site    OK to use clobetasol on this area as needed if symptomatic, up to 2 weeks at a time.     3. Multiple clinically benign nevi on the trunk and extremities     ABCDs of melanoma were discussed and self skin checks were advised.     Sun precaution was advised including the use of sun screens of SPF 30 or higher, sun protective clothing, and avoidance of tanning beds.    4. Cherry angioma(s)    No further intervention required. Patient to report changes.     5. Seborrheic keratosis, non irritated    No further intervention required. Patient to report changes.     6. History of liver transplant- currently on immunosuppressants     Given patient is on immune suppressing drugs, and with family history of melanoma, recommended skin check every 6 months     Follow-up in 6 months, earlier for new or changing lesions.       Staff Involved:  Scribe/Staff    Scribe Disclosure:   MILADIS, Racquel Cowan, am serving as a scribe to document services personally performed by Demetria Barger PA-C, based on data collection and the provider's statements to me.    Provider Disclosure:   The documentation recorded by the scribe accurately reflects the services I personally performed and the decisions made by  me.    All risks, benefits and alternatives were discussed with patient.  Patient is in agreement and understands the assessment and plan.  All questions were answered.  Sun Screen Education was given.   Return to Clinic in 6 months or sooner as needed.       Again, thank you for allowing me to participate in the care of your patient.      Sincerely,    Demetria Barger PA-C

## 2018-11-30 NOTE — NURSING NOTE
Dermatology Rooming Note    Phan Luque's goals for this visit include:   Chief Complaint   Patient presents with     Derm Problem     Nielssteve is here for a skin check. SHe would like to discuss eczema.      uRla Robles LPN

## 2018-11-30 NOTE — MR AVS SNAPSHOT
After Visit Summary   11/30/2018    Phan Luque    MRN: [id number]           Patient Information     Date Of Birth          1955        Visit Information        Provider Department      11/30/2018 4:15 PM Demetria Barger PA-C [facility] Dermatology        Today's Diagnoses     Nummular eczema    -  1    Skin cancer screening        Immunosuppressed status (H)        Cherry angioma        Multiple benign nevi        Seborrheic keratosis        Hypertrophic scar           Follow-ups after your visit        Follow-up notes from your care team     Return in about 6 months (around 5/30/2019).      Your next 10 appointments already scheduled     Dec 20, 2018  8:25 AM CST   (Arrive by 8:10 AM)   Return Visit with Arlyn Sanchez MD   [facility] ([facility])    [address] [place] [address]  4th Floor  [facility] [address]   [phone]            Mar 20, 2019  3:30 PM CDT   Lab with  LAB   [facility] Lab ([facility])    [address] [place] [address]  1st Floor  [facility] [address]   [phone]            Mar 20, 2019  4:30 PM CDT   (Arrive by 4:00 PM)   Return Visit with Daya Gutierrez MD   [facility] Nephrology ([facility])    [address] [place] [address]  [address]  [facility] [address]   [phone]            Mar 27, 2019  5:00 PM CDT   Adult Med Follow UP with DONOVAN [facility]   Psychiatry Clinic ([facility])    [place] [id number]  [address] [place] [address]   [phone]            Apr 01, 2019  4:30 PM CDT   (Arrive by 4:15 PM)   Return Liver Transplant with Hussein Banegas MD   [facility] Hepatology ([facility])    [address] [place] [address]  [address]  [facility] [address]   [phone]            Aug 14, 2019  7:45 AM CDT   (Arrive by 7:30 AM)   RETURN NEURO with Ambrose Ortega MD   [facility]  Ophthalmology ([facility] [place])    [address] [facility]  4th [facility] [address] [phone]              Who to contact     Please call your clinic at [phone] to:    Ask questions about your health    Make or cancel appointments    Discuss your medicines    Learn about your test results    Speak to your doctor            Additional Information About Your Visit        [facility] Information     [employer] gives you secure access to your electronic health record. If you see a primary care provider, you can also send messages to your care team and make appointments. If you have questions, please call your primary care clinic.  If you do not have a primary care provider, please call [phone] and they will assist you.      [employer] is an electronic gateway that provides easy, online access to your medical records. With [employer], you can request a clinic appointment, read your test results, renew a prescription or communicate with your care team.     To access your existing account, please contact your [facility] or call [phone] for assistance.        Care EveryWhere ID     This is your Care EveryWhere ID. This could be used by other organizations to access your [facility] medical records  [id number]         Blood Pressure from Last 3 Encounters:   11/29/18 152/63   11/02/18 137/77   10/22/18 133/73    Weight from Last 3 Encounters:   11/29/18 100.2 kg (221 lb)   11/02/18 99.5 kg (219 lb 4.8 oz)   10/22/18 97.7 kg (215 lb 6.4 oz)              Today, you had the following     No orders found for display         Today's Medication Changes          These changes are accurate as of 11/30/18 11:59 PM.  If you have any questions, ask your nurse or doctor.               Start taking these medicines.        Dose/Directions    clobetasol 0.05 % external ointment   Commonly known as:  TEMOVATE   Used for:  Nummular eczema   Started by:  Demetria Barger  ZHEN Polanco        Apply topically 2 times daily To areas of eczema on the lower legs, up to 2 weeks.   Quantity:  60 g   Refills:  1         These medicines have changed or have updated prescriptions.        Dose/Directions    betaxolol 0.5 % ophthalmic solution   Commonly known as:  BETOPTIC   This may have changed:  when to take this   Used for:  Primary open angle glaucoma of both eyes, unspecified glaucoma stage        Dose:  1 drop   Place 1 drop into both eyes 2 times daily   Quantity:  5 mL   Refills:  2       * predniSONE 5 MG tablet   Commonly known as:  DELTASONE   This may have changed:  Another medication with the same name was changed. Make sure you understand how and when to take each.   Used for:  Liver replaced by transplant (H)        Dose:  5 mg   Take 1 tablet (5 mg) by mouth daily   Quantity:  90 tablet   Refills:  3       * predniSONE 1 MG tablet   Commonly known as:  DELTASONE   This may have changed:  how much to take   Used for:  Liver replaced by transplant (H)        Dose:  4 mg   Take 4 tablets (4 mg) by mouth daily   Quantity:  360 tablet   Refills:  3       * Notice:  This list has 2 medication(s) that are the same as other medications prescribed for you. Read the directions carefully, and ask your doctor or other care provider to review them with you.         Where to get your medicines      These medications were sent to [id number]    Hours:  TRANSPLANT PHONE NUMBER [phone] Phone:  [phone]     clobetasol 0.05 % external ointment                Primary Care Provider Office Phone # Fax #    Arlyn Sanchez [phone]       [address] [place] [address]        Equal Access to Services     PAULO BERRY AH: Barby Roberson, lucille orellana, anna jean-baptiste.  [facility] [phone].    ATENCIÓN: Si yodit lopez, tiene a reece disposición servicios gratuitos de asistencia lingüística. Rhiannon hernandez [phone].    We comply with applicable federal civil rights laws and [place] laws. We do not discriminate on the basis of race, color, national origin, age, disability, sex, sexual orientation, or gender identity.            Thank you!     Thank you for choosing [facility] DERMATOLOGY  for your care. Our goal is always to provide you with excellent care. Hearing back from our patients is one way we can continue to improve our services. Please take a few minutes to complete the written survey that you may receive in the mail after your visit with us. Thank you!             Your Updated Medication List - Protect others around you: Learn how to safely use, store and throw away your medicines at www.disposemymeds.org.          This list is accurate as of 11/30/18 11:59 PM.  Always use your most recent med list.                   Brand Name Dispense Instructions for use Diagnosis    acetaminophen 325 MG tablet    TYLENOL    100 tablet    Take 2 tablets (650 mg) by mouth every 6 hours as needed for mild pain Or fevers. Use sparingly. Limit use to no more than 2 grams (2000 mg) in 24 hours. **Further refills must be obtained by primary care provider**    Acute post-operative pain       amLODIPine 5 MG tablet    NORVASC    90 tablet    Take 1 tablet (5 mg) by mouth daily    Hypertension       azaTHIOprine 50 MG tablet    IMURAN    90 tablet    Take 1 tablet (50 mg) by mouth every morning    Liver transplanted (H)       betaxolol 0.5 % ophthalmic solution    BETOPTIC    5 mL    Place 1 drop into both eyes 2 times daily    Primary open angle glaucoma of both eyes, unspecified glaucoma stage       calcium Citrate-vitamin D 500-400 MG-UNIT Chew      Take 1 tablet by mouth daily        chlorthalidone 25 MG tablet    HYGROTON    45 tablet    Take 0.5 tablets (12.5 mg) by mouth daily    CKD  (chronic kidney disease) stage 3, GFR 30-59 ml/min (H), Fluid retention       cholecalciferol 400 unit (10 mcg) Tabs tablet    VITAMIN D3     Take 400 Units by mouth daily        clobetasol 0.05 % external ointment    TEMOVATE    60 g    Apply topically 2 times daily To areas of eczema on the lower legs, up to 2 weeks.    Nummular eczema       diclofenac 1 % topical gel    VOLTAREN     Apply 2 g topically 4 times daily as needed for moderate pain        ferrous sulfate 325 (65 Fe) MG tablet    FEROSUL    100 tablet    Take 1 tablet (325 mg) by mouth 2 times daily    Iron deficiency       folic acid 1 MG tablet    FOLVITE    30 tablet    Take 1 tablet (1 mg) by mouth daily    Malnutrition (H)       gabapentin 100 MG capsule    NEURONTIN    90 capsule    Take 1 capsule (100 mg) by mouth 3 times daily    Peripheral polyneuropathy       hydrOXYzine 25 MG tablet    ATARAX    60 tablet    Take 1-2 tablets (25-50mg) every 6 hours as needed for itching    WALTER (generalized anxiety disorder)       levothyroxine 88 MCG tablet    SYNTHROID/LEVOTHROID    90 tablet    Take 1 tablet (88 mcg) by mouth every morning (before breakfast)    Thyroid function test abnormal       MAGNESIUM OXIDE PO      Take 400 mg by mouth daily        melatonin 5 MG tablet      Take 1 tablet (5 mg) by mouth nightly as needed for sleep        multivitamin, therapeutic Tabs tablet     30 tablet    Take 1 tablet by mouth every 24 hours    Malnutrition (H)       OMEGA-3 FISH OIL EX ST PO      Take 1 capsule by mouth 2 times daily 1290 (fish oil)-900 (omega 3)        order for DME     1 Device    Equipment being ordered: knee sleeve    Acute pain of left knee       pantoprazole 40 MG EC tablet    PROTONIX    90 tablet    Take 1 tablet (40 mg) by mouth every morning (before breakfast)    Gastroesophageal reflux disease, esophagitis presence not specified       pramipexole 0.5 MG tablet    MIRAPEX    90 tablet    Take 1 tablet (0.5 mg) by mouth At Bedtime     Restless leg syndrome       * predniSONE 5 MG tablet    DELTASONE    90 tablet    Take 1 tablet (5 mg) by mouth daily    Liver replaced by transplant (H)       * predniSONE 1 MG tablet    DELTASONE    360 tablet    Take 4 tablets (4 mg) by mouth daily    Liver replaced by transplant (H)       psyllium 0.52 g capsule    METAMUCIL/KONSYL    60 capsule    Take 2 capsules (1.04 g) by mouth daily With a full glass of water.    Loose stools       tacrolimus 0.5 MG capsule    GENERIC EQUIVALENT    240 capsule    Take 4 capsules (2 mg) by mouth every 12 hours    Liver transplanted (H)       traMADol 50 MG tablet    ULTRAM    20 tablet    Take 1 tablet (50 mg) by mouth every 6 hours as needed for severe pain    Pain of left lower leg       traZODone 100 MG tablet    DESYREL    135 tablet    Take 1.5 tablets (150 mg) by mouth At Bedtime    Insomnia, unspecified type       ursodiol 300 MG capsule    ACTIGALL    60 capsule    Take 1 capsule (300 mg) by mouth every 12 hours    Liver transplanted (H)       venlafaxine 37.5 MG 24 hr capsule    EFFEXOR XR    60 capsule    Take 1 capsule (37.5 mg) by mouth daily If tolerating may increase to 2 caps after 2 weeks    Fibromyalgia       vitamin B-12 1000 MCG tablet    CYANOCOBALAMIN     Take 1,000 mcg by mouth daily        * Notice:  This list has 2 medication(s) that are the same as other medications prescribed for you. Read the directions carefully, and ask your doctor or other care provider to review them with you.

## 2018-12-08 DIAGNOSIS — F41.1 GAD (GENERALIZED ANXIETY DISORDER): ICD-10-CM

## 2018-12-11 RX ORDER — HYDROXYZINE HYDROCHLORIDE 25 MG/1
TABLET, FILM COATED ORAL
Qty: 60 TABLET | Refills: 2 | Status: SHIPPED | OUTPATIENT
Start: 2018-12-11 | End: 2019-02-10

## 2018-12-21 NOTE — PROGRESS NOTES
Service Date: 11/29/2018      It was my pleasure to meet Ms. Luque in today's clinic visit.  In brief, Ms. Luque is a 63-year-old woman with a past medical history of a liver transplantation, currently on chronic anticoagulation for which she was hospitalized for 6 months who presents with chronic and diffuse neurologic complaints in the setting of MRI finding of a cervical central canal mild stenosis.  After her hospitalization, Ms. Luque complains of bilateral lower extremity tingling and burning in the feet up to the level of knee that is nonpainful.  Additionally, she reports neck pain that she has endured intermittently for several months, which is new.  She states that the neck pain is relatively mild and lasts approximately 30 seconds once per day.  Additionally, she reports some difficulty controlling her stool and urine, which she attributes to a past history of obstetrical tear into her rectum, status post anal surgery in 2008.  Additionally, the patient endorses the sensation of a cold ache in her hands bilaterally and clumsiness, which is also new.  Several months ago, Ms. Luque reports that she turned over in bed and subsequently experienced dizziness for approximately 6 months which is not present at the time of the examination today.      With regard to the patient's neck pain, Ms. Luque states that Tylenol does not help, tramadol helps slightly, gabapentin improves her pain, but makes her tired, and venlafaxine results in improvement of her pain, but a sensation of altered mental status.      She has previously undergone an EMG in 08/2018 by Dr. Sandoval, notable for mild distal motor predominantly peripheral neuropathy.      The patient's past medical history is also notable for gastroesophageal reflux disorder, stage III renal insufficiency, asthma, osteoporosis.        PHYSICAL EXAMINATION:     HEENT:  Normocephalic, no gross abnormalities.   GENERAL:  Well-appearing woman appearing  stated age, seated comfortably in examination room, pleasantly conversant.     NEUROLOGIC:    MENTAL STATUS:  Alert and oriented to name, location and time without any overt speech abnormalities.   Cranial Nerves:  Cranial nerves II-XII intact.     Motor:  No weakness.  The shoulder shrug, equal bilaterally.  5/5 strength throughout the bilateral upper and lower extremities.     Sensory:  Sensation intact to light touch in all 3 distributions of the trigeminal nerve bilaterally.  Sensation intact to light touch throughout the bilateral upper and lower extremities.     Reflexes:  Reflexes are 1+ bilaterally in the biceps and triceps.  We are unable to elicit brachioradialis, 1+ bilaterally in the patella.  Unable to elicit Achilles.  Matilde sign negative, no ankle clonus.        IMAGING:  MRI of the cervical spine dated 09/05/2018:  Most notable for moderate spinal canal stenosis at cervical 4 through 6 with cervical spondylosis, possibly congenital borderline narrow spinal canal.       ASSESSMENT:  Diffuse neurologic complaints associated with cervical central stenosis.      PLAN:  At the conclusion of today's visit, Dr. Lazar was unable to identify an anatomic correlate that would clearly cause Ms. Luque's diffuse neurologic symptoms.  Dr. Lazar recommended that Ms. Luque follow up in our clinic as needed in the future should she develop new neurologic issues, questions or concerns.  Ms. Luque expressed understanding and was amenable to proceed with this plan.      Again, it is a pleasure to meet Ms. Luque during today's clinic visit.      Dr. Lazar spent approximately 20 minutes in conversation with the patient during this encounter.  The majority of that time was spent in counseling and care coordination.   Dictated by Hanna Alfredo MD, Neurosurgery, PGY-1.       Addendum:  I have seen this patient and agree with this note. VALENTÍN LAZAR MD       As dictated by HANNA ALFREDO MD            D:  2018   T: 2018   MT: KRISTIN      Name:     PARTH FARMER   MRN:      [id number]        Account:      [id number]   :      1955           Service Date: 2018      Document: [id number]

## 2018-12-21 NOTE — PROGRESS NOTES
Service Date: 11/29/2018      It is my pleasure to meet Ms. Luque during today's clinic visit.      Ms. Luque is a 63-year-old woman with a past medical history of alcoholic hepatitis, status post liver transplant in 2016, currently on chronic immunosuppression, chronic kidney disease, irritable bowel syndrome, anxiety and hypertension, who presents to our clinic today with diffuse neurologic complaints and cervical stenosis noted on MRI.      Ms. Luque's primary complaints today regard an abnormal sensation in her feet that extends up to the level of her knees, qualified by burning and tingling.  However, she does state that she has had pain in her neck for several weeks, which she has not experienced before.  This pain is intermittent and lasts approximately 30 seconds when it occurs on a daily basis.  Additionally, she states that she has had difficulty with bowel and bladder incontinence, which she attributes to an obstetrical tear into her rectum, status post anal surgery in 2008.  She has taken gabapentin in the past, which reportedly improved her neck pain and abnormal bilateral lower extremity sensations.      Ms. Luque had undergone an EMG study in 08/2018 by Dr. Sandoval, which was most notable for mild distal motor predominantly peripheral neuropathy.      The patient's past medical history, aside from the aforementioned events, is notable for gastroesophageal reflux disorder, stage 3 renal insufficiency, asthma, osteoporosis and migraine.      PHYSICAL EXAMINATION:     GENERAL:  A well-appearing woman appearing stated age, seated comfortably in examination room, pleasantly conversant.   HEENT:  Normocephalic, no gross abnormalities.        NEUROLOGIC EXAMINATION:     MENTAL STATUS:  Alert and oriented to name, location and time.  No overt speech abnormalities.   CRANIAL NERVES:  Cranial nerves II-XII intact.     MOTOR:  No weakness to shoulder shrug, equal bilaterally.  5/5 strength throughout  the bilateral upper and lower extremities.     SENSORY:  Sensation intact to light touch throughout all 3 distributions of the trigeminal nerve bilaterally.  Sensation intact to light touch throughout the bilateral upper and lower extremities.     REFLEXES:  1+ bilaterally in the biceps and triceps.  Unable to elicit brachioradialis.  1+ bilaterally in the patellae, Achilles muted.  Matilde negative, no ankle clonus.      IMAGING:     MRI of the cervical spine dated 2018:  Most notable for moderate spinal canal stenosis at cervical 4-6 with cervical spondylosis, likely a congenital borderline narrow spinal canal.      ASSESSMENT:  Diffuse neurologic complaints without clear anatomic correlate on imaging.      PLAN:  At the conclusion of today's clinic visit, Dr. Lazar did not offer surgical intervention to Ms. Farmer, as there was no clear anatomic correlate for the diffuse nature of her symptoms.  Dr. Lazar recommended that Ms. Farmer follow up in our clinic as necessary should her symptoms progress with focal neurologic findings.  Ms. Farmer expressed understanding and was amenable to proceed with this plan.      Again, it is my pleasure to meet Ms. Farmer during today's clinic visit.  She may feel free to call our clinic at any time in the future with further issues, questions or concerns.      Dr. Lazar spent approximately 30 minutes in conversation with the patient during this encounter.  The majority of that time was spent in counseling and care coordination.      Dictated by Hanna Alfredo MD, PGY-1   Neurosurgery Resident       Addendum:  I have seen this patient and agree with this note. VALENTÍN LAZAR MD       As dictated by HANNA ALFREDO MD            D: 2018   T: 2018   MT: hermila      Name:     PARTH FARMER   MRN:      [id number]        Account:      [id number]   :      1955           Service Date: 2018      Document: [id number]

## 2019-01-08 NOTE — TELEPHONE ENCOUNTER
FUTURE VISIT INFORMATION      FUTURE VISIT INFORMATION:    Date: 1/9/19    Time:     [place] [facility] [place]ERRAL INFORMATION:    Referring provider:      Referring providers clinic:      Reason for visit/diagnosis  Sudden intensifed pain bilaterally above knees with weakness and instability, right knee feels like will buckle backwards, Inability to walk immediately upon standing. Burning, aching pain in  feet and legs.    RECORDS REQUESTED FROM:       Clinic name Comments Records Status Imaging Status      In chart xray  7-19-18

## 2019-01-09 ENCOUNTER — OFFICE VISIT (OUTPATIENT)
Dept: ORTHOPEDICS | Facility: CLINIC | Age: 64
End: 2019-01-09
Payer: COMMERCIAL

## 2019-01-09 ENCOUNTER — PRE VISIT (OUTPATIENT)
Dept: ORTHOPEDICS | Facility: CLINIC | Age: 64
End: 2019-01-09

## 2019-01-09 VITALS — WEIGHT: 221 LBS | RESPIRATION RATE: 16 BRPM | BODY MASS INDEX: 35.52 KG/M2 | HEIGHT: 66 IN

## 2019-01-09 DIAGNOSIS — M22.2X1 PATELLOFEMORAL PAIN SYNDROME OF BOTH KNEES: Primary | ICD-10-CM

## 2019-01-09 DIAGNOSIS — M22.2X2 PATELLOFEMORAL PAIN SYNDROME OF BOTH KNEES: Primary | ICD-10-CM

## 2019-01-09 ASSESSMENT — MIFFLIN-ST. JEOR: SCORE: 1579.2

## 2019-01-09 NOTE — LETTER
"   2019      RE: Phan Luque  [address] [facility] [address]       Sports Medicine Clinic Visit    PCP: Arlyn Sanchez    Phan Luque is a 63 year old female who is seen  as self referral presenting with bilateral anterior knee pain. 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     Injury: None    Location of Pain: bilateral knee  Duration of Pain: 3-4 weeks  Rating of Pain: Pain is achy but can shoot  Pain is better with: Prednisone  Pain is worse with: Standing, at night, lying down  Additional Features: Burning pain   Treatment so far consists of: Prednisone, CSI, gabapentin  Prior History of related problems: None    Resp 16   Ht 1.676 m (5' 6\")   Wt 100.2 kg (221 lb)   BMI 35.67 kg/m            PMH:  Past Medical History:   Diagnosis Date     Asthma      Chronic kidney disease      End stage liver disease (H)     2/2 Alcohol Abuse     Liver transplanted (H)      Migraines      Osteoporosis      Spinal stenosis in cervical region      Strabismus        Active problem list:  Patient Active Problem List   Diagnosis     Decompensation of cirrhosis of liver (H)     Liver transplanted (H)     Alcoholic hepatitis     Immunosuppression (H)     Alcoholic hepatitis without ascites     Enterococcus UTI     Liver transplant status (H)     Nausea & vomiting     Malnutrition (H)     Candidiasis of skin     Anemia     ACP (advance care planning)     Diarrhea     Hypothyroidism     Esophageal reflux     Recurrent major depression in partial remission (H)     Insomnia     CKD (chronic kidney disease) stage 3, GFR 30-59 ml/min (H)     Anemia in stage 3 chronic kidney disease (H)     Osteopenia     Alcohol abuse, uncomplicated       FH:  Family History   Problem Relation Age of Onset     Family History Negative Other      Melanoma Mother              Heart Disease Father         CHF       SH:  Social History     Socioeconomic " History     Marital status:      Spouse name: Not on file     Number of children: 3     Years of education: Not on file     Highest education level: Not on file   Social Needs     Financial resource strain: Not on file     Food insecurity - worry: Not on file     Food insecurity - inability: Not on file     Transportation needs - medical: Not on file     Transportation needs - non-medical: Not on file   Occupational History     Occupation:    Tobacco Use     Smoking status: Former Smoker     Packs/day: 2.50     Years: 20.00     Pack years: 50.00     Last attempt to quit: 1996     Years since quittin.0     Smokeless tobacco: Never Used   Substance and Sexual Activity     Alcohol use: No     Alcohol/week: 4.2 oz     Types: 7 Standard drinks or equivalent per week     Comment: heavy use (750ml/2 days) up until 1 year ago; had cut down to 1 drink/day; none since 16     Drug use: No     Sexual activity: Not on file   Other Topics Concern     Parent/sibling w/ CABG, MI or angioplasty before 65F 55M? 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       MEDS:  See EMR, reviewed  ALL:  See EMR, reviewed    REVIEW OF SYSTEMS:  CONSTITUTIONAL:NEGATIVE for fever, chills, change in weight  INTEGUMENTARY/SKIN: NEGATIVE for worrisome rashes, moles or lesions  EYES: NEGATIVE for vision changes or irritation  ENT/MOUTH: NEGATIVE for ear, mouth and throat problems  RESP:NEGATIVE for significant cough or SOB  BREAST: NEGATIVE for masses, tenderness or discharge  CV: NEGATIVE for chest pain, palpitations or peripheral edema  GI: NEGATIVE for nausea, abdominal pain, heartburn, or change in bowel habits  :NEGATIVE for frequency, dysuria, or hematuria  :NEGATIVE for frequency, dysuria, or hematuria  NEURO: NEGATIVE for weakness, dizziness or paresthesias  ENDOCRINE: NEGATIVE for temperature intolerance,  skin/hair changes  HEME/ALLERGY/IMMUNE: NEGATIVE for bleeding problems  PSYCHIATRIC: NEGATIVE for changes in mood or affect      Subjective: This 63-year-old female in the last 3 weeks is a tendency to notice anterior bilateral knee discomfort especially going up and down stairs.  She has had no recent injury.  It bothers her if she was doing a lot of baking.    Objective she has a tendency towards a knee valgus bilaterally.  The right knee is mildly tender at the lateral patellar facets and mildly tender at the medial patellar facet.  Nontender at the quadriceps tendon.  She can do an active straight leg raise with no extension lag.  Nontender over the medial joint line or lateral joint line.  Nontender over the patellar tendon pes anserine bursa or distal IT band.  Anterior posterior drawer is negative.  Normal range of motion at the right hip.  Overlying skin is intact.  No joint effusion.    The left knee is mildly tender over the medial and lateral patellar facet.  Nontender over the medial lateral joint line.  Nontender over the patellar tendon pes anserine bursa or distal IT band.  Nontender over the quadriceps tendon.  She can do an active straight leg raise on the left with no extensor lag.  Normal range of motion of the left hip.  Overlying skin is intact.  Appropriate in conversation and affect.    Previous x-rays of the bilateral standing AP views of the knee show mild degenerative joint disease with good maintenance of joint space but some small peripheral spurs in the tibiofemoral space.  A tendency towards patella bertha but no significant patellofemoral DJD on the lateral view.    Assessment: Patellofemoral discomfort bilateral knees.  Plan she has done pool therapy in the past and tolerated it but did not have access to continue exercises in a pool.  She would rather do land-based therapy.  I suggested physical therapy near her home in [facility] and a referral was placed.  She has topical lidocaine  patches and topical diclofenac that she uses.  She pays close attention to her kidney function and is allowed these medicines by her kidney provider.  She asked about trying tramadol but I cautioned her to avoid narcotics as a long-term treatment for these sorts of aches and pains.  She will follow-up as needed.      Julio Sharp MD

## 2019-01-09 NOTE — TELEPHONE ENCOUNTER
FUTURE VISIT INFORMATION      FUTURE VISIT INFORMATION:    Date: 1/9/19    [place] [facility] [place]:    Referring provider:  Self    Referring providers clinic:      Reason for visit/diagnosis  Sudden intensifed pain bilaterally above knees with weakness and instability, right knee feels like will buckle backwards, Inability to walk immediately upon standing. Burning, aching pain in  feet and legs.    RECORDS REQUESTED FROM:       Clinic name Comments Records Status Imaging Status     Internal Internal   [facility] Everywhere In PACS

## 2019-01-10 NOTE — PROGRESS NOTES
"Sports Medicine Clinic Visit    PCP: Arlyn Sanchez    Marybabatunde JINNY Luque is a 63 year old female who is seen  as self referral presenting with bilateral anterior knee pain. 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     Injury: None    Location of Pain: bilateral knee  Duration of Pain: 3-4 weeks  Rating of Pain: Pain is achy but can shoot  Pain is better with: Prednisone  Pain is worse with: Standing, at night, lying down  Additional Features: Burning pain   Treatment so far consists of: Prednisone, CSI, gabapentin  Prior History of related problems: None    Resp 16   Ht 1.676 m (5' 6\")   Wt 100.2 kg (221 lb)   BMI 35.67 kg/m           PMH:  Past Medical History:   Diagnosis Date     Asthma      Chronic kidney disease      End stage liver disease (H)     2/2 Alcohol Abuse     Liver transplanted (H)      Migraines      Osteoporosis      Spinal stenosis in cervical region      Strabismus        Active problem list:  Patient Active Problem List   Diagnosis     Decompensation of cirrhosis of liver (H)     Liver transplanted (H)     Alcoholic hepatitis     Immunosuppression (H)     Alcoholic hepatitis without ascites     Enterococcus UTI     Liver transplant status (H)     Nausea & vomiting     Malnutrition (H)     Candidiasis of skin     Anemia     ACP (advance care planning)     Diarrhea     Hypothyroidism     Esophageal reflux     Recurrent major depression in partial remission (H)     Insomnia     CKD (chronic kidney disease) stage 3, GFR 30-59 ml/min (H)     Anemia in stage 3 chronic kidney disease (H)     Osteopenia     Alcohol abuse, uncomplicated       FH:  Family History   Problem Relation Age of Onset     Family History Negative Other      Melanoma Mother              Heart Disease Father         CHF       SH:  Social History     Socioeconomic History     Marital status:      Spouse name: Not on file     Number of " children: 3     Years of education: Not on file     Highest education level: Not on file   Social Needs     Financial resource strain: Not on file     Food insecurity - worry: Not on file     Food insecurity - inability: Not on file     Transportation needs - medical: Not on file     Transportation needs - non-medical: Not on file   Occupational History     Occupation:    Tobacco Use     Smoking status: Former Smoker     Packs/day: 2.50     Years: 20.00     Pack years: 50.00     Last attempt to quit: 1996     Years since quittin.0     Smokeless tobacco: Never Used   Substance and Sexual Activity     Alcohol use: No     Alcohol/week: 4.2 oz     Types: 7 Standard drinks or equivalent per week     Comment: heavy use (750ml/2 days) up until 1 year ago; had cut down to 1 drink/day; none since 16     Drug use: No     Sexual activity: Not on file   Other Topics Concern     Parent/sibling w/ CABG, MI or angioplasty before 65F 55M? 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       MEDS:  See EMR, reviewed  ALL:  See EMR, reviewed    REVIEW OF SYSTEMS:  CONSTITUTIONAL:NEGATIVE for fever, chills, change in weight  INTEGUMENTARY/SKIN: NEGATIVE for worrisome rashes, moles or lesions  EYES: NEGATIVE for vision changes or irritation  ENT/MOUTH: NEGATIVE for ear, mouth and throat problems  RESP:NEGATIVE for significant cough or SOB  BREAST: NEGATIVE for masses, tenderness or discharge  CV: NEGATIVE for chest pain, palpitations or peripheral edema  GI: NEGATIVE for nausea, abdominal pain, heartburn, or change in bowel habits  :NEGATIVE for frequency, dysuria, or hematuria  :NEGATIVE for frequency, dysuria, or hematuria  NEURO: NEGATIVE for weakness, dizziness or paresthesias  ENDOCRINE: NEGATIVE for temperature intolerance, skin/hair changes  HEME/ALLERGY/IMMUNE: NEGATIVE for bleeding problems  PSYCHIATRIC:  NEGATIVE for changes in mood or affect      Subjective: This 63-year-old female in the last 3 weeks is a tendency to notice anterior bilateral knee discomfort especially going up and down stairs.  She has had no recent injury.  It bothers her if she was doing a lot of baking.    Objective she has a tendency towards a knee valgus bilaterally.  The right knee is mildly tender at the lateral patellar facets and mildly tender at the medial patellar facet.  Nontender at the quadriceps tendon.  She can do an active straight leg raise with no extension lag.  Nontender over the medial joint line or lateral joint line.  Nontender over the patellar tendon pes anserine bursa or distal IT band.  Anterior posterior drawer is negative.  Normal range of motion at the right hip.  Overlying skin is intact.  No joint effusion.    The left knee is mildly tender over the medial and lateral patellar facet.  Nontender over the medial lateral joint line.  Nontender over the patellar tendon pes anserine bursa or distal IT band.  Nontender over the quadriceps tendon.  She can do an active straight leg raise on the left with no extensor lag.  Normal range of motion of the left hip.  Overlying skin is intact.  Appropriate in conversation and affect.    Previous x-rays of the bilateral standing AP views of the knee show mild degenerative joint disease with good maintenance of joint space but some small peripheral spurs in the tibiofemoral space.  A tendency towards patella bertha but no significant patellofemoral DJD on the lateral view.    Assessment: Patellofemoral discomfort bilateral knees.  Plan she has done pool therapy in the past and tolerated it but did not have access to continue exercises in a pool.  She would rather do land-based therapy.  I suggested physical therapy near her home in [facility] and a referral was placed.  She has topical lidocaine patches and topical diclofenac that she uses.  She pays close attention to her kidney  function and is allowed these medicines by her kidney provider.  She asked about trying tramadol but I cautioned her to avoid narcotics as a long-term treatment for these sorts of aches and pains.  She will follow-up as needed.

## 2019-01-25 DIAGNOSIS — K21.9 GASTROESOPHAGEAL REFLUX DISEASE, ESOPHAGITIS PRESENCE NOT SPECIFIED: ICD-10-CM

## 2019-01-28 RX ORDER — PANTOPRAZOLE SODIUM 40 MG/1
TABLET, DELAYED RELEASE ORAL
Qty: 90 TABLET | Refills: 4 | Status: SHIPPED | OUTPATIENT
Start: 2019-01-28 | End: 2019-07-22

## 2019-01-31 DIAGNOSIS — Z94.4 LIVER REPLACED BY TRANSPLANT (H): ICD-10-CM

## 2019-01-31 LAB
ALBUMIN SERPL-MCNC: 3.8 G/DL (ref 3.4–5)
ALP SERPL-CCNC: 44 U/L (ref 40–150)
ALT SERPL W P-5'-P-CCNC: 20 U/L (ref 0–50)
ANION GAP SERPL CALCULATED.3IONS-SCNC: 8 MMOL/L (ref 3–14)
AST SERPL W P-5'-P-CCNC: 15 U/L (ref 0–45)
BILIRUB DIRECT SERPL-MCNC: 0.2 MG/DL (ref 0–0.2)
BILIRUB SERPL-MCNC: 0.9 MG/DL (ref 0.2–1.3)
BUN SERPL-MCNC: 20 MG/DL (ref 7–30)
CALCIUM SERPL-MCNC: 9.4 MG/DL (ref 8.5–10.1)
CHLORIDE SERPL-SCNC: 103 MMOL/L (ref 94–109)
CO2 SERPL-SCNC: 24 MMOL/L (ref 20–32)
CREAT SERPL-MCNC: 1.3 MG/DL (ref 0.52–1.04)
ERYTHROCYTE [DISTWIDTH] IN BLOOD BY AUTOMATED COUNT: 13.6 % (ref 10–15)
GFR SERPL CREATININE-BSD FRML MDRD: 44 ML/MIN/{1.73_M2}
GLUCOSE SERPL-MCNC: 98 MG/DL (ref 70–99)
HCT VFR BLD AUTO: 43 % (ref 35–47)
HGB BLD-MCNC: 14.2 G/DL (ref 11.7–15.7)
MCH RBC QN AUTO: 30.7 PG (ref 26.5–33)
MCHC RBC AUTO-ENTMCNC: 33 G/DL (ref 31.5–36.5)
MCV RBC AUTO: 93 FL (ref 78–100)
PLATELET # BLD AUTO: 317 10E9/L (ref 150–450)
POTASSIUM SERPL-SCNC: 3.7 MMOL/L (ref 3.4–5.3)
PROT SERPL-MCNC: 7.4 G/DL (ref 6.8–8.8)
RBC # BLD AUTO: 4.63 10E12/L (ref 3.8–5.2)
SODIUM SERPL-SCNC: 135 MMOL/L (ref 133–144)
TACROLIMUS BLD-MCNC: 4.1 UG/L (ref 5–15)
TME LAST DOSE: ABNORMAL H
WBC # BLD AUTO: 8.9 10E9/L (ref 4–11)

## 2019-01-31 PROCEDURE — 36415 COLL VENOUS BLD VENIPUNCTURE: CPT | Performed by: INTERNAL MEDICINE

## 2019-01-31 PROCEDURE — 80076 HEPATIC FUNCTION PANEL: CPT | Performed by: INTERNAL MEDICINE

## 2019-01-31 PROCEDURE — 80197 ASSAY OF TACROLIMUS: CPT | Performed by: INTERNAL MEDICINE

## 2019-01-31 PROCEDURE — 80048 BASIC METABOLIC PNL TOTAL CA: CPT | Performed by: INTERNAL MEDICINE

## 2019-01-31 PROCEDURE — 85027 COMPLETE CBC AUTOMATED: CPT | Performed by: INTERNAL MEDICINE

## 2019-02-08 ENCOUNTER — OFFICE VISIT (OUTPATIENT)
Dept: NEUROLOGY | Facility: CLINIC | Age: 64
End: 2019-02-08
Payer: COMMERCIAL

## 2019-02-08 ENCOUNTER — OFFICE VISIT (OUTPATIENT)
Dept: ORTHOPEDICS | Facility: CLINIC | Age: 64
End: 2019-02-08
Payer: COMMERCIAL

## 2019-02-08 VITALS
SYSTOLIC BLOOD PRESSURE: 141 MMHG | WEIGHT: 223.6 LBS | DIASTOLIC BLOOD PRESSURE: 57 MMHG | RESPIRATION RATE: 16 BRPM | BODY MASS INDEX: 35.93 KG/M2 | TEMPERATURE: 98 F | HEART RATE: 87 BPM | HEIGHT: 66 IN | OXYGEN SATURATION: 90 %

## 2019-02-08 VITALS
HEIGHT: 66 IN | DIASTOLIC BLOOD PRESSURE: 57 MMHG | WEIGHT: 223 LBS | SYSTOLIC BLOOD PRESSURE: 141 MMHG | BODY MASS INDEX: 35.84 KG/M2

## 2019-02-08 DIAGNOSIS — M25.552 BILATERAL HIP PAIN: Primary | ICD-10-CM

## 2019-02-08 DIAGNOSIS — M25.551 BILATERAL HIP PAIN: Primary | ICD-10-CM

## 2019-02-08 DIAGNOSIS — M67.951 TENDINOPATHY OF RIGHT GLUTEUS MEDIUS: ICD-10-CM

## 2019-02-08 DIAGNOSIS — M70.60 TROCHANTERIC BURSITIS, UNSPECIFIED LATERALITY: Primary | ICD-10-CM

## 2019-02-08 DIAGNOSIS — M67.952 TENDINOPATHY OF LEFT GLUTEUS MEDIUS: ICD-10-CM

## 2019-02-08 ASSESSMENT — MIFFLIN-ST. JEOR
SCORE: 1590.99
SCORE: 1588.27

## 2019-02-08 ASSESSMENT — PAIN SCALES - GENERAL: PAINLEVEL: SEVERE PAIN (6)

## 2019-02-08 NOTE — PROGRESS NOTES
SPORTS & ORTHOPEDIC WALK-IN VISIT 2/8/2019    Primary Care Physician: Dr. Sanchez  Several months has had b/l hip pain. Points to GT. Denies any trauma. Hx of LBP. States sometimes feels instable. Noticed right around tibial plateau fracture    Reason for visit:     What part of your body is injured / painful?  bilateral hip    What caused the injury /pain? No inciting event     How long ago did your injury occur or pain begin? about a month ago    What are your most bothersome symptoms? Pain    How would you characterize your symptom?  aching and dull    What makes your symptoms better? Rest    What makes your symptoms worse? Sitting to standing    Have you been previously seen for this problem? Yes, Neuro    Medical History:    Any recent changes to your medical history? No    Any new medication prescribed since last visit? No    Have you had surgery on this body part before? No    Social History:    Occupation: [employer]    Handedness: Right    Exercise: None    Review of Systems:    Do you have fever, chills, weight loss? No    Do you have any vision problems? No    Do you have any chest pain or edema? No    Do you have any shortness of breath or wheezing?  No    Do you have stomach problems? No    Do you have any numbness or focal weakness? Yes, peripheral neuropathy and c spine stenosis    Do you have diabetes? No    Do you have problems with bleeding or clotting? No    Do you have an rashes or other skin lesions? No

## 2019-02-08 NOTE — LETTER
2019       RE: Phan Luque  [address] [facility] [address]     Dear Colleague,    Thank you for referring your patient, Phan Luque, to the [facility] NEUROLOGY at [facility]. Please see a copy of my visit note below.    Service Date: 2019      RE: Phan Luque   MRN: [id number]   : 1955      Dear Dr. Sanchez:      This is in regard to followup on Phan Luque.  The patient returned today with a chief complaint of bilateral thigh discomfort.      The patient did go ahead with further evaluation at my request.  She saw Dr. Still, our neurosurgeon here, because of her spinal stenosis.  Dr. Still assured her she did not feel she was a surgical candidate.  The patient is aware she has this issue with moderate canal narrowing.  I reviewed the films with her again.  She is aware that she should avoid any type of activity that would lead to sudden hyperextension of her neck and should avoid any severe heavy lifting.  She has not had any symptoms to suggest spinal cord compression, including Lhermitte sign, new radicular arm symptoms, or new problems passing her urine or stool.  She did discuss with me problems that started at the end of December.  She was preparing for her son's wedding.  He had asked her to bake [employer] for the reception.  She said she did this over a number of days, and she started having increasing pain in her thighs.  She did not describe any new pain from her knees down to her feet.  She has not had any sensory disturbance involving her feet now.  The patient has seen Dr. Duvall, our orthopedist, and she was found to have some epidural fat on lumbar MRI.  I reviewed that with her again.  She had this most prominent at the L5-S1 level.  The patient has been told to have followup with Dr. Duvall but has not done so yet.  She reminded me that she had had trouble with her left hip after suffering an injury  to the left ankle.  She was wearing a boot and she said she was walking quite awkwardly.  In 2017 she had an injection to the trochanteric bursa at [facility] that did help.  She has noted her symptoms now are worse with walking up steps and also as soon as she gets out of a chair and places weight on her feet.  She also has noted she has had pain when she attempts to sleep on either side.  The patient did recall being tender over the trochanteric bursae in the past.  She reviewed with me her status after her liver transplant and the various drugs she has taken.  She still is on prednisone and is trying to taper down to 1 mg.  She is now at 9 mg, although she had been at a lower level in the past.  She did tell me that she has gained 55 pounds since her transplant.  She is 5 feet 2 inches tall.  The patient denied any other issues to me.      Neurologic examination revealed a pleasant woman.  Gait, station, cerebellar testing, muscle stretch reflexes which were absent in the arms and ankles with trace knee jerks symmetrically, plantar stimulation, strength testing, straight leg raising, reverse leg raising and range of motion of her hips were all unremarkable.  She had normal vibratory and position sense testing.  The patient was extremely tender over both trochanteric bursae.  This reproduced her pain.  She had mild reduction of full extension of her back and did not have any paraspinal muscle spasm today.      IMPRESSION:  Trochanteric bursitis.      The patient reviewed with me her different issues including her mild sensory neuropathy as well as previous known fibromyalgia and findings of cervical stenosis and epidural lumbar canal fat.  I went over all of this with her.  She did tell me she had stopped pramipexole and trazodone and she thinks this has helped.  She today, though, on examination does have evidence to suggest bilateral trochanteric bursitis.  She is going to follow up with   Jadiel , but today is going to [facility] to get [facility] to see if they can help her.  I told I would be happy to see her in the future and on a p.r.n. basis.      I spent 30 minutes with the patient today.  Over 50% of the time of this involved counseling and coordination of care.       Thank you for allowing me to see this patient.         D: 2019   T: 2019   MT: DENISE      Name:     PARTH FARMER   MRN:      [id number]        Account:      [id number]   :      1955           Service Date: 2019      Document: [id number]        Again, thank you for allowing me to participate in the care of your patient.      Sincerely,    Barber Ortiz MD    CC:  Arlyn Sanchez MD    [address]  [address]

## 2019-02-08 NOTE — PROGRESS NOTES
"[facility]  Orthopedics  Donnell Gorman, DO  2019     Name: Phan Luque  MRN: [id number]  Age: 63 year old  : 1955  Referring provider: Referred Self     Chief Complaint: Bilateral hip pain (R slightly>L)    History of Present Illness:   Phan Luque is a 63 year old, female with PMH of liver transplant and chronic lower back pain who presents today for evaluation of her bilateral hip pain (R slightly>L) beginning over the past few months without injury. The patient has a history of lower back pain and has seen Dr. Duvall for treatment. Her pain is aching and dull in nature, and occurs intermittently at the medial aspect of her hips. She mostly notices the pain when going from sitting to standing. Her pain gets better with rest. She reports that the pain feels \"muscular\" in nature. The patient was evaluated by Dr. Ortiz in neurology for her cervical spine issues this morning, at which time he referred her to this clinic for further evaluation.     Review of Systems:   A 10-point review of systems was obtained and is negative except for as noted in the HPI.     Medications:   Current Outpatient Medications:      acetaminophen (TYLENOL) 325 MG tablet, Take 2 tablets (650 mg) by mouth every 6 hours as needed for mild pain Or fevers. Use sparingly. Limit use to no more than 2 grams (2000 mg) in 24 hours. **Further refills must be obtained by primary care provider**, Disp: 100 tablet, Rfl: 0     amLODIPine (NORVASC) 5 MG tablet, Take 1 tablet (5 mg) by mouth daily, Disp: 90 tablet, Rfl: 3     azaTHIOprine (IMURAN) 50 MG tablet, Take 1 tablet (50 mg) by mouth every morning, Disp: 90 tablet, Rfl: 3     betaxolol (BETOPTIC) 0.5 % ophthalmic solution, Place 1 drop into both eyes 2 times daily, Disp: 5 mL, Rfl: 2     calcium Citrate-vitamin D 500-400 MG-UNIT CHEW, Take 1 tablet by mouth daily, Disp: , Rfl:      chlorthalidone (HYGROTON) 25 MG tablet, Take 0.5 tablets (12.5 mg) by mouth daily, Disp: " 45 tablet, Rfl: 3     cholecalciferol (VITAMIN D3) 400 UNIT TABS tablet, Take 400 Units by mouth daily, Disp: , Rfl:      clobetasol (TEMOVATE) 0.05 % external ointment, Apply topically 2 times daily To areas of eczema on the lower legs, up to 2 weeks., Disp: 60 g, Rfl: 1     cyanocobalamin (VITAMIN  B-12) 1000 MCG tablet, Take 1,000 mcg by mouth daily, Disp: , Rfl:      diclofenac (VOLTAREN) 1 % GEL topical gel, Apply 2 g topically 4 times daily as needed for moderate pain, Disp: , Rfl:      ferrous sulfate (IRON) 325 (65 FE) MG tablet, Take 1 tablet (325 mg) by mouth 2 times daily, Disp: 100 tablet, Rfl:      folic acid (FOLVITE) 1 MG tablet, Take 1 tablet (1 mg) by mouth daily, Disp: 30 tablet, Rfl: 1     gabapentin (NEURONTIN) 100 MG capsule, Take 1 capsule (100 mg) by mouth 3 times daily, Disp: 90 capsule, Rfl: 1     hydrOXYzine (ATARAX) 25 MG tablet, TAKE 1 TO 2 TABLETS BY MOUTH EVERY 6 HOURS AS NEEDED FOR ITCHING, Disp: 60 tablet, Rfl: 2     levothyroxine (SYNTHROID/LEVOTHROID) 88 MCG tablet, Take 1 tablet (88 mcg) by mouth every morning (before breakfast), Disp: 90 tablet, Rfl: 2     MAGNESIUM OXIDE PO, Take 400 mg by mouth daily, Disp: , Rfl:      melatonin 5 MG tablet, Take 1 tablet (5 mg) by mouth nightly as needed for sleep, Disp: , Rfl:      multivitamin, therapeutic (THERA-VIT) TABS tablet, Take 1 tablet by mouth every 24 hours, Disp: 30 tablet, Rfl: 11     Omega-3 Fatty Acids (OMEGA-3 FISH OIL EX ST PO), Take 1 capsule by mouth 2 times daily 1290 (fish oil)-900 (omega 3), Disp: , Rfl:      order for DME, Equipment being ordered: knee sleeve, Disp: 1 Device, Rfl: 0     pantoprazole (PROTONIX) 40 MG EC tablet, TAKE ONE TABLET (40MG) BY MOUTH EVERY MORNING BEFORE BREAKFAST, Disp: 90 tablet, Rfl: 4     pramipexole (MIRAPEX) 0.5 MG tablet, Take 1 tablet (0.5 mg) by mouth At Bedtime (Patient not taking: Reported on 2/8/2019), Disp: 90 tablet, Rfl: 3     predniSONE (DELTASONE) 1 MG tablet, Take 4 tablets (4  mg) by mouth daily, Disp: 360 tablet, Rfl: 3     predniSONE (DELTASONE) 5 MG tablet, Take 1 tablet (5 mg) by mouth daily, Disp: 90 tablet, Rfl: 3     psyllium 0.52 G capsule, Take 2 capsules (1.04 g) by mouth daily With a full glass of water., Disp: 60 capsule, Rfl: 1     tacrolimus (GENERIC EQUIVALENT) 0.5 MG capsule, Take 4 capsules (2 mg) by mouth every 12 hours, Disp: 240 capsule, Rfl: 11     traMADol (ULTRAM) 50 MG tablet, Take 1 tablet (50 mg) by mouth every 6 hours as needed for severe pain, Disp: 20 tablet, Rfl: 0     traZODone (DESYREL) 100 MG tablet, Take 1.5 tablets (150 mg) by mouth At Bedtime (Patient not taking: Reported on 2/8/2019), Disp: 135 tablet, Rfl: 1     ursodiol (ACTIGALL) 300 MG capsule, Take 1 capsule (300 mg) by mouth every 12 hours, Disp: 60 capsule, Rfl: 11     venlafaxine (EFFEXOR XR) 37.5 MG 24 hr capsule, Take 1 capsule (37.5 mg) by mouth daily If tolerating may increase to 2 caps after 2 weeks, Disp: 60 capsule, Rfl: 2    Allergies:  Codeine (hives)  Benadryl (hives)  Cefaclor (hives)  Hydrocodone-acetaminophen (itching)  Oxycodone (itching)  Penicillins (hives)  Sulfa drugs    Past Medical History:  Asthma  Chronic kidney disease  End stage liver disease (H)  Liver transplanted (H)  Migraines  Osteoporosis  Spinal stenosis in cervical region  Strabismus     Physical Examination:  General  - normal appearance, in no obvious distress  CV  - normal femoral pulse  Pulm  - normal respiratory pattern, non-labored  Musculoskeletal - bilateral hip  - stance: normal gait without limp, normal single leg squat, no obvious leg length discrepancy, normal heel and toe walk  - inspection: no swelling or effusion,  normal bone and joint alignment, no obvious deformity  - palpation: TTP at bilateral greater trochanter area and bilateral gluteus medius, no groin pain   - ROM: 115 right flexion, 75 right external rotation, 120 right internal rotation    115 left flexion, 70 left external rotation, 20  left internal rotation   - strength: right: 5/5 in all planes  - special tests: Right   (-) FLORESITA  (-) FADIR  - special tests: Left   (-) FLORESITA  (+) FADIR  no pain with axial femoral load  Neuro  - no sensory or motor deficit, grossly normal coordination, normal muscle tone  Skin  - no ecchymosis, erythema, warmth, or induration, no obvious rash  Psych  - interactive, appropriate, normal mood and affect    Diagnosis:  Bilateral gluteus medius tendonopathy  Bilateral hip pain    Assessment:   63 year old, female with PMH of liver transplant and chronic lower back pain presenting with bilateral lateral hip pain which is intermittent in nature. Clinical examination is consistent with bilateral gluteus medius tendonopathy. We discussed varius treatment options including a CSI. Patient would like to start with HEP/PT, as well as topical analgesics     Plan:  -Given referral for formal PT  -Provided HEP for stretches and light exercises   -Can use OTC lidocaine patches on hips    It was a pleasure seeing Suemay today.    Donnell Gorman DO, [facility]  Primary Care Sports Medicine    I, Donnell Gorman DO, have reviewed the above note and agree with the scribe's notation as written.    Scribe Disclosure:   I, Ayo Puga, am serving as a scribe to document services personally performed by Donnell Gorman DO at this visit, based upon the provider's statements to me. All documentation has been reviewed by the aforementioned provider prior to being entered into the official medical record.

## 2019-02-08 NOTE — LETTER
"  RE: Phan Luque  [address] [address]     Dear Colleague,    Thank you for referring your patient, Phan Luque, to the [facility] SPORTS AND ORTHOPAEDIC WALK IN CLINIC at [facility]. Please see a copy of my visit note below.    [facility]  Orthopedics  ErickCedric Gorman, DO  2019     Name: Phan Luque  MRN: [id number]  Age: 63 year old  : 1955  Referring provider: Referred Self     Chief Complaint: Bilateral hip pain (R slightly>L)    History of Present Illness:   Phan Luque is a 63 year old, female with PMH of liver transplant and chronic lower back pain who presents today for evaluation of her bilateral hip pain (R slightly>L) beginning over the past few months without injury. The patient has a history of lower back pain and has seen Dr. Duvall for treatment. Her pain is aching and dull in nature, and occurs intermittently at the medial aspect of her hips. She mostly notices the pain when going from sitting to standing. Her pain gets better with rest. She reports that the pain feels \"muscular\" in nature. The patient was evaluated by Dr. Ortiz in neurology for her cervical spine issues this morning, at which time he referred her to this clinic for further evaluation.     Medications:   Current Outpatient Medications:      acetaminophen (TYLENOL) 325 MG tablet, Take 2 tablets (650 mg) by mouth every 6 hours as needed for mild pain Or fevers. Use sparingly. Limit use to no more than 2 grams (2000 mg) in 24 hours. **Further refills must be obtained by primary care provider**, Disp: 100 tablet, Rfl: 0     amLODIPine (NORVASC) 5 MG tablet, Take 1 tablet (5 mg) by mouth daily, Disp: 90 tablet, Rfl: 3     azaTHIOprine (IMURAN) 50 MG tablet, Take 1 tablet (50 mg) by mouth every morning, Disp: 90 tablet, Rfl: 3     betaxolol (BETOPTIC) 0.5 % ophthalmic solution, Place 1 drop into both eyes 2 times daily, Disp: 5 mL, Rfl: 2     calcium " Citrate-vitamin D 500-400 MG-UNIT CHEW, Take 1 tablet by mouth daily, Disp: , Rfl:      chlorthalidone (HYGROTON) 25 MG tablet, Take 0.5 tablets (12.5 mg) by mouth daily, Disp: 45 tablet, Rfl: 3     cholecalciferol (VITAMIN D3) 400 UNIT TABS tablet, Take 400 Units by mouth daily, Disp: , Rfl:      clobetasol (TEMOVATE) 0.05 % external ointment, Apply topically 2 times daily To areas of eczema on the lower legs, up to 2 weeks., Disp: 60 g, Rfl: 1     cyanocobalamin (VITAMIN  B-12) 1000 MCG tablet, Take 1,000 mcg by mouth daily, Disp: , Rfl:      diclofenac (VOLTAREN) 1 % GEL topical gel, Apply 2 g topically 4 times daily as needed for moderate pain, Disp: , Rfl:      ferrous sulfate (IRON) 325 (65 FE) MG tablet, Take 1 tablet (325 mg) by mouth 2 times daily, Disp: 100 tablet, Rfl:      folic acid (FOLVITE) 1 MG tablet, Take 1 tablet (1 mg) by mouth daily, Disp: 30 tablet, Rfl: 1     gabapentin (NEURONTIN) 100 MG capsule, Take 1 capsule (100 mg) by mouth 3 times daily, Disp: 90 capsule, Rfl: 1     hydrOXYzine (ATARAX) 25 MG tablet, TAKE 1 TO 2 TABLETS BY MOUTH EVERY 6 HOURS AS NEEDED FOR ITCHING, Disp: 60 tablet, Rfl: 2     levothyroxine (SYNTHROID/LEVOTHROID) 88 MCG tablet, Take 1 tablet (88 mcg) by mouth every morning (before breakfast), Disp: 90 tablet, Rfl: 2     MAGNESIUM OXIDE PO, Take 400 mg by mouth daily, Disp: , Rfl:      melatonin 5 MG tablet, Take 1 tablet (5 mg) by mouth nightly as needed for sleep, Disp: , Rfl:      multivitamin, therapeutic (THERA-VIT) TABS tablet, Take 1 tablet by mouth every 24 hours, Disp: 30 tablet, Rfl: 11     Omega-3 Fatty Acids (OMEGA-3 FISH OIL EX ST PO), Take 1 capsule by mouth 2 times daily 1290 (fish oil)-900 (omega 3), Disp: , Rfl:      order for DME, Equipment being ordered: knee sleeve, Disp: 1 Device, Rfl: 0     pantoprazole (PROTONIX) 40 MG EC tablet, TAKE ONE TABLET (40MG) BY MOUTH EVERY MORNING BEFORE BREAKFAST, Disp: 90 tablet, Rfl: 4     pramipexole (MIRAPEX) 0.5 MG  tablet, Take 1 tablet (0.5 mg) by mouth At Bedtime (Patient not taking: Reported on 2/8/2019), Disp: 90 tablet, Rfl: 3     predniSONE (DELTASONE) 1 MG tablet, Take 4 tablets (4 mg) by mouth daily, Disp: 360 tablet, Rfl: 3     predniSONE (DELTASONE) 5 MG tablet, Take 1 tablet (5 mg) by mouth daily, Disp: 90 tablet, Rfl: 3     psyllium 0.52 G capsule, Take 2 capsules (1.04 g) by mouth daily With a full glass of water., Disp: 60 capsule, Rfl: 1     tacrolimus (GENERIC EQUIVALENT) 0.5 MG capsule, Take 4 capsules (2 mg) by mouth every 12 hours, Disp: 240 capsule, Rfl: 11     traMADol (ULTRAM) 50 MG tablet, Take 1 tablet (50 mg) by mouth every 6 hours as needed for severe pain, Disp: 20 tablet, Rfl: 0     traZODone (DESYREL) 100 MG tablet, Take 1.5 tablets (150 mg) by mouth At Bedtime (Patient not taking: Reported on 2/8/2019), Disp: 135 tablet, Rfl: 1     ursodiol (ACTIGALL) 300 MG capsule, Take 1 capsule (300 mg) by mouth every 12 hours, Disp: 60 capsule, Rfl: 11     venlafaxine (EFFEXOR XR) 37.5 MG 24 hr capsule, Take 1 capsule (37.5 mg) by mouth daily If tolerating may increase to 2 caps after 2 weeks, Disp: 60 capsule, Rfl: 2    Allergies:  Codeine (hives)  Benadryl (hives)  Cefaclor (hives)  Hydrocodone-acetaminophen (itching)  Oxycodone (itching)  Penicillins (hives)  Sulfa drugs    Past Medical History:  Asthma  Chronic kidney disease  End stage liver disease (H)  Liver transplanted (H)  Migraines  Osteoporosis  Spinal stenosis in cervical region  Strabismus     Physical Examination:  General  - normal appearance, in no obvious distress  CV  - normal femoral pulse  Pulm  - normal respiratory pattern, non-labored  Musculoskeletal - bilateral hip  - stance: normal gait without limp, normal single leg squat, no obvious leg length discrepancy, normal heel and toe walk  - inspection: no swelling or effusion,  normal bone and joint alignment, no obvious deformity  - palpation: TTP at bilateral greater trochanter area  and bilateral gluteus medius, no groin pain   - ROM: 115 right flexion, 75 right external rotation, 120 right internal rotation    115 left flexion, 70 left external rotation, 20 left internal rotation   - strength: right: 5/5 in all planes  - special tests: Right   (-) FLORESITA  (-) FADIR  - special tests: Left   (-) FLORESITA  (+) FADIR  no pain with axial femoral load  Neuro  - no sensory or motor deficit, grossly normal coordination, normal muscle tone  Skin  - no ecchymosis, erythema, warmth, or induration, no obvious rash  Psych  - interactive, appropriate, normal mood and affect    Diagnosis:  Bilateral gluteus medius tendonopathy  Bilateral hip pain    Assessment:   63 year old, female with PMH of liver transplant and chronic lower back pain presenting with bilateral lateral hip pain which is intermittent in nature. Clinical examination is consistent with bilateral gluteus medius tendonopathy. We discussed varius treatment options including a CSI. Patient would like to start with HEP/PT, as well as topical analgesics     Plan:  -Given referral for formal PT  -Provided HEP for stretches and light exercises   -Can use OTC lidocaine patches on hips    It was a pleasure seeing Suemay today.    Donnell Gorman DO, [facility]  Primary Care Sports Medicine    I, Donnell Gorman DO, have reviewed the above note and agree with the scribe's notation as written.    Scribe Disclosure:   I, Ayo uPga, am serving as a scribe to document services personally performed by Donnell Gorman DO at this visit, based upon the provider's statements to me. All documentation has been reviewed by the aforementioned provider prior to being entered into the official medical record.            SPORTS & ORTHOPEDIC WALK-IN VISIT 2/8/2019    Primary Care Physician: Dr. Sanchez  Several months has had b/l hip pain. Points to GT. Denies any trauma. Hx of LBP. States sometimes feels instable. Noticed right around tibial plateau fracture    Reason for visit:      What part of your body is injured / painful?  bilateral hip    What caused the injury /pain? No inciting event     How long ago did your injury occur or pain begin? about a month ago    What are your most bothersome symptoms? Pain    How would you characterize your symptom?  aching and dull    What makes your symptoms better? Rest    What makes your symptoms worse? Sitting to standing    Have you been previously seen for this problem? Yes, Neuro    Medical History:    Any recent changes to your medical history? No    Any new medication prescribed since last visit? No    Have you had surgery on this body part before? No    Social History:    Occupation: [employer]    Handedness: Right    Exercise: None     Again, thank you for allowing me to participate in the care of your patient.      Sincerely,    Donnell Gorman, DO

## 2019-02-08 NOTE — PATIENT INSTRUCTIONS
Trochanteric Bursitis / Gluteal Tendinopathy    WHAT IS TROCHANTERIC BURSITIS?    Bursitis is irritation or inflammation of the bursa. A bursa is a fluid-filled sac that acts as a cushion between tendons, bones, and skin. There is a bump on the outer side of the upper part of the thigh bone (femur) called the greater trochanter. The trochanteric bursa is located over the greater trochanter. When this bursa is inflamed it is called trochanteric bursitis.          WHAT IS THE CAUSE?     The trochanteric bursa may be inflamed by a group of muscles or tendons rubbing over the bursa and causing friction against the thigh bone. Your iliotibial band goes from the iliac crest of your pelvis down the outer side of your thigh and attaches just below the knee. A tight iliotibial band can lead to trochanteric bursitis. This injury can occur with running, walking, or bicycling, especially when the bicycle seat is too high.    Trochanteric bursitis may also be caused by a fall, by a spine disorder, by differences in the length of your legs, or as a complication of hip surgery.    WHAT ARE THE SYMPTOMS?    You have pain on the upper outer area of your thigh or on the side of your hip. The pain is worse when you walk, bicycle, or go up or down stairs. You have pain when you move your thigh bone and feel tenderness in the area over the greater trochanter.    HOW IS IT DIAGNOSED?    Your healthcare provider will ask about your symptoms and examine your hip and thigh.    HOW IS IT TREATED?    To treat this condition:    Put an ice pack, gel pack, or package of frozen vegetables wrapped in a cloth on the painful area every 3 to 4 hours for up to 20 minutes at a time until the pain goes away.    Lidocaine patch to hips.    PT    Follow your provider s instructions for doing exercises to help you recover.    While you are recovering from your injury you will need to change your sport or activity to one that does not make your condition  worse. For example, you may need to swim instead of running or bicycling. If you are bicycling, you may need to lower your bicycle seat.    A bursa that is only mildly inflamed and has just started to hurt may improve within a few weeks. A bursa that is significantly inflamed and has been painful for a long time may take up to a few months to improve. You need to stop doing the activities that cause pain until your bursa has healed.    Follow your healthcare provider's instructions. Ask your provider:    How and when you will hear your test results  How long it will take to recover  What activities you should avoid and when you can return to your normal activities  How to take care of yourself at home  What symptoms or problems you should watch for and what to do if you have them  Make sure you know when you should come back for a checkup.    HOW CAN I HELP PREVENT TROCHANTERIC BURSITIS?    Trochanteric bursitis is best prevented by warming up properly and stretching the muscles on the outer side of your upper thigh.    Developed by [employer].  Published by [employer].  Copyright  2014 [employer] and/or one of its subsidiaries. All rights reserved.    You can do the first 3 stretches to begin stretching the muscles that run along the outside of your hip. You can do the strengthening exercises when the sharp pain lessens.    STRETCHING EXERCISES    Gluteal stretch: Lie on your back with both knees bent. Rest the ankle on your injured side over the knee of your other leg. Grasp the thigh of the leg on the uninjured side and pull toward your chest. You will feel a stretch along the buttocks on the injured side and possibly along the outside of your hip. Hold the stretch for 15 to 30 seconds. Repeat 3 times.    Iliotibial band stretch, standing: Cross your uninjured leg in front of the other leg and bend down and reach toward the inside of your back foot. Do not bend your knees. Hold this position for 15 to  30 seconds. Return to the starting position. Repeat 3 times.  Iliotibial band stretch, side-leaning: Stand sideways near a wall with your injured side closest to the wall. Place a hand on the wall for support. Cross the leg farther from the wall over the other leg. Keep the foot closest to the wall flat on the floor. Lean your hips into the wall. Hold the stretch for 15 to 30 seconds. Repeat 3 times.    STRENGTHENING EXERCISES    Straight leg raise: Lie on your back with your legs straight out in front of you. Bend the knee on your uninjured side and place the foot flat on the floor. Tighten the thigh muscle on your injured side and lift your leg about 8 inches off the floor. Keep your leg straight and your thigh muscle tight. Slowly lower your leg back down to the floor. Do 2 sets of 15.    Prone hip extension: Lie on your stomach with your legs straight out behind you. Fold your arms under your head and rest your head on your arms. Draw your belly button in towards your spine and tighten your abdominal muscles. Tighten the buttocks and thigh muscles of the leg on your injured side and lift the leg off the floor about 8 inches. Keep your leg straight. Hold for 5 seconds. Then lower your leg and relax. Do 2 sets of 15.    Side-lying leg lift: Lie on your uninjured side. Tighten the front thigh muscles on your injured leg and lift that leg 8 to 10 inches (20 to 25 centimeters) away from the other leg. Keep the leg straight and lower it slowly. Do 2 sets of 15.    Wall squat with a ball: Stand with your back, shoulders, and head against a wall. Look straight ahead. Keep your shoulders relaxed and your feet 3 feet (90 centimeters) from the wall and shoulder's width apart. Place a soccer or basketball-sized ball behind your back. Keeping your back against the wall, slowly squat down to a 45-degree angle. Your thighs will not yet be parallel to the floor. Hold this position for 10 seconds and then slowly slide back up  the wall. Repeat 10 times. Build up to 2 sets of 15.    Clam exercise: Lie on your uninjured side with your hips and knees bent and feet together. Slowly raise your top leg toward the ceiling while keeping your heels touching each other. Hold for 2 seconds and lower slowly. Do 2 sets of 15 repetitions.    Side plank: Lie on your side with your legs, hips, and shoulders in a straight line. Prop yourself up onto your forearm with your elbow directly under your shoulder. Lift your hips off the floor and balance on your forearm and the outside of your foot. Try to hold this position for 15 seconds and then slowly lower your hip to the ground. Switch sides and repeat. Work up to holding for 1 minute. This exercise can be made easier by starting with your knees and hips flexed toward your chest.    The plank: Lie on your stomach resting on our forearms. With your legs straight, lift your hips off the floor until they are in line with your shoulders. Support yourself on your forearms and toes. Hold this position for 15 seconds. (If this is too difficult, you can modify it by placing your knees on the floor.) Repeat 3 times. Work up to increasing your hold time to 30 to 60 seconds.    Developed by [employer].  Published by [employer].  Copyright  2014 [employer] and/or one of its subsidiaries. All rights reserved.

## 2019-02-08 NOTE — PROGRESS NOTES
Service Date: 2019      Arlyn Sanchez MD    [facility]    [address] [place] [address]       RE: Phan Luque   MRN: [id number]   : 1955      Dear Dr. Sanchez:      This is in regard to followup on Phan Luque.  The patient returned today with a chief complaint of bilateral thigh discomfort.      The patient did go ahead with further evaluation at my request.  She saw Dr. Still, our neurosurgeon here, because of her spinal stenosis.  Dr. Still assured her she did not feel she was a surgical candidate.  The patient is aware she has this issue with moderate canal narrowing.  I reviewed the films with her again.  She is aware that she should avoid any type of activity that would lead to sudden hyperextension of her neck and should avoid any severe heavy lifting.  She has not had any symptoms to suggest spinal cord compression, including Lhermitte sign, new radicular arm symptoms, or new problems passing her urine or stool.  She did discuss with me problems that started at the end of December.  She was preparing for her son's wedding.  He had asked her to [email or web address] for the reception.  She said she did this over a number of days, and she started having increasing pain in her thighs.  She did not describe any new pain from her knees down to her feet.  She has not had any sensory disturbance involving her feet now.  The patient has seen Dr. Duvall, our orthopedist, and she was found to have some epidural fat on lumbar MRI.  I reviewed that with her again.  She had this most prominent at the L5-S1 level.  The patient has been told to have followup with Dr. Duvall but has not done so yet.  She reminded me that she had had trouble with her left hip after suffering an injury to the left ankle.  She was wearing a boot and she said she was walking quite awkwardly.  In 2017 she had an injection to the trochanteric bursa at [facility] that did  help.  She has noted her symptoms now are worse with walking up steps and also as soon as she gets out of a chair and places weight on her feet.  She also has noted she has had pain when she attempts to sleep on either side.  The patient did recall being tender over the trochanteric bursae in the past.  She reviewed with me her status after her liver transplant and the various drugs she has taken.  She still is on prednisone and is trying to taper down to 1 mg.  She is now at 9 mg, although she had been at a lower level in the past.  She did tell me that she has gained 55 pounds since her transplant.  She is 5 feet 2 inches tall.  The patient denied any other issues to me.      Neurologic examination revealed a pleasant woman.  Gait, station, cerebellar testing, muscle stretch reflexes which were absent in the arms and ankles with trace knee jerks symmetrically, plantar stimulation, strength testing, straight leg raising, reverse leg raising and range of motion of her hips were all unremarkable.  She had normal vibratory and position sense testing.  The patient was extremely tender over both trochanteric bursae.  This reproduced her pain.  She had mild reduction of full extension of her back and did not have any paraspinal muscle spasm today.      IMPRESSION:  Trochanteric bursitis.      The patient reviewed with me her different issues including her mild sensory neuropathy as well as previous known fibromyalgia and findings of cervical stenosis and epidural lumbar canal fat.  I went over all of this with her.  She did tell me she had stopped pramipexole and trazodone and she thinks this has helped.  She today, though, on examination does have evidence to suggest bilateral trochanteric bursitis.  She is going to follow up with Dr. Duvall , but today is going to [facility] to get [facility] to see if they can help her.  I told I would be happy to see her in the future and on a p.r.n. basis.      I  spent 30 minutes with the patient today.  Over 50% of the time of this involved counseling and coordination of care.       Thank you for allowing me to see this patient.      Sincerely yours,            MD LIZZIE Mcdaniel MD             D: 2019   T: 2019   MT: DENISE      Name:     PARTH FARMER   MRN:      [id number]        Account:      [id number]   :      1955           Service Date: 2019      Document: [id number]

## 2019-02-10 DIAGNOSIS — F41.1 GAD (GENERALIZED ANXIETY DISORDER): ICD-10-CM

## 2019-02-12 RX ORDER — HYDROXYZINE HYDROCHLORIDE 25 MG/1
TABLET, FILM COATED ORAL
Qty: 120 TABLET | Refills: 5 | Status: SHIPPED | OUTPATIENT
Start: 2019-02-12 | End: 2020-03-24

## 2019-02-21 ENCOUNTER — OFFICE VISIT (OUTPATIENT)
Dept: NEUROLOGY | Facility: CLINIC | Age: 64
End: 2019-02-21
Payer: COMMERCIAL

## 2019-02-21 VITALS
HEIGHT: 66 IN | OXYGEN SATURATION: 97 % | BODY MASS INDEX: 37.28 KG/M2 | WEIGHT: 232 LBS | SYSTOLIC BLOOD PRESSURE: 139 MMHG | DIASTOLIC BLOOD PRESSURE: 81 MMHG | HEART RATE: 84 BPM

## 2019-02-21 DIAGNOSIS — G60.3 IDIOPATHIC PROGRESSIVE POLYNEUROPATHY: ICD-10-CM

## 2019-02-21 DIAGNOSIS — R27.0 ATAXIA: Primary | ICD-10-CM

## 2019-02-21 ASSESSMENT — PAIN SCALES - GENERAL: PAINLEVEL: NO PAIN (0)

## 2019-02-21 ASSESSMENT — MIFFLIN-ST. JEOR: SCORE: 1629.1

## 2019-02-21 NOTE — LETTER
"2019       RE: Phan Luque  [address] [facility] [address]     Dear Colleague,    Thank you for referring your patient, Phan Luque, to the [facility]UR[facility] at [facility]. Please see a copy of my visit note below.    Service Date: 2019      RE: Phan Luque   MRN: [id number]   : 1955      Dear Dr. Sanchez:      This is in regard to followup on Phan Luque.  The patient returned with chief complaint of dysesthetic sensation involving her feet.      The patient just saw me on 2019.  She said that she was pleased with that consultation followup, but she did want to discuss with me her concerns about neuropathy that had been diagnosed approximately 15 years ago and had possibly worsened.  The patient had initially seen me on 2018 and I had discussed that at length with her.  Her concern when I saw her on 2019 was pain involving the thighs.  At that time I did suggest she have trochanteric bursitis.  I referred her to an orthopedist who confirmed that diagnosis.  She was told to have therapy.  She may go back to her previous orthopedist, Dr. Duvall.  The patient does have known cervical stenosis.  She did see Dr. Still but has not been felt to be a surgical candidate.  She is aware that if she has further complaints including neck or radicular pain, Lhermitte sign or difficulty passing her urine or stool she may need to have further treatment regarding that finding.  She also does have lumbar degenerative disk problems and does have epidural fat, especially prominent at the L5-S1 level.  She is aware that that conceivably could also have caused some of her paresthesias in her feet.  She has gained weight because of her rejection medications.  She had reviewed that with me and it is hard for her to lose weight.  She did tell me that her feet had actually been on \"fire\" but miraculously that sensation went away once she " was taken off trazodone and pramipexole.  She did this on her own and she only found out that it helped by inadvertently not taking it one night.  The patient last had EMG testing on 08/09/2018.  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  She denied any problems involving her hands.  She does eat fruits, vegetables and meat.  She has multiple labs when I saw her last summer and I went over those again with her and they are basically unremarkable.  She said her balance is not as good, but she has not fallen.  She did note it is harder for her to walk, in particular because of the snow.  The patient denied any other new issues.  I reviewed with her her 2 prior examinations and did suggest to her that with her liver transplantation it was conceivable that her neuropathy may improve.      Neurologic examination did show that the patient had trace reflexes in the arms and a hint of reflexes at the knees and absent ankle jerks.  Her toes were downgoing to plantar stimulation.  She had negative Matilde reflexes.  Strength testing was all normal except for mild to moderate weakness of the left toe extensors which she had had before.  The patient had normal finger-to-nose and heel-to-shin.  She had to concentrate to do tandem and had negative Romberg.  She did have the ability to feel light touch now involving her feet.  She had decreased sensation to pinprick involving her toes to the mid metatarsal areas of the feet, but this was normal in her hands.      IMPRESSION:  Known neuropathy which is probably multifactorial including long previous use of alcohol and then anti- rejection medications for liver transplantation.      The patient overall actually is better than when I first met her.  I reassured her of this.  I told her that we could verify this or at least see if she has residual neuropathic findings with a followup EMG.  It would be approximately 6 months since the last.  She would like to forego this testing now.   She is much better since she stopped trazodone and pramipexole.  She is still concerned about her balance and I did talk with her about the importance of getting involved in a balance program.  I gave her referral for physical therapy for this.  She is going to be seen on a p.r.n. basis in this office now.  She knows that with her trochanteric bursitis issues, she may consider injection if physical therapy does not help that complaint.         I spent 25 minutes with the patient today.  Over 50% of the time this involved counseling and coordination of care.         D: 2019   T: 2019   MT: AKA      Name:     PARTH FARMER   MRN:      [id number]        Account:      [id number]   :      1955           Service Date: 2019      Document: [id number]        Again, thank you for allowing me to participate in the care of your patient.      Sincerely,    Barber Ortiz MD    CC:  Arlyn Sanchez MD   [id number]   [place] [address]

## 2019-02-23 NOTE — PROGRESS NOTES
"Service Date: 2019      Arlyn Sanchez MD   [address]   [place] [address]      RE: Phan Luque   MRN: [id number]   : 1955      Dear Dr. Sanchez:      This is in regard to followup on Phan Luque.  The patient returned with chief complaint of dysesthetic sensation involving her feet.      The patient just saw me on 2019.  She said that she was pleased with that consultation followup, but she did want to discuss with me her concerns about neuropathy that had been diagnosed approximately 15 years ago and had possibly worsened.  The patient had initially seen me on 2018 and I had discussed that at length with her.  Her concern when I saw her on 2019 was pain involving the thighs.  At that time I did suggest she have trochanteric bursitis.  I referred her to an orthopedist who confirmed that diagnosis.  She was told to have therapy.  She may go back to her previous orthopedist, Dr. Duvall.  The patient does have known cervical stenosis.  She did see Dr. Still but has not been felt to be a surgical candidate.  She is aware that if she has further complaints including neck or radicular pain, Lhermitte sign or difficulty passing her urine or stool she may need to have further treatment regarding that finding.  She also does have lumbar degenerative disk problems and does have epidural fat, especially prominent at the L5-S1 level.  She is aware that that conceivably could also have caused some of her paresthesias in her feet.  She has gained weight because of her rejection medications.  She had reviewed that with me and it is hard for her to lose weight.  She did tell me that her feet had actually been on \"fire\" but miraculously that sensation went away once she was taken off trazodone and pramipexole.  She did this on her own and she only found out that it helped by inadvertently not taking it one night.  The patient last had EMG testing on " 08/09/2018.  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  She denied any problems involving her hands.  She does eat fruits, vegetables and meat.  She has multiple labs when I saw her last summer and I went over those again with her and they are basically unremarkable.  She said her balance is not as good, but she has not fallen.  She did note it is harder for her to walk, in particular because of the snow.  The patient denied any other new issues.  I reviewed with her her 2 prior examinations and did suggest to her that with her liver transplantation it was conceivable that her neuropathy may improve.      Neurologic examination did show that the patient had trace reflexes in the arms and a hint of reflexes at the knees and absent ankle jerks.  Her toes were downgoing to plantar stimulation.  She had negative Matilde reflexes.  Strength testing was all normal except for mild to moderate weakness of the left toe extensors which she had had before.  The patient had normal finger-to-nose and heel-to-shin.  She had to concentrate to do tandem and had negative Romberg.  She did have the ability to feel light touch now involving her feet.  She had decreased sensation to pinprick involving her toes to the mid metatarsal areas of the feet, but this was normal in her hands.      IMPRESSION:  Known neuropathy which is probably multifactorial including long previous use of alcohol and then anti- rejection medications for liver transplantation.      The patient overall actually is better than when I first met her.  I reassured her of this.  I told her that we could verify this or at least see if she has residual neuropathic findings with a followup EMG.  It would be approximately 6 months since the last.  She would like to forego this testing now.  She is much better since she stopped trazodone and pramipexole.  She is still concerned about her balance and I did talk with her about the importance of getting involved in a balance  program.  I gave her referral for physical therapy for this.  She is going to be seen on a p.r.n. basis in this office now.  She knows that with her trochanteric bursitis issues, she may consider injection if physical therapy does not help that complaint.      Thank you for allowing me to see this patient.       Sincerely yours,      Lizzie Ortiz MD       I spent 25 minutes with the patient today.  Over 50% of the time this involved counseling and coordination of care.         LIZZIE ORTIZ MD             D: 2019   T: 2019   MT: AKA      Name:     PARTH FARMER   MRN:      [id number]        Account:      [id number]   :      1955           Service Date: 2019      Document: [id number]

## 2019-03-10 DIAGNOSIS — N18.30 CKD (CHRONIC KIDNEY DISEASE) STAGE 3, GFR 30-59 ML/MIN (H): Primary | ICD-10-CM

## 2019-03-20 ENCOUNTER — OFFICE VISIT (OUTPATIENT)
Dept: NEPHROLOGY | Facility: CLINIC | Age: 64
End: 2019-03-20
Attending: INTERNAL MEDICINE
Payer: COMMERCIAL

## 2019-03-20 VITALS
OXYGEN SATURATION: 100 % | HEART RATE: 97 BPM | BODY MASS INDEX: 37.51 KG/M2 | WEIGHT: 233.4 LBS | HEIGHT: 66 IN | DIASTOLIC BLOOD PRESSURE: 82 MMHG | SYSTOLIC BLOOD PRESSURE: 137 MMHG

## 2019-03-20 DIAGNOSIS — N18.30 CKD (CHRONIC KIDNEY DISEASE) STAGE 3, GFR 30-59 ML/MIN (H): Primary | ICD-10-CM

## 2019-03-20 DIAGNOSIS — N18.30 CKD (CHRONIC KIDNEY DISEASE) STAGE 3, GFR 30-59 ML/MIN (H): ICD-10-CM

## 2019-03-20 LAB
ALBUMIN SERPL-MCNC: 3.9 G/DL (ref 3.4–5)
ANION GAP SERPL CALCULATED.3IONS-SCNC: 8 MMOL/L (ref 3–14)
BUN SERPL-MCNC: 23 MG/DL (ref 7–30)
CALCIUM SERPL-MCNC: 9.6 MG/DL (ref 8.5–10.1)
CHLORIDE SERPL-SCNC: 99 MMOL/L (ref 94–109)
CO2 SERPL-SCNC: 28 MMOL/L (ref 20–32)
CREAT SERPL-MCNC: 1.25 MG/DL (ref 0.52–1.04)
CREAT UR-MCNC: 23 MG/DL
GFR SERPL CREATININE-BSD FRML MDRD: 46 ML/MIN/{1.73_M2}
GLUCOSE SERPL-MCNC: 111 MG/DL (ref 70–99)
HGB BLD-MCNC: 13.9 G/DL (ref 11.7–15.7)
PHOSPHATE SERPL-MCNC: 3.7 MG/DL (ref 2.5–4.5)
POTASSIUM SERPL-SCNC: 4 MMOL/L (ref 3.4–5.3)
PROT UR-MCNC: <0.05 G/L
PROT/CREAT 24H UR: NORMAL G/G CR (ref 0–0.2)
PTH-INTACT SERPL-MCNC: 47 PG/ML (ref 18–80)
SODIUM SERPL-SCNC: 135 MMOL/L (ref 133–144)

## 2019-03-20 PROCEDURE — G0463 HOSPITAL OUTPT CLINIC VISIT: HCPCS | Mod: ZF

## 2019-03-20 ASSESSMENT — ENCOUNTER SYMPTOMS
NECK PAIN: 1
MYALGIAS: 1
STIFFNESS: 1
BACK PAIN: 1
ARTHRALGIAS: 0
JOINT SWELLING: 0
MUSCLE WEAKNESS: 1
MUSCLE CRAMPS: 1

## 2019-03-20 ASSESSMENT — MIFFLIN-ST. JEOR: SCORE: 1635.45

## 2019-03-20 ASSESSMENT — PAIN SCALES - GENERAL: PAINLEVEL: NO PAIN (0)

## 2019-03-20 NOTE — NURSING NOTE
"Chief Complaint   Patient presents with     RECHECK     CKD, stage 3     /82   Pulse 97   Ht 1.676 m (5' 6\")   Wt 105.9 kg (233 lb 6.4 oz)   SpO2 100%   BMI 37.67 kg/m    Madeleine Munoz CMA    "

## 2019-03-20 NOTE — PATIENT INSTRUCTIONS
Dear Phan Luque      Your were seen in the [facility] for follow up.  Your kidney function is stable.      We are suggesting the following medications:  No change    Please set up appointment with:  Daya Gutierrez MD in one year    It was a pleasure meeting with you today. Thank you for allowing me and my team the privilege of caring for you today.  Please let us know if there is anything else we can do for you.    Sujey Harper LPN care coordinator - [phone]  Benito Brannon RN care coordinator [phone]    Take care!  Daya Gutierrez MD  Department of Medicine  Division of Renal Diseases and Hypertension  [facility]    Email: [email or web address]

## 2019-03-20 NOTE — LETTER
3/20/2019      RE: Phan Luque  [address] [facility] [address]         Nephrology Clinic    Daya Gutierrez MD  2019     Name: Phan Luque  MRN: [id number]  Age: 63 year old  : 1955  Referring provider: Leonel Salgado     Assessment and Plan:  1. CKD stage 3:    - Creatine 1.25 mg/dL   - eGFR: 46   - stable  2. HTN:   - On amlodipine 5 mg daily   - On chlorthalidone 12.5 mg daily - controlled, no change  3. Hypomagnesemia: on magnesium supplement    Daya Gutierrez MD  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 I last evaluated the patient on 3/21/18; please see that note for further information.    Today, the patient is doing well; however, she reports aches and pains, and that after accidentally stopping trazodone they have improved (she will not be taking that anymore). She does not have swelling in her legs. Her blood pressure at home has been in the low 130s systolic.     Review of Systems:   Pertinent items are noted in HPI or as below, remainder of complete ROS is negative.      Active Medications:     Current Outpatient Medications:      acetaminophen (TYLENOL) 325 MG tablet, Take 2 tablets (650 mg) by mouth every 6 hours as needed for mild pain Or fevers. Use sparingly. Limit use to no more than 2 grams (2000 mg) in 24 hours. **Further refills must be obtained by primary care provider**, Disp: 100 tablet, Rfl: 0     amLODIPine (NORVASC) 5 MG tablet, Take 1 tablet (5 mg) by mouth daily, Disp: 90 tablet, Rfl: 3     azaTHIOprine (IMURAN) 50 MG tablet, Take 1 tablet (50 mg) by mouth every morning, Disp: 90 tablet, Rfl: 3     betaxolol (BETOPTIC) 0.5 % ophthalmic solution, Place  1 drop into both eyes 2 times daily, Disp: 5 mL, Rfl: 2     calcium Citrate-vitamin D 500-400 MG-UNIT CHEW, Take 1 tablet by mouth daily, Disp: , Rfl:      chlorthalidone (HYGROTON) 25 MG tablet, Take 0.5 tablets (12.5 mg) by mouth daily, Disp: 45 tablet, Rfl: 3     cholecalciferol (VITAMIN D3) 400 UNIT TABS tablet, Take 400 Units by mouth daily, Disp: , Rfl:      clobetasol (TEMOVATE) 0.05 % external ointment, Apply topically 2 times daily To areas of eczema on the lower legs, up to 2 weeks., Disp: 60 g, Rfl: 1     cyanocobalamin (VITAMIN  B-12) 1000 MCG tablet, Take 1,000 mcg by mouth daily, Disp: , Rfl:      diclofenac (VOLTAREN) 1 % GEL topical gel, Apply 2 g topically 4 times daily as needed for moderate pain, Disp: , Rfl:      ferrous sulfate (IRON) 325 (65 FE) MG tablet, Take 1 tablet (325 mg) by mouth 2 times daily, Disp: 100 tablet, Rfl:      folic acid (FOLVITE) 1 MG tablet, Take 1 tablet (1 mg) by mouth daily, Disp: 30 tablet, Rfl: 1     gabapentin (NEURONTIN) 100 MG capsule, Take 1 capsule (100 mg) by mouth 3 times daily, Disp: 90 capsule, Rfl: 1     hydrOXYzine (ATARAX) 25 MG tablet, TAKE 1 TO 2 TABLETS BY MOUTH EVERY 6 HOURS AS NEEDED FOR ITCHING, Disp: 120 tablet, Rfl: 5     levothyroxine (SYNTHROID/LEVOTHROID) 88 MCG tablet, Take 1 tablet (88 mcg) by mouth every morning (before breakfast), Disp: 90 tablet, Rfl: 2     MAGNESIUM OXIDE PO, Take 400 mg by mouth daily, Disp: , Rfl:      melatonin 5 MG tablet, Take 1 tablet (5 mg) by mouth nightly as needed for sleep, Disp: , Rfl:      multivitamin, therapeutic (THERA-VIT) TABS tablet, Take 1 tablet by mouth every 24 hours, Disp: 30 tablet, Rfl: 11     Omega-3 Fatty Acids (OMEGA-3 FISH OIL EX ST PO), Take 1 capsule by mouth 2 times daily 1290 (fish oil)-900 (omega 3), Disp: , Rfl:      order for DME, Equipment being ordered: knee sleeve, Disp: 1 Device, Rfl: 0     pantoprazole (PROTONIX) 40 MG EC tablet, TAKE ONE TABLET (40MG) BY MOUTH EVERY MORNING BEFORE  BREAKFAST, Disp: 90 tablet, Rfl: 4     pramipexole (MIRAPEX) 0.5 MG tablet, Take 1 tablet (0.5 mg) by mouth At Bedtime, Disp: 90 tablet, Rfl: 3     predniSONE (DELTASONE) 1 MG tablet, Take 4 tablets (4 mg) by mouth daily, Disp: 360 tablet, Rfl: 3     predniSONE (DELTASONE) 5 MG tablet, Take 1 tablet (5 mg) by mouth daily, Disp: 90 tablet, Rfl: 3     psyllium 0.52 G capsule, Take 2 capsules (1.04 g) by mouth daily With a full glass of water., Disp: 60 capsule, Rfl: 1     tacrolimus (GENERIC EQUIVALENT) 0.5 MG capsule, Take 4 capsules (2 mg) by mouth every 12 hours, Disp: 240 capsule, Rfl: 11     traMADol (ULTRAM) 50 MG tablet, Take 1 tablet (50 mg) by mouth every 6 hours as needed for severe pain, Disp: 20 tablet, Rfl: 0     traZODone (DESYREL) 100 MG tablet, Take 1.5 tablets (150 mg) by mouth At Bedtime, Disp: 135 tablet, Rfl: 1     ursodiol (ACTIGALL) 300 MG capsule, Take 1 capsule (300 mg) by mouth every 12 hours, Disp: 60 capsule, Rfl: 11     venlafaxine (EFFEXOR XR) 37.5 MG 24 hr capsule, Take 1 capsule (37.5 mg) by mouth daily If tolerating may increase to 2 caps after 2 weeks, Disp: 60 capsule, Rfl: 2     Allergies:   Codeine; Benadryl [diphenhydramine]; Cefaclor; Hydrocodone-acetaminophen; Oxycodone; Penicillins; and Sulfa drugs      Past Medical History:  Past Medical History:   Diagnosis Date     Asthma      Chronic kidney disease      End stage liver disease (H)     2/2 Alcohol Abuse     Liver transplanted (H)      Migraines      Osteoporosis      Spinal stenosis in cervical region      Strabismus      Patient Active Problem List   Diagnosis     Decompensation of cirrhosis of liver (H)     Liver transplanted (H)     Alcoholic hepatitis     Immunosuppression (H)     Alcoholic hepatitis without ascites     Enterococcus UTI     Liver transplant status (H)     Nausea & vomiting     Malnutrition (H)     Candidiasis of skin     Anemia     ACP (advance care planning)     Diarrhea     Hypothyroidism     Esophageal  reflux     Recurrent major depression in partial remission (H)     Insomnia     CKD (chronic kidney disease) stage 3, GFR 30-59 ml/min (H)     Anemia in stage 3 chronic kidney disease (H)     Osteopenia     Alcohol abuse, uncomplicated     Past Surgical History:  Past Surgical History:   Procedure Laterality Date     APPENDECTOMY           BENCH LIVER N/A 2016    Procedure: BENCH LIVER;  Surgeon: Larry Dhillon MD;  Location: [facility] OR     CATARACT IOL, RT/LT       COLONOSCOPY       ESOPHAGOSCOPY, GASTROSCOPY, DUODENOSCOPY (EGD), COMBINED N/A 2016    Procedure: COMBINED ESOPHAGOSCOPY, GASTROSCOPY, DUODENOSCOPY (EGD);  Surgeon: Tao Alfonso MD;  Location: [facility] GI     ESOPHAGOSCOPY, GASTROSCOPY, DUODENOSCOPY (EGD), COMBINED N/A 10/31/2016    Procedure: COMBINED ESOPHAGOSCOPY, GASTROSCOPY, DUODENOSCOPY (EGD), BIOPSY SINGLE OR MULTIPLE;  Surgeon: Ronald Bojorquez MD;  Location: [facility] GI     sphincteroplasty       TRANSPLANT LIVER RECIPIENT  DONOR N/A 2016    Procedure: TRANSPLANT LIVER RECIPIENT  DONOR;  Surgeon: Larry Dhillon MD;  Location: [facility] OR     TUBAL LIGATION      laparoscopic     Family History:   Family History   Problem Relation Age of Onset     Family History Negative Other      Melanoma Mother              Heart Disease Father         CHF      Social History:   Social History     Socioeconomic History     Marital status:      Spouse name: Not on file     Number of children: 3     Years of education: Not on file     Highest education level: Not on file   Occupational History     Occupation:    Social Needs     Financial resource strain: Not on file     Food insecurity:     Worry: Not on file     Inability: Not on file     Transportation needs:     Medical: Not on file     Non-medical: Not on file   Tobacco Use     Smoking status: Former Smoker     Packs/day: 2.50     Years: 20.00     Pack years: 50.00     Last attempt to quit:  "1996     Years since quittin.2     Smokeless tobacco: Never Used   Substance and Sexual Activity     Alcohol use: No     Alcohol/week: 4.2 oz     Types: 7 Standard drinks or equivalent per week     Comment: heavy use (750ml/2 days) up until 1 year ago; had cut down to 1 drink/day; none since 17     Drug use: No     Sexual activity: Not on file   Lifestyle     Physical activity:     Days per week: Not on file     Minutes per session: Not on file     Stress: Not on file   Relationships     Social connections:     Talks on phone: Not on file     Gets together: Not on file     Attends Zoroastrianism service: Not on file     Active member of club or organization: Not on file     Attends meetings of clubs or organizations: Not on file     Relationship status: Not on file     Intimate partner violence:     Fear of current or ex partner: Not on file     Emotionally abused: Not on file     Physically abused: Not on file     Forced sexual activity: Not on file   Other Topics Concern     Parent/sibling w/ CABG, MI or angioplasty before 65F 55M? 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     Physical Exam:  /82   Pulse 97   Ht 1.676 m (5' 6\")   Wt 105.9 kg (233 lb 6.4 oz)   SpO2 100%   BMI 37.67 kg/m      GENERAL APPEARANCE: alert and no distress   EYES: no scleral icterus, pupils equal   HENT: NC/AT,  mouth  without ulcers or lesions   Pulmonary: normal work of breathing, no clubbing   CV: WWP and no edema   SKIN: no rash, warm, dry, no cyanosis   NEURO: mentation intact and speech normal     Laboratory:  CMP  Recent Labs   Lab Test 19  1517 19  0718 18  0717 18  1151 18  0754 18  0739 18  0723 18  0739 18  0731 18  0823 18  1540 18  0728    135 139  --  138 138 139  --  140 137 135 140   POTASSIUM 4.0 3.7 3.8  --  3.7 3.8 3.7  --  3.8 4.1 " 4.7 3.7   CHLORIDE 99 103 105  --  103 103 103  --  107 105 100 103   CO2 28 24 25  --  25 26 27  --  28 24 26 30   ANIONGAP 8 8 8  --  10 9 9  --  5 8 9 7   * 98 116*  --  108* 101* 124*  --  101* 125* 121* 87   BUN 23 20 21  --  19 17 22  --  18 21 35* 23   CR 1.25* 1.30* 1.22* 1.17* 1.18* 1.41* 1.32*  --  1.19* 1.17* 1.21* 1.30*   GFRESTIMATED 46* 44* 45* 47* 46* 38* 41*  --  46* 47* 45* 42*   GFRESTBLACK 53* 51* 54* 57* 56* 46* 49*  --  56* 57* 55* 50*   ERIC 9.6 9.4 8.4* 8.6 8.9 8.9 8.8  --  8.7 8.8 8.9 9.1   MAG  --   --   --   --   --   --  1.7  --  1.9 2.2  --  1.8   PHOS 3.7  --   --   --   --  3.6  --  3.1  --   --  4.2  --    PROTTOTAL  --  7.4 7.0  --  7.0 6.8 7.1  --  7.0 7.4  --  7.1   ALBUMIN 3.9 3.8 3.6  --  3.7 3.8 3.7  --  3.6 3.9 3.7 3.7   BILITOTAL  --  0.9 0.4  --  0.4 0.8 0.5  --  0.7 0.5  --  0.7   ALKPHOS  --  44 56  --  55 38* 51  --  48 49  --  51   AST  --  15 13  --  14 16 11  --  12 15  --  14   ALT  --  20 20  --  22 19 20  --  14 16  --  16     CBC  Recent Labs   Lab Test 03/20/19  1517 01/31/19  0718 11/29/18  0717 11/01/18  0754 08/21/18  0739   HGB 13.9 14.2 12.9 13.2 13.5   WBC  --  8.9 6.6 7.6 7.5   RBC  --  4.63 4.18 4.23 4.21   HCT  --  43.0 38.0 39.5 40.2   MCV  --  93 91 93 96   MCH  --  30.7 30.9 31.2 32.1   MCHC  --  33.0 33.9 33.4 33.6   RDW  --  13.6 13.7 13.9 12.9   PLT  --  317 276 283 268     INR  Recent Labs   Lab Test 03/16/17  1728 09/13/16  1055 08/30/16  0740 08/29/16  0635  08/25/16  1230 08/25/16  1055 08/25/16  0850 08/25/16  0756   INR 1.15* 1.08 1.01 1.03   < > 1.76* 1.76* 1.87* 1.73*   PTT  --   --   --   --   --  48* 59* 58* 71*    < > = values in this interval not displayed.     ABG  Recent Labs   Lab Test 10/20/16  1216 10/19/16  0821 08/26/16  0510 08/26/16  0004 08/25/16  2000 08/25/16  1230 08/25/16  1055   PH  --   --   --  7.48* 7.51* 7.45 7.45   PCO2  --   --   --  38 35 44 43   PO2  --   --   --  77* 58* 164* 103   HCO3  --   --   --  28 28  30* 29*   O2PER 21.0 21 4L 4L 4L 100.0  100.0 0.58      URINE STUDIES  Recent Labs   Lab Test 11/29/18  0729 03/01/18  1220 12/20/17  1450 12/31/16  1030   COLOR Yellow Yellow Yellow Yellow   APPEARANCE Clear Clear Clear Clear   URINEGLC Negative Negative Negative Negative   URINEBILI Negative Negative Negative Negative   URINEKETONE Negative Negative Negative Negative   SG 1.015 1.014 1.014 1.005   UBLD Negative Negative Negative Negative   URINEPH 6.0 6.0 7.0 6.5   PROTEIN Negative Negative Negative Negative   NITRITE Negative Negative Negative Negative   LEUKEST Negative Negative Negative Negative   RBCU <1 1 2 <1   WBCU <1 1 6* 4*     Recent Labs   Lab Test 03/20/19  1525 11/29/18  0729 03/21/18  1550 12/20/17  1450 09/20/17  1214 06/05/17  1214 03/13/17  1550 12/12/16  1502 10/10/16  1457   UTPG Unable to calculate due to low value 0.11 0.16 0.12 0.13 0.39* 0.13 0.20 0.39*     PTH  Recent Labs   Lab Test 08/21/18  0739 04/20/18  0739 09/08/17  0723 04/05/17  1236 11/16/16  0640 10/29/16  1807 10/27/16  0705 10/10/16  1456   PTHI 66 90* 117* 53 20 <3* Quantity not sufficient  ED GLO NOTIFIED ON URTR[facility],10/29/2016,1650,MJ   3*     IRON STUDIES   Recent Labs   Lab Test 08/21/18  0739 04/04/18  0823 03/21/18  1540 06/05/17  1211 04/05/17  1236 03/22/17  1607 09/06/16  0638 08/06/16  0757   IRON  --   --   --  59 56 62 18* 25*   FEB  --   --   --  248 179* 197* 118* 92*   IRONSAT  --   --   --  24 31 32 15 27   WILL 84 57 50 138 278* 320* 480* 528*     Imaging:       Scribe Disclosure:   I, Michaelle Man, am serving as a scribe to document services personally performed by Daya Gutierrez MD at this visit, based upon the provider's statements to me. All documentation has been reviewed by the aforementioned provider prior to being entered into the official medical record.     Portions of this medical record were completed by a scribe. UPON MY REVIEW AND AUTHENTICATION BY ELECTRONIC SIGNATURE, this confirms (a) I  performed the applicable clinical services, and (b) the record is accurate.      Daya Gutierrez MD

## 2019-03-20 NOTE — LETTER
3/20/2019       RE: Phan Luque  [address]  [facility] [address]     Dear Colleague,    Thank you for referring your patient, Phan Luque, to the [facility] NEPHROLOGY at [facility]. Please see a copy of my visit note below.      Nephrology Clinic    Daya Gutierrez MD  2019     Name: Phan Luque  MRN: [id number]  Age: 63 year old  : 1955  Referring provider: Leonel Salgado     Assessment and Plan:  1. CKD stage 3:    - Creatine 1.25 mg/dL   - eGFR: 46   - stable  2. HTN:   - On amlodipine 5 mg daily   - On chlorthalidone 12.5 mg daily - controlled, no change  3. 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 I last evaluated the patient on 3/21/18; please see that note for further information.    Today, the patient is doing well; however, she reports aches and pains, and that after accidentally stopping trazodone they have improved (she will not be taking that anymore). She does not have swelling in her legs. Her blood pressure at home has been in the low 130s systolic.     Review of Systems:   Pertinent items are noted in HPI or as below, remainder of complete ROS is negative.      Active Medications:     Current Outpatient Medications:      acetaminophen (TYLENOL) 325 MG tablet, Take 2 tablets (650 mg) by mouth every 6 hours as needed for mild pain Or fevers. Use sparingly. Limit use to no more than 2 grams (2000 mg) in 24 hours. **Further refills must be obtained by primary care provider**, Disp: 100 tablet, Rfl: 0     amLODIPine (NORVASC) 5 MG tablet, Take 1 tablet (5 mg) by mouth  daily, Disp: 90 tablet, Rfl: 3     azaTHIOprine (IMURAN) 50 MG tablet, Take 1 tablet (50 mg) by mouth every morning, Disp: 90 tablet, Rfl: 3     betaxolol (BETOPTIC) 0.5 % ophthalmic solution, Place 1 drop into both eyes 2 times daily, Disp: 5 mL, Rfl: 2     calcium Citrate-vitamin D 500-400 MG-UNIT CHEW, Take 1 tablet by mouth daily, Disp: , Rfl:      chlorthalidone (HYGROTON) 25 MG tablet, Take 0.5 tablets (12.5 mg) by mouth daily, Disp: 45 tablet, Rfl: 3     cholecalciferol (VITAMIN D3) 400 UNIT TABS tablet, Take 400 Units by mouth daily, Disp: , Rfl:      clobetasol (TEMOVATE) 0.05 % external ointment, Apply topically 2 times daily To areas of eczema on the lower legs, up to 2 weeks., Disp: 60 g, Rfl: 1     cyanocobalamin (VITAMIN  B-12) 1000 MCG tablet, Take 1,000 mcg by mouth daily, Disp: , Rfl:      diclofenac (VOLTAREN) 1 % GEL topical gel, Apply 2 g topically 4 times daily as needed for moderate pain, Disp: , Rfl:      ferrous sulfate (IRON) 325 (65 FE) MG tablet, Take 1 tablet (325 mg) by mouth 2 times daily, Disp: 100 tablet, Rfl:      folic acid (FOLVITE) 1 MG tablet, Take 1 tablet (1 mg) by mouth daily, Disp: 30 tablet, Rfl: 1     gabapentin (NEURONTIN) 100 MG capsule, Take 1 capsule (100 mg) by mouth 3 times daily, Disp: 90 capsule, Rfl: 1     hydrOXYzine (ATARAX) 25 MG tablet, TAKE 1 TO 2 TABLETS BY MOUTH EVERY 6 HOURS AS NEEDED FOR ITCHING, Disp: 120 tablet, Rfl: 5     levothyroxine (SYNTHROID/LEVOTHROID) 88 MCG tablet, Take 1 tablet (88 mcg) by mouth every morning (before breakfast), Disp: 90 tablet, Rfl: 2     MAGNESIUM OXIDE PO, Take 400 mg by mouth daily, Disp: , Rfl:      melatonin 5 MG tablet, Take 1 tablet (5 mg) by mouth nightly as needed for sleep, Disp: , Rfl:      multivitamin, therapeutic (THERA-VIT) TABS tablet, Take 1 tablet by mouth every 24 hours, Disp: 30 tablet, Rfl: 11     Omega-3 Fatty Acids (OMEGA-3 FISH OIL EX ST PO), Take 1 capsule by mouth 2 times daily 1290 (fish oil)-900  (omega 3), Disp: , Rfl:      order for DME, Equipment being ordered: knee sleeve, Disp: 1 Device, Rfl: 0     pantoprazole (PROTONIX) 40 MG EC tablet, TAKE ONE TABLET (40MG) BY MOUTH EVERY MORNING BEFORE BREAKFAST, Disp: 90 tablet, Rfl: 4     pramipexole (MIRAPEX) 0.5 MG tablet, Take 1 tablet (0.5 mg) by mouth At Bedtime, Disp: 90 tablet, Rfl: 3     predniSONE (DELTASONE) 1 MG tablet, Take 4 tablets (4 mg) by mouth daily, Disp: 360 tablet, Rfl: 3     predniSONE (DELTASONE) 5 MG tablet, Take 1 tablet (5 mg) by mouth daily, Disp: 90 tablet, Rfl: 3     psyllium 0.52 G capsule, Take 2 capsules (1.04 g) by mouth daily With a full glass of water., Disp: 60 capsule, Rfl: 1     tacrolimus (GENERIC EQUIVALENT) 0.5 MG capsule, Take 4 capsules (2 mg) by mouth every 12 hours, Disp: 240 capsule, Rfl: 11     traMADol (ULTRAM) 50 MG tablet, Take 1 tablet (50 mg) by mouth every 6 hours as needed for severe pain, Disp: 20 tablet, Rfl: 0     traZODone (DESYREL) 100 MG tablet, Take 1.5 tablets (150 mg) by mouth At Bedtime, Disp: 135 tablet, Rfl: 1     ursodiol (ACTIGALL) 300 MG capsule, Take 1 capsule (300 mg) by mouth every 12 hours, Disp: 60 capsule, Rfl: 11     venlafaxine (EFFEXOR XR) 37.5 MG 24 hr capsule, Take 1 capsule (37.5 mg) by mouth daily If tolerating may increase to 2 caps after 2 weeks, Disp: 60 capsule, Rfl: 2     Allergies:   Codeine; Benadryl [diphenhydramine]; Cefaclor; Hydrocodone-acetaminophen; Oxycodone; Penicillins; and Sulfa drugs      Past Medical History:  Past Medical History:   Diagnosis Date     Asthma      Chronic kidney disease      End stage liver disease (H)     2/2 Alcohol Abuse     Liver transplanted (H)      Migraines      Osteoporosis      Spinal stenosis in cervical region      Strabismus      Patient Active Problem List   Diagnosis     Decompensation of cirrhosis of liver (H)     Liver transplanted (H)     Alcoholic hepatitis     Immunosuppression (H)     Alcoholic hepatitis without ascites      Enterococcus UTI     Liver transplant status (H)     Nausea & vomiting     Malnutrition (H)     Candidiasis of skin     Anemia     ACP (advance care planning)     Diarrhea     Hypothyroidism     Esophageal reflux     Recurrent major depression in partial remission (H)     Insomnia     CKD (chronic kidney disease) stage 3, GFR 30-59 ml/min (H)     Anemia in stage 3 chronic kidney disease (H)     Osteopenia     Alcohol abuse, uncomplicated     Past Surgical History:  Past Surgical History:   Procedure Laterality Date     APPENDECTOMY           BENCH LIVER N/A 2016    Procedure: BENCH LIVER;  Surgeon: Larry Dhillon MD;  Location: [facility] OR     CATARACT IOL, RT/LT       COLONOSCOPY       ESOPHAGOSCOPY, GASTROSCOPY, DUODENOSCOPY (EGD), COMBINED N/A 2016    Procedure: COMBINED ESOPHAGOSCOPY, GASTROSCOPY, DUODENOSCOPY (EGD);  Surgeon: Tao Alfonso MD;  Location: [facility] GI     ESOPHAGOSCOPY, GASTROSCOPY, DUODENOSCOPY (EGD), COMBINED N/A 10/31/2016    Procedure: COMBINED ESOPHAGOSCOPY, GASTROSCOPY, DUODENOSCOPY (EGD), BIOPSY SINGLE OR MULTIPLE;  Surgeon: Ronald Bojorquez MD;  Location: [facility] GI     sphincteroplasty       TRANSPLANT LIVER RECIPIENT  DONOR N/A 2016    Procedure: TRANSPLANT LIVER RECIPIENT  DONOR;  Surgeon: Larry Dhillon MD;  Location: [facility] OR     TUBAL LIGATION      laparoscopic     Family History:   Family History   Problem Relation Age of Onset     Family History Negative Other      Melanoma Mother              Heart Disease Father         CHF      Social History:   Social History     Socioeconomic History     Marital status:      Spouse name: Not on file     Number of children: 3     Years of education: Not on file     Highest education level: Not on file   Occupational History     Occupation:    Social Needs     Financial resource strain: Not on file     Food insecurity:     Worry: Not on file     Inability: Not on file      "Transportation needs:     Medical: Not on file     Non-medical: Not on file   Tobacco Use     Smoking status: Former Smoker     Packs/day: 2.50     Years: 20.00     Pack years: 50.00     Last attempt to quit: 1996     Years since quittin.2     Smokeless tobacco: Never Used   Substance and Sexual Activity     Alcohol use: No     Alcohol/week: 4.2 oz     Types: 7 Standard drinks or equivalent per week     Comment: heavy use (750ml/2 days) up until 1 year ago; had cut down to 1 drink/day; none since 17     Drug use: No     Sexual activity: Not on file   Lifestyle     Physical activity:     Days per week: Not on file     Minutes per session: Not on file     Stress: Not on file   Relationships     Social connections:     Talks on phone: Not on file     Gets together: Not on file     Attends Voodoo service: Not on file     Active member of club or organization: Not on file     Attends meetings of clubs or organizations: Not on file     Relationship status: Not on file     Intimate partner violence:     Fear of current or ex partner: Not on file     Emotionally abused: Not on file     Physically abused: Not on file     Forced sexual activity: Not on file   Other Topics Concern     Parent/sibling w/ CABG, MI or angioplasty before 65F 55M? 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     Physical Exam:  /82   Pulse 97   Ht 1.676 m (5' 6\")   Wt 105.9 kg (233 lb 6.4 oz)   SpO2 100%   BMI 37.67 kg/m      GENERAL APPEARANCE: alert and no distress   EYES: no scleral icterus, pupils equal   HENT: NC/AT,  mouth  without ulcers or lesions   Pulmonary: normal work of breathing, no clubbing   CV: WWP and no edema   SKIN: no rash, warm, dry, no cyanosis   NEURO: mentation intact and speech normal     Laboratory:  CMP  Recent Labs   Lab Test 19  1517 19  0718 18  0717 18  1151 18  0754 " 08/21/18  0739 06/22/18  0723 04/20/18  0739 04/20/18  0731 04/04/18  0823 03/21/18  1540 02/21/18  0728    135 139  --  138 138 139  --  140 137 135 140   POTASSIUM 4.0 3.7 3.8  --  3.7 3.8 3.7  --  3.8 4.1 4.7 3.7   CHLORIDE 99 103 105  --  103 103 103  --  107 105 100 103   CO2 28 24 25  --  25 26 27  --  28 24 26 30   ANIONGAP 8 8 8  --  10 9 9  --  5 8 9 7   * 98 116*  --  108* 101* 124*  --  101* 125* 121* 87   BUN 23 20 21  --  19 17 22  --  18 21 35* 23   CR 1.25* 1.30* 1.22* 1.17* 1.18* 1.41* 1.32*  --  1.19* 1.17* 1.21* 1.30*   GFRESTIMATED 46* 44* 45* 47* 46* 38* 41*  --  46* 47* 45* 42*   GFRESTBLACK 53* 51* 54* 57* 56* 46* 49*  --  56* 57* 55* 50*   ERIC 9.6 9.4 8.4* 8.6 8.9 8.9 8.8  --  8.7 8.8 8.9 9.1   MAG  --   --   --   --   --   --  1.7  --  1.9 2.2  --  1.8   PHOS 3.7  --   --   --   --  3.6  --  3.1  --   --  4.2  --    PROTTOTAL  --  7.4 7.0  --  7.0 6.8 7.1  --  7.0 7.4  --  7.1   ALBUMIN 3.9 3.8 3.6  --  3.7 3.8 3.7  --  3.6 3.9 3.7 3.7   BILITOTAL  --  0.9 0.4  --  0.4 0.8 0.5  --  0.7 0.5  --  0.7   ALKPHOS  --  44 56  --  55 38* 51  --  48 49  --  51   AST  --  15 13  --  14 16 11  --  12 15  --  14   ALT  --  20 20  --  22 19 20  --  14 16  --  16     CBC  Recent Labs   Lab Test 03/20/19  1517 01/31/19  0718 11/29/18  0717 11/01/18  0754 08/21/18  0739   HGB 13.9 14.2 12.9 13.2 13.5   WBC  --  8.9 6.6 7.6 7.5   RBC  --  4.63 4.18 4.23 4.21   HCT  --  43.0 38.0 39.5 40.2   MCV  --  93 91 93 96   MCH  --  30.7 30.9 31.2 32.1   MCHC  --  33.0 33.9 33.4 33.6   RDW  --  13.6 13.7 13.9 12.9   PLT  --  317 276 283 268     INR  Recent Labs   Lab Test 03/16/17  1728 09/13/16  1055 08/30/16  0740 08/29/16  0635  08/25/16  1230 08/25/16  1055 08/25/16  0850 08/25/16  0756   INR 1.15* 1.08 1.01 1.03   < > 1.76* 1.76* 1.87* 1.73*   PTT  --   --   --   --   --  48* 59* 58* 71*    < > = values in this interval not displayed.     ABG  Recent Labs   Lab Test 10/20/16  1216 10/19/16  0821  08/26/16  0510 08/26/16  0004 08/25/16  2000 08/25/16  1230 08/25/16  1055   PH  --   --   --  7.48* 7.51* 7.45 7.45   PCO2  --   --   --  38 35 44 43   PO2  --   --   --  77* 58* 164* 103   HCO3  --   --   --  28 28 30* 29*   O2PER 21.0 21 4L 4L 4L 100.0  100.0 0.58      URINE STUDIES  Recent Labs   Lab Test 11/29/18  0729 03/01/18  1220 12/20/17  1450 12/31/16  1030   COLOR Yellow Yellow Yellow Yellow   APPEARANCE Clear Clear Clear Clear   URINEGLC Negative Negative Negative Negative   URINEBILI Negative Negative Negative Negative   URINEKETONE Negative Negative Negative Negative   SG 1.015 1.014 1.014 1.005   UBLD Negative Negative Negative Negative   URINEPH 6.0 6.0 7.0 6.5   PROTEIN Negative Negative Negative Negative   NITRITE Negative Negative Negative Negative   LEUKEST Negative Negative Negative Negative   RBCU <1 1 2 <1   WBCU <1 1 6* 4*     Recent Labs   Lab Test 03/20/19  1525 11/29/18  0729 03/21/18  1550 12/20/17  1450 09/20/17  1214 06/05/17  1214 03/13/17  1550 12/12/16  1502 10/10/16  1457   UTPG Unable to calculate due to low value 0.11 0.16 0.12 0.13 0.39* 0.13 0.20 0.39*     PTH  Recent Labs   Lab Test 08/21/18  0739 04/20/18  0739 09/08/17  0723 04/05/17  1236 11/16/16  0640 10/29/16  1807 10/27/16  0705 10/10/16  1456   PTHI 66 90* 117* 53 20 <3* Quantity not sufficient  ED GLOH NOTIFIED ON [facility]TR[facility],10/29/2016,1650,MJ   3*     IRON STUDIES   Recent Labs   Lab Test 08/21/18  0739 04/04/18  0823 03/21/18  1540 06/05/17  1211 04/05/17  1236 03/22/17  1607 09/06/16  0638 08/06/16  0757   IRON  --   --   --  59 56 62 18* 25*   FEB  --   --   --  248 179* 197* 118* 92*   IRONSAT  --   --   --  24 31 32 15 27   WILL 84 57 50 138 278* 320* 480* 528*     Imaging:       Scribe Disclosure:   I, Michaelle Man, am serving as a scribe to document services personally performed by Daya Gutierrez MD at this visit, based upon the provider's statements to me. All documentation has been reviewed by the  aforementioned provider prior to being entered into the official medical record.     Portions of this medical record were completed by a scribe. UPON MY REVIEW AND AUTHENTICATION BY ELECTRONIC SIGNATURE, this confirms (a) I performed the applicable clinical services, and (b) the record is accurate.      Again, thank you for allowing me to participate in the care of your patient.      Sincerely,    Daya Gutierrez MD

## 2019-03-20 NOTE — PROGRESS NOTES
Nephrology Clinic    Daya Gutierrez MD  2019     Name: Phan Luque  MRN: [id number]  Age: 63 year old  : 1955  Referring provider: Leonel Salgado     Assessment and Plan:  1. CKD stage 3:    - Creatine 1.25 mg/dL   - eGFR: 46   - stable  2. HTN:   - On amlodipine 5 mg daily   - On chlorthalidone 12.5 mg daily - controlled, no change  3. 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 I last evaluated the patient on 3/21/18; please see that note for further information.    Today, the patient is doing well; however, she reports aches and pains, and that after accidentally stopping trazodone they have improved (she will not be taking that anymore). She does not have swelling in her legs. Her blood pressure at home has been in the low 130s systolic.     Review of Systems:   Pertinent items are noted in HPI or as below, remainder of complete ROS is negative.      Active Medications:     Current Outpatient Medications:      acetaminophen (TYLENOL) 325 MG tablet, Take 2 tablets (650 mg) by mouth every 6 hours as needed for mild pain Or fevers. Use sparingly. Limit use to no more than 2 grams (2000 mg) in 24 hours. **Further refills must be obtained by primary care provider**, Disp: 100 tablet, Rfl: 0     amLODIPine (NORVASC) 5 MG tablet, Take 1 tablet (5 mg) by mouth daily, Disp: 90 tablet, Rfl: 3     azaTHIOprine (IMURAN) 50 MG tablet, Take 1 tablet (50 mg) by mouth every morning, Disp: 90 tablet, Rfl: 3     betaxolol (BETOPTIC) 0.5 % ophthalmic solution, Place 1 drop into both eyes 2 times daily, Disp: 5 mL, Rfl: 2     calcium  Citrate-vitamin D 500-400 MG-UNIT CHEW, Take 1 tablet by mouth daily, Disp: , Rfl:      chlorthalidone (HYGROTON) 25 MG tablet, Take 0.5 tablets (12.5 mg) by mouth daily, Disp: 45 tablet, Rfl: 3     cholecalciferol (VITAMIN D3) 400 UNIT TABS tablet, Take 400 Units by mouth daily, Disp: , Rfl:      clobetasol (TEMOVATE) 0.05 % external ointment, Apply topically 2 times daily To areas of eczema on the lower legs, up to 2 weeks., Disp: 60 g, Rfl: 1     cyanocobalamin (VITAMIN  B-12) 1000 MCG tablet, Take 1,000 mcg by mouth daily, Disp: , Rfl:      diclofenac (VOLTAREN) 1 % GEL topical gel, Apply 2 g topically 4 times daily as needed for moderate pain, Disp: , Rfl:      ferrous sulfate (IRON) 325 (65 FE) MG tablet, Take 1 tablet (325 mg) by mouth 2 times daily, Disp: 100 tablet, Rfl:      folic acid (FOLVITE) 1 MG tablet, Take 1 tablet (1 mg) by mouth daily, Disp: 30 tablet, Rfl: 1     gabapentin (NEURONTIN) 100 MG capsule, Take 1 capsule (100 mg) by mouth 3 times daily, Disp: 90 capsule, Rfl: 1     hydrOXYzine (ATARAX) 25 MG tablet, TAKE 1 TO 2 TABLETS BY MOUTH EVERY 6 HOURS AS NEEDED FOR ITCHING, Disp: 120 tablet, Rfl: 5     levothyroxine (SYNTHROID/LEVOTHROID) 88 MCG tablet, Take 1 tablet (88 mcg) by mouth every morning (before breakfast), Disp: 90 tablet, Rfl: 2     MAGNESIUM OXIDE PO, Take 400 mg by mouth daily, Disp: , Rfl:      melatonin 5 MG tablet, Take 1 tablet (5 mg) by mouth nightly as needed for sleep, Disp: , Rfl:      multivitamin, therapeutic (THERA-VIT) TABS tablet, Take 1 tablet by mouth every 24 hours, Disp: 30 tablet, Rfl: 11     Omega-3 Fatty Acids (OMEGA-3 FISH OIL EX ST PO), Take 1 capsule by mouth 2 times daily 1290 (fish oil)-900 (omega 3), Disp: , Rfl:      order for DME, Equipment being ordered: knee sleeve, Disp: 1 Device, Rfl: 0     pantoprazole (PROTONIX) 40 MG EC tablet, TAKE ONE TABLET (40MG) BY MOUTH EVERY MORNING BEFORE BREAKFAST, Disp: 90 tablet, Rfl: 4     pramipexole (MIRAPEX) 0.5 MG  tablet, Take 1 tablet (0.5 mg) by mouth At Bedtime, Disp: 90 tablet, Rfl: 3     predniSONE (DELTASONE) 1 MG tablet, Take 4 tablets (4 mg) by mouth daily, Disp: 360 tablet, Rfl: 3     predniSONE (DELTASONE) 5 MG tablet, Take 1 tablet (5 mg) by mouth daily, Disp: 90 tablet, Rfl: 3     psyllium 0.52 G capsule, Take 2 capsules (1.04 g) by mouth daily With a full glass of water., Disp: 60 capsule, Rfl: 1     tacrolimus (GENERIC EQUIVALENT) 0.5 MG capsule, Take 4 capsules (2 mg) by mouth every 12 hours, Disp: 240 capsule, Rfl: 11     traMADol (ULTRAM) 50 MG tablet, Take 1 tablet (50 mg) by mouth every 6 hours as needed for severe pain, Disp: 20 tablet, Rfl: 0     traZODone (DESYREL) 100 MG tablet, Take 1.5 tablets (150 mg) by mouth At Bedtime, Disp: 135 tablet, Rfl: 1     ursodiol (ACTIGALL) 300 MG capsule, Take 1 capsule (300 mg) by mouth every 12 hours, Disp: 60 capsule, Rfl: 11     venlafaxine (EFFEXOR XR) 37.5 MG 24 hr capsule, Take 1 capsule (37.5 mg) by mouth daily If tolerating may increase to 2 caps after 2 weeks, Disp: 60 capsule, Rfl: 2     Allergies:   Codeine; Benadryl [diphenhydramine]; Cefaclor; Hydrocodone-acetaminophen; Oxycodone; Penicillins; and Sulfa drugs      Past Medical History:  Past Medical History:   Diagnosis Date     Asthma      Chronic kidney disease      End stage liver disease (H)     2/2 Alcohol Abuse     Liver transplanted (H)      Migraines      Osteoporosis      Spinal stenosis in cervical region      Strabismus      Patient Active Problem List   Diagnosis     Decompensation of cirrhosis of liver (H)     Liver transplanted (H)     Alcoholic hepatitis     Immunosuppression (H)     Alcoholic hepatitis without ascites     Enterococcus UTI     Liver transplant status (H)     Nausea & vomiting     Malnutrition (H)     Candidiasis of skin     Anemia     ACP (advance care planning)     Diarrhea     Hypothyroidism     Esophageal reflux     Recurrent major depression in partial remission (H)      Insomnia     CKD (chronic kidney disease) stage 3, GFR 30-59 ml/min (H)     Anemia in stage 3 chronic kidney disease (H)     Osteopenia     Alcohol abuse, uncomplicated     Past Surgical History:  Past Surgical History:   Procedure Laterality Date     APPENDECTOMY           BENCH LIVER N/A 2016    Procedure: BENCH LIVER;  Surgeon: Larry Dhillon MD;  Location: [facility] OR     CATARACT IOL, RT/LT       COLONOSCOPY       ESOPHAGOSCOPY, GASTROSCOPY, DUODENOSCOPY (EGD), COMBINED N/A 2016    Procedure: COMBINED ESOPHAGOSCOPY, GASTROSCOPY, DUODENOSCOPY (EGD);  Surgeon: Tao Alfonso MD;  Location: [facility] GI     ESOPHAGOSCOPY, GASTROSCOPY, DUODENOSCOPY (EGD), COMBINED N/A 10/31/2016    Procedure: COMBINED ESOPHAGOSCOPY, GASTROSCOPY, DUODENOSCOPY (EGD), BIOPSY SINGLE OR MULTIPLE;  Surgeon: Ronald Bojorquez MD;  Location: [facility] GI     sphincteroplasty       TRANSPLANT LIVER RECIPIENT  DONOR N/A 2016    Procedure: TRANSPLANT LIVER RECIPIENT  DONOR;  Surgeon: Larry Dhillon MD;  Location: [facility] OR     TUBAL LIGATION      laparoscopic     Family History:   Family History   Problem Relation Age of Onset     Family History Negative Other      Melanoma Mother              Heart Disease Father         CHF      Social History:   Social History     Socioeconomic History     Marital status:      Spouse name: Not on file     Number of children: 3     Years of education: Not on file     Highest education level: Not on file   Occupational History     Occupation:    Social Needs     Financial resource strain: Not on file     Food insecurity:     Worry: Not on file     Inability: Not on file     Transportation needs:     Medical: Not on file     Non-medical: Not on file   Tobacco Use     Smoking status: Former Smoker     Packs/day: 2.50     Years: 20.00     Pack years: 50.00     Last attempt to quit: 1996     Years since quittin.2     Smokeless tobacco: Never  "Used   Substance and Sexual Activity     Alcohol use: No     Alcohol/week: 4.2 oz     Types: 7 Standard drinks or equivalent per week     Comment: heavy use (750ml/2 days) up until 1 year ago; had cut down to 1 drink/day; none since 7/18/16     Drug use: No     Sexual activity: Not on file   Lifestyle     Physical activity:     Days per week: Not on file     Minutes per session: Not on file     Stress: Not on file   Relationships     Social connections:     Talks on phone: Not on file     Gets together: Not on file     Attends Shinto service: Not on file     Active member of club or organization: Not on file     Attends meetings of clubs or organizations: Not on file     Relationship status: Not on file     Intimate partner violence:     Fear of current or ex partner: Not on file     Emotionally abused: Not on file     Physically abused: Not on file     Forced sexual activity: Not on file   Other Topics Concern     Parent/sibling w/ CABG, MI or angioplasty before 65F 55M? 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     Physical Exam:  /82   Pulse 97   Ht 1.676 m (5' 6\")   Wt 105.9 kg (233 lb 6.4 oz)   SpO2 100%   BMI 37.67 kg/m     GENERAL APPEARANCE: alert and no distress   EYES: no scleral icterus, pupils equal   HENT: NC/AT,  mouth  without ulcers or lesions   Pulmonary: normal work of breathing, no clubbing   CV: WWP and no edema   SKIN: no rash, warm, dry, no cyanosis   NEURO: mentation intact and speech normal     Laboratory:  CMP  Recent Labs   Lab Test 03/20/19  1517 01/31/19  0718 11/29/18  0717 11/02/18  1151 11/01/18  0754 08/21/18  0739 06/22/18  0723 04/20/18  0739 04/20/18  0731 04/04/18  0823 03/21/18  1540 02/21/18  0728    135 139  --  138 138 139  --  140 137 135 140   POTASSIUM 4.0 3.7 3.8  --  3.7 3.8 3.7  --  3.8 4.1 4.7 3.7   CHLORIDE 99 103 105  --  103 103 103  --  107 105 100 103 "   CO2 28 24 25  --  25 26 27  --  28 24 26 30   ANIONGAP 8 8 8  --  10 9 9  --  5 8 9 7   * 98 116*  --  108* 101* 124*  --  101* 125* 121* 87   BUN 23 20 21  --  19 17 22  --  18 21 35* 23   CR 1.25* 1.30* 1.22* 1.17* 1.18* 1.41* 1.32*  --  1.19* 1.17* 1.21* 1.30*   GFRESTIMATED 46* 44* 45* 47* 46* 38* 41*  --  46* 47* 45* 42*   GFRESTBLACK 53* 51* 54* 57* 56* 46* 49*  --  56* 57* 55* 50*   ERIC 9.6 9.4 8.4* 8.6 8.9 8.9 8.8  --  8.7 8.8 8.9 9.1   MAG  --   --   --   --   --   --  1.7  --  1.9 2.2  --  1.8   PHOS 3.7  --   --   --   --  3.6  --  3.1  --   --  4.2  --    PROTTOTAL  --  7.4 7.0  --  7.0 6.8 7.1  --  7.0 7.4  --  7.1   ALBUMIN 3.9 3.8 3.6  --  3.7 3.8 3.7  --  3.6 3.9 3.7 3.7   BILITOTAL  --  0.9 0.4  --  0.4 0.8 0.5  --  0.7 0.5  --  0.7   ALKPHOS  --  44 56  --  55 38* 51  --  48 49  --  51   AST  --  15 13  --  14 16 11  --  12 15  --  14   ALT  --  20 20  --  22 19 20  --  14 16  --  16     CBC  Recent Labs   Lab Test 03/20/19  1517 01/31/19  0718 11/29/18  0717 11/01/18  0754 08/21/18  0739   HGB 13.9 14.2 12.9 13.2 13.5   WBC  --  8.9 6.6 7.6 7.5   RBC  --  4.63 4.18 4.23 4.21   HCT  --  43.0 38.0 39.5 40.2   MCV  --  93 91 93 96   MCH  --  30.7 30.9 31.2 32.1   MCHC  --  33.0 33.9 33.4 33.6   RDW  --  13.6 13.7 13.9 12.9   PLT  --  317 276 283 268     INR  Recent Labs   Lab Test 03/16/17  1728 09/13/16  1055 08/30/16  0740 08/29/16  0635  08/25/16  1230 08/25/16  1055 08/25/16  0850 08/25/16  0756   INR 1.15* 1.08 1.01 1.03   < > 1.76* 1.76* 1.87* 1.73*   PTT  --   --   --   --   --  48* 59* 58* 71*    < > = values in this interval not displayed.     ABG  Recent Labs   Lab Test 10/20/16  1216 10/19/16  0821 08/26/16  0510 08/26/16  0004 08/25/16  2000 08/25/16  1230 08/25/16  1055   PH  --   --   --  7.48* 7.51* 7.45 7.45   PCO2  --   --   --  38 35 44 43   PO2  --   --   --  77* 58* 164* 103   HCO3  --   --   --  28 28 30* 29*   O2PER 21.0 21 4L 4L 4L 100.0  100.0 0.58      URINE  STUDIES  Recent Labs   Lab Test 11/29/18  0729 03/01/18  1220 12/20/17  1450 12/31/16  1030   COLOR Yellow Yellow Yellow Yellow   APPEARANCE Clear Clear Clear Clear   URINEGLC Negative Negative Negative Negative   URINEBILI Negative Negative Negative Negative   URINEKETONE Negative Negative Negative Negative   SG 1.015 1.014 1.014 1.005   UBLD Negative Negative Negative Negative   URINEPH 6.0 6.0 7.0 6.5   PROTEIN Negative Negative Negative Negative   NITRITE Negative Negative Negative Negative   LEUKEST Negative Negative Negative Negative   RBCU <1 1 2 <1   WBCU <1 1 6* 4*     Recent Labs   Lab Test 03/20/19  1525 11/29/18  0729 03/21/18  1550 12/20/17  1450 09/20/17  1214 06/05/17  1214 03/13/17  1550 12/12/16  1502 10/10/16  1457   UTPG Unable to calculate due to low value 0.11 0.16 0.12 0.13 0.39* 0.13 0.20 0.39*     PTH  Recent Labs   Lab Test 08/21/18  0739 04/20/18  0739 09/08/17  0723 04/05/17  1236 11/16/16  0640 10/29/16  1807 10/27/16  0705 10/10/16  1456   PTHI 66 90* 117* 53 20 <3* Quantity not sufficient  ED GLOH NOTIFIED ON [facility]TR[facility],10/29/2016,1650,MJ   3*     IRON STUDIES   Recent Labs   Lab Test 08/21/18  0739 04/04/18  0823 03/21/18  1540 06/05/17  1211 04/05/17  1236 03/22/17  1607 09/06/16  0638 08/06/16  0757   IRON  --   --   --  59 56 62 18* 25*   FEB  --   --   --  248 179* 197* 118* 92*   IRONSAT  --   --   --  24 31 32 15 27   WILL 84 57 50 138 278* 320* 480* 528*     Imaging:       Scribe Disclosure:   I, Michaelle Man, am serving as a scribe to document services personally performed by Daya Gutierrez MD at this visit, based upon the provider's statements to me. All documentation has been reviewed by the aforementioned provider prior to being entered into the official medical record.     Portions of this medical record were completed by a scribe. UPON MY REVIEW AND AUTHENTICATION BY ELECTRONIC SIGNATURE, this confirms (a) I performed the applicable clinical services, and (b) the record  is accurate.

## 2019-03-21 LAB — DEPRECATED CALCIDIOL+CALCIFEROL SERPL-MC: 37 UG/L (ref 20–75)

## 2019-03-27 ENCOUNTER — OFFICE VISIT (OUTPATIENT)
Dept: PSYCHIATRY | Facility: CLINIC | Age: 64
End: 2019-03-27
Attending: NURSE PRACTITIONER
Payer: COMMERCIAL

## 2019-03-27 ENCOUNTER — TELEPHONE (OUTPATIENT)
Dept: TRANSPLANT | Facility: CLINIC | Age: 64
End: 2019-03-27

## 2019-03-27 VITALS
DIASTOLIC BLOOD PRESSURE: 83 MMHG | WEIGHT: 234.2 LBS | HEART RATE: 73 BPM | SYSTOLIC BLOOD PRESSURE: 136 MMHG | BODY MASS INDEX: 37.8 KG/M2

## 2019-03-27 DIAGNOSIS — Z94.4 LIVER REPLACED BY TRANSPLANT (H): ICD-10-CM

## 2019-03-27 DIAGNOSIS — G62.9 PERIPHERAL POLYNEUROPATHY: ICD-10-CM

## 2019-03-27 LAB
ALBUMIN SERPL-MCNC: 3.8 G/DL (ref 3.4–5)
ALP SERPL-CCNC: 49 U/L (ref 40–150)
ALT SERPL W P-5'-P-CCNC: 20 U/L (ref 0–50)
ANION GAP SERPL CALCULATED.3IONS-SCNC: 7 MMOL/L (ref 3–14)
AST SERPL W P-5'-P-CCNC: 17 U/L (ref 0–45)
BILIRUB DIRECT SERPL-MCNC: 0.2 MG/DL (ref 0–0.2)
BILIRUB SERPL-MCNC: 0.9 MG/DL (ref 0.2–1.3)
BUN SERPL-MCNC: 24 MG/DL (ref 7–30)
CALCIUM SERPL-MCNC: 9.7 MG/DL (ref 8.5–10.1)
CHLORIDE SERPL-SCNC: 104 MMOL/L (ref 94–109)
CO2 SERPL-SCNC: 27 MMOL/L (ref 20–32)
CREAT SERPL-MCNC: 1.45 MG/DL (ref 0.52–1.04)
ERYTHROCYTE [DISTWIDTH] IN BLOOD BY AUTOMATED COUNT: 13.9 % (ref 10–15)
GFR SERPL CREATININE-BSD FRML MDRD: 38 ML/MIN/{1.73_M2}
GLUCOSE SERPL-MCNC: 114 MG/DL (ref 70–99)
HCT VFR BLD AUTO: 43.2 % (ref 35–47)
HGB BLD-MCNC: 14.5 G/DL (ref 11.7–15.7)
MCH RBC QN AUTO: 31.3 PG (ref 26.5–33)
MCHC RBC AUTO-ENTMCNC: 33.6 G/DL (ref 31.5–36.5)
MCV RBC AUTO: 93 FL (ref 78–100)
PLATELET # BLD AUTO: 313 10E9/L (ref 150–450)
POTASSIUM SERPL-SCNC: 3.9 MMOL/L (ref 3.4–5.3)
PROT SERPL-MCNC: 7.4 G/DL (ref 6.8–8.8)
RBC # BLD AUTO: 4.64 10E12/L (ref 3.8–5.2)
SODIUM SERPL-SCNC: 138 MMOL/L (ref 133–144)
TACROLIMUS BLD-MCNC: 5.6 UG/L (ref 5–15)
TME LAST DOSE: 1930 H
WBC # BLD AUTO: 12.9 10E9/L (ref 4–11)

## 2019-03-27 PROCEDURE — 80076 HEPATIC FUNCTION PANEL: CPT | Performed by: INTERNAL MEDICINE

## 2019-03-27 PROCEDURE — 85027 COMPLETE CBC AUTOMATED: CPT | Performed by: INTERNAL MEDICINE

## 2019-03-27 PROCEDURE — G0463 HOSPITAL OUTPT CLINIC VISIT: HCPCS | Mod: ZF

## 2019-03-27 PROCEDURE — 36415 COLL VENOUS BLD VENIPUNCTURE: CPT | Performed by: INTERNAL MEDICINE

## 2019-03-27 PROCEDURE — 80197 ASSAY OF TACROLIMUS: CPT | Performed by: INTERNAL MEDICINE

## 2019-03-27 PROCEDURE — 80048 BASIC METABOLIC PNL TOTAL CA: CPT | Performed by: INTERNAL MEDICINE

## 2019-03-27 RX ORDER — GABAPENTIN 100 MG/1
200 CAPSULE ORAL AT BEDTIME
Qty: 60 CAPSULE | Refills: 2 | Status: SHIPPED | OUTPATIENT
Start: 2019-03-27 | End: 2019-07-02

## 2019-03-27 ASSESSMENT — PAIN SCALES - GENERAL: PAINLEVEL: NO PAIN (0)

## 2019-03-27 NOTE — TELEPHONE ENCOUNTER
Pt returned call and reported that she does not a respiratory illness. Pt does not have any fevers, urinary frequency or urgency. Patient will stay hydrated.

## 2019-03-27 NOTE — PROGRESS NOTES
"  Psychiatry Clinic Progress Note                                                                   Phan Luque is a 63 year old female who returns to the clinic for continued care.   Therapist: Germania Bethea @ [facility] and [facility].  PCP: Santosh Salgado  Other Providers: Hussein Banegas MD    Pertinent Background:  Liver Transplant on 9/25/16 and Stage 3 kidney disease.  Psych critical item history includes SUBSTANCE USE: alcohol.     Interim History                                                                                                             4, 4     The patient is a good historian, reports good treatment adherence and was last seen 9/26/18. Since the last visit, Phan reports she is \"hanging in there.\"  Phan reports her sleep has worsened since stopping Trazodone in December.  She does report her pain has improved since stopping Trazodone and is no longer experiencing the morning \"hangover.\"  Will start/restart Gabapentin for sleep.  Duloxetine stopped due to side effects and Venlafaxine ordered but she did not start due to fear it would have same adverse effects as duloxetine.  Recommended she try as it appears her depression may be worsening and if experiences concerning side effects, she should call clinic.  She will continue to consider.    9/26/18: Phan reports the following changes to medications:  Started Gabapentin 100mg TID which was started by orthopedics, prednisone was decreased to 5mg, and folic acid was stopped.  No significant psychiatric symptoms reported.  She does report increase restlessness at night but does not believe it is anxiety.  Restless legs have also gotten worse which has further impeded sleep.  Iron supplementation started to help with RLS.      6/26/18: Phan reports her mental health continues to be well managed in spite of forgetting to load Buspar into her weekly med reminder.  She has not noticed any increase in anxiety.  Buspar will not be restarted.  Phan " continues to not have taken the Propranolol as it is unneeded. Sleep quality remains unchanged but she is unconcerned.  Sleep may be impacted by prednisone.  Continues to take Melatonin 5mg and Trazodone 150mg to help with sleep.  Phan reports the two year anniversary of her liver transplant is approaching with no concerns from providers.      3/30/18:  Phan tried decreased Trazodone and the change significantly impacted her sleep.  She has resumed taking Trazodone 150mg qHS. Phan also recently saw nephrologist who prescribed Iron supplementation to possibly help manage restless leg syndrome.       Phan has not taken propranolol as she feels her anxiety is well managed.  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  Discussed longer duration between appointments due to symptoms being well managed.        Recent Symptoms:   Depression:  depressed mood, anhedonia, low energy, insomnia and appetite changes  Elevated:  none  Psychosis:  none  Anxiety:  anxiety intensifies when driving  Panic Attack:  none  Trauma Related:  none     Recent Substance Use:  none reported        Social/ Family History                                  [per patient report]                                     1ea, 1ea   CHILDREN- 3 adult children       TRAUMA HISTORY (self-report)- None  FEELS SAFE AT HOME- Yes    Medical / Surgical History                                                                                                                  Patient Active Problem List   Diagnosis     Decompensation of cirrhosis of liver (H)     Liver transplanted (H)     Alcoholic hepatitis     Immunosuppression (H)     Alcoholic hepatitis without ascites     Enterococcus UTI     Liver transplant status (H)     Nausea & vomiting     Malnutrition (H)     Candidiasis of skin     Anemia     ACP (advance care planning)     Diarrhea     Hypothyroidism     Esophageal reflux     Recurrent major depression in partial  remission (H)     Insomnia     CKD (chronic kidney disease) stage 3, GFR 30-59 ml/min (H)     Anemia in stage 3 chronic kidney disease (H)     Osteopenia     Alcohol abuse, uncomplicated       Past Surgical History:   Procedure Laterality Date     APPENDECTOMY           BENCH LIVER N/A 2016    Procedure: BENCH LIVER;  Surgeon: Larry Dhillon MD;  Location: [facility] OR     CATARACT IOL, RT/LT       COLONOSCOPY       ESOPHAGOSCOPY, GASTROSCOPY, DUODENOSCOPY (EGD), COMBINED N/A 2016    Procedure: COMBINED ESOPHAGOSCOPY, GASTROSCOPY, DUODENOSCOPY (EGD);  Surgeon: Tao Alfonso MD;  Location: [facility] GI     ESOPHAGOSCOPY, GASTROSCOPY, DUODENOSCOPY (EGD), COMBINED N/A 10/31/2016    Procedure: COMBINED ESOPHAGOSCOPY, GASTROSCOPY, DUODENOSCOPY (EGD), BIOPSY SINGLE OR MULTIPLE;  Surgeon: Ronald Bojorquez MD;  Location: [facility] GI     sphincteroplasty       TRANSPLANT LIVER RECIPIENT  DONOR N/A 2016    Procedure: TRANSPLANT LIVER RECIPIENT  DONOR;  Surgeon: Larry Dhillon MD;  Location: [facility] OR     TUBAL LIGATION      laparoscopic      Medical Review of Systems                                                                                                        2,10   A comprehensive review of systems was performed and is negative other than noted in the HPI.  Pregnant- No    Breastfeeding- No    Contraception- No  Allergy                                Codeine; Benadryl [diphenhydramine]; Cefaclor; Hydrocodone-acetaminophen; Oxycodone; Penicillins; and Sulfa drugs  Current Medications                                                                                                       Current Outpatient Medications   Medication Sig Dispense Refill     acetaminophen (TYLENOL) 325 MG tablet Take 2 tablets (650 mg) by mouth every 6 hours as needed for mild pain Or fevers. Use sparingly. Limit use to no more than 2 grams (2000 mg) in 24 hours. **Further refills must be obtained by primary  care provider** 100 tablet 0     amLODIPine (NORVASC) 5 MG tablet Take 1 tablet (5 mg) by mouth daily 90 tablet 3     azaTHIOprine (IMURAN) 50 MG tablet Take 1 tablet (50 mg) by mouth every morning 90 tablet 3     betaxolol (BETOPTIC) 0.5 % ophthalmic solution Place 1 drop into both eyes 2 times daily 5 mL 2     calcium Citrate-vitamin D 500-400 MG-UNIT CHEW Take 1 tablet by mouth daily       chlorthalidone (HYGROTON) 25 MG tablet Take 0.5 tablets (12.5 mg) by mouth daily 45 tablet 3     cholecalciferol (VITAMIN D3) 400 UNIT TABS tablet Take 400 Units by mouth daily       clobetasol (TEMOVATE) 0.05 % external ointment Apply topically 2 times daily To areas of eczema on the lower legs, up to 2 weeks. 60 g 1     cyanocobalamin (VITAMIN  B-12) 1000 MCG tablet Take 1,000 mcg by mouth daily       diclofenac (VOLTAREN) 1 % GEL topical gel Apply 2 g topically 4 times daily as needed for moderate pain       ferrous sulfate (IRON) 325 (65 FE) MG tablet Take 1 tablet (325 mg) by mouth 2 times daily 100 tablet      folic acid (FOLVITE) 1 MG tablet Take 1 tablet (1 mg) by mouth daily 30 tablet 1     gabapentin (NEURONTIN) 100 MG capsule Take 1 capsule (100 mg) by mouth 3 times daily 90 capsule 1     hydrOXYzine (ATARAX) 25 MG tablet TAKE 1 TO 2 TABLETS BY MOUTH EVERY 6 HOURS AS NEEDED FOR ITCHING 120 tablet 5     levothyroxine (SYNTHROID/LEVOTHROID) 88 MCG tablet Take 1 tablet (88 mcg) by mouth every morning (before breakfast) 90 tablet 2     MAGNESIUM OXIDE PO Take 400 mg by mouth daily       melatonin 5 MG tablet Take 1 tablet (5 mg) by mouth nightly as needed for sleep       multivitamin, therapeutic (THERA-VIT) TABS tablet Take 1 tablet by mouth every 24 hours 30 tablet 11     Omega-3 Fatty Acids (OMEGA-3 FISH OIL EX ST PO) Take 1 capsule by mouth 2 times daily 1290 (fish oil)-900 (omega 3)       order for DME Equipment being ordered: knee sleeve 1 Device 0     pantoprazole (PROTONIX) 40 MG EC tablet TAKE ONE TABLET (40MG)  "BY MOUTH EVERY MORNING BEFORE BREAKFAST 90 tablet 4     predniSONE (DELTASONE) 1 MG tablet Take 4 tablets (4 mg) by mouth daily 360 tablet 3     predniSONE (DELTASONE) 5 MG tablet Take 1 tablet (5 mg) by mouth daily 90 tablet 3     psyllium 0.52 G capsule Take 2 capsules (1.04 g) by mouth daily With a full glass of water. 60 capsule 1     tacrolimus (GENERIC EQUIVALENT) 0.5 MG capsule Take 4 capsules (2 mg) by mouth every 12 hours 240 capsule 11     traMADol (ULTRAM) 50 MG tablet Take 1 tablet (50 mg) by mouth every 6 hours as needed for severe pain 20 tablet 0     ursodiol (ACTIGALL) 300 MG capsule Take 1 capsule (300 mg) by mouth every 12 hours 60 capsule 11     venlafaxine (EFFEXOR XR) 37.5 MG 24 hr capsule Take 1 capsule (37.5 mg) by mouth daily If tolerating may increase to 2 caps after 2 weeks 60 capsule 2     pramipexole (MIRAPEX) 0.5 MG tablet Take 1 tablet (0.5 mg) by mouth At Bedtime (Patient not taking: Reported on 3/27/2019) 90 tablet 3     traZODone (DESYREL) 100 MG tablet Take 1.5 tablets (150 mg) by mouth At Bedtime (Patient not taking: Reported on 3/27/2019) 135 tablet 1     Vitals                                                                                                                            3, 3   /83   Pulse 73   Wt 106.2 kg (234 lb 3.2 oz)   BMI 37.80 kg/m     Mental Status Exam                                                                                        9, 14 cog gs     Alertness: alert  and oriented  Appearance: casually groomed  Behavior/Demeanor: cooperative, pleasant and calm, with good  eye contact   Speech: normal  Language: good  Psychomotor: normal or unremarkable  Mood: \"ok\"  Affect: appropriate; was congruent to mood; was congruent to content  Thought Process/Associations: unremarkable  Thought Content:  Reports none;  Denies suicidal ideation and violent ideation  Perception:  Reports none;  Denies auditory hallucinations and visual " hallucinations  Insight: good  Judgment: good  Cognition: (6) does  appear grossly intact; formal cognitive testing was not done  Gait/Station and/or Muscle Strength/Tone: unremarkable    Labs and Data                                                                                                                 Rating Scales:  PHQ9    PHQ9 Today:  10  PHQ-9 SCORE 3/30/2018 6/26/2018 9/26/2018   PHQ-9 Total Score Alexiahart - - -   PHQ-9 Total Score 5 3 7         Diagnosis and Assessment                                                                                  m2, h3     Today the following issues were addressed:    1) Major Depressive Disorder, recurrent, mild  2) Generalized Anxiety Disorder    [place] Prescription Monitoring Program [] was not checked today:  will be checked next visit.         Plan                                                                                                                         m2, h3      1) Medication Management  Discontinue Trazodone 150mg as Suemay stopped in December    Start Gabapentin 200mg at bedtime for sleep.     Duloxetine started in October 2018.  Stopped due to confusion.    Effexor ordered but Suemay did not start due to fear of side effects    RTC: 3 months.  Consider transfer of care to PCP     CRISIS NUMBERS:   Provided routinely in AVS.    Treatment Risk Statement:  The patient understands the risks, benefits, adverse effects and alternatives. Agrees to treatment with the capacity to do so. No medical contraindications to treatment. Agrees to call clinic for any problems. The patient understands to call 911 or go to the nearest ED if life threatening or urgent symptoms occur.      PROVIDER:  DONOVAN Hanna CNP

## 2019-03-27 NOTE — NURSING NOTE
Chief Complaint   Patient presents with     Recheck Medication     Alcohol abuse, uncomplicated

## 2019-04-01 ENCOUNTER — OFFICE VISIT (OUTPATIENT)
Dept: GASTROENTEROLOGY | Facility: CLINIC | Age: 64
End: 2019-04-01
Attending: INTERNAL MEDICINE
Payer: COMMERCIAL

## 2019-04-01 ENCOUNTER — RECORDS - HEALTHEAST (OUTPATIENT)
Dept: ADMINISTRATIVE | Facility: OTHER | Age: 64
End: 2019-04-01

## 2019-04-01 VITALS
SYSTOLIC BLOOD PRESSURE: 116 MMHG | OXYGEN SATURATION: 95 % | BODY MASS INDEX: 36.73 KG/M2 | HEART RATE: 92 BPM | DIASTOLIC BLOOD PRESSURE: 64 MMHG | WEIGHT: 234 LBS | TEMPERATURE: 98 F | HEIGHT: 67 IN

## 2019-04-01 DIAGNOSIS — Z94.4 LIVER REPLACED BY TRANSPLANT (H): Primary | ICD-10-CM

## 2019-04-01 PROCEDURE — G0463 HOSPITAL OUTPT CLINIC VISIT: HCPCS | Mod: ZF

## 2019-04-01 ASSESSMENT — MIFFLIN-ST. JEOR: SCORE: 1654.05

## 2019-04-01 ASSESSMENT — PAIN SCALES - GENERAL: PAINLEVEL: NO PAIN (0)

## 2019-04-01 NOTE — PROGRESS NOTES
I had the pleasure of seeing Megan Luque for followup in the Liver Transplantation Clinic at the [facility] on 04/01/2019.  Ms. Luque returns for followup now 2-1/2 years status post liver transplantation for alcoholic hepatitis.      She is doing well at this visit.  She is complaining still of some joint aches and joint pains, particularly in her neck and shoulders.  This has really been her major problem and required her to go back on prednisone.      She denies any abdominal pain.  She does have some itching.  She does complain of a moderate amount of fatigue.  She denies any increased abdominal girth or lower extremity edema.  She has not had any cough or shortness of breath.  She denies any nausea, vomiting, diarrhea or constipation.  Her appetite is good, in fact it has been too good, and her weight is up.     Current Outpatient Medications   Medication     acetaminophen (TYLENOL) 325 MG tablet     amLODIPine (NORVASC) 5 MG tablet     azaTHIOprine (IMURAN) 50 MG tablet     betaxolol (BETOPTIC) 0.5 % ophthalmic solution     calcium Citrate-vitamin D 500-400 MG-UNIT CHEW     chlorthalidone (HYGROTON) 25 MG tablet     cholecalciferol (VITAMIN D3) 400 UNIT TABS tablet     clobetasol (TEMOVATE) 0.05 % external ointment     cyanocobalamin (VITAMIN  B-12) 1000 MCG tablet     diclofenac (VOLTAREN) 1 % GEL topical gel     ferrous sulfate (IRON) 325 (65 FE) MG tablet     folic acid (FOLVITE) 1 MG tablet     gabapentin (NEURONTIN) 100 MG capsule     hydrOXYzine (ATARAX) 25 MG tablet     levothyroxine (SYNTHROID/LEVOTHROID) 88 MCG tablet     MAGNESIUM OXIDE PO     melatonin 5 MG tablet     multivitamin, therapeutic (THERA-VIT) TABS tablet     Omega-3 Fatty Acids (OMEGA-3 FISH OIL EX ST PO)     order for DME     pantoprazole (PROTONIX) 40 MG EC tablet     predniSONE (DELTASONE) 1 MG tablet     predniSONE (DELTASONE) 5 MG tablet     psyllium 0.52 G capsule     tacrolimus (GENERIC  "EQUIVALENT) 0.5 MG capsule     traMADol (ULTRAM) 50 MG tablet     ursodiol (ACTIGALL) 300 MG capsule     venlafaxine (EFFEXOR XR) 37.5 MG 24 hr capsule     pramipexole (MIRAPEX) 0.5 MG tablet     traZODone (DESYREL) 100 MG tablet     No current facility-administered medications for this visit.      /64   Pulse 92   Temp 98  F (36.7  C) (Oral)   Ht 1.702 m (5' 7\")   Wt 106.1 kg (234 lb)   SpO2 95%   BMI 36.65 kg/m      HEENT exam shows no scleral icterus or temporal muscle wasting.  Her chest is clear.  Her abdominal exam shows no increase in girth.  No masses or tenderness to palpation are present.  Her liver is 10 cm in span without left lobe enlargement.  No spleen tip is palpable.  Extremity exam shows no edema.  Skin exam shows no stigmata of chronic liver disease.  Neurologic exam shows no asterixis.     Recent Results (from the past 168 hour(s))   Tacrolimus level    Collection Time: 03/27/19  7:23 AM   Result Value Ref Range    Tacrolimus Last Dose 1,930     Tacrolimus Level 5.6 5.0 - 15.0 ug/L   Hepatic panel    Collection Time: 03/27/19  7:23 AM   Result Value Ref Range    Bilirubin Direct 0.2 0.0 - 0.2 mg/dL    Bilirubin Total 0.9 0.2 - 1.3 mg/dL    Albumin 3.8 3.4 - 5.0 g/dL    Protein Total 7.4 6.8 - 8.8 g/dL    Alkaline Phosphatase 49 40 - 150 U/L    ALT 20 0 - 50 U/L    AST 17 0 - 45 U/L   Basic metabolic panel    Collection Time: 03/27/19  7:23 AM   Result Value Ref Range    Sodium 138 133 - 144 mmol/L    Potassium 3.9 3.4 - 5.3 mmol/L    Chloride 104 94 - 109 mmol/L    Carbon Dioxide 27 20 - 32 mmol/L    Anion Gap 7 3 - 14 mmol/L    Glucose 114 (H) 70 - 99 mg/dL    Urea Nitrogen 24 7 - 30 mg/dL    Creatinine 1.45 (H) 0.52 - 1.04 mg/dL    GFR Estimate 38 (L) >60 mL/min/[1.73_m2]    GFR Estimate If Black 44 (L) >60 mL/min/[1.73_m2]    Calcium 9.7 8.5 - 10.1 mg/dL   CBC with platelets    Collection Time: 03/27/19  7:23 AM   Result Value Ref Range    WBC 12.9 (H) 4.0 - 11.0 10e9/L    RBC " Count 4.64 3.8 - 5.2 10e12/L    Hemoglobin 14.5 11.7 - 15.7 g/dL    Hematocrit 43.2 35.0 - 47.0 %    MCV 93 78 - 100 fl    MCH 31.3 26.5 - 33.0 pg    MCHC 33.6 31.5 - 36.5 g/dL    RDW 13.9 10.0 - 15.0 %    Platelet Count 313 150 - 450 10e9/L      My impression is that Ms. Luque is 2-1/2 years status post liver transplantation for alcoholic hepatitis.  She continues to abstain from alcohol and I congratulated on her sobriety.  She does have joint aches and joint pains as well as some undefined of neuropathy which is what really compromises her quality of life.  The neuropathy seems to be responding fairly well to the gabapentin, particularly in terms of ability to sleep at night.  She is otherwise up-to-date with regard to cancer screening and vaccines, and my plan will be to see her back in the clinic again in 6 months.      Thank you very much for allowing me to participate in the care of this patient.  If you have any questions regarding my recommendations, please do not hesitate to contact me.        Hussein Banegas MD      Professor of Medicine  [employer]      Executive Medical Director, Solid Organ Transplant Program  [facility]

## 2019-04-01 NOTE — LETTER
4/1/2019       RE: Phan Luque  [address] [facility] [address]     Dear Colleague,    Thank you for referring your patient, Phan Luque, to the [facility] HEPATOLOGY at [facility]. Please see a copy of my visit note below.    I had the pleasure of seeing Phan Luque for followup in the Liver Transplantation Clinic at the [facility] on 04/01/2019.  Ms. Luque returns for followup now 2-1/2 years status post liver transplantation for alcoholic hepatitis.      She is doing well at this visit.  She is complaining still of some joint aches and joint pains, particularly in her neck and shoulders.  This has really been her major problem and required her to go back on prednisone.      She denies any abdominal pain.  She does have some itching.  She does complain of a moderate amount of fatigue.  She denies any increased abdominal girth or lower extremity edema.  She has not had any cough or shortness of breath.  She denies any nausea, vomiting, diarrhea or constipation.  Her appetite is good, in fact it has been too good, and her weight is up.     Current Outpatient Medications   Medication     acetaminophen (TYLENOL) 325 MG tablet     amLODIPine (NORVASC) 5 MG tablet     azaTHIOprine (IMURAN) 50 MG tablet     betaxolol (BETOPTIC) 0.5 % ophthalmic solution     calcium Citrate-vitamin D 500-400 MG-UNIT CHEW     chlorthalidone (HYGROTON) 25 MG tablet     cholecalciferol (VITAMIN D3) 400 UNIT TABS tablet     clobetasol (TEMOVATE) 0.05 % external ointment     cyanocobalamin (VITAMIN  B-12) 1000 MCG tablet     diclofenac (VOLTAREN) 1 % GEL topical gel     ferrous sulfate (IRON) 325 (65 FE) MG tablet     folic acid (FOLVITE) 1 MG tablet     gabapentin (NEURONTIN) 100 MG capsule     hydrOXYzine (ATARAX) 25 MG tablet     levothyroxine (SYNTHROID/LEVOTHROID) 88 MCG tablet     MAGNESIUM OXIDE PO     melatonin 5 MG tablet     multivitamin, therapeutic  "(THERA-VIT) TABS tablet     Omega-3 Fatty Acids (OMEGA-3 FISH OIL EX ST PO)     order for DME     pantoprazole (PROTONIX) 40 MG EC tablet     predniSONE (DELTASONE) 1 MG tablet     predniSONE (DELTASONE) 5 MG tablet     psyllium 0.52 G capsule     tacrolimus (GENERIC EQUIVALENT) 0.5 MG capsule     traMADol (ULTRAM) 50 MG tablet     ursodiol (ACTIGALL) 300 MG capsule     venlafaxine (EFFEXOR XR) 37.5 MG 24 hr capsule     pramipexole (MIRAPEX) 0.5 MG tablet     traZODone (DESYREL) 100 MG tablet     No current facility-administered medications for this visit.      /64   Pulse 92   Temp 98  F (36.7  C) (Oral)   Ht 1.702 m (5' 7\")   Wt 106.1 kg (234 lb)   SpO2 95%   BMI 36.65 kg/m       HEENT exam shows no scleral icterus or temporal muscle wasting.  Her chest is clear.  Her abdominal exam shows no increase in girth.  No masses or tenderness to palpation are present.  Her liver is 10 cm in span without left lobe enlargement.  No spleen tip is palpable.  Extremity exam shows no edema.  Skin exam shows no stigmata of chronic liver disease.  Neurologic exam shows no asterixis.     Recent Results (from the past 168 hour(s))   Tacrolimus level    Collection Time: 03/27/19  7:23 AM   Result Value Ref Range    Tacrolimus Last Dose 1,930     Tacrolimus Level 5.6 5.0 - 15.0 ug/L   Hepatic panel    Collection Time: 03/27/19  7:23 AM   Result Value Ref Range    Bilirubin Direct 0.2 0.0 - 0.2 mg/dL    Bilirubin Total 0.9 0.2 - 1.3 mg/dL    Albumin 3.8 3.4 - 5.0 g/dL    Protein Total 7.4 6.8 - 8.8 g/dL    Alkaline Phosphatase 49 40 - 150 U/L    ALT 20 0 - 50 U/L    AST 17 0 - 45 U/L   Basic metabolic panel    Collection Time: 03/27/19  7:23 AM   Result Value Ref Range    Sodium 138 133 - 144 mmol/L    Potassium 3.9 3.4 - 5.3 mmol/L    Chloride 104 94 - 109 mmol/L    Carbon Dioxide 27 20 - 32 mmol/L    Anion Gap 7 3 - 14 mmol/L    Glucose 114 (H) 70 - 99 mg/dL    Urea Nitrogen 24 7 - 30 mg/dL    Creatinine 1.45 (H) 0.52 - " 1.04 mg/dL    GFR Estimate 38 (L) >60 mL/min/[1.73_m2]    GFR Estimate If Black 44 (L) >60 mL/min/[1.73_m2]    Calcium 9.7 8.5 - 10.1 mg/dL   CBC with platelets    Collection Time: 03/27/19  7:23 AM   Result Value Ref Range    WBC 12.9 (H) 4.0 - 11.0 10e9/L    RBC Count 4.64 3.8 - 5.2 10e12/L    Hemoglobin 14.5 11.7 - 15.7 g/dL    Hematocrit 43.2 35.0 - 47.0 %    MCV 93 78 - 100 fl    MCH 31.3 26.5 - 33.0 pg    MCHC 33.6 31.5 - 36.5 g/dL    RDW 13.9 10.0 - 15.0 %    Platelet Count 313 150 - 450 10e9/L      My impression is that Ms. Luque is 2-1/2 years status post liver transplantation for alcoholic hepatitis.  She continues to abstain from alcohol and I congratulated on her sobriety.  She does have joint aches and joint pains as well as some undefined of neuropathy which is what really compromises her quality of life.  The neuropathy seems to be responding fairly well to the gabapentin, particularly in terms of ability to sleep at night.  She is otherwise up-to-date with regard to cancer screening and vaccines, and my plan will be to see her back in the clinic again in 6 months.      Thank you very much for allowing me to participate in the care of this patient.  If you have any questions regarding my recommendations, please do not hesitate to contact me.        Hussein Banegas MD      Professor of Medicine  [employer]      Executive Medical Director, Solid Organ Transplant Program  [facility]

## 2019-04-01 NOTE — NURSING NOTE
"Chief Complaint   Patient presents with     RECHECK     liver tx     /64   Pulse 92   Temp 98  F (36.7  C) (Oral)   Ht 1.702 m (5' 7\")   Wt 106.1 kg (234 lb)   SpO2 95%   BMI 36.65 kg/m    Jeanine Goodman MA    "

## 2019-04-14 ASSESSMENT — ENCOUNTER SYMPTOMS
STIFFNESS: 1
HOARSE VOICE: 1
MYALGIAS: 0
SMELL DISTURBANCE: 0
SINUS CONGESTION: 1
TROUBLE SWALLOWING: 0
BACK PAIN: 1
TASTE DISTURBANCE: 0
NECK MASS: 0
NECK PAIN: 1
MUSCLE CRAMPS: 0
SORE THROAT: 1
JOINT SWELLING: 0
ARTHRALGIAS: 1
SINUS PAIN: 1
MUSCLE WEAKNESS: 1

## 2019-04-15 ENCOUNTER — OFFICE VISIT (OUTPATIENT)
Dept: INTERNAL MEDICINE | Facility: CLINIC | Age: 64
End: 2019-04-15
Payer: COMMERCIAL

## 2019-04-15 VITALS
BODY MASS INDEX: 36.73 KG/M2 | WEIGHT: 234 LBS | HEIGHT: 67 IN | HEART RATE: 75 BPM | DIASTOLIC BLOOD PRESSURE: 83 MMHG | SYSTOLIC BLOOD PRESSURE: 134 MMHG | OXYGEN SATURATION: 95 %

## 2019-04-15 DIAGNOSIS — M85.80 OSTEOPENIA, UNSPECIFIED LOCATION: ICD-10-CM

## 2019-04-15 DIAGNOSIS — Z79.52 LONG TERM (CURRENT) USE OF SYSTEMIC STEROIDS: ICD-10-CM

## 2019-04-15 DIAGNOSIS — E03.9 HYPOTHYROIDISM, UNSPECIFIED TYPE: ICD-10-CM

## 2019-04-15 DIAGNOSIS — Z00.00 ROUTINE GENERAL MEDICAL EXAMINATION AT A HEALTH CARE FACILITY: Primary | ICD-10-CM

## 2019-04-15 DIAGNOSIS — Z12.31 ENCOUNTER FOR SCREENING MAMMOGRAM FOR BREAST CANCER: ICD-10-CM

## 2019-04-15 DIAGNOSIS — E66.812 CLASS 2 OBESITY IN ADULT, UNSPECIFIED BMI, UNSPECIFIED OBESITY TYPE, UNSPECIFIED WHETHER SERIOUS COMORBIDITY PRESENT: ICD-10-CM

## 2019-04-15 DIAGNOSIS — I10 BENIGN ESSENTIAL HYPERTENSION: ICD-10-CM

## 2019-04-15 DIAGNOSIS — D72.829 LEUKOCYTOSIS, UNSPECIFIED TYPE: ICD-10-CM

## 2019-04-15 DIAGNOSIS — N18.30 CKD (CHRONIC KIDNEY DISEASE) STAGE 3, GFR 30-59 ML/MIN (H): ICD-10-CM

## 2019-04-15 DIAGNOSIS — Z13.220 SCREENING FOR HYPERLIPIDEMIA: ICD-10-CM

## 2019-04-15 DIAGNOSIS — Z94.4 LIVER TRANSPLANTED (H): ICD-10-CM

## 2019-04-15 ASSESSMENT — PAIN SCALES - GENERAL: PAINLEVEL: MODERATE PAIN (5)

## 2019-04-15 ASSESSMENT — MIFFLIN-ST. JEOR: SCORE: 1654.17

## 2019-04-15 NOTE — NURSING NOTE
Chief Complaint   Patient presents with     Physical     Patient is here for her annual physical        COLE Carrillo at 8:03 AM on 4/15/2019.

## 2019-04-15 NOTE — PROGRESS NOTES
Sleep    URI    CBC    osteopenia  Tibial plateau fracture    dexa    Vit D, A1c lipid adelina d cbc    mammo    PAP, [place]lee [place]    #Routine Health Maintenence:  Immunizations (zoster, pneumovax, flu, Tdap, Hep A/B):        Most Recent Immunizations   Administered Date(s) Administered     HEPA 01/13/2009     Influenza Vaccine IM 3yrs+ 4 Valent IIV4 10/18/2017     Mantoux Tuberculin Skin Test 10/06/2016     Pneumo Conj 13-V (2010&after) 08/19/2016     TD (ADULT, 7+) 01/21/2004     TDAP Vaccine (Adacel) 08/18/2016      Lipids:          Recent Labs   Lab Test  12/20/17   1443  02/23/17   0850    08/06/16   0757   CHOL  147   --    --   Interfering substances, unable to perform test  LUPE RIOJAS NOTIFIED ON 6B,8/6/2016,0926,MJ      HDL  51   --    --   9*   LDL  49  108*   < >  13   TRIG  230*   --    --   Interfering substances, unable to perform test  LUPE RIOJAS NOTIFIED ON 6B,8/6/2016,0926,MJ       < > = values in this interval not displayed.      Lung Ca Screening (>30 pk age 55-79 or >20 py age 50-79 + RF): does not qualify, quit 1996  Colonoscopy (50-75 yrs): 6/18TA x 2                       - Diverticulosis in the left colon.   Dexa (>65W or 70M yrs): 4/17 had reclast, ordered  Mammogram (40-75 yrs): 12/17 [facility], normal  Pap (21-65 yrs): MARKO for PAP  Pelvic/Breast: up to date  Safety/Lifestyle: reviewed  Tob/EtOH: screen neg  Depression: reviewed  Advanced Directive: deferred

## 2019-04-15 NOTE — PROGRESS NOTES
"[facility]  Primary Care Center   Arlyn Sanchez MD  04/15/2019      Chief Complaint:   Physical       History of Present Illness:   Phan Luque is a 63 year old female with a history of liver transplant related to alcoholic hepatitis, anxiety, HTN, CKD, IBS who presents for a physical.    Left great toe pain: going on for months. The nail also does not seem to be growing.    Sore throat: She has had a sore throat for months as well. She thought this would improve with the weather warming but it has not. She endorses post nasal drip, her neck feels swollen bilaterally. Denies ear pain, sinus pain, and reflux. Her white count was elevated on 3/27 labs. She has saline nasal rinse but does not use it, so far she has not tried a nasal spray.     Pain: She is still on Prednisone. She previously stopped in November 2018 and restarted in December 2018 for pain. She is tapering her Prednisone and is now down to 6 mg.  Her pain improved after discontinuing Trazodone and Pramipexole.     Insomnia: stopped taking Trazodone and Pramipexole. She was feeling a \"hangover\" on Trazodone which stopped when she discontinued. Since stopping her neuropathy feels worse but she feels less overall pain. She did not try Effexor because she had some confusion on Cymbalta. She tried Gabapentin at 200mg and 300mg doses for sleep which did not help. Gabapentin does not help neuropathy. She has difficulty falling asleep and wakes to use the bathroom with difficulty falling back asleep. She is on Melatonin 5 mg, but does not know if it helps. She reports an allergy to Benadryl when she was young. She tried Ambien and does not want to go back on this.     Osteopenia: has had 2 Reclast infusions with a repeat DEXA planned for this year. She is tapering off Prednisone. In 2018 she fractured her tibial plateau during a fall.     RLS: she was on Pramipexole for RLS and discontinued. She has not had difficulty with restless legs without " medication.     Weight gain: she has tried losing weight with weight watchers and calorie counting. She was able to lose 15 pounds over 4 months before weight loss stopped. She is losing some weight again. She does not get exercise.     Derm concerns: Sees dermatology routinely due to history of transplant and family history of melanoma. There is a hyperpigmented area on her skin where she hit a ladder twice.    Bowel habits: Alternates between diarrhea and constipation, which is normal for her.     Review of Systems:   Pertinent items are noted in HPI or as in patient entered ROS below, remainder of complete ROS is negative.  Denies chest pain and shortness of breath   Answers for HPI/ROS submitted by the patient on 4/14/2019   General Symptoms: No  Skin Symptoms: No  HENT Symptoms: Yes  EYE SYMPTOMS: No  HEART SYMPTOMS: No  LUNG SYMPTOMS: No  INTESTINAL SYMPTOMS: No  URINARY SYMPTOMS: No  GYNECOLOGIC SYMPTOMS: No  BREAST SYMPTOMS: No  SKELETAL SYMPTOMS: Yes  BLOOD SYMPTOMS: No  NERVOUS SYSTEM SYMPTOMS: No  MENTAL HEALTH SYMPTOMS: No  Ear pain: No  Ear discharge: No  Hearing loss: No  Tinnitus: No  Nosebleeds: No  Congestion: Yes  Sinus pain: Yes  Trouble swallowing: No   Voice hoarseness: Yes  Mouth sores: No  Sore throat: Yes  Tooth pain: No  Gum tenderness: No  Bleeding gums: No  Change in taste: No  Change in sense of smell: No  Dry mouth: No  Hearing aid used: No  Neck lump: No  Back pain: Yes  Muscle aches: No  Neck pain: Yes  Swollen joints: No  Joint pain: Yes  Bone pain: No  Muscle cramps: No  Muscle weakness: Yes  Joint stiffness: Yes  Bone fracture: No    Active Medications:     Current Outpatient Medications:      acetaminophen (TYLENOL) 325 MG tablet, Take 2 tablets (650 mg) by mouth every 6 hours as needed for mild pain Or fevers. Use sparingly. Limit use to no more than 2 grams (2000 mg) in 24 hours. **Further refills must be obtained by primary care provider**, Disp: 100 tablet, Rfl: 0      amLODIPine (NORVASC) 5 MG tablet, Take 1 tablet (5 mg) by mouth daily, Disp: 90 tablet, Rfl: 3     azaTHIOprine (IMURAN) 50 MG tablet, Take 1 tablet (50 mg) by mouth every morning, Disp: 90 tablet, Rfl: 3     betaxolol (BETOPTIC) 0.5 % ophthalmic solution, Place 1 drop into both eyes 2 times daily, Disp: 5 mL, Rfl: 2     calcium Citrate-vitamin D 500-400 MG-UNIT CHEW, Take 1 tablet by mouth daily, Disp: , Rfl:      chlorthalidone (HYGROTON) 25 MG tablet, Take 0.5 tablets (12.5 mg) by mouth daily, Disp: 45 tablet, Rfl: 3     cholecalciferol (VITAMIN D3) 400 UNIT TABS tablet, Take 400 Units by mouth daily, Disp: , Rfl:      clobetasol (TEMOVATE) 0.05 % external ointment, Apply topically 2 times daily To areas of eczema on the lower legs, up to 2 weeks., Disp: 60 g, Rfl: 1     cyanocobalamin (VITAMIN  B-12) 1000 MCG tablet, Take 1,000 mcg by mouth daily, Disp: , Rfl:      diclofenac (VOLTAREN) 1 % GEL topical gel, Apply 2 g topically 4 times daily as needed for moderate pain, Disp: , Rfl:      ferrous sulfate (IRON) 325 (65 FE) MG tablet, Take 1 tablet (325 mg) by mouth 2 times daily, Disp: 100 tablet, Rfl:      folic acid (FOLVITE) 1 MG tablet, Take 1 tablet (1 mg) by mouth daily, Disp: 30 tablet, Rfl: 1     gabapentin (NEURONTIN) 100 MG capsule, Take 2 capsules (200 mg) by mouth At Bedtime, Disp: 60 capsule, Rfl: 2     hydrOXYzine (ATARAX) 25 MG tablet, TAKE 1 TO 2 TABLETS BY MOUTH EVERY 6 HOURS AS NEEDED FOR ITCHING, Disp: 120 tablet, Rfl: 5     levothyroxine (SYNTHROID/LEVOTHROID) 88 MCG tablet, Take 1 tablet (88 mcg) by mouth every morning (before breakfast), Disp: 90 tablet, Rfl: 2     MAGNESIUM OXIDE PO, Take 400 mg by mouth daily, Disp: , Rfl:      melatonin 5 MG tablet, Take 1 tablet (5 mg) by mouth nightly as needed for sleep, Disp: , Rfl:      multivitamin, therapeutic (THERA-VIT) TABS tablet, Take 1 tablet by mouth every 24 hours, Disp: 30 tablet, Rfl: 11     Omega-3 Fatty Acids (OMEGA-3 FISH OIL EX ST PO),  "Take 1 capsule by mouth 2 times daily 1290 (fish oil)-900 (omega 3), Disp: , Rfl:      order for DME, Equipment being ordered: knee sleeve, Disp: 1 Device, Rfl: 0     pantoprazole (PROTONIX) 40 MG EC tablet, TAKE ONE TABLET (40MG) BY MOUTH EVERY MORNING BEFORE BREAKFAST, Disp: 90 tablet, Rfl: 4     predniSONE (DELTASONE) 1 MG tablet, Take 4 tablets (4 mg) by mouth daily (Patient taking differently: Take 1 mg by mouth daily .), Disp: 360 tablet, Rfl: 3     predniSONE (DELTASONE) 5 MG tablet, Take 1 tablet (5 mg) by mouth daily, Disp: 90 tablet, Rfl: 3     psyllium 0.52 G capsule, Take 2 capsules (1.04 g) by mouth daily With a full glass of water., Disp: 60 capsule, Rfl: 1     tacrolimus (GENERIC EQUIVALENT) 0.5 MG capsule, Take 4 capsules (2 mg) by mouth every 12 hours, Disp: 240 capsule, Rfl: 11     traMADol (ULTRAM) 50 MG tablet, Take 1 tablet (50 mg) by mouth every 6 hours as needed for severe pain, Disp: 20 tablet, Rfl: 0     ursodiol (ACTIGALL) 300 MG capsule, Take 1 capsule (300 mg) by mouth every 12 hours, Disp: 60 capsule, Rfl: 11      Allergies:   Codeine; Benadryl [diphenhydramine]; Cefaclor; Hydrocodone-acetaminophen; Oxycodone; Penicillins; and Sulfa drugs      Past Medical History:  Asthma  Chronic kidney disease stage 3  Ends stage liver disease  Alcohol abuse  Migraines  Osteoporosis  Cervical spinal stenosis  Liver transplant  Anemia  Hypothyroidism  Reflux  Depression     Past Surgical History:  Appendectomy  Bench liver  Colonoscopy  Cataract  EGD x2  Transplant liver recipient  Sphincteroplasty   Tubal ligation    Family History:   Mother: melanoma  Father: congestive heart failure     Social History:     Former 2.5 PPD smoker for 20 years ,quit 1996  Former heavy alcohol user, reduced, reports discontinuing in 2016    Physical Exam:   /83 (BP Location: Right arm, Patient Position: Sitting, Cuff Size: Adult Large)   Pulse 75   Ht 1.702 m (5' 7.01\")   Wt 106.1 kg (234 lb)   LMP  " (LMP Unknown)   SpO2 95%   Breastfeeding? No   BMI 36.64 kg/m     Constitutional: Alert, oriented, pleasant, no acute distress. Obese.  Head: Normocephalic, atraumatic  Eyes: Extra-ocular movements intact, no scleral icterus  ENT: Oropharynx clear, moist mucus membranes, poor dentition  Neck: Supple, no lymphadenopathy, no thyromegaly   Cardiovascular: Regular rate and rhythm, no murmurs, rubs or gallops, peripheral pulses full/symmetric  Respiratory: Good air movement bilaterally, lungs clear, no wheezes/rales/rhonchi  GI: Abdomen soft, bowel sounds present, nondistended, nontender, no organomegaly or masses, no rebound/guarding  Musculoskeletal: No edema, normal muscle tone, normal gait  Neurologic: Alert and oriented, cranial nerves 2-12 intact.  Psychiatric: normal mentation, affect and mood     Assessment and Plan:    Insomnia  She tapered off Trazodone secondary to drowsy side effects. She continues to take Melatonin. Suggested she could try Unisom, as other medications may give her similar side effects to Trazodone.     Osteopenia, unspecified location  On Vitamin D. Patient has completed 2 Reclast infusions and plans to repeat DEXA this year. If normal she may discontinue Reclast. I encouraged her to taper off Prednisone as the indication for this is unclear.   - Vitamin D Deficiency **(2 WKS)    Long term (current) use of systemic steroids  - Vitamin D Deficiency **(2 WKS)    Benign essential hypertension  Well controlled on Chlorthalidone and Amlodipine.     Liver transplanted (H)  Stable. Encouraged continued dermatology visits for skin cancer screening.     CKD (chronic kidney disease) stage 3, GFR 30-59 ml/min (H)  Stable. She continues to follow with nephrology.     Class 2 obesity in adult, unspecified BMI, unspecified obesity type, unspecified whether serious comorbidity present  Recommended adding light exercise such as walking to her routine, she is already working on calorie counting and seeing  weight watchers. Glucose has been elevated on her March labs, which were done in the morning. She is also on Prednisone, so we will check an A1c with her routine labs.  - Lipid panel reflex to direct LDL Fasting  - Hemoglobin A1c **(2 WKS)    Hypothyroidism, unspecified type  - TSH with free T4 reflex **(2 WKS)    Screening for hyperlipidemia  - Lipid panel reflex to direct LDL Fasting    Leukocytosis, unspecified type  Likely secondary to URI and possibly steroid use, however, she does report chronic symptoms and I would like to repeat CBC to ensure there is nothing more serious going on.   - CBC with platelets differential **(2 WKS)    Encounter for screening mammogram for breast cancer  - Mammogram, routine screening    Routine general medical examination at a health care facility  Healthcare maintenance updated today, will send for her outside pap records to determine next interval screening.     Routine Health Maintenance  Immunizations (zoster, pneumovax, flu, Tdap, Hep A/B):   Shingrix recommended      #Routine Health Maintenence:  Immunizations (zoster, pneumovax, flu, Tdap, Hep A/B):        Most Recent Immunizations   Administered Date(s) Administered     HEPA 01/13/2009     Influenza Vaccine IM 3yrs+ 4 Valent IIV4 10/18/2017     Mantoux Tuberculin Skin Test 10/06/2016     Pneumo Conj 13-V (2010&after) 08/19/2016     TD (ADULT, 7+) 01/21/2004     TDAP Vaccine (Adacel) 08/18/2016      Lipids:          Recent Labs   Lab Test  12/20/17   1443  02/23/17   0850    08/06/16   0757   CHOL  147   --    --   Interfering substances, unable to perform test  LUPE RIOJAS NOTIFIED ON 6B,8/6/2016,0926,MJ      HDL  51   --    --   9*   LDL  49  108*   < >  13   TRIG  230*   --    --   Interfering substances, unable to perform test  LUPE RIOJAS NOTIFIED ON 6B,8/6/2016,0926,MJ       < > = values in this interval not displayed.      Lung Ca Screening (>30 pk age 55-79 or >20 py age 50-79 + RF): does not qualify, quit  1996  Colonoscopy (50-75 yrs): 6/18 TA x 2, due 6/23                       - Diverticulosis in the left colon.   Dexa (>65W or 70M yrs): 4/17 had reclast, ordered  Mammogram (40-75 yrs): 12/17 [facility], normal, ordered  Pap (21-65 yrs): [facility]O for PAP, last 12/17, due 12/20  Pelvic/Breast: up to date  Safety/Lifestyle: reviewed  Tob/EtOH: screen neg  Depression: reviewed  Advanced Directive: deferred           Follow-up: Return in about 3 months (around 7/15/2019) for Routine Visit, f/u meds/sleep.         Scribe Disclosure:  I, Maikel Sewell, am serving as a scribe to document services personally performed by Arlyn Sanchez MD at this visit, based upon the provider's statements to me. All documentation has been reviewed by the aforementioned provider prior to being entered into the official medical record.    Portions of this medical record were completed by a scribe. UPON MY REVIEW AND AUTHENTICATION BY ELECTRONIC SIGNATURE, this confirms (a) I performed the applicable clinical services, and (b) the record is accurate.   Arlyn Sanchez MD  Internal Medicine

## 2019-04-15 NOTE — PATIENT INSTRUCTIONS
[facility] Care Center Medication Refill Request Information:  * Please contact your pharmacy regarding ANY request for medication refills.  ** [facility] Prescription Fax = [phone]  * Please allow 3 business days for routine medication refills.  * Please allow 5 business days for controlled substance medication refills.     [facility] Test Result notification information:  *You will be notified with in 7-10 days of your appointment day regarding the results of your test.  If you are on MyChart you will be notified as soon as the provider has reviewed the results and signed off on them.    Primary Care Center: [phone]       Can try doxylamine/Unisom for sleep, start 1/2 tablet at bedtime.  Try nasal steroid spray for post nasal drainage.      The [facility] now has the Shingrix vaccine available.     However, not all insurance carriers cover the entire cost of the Shingrix vaccine if the vaccine is administered at a primary clinic.     Prior to receiving the vaccine, we recommend that you call your insurance carrier and ask them the following questions:     * Does my insurance cover the Shingrix vaccine and administration of the vaccine?   * What is my co-pay or deductible for the vaccine?   * Is there a cost difference if I receive the vaccine at my doctors office or at a pharmacy?     Unfortunately, the clinic cannot determine your insurance benefits.  Please call your insurance provider prior to scheduling an appointment to receive the Shingrix vaccine.    If you decide to receive your vaccine at the Primary Care Clinic please call [phone] and request a nurse only appointment for the Shingrix vaccine.     This vaccine comes in two doses. The second dose should be 2-6 months from your first Shingrix dose.

## 2019-04-18 DIAGNOSIS — E03.9 HYPOTHYROIDISM, UNSPECIFIED TYPE: ICD-10-CM

## 2019-04-18 DIAGNOSIS — Z79.52 LONG TERM (CURRENT) USE OF SYSTEMIC STEROIDS: ICD-10-CM

## 2019-04-18 DIAGNOSIS — M85.80 OSTEOPENIA, UNSPECIFIED LOCATION: ICD-10-CM

## 2019-04-18 DIAGNOSIS — D72.829 LEUKOCYTOSIS, UNSPECIFIED TYPE: ICD-10-CM

## 2019-04-18 DIAGNOSIS — Z13.220 SCREENING FOR HYPERLIPIDEMIA: ICD-10-CM

## 2019-04-18 DIAGNOSIS — E66.812 CLASS 2 OBESITY IN ADULT, UNSPECIFIED BMI, UNSPECIFIED OBESITY TYPE, UNSPECIFIED WHETHER SERIOUS COMORBIDITY PRESENT: ICD-10-CM

## 2019-04-18 LAB
BASOPHILS # BLD AUTO: 0 10E9/L (ref 0–0.2)
BASOPHILS NFR BLD AUTO: 0.2 %
CHOLEST SERPL-MCNC: 180 MG/DL
DEPRECATED CALCIDIOL+CALCIFEROL SERPL-MC: 42 UG/L (ref 20–75)
DIFFERENTIAL METHOD BLD: NORMAL
EOSINOPHIL # BLD AUTO: 0.3 10E9/L (ref 0–0.7)
EOSINOPHIL NFR BLD AUTO: 2.9 %
ERYTHROCYTE [DISTWIDTH] IN BLOOD BY AUTOMATED COUNT: 13.7 % (ref 10–15)
HBA1C MFR BLD: 5.3 % (ref 0–5.6)
HCT VFR BLD AUTO: 42.5 % (ref 35–47)
HDLC SERPL-MCNC: 42 MG/DL
HGB BLD-MCNC: 14.1 G/DL (ref 11.7–15.7)
LDLC SERPL CALC-MCNC: 82 MG/DL
LYMPHOCYTES # BLD AUTO: 2.4 10E9/L (ref 0.8–5.3)
LYMPHOCYTES NFR BLD AUTO: 28.1 %
MCH RBC QN AUTO: 31.1 PG (ref 26.5–33)
MCHC RBC AUTO-ENTMCNC: 33.2 G/DL (ref 31.5–36.5)
MCV RBC AUTO: 94 FL (ref 78–100)
MONOCYTES # BLD AUTO: 0.8 10E9/L (ref 0–1.3)
MONOCYTES NFR BLD AUTO: 9.2 %
NEUTROPHILS # BLD AUTO: 5.1 10E9/L (ref 1.6–8.3)
NEUTROPHILS NFR BLD AUTO: 59.6 %
NONHDLC SERPL-MCNC: 138 MG/DL
PLATELET # BLD AUTO: 344 10E9/L (ref 150–450)
RBC # BLD AUTO: 4.54 10E12/L (ref 3.8–5.2)
TRIGL SERPL-MCNC: 282 MG/DL
TSH SERPL DL<=0.005 MIU/L-ACNC: 2.56 MU/L (ref 0.4–4)
WBC # BLD AUTO: 8.6 10E9/L (ref 4–11)

## 2019-04-18 PROCEDURE — 82306 VITAMIN D 25 HYDROXY: CPT | Performed by: INTERNAL MEDICINE

## 2019-04-18 PROCEDURE — 83036 HEMOGLOBIN GLYCOSYLATED A1C: CPT | Performed by: INTERNAL MEDICINE

## 2019-04-18 PROCEDURE — 84443 ASSAY THYROID STIM HORMONE: CPT | Performed by: INTERNAL MEDICINE

## 2019-04-18 PROCEDURE — 80061 LIPID PANEL: CPT | Performed by: INTERNAL MEDICINE

## 2019-04-18 PROCEDURE — 36415 COLL VENOUS BLD VENIPUNCTURE: CPT | Performed by: INTERNAL MEDICINE

## 2019-04-18 PROCEDURE — 85025 COMPLETE CBC W/AUTO DIFF WBC: CPT | Performed by: INTERNAL MEDICINE

## 2019-04-19 ENCOUNTER — TELEPHONE (OUTPATIENT)
Dept: TRANSPLANT | Facility: CLINIC | Age: 64
End: 2019-04-19

## 2019-04-19 ENCOUNTER — ANCILLARY PROCEDURE (OUTPATIENT)
Dept: MAMMOGRAPHY | Facility: CLINIC | Age: 64
End: 2019-04-19
Attending: INTERNAL MEDICINE
Payer: COMMERCIAL

## 2019-04-19 ENCOUNTER — ANCILLARY PROCEDURE (OUTPATIENT)
Dept: BONE DENSITY | Facility: CLINIC | Age: 64
End: 2019-04-19
Attending: INTERNAL MEDICINE
Payer: COMMERCIAL

## 2019-04-19 DIAGNOSIS — M85.80 OSTEOPENIA, UNSPECIFIED LOCATION: ICD-10-CM

## 2019-04-19 DIAGNOSIS — Z12.31 ENCOUNTER FOR SCREENING MAMMOGRAM FOR BREAST CANCER: ICD-10-CM

## 2019-04-24 DIAGNOSIS — H40.1130 PRIMARY OPEN ANGLE GLAUCOMA OF BOTH EYES, UNSPECIFIED GLAUCOMA STAGE: ICD-10-CM

## 2019-04-24 ASSESSMENT — PATIENT HEALTH QUESTIONNAIRE - PHQ9: SUM OF ALL RESPONSES TO PHQ QUESTIONS 1-9: 10

## 2019-04-25 RX ORDER — BETAXOLOL HYDROCHLORIDE 5 MG/ML
1 SOLUTION/ DROPS OPHTHALMIC DAILY
Qty: 5 ML | Refills: 3 | Status: SHIPPED | OUTPATIENT
Start: 2019-04-25 | End: 2020-09-02

## 2019-04-25 RX ORDER — BETAXOLOL HYDROCHLORIDE 5 MG/ML
1 SOLUTION/ DROPS OPHTHALMIC 2 TIMES DAILY
Qty: 5 ML | Refills: 2 | Status: SHIPPED | OUTPATIENT
Start: 2019-04-25 | End: 2019-04-25

## 2019-04-25 NOTE — TELEPHONE ENCOUNTER
Medication: betaxolol (BETOPTIC) 0.5 % ophthalmic solution    Requested directions: Place 1 drop into both eyes 2 times daily  Current directions on the medication list: same    Last Written Prescription Date: 11-27-17  Last Fill Quantity: 5 ml,   # refills: 2    Last Office Visit: 8-9-18  Future Office visit: 8-14-19    Attending Provider: Jordan  Last Clinic Note:   1. 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 Last visit was December 2017.  At that time, took her off betaxolol.  Repeat intraocular pressure was 20 both eyes and was restarted at once a day.  Patient feels no change in her vision.  Is on 7 mg prednisone for arthritis and is supposed to get off at some point.       Her visual acuity is 20/20 RIGHT eye and 20/70 LEFT eye (stable).  Her visual field and optical coherence tomography are stable.  Her exam is stable.       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 The RIGHT eye is normal.  Follow up 1 year sooner as needed for worsening symptoms.      Routing refill request to provider for review/approval because:  Inconsistent dose/direction:  Medication on med list- verify plan, ?discontinue

## 2019-05-15 ENCOUNTER — OFFICE VISIT (OUTPATIENT)
Dept: DERMATOLOGY | Facility: CLINIC | Age: 64
End: 2019-05-15
Payer: COMMERCIAL

## 2019-05-15 DIAGNOSIS — Z94.4 HISTORY OF LIVER TRANSPLANT (H): ICD-10-CM

## 2019-05-15 DIAGNOSIS — D22.9 MULTIPLE BENIGN NEVI: ICD-10-CM

## 2019-05-15 DIAGNOSIS — L82.1 SEBORRHEIC KERATOSIS: ICD-10-CM

## 2019-05-15 DIAGNOSIS — Z12.83 SKIN CANCER SCREENING: Primary | ICD-10-CM

## 2019-05-15 DIAGNOSIS — D18.01 CHERRY ANGIOMA: ICD-10-CM

## 2019-05-15 ASSESSMENT — PAIN SCALES - GENERAL: PAINLEVEL: NO PAIN (0)

## 2019-05-15 NOTE — LETTER
5/15/2019     RE: Phan Luque  [address]  [facility] [address]     Dear Colleague,    Thank you for referring your patient, Phan Luque, to the [facility] DERMATOLOGY at [facility]. Please see a copy of my visit note below.    [facility] Dermatology Note      Dermatology Problem List:   1. History of liver transplant at [facility], 2016- currently on tacrolimus   2. Nummular dermatitis  - clobetasol 0.05% ointment  3. Skin cancer screening, 5/15/19    Encounter Date: May 15, 2019    CC:  Chief Complaint   Patient presents with     Skin Check     TSC,  Jessica notes a lesion of concern on  her face.        History of Present Illness:  Ms. Phan Luque is a 63 year old female on immunosuppressants, who presents today for a skin check. The patient was last seen in the dermatology clinic on 11/20/18 during which she started clobetasol 0.05% ointment for her nummular dermatitis during her last FBSE.     Today she reports a lesion of concern under her right eye. She has had this lesion for some time, but it has recently increased in size. As for her dermatitis, she describes this comes and goes.     The patient otherwise denies any areas of change or concern including areas of bleeding or tenderness.     Past Medical History:   Patient Active Problem List   Diagnosis     Decompensation of cirrhosis of liver (H)     Liver transplanted (H)     Alcoholic hepatitis     Immunosuppression (H)     Alcoholic hepatitis without ascites     Enterococcus UTI     Liver transplant status (H)     Nausea & vomiting     Malnutrition (H)     Candidiasis of skin     Anemia     ACP (advance care planning)     Diarrhea     Hypothyroidism     Esophageal reflux     Recurrent major depression in partial remission (H)     Insomnia     CKD (chronic kidney disease) stage 3, GFR 30-59 ml/min (H)     Anemia in stage 3 chronic kidney disease (H)     Osteopenia     Alcohol abuse,  uncomplicated     Past Medical History:   Diagnosis Date     Asthma      Chronic kidney disease      End stage liver disease (H)     2/2 Alcohol Abuse     Liver transplanted (H)      Migraines      Osteoporosis      Spinal stenosis in cervical region      Strabismus      Past Surgical History:   Procedure Laterality Date     APPENDECTOMY           BENCH LIVER N/A 2016    Procedure: BENCH LIVER;  Surgeon: Larry Dhillon MD;  Location: [facility] OR     CATARACT IOL, RT/LT       COLONOSCOPY       ESOPHAGOSCOPY, GASTROSCOPY, DUODENOSCOPY (EGD), COMBINED N/A 2016    Procedure: COMBINED ESOPHAGOSCOPY, GASTROSCOPY, DUODENOSCOPY (EGD);  Surgeon: Tao Alfonso MD;  Location: [facility] GI     ESOPHAGOSCOPY, GASTROSCOPY, DUODENOSCOPY (EGD), COMBINED N/A 10/31/2016    Procedure: COMBINED ESOPHAGOSCOPY, GASTROSCOPY, DUODENOSCOPY (EGD), BIOPSY SINGLE OR MULTIPLE;  Surgeon: Ronald Bojorquez MD;  Location: [facility] GI     sphincteroplasty       TRANSPLANT LIVER RECIPIENT  DONOR N/A 2016    Procedure: TRANSPLANT LIVER RECIPIENT  DONOR;  Surgeon: Larry Dhillon MD;  Location: [facility] OR     TUBAL LIGATION      laparoscopic       Social History:  The patient works for medical insurance. The patient denies use of tanning beds. Patient is originally from [place].     Family History:  There is a family history of melanoma in the patient's mother (retinal, thought to have been present since birth, passed in ).    Medications:  Current Outpatient Medications   Medication Sig Dispense Refill     acetaminophen (TYLENOL) 325 MG tablet Take 2 tablets (650 mg) by mouth every 6 hours as needed for mild pain Or fevers. Use sparingly. Limit use to no more than 2 grams (2000 mg) in 24 hours. **Further refills must be obtained by primary care provider** 100 tablet 0     amLODIPine (NORVASC) 5 MG tablet Take 1 tablet (5 mg) by mouth daily 90 tablet 3     azaTHIOprine (IMURAN) 50 MG tablet Take 1 tablet (50 mg) by  mouth every morning 90 tablet 3     betaxolol (BETOPTIC) 0.5 % ophthalmic solution Place 1 drop into both eyes daily 5 mL 3     calcium Citrate-vitamin D 500-400 MG-UNIT CHEW Take 1 tablet by mouth daily       chlorthalidone (HYGROTON) 25 MG tablet Take 0.5 tablets (12.5 mg) by mouth daily 45 tablet 3     cholecalciferol (VITAMIN D3) 400 UNIT TABS tablet Take 400 Units by mouth daily       clobetasol (TEMOVATE) 0.05 % external ointment Apply topically 2 times daily To areas of eczema on the lower legs, up to 2 weeks. 60 g 1     cyanocobalamin (VITAMIN  B-12) 1000 MCG tablet Take 1,000 mcg by mouth daily       diclofenac (VOLTAREN) 1 % GEL topical gel Apply 2 g topically 4 times daily as needed for moderate pain       ferrous sulfate (IRON) 325 (65 FE) MG tablet Take 1 tablet (325 mg) by mouth 2 times daily 100 tablet      folic acid (FOLVITE) 1 MG tablet Take 1 tablet (1 mg) by mouth daily 30 tablet 1     gabapentin (NEURONTIN) 100 MG capsule Take 2 capsules (200 mg) by mouth At Bedtime 60 capsule 2     hydrOXYzine (ATARAX) 25 MG tablet TAKE 1 TO 2 TABLETS BY MOUTH EVERY 6 HOURS AS NEEDED FOR ITCHING 120 tablet 5     levothyroxine (SYNTHROID/LEVOTHROID) 88 MCG tablet Take 1 tablet (88 mcg) by mouth every morning (before breakfast) 90 tablet 2     MAGNESIUM OXIDE PO Take 400 mg by mouth daily       melatonin 5 MG tablet Take 1 tablet (5 mg) by mouth nightly as needed for sleep       multivitamin, therapeutic (THERA-VIT) TABS tablet Take 1 tablet by mouth every 24 hours 30 tablet 11     Omega-3 Fatty Acids (OMEGA-3 FISH OIL EX ST PO) Take 1 capsule by mouth 2 times daily 1290 (fish oil)-900 (omega 3)       pantoprazole (PROTONIX) 40 MG EC tablet TAKE ONE TABLET (40MG) BY MOUTH EVERY MORNING BEFORE BREAKFAST 90 tablet 4     predniSONE (DELTASONE) 1 MG tablet Take 4 tablets (4 mg) by mouth daily (Patient taking differently: Take 1 mg by mouth daily .) 360 tablet 3     predniSONE (DELTASONE) 5 MG tablet Take 1 tablet (5  mg) by mouth daily 90 tablet 3     psyllium 0.52 G capsule Take 2 capsules (1.04 g) by mouth daily With a full glass of water. 60 capsule 1     tacrolimus (GENERIC EQUIVALENT) 0.5 MG capsule Take 4 capsules (2 mg) by mouth every 12 hours 240 capsule 11     traMADol (ULTRAM) 50 MG tablet Take 1 tablet (50 mg) by mouth every 6 hours as needed for severe pain 20 tablet 0     ursodiol (ACTIGALL) 300 MG capsule Take 1 capsule (300 mg) by mouth every 12 hours 60 capsule 11       Allergies   Allergen Reactions     Codeine Hives     Benadryl [Diphenhydramine] Hives     Cefaclor Hives     Hydrocodone-Acetaminophen Itching     Oxycodone Itching     Penicillins Hives     Sulfa Drugs Hives       Review of Systems:  -Constitutional: The patient denies fatigue, fevers, chills, unintended weight loss, and night sweats. She is feeling in her usual state of health.  -Skin: As above in HPI. No additional skin concerns.    Physical exam:  Vitals: LMP  (LMP Unknown)   GEN: This is a well developed, well-nourished female in no acute distress, in a pleasant mood.    SKIN: Full skin, which includes the head/face, both arms, chest, back, abdomen,both legs, genitalia and/or groin buttocks, digits and/or nails, was examined. Significant for:     -Multiple regular brown pigmented macules and papules are identified on the trunk and extremities.   -There are waxy stuck on tan to brown papules on the face, trunk, and extremities.  -There are dome shaped bright red papules on the trunk.   - Faint erythema to bilateral cheeks with mild telangiectasia  - There are nummular pink scaly plaques to bilateral lower legs  -No other lesions of concern on areas examined.     Impression/Plan:  1. Nummular dermatitis     Start clobetasol 0.05% ointment, apply to affected areas of eczema while flaring.    Steroid ed given, use to to 2 weeks at a time.     2. Multiple clinically benign nevi on the trunk and extremities     ABCDs of melanoma were discussed and  self skin checks were advised.     Sun precaution was advised including the use of sun screens of SPF 30 or higher, sun protective clothing, and avoidance of tanning beds.    3. Cherry angiomas, trunk    Reassured of benign, congenital nature     4. Seborrheic keratosis, non irritated    No further intervention required. Patient to report changes.     5. History of liver transplant- currently on immunosuppressants     Given patient is on immune suppressing drugs, and with family history of melanoma, recommended skin check every 6 months     Follow-up in 6 months, earlier for new or changing lesions.       Staff Involved:  Scribe/Staff    Scribe Disclosure:   IRacquel, am serving as a scribe to document services personally performed by Demetria Barger PA-C, based on data collection and the provider's statements to me.    Provider Disclosure:   The documentation recorded by the scribe accurately reflects the services I personally performed and the decisions made by me.    All risks, benefits and alternatives were discussed with patient.  Patient is in agreement and understands the assessment and plan.  All questions were answered.  Sun Screen Education was given.   Return to Clinic in 6 months or sooner as needed.     Demetria Barger PA-C   [facility]

## 2019-05-15 NOTE — PROGRESS NOTES
[facility] Dermatology Note      Dermatology Problem List:   1. [facility]- currently on tacrolimus   2. Nummular dermatitis  - clobetasol 0.05% ointment  3. Skin cancer screening, 5/15/19    Encounter Date: May 15, 2019    CC:  Chief Complaint   Patient presents with     Skin Check     TSC,  Jessica notes a lesion of concern on  her face.        History of Present Illness:  Ms. Phan Luque is a 63 year old female on immunosuppressants, who presents today for a skin check. The patient was last seen in the dermatology clinic on 11/20/18 during which she started clobetasol 0.05% ointment for her nummular dermatitis during her last FBSE.     Today she reports a lesion of concern under her right eye. She has had this lesion for some time, but it has recently increased in size. As for her dermatitis, she describes this comes and goes.     The patient otherwise denies any areas of change or concern including areas of bleeding or tenderness.     Past Medical History:   Patient Active Problem List   Diagnosis     Decompensation of cirrhosis of liver (H)     Liver transplanted (H)     Alcoholic hepatitis     Immunosuppression (H)     Alcoholic hepatitis without ascites     Enterococcus UTI     Liver transplant status (H)     Nausea & vomiting     Malnutrition (H)     Candidiasis of skin     Anemia     ACP (advance care planning)     Diarrhea     Hypothyroidism     Esophageal reflux     Recurrent major depression in partial remission (H)     Insomnia     CKD (chronic kidney disease) stage 3, GFR 30-59 ml/min (H)     Anemia in stage 3 chronic kidney disease (H)     Osteopenia     Alcohol abuse, uncomplicated     Past Medical History:   Diagnosis Date     Asthma      Chronic kidney disease      End stage liver disease (H)     2/2 Alcohol Abuse     Liver transplanted (H)      Migraines      Osteoporosis      Spinal stenosis in cervical region      Strabismus      Past  Surgical History:   Procedure Laterality Date     APPENDECTOMY           BENCH LIVER N/A 2016    Procedure: BENCH LIVER;  Surgeon: Larry Dhillon MD;  Location: [facility] OR     CATARACT IOL, RT/LT       COLONOSCOPY       ESOPHAGOSCOPY, GASTROSCOPY, DUODENOSCOPY (EGD), COMBINED N/A 2016    Procedure: COMBINED ESOPHAGOSCOPY, GASTROSCOPY, DUODENOSCOPY (EGD);  Surgeon: Tao Alfonso MD;  Location: [facility] GI     ESOPHAGOSCOPY, GASTROSCOPY, DUODENOSCOPY (EGD), COMBINED N/A 10/31/2016    Procedure: COMBINED ESOPHAGOSCOPY, GASTROSCOPY, DUODENOSCOPY (EGD), BIOPSY SINGLE OR MULTIPLE;  Surgeon: Ronald Bojorquez MD;  Location: [facility] GI     sphincteroplasty       TRANSPLANT LIVER RECIPIENT  DONOR N/A 2016    Procedure: TRANSPLANT LIVER RECIPIENT  DONOR;  Surgeon: Larry Dhillon MD;  Location: [facility] OR     TUBAL LIGATION      laparoscopic       Social History:  The patient works for medical insurance. The patient denies use of tanning beds. Patient is originally from [place].     Family History:  There is a family history of melanoma in the patient's mother (retinal, thought to have been present since birth, passed in ).    Medications:  Current Outpatient Medications   Medication Sig Dispense Refill     acetaminophen (TYLENOL) 325 MG tablet Take 2 tablets (650 mg) by mouth every 6 hours as needed for mild pain Or fevers. Use sparingly. Limit use to no more than 2 grams (2000 mg) in 24 hours. **Further refills must be obtained by primary care provider** 100 tablet 0     amLODIPine (NORVASC) 5 MG tablet Take 1 tablet (5 mg) by mouth daily 90 tablet 3     azaTHIOprine (IMURAN) 50 MG tablet Take 1 tablet (50 mg) by mouth every morning 90 tablet 3     betaxolol (BETOPTIC) 0.5 % ophthalmic solution Place 1 drop into both eyes daily 5 mL 3     calcium Citrate-vitamin D 500-400 MG-UNIT CHEW Take 1 tablet by mouth daily       chlorthalidone (HYGROTON) 25 MG tablet Take 0.5 tablets (12.5 mg) by  mouth daily 45 tablet 3     cholecalciferol (VITAMIN D3) 400 UNIT TABS tablet Take 400 Units by mouth daily       clobetasol (TEMOVATE) 0.05 % external ointment Apply topically 2 times daily To areas of eczema on the lower legs, up to 2 weeks. 60 g 1     cyanocobalamin (VITAMIN  B-12) 1000 MCG tablet Take 1,000 mcg by mouth daily       diclofenac (VOLTAREN) 1 % GEL topical gel Apply 2 g topically 4 times daily as needed for moderate pain       ferrous sulfate (IRON) 325 (65 FE) MG tablet Take 1 tablet (325 mg) by mouth 2 times daily 100 tablet      folic acid (FOLVITE) 1 MG tablet Take 1 tablet (1 mg) by mouth daily 30 tablet 1     gabapentin (NEURONTIN) 100 MG capsule Take 2 capsules (200 mg) by mouth At Bedtime 60 capsule 2     hydrOXYzine (ATARAX) 25 MG tablet TAKE 1 TO 2 TABLETS BY MOUTH EVERY 6 HOURS AS NEEDED FOR ITCHING 120 tablet 5     levothyroxine (SYNTHROID/LEVOTHROID) 88 MCG tablet Take 1 tablet (88 mcg) by mouth every morning (before breakfast) 90 tablet 2     MAGNESIUM OXIDE PO Take 400 mg by mouth daily       melatonin 5 MG tablet Take 1 tablet (5 mg) by mouth nightly as needed for sleep       multivitamin, therapeutic (THERA-VIT) TABS tablet Take 1 tablet by mouth every 24 hours 30 tablet 11     Omega-3 Fatty Acids (OMEGA-3 FISH OIL EX ST PO) Take 1 capsule by mouth 2 times daily 1290 (fish oil)-900 (omega 3)       pantoprazole (PROTONIX) 40 MG EC tablet TAKE ONE TABLET (40MG) BY MOUTH EVERY MORNING BEFORE BREAKFAST 90 tablet 4     predniSONE (DELTASONE) 1 MG tablet Take 4 tablets (4 mg) by mouth daily (Patient taking differently: Take 1 mg by mouth daily .) 360 tablet 3     predniSONE (DELTASONE) 5 MG tablet Take 1 tablet (5 mg) by mouth daily 90 tablet 3     psyllium 0.52 G capsule Take 2 capsules (1.04 g) by mouth daily With a full glass of water. 60 capsule 1     tacrolimus (GENERIC EQUIVALENT) 0.5 MG capsule Take 4 capsules (2 mg) by mouth every 12 hours 240 capsule 11     traMADol (ULTRAM) 50  MG tablet Take 1 tablet (50 mg) by mouth every 6 hours as needed for severe pain 20 tablet 0     ursodiol (ACTIGALL) 300 MG capsule Take 1 capsule (300 mg) by mouth every 12 hours 60 capsule 11       Allergies   Allergen Reactions     Codeine Hives     Benadryl [Diphenhydramine] Hives     Cefaclor Hives     Hydrocodone-Acetaminophen Itching     Oxycodone Itching     Penicillins Hives     Sulfa Drugs Hives       Review of Systems:  -Constitutional: The patient denies fatigue, fevers, chills, unintended weight loss, and night sweats. She is feeling in her usual state of health.  -Skin: As above in HPI. No additional skin concerns.    Physical exam:  Vitals: LMP  (LMP Unknown)   GEN: This is a well developed, well-nourished female in no acute distress, in a pleasant mood.    SKIN: Full skin, which includes the head/face, both arms, chest, back, abdomen,both legs, genitalia and/or groin buttocks, digits and/or nails, was examined. Significant for:     -Multiple regular brown pigmented macules and papules are identified on the trunk and extremities.   -There are waxy stuck on tan to brown papules on the face, trunk, and extremities.  -There are dome shaped bright red papules on the trunk.   - Faint erythema to bilateral cheeks with mild telangiectasia  - There are nummular pink scaly plaques to bilateral lower legs  -No other lesions of concern on areas examined.     Impression/Plan:  1. Nummular dermatitis     Start clobetasol 0.05% ointment, apply to affected areas of eczema while flaring.    Steroid ed given, use to to 2 weeks at a time.     2. Multiple clinically benign nevi on the trunk and extremities     ABCDs of melanoma were discussed and self skin checks were advised.     Sun precaution was advised including the use of sun screens of SPF 30 or higher, sun protective clothing, and avoidance of tanning beds.    3. Cherry angiomas, trunk    Reassured of benign, congenital nature     4. Seborrheic keratosis, non  irritated    No further intervention required. Patient to report changes.     5. History of liver transplant- currently on immunosuppressants     Given patient is on immune suppressing drugs, and with family history of melanoma, recommended skin check every 6 months     Follow-up in 6 months, earlier for new or changing lesions.       Staff Involved:  Scribe/Staff    Scribe Disclosure:   I, Racquel Cowan, am serving as a scribe to document services personally performed by Demetria Barger PA-C, based on data collection and the provider's statements to me.    Provider Disclosure:   The documentation recorded by the scribe accurately reflects the services I personally performed and the decisions made by me.    All risks, benefits and alternatives were discussed with patient.  Patient is in agreement and understands the assessment and plan.  All questions were answered.  Sun Screen Education was given.   Return to Clinic in 6 months or sooner as needed.   Demetria Barger PA-C   [facility]

## 2019-05-15 NOTE — NURSING NOTE
Dermatology Rooming Note    Phan Luque's goals for this visit include:   Chief Complaint   Patient presents with     Skin Check     TSC,  Jessica notes a lesion of concern on  her face.      Candida Lagunas LPN

## 2019-05-20 ENCOUNTER — TELEPHONE (OUTPATIENT)
Dept: TRANSPLANT | Facility: CLINIC | Age: 64
End: 2019-05-20

## 2019-05-20 NOTE — TELEPHONE ENCOUNTER
Pt bounced down the steps on Friday 5/17/19- pt has been having pain in her abd region on the left and right ride, pain is dominate in the right side. Pt states it is not sever, but it is very discomforting     Pt would like to touch base with the care team

## 2019-05-21 RX ORDER — HEPARIN SODIUM (PORCINE) LOCK FLUSH IV SOLN 100 UNIT/ML 100 UNIT/ML
5 SOLUTION INTRAVENOUS
Status: CANCELLED | OUTPATIENT
Start: 2019-05-21

## 2019-05-21 RX ORDER — HEPARIN SODIUM,PORCINE 10 UNIT/ML
5 VIAL (ML) INTRAVENOUS
Status: CANCELLED | OUTPATIENT
Start: 2019-05-21

## 2019-05-21 RX ORDER — ZOLEDRONIC ACID 5 MG/100ML
5 INJECTION, SOLUTION INTRAVENOUS ONCE
Status: CANCELLED
Start: 2019-05-21

## 2019-05-21 NOTE — TELEPHONE ENCOUNTER
Pt fell down stairs and she says she is feeling slightly better, but she doesn't know if she is just talking herself out of it.   Pt does have pain over liver and along her side. She does not note any bulges or bumps. Patient is having labs completed later this week. Will discuss with Dr Banegas.

## 2019-05-24 DIAGNOSIS — Z94.4 LIVER REPLACED BY TRANSPLANT (H): ICD-10-CM

## 2019-05-24 LAB
ALBUMIN SERPL-MCNC: 3.6 G/DL (ref 3.4–5)
ALP SERPL-CCNC: 49 U/L (ref 40–150)
ALT SERPL W P-5'-P-CCNC: 20 U/L (ref 0–50)
ANION GAP SERPL CALCULATED.3IONS-SCNC: 8 MMOL/L (ref 3–14)
AST SERPL W P-5'-P-CCNC: 18 U/L (ref 0–45)
BILIRUB DIRECT SERPL-MCNC: 0.2 MG/DL (ref 0–0.2)
BILIRUB SERPL-MCNC: 0.7 MG/DL (ref 0.2–1.3)
BUN SERPL-MCNC: 32 MG/DL (ref 7–30)
CALCIUM SERPL-MCNC: 9.8 MG/DL (ref 8.5–10.1)
CHLORIDE SERPL-SCNC: 105 MMOL/L (ref 94–109)
CO2 SERPL-SCNC: 25 MMOL/L (ref 20–32)
CREAT SERPL-MCNC: 1.39 MG/DL (ref 0.52–1.04)
ERYTHROCYTE [DISTWIDTH] IN BLOOD BY AUTOMATED COUNT: 13.1 % (ref 10–15)
GFR SERPL CREATININE-BSD FRML MDRD: 40 ML/MIN/{1.73_M2}
GLUCOSE SERPL-MCNC: 100 MG/DL (ref 70–99)
HCT VFR BLD AUTO: 40.4 % (ref 35–47)
HGB BLD-MCNC: 13.6 G/DL (ref 11.7–15.7)
MCH RBC QN AUTO: 31.2 PG (ref 26.5–33)
MCHC RBC AUTO-ENTMCNC: 33.7 G/DL (ref 31.5–36.5)
MCV RBC AUTO: 93 FL (ref 78–100)
PLATELET # BLD AUTO: 301 10E9/L (ref 150–450)
POTASSIUM SERPL-SCNC: 3.7 MMOL/L (ref 3.4–5.3)
PROT SERPL-MCNC: 7.3 G/DL (ref 6.8–8.8)
RBC # BLD AUTO: 4.36 10E12/L (ref 3.8–5.2)
SODIUM SERPL-SCNC: 138 MMOL/L (ref 133–144)
TACROLIMUS BLD-MCNC: 8.3 UG/L (ref 5–15)
TME LAST DOSE: NORMAL H
WBC # BLD AUTO: 7.3 10E9/L (ref 4–11)

## 2019-05-24 PROCEDURE — 36415 COLL VENOUS BLD VENIPUNCTURE: CPT | Performed by: INTERNAL MEDICINE

## 2019-05-24 PROCEDURE — 80076 HEPATIC FUNCTION PANEL: CPT | Performed by: INTERNAL MEDICINE

## 2019-05-24 PROCEDURE — 80048 BASIC METABOLIC PNL TOTAL CA: CPT | Performed by: INTERNAL MEDICINE

## 2019-05-24 PROCEDURE — 85027 COMPLETE CBC AUTOMATED: CPT | Performed by: INTERNAL MEDICINE

## 2019-05-24 PROCEDURE — 80197 ASSAY OF TACROLIMUS: CPT | Performed by: INTERNAL MEDICINE

## 2019-05-27 ENCOUNTER — TRANSFERRED RECORDS (OUTPATIENT)
Dept: HEALTH INFORMATION MANAGEMENT | Facility: CLINIC | Age: 64
End: 2019-05-27

## 2019-05-29 ENCOUNTER — OFFICE VISIT (OUTPATIENT)
Dept: ORTHOPEDICS | Facility: CLINIC | Age: 64
End: 2019-05-29
Payer: COMMERCIAL

## 2019-05-29 ENCOUNTER — ANCILLARY PROCEDURE (OUTPATIENT)
Dept: GENERAL RADIOLOGY | Facility: CLINIC | Age: 64
End: 2019-05-29
Attending: FAMILY MEDICINE
Payer: COMMERCIAL

## 2019-05-29 ENCOUNTER — TELEPHONE (OUTPATIENT)
Dept: TRANSPLANT | Facility: CLINIC | Age: 64
End: 2019-05-29

## 2019-05-29 VITALS — HEIGHT: 67 IN | RESPIRATION RATE: 16 BRPM | BODY MASS INDEX: 36.73 KG/M2 | WEIGHT: 234 LBS

## 2019-05-29 DIAGNOSIS — Z94.4 LIVER TRANSPLANTED (H): ICD-10-CM

## 2019-05-29 DIAGNOSIS — M53.3 SACRAL BACK PAIN: Primary | ICD-10-CM

## 2019-05-29 DIAGNOSIS — M53.3 COCCYGEAL PAIN, ACUTE: ICD-10-CM

## 2019-05-29 LAB — RADIOLOGIST FLAGS: NORMAL

## 2019-05-29 RX ORDER — TRAZODONE HYDROCHLORIDE 100 MG/1
TABLET ORAL
Refills: 1 | COMMUNITY
Start: 2019-01-20 | End: 2020-05-04

## 2019-05-29 RX ORDER — TACROLIMUS 0.5 MG/1
CAPSULE ORAL
Qty: 180 CAPSULE | Refills: 11 | Status: SHIPPED | OUTPATIENT
Start: 2019-05-29 | End: 2020-02-21

## 2019-05-29 ASSESSMENT — MIFFLIN-ST. JEOR: SCORE: 1654.05

## 2019-05-29 NOTE — PATIENT INSTRUCTIONS
"Tramadol try 1/2 tab as needed        WHAT IS SACROILIAC JOINT PAIN?    Sacroiliac joint pain is discomfort or pain in a joint in your lower back, near your waist. The sacroiliac joints are the joints between your lower backbone and your hip bones. Strong bands of tissue called ligaments hold the bones of the joints in place.    Pain in the sacroiliac joint is also called sacroiliac joint dysfunction.    WHAT IS THE CAUSE?    Some possible causes of sacroiliac joint pain are:    Activities that involve twisting, bending, or heavy lifting, like swinging a golf club or shoveling snow  Injury from a fall  Imbalance of muscles because one leg is shorter than the other  Poor posture  Loosening of the ligaments after pregnancy  WHAT ARE THE SYMPTOMS?    Symptoms may include:    Pain and stiffness in the lower back, hips, or legs (pain may start in the back and travel to the legs)  Trouble bending or twisting your lower back  Pain after sitting for a long time  A feeling of being \"out of alignment\"  HOW IS IT DIAGNOSED?    Your healthcare provider will ask about your symptoms, activities, and medical history and examine you. You may have X-rays or other scans of your back.    HOW IS IT TREATED?    Your healthcare provider may recommend stretching, exercises, or other types of physical therapy. Your provider, a physical therapist, or chiropractor may use massage or other techniques to help put the joint in better alignment.    Your provider may give 1 or more shots of numbing medicine and steroid medicine into the joint.    HOW CAN I HELP TAKE CARE OF MYSELF?    To help relieve swelling and pain:    Put an ice pack, gel pack, or package of frozen vegetables wrapped in a cloth on the injured area every 3 to 4 hours for up to 20 minutes at a time.  Take pain medicine, such as acetaminophen, ibuprofen, or other medicine as directed by your provider. Nonsteroidal anti-inflammatory medicines (NSAIDs), such as ibuprofen, may " cause stomach bleeding and other problems. These risks increase with age. Read the label and take as directed. Unless recommended by your healthcare provider, do not take for more than 10 days.  Use moist heat for up to 20 minutes at a time to help relax tight muscles or muscle spasms. Moist heat includes heat patches or moist heating pads that you can purchase at most drugstores, a wet washcloth or towel that has been heated in the dryer, or a hot shower. Don t use heat if you have swelling.    If your legs are different lengths, special shoes or shoe inserts may help. Your healthcare provider may recommend a sacroiliac (SI) belt to help support the joint. An SI belt wraps around the hips just below the waist.    Follow your healthcare provider's instructions, including any exercises recommended by your provider. Ask your provider:    How and when you will hear your test results  How long it will take to recover  What activities you should avoid and when you can return to your normal activities  How to take care of yourself at home  What symptoms or problems you should watch for and what to do if you have them  Make sure you know when you should come back for a checkup.    HOW CAN I HELP PREVENT SACROILIAC JOINT PAIN?    Here are some things you can do to help prevent sacroiliac joint pain:    Do warm-up exercises and stretching before activities to help prevent injuries.  Try not to twist when you lift heavy objects.  Follow safety rules and use any protective equipment recommended for your work or sport.  Developed by [employer].  Published by [employer].  Copyright  2014 [employer] and/or one of its subsidiaries. All rights reserved.

## 2019-05-29 NOTE — PROGRESS NOTES
SPORTS & ORTHOPEDIC WALK-IN VISIT 5/29/2019    Primary Care Physician: Dr. Sanchez    5/17 was going down stairs in socks, foot slipped and bounced down about 4 steps. Hurt, had a bruised. Has been getting worse. Went to ED on 5/27 but they didn't do anything for her. Pain is across buttocks and hips ache. Feels like things got beat up in there. Has osteopenia and takes reclast. Has neuropathy normally in both legs but starting to go up left leg further than usual.     Reason for visit:     What part of your body is injured / painful?  midline tailbone    What caused the injury /pain? Fall    How long ago did your injury occur or pain begin? several weeks ago    What are your most bothersome symptoms? Pain and Numbness, instability    How would you characterize your symptom?  aching and sharp    What makes your symptoms better? Tylenol, tramadol,     What makes your symptoms worse? Movement, ice, laying in bed, standing from sitting    Have you been previously seen for this problem? Yes, ED    Medical History:    Any recent changes to your medical history? No    Any new medication prescribed since last visit? No    Have you had surgery on this body part before? No    Social History:    Occupation: [employer]    Handedness: Right    Exercise: None    Review of Systems:    Do you have fever, chills, weight loss? No    Do you have any vision problems? No    Do you have any chest pain or edema? No    Do you have any shortness of breath or wheezing?  No    Do you have stomach problems? No    Do you have any numbness or focal weakness? Yes, Peripheral Neuropathy and cervical stenosis    Do you have diabetes? No    Do you have problems with bleeding or clotting? No    Do you have an rashes or other skin lesions? No

## 2019-05-29 NOTE — LETTER
5/29/2019       RE: Phan Luque  [address] [facility] [address]     Dear Colleague,    Thank you for referring your patient, Phan Luque, to the [facility] SPORTS AND ORTHOPAEDIC WALK IN CLINIC at [facility]. Please see a copy of my visit note below.          SPORTS & ORTHOPEDIC WALK-IN VISIT 5/29/2019    Primary Care Physician: Dr. Sanchez    5/17 was going down stairs in socks, foot slipped and bounced down about 4 steps. Hurt, had a bruised. Has been getting worse. Went to ED on 5/27 but they didn't do anything for her. Pain is across buttocks and hips ache. Feels like things got beat up in there. Has osteopenia and takes reclast. Has neuropathy normally in both legs but starting to go up left leg further than usual.     Reason for visit:     What part of your body is injured / painful?  midline tailbone    What caused the injury /pain? Fall    How long ago did your injury occur or pain begin? several weeks ago    What are your most bothersome symptoms? Pain and Numbness, instability    How would you characterize your symptom?  aching and sharp    What makes your symptoms better? Tylenol, tramadol,     What makes your symptoms worse? Movement, ice, laying in bed, standing from sitting    Have you been previously seen for this problem? Yes, ED    Medical History:    Any recent changes to your medical history? No    Any new medication prescribed since last visit? No    Have you had surgery on this body part before? No    Social History:    Occupation: [employer]    Handedness: Right    Exercise: None    Review of Systems:    Do you have fever, chills, weight loss? No    Do you have any vision problems? No    Do you have any chest pain or edema? No    Do you have any shortness of breath or wheezing?  No    Do you have stomach problems? No    Do you have any numbness or focal weakness? Yes, Peripheral Neuropathy and cervical stenosis    Do you have diabetes?  "No    Do you have problems with bleeding or clotting? No    Do you have an rashes or other skin lesions? No           CHIEF COMPLAINT:  Pain of the Lower Back       HISTORY OF PRESENT ILLNESS  Ms. Luque is a pleasant 63 year old year old female who presents to clinic today with persisting pain of sacral / coccyx region.  Patient states that she suffered a fall on 5/17, slipped on stairs and \"bounced\" down about 4 steps.  Immediate pain of sacral region.  Seen in the ED on 5/17/19 and deemed coccygeal pain. No xr performed at that time.  Rx for tramadol.  Has tried tramadol with drowsiness, donut pad but poorly fitting for her.  Has new donut ordered.  Patient also notes her neuropathy has flared up, but describes tingling / numbness at lateral aspect of thigh.     She attempted to walk in the gym two days ago for recreation but noted pain flared up.  Has continued to worsen since this time.  Localized to central buttock region.     History of osteopenia on reclast.  Baseline neuropathy in bilateral lower extremities.    Additional history: as documented    MEDICAL HISTORY  Patient Active Problem List   Diagnosis     Decompensation of cirrhosis of liver (H)     Liver transplanted (H)     Alcoholic hepatitis     Immunosuppression (H)     Alcoholic hepatitis without ascites     Enterococcus UTI     Liver transplant status (H)     Nausea & vomiting     Malnutrition (H)     Candidiasis of skin     Anemia     ACP (advance care planning)     Diarrhea     Hypothyroidism     Esophageal reflux     Recurrent major depression in partial remission (H)     Insomnia     CKD (chronic kidney disease) stage 3, GFR 30-59 ml/min (H)     Anemia in stage 3 chronic kidney disease (H)     Osteopenia     Alcohol abuse, uncomplicated       Current Outpatient Medications   Medication Sig Dispense Refill     acetaminophen (TYLENOL) 325 MG tablet Take 2 tablets (650 mg) by mouth every 6 hours as needed for mild pain Or fevers. Use sparingly. " Limit use to no more than 2 grams (2000 mg) in 24 hours. **Further refills must be obtained by primary care provider** 100 tablet 0     amLODIPine (NORVASC) 5 MG tablet Take 1 tablet (5 mg) by mouth daily 90 tablet 3     azaTHIOprine (IMURAN) 50 MG tablet Take 1 tablet (50 mg) by mouth every morning 90 tablet 3     betaxolol (BETOPTIC) 0.5 % ophthalmic solution Place 1 drop into both eyes daily 5 mL 3     calcium Citrate-vitamin D 500-400 MG-UNIT CHEW Take 1 tablet by mouth daily       chlorthalidone (HYGROTON) 25 MG tablet Take 0.5 tablets (12.5 mg) by mouth daily 45 tablet 3     cholecalciferol (VITAMIN D3) 400 UNIT TABS tablet Take 400 Units by mouth daily       clobetasol (TEMOVATE) 0.05 % external ointment Apply topically 2 times daily To areas of eczema on the lower legs, up to 2 weeks. 60 g 1     cyanocobalamin (VITAMIN  B-12) 1000 MCG tablet Take 1,000 mcg by mouth daily       diclofenac (VOLTAREN) 1 % GEL topical gel Apply 2 g topically 4 times daily as needed for moderate pain       ferrous sulfate (IRON) 325 (65 FE) MG tablet Take 1 tablet (325 mg) by mouth 2 times daily 100 tablet      folic acid (FOLVITE) 1 MG tablet Take 1 tablet (1 mg) by mouth daily 30 tablet 1     gabapentin (NEURONTIN) 100 MG capsule Take 2 capsules (200 mg) by mouth At Bedtime 60 capsule 2     hydrOXYzine (ATARAX) 25 MG tablet TAKE 1 TO 2 TABLETS BY MOUTH EVERY 6 HOURS AS NEEDED FOR ITCHING 120 tablet 5     levothyroxine (SYNTHROID/LEVOTHROID) 88 MCG tablet Take 1 tablet (88 mcg) by mouth every morning (before breakfast) 90 tablet 2     MAGNESIUM OXIDE PO Take 400 mg by mouth daily       melatonin 5 MG tablet Take 1 tablet (5 mg) by mouth nightly as needed for sleep       multivitamin, therapeutic (THERA-VIT) TABS tablet Take 1 tablet by mouth every 24 hours 30 tablet 11     Omega-3 Fatty Acids (OMEGA-3 FISH OIL EX ST PO) Take 1 capsule by mouth 2 times daily 1290 (fish oil)-900 (omega 3)       pantoprazole (PROTONIX) 40 MG EC  "tablet TAKE ONE TABLET (40MG) BY MOUTH EVERY MORNING BEFORE BREAKFAST 90 tablet 4     predniSONE (DELTASONE) 1 MG tablet Take 4 tablets (4 mg) by mouth daily (Patient taking differently: Take 1 mg by mouth 3 times daily ) 360 tablet 3     predniSONE (DELTASONE) 5 MG tablet Take 1 tablet (5 mg) by mouth daily 90 tablet 3     psyllium 0.52 G capsule Take 2 capsules (1.04 g) by mouth daily With a full glass of water. 60 capsule 1     tacrolimus (GENERIC EQUIVALENT) 0.5 MG capsule Take 4 capsules (2 mg) by mouth every morning AND 2 capsules (1 mg) every evening. 180 capsule 11     traMADol (ULTRAM) 50 MG tablet Take 1 tablet (50 mg) by mouth every 6 hours as needed for severe pain 20 tablet 0     traZODone (DESYREL) 100 MG tablet   1     ursodiol (ACTIGALL) 300 MG capsule Take 1 capsule (300 mg) by mouth every 12 hours 60 capsule 11       Allergies   Allergen Reactions     Codeine Hives     Benadryl [Diphenhydramine] Hives     Cefaclor Hives     Hydrocodone-Acetaminophen Itching     Oxycodone Itching     Penicillins Hives     Sulfa Drugs Hives       Family History   Problem Relation Age of Onset     Family History Negative Other      Melanoma Mother              Heart Disease Father         CHF       Additional medical/Social/Surgical histories reviewed in [facility] and updated as appropriate.     REVIEW OF SYSTEMS (2019)  A 10-point review of systems was obtained and is negative except for as noted in the HPI.      PHYSICAL EXAM  Resp 16   Ht 1.702 m (5' 7\")   Wt 106.1 kg (234 lb)   LMP  (LMP Unknown)   BMI 36.65 kg/m       General  - normal appearance, in no obvious distress  HEENT  -Pupils equal, round, no conjunctival injection.  No lid lag  CV  - normal peripheral perfusion  Pulm  - normal respiratory pattern, non-labored  Musculoskeletal - lumbar spine  - stance: slow to rise and sit  - inspection: normal bone and joint alignment, ecchymosis of sacrococcygeal region cephalad to gluteal cleft  - palpation: " TTP of sacrum, SI joints and sacrococcygeal region.   - ROM: pain with flexion past 30 deg, extension, no pain with side bending and rotation  - strength: lower extremities 5/5 in all planes  Neuro  - patellar and Achilles DTRs 2+ bilaterally, no sensory or motor deficit, grossly normal coordination, normal muscle tone  Skin  - no ecchymosis, erythema, warmth, or induration, no obvious rash  Psych  - interactive, appropriate, normal mood and affect    IMAGING : XR Sacrum/Coccyx  IMPRESSION: Questionable contour irregularity involving lower portion  of S5. Though this may be artifactual, mildly displaced fracture in  this area cannot be excluded given suboptimal evaluation in this area.  Correlate clinically for acute trauma history.     [Consider Follow Up: Possible minimally displaced S5 fracture.     This report will be copied to the [facility] to ensure a  provider acknowledges the finding.      ELICIA REA     ASSESSMENT & PLAN  Ms. Luque is a 63 year old year old female who presents to clinic today with acute sacrum and coccygeal region pain after a fall onto buttocks 5/17/19. Concern for possible sacral pain with minimal displacement.     Diagnosis: Acute pain of sacrum, Coccygodynia.    -CT sacrum and coccyx  -Decrease weightbearing activity  -Tylenol use discussed  -Donut pad  -Ice therapy encouraged, soft cold pack  -Follow up after CT scan    It was a pleasure seeing Suemay today.    ADDENDUM: Plan updated via telephone, call approximately 1900 with updated XR results and plan for CT>    Donnell Gorman DO, CAQSM  Primary Care Sports Medicine

## 2019-05-30 ENCOUNTER — ANCILLARY PROCEDURE (OUTPATIENT)
Dept: CT IMAGING | Facility: CLINIC | Age: 64
End: 2019-05-30
Attending: FAMILY MEDICINE
Payer: COMMERCIAL

## 2019-05-30 DIAGNOSIS — M53.3 SACRAL BACK PAIN: ICD-10-CM

## 2019-05-30 DIAGNOSIS — M53.3 COCCYGEAL PAIN, ACUTE: ICD-10-CM

## 2019-05-30 NOTE — PROGRESS NOTES
"CHIEF COMPLAINT:  Pain of the Lower Back       HISTORY OF PRESENT ILLNESS  Ms. Luque is a pleasant 63 year old year old female who presents to clinic today with persisting pain of sacral / coccyx region.  Patient states that she suffered a fall on 5/17, slipped on stairs and \"bounced\" down about 4 steps.  Immediate pain of sacral region.  Seen in the ED on 5/17/19 and deemed coccygeal pain. No xr performed at that time.  Rx for tramadol.  Has tried tramadol with drowsiness, donut pad but poorly fitting for her.  Has new donut ordered.  Patient also notes her neuropathy has flared up, but describes tingling / numbness at lateral aspect of thigh.     She attempted to walk in the gym two days ago for recreation but noted pain flared up.  Has continued to worsen since this time.  Localized to central buttock region.     History of osteopenia on reclast.  Baseline neuropathy in bilateral lower extremities.    Additional history: as documented    MEDICAL HISTORY  Patient Active Problem List   Diagnosis     Decompensation of cirrhosis of liver (H)     Liver transplanted (H)     Alcoholic hepatitis     Immunosuppression (H)     Alcoholic hepatitis without ascites     Enterococcus UTI     Liver transplant status (H)     Nausea & vomiting     Malnutrition (H)     Candidiasis of skin     Anemia     ACP (advance care planning)     Diarrhea     Hypothyroidism     Esophageal reflux     Recurrent major depression in partial remission (H)     Insomnia     CKD (chronic kidney disease) stage 3, GFR 30-59 ml/min (H)     Anemia in stage 3 chronic kidney disease (H)     Osteopenia     Alcohol abuse, uncomplicated       Current Outpatient Medications   Medication Sig Dispense Refill     acetaminophen (TYLENOL) 325 MG tablet Take 2 tablets (650 mg) by mouth every 6 hours as needed for mild pain Or fevers. Use sparingly. Limit use to no more than 2 grams (2000 mg) in 24 hours. **Further refills must be obtained by primary care provider** " 100 tablet 0     amLODIPine (NORVASC) 5 MG tablet Take 1 tablet (5 mg) by mouth daily 90 tablet 3     azaTHIOprine (IMURAN) 50 MG tablet Take 1 tablet (50 mg) by mouth every morning 90 tablet 3     betaxolol (BETOPTIC) 0.5 % ophthalmic solution Place 1 drop into both eyes daily 5 mL 3     calcium Citrate-vitamin D 500-400 MG-UNIT CHEW Take 1 tablet by mouth daily       chlorthalidone (HYGROTON) 25 MG tablet Take 0.5 tablets (12.5 mg) by mouth daily 45 tablet 3     cholecalciferol (VITAMIN D3) 400 UNIT TABS tablet Take 400 Units by mouth daily       clobetasol (TEMOVATE) 0.05 % external ointment Apply topically 2 times daily To areas of eczema on the lower legs, up to 2 weeks. 60 g 1     cyanocobalamin (VITAMIN  B-12) 1000 MCG tablet Take 1,000 mcg by mouth daily       diclofenac (VOLTAREN) 1 % GEL topical gel Apply 2 g topically 4 times daily as needed for moderate pain       ferrous sulfate (IRON) 325 (65 FE) MG tablet Take 1 tablet (325 mg) by mouth 2 times daily 100 tablet      folic acid (FOLVITE) 1 MG tablet Take 1 tablet (1 mg) by mouth daily 30 tablet 1     gabapentin (NEURONTIN) 100 MG capsule Take 2 capsules (200 mg) by mouth At Bedtime 60 capsule 2     hydrOXYzine (ATARAX) 25 MG tablet TAKE 1 TO 2 TABLETS BY MOUTH EVERY 6 HOURS AS NEEDED FOR ITCHING 120 tablet 5     levothyroxine (SYNTHROID/LEVOTHROID) 88 MCG tablet Take 1 tablet (88 mcg) by mouth every morning (before breakfast) 90 tablet 2     MAGNESIUM OXIDE PO Take 400 mg by mouth daily       melatonin 5 MG tablet Take 1 tablet (5 mg) by mouth nightly as needed for sleep       multivitamin, therapeutic (THERA-VIT) TABS tablet Take 1 tablet by mouth every 24 hours 30 tablet 11     Omega-3 Fatty Acids (OMEGA-3 FISH OIL EX ST PO) Take 1 capsule by mouth 2 times daily 1290 (fish oil)-900 (omega 3)       pantoprazole (PROTONIX) 40 MG EC tablet TAKE ONE TABLET (40MG) BY MOUTH EVERY MORNING BEFORE BREAKFAST 90 tablet 4     predniSONE (DELTASONE) 1 MG tablet  "Take 4 tablets (4 mg) by mouth daily (Patient taking differently: Take 1 mg by mouth 3 times daily ) 360 tablet 3     predniSONE (DELTASONE) 5 MG tablet Take 1 tablet (5 mg) by mouth daily 90 tablet 3     psyllium 0.52 G capsule Take 2 capsules (1.04 g) by mouth daily With a full glass of water. 60 capsule 1     tacrolimus (GENERIC EQUIVALENT) 0.5 MG capsule Take 4 capsules (2 mg) by mouth every morning AND 2 capsules (1 mg) every evening. 180 capsule 11     traMADol (ULTRAM) 50 MG tablet Take 1 tablet (50 mg) by mouth every 6 hours as needed for severe pain 20 tablet 0     traZODone (DESYREL) 100 MG tablet   1     ursodiol (ACTIGALL) 300 MG capsule Take 1 capsule (300 mg) by mouth every 12 hours 60 capsule 11       Allergies   Allergen Reactions     Codeine Hives     Benadryl [Diphenhydramine] Hives     Cefaclor Hives     Hydrocodone-Acetaminophen Itching     Oxycodone Itching     Penicillins Hives     Sulfa Drugs Hives       Family History   Problem Relation Age of Onset     Family History Negative Other      Melanoma Mother              Heart Disease Father         CHF       Additional medical/Social/Surgical histories reviewed in [facility] and updated as appropriate.     REVIEW OF SYSTEMS (2019)  A 10-point review of systems was obtained and is negative except for as noted in the HPI.      PHYSICAL EXAM  Resp 16   Ht 1.702 m (5' 7\")   Wt 106.1 kg (234 lb)   LMP  (LMP Unknown)   BMI 36.65 kg/m      General  - normal appearance, in no obvious distress  HEENT  -Pupils equal, round, no conjunctival injection.  No lid lag  CV  - normal peripheral perfusion  Pulm  - normal respiratory pattern, non-labored  Musculoskeletal - lumbar spine  - stance: slow to rise and sit  - inspection: normal bone and joint alignment, ecchymosis of sacrococcygeal region cephalad to gluteal cleft  - palpation: TTP of sacrum, SI joints and sacrococcygeal region.   - ROM: pain with flexion past 30 deg, extension, no pain with side " bending and rotation  - strength: lower extremities 5/5 in all planes  Neuro  - patellar and Achilles DTRs 2+ bilaterally, no sensory or motor deficit, grossly normal coordination, normal muscle tone  Skin  - no ecchymosis, erythema, warmth, or induration, no obvious rash  Psych  - interactive, appropriate, normal mood and affect    IMAGING : XR Sacrum/Coccyx  IMPRESSION: Questionable contour irregularity involving lower portion  of S5. Though this may be artifactual, mildly displaced fracture in  this area cannot be excluded given suboptimal evaluation in this area.  Correlate clinically for acute trauma history.     [Consider Follow Up: Possible minimally displaced S5 fracture.     This report will be copied to the [facility] to ensure a  provider acknowledges the finding.      ELICIA REA     ASSESSMENT & PLAN  Ms. Luque is a 63 year old year old female who presents to clinic today with acute sacrum and coccygeal region pain after a fall onto buttocks 5/17/19. Concern for possible sacral pain with minimal displacement.     Diagnosis: Acute pain of sacrum, Coccygodynia.    -CT sacrum and coccyx  -Decrease weightbearing activity  -Tylenol use discussed  -Donut pad  -Ice therapy encouraged, soft cold pack  -Follow up after CT scan    It was a pleasure seeing Suemay today.    ADDENDUM: Plan updated via telephone, call approximately 1900 with updated XR results and plan for CT>    Donnell Gorman DO, IRAMM  Primary Care Sports Medicine

## 2019-05-31 DIAGNOSIS — M53.3 SACRAL BACK PAIN: Primary | ICD-10-CM

## 2019-05-31 RX ORDER — TRAMADOL HYDROCHLORIDE 50 MG/1
50 TABLET ORAL EVERY 6 HOURS PRN
Qty: 30 TABLET | Refills: 0 | Status: SHIPPED | OUTPATIENT
Start: 2019-05-31 | End: 2023-07-10

## 2019-06-29 DIAGNOSIS — G62.9 PERIPHERAL POLYNEUROPATHY: ICD-10-CM

## 2019-07-01 NOTE — TELEPHONE ENCOUNTER
Last seen: 3/27/19*  RTC: 3 months, consider transfer back to PCP  Cancel: 6/11/19 and 6/14/19  No-show: none  Next appt: 7/16/19     Incoming refill from mail specialty pharmacy via electronic request     Medication requested: gabapentin (NEURONTIN) 100 MG capsule  Directions: Take 2 capsules (200 mg) by mouth At Bedtime   Qty: 60  Last refilled: approximately 5/27/19 per med tab      Routed to provider due to multiple cancellations

## 2019-07-02 RX ORDER — GABAPENTIN 100 MG/1
200 CAPSULE ORAL AT BEDTIME
Qty: 60 CAPSULE | Refills: 2 | Status: SHIPPED | OUTPATIENT
Start: 2019-07-02 | End: 2019-07-16

## 2019-07-14 DIAGNOSIS — I10 HYPERTENSION: ICD-10-CM

## 2019-07-14 DIAGNOSIS — Z94.4 LIVER TRANSPLANTED (H): ICD-10-CM

## 2019-07-14 DIAGNOSIS — R94.6 THYROID FUNCTION TEST ABNORMAL: ICD-10-CM

## 2019-07-16 ENCOUNTER — OFFICE VISIT (OUTPATIENT)
Dept: PSYCHIATRY | Facility: CLINIC | Age: 64
End: 2019-07-16
Attending: NURSE PRACTITIONER
Payer: COMMERCIAL

## 2019-07-16 VITALS
HEART RATE: 92 BPM | SYSTOLIC BLOOD PRESSURE: 129 MMHG | DIASTOLIC BLOOD PRESSURE: 80 MMHG | WEIGHT: 228 LBS | BODY MASS INDEX: 35.71 KG/M2

## 2019-07-16 DIAGNOSIS — G62.9 PERIPHERAL POLYNEUROPATHY: ICD-10-CM

## 2019-07-16 PROCEDURE — G0463 HOSPITAL OUTPT CLINIC VISIT: HCPCS | Mod: ZF

## 2019-07-16 RX ORDER — GABAPENTIN 100 MG/1
300 CAPSULE ORAL AT BEDTIME
Qty: 150 CAPSULE | Refills: 2 | Status: SHIPPED | OUTPATIENT
Start: 2019-07-16 | End: 2019-10-15

## 2019-07-16 RX ORDER — LEVOTHYROXINE SODIUM 88 UG/1
88 TABLET ORAL DAILY
Qty: 90 TABLET | Refills: 2 | Status: SHIPPED | OUTPATIENT
Start: 2019-07-16 | End: 2020-03-24

## 2019-07-16 ASSESSMENT — PAIN SCALES - GENERAL: PAINLEVEL: NO PAIN (0)

## 2019-07-16 NOTE — NURSING NOTE
Chief Complaint   Patient presents with     Recheck Medication     Insomnia, unspecified type

## 2019-07-16 NOTE — PROGRESS NOTES
"  Psychiatry Clinic Progress Note                                                                   Phan Luque is a 63 year old female who returns to the clinic for continued care.   Therapist: Germania Bethea @ [facility].  PCP: Santosh Salgado  Other Providers: Hussein Banegas MD    Pertinent Background:  Liver Transplant on 9/25/16 and Stage 3 kidney disease.  Psych critical item history includes SUBSTANCE USE: alcohol.     Interim History                                                                                                             4, 4     The patient is a good historian, reports good treatment adherence and was last seen 3/27/19. Since the last visit, Phan reports she is \"ok.\" She reports the Gabapentin has been helpful with sleep quality.  She did increase to 600mg at bedtime.   She has does report any concern with depression but feels anhedonic and attributes to overwhelmed with various responsibilities and tasks she currently is facing.      3/27/19: Phan reports she is \"hanging in there.\"  Phan reports her sleep has worsened since stopping Trazodone in December.  She does report her pain has improved since stopping Trazodone and is no longer experiencing the morning \"hangover.\"  Will start/restart Gabapentin for sleep.  Duloxetine stopped due to side effects and Venlafaxine ordered but she did not start due to fear it would have same adverse effects as duloxetine.  Recommended she try as it appears her depression may be worsening and if experiences concerning side effects, she should call clinic.  She will continue to consider.    9/26/18: Phan reports the following changes to medications:  Started Gabapentin 100mg TID which was started by orthopedics, prednisone was decreased to 5mg, and folic acid was stopped.  No significant psychiatric symptoms reported.  She does report increase restlessness at night but does not believe it is anxiety.  Restless legs have also gotten worse " which has further impeded sleep.  Iron supplementation started to help with RLS.      6/26/18: Phan reports her mental health continues to be well managed in spite of forgetting to load Buspar into her weekly med reminder.  She has not noticed any increase in anxiety.  Buspar will not be restarted.  Phan continues to not have taken the Propranolol as it is unneeded. Sleep quality remains unchanged but she is unconcerned.  Sleep may be impacted by prednisone.  Continues to take Melatonin 5mg and Trazodone 150mg to help with sleep.  Phan reports the two year anniversary of her liver transplant is approaching with no concerns from providers.      3/30/18:  Phan tried decreased Trazodone and the change significantly impacted her sleep.  She has resumed taking Trazodone 150mg qHS. Phan also recently saw nephrologist who prescribed Iron supplementation to possibly help manage restless leg syndrome.       Phan has not taken propranolol as she feels her anxiety is well managed.  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  Discussed longer duration between appointments due to symptoms being well managed.        Recent Symptoms:   Depression:  depressed mood, anhedonia, low energy, insomnia and appetite changes  Elevated:  none  Psychosis:  none  Anxiety:  anxiety intensifies when driving  Panic Attack:  none  Trauma Related:  none     Recent Substance Use:  none reported        Social/ Family History                                  [per patient report]                                     1ea, 1ea   CHILDREN- 3 adult children       TRAUMA HISTORY (self-report)- None  FEELS SAFE AT HOME- Yes    Medical / Surgical History                                                                                                                  Patient Active Problem List   Diagnosis     Decompensation of cirrhosis of liver (H)     Liver transplanted (H)     Alcoholic hepatitis     Immunosuppression  (H)     Alcoholic hepatitis without ascites     Enterococcus UTI     Liver transplant status (H)     Nausea & vomiting     Malnutrition (H)     Candidiasis of skin     Anemia     ACP (advance care planning)     Diarrhea     Hypothyroidism     Esophageal reflux     Recurrent major depression in partial remission (H)     Insomnia     CKD (chronic kidney disease) stage 3, GFR 30-59 ml/min (H)     Anemia in stage 3 chronic kidney disease (H)     Osteopenia     Alcohol abuse, uncomplicated       Past Surgical History:   Procedure Laterality Date     APPENDECTOMY           BENCH LIVER N/A 2016    Procedure: BENCH LIVER;  Surgeon: Larry Dhillon MD;  Location: [facility] OR     CATARACT IOL, RT/LT       COLONOSCOPY       ESOPHAGOSCOPY, GASTROSCOPY, DUODENOSCOPY (EGD), COMBINED N/A 2016    Procedure: COMBINED ESOPHAGOSCOPY, GASTROSCOPY, DUODENOSCOPY (EGD);  Surgeon: Tao Alfonso MD;  Location: [facility] GI     ESOPHAGOSCOPY, GASTROSCOPY, DUODENOSCOPY (EGD), COMBINED N/A 10/31/2016    Procedure: COMBINED ESOPHAGOSCOPY, GASTROSCOPY, DUODENOSCOPY (EGD), BIOPSY SINGLE OR MULTIPLE;  Surgeon: Ronald Bojorquez MD;  Location: [facility] GI     sphincteroplasty       TRANSPLANT LIVER RECIPIENT  DONOR N/A 2016    Procedure: TRANSPLANT LIVER RECIPIENT  DONOR;  Surgeon: Larry Dhillon MD;  Location: [facility] OR     TUBAL LIGATION      laparoscopic      Medical Review of Systems                                                                                                        2,10   A comprehensive review of systems was performed and is negative other than noted in the HPI.  Pregnant- No    Breastfeeding- No    Contraception- No  Allergy                                Codeine; Benadryl [diphenhydramine]; Cefaclor; Hydrocodone-acetaminophen; Oxycodone; Penicillins; and Sulfa drugs  Current Medications                                                                                                        Current Outpatient Medications   Medication Sig Dispense Refill     acetaminophen (TYLENOL) 325 MG tablet Take 2 tablets (650 mg) by mouth every 6 hours as needed for mild pain Or fevers. Use sparingly. Limit use to no more than 2 grams (2000 mg) in 24 hours. **Further refills must be obtained by primary care provider** 100 tablet 0     amLODIPine (NORVASC) 5 MG tablet Take 1 tablet (5 mg) by mouth daily 90 tablet 3     azaTHIOprine (IMURAN) 50 MG tablet Take 1 tablet (50 mg) by mouth every morning 90 tablet 3     betaxolol (BETOPTIC) 0.5 % ophthalmic solution Place 1 drop into both eyes daily 5 mL 3     calcium Citrate-vitamin D 500-400 MG-UNIT CHEW Take 1 tablet by mouth daily       chlorthalidone (HYGROTON) 25 MG tablet Take 0.5 tablets (12.5 mg) by mouth daily 45 tablet 3     cholecalciferol (VITAMIN D3) 400 UNIT TABS tablet Take 400 Units by mouth daily       clobetasol (TEMOVATE) 0.05 % external ointment Apply topically 2 times daily To areas of eczema on the lower legs, up to 2 weeks. 60 g 1     cyanocobalamin (VITAMIN  B-12) 1000 MCG tablet Take 1,000 mcg by mouth daily       diclofenac (VOLTAREN) 1 % GEL topical gel Apply 2 g topically 4 times daily as needed for moderate pain       ferrous sulfate (IRON) 325 (65 FE) MG tablet Take 1 tablet (325 mg) by mouth 2 times daily 100 tablet      folic acid (FOLVITE) 1 MG tablet Take 1 tablet (1 mg) by mouth daily 30 tablet 1     gabapentin (NEURONTIN) 100 MG capsule Take 2 capsules (200 mg) by mouth At Bedtime 60 capsule 2     hydrOXYzine (ATARAX) 25 MG tablet TAKE 1 TO 2 TABLETS BY MOUTH EVERY 6 HOURS AS NEEDED FOR ITCHING 120 tablet 5     levothyroxine (SYNTHROID/LEVOTHROID) 88 MCG tablet Take 1 tablet (88 mcg) by mouth daily BEFORE BREAKFAST 90 tablet 2     MAGNESIUM OXIDE PO Take 400 mg by mouth daily       melatonin 5 MG tablet Take 1 tablet (5 mg) by mouth nightly as needed for sleep       multivitamin, therapeutic (THERA-VIT) TABS tablet Take 1 tablet by  "mouth every 24 hours 30 tablet 11     Omega-3 Fatty Acids (OMEGA-3 FISH OIL EX ST PO) Take 1 capsule by mouth 2 times daily 1290 (fish oil)-900 (omega 3)       pantoprazole (PROTONIX) 40 MG EC tablet TAKE ONE TABLET (40MG) BY MOUTH EVERY MORNING BEFORE BREAKFAST 90 tablet 4     predniSONE (DELTASONE) 1 MG tablet Take 4 tablets (4 mg) by mouth daily (Patient taking differently: Take 1 mg by mouth 3 times daily ) 360 tablet 3     predniSONE (DELTASONE) 5 MG tablet Take 1 tablet (5 mg) by mouth daily 90 tablet 3     psyllium 0.52 G capsule Take 2 capsules (1.04 g) by mouth daily With a full glass of water. 60 capsule 1     tacrolimus (GENERIC EQUIVALENT) 0.5 MG capsule Take 4 capsules (2 mg) by mouth every morning AND 2 capsules (1 mg) every evening. 180 capsule 11     traMADol (ULTRAM) 50 MG tablet Take 1 tablet (50 mg) by mouth every 6 hours as needed for severe pain 30 tablet 0     traMADol (ULTRAM) 50 MG tablet Take 1 tablet (50 mg) by mouth every 6 hours as needed for severe pain 20 tablet 0     traZODone (DESYREL) 100 MG tablet   1     ursodiol (ACTIGALL) 300 MG capsule Take 1 capsule (300 mg) by mouth every 12 hours 60 capsule 11     Vitals                                                                                                                            3, 3   /80   Pulse 92   Wt 103.4 kg (228 lb)   LMP  (LMP Unknown)   BMI 35.71 kg/m     Mental Status Exam                                                                                        9, 14 cog gs     Alertness: alert  and oriented  Appearance: casually groomed  Behavior/Demeanor: cooperative, pleasant and calm, with good  eye contact   Speech: regular rate and rhythm  Language: good  Psychomotor: normal or unremarkable  Mood: \"ok\"  Affect: appropriate; was congruent to mood; was congruent to content  Thought Process/Associations: unremarkable  Thought Content:  Reports none;  Denies suicidal ideation and violent ideation  Perception:  " Reports none;  Denies auditory hallucinations and visual hallucinations  Insight: good  Judgment: good  Cognition: (6) does  appear grossly intact; formal cognitive testing was not done  Gait/Station and/or Muscle Strength/Tone: unremarkable    Labs and Data                                                                                                                 Rating Scales:  PHQ9    PHQ9 Today:  6  PHQ-9 SCORE 6/26/2018 9/26/2018 3/27/2019   PHQ-9 Total Score MyChart - - -   PHQ-9 Total Score 3 7 10         Diagnosis and Assessment                                                                                  m2, h3     Today the following issues were addressed:    1) Major Depressive Disorder, recurrent, mild  2) Generalized Anxiety Disorder    [place] Prescription Monitoring Program [] was not checked today:  will be checked next visit.         Plan                                                                                                                         m2, h3      1) Medication Management      Continue Gabapentin 600mg at bedtime for sleep.      RTC: 3 months.  Consider transfer of care to PCP     CRISIS NUMBERS:   Provided routinely in AVS.    Treatment Risk Statement:  The patient understands the risks, benefits, adverse effects and alternatives. Agrees to treatment with the capacity to do so. No medical contraindications to treatment. Agrees to call clinic for any problems. The patient understands to call 911 or go to the nearest ED if life threatening or urgent symptoms occur.      PROVIDER:  DONOVAN Hanna CNP

## 2019-07-17 RX ORDER — AMLODIPINE BESYLATE 5 MG/1
TABLET ORAL
Qty: 90 TABLET | Refills: 0 | Status: SHIPPED | OUTPATIENT
Start: 2019-07-17 | End: 2019-07-22

## 2019-07-17 RX ORDER — AZATHIOPRINE 50 MG/1
50 TABLET ORAL EVERY MORNING
Qty: 90 TABLET | Refills: 3 | Status: SHIPPED | OUTPATIENT
Start: 2019-07-17 | End: 2019-07-26

## 2019-07-17 RX ORDER — AMLODIPINE BESYLATE 5 MG/1
5 TABLET ORAL DAILY
Qty: 90 TABLET | Refills: 3 | Status: SHIPPED | OUTPATIENT
Start: 2019-07-17 | End: 2020-12-09

## 2019-07-17 RX ORDER — AZATHIOPRINE 50 MG/1
TABLET ORAL
Qty: 90 TABLET | Refills: 3 | Status: SHIPPED | OUTPATIENT
Start: 2019-07-17 | End: 2019-07-26

## 2019-07-22 ENCOUNTER — OFFICE VISIT (OUTPATIENT)
Dept: INTERNAL MEDICINE | Facility: CLINIC | Age: 64
End: 2019-07-22
Payer: COMMERCIAL

## 2019-07-22 ENCOUNTER — OFFICE VISIT (OUTPATIENT)
Dept: ORTHOPEDICS | Facility: CLINIC | Age: 64
End: 2019-07-22
Payer: COMMERCIAL

## 2019-07-22 VITALS
SYSTOLIC BLOOD PRESSURE: 134 MMHG | BODY MASS INDEX: 35.57 KG/M2 | WEIGHT: 227.1 LBS | HEART RATE: 73 BPM | OXYGEN SATURATION: 96 % | DIASTOLIC BLOOD PRESSURE: 83 MMHG

## 2019-07-22 VITALS — RESPIRATION RATE: 16 BRPM | HEIGHT: 67 IN | BODY MASS INDEX: 35.71 KG/M2

## 2019-07-22 DIAGNOSIS — G47.00 INSOMNIA, UNSPECIFIED TYPE: Primary | ICD-10-CM

## 2019-07-22 DIAGNOSIS — M72.2 PLANTAR FASCIITIS OF RIGHT FOOT: Primary | ICD-10-CM

## 2019-07-22 DIAGNOSIS — N18.30 CKD (CHRONIC KIDNEY DISEASE) STAGE 3, GFR 30-59 ML/MIN (H): ICD-10-CM

## 2019-07-22 DIAGNOSIS — I10 ESSENTIAL HYPERTENSION: ICD-10-CM

## 2019-07-22 DIAGNOSIS — R60.9 FLUID RETENTION: ICD-10-CM

## 2019-07-22 DIAGNOSIS — K21.9 GASTROESOPHAGEAL REFLUX DISEASE, ESOPHAGITIS PRESENCE NOT SPECIFIED: ICD-10-CM

## 2019-07-22 RX ORDER — AMLODIPINE BESYLATE 5 MG/1
5 TABLET ORAL DAILY
Qty: 90 TABLET | Refills: 3 | Status: SHIPPED | OUTPATIENT
Start: 2019-07-22 | End: 2020-09-12

## 2019-07-22 RX ORDER — DOXEPIN HYDROCHLORIDE 10 MG/1
10 CAPSULE ORAL AT BEDTIME
Qty: 30 CAPSULE | Refills: 0 | Status: SHIPPED | OUTPATIENT
Start: 2019-07-22 | End: 2020-05-04

## 2019-07-22 RX ORDER — PANTOPRAZOLE SODIUM 40 MG/1
TABLET, DELAYED RELEASE ORAL
Qty: 90 TABLET | Refills: 3 | Status: SHIPPED | OUTPATIENT
Start: 2019-07-22 | End: 2020-09-12

## 2019-07-22 RX ORDER — CHLORTHALIDONE 25 MG/1
12.5 TABLET ORAL DAILY
Qty: 45 TABLET | Refills: 3 | Status: SHIPPED | OUTPATIENT
Start: 2019-07-22 | End: 2020-09-12

## 2019-07-22 ASSESSMENT — PAIN SCALES - GENERAL: PAINLEVEL: MILD PAIN (3)

## 2019-07-22 NOTE — PROGRESS NOTES
[facility]  Primary Care Center   Arlyn Sanchez MD  07/22/2019      Chief Complaint:   Fall    History of Present Illness:   Phan Luque is a 63 year old female with a history of liver transplant related to alcoholic hepatitis, anxiety, HTN, CKD, IBS who presents for follow up of a fall.    Fall  The patient reports she fell down the stairs. She is feeling much better and did not have any fractures. She is back to working out at the gym and is transitioning back to weight bearing exercises.    Sleep  Is following with psychiatry regarding her trouble sleeping. Has tried Gabapentin but notes difficulty waking up. Has tried OTC medication but did not experience symptomatic relief. Falling and staying asleep are her biggest problems.    Other concerns discussed:  1. She has discontinued prednisone. Does not note that it was difficult.  2. Had mammogram and bone density done.  3. She has lost some weight and hopes to lose more. She is aware of what the next steps are for further weight loss.     Review of Systems:   Pertinent items are noted in HPI or as in patient entered ROS below, remainder of complete ROS is negative.     Active Medications:     Current Outpatient Medications:      acetaminophen (TYLENOL) 325 MG tablet, Take 2 tablets (650 mg) by mouth every 6 hours as needed for mild pain Or fevers. Use sparingly. Limit use to no more than 2 grams (2000 mg) in 24 hours. **Further refills must be obtained by primary care provider**, Disp: 100 tablet, Rfl: 0     amLODIPine (NORVASC) 5 MG tablet, Take 1 tablet (5 mg) by mouth daily, Disp: 90 tablet, Rfl: 3     amLODIPine (NORVASC) 5 MG tablet, Take 1 tablet (5 mg) by mouth daily, Disp: 90 tablet, Rfl: 3     azaTHIOprine (IMURAN) 50 MG tablet, TAKE ONE TABLET BY MOUTH EVERY MORNING, Disp: 90 tablet, Rfl: 3     azaTHIOprine (IMURAN) 50 MG tablet, Take 1 tablet (50 mg) by mouth every morning, Disp: 90 tablet, Rfl: 3     betaxolol (BETOPTIC) 0.5 % ophthalmic  solution, Place 1 drop into both eyes daily, Disp: 5 mL, Rfl: 3     calcium Citrate-vitamin D 500-400 MG-UNIT CHEW, Take 1 tablet by mouth daily, Disp: , Rfl:      chlorthalidone (HYGROTON) 25 MG tablet, Take 0.5 tablets (12.5 mg) by mouth daily, Disp: 45 tablet, Rfl: 3     cholecalciferol (VITAMIN D3) 400 UNIT TABS tablet, Take 400 Units by mouth daily, Disp: , Rfl:      clobetasol (TEMOVATE) 0.05 % external ointment, Apply topically 2 times daily To areas of eczema on the lower legs, up to 2 weeks., Disp: 60 g, Rfl: 1     cyanocobalamin (VITAMIN  B-12) 1000 MCG tablet, Take 1,000 mcg by mouth daily, Disp: , Rfl:      diclofenac (VOLTAREN) 1 % GEL topical gel, Apply 2 g topically 4 times daily as needed for moderate pain, Disp: , Rfl:      doxepin (SINEQUAN) 10 MG capsule, Take 1 capsule (10 mg) by mouth At Bedtime, Disp: 30 capsule, Rfl: 0     ferrous sulfate (IRON) 325 (65 FE) MG tablet, Take 1 tablet (325 mg) by mouth 2 times daily, Disp: 100 tablet, Rfl:      folic acid (FOLVITE) 1 MG tablet, Take 1 tablet (1 mg) by mouth daily, Disp: 30 tablet, Rfl: 1     gabapentin (NEURONTIN) 100 MG capsule, Take 3 capsules (300 mg) by mouth At Bedtime Can take additional 100mg capsule twice per day as needed for anxiety/agitation, Disp: 150 capsule, Rfl: 2     hydrOXYzine (ATARAX) 25 MG tablet, TAKE 1 TO 2 TABLETS BY MOUTH EVERY 6 HOURS AS NEEDED FOR ITCHING, Disp: 120 tablet, Rfl: 5     levothyroxine (SYNTHROID/LEVOTHROID) 88 MCG tablet, Take 1 tablet (88 mcg) by mouth daily BEFORE BREAKFAST, Disp: 90 tablet, Rfl: 2     MAGNESIUM OXIDE PO, Take 400 mg by mouth daily, Disp: , Rfl:      melatonin 5 MG tablet, Take 1 tablet (5 mg) by mouth nightly as needed for sleep, Disp: , Rfl:      multivitamin, therapeutic (THERA-VIT) TABS tablet, Take 1 tablet by mouth every 24 hours, Disp: 30 tablet, Rfl: 11     Omega-3 Fatty Acids (OMEGA-3 FISH OIL EX ST PO), Take 1 capsule by mouth 2 times daily 1290 (fish oil)-900 (omega 3), Disp: ,  Rfl:      pantoprazole (PROTONIX) 40 MG EC tablet, TAKE ONE TABLET (40MG) BY MOUTH EVERY MORNING BEFORE BREAKFAST, Disp: 90 tablet, Rfl: 3     predniSONE (DELTASONE) 5 MG tablet, Take 1 tablet (5 mg) by mouth daily, Disp: 90 tablet, Rfl: 3     psyllium 0.52 G capsule, Take 2 capsules (1.04 g) by mouth daily With a full glass of water., Disp: 60 capsule, Rfl: 1     tacrolimus (GENERIC EQUIVALENT) 0.5 MG capsule, Take 4 capsules (2 mg) by mouth every morning AND 2 capsules (1 mg) every evening., Disp: 180 capsule, Rfl: 11     traMADol (ULTRAM) 50 MG tablet, Take 1 tablet (50 mg) by mouth every 6 hours as needed for severe pain, Disp: 30 tablet, Rfl: 0     traMADol (ULTRAM) 50 MG tablet, Take 1 tablet (50 mg) by mouth every 6 hours as needed for severe pain, Disp: 20 tablet, Rfl: 0     traZODone (DESYREL) 100 MG tablet, , Disp: , Rfl: 1     ursodiol (ACTIGALL) 300 MG capsule, Take 1 capsule (300 mg) by mouth every 12 hours, Disp: 60 capsule, Rfl: 11     predniSONE (DELTASONE) 1 MG tablet, Take 4 tablets (4 mg) by mouth daily (Patient not taking: Reported on 7/22/2019), Disp: 360 tablet, Rfl: 3      Allergies:   Codeine; Benadryl [diphenhydramine]; Cefaclor; Hydrocodone-acetaminophen; Oxycodone; Penicillins; and Sulfa drugs      Past Medical History:  Asthma  Chronic kidney disease stage 3  Ends stage liver disease  Alcohol abuse  Migraines  Osteoporosis  Cervical spinal stenosis  Liver transplant  Anemia  Hypothyroidism  Reflux  Depression     Past Surgical History:  Appendectomy  Bench liver  Colonoscopy  Cataract  EGD x2  Transplant liver recipient  Sphincteroplasty   Tubal ligation    Family History:   Mother: melanoma  Father: congestive heart failure      Social History:   Presents to clinic alone  Tobacco Use: quit smoking in 1996  Alcohol Use: heavy use until 2016, none since 7/18/16  PCP: Arlyn Sanchez     Physical Exam:   /83 (BP Location: Right arm, Patient Position: Sitting, Cuff Size: Adult  Regular)   Pulse 73   Wt 103 kg (227 lb 1.6 oz)   LMP  (LMP Unknown)   SpO2 96%   Breastfeeding? No   BMI 35.57 kg/m     Constitutional: Alert, oriented, pleasant, no acute distress  Head: Normocephalic, atraumatic  Eyes: Extra-ocular movements intact, no scleral icterus  Musculoskeletal: No edema, normal muscle tone, normal gait  Neurologic: Alert and oriented, cranial nerves 2-12 intact.  Psychiatric: normal mentation, affect and mood     Assessment and Plan:  Phan Luque is a 63 year old female with a history of liver transplant related to alcoholic hepatitis, anxiety, HTN, CKD, IBS who presents for follow up of a fall.    Hypertension  Stable.  - amLODIPine (NORVASC) 5 MG tablet  Dispense: 90 tablet; Refill: 3    CKD (chronic kidney disease) stage 3, GFR 30-59 ml/min (H)  - chlorthalidone (HYGROTON) 25 MG tablet  Dispense: 45 tablet; Refill: 3    Gastroesophageal reflux disease, esophagitis presence not specified  - pantoprazole (PROTONIX) 40 MG EC tablet  Dispense: 90 tablet; Refill: 3    Insomnia, unspecified type  She has tried numerous drugs including melatonin, trazodone, benadryl, dean and Ambien with side effects. We екатерина try doxepin but she is advised that it may cause side effects as well.  - doxepin (SINEQUAN) 10 MG capsule  Dispense: 30 capsule; Refill: 0    Follow-up: PRN    Scribe Disclosure:  Cari REDDING, am serving as a scribe to document services personally performed by Arlyn Sanchez MD at this visit, based upon the provider's statements to me. All documentation has been reviewed by the aforementioned provider prior to being entered into the official medical record.  Arlyn Sanchez MD  Internal Medicine

## 2019-07-22 NOTE — NURSING NOTE
Chief Complaint   Patient presents with     Fall     follow up meds / sleep / tailbone injury       Macey Sprague, EMT

## 2019-07-22 NOTE — PROGRESS NOTES
SPORTS & ORTHOPEDIC WALK-IN VISIT 7/22/2019    Primary Care Physician: Dr. Sanchez    Here today for right foot pain. Started in the middle of the night a few months ago. Pain in arch and now painful everywhere. Some days are better than others but overall not getting better. Today difficulty putting weight on it without pain. Sometimes bothers her at rest as well. No history of issues like this. Tender over medial arch and into heel. No tenderness over lateral side but does feel pain through whole heel with pressure/weight bearing. Went to gym yesterday and walked on treadmill, didn't seem to make things worse at the time but today is more painful.  Pain is worse when getting out of bed in the morning.  Seems better when wearing shoes as opposed to barefoot.  Has not had this problem previously.  No pain at rest.  No new tingling or numbness.    Reason for visit:     What part of your body is injured / painful?  right foot    What caused the injury /pain? Unsure    How long ago did your injury occur or pain begin? several months ago    What are your most bothersome symptoms? Pain    How would you characterize your symptom?  Throbing, sometimes sharp and stabbing    What makes your symptoms better? Nothing - hasn't tried much    What makes your symptoms worse? Standing and Walking    Have you been previously seen for this problem? No    Medical History:    Any recent changes to your medical history? No    Any new medication prescribed since last visit? No    Have you had surgery on this body part before? No    Social History:    Occupation: [employer]    Handedness: Right    Exercise: None    Review of Systems:    Do you have fever, chills, weight loss? No    Do you have any vision problems? No    Do you have any chest pain or edema? No    Do you have any shortness of breath or wheezing?  No    Do you have stomach problems? No    Do you have any numbness or focal weakness? Yes, peripheral neuropathy and  "cervical stenosis    Do you have diabetes? No    Do you have problems with bleeding or clotting? No    Do you have an rashes or other skin lesions? No         Past Medical History, Current Medications, and Allergies are reviewed in the electronic medical record as appropriate.       EXAM:Resp 16   Ht 1.702 m (5' 7\")   LMP  (LMP Unknown)   BMI 35.71 kg/m      General: Alert, pleasant, no distress  Right foot: Warm, well-perfused.  Cap refill brisk.  Sensation intact light touch.  R OM is symmetric without pain.  There is some symmetric bilateral heel cord tightness noted.  Patient is able to dorsiflex, plantarflex, invert and derrell against resistance without significant pain or weakness noted.  TTP over the medial arch near the calcaneus.  No TTP of the remaining arch, midfoot, metatarsals, fifth metatarsal base, heel compression, medial or lateral malleolus.    Imaging: No imaging during today's visit.      Assessment: Patient is a 63 year old female with right foot pain consistent with plantar fasciitis.    Recommendations:   Reviewed assessment with patient detail  Discussed natural course of plantar fasciitis as well as its treatment.  Discussed home exercise, footwear modifications as well as preventative measures.  She will call if she likes referral to physical therapy.  She will follow-up if she has any significant increase in pain.     Torin Ruffin MD              "

## 2019-07-22 NOTE — LETTER
7/22/2019       RE: Phan Luque  [address] [facility] [address]     Dear Colleague,    Thank you for referring your patient, Phan Luque, to the [facility] SPORTS AND ORTHOPAEDIC WALK IN CLINIC at [facility]. Please see a copy of my visit note below.          SPORTS & ORTHOPEDIC WALK-IN VISIT 7/22/2019    Primary Care Physician: Dr. Sanchez    Here today for right foot pain. Started in the middle of the night a few months ago. Pain in arch and now painful everywhere. Some days are better than others but overall not getting better. Today difficulty putting weight on it without pain. Sometimes bothers her at rest as well. No history of issues like this. Tender over medial arch and into heel. No tenderness over lateral side but does feel pain through whole heel with pressure/weight bearing. Went to gym yesterday and walked on treadmill, didn't seem to make things worse at the time but today is more painful.  Pain is worse when getting out of bed in the morning.  Seems better when wearing shoes as opposed to barefoot.  Has not had this problem previously.  No pain at rest.  No new tingling or numbness.    Reason for visit:     What part of your body is injured / painful?  right foot    What caused the injury /pain? Unsure    How long ago did your injury occur or pain begin? several months ago    What are your most bothersome symptoms? Pain    How would you characterize your symptom?  Throbing, sometimes sharp and stabbing    What makes your symptoms better? Nothing - hasn't tried much    What makes your symptoms worse? Standing and Walking    Have you been previously seen for this problem? No    Medical History:    Any recent changes to your medical history? No    Any new medication prescribed since last visit? No    Have you had surgery on this body part before? No    Social History:    Occupation: [employer]    Handedness: Right    Exercise: None    Review of  "Systems:    Do you have fever, chills, weight loss? No    Do you have any vision problems? No    Do you have any chest pain or edema? No    Do you have any shortness of breath or wheezing?  No    Do you have stomach problems? No    Do you have any numbness or focal weakness? Yes, peripheral neuropathy and cervical stenosis    Do you have diabetes? No    Do you have problems with bleeding or clotting? No    Do you have an rashes or other skin lesions? No       Past Medical History, Current Medications, and Allergies are reviewed in the electronic medical record as appropriate.     EXAM:Resp 16   Ht 1.702 m (5' 7\")   LMP  (LMP Unknown)   BMI 35.71 kg/m       General: Alert, pleasant, no distress  Right foot: Warm, well-perfused.  Cap refill brisk.  Sensation intact light touch.  R OM is symmetric without pain.  There is some symmetric bilateral heel cord tightness noted.  Patient is able to dorsiflex, plantarflex, invert and derrell against resistance without significant pain or weakness noted.  TTP over the medial arch near the calcaneus.  No TTP of the remaining arch, midfoot, metatarsals, fifth metatarsal base, heel compression, medial or lateral malleolus.    Imaging: No imaging during today's visit.    Assessment: Patient is a 63 year old female with right foot pain consistent with plantar fasciitis.    Recommendations:   Reviewed assessment with patient detail  Discussed natural course of plantar fasciitis as well as its treatment.  Discussed home exercise, footwear modifications as well as preventative measures.  She will call if she likes referral to physical therapy.  She will follow-up if she has any significant increase in pain.     Torin Ruffin MD    "

## 2019-07-22 NOTE — PATIENT INSTRUCTIONS
[facility] Medication Refill Request Information:  * Please contact your pharmacy regarding ANY request for medication refills.  ** [facility] Prescription Fax = [phone]  * Please allow 3 business days for routine medication refills.  * Please allow 5 business days for controlled substance medication refills.     [facility] Test Result notification information:  *You will be notified with in 7-10 days of your appointment day regarding the results of your test.  If you are on MyChart you will be notified as soon as the provider has reviewed the results and signed off on them.

## 2019-07-24 ENCOUNTER — TELEPHONE (OUTPATIENT)
Dept: TRANSPLANT | Facility: CLINIC | Age: 64
End: 2019-07-24

## 2019-07-24 DIAGNOSIS — Z94.4 LIVER REPLACED BY TRANSPLANT (H): ICD-10-CM

## 2019-07-24 LAB
ALBUMIN SERPL-MCNC: 3.7 G/DL (ref 3.4–5)
ALP SERPL-CCNC: 56 U/L (ref 40–150)
ALT SERPL W P-5'-P-CCNC: 23 U/L (ref 0–50)
ANION GAP SERPL CALCULATED.3IONS-SCNC: 9 MMOL/L (ref 3–14)
AST SERPL W P-5'-P-CCNC: 18 U/L (ref 0–45)
BILIRUB DIRECT SERPL-MCNC: 0.1 MG/DL (ref 0–0.2)
BILIRUB SERPL-MCNC: 0.5 MG/DL (ref 0.2–1.3)
BUN SERPL-MCNC: 31 MG/DL (ref 7–30)
CALCIUM SERPL-MCNC: 9.2 MG/DL (ref 8.5–10.1)
CHLORIDE SERPL-SCNC: 108 MMOL/L (ref 94–109)
CO2 SERPL-SCNC: 25 MMOL/L (ref 20–32)
CREAT SERPL-MCNC: 1.2 MG/DL (ref 0.52–1.04)
ERYTHROCYTE [DISTWIDTH] IN BLOOD BY AUTOMATED COUNT: 13.6 % (ref 10–15)
GFR SERPL CREATININE-BSD FRML MDRD: 48 ML/MIN/{1.73_M2}
GLUCOSE SERPL-MCNC: 113 MG/DL (ref 70–99)
HCT VFR BLD AUTO: 41.4 % (ref 35–47)
HGB BLD-MCNC: 14 G/DL (ref 11.7–15.7)
MCH RBC QN AUTO: 30.6 PG (ref 26.5–33)
MCHC RBC AUTO-ENTMCNC: 33.8 G/DL (ref 31.5–36.5)
MCV RBC AUTO: 90 FL (ref 78–100)
PLATELET # BLD AUTO: 322 10E9/L (ref 150–450)
POTASSIUM SERPL-SCNC: 3.7 MMOL/L (ref 3.4–5.3)
PROT SERPL-MCNC: 7.3 G/DL (ref 6.8–8.8)
RBC # BLD AUTO: 4.58 10E12/L (ref 3.8–5.2)
SODIUM SERPL-SCNC: 142 MMOL/L (ref 133–144)
TACROLIMUS BLD-MCNC: 4.8 UG/L (ref 5–15)
TME LAST DOSE: ABNORMAL H
WBC # BLD AUTO: 8.1 10E9/L (ref 4–11)

## 2019-07-24 PROCEDURE — 80048 BASIC METABOLIC PNL TOTAL CA: CPT | Performed by: INTERNAL MEDICINE

## 2019-07-24 PROCEDURE — 36415 COLL VENOUS BLD VENIPUNCTURE: CPT | Performed by: INTERNAL MEDICINE

## 2019-07-24 PROCEDURE — 80197 ASSAY OF TACROLIMUS: CPT | Performed by: INTERNAL MEDICINE

## 2019-07-24 PROCEDURE — 80076 HEPATIC FUNCTION PANEL: CPT | Performed by: INTERNAL MEDICINE

## 2019-07-24 PROCEDURE — 85027 COMPLETE CBC AUTOMATED: CPT | Performed by: INTERNAL MEDICINE

## 2019-08-06 ENCOUNTER — TELEPHONE (OUTPATIENT)
Dept: INTERNAL MEDICINE | Facility: CLINIC | Age: 64
End: 2019-08-06

## 2019-08-06 DIAGNOSIS — J45.20 INTERMITTENT ASTHMA WITHOUT COMPLICATION, UNSPECIFIED ASTHMA SEVERITY: Primary | ICD-10-CM

## 2019-08-06 NOTE — TELEPHONE ENCOUNTER
Left a message for patient that we will need to know the name and dose of the inhaler she is using, as we do not have an inhaler on her medication list. Niru Garrett LPN 8/6/2019 12:19 PM

## 2019-08-06 NOTE — TELEPHONE ENCOUNTER
[facility] Call Center    Phone Message    May a detailed message be left on voicemail: yes    Reason for Call: Medication Question or concern regarding medication   Prescription Clarification  Name of Medication: Inhaler  Prescribing Provider: LAURA ACUÑA    Pharmacy: n/a   What on the order needs clarification? Pt is wanting to know if LAURA White  Can refill her prescriptions when she runs out. Pt states she knows LAURA White  Did not prescribe the medication. Pt states she is going to be out soon and wanting to know if LAURA White  Can refill when she runs out.           Action Taken: Message routed to:  [facility] Pcc

## 2019-08-07 ENCOUNTER — TELEPHONE (OUTPATIENT)
Dept: INTERNAL MEDICINE | Facility: CLINIC | Age: 64
End: 2019-08-07

## 2019-08-07 DIAGNOSIS — Z94.4 LIVER REPLACED BY TRANSPLANT (H): ICD-10-CM

## 2019-08-07 LAB
ALBUMIN SERPL-MCNC: 3.7 G/DL (ref 3.4–5)
ALP SERPL-CCNC: 62 U/L (ref 40–150)
ALT SERPL W P-5'-P-CCNC: 27 U/L (ref 0–50)
AST SERPL W P-5'-P-CCNC: 17 U/L (ref 0–45)
BILIRUB DIRECT SERPL-MCNC: 0.1 MG/DL (ref 0–0.2)
BILIRUB SERPL-MCNC: 0.6 MG/DL (ref 0.2–1.3)
PROT SERPL-MCNC: 7.5 G/DL (ref 6.8–8.8)

## 2019-08-07 PROCEDURE — 80076 HEPATIC FUNCTION PANEL: CPT | Performed by: INTERNAL MEDICINE

## 2019-08-07 PROCEDURE — 36415 COLL VENOUS BLD VENIPUNCTURE: CPT | Performed by: INTERNAL MEDICINE

## 2019-08-07 RX ORDER — ALBUTEROL SULFATE 90 UG/1
2 AEROSOL, METERED RESPIRATORY (INHALATION) EVERY 6 HOURS
Qty: 18 G | Refills: 3 | Status: SHIPPED | OUTPATIENT
Start: 2019-08-07 | End: 2020-03-27

## 2019-08-07 NOTE — TELEPHONE ENCOUNTER
Patient asking if PCP will fill ProAir ventolin rescue inhaler. This is not currently on the patients medication list or talked about in last visit on 7/22/19, other than listed on the patients past medical history that the patient has asthma. Will send to PCP for recommendations. Niru Garrett LPN 8/7/2019 8:48 AM

## 2019-08-07 NOTE — TELEPHONE ENCOUNTER
[facility] Call Center    Phone Message    May a detailed message be left on voicemail: yes    Reason for Call: Other: the name of the medication is ProAir ventolin  the rescue inhaler. It is not on her medication list.    She would like to go to [facility] on [address]  [phone]  Action Taken: Message routed to:  [facility] Primary care

## 2019-08-13 ASSESSMENT — PATIENT HEALTH QUESTIONNAIRE - PHQ9: SUM OF ALL RESPONSES TO PHQ QUESTIONS 1-9: 6

## 2019-08-14 ENCOUNTER — OFFICE VISIT (OUTPATIENT)
Dept: OPHTHALMOLOGY | Facility: CLINIC | Age: 64
End: 2019-08-14
Payer: COMMERCIAL

## 2019-08-14 ENCOUNTER — TELEPHONE (OUTPATIENT)
Dept: OPHTHALMOLOGY | Facility: CLINIC | Age: 64
End: 2019-08-14

## 2019-08-14 DIAGNOSIS — H53.10 SUBJECTIVE VISUAL DISTURBANCE: ICD-10-CM

## 2019-08-14 DIAGNOSIS — H47.012 AION (ACUTE ISCHEMIC OPTIC NEUROPATHY), LEFT: Primary | ICD-10-CM

## 2019-08-14 DIAGNOSIS — H04.123 DRY EYE SYNDROME, BILATERAL: ICD-10-CM

## 2019-08-14 ASSESSMENT — EXTERNAL EXAM - LEFT EYE: OS_EXAM: NORMAL

## 2019-08-14 ASSESSMENT — REFRACTION_WEARINGRX
OS_ADD: +2.00
OD_SPHERE: PLANO
OD_ADD: +2.00
OS_SPHERE: PLANO

## 2019-08-14 ASSESSMENT — VISUAL ACUITY
CORRECTION_TYPE: GLASSES
OS_SC: 20/70ECC
OD_SC: 20/20
METHOD: SNELLEN - LINEAR
OD_CC: J1+
OD_SC+: -2
OS_SC+: -2

## 2019-08-14 ASSESSMENT — CONF VISUAL FIELD
OS_SUPERIOR_NASAL_RESTRICTION: 3
OD_NORMAL: 1
OS_INFERIOR_TEMPORAL_RESTRICTION: 1
OS_INFERIOR_NASAL_RESTRICTION: 1
OS_SUPERIOR_TEMPORAL_RESTRICTION: 3

## 2019-08-14 ASSESSMENT — REFRACTION_MANIFEST
OS_SPHERE: PLANO
OS_ADD: +2.50
OD_SPHERE: PLANO
OD_ADD: +2.50

## 2019-08-14 ASSESSMENT — SLIT LAMP EXAM - LIDS: COMMENTS: NORMAL

## 2019-08-14 ASSESSMENT — TONOMETRY
OD_IOP_MMHG: 17
OS_IOP_MMHG: 18
IOP_METHOD: ICARE

## 2019-08-14 ASSESSMENT — CUP TO DISC RATIO
OS_RATIO: 0.55
OD_RATIO: 0.25

## 2019-08-14 ASSESSMENT — EXTERNAL EXAM - RIGHT EYE: OD_EXAM: NORMAL

## 2019-08-14 NOTE — PROGRESS NOTES
Assessment & Plan     Phan Luque is a 63 year old female with the following diagnoses:   1. AION (acute ischemic optic neuropathy), left    2. Subjective visual disturbance         Follow up Anterior ischemic optic neuropathy (AION) LEFT eye. Last visit was August 2018.  Patient has been off of prednisone that she took for arthritis since March and has stopped taking betaxolol.  Patient feels no change in her vision.     Her visual acuity is 20/20 RIGHT eye and 20/70 LEFT eye (stable).  Color plates 11/11 right eye and 1/11 left eye. Her visual field is stable.  Her exam is stable.      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 The RIGHT eye is normal.  Start Xiidra twice a day both eyes and restart artificial tears. Follow up 1 year sooner as needed for worsening symptoms.             Attending Physician Attestation:  Complete documentation of historical and exam elements from today's encounter can be found in the full encounter summary report (not reduplicated in this progress note).  I personally obtained the chief complaint(s) and history of present illness.  I confirmed and edited as necessary the review of systems, past medical/surgical history, family history, social history, and examination findings as documented by others; and I examined the patient myself.  I personally reviewed the relevant tests, images, and reports as documented above.  I formulated and edited as necessary the assessment and plan and discussed the findings and management plan with the patient and family. I personally reviewed the ophthalmic test(s) associated with this encounter, agree with the interpretation(s) as documented by the resident/fellow, and have edited the corresponding report(s) as necessary.  - Ambrose Alexis MD  Ophthalmology, PGY-5  Neuro-Ophthalmology Fellow

## 2019-08-14 NOTE — NURSING NOTE
Chief Complaints and History of Present Illnesses   Patient presents with     Follow Up     Chief Complaint(s) and History of Present Illness(es)     Follow Up     Laterality: both eyes    Onset: years ago    Frequency: constantly    Course: stable    Treatments tried: no treatments    Pain scale: 0/10              Comments     AION follow up. Pt states has noticed having to look through the bi-focal to see better. Pt feels overall vision is stable. No pain. No drops.    Lisa Horta COT 7:24 AM August 14, 2019

## 2019-08-14 NOTE — TELEPHONE ENCOUNTER
[facility] Prior Authorization Team Request    Medication: Xiidra 5% Solution  Dosing: Place 1 drop into each eye twice daily  Qty: 60  Day Supply: 30  NDC (required for Medicaid members): [address]-70     Insurance   BIN: [id number]  PCN: [facility]  Grp:   ID: [id number]    CoverMyMeds Key (if applicable):     Additional documentation:       Filling Pharmacy: [facility]  Phone Number: [phone]  Contact:    Pharmacy NPI (required for Medicaid members): [id number]

## 2019-08-17 DIAGNOSIS — G25.81 RESTLESS LEG SYNDROME: ICD-10-CM

## 2019-08-19 ENCOUNTER — TELEPHONE (OUTPATIENT)
Dept: TRANSPLANT | Facility: CLINIC | Age: 64
End: 2019-08-19

## 2019-08-19 RX ORDER — PRAMIPEXOLE DIHYDROCHLORIDE 0.5 MG/1
TABLET ORAL
Qty: 90 TABLET | Refills: 3 | OUTPATIENT
Start: 2019-08-19

## 2019-08-19 NOTE — TELEPHONE ENCOUNTER
Writer spoke to pt about the refill request for Mirapex. Pt states that she actually has quite a few tabs at home but if pt needs a refill she will go through PCP.

## 2019-08-19 NOTE — TELEPHONE ENCOUNTER
PA Initiation    Medication: XIIDRA 5%  -   Insurance Company: [employer] [place] - Phone [phone] Fax [phone]  Pharmacy Filling the Rx: [facility] [place], [place] - [address] [place] [address] [id number]  Filling Pharmacy Phone: [phone]  Filling Pharmacy Fax:    Start Date: 8/19/2019

## 2019-08-21 NOTE — TELEPHONE ENCOUNTER
Prior Authorization Approval    Medication: XIIDRA 5%  - APPROVED was approved on 8/21/2019  Effective: 8/16/2019 to 8/16/2020  Reference #:    Approved Dose/Quantity:   Insurance Company: [employer] [place] - Phone [phone] Fax [phone]  Expected CoPay:    Pharmacy Filling the Rx: [facility] [place], [place] - [address] [place] [address] [phone]  Pharmacy Notified: Yes  Patient Notified: Comment:  **Instructed pharmacy to notify patient when script is ready to /ship.**

## 2019-08-30 ENCOUNTER — TELEPHONE (OUTPATIENT)
Dept: PHARMACY | Facility: CLINIC | Age: 64
End: 2019-08-30

## 2019-09-16 ENCOUNTER — DOCUMENTATION ONLY (OUTPATIENT)
Dept: CARE COORDINATION | Facility: CLINIC | Age: 64
End: 2019-09-16

## 2019-09-16 DIAGNOSIS — Z13.220 LIPID SCREENING: ICD-10-CM

## 2019-09-16 DIAGNOSIS — Z94.4 LIVER REPLACED BY TRANSPLANT (H): ICD-10-CM

## 2019-09-23 DIAGNOSIS — Z94.4 LIVER TRANSPLANTED (H): ICD-10-CM

## 2019-09-23 DIAGNOSIS — F41.1 GAD (GENERALIZED ANXIETY DISORDER): Primary | ICD-10-CM

## 2019-09-23 RX ORDER — URSODIOL 300 MG/1
CAPSULE ORAL
Qty: 60 CAPSULE | Refills: 11 | Status: SHIPPED | OUTPATIENT
Start: 2019-09-23 | End: 2020-09-08

## 2019-09-24 RX ORDER — HYDROXYZINE HYDROCHLORIDE 25 MG/1
TABLET, FILM COATED ORAL
Qty: 120 TABLET | Refills: 5 | Status: SHIPPED | OUTPATIENT
Start: 2019-09-24 | End: 2019-12-22

## 2019-09-30 DIAGNOSIS — Z13.220 LIPID SCREENING: ICD-10-CM

## 2019-09-30 DIAGNOSIS — Z94.4 LIVER REPLACED BY TRANSPLANT (H): ICD-10-CM

## 2019-09-30 LAB
ALBUMIN SERPL-MCNC: 3.8 G/DL (ref 3.4–5)
ALP SERPL-CCNC: 65 U/L (ref 40–150)
ALT SERPL W P-5'-P-CCNC: 22 U/L (ref 0–50)
ANION GAP SERPL CALCULATED.3IONS-SCNC: 7 MMOL/L (ref 3–14)
AST SERPL W P-5'-P-CCNC: 15 U/L (ref 0–45)
BILIRUB DIRECT SERPL-MCNC: 0.1 MG/DL (ref 0–0.2)
BILIRUB SERPL-MCNC: 0.7 MG/DL (ref 0.2–1.3)
BUN SERPL-MCNC: 37 MG/DL (ref 7–30)
CALCIUM SERPL-MCNC: 9.2 MG/DL (ref 8.5–10.1)
CHLORIDE SERPL-SCNC: 102 MMOL/L (ref 94–109)
CO2 SERPL-SCNC: 25 MMOL/L (ref 20–32)
CREAT SERPL-MCNC: 1.31 MG/DL (ref 0.52–1.04)
ERYTHROCYTE [DISTWIDTH] IN BLOOD BY AUTOMATED COUNT: 13.7 % (ref 10–15)
GFR SERPL CREATININE-BSD FRML MDRD: 43 ML/MIN/{1.73_M2}
GLUCOSE SERPL-MCNC: 111 MG/DL (ref 70–99)
HCT VFR BLD AUTO: 42.7 % (ref 35–47)
HGB BLD-MCNC: 14.4 G/DL (ref 11.7–15.7)
MCH RBC QN AUTO: 30.5 PG (ref 26.5–33)
MCHC RBC AUTO-ENTMCNC: 33.7 G/DL (ref 31.5–36.5)
MCV RBC AUTO: 91 FL (ref 78–100)
PLATELET # BLD AUTO: 280 10E9/L (ref 150–450)
POTASSIUM SERPL-SCNC: 4 MMOL/L (ref 3.4–5.3)
PROT SERPL-MCNC: 7.5 G/DL (ref 6.8–8.8)
RBC # BLD AUTO: 4.72 10E12/L (ref 3.8–5.2)
SODIUM SERPL-SCNC: 134 MMOL/L (ref 133–144)
TACROLIMUS BLD-MCNC: 4 UG/L (ref 5–15)
TME LAST DOSE: ABNORMAL H
WBC # BLD AUTO: 8.3 10E9/L (ref 4–11)

## 2019-09-30 PROCEDURE — 85027 COMPLETE CBC AUTOMATED: CPT | Performed by: INTERNAL MEDICINE

## 2019-09-30 PROCEDURE — 36415 COLL VENOUS BLD VENIPUNCTURE: CPT | Performed by: INTERNAL MEDICINE

## 2019-09-30 PROCEDURE — 80048 BASIC METABOLIC PNL TOTAL CA: CPT | Performed by: INTERNAL MEDICINE

## 2019-09-30 PROCEDURE — 80076 HEPATIC FUNCTION PANEL: CPT | Performed by: INTERNAL MEDICINE

## 2019-09-30 PROCEDURE — 80197 ASSAY OF TACROLIMUS: CPT | Performed by: INTERNAL MEDICINE

## 2019-10-04 ENCOUNTER — OFFICE VISIT (OUTPATIENT)
Dept: GASTROENTEROLOGY | Facility: CLINIC | Age: 64
End: 2019-10-04
Attending: INTERNAL MEDICINE
Payer: COMMERCIAL

## 2019-10-04 ENCOUNTER — OFFICE VISIT (OUTPATIENT)
Dept: ORTHOPEDICS | Facility: CLINIC | Age: 64
End: 2019-10-04
Payer: COMMERCIAL

## 2019-10-04 ENCOUNTER — RECORDS - HEALTHEAST (OUTPATIENT)
Dept: ADMINISTRATIVE | Facility: OTHER | Age: 64
End: 2019-10-04

## 2019-10-04 VITALS
WEIGHT: 222.2 LBS | OXYGEN SATURATION: 96 % | DIASTOLIC BLOOD PRESSURE: 84 MMHG | SYSTOLIC BLOOD PRESSURE: 155 MMHG | TEMPERATURE: 98.6 F | BODY MASS INDEX: 34.8 KG/M2 | HEART RATE: 81 BPM

## 2019-10-04 VITALS — RESPIRATION RATE: 16 BRPM | BODY MASS INDEX: 35.57 KG/M2 | HEIGHT: 67 IN

## 2019-10-04 DIAGNOSIS — M72.2 PLANTAR FASCIAL FIBROMATOSIS: Primary | ICD-10-CM

## 2019-10-04 DIAGNOSIS — Z94.4 LIVER REPLACED BY TRANSPLANT (H): Primary | ICD-10-CM

## 2019-10-04 PROCEDURE — 90750 HZV VACC RECOMBINANT IM: CPT | Mod: ZF | Performed by: INTERNAL MEDICINE

## 2019-10-04 PROCEDURE — 25000581 ZZH RX MED A9270 GY (STAT IND- M) 250: Mod: ZF | Performed by: INTERNAL MEDICINE

## 2019-10-04 PROCEDURE — 90471 IMMUNIZATION ADMIN: CPT | Mod: ZF

## 2019-10-04 PROCEDURE — G0463 HOSPITAL OUTPT CLINIC VISIT: HCPCS | Mod: 25,ZF

## 2019-10-04 RX ORDER — PRAMIPEXOLE DIHYDROCHLORIDE 0.5 MG/1
0.5 TABLET ORAL AT BEDTIME
Refills: 2 | COMMUNITY
Start: 2019-01-25 | End: 2019-12-13

## 2019-10-04 RX ADMIN — ZOSTER VACCINE RECOMBINANT, ADJUVANTED 0.5 ML: KIT at 09:32

## 2019-10-04 ASSESSMENT — PAIN SCALES - GENERAL: PAINLEVEL: NO PAIN (0)
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
## 2019-10-04 NOTE — LETTER
10/4/2019       RE: Phan Luque  [address] [facility] [address]     Dear Colleague,    Thank you for referring your patient, Phan Luque, to the [facility] HEPATOLOGY at [facility]. Please see a copy of my visit note below.    HISTORY OF PRESENT ILLNESS:  I had the pleasure of seeing Phan Luque for followup in the Liver Transplant Clinic at the [facility] on 10/04/2019.  Ms. Luque returns for followup now more than 3 years status post liver transplantation for alcoholic hepatitis.  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      Her only complaint today is some plantar fasciitis.  She is working with the people upstairs to try to get that under better control.      She otherwise denies any abdominal pain, itching or skin rash or fatigue.  She is working full time.  She denies any increased abdominal girth or lower extremity edema.  She denies any fevers or chills, cough or shortness of breath.  She denies any nausea, vomiting, diarrhea or constipation.  Her appetite is good.  She is trying to lose some weight.  Indeed, she has lost 7 pounds since she was last seen.     Current Outpatient Medications   Medication     acetaminophen (TYLENOL) 325 MG tablet     albuterol (PROAIR HFA/PROVENTIL HFA/VENTOLIN HFA) 108 (90 Base) MCG/ACT inhaler     amLODIPine (NORVASC) 5 MG tablet     chlorthalidone (HYGROTON) 25 MG tablet     cholecalciferol (VITAMIN D3) 400 UNIT TABS tablet     clobetasol (TEMOVATE) 0.05 % external ointment     cyanocobalamin (VITAMIN  B-12) 1000 MCG tablet     ferrous sulfate (IRON) 325 (65 FE) MG tablet     gabapentin (NEURONTIN) 100 MG capsule     hydrOXYzine (ATARAX) 25 MG tablet     levothyroxine (SYNTHROID/LEVOTHROID) 88 MCG tablet     MAGNESIUM OXIDE PO     multivitamin, therapeutic (THERA-VIT) TABS tablet     pantoprazole (PROTONIX) 40 MG EC tablet     pramipexole (MIRAPEX) 0.5 MG  tablet     psyllium 0.52 G capsule     tacrolimus (GENERIC EQUIVALENT) 0.5 MG capsule     traMADol (ULTRAM) 50 MG tablet     ursodiol (ACTIGALL) 300 MG capsule     amLODIPine (NORVASC) 5 MG tablet     betaxolol (BETOPTIC) 0.5 % ophthalmic solution     calcium Citrate-vitamin D 500-400 MG-UNIT CHEW     diclofenac (VOLTAREN) 1 % GEL topical gel     doxepin (SINEQUAN) 10 MG capsule     folic acid (FOLVITE) 1 MG tablet     hydrOXYzine (ATARAX) 25 MG tablet     lifitegrast (XIIDRA) 5 % opthalmic solution     melatonin 5 MG tablet     Omega-3 Fatty Acids (OMEGA-3 FISH OIL EX ST PO)     predniSONE (DELTASONE) 1 MG tablet     predniSONE (DELTASONE) 5 MG tablet     traZODone (DESYREL) 100 MG tablet     ursodiol (ACTIGALL) 300 MG capsule     No current facility-administered medications for this visit.      BP (!) 155/84 (BP Location: Right arm, Patient Position: Sitting, Cuff Size: Adult Regular)   Pulse 81   Temp 98.6  F (37  C)   Wt 100.8 kg (222 lb 3.2 oz)   LMP  (LMP Unknown)   SpO2 96%   BMI 34.80 kg/m       PHYSICAL EXAMINATION:  In general, she looks quite well.  HEENT exam shows no scleral icterus or temporal muscle wasting.  Chest is clear.  Abdominal exam shows no increase in girth.  No masses or tenderness to palpation are present.  Her liver is 10 cm in span without left lobe enlargement.  No spleen tip is palpable, and extremity exam shows no edema.  Skin exam shows no suspicious lesions.  Neurologic exam is nonfocal.     Recent Results (from the past 168 hour(s))   Tacrolimus level    Collection Time: 09/30/19  7:21 AM   Result Value Ref Range    Tacrolimus Last Dose [id number] 1930     Tacrolimus Level 4.0 (L) 5.0 - 15.0 ug/L   Hepatic panel    Collection Time: 09/30/19  7:21 AM   Result Value Ref Range    Bilirubin Direct 0.1 0.0 - 0.2 mg/dL    Bilirubin Total 0.7 0.2 - 1.3 mg/dL    Albumin 3.8 3.4 - 5.0 g/dL    Protein Total 7.5 6.8 - 8.8 g/dL    Alkaline Phosphatase 65 40 - 150 U/L    ALT 22 0 - 50 U/L     AST 15 0 - 45 U/L   Basic metabolic panel    Collection Time: 09/30/19  7:21 AM   Result Value Ref Range    Sodium 134 133 - 144 mmol/L    Potassium 4.0 3.4 - 5.3 mmol/L    Chloride 102 94 - 109 mmol/L    Carbon Dioxide 25 20 - 32 mmol/L    Anion Gap 7 3 - 14 mmol/L    Glucose 111 (H) 70 - 99 mg/dL    Urea Nitrogen 37 (H) 7 - 30 mg/dL    Creatinine 1.31 (H) 0.52 - 1.04 mg/dL    GFR Estimate 43 (L) >60 mL/min/[1.73_m2]    GFR Estimate If Black 50 (L) >60 mL/min/[1.73_m2]    Calcium 9.2 8.5 - 10.1 mg/dL   CBC with platelets    Collection Time: 09/30/19  7:21 AM   Result Value Ref Range    WBC 8.3 4.0 - 11.0 10e9/L    RBC Count 4.72 3.8 - 5.2 10e12/L    Hemoglobin 14.4 11.7 - 15.7 g/dL    Hematocrit 42.7 35.0 - 47.0 %    MCV 91 78 - 100 fl    MCH 30.5 26.5 - 33.0 pg    MCHC 33.7 31.5 - 36.5 g/dL    RDW 13.7 10.0 - 15.0 %    Platelet Count 280 150 - 450 10e9/L      IMPRESSION:  My impression is that Ms. Luque is more than 3 years status post liver transplantation for alcoholic cirrhosis.  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  Her laboratory tests are excellent.  She will get a flu shot at work.  She is going to get her first Shingrix vaccine today.  She is otherwise up to date with regard to other vaccinations.  She is up to date with regard to cancer screening including skin cancer screening and her last bone density study showed only osteopenia.  My plan will be to see her back in the clinic in 6 months.      Thank you very much for allowing me to participate in the care of this patient.  If you have any questions regarding my recommendations, please do not hesitate to contact me.       Hussein Banegas MD      Professor of Medicine  [employer]      Executive Medical Director, Solid Organ Transplant Program  [facility]    Again, thank you for allowing me to participate in the care of your patient.       Sincerely,    Hussein Banegas MD

## 2019-10-04 NOTE — PROGRESS NOTES
SPORTS & ORTHOPEDIC WALK-IN FOLLOW-UP VISIT 10/4/2019    Interval History:     Follow up reason: Continued pain     Date of injury: chronic    Date last seen: 7/22/19    Following Therapeutic Plan: Yes     Pain: Improving    Function: Improving    Interval History: Improving but still has pain with walking. Does have some moments without pain. Doesn't seem to matter what she's doing or what shoes she wears. Wears night splints consistently. Did go get green superfeet and believes that's what helped the most, but doesn't get them in some of her shoes. Pain is still in medial arch.     Medical History:    Any recent changes to your medical history? No    Any new medication prescribed since last visit? No    Review of Systems:    Do you have fever, chills, weight loss? No    Do you have any vision problems? No    Do you have any chest pain or edema? No    Do you have any shortness of breath or wheezing?  No    Do you have stomach problems? No    Do you have any numbness or focal weakness? Yes, peripheral neuropathy and cervical stenosis     Do you have diabetes? No    Do you have problems with bleeding or clotting? No    Do you have an rashes or other skin lesions? No

## 2019-10-04 NOTE — PROGRESS NOTES
HISTORY OF PRESENT ILLNESS:  I had the pleasure of seeing Phan Luque for followup in the Liver Transplant Clinic at the [facility] on 10/04/2019.  Ms. Luque returns for followup now more than 3 years status post liver transplantation for alcoholic hepatitis.  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      Her only complaint today is some plantar fasciitis.  She is working with the people upstairs to try to get that under better control.      She otherwise denies any abdominal pain, itching or skin rash or fatigue.  She is working full time.  She denies any increased abdominal girth or lower extremity edema.  She denies any fevers or chills, cough or shortness of breath.  She denies any nausea, vomiting, diarrhea or constipation.  Her appetite is good.  She is trying to lose some weight.  Indeed, she has lost 7 pounds since she was last seen.     Current Outpatient Medications   Medication     acetaminophen (TYLENOL) 325 MG tablet     albuterol (PROAIR HFA/PROVENTIL HFA/VENTOLIN HFA) 108 (90 Base) MCG/ACT inhaler     amLODIPine (NORVASC) 5 MG tablet     chlorthalidone (HYGROTON) 25 MG tablet     cholecalciferol (VITAMIN D3) 400 UNIT TABS tablet     clobetasol (TEMOVATE) 0.05 % external ointment     cyanocobalamin (VITAMIN  B-12) 1000 MCG tablet     ferrous sulfate (IRON) 325 (65 FE) MG tablet     gabapentin (NEURONTIN) 100 MG capsule     hydrOXYzine (ATARAX) 25 MG tablet     levothyroxine (SYNTHROID/LEVOTHROID) 88 MCG tablet     MAGNESIUM OXIDE PO     multivitamin, therapeutic (THERA-VIT) TABS tablet     pantoprazole (PROTONIX) 40 MG EC tablet     pramipexole (MIRAPEX) 0.5 MG tablet     psyllium 0.52 G capsule     tacrolimus (GENERIC EQUIVALENT) 0.5 MG capsule     traMADol (ULTRAM) 50 MG tablet     ursodiol (ACTIGALL) 300 MG capsule     amLODIPine (NORVASC) 5 MG tablet     betaxolol (BETOPTIC) 0.5 % ophthalmic solution     calcium Citrate-vitamin D 500-400  MG-UNIT CHEW     diclofenac (VOLTAREN) 1 % GEL topical gel     doxepin (SINEQUAN) 10 MG capsule     folic acid (FOLVITE) 1 MG tablet     hydrOXYzine (ATARAX) 25 MG tablet     lifitegrast (XIIDRA) 5 % opthalmic solution     melatonin 5 MG tablet     Omega-3 Fatty Acids (OMEGA-3 FISH OIL EX ST PO)     predniSONE (DELTASONE) 1 MG tablet     predniSONE (DELTASONE) 5 MG tablet     traZODone (DESYREL) 100 MG tablet     ursodiol (ACTIGALL) 300 MG capsule     No current facility-administered medications for this visit.      BP (!) 155/84 (BP Location: Right arm, Patient Position: Sitting, Cuff Size: Adult Regular)   Pulse 81   Temp 98.6  F (37  C)   Wt 100.8 kg (222 lb 3.2 oz)   LMP  (LMP Unknown)   SpO2 96%   BMI 34.80 kg/m      PHYSICAL EXAMINATION:  In general, she looks quite well.  HEENT exam shows no scleral icterus or temporal muscle wasting.  Chest is clear.  Abdominal exam shows no increase in girth.  No masses or tenderness to palpation are present.  Her liver is 10 cm in span without left lobe enlargement.  No spleen tip is palpable, and extremity exam shows no edema.  Skin exam shows no suspicious lesions.  Neurologic exam is nonfocal.     Recent Results (from the past 168 hour(s))   Tacrolimus level    Collection Time: 09/30/19  7:21 AM   Result Value Ref Range    Tacrolimus Last Dose [id number] 1930     Tacrolimus Level 4.0 (L) 5.0 - 15.0 ug/L   Hepatic panel    Collection Time: 09/30/19  7:21 AM   Result Value Ref Range    Bilirubin Direct 0.1 0.0 - 0.2 mg/dL    Bilirubin Total 0.7 0.2 - 1.3 mg/dL    Albumin 3.8 3.4 - 5.0 g/dL    Protein Total 7.5 6.8 - 8.8 g/dL    Alkaline Phosphatase 65 40 - 150 U/L    ALT 22 0 - 50 U/L    AST 15 0 - 45 U/L   Basic metabolic panel    Collection Time: 09/30/19  7:21 AM   Result Value Ref Range    Sodium 134 133 - 144 mmol/L    Potassium 4.0 3.4 - 5.3 mmol/L    Chloride 102 94 - 109 mmol/L    Carbon Dioxide 25 20 - 32 mmol/L    Anion Gap 7 3 - 14 mmol/L    Glucose 111 (H)  70 - 99 mg/dL    Urea Nitrogen 37 (H) 7 - 30 mg/dL    Creatinine 1.31 (H) 0.52 - 1.04 mg/dL    GFR Estimate 43 (L) >60 mL/min/[1.73_m2]    GFR Estimate If Black 50 (L) >60 mL/min/[1.73_m2]    Calcium 9.2 8.5 - 10.1 mg/dL   CBC with platelets    Collection Time: 09/30/19  7:21 AM   Result Value Ref Range    WBC 8.3 4.0 - 11.0 10e9/L    RBC Count 4.72 3.8 - 5.2 10e12/L    Hemoglobin 14.4 11.7 - 15.7 g/dL    Hematocrit 42.7 35.0 - 47.0 %    MCV 91 78 - 100 fl    MCH 30.5 26.5 - 33.0 pg    MCHC 33.7 31.5 - 36.5 g/dL    RDW 13.7 10.0 - 15.0 %    Platelet Count 280 150 - 450 10e9/L      IMPRESSION:  My impression is that Ms. Luque is more than 3 years status post liver transplantation for alcoholic cirrhosis.  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  Her laboratory tests are excellent.  She will get a flu shot at work.  She is going to get her first Shingrix vaccine today.  She is otherwise up to date with regard to other vaccinations.  She is up to date with regard to cancer screening including skin cancer screening and her last bone density study showed only osteopenia.  My plan will be to see her back in the clinic in 6 months.      Thank you very much for allowing me to participate in the care of this patient.  If you have any questions regarding my recommendations, please do not hesitate to contact me.       Hussein Banegas MD      Professor of Medicine  [employer]      Executive Medical Director, Solid Organ Transplant Program  [facility]

## 2019-10-04 NOTE — LETTER
10/4/2019      RE: Phan Luque  [address] [facility] [address]       HISTORY OF PRESENT ILLNESS:  I had the pleasure of seeing Phan Luque for followup in the Liver Transplant Clinic at the [facility] on 10/04/2019.  Ms. Luque returns for followup now more than 3 years status post liver transplantation for alcoholic hepatitis.  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      Her only complaint today is some plantar fasciitis.  She is working with the people upstairs to try to get that under better control.      She otherwise denies any abdominal pain, itching or skin rash or fatigue.  She is working full time.  She denies any increased abdominal girth or lower extremity edema.  She denies any fevers or chills, cough or shortness of breath.  She denies any nausea, vomiting, diarrhea or constipation.  Her appetite is good.  She is trying to lose some weight.  Indeed, she has lost 7 pounds since she was last seen.     Current Outpatient Medications   Medication     acetaminophen (TYLENOL) 325 MG tablet     albuterol (PROAIR HFA/PROVENTIL HFA/VENTOLIN HFA) 108 (90 Base) MCG/ACT inhaler     amLODIPine (NORVASC) 5 MG tablet     chlorthalidone (HYGROTON) 25 MG tablet     cholecalciferol (VITAMIN D3) 400 UNIT TABS tablet     clobetasol (TEMOVATE) 0.05 % external ointment     cyanocobalamin (VITAMIN  B-12) 1000 MCG tablet     ferrous sulfate (IRON) 325 (65 FE) MG tablet     gabapentin (NEURONTIN) 100 MG capsule     hydrOXYzine (ATARAX) 25 MG tablet     levothyroxine (SYNTHROID/LEVOTHROID) 88 MCG tablet     MAGNESIUM OXIDE PO     multivitamin, therapeutic (THERA-VIT) TABS tablet     pantoprazole (PROTONIX) 40 MG EC tablet     pramipexole (MIRAPEX) 0.5 MG tablet     psyllium 0.52 G capsule     tacrolimus (GENERIC EQUIVALENT) 0.5 MG capsule     traMADol (ULTRAM) 50 MG tablet     ursodiol (ACTIGALL) 300 MG capsule     amLODIPine (NORVASC) 5 MG tablet      betaxolol (BETOPTIC) 0.5 % ophthalmic solution     calcium Citrate-vitamin D 500-400 MG-UNIT CHEW     diclofenac (VOLTAREN) 1 % GEL topical gel     doxepin (SINEQUAN) 10 MG capsule     folic acid (FOLVITE) 1 MG tablet     hydrOXYzine (ATARAX) 25 MG tablet     lifitegrast (XIIDRA) 5 % opthalmic solution     melatonin 5 MG tablet     Omega-3 Fatty Acids (OMEGA-3 FISH OIL EX ST PO)     predniSONE (DELTASONE) 1 MG tablet     predniSONE (DELTASONE) 5 MG tablet     traZODone (DESYREL) 100 MG tablet     ursodiol (ACTIGALL) 300 MG capsule     No current facility-administered medications for this visit.      BP (!) 155/84 (BP Location: Right arm, Patient Position: Sitting, Cuff Size: Adult Regular)   Pulse 81   Temp 98.6  F (37  C)   Wt 100.8 kg (222 lb 3.2 oz)   LMP  (LMP Unknown)   SpO2 96%   BMI 34.80 kg/m       PHYSICAL EXAMINATION:  In general, she looks quite well.  HEENT exam shows no scleral icterus or temporal muscle wasting.  Chest is clear.  Abdominal exam shows no increase in girth.  No masses or tenderness to palpation are present.  Her liver is 10 cm in span without left lobe enlargement.  No spleen tip is palpable, and extremity exam shows no edema.  Skin exam shows no suspicious lesions.  Neurologic exam is nonfocal.     Recent Results (from the past 168 hour(s))   Tacrolimus level    Collection Time: 09/30/19  7:21 AM   Result Value Ref Range    Tacrolimus Last Dose [id number] 1930     Tacrolimus Level 4.0 (L) 5.0 - 15.0 ug/L   Hepatic panel    Collection Time: 09/30/19  7:21 AM   Result Value Ref Range    Bilirubin Direct 0.1 0.0 - 0.2 mg/dL    Bilirubin Total 0.7 0.2 - 1.3 mg/dL    Albumin 3.8 3.4 - 5.0 g/dL    Protein Total 7.5 6.8 - 8.8 g/dL    Alkaline Phosphatase 65 40 - 150 U/L    ALT 22 0 - 50 U/L    AST 15 0 - 45 U/L   Basic metabolic panel    Collection Time: 09/30/19  7:21 AM   Result Value Ref Range    Sodium 134 133 - 144 mmol/L    Potassium 4.0 3.4 - 5.3 mmol/L    Chloride 102 94 - 109  mmol/L    Carbon Dioxide 25 20 - 32 mmol/L    Anion Gap 7 3 - 14 mmol/L    Glucose 111 (H) 70 - 99 mg/dL    Urea Nitrogen 37 (H) 7 - 30 mg/dL    Creatinine 1.31 (H) 0.52 - 1.04 mg/dL    GFR Estimate 43 (L) >60 mL/min/[1.73_m2]    GFR Estimate If Black 50 (L) >60 mL/min/[1.73_m2]    Calcium 9.2 8.5 - 10.1 mg/dL   CBC with platelets    Collection Time: 09/30/19  7:21 AM   Result Value Ref Range    WBC 8.3 4.0 - 11.0 10e9/L    RBC Count 4.72 3.8 - 5.2 10e12/L    Hemoglobin 14.4 11.7 - 15.7 g/dL    Hematocrit 42.7 35.0 - 47.0 %    MCV 91 78 - 100 fl    MCH 30.5 26.5 - 33.0 pg    MCHC 33.7 31.5 - 36.5 g/dL    RDW 13.7 10.0 - 15.0 %    Platelet Count 280 150 - 450 10e9/L      IMPRESSION:  My impression is that Ms. Luque is more than 3 years status post liver transplantation for alcoholic cirrhosis.  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  Her laboratory tests are excellent.  She will get a flu shot at work.  She is going to get her first Shingrix vaccine today.  She is otherwise up to date with regard to other vaccinations.  She is up to date with regard to cancer screening including skin cancer screening and her last bone density study showed only osteopenia.  My plan will be to see her back in the clinic in 6 months.      Thank you very much for allowing me to participate in the care of this patient.  If you have any questions regarding my recommendations, please do not hesitate to contact me.       Hussein Banegas MD      Professor of Medicine  [employer]      Executive Medical Director, Solid Organ Transplant Program  [facility]    Hussein Banegas MD

## 2019-10-07 ENCOUNTER — THERAPY VISIT (OUTPATIENT)
Dept: PHYSICAL THERAPY | Facility: CLINIC | Age: 64
End: 2019-10-07
Payer: COMMERCIAL

## 2019-10-07 DIAGNOSIS — M79.671 RIGHT FOOT PAIN: ICD-10-CM

## 2019-10-07 DIAGNOSIS — M72.2 PLANTAR FASCIAL FIBROMATOSIS: ICD-10-CM

## 2019-10-07 PROCEDURE — 97161 PT EVAL LOW COMPLEX 20 MIN: CPT | Mod: GP | Performed by: PHYSICAL THERAPIST

## 2019-10-07 PROCEDURE — 97110 THERAPEUTIC EXERCISES: CPT | Mod: GP | Performed by: PHYSICAL THERAPIST

## 2019-10-07 NOTE — PROGRESS NOTES
[facility] Initial Evaluation  Subjective:  The history is provided by the patient. No  was used.   Phan Luque being seen for plantar fasciitis.   Where condition occurred: for unknown reasons.  and reported as 3/10 on pain scale. General health as reported by patient is good. Pertinent medical history includes:  Anemia, asthma, chemical dependency, depression, fibromyalgia, hepatitis, high blood pressure, kidney disease, menopausal, migraines/headaches, numbness/tingling, osteoarthritis, overweight and thyroid problems.    Surgeries include:  Orthopedic surgery and other. Other surgery history details: liver translpant, appendectomy, tubal ligation, bilateral cararact extraction  with lens implants.  Current medications:  High blood pressure medication, sleep medication, thyroid medication and other. Other medications details: see list.   Primary job tasks include:  Computer work, prolonged sitting and repetitive tasks.  Pain is described as aching, burning, sharp, shooting and stabbing and is intermittent. Pain is worse during the day. Since onset symptoms are gradually improving. Imaging testing: none. Previous treatment includes other (home/self treatment). There was mild improvement following previous treatment.   Patient is . Restrictions include:  Working in normal job without restrictions.    Barriers include:  None as reported by patient.  Red flags:  Other.  Phan Luque being seen for plantar fasciitis.   Where condition occurred: for unknown reasons.  and reported as 3/10 on pain scale. General health as reported by patient is good. Pertinent medical history includes:  Anemia, asthma, chemical dependency, depression, fibromyalgia, hepatitis, high blood pressure, kidney disease, menopausal, migraines/headaches, numbness/tingling, osteoarthritis, overweight and thyroid problems.    Surgeries include:  Orthopedic surgery and other. Other surgery  history details: liver translpant, appendectomy, tubal ligation, bilateral cararact extraction  with lens implants.  Current medications:  High blood pressure medication, sleep medication, thyroid medication and other. Other medications details: see list.   Primary job tasks include:  Computer work, prolonged sitting and repetitive tasks.  Pain is described as aching, burning, sharp, shooting and stabbing and is intermittent. Pain is worse during the day. Since onset symptoms are gradually improving. Imaging testing: none. Previous treatment includes other (home/self treatment). There was mild improvement following previous treatment.   Patient is . Restrictions include:  Working in normal job without restrictions.    Barriers include:  None as reported by patient.  Red flags:  Other.  Type of problem:  Right foot   Condition occurred with:  Insidious onset.    Problem details: Patient reports a relative sudden onset of plantar foot and heel pain beginning in June 2019.  Physical therapy was ordered 10/4/2019.  Patient c/o impaired walking, especially after prolong period of inactivity.   Patient reports pain:  Medial calcaneal tuberosity, longitudinal arch and medial. Radiates to:  No radiation. Associated symptoms:  Numbness and tingling (bilateral peripheral neuropathy). Symptoms are exacerbated by walking and relieved by ice and other (night splint).                      Objective:  Standing Alignment:                Ankle/Foot:  Mallet toe R and claw toe R    Gait:    Gait Type:  Antalgic   Assistive Devices:  None      Flexibility/Screens:       Lower Extremity:      Decreased right lower extremity flexibility:  Gastroc and Soleus          Ankle/Foot Evaluation  ROM:  AROM is normal.PROM is normal.      Strength:    Dorsiflexion:  Right: 5/5    Pain:-  Plantarflexion: Right: 5/5   Pain:-  Inversion:Right: 5/5   Pain:-  Eversion:Right: 5/5   Pain:-              Strength wnl ankle: Foot intrinsics  4+/5.      PALPATION:     Right ankle tenderness present at:   plantar fascia and medial calcaneal  EDEMA: normal                                                              General     ROS    Assessment/Plan:    Patient is a 63 year old female with right foot complaints.    Patient has the following significant findings with corresponding treatment plan.                Diagnosis 1:  Plantar Fascitis   Pain -  self management, education and home program  Decreased ROM/flexibility - manual therapy, therapeutic exercise, therapeutic activity and home program  Decreased strength - therapeutic exercise, therapeutic activities and home program  Impaired gait - gait training and home program  Impaired muscle performance - neuro re-education and home program  Decreased function - therapeutic activities and home program    Therapy Evaluation Codes:   1) History comprised of:   Personal factors that impact the plan of care:      None.    Comorbidity factors that impact the plan of care are:      Chemical Dependency, Depression, Fibromyalgia and Liver Transplant.     Medications impacting care: High blood pressure and Immunosuppressants.  2) Examination of Body Systems comprised of:   Body structures and functions that impact the plan of care:      Foot.   Activity limitations that impact the plan of care are:      Walking.  3) Clinical presentation characteristics are:   Stable/Uncomplicated.  4) Decision-Making    Low complexity using standardized patient assessment instrument and/or measureable assessment of functional outcome.  Cumulative Therapy Evaluation is: Low complexity.    Previous and current functional limitations:  (See Goal Flow Sheet for this information)    Short term and Long term goals: (See Goal Flow Sheet for this information)     Communication ability:  Patient appears to be able to clearly communicate and understand verbal and written communication and follow directions correctly.  Treatment  Explanation - The following has been discussed with the patient:   RX ordered/plan of care  Anticipated outcomes  Possible risks and side effects  This patient would benefit from PT intervention to resume normal activities.   Rehab potential is good.    Frequency:  1 X week, once daily  Duration:  for 6 weeks  Discharge Plan:  Achieve all LTG.  Independent in home treatment program.  Reach maximal therapeutic benefit.    Please refer to the daily flowsheet for treatment today, total treatment time and time spent performing 1:1 timed codes.

## 2019-10-10 ENCOUNTER — DOCUMENTATION ONLY (OUTPATIENT)
Dept: CARE COORDINATION | Facility: CLINIC | Age: 64
End: 2019-10-10

## 2019-10-15 ENCOUNTER — OFFICE VISIT (OUTPATIENT)
Dept: PSYCHIATRY | Facility: CLINIC | Age: 64
End: 2019-10-15
Attending: NURSE PRACTITIONER
Payer: COMMERCIAL

## 2019-10-15 VITALS
WEIGHT: 223.2 LBS | HEART RATE: 92 BPM | DIASTOLIC BLOOD PRESSURE: 75 MMHG | SYSTOLIC BLOOD PRESSURE: 137 MMHG | BODY MASS INDEX: 34.96 KG/M2

## 2019-10-15 DIAGNOSIS — G62.9 PERIPHERAL POLYNEUROPATHY: ICD-10-CM

## 2019-10-15 PROCEDURE — G0463 HOSPITAL OUTPT CLINIC VISIT: HCPCS | Mod: ZF

## 2019-10-15 RX ORDER — GABAPENTIN 300 MG/1
300 CAPSULE ORAL AT BEDTIME
Qty: 90 CAPSULE | Refills: 1 | Status: SHIPPED | OUTPATIENT
Start: 2019-10-15 | End: 2019-10-15

## 2019-10-15 RX ORDER — GABAPENTIN 300 MG/1
300 CAPSULE ORAL AT BEDTIME
Qty: 90 CAPSULE | Refills: 1 | Status: SHIPPED | OUTPATIENT
Start: 2019-10-15 | End: 2019-10-25

## 2019-10-15 ASSESSMENT — PAIN SCALES - GENERAL: PAINLEVEL: NO PAIN (0)

## 2019-10-15 NOTE — PROGRESS NOTES
"  Psychiatry Clinic Progress Note                                                                   Phan Luque is a 63 year old female who returns to the clinic for continued care.   Therapist: None.    PCP: Santosh Salgado  Other Providers: Hussein Banegas MD    Pertinent Background:  Liver Transplant on 9/25/16 and Stage 3 kidney disease.  Psych critical item history includes SUBSTANCE USE: alcohol.     Interim History                                                                                                             4, 4     The patient is a good historian, reports good treatment adherence and was last seen 7/16/19. Since the last visit, Phan reports continued situational depression (work, family).  When asked if she wanted to restart medications to manage depression, she declined.  Taking Gabapentin for sleep.  Tried doxepin for sleep but experienced increased RLS.  Currently taking Gabapentin 300mg and Mirapex 0.5mg at bedtime and is sleeping better.   No concerns with anxiety.  No history of seasonal component to mood.      7/16/19: Phan reports she is \"ok.\" She reports the Gabapentin has been helpful with sleep quality.  She did increase to 600mg at bedtime.   She has does report any concern with depression but feels anhedonic and attributes to overwhelmed with various responsibilities and tasks she currently is facing.      3/27/19: Phan reports she is \"hanging in there.\"  Phan reports her sleep has worsened since stopping Trazodone in December.  She does report her pain has improved since stopping Trazodone and is no longer experiencing the morning \"hangover.\"  Will start/restart Gabapentin for sleep.  Duloxetine stopped due to side effects and Venlafaxine ordered but she did not start due to fear it would have same adverse effects as duloxetine.  Recommended she try as it appears her depression may be worsening and if experiences concerning side effects, she should call clinic.  She will " continue to consider.    9/26/18: Phan reports the following changes to medications:  Started Gabapentin 100mg TID which was started by orthopedics, prednisone was decreased to 5mg, and folic acid was stopped.  No significant psychiatric symptoms reported.  She does report increase restlessness at night but does not believe it is anxiety.  Restless legs have also gotten worse which has further impeded sleep.  Iron supplementation started to help with RLS.      6/26/18: Phan reports her mental health continues to be well managed in spite of forgetting to load Buspar into her weekly med reminder.  She has not noticed any increase in anxiety.  Buspar will not be restarted.  Phan continues to not have taken the Propranolol as it is unneeded. Sleep quality remains unchanged but she is unconcerned.  Sleep may be impacted by prednisone.  Continues to take Melatonin 5mg and Trazodone 150mg to help with sleep.  Phan reports the two year anniversary of her liver transplant is approaching with no concerns from providers.      3/30/18:  Phan tried decreased Trazodone and the change significantly impacted her sleep.  She has resumed taking Trazodone 150mg qHS. Phan also recently saw nephrologist who prescribed Iron supplementation to possibly help manage restless leg syndrome.       Phan has not taken propranolol as she feels her anxiety is well managed.  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  Discussed longer duration between appointments due to symptoms being well managed.        Recent Symptoms:   Depression:  depressed mood, anhedonia, low energy, insomnia and appetite changes  Elevated:  none  Psychosis:  none  Anxiety:  anxiety intensifies when driving  Panic Attack:  none  Trauma Related:  none     Recent Substance Use:  none reported        Social/ Family History                                  [per patient report]                                     1ea, 1ea   CHILDREN- 3  adult children       TRAUMA HISTORY (self-report)- None  FEELS SAFE AT HOME- Yes    Medical / Surgical History                                                                                                                  Patient Active Problem List   Diagnosis     Decompensation of cirrhosis of liver (H)     Liver transplanted (H)     Alcoholic hepatitis     Immunosuppression (H)     Alcoholic hepatitis without ascites     Enterococcus UTI     Liver transplant status (H)     Nausea & vomiting     Malnutrition (H)     Candidiasis of skin     Anemia     ACP (advance care planning)     Diarrhea     Hypothyroidism     Esophageal reflux     Recurrent major depression in partial remission (H)     Insomnia     CKD (chronic kidney disease) stage 3, GFR 30-59 ml/min (H)     Anemia in stage 3 chronic kidney disease (H)     Osteopenia     Alcohol abuse, uncomplicated     Right foot pain       Past Surgical History:   Procedure Laterality Date     APPENDECTOMY           BENCH LIVER N/A 2016    Procedure: BENCH LIVER;  Surgeon: Larry Dhillon MD;  Location: [facility]     CATARACT IOL, RT/LT       COLONOSCOPY       ESOPHAGOSCOPY, GASTROSCOPY, DUODENOSCOPY (EGD), COMBINED N/A 2016    Procedure: COMBINED ESOPHAGOSCOPY, GASTROSCOPY, DUODENOSCOPY (EGD);  Surgeon: Tao Alfonso MD;  [facility]     ESOPHAGOSCOPY, GASTROSCOPY, DUODENOSCOPY (EGD), COMBINED N/A 10/31/2016    Procedure: COMBINED ESOPHAGOSCOPY, GASTROSCOPY, DUODENOSCOPY (EGD), BIOPSY SINGLE OR MULTIPLE;  Surgeon: Ronald Bojorquez MD;  Location: [facility]     sphincteroplasty       TRANSPLANT LIVER RECIPIENT  DONOR N/A 2016    Procedure: TRANSPLANT LIVER RECIPIENT  DONOR;  Surgeon: Larry Dhillon MD;  Location: [facility] OR     TUBAL LIGATION      laparoscopic      Medical Review of Systems                                                                                                        2,10   A comprehensive review of  systems was performed and is negative other than noted in the HPI.  Pregnant- No    Breastfeeding- No    Contraception- No  Allergy                                Codeine; Benadryl [diphenhydramine]; Cefaclor; Hydrocodone-acetaminophen; Oxycodone; Penicillins; and Sulfa drugs  Current Medications                                                                                                       Current Outpatient Medications   Medication Sig Dispense Refill     acetaminophen (TYLENOL) 325 MG tablet Take 2 tablets (650 mg) by mouth every 6 hours as needed for mild pain Or fevers. Use sparingly. Limit use to no more than 2 grams (2000 mg) in 24 hours. **Further refills must be obtained by primary care provider** 100 tablet 0     albuterol (PROAIR HFA/PROVENTIL HFA/VENTOLIN HFA) 108 (90 Base) MCG/ACT inhaler Inhale 2 puffs into the lungs every 6 hours 18 g 3     amLODIPine (NORVASC) 5 MG tablet Take 1 tablet (5 mg) by mouth daily 90 tablet 3     amLODIPine (NORVASC) 5 MG tablet Take 1 tablet (5 mg) by mouth daily 90 tablet 3     betaxolol (BETOPTIC) 0.5 % ophthalmic solution Place 1 drop into both eyes daily 5 mL 3     calcium Citrate-vitamin D 500-400 MG-UNIT CHEW Take 1 tablet by mouth daily       chlorthalidone (HYGROTON) 25 MG tablet Take 0.5 tablets (12.5 mg) by mouth daily 45 tablet 3     cholecalciferol (VITAMIN D3) 400 UNIT TABS tablet Take 400 Units by mouth daily       clobetasol (TEMOVATE) 0.05 % external ointment Apply topically 2 times daily To areas of eczema on the lower legs, up to 2 weeks. 60 g 1     cyanocobalamin (VITAMIN  B-12) 1000 MCG tablet Take 1,000 mcg by mouth daily       diclofenac (VOLTAREN) 1 % GEL topical gel Apply 2 g topically 4 times daily as needed for moderate pain       doxepin (SINEQUAN) 10 MG capsule Take 1 capsule (10 mg) by mouth At Bedtime 30 capsule 0     ferrous sulfate (IRON) 325 (65 FE) MG tablet Take 1 tablet (325 mg) by mouth 2 times daily 100 tablet      folic acid  (FOLVITE) 1 MG tablet Take 1 tablet (1 mg) by mouth daily 30 tablet 1     gabapentin (NEURONTIN) 100 MG capsule Take 3 capsules (300 mg) by mouth At Bedtime Can take additional 100mg capsule twice per day as needed for anxiety/agitation 150 capsule 2     hydrOXYzine (ATARAX) 25 MG tablet TAKE 1 TO 2 TABLETS BY MOUTH EVERY 6 HOURS AS NEEDED FOR ITCHING 120 tablet 5     hydrOXYzine (ATARAX) 25 MG tablet TAKE 1 TO 2 TABLETS BY MOUTH EVERY 6 HOURS AS NEEDED FOR ITCHING 120 tablet 5     levothyroxine (SYNTHROID/LEVOTHROID) 88 MCG tablet Take 1 tablet (88 mcg) by mouth daily BEFORE BREAKFAST 90 tablet 2     lifitegrast (XIIDRA) 5 % opthalmic solution Place 1 drop into both eyes 2 times daily 60 each 6     MAGNESIUM OXIDE PO Take 400 mg by mouth daily       melatonin 5 MG tablet Take 1 tablet (5 mg) by mouth nightly as needed for sleep       multivitamin, therapeutic (THERA-VIT) TABS tablet Take 1 tablet by mouth every 24 hours 30 tablet 11     Omega-3 Fatty Acids (OMEGA-3 FISH OIL EX ST PO) Take 1 capsule by mouth 2 times daily 1290 (fish oil)-900 (omega 3)       pantoprazole (PROTONIX) 40 MG EC tablet TAKE ONE TABLET (40MG) BY MOUTH EVERY MORNING BEFORE BREAKFAST 90 tablet 3     pramipexole (MIRAPEX) 0.5 MG tablet 0.5 mg At Bedtime  2     predniSONE (DELTASONE) 1 MG tablet Take 4 tablets (4 mg) by mouth daily 360 tablet 3     predniSONE (DELTASONE) 5 MG tablet Take 1 tablet (5 mg) by mouth daily 90 tablet 3     psyllium 0.52 G capsule Take 2 capsules (1.04 g) by mouth daily With a full glass of water. 60 capsule 1     tacrolimus (GENERIC EQUIVALENT) 0.5 MG capsule Take 4 capsules (2 mg) by mouth every morning AND 2 capsules (1 mg) every evening. 180 capsule 11     traMADol (ULTRAM) 50 MG tablet Take 1 tablet (50 mg) by mouth every 6 hours as needed for severe pain 30 tablet 0     traZODone (DESYREL) 100 MG tablet   1     ursodiol (ACTIGALL) 300 MG capsule TAKE ONE CAPSULE BY MOUTH TWICE A DAY 60 capsule 11     ursodiol  "(ACTIGALL) 300 MG capsule Take 1 capsule (300 mg) by mouth every 12 hours 60 capsule 11     Vitals                                                                                                                            3, 3   /75   Pulse 92   Wt 101.2 kg (223 lb 3.2 oz)   LMP  (LMP Unknown)   BMI 34.96 kg/m     Mental Status Exam                                                                                        9, 14 cog gs     Alertness: alert  and oriented  Appearance: casually groomed  Behavior/Demeanor: cooperative, pleasant and calm, with good  eye contact   Speech: normal  Language: no problems  Psychomotor: normal or unremarkable  Mood: \"ok\"  Affect: appropriate; was congruent to mood; was congruent to content  Thought Process/Associations: unremarkable  Thought Content:  Reports none;  Denies suicidal ideation and violent ideation  Perception:  Reports none;  Denies auditory hallucinations and visual hallucinations  Insight: good  Judgment: good  Cognition: (6) does  appear grossly intact; formal cognitive testing was not done  Gait/Station and/or Muscle Strength/Tone: unremarkable    Labs and Data                                                                                                                 Rating Scales:  PHQ9    PHQ9 Today:  5  PHQ-9 SCORE 9/26/2018 3/27/2019 7/16/2019   PHQ-9 Total Score MyChart - - -   PHQ-9 Total Score 7 10 6         Diagnosis and Assessment                                                                                  m2, h3     Today the following issues were addressed:    1) Major Depressive Disorder, recurrent, mild  2) Generalized Anxiety Disorder    [place] Prescription Monitoring Program [] was not checked today:  will be checked next visit.         Plan                                                                                                                         m2, h3      1) Medication Management      Continue Gabapentin 300mg at " bedtime for sleep.      2) Therapy  Will start looking for new therapist    RTC: 6 months.  Consider transfer of care to PCP     CRISIS NUMBERS:   Provided routinely in AVS.    Treatment Risk Statement:  The patient understands the risks, benefits, adverse effects and alternatives. Agrees to treatment with the capacity to do so. No medical contraindications to treatment. Agrees to call clinic for any problems. The patient understands to call 911 or go to the nearest ED if life threatening or urgent symptoms occur.      PROVIDER:  DONOVAN Hanna CNP

## 2019-10-15 NOTE — NURSING NOTE
Chief Complaint   Patient presents with     Recheck Medication     Peripheral polyneuropathy

## 2019-10-21 ENCOUNTER — TELEPHONE (OUTPATIENT)
Dept: PSYCHIATRY | Facility: CLINIC | Age: 64
End: 2019-10-21

## 2019-10-21 DIAGNOSIS — G62.9 PERIPHERAL POLYNEUROPATHY: ICD-10-CM

## 2019-10-21 NOTE — TELEPHONE ENCOUNTER
Pt requests refill of gabapentin 100mg caps, taking 3 capsules by mouth at bedtime, can take an additional capsule twice daily prn for anxiety/agitation    Thank you!  Sherri Eisenberg CPhT  [facility]

## 2019-10-21 NOTE — TELEPHONE ENCOUNTER
Writer reviewed pt's chart. Last office visit note from 10/15 is unsigned, although provider sent Rx for gabapentin 300 mg capsules, take one cap PO at bedtime. Will confirm dosing with provider.

## 2019-10-22 NOTE — TELEPHONE ENCOUNTER
David Vu APRN CNP Labossiere, Laura, RN   Caller: Unspecified (Yesterday, 11:45 AM)             Just 300mg at bedtime.  She does not take during day        Will relay this to Sherri Eisenberg CPhT.

## 2019-10-23 NOTE — TELEPHONE ENCOUNTER
Sherri Eisenberg Laura RN   Caller: Unspecified (2 days ago, 11:45 AM)             Can we get a new Rx for the Gabapentin?  We have no current Rx's on file, I only typed the directions of the last Rx we had on file.  Please send new Rx.     Thank you!   Sherri Eisenberg [place]   [facility]         Appears script was printed and given to pt on 10/15, when experiencing system issues. Called pt and asked that she c/b to confirm if she will be sending paper script to pharmacy or if a script should be e-prescribed.

## 2019-10-24 NOTE — TELEPHONE ENCOUNTER
Pt returned Tatiana's call, she said it would be much easier for her to have the script be e-prescribed and sent to  Specialty Pharm[facility]. [facility] to [facility].    Message routed to: TONY Tavarez

## 2019-10-25 ENCOUNTER — TELEPHONE (OUTPATIENT)
Dept: TRANSPLANT | Facility: CLINIC | Age: 64
End: 2019-10-25

## 2019-10-25 DIAGNOSIS — Z94.4 LIVER TRANSPLANTED (H): Primary | ICD-10-CM

## 2019-10-25 RX ORDER — GABAPENTIN 300 MG/1
300 CAPSULE ORAL AT BEDTIME
Qty: 90 CAPSULE | Refills: 1 | Status: SHIPPED | OUTPATIENT
Start: 2019-10-25 | End: 2020-04-08

## 2019-10-25 NOTE — TELEPHONE ENCOUNTER
Pt had shingrix vaccination on 10/4. Pt has not been feeling well. Pt will go have labs completed to ensure liver labs stable.

## 2019-10-26 DIAGNOSIS — Z94.4 LIVER TRANSPLANTED (H): ICD-10-CM

## 2019-10-26 LAB
ALBUMIN SERPL-MCNC: 3.6 G/DL (ref 3.4–5)
ALP SERPL-CCNC: 72 U/L (ref 40–150)
ALT SERPL W P-5'-P-CCNC: 22 U/L (ref 0–50)
ANION GAP SERPL CALCULATED.3IONS-SCNC: 6 MMOL/L (ref 3–14)
AST SERPL W P-5'-P-CCNC: 15 U/L (ref 0–45)
BILIRUB DIRECT SERPL-MCNC: 0.1 MG/DL (ref 0–0.2)
BILIRUB SERPL-MCNC: 0.4 MG/DL (ref 0.2–1.3)
BUN SERPL-MCNC: 21 MG/DL (ref 7–30)
CALCIUM SERPL-MCNC: 8.5 MG/DL (ref 8.5–10.1)
CHLORIDE SERPL-SCNC: 106 MMOL/L (ref 94–109)
CO2 SERPL-SCNC: 28 MMOL/L (ref 20–32)
CREAT SERPL-MCNC: 1.11 MG/DL (ref 0.52–1.04)
GFR SERPL CREATININE-BSD FRML MDRD: 53 ML/MIN/{1.73_M2}
GLUCOSE SERPL-MCNC: 116 MG/DL (ref 70–99)
POTASSIUM SERPL-SCNC: 3.4 MMOL/L (ref 3.4–5.3)
PROT SERPL-MCNC: 7.4 G/DL (ref 6.8–8.8)
SODIUM SERPL-SCNC: 139 MMOL/L (ref 133–144)
TACROLIMUS BLD-MCNC: 4.6 UG/L (ref 5–15)
TME LAST DOSE: ABNORMAL H

## 2019-10-28 ENCOUNTER — THERAPY VISIT (OUTPATIENT)
Dept: PHYSICAL THERAPY | Facility: CLINIC | Age: 64
End: 2019-10-28
Payer: COMMERCIAL

## 2019-10-28 DIAGNOSIS — M79.671 RIGHT FOOT PAIN: ICD-10-CM

## 2019-10-28 PROCEDURE — 97112 NEUROMUSCULAR REEDUCATION: CPT | Mod: GP | Performed by: PHYSICAL THERAPIST

## 2019-10-28 PROCEDURE — 97110 THERAPEUTIC EXERCISES: CPT | Mod: GP | Performed by: PHYSICAL THERAPIST

## 2019-10-29 ENCOUNTER — TELEPHONE (OUTPATIENT)
Dept: TRANSPLANT | Facility: CLINIC | Age: 64
End: 2019-10-29

## 2019-11-06 ENCOUNTER — HEALTH MAINTENANCE LETTER (OUTPATIENT)
Age: 64
End: 2019-11-06

## 2019-11-11 ENCOUNTER — TELEPHONE (OUTPATIENT)
Dept: TRANSPLANT | Facility: CLINIC | Age: 64
End: 2019-11-11

## 2019-11-11 NOTE — TELEPHONE ENCOUNTER
Pt contacted stating that the [facility] will not allow her to have a flu vaccination unless the transplant team puts a note in that she needs a flu shot.    Patient does need a flu shot and is cleared to receive a flu shot.

## 2019-11-14 ASSESSMENT — PATIENT HEALTH QUESTIONNAIRE - PHQ9: SUM OF ALL RESPONSES TO PHQ QUESTIONS 1-9: 5

## 2019-11-18 ENCOUNTER — OFFICE VISIT (OUTPATIENT)
Dept: DERMATOLOGY | Facility: CLINIC | Age: 64
End: 2019-11-18
Payer: COMMERCIAL

## 2019-11-18 ENCOUNTER — ALLIED HEALTH/NURSE VISIT (OUTPATIENT)
Dept: NURSING | Facility: CLINIC | Age: 64
End: 2019-11-18
Payer: COMMERCIAL

## 2019-11-18 DIAGNOSIS — Z12.83 SKIN CANCER SCREENING: Primary | ICD-10-CM

## 2019-11-18 DIAGNOSIS — D22.9 MULTIPLE BENIGN NEVI: ICD-10-CM

## 2019-11-18 DIAGNOSIS — L30.0 NUMMULAR ECZEMA: ICD-10-CM

## 2019-11-18 DIAGNOSIS — D18.01 CHERRY ANGIOMA: ICD-10-CM

## 2019-11-18 DIAGNOSIS — L20.82 FLEXURAL ECZEMA: ICD-10-CM

## 2019-11-18 DIAGNOSIS — Z23 NEED FOR PROPHYLACTIC VACCINATION AND INOCULATION AGAINST INFLUENZA: Primary | ICD-10-CM

## 2019-11-18 DIAGNOSIS — L57.0 ACTINIC KERATOSIS: ICD-10-CM

## 2019-11-18 PROCEDURE — 90682 RIV4 VACC RECOMBINANT DNA IM: CPT

## 2019-11-18 PROCEDURE — 90471 IMMUNIZATION ADMIN: CPT

## 2019-11-18 RX ORDER — TRIAMCINOLONE ACETONIDE 1 MG/G
OINTMENT TOPICAL 2 TIMES DAILY
Qty: 80 G | Refills: 1 | Status: SHIPPED | OUTPATIENT
Start: 2019-11-18 | End: 2020-10-05

## 2019-11-18 RX ORDER — CLOBETASOL PROPIONATE 0.5 MG/G
OINTMENT TOPICAL 2 TIMES DAILY
Qty: 60 G | Refills: 1 | Status: SHIPPED | OUTPATIENT
Start: 2019-11-18 | End: 2020-10-05

## 2019-11-18 ASSESSMENT — PAIN SCALES - GENERAL
PAINLEVEL: NO PAIN (0)
PAINLEVEL: NO PAIN (1)

## 2019-11-18 NOTE — PROGRESS NOTES
Prior to immunization administration, verified patients identity using patient s name and date of birth. Please see Immunization Activity for additional information.     Screening Questionnaire for Adult Immunization    Are you sick today?   No   Do you have allergies to medications, food, a vaccine component or latex?   No   Have you ever had a serious reaction after receiving a vaccination?   No   Do you have a long-term health problem with heart disease, lung disease, asthma, kidney disease, metabolic disease (e.g. diabetes), anemia, or other blood disorder?   No   Do you have cancer, leukemia, HIV/AIDS, or any other immune system problem?   No   In the past 3 months, have you taken medications that affect  your immune system, such as prednisone, other steroids, or anticancer drugs; drugs for the treatment of rheumatoid arthritis, Crohn s disease, or psoriasis; or have you had radiation treatments?   No   Have you had a seizure, or a brain or other nervous system problem?   No   During the past year, have you received a transfusion of blood or blood     products, or been given immune (gamma) globulin or antiviral drug?   No   For women: Are you pregnant or is there a chance you could become        pregnant during the next month?   No   Have you received any vaccinations in the past 4 weeks?   No     Immunization questionnaire answers were all negative.        Per orders of Dr. Sanchez, injection of Flublok given by Cynthia Al MA. Patient instructed to remain in clinic for 15 minutes afterwards, and to report any adverse reaction to me immediately.       Screening performed by Cynthia Al MA on 11/18/2019 at 11:35 AM.

## 2019-11-18 NOTE — PROGRESS NOTES
[facility] Dermatology Note      Dermatology Problem List:   1. History of liver transplant at [facility], 2016- currently on tacrolimus   2. Nummular dermatitis  - Triamcinolone 0.1% ointment BID to popliteal fossa bilaterally  - clobetasol 0.05% ointment  3. AK s/p cryo  4. Skin cancer screening, 11/18/19    Encounter Date: Nov 18, 2019    CC:  Chief Complaint   Patient presents with     Skin Check     TSC, one lesion noted on right cheek.        History of Present Illness:  Ms. Phan Luque is a 64 year old female on immunosuppressants with a family history of melanoma in her mother, who presents today for a skin check. She was last seen in the dermatology clinic on 5/15/19 for a skin cancer screening during which she started clobetasol 0.05% ointment BID for nummular dermatitis. Her skin cancer screening was otherwise unremarkable.     Today she reports a few lesions of interest including a rough patch on the right neck, a darker patch on the right upper neck, a lesion above her upper lip that is red, and a skin tag that is bothersome in her right axilla. The lesion above her upper lip is rough at times and has been present for the last month. She notes that her eczema has been persistent and is flaring in the popliteal fossa.    Otherwise he is feeling well, without additional skin concerns at this time.    Past Medical History:   Patient Active Problem List   Diagnosis     Decompensation of cirrhosis of liver (H)     Liver transplanted (H)     Alcoholic hepatitis     Immunosuppression (H)     Alcoholic hepatitis without ascites     Enterococcus UTI     Liver transplant status (H)     Nausea & vomiting     Malnutrition (H)     Candidiasis of skin     Anemia     ACP (advance care planning)     Diarrhea     Hypothyroidism     Esophageal reflux     Recurrent major depression in partial remission (H)     Insomnia     CKD (chronic kidney disease) stage 3, GFR 30-59 ml/min (H)     Anemia in stage  3 chronic kidney disease (H)     Osteopenia     Alcohol abuse, uncomplicated     Right foot pain     Past Medical History:   Diagnosis Date     Asthma      Chronic kidney disease      End stage liver disease (H)     2/2 Alcohol Abuse     Liver transplanted (H)      Migraines      Osteoporosis      Spinal stenosis in cervical region      Strabismus      Past Surgical History:   Procedure Laterality Date     APPENDECTOMY           BENCH LIVER N/A 2016    Procedure: BENCH LIVER;  Surgeon: Larry Dhillon MD;  Location: [facility]     CATARACT IOL, RT/LT       COLONOSCOPY       ESOPHAGOSCOPY, GASTROSCOPY, DUODENOSCOPY (EGD), COMBINED N/A 2016    Procedure: COMBINED ESOPHAGOSCOPY, GASTROSCOPY, DUODENOSCOPY (EGD);  Surgeon: Tao Alfonso MD;  Location: [facility]     ESOPHAGOSCOPY, GASTROSCOPY, DUODENOSCOPY (EGD), COMBINED N/A 10/31/2016    Procedure: COMBINED ESOPHAGOSCOPY, GASTROSCOPY, DUODENOSCOPY (EGD), BIOPSY SINGLE OR MULTIPLE;  Surgeon: Ronald Bojorquez MD;  Location: [facility]     sphincteroplasty       TRANSPLANT LIVER RECIPIENT  DONOR N/A 2016    Procedure: TRANSPLANT LIVER RECIPIENT  DONOR;  Surgeon: Larry Dhillon MD;  Location: [facility] OR     TUBAL LIGATION      laparoscopic       Social History:  The patient works for medical insurance. The patient denies use of tanning beds. Patient is originally from [place].     Family History:  There is a family history of melanoma in the patient's mother (retinal, thought to have been present since birth, passed in ).    Medications:  Current Outpatient Medications   Medication Sig Dispense Refill     acetaminophen (TYLENOL) 325 MG tablet Take 2 tablets (650 mg) by mouth every 6 hours as needed for mild pain Or fevers. Use sparingly. Limit use to no more than 2 grams (2000 mg) in 24 hours. **Further refills must be obtained by primary care provider** 100 tablet 0     albuterol (PROAIR HFA/PROVENTIL HFA/VENTOLIN HFA) 108 (90 Base)  MCG/ACT inhaler Inhale 2 puffs into the lungs every 6 hours 18 g 3     amLODIPine (NORVASC) 5 MG tablet Take 1 tablet (5 mg) by mouth daily 90 tablet 3     chlorthalidone (HYGROTON) 25 MG tablet Take 0.5 tablets (12.5 mg) by mouth daily 45 tablet 3     cholecalciferol (VITAMIN D3) 400 UNIT TABS tablet Take 400 Units by mouth daily       clobetasol (TEMOVATE) 0.05 % external ointment Apply topically 2 times daily To areas of eczema on the lower legs, up to 2 weeks. 60 g 1     cyanocobalamin (VITAMIN  B-12) 1000 MCG tablet Take 1,000 mcg by mouth daily       diclofenac (VOLTAREN) 1 % GEL topical gel Apply 2 g topically 4 times daily as needed for moderate pain       ferrous sulfate (IRON) 325 (65 FE) MG tablet Take 1 tablet (325 mg) by mouth 2 times daily 100 tablet      gabapentin (NEURONTIN) 300 MG capsule Take 1 capsule (300 mg) by mouth At Bedtime 90 capsule 1     hydrOXYzine (ATARAX) 25 MG tablet TAKE 1 TO 2 TABLETS BY MOUTH EVERY 6 HOURS AS NEEDED FOR ITCHING 120 tablet 5     hydrOXYzine (ATARAX) 25 MG tablet TAKE 1 TO 2 TABLETS BY MOUTH EVERY 6 HOURS AS NEEDED FOR ITCHING 120 tablet 5     levothyroxine (SYNTHROID/LEVOTHROID) 88 MCG tablet Take 1 tablet (88 mcg) by mouth daily BEFORE BREAKFAST 90 tablet 2     MAGNESIUM OXIDE PO Take 400 mg by mouth daily       multivitamin, therapeutic (THERA-VIT) TABS tablet Take 1 tablet by mouth every 24 hours 30 tablet 11     pantoprazole (PROTONIX) 40 MG EC tablet TAKE ONE TABLET (40MG) BY MOUTH EVERY MORNING BEFORE BREAKFAST 90 tablet 3     pramipexole (MIRAPEX) 0.5 MG tablet 0.5 mg At Bedtime  2     psyllium 0.52 G capsule Take 2 capsules (1.04 g) by mouth daily With a full glass of water. 60 capsule 1     tacrolimus (GENERIC EQUIVALENT) 0.5 MG capsule Take 4 capsules (2 mg) by mouth every morning AND 2 capsules (1 mg) every evening. 180 capsule 11     traMADol (ULTRAM) 50 MG tablet Take 1 tablet (50 mg) by mouth every 6 hours as needed for severe pain 30 tablet 0      ursodiol (ACTIGALL) 300 MG capsule TAKE ONE CAPSULE BY MOUTH TWICE A DAY 60 capsule 11     ursodiol (ACTIGALL) 300 MG capsule Take 1 capsule (300 mg) by mouth every 12 hours 60 capsule 11     amLODIPine (NORVASC) 5 MG tablet Take 1 tablet (5 mg) by mouth daily 90 tablet 3     betaxolol (BETOPTIC) 0.5 % ophthalmic solution Place 1 drop into both eyes daily 5 mL 3     calcium Citrate-vitamin D 500-400 MG-UNIT CHEW Take 1 tablet by mouth daily       doxepin (SINEQUAN) 10 MG capsule Take 1 capsule (10 mg) by mouth At Bedtime 30 capsule 0     folic acid (FOLVITE) 1 MG tablet Take 1 tablet (1 mg) by mouth daily 30 tablet 1     lifitegrast (XIIDRA) 5 % opthalmic solution Place 1 drop into both eyes 2 times daily 60 each 6     melatonin 5 MG tablet Take 1 tablet (5 mg) by mouth nightly as needed for sleep       Omega-3 Fatty Acids (OMEGA-3 FISH OIL EX ST PO) Take 1 capsule by mouth 2 times daily 1290 (fish oil)-900 (omega 3)       predniSONE (DELTASONE) 1 MG tablet Take 4 tablets (4 mg) by mouth daily 360 tablet 3     predniSONE (DELTASONE) 5 MG tablet Take 1 tablet (5 mg) by mouth daily 90 tablet 3     traZODone (DESYREL) 100 MG tablet   1       Allergies   Allergen Reactions     Codeine Hives     Benadryl [Diphenhydramine] Hives     Cefaclor Hives     Hydrocodone-Acetaminophen Itching     Oxycodone Itching     Penicillins Hives     Sulfa Drugs Hives       Review of Systems:  -Constitutional: The patient denies fatigue, fevers, chills, unintended weight loss, and night sweats. She is feeling in her usual state of health.  -Skin: As above in HPI. No additional skin concerns.    Physical exam:  Vitals: LMP  (LMP Unknown)   GEN: This is a well developed, well-nourished female in no acute distress, in a pleasant mood.    SKIN: Full skin, which includes the head/face, both arms, chest, back, abdomen,both legs, genitalia and/or groin buttocks, digits and/or nails, was examined.    - Pink scaly plaques to the popliteal fossa  bilaterally.  - Erythematous macule with overyling adherent scale on the left upper lip.  - Multiple regular brown pigmented macules and  papules are identified on the trunk and extremities. Some are skin colored papules, including the right cheek.   - Dome shaped bright red papules on the trunk.   - Waxy stuck on tan to brown papules on the face, trunk, and extremities.  - No other lesions of concern on areas examined.     Impression/Plan:  1. Nummular dermatitis, Popliteal fossa (flexural)    Start triamcinolone 0.1% ointment, apply BID to the flares on the popliteal fossa    Continue clobetasol 0.05% ointment, apply to affected areas of eczema while flaring on the lower legs.    Steroid ed given, use to to 2 weeks at a time.     2. Actinic Keratosis, left upper lip    Cryotherapy procedure note: After verbal consent and discussion of risks and benefits including but no limited to dyspigmentation/scar, blister, and pain, 1 was treated with 1-2mm freeze border for 2 cycles with liquid nitrogen. Post cryotherapy instructions were provided.     3. Multiple clinically benign nevi on the trunk and extremities , including right cheek.     ABCDs of melanoma were discussed and self skin checks were advised.     Sun precaution was advised including the use of sun screens of SPF 30 or higher, sun protective clothing, and avoidance of tanning beds.    4. Ardon angiomas, trunk    Reassured of benign, congenital nature     5. Seborrheic keratosis, non irritated    No further intervention required. Patient to report changes.     6. History of liver transplant- currently on immunosuppressants     Given patient is on immune suppressing drugs, and with family history of melanoma, recommended skin check every 6 months     Follow-up in 6 months, earlier for new or changing lesions.       Staff Involved:  Scribe/Staff    Scribe Disclosure:   Herson REDDING, am serving as a scribe to document services personally performed by Demetria  Charbel FONTAINE, based on data collection and the provider's statements to me.    Provider Disclosure:   The documentation recorded by the scribe accurately reflects the services I personally performed and the decisions made by me.    All risks, benefits and alternatives were discussed with patient.  Patient is in agreement and understands the assessment and plan.  All questions were answered.  Sun Screen Education was given.   Return to Clinic in 6 months or sooner as needed.   Demetria Barger PA-C   [facility]

## 2019-11-19 NOTE — NURSING NOTE
Dermatology Rooming Note    Phan Luque's goals for this visit include:   Chief Complaint   Patient presents with     Skin Check     TSC, one lesion noted on right cheek.      Candida Lagunas LPN

## 2019-11-19 NOTE — PATIENT INSTRUCTIONS
Cryotherapy    What is it?    Use of a very cold liquid, such as liquid nitrogen, to freeze and destroy abnormal skin cells that need to be removed    What should I expect?    Tenderness and redness    A small blister that might grow and fill with dark purple blood. There may be crusting.    More than one treatment may be needed if the lesions do not go away.    How do I care for the treated area?    Gently wash the area with your hands when bathing.    Use a thin layer of Vaseline to help with healing. You may use a Band-Aid.     The area should heal within 7-10 days and may leave behind a pink or lighter color.     Do not use an antibiotic or Neosporin ointment.     You may take acetaminophen (Tylenol) for pain.     Call your Doctor if you have:    Severe pain    Signs of infection (warmth, redness, cloudy yellow drainage, and or a bad smell)    Questions or concerns    Who should I call with questions?       [facility]: [phone]       [facility]: [phone]       For urgent needs outside of business hours call the [facility] at [phone]        and ask for the dermatology resident on call

## 2019-11-20 ENCOUNTER — OFFICE VISIT (OUTPATIENT)
Dept: PODIATRY | Facility: CLINIC | Age: 64
End: 2019-11-20
Payer: COMMERCIAL

## 2019-11-20 VITALS
DIASTOLIC BLOOD PRESSURE: 80 MMHG | WEIGHT: 224.4 LBS | SYSTOLIC BLOOD PRESSURE: 130 MMHG | BODY MASS INDEX: 35.22 KG/M2 | HEIGHT: 67 IN

## 2019-11-20 DIAGNOSIS — M79.672 PAIN OF LEFT HEEL: Primary | ICD-10-CM

## 2019-11-20 PROCEDURE — 99203 OFFICE O/P NEW LOW 30 MIN: CPT | Performed by: PODIATRIST

## 2019-11-20 ASSESSMENT — MIFFLIN-ST. JEOR: SCORE: 1605.5

## 2019-11-20 NOTE — LETTER
2019         RE: Phan Luque  [address] [facility] [address]        Dear Colleague,    Thank you for referring your patient, Phan Luque, to the [facility] [place]. Please see a copy of my visit note below.      ASSESSMENT/PLAN:  Encounter Diagnosis   Name Primary?     Pain of left heel Yes     I think her pain is likely plantar fascial pain and/ or plantar bursitis. I explained that the pain can present differently than it did on the right.  I do not have a suspicion for any osseous pathology, but will consider imaging if her problem worsens despite the following recommendations.     Triloka ankle brace with foot strap medial for 2-4 weeks.    The patient was educated about the causes and nature of arch and heel pain.  The anatomy and function of the plantar fascia was discussed.  The treatment plan discussed included icing, calf and plantar fascial stretching, avoidance of barefoot walking, wearing sturdy, supportive, stiffer-soled athletic-type shoes, activity modification, and over-the-counter arch supports versus custom orthoses.  A comprehensive After-Visit Summary was provided.       Body mass index is 35.15 kg/m .    Weight management plan: Patient was referred to their PCP to discuss a diet and exercise plan.      Ambrose Lebron DPM, FACFAS, MS    [place] [facility]      ____________________________________________________________________    HPI:         Chief Complaint: left heel pain  Onset of problem: 6 months  Pain/ discomfort is described as:  Sharp, burning, cutting, tearing  Ratin/10 at worst   Frequency:  daily    The pain is made worse with bending over  Previous treatment: no    She has a history of right heel pain. The current pain feels different than what she experienced on the right.    *  Patient Active Problem List   Diagnosis     Decompensation of cirrhosis of liver (H)     Liver transplanted (H)     Alcoholic hepatitis      Immunosuppression (H)     Alcoholic hepatitis without ascites     Enterococcus UTI     Liver transplant status (H)     Nausea & vomiting     Malnutrition (H)     Candidiasis of skin     Anemia     ACP (advance care planning)     Diarrhea     Hypothyroidism     Esophageal reflux     Recurrent major depression in partial remission (H)     Insomnia     CKD (chronic kidney disease) stage 3, GFR 30-59 ml/min (H)     Anemia in stage 3 chronic kidney disease (H)     Osteopenia     Alcohol abuse, uncomplicated     Right foot pain   *  *  Past Surgical History:   Procedure Laterality Date     APPENDECTOMY           BENCH LIVER N/A 2016    Procedure: BENCH LIVER;  Surgeon: Larry Dhillon MD;  Location: [facility]     CATARACT IOL, RT/LT       COLONOSCOPY       ESOPHAGOSCOPY, GASTROSCOPY, DUODENOSCOPY (EGD), COMBINED N/A 2016    Procedure: COMBINED ESOPHAGOSCOPY, GASTROSCOPY, DUODENOSCOPY (EGD);  Surgeon: Tao Alfonso MD;  Location: [facility]     ESOPHAGOSCOPY, GASTROSCOPY, DUODENOSCOPY (EGD), COMBINED N/A 10/31/2016    Procedure: COMBINED ESOPHAGOSCOPY, GASTROSCOPY, DUODENOSCOPY (EGD), BIOPSY SINGLE OR MULTIPLE;  Surgeon: Ronald Bojorquez MD;  Location: [facility]     sphincteroplasty       TRANSPLANT LIVER RECIPIENT  DONOR N/A 2016    Procedure: TRANSPLANT LIVER RECIPIENT  DONOR;  Surgeon: Larry Dhillon MD;  Location: [facility]     TUBAL LIGATION      laparoscopic   *  *  Current Outpatient Medications   Medication Sig Dispense Refill     order for DME Equipment being ordered: Trilok ankle brace 1 Device 0     acetaminophen (TYLENOL) 325 MG tablet Take 2 tablets (650 mg) by mouth every 6 hours as needed for mild pain Or fevers. Use sparingly. Limit use to no more than 2 grams (2000 mg) in 24 hours. **Further refills must be obtained by primary care provider** 100 tablet 0     albuterol (PROAIR HFA/PROVENTIL HFA/VENTOLIN HFA) 108 (90 Base) MCG/ACT inhaler Inhale 2 puffs into the  lungs every 6 hours 18 g 3     amLODIPine (NORVASC) 5 MG tablet Take 1 tablet (5 mg) by mouth daily 90 tablet 3     amLODIPine (NORVASC) 5 MG tablet Take 1 tablet (5 mg) by mouth daily 90 tablet 3     betaxolol (BETOPTIC) 0.5 % ophthalmic solution Place 1 drop into both eyes daily 5 mL 3     calcium Citrate-vitamin D 500-400 MG-UNIT CHEW Take 1 tablet by mouth daily       chlorthalidone (HYGROTON) 25 MG tablet Take 0.5 tablets (12.5 mg) by mouth daily 45 tablet 3     cholecalciferol (VITAMIN D3) 400 UNIT TABS tablet Take 400 Units by mouth daily       clobetasol (TEMOVATE) 0.05 % external ointment Apply topically 2 times daily To areas of eczema on the lower legs, up to 2 weeks. 60 g 1     cyanocobalamin (VITAMIN  B-12) 1000 MCG tablet Take 1,000 mcg by mouth daily       diclofenac (VOLTAREN) 1 % GEL topical gel Apply 2 g topically 4 times daily as needed for moderate pain       doxepin (SINEQUAN) 10 MG capsule Take 1 capsule (10 mg) by mouth At Bedtime 30 capsule 0     ferrous sulfate (IRON) 325 (65 FE) MG tablet Take 1 tablet (325 mg) by mouth 2 times daily 100 tablet      folic acid (FOLVITE) 1 MG tablet Take 1 tablet (1 mg) by mouth daily 30 tablet 1     gabapentin (NEURONTIN) 300 MG capsule Take 1 capsule (300 mg) by mouth At Bedtime 90 capsule 1     hydrOXYzine (ATARAX) 25 MG tablet TAKE 1 TO 2 TABLETS BY MOUTH EVERY 6 HOURS AS NEEDED FOR ITCHING 120 tablet 5     hydrOXYzine (ATARAX) 25 MG tablet TAKE 1 TO 2 TABLETS BY MOUTH EVERY 6 HOURS AS NEEDED FOR ITCHING 120 tablet 5     levothyroxine (SYNTHROID/LEVOTHROID) 88 MCG tablet Take 1 tablet (88 mcg) by mouth daily BEFORE BREAKFAST 90 tablet 2     lifitegrast (XIIDRA) 5 % opthalmic solution Place 1 drop into both eyes 2 times daily 60 each 6     MAGNESIUM OXIDE PO Take 400 mg by mouth daily       melatonin 5 MG tablet Take 1 tablet (5 mg) by mouth nightly as needed for sleep       multivitamin, therapeutic (THERA-VIT) TABS tablet Take 1 tablet by mouth every 24  hours 30 tablet 11     Omega-3 Fatty Acids (OMEGA-3 FISH OIL EX ST PO) Take 1 capsule by mouth 2 times daily 1290 (fish oil)-900 (omega 3)       pantoprazole (PROTONIX) 40 MG EC tablet TAKE ONE TABLET (40MG) BY MOUTH EVERY MORNING BEFORE BREAKFAST 90 tablet 3     pramipexole (MIRAPEX) 0.5 MG tablet 0.5 mg At Bedtime  2     predniSONE (DELTASONE) 1 MG tablet Take 4 tablets (4 mg) by mouth daily 360 tablet 3     predniSONE (DELTASONE) 5 MG tablet Take 1 tablet (5 mg) by mouth daily 90 tablet 3     psyllium 0.52 G capsule Take 2 capsules (1.04 g) by mouth daily With a full glass of water. 60 capsule 1     tacrolimus (GENERIC EQUIVALENT) 0.5 MG capsule Take 4 capsules (2 mg) by mouth every morning AND 2 capsules (1 mg) every evening. 180 capsule 11     traMADol (ULTRAM) 50 MG tablet Take 1 tablet (50 mg) by mouth every 6 hours as needed for severe pain 30 tablet 0     traZODone (DESYREL) 100 MG tablet   1     triamcinolone (KENALOG) 0.1 % external ointment Apply topically 2 times daily To plaques behind the knee 80 g 1     ursodiol (ACTIGALL) 300 MG capsule TAKE ONE CAPSULE BY MOUTH TWICE A DAY 60 capsule 11     ursodiol (ACTIGALL) 300 MG capsule Take 1 capsule (300 mg) by mouth every 12 hours 60 capsule 11       ROS:     A 10-point review of systems was performed and is positive for that noted above in the HPI and as seen below.  All other areas are negative.     Numbness in feet?  yes   Calf pain with walking? yes  Recent foot/ankle injury? Ankle sprain 2017  Weight change over past 12 months? no  Self perception as overweight? yes  Recent flu-like symptoms? no  Joint pain other than feet ? Hands, knees, hips, neck    Social History: Employment:  ;  Exercise/Physical activity:  verito;  Tobacco use:  no  Social History     Socioeconomic History     Marital status:      Spouse name: Not on file     Number of children: 3     Years of education: Not on file     Highest education level: Not on file  "  Occupational History     Occupation:    Social Needs     Financial resource strain: Not on file     Food insecurity:     Worry: Not on file     Inability: Not on file     Transportation needs:     Medical: Not on file     Non-medical: Not on file   Tobacco Use     Smoking status: Former Smoker     Packs/day: 2.50     Years: 20.00     Pack years: 50.00     Last attempt to quit: 1996     Years since quittin.9     Smokeless tobacco: Never Used   Substance and Sexual Activity     Alcohol use: No     Alcohol/week: 7.0 standard drinks     Types: 7 Standard drinks or equivalent per week     Comment: heavy use (750ml/2 days) up until 1 year ago; had cut down to 1 drink/day; none since 16     Drug use: No     Sexual activity: Not on file   Lifestyle     Physical activity:     Days per week: Not on file     Minutes per session: Not on file     Stress: Not on file   Relationships     Social connections:     Talks on phone: Not on file     Gets together: Not on file     Attends Shinto service: Not on file     Active member of club or organization: Not on file     Attends meetings of clubs or organizations: Not on file     Relationship status: Not on file     Intimate partner violence:     Fear of current or ex partner: Not on file     Emotionally abused: Not on file     Physically abused: Not on file     Forced sexual activity: Not on file   Other Topics Concern     Parent/sibling w/ CABG, MI or angioplasty before 65F 55M? 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       Family history:  Family History   Problem Relation Age of Onset     Family History Negative Other      Melanoma Mother              Heart Disease Father         CHF       Rheumatoid arthritis:  no  Foot Problems: no  Diabetes: yes      EXAM:    Vitals: /80   Ht 1.702 m (5' 7\")   Wt 101.8 kg (224 lb 6.4 oz)   LMP  (LMP " "Unknown)   BMI 35.15 kg/m     BMI: Body mass index is 35.15 kg/m .  Height: 5' 7\"    Constitutional/ general:  Pt is in no apparent distress, appears well-nourished.  Cooperative with history and physical exam.     Vascular:  Pedal pulses are palpable bilaterally for both the DP and PT arteries.  CFT < 3 sec.  No edema.  Pedal hair growth noted.     Neuro:  Alert and oriented x 3. Coordinated gait.  Light touch sensation is intact to the L4, L5, S1 distributions. No obvious deficits.  No evidence of neurological-based weakness, spasticity, or contracture in the lower extremities.     Derm: Normal texture and turgor.  No erythema, ecchymosis, or cyanosis.  No open lesions.     Musculoskeletal:    Lower extremity muscle strength is normal.  Patient is ambulatory without an assistive device or brace .  No gross deformities.  Pain on palpation to the plantar central spect of the left heel.  Some of this is near the more posterior aspect, but not at the level of the Achilles tendon insertion. No pain with side-to-side compression of the heel.  No nodularity noted.              Again, thank you for allowing me to participate in the care of your patient.        Sincerely,        Ambrose Lebron, GISSELLE    "

## 2019-11-20 NOTE — PATIENT INSTRUCTIONS
Thank you for choosing [facility]!    DR. [facility]'S CLINIC LOCATIONS     MONDAY - [place] WEDNESDAY (AM ONLY) - [place]   [address] [address] [place] [address]   [phone] / [phone] / [phone]       THURSDAY - [place] SCHEDULE SURGERY: [phone]   [address] APPOINTMENTS: [phone]   [place] [address] BILLING QUESTIONS: [phone] / [phone] TRIAGE NURSE: [phone]       follow up in 4-6 weeks

## 2019-11-21 NOTE — PROGRESS NOTES
ASSESSMENT/PLAN:  Encounter Diagnosis   Name Primary?     Pain of left heel Yes     I think her pain is likely plantar fascial pain and/ or plantar bursitis. I explained that the pain can present differently than it did on the right.  I do not have a suspicion for any osseous pathology, but will consider imaging if her problem worsens despite the following recommendations.     Triloka ankle brace with foot strap medial for 2-4 weeks.    The patient was educated about the causes and nature of arch and heel pain.  The anatomy and function of the plantar fascia was discussed.  The treatment plan discussed included icing, calf and plantar fascial stretching, avoidance of barefoot walking, wearing sturdy, supportive, stiffer-soled athletic-type shoes, activity modification, and over-the-counter arch supports versus custom orthoses.  A comprehensive After-Visit Summary was provided.       Body mass index is 35.15 kg/m .    Weight management plan: Patient was referred to their PCP to discuss a diet and exercise plan.      Ambrose Lebron DPM, FACFAS, MS    [place] [facility]      ____________________________________________________________________    HPI:         Chief Complaint: left heel pain  Onset of problem: 6 months  Pain/ discomfort is described as:  Sharp, burning, cutting, tearing  Ratin/10 at worst   Frequency:  daily    The pain is made worse with bending over  Previous treatment: no    She has a history of right heel pain. The current pain feels different than what she experienced on the right.    *  Patient Active Problem List   Diagnosis     Decompensation of cirrhosis of liver (H)     Liver transplanted (H)     Alcoholic hepatitis     Immunosuppression (H)     Alcoholic hepatitis without ascites     Enterococcus UTI     Liver transplant status (H)     Nausea & vomiting     Malnutrition (H)     Candidiasis of skin     Anemia     ACP (advance care planning)     Diarrhea      Hypothyroidism     Esophageal reflux     Recurrent major depression in partial remission (H)     Insomnia     CKD (chronic kidney disease) stage 3, GFR 30-59 ml/min (H)     Anemia in stage 3 chronic kidney disease (H)     Osteopenia     Alcohol abuse, uncomplicated     Right foot pain   *  *  Past Surgical History:   Procedure Laterality Date     APPENDECTOMY           BENCH LIVER N/A 2016    Procedure: BENCH LIVER;  Surgeon: Larry Dhillon MD;  Location: [facility] OR     CATARACT IOL, RT/LT       COLONOSCOPY       ESOPHAGOSCOPY, GASTROSCOPY, DUODENOSCOPY (EGD), COMBINED N/A 2016    Procedure: COMBINED ESOPHAGOSCOPY, GASTROSCOPY, DUODENOSCOPY (EGD);  Surgeon: Tao Alfonso MD;  Location: [facility]     ESOPHAGOSCOPY, GASTROSCOPY, DUODENOSCOPY (EGD), COMBINED N/A 10/31/2016    Procedure: COMBINED ESOPHAGOSCOPY, GASTROSCOPY, DUODENOSCOPY (EGD), BIOPSY SINGLE OR MULTIPLE;  Surgeon: Ronald Bojorquez MD;  Location: [facility]     sphincteroplasty       TRANSPLANT LIVER RECIPIENT  DONOR N/A 2016    Procedure: TRANSPLANT LIVER RECIPIENT  DONOR;  Surgeon: Larry Dhillon MD;  Location: [facility] OR     TUBAL LIGATION      laparoscopic   *  *  Current Outpatient Medications   Medication Sig Dispense Refill     order for DME Equipment being ordered: Trilok ankle brace 1 Device 0     acetaminophen (TYLENOL) 325 MG tablet Take 2 tablets (650 mg) by mouth every 6 hours as needed for mild pain Or fevers. Use sparingly. Limit use to no more than 2 grams (2000 mg) in 24 hours. **Further refills must be obtained by primary care provider** 100 tablet 0     albuterol (PROAIR HFA/PROVENTIL HFA/VENTOLIN HFA) 108 (90 Base) MCG/ACT inhaler Inhale 2 puffs into the lungs every 6 hours 18 g 3     amLODIPine (NORVASC) 5 MG tablet Take 1 tablet (5 mg) by mouth daily 90 tablet 3     amLODIPine (NORVASC) 5 MG tablet Take 1 tablet (5 mg) by mouth daily 90 tablet 3     betaxolol (BETOPTIC) 0.5 % ophthalmic  solution Place 1 drop into both eyes daily 5 mL 3     calcium Citrate-vitamin D 500-400 MG-UNIT CHEW Take 1 tablet by mouth daily       chlorthalidone (HYGROTON) 25 MG tablet Take 0.5 tablets (12.5 mg) by mouth daily 45 tablet 3     cholecalciferol (VITAMIN D3) 400 UNIT TABS tablet Take 400 Units by mouth daily       clobetasol (TEMOVATE) 0.05 % external ointment Apply topically 2 times daily To areas of eczema on the lower legs, up to 2 weeks. 60 g 1     cyanocobalamin (VITAMIN  B-12) 1000 MCG tablet Take 1,000 mcg by mouth daily       diclofenac (VOLTAREN) 1 % GEL topical gel Apply 2 g topically 4 times daily as needed for moderate pain       doxepin (SINEQUAN) 10 MG capsule Take 1 capsule (10 mg) by mouth At Bedtime 30 capsule 0     ferrous sulfate (IRON) 325 (65 FE) MG tablet Take 1 tablet (325 mg) by mouth 2 times daily 100 tablet      folic acid (FOLVITE) 1 MG tablet Take 1 tablet (1 mg) by mouth daily 30 tablet 1     gabapentin (NEURONTIN) 300 MG capsule Take 1 capsule (300 mg) by mouth At Bedtime 90 capsule 1     hydrOXYzine (ATARAX) 25 MG tablet TAKE 1 TO 2 TABLETS BY MOUTH EVERY 6 HOURS AS NEEDED FOR ITCHING 120 tablet 5     hydrOXYzine (ATARAX) 25 MG tablet TAKE 1 TO 2 TABLETS BY MOUTH EVERY 6 HOURS AS NEEDED FOR ITCHING 120 tablet 5     levothyroxine (SYNTHROID/LEVOTHROID) 88 MCG tablet Take 1 tablet (88 mcg) by mouth daily BEFORE BREAKFAST 90 tablet 2     lifitegrast (XIIDRA) 5 % opthalmic solution Place 1 drop into both eyes 2 times daily 60 each 6     MAGNESIUM OXIDE PO Take 400 mg by mouth daily       melatonin 5 MG tablet Take 1 tablet (5 mg) by mouth nightly as needed for sleep       multivitamin, therapeutic (THERA-VIT) TABS tablet Take 1 tablet by mouth every 24 hours 30 tablet 11     Omega-3 Fatty Acids (OMEGA-3 FISH OIL EX ST PO) Take 1 capsule by mouth 2 times daily 1290 (fish oil)-900 (omega 3)       pantoprazole (PROTONIX) 40 MG EC tablet TAKE ONE TABLET (40MG) BY MOUTH EVERY MORNING BEFORE  BREAKFAST 90 tablet 3     pramipexole (MIRAPEX) 0.5 MG tablet 0.5 mg At Bedtime  2     predniSONE (DELTASONE) 1 MG tablet Take 4 tablets (4 mg) by mouth daily 360 tablet 3     predniSONE (DELTASONE) 5 MG tablet Take 1 tablet (5 mg) by mouth daily 90 tablet 3     psyllium 0.52 G capsule Take 2 capsules (1.04 g) by mouth daily With a full glass of water. 60 capsule 1     tacrolimus (GENERIC EQUIVALENT) 0.5 MG capsule Take 4 capsules (2 mg) by mouth every morning AND 2 capsules (1 mg) every evening. 180 capsule 11     traMADol (ULTRAM) 50 MG tablet Take 1 tablet (50 mg) by mouth every 6 hours as needed for severe pain 30 tablet 0     traZODone (DESYREL) 100 MG tablet   1     triamcinolone (KENALOG) 0.1 % external ointment Apply topically 2 times daily To plaques behind the knee 80 g 1     ursodiol (ACTIGALL) 300 MG capsule TAKE ONE CAPSULE BY MOUTH TWICE A DAY 60 capsule 11     ursodiol (ACTIGALL) 300 MG capsule Take 1 capsule (300 mg) by mouth every 12 hours 60 capsule 11       ROS:     A 10-point review of systems was performed and is positive for that noted above in the HPI and as seen below.  All other areas are negative.     Numbness in feet?  yes   Calf pain with walking? yes  Recent foot/ankle injury? Ankle sprain 2017  Weight change over past 12 months? no  Self perception as overweight? yes  Recent flu-like symptoms? no  Joint pain other than feet ? Hands, knees, hips, neck    Social History: Employment:  ;  Exercise/Physical activity:  verito;  Tobacco use:  no  Social History     Socioeconomic History     Marital status:      Spouse name: Not on file     Number of children: 3     Years of education: Not on file     Highest education level: Not on file   Occupational History     Occupation:    Social Needs     Financial resource strain: Not on file     Food insecurity:     Worry: Not on file     Inability: Not on file     Transportation needs:     Medical: Not on  "file     Non-medical: Not on file   Tobacco Use     Smoking status: Former Smoker     Packs/day: 2.50     Years: 20.00     Pack years: 50.00     Last attempt to quit: 1996     Years since quittin.9     Smokeless tobacco: Never Used   Substance and Sexual Activity     Alcohol use: No     Alcohol/week: 7.0 standard drinks     Types: 7 Standard drinks or equivalent per week     Comment: heavy use (750ml/2 days) up until 1 year ago; had cut down to 1 drink/day; none since 17     Drug use: No     Sexual activity: Not on file   Lifestyle     Physical activity:     Days per week: Not on file     Minutes per session: Not on file     Stress: Not on file   Relationships     Social connections:     Talks on phone: Not on file     Gets together: Not on file     Attends Orthodoxy service: Not on file     Active member of club or organization: Not on file     Attends meetings of clubs or organizations: Not on file     Relationship status: Not on file     Intimate partner violence:     Fear of current or ex partner: Not on file     Emotionally abused: Not on file     Physically abused: Not on file     Forced sexual activity: Not on file   Other Topics Concern     Parent/sibling w/ CABG, MI or angioplasty before 65F 55M? Not Asked   Social History Marcelino    Works as  for [employer], 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       Family history:  Family History   Problem Relation Age of Onset     Family History Negative Other      Melanoma Mother              Heart Disease Father         CHF       Rheumatoid arthritis:  no  Foot Problems: no  Diabetes: yes      EXAM:    Vitals: /80   Ht 1.702 m (5' 7\")   Wt 101.8 kg (224 lb 6.4 oz)   LMP  (LMP Unknown)   BMI 35.15 kg/m    BMI: Body mass index is 35.15 kg/m .  Height: 5' 7\"    Constitutional/ general:  Pt is in no apparent distress, appears well-nourished.  Cooperative with history and physical exam.     Vascular:  Pedal " pulses are palpable bilaterally for both the DP and PT arteries.  CFT < 3 sec.  No edema.  Pedal hair growth noted.     Neuro:  Alert and oriented x 3. Coordinated gait.  Light touch sensation is intact to the L4, L5, S1 distributions. No obvious deficits.  No evidence of neurological-based weakness, spasticity, or contracture in the lower extremities.     Derm: Normal texture and turgor.  No erythema, ecchymosis, or cyanosis.  No open lesions.     Musculoskeletal:    Lower extremity muscle strength is normal.  Patient is ambulatory without an assistive device or brace .  No gross deformities.  Pain on palpation to the plantar central spect of the left heel.  Some of this is near the more posterior aspect, but not at the level of the Achilles tendon insertion. No pain with side-to-side compression of the heel.  No nodularity noted.

## 2019-12-06 ENCOUNTER — ALLIED HEALTH/NURSE VISIT (OUTPATIENT)
Dept: TRANSPLANT | Facility: CLINIC | Age: 64
End: 2019-12-06
Payer: COMMERCIAL

## 2019-12-06 DIAGNOSIS — Z23 NEED FOR ZOSTER VACCINATION: Primary | ICD-10-CM

## 2019-12-06 PROCEDURE — 90471 IMMUNIZATION ADMIN: CPT

## 2019-12-06 PROCEDURE — 40000269 ZZH STATISTIC NO CHARGE FACILITY FEE: Mod: ZF

## 2019-12-06 PROCEDURE — 90750 HZV VACC RECOMBINANT IM: CPT | Mod: ZF | Performed by: INTERNAL MEDICINE

## 2019-12-06 PROCEDURE — 25000581 ZZH RX MED A9270 GY (STAT IND- M) 250: Mod: ZF | Performed by: INTERNAL MEDICINE

## 2019-12-06 PROCEDURE — 96372 THER/PROPH/DIAG INJ SC/IM: CPT

## 2019-12-06 RX ADMIN — ZOSTER VACCINE RECOMBINANT, ADJUVANTED 0.5 ML: KIT at 15:35

## 2019-12-10 DIAGNOSIS — Z94.4 LIVER REPLACED BY TRANSPLANT (H): ICD-10-CM

## 2019-12-10 DIAGNOSIS — Z13.220 LIPID SCREENING: ICD-10-CM

## 2019-12-10 LAB
ALBUMIN SERPL-MCNC: 3.6 G/DL (ref 3.4–5)
ALP SERPL-CCNC: 70 U/L (ref 40–150)
ALT SERPL W P-5'-P-CCNC: 27 U/L (ref 0–50)
ANION GAP SERPL CALCULATED.3IONS-SCNC: 7 MMOL/L (ref 3–14)
AST SERPL W P-5'-P-CCNC: 17 U/L (ref 0–45)
BILIRUB DIRECT SERPL-MCNC: 0.1 MG/DL (ref 0–0.2)
BILIRUB SERPL-MCNC: 0.5 MG/DL (ref 0.2–1.3)
BUN SERPL-MCNC: 28 MG/DL (ref 7–30)
CALCIUM SERPL-MCNC: 9.6 MG/DL (ref 8.5–10.1)
CHLORIDE SERPL-SCNC: 101 MMOL/L (ref 94–109)
CO2 SERPL-SCNC: 27 MMOL/L (ref 20–32)
CREAT SERPL-MCNC: 1.36 MG/DL (ref 0.52–1.04)
ERYTHROCYTE [DISTWIDTH] IN BLOOD BY AUTOMATED COUNT: 13.3 % (ref 10–15)
GFR SERPL CREATININE-BSD FRML MDRD: 41 ML/MIN/{1.73_M2}
GLUCOSE SERPL-MCNC: 95 MG/DL (ref 70–99)
HCT VFR BLD AUTO: 43.6 % (ref 35–47)
HGB BLD-MCNC: 14.3 G/DL (ref 11.7–15.7)
MCH RBC QN AUTO: 29.5 PG (ref 26.5–33)
MCHC RBC AUTO-ENTMCNC: 32.8 G/DL (ref 31.5–36.5)
MCV RBC AUTO: 90 FL (ref 78–100)
PLATELET # BLD AUTO: 304 10E9/L (ref 150–450)
POTASSIUM SERPL-SCNC: 3.8 MMOL/L (ref 3.4–5.3)
PROT SERPL-MCNC: 7.6 G/DL (ref 6.8–8.8)
RBC # BLD AUTO: 4.84 10E12/L (ref 3.8–5.2)
SODIUM SERPL-SCNC: 135 MMOL/L (ref 133–144)
TACROLIMUS BLD-MCNC: 4.9 UG/L (ref 5–15)
TME LAST DOSE: ABNORMAL H
WBC # BLD AUTO: 6.7 10E9/L (ref 4–11)

## 2019-12-10 PROCEDURE — 36415 COLL VENOUS BLD VENIPUNCTURE: CPT | Performed by: INTERNAL MEDICINE

## 2019-12-10 PROCEDURE — 80076 HEPATIC FUNCTION PANEL: CPT | Performed by: INTERNAL MEDICINE

## 2019-12-10 PROCEDURE — 80048 BASIC METABOLIC PNL TOTAL CA: CPT | Performed by: INTERNAL MEDICINE

## 2019-12-10 PROCEDURE — 80197 ASSAY OF TACROLIMUS: CPT | Performed by: INTERNAL MEDICINE

## 2019-12-10 PROCEDURE — 85027 COMPLETE CBC AUTOMATED: CPT | Performed by: INTERNAL MEDICINE

## 2019-12-11 ENCOUNTER — TELEPHONE (OUTPATIENT)
Dept: TRANSPLANT | Facility: CLINIC | Age: 64
End: 2019-12-11

## 2019-12-11 NOTE — TELEPHONE ENCOUNTER
Left message for patient that creatinine is increased. Her tacro in within goal range. And to call back

## 2019-12-11 NOTE — TELEPHONE ENCOUNTER
Patient Call: General  Route to LPN    Reason for call: pt would like to review labs  Not in MyChart yet     Call back needed? Yes    Return Call Needed  Same as documented in contacts section  When to return call?: Greater than one day: Route standard priority

## 2019-12-12 PROBLEM — M79.671 RIGHT FOOT PAIN: Status: RESOLVED | Noted: 2019-10-07 | Resolved: 2019-12-12

## 2019-12-12 NOTE — PROGRESS NOTES
Discharge Note    Progress reporting period is from initial evaluation date (please see noted date below) to Oct 28, 2019.  Linked Episodes   Type: Episode: Status: Noted: Resolved: Last update: Updated by:   PHYSICAL THERAPY (R)Foot10/7/2019 Active 10/7/2019  10/28/2019  3:54 PM Reno Zñuiga, PT      Comments:       Phan failed to follow up and current status is unknown.  Please see information below for last relevant information on current status.  Patient seen for 2 visits.    SUBJECTIVE  Subjective changes noted by patient:  Patient reports symptoms are gradually improving.  HEP is going well.  .  Current pain level is 1/10.     Changes in function:  Yes (See Goal flowsheet attached for changes in current functional level)  Adverse reaction to treatment or activity: None    OBJECTIVE  Changes noted in objective findings: Gait: Normal.  Good foot/ankle control with exercises.      ASSESSMENT/PLAN  Diagnosis: R Foot   Updated problem list and treatment plan:   Pain - HEP  Decreased ROM/flexibility - HEP  Decreased function - HEP  Decreased strength - HEP  STG/LTGs have been met or progress has been made towards goals:  Yes, please see goal flowsheet for most current information  Assessment of Progress: current status is unknown.    Last current status: Pt is progressing as expected   Self Management Plans:  HEP  I have re-evaluated this patient and find that the nature, scope, duration and intensity of the therapy is appropriate for the medical condition of the patient.  Phan continues to require the following intervention to meet STG and LTG's:  HEP.    Recommendations:  Discharge with current home program.  Patient to follow up with MD as needed.    Please refer to the daily flowsheet for treatment today, total treatment time and time spent performing 1:1 timed codes.

## 2019-12-13 ENCOUNTER — TELEPHONE (OUTPATIENT)
Dept: INTERNAL MEDICINE | Facility: CLINIC | Age: 64
End: 2019-12-13

## 2019-12-13 DIAGNOSIS — G25.81 RESTLESS LEG SYNDROME: Primary | ICD-10-CM

## 2019-12-13 NOTE — TELEPHONE ENCOUNTER
[facility] Call Center    Phone Message    May a detailed message be left on voicemail: yes    Reason for Call: Medication Refill Request    Has the patient contacted the pharmacy for the refill? Yes   Name of medication being requested: pramipexole (MIRAPEX) 0.5 MG tablet  Provider who prescribed the medication: Dr. Hussein Banegas  Pharmacy: [facility] - [place], [place] - [address]  Date medication is needed: Next available; pt wanted to ask if Dr. Sanchez could take over prescribing this med. Dr. Banegas had wanted pt to ask this as well. Pt is not out of supply but the prescription will  in January.     Action Taken: Message routed to:  [facility] ([facility] Med Refills

## 2019-12-13 NOTE — TELEPHONE ENCOUNTER
pramipexole (MIRAPEX) 0.5 MG tablet    Last Written Prescription Date:  unknown  Last Fill Quantity: unknown,   # refills: unknown  Last Office Visit : 7/22/19  Future Office visit:  4/17/20    Routed because: see pt call request. States previously from Dr Banegas, now requesting from Dr Sanchez, historical on med list. thanks.

## 2019-12-17 RX ORDER — PRAMIPEXOLE DIHYDROCHLORIDE 0.5 MG/1
0.5 TABLET ORAL AT BEDTIME
Qty: 90 TABLET | Refills: 1 | Status: SHIPPED | OUTPATIENT
Start: 2019-12-17 | End: 2020-10-05

## 2019-12-20 ENCOUNTER — OFFICE VISIT - HEALTHEAST (OUTPATIENT)
Dept: FAMILY MEDICINE | Facility: CLINIC | Age: 64
End: 2019-12-20

## 2019-12-20 DIAGNOSIS — R07.0 THROAT PAIN: ICD-10-CM

## 2019-12-20 DIAGNOSIS — K12.0 ORAL APHTHOUS ULCER: ICD-10-CM

## 2019-12-20 DIAGNOSIS — F41.1 GAD (GENERALIZED ANXIETY DISORDER): Primary | ICD-10-CM

## 2019-12-20 LAB — DEPRECATED S PYO AG THROAT QL EIA: NORMAL

## 2019-12-21 LAB — GROUP A STREP BY PCR: NORMAL

## 2019-12-22 RX ORDER — HYDROXYZINE HYDROCHLORIDE 25 MG/1
TABLET, FILM COATED ORAL
Qty: 120 TABLET | Refills: 5 | Status: SHIPPED | OUTPATIENT
Start: 2019-12-22 | End: 2020-03-24

## 2020-02-18 DIAGNOSIS — Z94.4 LIVER REPLACED BY TRANSPLANT (H): ICD-10-CM

## 2020-02-18 DIAGNOSIS — Z13.220 LIPID SCREENING: ICD-10-CM

## 2020-02-18 LAB
ALBUMIN SERPL-MCNC: 3.7 G/DL (ref 3.4–5)
ALP SERPL-CCNC: 63 U/L (ref 40–150)
ALT SERPL W P-5'-P-CCNC: 27 U/L (ref 0–50)
ANION GAP SERPL CALCULATED.3IONS-SCNC: 8 MMOL/L (ref 3–14)
AST SERPL W P-5'-P-CCNC: 19 U/L (ref 0–45)
BILIRUB DIRECT SERPL-MCNC: 0.1 MG/DL (ref 0–0.2)
BILIRUB SERPL-MCNC: 0.5 MG/DL (ref 0.2–1.3)
BUN SERPL-MCNC: 27 MG/DL (ref 7–30)
CALCIUM SERPL-MCNC: 9.5 MG/DL (ref 8.5–10.1)
CHLORIDE SERPL-SCNC: 101 MMOL/L (ref 94–109)
CO2 SERPL-SCNC: 26 MMOL/L (ref 20–32)
CREAT SERPL-MCNC: 1.3 MG/DL (ref 0.52–1.04)
ERYTHROCYTE [DISTWIDTH] IN BLOOD BY AUTOMATED COUNT: 13.6 % (ref 10–15)
GFR SERPL CREATININE-BSD FRML MDRD: 44 ML/MIN/{1.73_M2}
GLUCOSE SERPL-MCNC: 111 MG/DL (ref 70–99)
HCT VFR BLD AUTO: 41.4 % (ref 35–47)
HGB BLD-MCNC: 14.1 G/DL (ref 11.7–15.7)
MCH RBC QN AUTO: 30 PG (ref 26.5–33)
MCHC RBC AUTO-ENTMCNC: 34.1 G/DL (ref 31.5–36.5)
MCV RBC AUTO: 88 FL (ref 78–100)
PLATELET # BLD AUTO: 311 10E9/L (ref 150–450)
POTASSIUM SERPL-SCNC: 3.6 MMOL/L (ref 3.4–5.3)
PROT SERPL-MCNC: 7.6 G/DL (ref 6.8–8.8)
RBC # BLD AUTO: 4.7 10E12/L (ref 3.8–5.2)
SODIUM SERPL-SCNC: 135 MMOL/L (ref 133–144)
TACROLIMUS BLD-MCNC: 6.3 UG/L (ref 5–15)
TME LAST DOSE: NORMAL H
WBC # BLD AUTO: 7.2 10E9/L (ref 4–11)

## 2020-02-18 PROCEDURE — 80048 BASIC METABOLIC PNL TOTAL CA: CPT | Performed by: INTERNAL MEDICINE

## 2020-02-18 PROCEDURE — 85027 COMPLETE CBC AUTOMATED: CPT | Performed by: INTERNAL MEDICINE

## 2020-02-18 PROCEDURE — 80076 HEPATIC FUNCTION PANEL: CPT | Performed by: INTERNAL MEDICINE

## 2020-02-18 PROCEDURE — 36415 COLL VENOUS BLD VENIPUNCTURE: CPT | Performed by: INTERNAL MEDICINE

## 2020-02-18 PROCEDURE — 80197 ASSAY OF TACROLIMUS: CPT | Performed by: INTERNAL MEDICINE

## 2020-02-21 ENCOUNTER — TELEPHONE (OUTPATIENT)
Dept: TRANSPLANT | Facility: CLINIC | Age: 65
End: 2020-02-21

## 2020-02-21 DIAGNOSIS — Z94.4 LIVER TRANSPLANTED (H): ICD-10-CM

## 2020-02-21 RX ORDER — TACROLIMUS 0.5 MG/1
CAPSULE ORAL
Qty: 120 CAPSULE | Refills: 11 | Status: SHIPPED | OUTPATIENT
Start: 2020-02-21 | End: 2020-05-04

## 2020-02-21 NOTE — TELEPHONE ENCOUNTER
ISSUE:   Tacrolimus IR level 6.3 on 2/18/2020, goal 3-5, dose 2mg AM and 1 mg PM.    PLAN:   Please call patient and confirm this was an accurate 12-hour trough. Verify Tacrolimus IR dose 2 mg AM and 1 mg PM. Confirm no new medications or illness. Confirm no missed doses. If accurate trough and accurate dose, decrease Tacrolimus IR dose to 1 mg every 12 hours and repeat labs as previously scheduled.  OUTCOME:   Spoke with patient, they confirm accurate trough level and current dose. Patient confirmed dose change to 1 mg every 12 hours and to repeat labs as previously scheduled. Orders sent to preferred pharmacy for dose change and lab for repeat labs. Patient voiced understanding of plan.

## 2020-03-04 DIAGNOSIS — N18.30 CKD (CHRONIC KIDNEY DISEASE) STAGE 3, GFR 30-59 ML/MIN (H): Primary | ICD-10-CM

## 2020-03-23 ENCOUNTER — TELEPHONE (OUTPATIENT)
Dept: NEPHROLOGY | Facility: CLINIC | Age: 65
End: 2020-03-23

## 2020-03-23 DIAGNOSIS — F41.1 GAD (GENERALIZED ANXIETY DISORDER): ICD-10-CM

## 2020-03-23 DIAGNOSIS — R94.6 THYROID FUNCTION TEST ABNORMAL: ICD-10-CM

## 2020-03-23 NOTE — TELEPHONE ENCOUNTER
[facility] Call Center    Phone Message    May a detailed message be left on voicemail: yes     Reason for Call: Patient stating that she was advised by her transplant coordinator to avoid completing any labs at this time due to the Coronavirus. Patient would like to know if Dr. Gutierrez would still like to complete a telephone visit on Wednesday 3/25 since patient is no longer completing labs. Please contact patient with recommendations. Thank you, Sol Muñoz on 3/23/2020 at 8:54 AM       Action Taken: Message routed to:  [facility] ([facility] NEPHROLOGY    Travel Screening: Not Applicable

## 2020-03-24 RX ORDER — HYDROXYZINE HYDROCHLORIDE 25 MG/1
25-50 TABLET, FILM COATED ORAL EVERY 6 HOURS PRN
Qty: 120 TABLET | Refills: 3 | Status: SHIPPED | OUTPATIENT
Start: 2020-03-24 | End: 2020-06-16

## 2020-03-24 RX ORDER — LEVOTHYROXINE SODIUM 88 UG/1
88 TABLET ORAL
Qty: 90 TABLET | Refills: 0 | Status: SHIPPED | OUTPATIENT
Start: 2020-03-24 | End: 2020-06-16

## 2020-03-24 NOTE — TELEPHONE ENCOUNTER
It is up to her, if she feels that she has no symptoms to discuss it is fine by me if we reschedule to a later date.  Daya Gutierrez MD

## 2020-03-24 NOTE — TELEPHONE ENCOUNTER
Left voice message for patient informing her of recommendations per Dr. Gutierrez below.   Sujey Harper LPN  Nephrology  [phone]

## 2020-03-25 ENCOUNTER — VIRTUAL VISIT (OUTPATIENT)
Dept: NEPHROLOGY | Facility: CLINIC | Age: 65
End: 2020-03-25
Attending: INTERNAL MEDICINE
Payer: COMMERCIAL

## 2020-03-25 DIAGNOSIS — N18.30 CKD (CHRONIC KIDNEY DISEASE) STAGE 3, GFR 30-59 ML/MIN (H): Primary | ICD-10-CM

## 2020-03-25 NOTE — PROGRESS NOTES
"Phan Luque is a 64 year old female who is being evaluated via a billable telephone visit.      The patient has been notified of following:     \"This telephone visit will be conducted via a call between you and your physician/provider. We have found that certain health care needs can be provided without the need for a physical exam.  This service lets us provide the care you need with a short phone conversation.  If a prescription is necessary we can send it directly to your pharmacy.  If lab work is needed we can place an order for that and you can then stop by our lab to have the test done at a later time.    If during the course of the call the physician/provider feels a telephone visit is not appropriate, you will not be charged for this service.\"     Verbal consent obtained for visit  yes         Phan Luque is a 64 year old female with a history of CKD in the setting of liver transplant who would like to discuss follow up of CKD management.    She is doing well, appetite and energy are fine.  She is working from home, trying to stay active within her home during COVID-19 pandemic. Had some dental issues and needed some antibiotics but no major illness.  Last creatinine was in February and was stable at 1.3 mg/dL    I have reviewed and updated the patient's Past Medical History, Social History, Family History and Medication List.      Current Outpatient Medications:      acetaminophen (TYLENOL) 325 MG tablet, Take 2 tablets (650 mg) by mouth every 6 hours as needed for mild pain Or fevers. Use sparingly. Limit use to no more than 2 grams (2000 mg) in 24 hours. **Further refills must be obtained by primary care provider**, Disp: 100 tablet, Rfl: 0     albuterol (PROAIR HFA/PROVENTIL HFA/VENTOLIN HFA) 108 (90 Base) MCG/ACT inhaler, Inhale 2 puffs into the lungs every 6 hours, Disp: 18 g, Rfl: 3     amLODIPine (NORVASC) 5 MG tablet, Take 1 tablet (5 mg) by mouth daily, Disp: 90 tablet, Rfl: 3     " amLODIPine (NORVASC) 5 MG tablet, Take 1 tablet (5 mg) by mouth daily, Disp: 90 tablet, Rfl: 3     betaxolol (BETOPTIC) 0.5 % ophthalmic solution, Place 1 drop into both eyes daily, Disp: 5 mL, Rfl: 3     calcium Citrate-vitamin D 500-400 MG-UNIT CHEW, Take 1 tablet by mouth daily, Disp: , Rfl:      chlorthalidone (HYGROTON) 25 MG tablet, Take 0.5 tablets (12.5 mg) by mouth daily, Disp: 45 tablet, Rfl: 3     cholecalciferol (VITAMIN D3) 400 UNIT TABS tablet, Take 400 Units by mouth daily, Disp: , Rfl:      clobetasol (TEMOVATE) 0.05 % external ointment, Apply topically 2 times daily To areas of eczema on the lower legs, up to 2 weeks., Disp: 60 g, Rfl: 1     cyanocobalamin (VITAMIN  B-12) 1000 MCG tablet, Take 1,000 mcg by mouth daily, Disp: , Rfl:      diclofenac (VOLTAREN) 1 % GEL topical gel, Apply 2 g topically 4 times daily as needed for moderate pain, Disp: , Rfl:      doxepin (SINEQUAN) 10 MG capsule, Take 1 capsule (10 mg) by mouth At Bedtime, Disp: 30 capsule, Rfl: 0     ferrous sulfate (IRON) 325 (65 FE) MG tablet, Take 1 tablet (325 mg) by mouth 2 times daily, Disp: 100 tablet, Rfl:      folic acid (FOLVITE) 1 MG tablet, Take 1 tablet (1 mg) by mouth daily, Disp: 30 tablet, Rfl: 1     gabapentin (NEURONTIN) 300 MG capsule, Take 1 capsule (300 mg) by mouth At Bedtime, Disp: 90 capsule, Rfl: 1     hydrOXYzine (ATARAX) 25 MG tablet, Take 1-2 tablets (25-50 mg) by mouth every 6 hours as needed for itching, Disp: 120 tablet, Rfl: 3     levothyroxine (SYNTHROID/LEVOTHROID) 88 MCG tablet, Take 1 tablet (88 mcg) by mouth every morning (before breakfast), Disp: 90 tablet, Rfl: 0     lifitegrast (XIIDRA) 5 % opthalmic solution, Place 1 drop into both eyes 2 times daily, Disp: 60 each, Rfl: 6     MAGNESIUM OXIDE PO, Take 400 mg by mouth daily, Disp: , Rfl:      melatonin 5 MG tablet, Take 1 tablet (5 mg) by mouth nightly as needed for sleep, Disp: , Rfl:      multivitamin, therapeutic (THERA-VIT) TABS tablet, Take 1  tablet by mouth every 24 hours, Disp: 30 tablet, Rfl: 11     Omega-3 Fatty Acids (OMEGA-3 FISH OIL EX ST PO), Take 1 capsule by mouth 2 times daily 1290 (fish oil)-900 (omega 3), Disp: , Rfl:      order for DME, Equipment being ordered: Trilok ankle brace, Disp: 1 Device, Rfl: 0     pantoprazole (PROTONIX) 40 MG EC tablet, TAKE ONE TABLET (40MG) BY MOUTH EVERY MORNING BEFORE BREAKFAST, Disp: 90 tablet, Rfl: 3     pramipexole (MIRAPEX) 0.5 MG tablet, Take 1 tablet (0.5 mg) by mouth At Bedtime, Disp: 90 tablet, Rfl: 1     predniSONE (DELTASONE) 1 MG tablet, Take 4 tablets (4 mg) by mouth daily, Disp: 360 tablet, Rfl: 3     predniSONE (DELTASONE) 5 MG tablet, Take 1 tablet (5 mg) by mouth daily, Disp: 90 tablet, Rfl: 3     psyllium 0.52 G capsule, Take 2 capsules (1.04 g) by mouth daily With a full glass of water., Disp: 60 capsule, Rfl: 1     TACROLIMUS 0.5 MG PO capsule, Take 2 capsules (1 mg) by mouth every morning AND 2 capsules (1 mg) every evening., Disp: 120 capsule, Rfl: 11     traMADol (ULTRAM) 50 MG tablet, Take 1 tablet (50 mg) by mouth every 6 hours as needed for severe pain, Disp: 30 tablet, Rfl: 0     traZODone (DESYREL) 100 MG tablet, , Disp: , Rfl: 1     triamcinolone (KENALOG) 0.1 % external ointment, Apply topically 2 times daily To plaques behind the knee, Disp: 80 g, Rfl: 1     ursodiol (ACTIGALL) 300 MG capsule, TAKE ONE CAPSULE BY MOUTH TWICE A DAY, Disp: 60 capsule, Rfl: 11     ursodiol (ACTIGALL) 300 MG capsule, Take 1 capsule (300 mg) by mouth every 12 hours, Disp: 60 capsule, Rfl: 11     ALLERGIES  Codeine; Benadryl [diphenhydramine]; Cefaclor; Hydrocodone-acetaminophen; Oxycodone; Penicillins; and Sulfa drugs        Assessment/Plan:  # CKD  # HTN - controlled, no change in amlodipine and chlorthalidone    Daya Gutierrez MD  [facility]  Department of Medicine  Division of Renal Disease and Hypertension  [phone]     Follow up plan  RTC 1 year or sooner if any  changes in creatinine      Phone call contact time  Call Started at 4:37  Call Ended at 4:47   10 minutes

## 2020-03-27 ENCOUNTER — TELEPHONE (OUTPATIENT)
Dept: INTERNAL MEDICINE | Facility: CLINIC | Age: 65
End: 2020-03-27

## 2020-03-27 DIAGNOSIS — J45.20 INTERMITTENT ASTHMA WITHOUT COMPLICATION, UNSPECIFIED ASTHMA SEVERITY: ICD-10-CM

## 2020-03-27 RX ORDER — ALBUTEROL SULFATE 90 UG/1
2 AEROSOL, METERED RESPIRATORY (INHALATION) EVERY 6 HOURS
Qty: 18 G | Refills: 3 | Status: SHIPPED | OUTPATIENT
Start: 2020-03-27 | End: 2021-03-23

## 2020-03-27 NOTE — TELEPHONE ENCOUNTER
[facility] Call Center    Phone Message    May a detailed message be left on voicemail: yes     Reason for Call: Medication Question or concern regarding medication   Prescription Clarification  Name of Medication: ALBUTEROL (ASTHMA INHALER)  Prescribing Provider: ARIANNE    Pharmacy: [facility] - [place], [place] - [address]    What on the order needs clarification? Pt notice she does not have a refill of this rx and would like to request for it.       Action Taken: Other: PCC    Travel Screening: Not Applicable

## 2020-04-08 ENCOUNTER — VIRTUAL VISIT (OUTPATIENT)
Dept: PSYCHIATRY | Facility: CLINIC | Age: 65
End: 2020-04-08
Attending: NURSE PRACTITIONER
Payer: COMMERCIAL

## 2020-04-08 DIAGNOSIS — G62.9 PERIPHERAL POLYNEUROPATHY: ICD-10-CM

## 2020-04-08 RX ORDER — GABAPENTIN 300 MG/1
300 CAPSULE ORAL AT BEDTIME
Qty: 90 CAPSULE | Refills: 1 | Status: SHIPPED | OUTPATIENT
Start: 2020-04-08 | End: 2020-10-05

## 2020-04-08 ASSESSMENT — PAIN SCALES - GENERAL: PAINLEVEL: NO PAIN (0)

## 2020-04-08 NOTE — PROGRESS NOTES
"  Psychiatry Clinic Progress Note                                                                   Phan Luque is a 64 year old female who returns to the clinic for continued care.   Therapist: None.    PCP: Santosh Salgado  Other Providers: Hussein Banegas MD    Pertinent Background:  Liver Transplant on 9/25/16 and Stage 3 kidney disease.  Psych critical item history includes SUBSTANCE USE: alcohol.     Interim History                                                                                                             4, 4     The patient is a good historian, reports good treatment adherence and was last seen 10/15/19. Since the last visit, Working from home.  Phan being very cautious due to being immunocompromised.  No significant concerns with anxiety/depression.  Continues to use Gabapentin for sleep.  Will continue to check in with clinic every 6 months.     10/15/19: Phan reports continued situational depression (work, family).  When asked if she wanted to restart medications to manage depression, she declined.  Taking Gabapentin for sleep.  Tried doxepin for sleep but experienced increased RLS.  Currently taking Gabapentin 300mg and Mirapex 0.5mg at bedtime and is sleeping better.   No concerns with anxiety.  No history of seasonal component to mood.      7/16/19: Phan reports she is \"ok.\" She reports the Gabapentin has been helpful with sleep quality.  She did increase to 600mg at bedtime.   She has does report any concern with depression but feels anhedonic and attributes to overwhelmed with various responsibilities and tasks she currently is facing.      3/27/19: Phan reports she is \"hanging in there.\"  Phan reports her sleep has worsened since stopping Trazodone in December.  She does report her pain has improved since stopping Trazodone and is no longer experiencing the morning \"hangover.\"  Will start/restart Gabapentin for sleep.  Duloxetine stopped due to side effects and Venlafaxine " ordered but she did not start due to fear it would have same adverse effects as duloxetine.  Recommended she try as it appears her depression may be worsening and if experiences concerning side effects, she should call clinic.  She will continue to consider.    9/26/18: Phan reports the following changes to medications:  Started Gabapentin 100mg TID which was started by orthopedics, prednisone was decreased to 5mg, and folic acid was stopped.  No significant psychiatric symptoms reported.  She does report increase restlessness at night but does not believe it is anxiety.  Restless legs have also gotten worse which has further impeded sleep.  Iron supplementation started to help with RLS.      6/26/18: Phan reports her mental health continues to be well managed in spite of forgetting to load Buspar into her weekly med reminder.  She has not noticed any increase in anxiety.  Buspar will not be restarted.  Phan continues to not have taken the Propranolol as it is unneeded. Sleep quality remains unchanged but she is unconcerned.  Sleep may be impacted by prednisone.  Continues to take Melatonin 5mg and Trazodone 150mg to help with sleep.  Phan reports the two year anniversary of her liver transplant is approaching with no concerns from providers.      3/30/18:  Phan tried decreased Trazodone and the change significantly impacted her sleep.  She has resumed taking Trazodone 150mg qHS. Phan also recently saw nephrologist who prescribed Iron supplementation to possibly help manage restless leg syndrome.       Phan has not taken propranolol as she feels her anxiety is well managed.  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  Discussed longer duration between appointments due to symptoms being well managed.        Recent Symptoms:   Depression:  low energy and insomnia  Elevated:  none  Psychosis:  none  Anxiety:  anxiety intensifies when driving  Panic Attack:  none  Trauma Related:   none     Recent Substance Use:  none reported        Social/ Family History                                  [per patient report]                                     1ea, 1ea   CHILDREN- 3 adult children       TRAUMA HISTORY (self-report)- None  FEELS SAFE AT HOME- Yes    Medical / Surgical History                                                                                                                  Patient Active Problem List   Diagnosis     Decompensation of cirrhosis of liver (H)     Liver transplanted (H)     Alcoholic hepatitis     Immunosuppression (H)     Alcoholic hepatitis without ascites     Enterococcus UTI     Liver transplant status (H)     Nausea & vomiting     Malnutrition (H)     Candidiasis of skin     Anemia     ACP (advance care planning)     Diarrhea     Hypothyroidism     Esophageal reflux     Recurrent major depression in partial remission (H)     Insomnia     CKD (chronic kidney disease) stage 3, GFR 30-59 ml/min (H)     Anemia in stage 3 chronic kidney disease (H)     Osteopenia     Alcohol abuse, uncomplicated       Past Surgical History:   Procedure Laterality Date     APPENDECTOMY           BENCH LIVER N/A 2016    Procedure: BENCH LIVER;  Surgeon: Larry Dhillon MD;  Location: [facility] OR     CATARACT IOL, RT/LT       COLONOSCOPY       ESOPHAGOSCOPY, GASTROSCOPY, DUODENOSCOPY (EGD), COMBINED N/A 2016    Procedure: COMBINED ESOPHAGOSCOPY, GASTROSCOPY, DUODENOSCOPY (EGD);  Surgeon: Tao Alfonso MD;  Location: [facility]     ESOPHAGOSCOPY, GASTROSCOPY, DUODENOSCOPY (EGD), COMBINED N/A 10/31/2016    Procedure: COMBINED ESOPHAGOSCOPY, GASTROSCOPY, DUODENOSCOPY (EGD), BIOPSY SINGLE OR MULTIPLE;  Surgeon: Ronald Bojorquez MD;  Location: [facility]     sphincteroplasty       TRANSPLANT LIVER RECIPIENT  DONOR N/A 2016    Procedure: TRANSPLANT LIVER RECIPIENT  DONOR;  Surgeon: Larry Dhillon MD;  Location: [facility] OR     TUBAL LIGATION      laparoscopic       Medical Review of Systems                                                                                                        2,10   A comprehensive review of systems was performed and is negative other than noted in the HPI.  Pregnant- No    Breastfeeding- No    Contraception- No  Allergy                                Codeine; Benadryl [diphenhydramine]; Cefaclor; Hydrocodone-acetaminophen; Oxycodone; Penicillins; and Sulfa drugs  Current Medications                                                                                                       Current Outpatient Medications   Medication Sig Dispense Refill     acetaminophen (TYLENOL) 325 MG tablet Take 2 tablets (650 mg) by mouth every 6 hours as needed for mild pain Or fevers. Use sparingly. Limit use to no more than 2 grams (2000 mg) in 24 hours. **Further refills must be obtained by primary care provider** 100 tablet 0     albuterol (PROAIR HFA/PROVENTIL HFA/VENTOLIN HFA) 108 (90 Base) MCG/ACT inhaler Inhale 2 puffs into the lungs every 6 hours 18 g 3     amLODIPine (NORVASC) 5 MG tablet Take 1 tablet (5 mg) by mouth daily 90 tablet 3     chlorthalidone (HYGROTON) 25 MG tablet Take 0.5 tablets (12.5 mg) by mouth daily 45 tablet 3     cholecalciferol (VITAMIN D3) 400 UNIT TABS tablet Take 400 Units by mouth daily       clobetasol (TEMOVATE) 0.05 % external ointment Apply topically 2 times daily To areas of eczema on the lower legs, up to 2 weeks. 60 g 1     cyanocobalamin (VITAMIN  B-12) 1000 MCG tablet Take 1,000 mcg by mouth daily       diclofenac (VOLTAREN) 1 % GEL topical gel Apply 2 g topically 4 times daily as needed for moderate pain       ferrous sulfate (IRON) 325 (65 FE) MG tablet Take 1 tablet (325 mg) by mouth 2 times daily 100 tablet      gabapentin (NEURONTIN) 300 MG capsule Take 1 capsule (300 mg) by mouth At Bedtime 90 capsule 1     hydrOXYzine (ATARAX) 25 MG tablet Take 1-2 tablets (25-50 mg) by mouth every 6 hours as needed for  itching 120 tablet 3     levothyroxine (SYNTHROID/LEVOTHROID) 88 MCG tablet Take 1 tablet (88 mcg) by mouth every morning (before breakfast) 90 tablet 0     MAGNESIUM OXIDE PO Take 400 mg by mouth daily       multivitamin, therapeutic (THERA-VIT) TABS tablet Take 1 tablet by mouth every 24 hours 30 tablet 11     order for DME Equipment being ordered: Trilok ankle brace 1 Device 0     pantoprazole (PROTONIX) 40 MG EC tablet TAKE ONE TABLET (40MG) BY MOUTH EVERY MORNING BEFORE BREAKFAST 90 tablet 3     pramipexole (MIRAPEX) 0.5 MG tablet Take 1 tablet (0.5 mg) by mouth At Bedtime 90 tablet 1     psyllium 0.52 G capsule Take 2 capsules (1.04 g) by mouth daily With a full glass of water. 60 capsule 1     TACROLIMUS 0.5 MG PO capsule Take 2 capsules (1 mg) by mouth every morning AND 2 capsules (1 mg) every evening. 120 capsule 11     traMADol (ULTRAM) 50 MG tablet Take 1 tablet (50 mg) by mouth every 6 hours as needed for severe pain 30 tablet 0     triamcinolone (KENALOG) 0.1 % external ointment Apply topically 2 times daily To plaques behind the knee 80 g 1     ursodiol (ACTIGALL) 300 MG capsule TAKE ONE CAPSULE BY MOUTH TWICE A DAY 60 capsule 11     ursodiol (ACTIGALL) 300 MG capsule Take 1 capsule (300 mg) by mouth every 12 hours 60 capsule 11     amLODIPine (NORVASC) 5 MG tablet Take 1 tablet (5 mg) by mouth daily 90 tablet 3     betaxolol (BETOPTIC) 0.5 % ophthalmic solution Place 1 drop into both eyes daily 5 mL 3     calcium Citrate-vitamin D 500-400 MG-UNIT CHEW Take 1 tablet by mouth daily       doxepin (SINEQUAN) 10 MG capsule Take 1 capsule (10 mg) by mouth At Bedtime 30 capsule 0     folic acid (FOLVITE) 1 MG tablet Take 1 tablet (1 mg) by mouth daily 30 tablet 1     lifitegrast (XIIDRA) 5 % opthalmic solution Place 1 drop into both eyes 2 times daily 60 each 6     melatonin 5 MG tablet Take 1 tablet (5 mg) by mouth nightly as needed for sleep       Omega-3 Fatty Acids (OMEGA-3 FISH OIL EX ST PO) Take 1  "capsule by mouth 2 times daily 1290 (fish oil)-900 (omega 3)       predniSONE (DELTASONE) 1 MG tablet Take 4 tablets (4 mg) by mouth daily 360 tablet 3     predniSONE (DELTASONE) 5 MG tablet Take 1 tablet (5 mg) by mouth daily 90 tablet 3     traZODone (DESYREL) 100 MG tablet   1     Vitals                                                                                                                            3, 3   LMP  (LMP Unknown)    Mental Status Exam                                                                                        9, 14 cog gs     Alertness: alert  and oriented  Appearance: casually groomed  Behavior/Demeanor: cooperative, pleasant and calm, with good  eye contact   Speech: regular rate and rhythm  Language: good  Psychomotor: normal or unremarkable  Mood: \"ok\"  Affect: appropriate; was congruent to mood; was congruent to content  Thought Process/Associations: unremarkable  Thought Content:  Reports none;  Denies suicidal ideation and violent ideation  Perception:  Reports none;  Denies auditory hallucinations and visual hallucinations  Insight: good  Judgment: good  Cognition: (6) does  appear grossly intact; formal cognitive testing was not done  Gait/Station and/or Muscle Strength/Tone: unable to assess    Labs and Data                                                                                                                 Rating Scales:  PHQ9    PHQ9 Today:  Not completed  PHQ-9 SCORE 3/27/2019 7/16/2019 10/15/2019   PHQ-9 Total Score MyChart - - -   PHQ-9 Total Score 10 6 5         Diagnosis and Assessment                                                                                  m2, h3     Today the following issues were addressed:    1) Major Depressive Disorder, recurrent, mild  2) Generalized Anxiety Disorder    [place] Prescription Monitoring Program [] was not checked today:  will be checked next visit.         Plan                                                   " "                                                                      m2, h3      1) Medication Management      Continue Gabapentin 300mg at bedtime for sleep.      2) Therapy  Will start looking for new therapist    RTC: 6 months.  Consider transfer of care to PCP     CRISIS NUMBERS:   Provided routinely in AVS.    Treatment Risk Statement:  The patient understands the risks, benefits, adverse effects and alternatives. Agrees to treatment with the capacity to do so. No medical contraindications to treatment. Agrees to call clinic for any problems. The patient understands to call 911 or go to the nearest ED if life threatening or urgent symptoms occur.      PROVIDER:  DONOVAN Hanna CNP    hPan Luque is a 64 year old female who is being evaluated via a billable video visit.      The patient has been notified of following:     \"This video visit will be conducted via a call between you and your physician/provider. We have found that certain health care needs can be provided without the need for an in-person physical exam.  This service lets us provide the care you need with a video conversation.  If a prescription is necessary we can send it directly to your pharmacy.  If lab work is needed we can place an order for that and you can then stop by our lab to have the test done at a later time.    Video visits are billed at different rates depending on your insurance coverage.  Please reach out to your insurance provider with any questions.    If during the course of the call the physician/provider feels a video visit is not appropriate, you will not be charged for this service.\"    Patient has given verbal consent for Video visit? Yes    How would you like to obtain your AVS?unknown     Patient would like the video invitation sent by: Text to cell phone: [phone]      Video Start Time: 5:03pm    Phan Luque complains of    Chief Complaint   Patient presents with     Recheck Medication     Peripheral " polyneuropathy        I have reviewed and updated the patient's Past Medical History, Social History, Family History and Medication List.    ALLERGIES  Codeine; Benadryl [diphenhydramine]; Cefaclor; Hydrocodone-acetaminophen; Oxycodone; Penicillins; and Sulfa drugs      Video-Visit Details    Type of service:  Video Visit    Video End Time (time video stopped): 5:23pm    Originating Location (pt. Location): Home    Distant Location (provider location):  PSYCHIATRY CLINIC     Mode of Communication:  Video Conference via [email or web address]

## 2020-04-24 ENCOUNTER — AMBULATORY - HEALTHEAST (OUTPATIENT)
Dept: LAB | Facility: CLINIC | Age: 65
End: 2020-04-24

## 2020-04-24 DIAGNOSIS — Z94.4 TRANSPLANTED LIVER (H): ICD-10-CM

## 2020-04-24 DIAGNOSIS — Z79.899 DRUG THERAPY: ICD-10-CM

## 2020-04-29 ENCOUNTER — AMBULATORY - HEALTHEAST (OUTPATIENT)
Dept: LAB | Facility: CLINIC | Age: 65
End: 2020-04-29

## 2020-04-29 DIAGNOSIS — Z79.899 DRUG THERAPY: ICD-10-CM

## 2020-04-29 DIAGNOSIS — Z94.4 TRANSPLANTED LIVER (H): ICD-10-CM

## 2020-04-29 LAB
ALBUMIN SERPL-MCNC: 4 G/DL (ref 3.5–5)
ALBUMIN UR-MCNC: NEGATIVE MG/DL
ALP SERPL-CCNC: 59 U/L (ref 45–120)
ALT SERPL W P-5'-P-CCNC: 26 U/L (ref 0–45)
ANION GAP SERPL CALCULATED.3IONS-SCNC: 12 MMOL/L (ref 5–18)
APPEARANCE UR: CLEAR
AST SERPL W P-5'-P-CCNC: 18 U/L (ref 0–40)
BILIRUB SERPL-MCNC: 0.6 MG/DL (ref 0–1)
BILIRUB UR QL STRIP: NEGATIVE
BUN SERPL-MCNC: 30 MG/DL (ref 8–22)
CALCIUM SERPL-MCNC: 9.5 MG/DL (ref 8.5–10.5)
CHLORIDE BLD-SCNC: 102 MMOL/L (ref 98–107)
CHOLEST SERPL-MCNC: 190 MG/DL
CO2 SERPL-SCNC: 23 MMOL/L (ref 22–31)
COLOR UR AUTO: YELLOW
CREAT SERPL-MCNC: 1.25 MG/DL (ref 0.6–1.1)
ERYTHROCYTE [DISTWIDTH] IN BLOOD BY AUTOMATED COUNT: 12 % (ref 11–14.5)
FASTING STATUS PATIENT QL REPORTED: YES
GFR SERPL CREATININE-BSD FRML MDRD: 43 ML/MIN/1.73M2
GLUCOSE BLD-MCNC: 108 MG/DL (ref 70–125)
GLUCOSE UR STRIP-MCNC: NEGATIVE MG/DL
HCT VFR BLD AUTO: 43.1 % (ref 35–47)
HDLC SERPL-MCNC: 39 MG/DL
HGB BLD-MCNC: 14.4 G/DL (ref 12–16)
HGB UR QL STRIP: NEGATIVE
KETONES UR STRIP-MCNC: NEGATIVE MG/DL
LDLC SERPL CALC-MCNC: 107 MG/DL
LEUKOCYTE ESTERASE UR QL STRIP: NEGATIVE
MCH RBC QN AUTO: 30.6 PG (ref 27–34)
MCHC RBC AUTO-ENTMCNC: 33.4 G/DL (ref 32–36)
MCV RBC AUTO: 92 FL (ref 80–100)
NITRATE UR QL: NEGATIVE
PH UR STRIP: 6 [PH] (ref 5–8)
PLATELET # BLD AUTO: 334 THOU/UL (ref 140–440)
PMV BLD AUTO: 8.3 FL (ref 7–10)
POTASSIUM BLD-SCNC: 4.2 MMOL/L (ref 3.5–5)
PROT SERPL-MCNC: 6.8 G/DL (ref 6–8)
PROTEIN, RANDOM URINE - HISTORICAL: <7 MG/DL
RBC # BLD AUTO: 4.69 MILL/UL (ref 3.8–5.4)
SODIUM SERPL-SCNC: 137 MMOL/L (ref 136–145)
SP GR UR STRIP: 1.01 (ref 1–1.03)
TRIGL SERPL-MCNC: 220 MG/DL
UROBILINOGEN UR STRIP-ACNC: NORMAL
WBC: 6.6 THOU/UL (ref 4–11)

## 2020-04-30 LAB
TACROLIMUS BLD-MCNC: <3 UG/L (ref 5–15)
TACROLIMUS LAST DOSE: ABNORMAL

## 2020-05-02 LAB
ETHYL GLUCURONIDE UR CFM-MCNC: <100 NG/ML
ETHYL SULFATE UR CFM-MCNC: <100 NG/ML

## 2020-05-04 ENCOUNTER — VIRTUAL VISIT (OUTPATIENT)
Dept: INTERNAL MEDICINE | Facility: CLINIC | Age: 65
End: 2020-05-04
Payer: COMMERCIAL

## 2020-05-04 ENCOUNTER — VIRTUAL VISIT (OUTPATIENT)
Dept: GASTROENTEROLOGY | Facility: CLINIC | Age: 65
End: 2020-05-04
Attending: INTERNAL MEDICINE
Payer: COMMERCIAL

## 2020-05-04 ENCOUNTER — TELEPHONE (OUTPATIENT)
Dept: INTERNAL MEDICINE | Facility: CLINIC | Age: 65
End: 2020-05-04

## 2020-05-04 ENCOUNTER — TELEPHONE (OUTPATIENT)
Dept: TRANSPLANT | Facility: CLINIC | Age: 65
End: 2020-05-04

## 2020-05-04 DIAGNOSIS — R94.6 THYROID FUNCTION TEST ABNORMAL: ICD-10-CM

## 2020-05-04 DIAGNOSIS — Z94.4 LIVER TRANSPLANTED (H): ICD-10-CM

## 2020-05-04 DIAGNOSIS — D84.9 IMMUNOSUPPRESSION (H): ICD-10-CM

## 2020-05-04 DIAGNOSIS — N18.30 CKD (CHRONIC KIDNEY DISEASE) STAGE 3, GFR 30-59 ML/MIN (H): ICD-10-CM

## 2020-05-04 DIAGNOSIS — M85.80 OSTEOPENIA, UNSPECIFIED LOCATION: ICD-10-CM

## 2020-05-04 DIAGNOSIS — Z12.31 ENCOUNTER FOR SCREENING MAMMOGRAM FOR BREAST CANCER: ICD-10-CM

## 2020-05-04 DIAGNOSIS — Z94.4 LIVER REPLACED BY TRANSPLANT (H): Primary | ICD-10-CM

## 2020-05-04 DIAGNOSIS — Z94.4 LIVER TRANSPLANTED (H): Primary | ICD-10-CM

## 2020-05-04 DIAGNOSIS — Z00.00 HEALTH CARE MAINTENANCE: ICD-10-CM

## 2020-05-04 RX ORDER — TACROLIMUS 0.5 MG/1
1.5 CAPSULE ORAL EVERY 12 HOURS
Qty: 180 CAPSULE | Refills: 11 | Status: SHIPPED | OUTPATIENT
Start: 2020-05-04 | End: 2020-07-24

## 2020-05-04 ASSESSMENT — ASTHMA QUESTIONNAIRES
QUESTION_3 LAST FOUR WEEKS HOW OFTEN DID YOUR ASTHMA SYMPTOMS (WHEEZING, COUGHING, SHORTNESS OF BREATH, CHEST TIGHTNESS OR PAIN) WAKE YOU UP AT NIGHT OR EARLIER THAN USUAL IN THE MORNING: ONCE OR TWICE
QUESTION_1 LAST FOUR WEEKS HOW MUCH OF THE TIME DID YOUR ASTHMA KEEP YOU FROM GETTING AS MUCH DONE AT WORK, SCHOOL OR AT HOME: NONE OF THE TIME
ACUTE_EXACERBATION_TODAY: NO
QUESTION_5 LAST FOUR WEEKS HOW WOULD YOU RATE YOUR ASTHMA CONTROL: COMPLETELY CONTROLLED
QUESTION_2 LAST FOUR WEEKS HOW OFTEN HAVE YOU HAD SHORTNESS OF BREATH: ONCE OR TWICE A WEEK
QUESTION_4 LAST FOUR WEEKS HOW OFTEN HAVE YOU USED YOUR RESCUE INHALER OR NEBULIZER MEDICATION (SUCH AS ALBUTEROL): ONCE A WEEK OR LESS
ACT_TOTALSCORE: 22

## 2020-05-04 ASSESSMENT — PAIN SCALES - GENERAL: PAINLEVEL: NO PAIN (0)

## 2020-05-04 NOTE — PROGRESS NOTES
"Phan Luque is a 64 year old female who is being evaluated via a billable video visit.      The patient has been notified of following:     \"This video visit will be conducted via a call between you and your physician/provider. We have found that certain health care needs can be provided without the need for an in-person physical exam.  This service lets us provide the care you need with a video conversation.  If a prescription is necessary we can send it directly to your pharmacy.  If lab work is needed we can place an order for that and you can then stop by our lab to have the test done at a later time.    Video visits are billed at different rates depending on your insurance coverage.  Please reach out to your insurance provider with any questions.    If during the course of the call the physician/provider feels a video visit is not appropriate, you will not be charged for this service.\"    Patient has given verbal consent for Video visit? Yes    How would you like to obtain your AVS? To    Patient would like the video invitation sent by: Text to cell phone: [phone]    Will anyone else be joining your video visit? No        Video-Visit Details    I had the pleasure of seeing Phan Luque for followup in the Liver Transplant Clinic at the [facility] on 05/04/2020.  Ms. Luque returns for followup now more than 3 and 1/2 years status post liver transplantation for alcoholic hepatitis.  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      She denies any abdominal pain, itching or skin rash or fatigue.  She is working full time.  She denies any increased abdominal girth or lower extremity edema.      She denies any fevers or chills, cough or shortness of breath.  She denies any nausea, vomiting, diarrhea or constipation.    Her appetite is good.  She has gained some weight sheltering in place.    Current Outpatient Medications   Medication     " acetaminophen (TYLENOL) 325 MG tablet     albuterol (PROAIR HFA/PROVENTIL HFA/VENTOLIN HFA) 108 (90 Base) MCG/ACT inhaler     amLODIPine (NORVASC) 5 MG tablet     chlorthalidone (HYGROTON) 25 MG tablet     cholecalciferol (VITAMIN D3) 400 UNIT TABS tablet     clobetasol (TEMOVATE) 0.05 % external ointment     cyanocobalamin (VITAMIN  B-12) 1000 MCG tablet     diclofenac (VOLTAREN) 1 % GEL topical gel     ferrous sulfate (IRON) 325 (65 FE) MG tablet     gabapentin (NEURONTIN) 300 MG capsule     hydrOXYzine (ATARAX) 25 MG tablet     levothyroxine (SYNTHROID/LEVOTHROID) 88 MCG tablet     MAGNESIUM OXIDE PO     multivitamin, therapeutic (THERA-VIT) TABS tablet     order for DME     pantoprazole (PROTONIX) 40 MG EC tablet     pramipexole (MIRAPEX) 0.5 MG tablet     psyllium 0.52 G capsule     tacrolimus (GENERIC EQUIVALENT) 0.5 MG capsule     traMADol (ULTRAM) 50 MG tablet     triamcinolone (KENALOG) 0.1 % external ointment     ursodiol (ACTIGALL) 300 MG capsule     ursodiol (ACTIGALL) 300 MG capsule     amLODIPine (NORVASC) 5 MG tablet     betaxolol (BETOPTIC) 0.5 % ophthalmic solution     calcium Citrate-vitamin D 500-400 MG-UNIT CHEW     folic acid (FOLVITE) 1 MG tablet     lifitegrast (XIIDRA) 5 % opthalmic solution     melatonin 5 MG tablet     Omega-3 Fatty Acids (OMEGA-3 FISH OIL EX ST PO)     No current facility-administered medications for this visit.      On physical exam, she looks well.  HEENT exam shows no scleral icterus or temporal muscle wasting.  Her affect is appropriate and neurologic exam appears nonfocal.    Her most recent laboratory test show her white count is 6.6, hemoglobin is 14.4 and platelets are 334,000.  Her sodium is 137 potassium 4.2, BUN is 30 and creatinine is 1.25.  Her AST is 18, ALT is 26, alkaline phosphatase is 59.  Her albumin is 4.0 with a total protein of 6.8 and total bilirubin is 0.6.    My impression is that Ms. Luque is more than 3 and 1/2 years status post liver  transplantation for alcoholic cirrhosis.  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  Her laboratory tests are excellent.   She is up to date with regard to other vaccinations.  She is up to date with regard to cancer screening including skin cancer screening and her last bone density study showed only osteopenia.  My plan will be to see her back in the clinic in 6 months.      Thank you very much for allowing me to participate in the care of this patient.  If you have any questions regarding my recommendations, please do not hesitate to contact me.         Hussien Banegas MD      Professor of Medicine  [employer]      Executive Medical Director, Solid Organ Transplant Program  [facility]    Type of service:  Video Visit    Video Start Time: 4:50 PM  Video End Time: 5:05 PM    Originating Location (pt. Location): Home    Distant Location (provider location):   [facility]     Platform used for Video Visit: [email or web address]

## 2020-05-04 NOTE — PROGRESS NOTES
"    Phan Luque is a 64 year old female who is being evaluated via a billable telephone visit.      The patient has been notified of following:     \"This telephone visit will be conducted via a call between you and your physician/provider. We have found that certain health care needs can be provided without the need for a physical exam.  This service lets us provide the care you need with a short phone conversation.  If a prescription is necessary we can send it directly to your pharmacy.  If lab work is needed we can place an order for that and you can then stop by our lab to have the test done at a later time.    Telephone visits are billed at different rates depending on your insurance coverage. During this emergency period, for some insurers they may be billed the same as an in-person visit.  Please reach out to your insurance provider with any questions.    If during the course of the call the physician/provider feels a telephone visit is not appropriate, you will not be charged for this service.\"    Patient has given verbal consent for Telephone visit?  Yes    How would you like to obtain your AVS? MyChart    Subjective     Phan Luque is a 64 year old female who presents to clinic today for the following health issues:  Chief Complaint   Patient presents with     Recheck Medication     follow up and general check in        HPI  Phan Luque is a 63 year old female with a history of liver transplant related to alcoholic hepatitis, anxiety, HTN, CKD, IBS who presents for follow up.    She is working from home since 3/18. Lives alone.  She is doing okay mentally.    She has started an exercise program at home. Not checking BP at home.  No swelling. Feeling well. No malaise, fevers, cough, dyspnea, CP, GI symptoms.    Got labs done last week--reviewed together. Chronic issues are stable.    Routine Health Maintenence:  Immunizations (zoster, pneumovax, flu, Tdap, Hep A/B):           Most Recent " Immunizations   Administered Date(s) Administered     HEPA 01/13/2009     Influenza Vaccine IM 3yrs+ 4 Valent IIV4 10/18/2017     Mantoux Tuberculin Skin Test 10/06/2016     Pneumo Conj 13-V (2010&after) 08/19/2016     TD (ADULT, 7+) 01/21/2004     TDAP Vaccine (Adacel) 08/18/2016      Lipids:               Recent Labs   Lab Test  12/20/17   1443  02/23/17   0850    08/06/16   0757   CHOL  147   --    --   Interfering substances, unable to perform test  LUPE BEULAH NOTIFIED ON 6B,8/6/2016,0926,MJ      HDL  51   --    --   9*   LDL  49  108*   < >  13   TRIG  230*   --    --   Interfering substances, unable to perform test  LUPE RIOJAS NOTIFIED ON 6B,8/6/2016,0926,MJ       < > = values in this interval not displayed.      Lung Ca Screening (>30 pk age 55-79 or >20 py age 50-79 + RF): does not qualify, quit 1996  Colonoscopy (50-75 yrs): 6/18 TA x 2, due 6/23                       - Diverticulosis in the left colon.    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  Mammogram (40-75 yrs): 12/17 [facility], normal, 4/19 normal  Pap (21-65 yrs): [facility] for PAP, last 12/17, due 12/20  Pelvic/Breast: up to date  Safety/Lifestyle: reviewed  Tob/EtOH: screen neg  Depression: reviewed  Advanced Directive: deferred           Patient Active Problem List   Diagnosis     Decompensation of cirrhosis of liver (H)     Liver transplanted (H)     Alcoholic hepatitis     Immunosuppression (H)     Alcoholic hepatitis without ascites     Enterococcus UTI     Liver transplant status (H)     Nausea & vomiting     Malnutrition (H)     Candidiasis of skin     Anemia     ACP (advance care planning)     Diarrhea     Hypothyroidism     Esophageal reflux     Recurrent major depression in partial remission (H)     Insomnia     CKD (chronic kidney disease) stage 3, GFR 30-59 ml/min (H)      Anemia in stage 3 chronic kidney disease (H)     Osteopenia     Alcohol abuse, uncomplicated     Past Surgical History:   Procedure Laterality Date     APPENDECTOMY           BENCH LIVER N/A 2016    Procedure: BENCH LIVER;  Surgeon: Larry Dhillon MD;  Location: [facility] OR     CATARACT IOL, RT/LT       COLONOSCOPY       ESOPHAGOSCOPY, GASTROSCOPY, DUODENOSCOPY (EGD), COMBINED N/A 2016    Procedure: COMBINED ESOPHAGOSCOPY, GASTROSCOPY, DUODENOSCOPY (EGD);  Surgeon: Tao Alfonso MD;  Location: [facility]     ESOPHAGOSCOPY, GASTROSCOPY, DUODENOSCOPY (EGD), COMBINED N/A 10/31/2016    Procedure: COMBINED ESOPHAGOSCOPY, GASTROSCOPY, DUODENOSCOPY (EGD), BIOPSY SINGLE OR MULTIPLE;  Surgeon: Ronald Bojorquez MD;  Location: [facility]     sphincteroplasty       TRANSPLANT LIVER RECIPIENT  DONOR N/A 2016    Procedure: TRANSPLANT LIVER RECIPIENT  DONOR;  Surgeon: Larry Dhillon MD;  Location: [facility] OR     TUBAL LIGATION      laparoscopic       Social History     Tobacco Use     Smoking status: Former Smoker     Packs/day: 2.50     Years: 20.00     Pack years: 50.00     Last attempt to quit: 1996     Years since quittin.4     Smokeless tobacco: Never Used   Substance Use Topics     Alcohol use: No     Alcohol/week: 7.0 standard drinks     Types: 7 Standard drinks or equivalent per week     Comment: heavy use (750ml/2 days) up until 1 year ago; had cut down to 1 drink/day; none since 16     Family History   Problem Relation Age of Onset     Family History Negative Other      Melanoma Mother              Heart Disease Father         CHF         Current Outpatient Medications   Medication Sig Dispense Refill     albuterol (PROAIR HFA/PROVENTIL HFA/VENTOLIN HFA) 108 (90 Base) MCG/ACT inhaler Inhale 2 puffs into the lungs every 6 hours 18 g 3     amLODIPine (NORVASC) 5 MG tablet Take 1 tablet (5 mg) by mouth daily 90 tablet 3     chlorthalidone (HYGROTON) 25 MG tablet Take 0.5  tablets (12.5 mg) by mouth daily 45 tablet 3     cholecalciferol (VITAMIN D3) 400 UNIT TABS tablet Take 400 Units by mouth daily       clobetasol (TEMOVATE) 0.05 % external ointment Apply topically 2 times daily To areas of eczema on the lower legs, up to 2 weeks. 60 g 1     cyanocobalamin (VITAMIN  B-12) 1000 MCG tablet Take 1,000 mcg by mouth daily       diclofenac (VOLTAREN) 1 % GEL topical gel Apply 2 g topically 4 times daily as needed for moderate pain       ferrous sulfate (IRON) 325 (65 FE) MG tablet Take 1 tablet (325 mg) by mouth 2 times daily 100 tablet      gabapentin (NEURONTIN) 300 MG capsule Take 1 capsule (300 mg) by mouth At Bedtime 90 capsule 1     hydrOXYzine (ATARAX) 25 MG tablet Take 1-2 tablets (25-50 mg) by mouth every 6 hours as needed for itching 120 tablet 3     levothyroxine (SYNTHROID/LEVOTHROID) 88 MCG tablet Take 1 tablet (88 mcg) by mouth every morning (before breakfast) 90 tablet 0     MAGNESIUM OXIDE PO Take 400 mg by mouth daily       multivitamin, therapeutic (THERA-VIT) TABS tablet Take 1 tablet by mouth every 24 hours 30 tablet 11     order for DME Equipment being ordered: Trilok ankle brace 1 Device 0     pantoprazole (PROTONIX) 40 MG EC tablet TAKE ONE TABLET (40MG) BY MOUTH EVERY MORNING BEFORE BREAKFAST 90 tablet 3     pramipexole (MIRAPEX) 0.5 MG tablet Take 1 tablet (0.5 mg) by mouth At Bedtime 90 tablet 1     psyllium 0.52 G capsule Take 2 capsules (1.04 g) by mouth daily With a full glass of water. 60 capsule 1     tacrolimus (GENERIC EQUIVALENT) 0.5 MG capsule Take 3 capsules (1.5 mg) by mouth every 12 hours 180 capsule 11     traMADol (ULTRAM) 50 MG tablet Take 1 tablet (50 mg) by mouth every 6 hours as needed for severe pain 30 tablet 0     triamcinolone (KENALOG) 0.1 % external ointment Apply topically 2 times daily To plaques behind the knee 80 g 1     ursodiol (ACTIGALL) 300 MG capsule TAKE ONE CAPSULE BY MOUTH TWICE A DAY 60 capsule 11     ursodiol (ACTIGALL) 300  "MG capsule Take 1 capsule (300 mg) by mouth every 12 hours 60 capsule 11     acetaminophen (TYLENOL) 325 MG tablet Take 2 tablets (650 mg) by mouth every 6 hours as needed for mild pain Or fevers. Use sparingly. Limit use to no more than 2 grams (2000 mg) in 24 hours. **Further refills must be obtained by primary care provider** (Patient not taking: Reported on 5/4/2020) 100 tablet 0     amLODIPine (NORVASC) 5 MG tablet Take 1 tablet (5 mg) by mouth daily 90 tablet 3     betaxolol (BETOPTIC) 0.5 % ophthalmic solution Place 1 drop into both eyes daily 5 mL 3     calcium Citrate-vitamin D 500-400 MG-UNIT CHEW Take 1 tablet by mouth daily       folic acid (FOLVITE) 1 MG tablet Take 1 tablet (1 mg) by mouth daily 30 tablet 1     lifitegrast (XIIDRA) 5 % opthalmic solution Place 1 drop into both eyes 2 times daily 60 each 6     melatonin 5 MG tablet Take 1 tablet (5 mg) by mouth nightly as needed for sleep       Omega-3 Fatty Acids (OMEGA-3 FISH OIL EX ST PO) Take 1 capsule by mouth 2 times daily 1290 (fish oil)-900 (omega 3)       Allergies   Allergen Reactions     Codeine Hives     Benadryl [Diphenhydramine] Hives     Cefaclor Hives     Hydrocodone-Acetaminophen Itching     Oxycodone Itching     Penicillins Hives     Sulfa Drugs Hives       Reviewed and updated as needed this visit by Provider    Review of Systems   Comprehensive review of systems negative unless indicated in the HPI.       Physical Exam   Reported vitals:  There were no vitals taken for this visit.   Wt Readings from Last 2 Encounters:   11/20/19 101.8 kg (224 lb 6.4 oz)   10/15/19 101.2 kg (223 lb 3.2 oz)    Estimated body mass index is 35.15 kg/m  as calculated from the following:    Height as of 11/20/19: 1.702 m (5' 7\").    Weight as of 11/20/19: 101.8 kg (224 lb 6.4 oz).   Gen: healthy, alert, no distress and cooperative  Psych: Alert and oriented times 3; coherent speech, normal   rate and volume, able to articulate logical thoughts, able   to " abstract reason, no tangential thoughts, no hallucinations   or delusions, affect is normal/bright.  Respiratory: Speaking in full sentences, unlabored, no audible wheezes or cough.  Remainder of exam unable to be completed due to telephone visits    Diagnostic Test Results:  Labs reviewed in Epic      Assessment/Plan:  Phan was seen today for recheck medication.    Diagnoses and all orders for this visit:    Liver transplanted (H)  Immunosuppression (H)  Doing well, off prednisone.    CKD (chronic kidney disease) stage 3, GFR 30-59 ml/min (H)  Creatinine labs reviewed from last week, stable. No proteinuria.    Osteopenia, unspecified location  Last dexa improved. 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 BP are relatively contraindicated. Will hold off on further therapy with close monitoring of dexa. Could consider anabolic agent in the future.    Encounter for screening mammogram for breast cancer  Due this summer, post-COVID.    Health care maintenance  Due for a pap this December.  -TSH due        Phone call duration: 11 minutes  12:36 PM  12:47 PM        Arlyn Sanchez MD  Internal Medicine

## 2020-05-04 NOTE — NURSING NOTE
Chief Complaint   Patient presents with     Recheck Medication     follow up and general check in      Kimberly Nissen, EMT at 11:03 AM on 5/4/2020

## 2020-05-04 NOTE — TELEPHONE ENCOUNTER
ISSUE:   Tacrolimus IR level < 3 on4/29 in [facility] , goal 3-5, dose 1 mg every 12 hours.    PLAN:   Please call patient and confirm this was an accurate 12-hour trough. Verify Tacrolimus IR dose 1 mg every 12 hours. Confirm no new medications or illness. Confirm no missed doses. If accurate trough and accurate dose, increase Tacrolimus IR dose to 1.5 mg every 12 hours and repeat labs as planned.    OUTCOME:   Spoke with patient, they confirm accurate trough level and current dose. Patient confirmed dose change to 1.5mg every 12 hours and to repeat labs as planned. Orders sent to preferred pharmacy for dose change and lab for repeat labs. Patient voiced understanding of plan.

## 2020-05-05 ASSESSMENT — ASTHMA QUESTIONNAIRES: ACT_TOTALSCORE: 22

## 2020-06-12 DIAGNOSIS — R94.6 THYROID FUNCTION TEST ABNORMAL: ICD-10-CM

## 2020-06-12 DIAGNOSIS — F41.1 GAD (GENERALIZED ANXIETY DISORDER): ICD-10-CM

## 2020-06-16 RX ORDER — HYDROXYZINE HYDROCHLORIDE 25 MG/1
25-50 TABLET, FILM COATED ORAL EVERY 6 HOURS PRN
Qty: 120 TABLET | Refills: 10 | Status: SHIPPED | OUTPATIENT
Start: 2020-06-16 | End: 2021-11-08

## 2020-06-16 RX ORDER — LEVOTHYROXINE SODIUM 88 UG/1
88 TABLET ORAL
Qty: 90 TABLET | Refills: 0 | Status: SHIPPED | OUTPATIENT
Start: 2020-06-16 | End: 2020-09-12

## 2020-06-16 NOTE — TELEPHONE ENCOUNTER
Last Clinic Visit: 5/4/2020  [facility]    Levothyroxine: Lab(s) - TSH past due.  Rhonda refill 90 days and FYI to [facility] clinic.  *Future order is in chart.

## 2020-07-14 DIAGNOSIS — Z13.220 LIPID SCREENING: ICD-10-CM

## 2020-07-14 DIAGNOSIS — N18.30 CKD (CHRONIC KIDNEY DISEASE) STAGE 3, GFR 30-59 ML/MIN (H): ICD-10-CM

## 2020-07-14 DIAGNOSIS — Z94.4 LIVER REPLACED BY TRANSPLANT (H): ICD-10-CM

## 2020-07-14 DIAGNOSIS — R94.6 THYROID FUNCTION TEST ABNORMAL: ICD-10-CM

## 2020-07-14 LAB
ALBUMIN SERPL-MCNC: 3.8 G/DL (ref 3.4–5)
ALP SERPL-CCNC: 56 U/L (ref 40–150)
ALT SERPL W P-5'-P-CCNC: 39 U/L (ref 0–50)
ANION GAP SERPL CALCULATED.3IONS-SCNC: 5 MMOL/L (ref 3–14)
AST SERPL W P-5'-P-CCNC: 23 U/L (ref 0–45)
BILIRUB DIRECT SERPL-MCNC: 0.2 MG/DL (ref 0–0.2)
BILIRUB SERPL-MCNC: 0.8 MG/DL (ref 0.2–1.3)
BUN SERPL-MCNC: 23 MG/DL (ref 7–30)
CALCIUM SERPL-MCNC: 9 MG/DL (ref 8.5–10.1)
CHLORIDE SERPL-SCNC: 104 MMOL/L (ref 94–109)
CO2 SERPL-SCNC: 26 MMOL/L (ref 20–32)
CREAT SERPL-MCNC: 1.49 MG/DL (ref 0.52–1.04)
CREAT UR-MCNC: 87 MG/DL
ERYTHROCYTE [DISTWIDTH] IN BLOOD BY AUTOMATED COUNT: 14 % (ref 10–15)
GFR SERPL CREATININE-BSD FRML MDRD: 37 ML/MIN/{1.73_M2}
GLUCOSE SERPL-MCNC: 104 MG/DL (ref 70–99)
HCT VFR BLD AUTO: 42.7 % (ref 35–47)
HGB BLD-MCNC: 14.1 G/DL (ref 11.7–15.7)
MCH RBC QN AUTO: 29.5 PG (ref 26.5–33)
MCHC RBC AUTO-ENTMCNC: 33 G/DL (ref 31.5–36.5)
MCV RBC AUTO: 89 FL (ref 78–100)
PHOSPHATE SERPL-MCNC: 3.1 MG/DL (ref 2.5–4.5)
PLATELET # BLD AUTO: 297 10E9/L (ref 150–450)
POTASSIUM SERPL-SCNC: 4.1 MMOL/L (ref 3.4–5.3)
PROT SERPL-MCNC: 7.8 G/DL (ref 6.8–8.8)
PROT UR-MCNC: 0.06 G/L
PROT/CREAT 24H UR: 0.07 G/G CR (ref 0–0.2)
PTH-INTACT SERPL-MCNC: 71 PG/ML (ref 18–80)
RBC # BLD AUTO: 4.78 10E12/L (ref 3.8–5.2)
SODIUM SERPL-SCNC: 135 MMOL/L (ref 133–144)
TACROLIMUS BLD-MCNC: 6 UG/L (ref 5–15)
TME LAST DOSE: NORMAL H
TSH SERPL DL<=0.005 MIU/L-ACNC: 3.88 MU/L (ref 0.4–4)
WBC # BLD AUTO: 7.9 10E9/L (ref 4–11)

## 2020-07-14 PROCEDURE — 80197 ASSAY OF TACROLIMUS: CPT | Performed by: INTERNAL MEDICINE

## 2020-07-14 PROCEDURE — 84460 ALANINE AMINO (ALT) (SGPT): CPT | Performed by: INTERNAL MEDICINE

## 2020-07-14 PROCEDURE — 84450 TRANSFERASE (AST) (SGOT): CPT | Performed by: INTERNAL MEDICINE

## 2020-07-14 PROCEDURE — 84443 ASSAY THYROID STIM HORMONE: CPT | Performed by: INTERNAL MEDICINE

## 2020-07-14 PROCEDURE — 82248 BILIRUBIN DIRECT: CPT | Performed by: INTERNAL MEDICINE

## 2020-07-14 PROCEDURE — 80069 RENAL FUNCTION PANEL: CPT | Performed by: INTERNAL MEDICINE

## 2020-07-14 PROCEDURE — 36415 COLL VENOUS BLD VENIPUNCTURE: CPT | Performed by: INTERNAL MEDICINE

## 2020-07-14 PROCEDURE — 82247 BILIRUBIN TOTAL: CPT | Performed by: INTERNAL MEDICINE

## 2020-07-14 PROCEDURE — 82306 VITAMIN D 25 HYDROXY: CPT | Performed by: INTERNAL MEDICINE

## 2020-07-14 PROCEDURE — 84075 ASSAY ALKALINE PHOSPHATASE: CPT | Performed by: INTERNAL MEDICINE

## 2020-07-14 PROCEDURE — 84156 ASSAY OF PROTEIN URINE: CPT | Performed by: INTERNAL MEDICINE

## 2020-07-14 PROCEDURE — 84155 ASSAY OF PROTEIN SERUM: CPT | Performed by: INTERNAL MEDICINE

## 2020-07-14 PROCEDURE — 83970 ASSAY OF PARATHORMONE: CPT | Performed by: INTERNAL MEDICINE

## 2020-07-14 PROCEDURE — 85027 COMPLETE CBC AUTOMATED: CPT | Performed by: INTERNAL MEDICINE

## 2020-07-15 ENCOUNTER — MYC MEDICAL ADVICE (OUTPATIENT)
Dept: INTERNAL MEDICINE | Facility: CLINIC | Age: 65
End: 2020-07-15

## 2020-07-15 LAB — DEPRECATED CALCIDIOL+CALCIFEROL SERPL-MC: 45 UG/L (ref 20–75)

## 2020-07-15 NOTE — TELEPHONE ENCOUNTER
Labs not ordered by me but my reply is unless one is participating in extreme exercise, it doesn't typically impact kidney function.  Thanks, ML

## 2020-07-24 ENCOUNTER — OFFICE VISIT - HEALTHEAST (OUTPATIENT)
Dept: FAMILY MEDICINE | Facility: CLINIC | Age: 65
End: 2020-07-24

## 2020-07-24 ENCOUNTER — TELEPHONE (OUTPATIENT)
Dept: TRANSPLANT | Facility: CLINIC | Age: 65
End: 2020-07-24

## 2020-07-24 DIAGNOSIS — N30.00 ACUTE CYSTITIS WITHOUT HEMATURIA: ICD-10-CM

## 2020-07-24 DIAGNOSIS — R30.0 DYSURIA: ICD-10-CM

## 2020-07-24 DIAGNOSIS — Z94.4 LIVER TRANSPLANTED (H): ICD-10-CM

## 2020-07-24 LAB
ALBUMIN UR-MCNC: NEGATIVE MG/DL
APPEARANCE UR: CLEAR
BACTERIA #/AREA URNS HPF: ABNORMAL HPF
BILIRUB UR QL STRIP: NEGATIVE
COLOR UR AUTO: YELLOW
GLUCOSE UR STRIP-MCNC: NEGATIVE MG/DL
HGB UR QL STRIP: ABNORMAL
KETONES UR STRIP-MCNC: NEGATIVE MG/DL
LEUKOCYTE ESTERASE UR QL STRIP: ABNORMAL
NITRATE UR QL: NEGATIVE
PH UR STRIP: 5.5 [PH] (ref 5–8)
RBC #/AREA URNS AUTO: ABNORMAL HPF
SP GR UR STRIP: 1.01 (ref 1–1.03)
SQUAMOUS #/AREA URNS AUTO: ABNORMAL LPF
UROBILINOGEN UR STRIP-ACNC: ABNORMAL
WBC #/AREA URNS AUTO: ABNORMAL HPF
WBC CLUMPS #/AREA URNS HPF: PRESENT /[HPF]

## 2020-07-24 RX ORDER — TACROLIMUS 0.5 MG/1
CAPSULE ORAL
Qty: 150 CAPSULE | Refills: 11 | Status: SHIPPED | OUTPATIENT
Start: 2020-07-24 | End: 2021-06-28

## 2020-07-24 NOTE — TELEPHONE ENCOUNTER
ISSUE:   Tacrolimus IR level 6, goal 3-5, dose 1.5 mg every 12 hours.    PLAN:   Please call patient and confirm this was an accurate 12-hour trough. Verify Tacrolimus IR dose 1.5 mg every 12 hours. Confirm no new medications or illness. Confirm no missed doses. If accurate trough and accurate dose, decrease Tacrolimus IR dose to 1.5 mg AM and 1 mg PM and repeat labs as scheduled    OUTCOME:   Spoke with patient, they confirm accurate trough level and current dose. Patient confirmed dose change to 1.5 mg AM and 1 mg pM and to repeat labs as scheduled. Orders sent to preferred pharmacy for dose change and lab for repeat labs. Patient voiced understanding of plan.

## 2020-07-26 LAB — BACTERIA SPEC CULT: ABNORMAL

## 2020-09-02 ENCOUNTER — OFFICE VISIT (OUTPATIENT)
Dept: OPHTHALMOLOGY | Facility: CLINIC | Age: 65
End: 2020-09-02
Payer: COMMERCIAL

## 2020-09-02 DIAGNOSIS — H53.40 VISUAL FIELD DEFECT: Primary | ICD-10-CM

## 2020-09-02 DIAGNOSIS — H53.40 VISUAL FIELD DEFECT: ICD-10-CM

## 2020-09-02 ASSESSMENT — VISUAL ACUITY
OD_CC: 20/20
METHOD: SNELLEN - LINEAR
OS_CC: 20/80
CORRECTION_TYPE: GLASSES
OS_CC+: -1

## 2020-09-02 ASSESSMENT — CUP TO DISC RATIO
OS_RATIO: 0.2
OD_RATIO: 0.2

## 2020-09-02 ASSESSMENT — CONF VISUAL FIELD
OS_INFERIOR_TEMPORAL_RESTRICTION: 1
OD_NORMAL: 1
OS_INFERIOR_NASAL_RESTRICTION: 1
METHOD: COUNTING FINGERS
OS_SUPERIOR_TEMPORAL_RESTRICTION: 3
OS_SUPERIOR_NASAL_RESTRICTION: 3

## 2020-09-02 ASSESSMENT — SLIT LAMP EXAM - LIDS
COMMENTS: NORMAL
COMMENTS: PAPILLOMA RLL

## 2020-09-02 ASSESSMENT — EXTERNAL EXAM - RIGHT EYE: OD_EXAM: NORMAL

## 2020-09-02 ASSESSMENT — TONOMETRY
OS_IOP_MMHG: 18
IOP_METHOD: TONOPEN
OD_IOP_MMHG: 22

## 2020-09-02 ASSESSMENT — EXTERNAL EXAM - LEFT EYE: OS_EXAM: NORMAL

## 2020-09-02 NOTE — NURSING NOTE
Chief Complaints and History of Present Illnesses   Patient presents with     Follow Up     1 year f/u NARINDER       Chief Complaint(s) and History of Present Illness(es)     Follow Up     Laterality: both eyes    Quality: blurred vision    Context: mid-range vision    Associated symptoms: itching.  Negative for eye pain, headache, flashes and floaters    Comments: 1 year f/u NARINDER                Comments     Patient notes that she spends a lot of time at a computer, having to lift chin to see the computer more, feels that [facility] has changed some, would like an updated RX. 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

## 2020-09-02 NOTE — PROGRESS NOTES
Assessment & Plan     Phan Luque is a 64 year old female with the following diagnoses:   1. 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 Last follow up visit was in 8/14/2019. She has noticed decreased vision when she is using.   She did not tolerate the Xiidra that was recommended last visit and stopped after 3 weeks.  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  Since she did not notice any change in vision in the right eyes and since the VF is completely normal, the decrease in RNFL thickness is just a variation.  Follow up in 1 year or earlier if needed for worsening symptoms.      Patient also has dry eyes.  She did not tolerate the Xiidra in the past.      Patient has a papillomatous lesion right lower lid.  I have her see oculoplastics for consideration of removal.            Attending Physician Attestation:  Complete documentation of historical and exam elements from today's encounter can be found in the full encounter summary report (not reduplicated in this progress note).  I personally obtained the chief complaint(s) and history of present illness.  I confirmed and edited as necessary the review of systems, past medical/surgical history, family history, social history, and examination findings as documented by others; and I examined the patient myself.  I personally reviewed the relevant tests, images, and reports as documented above.  I formulated and edited as necessary the assessment and plan and discussed the findings and management plan with the patient and family. I personally reviewed the ophthalmic  test(s) associated with this encounter, agree with the interpretation(s) as documented by the resident/fellow, and have edited the corresponding report(s) as necessary.  - Ambrose Kasper MD  Neuro-ophthalmology fellow  [facility]

## 2020-09-03 NOTE — TELEPHONE ENCOUNTER
FUTURE VISIT INFORMATION      FUTURE VISIT INFORMATION:    Date: 9/29/20    Time: 2:00pm    Location: [facility]  REFERRAL INFORMATION:    Referring provider:  Dr. Ortega    Referring providers clinic:  [facility]    Reason for visit/diagnosis  eval for right lower lid lesion     RECORDS REQUESTED FROM:       Clinic name Comments Records Status Imaging Status   [facility] OV/referral 9/2/20  Ov/referral 4/19/17-9/2/20 EPIC

## 2020-09-08 DIAGNOSIS — I10 ESSENTIAL HYPERTENSION: ICD-10-CM

## 2020-09-08 DIAGNOSIS — K21.9 GASTROESOPHAGEAL REFLUX DISEASE, ESOPHAGITIS PRESENCE NOT SPECIFIED: ICD-10-CM

## 2020-09-08 DIAGNOSIS — Z94.4 LIVER TRANSPLANTED (H): ICD-10-CM

## 2020-09-08 DIAGNOSIS — N18.30 CKD (CHRONIC KIDNEY DISEASE) STAGE 3, GFR 30-59 ML/MIN (H): ICD-10-CM

## 2020-09-08 DIAGNOSIS — R60.9 FLUID RETENTION: ICD-10-CM

## 2020-09-08 DIAGNOSIS — R94.6 THYROID FUNCTION TEST ABNORMAL: ICD-10-CM

## 2020-09-08 RX ORDER — URSODIOL 300 MG/1
CAPSULE ORAL
Qty: 60 CAPSULE | Refills: 11 | Status: SHIPPED | OUTPATIENT
Start: 2020-09-08 | End: 2021-08-12

## 2020-09-12 RX ORDER — LEVOTHYROXINE SODIUM 88 UG/1
88 TABLET ORAL DAILY
Qty: 90 TABLET | Refills: 3 | Status: SHIPPED | OUTPATIENT
Start: 2020-09-12 | End: 2021-09-14

## 2020-09-12 RX ORDER — PANTOPRAZOLE SODIUM 40 MG/1
TABLET, DELAYED RELEASE ORAL
Qty: 90 TABLET | Refills: 3 | Status: SHIPPED | OUTPATIENT
Start: 2020-09-12 | End: 2021-09-14

## 2020-09-12 NOTE — TELEPHONE ENCOUNTER
CHLORTHALIDONE 25MG TABS   Last Written Prescription Date:  7/22/19  Last Fill Quantity: 45,   # refills: 3  Last Office Visit : 5/4/20  Future Office visit:  non    Routing refill request to provider for review/approval because:  Abnormal Cr   Creatinine   Date Value Ref Range Status   07/14/2020 1.49 (H) 0.52 - 1.04 mg/dL Final       AMLODIPINE BESYLATE 5MG TABS      Last Written Prescription Date:  7/22/19  Last Fill Quantity: 90,   # refills: 3  Last Office Visit : 5/4/20  Future Office visit:  none    Routing refill request to provider for review/approval because:  Abnormal Cr, see above  Thank-you, Arlyn BASURTO RN Medication Refill Team

## 2020-09-14 ENCOUNTER — TELEPHONE (OUTPATIENT)
Dept: GASTROENTEROLOGY | Facility: CLINIC | Age: 65
End: 2020-09-14

## 2020-09-14 DIAGNOSIS — Z13.220 LIPID SCREENING: ICD-10-CM

## 2020-09-14 DIAGNOSIS — Z94.4 LIVER REPLACED BY TRANSPLANT (H): ICD-10-CM

## 2020-09-14 LAB
ALBUMIN SERPL-MCNC: 3.8 G/DL (ref 3.4–5)
ALP SERPL-CCNC: 63 U/L (ref 40–150)
ALT SERPL W P-5'-P-CCNC: 110 U/L (ref 0–50)
ANION GAP SERPL CALCULATED.3IONS-SCNC: 4 MMOL/L (ref 3–14)
AST SERPL W P-5'-P-CCNC: 51 U/L (ref 0–45)
BILIRUB DIRECT SERPL-MCNC: 0.2 MG/DL (ref 0–0.2)
BILIRUB SERPL-MCNC: 0.7 MG/DL (ref 0.2–1.3)
BUN SERPL-MCNC: 24 MG/DL (ref 7–30)
CALCIUM SERPL-MCNC: 8.9 MG/DL (ref 8.5–10.1)
CHLORIDE SERPL-SCNC: 105 MMOL/L (ref 94–109)
CO2 SERPL-SCNC: 25 MMOL/L (ref 20–32)
CREAT SERPL-MCNC: 1.36 MG/DL (ref 0.52–1.04)
ERYTHROCYTE [DISTWIDTH] IN BLOOD BY AUTOMATED COUNT: 15.1 % (ref 10–15)
GFR SERPL CREATININE-BSD FRML MDRD: 41 ML/MIN/{1.73_M2}
GLUCOSE SERPL-MCNC: 100 MG/DL (ref 70–99)
HCT VFR BLD AUTO: 42.2 % (ref 35–47)
HGB BLD-MCNC: 14.1 G/DL (ref 11.7–15.7)
MCH RBC QN AUTO: 30.5 PG (ref 26.5–33)
MCHC RBC AUTO-ENTMCNC: 33.4 G/DL (ref 31.5–36.5)
MCV RBC AUTO: 91 FL (ref 78–100)
PLATELET # BLD AUTO: 305 10E9/L (ref 150–450)
POTASSIUM SERPL-SCNC: 4.2 MMOL/L (ref 3.4–5.3)
PROT SERPL-MCNC: 7.6 G/DL (ref 6.8–8.8)
RBC # BLD AUTO: 4.62 10E12/L (ref 3.8–5.2)
SODIUM SERPL-SCNC: 134 MMOL/L (ref 133–144)
TACROLIMUS BLD-MCNC: 4.2 UG/L (ref 5–15)
TME LAST DOSE: ABNORMAL H
WBC # BLD AUTO: 8.1 10E9/L (ref 4–11)

## 2020-09-14 PROCEDURE — 36415 COLL VENOUS BLD VENIPUNCTURE: CPT | Performed by: INTERNAL MEDICINE

## 2020-09-14 PROCEDURE — 85027 COMPLETE CBC AUTOMATED: CPT | Performed by: INTERNAL MEDICINE

## 2020-09-14 PROCEDURE — 80076 HEPATIC FUNCTION PANEL: CPT | Performed by: INTERNAL MEDICINE

## 2020-09-14 PROCEDURE — 80048 BASIC METABOLIC PNL TOTAL CA: CPT | Performed by: INTERNAL MEDICINE

## 2020-09-14 PROCEDURE — 80197 ASSAY OF TACROLIMUS: CPT | Performed by: INTERNAL MEDICINE

## 2020-09-14 RX ORDER — AMLODIPINE BESYLATE 5 MG/1
5 TABLET ORAL DAILY
Qty: 90 TABLET | Refills: 0 | Status: SHIPPED | OUTPATIENT
Start: 2020-09-14 | End: 2020-12-09

## 2020-09-14 RX ORDER — CHLORTHALIDONE 25 MG/1
TABLET ORAL
Qty: 45 TABLET | Refills: 0 | Status: SHIPPED | OUTPATIENT
Start: 2020-09-14 | End: 2020-12-09

## 2020-09-14 NOTE — TELEPHONE ENCOUNTER
[facility] Call Center    Phone Message    May a detailed message be left on voicemail: yes     Reason for Call: Order(s): Other:   Reason for requested: Pt is requesting her lab orders be sent to the  in [place]. Thanks!  Date needed: asap  Provider name: Dr. Banegas      Action Taken: Message routed to:  [facility] hep    Travel Screening: Not Applicable

## 2020-09-16 ENCOUNTER — TELEPHONE (OUTPATIENT)
Dept: TRANSPLANT | Facility: CLINIC | Age: 65
End: 2020-09-16

## 2020-09-16 DIAGNOSIS — Z94.4 LIVER TRANSPLANT STATUS (H): ICD-10-CM

## 2020-09-16 DIAGNOSIS — R19.8 ANAL SYMPTOMS: Primary | ICD-10-CM

## 2020-09-16 NOTE — TELEPHONE ENCOUNTER
FUTURE VISIT INFORMATION      FUTURE VISIT INFORMATION:    Date: 11/3/20    Time: 8:00am    Location: [facility]  REFERRAL INFORMATION:    Referring provider:  Dr. Ortega    Referring providers clinic[facility]    Reason for visit/diagnosis  eval for right lower lid lesion      RECORDS REQUESTED FROM:         Clinic name Comments Records Status Imaging Status   [facility] OV/referral 9/2/20  Ov/referral 4/19/17-9/2/20 EPIC

## 2020-09-16 NOTE — TELEPHONE ENCOUNTER
Spoke with patient. She had 5 dental extractions yesterday at 1pm. She is feeling ok. She is down 40 pounds due to exercise and eating healthy. She did notice in shower an issue with anal area. She reports [facility] and thinks there is an issue or hemorrhoid and would like to follow up with colo rectal. Message request sent.

## 2020-09-17 ENCOUNTER — TELEPHONE (OUTPATIENT)
Dept: SURGERY | Facility: CLINIC | Age: 65
End: 2020-09-17

## 2020-09-17 NOTE — TELEPHONE ENCOUNTER
[facility] Call Center    Phone Message    May a detailed message be left on voicemail: yes     Reason for Call: Other: NP referred to [facility] for Anal Symptoms Referred by Hussein Banegas Needs clinic review per protocols     Action Taken: Message routed to:  [facility] Colon and rectal    Travel Screening: Not Applicable

## 2020-09-21 ENCOUNTER — RECORDS - HEALTHEAST (OUTPATIENT)
Dept: LAB | Facility: CLINIC | Age: 65
End: 2020-09-21

## 2020-09-21 ENCOUNTER — TELEPHONE (OUTPATIENT)
Dept: TRANSPLANT | Facility: CLINIC | Age: 65
End: 2020-09-21

## 2020-09-21 LAB
ALBUMIN SERPL-MCNC: 4.1 G/DL (ref 3.5–5)
ALP SERPL-CCNC: 59 U/L (ref 45–120)
ALT SERPL W P-5'-P-CCNC: 43 U/L (ref 0–45)
AST SERPL W P-5'-P-CCNC: 25 U/L (ref 0–40)
BILIRUB DIRECT SERPL-MCNC: 0.2 MG/DL
BILIRUB SERPL-MCNC: 0.8 MG/DL (ref 0–1)
PROT SERPL-MCNC: 7.1 G/DL (ref 6–8)

## 2020-09-21 NOTE — TELEPHONE ENCOUNTER
"Called patient regarding the below message. Patient states she was in the shower last weekend (9/12) and she felt \"something\" around her anus. Patient had an overlapping sphincteroplasty in 2009 or 2008 at [facility]. Patient denies any pain, pressure, or tenderness. Since last weekend she has not felt this again. Patient is concerned for complications from her overlapping sphincteroplasty. Patient is scheduled with Dr. Flor on 10/7 at 9:00 am, appointment details verified.   "

## 2020-09-21 NOTE — TELEPHONE ENCOUNTER
Patient Call: General    Reason for call: Jessica called about getting the Quadravailent? flu vaccine this year. Jessica believes she got it at [facility] year. Dr. Banegas wanted her to have it. Jessica is wondering if it is available and what she needs to do to get a shot. Thank you.      Call back needed? Yes    Return Call Needed  Same as documented in contacts section  When to return call?: Greater than one day: Route standard priority

## 2020-09-22 NOTE — TELEPHONE ENCOUNTER
RECORDS RECEIVED FROM: N/A   DATE RECEIVED: 10/7/2020   NOTES STATUS DETAILS   OFFICE NOTE from referring provider  N/A    OFFICE NOTE from other specialist   Internal 12/20/17, 6/16/17, 11/4/16 Office visit with DONOVAN Gusman CNP ([facility])    DISCHARGE SUMMARY from hospital  N/A    DISCHARGE REPORT from the ER Care Everywhere 4/29/18 ([facility])    OPERATIVE REPORT  Internal     MEDICATION LIST Internal    LABS     PFC TESTING N/A    ANAL PAP N/A    BIOPSIES/PATHOLOGY RELATED TO DIAGNOSIS Internal 6/15/18   DIAGNOSTIC PROCEDURES     COLONOSCOPY Internal/ Received  6/15/18  12/31/07 ([facility])   UPPER ENDOSCOPY (EGD) Internal 10/31/16, 8/17/16    FLEX SIGMOIDOSCOPY  N/A    ERCP N/A    IMAGING (DISC & REPORT)      CT  Internal/ Care Everywhere CT Abdomen Pelvis: 5/30/19    [facility]:  - CT Abdomen Pelvis: 7/12/16   MRI N/A    XRAY N/A    ULTRASOUND (ENDOANAL/ENDORECTAL) N/A      9/29/2020 10:52am Fax request sent to [facility] ([phone]) for image noted above. -Ric

## 2020-09-23 ENCOUNTER — AMBULATORY - HEALTHEAST (OUTPATIENT)
Dept: LAB | Facility: CLINIC | Age: 65
End: 2020-09-23

## 2020-09-23 DIAGNOSIS — Z94.4 LIVER REPLACED BY TRANSPLANT (H): Primary | ICD-10-CM

## 2020-09-23 DIAGNOSIS — Z79.899 DRUG THERAPY: ICD-10-CM

## 2020-09-23 DIAGNOSIS — Z94.4 LIVER REPLACED BY TRANSPLANT (H): ICD-10-CM

## 2020-09-28 ENCOUNTER — MYC MEDICAL ADVICE (OUTPATIENT)
Dept: INTERNAL MEDICINE | Facility: CLINIC | Age: 65
End: 2020-09-28

## 2020-09-29 ENCOUNTER — PRE VISIT (OUTPATIENT)
Dept: OPHTHALMOLOGY | Facility: CLINIC | Age: 65
End: 2020-09-29

## 2020-09-29 NOTE — TELEPHONE ENCOUNTER
External pap report has been entered into patient chart.      Richard Walter CMA ([facility]) at 11:22 AM on 9/29/2020

## 2020-10-03 ASSESSMENT — ENCOUNTER SYMPTOMS
HOARSE VOICE: 0
NECK PAIN: 1
SMELL DISTURBANCE: 0
BACK PAIN: 0
NAIL CHANGES: 1
STIFFNESS: 0
ARTHRALGIAS: 0
TROUBLE SWALLOWING: 0
TASTE DISTURBANCE: 0
SORE THROAT: 1
SINUS PAIN: 0
JOINT SWELLING: 0
MUSCLE WEAKNESS: 0
MYALGIAS: 0
POOR WOUND HEALING: 0
NECK MASS: 1
SINUS CONGESTION: 1
SKIN CHANGES: 0
MUSCLE CRAMPS: 0

## 2020-10-04 NOTE — PROGRESS NOTES
Colon and Rectal Surgery Clinic Note    RE: Phan Luque.  : 1955.  ERICK: 10/7/2020.    Reason for visit: prolapsing anal tissue.    HPI: 65 yo F,Hx of liver transplantation in 2016 for EtOH hepatitis, doing well, followed by Dr. Banegas, CKD with last Cr 1.36.     Last colonoscopy 2018, normal with exception of one 1 mm polyp (TA) in ascending colon and diverticulosis in descending colon.    Had 4 , first one complicated by episiotomy requiring primary repair. Notably had overlapping sphincteroplasty at [facility] in . Felt prolapsing anal tissue two weeks ago in shower. Only felt this once two weeks ago, one quarter of an inch out.     Eats a high fiber diet, stool is soft, good consistency. Able to feel when she needs to defecate. Able to control gas and stool. Able to generally control to get to the bathroom, and feels like she is able to completely evacuate. Very mild urinary incontinence. No need for depends. No pain or bleeding.     Medical history:  Past Medical History:   Diagnosis Date     Asthma      Chronic kidney disease      End stage liver disease (H)     2/2 Alcohol Abuse     Liver transplanted (H)      Migraines      Osteoporosis      Spinal stenosis in cervical region      Strabismus        Surgical history:  Past Surgical History:   Procedure Laterality Date     APPENDECTOMY           BENCH LIVER N/A 2016    Procedure: BENCH LIVER;  Surgeon: Larry Dhillon MD;  Location: [facility]     CATARACT IOL, RT/LT       COLONOSCOPY       ESOPHAGOSCOPY, GASTROSCOPY, DUODENOSCOPY (EGD), COMBINED N/A 2016    Procedure: COMBINED ESOPHAGOSCOPY, GASTROSCOPY, DUODENOSCOPY (EGD);  Surgeon: Tao Alfonso MD;  Location: [facility]     ESOPHAGOSCOPY, GASTROSCOPY, DUODENOSCOPY (EGD), COMBINED N/A 10/31/2016    Procedure: COMBINED ESOPHAGOSCOPY, GASTROSCOPY, DUODENOSCOPY (EGD), BIOPSY SINGLE OR MULTIPLE;  Surgeon: Ronald Bojorquez MD;  Location: [facility]     sphincteroplasty       TRANSPLANT  LIVER RECIPIENT  DONOR N/A 2016    Procedure: TRANSPLANT LIVER RECIPIENT  DONOR;  Surgeon: Larry Dhillon MD;  Location: [facility]     TUBAL LIGATION      laparoscopic       Family history:  Family History   Problem Relation Age of Onset     Family History Negative Other      Melanoma Mother              Heart Disease Father         CHF       Medications:  Current Outpatient Medications   Medication Sig Dispense Refill     acetaminophen (TYLENOL) 325 MG tablet Take 2 tablets (650 mg) by mouth every 6 hours as needed for mild pain Or fevers. Use sparingly. Limit use to no more than 2 grams (2000 mg) in 24 hours. **Further refills must be obtained by primary care provider** 100 tablet 0     albuterol (PROAIR HFA/PROVENTIL HFA/VENTOLIN HFA) 108 (90 Base) MCG/ACT inhaler Inhale 2 puffs into the lungs every 6 hours 18 g 3     amLODIPine (NORVASC) 5 MG tablet Take 1 tablet (5 mg) by mouth daily 90 tablet 0     amLODIPine (NORVASC) 5 MG tablet Take 1 tablet (5 mg) by mouth daily 90 tablet 3     calcium Citrate-vitamin D 500-400 MG-UNIT CHEW Take 1 tablet by mouth daily       chlorthalidone (HYGROTON) 25 MG tablet Take 1/2 tab daily 45 tablet 0     cholecalciferol (VITAMIN D3) 400 UNIT TABS tablet Take 400 Units by mouth daily       clobetasol (TEMOVATE) 0.05 % external ointment Apply topically 2 times daily To areas of eczema on the lower legs, up to 2 weeks. 60 g 1     cyanocobalamin (VITAMIN  B-12) 1000 MCG tablet Take 1,000 mcg by mouth daily       diclofenac (VOLTAREN) 1 % GEL topical gel Apply 2 g topically 4 times daily as needed for moderate pain       ferrous sulfate (IRON) 325 (65 FE) MG tablet Take 1 tablet (325 mg) by mouth 2 times daily 100 tablet      folic acid (FOLVITE) 1 MG tablet Take 1 tablet (1 mg) by mouth daily 30 tablet 1     gabapentin (NEURONTIN) 300 MG capsule Take 1 capsule (300 mg) by mouth At Bedtime 90 capsule 1     hydrOXYzine (ATARAX) 25 MG tablet Take 1-2 tablets  (25-50 mg) by mouth every 6 hours as needed for itching 120 tablet 10     levothyroxine (SYNTHROID/LEVOTHROID) 88 MCG tablet Take 1 tablet (88 mcg) by mouth daily 90 tablet 3     MAGNESIUM OXIDE PO Take 400 mg by mouth daily       melatonin 5 MG tablet Take 1 tablet (5 mg) by mouth nightly as needed for sleep       multivitamin, therapeutic (THERA-VIT) TABS tablet Take 1 tablet by mouth every 24 hours 30 tablet 11     order for DME Equipment being ordered: [employer] ankle brace 1 Device 0     pantoprazole (PROTONIX) 40 MG EC tablet TAKE 1 TABLET BY MOUTH EVERY MORNING BEFORE BREAKFAST 90 tablet 3     pramipexole (MIRAPEX) 0.5 MG tablet Take 1 tablet (0.5 mg) by mouth At Bedtime 90 tablet 1     psyllium 0.52 G capsule Take 2 capsules (1.04 g) by mouth daily With a full glass of water. 60 capsule 1     tacrolimus (GENERIC EQUIVALENT) 0.5 MG capsule Take 3 capsules (1.5 mg) by mouth every morning AND 2 capsules (1 mg) every evening. 150 capsule 11     traMADol (ULTRAM) 50 MG tablet Take 1 tablet (50 mg) by mouth every 6 hours as needed for severe pain 30 tablet 0     triamcinolone (KENALOG) 0.1 % external ointment Apply topically 2 times daily To plaques behind the knee 80 g 1     ursodiol (ACTIGALL) 300 MG capsule TAKE ONE CAPSULE BY MOUTH TWICE A DAY 60 capsule 11     ursodiol (ACTIGALL) 300 MG capsule Take 1 capsule (300 mg) by mouth every 12 hours 60 capsule 11       Allergies:  Allergies   Allergen Reactions     Codeine Hives     Benadryl [Diphenhydramine] Hives     Cefaclor Hives     Hydrocodone-Acetaminophen Itching     Oxycodone Itching     Penicillins Hives     Sulfa Drugs Hives       Social history:   Social History     Tobacco Use     Smoking status: Former Smoker     Packs/day: 2.50     Years: 20.00     Pack years: 50.00     Quit date: 1996     Years since quittin.8     Smokeless tobacco: Never Used   Substance Use Topics     Alcohol use: No     Alcohol/week: 7.0 standard drinks     Types: 7  "Standard drinks or equivalent per week     Comment: heavy use (750ml/2 days) up until 1 year ago; had cut down to 1 drink/day; none since 7/18/16     Marital status: .    ROS:  A complete review of systems was performed with the patient and all systems negative except as per HPI.    Physical Examination:  Exam was chaperoned by Ara Bragg  /74 (BP Location: Left arm, Patient Position: Sitting, Cuff Size: Adult Regular)   Pulse 88   Temp 98.6  F (37  C) (Oral)   Ht 5' 7\"   Wt 194 lb 9.6 oz   LMP  (LMP Unknown)   SpO2 95%   BMI 30.48 kg/m    General: Well hydrated. No acute distress.  Abdomen: Soft, NT, ND. No inguinal adenopathy palpated.  Perianal external examination:  Perianal skin: intact.  Lesions: No.  Eversion of buttocks: There was not evidence of an anal fissure. Details: Previous well healed scar found anteriorly.  Skin tags or external hemorrhoids: None.  Digital rectal examination: Was performed.   Sphincter tone: Good.  Palpable lesions: No.  Other: None.  Bimanual examination: was not performed.    Anoscopy: Was performed.   Hemorrhoids: Yes, grade 2 right anterior internal hemorrhoid. No inflammation.  Lesions: No    Procedures:  None.    Sat on commode: upon straining, a thrombosed right sided internal hemorrhoid was seen, approximately 1 cm in size, nontender, noninflamed.     Laboratory values reviewed:  Recent Labs   Lab Test 09/14/20  0903 03/16/17  1728 03/16/17  1728   WBC 8.1   < > 4.2   HGB 14.1   < > 8.8*      < > 290   CR 1.36*   < > 1.57*   ALBUMIN 3.8   < > 3.7   BILITOTAL 0.7   < > 0.7   ALKPHOS 63   < > 61   *   < > 9   AST 51*   < > 9   INR  --   --  1.15*    < > = values in this interval not displayed.       Imaging personally reviewed by me:  none    ASSESSMENT  1. S/p overlapping sphincteroplasty, now with episode of prolapsed likely right posterior internal hemorrhoid.  2. Hx of liver transplantation, doing well.  3. CKD, last Cr " 1.36.    PLAN  1. Continue high fiber, water, sitz baths TID.   2. Given that it was a brief episode and not reproducible in clinic, would advocate monitoring. She is agreeable to this.  3. Follow up as needed.     Time spent: 30 minutes.  >50% spent in discussing, counseling and coordinating care.    Eliazar Flor M.D    Division of Colon and Rectal Surgery  [facility]    Referring Provider:  Hussein Banegas MD  [address] [place] [id number]     Primary Care Provider:  Arlyn Sanchez

## 2020-10-05 ENCOUNTER — OFFICE VISIT (OUTPATIENT)
Dept: DERMATOLOGY | Facility: CLINIC | Age: 65
End: 2020-10-05
Payer: COMMERCIAL

## 2020-10-05 ENCOUNTER — TELEPHONE (OUTPATIENT)
Dept: PSYCHIATRY | Facility: CLINIC | Age: 65
End: 2020-10-05

## 2020-10-05 ENCOUNTER — VIRTUAL VISIT (OUTPATIENT)
Dept: PSYCHIATRY | Facility: CLINIC | Age: 65
End: 2020-10-05
Attending: NURSE PRACTITIONER
Payer: COMMERCIAL

## 2020-10-05 DIAGNOSIS — L30.0 NUMMULAR ECZEMA: ICD-10-CM

## 2020-10-05 DIAGNOSIS — L60.1 ONYCHOLYSIS: ICD-10-CM

## 2020-10-05 DIAGNOSIS — L40.0 PSORIASIS VULGARIS: ICD-10-CM

## 2020-10-05 DIAGNOSIS — M25.561 ARTHRALGIA OF BOTH KNEES: ICD-10-CM

## 2020-10-05 DIAGNOSIS — L82.1 SEBORRHEIC KERATOSIS: ICD-10-CM

## 2020-10-05 DIAGNOSIS — M25.562 ARTHRALGIA OF BOTH KNEES: ICD-10-CM

## 2020-10-05 DIAGNOSIS — L20.82 FLEXURAL ECZEMA: ICD-10-CM

## 2020-10-05 DIAGNOSIS — Z12.83 SKIN CANCER SCREENING: Primary | ICD-10-CM

## 2020-10-05 DIAGNOSIS — G62.9 PERIPHERAL POLYNEUROPATHY: ICD-10-CM

## 2020-10-05 PROCEDURE — 99000 SPECIMEN HANDLING OFFICE-LAB: CPT | Performed by: PATHOLOGY

## 2020-10-05 PROCEDURE — 87101 SKIN FUNGI CULTURE: CPT | Mod: 90 | Performed by: PATHOLOGY

## 2020-10-05 PROCEDURE — 99213 OFFICE O/P EST LOW 20 MIN: CPT | Mod: 95 | Performed by: NURSE PRACTITIONER

## 2020-10-05 PROCEDURE — 87106 FUNGI IDENTIFICATION YEAST: CPT | Mod: 90 | Performed by: PATHOLOGY

## 2020-10-05 PROCEDURE — 99214 OFFICE O/P EST MOD 30 MIN: CPT | Performed by: PHYSICIAN ASSISTANT

## 2020-10-05 RX ORDER — CLOBETASOL PROPIONATE 0.5 MG/G
OINTMENT TOPICAL 2 TIMES DAILY
Qty: 60 G | Refills: 1 | Status: SHIPPED | OUTPATIENT
Start: 2020-10-05 | End: 2021-03-24

## 2020-10-05 RX ORDER — CLOBETASOL PROPIONATE 0.5 MG/ML
SOLUTION TOPICAL 2 TIMES DAILY
Qty: 50 ML | Refills: 3 | Status: SHIPPED | OUTPATIENT
Start: 2020-10-05 | End: 2020-12-09

## 2020-10-05 RX ORDER — GABAPENTIN 100 MG/1
200 CAPSULE ORAL AT BEDTIME
Qty: 180 CAPSULE | Refills: 1 | Status: SHIPPED | OUTPATIENT
Start: 2020-10-05 | End: 2021-03-29

## 2020-10-05 RX ORDER — TRIAMCINOLONE ACETONIDE 1 MG/G
OINTMENT TOPICAL 2 TIMES DAILY
Qty: 80 G | Refills: 1 | Status: SHIPPED | OUTPATIENT
Start: 2020-10-05 | End: 2021-03-17

## 2020-10-05 ASSESSMENT — PAIN SCALES - GENERAL: PAINLEVEL: NO PAIN (0)

## 2020-10-05 NOTE — PROGRESS NOTES
[facility] Dermatology Note      Dermatology Problem List:   1. History of liver transplant at [facility], 2016- currently on tacrolimus   2. Nummular dermatitis  - Triamcinolone 0.1% ointment BID to popliteal fossa bilaterally  - clobetasol 0.05% ointment  3. AK s/p cryo  4. Skin cancer screening, 10/5/20  5. Scalp psoriasis clobetasol solution, shampoo  6. Nail dystrophy- culture pending  7. Facial asymmetry- cosmetic referral.   Encounter Date: Oct 5, 2020    CC:  Chief Complaint   Patient presents with     Skin Check     Annual skin check. Tracy notes that she is having issues with her scalp. Tracy also notes dystrophic finger nails.     Derm Problem     tracy would like to talk about her swollen right cheek - after her transplant.       History of Present Illness:  Ms. Phan Luque is a 64 year old female on immunosuppressants with a family history of melanoma in her mother, who presents today for a skin check. She was last seen in the dermatology clinic on 11/18/19 for a skin cancer screening during which she had an actinic keratosis treated with cryotherapy on her left upper lip. This has resolved. She was continued on started clobetasol 0.05% ointment BID for nummular dermatitis and started on triamcinolone 0.1% ointment for her popliteal fossa. Her skin cancer screening was otherwise unremarkable. These eczema areas are doing well and has not had to use the topical therapies often.    She does admit she has worsening scalp symptoms. She has a thick patch on the back of her scalp. She has applied the ointment at times but does not like how greasy it is and uses it less often because it is hard to wash out. She wonders if there is another formulation She wonders if this is eczema or psoriasis. Her son has psoriasis.     She has cervical spinal stenosis and has chronic neck pain but also notes she has worsening arthralgias in her hands and knees. She is interested in a rheumatology referral as  she has never been evaluated for this.     She also has nail changes, gradually since she was last seen. She wonders what is going on with these. She does not wear acrylic nails.     Through all of her medical procedures and intermittent swelling in her body she had developed facial asymmetry.She feels like the left cheek has lost fat or that the right cheek is more swollen. She would like to look more symmetric.       Otherwise she is feeling well, without additional skin concerns at this time.    Past Medical History:   Patient Active Problem List   Diagnosis     Decompensation of cirrhosis of liver (H)     Liver transplanted (H)     Alcoholic hepatitis     Immunosuppression (H)     Alcoholic hepatitis without ascites     Enterococcus UTI     Liver transplant status (H)     Nausea & vomiting     Malnutrition (H)     Candidiasis of skin     Anemia     ACP (advance care planning)     Diarrhea     Hypothyroidism     Esophageal reflux     Recurrent major depression in partial remission (H)     Insomnia     CKD (chronic kidney disease) stage 3, GFR 30-59 ml/min     Anemia in stage 3 chronic kidney disease     Osteopenia     Alcohol abuse, uncomplicated     Past Medical History:   Diagnosis Date     Asthma      Chronic kidney disease      End stage liver disease (H)     2/2 Alcohol Abuse     Liver transplanted (H)      Migraines      Osteoporosis      Spinal stenosis in cervical region      Strabismus      Past Surgical History:   Procedure Laterality Date     APPENDECTOMY      1962     BENCH LIVER N/A 8/25/2016    Procedure: BENCH LIVER;  Surgeon: Larry Dhillon MD;  Location: [facility]     CATARACT IOL, RT/LT       COLONOSCOPY       ESOPHAGOSCOPY, GASTROSCOPY, DUODENOSCOPY (EGD), COMBINED N/A 8/17/2016    Procedure: COMBINED ESOPHAGOSCOPY, GASTROSCOPY, DUODENOSCOPY (EGD);  Surgeon: Tao Alfonso MD;  Location: [facility]     ESOPHAGOSCOPY, GASTROSCOPY, DUODENOSCOPY (EGD), COMBINED N/A 10/31/2016    Procedure: COMBINED  ESOPHAGOSCOPY, GASTROSCOPY, DUODENOSCOPY (EGD), BIOPSY SINGLE OR MULTIPLE;  Surgeon: Ronald Bojorquez MD;  Location: [facility]     sphincteroplasty       TRANSPLANT LIVER RECIPIENT  DONOR N/A 2016    Procedure: TRANSPLANT LIVER RECIPIENT  DONOR;  Surgeon: Larry Dhillon MD;  Location: [facility]     TUBAL LIGATION      laparoscopic       Social History:  The patient works for medical insurance. The patient denies use of tanning beds. Patient is originally from [place].     Family History:  There is a family history of melanoma in the patient's mother (retinal, thought to have been present since birth, passed in ).    Medications:  Current Outpatient Medications   Medication Sig Dispense Refill     acetaminophen (TYLENOL) 325 MG tablet Take 2 tablets (650 mg) by mouth every 6 hours as needed for mild pain Or fevers. Use sparingly. Limit use to no more than 2 grams (2000 mg) in 24 hours. **Further refills must be obtained by primary care provider** 100 tablet 0     albuterol (PROAIR HFA/PROVENTIL HFA/VENTOLIN HFA) 108 (90 Base) MCG/ACT inhaler Inhale 2 puffs into the lungs every 6 hours 18 g 3     amLODIPine (NORVASC) 5 MG tablet Take 1 tablet (5 mg) by mouth daily 90 tablet 0     amLODIPine (NORVASC) 5 MG tablet Take 1 tablet (5 mg) by mouth daily 90 tablet 3     chlorthalidone (HYGROTON) 25 MG tablet Take 1/2 tab daily 45 tablet 0     cholecalciferol (VITAMIN D3) 400 UNIT TABS tablet Take 400 Units by mouth daily       clobetasol (TEMOVATE) 0.05 % external ointment Apply topically 2 times daily To areas of eczema on the lower legs, up to 2 weeks. 60 g 1     cyanocobalamin (VITAMIN  B-12) 1000 MCG tablet Take 1,000 mcg by mouth daily       ferrous sulfate (IRON) 325 (65 FE) MG tablet Take 1 tablet (325 mg) by mouth 2 times daily 100 tablet      folic acid (FOLVITE) 1 MG tablet Take 1 tablet (1 mg) by mouth daily 30 tablet 1     gabapentin (NEURONTIN) 300 MG capsule Take 1 capsule (300 mg)  by mouth At Bedtime 90 capsule 1     hydrOXYzine (ATARAX) 25 MG tablet Take 1-2 tablets (25-50 mg) by mouth every 6 hours as needed for itching 120 tablet 10     levothyroxine (SYNTHROID/LEVOTHROID) 88 MCG tablet Take 1 tablet (88 mcg) by mouth daily 90 tablet 3     MAGNESIUM OXIDE PO Take 400 mg by mouth daily       melatonin 5 MG tablet Take 1 tablet (5 mg) by mouth nightly as needed for sleep       multivitamin, therapeutic (THERA-VIT) TABS tablet Take 1 tablet by mouth every 24 hours 30 tablet 11     order for DME Equipment being ordered: [employer] ankle brace 1 Device 0     pantoprazole (PROTONIX) 40 MG EC tablet TAKE 1 TABLET BY MOUTH EVERY MORNING BEFORE BREAKFAST 90 tablet 3     psyllium 0.52 G capsule Take 2 capsules (1.04 g) by mouth daily With a full glass of water. 60 capsule 1     tacrolimus (GENERIC EQUIVALENT) 0.5 MG capsule Take 3 capsules (1.5 mg) by mouth every morning AND 2 capsules (1 mg) every evening. 150 capsule 11     traMADol (ULTRAM) 50 MG tablet Take 1 tablet (50 mg) by mouth every 6 hours as needed for severe pain 30 tablet 0     triamcinolone (KENALOG) 0.1 % external ointment Apply topically 2 times daily To plaques behind the knee 80 g 1     ursodiol (ACTIGALL) 300 MG capsule TAKE ONE CAPSULE BY MOUTH TWICE A DAY 60 capsule 11     ursodiol (ACTIGALL) 300 MG capsule Take 1 capsule (300 mg) by mouth every 12 hours 60 capsule 11       Allergies   Allergen Reactions     Codeine Hives     Benadryl [Diphenhydramine] Hives     Cefaclor Hives     Hydrocodone-Acetaminophen Itching     Oxycodone Itching     Penicillins Hives     Sulfa Drugs Hives       Review of Systems:  -Constitutional: The patient denies fatigue, fevers, chills, unintended weight loss, and night sweats. She is feeling in her usual state of health. Arthralgias.   -Skin: As above in HPI. No additional skin concerns.    Physical exam:  Vitals: LMP  (LMP Unknown)   GEN: This is a well developed, well-nourished female in no acute  distress, in a pleasant mood.    SKIN: Full skin, which includes the head/face, both arms, chest, back, abdomen,both legs, genitalia and/or groin buttocks, digits and/or nails, was examined.    - No active plaques to the lower legs.  There is a pink scaly plaque with micaceous appearing scale on the occipital scalp  - There is onycholysis to most finger nail plates.   - Right cheek and chin appear to have a greater volume that the left side  - Multiple regular brown pigmented macules and  papules are identified on the trunk and extremities. Some are skin colored papules, including the right cheek.   - Dome shaped bright red papules on the trunk.   - Waxy stuck on tan to brown papules on the face, trunk, and extremities.  - No other lesions of concern on areas examined.     Impression/Plan:  1. HX of  Nummular dermatitis, Popliteal fossa (flexural) and now presentation on the occipital scalp that may appear more like psoraisis with a family history of psoriasis and new nail changes.     For any skin concerns on on the popliteal fossa apply  triamcinolone 0.1% ointment bid until resolved     Continue clobetasol 0.05% ointment, apply to affected areas of eczema while flaring on the lower legs.    Steroid ed given, use to to 2 weeks at a time.     For the scalp, start clobetasol 0.05% solution bid. She sent a message later stating this was too irritating/ painful on application, so clobetasol 0.05% shampoo was applied.          2. Facial asymmetry, will refer to cosmetic dermatology for a consultation.   3. Nail dystrophy, onychomycosis vs nail psoriasis, will culture nail to determine management.     A fungal culture was taken of the left 5th finger nail.   4. Multiple clinically benign nevi on the trunk and extremities , including right cheek.     ABCDs of melanoma were discussed and self skin checks were advised.     Sun precaution was advised including the use of sun screens of SPF 30 or higher, sun protective  clothing, and avoidance of tanning beds.    5. Cherry angiomas, trunk    Reassured of benign, congenital nature     6. Seborrheic keratosis, non irritated    No further intervention required. Patient to report changes.     7. History of liver transplant- currently on immunosuppressants     Given patient is on immune suppressing drugs, and with family history of melanoma, recommended skin check every 6 months       Follow-up in 3 months for follow up of scalp, earlier for new or changing lesions.       Staff Involved:  All risks, benefits and alternatives were discussed with patient.  Patient is in agreement and understands the assessment and plan.  All questions were answered.  Sun Screen Education was given.   Return to Clinic in 3 months or sooner as needed.   Demetria Barger PA-C

## 2020-10-05 NOTE — LETTER
10/5/2020       RE: Phan Luque  [address]  [facility] [address]     Dear Colleague,    Thank you for referring your patient, Phan Luque, to the [facility] [place] at [facility]. Please see a copy of my visit note below.    [facility] Dermatology Note      Dermatology Problem List:   1. [facility] of [facility]- currently on tacrolimus   2. Nummular dermatitis  - Triamcinolone 0.1% ointment BID to popliteal fossa bilaterally  - clobetasol 0.05% ointment  3. AK s/p cryo  4. Skin cancer screening, 10/5/20  5. Scalp psoriasis clobetasol solution, shampoo  6. Nail dystrophy- culture pending  7. Facial asymmetry- cosmetic referral.   Encounter Date: Oct 5, 2020    CC:  Chief Complaint   Patient presents with     Skin Check     Annual skin check. Jessica notes that she is having issues with her scalp. Jessica also notes dystrophic finger nails.     Derm Problem     jessica would like to talk about her swollen right cheek - after her transplant.       History of Present Illness:  Ms. Phan Luque is a 64 year old female on immunosuppressants with a family history of melanoma in her mother, who presents today for a skin check. She was last seen in the dermatology clinic on 11/18/19 for a skin cancer screening during which she had an actinic keratosis treated with cryotherapy on her left upper lip. This has resolved. She was continued on started clobetasol 0.05% ointment BID for nummular dermatitis and started on triamcinolone 0.1% ointment for her popliteal fossa. Her skin cancer screening was otherwise unremarkable. These eczema areas are doing well and has not had to use the topical therapies often.    She does admit she has worsening scalp symptoms. She has a thick patch on the back of her scalp. She has applied the ointment at times but does not like how greasy it is and uses it less often because it is  hard to wash out. She wonders if there is another formulation She wonders if this is eczema or psoriasis. Her son has psoriasis.     She has cervical spinal stenosis and has chronic neck pain but also notes she has worsening arthralgias in her hands and knees. She is interested in a rheumatology referral as she has never been evaluated for this.     She also has nail changes, gradually since she was last seen. She wonders what is going on with these. She does not wear acrylic nails.     Through all of her medical procedures and intermittent swelling in her body she had developed facial asymmetry.She feels like the left cheek has lost fat or that the right cheek is more swollen. She would like to look more symmetric.       Otherwise she is feeling well, without additional skin concerns at this time.    Past Medical History:   Patient Active Problem List   Diagnosis     Decompensation of cirrhosis of liver (H)     Liver transplanted (H)     Alcoholic hepatitis     Immunosuppression (H)     Alcoholic hepatitis without ascites     Enterococcus UTI     Liver transplant status (H)     Nausea & vomiting     Malnutrition (H)     Candidiasis of skin     Anemia     ACP (advance care planning)     Diarrhea     Hypothyroidism     Esophageal reflux     Recurrent major depression in partial remission (H)     Insomnia     CKD (chronic kidney disease) stage 3, GFR 30-59 ml/min     Anemia in stage 3 chronic kidney disease     Osteopenia     Alcohol abuse, uncomplicated     Past Medical History:   Diagnosis Date     Asthma      Chronic kidney disease      End stage liver disease (H)     2/2 Alcohol Abuse     Liver transplanted (H)      Migraines      Osteoporosis      Spinal stenosis in cervical region      Strabismus      Past Surgical History:   Procedure Laterality Date     APPENDECTOMY      1962     BENCH LIVER N/A 8/25/2016    Procedure: BENCH LIVER;  Surgeon: Larry Dhillon MD;  Location: [facility] OR     CATARACT IOL, RT/LT        COLONOSCOPY       ESOPHAGOSCOPY, GASTROSCOPY, DUODENOSCOPY (EGD), COMBINED N/A 2016    Procedure: COMBINED ESOPHAGOSCOPY, GASTROSCOPY, DUODENOSCOPY (EGD);  Surgeon: Tao Alfonso MD;  Location: [facility] GI     ESOPHAGOSCOPY, GASTROSCOPY, DUODENOSCOPY (EGD), COMBINED N/A 10/31/2016    Procedure: COMBINED ESOPHAGOSCOPY, GASTROSCOPY, DUODENOSCOPY (EGD), BIOPSY SINGLE OR MULTIPLE;  Surgeon: Ronald Bojorquez MD;  Location: [facility]     sphincteroplasty       TRANSPLANT LIVER RECIPIENT  DONOR N/A 2016    Procedure: TRANSPLANT LIVER RECIPIENT  DONOR;  Surgeon: Larry Dhillon MD;  Location: [facility] OR     TUBAL LIGATION      laparoscopic       Social History:  The patient works for medical insurance. The patient denies use of tanning beds. Patient is originally from [place].     Family History:  There is a family history of melanoma in the patient's mother (retinal, thought to have been present since birth, passed in ).    Medications:  Current Outpatient Medications   Medication Sig Dispense Refill     acetaminophen (TYLENOL) 325 MG tablet Take 2 tablets (650 mg) by mouth every 6 hours as needed for mild pain Or fevers. Use sparingly. Limit use to no more than 2 grams (2000 mg) in 24 hours. **Further refills must be obtained by primary care provider** 100 tablet 0     albuterol (PROAIR HFA/PROVENTIL HFA/VENTOLIN HFA) 108 (90 Base) MCG/ACT inhaler Inhale 2 puffs into the lungs every 6 hours 18 g 3     amLODIPine (NORVASC) 5 MG tablet Take 1 tablet (5 mg) by mouth daily 90 tablet 0     amLODIPine (NORVASC) 5 MG tablet Take 1 tablet (5 mg) by mouth daily 90 tablet 3     chlorthalidone (HYGROTON) 25 MG tablet Take 1/2 tab daily 45 tablet 0     cholecalciferol (VITAMIN D3) 400 UNIT TABS tablet Take 400 Units by mouth daily       clobetasol (TEMOVATE) 0.05 % external ointment Apply topically 2 times daily To areas of eczema on the lower legs, up to 2 weeks. 60 g 1     cyanocobalamin (VITAMIN   B-12) 1000 MCG tablet Take 1,000 mcg by mouth daily       ferrous sulfate (IRON) 325 (65 FE) MG tablet Take 1 tablet (325 mg) by mouth 2 times daily 100 tablet      folic acid (FOLVITE) 1 MG tablet Take 1 tablet (1 mg) by mouth daily 30 tablet 1     gabapentin (NEURONTIN) 300 MG capsule Take 1 capsule (300 mg) by mouth At Bedtime 90 capsule 1     hydrOXYzine (ATARAX) 25 MG tablet Take 1-2 tablets (25-50 mg) by mouth every 6 hours as needed for itching 120 tablet 10     levothyroxine (SYNTHROID/LEVOTHROID) 88 MCG tablet Take 1 tablet (88 mcg) by mouth daily 90 tablet 3     MAGNESIUM OXIDE PO Take 400 mg by mouth daily       melatonin 5 MG tablet Take 1 tablet (5 mg) by mouth nightly as needed for sleep       multivitamin, therapeutic (THERA-VIT) TABS tablet Take 1 tablet by mouth every 24 hours 30 tablet 11     order for DME Equipment being ordered: Predect ankle brace 1 Device 0     pantoprazole (PROTONIX) 40 MG EC tablet TAKE 1 TABLET BY MOUTH EVERY MORNING BEFORE BREAKFAST 90 tablet 3     psyllium 0.52 G capsule Take 2 capsules (1.04 g) by mouth daily With a full glass of water. 60 capsule 1     tacrolimus (GENERIC EQUIVALENT) 0.5 MG capsule Take 3 capsules (1.5 mg) by mouth every morning AND 2 capsules (1 mg) every evening. 150 capsule 11     traMADol (ULTRAM) 50 MG tablet Take 1 tablet (50 mg) by mouth every 6 hours as needed for severe pain 30 tablet 0     triamcinolone (KENALOG) 0.1 % external ointment Apply topically 2 times daily To plaques behind the knee 80 g 1     ursodiol (ACTIGALL) 300 MG capsule TAKE ONE CAPSULE BY MOUTH TWICE A DAY 60 capsule 11     ursodiol (ACTIGALL) 300 MG capsule Take 1 capsule (300 mg) by mouth every 12 hours 60 capsule 11       Allergies   Allergen Reactions     Codeine Hives     Benadryl [Diphenhydramine] Hives     Cefaclor Hives     Hydrocodone-Acetaminophen Itching     Oxycodone Itching     Penicillins Hives     Sulfa Drugs Hives       Review of Systems:  -Constitutional: The  patient denies fatigue, fevers, chills, unintended weight loss, and night sweats. She is feeling in her usual state of health. Arthralgias.   -Skin: As above in HPI. No additional skin concerns.    Physical exam:  Vitals: LMP  (LMP Unknown)   GEN: This is a well developed, well-nourished female in no acute distress, in a pleasant mood.    SKIN: Full skin, which includes the head/face, both arms, chest, back, abdomen,both legs, genitalia and/or groin buttocks, digits and/or nails, was examined.    - No active plaques to the lower legs.  There is a pink scaly plaque with micaceous appearing scale on the occipital scalp  - There is onycholysis to most finger nail plates.   - Right cheek and chin appear to have a greater volume that the left side  - Multiple regular brown pigmented macules and  papules are identified on the trunk and extremities. Some are skin colored papules, including the right cheek.   - Dome shaped bright red papules on the trunk.   - Waxy stuck on tan to brown papules on the face, trunk, and extremities.  - No other lesions of concern on areas examined.     Impression/Plan:  1. HX of  Nummular dermatitis, Popliteal fossa (flexural) and now presentation on the occipital scalp that may appear more like psoraisis with a family history of psoriasis and new nail changes.     For any skin concerns on on the popliteal fossa apply  triamcinolone 0.1% ointment bid until resolved     Continue clobetasol 0.05% ointment, apply to affected areas of eczema while flaring on the lower legs.    Steroid ed given, use to to 2 weeks at a time.     For the scalp, start clobetasol 0.05% solution bid. She sent a message later stating this was too irritating/ painful on application, so clobetasol 0.05% shampoo was applied.          2. Facial asymmetry, will refer to cosmetic dermatology for a consultation.   3. Nail dystrophy, onychomycosis vs nail psoriasis, will culture nail to determine management.     A fungal culture  was taken of the left 5th finger nail.   4. Multiple clinically benign nevi on the trunk and extremities , including right cheek.     ABCDs of melanoma were discussed and self skin checks were advised.     Sun precaution was advised including the use of sun screens of SPF 30 or higher, sun protective clothing, and avoidance of tanning beds.    5. Cherry angiomas, trunk    Reassured of benign, congenital nature     6. Seborrheic keratosis, non irritated    No further intervention required. Patient to report changes.     7. History of liver transplant- currently on immunosuppressants     Given patient is on immune suppressing drugs, and with family history of melanoma, recommended skin check every 6 months       Follow-up in 3 months for follow up of scalp, earlier for new or changing lesions.       Staff Involved:  All risks, benefits and alternatives were discussed with patient.  Patient is in agreement and understands the assessment and plan.  All questions were answered.  Sun Screen Education was given.   Return to Clinic in 3 months or sooner as needed.   Demetria Barger PA-C

## 2020-10-05 NOTE — PROGRESS NOTES
TELEPHONE VISIT  Phan Luque is a 64 year old pt. who is being evaluated via a billable telephone visit.      The patient has been notified of the following:    We have found that certain health care needs can be provided without the need for a physical exam. This service lets us provide the care you need with a short phone conversation. If a prescription is necessary we can send it directly to your pharmacy. If lab work is needed we can place an order for that and you can then stop by our lab to have the test done at a later time. Insurers are generally covering virtual visits as they would in-office visits so billing should not be different than normal.  If for some reason you do get billed incorrectly, you should contact the billing office to correct it and that number is in the AVS .    Patient has given verbal consent for a telephone visit?:  Yes   How would the pt like to obtain the AVS?:  not requested  AVS SmartPhrase [PsychAVS] has been placed in 'Patient Instructions':  N/A     Start Time:  5:06 PM          End Time:  5:21pm      Psychiatry Clinic Progress Note                                                                   Phan Luque is a 64 year old female who returns to the clinic for continued care.   Therapist: None.    PCP: Santosh Salgado  Other Providers: Hussein Banegas MD    Pertinent Background:  Liver Transplant on 9/25/16 and Stage 3 kidney disease.  Psych critical item history includes SUBSTANCE USE: alcohol.     Interim History                                                                                                             4, 4     The patient is a good historian, reports good treatment adherence and was last seen 4/8/20. Since the last visit, Phan reports he mood is stable.  No significant concerns with depression and anxiety.  She is requesting her Gabapentin be decreased.  Discussed transferring care to PCP but Phan prefers to work with psychiatry to manage  "Gabapentin in case of any changes to mental health.     4/8/20: Working from home.  Phan being very cautious due to being immunocompromised.  No significant concerns with anxiety/depression.  Continues to use Gabapentin for sleep.  Will continue to check in with clinic every 6 months.     10/15/19: Phan reports continued situational depression (work, family).  When asked if she wanted to restart medications to manage depression, she declined.  Taking Gabapentin for sleep.  Tried doxepin for sleep but experienced increased RLS.  Currently taking Gabapentin 300mg and Mirapex 0.5mg at bedtime and is sleeping better.   No concerns with anxiety.  No history of seasonal component to mood.      7/16/19: Phan reports she is \"ok.\" She reports the Gabapentin has been helpful with sleep quality.  She did increase to 600mg at bedtime.   She has does report any concern with depression but feels anhedonic and attributes to overwhelmed with various responsibilities and tasks she currently is facing.      3/27/19: Phan reports she is \"hanging in there.\"  Phan reports her sleep has worsened since stopping Trazodone in December.  She does report her pain has improved since stopping Trazodone and is no longer experiencing the morning \"hangover.\"  Will start/restart Gabapentin for sleep.  Duloxetine stopped due to side effects and Venlafaxine ordered but she did not start due to fear it would have same adverse effects as duloxetine.  Recommended she try as it appears her depression may be worsening and if experiences concerning side effects, she should call clinic.  She will continue to consider.    9/26/18: Phan reports the following changes to medications:  Started Gabapentin 100mg TID which was started by orthopedics, prednisone was decreased to 5mg, and folic acid was stopped.  No significant psychiatric symptoms reported.  She does report increase restlessness at night but does not believe it is anxiety.  Restless legs " have also gotten worse which has further impeded sleep.  Iron supplementation started to help with RLS.      6/26/18: Phan reports her mental health continues to be well managed in spite of forgetting to load Buspar into her weekly med reminder.  She has not noticed any increase in anxiety.  Buspar will not be restarted.  Phan continues to not have taken the Propranolol as it is unneeded. Sleep quality remains unchanged but she is unconcerned.  Sleep may be impacted by prednisone.  Continues to take Melatonin 5mg and Trazodone 150mg to help with sleep.  Phan reports the two year anniversary of her liver transplant is approaching with no concerns from providers.      3/30/18:  Phan tried decreased Trazodone and the change significantly impacted her sleep.  She has resumed taking Trazodone 150mg qHS. Phan also recently saw nephrologist who prescribed Iron supplementation to possibly help manage restless leg syndrome.       Phan has not taken propranolol as she feels her anxiety is well managed.  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  Discussed longer duration between appointments due to symptoms being well managed.        Recent Symptoms:   Depression:  low energy and insomnia  Elevated:  none  Psychosis:  none  Anxiety:  anxiety intensifies when driving  Panic Attack:  none  Trauma Related:  none     Recent Substance Use:  none reported        Social/ Family History                                  [per patient report]                                     1ea, 1ea   CHILDREN- 3 adult children       TRAUMA HISTORY (self-report)- None  FEELS SAFE AT HOME- Yes    Medical / Surgical History                                                                                                                  Patient Active Problem List   Diagnosis     Decompensation of cirrhosis of liver (H)     Liver transplanted (H)     Alcoholic hepatitis     Immunosuppression (H)     Alcoholic  hepatitis without ascites     Enterococcus UTI     Liver transplant status (H)     Nausea & vomiting     Malnutrition (H)     Candidiasis of skin     Anemia     ACP (advance care planning)     Diarrhea     Hypothyroidism     Esophageal reflux     Recurrent major depression in partial remission (H)     Insomnia     CKD (chronic kidney disease) stage 3, GFR 30-59 ml/min     Anemia in stage 3 chronic kidney disease     Osteopenia     Alcohol abuse, uncomplicated       Past Surgical History:   Procedure Laterality Date     APPENDECTOMY           BENCH LIVER N/A 2016    Procedure: BENCH LIVER;  Surgeon: Larry Dhillon MD;  Location: [facility] OR     CATARACT IOL, RT/LT       COLONOSCOPY       ESOPHAGOSCOPY, GASTROSCOPY, DUODENOSCOPY (EGD), COMBINED N/A 2016    Procedure: COMBINED ESOPHAGOSCOPY, GASTROSCOPY, DUODENOSCOPY (EGD);  Surgeon: Tao Alfonso MD;  Location: [facility]     ESOPHAGOSCOPY, GASTROSCOPY, DUODENOSCOPY (EGD), COMBINED N/A 10/31/2016    Procedure: COMBINED ESOPHAGOSCOPY, GASTROSCOPY, DUODENOSCOPY (EGD), BIOPSY SINGLE OR MULTIPLE;  Surgeon: Ronald Bojorquez MD;  Location: [facility]     sphincteroplasty       TRANSPLANT LIVER RECIPIENT  DONOR N/A 2016    Procedure: TRANSPLANT LIVER RECIPIENT  DONOR;  Surgeon: Larry Dhillon MD;  Location: [facility] OR     TUBAL LIGATION      laparoscopic      Medical Review of Systems                                                                                                        2,10   A comprehensive review of systems was performed and is negative other than noted in the HPI.  Pregnant- No    Breastfeeding- No    Contraception- No  Allergy                                Codeine, Benadryl [diphenhydramine], Cefaclor, Hydrocodone-acetaminophen, Oxycodone, Penicillins, and Sulfa drugs  Current Medications                                                                                                       Current Outpatient Medications    Medication Sig Dispense Refill     acetaminophen (TYLENOL) 325 MG tablet Take 2 tablets (650 mg) by mouth every 6 hours as needed for mild pain Or fevers. Use sparingly. Limit use to no more than 2 grams (2000 mg) in 24 hours. **Further refills must be obtained by primary care provider** 100 tablet 0     albuterol (PROAIR HFA/PROVENTIL HFA/VENTOLIN HFA) 108 (90 Base) MCG/ACT inhaler Inhale 2 puffs into the lungs every 6 hours 18 g 3     amLODIPine (NORVASC) 5 MG tablet Take 1 tablet (5 mg) by mouth daily 90 tablet 0     amLODIPine (NORVASC) 5 MG tablet Take 1 tablet (5 mg) by mouth daily 90 tablet 3     chlorthalidone (HYGROTON) 25 MG tablet Take 1/2 tab daily 45 tablet 0     cholecalciferol (VITAMIN D3) 400 UNIT TABS tablet Take 400 Units by mouth daily       clobetasol (TEMOVATE) 0.05 % external ointment Apply topically 2 times daily To areas of eczema on the lower legs, up to 2 weeks. 60 g 1     clobetasol (TEMOVATE) 0.05 % external solution Apply topically 2 times daily To the scalp as needed 50 mL 3     cyanocobalamin (VITAMIN  B-12) 1000 MCG tablet Take 1,000 mcg by mouth daily       ferrous sulfate (IRON) 325 (65 FE) MG tablet Take 1 tablet (325 mg) by mouth 2 times daily 100 tablet      folic acid (FOLVITE) 1 MG tablet Take 1 tablet (1 mg) by mouth daily 30 tablet 1     gabapentin (NEURONTIN) 300 MG capsule Take 1 capsule (300 mg) by mouth At Bedtime 90 capsule 1     hydrOXYzine (ATARAX) 25 MG tablet Take 1-2 tablets (25-50 mg) by mouth every 6 hours as needed for itching 120 tablet 10     levothyroxine (SYNTHROID/LEVOTHROID) 88 MCG tablet Take 1 tablet (88 mcg) by mouth daily 90 tablet 3     MAGNESIUM OXIDE PO Take 400 mg by mouth daily       melatonin 5 MG tablet Take 1 tablet (5 mg) by mouth nightly as needed for sleep       multivitamin, therapeutic (THERA-VIT) TABS tablet Take 1 tablet by mouth every 24 hours 30 tablet 11     order for DME Equipment being ordered: [employer] ankle brace 1 Device 0      "pantoprazole (PROTONIX) 40 MG EC tablet TAKE 1 TABLET BY MOUTH EVERY MORNING BEFORE BREAKFAST 90 tablet 3     psyllium 0.52 G capsule Take 2 capsules (1.04 g) by mouth daily With a full glass of water. 60 capsule 1     tacrolimus (GENERIC EQUIVALENT) 0.5 MG capsule Take 3 capsules (1.5 mg) by mouth every morning AND 2 capsules (1 mg) every evening. 150 capsule 11     traMADol (ULTRAM) 50 MG tablet Take 1 tablet (50 mg) by mouth every 6 hours as needed for severe pain 30 tablet 0     triamcinolone (KENALOG) 0.1 % external ointment Apply topically 2 times daily To plaques behind the knee 80 g 1     ursodiol (ACTIGALL) 300 MG capsule TAKE ONE CAPSULE BY MOUTH TWICE A DAY 60 capsule 11     ursodiol (ACTIGALL) 300 MG capsule Take 1 capsule (300 mg) by mouth every 12 hours 60 capsule 11     Vitals                                                                                                                            3, 3   LMP  (LMP Unknown)    Mental Status Exam                                                                                        9, 14 cog gs     Alertness: alert  and oriented  Appearance: unable to assess  Behavior/Demeanor: cooperative and pleasant, with unable to assess eye contact   Speech: regular rate and rhythm  Language: good  Psychomotor: unable to assess  Mood: \"stable\"  Affect: unable to assess; was congruent to mood; was congruent to content  Thought Process/Associations: unremarkable  Thought Content:  Reports none;  Denies suicidal ideation and violent ideation  Perception:  Reports none;  Denies auditory hallucinations and visual hallucinations  Insight: good  Judgment: good  Cognition: (6) does  appear grossly intact; formal cognitive testing was not done  Gait/Station and/or Muscle Strength/Tone: unable to assess    Labs and Data                                                                                                                 Rating Scales:  PHQ9    PHQ9 Today:  Not " completed  PHQ-9 SCORE 3/27/2019 7/16/2019 10/15/2019   PHQ-9 Total Score MyChart - - -   PHQ-9 Total Score 10 6 5         Diagnosis and Assessment                                                                                  m2, h3     Today the following issues were addressed:    1) Major Depressive Disorder, recurrent, mild  2) Generalized Anxiety Disorder    [place] Prescription Monitoring Program [] was not checked today:  will be checked next visit.         Plan                                                                                                                         m2, h3      1) Medication Management      Decrease Gabapentin to 200mg at bedtime for sleep.      2) Therapy  Will start looking for new therapist    RTC: 6 months.      CRISIS NUMBERS:   Provided routinely in AVS.    Treatment Risk Statement:  The patient understands the risks, benefits, adverse effects and alternatives. Agrees to treatment with the capacity to do so. No medical contraindications to treatment. Agrees to call clinic for any problems. The patient understands to call 911 or go to the nearest ED if life threatening or urgent symptoms occur.      PROVIDER:  DONOVAN Hanna CNP

## 2020-10-05 NOTE — NURSING NOTE
Dermatology Rooming Note    Phan Luque's goals for this visit include:   Chief Complaint   Patient presents with     Skin Check     Annual skin check. Tracy notes that she is having issues with her scalp. Tracy also notes dystrophic finger nails.     Derm Problem     tracy would like to talk about her swollen right cheek - after her transplant.     Samantha Leblanc, CMA

## 2020-10-05 NOTE — TELEPHONE ENCOUNTER
On 10/5/2020, at 1636, writer called patient at mobile to confirm Virtual Visit. Writer unable to make contact with patient. Writer left detailed voice message for callback. [phone], left as call back number. CASSIDY Vincent, EMT

## 2020-10-06 DIAGNOSIS — Z12.31 ENCOUNTER FOR SCREENING MAMMOGRAM FOR BREAST CANCER: ICD-10-CM

## 2020-10-07 ENCOUNTER — MYC MEDICAL ADVICE (OUTPATIENT)
Dept: DERMATOLOGY | Facility: CLINIC | Age: 65
End: 2020-10-07

## 2020-10-07 ENCOUNTER — OFFICE VISIT (OUTPATIENT)
Dept: SURGERY | Facility: CLINIC | Age: 65
End: 2020-10-07
Payer: COMMERCIAL

## 2020-10-07 ENCOUNTER — PRE VISIT (OUTPATIENT)
Dept: SURGERY | Facility: CLINIC | Age: 65
End: 2020-10-07

## 2020-10-07 VITALS
HEART RATE: 88 BPM | OXYGEN SATURATION: 95 % | WEIGHT: 194.6 LBS | TEMPERATURE: 98.6 F | BODY MASS INDEX: 30.54 KG/M2 | HEIGHT: 67 IN | DIASTOLIC BLOOD PRESSURE: 74 MMHG | SYSTOLIC BLOOD PRESSURE: 130 MMHG

## 2020-10-07 DIAGNOSIS — K64.5 HEMORRHOIDS, INTERNAL, THROMBOSED: Primary | ICD-10-CM

## 2020-10-07 PROCEDURE — 99202 OFFICE O/P NEW SF 15 MIN: CPT | Performed by: SURGERY

## 2020-10-07 ASSESSMENT — PAIN SCALES - GENERAL: PAINLEVEL: NO PAIN (0)

## 2020-10-07 ASSESSMENT — MIFFLIN-ST. JEOR: SCORE: 1470.33

## 2020-10-07 NOTE — NURSING NOTE
"Chief Complaint   Patient presents with     New Patient     New consult for issues after overlapping sphincteroplasty.       Vitals:    10/07/20 0859   BP: 130/74   BP Location: Left arm   Patient Position: Sitting   Cuff Size: Adult Regular   Pulse: 88   Temp: 98.6  F (37  C)   TempSrc: Oral   SpO2: 95%   Weight: 194 lb 9.6 oz   Height: 5' 7\"       Body mass index is 30.48 kg/m .         Ara Bragg CMA    "

## 2020-10-07 NOTE — LETTER
10/7/2020       RE: Phan Luque  [address]  [facility] [address]     Dear Colleague,    Thank you for referring your patient, Phan Luque, to the [facility] COLON AND RECT[facility]IN[facility] [place] at [facility]. Please see a copy of my visit note below.    Colon and Rectal Surgery Clinic Note    RE: Phan Luque.  : 1955.  ERICK: 10/7/2020.    Reason for visit: prolapsing anal tissue.    HPI: 63 yo F,Hx of liver transplantation in 2016 for EtOH hepatitis, doing well, followed by Dr. Banegas, CKD with last Cr 1.36.     Last colonoscopy 2018, normal with exception of one 1 mm polyp (TA) in ascending colon and diverticulosis in descending colon.    Had 4 , first one complicated by episiotomy requiring primary repair. Notably had overlapping sphincteroplasty at [facility] in . Felt prolapsing anal tissue two weeks ago in shower. Only felt this once two weeks ago, one quarter of an inch out.     Eats a high fiber diet, stool is soft, good consistency. Able to feel when she needs to defecate. Able to control gas and stool. Able to generally control to get to the bathroom, and feels like she is able to completely evacuate. Very mild urinary incontinence. No need for depends. No pain or bleeding.     Medical history:  Past Medical History:   Diagnosis Date     Asthma      Chronic kidney disease      End stage liver disease (H)     2/2 Alcohol Abuse     Liver transplanted (H)      Migraines      Osteoporosis      Spinal stenosis in cervical region      Strabismus        Surgical history:  Past Surgical History:   Procedure Laterality Date     APPENDECTOMY           BENCH LIVER N/A 2016    Procedure: BENCH LIVER;  Surgeon: Larry Dhillon MD;  Location: [facility] OR     CATARACT IOL, RT/LT       COLONOSCOPY       ESOPHAGOSCOPY, GASTROSCOPY, DUODENOSCOPY (EGD), COMBINED N/A 2016    Procedure: COMBINED ESOPHAGOSCOPY, GASTROSCOPY,  DUODENOSCOPY (EGD);  Surgeon: Tao Alfonso MD;  Location: [facility] GI     ESOPHAGOSCOPY, GASTROSCOPY, DUODENOSCOPY (EGD), COMBINED N/A 10/31/2016    Procedure: COMBINED ESOPHAGOSCOPY, GASTROSCOPY, DUODENOSCOPY (EGD), BIOPSY SINGLE OR MULTIPLE;  Surgeon: Ronald Bojorquez MD;  Location: [facility] GI     sphincteroplasty       TRANSPLANT LIVER RECIPIENT  DONOR N/A 2016    Procedure: TRANSPLANT LIVER RECIPIENT  DONOR;  Surgeon: Larry Dhillon MD;  Location: [facility] OR     TUBAL LIGATION      laparoscopic       Family history:  Family History   Problem Relation Age of Onset     Family History Negative Other      Melanoma Mother              Heart Disease Father         CHF       Medications:  Current Outpatient Medications   Medication Sig Dispense Refill     acetaminophen (TYLENOL) 325 MG tablet Take 2 tablets (650 mg) by mouth every 6 hours as needed for mild pain Or fevers. Use sparingly. Limit use to no more than 2 grams (2000 mg) in 24 hours. **Further refills must be obtained by primary care provider** 100 tablet 0     albuterol (PROAIR HFA/PROVENTIL HFA/VENTOLIN HFA) 108 (90 Base) MCG/ACT inhaler Inhale 2 puffs into the lungs every 6 hours 18 g 3     amLODIPine (NORVASC) 5 MG tablet Take 1 tablet (5 mg) by mouth daily 90 tablet 0     amLODIPine (NORVASC) 5 MG tablet Take 1 tablet (5 mg) by mouth daily 90 tablet 3     calcium Citrate-vitamin D 500-400 MG-UNIT CHEW Take 1 tablet by mouth daily       chlorthalidone (HYGROTON) 25 MG tablet Take 1/2 tab daily 45 tablet 0     cholecalciferol (VITAMIN D3) 400 UNIT TABS tablet Take 400 Units by mouth daily       clobetasol (TEMOVATE) 0.05 % external ointment Apply topically 2 times daily To areas of eczema on the lower legs, up to 2 weeks. 60 g 1     cyanocobalamin (VITAMIN  B-12) 1000 MCG tablet Take 1,000 mcg by mouth daily       diclofenac (VOLTAREN) 1 % GEL topical gel Apply 2 g topically 4 times daily as needed for moderate pain       ferrous  sulfate (IRON) 325 (65 FE) MG tablet Take 1 tablet (325 mg) by mouth 2 times daily 100 tablet      folic acid (FOLVITE) 1 MG tablet Take 1 tablet (1 mg) by mouth daily 30 tablet 1     gabapentin (NEURONTIN) 300 MG capsule Take 1 capsule (300 mg) by mouth At Bedtime 90 capsule 1     hydrOXYzine (ATARAX) 25 MG tablet Take 1-2 tablets (25-50 mg) by mouth every 6 hours as needed for itching 120 tablet 10     levothyroxine (SYNTHROID/LEVOTHROID) 88 MCG tablet Take 1 tablet (88 mcg) by mouth daily 90 tablet 3     MAGNESIUM OXIDE PO Take 400 mg by mouth daily       melatonin 5 MG tablet Take 1 tablet (5 mg) by mouth nightly as needed for sleep       multivitamin, therapeutic (THERA-VIT) TABS tablet Take 1 tablet by mouth every 24 hours 30 tablet 11     order for DME Equipment being ordered: [employer] ankle brace 1 Device 0     pantoprazole (PROTONIX) 40 MG EC tablet TAKE 1 TABLET BY MOUTH EVERY MORNING BEFORE BREAKFAST 90 tablet 3     pramipexole (MIRAPEX) 0.5 MG tablet Take 1 tablet (0.5 mg) by mouth At Bedtime 90 tablet 1     psyllium 0.52 G capsule Take 2 capsules (1.04 g) by mouth daily With a full glass of water. 60 capsule 1     tacrolimus (GENERIC EQUIVALENT) 0.5 MG capsule Take 3 capsules (1.5 mg) by mouth every morning AND 2 capsules (1 mg) every evening. 150 capsule 11     traMADol (ULTRAM) 50 MG tablet Take 1 tablet (50 mg) by mouth every 6 hours as needed for severe pain 30 tablet 0     triamcinolone (KENALOG) 0.1 % external ointment Apply topically 2 times daily To plaques behind the knee 80 g 1     ursodiol (ACTIGALL) 300 MG capsule TAKE ONE CAPSULE BY MOUTH TWICE A DAY 60 capsule 11     ursodiol (ACTIGALL) 300 MG capsule Take 1 capsule (300 mg) by mouth every 12 hours 60 capsule 11       Allergies:  Allergies   Allergen Reactions     Codeine Hives     Benadryl [Diphenhydramine] Hives     Cefaclor Hives     Hydrocodone-Acetaminophen Itching     Oxycodone Itching     Penicillins Hives     Sulfa Drugs Hives  "      Social history:   Social History     Tobacco Use     Smoking status: Former Smoker     Packs/day: 2.50     Years: 20.00     Pack years: 50.00     Quit date: 1996     Years since quittin.8     Smokeless tobacco: Never Used   Substance Use Topics     Alcohol use: No     Alcohol/week: 7.0 standard drinks     Types: 7 Standard drinks or equivalent per week     Comment: heavy use (750ml/2 days) up until 1 year ago; had cut down to 1 drink/day; none since 16     Marital status: .    ROS:  A complete review of systems was performed with the patient and all systems negative except as per HPI.    Physical Examination:  Exam was chaperoned by Ara Bragg  /74 (BP Location: Left arm, Patient Position: Sitting, Cuff Size: Adult Regular)   Pulse 88   Temp 98.6  F (37  C) (Oral)   Ht 5' 7\"   Wt 194 lb 9.6 oz   LMP  (LMP Unknown)   SpO2 95%   BMI 30.48 kg/m    General: Well hydrated. No acute distress.  Abdomen: Soft, NT, ND. No inguinal adenopathy palpated.  Perianal external examination:  Perianal skin: intact.  Lesions: No.  Eversion of buttocks: There was not evidence of an anal fissure. Details: Previous well healed scar found anteriorly.  Skin tags or external hemorrhoids: None.  Digital rectal examination: Was performed.   Sphincter tone: Good.  Palpable lesions: No.  Other: None.  Bimanual examination: was not performed.    Anoscopy: Was performed.   Hemorrhoids: Yes, grade 2 right anterior internal hemorrhoid. No inflammation.  Lesions: No    Procedures:  None.    Sat on commode: upon straining, a thrombosed right sided internal hemorrhoid was seen, approximately 1 cm in size, nontender, noninflamed.     Laboratory values reviewed:  Recent Labs   Lab Test 20  0903 17  1728 17  1728   WBC 8.1   < > 4.2   HGB 14.1   < > 8.8*      < > 290   CR 1.36*   < > 1.57*   ALBUMIN 3.8   < > 3.7   BILITOTAL 0.7   < > 0.7   ALKPHOS 63   < > 61   *   " < > 9   AST 51*   < > 9   INR  --   --  1.15*    < > = values in this interval not displayed.       Imaging personally reviewed by me:  none    ASSESSMENT  1. S/p overlapping sphincteroplasty, now with episode of prolapsed likely right posterior internal hemorrhoid.  2. Hx of liver transplantation, doing well.  3. CKD, last Cr 1.36.    PLAN  1. Continue high fiber, water, sitz baths TID.   2. Given that it was a brief episode and not reproducible in clinic, would advocate monitoring. She is agreeable to this.  3. Follow up as needed.     Time spent: 30 minutes.  >50% spent in discussing, counseling and coordinating care.      Again, thank you for allowing me to participate in the care of your patient.  Sincerely,    Eliazar Flor M.D    Division of Colon and Rectal Surgery  [facility]    Referring Provider:  Hussein Banegas MD  [address] [place] [address]     Primary Care Provider:  Arlyn Sanchez

## 2020-10-19 RX ORDER — CLOBETASOL PROPIONATE 0.05 G/100ML
SHAMPOO TOPICAL DAILY
Qty: 118 ML | Refills: 2 | Status: SHIPPED | OUTPATIENT
Start: 2020-10-19 | End: 2020-11-10

## 2020-10-25 NOTE — TELEPHONE ENCOUNTER
FUTURE VISIT INFORMATION      FUTURE VISIT INFORMATION:    Date: 11.11.20    Time: 9:15    [place] [facility] [place]FORMATION:    Referring provider:  Demetria Barger PA-C    Referring providers clinic:  [facility]    Reason for visit/diagnosis  consult per Charbel    RECORDS REQUESTED FROM:       Clinic name Comments Records Status Photos Status   [facility] 10.5.20  Demetria Barger [facility]

## 2020-11-02 ENCOUNTER — VIRTUAL VISIT (OUTPATIENT)
Dept: INTERNAL MEDICINE | Facility: CLINIC | Age: 65
End: 2020-11-02
Payer: COMMERCIAL

## 2020-11-02 DIAGNOSIS — Z94.4 LIVER TRANSPLANT STATUS (H): ICD-10-CM

## 2020-11-02 DIAGNOSIS — R63.4 WEIGHT LOSS: ICD-10-CM

## 2020-11-02 DIAGNOSIS — L60.0 IGTN (INGROWING TOE NAIL): Primary | ICD-10-CM

## 2020-11-02 LAB
BACTERIA SPEC CULT: ABNORMAL
BACTERIA SPEC CULT: ABNORMAL
SPECIMEN SOURCE: ABNORMAL

## 2020-11-02 PROCEDURE — 99213 OFFICE O/P EST LOW 20 MIN: CPT | Mod: 95 | Performed by: INTERNAL MEDICINE

## 2020-11-02 NOTE — NURSING NOTE
Chief Complaint   Patient presents with     Medication Follow-up     Pt is here for 6 month follow up.      Video Visit Technology for this patient: Nataliya not working, patient has smart device, please try Doximity Video with patient.    Ceci Pulido LPN at 4:23 PM on 11/2/2020.

## 2020-11-02 NOTE — PROGRESS NOTES
Phan Luque is a 65 year old female who is being evaluated via a billable video visit.      The patient has consented to a video visit and informed that video and telephone visits are being performed during the COVID-19 pandemic in order to mitigate the risk of an in office visit for appropriate candidates/issues. If during the course of the call the physician/provider feels a video visit is not appropriate, you will not be charged for this service. If the provider feels that they are unable to assess your concerns without an in person visit, you will be advised of this limitation and depending on the nature of the concern, advised to seek in person care if your provider feels you need urgent evaluation.      Subjective     Phan Luque is a 65 year old female who presents to clinic today for the following health issues:    Chief Complaint   Patient presents with     Medication Follow-up     Pt is here for 6 month follow up.        DIMA Lopez has a PMH notable for alcoholic hepatitis s/p liver transplant, anxiety, HTN, CKD, IBS.  Jessica is doing well overall.  Working from home. Her chronic issues are stable.  She does reports some foot pain and nail issues.  Her liver is doing well.  She has continued to work on exercise and diet and weight is down 30 lbs. She got her flu shot.  No concerns about mental health. No fevers or recent illnesses.      Routine Health Maintenence:  Immunizations (zoster, pneumovax, flu, Tdap, Hep A/B):           Most Recent Immunizations   Administered Date(s) Administered     HEPA 01/13/2009     Influenza Vaccine IM 3yrs+ 4 Valent IIV4 10/18/2017     Mantoux Tuberculin Skin Test 10/06/2016     Pneumo Conj 13-V (2010&after) 08/19/2016     TD (ADULT, 7+) 01/21/2004     TDAP Vaccine (Adacel) 08/18/2016      Lipids:               Recent Labs   Lab Test  12/20/17   1443  02/23/17   0850    08/06/16   0757   CHOL  147   --    --   Interfering substances, unable to perform test  LUPE  BEULAH NOTIFIED ON 6B,8/6/2016,0926,MJ      HDL  51   --    --   9*   LDL  49  108*   < >  13   TRIG  230*   --    --   Interfering substances, unable to perform test  LUPE RIOJAS NOTIFIED ON 6B,8/6/2016,0926,MJ       < > = values in this interval not displayed.      Lung Ca Screening (>30 pk age 55-79 or >20 py age 50-79 + RF): does not qualify, quit 1996  Colonoscopy (50-75 yrs): 6/18 TA x 2, due 6/23                       - Diverticulosis in the left colon.    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  Mammogram (40-75 yrs): 12/17 [facility], normal, 4/19 normal  Pap (21-65 yrs): MARKO for PAP, last 12/17, due 12/20  Pelvic/Breast: up to date  Safety/Lifestyle: reviewed  Tob/EtOH: screen neg  Depression: reviewed  Advanced Directive: deferred           Patient Active Problem List   Diagnosis     Decompensation of cirrhosis of liver (H)     Liver transplanted (H)     Alcoholic hepatitis     Immunosuppression (H)     Alcoholic hepatitis without ascites     Enterococcus UTI     Liver transplant status (H)     Nausea & vomiting     Malnutrition (H)     Candidiasis of skin     Anemia     ACP (advance care planning)     Diarrhea     Hypothyroidism     Esophageal reflux     Recurrent major depression in partial remission (H)     Insomnia     CKD (chronic kidney disease) stage 3, GFR 30-59 ml/min     Anemia in stage 3 chronic kidney disease     Osteopenia     Alcohol abuse, uncomplicated     Past Surgical History:   Procedure Laterality Date     APPENDECTOMY      1962     BENCH LIVER N/A 8/25/2016    Procedure: BENCH LIVER;  Surgeon: Larry Dhillon MD;  Location: [facility] OR     CATARACT IOL, RT/LT       COLONOSCOPY       ESOPHAGOSCOPY, GASTROSCOPY, DUODENOSCOPY (EGD), COMBINED N/A 8/17/2016    Procedure: COMBINED ESOPHAGOSCOPY, GASTROSCOPY, DUODENOSCOPY  (EGD);  Surgeon: Tao Alfonso MD;  Location: [facility]     ESOPHAGOSCOPY, GASTROSCOPY, DUODENOSCOPY (EGD), COMBINED N/A 10/31/2016    Procedure: COMBINED ESOPHAGOSCOPY, GASTROSCOPY, DUODENOSCOPY (EGD), BIOPSY SINGLE OR MULTIPLE;  Surgeon: Ronald Bojorquez MD;  Location: [facility]     sphincteroplasty       TRANSPLANT LIVER RECIPIENT  DONOR N/A 2016    Procedure: TRANSPLANT LIVER RECIPIENT  DONOR;  Surgeon: Larry Dhillon MD;  Location: [facility]     TUBAL LIGATION      laparoscopic       Social History     Tobacco Use     Smoking status: Former Smoker     Packs/day: 2.50     Years: 20.00     Pack years: 50.00     Quit date: 1996     Years since quittin.9     Smokeless tobacco: Never Used   Substance Use Topics     Alcohol use: No     Alcohol/week: 7.0 standard drinks     Types: 7 Standard drinks or equivalent per week     Comment: heavy use (750ml/2 days) up until 1 year ago; had cut down to 1 drink/day; none since 16     Family History   Problem Relation Age of Onset     Family History Negative Other      Melanoma Mother              Heart Disease Father         CHF         Current Outpatient Medications   Medication Sig Dispense Refill     acetaminophen (TYLENOL) 325 MG tablet Take 2 tablets (650 mg) by mouth every 6 hours as needed for mild pain Or fevers. Use sparingly. Limit use to no more than 2 grams (2000 mg) in 24 hours. **Further refills must be obtained by primary care provider** 100 tablet 0     albuterol (PROAIR HFA/PROVENTIL HFA/VENTOLIN HFA) 108 (90 Base) MCG/ACT inhaler Inhale 2 puffs into the lungs every 6 hours 18 g 3     amLODIPine (NORVASC) 5 MG tablet Take 1 tablet (5 mg) by mouth daily 90 tablet 0     amLODIPine (NORVASC) 5 MG tablet Take 1 tablet (5 mg) by mouth daily 90 tablet 3     chlorthalidone (HYGROTON) 25 MG tablet Take 1/2 tab daily 45 tablet 0     cholecalciferol (VITAMIN D3) 400 UNIT TABS tablet Take 400 Units by mouth daily       clobetasol  (TEMOVATE) 0.05 % external ointment Apply topically 2 times daily To areas of eczema on the lower legs, up to 2 weeks. 60 g 1     clobetasol (TEMOVATE) 0.05 % external solution Apply topically 2 times daily To the scalp as needed 50 mL 3     clobetasol propionate (CLOBEX) 0.05 % external shampoo Apply topically daily To dry scalp x 15 min and rinse. Do this until symptoms resolve then, 1-2 times weekly. 118 mL 2     cyanocobalamin (VITAMIN  B-12) 1000 MCG tablet Take 1,000 mcg by mouth daily       ferrous sulfate (IRON) 325 (65 FE) MG tablet Take 1 tablet (325 mg) by mouth 2 times daily 100 tablet      folic acid (FOLVITE) 1 MG tablet Take 1 tablet (1 mg) by mouth daily 30 tablet 1     gabapentin (NEURONTIN) 100 MG capsule Take 2 capsules (200 mg) by mouth At Bedtime 180 capsule 1     hydrOXYzine (ATARAX) 25 MG tablet Take 1-2 tablets (25-50 mg) by mouth every 6 hours as needed for itching 120 tablet 10     levothyroxine (SYNTHROID/LEVOTHROID) 88 MCG tablet Take 1 tablet (88 mcg) by mouth daily 90 tablet 3     MAGNESIUM OXIDE PO Take 400 mg by mouth daily       melatonin 5 MG tablet Take 1 tablet (5 mg) by mouth nightly as needed for sleep       multivitamin, therapeutic (THERA-VIT) TABS tablet Take 1 tablet by mouth every 24 hours 30 tablet 11     order for DME Equipment being ordered: [employer] ankle brace 1 Device 0     pantoprazole (PROTONIX) 40 MG EC tablet TAKE 1 TABLET BY MOUTH EVERY MORNING BEFORE BREAKFAST 90 tablet 3     psyllium 0.52 G capsule Take 2 capsules (1.04 g) by mouth daily With a full glass of water. 60 capsule 1     tacrolimus (GENERIC EQUIVALENT) 0.5 MG capsule Take 3 capsules (1.5 mg) by mouth every morning AND 2 capsules (1 mg) every evening. 150 capsule 11     traMADol (ULTRAM) 50 MG tablet Take 1 tablet (50 mg) by mouth every 6 hours as needed for severe pain 30 tablet 0     triamcinolone (KENALOG) 0.1 % external ointment Apply topically 2 times daily To plaques behind the knee 80 g 1      "ursodiol (ACTIGALL) 300 MG capsule TAKE ONE CAPSULE BY MOUTH TWICE A DAY 60 capsule 11     ursodiol (ACTIGALL) 300 MG capsule Take 1 capsule (300 mg) by mouth every 12 hours 60 capsule 11     Allergies   Allergen Reactions     Codeine Hives     Benadryl [Diphenhydramine] Hives     Cefaclor Hives     Hydrocodone-Acetaminophen Itching     Oxycodone Itching     Penicillins Hives     Sulfa Drugs Hives       Reviewed and updated as needed this visit by Provider    Review of Systems   A comprehensive ROS was performed and was negative unless indicated in the HPI above.        Physical Exam   There were no vitals taken for this visit.  Estimated body mass index is 22.24 kg/m  as calculated from the following:    Height as of 3/10/20: 1.702 m (5' 7\").    Weight as of 4/22/20: 64.4 kg (142 lb).  GENERAL: healthy, alert and no distress  HEAD: Normocephalic, atraumatic  EYES: Eyes grossly normal to inspection, EOMI and conjunctivae and sclerae normal  RESP: Speaking in full sentences, unlabored, no audible wheezes or cough  SKIN: no suspicious lesions or rashes, no jaundice  NEURO: oriented, and speech normal  PSYCH: mentation appears normal, affect normal/bright      Diagnostic Test Results:  Labs reviewed in Epic        Assessment and Plan:  Phan was seen today for medication follow-up.    Diagnoses and all orders for this visit:    Liver Transplant    Weight Loss - Intentional    IGTN (ingrowing toe nail)  -Podiatry referral    Come in for pap soon.        Video-Visit Details    Type of service:  Video Visit    Video Start/End Time: 5:00 PM  5:11 PM    10 min    Originating Location (pt. Location): Home    Distant Location (provider location):  [facility]     Mode of Communication:  Video Conference via  [employer] or  [employer]    Arlyn Sanchez MD  Internal Medicine      "

## 2020-11-03 ENCOUNTER — OFFICE VISIT (OUTPATIENT)
Dept: OPHTHALMOLOGY | Facility: CLINIC | Age: 65
End: 2020-11-03
Payer: COMMERCIAL

## 2020-11-03 ENCOUNTER — PRE VISIT (OUTPATIENT)
Dept: OPHTHALMOLOGY | Facility: CLINIC | Age: 65
End: 2020-11-03

## 2020-11-03 DIAGNOSIS — H02.9 EYELID LESION: ICD-10-CM

## 2020-11-03 DIAGNOSIS — H02.9 EYELID LESION: Primary | ICD-10-CM

## 2020-11-03 PROCEDURE — 92285 EXTERNAL OCULAR PHOTOGRAPHY: CPT | Performed by: OPHTHALMOLOGY

## 2020-11-03 PROCEDURE — 99213 OFFICE O/P EST LOW 20 MIN: CPT | Performed by: OPHTHALMOLOGY

## 2020-11-03 ASSESSMENT — VISUAL ACUITY
OS_CC: 20/80
METHOD: SNELLEN - LINEAR
OS_CC+: -1
OD_CC: 20/20

## 2020-11-03 ASSESSMENT — CONF VISUAL FIELD
OS_INFERIOR_TEMPORAL_RESTRICTION: 1
OS_SUPERIOR_TEMPORAL_RESTRICTION: 3
OS_SUPERIOR_NASAL_RESTRICTION: 3
OS_INFERIOR_NASAL_RESTRICTION: 1
OD_NORMAL: 1

## 2020-11-03 ASSESSMENT — EXTERNAL EXAM - RIGHT EYE: OD_EXAM: NORMAL

## 2020-11-03 ASSESSMENT — TONOMETRY
OD_IOP_MMHG: 21
IOP_METHOD: ICARE
OS_IOP_MMHG: 18

## 2020-11-03 ASSESSMENT — SLIT LAMP EXAM - LIDS: COMMENTS: NORMAL

## 2020-11-03 ASSESSMENT — EXTERNAL EXAM - LEFT EYE: OS_EXAM: NORMAL

## 2020-11-03 NOTE — PROGRESS NOTES
Chief Complaint(s) and History of Present Illness(es)     Lesion On Right Lower Lid     Laterality: right lower lid    Onset: years ago    Associated symptoms: Negative for lid swelling    Response to treatment: no improvement    Pain scale: 0/10              Comments     Phan Luque is being seen today at the request of Ambrose Ortega for lesion on the right lower eyelid. Pt states she has a small bump   that get inflamed and big sometimes where it rubs her eyes. No   inflammation today. No pain.     Lisa Horta, COT COT 7:47 AM November 3, 2020         First noticed about 20 years ago. She states that it occasionally enlarges, last enlarged in 08/2020. No vision changes and no eye pain, redness, discharge.  History of liver transplant. Not currently on blood thinners.    Assessment & Plan     Phan Luque is a 65 year old female with the following diagnoses:     Encounter Diagnosis   Name Primary?     Eyelid lesion - Right Eye Yes     -Raised, flesh-colored papule 2.3 mm (H) x 0.5 mm (W) medial lower lid that intermittently enlarges, possible papilloma vs malignancy  -R/b/a of shave biopsy with possible Mohs with reconstruction vs pentagonal wedge excision discussed with patient, who would like to proceed with shave biopsy   -History of Anterior ischemic optic neuropathy (AION), would like to return for procedure with .    Maia Melo MD  Ophthalmology Resident, PGY-2  Pager: [phone]    Attending Physician Attestation: Complete documentation of historical and exam elements from today's encounter can be found in the full encounter summary report (not reduplicated in this progress note). I personally obtained the chief complaint(s) and history of present illness. I confirmed and edited as necessary the review of systems, past medical/surgical history, family history, social history, and examination findings as documented by others; and I examined the patient myself. I personally  reviewed the relevant tests, images, and reports as documented above. I formulated and edited as necessary the assessment and plan and discussed the findings and management plan with the patient.  -Blaine Helms MD

## 2020-11-03 NOTE — NURSING NOTE
Chief Complaints and History of Present Illnesses   Patient presents with     Lesion On Right Lower Lid     Chief Complaint(s) and History of Present Illness(es)     Lesion On Right Lower Lid     Laterality: right lower lid    Onset: years ago    Associated symptoms: Negative for lid swelling    Response to treatment: no improvement    Pain scale: 0/10              Comments     Phan Luque is being seen today at the request of Ambrose Ortega for lesion on the right lower eyelid. Pt states she has a small bump that get inflamed and big sometimes where it rubs her eyes. No inflammation today. No pain.     Lisa Horta, COT COT 7:47 AM November 3, 2020

## 2020-11-03 NOTE — LETTER
11/3/2020       RE: Phan Luque  [address] [facility] [address]     Dear Colleague,    Thank you for referring your patient, Phan Luque, to the [facility] OPHTHALMOLOGY CLINIC [place] at [facility]. Please see a copy of my visit note below.         Chief Complaint(s) and History of Present Illness(es)     Lesion On Right Lower Lid     Laterality: right lower lid    Onset: years ago    Associated symptoms: Negative for lid swelling    Response to treatment: no improvement    Pain scale: 0/10              Comments     Phan Luque is being seen today at the request of Ambrose Ortega for lesion on the right lower eyelid. Pt states she has a small bump   that get inflamed and big sometimes where it rubs her eyes. No   inflammation today. No pain.     Lisa Horta, COT COT 7:47 AM November 3, 2020         First noticed about 20 years ago. She states that it occasionally enlarges, last enlarged in 08/2020. No vision changes and no eye pain, redness, discharge.  History of liver transplant. Not currently on blood thinners.    Assessment & Plan     Phan Luque is a 65 year old female with the following diagnoses:     Encounter Diagnosis   Name Primary?     Eyelid lesion - Right Eye Yes     -Raised, flesh-colored papule 2.3 mm (H) x 0.5 mm (W) medial lower lid that intermittently enlarges, possible papilloma vs malignancy  -R/b/a of shave biopsy with possible Mohs with reconstruction vs pentagonal wedge excision discussed with patient, who would like to proceed with shave biopsy   -History of Anterior ischemic optic neuropathy (AION), would like to return for procedure with .    Maia Melo MD  Ophthalmology Resident, PGY-2  Pager: [phone]    Attending Physician Attestation: Complete documentation of historical and exam elements from today's encounter can be found in the full encounter summary report (not reduplicated in this  progress note). I personally obtained the chief complaint(s) and history of present illness. I confirmed and edited as necessary the review of systems, past medical/surgical history, family history, social history, and examination findings as documented by others; and I examined the patient myself. I personally reviewed the relevant tests, images, and reports as documented above. I formulated and edited as necessary the assessment and plan and discussed the findings and management plan with the patient.  -Blaine Helms MD

## 2020-11-10 DIAGNOSIS — L40.0 PSORIASIS VULGARIS: ICD-10-CM

## 2020-11-10 RX ORDER — CLOBETASOL PROPIONATE 0.05 G/100ML
SHAMPOO TOPICAL DAILY
Qty: 354 ML | Refills: 0 | Status: SHIPPED | OUTPATIENT
Start: 2020-11-10 | End: 2020-12-09

## 2020-11-11 ENCOUNTER — OFFICE VISIT (OUTPATIENT)
Dept: DERMATOLOGY | Facility: CLINIC | Age: 65
End: 2020-11-11
Payer: COMMERCIAL

## 2020-11-11 ENCOUNTER — PRE VISIT (OUTPATIENT)
Dept: DERMATOLOGY | Facility: CLINIC | Age: 65
End: 2020-11-11

## 2020-11-11 DIAGNOSIS — Q67.0 FACIAL ASYMMETRY: Primary | ICD-10-CM

## 2020-11-11 PROCEDURE — 99213 OFFICE O/P EST LOW 20 MIN: CPT | Mod: GC | Performed by: DERMATOLOGY

## 2020-11-11 NOTE — PROGRESS NOTES
"[facility] Dermatology Note       Dermatology Problem List:  1. History of liver transplant at [facility], 2016- currently on tacrolimus   2. Nummular dermatitis  - Triamcinolone 0.1% ointment BID to popliteal fossa bilaterally  - clobetasol 0.05% ointment  3. AK s/p cryo  4. Skin cancer screening, 10/5/20  5. Scalp psoriasis clobetasol solution, shampoo  6. Nail dystrophy- candida positive   7. Facial asymmetry.     Encounter Date: Nov 11, 2020    CC:  Chief Complaint   Patient presents with     Derm Problem     Jessica is here to discuss \"accumulated fluid\" on face after liver transplant in 2016         History of Present Illness:  Ms. Phan Luque is a 65 year old female who presents as a referral from Dr. Barger for facial asymmetry. She is concerned about the fullness of the right cheek which is more than the left cheek. Does not like to be in photographs due to this asymmetry. Pertinent history includes liver transplant in 2016 for decompensated cirrhosis (possily alcoholic?), after which she retained 50lb of fluid after transplant, most of which has resolved with dialysis except for the right cheek. 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 Has been on amlodipine for hypertension for a few years. No recent use of prednisone (not since 1 year ago). This area has not been imaged.       Past Medical History:   Patient Active Problem List   Diagnosis     Decompensation of cirrhosis of liver (H)     Liver transplanted (H)     Alcoholic hepatitis     Immunosuppression (H)     Alcoholic hepatitis without ascites     Enterococcus UTI     Liver transplant status (H)     Nausea & vomiting     Malnutrition (H)     Candidiasis of skin     Anemia     ACP (advance care planning)     Diarrhea     Hypothyroidism     Esophageal reflux     Recurrent major depression in partial remission (H)     Insomnia     CKD " (chronic kidney disease) stage 3, GFR 30-59 ml/min     Anemia in stage 3 chronic kidney disease     Osteopenia     Alcohol abuse, uncomplicated     Past Medical History:   Diagnosis Date     Asthma      Chronic kidney disease      End stage liver disease (H)     2/2 Alcohol Abuse     Liver transplanted (H)      Migraines      Osteoporosis      Spinal stenosis in cervical region      Strabismus      Past Surgical History:   Procedure Laterality Date     APPENDECTOMY           BENCH LIVER N/A 2016    Procedure: BENCH LIVER;  Surgeon: Larry Dhillon MD;  Location: [facility]     CATARACT IOL, RT/LT       COLONOSCOPY       ESOPHAGOSCOPY, GASTROSCOPY, DUODENOSCOPY (EGD), COMBINED N/A 2016    Procedure: COMBINED ESOPHAGOSCOPY, GASTROSCOPY, DUODENOSCOPY (EGD);  Surgeon: Tao Alfonso MD;  Location: [facility]     ESOPHAGOSCOPY, GASTROSCOPY, DUODENOSCOPY (EGD), COMBINED N/A 10/31/2016    Procedure: COMBINED ESOPHAGOSCOPY, GASTROSCOPY, DUODENOSCOPY (EGD), BIOPSY SINGLE OR MULTIPLE;  Surgeon: Ronald Bojorquez MD;  Location: [facility] GI     sphincteroplasty       TRANSPLANT LIVER RECIPIENT  DONOR N/A 2016    Procedure: TRANSPLANT LIVER RECIPIENT  DONOR;  Surgeon: Larry Dhillon MD;  Location: [facility] OR     TUBAL LIGATION      laparoscopic       Social History:  Patient reports that she quit smoking about 23 years ago. She has a 50.00 pack-year smoking history. She has never used smokeless tobacco. She reports that she does not drink alcohol or use drugs.    Family History:  Family History   Problem Relation Age of Onset     Family History Negative Other      Melanoma Mother              Heart Disease Father         CHF       Medications:  Current Outpatient Medications   Medication Sig Dispense Refill     acetaminophen (TYLENOL) 325 MG tablet Take 2 tablets (650 mg) by mouth every 6 hours as needed for mild pain Or fevers. Use sparingly. Limit use to no more than 2 grams (2000 mg) in 24  hours. **Further refills must be obtained by primary care provider** 100 tablet 0     albuterol (PROAIR HFA/PROVENTIL HFA/VENTOLIN HFA) 108 (90 Base) MCG/ACT inhaler Inhale 2 puffs into the lungs every 6 hours 18 g 3     amLODIPine (NORVASC) 5 MG tablet Take 1 tablet (5 mg) by mouth daily 90 tablet 0     amLODIPine (NORVASC) 5 MG tablet Take 1 tablet (5 mg) by mouth daily 90 tablet 3     chlorthalidone (HYGROTON) 25 MG tablet Take 1/2 tab daily 45 tablet 0     cholecalciferol (VITAMIN D3) 400 UNIT TABS tablet Take 400 Units by mouth daily       clobetasol (TEMOVATE) 0.05 % external ointment Apply topically 2 times daily To areas of eczema on the lower legs, up to 2 weeks. 60 g 1     clobetasol (TEMOVATE) 0.05 % external solution Apply topically 2 times daily To the scalp as needed 50 mL 3     clobetasol propionate (CLOBEX) 0.05 % external shampoo Apply topically daily To dry scalp x 15 min and rinse. Do this until symptoms resolve then, 1-2 times weekly. 354 mL 0     cyanocobalamin (VITAMIN  B-12) 1000 MCG tablet Take 1,000 mcg by mouth daily       ferrous sulfate (IRON) 325 (65 FE) MG tablet Take 1 tablet (325 mg) by mouth 2 times daily 100 tablet      folic acid (FOLVITE) 1 MG tablet Take 1 tablet (1 mg) by mouth daily 30 tablet 1     gabapentin (NEURONTIN) 100 MG capsule Take 2 capsules (200 mg) by mouth At Bedtime 180 capsule 1     hydrOXYzine (ATARAX) 25 MG tablet Take 1-2 tablets (25-50 mg) by mouth every 6 hours as needed for itching 120 tablet 10     levothyroxine (SYNTHROID/LEVOTHROID) 88 MCG tablet Take 1 tablet (88 mcg) by mouth daily 90 tablet 3     MAGNESIUM OXIDE PO Take 400 mg by mouth daily       melatonin 5 MG tablet Take 1 tablet (5 mg) by mouth nightly as needed for sleep       multivitamin, therapeutic (THERA-VIT) TABS tablet Take 1 tablet by mouth every 24 hours 30 tablet 11     order for DME Equipment being ordered: Trilok ankle brace 1 Device 0     pantoprazole (PROTONIX) 40 MG EC tablet TAKE  1 TABLET BY MOUTH EVERY MORNING BEFORE BREAKFAST 90 tablet 3     psyllium 0.52 G capsule Take 2 capsules (1.04 g) by mouth daily With a full glass of water. 60 capsule 1     tacrolimus (GENERIC EQUIVALENT) 0.5 MG capsule Take 3 capsules (1.5 mg) by mouth every morning AND 2 capsules (1 mg) every evening. 150 capsule 11     traMADol (ULTRAM) 50 MG tablet Take 1 tablet (50 mg) by mouth every 6 hours as needed for severe pain 30 tablet 0     triamcinolone (KENALOG) 0.1 % external ointment Apply topically 2 times daily To plaques behind the knee 80 g 1     ursodiol (ACTIGALL) 300 MG capsule TAKE ONE CAPSULE BY MOUTH TWICE A DAY 60 capsule 11     ursodiol (ACTIGALL) 300 MG capsule Take 1 capsule (300 mg) by mouth every 12 hours 60 capsule 11       Allergies   Allergen Reactions     Codeine Hives     Benadryl [Diphenhydramine] Hives     Cefaclor Hives     Hydrocodone-Acetaminophen Itching     Oxycodone Itching     Penicillins Hives     Sulfa Drugs Hives       Review of Systems:  -Constitutional: Otherwise feeling well today, in usual state of health.  -Skin: As above in HPI. No additional skin concerns.    Physical exam:  Vitals: LMP  (LMP Unknown)   GEN: This is a well developed, well-nourished female in no acute distress, in a pleasant mood.    SKIN: Focused examination of the face and her last photograph was performed.  -Abad skin type: II  - prominent marionette lines (right greter than left, loss of lateral cheek and medial cheek volume. Rhytides on forehead and glabella  - There is a waxy stuck on tan to brown papule on the right infraorbital cheek.  -No other lesions of concern on areas examined.     Labs:  NA    Impression/Plan:    1. Facial asymmetry (R>L). This is most likely nature asymmetry with rhytides appearing secondary to volume loss and the effects of gravity. Patient is a translplant patient. On tacrolimus  - We reviewed the risks of filler including bruising, pain, duration, nodules, infection,  ulceration, scar, dyspigmentation, and uncommon but possible are stroke and blindness. We reviewed the patient may be at increased risk for filler complications due to immunosuppression.   - If patient would like to pursue filler for correction of facial asymmetry, would plan for softening the second wrinkle on the right, and possibly adding fullness to the left side as well.     2.   History of AKs, scalp psoriasis, nail dystrophy (possibly psoriasis related), nummular dermatitis  -   Continue with Demetria Barger PA-C     3. Likely seborrheic keratosis, right infraorbital papule.   - Pending removal with Dr. Valladares     Patient will schedule for filler if she plans to move forward, earlier for new or changing lesions.       Staff Involved:  Scribe/Resident/Staff    Danuta Vee MD   Dermatology Resident     Scribe Disclosure  I, Stephanie Decker, am serving as a scribe to document services personally performed by Dr. Angie Amos MD, based on data collection and the provider's statements to me.     Provider Disclosure:   The documentation recorded by the scribe accurately reflects the services I personally performed and the decisions made by me.    Angie Amos MD    Department of Dermatology  [facility]: Phone: [phone], Fax:[phone]  [facility] [place]: Phone: [phone], Fax: [phone]

## 2020-11-11 NOTE — PATIENT INSTRUCTIONS
- Please follow-up with Dr. Angie Amos in the next few months if you decide you would like to pursue filler for facial asymmetry.   - For psoriasis, nails, dermatitis, and actinic keratoses, please continue regular follow-up with Demetria Barger PA-C.

## 2020-11-11 NOTE — LETTER
"11/11/2020       RE: Phan Luque  [address] [facility] [address]     Dear Colleague,    Thank you for referring your patient, Phan Luque, to the [facility] DERMATOLOGY CLINIC [place] at [facility]. Please see a copy of my visit note below.    [facility] Dermatology Note       Dermatology Problem List:  1. History of liver transplant at [facility], 2016- currently on tacrolimus   2. Nummular dermatitis  - Triamcinolone 0.1% ointment BID to popliteal fossa bilaterally  - clobetasol 0.05% ointment  3. AK s/p cryo  4. Skin cancer screening, 10/5/20  5. Scalp psoriasis clobetasol solution, shampoo  6. Nail dystrophy- candida positive   7. Facial asymmetry.     Encounter Date: Nov 11, 2020    CC:  Chief Complaint   Patient presents with     Derm Problem     Jessica is here to discuss \"accumulated fluid\" on face after liver transplant in 2016         History of Present Illness:  Ms. Phan Luque is a 65 year old female who presents as a referral from Dr. Barger for facial asymmetry. She is concerned about the fullness of the right cheek which is more than the left cheek. Does not like to be in photographs due to this asymmetry. Pertinent history includes liver transplant in 2016 for decompensated cirrhosis (possily alcoholic?), after which she retained 50lb of fluid after transplant, most of which has resolved with dialysis except for the right cheek. 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 Has been on amlodipine for hypertension for a few years. No recent use of prednisone (not since 1 year ago). This area has not been imaged.       Past Medical History:   Patient Active Problem List   Diagnosis     Decompensation of cirrhosis of liver (H)     Liver transplanted (H)     Alcoholic hepatitis     Immunosuppression (H)     Alcoholic hepatitis " without ascites     Enterococcus UTI     Liver transplant status (H)     Nausea & vomiting     Malnutrition (H)     Candidiasis of skin     Anemia     ACP (advance care planning)     Diarrhea     Hypothyroidism     Esophageal reflux     Recurrent major depression in partial remission (H)     Insomnia     CKD (chronic kidney disease) stage 3, GFR 30-59 ml/min     Anemia in stage 3 chronic kidney disease     Osteopenia     Alcohol abuse, uncomplicated     Past Medical History:   Diagnosis Date     Asthma      Chronic kidney disease      End stage liver disease (H)     2/2 Alcohol Abuse     Liver transplanted (H)      Migraines      Osteoporosis      Spinal stenosis in cervical region      Strabismus      Past Surgical History:   Procedure Laterality Date     APPENDECTOMY           BENCH LIVER N/A 2016    Procedure: BENCH LIVER;  Surgeon: Larry Dhillon MD;  Location: [facility] OR     CATARACT IOL, RT/LT       COLONOSCOPY       ESOPHAGOSCOPY, GASTROSCOPY, DUODENOSCOPY (EGD), COMBINED N/A 2016    Procedure: COMBINED ESOPHAGOSCOPY, GASTROSCOPY, DUODENOSCOPY (EGD);  Surgeon: Tao Alfonso MD;  Location: [facility]     ESOPHAGOSCOPY, GASTROSCOPY, DUODENOSCOPY (EGD), COMBINED N/A 10/31/2016    Procedure: COMBINED ESOPHAGOSCOPY, GASTROSCOPY, DUODENOSCOPY (EGD), BIOPSY SINGLE OR MULTIPLE;  Surgeon: Ronald Bojorquez MD;  Location: [facility]     sphincteroplasty       TRANSPLANT LIVER RECIPIENT  DONOR N/A 2016    Procedure: TRANSPLANT LIVER RECIPIENT  DONOR;  Surgeon: Larry Dhillon MD;  Location: [facility] OR     TUBAL LIGATION      laparoscopic       Social History:  Patient reports that she quit smoking about 23 years ago. She has a 50.00 pack-year smoking history. She has never used smokeless tobacco. She reports that she does not drink alcohol or use drugs.    Family History:  Family History   Problem Relation Age of Onset     Family History Negative Other      Melanoma Mother               Heart Disease Father         CHF       Medications:  Current Outpatient Medications   Medication Sig Dispense Refill     acetaminophen (TYLENOL) 325 MG tablet Take 2 tablets (650 mg) by mouth every 6 hours as needed for mild pain Or fevers. Use sparingly. Limit use to no more than 2 grams (2000 mg) in 24 hours. **Further refills must be obtained by primary care provider** 100 tablet 0     albuterol (PROAIR HFA/PROVENTIL HFA/VENTOLIN HFA) 108 (90 Base) MCG/ACT inhaler Inhale 2 puffs into the lungs every 6 hours 18 g 3     amLODIPine (NORVASC) 5 MG tablet Take 1 tablet (5 mg) by mouth daily 90 tablet 0     amLODIPine (NORVASC) 5 MG tablet Take 1 tablet (5 mg) by mouth daily 90 tablet 3     chlorthalidone (HYGROTON) 25 MG tablet Take 1/2 tab daily 45 tablet 0     cholecalciferol (VITAMIN D3) 400 UNIT TABS tablet Take 400 Units by mouth daily       clobetasol (TEMOVATE) 0.05 % external ointment Apply topically 2 times daily To areas of eczema on the lower legs, up to 2 weeks. 60 g 1     clobetasol (TEMOVATE) 0.05 % external solution Apply topically 2 times daily To the scalp as needed 50 mL 3     clobetasol propionate (CLOBEX) 0.05 % external shampoo Apply topically daily To dry scalp x 15 min and rinse. Do this until symptoms resolve then, 1-2 times weekly. 354 mL 0     cyanocobalamin (VITAMIN  B-12) 1000 MCG tablet Take 1,000 mcg by mouth daily       ferrous sulfate (IRON) 325 (65 FE) MG tablet Take 1 tablet (325 mg) by mouth 2 times daily 100 tablet      folic acid (FOLVITE) 1 MG tablet Take 1 tablet (1 mg) by mouth daily 30 tablet 1     gabapentin (NEURONTIN) 100 MG capsule Take 2 capsules (200 mg) by mouth At Bedtime 180 capsule 1     hydrOXYzine (ATARAX) 25 MG tablet Take 1-2 tablets (25-50 mg) by mouth every 6 hours as needed for itching 120 tablet 10     levothyroxine (SYNTHROID/LEVOTHROID) 88 MCG tablet Take 1 tablet (88 mcg) by mouth daily 90 tablet 3     MAGNESIUM OXIDE PO Take 400 mg by mouth daily        melatonin 5 MG tablet Take 1 tablet (5 mg) by mouth nightly as needed for sleep       multivitamin, therapeutic (THERA-VIT) TABS tablet Take 1 tablet by mouth every 24 hours 30 tablet 11     order for DME Equipment being ordered: Trilok ankle brace 1 Device 0     pantoprazole (PROTONIX) 40 MG EC tablet TAKE 1 TABLET BY MOUTH EVERY MORNING BEFORE BREAKFAST 90 tablet 3     psyllium 0.52 G capsule Take 2 capsules (1.04 g) by mouth daily With a full glass of water. 60 capsule 1     tacrolimus (GENERIC EQUIVALENT) 0.5 MG capsule Take 3 capsules (1.5 mg) by mouth every morning AND 2 capsules (1 mg) every evening. 150 capsule 11     traMADol (ULTRAM) 50 MG tablet Take 1 tablet (50 mg) by mouth every 6 hours as needed for severe pain 30 tablet 0     triamcinolone (KENALOG) 0.1 % external ointment Apply topically 2 times daily To plaques behind the knee 80 g 1     ursodiol (ACTIGALL) 300 MG capsule TAKE ONE CAPSULE BY MOUTH TWICE A DAY 60 capsule 11     ursodiol (ACTIGALL) 300 MG capsule Take 1 capsule (300 mg) by mouth every 12 hours 60 capsule 11       Allergies   Allergen Reactions     Codeine Hives     Benadryl [Diphenhydramine] Hives     Cefaclor Hives     Hydrocodone-Acetaminophen Itching     Oxycodone Itching     Penicillins Hives     Sulfa Drugs Hives       Review of Systems:  -Constitutional: Otherwise feeling well today, in usual state of health.  -Skin: As above in HPI. No additional skin concerns.    Physical exam:  Vitals: LMP  (LMP Unknown)   GEN: This is a well developed, well-nourished female in no acute distress, in a pleasant mood.    SKIN: Focused examination of the face and her last photograph was performed.  -Abad skin type: II  - prominent marionette lines (right greter than left, loss of lateral cheek and medial cheek volume. Rhytides on forehead and glabella  - There is a waxy stuck on tan to brown papule on the right infraorbital cheek.  -No other lesions of concern on areas examined.      Labs:  NA    Impression/Plan:    1. Facial asymmetry (R>L). This is most likely nature asymmetry with rhytides appearing secondary to volume loss and the effects of gravity. Patient is a translplant patient. On tacrolimus  - We reviewed the risks of filler including bruising, pain, duration, nodules, infection, ulceration, scar, dyspigmentation, and uncommon but possible are stroke and blindness. We reviewed the patient may be at increased risk for filler complications due to immunosuppression.   - If patient would like to pursue filler for correction of facial asymmetry, would plan for softening the second wrinkle on the right, and possibly adding fullness to the left side as well.     2.   History of AKs, scalp psoriasis, nail dystrophy (possibly psoriasis related), nummular dermatitis  -   Continue with Demetria Barger PA-C     3. Likely seborrheic keratosis, right infraorbital papule.   - Pending removal with Dr. Valladares     Patient will schedule for filler if she plans to move forward, earlier for new or changing lesions.       Staff Involved:  Scribe/Resident/Staff    Danuta Vee MD   Dermatology Resident     Scribe Disclosure  I, Stephanie Decker, am serving as a scribe to document services personally performed by Dr. Angie Amos MD, based on data collection and the provider's statements to me.     Provider Disclosure:   The documentation recorded by the scribe accurately reflects the services I personally performed and the decisions made by me.    Angie Amos MD    Department of Dermatology  [facility]: Phone: [phone], Fax:[phone]  [facility] [place]: Phone: [phone], Fax: [phone]            Again, thank you for allowing me to participate in the care of your patient.      Sincerely,    Angie Amos MD

## 2020-11-12 ENCOUNTER — TELEPHONE (OUTPATIENT)
Dept: RHEUMATOLOGY | Facility: CLINIC | Age: 65
End: 2020-11-12

## 2020-11-12 ENCOUNTER — TELEPHONE (OUTPATIENT)
Dept: OPHTHALMOLOGY | Facility: CLINIC | Age: 65
End: 2020-11-12

## 2020-11-12 NOTE — TELEPHONE ENCOUNTER
Patient is referred by Demetria Barger PA-C for Arthralgia of both knees    Has patient had imaging that pertains to referral? No    Has patient had labs that pertains to referral reason: No    Clinical review of patient's chart has been performed. Patient does not meet the criteria for an appointment.  A letter has been sent the referring encouraging them to send the patient to one of our community sites. Patient needs to follow up with their referring or PCP for further direction.     Breanna Rose CMA   11/12/2020 11:33 AM

## 2020-11-12 NOTE — LETTER
Adult Rheumatology Clinic  [address], [address]                        [place] [address]  Ph: [phone]                     Fax: [phone]       Date: November 12, 2020  Name: Phan Luque  YOB: 1955  MRN: [id number]     Dear Referring Provider,  Thank you for the referral for Phan Luque, 1955. After careful review by the Rheumatologist, your patient does not meet the criteria for an appointment. We encourage you to refer your patient to one of our [facility] sites:    [facility]    Provider of your choice  Thank you for your support and understanding.    Sincerely,  The Division of Arthritis and Autoimmune Disorders

## 2020-11-13 DIAGNOSIS — B37.2: ICD-10-CM

## 2020-11-13 DIAGNOSIS — B35.1 ONYCHOMYCOSIS OF NAIL OF DIGIT OF HAND: Primary | ICD-10-CM

## 2020-11-13 RX ORDER — NYSTATIN 100000 U/G
CREAM TOPICAL AT BEDTIME
Qty: 30 G | Refills: 11 | Status: SHIPPED | OUTPATIENT
Start: 2020-11-13 | End: 2021-03-17

## 2020-11-18 DIAGNOSIS — Z94.4 LIVER REPLACED BY TRANSPLANT (H): ICD-10-CM

## 2020-11-18 LAB
ALBUMIN SERPL-MCNC: 3.8 G/DL (ref 3.4–5)
ALP SERPL-CCNC: 58 U/L (ref 40–150)
ALT SERPL W P-5'-P-CCNC: 26 U/L (ref 0–50)
ANION GAP SERPL CALCULATED.3IONS-SCNC: 6 MMOL/L (ref 3–14)
AST SERPL W P-5'-P-CCNC: 25 U/L (ref 0–45)
BILIRUB DIRECT SERPL-MCNC: 0.1 MG/DL (ref 0–0.2)
BILIRUB SERPL-MCNC: 0.8 MG/DL (ref 0.2–1.3)
BUN SERPL-MCNC: 23 MG/DL (ref 7–30)
CALCIUM SERPL-MCNC: 9.3 MG/DL (ref 8.5–10.1)
CHLORIDE SERPL-SCNC: 103 MMOL/L (ref 94–109)
CO2 SERPL-SCNC: 26 MMOL/L (ref 20–32)
CREAT SERPL-MCNC: 1.27 MG/DL (ref 0.52–1.04)
ERYTHROCYTE [DISTWIDTH] IN BLOOD BY AUTOMATED COUNT: 13.3 % (ref 10–15)
GFR SERPL CREATININE-BSD FRML MDRD: 45 ML/MIN/{1.73_M2}
GLUCOSE SERPL-MCNC: 102 MG/DL (ref 70–99)
HCT VFR BLD AUTO: 42.7 % (ref 35–47)
HGB BLD-MCNC: 14 G/DL (ref 11.7–15.7)
MAGNESIUM SERPL-MCNC: 1.9 MG/DL (ref 1.6–2.3)
MCH RBC QN AUTO: 30 PG (ref 26.5–33)
MCHC RBC AUTO-ENTMCNC: 32.8 G/DL (ref 31.5–36.5)
MCV RBC AUTO: 92 FL (ref 78–100)
PLATELET # BLD AUTO: 268 10E9/L (ref 150–450)
POTASSIUM SERPL-SCNC: 4.4 MMOL/L (ref 3.4–5.3)
PROT SERPL-MCNC: 7.6 G/DL (ref 6.8–8.8)
RBC # BLD AUTO: 4.66 10E12/L (ref 3.8–5.2)
SODIUM SERPL-SCNC: 135 MMOL/L (ref 133–144)
TACROLIMUS BLD-MCNC: 3.2 UG/L (ref 5–15)
TME LAST DOSE: ABNORMAL H
WBC # BLD AUTO: 6.9 10E9/L (ref 4–11)

## 2020-11-18 PROCEDURE — 36415 COLL VENOUS BLD VENIPUNCTURE: CPT | Performed by: INTERNAL MEDICINE

## 2020-11-18 PROCEDURE — 80197 ASSAY OF TACROLIMUS: CPT | Performed by: INTERNAL MEDICINE

## 2020-11-18 PROCEDURE — 83735 ASSAY OF MAGNESIUM: CPT | Performed by: INTERNAL MEDICINE

## 2020-11-18 PROCEDURE — 85027 COMPLETE CBC AUTOMATED: CPT | Performed by: INTERNAL MEDICINE

## 2020-11-18 PROCEDURE — 80048 BASIC METABOLIC PNL TOTAL CA: CPT | Performed by: INTERNAL MEDICINE

## 2020-11-18 PROCEDURE — 80076 HEPATIC FUNCTION PANEL: CPT | Performed by: INTERNAL MEDICINE

## 2020-11-19 ENCOUNTER — VIRTUAL VISIT (OUTPATIENT)
Dept: GASTROENTEROLOGY | Facility: CLINIC | Age: 65
End: 2020-11-19
Attending: INTERNAL MEDICINE
Payer: COMMERCIAL

## 2020-11-19 ENCOUNTER — OFFICE VISIT (OUTPATIENT)
Dept: OPHTHALMOLOGY | Facility: CLINIC | Age: 65
End: 2020-11-19
Payer: COMMERCIAL

## 2020-11-19 DIAGNOSIS — H02.9 EYELID LESION: Primary | ICD-10-CM

## 2020-11-19 DIAGNOSIS — H02.9 EYELID LESION: ICD-10-CM

## 2020-11-19 DIAGNOSIS — Z94.4 LIVER REPLACED BY TRANSPLANT (H): Primary | ICD-10-CM

## 2020-11-19 PROCEDURE — 99207 PR DROP WITH A PROCEDURE: CPT | Performed by: OPHTHALMOLOGY

## 2020-11-19 PROCEDURE — 99214 OFFICE O/P EST MOD 30 MIN: CPT | Mod: 95 | Performed by: INTERNAL MEDICINE

## 2020-11-19 PROCEDURE — 67810 INCAL BX EYELID SKN LID MRGN: CPT | Mod: E4 | Performed by: OPHTHALMOLOGY

## 2020-11-19 PROCEDURE — 88305 TISSUE EXAM BY PATHOLOGIST: CPT | Performed by: PATHOLOGY

## 2020-11-19 RX ORDER — ERYTHROMYCIN 5 MG/G
OINTMENT OPHTHALMIC ONCE
Status: COMPLETED | OUTPATIENT
Start: 2020-11-19 | End: 2020-11-19

## 2020-11-19 RX ADMIN — ERYTHROMYCIN: 5 OINTMENT OPHTHALMIC at 09:26

## 2020-11-19 ASSESSMENT — PAIN SCALES - GENERAL: PAINLEVEL: NO PAIN (0)

## 2020-11-19 NOTE — PROGRESS NOTES
Operative Note - Eyelid Biopsy      Nov 19, 2020    Pre-operative Diagnosis: Lesion right eye lower eyelid    Post-operative Diagnosis: Same.    Procedure: Excision of eyelid neoplasm.    Surgeon: Blaine Helms MD    Assistant: Asia Moore MD     Anesthesia: Local infiltration with 2% Lidocaine and Epinephrine.    Complications: None.    Estimated blood loss: <5 mL    Specimen: Eyelid neoplasm to pathology.    Procedure: The patient was brought to the minor procedure room and placed supine on the operating table.  The involved eyelid was infiltrated with local anesthetic.  The area was prepped and draped in the typical sterile fashion.  A tooth forceps was used to elevate the lesion and it was excised using a 15 blade. Hemostasis was obtained with a high temperature cautery.  The excised diameter measured 2 mm.      Closure of wound: Granulation    Dressing: The wound was dressed with Erythromycin.    Disposition: The patient left the minor procedure room in stable condition.    I was present for the entire procedure. Blaine Helms MD

## 2020-11-19 NOTE — PROGRESS NOTES
"Phan Luque is a 65 year old female who is being evaluated via a billable video visit.      The patient has been notified of following:     \"This video visit will be conducted via a call between you and your physician/provider. We have found that certain health care needs can be provided without the need for an in-person physical exam.  This service lets us provide the care you need with a video conversation.  If a prescription is necessary we can send it directly to your pharmacy.  If lab work is needed we can place an order for that and you can then stop by our lab to have the test done at a later time.    Video visits are billed at different rates depending on your insurance coverage.  Please reach out to your insurance provider with any questions.    If during the course of the call the physician/provider feels a video visit is not appropriate, you will not be charged for this service.\"    Patient has given verbal consent for Video visit? Yes  How would you like to obtain your AVS? MyChart  If you are dropped from the video visit, the video invite should be resent to: Text to cell phone: [phone]  Will anyone else be joining your video visit? No        Video-Visit Details    I had the pleasure of seeing Phan Luque for followup in the Liver Transplant Clinic at the [facility] on  November 19, 2020.  Ms. 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      She denies any abdominal pain, itching or skin rash or fatigue.  She is working full time.  She denies any increased abdominal girth or lower extremity edema.      She denies any fevers or chills, cough or shortness of breath.  She denies any nausea, vomiting, diarrhea or constipation.  Her appetite is good.  She has gained some weight sheltering in place.    Current Outpatient Medications "   Medication     acetaminophen (TYLENOL) 325 MG tablet     albuterol (PROAIR HFA/PROVENTIL HFA/VENTOLIN HFA) 108 (90 Base) MCG/ACT inhaler     amLODIPine (NORVASC) 5 MG tablet     chlorthalidone (HYGROTON) 25 MG tablet     cholecalciferol (VITAMIN D3) 400 UNIT TABS tablet     clobetasol (TEMOVATE) 0.05 % external ointment     clobetasol (TEMOVATE) 0.05 % external solution     clobetasol propionate (CLOBEX) 0.05 % external shampoo     cyanocobalamin (VITAMIN  B-12) 1000 MCG tablet     ferrous sulfate (IRON) 325 (65 FE) MG tablet     folic acid (FOLVITE) 1 MG tablet     gabapentin (NEURONTIN) 100 MG capsule     hydrOXYzine (ATARAX) 25 MG tablet     levothyroxine (SYNTHROID/LEVOTHROID) 88 MCG tablet     MAGNESIUM OXIDE PO     melatonin 5 MG tablet     multivitamin, therapeutic (THERA-VIT) TABS tablet     nystatin (MYCOSTATIN) 260845 UNIT/GM external cream     order for DME     pantoprazole (PROTONIX) 40 MG EC tablet     psyllium 0.52 G capsule     tacrolimus (GENERIC EQUIVALENT) 0.5 MG capsule     traMADol (ULTRAM) 50 MG tablet     triamcinolone (KENALOG) 0.1 % external ointment     ursodiol (ACTIGALL) 300 MG capsule     amLODIPine (NORVASC) 5 MG tablet     ursodiol (ACTIGALL) 300 MG capsule     No current facility-administered medications for this visit.      On physical examination, she looks well.  HEENT exam shows no scleral icterus or temporal muscle wasting.  Neurologic exam nonfocal.      Recent Results (from the past 168 hour(s))   Magnesium    Collection Time: 11/18/20  7:28 AM   Result Value Ref Range    Magnesium 1.9 1.6 - 2.3 mg/dL   Hepatic panel    Collection Time: 11/18/20  7:28 AM   Result Value Ref Range    Bilirubin Direct 0.1 0.0 - 0.2 mg/dL    Bilirubin Total 0.8 0.2 - 1.3 mg/dL    Albumin 3.8 3.4 - 5.0 g/dL    Protein Total 7.6 6.8 - 8.8 g/dL    Alkaline Phosphatase 58 40 - 150 U/L    ALT 26 0 - 50 U/L    AST 25 0 - 45 U/L   CBC with platelets    Collection Time: 11/18/20  7:28 AM   Result Value  Ref Range    WBC 6.9 4.0 - 11.0 10e9/L    RBC Count 4.66 3.8 - 5.2 10e12/L    Hemoglobin 14.0 11.7 - 15.7 g/dL    Hematocrit 42.7 35.0 - 47.0 %    MCV 92 78 - 100 fl    MCH 30.0 26.5 - 33.0 pg    MCHC 32.8 31.5 - 36.5 g/dL    RDW 13.3 10.0 - 15.0 %    Platelet Count 268 150 - 450 10e9/L   Basic metabolic panel    Collection Time: 11/18/20  7:28 AM   Result Value Ref Range    Sodium 135 133 - 144 mmol/L    Potassium 4.4 3.4 - 5.3 mmol/L    Chloride 103 94 - 109 mmol/L    Carbon Dioxide 26 20 - 32 mmol/L    Anion Gap 6 3 - 14 mmol/L    Glucose 102 (H) 70 - 99 mg/dL    Urea Nitrogen 23 7 - 30 mg/dL    Creatinine 1.27 (H) 0.52 - 1.04 mg/dL    GFR Estimate 45 (L) >60 mL/min/[1.73_m2]    GFR Estimate If Black 52 (L) >60 mL/min/[1.73_m2]    Calcium 9.3 8.5 - 10.1 mg/dL   Tacrolimus level    Collection Time: 11/18/20  7:29 AM   Result Value Ref Range    Tacrolimus Last Dose 11/17/2020 at 7:30pm     Tacrolimus Level 3.2 (L) 5.0 - 15.0 ug/L      My impression is that Ms. Luque is more than 4 years status post liver transplantation for alcoholic cirrhosis.  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  Her laboratory tests are excellent.   She is up to date with regard to other vaccinations.  She is up to date with regard to cancer screening including skin cancer screening and her last bone density study showed only osteopenia.  My plan will be to see her back in the clinic in 6 months.      Thank you very much for allowing me to participate in the care of this patient.  If you have any questions regarding my recommendations, please do not hesitate to contact me.         Hussein Banegas MD      Professor of Medicine  [employer]      Executive Medical Director, Solid Organ Transplant Program  [facility]    Type of service:  Video Visit    Video Start Time: 9:59 AM  Video End Time: 10:15 AM    Originating Location (pt.  Location): Clinic    Distant Location (provider location):  [facility] [place]     Platform used for Video Visit: [employer]

## 2020-11-19 NOTE — PROGRESS NOTES
Oculoplastic Clinic New Patient    Patient: Phan Luque MRN# [id number]   YOB: 1955 Age: 65 year old   Date of Visit: Nov 19, 2020    CC: Eyelid lesion       HPI:     Phan Luque is a 65 year old female who has noted a lesion on the right lower, left upper eyelid. It has been present for more than 15 years. This has not been biopsied.                Assessment and Plan:   1. right lower eyelid lesion     Overall benign appearance; nevus vs papilloma  Removal and send to pathology today    We discussed risks benefits and alternatives to the proposed procedure. All patient questions were answered. Patient understands that residents and fellows will be involved in surgery at a level deemed fit by the attending surgeon.     Asia Moore MD  PGY-2 Resident Physician  Department of Ophthalmology      Attending Physician Attestation: Complete documentation of historical and exam elements from today's encounter can be found in the full encounter summary report (not reduplicated in this progress note). I personally obtained the chief complaint(s) and history of present illness. I confirmed and edited as necessary the review of systems, past medical/surgical history, family history, social history, and examination findings as documented by others; and I examined the patient myself. I personally reviewed the relevant tests, images, and reports as documented above. I formulated and edited as necessary the assessment and plan and discussed the findings and management plan with the patient and family. ***  -Blaine Helms MD      Today with Phan Luque, I reviewed the indications, risks, benefits, and alternatives of the proposed surgical procedure including, but not limited to, failure obtain the desired result  and need for additional surgery, bleeding, infection, loss of vision, loss of the eye, and the remote possibility of permanent damage to any organ system or death with the use of  anesthesia.  I provided multiple opportunities for the questions, answered all questions to the best of my ability, and confirmed that my answers and my discussion were understood.         Operative Note - Eyelid Biopsy      Nov 19, 2020    Pre-operative Diagnosis: Lesion right eye Lower Eyelid    Post-operative Diagnosis: Same.    Procedure: Excision of eyelid neoplasm.    Surgeon: Blaine Helms MD    Assistant: ***    Anesthesia: Local infiltration with 2% Lidocaine and Epinephrine.    Complications: None.    Estimated blood loss: <5 mL    Specimen: Eyelid neoplasm to pathology.    Procedure: The patient was brought to the minor procedure room and placed supine on the operating table.  The involved eyelid was infiltrated with local anesthetic.  The area was prepped and draped in the typical sterile fashion.  A tooth forceps was used to elevate the lesion and it was excised at its base with a Abbey scissors.  Hemostasis was obtained with a high temperature cautery.  The excised diameter measured *** mm.      Closure of wound: The wound was closed using deep sutures of *** vicryl.  The skin was closed using interrupted 6-0 plain fast absorbing {Suture Type:267097} .    Dressing: The wound was dressed with Erythromycin.    Disposition: The patient left the minor procedure room in stable condition.

## 2020-11-25 LAB — COPATH REPORT: NORMAL

## 2020-11-29 ENCOUNTER — HEALTH MAINTENANCE LETTER (OUTPATIENT)
Age: 65
End: 2020-11-29

## 2020-12-03 ENCOUNTER — MYC MEDICAL ADVICE (OUTPATIENT)
Dept: DERMATOLOGY | Facility: CLINIC | Age: 65
End: 2020-12-03

## 2020-12-03 DIAGNOSIS — L40.0 PSORIASIS VULGARIS: ICD-10-CM

## 2020-12-05 DIAGNOSIS — R60.9 FLUID RETENTION: ICD-10-CM

## 2020-12-05 DIAGNOSIS — I10 ESSENTIAL HYPERTENSION: ICD-10-CM

## 2020-12-05 DIAGNOSIS — N18.30 CKD (CHRONIC KIDNEY DISEASE) STAGE 3, GFR 30-59 ML/MIN (H): ICD-10-CM

## 2020-12-08 ENCOUNTER — MYC MEDICAL ADVICE (OUTPATIENT)
Dept: INTERNAL MEDICINE | Facility: CLINIC | Age: 65
End: 2020-12-08

## 2020-12-09 RX ORDER — CHLORTHALIDONE 25 MG/1
12.5 TABLET ORAL DAILY
Qty: 45 TABLET | Refills: 1 | Status: SHIPPED | OUTPATIENT
Start: 2020-12-09 | End: 2021-06-15

## 2020-12-09 RX ORDER — CLOBETASOL PROPIONATE 0.5 MG/ML
SOLUTION TOPICAL 2 TIMES DAILY
Qty: 150 ML | Refills: 3 | Status: SHIPPED | OUTPATIENT
Start: 2020-12-09 | End: 2021-09-15

## 2020-12-09 RX ORDER — AMLODIPINE BESYLATE 5 MG/1
5 TABLET ORAL DAILY
Qty: 90 TABLET | Refills: 1 | Status: SHIPPED | OUTPATIENT
Start: 2020-12-09 | End: 2021-06-15

## 2020-12-09 RX ORDER — CLOBETASOL PROPIONATE 0.05 G/100ML
SHAMPOO TOPICAL DAILY
Qty: 354 ML | Refills: 0 | Status: SHIPPED | OUTPATIENT
Start: 2020-12-09 | End: 2021-01-18

## 2020-12-09 NOTE — TELEPHONE ENCOUNTER
amLODIPine (NORVASC) 5 MG tablet      Last Written Prescription Date:  9/14/2020  Last Fill Quantity: 90,   # refills: 0  Routing refill request to provider for review/approval because:  Failed medication protocol: abnormal lab  - Creatinine (H)     chlorthalidone (HYGROTON) 25 MG tablet      Last Written Prescription Date:  9/14/2020  Last Fill Quantity: 45,   # refills: 0  Routing refill request to provider for review/approval because:  Failed medication protocol: abnormal lab  - Creatinine (H)    Last Office Visit : 11/2/2020  Future Office visit:  None    Creatinine   Date Value Ref Range Status   11/18/2020 1.27 (H) 0.52 - 1.04 mg/dL Final

## 2020-12-15 ENCOUNTER — ANCILLARY PROCEDURE (OUTPATIENT)
Dept: GENERAL RADIOLOGY | Facility: CLINIC | Age: 65
End: 2020-12-15
Attending: PEDIATRICS
Payer: COMMERCIAL

## 2020-12-15 ENCOUNTER — OFFICE VISIT (OUTPATIENT)
Dept: INTERNAL MEDICINE | Facility: CLINIC | Age: 65
End: 2020-12-15
Payer: COMMERCIAL

## 2020-12-15 VITALS
WEIGHT: 188.3 LBS | RESPIRATION RATE: 16 BRPM | HEIGHT: 67 IN | TEMPERATURE: 98.1 F | DIASTOLIC BLOOD PRESSURE: 85 MMHG | OXYGEN SATURATION: 97 % | SYSTOLIC BLOOD PRESSURE: 138 MMHG | HEART RATE: 83 BPM | BODY MASS INDEX: 29.55 KG/M2

## 2020-12-15 DIAGNOSIS — M89.8X1 PAIN OF LEFT CLAVICLE: Primary | ICD-10-CM

## 2020-12-15 DIAGNOSIS — M89.8X1 PAIN OF LEFT CLAVICLE: ICD-10-CM

## 2020-12-15 DIAGNOSIS — M85.80 OSTEOPENIA, UNSPECIFIED LOCATION: ICD-10-CM

## 2020-12-15 LAB
BASOPHILS # BLD AUTO: 0 10E9/L (ref 0–0.2)
BASOPHILS NFR BLD AUTO: 0.4 %
CRP SERPL-MCNC: <2.9 MG/L (ref 0–8)
DIFFERENTIAL METHOD BLD: NORMAL
EOSINOPHIL # BLD AUTO: 0.1 10E9/L (ref 0–0.7)
EOSINOPHIL NFR BLD AUTO: 1 %
ERYTHROCYTE [DISTWIDTH] IN BLOOD BY AUTOMATED COUNT: 13.3 % (ref 10–15)
HCT VFR BLD AUTO: 44.4 % (ref 35–47)
HGB BLD-MCNC: 14.5 G/DL (ref 11.7–15.7)
IMM GRANULOCYTES # BLD: 0.1 10E9/L (ref 0–0.4)
IMM GRANULOCYTES NFR BLD: 0.5 %
LYMPHOCYTES # BLD AUTO: 3 10E9/L (ref 0.8–5.3)
LYMPHOCYTES NFR BLD AUTO: 31.9 %
MCH RBC QN AUTO: 30.6 PG (ref 26.5–33)
MCHC RBC AUTO-ENTMCNC: 32.7 G/DL (ref 31.5–36.5)
MCV RBC AUTO: 94 FL (ref 78–100)
MONOCYTES # BLD AUTO: 0.7 10E9/L (ref 0–1.3)
MONOCYTES NFR BLD AUTO: 7.3 %
NEUTROPHILS # BLD AUTO: 5.5 10E9/L (ref 1.6–8.3)
NEUTROPHILS NFR BLD AUTO: 58.9 %
NRBC # BLD AUTO: 0 10*3/UL
NRBC BLD AUTO-RTO: 0 /100
PLATELET # BLD AUTO: 328 10E9/L (ref 150–450)
RBC # BLD AUTO: 4.74 10E12/L (ref 3.8–5.2)
WBC # BLD AUTO: 9.3 10E9/L (ref 4–11)

## 2020-12-15 PROCEDURE — 85025 COMPLETE CBC W/AUTO DIFF WBC: CPT | Performed by: PATHOLOGY

## 2020-12-15 PROCEDURE — 99213 OFFICE O/P EST LOW 20 MIN: CPT | Performed by: PEDIATRICS

## 2020-12-15 PROCEDURE — 36415 COLL VENOUS BLD VENIPUNCTURE: CPT | Performed by: PATHOLOGY

## 2020-12-15 PROCEDURE — 86140 C-REACTIVE PROTEIN: CPT | Performed by: PATHOLOGY

## 2020-12-15 PROCEDURE — 73000 X-RAY EXAM OF COLLAR BONE: CPT | Mod: LT | Performed by: RADIOLOGY

## 2020-12-15 ASSESSMENT — ASTHMA QUESTIONNAIRES
QUESTION_2 LAST FOUR WEEKS HOW OFTEN HAVE YOU HAD SHORTNESS OF BREATH: NOT AT ALL
QUESTION_4 LAST FOUR WEEKS HOW OFTEN HAVE YOU USED YOUR RESCUE INHALER OR NEBULIZER MEDICATION (SUCH AS ALBUTEROL): NOT AT ALL
QUESTION_1 LAST FOUR WEEKS HOW MUCH OF THE TIME DID YOUR ASTHMA KEEP YOU FROM GETTING AS MUCH DONE AT WORK, SCHOOL OR AT HOME: NONE OF THE TIME
QUESTION_5 LAST FOUR WEEKS HOW WOULD YOU RATE YOUR ASTHMA CONTROL: COMPLETELY CONTROLLED
ACT_TOTALSCORE: 25
QUESTION_3 LAST FOUR WEEKS HOW OFTEN DID YOUR ASTHMA SYMPTOMS (WHEEZING, COUGHING, SHORTNESS OF BREATH, CHEST TIGHTNESS OR PAIN) WAKE YOU UP AT NIGHT OR EARLIER THAN USUAL IN THE MORNING: NOT AT ALL

## 2020-12-15 ASSESSMENT — MIFFLIN-ST. JEOR: SCORE: 1436.75

## 2020-12-15 ASSESSMENT — ENCOUNTER SYMPTOMS
CHILLS: 0
FEVER: 0

## 2020-12-15 ASSESSMENT — PAIN SCALES - GENERAL: PAINLEVEL: MILD PAIN (2)

## 2020-12-15 NOTE — NURSING NOTE
Chief Complaint   Patient presents with     Mass     Patient comes in to discuss a lump on her left collar bone.         Paramjit Dunne MA on 12/15/2020 at 3:55 PM

## 2020-12-15 NOTE — PATIENT INSTRUCTIONS
[facility] Medication Refill Request Information:  * Please contact your pharmacy regarding ANY request for medication refills.  ** [facility] Prescription Fax = [phone]  * Please allow 3 business days for routine medication refills.  * Please allow 5 business days for controlled substance medication refills.     [facility] Test Result notification information:  *You will be notified with in 7-10 days of your appointment day regarding the results of your test.  If you are on MyChart you will be notified as soon as the provider has reviewed the results and signed off on them.    [facility]: [phone]

## 2020-12-15 NOTE — PROGRESS NOTES
"About this note. I use the medical record to document (to the best of my ability) my understanding of:   - What the patient told me,  - Relevant details from my exam, records review, and/or test results, and  - My assessment and plan  Patients with questions are welcome to contact me; I will reply as soon as time allows.    Format: SOAP (Subjective, Objective, Assessment & Plan)  Status: established patient, PCP Arlyn Sanchez  Visit type: evaluation & management of one or more problems      Subjective     Phan Luque is a 65 year old woman, with concerns including:  Chief Complaint   Patient presents with     Mass     Patient comes in to discuss a lump on her left collar bone.       Noted several days ago - the area is quite tender to pressure and with some movements.  No recalled trauma.  History of osteopenia. 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      Review of Systems   Constitutional: Negative for chills and fever.       As part of this encounter, I reviewed (and updated as appropriate) the past medical, family, and social history.      Objective     /85 (BP Location: Right arm, Patient Position: Sitting, Cuff Size: Adult Large)   Pulse 83   Temp 98.1  F (36.7  C) (Oral)   Resp 16   Ht 1.702 m (5' 7\")   Wt 85.4 kg (188 lb 4.8 oz)   LMP  (LMP Unknown)   SpO2 97%   BMI 29.49 kg/m      Physical Exam  Chest:      Comments: Right clavicle appears grossly normal, no tenderness to palpation.  Medial 1/3 of left clavicle has an overlying circumferential prominence or edema, extending into the jugular notch and anterior subclavicular space but not posterior into the supraclavicular space. Tenderness is nonspecific throughout. There is a slight amount of erythema at the area, but no evidence of spreading. No crepitus or movement apparent.  No lymph node enlargement.  No pain at sternocleidomastoids or thyroid.         Assessment & Plan     Clavicle pain and edema. Exam seems atypical for " either fracture or dislocation, but there is enough edema in the area that tactile exam might be limited. Osteopenia is a risk factor, but I respect her concern about medication and the history of jaw osteonecrosis.  I researched but was not able to find a problem related to tacrolimus.  Plan: imaging and CBC/CRP.

## 2020-12-16 ENCOUNTER — MYC MEDICAL ADVICE (OUTPATIENT)
Dept: INTERNAL MEDICINE | Facility: CLINIC | Age: 65
End: 2020-12-16

## 2020-12-16 ASSESSMENT — ASTHMA QUESTIONNAIRES: ACT_TOTALSCORE: 25

## 2021-01-05 NOTE — TELEPHONE ENCOUNTER
Left message also 2-6-18  No call back    Will refuse betamol.  Eye drop discontinued last visit  Without update if seen elsewhere and eye pressure up will hold on renewing  Alfredo Hall RN 7:55 AM 02/07/18    Note to neuro-ophthalmology resident for review    
Spoke to pt   Pt started prednisone December 26th again and also started the betimol eye drops at that time  Pt will f/u with local eye MD ([facility]) for intraocular pressure check and monitoring of pressure/Rx  Pt to f/u with dr. Ortega in one year  Pt has direct triage number if needs any further assistance  Alfredo Hall RN 11:49 AM 02/07/18      
betamol was discontinued for 4-6 weeks and was to have intraocular pressure checked (likely by regular ophthalmologist)  Pt to f/u with dr. Ortega in one year  Last visit 12-5-17    Left message to review what last pressure check was and to review need for refill-- direct number left at 1500    Alfredo Hall RN 3:16 PM 02/02/18      
English

## 2021-01-18 ENCOUNTER — OFFICE VISIT (OUTPATIENT)
Dept: DERMATOLOGY | Facility: CLINIC | Age: 66
End: 2021-01-18
Payer: COMMERCIAL

## 2021-01-18 DIAGNOSIS — D84.9 IMMUNOSUPPRESSION (H): ICD-10-CM

## 2021-01-18 DIAGNOSIS — L40.9 SCALP PSORIASIS: Primary | ICD-10-CM

## 2021-01-18 DIAGNOSIS — L40.0 PSORIASIS VULGARIS: ICD-10-CM

## 2021-01-18 PROCEDURE — 99213 OFFICE O/P EST LOW 20 MIN: CPT | Performed by: PHYSICIAN ASSISTANT

## 2021-01-18 RX ORDER — CALCIPOTRIENE 0.05 MG/ML
1 SOLUTION TOPICAL DAILY
Qty: 180 ML | Refills: 3 | Status: SHIPPED | OUTPATIENT
Start: 2021-01-18 | End: 2021-11-24

## 2021-01-18 RX ORDER — CLOBETASOL PROPIONATE 0.05 G/100ML
SHAMPOO TOPICAL DAILY
Qty: 354 ML | Refills: 3 | Status: SHIPPED | OUTPATIENT
Start: 2021-01-18 | End: 2021-11-24

## 2021-01-18 ASSESSMENT — PAIN SCALES - GENERAL: PAINLEVEL: NO PAIN (0)

## 2021-01-18 NOTE — NURSING NOTE
Chief Complaint   Patient presents with     Psoriasis     Jessica is here for a follow up of her psoriasis.      Rhea Swift LPN

## 2021-01-18 NOTE — LETTER
1/18/2021       RE: Phan Luque  [address]  [place] [address]     Dear Colleague,    Thank you for referring your patient, Phan Luque, to the [facility]MATOLOG[facility] [place] at [facility]. Please see a copy of my visit note below.    [facility] Dermatology Note      Dermatology Problem List:   1. History of liver transplant at [facility] , 2016- currently on tacrolimus   2. Nummular dermatitis  - Triamcinolone 0.1% ointment BID to popliteal fossa bilaterally  - clobetasol 0.05% ointment  3. AK s/p cryo  4. Skin cancer screening, 10/5/20  5. Scalp psoriasis clobetasol solution, shampoo  6. Nail dystrophy, likely psoriasis- grew candida. Has been using clobetasol solution with resolution.    7. Facial asymmetry- cosmetic referral.   8. Family hx psoriasis (son)  Encounter Date: Jan 18, 2021    CC:  Chief Complaint   Patient presents with     Psoriasis     Jessica is here for a follow up of her psoriasis.        History of Present Illness:  Ms. Phan Luque is a 65 year old female on immunosuppressants with a family history of melanoma in her mother, who presents today for a follow up of scalp psorasis and her nail dystrophy. She was last seen in the clinic 11/11/20 with Dr Amos to discuss fillers for facial asymmetry. She was last seen by me 10/5/20 when she had a skin check and was started on clobetasol solution and shampoo. She has finally gotten this under better control. It is no longer itchy. She is also using mineral oil to help moisturize it. She had a nail culture which grew candida at her last visit and we discussed starting Fluconazole vs topical medications. As she was waiting for culture results she was getting clobetasol solution on her nails and found that they started to improve. She has not used any antifungals and feels like her nail psoriasis is almost gone. She is very happy.       Otherwise she is  feeling well, without additional skin concerns at this time.    Past Medical History:   Patient Active Problem List   Diagnosis     Decompensation of cirrhosis of liver (H)     Liver transplanted (H)     Alcoholic hepatitis     Immunosuppression (H)     Alcoholic hepatitis without ascites     Enterococcus UTI     Liver transplant status (H)     Nausea & vomiting     Malnutrition (H)     Candidiasis of skin     Anemia     ACP (advance care planning)     Diarrhea     Hypothyroidism     Esophageal reflux     Recurrent major depression in partial remission (H)     Insomnia     CKD (chronic kidney disease) stage 3, GFR 30-59 ml/min     Anemia in stage 3 chronic kidney disease     Osteopenia     Alcohol abuse, uncomplicated     Past Medical History:   Diagnosis Date     Asthma      Chronic kidney disease      End stage liver disease (H)     2/2 Alcohol Abuse     Liver transplanted (H)      Migraines      Osteoporosis      Spinal stenosis in cervical region      Strabismus      Past Surgical History:   Procedure Laterality Date     APPENDECTOMY           BENCH LIVER N/A 2016    Procedure: BENCH LIVER;  Surgeon: Larry Dhillon MD;  Location: [facility] OR     CATARACT IOL, RT/LT       COLONOSCOPY       ESOPHAGOSCOPY, GASTROSCOPY, DUODENOSCOPY (EGD), COMBINED N/A 2016    Procedure: COMBINED ESOPHAGOSCOPY, GASTROSCOPY, DUODENOSCOPY (EGD);  Surgeon: Tao Alfonso MD;  Location: [facility] GI     ESOPHAGOSCOPY, GASTROSCOPY, DUODENOSCOPY (EGD), COMBINED N/A 10/31/2016    Procedure: COMBINED ESOPHAGOSCOPY, GASTROSCOPY, DUODENOSCOPY (EGD), BIOPSY SINGLE OR MULTIPLE;  Surgeon: Ronald Bojorquez MD;  Location: [facility] GI     sphincteroplasty       TRANSPLANT LIVER RECIPIENT  DONOR N/A 2016    Procedure: TRANSPLANT LIVER RECIPIENT  DONOR;  Surgeon: Larry Dhillon MD;  Location: [facility] OR     TUBAL LIGATION      laparoscopic       Social History:  The patient works for medical insurance. The patient denies  use of tanning beds. Patient is originally from [place].     Family History:  There is a family history of melanoma in the patient's mother (retinal, thought to have been present since birth, passed in 1994). Son has psoriasis.     Medications:  Current Outpatient Medications   Medication Sig Dispense Refill     acetaminophen (TYLENOL) 325 MG tablet Take 2 tablets (650 mg) by mouth every 6 hours as needed for mild pain Or fevers. Use sparingly. Limit use to no more than 2 grams (2000 mg) in 24 hours. **Further refills must be obtained by primary care provider** 100 tablet 0     albuterol (PROAIR HFA/PROVENTIL HFA/VENTOLIN HFA) 108 (90 Base) MCG/ACT inhaler Inhale 2 puffs into the lungs every 6 hours 18 g 3     amLODIPine (NORVASC) 5 MG tablet Take 1 tablet (5 mg) by mouth daily 90 tablet 1     calcipotriene (DOVONOX) 0.005 % external solution Apply 1 mL topically daily To the scalp 180 mL 3     chlorthalidone (HYGROTON) 25 MG tablet Take 0.5 tablets (12.5 mg) by mouth daily 45 tablet 1     cholecalciferol (VITAMIN D3) 400 UNIT TABS tablet Take 400 Units by mouth daily       clobetasol (TEMOVATE) 0.05 % external ointment Apply topically 2 times daily To areas of eczema on the lower legs, up to 2 weeks. 60 g 1     clobetasol (TEMOVATE) 0.05 % external solution Apply topically 2 times daily To the scalp as needed for itching and flaking, on the days you don't use the clobetasol shampoo. 150 mL 3     clobetasol propionate (CLOBEX) 0.05 % external shampoo Apply topically daily To dry scalp x 15 min and rinse ,when psoriasis is present. 354 mL 3     cyanocobalamin (VITAMIN  B-12) 1000 MCG tablet Take 1,000 mcg by mouth daily       ferrous sulfate (IRON) 325 (65 FE) MG tablet Take 1 tablet (325 mg) by mouth 2 times daily 100 tablet      folic acid (FOLVITE) 1 MG tablet Take 1 tablet (1 mg) by mouth daily 30 tablet 1     gabapentin (NEURONTIN) 100 MG capsule Take 2 capsules (200 mg) by mouth At Bedtime 180 capsule 1      hydrOXYzine (ATARAX) 25 MG tablet Take 1-2 tablets (25-50 mg) by mouth every 6 hours as needed for itching 120 tablet 10     levothyroxine (SYNTHROID/LEVOTHROID) 88 MCG tablet Take 1 tablet (88 mcg) by mouth daily 90 tablet 3     MAGNESIUM OXIDE PO Take 400 mg by mouth daily       melatonin 5 MG tablet Take 1 tablet (5 mg) by mouth nightly as needed for sleep       multivitamin, therapeutic (THERA-VIT) TABS tablet Take 1 tablet by mouth every 24 hours 30 tablet 11     nystatin (MYCOSTATIN) 118764 UNIT/GM external cream Apply topically At Bedtime To fingernails and skin around fingernails. 30 g 11     order for DME Equipment being ordered: Trilok ankle brace 1 Device 0     pantoprazole (PROTONIX) 40 MG EC tablet TAKE 1 TABLET BY MOUTH EVERY MORNING BEFORE BREAKFAST 90 tablet 3     psyllium 0.52 G capsule Take 2 capsules (1.04 g) by mouth daily With a full glass of water. 60 capsule 1     tacrolimus (GENERIC EQUIVALENT) 0.5 MG capsule Take 3 capsules (1.5 mg) by mouth every morning AND 2 capsules (1 mg) every evening. 150 capsule 11     traMADol (ULTRAM) 50 MG tablet Take 1 tablet (50 mg) by mouth every 6 hours as needed for severe pain 30 tablet 0     triamcinolone (KENALOG) 0.1 % external ointment Apply topically 2 times daily To plaques behind the knee 80 g 1     ursodiol (ACTIGALL) 300 MG capsule TAKE ONE CAPSULE BY MOUTH TWICE A DAY 60 capsule 11     ursodiol (ACTIGALL) 300 MG capsule Take 1 capsule (300 mg) by mouth every 12 hours 60 capsule 11     doxycycline monohydrate (MONODOX) 100 MG capsule Take 1 capsule (100 mg) by mouth 2 times daily 14 capsule 0       Allergies   Allergen Reactions     Codeine Hives     Benadryl [Diphenhydramine] Hives     Cefaclor Hives     Hydrocodone-Acetaminophen Itching     Oxycodone Itching     Penicillins Hives     Sulfa Drugs Hives       Review of Systems:  -Constitutional: The patient denies fatigue, fevers, chills, unintended weight loss, and night sweats. She is feeling in  her usual state of health. Arthralgias.   -Skin: As above in HPI. No additional skin concerns.    Physical exam:  Vitals: LMP  (LMP Unknown)   GEN: This is a well developed, well-nourished female in no acute distress, in a pleasant mood.    SKIN: Skin exam is to her scalp, and hands including her nails. Significant for;   Minimal scaling to occipital scalp, no pink scaly plaque   - No notable onycholysis to her finger nail plates.   - No other lesions of concern on areas examined.     Impression/Plan:  1. Scalp and nail psoriasis, significant improvement with topical therapies. Continue current regimen. Photodocumentation performed.     Continue clobetasol 0.05% twice daily to the nails, when psoriasis is present.     For the scalp, Cotinue clobetasol 0.05% solution bid, when psoriasis is present, not on days when she uses clobetasol shampoo.     Continue clobetasol 0.05% shampoo (typially every other day)       Steroid ed given.     For maintenance, Start calcipotriene 0.005% solution bid after psoriasis is clear.       2. History of liver transplant- currently on immunosuppressants     Given patient is on immune suppressing drugs, and with family history of melanoma, recommended skin check every 6 months       Follow-up in 2-3 months for a full skin check, earlier for new or changing lesions.       Staff Involved:  All risks, benefits and alternatives were discussed with patient.  Patient is in agreement and understands the assessment and plan.  All questions were answered.  Sun Screen Education was given.   Return to Clinic in 2-3 months or sooner as needed.   Demetria Barger PA-C

## 2021-01-25 DIAGNOSIS — Z94.4 LIVER REPLACED BY TRANSPLANT (H): ICD-10-CM

## 2021-01-25 LAB
ALBUMIN SERPL-MCNC: 3.8 G/DL (ref 3.4–5)
ALP SERPL-CCNC: 50 U/L (ref 40–150)
ALT SERPL W P-5'-P-CCNC: 24 U/L (ref 0–50)
ANION GAP SERPL CALCULATED.3IONS-SCNC: 5 MMOL/L (ref 3–14)
AST SERPL W P-5'-P-CCNC: 23 U/L (ref 0–45)
BILIRUB DIRECT SERPL-MCNC: 0.2 MG/DL (ref 0–0.2)
BILIRUB SERPL-MCNC: 0.7 MG/DL (ref 0.2–1.3)
BUN SERPL-MCNC: 22 MG/DL (ref 7–30)
CALCIUM SERPL-MCNC: 9 MG/DL (ref 8.5–10.1)
CHLORIDE SERPL-SCNC: 101 MMOL/L (ref 94–109)
CO2 SERPL-SCNC: 26 MMOL/L (ref 20–32)
CREAT SERPL-MCNC: 1.38 MG/DL (ref 0.52–1.04)
ERYTHROCYTE [DISTWIDTH] IN BLOOD BY AUTOMATED COUNT: 12.7 % (ref 10–15)
GFR SERPL CREATININE-BSD FRML MDRD: 40 ML/MIN/{1.73_M2}
GLUCOSE SERPL-MCNC: 99 MG/DL (ref 70–99)
HCT VFR BLD AUTO: 42.6 % (ref 35–47)
HGB BLD-MCNC: 14.4 G/DL (ref 11.7–15.7)
MAGNESIUM SERPL-MCNC: 2 MG/DL (ref 1.6–2.3)
MCH RBC QN AUTO: 30.4 PG (ref 26.5–33)
MCHC RBC AUTO-ENTMCNC: 33.8 G/DL (ref 31.5–36.5)
MCV RBC AUTO: 90 FL (ref 78–100)
PLATELET # BLD AUTO: 307 10E9/L (ref 150–450)
POTASSIUM SERPL-SCNC: 4.4 MMOL/L (ref 3.4–5.3)
PROT SERPL-MCNC: 7.6 G/DL (ref 6.8–8.8)
RBC # BLD AUTO: 4.73 10E12/L (ref 3.8–5.2)
SODIUM SERPL-SCNC: 132 MMOL/L (ref 133–144)
TACROLIMUS BLD-MCNC: 3.7 UG/L (ref 5–15)
TME LAST DOSE: ABNORMAL H
WBC # BLD AUTO: 8.5 10E9/L (ref 4–11)

## 2021-01-25 PROCEDURE — 83735 ASSAY OF MAGNESIUM: CPT | Performed by: INTERNAL MEDICINE

## 2021-01-25 PROCEDURE — 85027 COMPLETE CBC AUTOMATED: CPT | Performed by: INTERNAL MEDICINE

## 2021-01-25 PROCEDURE — 80048 BASIC METABOLIC PNL TOTAL CA: CPT | Performed by: INTERNAL MEDICINE

## 2021-01-25 PROCEDURE — 80197 ASSAY OF TACROLIMUS: CPT | Performed by: INTERNAL MEDICINE

## 2021-01-25 PROCEDURE — 80076 HEPATIC FUNCTION PANEL: CPT | Performed by: INTERNAL MEDICINE

## 2021-01-25 PROCEDURE — 36415 COLL VENOUS BLD VENIPUNCTURE: CPT | Performed by: INTERNAL MEDICINE

## 2021-02-04 ENCOUNTER — TELEPHONE (OUTPATIENT)
Dept: INTERNAL MEDICINE | Facility: CLINIC | Age: 66
End: 2021-02-04

## 2021-02-04 ENCOUNTER — VIRTUAL VISIT (OUTPATIENT)
Dept: INTERNAL MEDICINE | Facility: CLINIC | Age: 66
End: 2021-02-04
Payer: COMMERCIAL

## 2021-02-04 DIAGNOSIS — J32.9 SINUS INFECTION: Primary | ICD-10-CM

## 2021-02-04 DIAGNOSIS — J01.00 SUBACUTE MAXILLARY SINUSITIS: ICD-10-CM

## 2021-02-04 DIAGNOSIS — J34.89 NOSE PAIN: Primary | ICD-10-CM

## 2021-02-04 PROCEDURE — 99214 OFFICE O/P EST MOD 30 MIN: CPT | Mod: 95 | Performed by: STUDENT IN AN ORGANIZED HEALTH CARE EDUCATION/TRAINING PROGRAM

## 2021-02-04 NOTE — NURSING NOTE
Chief Complaint   Patient presents with     Derm Problem     Pt reports a sore on the side of her nose where the bone meets the cartilage      COLE Hastings at 8:57 AM sign on 2/4/2021

## 2021-02-04 NOTE — TELEPHONE ENCOUNTER
Health Call Center    Phone Message    May a detailed message be left on voicemail: yes     Reason for Call: Medication Question or concern regarding medication   Prescription Clarification  Name of Medication: Doxycycline 100 mg po BID for 7 days   Prescribing Provider: Dr. Breezy Kerns   Pharmacy: [address] , [place], [place], [phone]   What on the order needs clarification? Patient states Dr. Tijerina was going to send this prescription to the pharmacy for her. Patient would like for the medication to be sent to the [facility] in [place], [place]. Please contact the patient once the prescription has been sent.    Action Taken: Message routed to:  [facility]    Travel Screening: Not Applicable

## 2021-02-04 NOTE — PROGRESS NOTES
"Resident video visit documentation      This patient is being evaluated via a billable video visit as an alternative to an in-person visit.       The patient has been notified of following:     \"This video visit will be conducted via a call between you and your physician/provider. We have found that certain health care needs can be provided without the need for an in-person physical exam.  This service lets us provide the care you need with a video conversation.  If a prescription is necessary we can send it directly to your pharmacy.  If lab work is needed we can place an order for that and you can then stop by our lab to have the test done at a later time. If during the course of the call the physician/provider feels a video visit is not appropriate, you will not be charged for this service.\"     Patient has given verbal consent for Video visit? Yes    Person spoken to: Patient    Location of patient: (home)  Location of physician resident: (in home office)  Location of teaching physician: (in clinic)  Department name:  Medicine  Mode of communication:  Video Conference via ( [employer]  Time video initiated:  0930 AM  Time video ended:  0944 AM  Total length of video visit: 14 min     Staffed with: Dr. Campbell                          [facility]       SUBJECTIVE:  Phan Luque is a 65 year old female with a PMHx of alcoholic hepatitis s/p liver transplant, anxiety, HTN, CKD, IBS and psoriasis presenting today for evaluation of nose soreness.      Patient reports 2 days of nasal soreness. It started on left side of her nose at \"where the bone and cartilage meet\" and then got worse and now crossing to the other side. She also noticed mild swelling right where her pain is, but no erythema or skin lesions.   She reported that it is tender to touch on both medial sides of her maxillary sinuses which she thinks it started to hurt after she had her nose soreness. She has intermittent sore throat, but has " been a chronic problem.   She reported chronic nasal congestion and rhinorrhea with bilateral ear pursuitis. No fever, chills or night sweats. No post nasal drip or eye congestion or redness. No recent sick contacts.     She had a new prescription eyeglasses, but has been using them since Nov/2020. No recent trauma or falls.    Medications and allergies reviewed by me today.     ROS:   Constitutional, neuro, ENT, endocrine, pulmonary, cardiac, gastrointestinal, genitourinary, musculoskeletal, integument and psychiatric systems are negative, except as otherwise noted.    OBJECTIVE:    LMP  (LMP Unknown)    Wt Readings from Last 1 Encounters:   12/15/20 85.4 kg (188 lb 4.8 oz)     Gen: no acute distress   HEENT: chronic erythema on nasal dorsum due to rosacea. Swelling at the proximal nasal septal cartilage. No skin lesions. Nasal bone looks normal.  Tender to touch on bilateral maxillary sinuses.        ASSESSMENT/PLAN:    Phan was seen today for derm problem.    Diagnoses and all orders for this visit:    Nose pain    Subacute maxillary sinusitis    patient with 2 days history of nasal septal cartilage pain and soreness with associated maxillary sinuses congestion and tenderness. Most likely patient might have a sinus infection especially with a history of chronic nasal congestion. Other differential diagnosis giving her nasal cartilage swelling, rheumatological disorders like granulomatosis with polyangiitis or inflammation due to her rosacea as it can cause rhinophyma. Discussed with patient that we will treat as a bacterial sinusitis, hx of liver Tx, with 7 days course of doxycyline and to follow up if no improvement or worsening clinical picture despite on antibiotics.    - Doxycycline 100 mg po BID for 7 days   - normal saline nasal spray TID  - as needed tylenol 500 mg po q 8 hrs     Pt should return to clinic for f/u with clinic in 7 days     Saumya Tijerina MD  Internal Medicine PGY-2  Feb 4,  2021    Attending note: I was present for the critical portions of this virtual visit.  The patient was aware that I was on the call.  I agree with the A/P as documented above.  Monica Begr MD

## 2021-02-04 NOTE — PROGRESS NOTES
"Video Visit Technology for this patient: AmWell not working, patient has smart device, please try [employer] Video with patient     Jessica is a 65 year old who is being evaluated via a billable video visit.      How would you like to obtain your AVS? MyChart  If the video visit is dropped, the invitation should be resent by: Text to cell phone: [phone]  Will anyone else be joining your video visit? No  {If patient encounters technical issues they should call [phone] :693138}    Video Start Time: {video visit start/end time for provider to select:839090}  {PROVIDER CHARTING PREFERENCE:239169}    Subjective     Jessica is a 65 year old who presents to clinic today for the following health issues {ACCOMPANIED BY STATEMENT (Optional):347746}    HPI       {SUPERLIST (Optional):950875}  {additonal problems for provider to add (Optional):688019}    Review of Systems   {ROS COMP (Optional):419347}      Objective           Vitals:  No vitals were obtained today due to virtual visit.    Physical Exam   {video visit exam brief selected:952980::\"GENERAL: Healthy, alert and no distress\",\"EYES: Eyes grossly normal to inspection.  No discharge or erythema, or obvious scleral/conjunctival abnormalities.\",\"RESP: No audible wheeze, cough, or visible cyanosis.  No visible retractions or increased work of breathing.  \",\"SKIN: Visible skin clear. No significant rash, abnormal pigmentation or lesions.\",\"NEURO: Cranial nerves grossly intact.  Mentation and speech appropriate for age.\",\"PSYCH: Mentation appears normal, affect normal/bright, judgement and insight intact, normal speech and appearance well-groomed.\"}    {Diagnostic Test Results (Optional):556777}    {AMBULATORY ATTESTATION (Optional):211790}        Video-Visit Details    Type of service:  Video Visit    Video End Time:{video visit start/end time for provider to select:174658}    Originating Location (pt. Location): {video visit patient location:927139::\"Home\"}    Distant " "Location (provider location):  [facility] [place]     Platform used for Video Visit: {Virtual Visit Platforms:045633::\"Nataliya\"}      "

## 2021-02-05 RX ORDER — DOXYCYCLINE 100 MG/1
100 CAPSULE ORAL 2 TIMES DAILY
Qty: 14 CAPSULE | Refills: 0 | Status: SHIPPED | OUTPATIENT
Start: 2021-02-05 | End: 2021-02-12

## 2021-02-05 NOTE — TELEPHONE ENCOUNTER
[facility] Call Center    Phone Message    May a detailed message be left on voicemail: yes     Reason for Call: Other: pt still waiting for the medication. Please send medicaiton over as she is not getting any better     Action Taken: Message routed to:  [facility]    Travel Screening: Not Applicable

## 2021-02-05 NOTE — TELEPHONE ENCOUNTER
See provider progress note from 2/6/21 below:    ASSESSMENT/PLAN:     Phan was seen today for derm problem.     Diagnoses and all orders for this visit:     Nose pain     Subacute maxillary sinusitis     patient with 2 days history of nasal septal cartilage pain and soreness with associated maxillary sinuses congestion and tenderness. Most likely patient might have a sinus infection especially with a history of chronic nasal congestion. Other differential diagnosis giving her nasal cartilage swelling, rheumatological disorders like granulomatosis with polyangiitis or inflammation due to her rosacea as it can cause rhinophyma. Discussed with patient that we will treat as a bacterial sinusitis, hx of liver Tx, with 7 days course of doxycyline and to follow up if no improvement or worsening clinical picture despite on antibiotics.     - Doxycycline 100 mg po BID for 7 days   - normal saline nasal spray TID  - as needed tylenol 500 mg po q 8 hrs    Pt should return to clinic for f/u with clinic in 7 days      Saumya Tijerina MD  Internal Medicine PGY-2  Feb 4, 2021    Will send Rx to pharmacy for patient. Niru Garrett LPN 2/5/2021 9:30 AM

## 2021-02-08 RX ORDER — DOXYCYCLINE 100 MG/1
100 CAPSULE ORAL 2 TIMES DAILY
Qty: 14 CAPSULE | Refills: 0 | Status: SHIPPED | OUTPATIENT
Start: 2021-02-08 | End: 2021-03-17

## 2021-02-14 NOTE — PROGRESS NOTES
[facility] Dermatology Note      Dermatology Problem List:   1. History of liver transplant at [facility], 2016- currently on tacrolimus   2. Nummular dermatitis  - Triamcinolone 0.1% ointment BID to popliteal fossa bilaterally  - clobetasol 0.05% ointment  3. AK s/p cryo  4. Skin cancer screening, 10/5/20  5. Scalp psoriasis clobetasol solution, shampoo  6. Nail dystrophy, likely psoriasis- grew candida. Has been using clobetasol solution with resolution.    7. Facial asymmetry- cosmetic referral.   8. Family hx psoriasis (son)  Encounter Date: Jan 18, 2021    CC:  Chief Complaint   Patient presents with     Psoriasis     Jessica is here for a follow up of her psoriasis.        History of Present Illness:  Ms. Phan Luque is a 65 year old female on immunosuppressants with a family history of melanoma in her mother, who presents today for a follow up of scalp psorasis and her nail dystrophy. She was last seen in the clinic 11/11/20 with Dr Amos to discuss fillers for facial asymmetry. She was last seen by me 10/5/20 when she had a skin check and was started on clobetasol solution and shampoo. She has finally gotten this under better control. It is no longer itchy. She is also using mineral oil to help moisturize it. She had a nail culture which grew candida at her last visit and we discussed starting Fluconazole vs topical medications. As she was waiting for culture results she was getting clobetasol solution on her nails and found that they started to improve. She has not used any antifungals and feels like her nail psoriasis is almost gone. She is very happy.       Otherwise she is feeling well, without additional skin concerns at this time.    Past Medical History:   Patient Active Problem List   Diagnosis     Decompensation of cirrhosis of liver (H)     Liver transplanted (H)     Alcoholic hepatitis     Immunosuppression (H)     Alcoholic hepatitis without ascites     Enterococcus UTI     Liver  transplant status (H)     Nausea & vomiting     Malnutrition (H)     Candidiasis of skin     Anemia     ACP (advance care planning)     Diarrhea     Hypothyroidism     Esophageal reflux     Recurrent major depression in partial remission (H)     Insomnia     CKD (chronic kidney disease) stage 3, GFR 30-59 ml/min     Anemia in stage 3 chronic kidney disease     Osteopenia     Alcohol abuse, uncomplicated     Past Medical History:   Diagnosis Date     Asthma      Chronic kidney disease      End stage liver disease (H)     2/2 Alcohol Abuse     Liver transplanted (H)      Migraines      Osteoporosis      Spinal stenosis in cervical region      Strabismus      Past Surgical History:   Procedure Laterality Date     APPENDECTOMY           BENCH LIVER N/A 2016    Procedure: BENCH LIVER;  Surgeon: Larry Dhillon MD;  Location: [facility] OR     CATARACT IOL, RT/LT       COLONOSCOPY       ESOPHAGOSCOPY, GASTROSCOPY, DUODENOSCOPY (EGD), COMBINED N/A 2016    Procedure: COMBINED ESOPHAGOSCOPY, GASTROSCOPY, DUODENOSCOPY (EGD);  Surgeon: Tao Alfonso MD;  Location: [facility] GI     ESOPHAGOSCOPY, GASTROSCOPY, DUODENOSCOPY (EGD), COMBINED N/A 10/31/2016    Procedure: COMBINED ESOPHAGOSCOPY, GASTROSCOPY, DUODENOSCOPY (EGD), BIOPSY SINGLE OR MULTIPLE;  Surgeon: Ronald Bojorquez MD;  Location: [facility] GI     sphincteroplasty       TRANSPLANT LIVER RECIPIENT  DONOR N/A 2016    Procedure: TRANSPLANT LIVER RECIPIENT  DONOR;  Surgeon: Larry Dhillon MD;  Location: [facility] OR     TUBAL LIGATION      laparoscopic       Social History:  The patient works for medical insurance. The patient denies use of tanning beds. Patient is originally from [place].     Family History:  There is a family history of melanoma in the patient's mother (retinal, thought to have been present since birth, passed in ). Son has psoriasis.     Medications:  Current Outpatient Medications   Medication Sig Dispense Refill      acetaminophen (TYLENOL) 325 MG tablet Take 2 tablets (650 mg) by mouth every 6 hours as needed for mild pain Or fevers. Use sparingly. Limit use to no more than 2 grams (2000 mg) in 24 hours. **Further refills must be obtained by primary care provider** 100 tablet 0     albuterol (PROAIR HFA/PROVENTIL HFA/VENTOLIN HFA) 108 (90 Base) MCG/ACT inhaler Inhale 2 puffs into the lungs every 6 hours 18 g 3     amLODIPine (NORVASC) 5 MG tablet Take 1 tablet (5 mg) by mouth daily 90 tablet 1     calcipotriene (DOVONOX) 0.005 % external solution Apply 1 mL topically daily To the scalp 180 mL 3     chlorthalidone (HYGROTON) 25 MG tablet Take 0.5 tablets (12.5 mg) by mouth daily 45 tablet 1     cholecalciferol (VITAMIN D3) 400 UNIT TABS tablet Take 400 Units by mouth daily       clobetasol (TEMOVATE) 0.05 % external ointment Apply topically 2 times daily To areas of eczema on the lower legs, up to 2 weeks. 60 g 1     clobetasol (TEMOVATE) 0.05 % external solution Apply topically 2 times daily To the scalp as needed for itching and flaking, on the days you don't use the clobetasol shampoo. 150 mL 3     clobetasol propionate (CLOBEX) 0.05 % external shampoo Apply topically daily To dry scalp x 15 min and rinse ,when psoriasis is present. 354 mL 3     cyanocobalamin (VITAMIN  B-12) 1000 MCG tablet Take 1,000 mcg by mouth daily       ferrous sulfate (IRON) 325 (65 FE) MG tablet Take 1 tablet (325 mg) by mouth 2 times daily 100 tablet      folic acid (FOLVITE) 1 MG tablet Take 1 tablet (1 mg) by mouth daily 30 tablet 1     gabapentin (NEURONTIN) 100 MG capsule Take 2 capsules (200 mg) by mouth At Bedtime 180 capsule 1     hydrOXYzine (ATARAX) 25 MG tablet Take 1-2 tablets (25-50 mg) by mouth every 6 hours as needed for itching 120 tablet 10     levothyroxine (SYNTHROID/LEVOTHROID) 88 MCG tablet Take 1 tablet (88 mcg) by mouth daily 90 tablet 3     MAGNESIUM OXIDE PO Take 400 mg by mouth daily       melatonin 5 MG tablet Take 1 tablet  (5 mg) by mouth nightly as needed for sleep       multivitamin, therapeutic (THERA-VIT) TABS tablet Take 1 tablet by mouth every 24 hours 30 tablet 11     nystatin (MYCOSTATIN) 265750 UNIT/GM external cream Apply topically At Bedtime To fingernails and skin around fingernails. 30 g 11     order for DME Equipment being ordered: Trilok ankle brace 1 Device 0     pantoprazole (PROTONIX) 40 MG EC tablet TAKE 1 TABLET BY MOUTH EVERY MORNING BEFORE BREAKFAST 90 tablet 3     psyllium 0.52 G capsule Take 2 capsules (1.04 g) by mouth daily With a full glass of water. 60 capsule 1     tacrolimus (GENERIC EQUIVALENT) 0.5 MG capsule Take 3 capsules (1.5 mg) by mouth every morning AND 2 capsules (1 mg) every evening. 150 capsule 11     traMADol (ULTRAM) 50 MG tablet Take 1 tablet (50 mg) by mouth every 6 hours as needed for severe pain 30 tablet 0     triamcinolone (KENALOG) 0.1 % external ointment Apply topically 2 times daily To plaques behind the knee 80 g 1     ursodiol (ACTIGALL) 300 MG capsule TAKE ONE CAPSULE BY MOUTH TWICE A DAY 60 capsule 11     ursodiol (ACTIGALL) 300 MG capsule Take 1 capsule (300 mg) by mouth every 12 hours 60 capsule 11     doxycycline monohydrate (MONODOX) 100 MG capsule Take 1 capsule (100 mg) by mouth 2 times daily 14 capsule 0       Allergies   Allergen Reactions     Codeine Hives     Benadryl [Diphenhydramine] Hives     Cefaclor Hives     Hydrocodone-Acetaminophen Itching     Oxycodone Itching     Penicillins Hives     Sulfa Drugs Hives       Review of Systems:  -Constitutional: The patient denies fatigue, fevers, chills, unintended weight loss, and night sweats. She is feeling in her usual state of health. Arthralgias.   -Skin: As above in HPI. No additional skin concerns.    Physical exam:  Vitals: LMP  (LMP Unknown)   GEN: This is a well developed, well-nourished female in no acute distress, in a pleasant mood.    SKIN: Skin exam is to her scalp, and hands including her nails. Significant  for;   Minimal scaling to occipital scalp, no pink scaly plaque   - No notable onycholysis to her finger nail plates.   - No other lesions of concern on areas examined.     Impression/Plan:  1. Scalp and nail psoriasis, significant improvement with topical therapies. Continue current regimen. Photodocumentation performed.     Continue clobetasol 0.05% twice daily to the nails, when psoriasis is present.     For the scalp, Cotinue clobetasol 0.05% solution bid, when psoriasis is present, not on days when she uses clobetasol shampoo.     Continue clobetasol 0.05% shampoo (typially every other day)       Steroid ed given.     For maintenance, Start calcipotriene 0.005% solution bid after psoriasis is clear.       2. History of liver transplant- currently on immunosuppressants     Given patient is on immune suppressing drugs, and with family history of melanoma, recommended skin check every 6 months       Follow-up in 2-3 months for a full skin check, earlier for new or changing lesions.       Staff Involved:  All risks, benefits and alternatives were discussed with patient.  Patient is in agreement and understands the assessment and plan.  All questions were answered.  Sun Screen Education was given.   Return to Clinic in 2-3 months or sooner as needed.   Demetria Barger PA-C

## 2021-03-11 ENCOUNTER — AMBULATORY - HEALTHEAST (OUTPATIENT)
Dept: NURSING | Facility: CLINIC | Age: 66
End: 2021-03-11

## 2021-03-17 ENCOUNTER — OFFICE VISIT (OUTPATIENT)
Dept: DERMATOLOGY | Facility: CLINIC | Age: 66
End: 2021-03-17
Payer: COMMERCIAL

## 2021-03-17 DIAGNOSIS — Z12.83 SKIN CANCER SCREENING: Primary | ICD-10-CM

## 2021-03-17 DIAGNOSIS — L20.82 FLEXURAL ECZEMA: ICD-10-CM

## 2021-03-17 DIAGNOSIS — D84.9 IMMUNOSUPPRESSED STATUS (H): ICD-10-CM

## 2021-03-17 DIAGNOSIS — L40.9 PSORIASIS: ICD-10-CM

## 2021-03-17 PROCEDURE — 99213 OFFICE O/P EST LOW 20 MIN: CPT | Performed by: PHYSICIAN ASSISTANT

## 2021-03-17 RX ORDER — TRIAMCINOLONE ACETONIDE 0.25 MG/G
OINTMENT TOPICAL 2 TIMES DAILY
Qty: 80 G | Refills: 1 | Status: SHIPPED | OUTPATIENT
Start: 2021-03-17 | End: 2021-11-24

## 2021-03-17 RX ORDER — TRIAMCINOLONE ACETONIDE 1 MG/G
OINTMENT TOPICAL 2 TIMES DAILY
Qty: 80 G | Refills: 1 | Status: SHIPPED | OUTPATIENT
Start: 2021-03-17 | End: 2021-11-24

## 2021-03-17 ASSESSMENT — PAIN SCALES - GENERAL: PAINLEVEL: NO PAIN (0)

## 2021-03-17 NOTE — NURSING NOTE
Dermatology Rooming Note    Phan Luque's goals for this visit include:   Chief Complaint   Patient presents with     Skin Check     tracy is coming in today for a skin check, states that she does not have any spots of concern today      Elaine Paulino CMA on 3/17/2021 at 2:05 PM

## 2021-03-17 NOTE — PROGRESS NOTES
[facility] Dermatology Note      Dermatology Problem List:   1. History of liver transplant at [facility], 2016- currently on tacrolimus   2. Flexural eczema-trimcinolone 0.1% ointment BID to popliteal fossa bilaterally  3. AK s/p cryo  4. Skin cancer screening, 10/5/20,. 3/17/21  5. Scalp psoriasis clobetasol solution, shampoo,   6. Nail dystrophy, likely psoriasis- grew candida. Has been using clobetasol solution with resolution.    7. Facial asymmetry- cosmetic referral.   8. Family hx psoriasis (son)    Encounter Date: Mar 17, 2021    CC:  Chief Complaint   Patient presents with     Skin Check     tracy is coming in today for a skin check, states that she does not have any spots of concern today        History of Present Illness:  Ms. Phan Luque is a 65 year old female on immunosuppressants for liver transplant. She has a family history of melanoma in her mother, who presents today for a skin check and a follow up of scalp and nail psoriasis. She states her psoriasis has been stable with the use of clobetasol solution 1-2 times a week and clobetasol shampoo 1-2 days per week. She believes her nails are controlled with the exposure to clobetasol solution that he puts in her scalp with her fingers. She was prescribed calcipotriene solution but has only used this 1-2 times as she feels her psoriasis is doing well. She has noticed intermittent smooth red marks in her axilla.     She denies any spots that are painful, bleeding, itchy or changing.    Otherwise she is feeling well, without additional skin concerns at this time.    Past Medical History:   Patient Active Problem List   Diagnosis     Decompensation of cirrhosis of liver (H)     Liver transplanted (H)     Alcoholic hepatitis     Immunosuppression (H)     Alcoholic hepatitis without ascites     Enterococcus UTI     Liver transplant status (H)     Nausea & vomiting     Malnutrition (H)     Candidiasis of skin     Anemia     ACP (advance  care planning)     Diarrhea     Hypothyroidism     Esophageal reflux     Recurrent major depression in partial remission (H)     Insomnia     CKD (chronic kidney disease) stage 3, GFR 30-59 ml/min     Anemia in stage 3 chronic kidney disease     Osteopenia     Alcohol abuse, uncomplicated     Past Medical History:   Diagnosis Date     Asthma      Chronic kidney disease      End stage liver disease (H)     2/2 Alcohol Abuse     Liver transplanted (H)      Migraines      Osteoporosis      Spinal stenosis in cervical region      Strabismus      Past Surgical History:   Procedure Laterality Date     APPENDECTOMY           BENCH LIVER N/A 2016    Procedure: BENCH LIVER;  Surgeon: Larry Dhillon MD;  Location: [facility]     CATARACT IOL, RT/LT       COLONOSCOPY       ESOPHAGOSCOPY, GASTROSCOPY, DUODENOSCOPY (EGD), COMBINED N/A 2016    Procedure: COMBINED ESOPHAGOSCOPY, GASTROSCOPY, DUODENOSCOPY (EGD);  Surgeon: Tao Alfonso MD;  Location: [facility]     ESOPHAGOSCOPY, GASTROSCOPY, DUODENOSCOPY (EGD), COMBINED N/A 10/31/2016    Procedure: COMBINED ESOPHAGOSCOPY, GASTROSCOPY, DUODENOSCOPY (EGD), BIOPSY SINGLE OR MULTIPLE;  Surgeon: Ronald Bojorquez MD;  Location: [facility]     sphincteroplasty       TRANSPLANT LIVER RECIPIENT  DONOR N/A 2016    Procedure: TRANSPLANT LIVER RECIPIENT  DONOR;  Surgeon: Larry Dhillon MD;  Location: [facility] OR     TUBAL LIGATION      laparoscopic       Social History:  The patient works for medical insurance. The patient denies use of tanning beds. Patient is originally from [place].     Family History:  There is a family history of melanoma in the patient's mother (retinal, thought to have been present since birth, passed in ). Son has psoriasis.     Medications:  Current Outpatient Medications   Medication Sig Dispense Refill     acetaminophen (TYLENOL) 325 MG tablet Take 2 tablets (650 mg) by mouth every 6 hours as needed for mild pain Or fevers.  Use sparingly. Limit use to no more than 2 grams (2000 mg) in 24 hours. **Further refills must be obtained by primary care provider** 100 tablet 0     albuterol (PROAIR HFA/PROVENTIL HFA/VENTOLIN HFA) 108 (90 Base) MCG/ACT inhaler Inhale 2 puffs into the lungs every 6 hours 18 g 3     amLODIPine (NORVASC) 5 MG tablet Take 1 tablet (5 mg) by mouth daily 90 tablet 1     calcipotriene (DOVONOX) 0.005 % external solution Apply 1 mL topically daily To the scalp 180 mL 3     chlorthalidone (HYGROTON) 25 MG tablet Take 0.5 tablets (12.5 mg) by mouth daily 45 tablet 1     cholecalciferol (VITAMIN D3) 400 UNIT TABS tablet Take 400 Units by mouth daily       clobetasol (TEMOVATE) 0.05 % external ointment Apply topically 2 times daily To areas of eczema on the lower legs, up to 2 weeks. 60 g 1     clobetasol (TEMOVATE) 0.05 % external solution Apply topically 2 times daily To the scalp as needed for itching and flaking, on the days you don't use the clobetasol shampoo. 150 mL 3     clobetasol propionate (CLOBEX) 0.05 % external shampoo Apply topically daily To dry scalp x 15 min and rinse ,when psoriasis is present. 354 mL 3     cyanocobalamin (VITAMIN  B-12) 1000 MCG tablet Take 1,000 mcg by mouth daily       ferrous sulfate (IRON) 325 (65 FE) MG tablet Take 1 tablet (325 mg) by mouth 2 times daily 100 tablet      folic acid (FOLVITE) 1 MG tablet Take 1 tablet (1 mg) by mouth daily 30 tablet 1     gabapentin (NEURONTIN) 100 MG capsule Take 2 capsules (200 mg) by mouth At Bedtime 180 capsule 1     hydrOXYzine (ATARAX) 25 MG tablet Take 1-2 tablets (25-50 mg) by mouth every 6 hours as needed for itching 120 tablet 10     levothyroxine (SYNTHROID/LEVOTHROID) 88 MCG tablet Take 1 tablet (88 mcg) by mouth daily 90 tablet 3     MAGNESIUM OXIDE PO Take 400 mg by mouth daily       melatonin 5 MG tablet Take 1 tablet (5 mg) by mouth nightly as needed for sleep       multivitamin, therapeutic (THERA-VIT) TABS tablet Take 1 tablet by  mouth every 24 hours 30 tablet 11     order for DME Equipment being ordered: Trilok ankle brace 1 Device 0     pantoprazole (PROTONIX) 40 MG EC tablet TAKE 1 TABLET BY MOUTH EVERY MORNING BEFORE BREAKFAST 90 tablet 3     psyllium 0.52 G capsule Take 2 capsules (1.04 g) by mouth daily With a full glass of water. 60 capsule 1     tacrolimus (GENERIC EQUIVALENT) 0.5 MG capsule Take 3 capsules (1.5 mg) by mouth every morning AND 2 capsules (1 mg) every evening. 150 capsule 11     traMADol (ULTRAM) 50 MG tablet Take 1 tablet (50 mg) by mouth every 6 hours as needed for severe pain 30 tablet 0     triamcinolone (KENALOG) 0.025 % external ointment Apply topically 2 times daily To affected areas in the axilla as needed. 80 g 1     triamcinolone (KENALOG) 0.1 % external ointment Apply topically 2 times daily To plaques behind the knee 80 g 1     ursodiol (ACTIGALL) 300 MG capsule TAKE ONE CAPSULE BY MOUTH TWICE A DAY 60 capsule 11     ursodiol (ACTIGALL) 300 MG capsule Take 1 capsule (300 mg) by mouth every 12 hours 60 capsule 11       Allergies   Allergen Reactions     Codeine Hives     Benadryl [Diphenhydramine] Hives     Cefaclor Hives     Hydrocodone-Acetaminophen Itching     Oxycodone Itching     Penicillins Hives     Sulfa Drugs Hives       Review of Systems:  -Constitutional: The patient denies fatigue, fevers, chills, unintended weight loss, and night sweats. She is feeling in her usual state of health. Arthralgias.   -Skin: As above in HPI. No additional skin concerns.    Physical exam:  Vitals: LMP  (LMP Unknown)   GEN: This is a well developed, well-nourished female in no acute distress, in a pleasant mood.    SKIN: Full skin, which includes the head/face, both arms, chest, back, abdomen,both legs, genitalia and/or groin buttocks, digits and/or nails, was examined. Significant for;   -Minimal scaling to occipital scalp, no pink scaly plaques  - No notable onycholysis to her finger nail plates.   - Waxy white,  brown and tan verrucoid, stuck on- appearing papules and plaques to face, trunk and extremities.  -Few scattered erythematous non blanching papules to trunk and upper extremities.  - Scattered round, brown symmetric macules and papules to trunk and extremities.  -Three faint erythema macules on the right axilla.   - No other lesions of concern on areas examined.     Impression/Plan:  1. Scalp and nail psoriasis, significant improvement with topical therapies. Continue current regimen. Photodocumentation performed.     Continue clobetasol 0.05% twice daily to the nails, when psoriasis is present.     For the scalp, Cotinue clobetasol 0.05% solution bid, when psoriasis is present, not on days when she uses clobetasol shampoo.     Continue clobetasol 0.05% shampoo (typially every other day)       Steroid ed given.     For maintenance, Start calcipotriene 0.005% solution bid after psoriasis is clear.     Start triamcinolone 0.025% ointment bid. Steroid ed given.       2. History of liver transplant- currently on immunosuppressants     Given patient is on immune suppressing drugs, and with family history of melanoma, recommended skin check every 6 months     3. Multiple clinically benign nevi on the trunk and extremities , including right cheek.     ABCDs of melanoma were discussed and self skin checks were advised.     Sun precaution was advised including the use of sun screens of SPF 30 or higher, sun protective clothing, and avoidance of tanning beds.     4. Ardon angiomas, trunk    Reassured of benign, congenital nature      5. Seborrheic keratosis, non irritated    No further intervention required. Patient to report changes.     Follow-up in 6 months for a full skin check, earlier for new or changing lesions.       Staff Involved:  All risks, benefits and alternatives were discussed with patient.  Patient is in agreement and understands the assessment and plan.  All questions were answered.  Sun Screen Education 6  months or sooner as needed.   Demetria Barger PA-C

## 2021-03-17 NOTE — LETTER
3/17/2021       RE: Phan Luque  [address]  [facility] [address]     Dear Colleague,    Thank you for referring your patient, Phan Luque, to the [facility] [place] at [facility]. Please see a copy of my visit note below.    [facility] Dermatology Note      Dermatology Problem List:   1. [facility]- currently on tacrolimus   2. Flexural eczema-trimcinolone 0.1% ointment BID to popliteal fossa bilaterally  3. AK s/p cryo  4. Skin cancer screening, 10/5/20,. 3/17/21  5. Scalp psoriasis clobetasol solution, shampoo,   6. Nail dystrophy, likely psoriasis- grew candida. Has been using clobetasol solution with resolution.    7. Facial asymmetry- cosmetic referral.   8. Family hx psoriasis (son)    Encounter Date: Mar 17, 2021    CC:  Chief Complaint   Patient presents with     Skin Check     tracy is coming in today for a skin check, states that she does not have any spots of concern today        History of Present Illness:  Ms. Phan Luque is a 65 year old female on immunosuppressants for liver transplant. She has a family history of melanoma in her mother, who presents today for a skin check and a follow up of scalp and nail psoriasis. She states her psoriasis has been stable with the use of clobetasol solution 1-2 times a week and clobetasol shampoo 1-2 days per week. She believes her nails are controlled with the exposure to clobetasol solution that he puts in her scalp with her fingers. She was prescribed calcipotriene solution but has only used this 1-2 times as she feels her psoriasis is doing well. She has noticed intermittent smooth red marks in her axilla.     She denies any spots that are painful, bleeding, itchy or changing.    Otherwise she is feeling well, without additional skin concerns at this time.    Past Medical History:   Patient Active Problem List    Diagnosis     Decompensation of cirrhosis of liver (H)     Liver transplanted (H)     Alcoholic hepatitis     Immunosuppression (H)     Alcoholic hepatitis without ascites     Enterococcus UTI     Liver transplant status (H)     Nausea & vomiting     Malnutrition (H)     Candidiasis of skin     Anemia     ACP (advance care planning)     Diarrhea     Hypothyroidism     Esophageal reflux     Recurrent major depression in partial remission (H)     Insomnia     CKD (chronic kidney disease) stage 3, GFR 30-59 ml/min     Anemia in stage 3 chronic kidney disease     Osteopenia     Alcohol abuse, uncomplicated     Past Medical History:   Diagnosis Date     Asthma      Chronic kidney disease      End stage liver disease (H)     2/2 Alcohol Abuse     Liver transplanted (H)      Migraines      Osteoporosis      Spinal stenosis in cervical region      Strabismus      Past Surgical History:   Procedure Laterality Date     APPENDECTOMY           BENCH LIVER N/A 2016    Procedure: BENCH LIVER;  Surgeon: Larry Dhillon MD;  Location: [facility]     CATARACT IOL, RT/LT       COLONOSCOPY       ESOPHAGOSCOPY, GASTROSCOPY, DUODENOSCOPY (EGD), COMBINED N/A 2016    Procedure: COMBINED ESOPHAGOSCOPY, GASTROSCOPY, DUODENOSCOPY (EGD);  Surgeon: Tao Alfonso MD;  Location: [facility]     ESOPHAGOSCOPY, GASTROSCOPY, DUODENOSCOPY (EGD), COMBINED N/A 10/31/2016    Procedure: COMBINED ESOPHAGOSCOPY, GASTROSCOPY, DUODENOSCOPY (EGD), BIOPSY SINGLE OR MULTIPLE;  Surgeon: Ronald Bojorquez MD;  Location: [facility]     sphincteroplasty       TRANSPLANT LIVER RECIPIENT  DONOR N/A 2016    Procedure: TRANSPLANT LIVER RECIPIENT  DONOR;  Surgeon: Larry Dhillon MD;  Location: [facility]     TUBAL LIGATION      laparoscopic       Social History:  The patient works for medical insurance. The patient denies use of tanning beds. Patient is originally from [place].     Family History:  There is a family history of  melanoma in the patient's mother (retinal, thought to have been present since birth, passed in 1994). Son has psoriasis.     Medications:  Current Outpatient Medications   Medication Sig Dispense Refill     acetaminophen (TYLENOL) 325 MG tablet Take 2 tablets (650 mg) by mouth every 6 hours as needed for mild pain Or fevers. Use sparingly. Limit use to no more than 2 grams (2000 mg) in 24 hours. **Further refills must be obtained by primary care provider** 100 tablet 0     albuterol (PROAIR HFA/PROVENTIL HFA/VENTOLIN HFA) 108 (90 Base) MCG/ACT inhaler Inhale 2 puffs into the lungs every 6 hours 18 g 3     amLODIPine (NORVASC) 5 MG tablet Take 1 tablet (5 mg) by mouth daily 90 tablet 1     calcipotriene (DOVONOX) 0.005 % external solution Apply 1 mL topically daily To the scalp 180 mL 3     chlorthalidone (HYGROTON) 25 MG tablet Take 0.5 tablets (12.5 mg) by mouth daily 45 tablet 1     cholecalciferol (VITAMIN D3) 400 UNIT TABS tablet Take 400 Units by mouth daily       clobetasol (TEMOVATE) 0.05 % external ointment Apply topically 2 times daily To areas of eczema on the lower legs, up to 2 weeks. 60 g 1     clobetasol (TEMOVATE) 0.05 % external solution Apply topically 2 times daily To the scalp as needed for itching and flaking, on the days you don't use the clobetasol shampoo. 150 mL 3     clobetasol propionate (CLOBEX) 0.05 % external shampoo Apply topically daily To dry scalp x 15 min and rinse ,when psoriasis is present. 354 mL 3     cyanocobalamin (VITAMIN  B-12) 1000 MCG tablet Take 1,000 mcg by mouth daily       ferrous sulfate (IRON) 325 (65 FE) MG tablet Take 1 tablet (325 mg) by mouth 2 times daily 100 tablet      folic acid (FOLVITE) 1 MG tablet Take 1 tablet (1 mg) by mouth daily 30 tablet 1     gabapentin (NEURONTIN) 100 MG capsule Take 2 capsules (200 mg) by mouth At Bedtime 180 capsule 1     hydrOXYzine (ATARAX) 25 MG tablet Take 1-2 tablets (25-50 mg) by mouth every 6 hours as needed for itching 120  tablet 10     levothyroxine (SYNTHROID/LEVOTHROID) 88 MCG tablet Take 1 tablet (88 mcg) by mouth daily 90 tablet 3     MAGNESIUM OXIDE PO Take 400 mg by mouth daily       melatonin 5 MG tablet Take 1 tablet (5 mg) by mouth nightly as needed for sleep       multivitamin, therapeutic (THERA-VIT) TABS tablet Take 1 tablet by mouth every 24 hours 30 tablet 11     order for DME Equipment being ordered: Trilok ankle brace 1 Device 0     pantoprazole (PROTONIX) 40 MG EC tablet TAKE 1 TABLET BY MOUTH EVERY MORNING BEFORE BREAKFAST 90 tablet 3     psyllium 0.52 G capsule Take 2 capsules (1.04 g) by mouth daily With a full glass of water. 60 capsule 1     tacrolimus (GENERIC EQUIVALENT) 0.5 MG capsule Take 3 capsules (1.5 mg) by mouth every morning AND 2 capsules (1 mg) every evening. 150 capsule 11     traMADol (ULTRAM) 50 MG tablet Take 1 tablet (50 mg) by mouth every 6 hours as needed for severe pain 30 tablet 0     triamcinolone (KENALOG) 0.025 % external ointment Apply topically 2 times daily To affected areas in the axilla as needed. 80 g 1     triamcinolone (KENALOG) 0.1 % external ointment Apply topically 2 times daily To plaques behind the knee 80 g 1     ursodiol (ACTIGALL) 300 MG capsule TAKE ONE CAPSULE BY MOUTH TWICE A DAY 60 capsule 11     ursodiol (ACTIGALL) 300 MG capsule Take 1 capsule (300 mg) by mouth every 12 hours 60 capsule 11       Allergies   Allergen Reactions     Codeine Hives     Benadryl [Diphenhydramine] Hives     Cefaclor Hives     Hydrocodone-Acetaminophen Itching     Oxycodone Itching     Penicillins Hives     Sulfa Drugs Hives       Review of Systems:  -Constitutional: The patient denies fatigue, fevers, chills, unintended weight loss, and night sweats. She is feeling in her usual state of health. Arthralgias.   -Skin: As above in HPI. No additional skin concerns.    Physical exam:  Vitals: LMP  (LMP Unknown)   GEN: This is a well developed, well-nourished female in no acute distress, in a  pleasant mood.    SKIN: Full skin, which includes the head/face, both arms, chest, back, abdomen,both legs, genitalia and/or groin buttocks, digits and/or nails, was examined. Significant for;   -Minimal scaling to occipital scalp, no pink scaly plaques  - No notable onycholysis to her finger nail plates.   - Waxy white, brown and tan verrucoid, stuck on- appearing papules and plaques to face, trunk and extremities.  -Few scattered erythematous non blanching papules to trunk and upper extremities.  - Scattered round, brown symmetric macules and papules to trunk and extremities.  -Three faint erythema macules on the right axilla.   - No other lesions of concern on areas examined.     Impression/Plan:  1. Scalp and nail psoriasis, significant improvement with topical therapies. Continue current regimen. Photodocumentation performed.     Continue clobetasol 0.05% twice daily to the nails, when psoriasis is present.     For the scalp, Cotinue clobetasol 0.05% solution bid, when psoriasis is present, not on days when she uses clobetasol shampoo.     Continue clobetasol 0.05% shampoo (typially every other day)       Steroid ed given.     For maintenance, Start calcipotriene 0.005% solution bid after psoriasis is clear.     Start triamcinolone 0.025% ointment bid. Steroid ed given.       2. History of liver transplant- currently on immunosuppressants     Given patient is on immune suppressing drugs, and with family history of melanoma, recommended skin check every 6 months     3. Multiple clinically benign nevi on the trunk and extremities , including right cheek.     ABCDs of melanoma were discussed and self skin checks were advised.     Sun precaution was advised including the use of sun screens of SPF 30 or higher, sun protective clothing, and avoidance of tanning beds.     4. Ardon angiomas, trunk    Reassured of benign, congenital nature      5. Seborrheic keratosis, non irritated    No further intervention required.  Patient to report changes.     Follow-up in 6 months for a full skin check, earlier for new or changing lesions.       Staff Involved:  All risks, benefits and alternatives were discussed with patient.  Patient is in agreement and understands the assessment and plan.  All questions were answered.  Sun Screen Education 6 months or sooner as needed.   Demetria Barger PA-C

## 2021-03-19 DIAGNOSIS — L30.0 NUMMULAR ECZEMA: ICD-10-CM

## 2021-03-19 DIAGNOSIS — J45.20 INTERMITTENT ASTHMA WITHOUT COMPLICATION, UNSPECIFIED ASTHMA SEVERITY: ICD-10-CM

## 2021-03-23 RX ORDER — ALBUTEROL SULFATE 90 UG/1
2 AEROSOL, METERED RESPIRATORY (INHALATION) EVERY 6 HOURS PRN
Qty: 18 G | Refills: 8 | Status: SHIPPED | OUTPATIENT
Start: 2021-03-23 | End: 2022-03-24

## 2021-03-24 RX ORDER — CLOBETASOL PROPIONATE 0.5 MG/G
OINTMENT TOPICAL 2 TIMES DAILY
Qty: 60 G | Refills: 1 | Status: SHIPPED | OUTPATIENT
Start: 2021-03-24 | End: 2021-08-04

## 2021-03-25 DIAGNOSIS — Z94.4 LIVER REPLACED BY TRANSPLANT (H): ICD-10-CM

## 2021-03-25 LAB
ALBUMIN SERPL-MCNC: 3.8 G/DL (ref 3.4–5)
ALBUMIN UR-MCNC: NEGATIVE MG/DL
ALP SERPL-CCNC: 67 U/L (ref 40–150)
ALT SERPL W P-5'-P-CCNC: 23 U/L (ref 0–50)
ANION GAP SERPL CALCULATED.3IONS-SCNC: 5 MMOL/L (ref 3–14)
APPEARANCE UR: CLEAR
AST SERPL W P-5'-P-CCNC: 13 U/L (ref 0–45)
BILIRUB DIRECT SERPL-MCNC: 0.1 MG/DL (ref 0–0.2)
BILIRUB SERPL-MCNC: 0.5 MG/DL (ref 0.2–1.3)
BILIRUB UR QL STRIP: NEGATIVE
BUN SERPL-MCNC: 36 MG/DL (ref 7–30)
CALCIUM SERPL-MCNC: 8.8 MG/DL (ref 8.5–10.1)
CHLORIDE SERPL-SCNC: 102 MMOL/L (ref 94–109)
CHOLEST SERPL-MCNC: 162 MG/DL
CO2 SERPL-SCNC: 28 MMOL/L (ref 20–32)
COLOR UR AUTO: YELLOW
CREAT SERPL-MCNC: 1.44 MG/DL (ref 0.52–1.04)
CREAT UR-MCNC: 72 MG/DL
ERYTHROCYTE [DISTWIDTH] IN BLOOD BY AUTOMATED COUNT: 13.4 % (ref 10–15)
GFR SERPL CREATININE-BSD FRML MDRD: 38 ML/MIN/{1.73_M2}
GLUCOSE SERPL-MCNC: 103 MG/DL (ref 70–99)
GLUCOSE UR STRIP-MCNC: NEGATIVE MG/DL
HCT VFR BLD AUTO: 45.6 % (ref 35–47)
HDLC SERPL-MCNC: 51 MG/DL
HGB BLD-MCNC: 14.5 G/DL (ref 11.7–15.7)
HGB UR QL STRIP: NEGATIVE
KETONES UR STRIP-MCNC: NEGATIVE MG/DL
LDLC SERPL CALC-MCNC: 90 MG/DL
LEUKOCYTE ESTERASE UR QL STRIP: NEGATIVE
MAGNESIUM SERPL-MCNC: 2 MG/DL (ref 1.6–2.3)
MCH RBC QN AUTO: 30.3 PG (ref 26.5–33)
MCHC RBC AUTO-ENTMCNC: 31.8 G/DL (ref 31.5–36.5)
MCV RBC AUTO: 95 FL (ref 78–100)
NITRATE UR QL: NEGATIVE
NONHDLC SERPL-MCNC: 111 MG/DL
PH UR STRIP: 7 PH (ref 5–7)
PLATELET # BLD AUTO: 283 10E9/L (ref 150–450)
POTASSIUM SERPL-SCNC: 4.5 MMOL/L (ref 3.4–5.3)
PROT SERPL-MCNC: 7.5 G/DL (ref 6.8–8.8)
PROT UR-MCNC: 0.07 G/L
PROT/CREAT 24H UR: 0.09 G/G CR (ref 0–0.2)
RBC # BLD AUTO: 4.79 10E12/L (ref 3.8–5.2)
RBC #/AREA URNS AUTO: 2 /HPF (ref 0–2)
SODIUM SERPL-SCNC: 135 MMOL/L (ref 133–144)
SOURCE: NORMAL
SP GR UR STRIP: 1.01 (ref 1–1.03)
TACROLIMUS BLD-MCNC: 3.5 UG/L (ref 5–15)
TME LAST DOSE: ABNORMAL H
TRIGL SERPL-MCNC: 105 MG/DL
UROBILINOGEN UR STRIP-MCNC: 0 MG/DL (ref 0–2)
WBC # BLD AUTO: 9.2 10E9/L (ref 4–11)
WBC #/AREA URNS AUTO: <1 /HPF (ref 0–5)

## 2021-03-25 PROCEDURE — 80048 BASIC METABOLIC PNL TOTAL CA: CPT | Performed by: PATHOLOGY

## 2021-03-25 PROCEDURE — 84156 ASSAY OF PROTEIN URINE: CPT | Performed by: PATHOLOGY

## 2021-03-25 PROCEDURE — 80061 LIPID PANEL: CPT | Performed by: PATHOLOGY

## 2021-03-25 PROCEDURE — 85027 COMPLETE CBC AUTOMATED: CPT | Performed by: PATHOLOGY

## 2021-03-25 PROCEDURE — 83735 ASSAY OF MAGNESIUM: CPT | Performed by: PATHOLOGY

## 2021-03-25 PROCEDURE — 80197 ASSAY OF TACROLIMUS: CPT | Mod: 90 | Performed by: PATHOLOGY

## 2021-03-25 PROCEDURE — 36415 COLL VENOUS BLD VENIPUNCTURE: CPT | Performed by: PATHOLOGY

## 2021-03-25 PROCEDURE — 80076 HEPATIC FUNCTION PANEL: CPT | Performed by: PATHOLOGY

## 2021-03-25 PROCEDURE — 81001 URINALYSIS AUTO W/SCOPE: CPT | Performed by: PATHOLOGY

## 2021-03-25 PROCEDURE — 80321 ALCOHOLS BIOMARKERS 1OR 2: CPT | Mod: 90 | Performed by: PATHOLOGY

## 2021-03-25 PROCEDURE — 99000 SPECIMEN HANDLING OFFICE-LAB: CPT | Performed by: PATHOLOGY

## 2021-03-29 DIAGNOSIS — G62.9 PERIPHERAL POLYNEUROPATHY: ICD-10-CM

## 2021-03-29 LAB — PETH BLD-MCNC: 30 NG/ML

## 2021-03-29 RX ORDER — GABAPENTIN 100 MG/1
200 CAPSULE ORAL AT BEDTIME
Qty: 60 CAPSULE | Refills: 0 | Status: SHIPPED | OUTPATIENT
Start: 2021-03-29 | End: 2021-05-24

## 2021-03-29 NOTE — TELEPHONE ENCOUNTER
Last seen: 10/5  RTC: 6 months  Cancel: none  No-show: none  Next appt: none     Disp Refills Start End ROSSANA    gabapentin (NEURONTIN) 100 MG capsule 180 capsule 1 10/5/2020  No   Sig - Route: Take 2 capsules (200 mg) by mouth At Bedtime - Oral     Last refilled per : 1/7 #180, 10/9 #180, 7/20#90     Medication refill approved per refill protocol.  Message sent to scheduling to get patient scheduled for 6 mo follow-up at the beginning of April.

## 2021-03-30 ENCOUNTER — TELEPHONE (OUTPATIENT)
Dept: TRANSPLANT | Facility: CLINIC | Age: 66
End: 2021-03-30

## 2021-03-30 NOTE — TELEPHONE ENCOUNTER
Spoke with patient regarding positive peth test. She reports that she went out with coworkers that she has not been around in a year since quarantine. She admits to having one hard alcoholic beverage. Discussed that we would like her not to drink alcohol of any kind. She is agreeable. Message to MANOJ Marte.

## 2021-03-30 NOTE — TELEPHONE ENCOUNTER
Patient Call: Jessica  Was calling back        Call back needed? Yes    Return Call Needed  Same as documented in contacts section  When to return call?: Same day: Route High Priority

## 2021-04-01 ENCOUNTER — AMBULATORY - HEALTHEAST (OUTPATIENT)
Dept: NURSING | Facility: CLINIC | Age: 66
End: 2021-04-01

## 2021-04-02 ENCOUNTER — MYC MEDICAL ADVICE (OUTPATIENT)
Dept: INTERNAL MEDICINE | Facility: CLINIC | Age: 66
End: 2021-04-02

## 2021-04-05 ENCOUNTER — TELEPHONE (OUTPATIENT)
Dept: TRANSPLANT | Facility: CLINIC | Age: 66
End: 2021-04-05

## 2021-04-05 NOTE — TELEPHONE ENCOUNTER
Transplant Social Work Services Phone Call    Data: This writer received notification re: patient's positive PEth 3/25/2021 (30ng/mL). I left a voice message requesting a call back.   Intervention: deferred  Assessment: deferred  Education provided by SW: deferred  Plan: Waiting for return call     HUGO Armendariz, [facility]  Liver Transplant   [facility]  Phone: [phone]  Pager: [phone]

## 2021-04-14 ENCOUNTER — TELEPHONE (OUTPATIENT)
Dept: TRANSPLANT | Facility: CLINIC | Age: 66
End: 2021-04-14

## 2021-04-14 NOTE — TELEPHONE ENCOUNTER
Transplant Social Work Services Phone Call    Data: I received notification of pt's positive PEth. I called Jessica and left a voice message requesting a call back.   Intervention: deferred  Assessment: deferred  Education provided by SW: deferred  Plan: Waiting for return call. Plan to discuss positive PEth and provide support/resources     HUGO Armendariz, [facility]  Liver Transplant   [facility]  Phone: [phone]  Pager: [phone]

## 2021-04-15 ENCOUNTER — MYC MEDICAL ADVICE (OUTPATIENT)
Dept: INTERNAL MEDICINE | Facility: CLINIC | Age: 66
End: 2021-04-15

## 2021-04-15 NOTE — TELEPHONE ENCOUNTER
BP has always been high in the clinic.  Can she get some more data before we make a decision?  Would she be able to check twice daily for one week and then let me know.  If it's very low less than 100/60  I would advise holding the chlorthalidone.  Arlyn Sanchez MD  Internal Medicine

## 2021-04-16 ENCOUNTER — MYC MEDICAL ADVICE (OUTPATIENT)
Dept: INTERNAL MEDICINE | Facility: CLINIC | Age: 66
End: 2021-04-16

## 2021-04-22 DIAGNOSIS — N18.31 STAGE 3A CHRONIC KIDNEY DISEASE (H): Primary | ICD-10-CM

## 2021-04-30 ENCOUNTER — MYC MEDICAL ADVICE (OUTPATIENT)
Dept: INTERNAL MEDICINE | Facility: CLINIC | Age: 66
End: 2021-04-30

## 2021-05-17 DIAGNOSIS — N18.31 STAGE 3A CHRONIC KIDNEY DISEASE (H): ICD-10-CM

## 2021-05-17 DIAGNOSIS — Z94.4 LIVER REPLACED BY TRANSPLANT (H): ICD-10-CM

## 2021-05-17 LAB
ALBUMIN SERPL-MCNC: 3.6 G/DL (ref 3.4–5)
ALBUMIN SERPL-MCNC: 3.8 G/DL (ref 3.4–5)
ALP SERPL-CCNC: 50 U/L (ref 40–150)
ALT SERPL W P-5'-P-CCNC: 19 U/L (ref 0–50)
ANION GAP SERPL CALCULATED.3IONS-SCNC: 7 MMOL/L (ref 3–14)
ANION GAP SERPL CALCULATED.3IONS-SCNC: 7 MMOL/L (ref 3–14)
AST SERPL W P-5'-P-CCNC: 18 U/L (ref 0–45)
BILIRUB DIRECT SERPL-MCNC: 0.1 MG/DL (ref 0–0.2)
BILIRUB SERPL-MCNC: 0.6 MG/DL (ref 0.2–1.3)
BUN SERPL-MCNC: 19 MG/DL (ref 7–30)
BUN SERPL-MCNC: 19 MG/DL (ref 7–30)
CALCIUM SERPL-MCNC: 8.8 MG/DL (ref 8.5–10.1)
CALCIUM SERPL-MCNC: 8.9 MG/DL (ref 8.5–10.1)
CHLORIDE SERPL-SCNC: 101 MMOL/L (ref 94–109)
CHLORIDE SERPL-SCNC: 101 MMOL/L (ref 94–109)
CO2 SERPL-SCNC: 26 MMOL/L (ref 20–32)
CO2 SERPL-SCNC: 27 MMOL/L (ref 20–32)
CREAT SERPL-MCNC: 1.26 MG/DL (ref 0.52–1.04)
CREAT SERPL-MCNC: 1.3 MG/DL (ref 0.52–1.04)
CREAT UR-MCNC: 82 MG/DL
ERYTHROCYTE [DISTWIDTH] IN BLOOD BY AUTOMATED COUNT: 13.1 % (ref 10–15)
GFR SERPL CREATININE-BSD FRML MDRD: 43 ML/MIN/{1.73_M2}
GFR SERPL CREATININE-BSD FRML MDRD: 45 ML/MIN/{1.73_M2}
GLUCOSE SERPL-MCNC: 94 MG/DL (ref 70–99)
GLUCOSE SERPL-MCNC: 97 MG/DL (ref 70–99)
HCT VFR BLD AUTO: 39.8 % (ref 35–47)
HGB BLD-MCNC: 13.3 G/DL (ref 11.7–15.7)
MAGNESIUM SERPL-MCNC: 2.1 MG/DL (ref 1.6–2.3)
MCH RBC QN AUTO: 30.3 PG (ref 26.5–33)
MCHC RBC AUTO-ENTMCNC: 33.4 G/DL (ref 31.5–36.5)
MCV RBC AUTO: 91 FL (ref 78–100)
PHOSPHATE SERPL-MCNC: 3.2 MG/DL (ref 2.5–4.5)
PLATELET # BLD AUTO: 269 10E9/L (ref 150–450)
POTASSIUM SERPL-SCNC: 4.1 MMOL/L (ref 3.4–5.3)
POTASSIUM SERPL-SCNC: 4.3 MMOL/L (ref 3.4–5.3)
PROT SERPL-MCNC: 7.4 G/DL (ref 6.8–8.8)
PROT UR-MCNC: 0.07 G/L
PROT/CREAT 24H UR: 0.08 G/G CR (ref 0–0.2)
RBC # BLD AUTO: 4.39 10E12/L (ref 3.8–5.2)
SODIUM SERPL-SCNC: 134 MMOL/L (ref 133–144)
SODIUM SERPL-SCNC: 135 MMOL/L (ref 133–144)
TACROLIMUS BLD-MCNC: 3.1 UG/L (ref 5–15)
TME LAST DOSE: 19.3 H
WBC # BLD AUTO: 6.2 10E9/L (ref 4–11)

## 2021-05-17 PROCEDURE — 85027 COMPLETE CBC AUTOMATED: CPT | Performed by: INTERNAL MEDICINE

## 2021-05-17 PROCEDURE — 80076 HEPATIC FUNCTION PANEL: CPT | Performed by: INTERNAL MEDICINE

## 2021-05-17 PROCEDURE — 80069 RENAL FUNCTION PANEL: CPT | Performed by: INTERNAL MEDICINE

## 2021-05-17 PROCEDURE — 84156 ASSAY OF PROTEIN URINE: CPT | Performed by: INTERNAL MEDICINE

## 2021-05-17 PROCEDURE — 83735 ASSAY OF MAGNESIUM: CPT | Performed by: INTERNAL MEDICINE

## 2021-05-17 PROCEDURE — 36415 COLL VENOUS BLD VENIPUNCTURE: CPT | Performed by: INTERNAL MEDICINE

## 2021-05-17 PROCEDURE — 80197 ASSAY OF TACROLIMUS: CPT | Performed by: INTERNAL MEDICINE

## 2021-05-17 PROCEDURE — 80048 BASIC METABOLIC PNL TOTAL CA: CPT | Performed by: INTERNAL MEDICINE

## 2021-05-18 ENCOUNTER — VIRTUAL VISIT (OUTPATIENT)
Dept: GASTROENTEROLOGY | Facility: CLINIC | Age: 66
End: 2021-05-18
Attending: INTERNAL MEDICINE
Payer: COMMERCIAL

## 2021-05-18 DIAGNOSIS — Z94.4 LIVER REPLACED BY TRANSPLANT (H): Primary | ICD-10-CM

## 2021-05-18 PROCEDURE — 99214 OFFICE O/P EST MOD 30 MIN: CPT | Mod: 95 | Performed by: INTERNAL MEDICINE

## 2021-05-18 NOTE — LETTER
5/18/2021         RE: Phan Luque  [address]  [facility] [address]        Dear Colleague,    Thank you for referring your patient, Phan Luque, to the [facility] HEPATOLOGY CLINIC [place]. Please see a copy of my visit note below.    Jessica is a 65 year old who is being evaluated via a billable video visit.      How would you like to obtain your AVS? MyChart  If the video visit is dropped, the invitation should be resent by: Text to cell phone: [phone]  Will anyone else be joining your video visit? No    Video Start Time: 10:25 AM    Subjective   Jessica is a 65 year old who presents for the following health issues: S/P liver transplantation    HPI: I had the pleasure of seeing Phan Luque for followup in the Liver Transplant Clinic at the [facility] on   May 18, 2021 or.  Ms. Luque returns for followup now more than 4 and 3/4 years status post liver transplantation for alcoholic hepatitis.  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      She is doing well.  She denies any abdominal pain, or itching.  She does have eczema and psoriasis.  She denies any fatigue.  She is working full time.  She denies any increased abdominal girth or lower extremity edema.      She denies any fevers or chills, cough or shortness of breath.  She is fully vaccinated against COVID-19.  She denies any nausea, vomiting, diarrhea or constipation.  Her appetite is good. She has lost 58 pounds in weight.  She is exercising on a regular basis and eating healthy.    There have been no other new events since she was last seen.    Current Outpatient Medications   Medication     acetaminophen (TYLENOL) 325 MG tablet     albuterol (VENTOLIN HFA) 108 (90 Base) MCG/ACT inhaler     amLODIPine (NORVASC) 5 MG tablet     calcipotriene (DOVONOX) 0.005 % external solution     chlorthalidone (HYGROTON) 25 MG tablet     cholecalciferol (VITAMIN D3) 400  UNIT TABS tablet     clobetasol (TEMOVATE) 0.05 % external ointment     clobetasol propionate (CLOBEX) 0.05 % external shampoo     cyanocobalamin (VITAMIN  B-12) 1000 MCG tablet     ferrous sulfate (IRON) 325 (65 FE) MG tablet     folic acid (FOLVITE) 1 MG tablet     gabapentin (NEURONTIN) 100 MG capsule     hydrOXYzine (ATARAX) 25 MG tablet     levothyroxine (SYNTHROID/LEVOTHROID) 88 MCG tablet     MAGNESIUM OXIDE PO     melatonin 5 MG tablet     multivitamin, therapeutic (THERA-VIT) TABS tablet     pantoprazole (PROTONIX) 40 MG EC tablet     psyllium 0.52 G capsule     tacrolimus (GENERIC EQUIVALENT) 0.5 MG capsule     traMADol (ULTRAM) 50 MG tablet     triamcinolone (KENALOG) 0.025 % external ointment     triamcinolone (KENALOG) 0.1 % external ointment     ursodiol (ACTIGALL) 300 MG capsule     clobetasol (TEMOVATE) 0.05 % external solution     order for DME     ursodiol (ACTIGALL) 300 MG capsule     No current facility-administered medications for this visit.      Objective      Vitals: No vitals were obtained today due to virtual visit.    Physical Exam: GENERAL: Healthy, alert and no distress. EYES: Eyes grossly normal to inspection.  No discharge or erythema, or obvious scleral/conjunctival abnormalities. RESP: No audible wheeze, cough, or visible cyanosis.  No visible retractions or increased work of breathing.  SKIN: Visible skin clear. No significant rash, abnormal pigmentation or lesions. NEURO: Cranial nerves grossly intact.  Mentation and speech appropriate for age. PSYCH: Mentation appears normal, affect normal/bright, judgement and insight intact, normal speech and appearance well-groomed.    Recent Results (from the past 168 hour(s))   Tacrolimus level    Collection Time: 05/17/21  7:24 AM   Result Value Ref Range    Tacrolimus Last Dose 19.30     Tacrolimus Level 3.1 (L) 5.0 - 15.0 ug/L   Magnesium    Collection Time: 05/17/21  7:24 AM   Result Value Ref Range    Magnesium 2.1 1.6 - 2.3 mg/dL    Hepatic panel    Collection Time: 05/17/21  7:24 AM   Result Value Ref Range    Bilirubin Direct 0.1 0.0 - 0.2 mg/dL    Bilirubin Total 0.6 0.2 - 1.3 mg/dL    Albumin 3.8 3.4 - 5.0 g/dL    Protein Total 7.4 6.8 - 8.8 g/dL    Alkaline Phosphatase 50 40 - 150 U/L    ALT 19 0 - 50 U/L    AST 18 0 - 45 U/L   CBC with platelets    Collection Time: 05/17/21  7:24 AM   Result Value Ref Range    WBC 6.2 4.0 - 11.0 10e9/L    RBC Count 4.39 3.8 - 5.2 10e12/L    Hemoglobin 13.3 11.7 - 15.7 g/dL    Hematocrit 39.8 35.0 - 47.0 %    MCV 91 78 - 100 fl    MCH 30.3 26.5 - 33.0 pg    MCHC 33.4 31.5 - 36.5 g/dL    RDW 13.1 10.0 - 15.0 %    Platelet Count 269 150 - 450 10e9/L   Basic metabolic panel    Collection Time: 05/17/21  7:24 AM   Result Value Ref Range    Sodium 135 133 - 144 mmol/L    Potassium 4.1 3.4 - 5.3 mmol/L    Chloride 101 94 - 109 mmol/L    Carbon Dioxide 27 20 - 32 mmol/L    Anion Gap 7 3 - 14 mmol/L    Glucose 97 70 - 99 mg/dL    Urea Nitrogen 19 7 - 30 mg/dL    Creatinine 1.30 (H) 0.52 - 1.04 mg/dL    GFR Estimate 43 (L) >60 mL/min/[1.73_m2]    GFR Estimate If Black 50 (L) >60 mL/min/[1.73_m2]    Calcium 8.9 8.5 - 10.1 mg/dL   Protein  random urine with Creat Ratio    Collection Time: 05/17/21  7:27 AM   Result Value Ref Range    Protein Random Urine 0.07 g/L    Protein Total Urine g/gr Creatinine 0.08 0 - 0.2 g/g Cr   Renal panel    Collection Time: 05/17/21  7:27 AM   Result Value Ref Range    Sodium 134 133 - 144 mmol/L    Potassium 4.3 3.4 - 5.3 mmol/L    Chloride 101 94 - 109 mmol/L    Carbon Dioxide 26 20 - 32 mmol/L    Anion Gap 7 3 - 14 mmol/L    Glucose 94 70 - 99 mg/dL    Urea Nitrogen 19 7 - 30 mg/dL    Creatinine 1.26 (H) 0.52 - 1.04 mg/dL    GFR Estimate 45 (L) >60 mL/min/[1.73_m2]    GFR Estimate If Black 52 (L) >60 mL/min/[1.73_m2]    Calcium 8.8 8.5 - 10.1 mg/dL    Phosphorus 3.2 2.5 - 4.5 mg/dL    Albumin 3.6 3.4 - 5.0 g/dL   Creatinine urine calculation only    Collection Time: 05/17/21  7:27  AM   Result Value Ref Range    Creatinine Urine 82 mg/dL      My impression is that Ms. Luque is coming up on 5 years status post liver transplantation for alcoholic cirrhosis.  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  Her laboratory tests are excellent.   She is up to date with regard to other vaccinations.  She is up to date with regard to cancer screening including skin cancer screening and her last bone density study showed only osteopenia.  My plan will be to see her back in the clinic in 6 months.     She is vaccinated against COVID-19.  She is on single drug immunosuppression and thus I think her likelihood of responding to the vaccine is quite good.  I did point out that as a group however transplant patients respond less well.     Thank you very much for allowing me to participate in the care of this patient.  If you have any questions regarding my recommendations, please do not hesitate to contact me.         Hussein Banegas MD      Professor of Medicine  [employer]      Executive Medical Director, Solid Organ Transplant Program  [facility]    Video-Visit Details    Type of service:  Video Visit    Video End Time:10:45 AM    Originating Location (pt. Location): Home    Distant Location (provider location):  [facility]PATOLOG[facility] [place]     Platform used for Video Visit: [employer]        Again, thank you for allowing me to participate in the care of your patient.        Sincerely,        Hussein Banegas MD

## 2021-05-18 NOTE — PROGRESS NOTES
Jessica is a 65 year old who is being evaluated via a billable video visit.      How would you like to obtain your AVS? MyChart  If the video visit is dropped, the invitation should be resent by: Text to cell phone: [phone]  Will anyone else be joining your video visit? No    Video Start Time: 10:25 AM    Subjective   Jessica is a 65 year old who presents for the following health issues: S/P liver transplantation    HPI: I had the pleasure of seeing Phan Luque for followup in the Liver Transplant Clinic at the [facility] on   May 18, 2021 or.  Ms. Luque returns for followup now more than 4 and 3/4 years status post liver transplantation for alcoholic hepatitis.  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      She is doing well.  She denies any abdominal pain, or itching.  She does have eczema and psoriasis.  She denies any fatigue.  She is working full time.  She denies any increased abdominal girth or lower extremity edema.      She denies any fevers or chills, cough or shortness of breath.  She is fully vaccinated against COVID-19.  She denies any nausea, vomiting, diarrhea or constipation.  Her appetite is good. She has lost 58 pounds in weight.  She is exercising on a regular basis and eating healthy.    There have been no other new events since she was last seen.    Current Outpatient Medications   Medication     acetaminophen (TYLENOL) 325 MG tablet     albuterol (VENTOLIN HFA) 108 (90 Base) MCG/ACT inhaler     amLODIPine (NORVASC) 5 MG tablet     calcipotriene (DOVONOX) 0.005 % external solution     chlorthalidone (HYGROTON) 25 MG tablet     cholecalciferol (VITAMIN D3) 400 UNIT TABS tablet     clobetasol (TEMOVATE) 0.05 % external ointment     clobetasol propionate (CLOBEX) 0.05 % external shampoo     cyanocobalamin (VITAMIN  B-12) 1000 MCG tablet     ferrous sulfate (IRON) 325 (65 FE) MG tablet     folic acid (FOLVITE) 1 MG tablet      gabapentin (NEURONTIN) 100 MG capsule     hydrOXYzine (ATARAX) 25 MG tablet     levothyroxine (SYNTHROID/LEVOTHROID) 88 MCG tablet     MAGNESIUM OXIDE PO     melatonin 5 MG tablet     multivitamin, therapeutic (THERA-VIT) TABS tablet     pantoprazole (PROTONIX) 40 MG EC tablet     psyllium 0.52 G capsule     tacrolimus (GENERIC EQUIVALENT) 0.5 MG capsule     traMADol (ULTRAM) 50 MG tablet     triamcinolone (KENALOG) 0.025 % external ointment     triamcinolone (KENALOG) 0.1 % external ointment     ursodiol (ACTIGALL) 300 MG capsule     clobetasol (TEMOVATE) 0.05 % external solution     order for DME     ursodiol (ACTIGALL) 300 MG capsule     No current facility-administered medications for this visit.      Objective      Vitals: No vitals were obtained today due to virtual visit.    Physical Exam: GENERAL: Healthy, alert and no distress. EYES: Eyes grossly normal to inspection.  No discharge or erythema, or obvious scleral/conjunctival abnormalities. RESP: No audible wheeze, cough, or visible cyanosis.  No visible retractions or increased work of breathing.  SKIN: Visible skin clear. No significant rash, abnormal pigmentation or lesions. NEURO: Cranial nerves grossly intact.  Mentation and speech appropriate for age. PSYCH: Mentation appears normal, affect normal/bright, judgement and insight intact, normal speech and appearance well-groomed.    Recent Results (from the past 168 hour(s))   Tacrolimus level    Collection Time: 05/17/21  7:24 AM   Result Value Ref Range    Tacrolimus Last Dose 19.30     Tacrolimus Level 3.1 (L) 5.0 - 15.0 ug/L   Magnesium    Collection Time: 05/17/21  7:24 AM   Result Value Ref Range    Magnesium 2.1 1.6 - 2.3 mg/dL   Hepatic panel    Collection Time: 05/17/21  7:24 AM   Result Value Ref Range    Bilirubin Direct 0.1 0.0 - 0.2 mg/dL    Bilirubin Total 0.6 0.2 - 1.3 mg/dL    Albumin 3.8 3.4 - 5.0 g/dL    Protein Total 7.4 6.8 - 8.8 g/dL    Alkaline Phosphatase 50 40 - 150 U/L    ALT 19  0 - 50 U/L    AST 18 0 - 45 U/L   CBC with platelets    Collection Time: 05/17/21  7:24 AM   Result Value Ref Range    WBC 6.2 4.0 - 11.0 10e9/L    RBC Count 4.39 3.8 - 5.2 10e12/L    Hemoglobin 13.3 11.7 - 15.7 g/dL    Hematocrit 39.8 35.0 - 47.0 %    MCV 91 78 - 100 fl    MCH 30.3 26.5 - 33.0 pg    MCHC 33.4 31.5 - 36.5 g/dL    RDW 13.1 10.0 - 15.0 %    Platelet Count 269 150 - 450 10e9/L   Basic metabolic panel    Collection Time: 05/17/21  7:24 AM   Result Value Ref Range    Sodium 135 133 - 144 mmol/L    Potassium 4.1 3.4 - 5.3 mmol/L    Chloride 101 94 - 109 mmol/L    Carbon Dioxide 27 20 - 32 mmol/L    Anion Gap 7 3 - 14 mmol/L    Glucose 97 70 - 99 mg/dL    Urea Nitrogen 19 7 - 30 mg/dL    Creatinine 1.30 (H) 0.52 - 1.04 mg/dL    GFR Estimate 43 (L) >60 mL/min/[1.73_m2]    GFR Estimate If Black 50 (L) >60 mL/min/[1.73_m2]    Calcium 8.9 8.5 - 10.1 mg/dL   Protein  random urine with Creat Ratio    Collection Time: 05/17/21  7:27 AM   Result Value Ref Range    Protein Random Urine 0.07 g/L    Protein Total Urine g/gr Creatinine 0.08 0 - 0.2 g/g Cr   Renal panel    Collection Time: 05/17/21  7:27 AM   Result Value Ref Range    Sodium 134 133 - 144 mmol/L    Potassium 4.3 3.4 - 5.3 mmol/L    Chloride 101 94 - 109 mmol/L    Carbon Dioxide 26 20 - 32 mmol/L    Anion Gap 7 3 - 14 mmol/L    Glucose 94 70 - 99 mg/dL    Urea Nitrogen 19 7 - 30 mg/dL    Creatinine 1.26 (H) 0.52 - 1.04 mg/dL    GFR Estimate 45 (L) >60 mL/min/[1.73_m2]    GFR Estimate If Black 52 (L) >60 mL/min/[1.73_m2]    Calcium 8.8 8.5 - 10.1 mg/dL    Phosphorus 3.2 2.5 - 4.5 mg/dL    Albumin 3.6 3.4 - 5.0 g/dL   Creatinine urine calculation only    Collection Time: 05/17/21  7:27 AM   Result Value Ref Range    Creatinine Urine 82 mg/dL      My impression is that Ms. Luque is coming up on 5 years status post liver transplantation for alcoholic cirrhosis.  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  Her laboratory tests are excellent.   She is up to date with regard to other vaccinations.  She is up to date with regard to cancer screening including skin cancer screening and her last bone density study showed only osteopenia.  My plan will be to see her back in the clinic in 6 months.     She is vaccinated against COVID-19.  She is on single drug immunosuppression and thus I think her likelihood of responding to the vaccine is quite good.  I did point out that as a group however transplant patients respond less well.     Thank you very much for allowing me to participate in the care of this patient.  If you have any questions regarding my recommendations, please do not hesitate to contact me.         Hussein Banegas MD      Professor of Medicine  [employer]      Executive Medical Director, Solid Organ Transplant Program  [facility]    Video-Visit Details    Type of service:  Video Visit    Video End Time:10:45 AM    Originating Location (pt. Location): Home    Distant Location (provider location):  [facility]PATOLOG[facility] [place]     Platform used for Video Visit: [employer]

## 2021-05-24 ENCOUNTER — VIRTUAL VISIT (OUTPATIENT)
Dept: PSYCHIATRY | Facility: CLINIC | Age: 66
End: 2021-05-24
Attending: NURSE PRACTITIONER
Payer: COMMERCIAL

## 2021-05-24 ENCOUNTER — TELEPHONE (OUTPATIENT)
Dept: NEUROSURGERY | Facility: CLINIC | Age: 66
End: 2021-05-24

## 2021-05-24 DIAGNOSIS — G62.9 PERIPHERAL POLYNEUROPATHY: ICD-10-CM

## 2021-05-24 PROCEDURE — 99213 OFFICE O/P EST LOW 20 MIN: CPT | Mod: 95 | Performed by: NURSE PRACTITIONER

## 2021-05-24 RX ORDER — GABAPENTIN 100 MG/1
200 CAPSULE ORAL AT BEDTIME
Qty: 180 CAPSULE | Refills: 1 | Status: SHIPPED | OUTPATIENT
Start: 2021-05-24 | End: 2021-10-26

## 2021-05-24 ASSESSMENT — PAIN SCALES - GENERAL: PAINLEVEL: NO PAIN (0)

## 2021-05-24 NOTE — PATIENT INSTRUCTIONS
**For crisis resources, please see the information at the end of this document**     Patient Education      Thank you for coming to the [facility] [place] [facility].    Lab Testing:  If you had lab testing today and your results are reassuring or normal they will be mailed to you or sent through [facility] within 7 days. If the lab tests need quick action we will call you with the results. The phone number we will call with results is # [phone] (home) . If this is not the best number please call our clinic and change the number.    Medication Refills:  If you need any refills please call your pharmacy and they will contact us. Our fax number for refills is [phone]. Please allow three business for refill processing. If you need to  your refill at a new pharmacy, please contact the new pharmacy directly. The new pharmacy will help you get your medications transferred.     Scheduling:  If you have any concerns about today's visit or wish to schedule another appointment please call our office during normal business hours [phone] (8-5:00 M-F)    Contact Us:  Please call [phone] during business hours (8-5:00 M-F).  If after clinic hours, or on the weekend, please call  [phone].    Financial Assistance [phone]  [email or web address] Billing [phone]  Central Billing Office, [facility]: [phone]  [place] Billing [phone]  Medical Records [phone]  [place] Patient Bill of Rights [email or web address]       MENTAL HEALTH CRISIS NUMBERS:  For a medical emergency please call  911 or go to the nearest ER.     [facility]:   [facility] -[phone]   Crisis Residence [facility] -[phone]   Walk-In Counseling Center [facility] -[phone]   COPE 24/7 [place] Mobile Team -[phone] (adults)/[phone] (child)  CHILD: [facility] needs assessment  team - [phone]      [facility]:   [facility] - [phone]   Walk-in counseling [facility] - [phone]   Walk-in counseling [facility] - [phone]   Crisis Residence [facility] - [phone]  Urgent Care Adult Mental [facility]-[phone] mobile unit/ 24/7 crisis line    National Crisis Numbers:   National Suicide Prevention Lifeline: 7-064-251-TALK (035-489-4576)  Poison Control Center - 8-324-444-7762  [employer]/resources for a list of additional resources (SOS)  Trans Lifeline a hotline for transgender people 1-460.675.5803  The [employer] Project a hotline for LGBT youth 9-666-463-3220  Crisis Text Line: For any crisis 24/7   To: 856404  see www.crisistextline.org  - IF MAKING A CALL FEELS TOO HARD, send a text!         Again thank you for choosing [facility] and please let us know how we can best partner with you to improve you and your family's health.    You may be receiving a survey regarding this appointment. We would love to have your feedback, both positive and negative. The survey is done by an external company, so your answers are anonymous.

## 2021-05-24 NOTE — PROGRESS NOTES
"VIDEO VISIT  Phan Luque is a 65 year old patient who is being evaluated via a billable video visit.      The patient has been notified of following:   \"This video visit will be conducted via a call between you and your physician/provider. We have found that certain health care needs can be provided without the need for an in-person physical exam. This service lets us provide the care you need with a video conversation. If a prescription is necessary we can send it directly to your pharmacy. If lab work is needed we can place an order for that and you can then stop by our lab to have the test done at a later time. Insurers are generally covering virtual visits as they would in-office visits so billing should not be different than normal.  If for some reason you do get billed incorrectly, you should contact the billing office to correct it and that number is in the AVS .    Video Conference to be completed via:  Mark    Patient has given verbal consent for video visit?:  Yes    Patient would prefer that any video invitations be sent by: Send to e-mail at: [email or web address]      How would patient like to obtain AVS?:  To    AVS SmartPhrase [PsychAVS] has been placed in 'Patient Instructions':  Yes    "

## 2021-05-24 NOTE — PROGRESS NOTES
"Video- Visit Details  Type of service:  video visit for medication management  Time of service:    Date:  05/24/2021    Video Start Time:  10:35 AM        Video End Time:  10:50am    Reason for video visit:  Patient unable to travel due to Covid-19  Originating Site (patient location):  [facility]   Location- Patient's home  Distant Site (provider location):  Remote location  Mode of Communication:  Video Conference via AmWell  Consent:  Patient has given verbal consent for video visit?: Yes     VIDEO VISIT  Phan Luque is a 65 year old patient who is being evaluated via a billable video visit.      The patient has been notified of following:   \"This video visit will be conducted via a call between you and your physician/provider. We have found that certain health care needs can be provided without the need for an in-person physical exam. This service lets us provide the care you need with a video conversation. If a prescription is necessary we can send it directly to your pharmacy. If lab work is needed we can place an order for that and you can then stop by our lab to have the test done at a later time. Insurers are generally covering virtual visits as they would in-office visits so billing should not be different than normal.  If for some reason you do get billed incorrectly, you should contact the billing office to correct it and that number is in the AVS .    Video Conference to be completed via:  Amwell    Patient has given verbal consent for video visit?:  Yes    Patient would prefer that any video invitations be sent by: Send to e-mail at: [email or web address]      How would patient like to obtain AVS?:  [employer]    AVS SmartPhrase [PsychAVS] has been placed in 'Patient Instructions':  Yes    Psychiatry Clinic Progress Note                                                                   Phan Luque is a 65 year old female who returns to the clinic for continued care.   Therapist: None.    PCP: " "Santosh Salgado  Other Providers: Hussein Banegas MD    Pertinent Background:  Liver Transplant on 9/25/16 and Stage 3 kidney disease.  Psych critical item history includes SUBSTANCE USE: alcohol.     Interim History                                                                                                             4, 4     The patient is a good historian, reports good treatment adherence and was last seen 10/5/20. Since the last visit, Phan reports that she is \"real good.\"  NO concerns with mood or anxiety.  Sleep continues to be \"not too bad.\"  Purchased a new bed which has been helpful with sleep.      10/5/20: Phan reports he mood is stable.  No significant concerns with depression and anxiety.  She is requesting her Gabapentin be decreased.  Discussed transferring care to PCP but Phan prefers to work with psychiatry to manage Gabapentin in case of any changes to mental health.     4/8/20: Working from home.  Phan being very cautious due to being immunocompromised.  No significant concerns with anxiety/depression.  Continues to use Gabapentin for sleep.  Will continue to check in with clinic every 6 months.     10/15/19: Phan reports continued situational depression (work, family).  When asked if she wanted to restart medications to manage depression, she declined.  Taking Gabapentin for sleep.  Tried doxepin for sleep but experienced increased RLS.  Currently taking Gabapentin 300mg and Mirapex 0.5mg at bedtime and is sleeping better.   No concerns with anxiety.  No history of seasonal component to mood.      7/16/19: Phan reports she is \"ok.\" She reports the Gabapentin has been helpful with sleep quality.  She did increase to 600mg at bedtime.   She has does report any concern with depression but feels anhedonic and attributes to overwhelmed with various responsibilities and tasks she currently is facing.      3/27/19: Phna reports she is \"hanging in there.\"  Phan reports her sleep has " "worsened since stopping Trazodone in December.  She does report her pain has improved since stopping Trazodone and is no longer experiencing the morning \"hangover.\"  Will start/restart Gabapentin for sleep.  Duloxetine stopped due to side effects and Venlafaxine ordered but she did not start due to fear it would have same adverse effects as duloxetine.  Recommended she try as it appears her depression may be worsening and if experiences concerning side effects, she should call clinic.  She will continue to consider.    9/26/18: Phan reports the following changes to medications:  Started Gabapentin 100mg TID which was started by orthopedics, prednisone was decreased to 5mg, and folic acid was stopped.  No significant psychiatric symptoms reported.  She does report increase restlessness at night but does not believe it is anxiety.  Restless legs have also gotten worse which has further impeded sleep.  Iron supplementation started to help with RLS.      6/26/18: Phan reports her mental health continues to be well managed in spite of forgetting to load Buspar into her weekly med reminder.  She has not noticed any increase in anxiety.  Buspar will not be restarted.  Phan continues to not have taken the Propranolol as it is unneeded. Sleep quality remains unchanged but she is unconcerned.  Sleep may be impacted by prednisone.  Continues to take Melatonin 5mg and Trazodone 150mg to help with sleep.  Phan reports the two year anniversary of her liver transplant is approaching with no concerns from providers.      3/30/18:  Phan tried decreased Trazodone and the change significantly impacted her sleep.  She has resumed taking Trazodone 150mg qHS. Phan also recently saw nephrologist who prescribed Iron supplementation to possibly help manage restless leg syndrome.       Phan has not taken propranolol as she feels her anxiety is well managed.  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  " Discussed longer duration between appointments due to symptoms being well managed.        Recent Symptoms:   Depression:  not endorsed today  Elevated:  none  Psychosis:  none  Anxiety:  anxiety intensifies when driving  Panic Attack:  none  Trauma Related:  none     Recent Substance Use:  none reported        Social/ Family History                                  [per patient report]                                     1ea, 1ea   CHILDREN- 3 adult children       TRAUMA HISTORY (self-report)- None  FEELS SAFE AT HOME- Yes    Medical / Surgical History                                                                                                                  Patient Active Problem List   Diagnosis     Decompensation of cirrhosis of liver (H)     Liver transplanted (H)     Alcoholic hepatitis     Immunosuppression (H)     Alcoholic hepatitis without ascites     Enterococcus UTI     Liver transplant status (H)     Nausea & vomiting     Malnutrition (H)     Anemia     ACP (advance care planning)     Diarrhea     Hypothyroidism     Esophageal reflux     Recurrent major depression in partial remission (H)     Insomnia     CKD (chronic kidney disease) stage 3, GFR 30-59 ml/min     Anemia in stage 3 chronic kidney disease     Osteopenia     Alcohol abuse, uncomplicated     Psoriasis       Past Surgical History:   Procedure Laterality Date     APPENDECTOMY      1962     BENCH LIVER N/A 8/25/2016    Procedure: BENCH LIVER;  Surgeon: Larry Dhillon MD;  Location: [facility] OR     CATARACT IOL, RT/LT       COLONOSCOPY       ESOPHAGOSCOPY, GASTROSCOPY, DUODENOSCOPY (EGD), COMBINED N/A 8/17/2016    Procedure: COMBINED ESOPHAGOSCOPY, GASTROSCOPY, DUODENOSCOPY (EGD);  Surgeon: Tao Alfonso MD;  Location:  GI     ESOPHAGOSCOPY, GASTROSCOPY, DUODENOSCOPY (EGD), COMBINED N/A 10/31/2016    Procedure: COMBINED ESOPHAGOSCOPY, GASTROSCOPY, DUODENOSCOPY (EGD), BIOPSY SINGLE OR MULTIPLE;  Surgeon: Ronald Bojorquez MD;   Location: [facility] GI     sphincteroplasty       TRANSPLANT LIVER RECIPIENT  DONOR N/A 2016    Procedure: TRANSPLANT LIVER RECIPIENT  DONOR;  Surgeon: Larry Dhillon MD;  Location: [facility] OR     TUBAL LIGATION      laparoscopic      Medical Review of Systems                                                                                                        2,10   A comprehensive review of systems was performed and is negative other than noted in the HPI.  Pregnant- No    Breastfeeding- No    Contraception- No  Allergy                                Codeine, Benadryl [diphenhydramine], Cefaclor, Hydrocodone-acetaminophen, Oxycodone, Penicillins, and Sulfa drugs  Current Medications                                                                                                       Current Outpatient Medications   Medication Sig Dispense Refill     acetaminophen (TYLENOL) 325 MG tablet Take 2 tablets (650 mg) by mouth every 6 hours as needed for mild pain Or fevers. Use sparingly. Limit use to no more than 2 grams (2000 mg) in 24 hours. **Further refills must be obtained by primary care provider** 100 tablet 0     albuterol (VENTOLIN HFA) 108 (90 Base) MCG/ACT inhaler Inhale 2 puffs into the lungs every 6 hours as needed for shortness of breath / dyspnea or wheezing 18 g 8     amLODIPine (NORVASC) 5 MG tablet Take 1 tablet (5 mg) by mouth daily 90 tablet 1     calcipotriene (DOVONOX) 0.005 % external solution Apply 1 mL topically daily To the scalp 180 mL 3     chlorthalidone (HYGROTON) 25 MG tablet Take 0.5 tablets (12.5 mg) by mouth daily 45 tablet 1     cholecalciferol (VITAMIN D3) 400 UNIT TABS tablet Take 400 Units by mouth daily       clobetasol (TEMOVATE) 0.05 % external ointment Apply topically 2 times daily To areas of eczema on the lower legs, until areas resolve 60 g 1     clobetasol propionate (CLOBEX) 0.05 % external shampoo Apply topically daily To dry scalp x 15 min and rinse ,when  psoriasis is present. 354 mL 3     cyanocobalamin (VITAMIN  B-12) 1000 MCG tablet Take 1,000 mcg by mouth daily       ferrous sulfate (IRON) 325 (65 FE) MG tablet Take 1 tablet (325 mg) by mouth 2 times daily 100 tablet      folic acid (FOLVITE) 1 MG tablet Take 1 tablet (1 mg) by mouth daily 30 tablet 1     hydrOXYzine (ATARAX) 25 MG tablet Take 1-2 tablets (25-50 mg) by mouth every 6 hours as needed for itching 120 tablet 10     levothyroxine (SYNTHROID/LEVOTHROID) 88 MCG tablet Take 1 tablet (88 mcg) by mouth daily 90 tablet 3     MAGNESIUM OXIDE PO Take 400 mg by mouth daily       melatonin 5 MG tablet Take 1 tablet (5 mg) by mouth nightly as needed for sleep       multivitamin, therapeutic (THERA-VIT) TABS tablet Take 1 tablet by mouth every 24 hours 30 tablet 11     pantoprazole (PROTONIX) 40 MG EC tablet TAKE 1 TABLET BY MOUTH EVERY MORNING BEFORE BREAKFAST 90 tablet 3     psyllium 0.52 G capsule Take 2 capsules (1.04 g) by mouth daily With a full glass of water. 60 capsule 1     tacrolimus (GENERIC EQUIVALENT) 0.5 MG capsule Take 3 capsules (1.5 mg) by mouth every morning AND 2 capsules (1 mg) every evening. 150 capsule 11     traMADol (ULTRAM) 50 MG tablet Take 1 tablet (50 mg) by mouth every 6 hours as needed for severe pain 30 tablet 0     triamcinolone (KENALOG) 0.025 % external ointment Apply topically 2 times daily To affected areas in the axilla as needed. 80 g 1     triamcinolone (KENALOG) 0.1 % external ointment Apply topically 2 times daily To plaques behind the knee 80 g 1     ursodiol (ACTIGALL) 300 MG capsule Take 1 capsule (300 mg) by mouth every 12 hours 60 capsule 11     clobetasol (TEMOVATE) 0.05 % external solution Apply topically 2 times daily To the scalp as needed for itching and flaking, on the days you don't use the clobetasol shampoo. (Patient not taking: Reported on 5/18/2021) 150 mL 3     gabapentin (NEURONTIN) 100 MG capsule Take 2 capsules (200 mg) by mouth At Bedtime 60 capsule 0  "    order for DME Equipment being ordered: Trilok ankle brace (Patient not taking: Reported on 5/18/2021) 1 Device 0     ursodiol (ACTIGALL) 300 MG capsule TAKE ONE CAPSULE BY MOUTH TWICE A DAY (Patient not taking: Reported on 5/18/2021) 60 capsule 11     Vitals                                                                                                                            3, 3   LMP  (LMP Unknown)    Mental Status Exam                                                                                        9, 14 cog gs     Alertness: alert  and oriented  Appearance: casually groomed  Behavior/Demeanor: cooperative, pleasant and calm, with good  eye contact   Speech: regular rate and rhythm  Language: good  Psychomotor: unable to assess  Mood: \"real good\"  Affect: appropriate; was congruent to mood; was congruent to content  Thought Process/Associations: unremarkable  Thought Content:  Reports none;  Denies suicidal ideation and violent ideation  Perception:  Reports none;  Denies auditory hallucinations and visual hallucinations  Insight: good  Judgment: good  Cognition: (6) does  appear grossly intact; formal cognitive testing was not done  Gait/Station and/or Muscle Strength/Tone: unable to assess    Labs and Data                                                                                                                 Rating Scales:  PHQ9    PHQ9 Today:  Not completed  PHQ-9 SCORE 3/27/2019 7/16/2019 10/15/2019   PHQ-9 Total Score MyChart - - -   PHQ-9 Total Score 10 6 5         Diagnosis and Assessment                                                                                  m2, h3     Today the following issues were addressed:    1) Major Depressive Disorder, recurrent, mild  2) Generalized Anxiety Disorder    [place] Prescription Monitoring Program [] was not checked today:  will be checked next visit.         Plan                                                                               "                                           m2, h3      1) Medication Management      Conitnue Gabapentin 200mg at bedtime for sleep.      2) Therapy  Continues to contemplate need for therapist    RTC: 6 months.      CRISIS NUMBERS:   Provided routinely in AVS.    Treatment Risk Statement:  The patient understands the risks, benefits, adverse effects and alternatives. Agrees to treatment with the capacity to do so. No medical contraindications to treatment. Agrees to call clinic for any problems. The patient understands to call 911 or go to the nearest ED if life threatening or urgent symptoms occur.      PROVIDER:  DONOVAN Hanna CNP

## 2021-05-25 ENCOUNTER — VIRTUAL VISIT (OUTPATIENT)
Dept: NEUROSURGERY | Facility: CLINIC | Age: 66
End: 2021-05-25
Payer: COMMERCIAL

## 2021-05-25 DIAGNOSIS — Z94.4 LIVER TRANSPLANTED (H): Primary | ICD-10-CM

## 2021-05-25 DIAGNOSIS — M54.2 NECK PAIN: ICD-10-CM

## 2021-05-25 DIAGNOSIS — M48.02 SPINAL STENOSIS IN CERVICAL REGION: ICD-10-CM

## 2021-05-25 PROCEDURE — 99214 OFFICE O/P EST MOD 30 MIN: CPT | Mod: 95 | Performed by: NURSE PRACTITIONER

## 2021-05-25 NOTE — PATIENT INSTRUCTIONS
~Updated MR of cervical spine   ~Return to Neurosurgery Clinic for in-person appointment on Tuesday   (when Dr. Still is also in clinic)   ~Continue with current medication regimen       At the end of the visit, all the patient's questions and concerns had been addressed and the patient was agreeable with the plan of care as outlined above. The patient has our office contact information at [phone], and knows to call with any questions, concerns, or changes in condition.

## 2021-05-25 NOTE — LETTER
5/25/2021       RE: Phan Luque  [address] [address]     Dear Colleague,    Thank you for referring your patient, Phan Luque, to the [facility] NEUROSURGERY [facility]INIC [place] at [facility]. Please see a copy of my visit note below.    Jessica is a 65 year old who is being evaluated via a billable video visit.      This is a video/telephone visit conducted due to [facility] and [facility] restrictions on non-urgent clinic visits due to risk of the spread of COVID-19 pandemic virus. The patient did not identify any urgent or red-flag symptoms that would require in-person evaluation.    How would you like to obtain your AVS? Snapstream  If the video visit is dropped, the invitation should be resent by: [phone]  Will anyone else be joining your video visit? no      Video-Visit Details    Type of service:  Video Visit    Video Start Time:      10:08 am     Video End Time:         10:26 am     Originating Location (pt. Location):  Home     Distant Location (provider location):  [facility]UR[facility] [place]     Platform used for Video Visit: [facility]    NEUROSURGERY CLINIC NOTE       Reason for visit:          Neck pain and bilateral upper extremity Pain/symptoms     HISTORY OF PRESENT ILLNESS:  Jessica Luque is a 65 year old female with past medical history of chronic kidney disease, s/p liver transplantation, and osteoporosis, who is seen for her neck pain.     Last seen in the Neurosurgery Clinic by Dr. Palak Still on 12/21/2018:    Ms. Luque complains of bilateral lower extremity tingling and burning in the feet up to the level of knee that is nonpainful.   Additionally, she reports neck pain that she has endured intermittently for several months, which is new.    She states that the neck pain is relatively mild and lasts approximately 30 seconds once per day.    Surgical intervention was discussed,  "however, she did not offer surgery at that time.     Today,   She returns for recurrent neck pain and pain/symptoms into upper extremities.  Neck pain-  She states neck pain was \"contant\" for awhile, but now slightly improved.   She did physical therapy with [facility]  (Left shoulder and posterior shoulder)  She describes Shooting pain down the neck into upper back.   Keeps her awake at night   Could get to pain rating of 8/10   Increased pain/symptoms w/certain neck positions  Can get radiating pain down both arms just to elbow (L > R)  She has baseline neurology  But no new increased numbness /tingling in hands /fingers.   Ambulating independently without assistive device   No difficulty with balance, or falls   No numbness/tingling in feet.   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  Contraindicated.   Muscle Relaxants: Tizanidine in the past   Anti-depressants:  Yes in the past-  Not helpful    Neuropathics:       Gabapentin at night   Topicals:   No  Other medications not covered above:  No  Occas Heat/Ice -   Somewhat helpful      Past Pain Treatments:   Pain Clinic:     No  PT:    Yes.  [facility].  Completed ~8 weeks ago.  Helpful   Chiropractor:               No       Injections:   No  Surgeries related to pain:  No      Review of systems: 10 point ROS negative except for as detailed in HPI  Past Medical History:   Past Medical History:   Diagnosis Date     Asthma      Chronic kidney disease      End stage liver disease (H)     2/2 Alcohol Abuse     Liver transplanted (H)        5 years ago       Migraines      Osteoporosis      Spinal stenosis in cervical region      Strabismus      Surgical History:   Past Surgical History:   Procedure Laterality Date     APPENDECTOMY      1962     BENCH LIVER N/A 8/25/2016    Procedure: BENCH LIVER;  Surgeon: Larry Dhillon MD;  Location: [facility] OR     CATARACT IOL, " RT/LT       sphincteroplasty       TRANSPLANT LIVER RECIPIENT  DONOR N/A 2016    Procedure: TRANSPLANT LIVER RECIPIENT  DONOR;  Surgeon: Larry Dhillon MD;  Location: [facility] OR     TUBAL LIGATION      laparoscopic     Medications:  Current Outpatient Medications   Medication     acetaminophen (TYLENOL) 325 MG tablet     albuterol (VENTOLIN HFA) 108 (90 Base) MCG/ACT inhaler     amLODIPine (NORVASC) 5 MG tablet     calcipotriene (DOVONOX) 0.005 % external solution     chlorthalidone (HYGROTON) 25 MG tablet     cholecalciferol (VITAMIN D3) 400 UNIT TABS tablet     clobetasol propionate (CLOBEX) 0.05 % external shampoo     cyanocobalamin (VITAMIN  B-12) 1000 MCG tablet     ferrous sulfate (IRON) 325 (65 FE) MG tablet     folic acid (FOLVITE) 1 MG tablet     gabapentin (NEURONTIN) 100 MG capsule                   200 mg at night      hydrOXYzine (ATARAX) 25 MG tablet     levothyroxine (SYNTHROID/LEVOTHROID) 88 MCG tablet     MAGNESIUM OXIDE PO     melatonin 5 MG tablet     multivitamin, therapeutic (THERA-VIT) TABS tablet     pantoprazole (PROTONIX) 40 MG EC tablet     psyllium 0.52 G capsule     tacrolimus (GENERIC EQUIVALENT) 0.5 MG capsule     traMADol (ULTRAM) 50 MG tablet              Just prn      ursodiol (ACTIGALL) 300 MG capsule     No current facility-administered medications for this visit.        Social History     Tobacco Use     Smoking status: Former Smoker     Packs/day: 2.50     Years: 20.00     Pack years: 50.00     Quit date: 1996     Years since quittin.4     Smokeless tobacco: Never Used   Substance Use Topics     Alcohol use: No     Alcohol/week: 7.0 standard drinks     Types: 7 Standard drinks or equivalent per week     Comment: heavy use (750ml/2 days) up until 1 year ago; had cut down to 1 drink/day; none since 16     Drug use: No       Family History   Problem Relation Age of Onset     Family History Negative Other      Melanoma Mother               Heart Disease Father         CHF     Skin Cancer Sister            Physical exam:   LMP  (LMP Unknown)     General    Alert, cooperative.  No acute distress  Pulmonary:   Breathing comfortably on room air. No cough, or shortness of breath  Skin:    Visible skin without lesions or obvious rash  Speech is fluent  Maintains eye contact  Musculoskeletal:    Moving extremities freely with good strength      Imaging:        No current Imaging   MR CERVICAL SPINE W/O CONTRAST 9/5/2018 2:33 PM    Findings:     There is loss of normal cervical lordosis however cervical vertebrae  are normally aligned. There is disc height narrowing at multiple  levels in the cervical spine from C3 to C7.  There is cervical spinal  cord contour deformity with abnormal hyperintense signals  ,representing cord myelopathy  at C3-C4, C4-C5, and C5-C6.  Congenital  borderline small spinal canal.     The findings on a level by level basis are as follows:     C2-3: No spinal canal or neural foraminal stenosis.     C3-4:  Disc osteophyte complex indenting the anterior thecal sac.  Bilateral uncinate and facet arthropathy. Mild spinal canal narrowing  with spinal cord contour abnormality. Mild left neural foraminal  narrowing.     C4-5:  Disc osteophyte complex indenting the anterior thecal sac.  Bilateral uncinate and facet arthropathy. Thickening of ligamentum  flavum. There is moderate spinal canal stenosis with spinal contour  deformity. Mild left neural foraminal narrowing.     C5-6:  Disc osteophyte complex indenting the anterior thecal sac,  causing flattening of the spinal cord. Bilateral uncinate and facet  arthropathy. Moderate spinal canal stenosis. Mild right neural  foraminal narrowing.     C6-7:  Diffuse disc bulge indenting anterior thecal sac. Mild spinal  canal narrowing. No significant neural foraminal stenosis.      C7-T1:  Disc osteophyte complex indenting the anterior thecal sac.  Mild spinal canal narrowing. No significant neural  foraminal stenosis.     No abnormality of the paraspinous soft tissues.                                                             Impression:      1. Moderate spinal canal stenosis at C4-5 and C5-C6 with cervical  spondylosis.  2. Congenital borderline small spinal canal.     I have personally reviewed the examination and initial interpretation  and I agree with the findings.     DOC FISCHER MD    I have personally reviewed imaging and agree with the radiologist's interpretation   and findings as outlined above.       Assessment:  ~Neck pain  ~Radiating pain to bilateral upper arms (to elbows)   ~No numbness/tingling in extremities   ~Moderate spinal canal stenosis at C4-5 and C5-C6   ~Cervical spondylosis.    Plan:  ~Updated MR of cervicals spine   ~Return to Neurosurgery Clinic for in-person appointment on Tuesday   (when Dr. Still is also in clinic)   ~Continue with current medication regimen     At the end of the visit, all the patient's questions and concerns had been addressed and the patient was agreeable with the plan of care as outlined above. The patient has our office contact information at [phone], and knows to call with any questions, concerns, or changes in condition.     Nette Staley DNP  Neurosurgery Nurse Practitioner  [facility]  [phone]

## 2021-05-25 NOTE — PROGRESS NOTES
"Jessica is a 65 year old who is being evaluated via a billable video visit.      This is a video/telephone visit conducted due to [facility] on non-urgent clinic visits due to risk of the spread of COVID-19 pandemic virus. The patient did not identify any urgent or red-flag symptoms that would require in-person evaluation.    How would you like to obtain your AVS? Zibby  If the video visit is dropped, the invitation should be resent by: [phone]  Will anyone else be joining your video visit? no      Video Start Time:      10:08 am       Video-Visit Details    Type of service:  Video Visit    Video End Time:         10:26 am     Originating Location (pt. Location):  Home     Distant Location (provider location):  [facility] [place]     Platform used for Video Visit: Codealike    NEUROSURGERY CLINIC NOTE       Reason for visit:          Neck pain and bilateral upper extremity Pain/symptoms     HISTORY OF PRESENT ILLNESS:  Jessica Luque is a 65 year old female with past medical history of chronic kidney disease, s/p liver transplantation, and osteoporosis, who is seen for her neck pain.     Last seen in the Neurosurgery Clinic by Dr. Palak Still on 12/21/2018:    Ms. Luque complains of bilateral lower extremity tingling and burning in the feet up to the level of knee that is nonpainful.   Additionally, she reports neck pain that she has endured intermittently for several months, which is new.    She states that the neck pain is relatively mild and lasts approximately 30 seconds once per day.    Surgical intervention was discussed, however, she did not offer surgery at that time.     Today,   She returns for recurrent neck pain and pain/symptoms into upper extremities.  Neck pain-  She states neck pain was \"contant\" for awhile, but now slightly improved.   She did physical therapy with [facility]  (Left shoulder and posterior shoulder)  She describes " Shooting pain down the neck into upper back.   Keeps her awake at night   Could get to pain rating of 8/10   Increased pain/symptoms w/certain neck positions  Can get radiating pain down both arms just to elbow (L > R)  She has baseline neurology  But no new increased numbness /tingling in hands /fingers.   Ambulating independently without assistive device   No difficulty with balance, or falls   No numbness/tingling in feet.   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  Contraindicated.   Muscle Relaxants: Tizanidine in the past   Anti-depressants:  Yes in the past-  Not helpful    Neuropathics:       Gabapentin at night   Topicals:   No  Other medications not covered above:  No  Occas Heat/Ice -   Somewhat helpful      Past Pain Treatments:   Pain Clinic:     No  PT:    Yes.  [facility].  Completed ~8 weeks ago.  Helpful   Chiropractor:               No       Injections:   No  Surgeries related to pain:  No      Review of systems: 10 point ROS negative except for as detailed in HPI  Past Medical History:   Past Medical History:   Diagnosis Date     Asthma      Chronic kidney disease      End stage liver disease (H)     2/2 Alcohol Abuse     Liver transplanted (H)        5 years ago       Migraines      Osteoporosis      Spinal stenosis in cervical region      Strabismus      Surgical History:   Past Surgical History:   Procedure Laterality Date     APPENDECTOMY           BENCH LIVER N/A 2016    Procedure: BENCH LIVER;  Surgeon: Larry Dhillon MD;  Location: [facility] OR     CATARACT IOL, RT/LT       sphincteroplasty       TRANSPLANT LIVER RECIPIENT  DONOR N/A 2016    Procedure: TRANSPLANT LIVER RECIPIENT  DONOR;  Surgeon: Larry Dhillon MD;  Location: [facility] OR     TUBAL LIGATION      laparoscopic     Medications:  Current Outpatient Medications   Medication     acetaminophen  (TYLENOL) 325 MG tablet     albuterol (VENTOLIN HFA) 108 (90 Base) MCG/ACT inhaler     amLODIPine (NORVASC) 5 MG tablet     calcipotriene (DOVONOX) 0.005 % external solution     chlorthalidone (HYGROTON) 25 MG tablet     cholecalciferol (VITAMIN D3) 400 UNIT TABS tablet     clobetasol propionate (CLOBEX) 0.05 % external shampoo     cyanocobalamin (VITAMIN  B-12) 1000 MCG tablet     ferrous sulfate (IRON) 325 (65 FE) MG tablet     folic acid (FOLVITE) 1 MG tablet     gabapentin (NEURONTIN) 100 MG capsule                   200 mg at night      hydrOXYzine (ATARAX) 25 MG tablet     levothyroxine (SYNTHROID/LEVOTHROID) 88 MCG tablet     MAGNESIUM OXIDE PO     melatonin 5 MG tablet     multivitamin, therapeutic (THERA-VIT) TABS tablet     pantoprazole (PROTONIX) 40 MG EC tablet     psyllium 0.52 G capsule     tacrolimus (GENERIC EQUIVALENT) 0.5 MG capsule     traMADol (ULTRAM) 50 MG tablet              Just prn      ursodiol (ACTIGALL) 300 MG capsule     No current facility-administered medications for this visit.        Social History     Tobacco Use     Smoking status: Former Smoker     Packs/day: 2.50     Years: 20.00     Pack years: 50.00     Quit date: 1996     Years since quittin.4     Smokeless tobacco: Never Used   Substance Use Topics     Alcohol use: No     Alcohol/week: 7.0 standard drinks     Types: 7 Standard drinks or equivalent per week     Comment: heavy use (750ml/2 days) up until 1 year ago; had cut down to 1 drink/day; none since 17     Drug use: No       Family History   Problem Relation Age of Onset     Family History Negative Other      Melanoma Mother              Heart Disease Father         CHF     Skin Cancer Sister            Physical exam:   LMP  (LMP Unknown)     General    Alert, cooperative.  No acute distress  Pulmonary:   Breathing comfortably on room air. No cough, or shortness of breath  Skin:    Visible skin without lesions or obvious rash  Speech is  fluent  Maintains eye contact  Musculoskeletal:    Moving extremities freely with good strength      Imaging:        No current Imaging   MR CERVICAL SPINE W/O CONTRAST 9/5/2018 2:33 PM    Findings:     There is loss of normal cervical lordosis however cervical vertebrae  are normally aligned. There is disc height narrowing at multiple  levels in the cervical spine from C3 to C7.  There is cervical spinal  cord contour deformity with abnormal hyperintense signals  ,representing cord myelopathy  at C3-C4, C4-C5, and C5-C6.  Congenital  borderline small spinal canal.     The findings on a level by level basis are as follows:     C2-3: No spinal canal or neural foraminal stenosis.     C3-4:  Disc osteophyte complex indenting the anterior thecal sac.  Bilateral uncinate and facet arthropathy. Mild spinal canal narrowing  with spinal cord contour abnormality. Mild left neural foraminal  narrowing.     C4-5:  Disc osteophyte complex indenting the anterior thecal sac.  Bilateral uncinate and facet arthropathy. Thickening of ligamentum  flavum. There is moderate spinal canal stenosis with spinal contour  deformity. Mild left neural foraminal narrowing.     C5-6:  Disc osteophyte complex indenting the anterior thecal sac,  causing flattening of the spinal cord. Bilateral uncinate and facet  arthropathy. Moderate spinal canal stenosis. Mild right neural  foraminal narrowing.     C6-7:  Diffuse disc bulge indenting anterior thecal sac. Mild spinal  canal narrowing. No significant neural foraminal stenosis.      C7-T1:  Disc osteophyte complex indenting the anterior thecal sac.  Mild spinal canal narrowing. No significant neural foraminal stenosis.     No abnormality of the paraspinous soft tissues.                                                             Impression:      1. Moderate spinal canal stenosis at C4-5 and C5-C6 with cervical  spondylosis.  2. Congenital borderline small spinal canal.     I have personally  reviewed the examination and initial interpretation  and I agree with the findings.     DOC FISCHER MD    I have personally reviewed imaging and agree with the radiologist's interpretation   and findings as outlined above.       Assessment:  ~Neck pain  ~Radiating pain to bilateral upper arms (to elbows)   ~No numbness/tingling in extremities   ~Moderate spinal canal stenosis at C4-5 and C5-C6   ~Cervical spondylosis.    Plan:  ~Updated MR of cervicals spine   ~Return to Neurosurgery Clinic for in-person appointment on Tuesday   (when Dr. Still is also in clinic)   ~Continue with current medication regimen       At the end of the visit, all the patient's questions and concerns had been addressed and the patient was agreeable with the plan of care as outlined above. The patient has our office contact information at [phone], and knows to call with any questions, concerns, or changes in condition.        Nette Staley DNP  Neurosurgery Nurse Practitioner  [facility]  [phone]

## 2021-05-26 ENCOUNTER — TELEPHONE (OUTPATIENT)
Dept: NEUROSURGERY | Facility: CLINIC | Age: 66
End: 2021-05-26

## 2021-05-26 ENCOUNTER — VIRTUAL VISIT (OUTPATIENT)
Dept: NEPHROLOGY | Facility: CLINIC | Age: 66
End: 2021-05-26
Attending: INTERNAL MEDICINE
Payer: COMMERCIAL

## 2021-05-26 VITALS — WEIGHT: 188 LBS | BODY MASS INDEX: 29.44 KG/M2

## 2021-05-26 DIAGNOSIS — N18.31 STAGE 3A CHRONIC KIDNEY DISEASE (H): Primary | ICD-10-CM

## 2021-05-26 DIAGNOSIS — I15.1 HYPERTENSION SECONDARY TO OTHER RENAL DISORDERS: ICD-10-CM

## 2021-05-26 PROCEDURE — 99214 OFFICE O/P EST MOD 30 MIN: CPT | Mod: 95 | Performed by: INTERNAL MEDICINE

## 2021-05-26 NOTE — LETTER
2021       RE: Phan Luque  [address]  [place] [address]     Dear Colleague,    Thank you for referring your patient, Phan Luque, to the [facility] [place] at [facility]. Please see a copy of my visit note below.      Nephrology Clinic  Chief Complaint   Patient presents with     RECHECK     Weight 85.3 kg (188 lb), not currently breastfeeding.    Jessica is a 65 year old who is being evaluated via a billable telephone visit.      What phone number would you like to be contacted at? [phone]  How would you like to obtain your AVS? [facility]  Phone call duration:  13 Minutes. Anitha Marquez [facility]    2: pm    Daya Gutierrez MD  2021     Name: Phan Luque  MRN: [id number]  : 1955  Referring provider: Leonel Salgado     Assessment and Plan:  1. CKD stage 3b:    - creatinine 1.26 mg/dL with eGFR 45 on 21   - stable  2. HTN:  Home BP log that was sent 2021 reviewed and BP at goal of <130/80   - On amlodipine 5 mg daily   - On chlorthalidone 12.5 mg daily - controlled, no change  3. Hypomagnesemia: on magnesium supplement  4. S/p liver transplant: doing well, reviewed Dr. Banegas notes  5. 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 I last evaluated the patient Jessica on 3/25/2020 via a virtual visit    Doing well.  Has been working out.  Lost 50+ lbs.       Review of Systems:   Pertinent items are noted in HPI or as below, remainder of complete ROS is negative.      Active Medications:     Current  Outpatient Medications:      acetaminophen (TYLENOL) 325 MG tablet, Take 2 tablets (650 mg) by mouth every 6 hours as needed for mild pain Or fevers. Use sparingly. Limit use to no more than 2 grams (2000 mg) in 24 hours. **Further refills must be obtained by primary care provider**, Disp: 100 tablet, Rfl: 0     albuterol (VENTOLIN HFA) 108 (90 Base) MCG/ACT inhaler, Inhale 2 puffs into the lungs every 6 hours as needed for shortness of breath / dyspnea or wheezing, Disp: 18 g, Rfl: 8     amLODIPine (NORVASC) 5 MG tablet, Take 1 tablet (5 mg) by mouth daily, Disp: 90 tablet, Rfl: 1     calcipotriene (DOVONOX) 0.005 % external solution, Apply 1 mL topically daily To the scalp, Disp: 180 mL, Rfl: 3     chlorthalidone (HYGROTON) 25 MG tablet, Take 0.5 tablets (12.5 mg) by mouth daily, Disp: 45 tablet, Rfl: 1     cholecalciferol (VITAMIN D3) 400 UNIT TABS tablet, Take 400 Units by mouth daily, Disp: , Rfl:      clobetasol (TEMOVATE) 0.05 % external ointment, Apply topically 2 times daily To areas of eczema on the lower legs, until areas resolve, Disp: 60 g, Rfl: 1     clobetasol (TEMOVATE) 0.05 % external solution, Apply topically 2 times daily To the scalp as needed for itching and flaking, on the days you don't use the clobetasol shampoo., Disp: 150 mL, Rfl: 3     clobetasol propionate (CLOBEX) 0.05 % external shampoo, Apply topically daily To dry scalp x 15 min and rinse ,when psoriasis is present., Disp: 354 mL, Rfl: 3     cyanocobalamin (VITAMIN  B-12) 1000 MCG tablet, Take 1,000 mcg by mouth daily, Disp: , Rfl:      ferrous sulfate (IRON) 325 (65 FE) MG tablet, Take 1 tablet (325 mg) by mouth 2 times daily, Disp: 100 tablet, Rfl:      folic acid (FOLVITE) 1 MG tablet, Take 1 tablet (1 mg) by mouth daily, Disp: 30 tablet, Rfl: 1     gabapentin (NEURONTIN) 100 MG capsule, Take 2 capsules (200 mg) by mouth At Bedtime, Disp: 180 capsule, Rfl: 1     hydrOXYzine (ATARAX) 25 MG tablet, Take 1-2 tablets (25-50 mg) by  mouth every 6 hours as needed for itching, Disp: 120 tablet, Rfl: 10     levothyroxine (SYNTHROID/LEVOTHROID) 88 MCG tablet, Take 1 tablet (88 mcg) by mouth daily, Disp: 90 tablet, Rfl: 3     MAGNESIUM OXIDE PO, Take 400 mg by mouth daily, Disp: , Rfl:      melatonin 5 MG tablet, Take 1 tablet (5 mg) by mouth nightly as needed for sleep, Disp: , Rfl:      multivitamin, therapeutic (THERA-VIT) TABS tablet, Take 1 tablet by mouth every 24 hours, Disp: 30 tablet, Rfl: 11     order for DME, Equipment being ordered: [employer] ankle brace, Disp: 1 Device, Rfl: 0     pantoprazole (PROTONIX) 40 MG EC tablet, TAKE 1 TABLET BY MOUTH EVERY MORNING BEFORE BREAKFAST, Disp: 90 tablet, Rfl: 3     psyllium 0.52 G capsule, Take 2 capsules (1.04 g) by mouth daily With a full glass of water., Disp: 60 capsule, Rfl: 1     tacrolimus (GENERIC EQUIVALENT) 0.5 MG capsule, Take 3 capsules (1.5 mg) by mouth every morning AND 2 capsules (1 mg) every evening., Disp: 150 capsule, Rfl: 11     traMADol (ULTRAM) 50 MG tablet, Take 1 tablet (50 mg) by mouth every 6 hours as needed for severe pain, Disp: 30 tablet, Rfl: 0     triamcinolone (KENALOG) 0.025 % external ointment, Apply topically 2 times daily To affected areas in the axilla as needed., Disp: 80 g, Rfl: 1     triamcinolone (KENALOG) 0.1 % external ointment, Apply topically 2 times daily To plaques behind the knee, Disp: 80 g, Rfl: 1     ursodiol (ACTIGALL) 300 MG capsule, TAKE ONE CAPSULE BY MOUTH TWICE A DAY, Disp: 60 capsule, Rfl: 11     ursodiol (ACTIGALL) 300 MG capsule, Take 1 capsule (300 mg) by mouth every 12 hours, Disp: 60 capsule, Rfl: 11     Allergies:   Codeine, Benadryl [diphenhydramine], Cefaclor, Hydrocodone-acetaminophen, Oxycodone, Penicillins, and Sulfa drugs      Past Medical History:  Past Medical History:   Diagnosis Date     Asthma      Chronic kidney disease      End stage liver disease (H)     2/2 Alcohol Abuse     Liver transplanted (H)      Migraines       Osteoporosis      Spinal stenosis in cervical region      Strabismus      Patient Active Problem List   Diagnosis     Decompensation of cirrhosis of liver (H)     Liver transplanted (H)     Alcoholic hepatitis     Immunosuppression (H)     Alcoholic hepatitis without ascites     Enterococcus UTI     Liver transplant status (H)     Nausea & vomiting     Malnutrition (H)     Anemia     ACP (advance care planning)     Diarrhea     Hypothyroidism     Esophageal reflux     Recurrent major depression in partial remission (H)     Insomnia     CKD (chronic kidney disease) stage 3, GFR 30-59 ml/min     Anemia in stage 3 chronic kidney disease     Osteopenia     Alcohol abuse, uncomplicated     Psoriasis     Past Surgical History:  Past Surgical History:   Procedure Laterality Date     APPENDECTOMY           BENCH LIVER N/A 2016    Procedure: BENCH LIVER;  Surgeon: Larry Dhillon MD;  Location: [facility] OR     CATARACT IOL, RT/LT       COLONOSCOPY       ESOPHAGOSCOPY, GASTROSCOPY, DUODENOSCOPY (EGD), COMBINED N/A 2016    Procedure: COMBINED ESOPHAGOSCOPY, GASTROSCOPY, DUODENOSCOPY (EGD);  Surgeon: Tao Alfonso MD;  Location: [facility] GI     ESOPHAGOSCOPY, GASTROSCOPY, DUODENOSCOPY (EGD), COMBINED N/A 10/31/2016    Procedure: COMBINED ESOPHAGOSCOPY, GASTROSCOPY, DUODENOSCOPY (EGD), BIOPSY SINGLE OR MULTIPLE;  Surgeon: Ronald Bojorquez MD;  Location: [facility] GI     sphincteroplasty       TRANSPLANT LIVER RECIPIENT  DONOR N/A 2016    Procedure: TRANSPLANT LIVER RECIPIENT  DONOR;  Surgeon: Larry Dhillon MD;  Location: [facility] OR     TUBAL LIGATION      laparoscopic     Family History:   Family History   Problem Relation Age of Onset     Family History Negative Other      Melanoma Mother              Heart Disease Father         CHF     Skin Cancer Sister       Social History:   Continues to work for [employer] and has been working from home    Physical Exam:  LMP  (LMP Unknown)     GENERAL APPEARANCE: alert and no distress   Pulmonary: normal work of breathing  NEURO: mentation intact and speech normal     Laboratory:  CMP  Recent Labs   Lab Test 05/17/21  0727 05/17/21  0724 03/25/21  0734 01/25/21  0725 11/18/20  0728 07/14/20  0936 07/14/20  0936 03/20/19  1517 03/20/19  1517 08/21/18  0739 08/21/18  0739    135 135 132* 135   < > 135   < > 135   < > 138   POTASSIUM 4.3 4.1 4.5 4.4 4.4   < > 4.1   < > 4.0   < > 3.8   CHLORIDE 101 101 102 101 103   < > 104   < > 99   < > 103   CO2 26 27 28 26 26   < > 26   < > 28   < > 26   ANIONGAP 7 7 5 5 6   < > 5   < > 8   < > 9   GLC 94 97 103* 99 102*   < > 104*   < > 111*   < > 101*   BUN 19 19 36* 22 23   < > 23   < > 23   < > 17   CR 1.26* 1.30* 1.44* 1.38* 1.27*   < > 1.49*   < > 1.25*   < > 1.41*   GFRESTIMATED 45* 43* 38* 40* 45*   < > 37*   < > 46*   < > 38*   GFRESTBLACK 52* 50* 44* 47* 52*   < > 43*   < > 53*   < > 46*   ERIC 8.8 8.9 8.8 9.0 9.3   < > 9.0   < > 9.6   < > 8.9   MAG  --  2.1 2.0 2.0 1.9  --   --   --   --   --   --    PHOS 3.2  --   --   --   --   --  3.1  --  3.7  --  3.6   PROTTOTAL  --  7.4 7.5 7.6 7.6   < > 7.8   < >  --    < > 6.8   ALBUMIN 3.6 3.8 3.8 3.8 3.8   < > 3.8   < > 3.9   < > 3.8   BILITOTAL  --  0.6 0.5 0.7 0.8   < > 0.8   < >  --    < > 0.8   ALKPHOS  --  50 67 50 58   < > 56   < >  --    < > 38*   AST  --  18 13 23 25   < > 23   < >  --    < > 16   ALT  --  19 23 24 26   < > 39   < >  --    < > 19    < > = values in this interval not displayed.     CBC  Recent Labs   Lab Test 05/17/21  0724 03/25/21  0734 01/25/21  0725 12/15/20  1727   HGB 13.3 14.5 14.4 14.5   WBC 6.2 9.2 8.5 9.3   RBC 4.39 4.79 4.73 4.74   HCT 39.8 45.6 42.6 44.4   MCV 91 95 90 94   MCH 30.3 30.3 30.4 30.6   MCHC 33.4 31.8 33.8 32.7   RDW 13.1 13.4 12.7 13.3    283 307 328     INR  Recent Labs   Lab Test 03/16/17  1728 09/13/16  1055 08/30/16  0740 08/29/16  0635 08/25/16  1230 08/25/16  1230 08/25/16  1055 08/25/16  0850  08/25/16  0756   INR 1.15* 1.08 1.01 1.03   < > 1.76* 1.76* 1.87* 1.73*   PTT  --   --   --   --   --  48* 59* 58* 71*    < > = values in this interval not displayed.     ABG  Recent Labs   Lab Test 10/20/16  1216 10/19/16  0821 08/26/16  0510 08/26/16  0004 08/25/16  2000 08/25/16  1230 08/25/16  1055   PH  --   --   --  7.48* 7.51* 7.45 7.45   PCO2  --   --   --  38 35 44 43   PO2  --   --   --  77* 58* 164* 103   HCO3  --   --   --  28 28 30* 29*   O2PER 21.0 21 4L 4L 4L 100.0  100.0 0.58      URINE STUDIES  Recent Labs   Lab Test 03/25/21  0736 11/29/18  0729 03/01/18  1220 12/20/17  1450   COLOR Yellow Yellow Yellow Yellow   APPEARANCE Clear Clear Clear Clear   URINEGLC Negative Negative Negative Negative   URINEBILI Negative Negative Negative Negative   URINEKETONE Negative Negative Negative Negative   SG 1.013 1.015 1.014 1.014   UBLD Negative Negative Negative Negative   URINEPH 7.0 6.0 6.0 7.0   PROTEIN Negative Negative Negative Negative   NITRITE Negative Negative Negative Negative   LEUKEST Negative Negative Negative Negative   RBCU 2 <1 1 2   WBCU <1 <1 1 6*     Recent Labs   Lab Test 05/17/21  0727 03/25/21  0736 07/14/20  0937 03/20/19  1525 11/29/18  0729 03/21/18  1550 12/20/17  1450 09/20/17  1214 06/05/17  1214 03/13/17  1550 12/12/16  1502 10/10/16  1457   UTPG 0.08 0.09 0.07 Unable to calculate due to low value 0.11 0.16 0.12 0.13 0.39* 0.13 0.20 0.39*     PTH  Recent Labs   Lab Test 07/14/20  0936 03/20/19  1517 08/21/18  0739 04/20/18  0739 09/08/17  0723 04/05/17  1236 11/16/16  0640 10/29/16  1807 10/27/16  0705 10/10/16  1456   PTHI 71 47 66 90* 117* 53 20 <3* Quantity not sufficient  ED GLO NOTIFIED ON URTR[facility],10/29/2016,1650,MJ   3*     IRON STUDIES   Recent Labs   Lab Test 08/21/18  0739 04/04/18  0823 03/21/18  1540 06/05/17  1211 04/05/17  1236 03/22/17  1607 09/06/16  0638 08/06/16  0757   IRON  --   --   --  59 56 62 18* 25*   FEB  --   --   --  248 179* 197* 118* 92*   IRONSAT  --    --   --  24 31 32 15 27   WILL 84 57 50 138 278* 320* 480* 528*         Again, thank you for allowing me to participate in the care of your patient.      Sincerely,    Daya Gutierrez MD

## 2021-05-26 NOTE — PROGRESS NOTES
Nephrology Clinic  Chief Complaint   Patient presents with     RECHECK     Weight 85.3 kg (188 lb), not currently breastfeeding.    Jessica is a 65 year old who is being evaluated via a billable telephone visit.      What phone number would you like to be contacted at? [phone]  How would you like to obtain your AVS? To  Phone call duration:  13 Minutes. Anitha Marquez [facility]    2: pm    Daya Gutierrez MD  2021     Name: Phan Luque  MRN: [id number]  : 1955  Referring provider: Leonel Salgado     Assessment and Plan:  1. CKD stage 3b:    - creatinine 1.26 mg/dL with eGFR 45 on 21   - stable  2. HTN:  Home BP log that was sent 2021 reviewed and BP at goal of <130/80   - On amlodipine 5 mg daily   - On chlorthalidone 12.5 mg daily - controlled, no change  3. Hypomagnesemia: on magnesium supplement  4. S/p liver transplant: doing well, reviewed Dr. Banegas notes  5. 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 I last evaluated the patient Jessica on 3/25/2020 via a virtual visit    Doing well.  Has been working out.  Lost 50+ lbs.       Review of Systems:   Pertinent items are noted in HPI or as below, remainder of complete ROS is negative.      Active Medications:     Current Outpatient Medications:      acetaminophen (TYLENOL) 325 MG tablet, Take 2 tablets (650 mg) by mouth every 6 hours as needed for mild pain Or fevers. Use sparingly. Limit use to no more than 2 grams (2000 mg) in 24 hours. **Further refills must be obtained by primary care provider**, Disp: 100 tablet, Rfl: 0      albuterol (VENTOLIN HFA) 108 (90 Base) MCG/ACT inhaler, Inhale 2 puffs into the lungs every 6 hours as needed for shortness of breath / dyspnea or wheezing, Disp: 18 g, Rfl: 8     amLODIPine (NORVASC) 5 MG tablet, Take 1 tablet (5 mg) by mouth daily, Disp: 90 tablet, Rfl: 1     calcipotriene (DOVONOX) 0.005 % external solution, Apply 1 mL topically daily To the scalp, Disp: 180 mL, Rfl: 3     chlorthalidone (HYGROTON) 25 MG tablet, Take 0.5 tablets (12.5 mg) by mouth daily, Disp: 45 tablet, Rfl: 1     cholecalciferol (VITAMIN D3) 400 UNIT TABS tablet, Take 400 Units by mouth daily, Disp: , Rfl:      clobetasol (TEMOVATE) 0.05 % external ointment, Apply topically 2 times daily To areas of eczema on the lower legs, until areas resolve, Disp: 60 g, Rfl: 1     clobetasol (TEMOVATE) 0.05 % external solution, Apply topically 2 times daily To the scalp as needed for itching and flaking, on the days you don't use the clobetasol shampoo., Disp: 150 mL, Rfl: 3     clobetasol propionate (CLOBEX) 0.05 % external shampoo, Apply topically daily To dry scalp x 15 min and rinse ,when psoriasis is present., Disp: 354 mL, Rfl: 3     cyanocobalamin (VITAMIN  B-12) 1000 MCG tablet, Take 1,000 mcg by mouth daily, Disp: , Rfl:      ferrous sulfate (IRON) 325 (65 FE) MG tablet, Take 1 tablet (325 mg) by mouth 2 times daily, Disp: 100 tablet, Rfl:      folic acid (FOLVITE) 1 MG tablet, Take 1 tablet (1 mg) by mouth daily, Disp: 30 tablet, Rfl: 1     gabapentin (NEURONTIN) 100 MG capsule, Take 2 capsules (200 mg) by mouth At Bedtime, Disp: 180 capsule, Rfl: 1     hydrOXYzine (ATARAX) 25 MG tablet, Take 1-2 tablets (25-50 mg) by mouth every 6 hours as needed for itching, Disp: 120 tablet, Rfl: 10     levothyroxine (SYNTHROID/LEVOTHROID) 88 MCG tablet, Take 1 tablet (88 mcg) by mouth daily, Disp: 90 tablet, Rfl: 3     MAGNESIUM OXIDE PO, Take 400 mg by mouth daily, Disp: , Rfl:      melatonin 5 MG tablet, Take 1 tablet (5 mg) by mouth nightly  as needed for sleep, Disp: , Rfl:      multivitamin, therapeutic (THERA-VIT) TABS tablet, Take 1 tablet by mouth every 24 hours, Disp: 30 tablet, Rfl: 11     order for DME, Equipment being ordered: Trilok ankle brace, Disp: 1 Device, Rfl: 0     pantoprazole (PROTONIX) 40 MG EC tablet, TAKE 1 TABLET BY MOUTH EVERY MORNING BEFORE BREAKFAST, Disp: 90 tablet, Rfl: 3     psyllium 0.52 G capsule, Take 2 capsules (1.04 g) by mouth daily With a full glass of water., Disp: 60 capsule, Rfl: 1     tacrolimus (GENERIC EQUIVALENT) 0.5 MG capsule, Take 3 capsules (1.5 mg) by mouth every morning AND 2 capsules (1 mg) every evening., Disp: 150 capsule, Rfl: 11     traMADol (ULTRAM) 50 MG tablet, Take 1 tablet (50 mg) by mouth every 6 hours as needed for severe pain, Disp: 30 tablet, Rfl: 0     triamcinolone (KENALOG) 0.025 % external ointment, Apply topically 2 times daily To affected areas in the axilla as needed., Disp: 80 g, Rfl: 1     triamcinolone (KENALOG) 0.1 % external ointment, Apply topically 2 times daily To plaques behind the knee, Disp: 80 g, Rfl: 1     ursodiol (ACTIGALL) 300 MG capsule, TAKE ONE CAPSULE BY MOUTH TWICE A DAY, Disp: 60 capsule, Rfl: 11     ursodiol (ACTIGALL) 300 MG capsule, Take 1 capsule (300 mg) by mouth every 12 hours, Disp: 60 capsule, Rfl: 11     Allergies:   Codeine, Benadryl [diphenhydramine], Cefaclor, Hydrocodone-acetaminophen, Oxycodone, Penicillins, and Sulfa drugs      Past Medical History:  Past Medical History:   Diagnosis Date     Asthma      Chronic kidney disease      End stage liver disease (H)     2/2 Alcohol Abuse     Liver transplanted (H)      Migraines      Osteoporosis      Spinal stenosis in cervical region      Strabismus      Patient Active Problem List   Diagnosis     Decompensation of cirrhosis of liver (H)     Liver transplanted (H)     Alcoholic hepatitis     Immunosuppression (H)     Alcoholic hepatitis without ascites     Enterococcus UTI     Liver transplant status  (H)     Nausea & vomiting     Malnutrition (H)     Anemia     ACP (advance care planning)     Diarrhea     Hypothyroidism     Esophageal reflux     Recurrent major depression in partial remission (H)     Insomnia     CKD (chronic kidney disease) stage 3, GFR 30-59 ml/min     Anemia in stage 3 chronic kidney disease     Osteopenia     Alcohol abuse, uncomplicated     Psoriasis     Past Surgical History:  Past Surgical History:   Procedure Laterality Date     APPENDECTOMY           BENCH LIVER N/A 2016    Procedure: BENCH LIVER;  Surgeon: Larry Dhillon MD;  Location: [facility] OR     CATARACT IOL, RT/LT       COLONOSCOPY       ESOPHAGOSCOPY, GASTROSCOPY, DUODENOSCOPY (EGD), COMBINED N/A 2016    Procedure: COMBINED ESOPHAGOSCOPY, GASTROSCOPY, DUODENOSCOPY (EGD);  Surgeon: Tao Alfonso MD;  Location: [facility] GI     ESOPHAGOSCOPY, GASTROSCOPY, DUODENOSCOPY (EGD), COMBINED N/A 10/31/2016    Procedure: COMBINED ESOPHAGOSCOPY, GASTROSCOPY, DUODENOSCOPY (EGD), BIOPSY SINGLE OR MULTIPLE;  Surgeon: Ronald Bojorquez MD;  Location: [facility] GI     sphincteroplasty       TRANSPLANT LIVER RECIPIENT  DONOR N/A 2016    Procedure: TRANSPLANT LIVER RECIPIENT  DONOR;  Surgeon: Larry Dhillon MD;  Location: [facility] OR     TUBAL LIGATION      laparoscopic     Family History:   Family History   Problem Relation Age of Onset     Family History Negative Other      Melanoma Mother              Heart Disease Father         CHF     Skin Cancer Sister       Social History:   Continues to work for [employer] and has been working from home    Physical Exam:  LMP  (LMP Unknown)    GENERAL APPEARANCE: alert and no distress   Pulmonary: normal work of breathing  NEURO: mentation intact and speech normal     Laboratory:  CMP  Recent Labs   Lab Test 21  0727 21  0724 21  0734 21  0725 20  0728 20  0936 20  0936 19  1517 19  1517 18  0739  08/21/18  0739    135 135 132* 135   < > 135   < > 135   < > 138   POTASSIUM 4.3 4.1 4.5 4.4 4.4   < > 4.1   < > 4.0   < > 3.8   CHLORIDE 101 101 102 101 103   < > 104   < > 99   < > 103   CO2 26 27 28 26 26   < > 26   < > 28   < > 26   ANIONGAP 7 7 5 5 6   < > 5   < > 8   < > 9   GLC 94 97 103* 99 102*   < > 104*   < > 111*   < > 101*   BUN 19 19 36* 22 23   < > 23   < > 23   < > 17   CR 1.26* 1.30* 1.44* 1.38* 1.27*   < > 1.49*   < > 1.25*   < > 1.41*   GFRESTIMATED 45* 43* 38* 40* 45*   < > 37*   < > 46*   < > 38*   GFRESTBLACK 52* 50* 44* 47* 52*   < > 43*   < > 53*   < > 46*   ERIC 8.8 8.9 8.8 9.0 9.3   < > 9.0   < > 9.6   < > 8.9   MAG  --  2.1 2.0 2.0 1.9  --   --   --   --   --   --    PHOS 3.2  --   --   --   --   --  3.1  --  3.7  --  3.6   PROTTOTAL  --  7.4 7.5 7.6 7.6   < > 7.8   < >  --    < > 6.8   ALBUMIN 3.6 3.8 3.8 3.8 3.8   < > 3.8   < > 3.9   < > 3.8   BILITOTAL  --  0.6 0.5 0.7 0.8   < > 0.8   < >  --    < > 0.8   ALKPHOS  --  50 67 50 58   < > 56   < >  --    < > 38*   AST  --  18 13 23 25   < > 23   < >  --    < > 16   ALT  --  19 23 24 26   < > 39   < >  --    < > 19    < > = values in this interval not displayed.     CBC  Recent Labs   Lab Test 05/17/21  0724 03/25/21  0734 01/25/21  0725 12/15/20  1727   HGB 13.3 14.5 14.4 14.5   WBC 6.2 9.2 8.5 9.3   RBC 4.39 4.79 4.73 4.74   HCT 39.8 45.6 42.6 44.4   MCV 91 95 90 94   MCH 30.3 30.3 30.4 30.6   MCHC 33.4 31.8 33.8 32.7   RDW 13.1 13.4 12.7 13.3    283 307 328     INR  Recent Labs   Lab Test 03/16/17  1728 09/13/16  1055 08/30/16  0740 08/29/16  0635 08/25/16  1230 08/25/16  1230 08/25/16  1055 08/25/16  0850 08/25/16  0756   INR 1.15* 1.08 1.01 1.03   < > 1.76* 1.76* 1.87* 1.73*   PTT  --   --   --   --   --  48* 59* 58* 71*    < > = values in this interval not displayed.     ABG  Recent Labs   Lab Test 10/20/16  1216 10/19/16  0821 08/26/16  0510 08/26/16  0004 08/25/16  2000 08/25/16  1230 08/25/16  1055   PH  --   --   --   7.48* 7.51* 7.45 7.45   PCO2  --   --   --  38 35 44 43   PO2  --   --   --  77* 58* 164* 103   HCO3  --   --   --  28 28 30* 29*   O2PER 21.0 21 4L 4L 4L 100.0  100.0 0.58      URINE STUDIES  Recent Labs   Lab Test 03/25/21  0736 11/29/18  0729 03/01/18  1220 12/20/17  1450   COLOR Yellow Yellow Yellow Yellow   APPEARANCE Clear Clear Clear Clear   URINEGLC Negative Negative Negative Negative   URINEBILI Negative Negative Negative Negative   URINEKETONE Negative Negative Negative Negative   SG 1.013 1.015 1.014 1.014   UBLD Negative Negative Negative Negative   URINEPH 7.0 6.0 6.0 7.0   PROTEIN Negative Negative Negative Negative   NITRITE Negative Negative Negative Negative   LEUKEST Negative Negative Negative Negative   RBCU 2 <1 1 2   WBCU <1 <1 1 6*     Recent Labs   Lab Test 05/17/21  0727 03/25/21  0736 07/14/20  0937 03/20/19  1525 11/29/18  0729 03/21/18  1550 12/20/17  1450 09/20/17  1214 06/05/17  1214 03/13/17  1550 12/12/16  1502 10/10/16  1457   UTPG 0.08 0.09 0.07 Unable to calculate due to low value 0.11 0.16 0.12 0.13 0.39* 0.13 0.20 0.39*     PTH  Recent Labs   Lab Test 07/14/20  0936 03/20/19  1517 08/21/18  0739 04/20/18  0739 09/08/17  0723 04/05/17  1236 11/16/16  0640 10/29/16  1807 10/27/16  0705 10/10/16  1456   PTHI 71 47 66 90* 117* 53 20 <3* Quantity not sufficient  ED GLOH NOTIFIED ON URTRTC,10/29/2016,1650,MJ   3*     IRON STUDIES   Recent Labs   Lab Test 08/21/18  0739 04/04/18  0823 03/21/18  1540 06/05/17  1211 04/05/17  1236 03/22/17  1607 09/06/16  0638 08/06/16  0757   IRON  --   --   --  59 56 62 18* 25*   FEB  --   --   --  248 179* 197* 118* 92*   IRONSAT  --   --   --  24 31 32 15 27   WILL 84 57 50 138 278* 320* 480* 528*

## 2021-05-27 ENCOUNTER — RECORDS - HEALTHEAST (OUTPATIENT)
Dept: ADMINISTRATIVE | Facility: CLINIC | Age: 66
End: 2021-05-27

## 2021-05-28 ENCOUNTER — RECORDS - HEALTHEAST (OUTPATIENT)
Dept: ADMINISTRATIVE | Facility: CLINIC | Age: 66
End: 2021-05-28

## 2021-05-29 ENCOUNTER — RECORDS - HEALTHEAST (OUTPATIENT)
Dept: ADMINISTRATIVE | Facility: CLINIC | Age: 66
End: 2021-05-29

## 2021-05-30 ENCOUNTER — RECORDS - HEALTHEAST (OUTPATIENT)
Dept: ADMINISTRATIVE | Facility: CLINIC | Age: 66
End: 2021-05-30

## 2021-05-30 VITALS — BODY MASS INDEX: 24.97 KG/M2 | WEIGHT: 159.4 LBS

## 2021-05-30 VITALS — BODY MASS INDEX: 25.19 KG/M2 | WEIGHT: 160.9 LBS

## 2021-05-30 VITALS — BODY MASS INDEX: 24.02 KG/M2 | WEIGHT: 153.4 LBS

## 2021-05-30 VITALS — BODY MASS INDEX: 25.22 KG/M2 | WEIGHT: 161 LBS

## 2021-05-30 VITALS — BODY MASS INDEX: 25.08 KG/M2 | WEIGHT: 160.1 LBS

## 2021-05-31 VITALS — WEIGHT: 177 LBS | HEIGHT: 67 IN | BODY MASS INDEX: 27.78 KG/M2

## 2021-05-31 VITALS — WEIGHT: 185 LBS | BODY MASS INDEX: 28.98 KG/M2

## 2021-05-31 VITALS — HEIGHT: 67 IN | WEIGHT: 168 LBS | BODY MASS INDEX: 26.37 KG/M2

## 2021-05-31 VITALS — WEIGHT: 200.7 LBS | BODY MASS INDEX: 31.42 KG/M2

## 2021-06-01 ENCOUNTER — RECORDS - HEALTHEAST (OUTPATIENT)
Dept: ADMINISTRATIVE | Facility: CLINIC | Age: 66
End: 2021-06-01

## 2021-06-02 ENCOUNTER — RECORDS - HEALTHEAST (OUTPATIENT)
Dept: ADMINISTRATIVE | Facility: CLINIC | Age: 66
End: 2021-06-02

## 2021-06-03 VITALS
HEART RATE: 84 BPM | DIASTOLIC BLOOD PRESSURE: 82 MMHG | RESPIRATION RATE: 18 BRPM | WEIGHT: 221 LBS | TEMPERATURE: 98 F | BODY MASS INDEX: 34.61 KG/M2 | OXYGEN SATURATION: 95 % | SYSTOLIC BLOOD PRESSURE: 137 MMHG

## 2021-06-04 ENCOUNTER — HOSPITAL ENCOUNTER (OUTPATIENT)
Dept: MRI IMAGING | Facility: CLINIC | Age: 66
Discharge: HOME | End: 2021-06-04
Attending: NURSE PRACTITIONER | Admitting: NURSE PRACTITIONER
Payer: COMMERCIAL

## 2021-06-04 VITALS
DIASTOLIC BLOOD PRESSURE: 72 MMHG | HEART RATE: 87 BPM | SYSTOLIC BLOOD PRESSURE: 112 MMHG | RESPIRATION RATE: 16 BRPM | BODY MASS INDEX: 31.95 KG/M2 | WEIGHT: 204 LBS | TEMPERATURE: 98.4 F | OXYGEN SATURATION: 96 %

## 2021-06-04 DIAGNOSIS — Z94.4 LIVER TRANSPLANTED (H): ICD-10-CM

## 2021-06-04 DIAGNOSIS — M48.02 SPINAL STENOSIS IN CERVICAL REGION: ICD-10-CM

## 2021-06-04 DIAGNOSIS — M54.2 NECK PAIN: ICD-10-CM

## 2021-06-04 PROCEDURE — 72141 MRI NECK SPINE W/O DYE: CPT | Mod: 26 | Performed by: RADIOLOGY

## 2021-06-04 PROCEDURE — 72141 MRI NECK SPINE W/O DYE: CPT

## 2021-06-04 NOTE — PROGRESS NOTES
Walk In Care Note                                                        Date of Visit: 12/20/2019     Chief Complaint   Phan Luque is a(n) 64 y.o. White or  female who presents to [facility] with the following complaint(s):  Sore Throat (Sore mouth )       Assessment and Plan   1. Oral aphthous ulcer  - viscous lidocaine HC (LIDOCAINE) 2 % Soln viscous solution; Take 15 mL by mouth every 3 (three) hours as needed (for oral pain). Swish and spit.  Dispense: 200 mL; Refill: 1    2. Throat pain  - Rapid Strep A Screen- Throat Swab  - Group A Strep, RNA Direct Detection, Throat      Discussed typical clinical course of aphthous ulcers.  Discussed symptomatic/supportive cares.  Prescribed viscous lidocaine to be used as needed for pain.  Strep screen is negative. Reflex strep testing is in process; will prescribe azithromycin if positive.    Counseled patient regarding assessment and plan for evaluation and treatment. Questions were answered. See AVS for the specific written instructions and educational handout(s) regarding canker sore that were provided at the conclusion of the visit.     Discussed signs / symptoms that warrant urgent / emergent medical attention.     Follow up in 1 week if not resolved.      History of Present Illness   Primary symptom: Sore throat  Onset: Almost 1 week ago  Progression: Persisting, possibly slightly improved  Hoarseness: Yes  Dysphagia: Yes  Fevers: No  Chills: No  Upper respiratory symptoms: Has mild nasal congestion and rhinorrhea. Both ears feel full. No cough.   Associated headache: Yes  Associated rash: No  Associated abdominal pain: No  Additional symptoms: Initially thought that she may have burned the roof of her mouth while eating oatmeal.   Home therapies utilized: Salt water rinses  History of recurrent strep: No  Exposure to strep: No     Review of Systems   Review of Systems   All other systems reviewed and are negative.       Physical Exam   Vitals:     12/20/19 0910   BP: 137/82   Patient Site: Right Arm   Patient Position: Sitting   Cuff Size: Adult Large   Pulse: 84   Resp: 18   Temp: 98  F (36.7  C)   TempSrc: Oral   SpO2: 95%   Weight: 221 lb (100.2 kg)     Physical Exam  Vitals signs and nursing note reviewed.   Constitutional:       General: She is not in acute distress.     Appearance: She is well-developed and overweight. She is not ill-appearing or toxic-appearing.   HENT:      Head: Normocephalic and atraumatic.      Right Ear: Tympanic membrane, ear canal and external ear normal.      Left Ear: Tympanic membrane, ear canal and external ear normal.      Nose: No mucosal edema or rhinorrhea.      Mouth/Throat:      Mouth: Mucous membranes are moist. Oral lesions (irregular approximately 3 x 7 mm aphthous ulcer on the roof of the mouth) present.      Dentition: Abnormal dentition (severely decayed). No dental abscesses or gum lesions.      Pharynx: Uvula midline. No oropharyngeal exudate or posterior oropharyngeal erythema.   Eyes:      General: Lids are normal.      Conjunctiva/sclera: Conjunctivae normal.   Neck:      Musculoskeletal: Neck supple. No edema or erythema.   Cardiovascular:      Rate and Rhythm: Normal rate and regular rhythm.      Heart sounds: S1 normal and S2 normal. No murmur. No friction rub. No gallop.    Pulmonary:      Effort: Pulmonary effort is normal.      Breath sounds: Normal breath sounds. No stridor. No wheezing, rhonchi or rales.   Lymphadenopathy:      Head:      Right side of head: No tonsillar adenopathy.      Left side of head: No tonsillar adenopathy.      Cervical: No cervical adenopathy.   Skin:     General: Skin is warm and dry.      Coloration: Skin is not pale.      Findings: No rash.   Neurological:      General: No focal deficit present.      Mental Status: She is alert and oriented to person, place, and time.          Diagnostic Studies   Laboratory:  Results for orders placed or performed in visit on 12/20/19    Rapid Strep A Screen- Throat Swab   Result Value Ref Range    Rapid Strep A Antigen No Group A Strep detected, presumptive negative No Group A Strep detected, presumptive negative     Radiology:  N/A  Electrocardiogram:  N/A     Procedure Note   N/A     Pertinent History   The following portions of the patient's history were reviewed and updated as appropriate: allergies, current medications, past family history, past medical history, past social history, past surgical history and problem list.    Patient has Asthma; Alcohol abuse; Acute renal failure superimposed on stage 3 chronic kidney disease (H); Liver transplant recipient (H); GERD (gastroesophageal reflux disease); Hypothyroidism; HTN (hypertension); Anemia; and Insomnia on their problem list.    Patient has a past medical history of AION (acute ischemic optic neuropathy), Cervical spinal stenosis, Chronic kidney disease, stage 3 (H), End stage liver disease (H), GERD (gastroesophageal reflux disease), Hypertension, Liver transplant recipient (H) (08/25/2016), Migraine headache, Mild Intermittent Asthma, PFO (patent foramen ovale) (08/08/2016), Recurrent UTI, and Vertigo.    Patient has a past surgical history that includes Rectal surgery; Appendectomy; Tubal ligation; and Liver transplantation (08/25/2016).    Patient's family history includes COPD in her father; Cirrhosis in her paternal uncle; Heart failure in her father and mother.    Patient reports that she quit smoking about 23 years ago. She has never used smokeless tobacco. She reports that she does not drink alcohol or use drugs.     Portions of this note have been dictated using voice recognition software. Any grammatical or context distortions are unintentional and inherent to the software.     Chinedu Cano MD  [facility] [place]

## 2021-06-08 ENCOUNTER — OFFICE VISIT (OUTPATIENT)
Dept: NEUROSURGERY | Facility: CLINIC | Age: 66
End: 2021-06-08
Payer: COMMERCIAL

## 2021-06-08 VITALS
RESPIRATION RATE: 16 BRPM | SYSTOLIC BLOOD PRESSURE: 126 MMHG | BODY MASS INDEX: 28.82 KG/M2 | DIASTOLIC BLOOD PRESSURE: 77 MMHG | WEIGHT: 184 LBS | HEART RATE: 88 BPM | OXYGEN SATURATION: 98 %

## 2021-06-08 DIAGNOSIS — M54.2 NECK PAIN: Primary | ICD-10-CM

## 2021-06-08 DIAGNOSIS — R20.2 NUMBNESS AND TINGLING OF BOTH UPPER EXTREMITIES: ICD-10-CM

## 2021-06-08 DIAGNOSIS — M48.02 SPINAL STENOSIS IN CERVICAL REGION: ICD-10-CM

## 2021-06-08 DIAGNOSIS — R20.0 NUMBNESS AND TINGLING OF BOTH UPPER EXTREMITIES: ICD-10-CM

## 2021-06-08 DIAGNOSIS — M48.02 NEUROFORAMINAL STENOSIS OF CERVICAL SPINE: ICD-10-CM

## 2021-06-08 PROCEDURE — 99214 OFFICE O/P EST MOD 30 MIN: CPT | Performed by: NURSE PRACTITIONER

## 2021-06-08 ASSESSMENT — PAIN SCALES - GENERAL: PAINLEVEL: NO PAIN (0)

## 2021-06-08 NOTE — PROGRESS NOTES
Phan jimenez cane intact and asks some questions regarding her medications, but soon before her appointment she was contacted by the transplant clinic at the [facility] who sorted out her questions.  She was otherwise fine.  This should be no charge visit.  If the patient desires, I would be willing to take over her primary care

## 2021-06-08 NOTE — LETTER
"6/8/2021       RE: Phan Luque  [address]  [place] [address]     Dear Colleague,    Thank you for referring your patient, Phan Luque, to the [facility] NEUROSURGERY CLINIC [place] at [facility]. Please see a copy of my visit note below.    NEUROSURGERY CLINIC NOTE       Reason for Visit:              Follow Up neck pain and bilateral upper extremity symptoms         History of Presenting Illness:   Jessica Luque is seen in follow up for her neck pain and bilateral upper extremity symptoms.   Brief HPI-   Neck pain-  She states neck pain was \"constant\" for awhile, but now slightly improved.   She did physical therapy with [facility]  (Left shoulder and posterior shoulder)  She describes Shooting pain down the neck into upper back.   Keeps her awake at night   Could get to pain rating of 8/10   Increased pain/symptoms w/certain neck positions  Can get radiating pain down both arms just to elbow (L > R)  She has baseline neuropathy  But no new increased numbness /tingling in hands /fingers.   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    Things are \"Really good\" today.       Examination:   There were no vitals taken for this visit.      Neurological Assessment:    Video examination  General    Alert, cooperative.  No acute distress  Pulmonary:   Breathing comfortably on room air. No cough, or shortness of breath  Skin:    Visible skin without lesions or obvious rash  Speech is fluent  Maintains eye contact  Musculoskeletal:    Moving extremities freely with good strength  Able to raise arms above head  She has excellent and full cervical range of motion   Bilateral upper extremity is 5/5 including deltoid, bicep, tricep,   Wrist flexion/extension,  and intrinsic musculature.   Negative Tinels  Negative Phalen's  She is not tender to " palpation of ulnar nerve/groove at the elbow bilaterally.   Negative ankle clonus  No hyper-reflexia    IMAGING:  MR CERVICAL SPINE W/O CONTRAST 6/4/2021 5:34 PM     Comparison: Cervical spine MRI dated 9/5/2018    Findings:  Straightening of cervical necrosis. The cervical vertebrae are  normally aligned. Degenerative changes of cervical spine multilevel  osteophytic spurring and disc space narrowing extending from C3-4  through C6-7.  There is likely artifactual T2 signal within the spinal  cord at the level of C3-4, C4-5 and C5-6. Focal fat deposition at C7  and T1, not significant changed from prior. Degenerative bone marrow  edema noted in the opposing endplate of C3-4. The findings on a level  by level basis are as follows:     C2-3:  Facet arthropathy bilaterally. No significant spinal canal  stenosis. Mild left neural foraminal narrowing. Right neuroforamen is  patent.     C3-4:  Uncovertebral hypertrophy and facet arthropathy bilaterally.  Moderate spinal canal stenosis. Moderate to severe bilateral  neuroforaminal narrowing. Not significantly changed from 9/5/2018.     C4-5:  Disc osteophyte complex, uncovertebral hypertrophy and facet  arthropathy bilaterally. Moderate spinal canal stenosis. Moderate  right greater than left neural foraminal narrowing. Overall not  significantly changed from 9/5/2018.     C5-6:  Disc osteophyte complex, uncovertebral hypertrophy and facet  arthropathy bilaterally. Moderate spinal canal stenosis. Severe right,  moderate left neural foraminal narrowing. Overall not significantly  changed from 9/5/2018.      C6-7:  Disc osteophyte complex, uncovertebral hypertrophy and facet  arthropathy bilaterally. Mild spinal canal stenosis. Mild to moderate  bilateral neuroforaminal narrowing. Overall not significantly changed  from 9/5/2018.     C7-T1:  Disc osteophyte complex, uncovertebral hypertrophy and facet  arthropathy bilaterally. Mild spinal canal stenosis. Mild to  moderate  right neuroforaminal narrowing. Overall not significantly changed from  9/5/2018.     No abnormality of the paraspinous soft tissues.                                                                      Impression: Little change in mid cervical spine cervical spondylosis  with moderate spinal canal stenosis at C3-4 through C5-6. Severe right  neuroforaminal narrowing at C5-6. Additional neuroforaminal narrowing  detailed as above. No convincing myelopathic signal.     I have personally reviewed the examination and initial interpretation  and I agree with the findings.     DENISE WALLACE MD      Assessment:   ~Neck pain   ~Bilateral upper extremity radiating pain to elbow L >R  ~Cervical spine cervical spondylosis  ~Moderate spinal canal stenosis at C3-4 through C5-6  ~Severe right neuroforaminal narrowing at C5-6  ~Disc osteophyte complex - multi-level  ~Facet arthropathy, C2-C3, C3-C4, C4-C5       Plan:   ~Will obtain bilateral upper extremity EMG to evaluate for cervical nerve involvement   ~Wishes to continue to avoid surgical intervention   ~Can consider spinal injections, and/or repeat/continued PT (cervical traction), or home unit  ~Will follow up by phone following EMG nerve study     At the end of the visit, all the patient's questions and concerns had been addressed and the patient was agreeable with the plan of care as outlined above. The patient has our office contact information at [phone], and knows to call with any questions, concerns, or changes in condition.     Nette Staley DNP  Neurosurgery Nurse Practitioner  [facility]  [phone]

## 2021-06-08 NOTE — PROGRESS NOTES
"NEUROSURGERY CLINIC NOTE       Reason for Visit:              Follow Up neck pain and bilateral upper extremity symptoms         History of Presenting Illness:   Jessica Luque is seen in follow up for her neck pain and bilateral upper extremity symptoms.   Brief HPI-   Neck pain-  She states neck pain was \"constant\" for awhile, but now slightly improved.   She did physical therapy with [facility]  (Left shoulder and posterior shoulder)  She describes Shooting pain down the neck into upper back.   Keeps her awake at night   Could get to pain rating of 8/10   Increased pain/symptoms w/certain neck positions  Can get radiating pain down both arms just to elbow (L > R)  She has baseline neuropathy  But no new increased numbness /tingling in hands /fingers.   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    Things are \"Really good\" today.       Examination:   There were no vitals taken for this visit.      Neurological Assessment:    Video examination  General    Alert, cooperative.  No acute distress  Pulmonary:   Breathing comfortably on room air. No cough, or shortness of breath  Skin:    Visible skin without lesions or obvious rash  Speech is fluent  Maintains eye contact  Musculoskeletal:    Moving extremities freely with good strength  Able to raise arms above head  She has excellent and full cervical range of motion   Bilateral upper extremity is 5/5 including deltoid, bicep, tricep,   Wrist flexion/extension,  and intrinsic musculature.   Negative Tinels  Negative Phalen's  She is not tender to palpation of ulnar nerve/groove at the elbow bilaterally.   Negative ankle clonus  No hyper-reflexia    IMAGING:  MR CERVICAL SPINE W/O CONTRAST 6/4/2021 5:34 PM     Comparison: Cervical spine MRI dated 9/5/2018    Findings:  Straightening of cervical necrosis. The cervical vertebrae are  normally aligned. Degenerative " changes of cervical spine multilevel  osteophytic spurring and disc space narrowing extending from C3-4  through C6-7.  There is likely artifactual T2 signal within the spinal  cord at the level of C3-4, C4-5 and C5-6. Focal fat deposition at C7  and T1, not significant changed from prior. Degenerative bone marrow  edema noted in the opposing endplate of C3-4. The findings on a level  by level basis are as follows:     C2-3:  Facet arthropathy bilaterally. No significant spinal canal  stenosis. Mild left neural foraminal narrowing. Right neuroforamen is  patent.     C3-4:  Uncovertebral hypertrophy and facet arthropathy bilaterally.  Moderate spinal canal stenosis. Moderate to severe bilateral  neuroforaminal narrowing. Not significantly changed from 9/5/2018.     C4-5:  Disc osteophyte complex, uncovertebral hypertrophy and facet  arthropathy bilaterally. Moderate spinal canal stenosis. Moderate  right greater than left neural foraminal narrowing. Overall not  significantly changed from 9/5/2018.     C5-6:  Disc osteophyte complex, uncovertebral hypertrophy and facet  arthropathy bilaterally. Moderate spinal canal stenosis. Severe right,  moderate left neural foraminal narrowing. Overall not significantly  changed from 9/5/2018.      C6-7:  Disc osteophyte complex, uncovertebral hypertrophy and facet  arthropathy bilaterally. Mild spinal canal stenosis. Mild to moderate  bilateral neuroforaminal narrowing. Overall not significantly changed  from 9/5/2018.     C7-T1:  Disc osteophyte complex, uncovertebral hypertrophy and facet  arthropathy bilaterally. Mild spinal canal stenosis. Mild to moderate  right neuroforaminal narrowing. Overall not significantly changed from  9/5/2018.     No abnormality of the paraspinous soft tissues.                                                                      Impression: Little change in mid cervical spine cervical spondylosis  with moderate spinal canal stenosis at C3-4  through C5-6. Severe right  neuroforaminal narrowing at C5-6. Additional neuroforaminal narrowing  detailed as above. No convincing myelopathic signal.     I have personally reviewed the examination and initial interpretation  and I agree with the findings.     DENISE WALLACE MD      Assessment:   ~Neck pain   ~Bilateral upper extremity radiating pain to elbow L >R  ~Cervical spine cervical spondylosis  ~Moderate spinal canal stenosis at C3-4 through C5-6  ~Severe right neuroforaminal narrowing at C5-6  ~Disc osteophyte complex - multi-level  ~Facet arthropathy, C2-C3, C3-C4, C4-C5       Plan:   ~Will obtain bilateral upper extremity EMG to evaluate for cervical nerve involvement   ~Wishes to continue to avoid surgical intervention   ~Can consider spinal injections, and/or repeat/continued PT (cervical traction), or home unit  ~Will follow up by phone following EMG nerve study         At the end of the visit, all the patient's questions and concerns had been addressed and the patient was agreeable with the plan of care as outlined above. The patient has our office contact information at [phone], and knows to call with any questions, concerns, or changes in condition.       Nette Staley DNP  Neurosurgery Nurse Practitioner  [facility]  [phone]

## 2021-06-08 NOTE — PROGRESS NOTES
ASSESSMENT:    #1.  Follow-up for hospitalization for diarrhea and acute renal failure in a recent liver transplant patient    #2.  Possible cystitis    PLAN:  Check a BMP and a urinalysis.  I will call her with results and further recommendations.  Problem List Items Addressed This Visit     None          There are no discontinued medications.    No Follow-up on file.    There are no Patient Instructions on file for this visit.    CHIEF COMPLAINT:  Chief Complaint   Patient presents with     Follow-up     [facility] f/u acute renal failure 1/9-1/12     All medications have been reconciled.     HISTORY OF PRESENT ILLNESS:  Phan Luque is a 61 y.o. female  presenting to the clinic today for follow up from her recent hospitalization. She presented to the ED at [facility] on January 9 for evaluation of an elevated creatinine of 3.03 earlier that day at [place] during her regular s/p liver transplant monitoring labwork. She reported being nauseated, lightheaded, and short of breath while walking up stairs in the days prior to her presentation. She received labs in the ED which indicated elevated creatinine and BUN levels of 3.36 and elevated of 27. She received an EKG in the ED which indicated normal sinus rhythm. She was diagnosed with acute renal failure at that time and was admitted for fluid administration and further workup. Her renal function improved to baseline after fluid administration, with a creatinine of 1.19 on January 12. Her urinary output was stable on January 12. She was discharged in stable condition on January 12 and she was instructed to follow up with her primary medicine physician and the [facility] in one week. Of note, Prograf was held during her admission but was resumed at 1 mg BID upon discharge. Additionally, she was started on atenolol 50 mg daily for elevated blood pressure during her admission.     Hypertension: She felt dizzy and short of breath yesterday. She inquires if  she should hold a dosage of 100 mg of atenolol if she monitors her blood pressure as low after developing dizziness and/or shortness of breath.     S/P liver transplant: She received a liver transplant from Dr. Dhillon at the [facility] 8/25/16. She was in the TCU from 9/14/16-1/5/17 after discharge from the [facility] after receiving transplant. She receives home nursing follow ups twice weekly. She follows with Dr. Dhillon at the Liver Transplant Clinic at the [facility] once per month. She denies consuming any alcohol since receiving her liver transplant in August.     REVIEW OF SYSTEMS:   She has mild pelvic pain. She requests receiving a urinalysis for evaluation of a possible UTI. Her diarrhea resolved with Imodium and psyllium during her admission. She has occasional joint and muscle pains. All other systems are negative.    PFSH:  She has a notable history of being diagnosed with intercystitis.  Allergies   Allergen Reactions     Hydrocodone-Acetaminophen Itching     Oxycodone Itching     Cefaclor Hives     Diphenhydramine Hcl Hives     Childhood rxn     Penicillins Hives     Sulfa (Sulfonamide Antibiotics) Hives       TOBACCO USE:  History   Smoking Status     Former Smoker     Quit date: 1/1/1996   Smokeless Tobacco     Not on file     Comment: quit December 1996       VITALS:  There were no vitals filed for this visit.  Wt Readings from Last 3 Encounters:   01/12/17 165 lb (74.8 kg)   07/28/16 200 lb 2 oz (90.8 kg)   07/27/16 201 lb (91.2 kg)       PHYSICAL EXAM:  Constitutional:  Reveals an alert and oriented x3, pleasant female with no acute distress.   CV: S1, S2, regular rate and rhythm, no murmurs, gallops, or rubs   LUNGS: Clear to auscultation bilaterally   EXTREMITIES: No pedal swelling bilaterally.       ADDITIONAL HISTORY SUMMARIZED (FROM OLD RECORDS OR HISTORY FROM SOMEONE OTHER THAN THE PATIENT OR ANOTHER HEALTHCARE PROVIDER), COLONOSCOPY (2 TOTAL):  Reviewed discharge summary from Dr. Salas on January 12 regarding acute renal failure.     DECISION TO OBTAIN EXTRA INFORMATION (OLD RECORDS REQUESTED OR HISTORY FROM ANOTHER PERSON OR ACCESSING CARE EVERYWHERE) (1 TOTAL):none    RADIOLOGY TESTS SUMMARIZED OR ORDERED (XRAY/CT/MRI/DXA/MAMMO) (1 TOTAL): none    LABS REVIEWED OR ORDERED (1 TOTAL): Reviewed labs from admission January 9-12.    MEDICINE TESTS SUMMARIZED OR ORDERED (EKG/ECHO/EGD) (1 TOTAL): Reviewed EKG report from January 9.     INDEPENDENT REVIEW OF EKG OR X-RAY (2 EACH): none      The visit lasted a total of 13 minutes face to face with the patient. Over 50% of the time was spent counseling and educating the patient about acute renal failure.    IAlexis, am scribing for and in the presence of, Dr. Santosh Salgado.    I, Dr. Santosh Salgado, personally performed the services described in this documentation, as scribed by Alexis Davis in my presence, and it is both accurate and complete.    MEDICATIONS:  Current Outpatient Prescriptions   Medication Sig Dispense Refill     acetaminophen (TYLENOL) 325 MG tablet Take 650 mg by mouth every 4 (four) hours as needed.       albuterol (PROVENTIL HFA;VENTOLIN HFA) 90 mcg/actuation inhaler Inhale 2 puffs every 6 (six) hours as needed for wheezing.       atenolol (TENORMIN) 50 MG tablet Take 1 tablet (50 mg total) by mouth daily. 30 tablet 0     azaTHIOprine (IMURAN) 100 mg tablet Take 150 mg by mouth bedtime.       busPIRone (BUSPAR) 10 MG tablet Take 10 mg by mouth 2 (two) times a day.       cyanocobalamin (VITAMIN B-12) 1000 MCG tablet Take 1,000 mcg by mouth daily.       folic acid (FOLVITE) 1 MG tablet Take 1 tablet (1 mg total) by mouth daily. 30 tablet 0     levothyroxine (SYNTHROID, LEVOTHROID) 88 MCG tablet Take 88 mcg by mouth daily.       loperamide (IMODIUM) 2 mg capsule Take 2 mg by mouth 4 (four) times a day as needed for diarrhea.       magnesium oxide (MAG-OX) 400 mg tablet Take 400 mg by mouth  2 (two) times a day.       multivitamin therapeutic (THERAGRAN) tablet Take 1 tablet by mouth every evening.       pantoprazole (PROTONIX) 40 MG tablet Take 40 mg by mouth daily.       psyllium (METAMUCIL) 0.52 gram capsule Take 1.04 g by mouth daily.       sertraline (ZOLOFT) 100 MG tablet Take 150 mg by mouth daily.       simethicone (MYLICON) 80 MG chewable tablet Chew 80 mg 4 (four) times a day as needed.       sulfamethoxazole-trimethoprim (SEPTRA) 400-80 mg per tablet Take 1 tablet by mouth 2 (two) times a week. Take on Mondays and Thursdays       tacrolimus (PROGRAF) 1 MG capsule Take 1 capsule (1 mg total) by mouth every 12 (twelve) hours.  0     traZODone (DESYREL) 100 MG tablet Take 100 mg by mouth bedtime.       ursodiol (ACTIGALL) 300 mg capsule Take 300 mg by mouth 2 (two) times a day.        No current facility-administered medications for this visit.        Total data points: 4

## 2021-06-08 NOTE — NURSING NOTE
Chief Complaint   Patient presents with     RECHECK     [facility] RETURN - 2 WK F/U - CERVICAL STENOSIS     Akira Shepard

## 2021-06-08 NOTE — PATIENT INSTRUCTIONS
~Will obtain bilateral upper extremity EMG to evaluate for cervical nerve involvement   ~Wishes to continue to avoid surgical intervention   ~Can consider spinal injections, and/or repeat/continued PT (cervical traction), or home unit  ~Will follow up by phone following EMG nerve study

## 2021-06-09 NOTE — PROGRESS NOTES
Assessment/Plan:   Dysuria  Acute cystitis without hematuria  3-4 days of dysuria and increased frequency and urgency consistent with UTI sxs in the past. UA suspicious for infection. No fever, flank or abdominal pain, N/V/D to suggest systemic illness. She is immunosuppressed and has several allergies as well as renal insufficiency. She has tolerated Cipro in the past for UTI. UCs have sometimes been resistant to macrobid. Has had history of interstitial cystitis 8-9 years ago which resolved. We will treat with cipro pending UC. Pyridium as needed.   I discussed red flag symptoms, return precautions to clinic/ER and follow up care with patient/parent.  Expected clinical course, symptomatic cares advised. Questions answered. Patient/parent amenable with plan.  - Urinalysis-UC if Indicated  - Culture, Urine  - phenazopyridine (PYRIDIUM) 100 MG tablet; Take 1-2 tabs three times a day as needed for bladder pain.  Dispense: 30 tablet; Refill: 0  - ciprofloxacin HCl (CIPRO) 250 MG tablet; Take 1 tablet (250 mg total) by mouth 2 (two) times a day for 7 days.  Dispense: 14 tablet; Refill: 0    Cipro twice a day for 7 days  Drink water  Pyridium as needed for bladder pain  Urine culture pending - we will call if a change is needed.   Recheck if 3 days if no better.     Patient wore mask throughout the visit  Rooming staff and provider wore mask, face shield and gloves during the exam.     Subjective:      Phan Luque is a 64 y.o. female who is s/p liver transplant and presents with 3-4 days of dysuria and urinary urgency. initally this was off and on but getting more persistent today. She has burning with urination, bladder spasms and suprapubic discomfort. No LBP or flank pain, no fever or chills, no N/V/D or constipation. She feels well otherwise. No blood in urine but it has been cloudy.   She has history of UTI, last one about 2 years ago. Usually has one every 1-2 years. Did have a spell of interstitial cystitis 8  years ago that has resolved. Has renal insufficiency in addition to be chronically immunosuppressed. Kidney function has been stable.   No CP or shortness of breath, no cough or leg swelling or calf pain. No URI or ST. No myalgia or fatigue. Feels a little under the weather today.   No known ill contacts.   Has been isolating at home for the most part this spring and summer during the pandemic and denies any high risk covid exposures.    Former smoker.     Allergies   Allergen Reactions     Cefaclor Hives     Codeine Hives and Itching     Diphenhydramine Hives     Hydrocodone-Acetaminophen Itching     Other Allergy (See Comments) Hives     Oxycodone Itching     Penicillins Hives and Rash     Diphenhydramine Hcl Hives     Childhood rxn     Hydrocodone Itching     Sulfa (Sulfonamide Antibiotics) Hives     Current Outpatient Medications on File Prior to Visit   Medication Sig Dispense Refill     acetaminophen (TYLENOL) 325 MG tablet Take 650 mg by mouth every 4 (four) hours as needed.       amLODIPine (NORVASC) 5 MG tablet Take 5 mg by mouth.       calcium citrate-vitamin D (CITRACAL+D) 315-200 mg-unit per tablet Take 1 tablet by mouth 2 (two) times a day.       chlorthalidone (HYGROTEN) 25 MG tablet Take 12.5 mg by mouth daily.  0     cyanocobalamin (VITAMIN B-12) 1000 MCG tablet Take 1,000 mcg by mouth daily.       folic acid (FOLVITE) 1 MG tablet Take 1 tablet (1 mg total) by mouth daily. 30 tablet 0     gabapentin (NEURONTIN) 300 MG capsule Take 300 mg by mouth.       hydrOXYzine HCl (ATARAX) 25 MG tablet TAKE ONE TABLET BY MOUTH THREE TIMES A DAY AS NEEDED FOR ITCHING. 270 tablet 1     levothyroxine (SYNTHROID, LEVOTHROID) 88 MCG tablet TAKE ONE TABLET BY MOUTH EVERY DAY 90 tablet 3     MAGNESIUM CHLORIDE (SLOW-MAG ORAL) Take 143 mg by mouth daily. Pt takes 2       multivitamin therapeutic (THERAGRAN) tablet Take 1 tablet by mouth every evening.       pantoprazole (PROTONIX) 40 MG tablet Take 40 mg by mouth daily.        psyllium (METAMUCIL) 0.52 gram capsule Take 2.08 g by mouth daily.        tacrolimus (PROGRAF) 1 MG capsule Take 1 capsule (1 mg total) by mouth every 12 (twelve) hours. (Patient taking differently: Take 1.5 mg by mouth every 12 (twelve) hours. )  0     ursodiol (ACTIGALL) 300 mg capsule Take 300 mg by mouth 2 (two) times a day.        albuterol (PROAIR HFA;PROVENTIL HFA;VENTOLIN HFA) 90 mcg/actuation inhaler Inhale 2 puffs every 6 (six) hours as needed for wheezing. 8.5 g 5     azaTHIOprine (IMURAN) 50 mg tablet Take 1 tablet (50 mg total) by mouth at bedtime.  0     betaxolol (BETOPTIC-S) 0.5 % ophthalmic suspension Apply 1 drop to eye 2 (two) times a day.       fexofenadine (ALLEGRA) 180 MG tablet Take 180 mg by mouth daily.       latanoprost (XALATAN) 0.005 % ophthalmic solution Administer 1 drop to both eyes at bedtime.       loperamide (IMODIUM) 2 mg capsule Take 2 mg by mouth 4 (four) times a day as needed for diarrhea.       magnesium oxide (MAG-OX) 400 mg tablet Take 400 mg by mouth 2 (two) times a day.       methylPREDNISolone (MEDROL DOSEPACK) 4 mg tablet TK UTD  0     pramipexole (MIRAPEX) 0.5 MG tablet Take 0.5 mg by mouth at bedtime.  0     predniSONE (DELTASONE) 5 MG tablet Take 1.5 tablets (7.5 mg total) by mouth daily. 15 tablet 0     simethicone (MYLICON) 80 MG chewable tablet Chew 80 mg 4 (four) times a day as needed.       tiZANidine (ZANAFLEX) 4 MG tablet Take 4 mg by mouth at bedtime.  0     traMADol (ULTRAM) 50 mg tablet TAKE ONE TABLET (50MG) BY MOUTH EVERY 6 HOURS AS NEEDED FOR PAIN 90 tablet 1     traMADol (ULTRAM) 50 mg tablet Take 1 tablet (50 mg total) by mouth every 6 (six) hours as needed for pain. 6 tablet 0     traZODone (DESYREL) 100 MG tablet TAKE ONE AND ONE-HALF TABLETS BY MOUTH NIGHTLY AT BEDTIME 45 tablet 1     viscous lidocaine HC (LIDOCAINE) 2 % Soln viscous solution Take 15 mL by mouth every 3 (three) hours as needed (for oral pain). Swish and spit. 200 mL 1     No  current facility-administered medications on file prior to visit.      Patient Active Problem List   Diagnosis     Asthma     Alcohol abuse     Acute renal failure superimposed on stage 3 chronic kidney disease (H)     Liver transplant recipient (H)     Esophageal reflux     Hypothyroidism     HTN (hypertension)     Anemia     Insomnia     Liver transplant status (H)     Symptomatic menopausal or female climacteric states     Rosacea     Other atopic dermatitis and related conditions     Major depressive disorder, recurrent episode, moderate (H)     Arthropathy     Anxiety state     Allergic rhinitis       Objective:     /72 (Patient Site: Left Arm, Patient Position: Sitting, Cuff Size: Adult Large)   Pulse 87   Temp 98.4  F (36.9  C) (Oral)   Resp 16   Wt 204 lb (92.5 kg)   SpO2 96%   BMI 31.95 kg/m      Physical  General Appearance: Alert, cooperative, no distress, AVSS  Head: Normocephalic, without obvious abnormality, atraumatic  Eyes: Conjunctivae are normal.   Abdomen: Soft, non-distended, non-tender, no masses, no CVA tenderness with percussion  Psychiatric: Patient has a normal mood and affect.        Recent Results (from the past 24 hour(s))   Urinalysis-UC if Indicated   Result Value Ref Range    Color, UA Yellow Colorless, Yellow, Straw, Light Yellow    Clarity, UA Clear Clear    Glucose, UA Negative Negative    Bilirubin, UA Negative Negative    Ketones, UA Negative Negative    Specific Gravity, UA 1.010 1.005 - 1.030    Blood, UA Trace (!) Negative    pH, UA 5.5 5.0 - 8.0    Protein, UA Negative Negative mg/dL    Urobilinogen, UA 0.2 E.U./dL 0.2 E.U./dL, 1.0 E.U./dL    Nitrite, UA Negative Negative    Leukocytes, UA Small (!) Negative    Bacteria, UA Few (!) None Seen hpf    RBC, UA 0-2 None Seen, 0-2 hpf    WBC, UA 25-50 (!) None Seen, 0-5 hpf    Squam Epithel, UA 0-5 None Seen, 0-5 lpf    WBC Clumps Present (!) None Seen

## 2021-06-09 NOTE — PATIENT INSTRUCTIONS - HE
Cipro twice a day for 7 days  Drink water  Pyridium as needed for bladder pain  Urine culture pending - we will call if a change is needed.

## 2021-06-09 NOTE — PROGRESS NOTES
ASSESSMENT:    #1.  Anemia, likely anemia of chronic disease.  This is currently being treated at the [facility].    #2.  Chronic renal insufficiency, mild    PLAN:  She states that she is likely going to go on Aranesp in the next couple of weeks.  Things are otherwise stable.  Continue current medication regimen.  Follow-up with us as needed.  Problem List Items Addressed This Visit     None          There are no discontinued medications.    No Follow-up on file.    There are no Patient Instructions on file for this visit.    CHIEF COMPLAINT:  Chief Complaint   Patient presents with     Follow-up     Anemia & seeing kidney doctor at [facility]; what else does she need to do before est care appointment with [facility]       HISTORY OF PRESENT ILLNESS:  Phan Luque is a 61 y.o. female presenting to the clinic today for anemia follow up. She presented to the Anemia Clinic at the [facility] on March 22 for evaluation, at which time she was recommended to begin Aranesp. She is planning to begin Aranesp in the upcoming weeks. She has also presented to her Dr. Erazo in nephrology at the [facility] for follow up in recent weeks, who is monitoring her kidney function. She received labs in our clinic yesterday, which indicated a hemoglobin level of 9.3.      Depression: She is currently taking 10 mg of Buspar BID, 150 mg of Zoloft daily, and 100 mg of trazodone qhs for her depression. She follows with a psychiatrist at [facility]. She would like to discontinue one or more of her anti-depressive medications in the upcoming weeks. She is agreeable to discussing this further with her psychiatrist.     Pain management: She takes 50 mg of tramadol as needed every 6 hours for pain management. She clarifies that she takes 50 mg of tramadol per night and occasionally takes one dosage during the day as needed. Her tramadol is followed by Dr. Banegas in hepatology at the [facility]  [place].     REVIEW OF SYSTEMS:   She has continued to experience regular fatigue and dizziness in recent weeks, which she suspects is secondary to anemia.All other systems are negative.    PFSH:  Allergies   Allergen Reactions     Hydrocodone-Acetaminophen Itching     Oxycodone Itching     Cefaclor Hives     Diphenhydramine Hcl Hives     Childhood rxn     Penicillins Hives     Sulfa (Sulfonamide Antibiotics) Hives       TOBACCO USE:  History   Smoking Status     Former Smoker     Quit date: 1/1/1996   Smokeless Tobacco     Not on file     Comment: quit December 1996       VITALS:  Vitals:    03/30/17 1033   BP: 102/64   Patient Site: Right Arm   Patient Position: Sitting   Cuff Size: Adult Regular   Pulse: 96   Resp: 20   Temp: 98.3  F (36.8  C)   TempSrc: Oral   Weight: 153 lb 6.4 oz (69.6 kg)     Wt Readings from Last 3 Encounters:   03/30/17 153 lb 6.4 oz (69.6 kg)   03/07/17 160 lb 14.4 oz (73 kg)   02/27/17 160 lb 1.6 oz (72.6 kg)         PHYSICAL EXAM:  Constitutional:  Reveals an alert and oriented x3, pleasant female with no acute distress.       ADDITIONAL HISTORY SUMMARIZED (FROM OLD RECORDS OR HISTORY FROM SOMEONE OTHER THAN THE PATIENT OR ANOTHER HEALTHCARE PROVIDER), COLONOSCOPY (2 TOTAL): none    DECISION TO OBTAIN EXTRA INFORMATION (OLD RECORDS REQUESTED OR HISTORY FROM ANOTHER PERSON OR ACCESSING CARE EVERYWHERE) (1 TOTAL):none    RADIOLOGY TESTS SUMMARIZED OR ORDERED (XRAY/CT/MRI/DXA/MAMMO) (1 TOTAL): none    LABS REVIEWED OR ORDERED (1 TOTAL): Reviewed labs from yesterday including 2.    MEDICINE TESTS SUMMARIZED OR ORDERED (EKG/ECHO/EGD) (1 TOTAL): none    INDEPENDENT REVIEW OF EKG OR X-RAY (2 EACH): none      The visit lasted a total of 12 minutes face to face with the patient. Over 50% of the time was spent counseling and educating the patient about anemia.    Alexis REDDING, am scribing for and in the presence of, Dr. Santosh Salgado.    Dr. Santosh REDDING, personally performed the services  described in this documentation, as scribed by Alexis Davis in my presence, and it is both accurate and complete.    MEDICATIONS:  Current Outpatient Prescriptions   Medication Sig Dispense Refill     acetaminophen (TYLENOL) 325 MG tablet Take 650 mg by mouth every 4 (four) hours as needed.       azaTHIOprine (IMURAN) 50 mg tablet Take 150 mg by mouth daily.        busPIRone (BUSPAR) 10 MG tablet Take 10 mg by mouth 2 (two) times a day.       cyanocobalamin (VITAMIN B-12) 1000 MCG tablet Take 1,000 mcg by mouth daily.       folic acid (FOLVITE) 1 MG tablet Take 1 tablet (1 mg total) by mouth daily. 30 tablet 0     levothyroxine (SYNTHROID, LEVOTHROID) 88 MCG tablet Take 88 mcg by mouth daily.       loperamide (IMODIUM) 2 mg capsule Take 2 mg by mouth 4 (four) times a day as needed for diarrhea.       multivitamin therapeutic (THERAGRAN) tablet Take 1 tablet by mouth every evening.       pantoprazole (PROTONIX) 40 MG tablet Take 40 mg by mouth daily.       psyllium (METAMUCIL) 0.52 gram capsule Take 1.04 g by mouth daily.       sertraline (ZOLOFT) 100 MG tablet Take 150 mg by mouth daily.       simethicone (MYLICON) 80 MG chewable tablet Chew 80 mg 4 (four) times a day as needed.       tacrolimus (PROGRAF) 1 MG capsule Take 1 capsule (1 mg total) by mouth every 12 (twelve) hours. (Patient taking differently: Take 1.5 mg by mouth every 12 (twelve) hours. )  0     traMADol (ULTRAM) 50 mg tablet Take 1 tablet (50 mg total) by mouth every 6 (six) hours as needed for pain. 30 tablet 0     traZODone (DESYREL) 100 MG tablet Take 1 tablet (100 mg total) by mouth bedtime. 30 tablet 0     ursodiol (ACTIGALL) 300 mg capsule Take 300 mg by mouth 2 (two) times a day.        albuterol (PROVENTIL HFA;VENTOLIN HFA) 90 mcg/actuation inhaler Inhale 2 puffs every 6 (six) hours as needed for wheezing.       atenolol (TENORMIN) 50 MG tablet Take 1 tablet (50 mg total) by mouth daily. (Patient taking differently: Take 50 mg by mouth  daily. ) 30 tablet 0     calcium citrate-vitamin D (CITRACAL+D) 315-200 mg-unit per tablet Take 1 tablet by mouth 2 (two) times a day.       hydrOXYzine (ATARAX) 25 MG tablet Take 25 mg by mouth 3 (three) times a day as needed for itching.       magnesium oxide (MAG-OX) 400 mg tablet Take 400 mg by mouth 2 (two) times a day.       traZODone (DESYREL) 100 MG tablet Take 100 mg by mouth bedtime.       No current facility-administered medications for this visit.        Total data points: 1

## 2021-06-09 NOTE — PROGRESS NOTES
ASSESSMENT:    Persistent fatigue ×2 months along with polyarthralgias    PLAN:  Check a CBC, CMP, TSH, CRP, sed rate, Lyme titer, rheumatoid factor, PATRICIO cascade, anti-CCP antibody, and vitamin D level.  I will call her with results and further recommendations.  Problem List Items Addressed This Visit     None          There are no discontinued medications.    No Follow-up on file.    There are no Patient Instructions on file for this visit.    CHIEF COMPLAINT:  Chief Complaint   Patient presents with     Fatigue      very tired all day x 2/mo's       HISTORY OF PRESENT ILLNESS:  Phan Luque is a 61 y.o. female presenting to the clinic today with generalized arthralgias, bodily itching, and fatigue. She has experienced all 3 of the aforementioned symptoms on a regular basis for multiple months. Her symptoms have all worsened since first presentation. Her arthralgias present in her knees, hips, shoulders, and neck- all bilaterally. She notes that she has also developed soreness and fatigue in the muscles which control her eye movement over the last month.     Recent hypercalcemia: She consulted with her endocrinologist at the [facility], Dr. Oakley, on February 13 for follow up for her recent hypercalcemia during her recent hospitalization January 9-12.  Dr. Oakley ordered updated lab work and an updated DXA scan at that time for further workup.     Hypothyroidism: Of note, she reports that her T4 level was last monitored at 0.87 on February 16 at the [facility].     S/P liver transplant: She presents a log of her recent lab work results from the liver transplant clinic.     REVIEW OF SYSTEMS:   She occasionally develops mild chest pains. She denies dyspnea or shortness of breath with exertion. She has developed tenderness over the entirety of her abdomen recently. She denies any blood in her stool, black tarry stools, or bilateral lower extremity swelling. She began developing mild  triggering of her left thumb during her recent hospitalization from January 9-12. She has developed multiple minor masses over her shins bilaterally in recent months. She presents one of her shin masses for evaluation. All other systems are negative.    PFSH:  Allergies   Allergen Reactions     Hydrocodone-Acetaminophen Itching     Oxycodone Itching     Cefaclor Hives     Diphenhydramine Hcl Hives     Childhood rxn     Penicillins Hives     Sulfa (Sulfonamide Antibiotics) Hives       TOBACCO USE:  History   Smoking Status     Former Smoker     Quit date: 1/1/1996   Smokeless Tobacco     Not on file     Comment: quit December 1996       VITALS:  Vitals:    02/27/17 1510   BP: 106/70   Patient Site: Right Arm   Patient Position: Sitting   Cuff Size: Adult Regular   Pulse: 80   Weight: 160 lb 1.6 oz (72.6 kg)     Wt Readings from Last 3 Encounters:   02/27/17 160 lb 1.6 oz (72.6 kg)   01/27/17 161 lb (73 kg)   01/19/17 159 lb 6.4 oz (72.3 kg)         PHYSICAL EXAM:  Constitutional:  Reveals an alert and oriented x3, pleasant female with no acute distress.   HEENT: Pupils are equal, round, and reactive to light; mild conjunctival pallor.   LYMPHATIC: No anterior or posterior lymphadenopathy   NECK: No palpable thyroid nodules.   CV: S1, S2, regular rate and rhythm, no murmurs, gallops, or rubs   LUNGS: Clear to auscultation bilaterally   ABDOMEN: Soft, non-tender, non-distended   EXTREMITIES: No pedal swelling bilaterally.   MUSCULOSKELETAL: No joint swelling or synovial thickening of the joints of hands and wrists bilaterally      ADDITIONAL HISTORY SUMMARIZED (FROM OLD RECORDS OR HISTORY FROM SOMEONE OTHER THAN THE PATIENT OR ANOTHER HEALTHCARE PROVIDER), COLONOSCOPY (2 TOTAL): none    DECISION TO OBTAIN EXTRA INFORMATION (OLD RECORDS REQUESTED OR HISTORY FROM ANOTHER PERSON OR ACCESSING CARE EVERYWHERE) (1 TOTAL):none    RADIOLOGY TESTS SUMMARIZED OR ORDERED (XRAY/CT/MRI/DXA/MAMMO) (1 TOTAL): none    LABS REVIEWED OR  ORDERED (1 TOTAL): Labs ordered and reviewed.    MEDICINE TESTS SUMMARIZED OR ORDERED (EKG/ECHO/EGD) (1 TOTAL): none    INDEPENDENT REVIEW OF EKG OR X-RAY (2 EACH): none      The visit lasted a total of 11 minutes face to face with the patient. Over 50% of the time was spent counseling and educating the patient about arthralgias and fatigue.    I, Alexis Davis, am scribing for and in the presence of, Dr. Santosh Salgado.    I, Dr. Santosh Salgado, personally performed the services described in this documentation, as scribed by Alexis Davis in my presence, and it is both accurate and complete.    MEDICATIONS:  Current Outpatient Prescriptions   Medication Sig Dispense Refill     acetaminophen (TYLENOL) 325 MG tablet Take 650 mg by mouth every 4 (four) hours as needed.       albuterol (PROVENTIL HFA;VENTOLIN HFA) 90 mcg/actuation inhaler Inhale 2 puffs every 6 (six) hours as needed for wheezing.       atenolol (TENORMIN) 50 MG tablet Take 1 tablet (50 mg total) by mouth daily. (Patient taking differently: Take 50 mg by mouth daily. ) 30 tablet 0     busPIRone (BUSPAR) 10 MG tablet Take 10 mg by mouth 2 (two) times a day.       cyanocobalamin (VITAMIN B-12) 1000 MCG tablet Take 1,000 mcg by mouth daily.       folic acid (FOLVITE) 1 MG tablet Take 1 tablet (1 mg total) by mouth daily. 30 tablet 0     levothyroxine (SYNTHROID, LEVOTHROID) 88 MCG tablet Take 88 mcg by mouth daily.       loperamide (IMODIUM) 2 mg capsule Take 2 mg by mouth 4 (four) times a day as needed for diarrhea.       magnesium oxide (MAG-OX) 400 mg tablet Take 400 mg by mouth 2 (two) times a day.       multivitamin therapeutic (THERAGRAN) tablet Take 1 tablet by mouth every evening.       pantoprazole (PROTONIX) 40 MG tablet Take 40 mg by mouth daily.       psyllium (METAMUCIL) 0.52 gram capsule Take 1.04 g by mouth daily.       simethicone (MYLICON) 80 MG chewable tablet Chew 80 mg 4 (four) times a day as needed.       tacrolimus (PROGRAF) 1 MG capsule  Take 1 capsule (1 mg total) by mouth every 12 (twelve) hours. (Patient taking differently: Take 1.5 mg by mouth every 12 (twelve) hours. )  0     traMADol (ULTRAM) 50 mg tablet Take 50 mg by mouth every 6 (six) hours as needed for pain.       traZODone (DESYREL) 100 MG tablet Take 100 mg by mouth bedtime.       traZODone (DESYREL) 100 MG tablet Take 1 tablet (100 mg total) by mouth bedtime. 30 tablet 0     ursodiol (ACTIGALL) 300 mg capsule Take 300 mg by mouth 2 (two) times a day.        No current facility-administered medications for this visit.        Total data points: 1

## 2021-06-09 NOTE — PROGRESS NOTES
ASSESSMENT:    Anemia, likely causing her LEE, but without current etiology known.    PLAN:  Check an ITS panel, ferritin, B12, folate, MMA level, peripheral smear, and retic count.  I will call her with results and further recommendations.  Problem List Items Addressed This Visit     None          There are no discontinued medications.    No Follow-up on file.    There are no Patient Instructions on file for this visit.    CHIEF COMPLAINT:  Chief Complaint   Patient presents with     Follow-up     discuss lab results       HISTORY OF PRESENT ILLNESS:  Phan Luque is a 61 y.o. female presenting to the clinic today for follow up from her recent lab work from February 27. She majority of her lab work from February 27 was unremarkable, including the following: rheumatoid factor quant, CCP antibodies, Lyme's antibody cascade, PATRICIO, sed rate, CRP, TSH, vitamin D, and CMP; her creatinine was mildly increased but stable at 1.40 on February 27. However, her hemoglobin level was decreased at 9.6 at that time.     S/P liver transplant: She has continued to follow with Dr. Dhillon at the Liver Transplant Clinic at the [facility], who recently instructed her to decrease her frequency of follow up from once every month to once every other month. Dr. Dhillon also instructed her to decrease her frequency of lab draws at [facility] from once every week to once every two weeks.     Chronic kidney disease: She follows with Dr. Erazo in nephrology at the [facility] for her chronic kidney disease.     REVIEW OF SYSTEMS:   She has continued to experience generalized arthralgias, bodily itching, and fatigue since her last office visit on February 27. All other systems are negative.    PFSH:  She was drinking 3-4 alcoholic beverages per night before being diagnosed with liver disease and receiving a liver transplant. She clarifies that she did not experience alcohol withdrawal after  discontinuing alcohol consumption cold turkey before her liver transplant. She has a history of bilateral eye weakness, which she was previously informed was the result of anemia and malnutrition.   Allergies   Allergen Reactions     Hydrocodone-Acetaminophen Itching     Oxycodone Itching     Cefaclor Hives     Diphenhydramine Hcl Hives     Childhood rxn     Penicillins Hives     Sulfa (Sulfonamide Antibiotics) Hives       TOBACCO USE:  History   Smoking Status     Former Smoker     Quit date: 1/1/1996   Smokeless Tobacco     Not on file     Comment: quit December 1996       VITALS:  Vitals:    03/07/17 1520   Weight: 160 lb 14.4 oz (73 kg)     Wt Readings from Last 3 Encounters:   03/07/17 160 lb 14.4 oz (73 kg)   02/27/17 160 lb 1.6 oz (72.6 kg)   01/27/17 161 lb (73 kg)         PHYSICAL EXAM:  Constitutional:  Reveals an alert and oriented x3, pleasant female with no acute distress.       ADDITIONAL HISTORY SUMMARIZED (FROM OLD RECORDS OR HISTORY FROM SOMEONE OTHER THAN THE PATIENT OR ANOTHER HEALTHCARE PROVIDER), COLONOSCOPY (2 TOTAL): none    DECISION TO OBTAIN EXTRA INFORMATION (OLD RECORDS REQUESTED OR HISTORY FROM ANOTHER PERSON OR ACCESSING CARE EVERYWHERE) (1 TOTAL):none    RADIOLOGY TESTS SUMMARIZED OR ORDERED (XRAY/CT/MRI/DXA/MAMMO) (1 TOTAL): none    LABS REVIEWED OR ORDERED (1 TOTAL): Reviewed lab results from February 27. Labs ordered.     MEDICINE TESTS SUMMARIZED OR ORDERED (EKG/ECHO/EGD) (1 TOTAL): none    INDEPENDENT REVIEW OF EKG OR X-RAY (2 EACH): none      The visit lasted a total of 6 minutes face to face with the patient. Over 50% of the time was spent counseling and educating the patient about anemia.    IAlexis, am scribing for and in the presence of, Dr. Santosh Salgado.    I, Dr. Santosh Salgado, personally performed the services described in this documentation, as scribed by Alexis Davis in my presence, and it is both accurate and complete.    MEDICATIONS:  Current Outpatient  Prescriptions   Medication Sig Dispense Refill     acetaminophen (TYLENOL) 325 MG tablet Take 650 mg by mouth every 4 (four) hours as needed.       albuterol (PROVENTIL HFA;VENTOLIN HFA) 90 mcg/actuation inhaler Inhale 2 puffs every 6 (six) hours as needed for wheezing.       atenolol (TENORMIN) 50 MG tablet Take 1 tablet (50 mg total) by mouth daily. (Patient taking differently: Take 50 mg by mouth daily. ) 30 tablet 0     azaTHIOprine (IMURAN) 50 mg tablet Take 50 mg by mouth daily.       busPIRone (BUSPAR) 10 MG tablet Take 10 mg by mouth 2 (two) times a day.       calcium citrate-vitamin D (CITRACAL+D) 315-200 mg-unit per tablet Take 1 tablet by mouth 2 (two) times a day.       cyanocobalamin (VITAMIN B-12) 1000 MCG tablet Take 1,000 mcg by mouth daily.       folic acid (FOLVITE) 1 MG tablet Take 1 tablet (1 mg total) by mouth daily. 30 tablet 0     hydrOXYzine (ATARAX) 25 MG tablet Take 25 mg by mouth 3 (three) times a day as needed for itching.       levothyroxine (SYNTHROID, LEVOTHROID) 88 MCG tablet Take 88 mcg by mouth daily.       loperamide (IMODIUM) 2 mg capsule Take 2 mg by mouth 4 (four) times a day as needed for diarrhea.       magnesium oxide (MAG-OX) 400 mg tablet Take 400 mg by mouth 2 (two) times a day.       multivitamin therapeutic (THERAGRAN) tablet Take 1 tablet by mouth every evening.       pantoprazole (PROTONIX) 40 MG tablet Take 40 mg by mouth daily.       psyllium (METAMUCIL) 0.52 gram capsule Take 1.04 g by mouth daily.       sertraline (ZOLOFT) 100 MG tablet Take 100 mg by mouth daily.       simethicone (MYLICON) 80 MG chewable tablet Chew 80 mg 4 (four) times a day as needed.       tacrolimus (PROGRAF) 1 MG capsule Take 1 capsule (1 mg total) by mouth every 12 (twelve) hours. (Patient taking differently: Take 1.5 mg by mouth every 12 (twelve) hours. )  0     traMADol (ULTRAM) 50 mg tablet Take 50 mg by mouth every 6 (six) hours as needed for pain.       traZODone (DESYREL) 100 MG  tablet Take 100 mg by mouth bedtime.       traZODone (DESYREL) 100 MG tablet Take 1 tablet (100 mg total) by mouth bedtime. 30 tablet 0     ursodiol (ACTIGALL) 300 mg capsule Take 300 mg by mouth 2 (two) times a day.        No current facility-administered medications for this visit.        Total data points: 1

## 2021-06-11 NOTE — PROGRESS NOTES
Phan presents today for a medication review along with her asthma.  She is tolerating her immunosuppressive regimen well.  Her creatinine continues to go down and she has regular follow-ups of the liver transplant clinic at the [facility].  She does not have a rescue inhaler on hand for her asthma and is looking to get one.  She is otherwise feeling well today.    Objective: Vital signs are as per the EMR.  In general the patient is alert pleasant and in no acute distress.  He appears healthy.    Assessment and plan: Asthma.  I called in a refill on her albuterol inhaler.  She will follow-up as needed.

## 2021-06-12 DIAGNOSIS — I10 ESSENTIAL HYPERTENSION: ICD-10-CM

## 2021-06-12 DIAGNOSIS — N18.30 CKD (CHRONIC KIDNEY DISEASE) STAGE 3, GFR 30-59 ML/MIN (H): ICD-10-CM

## 2021-06-12 DIAGNOSIS — R60.9 FLUID RETENTION: Primary | ICD-10-CM

## 2021-06-12 NOTE — PROGRESS NOTES
Phan comes in today for evaluation of shortness of breath and fatigue.  She has been having some fatigue for about the last 6-8 months.  This was worked up back in February of this year, which showed that she was anemic, but she also had a normal CMP, Lyme cascade, vitamin D level, PATRICIO cascade, CRP, and sed rate.  She was evaluated at the [facility] in the transplant clinic, and eventually her azathioprine was found to be the culprit for her anemia.  The dose of this was decreased and her blood counts have recovered nicely.  She still feels fatigued, however, and states that she is also short of breath when going up and down stairs.  She states that this happens at night as well when she is lying down to go to sleep.  It has been gradually getting worse over the last 2 months.  She denies any chest pain, fevers, cough, blood in her stool, melena, lower extremity edema, dysuria, or hematuria.  She has a history of hypertension but no other risk factors for heart disease.    Objective: Vital signs are as per the EMR.  In general patient is alert pleasant and in no acute distress.  She appears healthy.    Cardiovascular: S1-S2 regular and rhythm no murmurs gallops rubs    Lungs are clear.    Extremities show no pedal edema present bilaterally.    EKG today shows normal sinus rhythm, rate of 80, no ST or T-wave changes.    Assessment and plan: Fatigue along with worsening dyspnea on exertion.  I am going to obtain a stress test to evaluate her heart further.  If this is normal I will have her bring up her fatigue with the transplant clinic to see if attentionally any of her immunosuppressants are contributing to this.  I will call her with results and further recommendations.
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
## 2021-06-15 RX ORDER — CHLORTHALIDONE 25 MG/1
12.5 TABLET ORAL DAILY
Qty: 45 TABLET | Refills: 1 | Status: SHIPPED | OUTPATIENT
Start: 2021-06-15 | End: 2021-10-20

## 2021-06-15 RX ORDER — AMLODIPINE BESYLATE 5 MG/1
5 TABLET ORAL DAILY
Qty: 90 TABLET | Refills: 1 | Status: SHIPPED | OUTPATIENT
Start: 2021-06-15 | End: 2021-10-20

## 2021-06-15 NOTE — PROGRESS NOTES
Assessment:     1. Dysuria  Urinalysis-UC if Indicated    Culture, Urine   2. Sore throat  Rapid Strep A Screen-Throat    Influenza A/B Rapid Test    Group A Strep, RNA Direct Detection, Throat   3. Sinusitis  ciprofloxacin HCl (CIPRO) 500 MG tablet     Results for orders placed or performed in visit on 02/06/18   Rapid Strep A Screen-Throat   Result Value Ref Range    Rapid Strep A Antigen No Group A Strep detected, presumptive negative No Group A Strep detected, presumptive negative   Influenza A/B Rapid Test   Result Value Ref Range    Influenza  A, Rapid Antigen No Influenza A antigen detected No Influenza A antigen detected    Influenza B, Rapid Antigen No Influenza B antigen detected No Influenza B antigen detected   Urinalysis-UC if Indicated   Result Value Ref Range    Color, UA Yellow Colorless, Yellow, Straw, Light Yellow    Clarity, UA Clear Clear    Glucose, UA Negative Negative    Bilirubin, UA Negative Negative    Ketones, UA Negative Negative    Specific Gravity, UA 1.010 1.005 - 1.030    Blood, UA Negative Negative    pH, UA 6.0 5.0 - 8.0    Protein, UA Negative Negative mg/dL    Urobilinogen, UA 0.2 E.U./dL 0.2 E.U./dL, 1.0 E.U./dL    Nitrite, UA Negative Negative    Leukocytes, UA Small (!) Negative    Bacteria, UA Many (!) None Seen hpf    RBC, UA 0-2 None Seen, 0-2 hpf    WBC, UA 5-10 (!) None Seen, 0-5 hpf    Squam Epithel, UA 0-5 None Seen, 0-5 lpf          Plan:   -Discussed results and treatment plan with the patient.  We will treat patient for UTI and also possible for sinusitis.  Advised patient to increase fluid intake.  She may follow-up with PCP if her symptoms does not resolve after treatment.  Patient verbalized understanding the plan of care.      Subjective:       62 y.o. female presents for evaluation of congestion and cough.  Patient reports that she has been having symptoms since January 20, 2018.  She reports that she has used medication on and off, her symptoms subsides for a  few days and then he comes back.  She reports that she has copious drainage, yellowish in color, she reports pain in her sinuses, her teeth hurts.  She reports that she coughs so hard that she vomits.  Denies diarrhea, reports shortness of breath, she is the only one at home, she is not sure if she was exposed to illness when she was work.  She has history of liver transplant and stage III kidney disease.  Patient also reports that she has urinary frequency which has been on and off for a long time, she is not sexually active, she has history of interstitial infection previously.  She denies any discharge or dysuria.    The following portions of the patient's history were reviewed and updated as appropriate: allergies, current medications, past family history, past medical history, past social history, past surgical history and problem list.    Review of Systems  A 12 point comprehensive review of systems was negative except as noted.     Objective:      /70 (Patient Site: Right Arm, Patient Position: Sitting, Cuff Size: Adult Regular)  Pulse 93  Temp 97.8  F (36.6  C) (Oral)   Resp 18  Wt 200 lb 11.2 oz (91 kg)  SpO2 94%  BMI 31.43 kg/m2  General appearance: alert, appears stated age, cooperative, fatigued and mild distress  Head: Normocephalic, without obvious abnormality, atraumatic  Eyes: conjunctivae/corneas clear. PERRL, EOM's intact. Fundi benign.  Ears: abnormal TM right ear - bulging and air-fluid level and abnormal TM left ear - bulging and air-fluid level  Nose: Nares normal. Septum midline. Mucosa normal. No drainage or sinus tenderness., no discharge, mild congestion, sinus tenderness bilateral  Throat: abnormal findings: mild oropharyngeal erythema and postnasal drainage.   Neck: no adenopathy, no carotid bruit, no JVD, supple, symmetrical, trachea midline and thyroid not enlarged, symmetric, no tenderness/mass/nodules  Back: symmetric, no curvature. ROM normal. No CVA tenderness., no pain  in the flank area to palpation.   Lungs: clear to auscultation bilaterally  Heart: regular rate and rhythm, S1, S2 normal, no murmur, click, rub or gallop  Abdomen: soft, non-tender; bowel sounds normal; no masses,  no organomegaly and suprapubic tenderness to palpation.   Skin: Skin color, texture, turgor normal. No rashes or lesions  Lymph nodes: Cervical, supraclavicular, and axillary nodes normal.  Neurologic: Grossly normal     This note has been dictated using voice recognition software. Any grammatical or context distortions are unintentional and inherent to the software

## 2021-06-17 NOTE — PATIENT INSTRUCTIONS - HE
Patient Instructions by Chinedu Cano MD at 12/20/2019  8:00 AM     Author: Chinedu Cano MD Service: -- Author Type: Physician    Filed: 12/20/2019  9:35 AM Encounter Date: 12/20/2019 Status: Addendum    : Chinedu Cano MD (Physician)    Related Notes: Original Note by Chinedu Cano MD (Physician) filed at 12/20/2019  9:34 AM       -Rapid strep test is negative.  A confirmatory strep test is in process and will be finalized tomorrow.  -We will only reach out to you if the confirmatory strep test is positive.  An antibiotic will be prescribed if this test is positive.  -You have an aphthous ulcer on the roof of your mouth.  -Use viscous lidocaine as needed for pain.  Patient Education     Canker Sore    A canker sore (also called an aphthous ulcer) is a painful sore on the lining of the mouth. It is most painful during the first few days, and it lasts about 7 to 14 days before going away.  Causes  Canker sores are not cold sores or fever blisters. They are not contagious, so they are not spread by contact. The exact cause of canker sores is not clear, but there are a number of things that can trigger them in different people.    Mild injury, such as biting the inside of the mouth, lip, or cheek, or dental procedures    Stress    Poor diet, or lack of certain nutrients, including B vitamins and iron    Foods that can irritate the mouth, including tomatoes, citrus fruits, and some nuts (foods that are acidic or contain bitter substances called tannins)    Irritating chemicals, such as those in some toothpastes and mouthwashes    Certain chronic illnesses  Symptoms  Canker sores are found on the lining of the mouth. They can be inside the cheeks or lips, on the roof of the mouth, at the base of the gums, on the tongue, or in the back of the throat. Canker sores typically have these characteristics:    Small, flat (not raised) sores    Can be white or yellowish bumps that are red around the  edges or have a red halo    Usually small in size, roundish, and in groups    Accompanied by pain or burning  Canker sores don't leave a scar. But they usually come back.  Home care  The goals of canker sore treatment are to decrease the pain, speed healing, and prevent sores from coming back. No single treatment works for everyone. Try a number of methods to see what works best.  General care    You may find that soft, easy-to-chew foods cause less pain. Use a straw to direct liquids away from the sore.    Use a soft-bristle toothbrush, and brush your teeth gently.    Dont eat acidic, salty, or spicy foods.    Dont eat crusty or crunchy foods such as French bread or potato chips. These kinds of foods can hurt the inside of your mouth, or scrape your existing canker sores.  Medicines  You can try over-the-counter medicines that cover the sores and numb them. This protects the sores while they heal and helps reduce pain.  Homemade rinses and solutions  You can use these solutions as mouth rinses. Spit them out after using them. You can also dab them on the sores. You can repeat these treatments as often as needed.    Rinse your mouth with saltwater.    Mix equal amounts of hydrogen peroxide and water. You can use this as a mouthwash or dab it on spots with a cotton swab. You can also add sodium bicarbonate to this to make a paste, and then dab it on spots.  Follow-up care  Follow up with your healthcare provider, or as advised.    If a culture was done, you will be notified if the treatment needs to be changed. You can call as directed for the results.  Call 911  Call 911 if any of these occur:    Trouble breathing    Inability to swallow    Extreme drowsiness or trouble waking up    Fainting or loss of consciousness    Rapid heart rate    Seizure    Stiff neck  When to seek medical advice  Call your healthcare provider right away if any of these occur:    You have a fever of 100.4 F (38 C) or higher, or as directed  by your provider    You are pregnant    You just had surgery or another medical procedure, or you were just discharged from the hospital    It's too painful to eat or swallow  Date Last Reviewed: 10/1/2017    9455-6056 [employer]. [address] [place] [address], [place] [address]. All rights reserved. This information is not intended as a substitute for professional medical care. Always follow your healthcare professional's instructions.

## 2021-06-25 ENCOUNTER — OFFICE VISIT (OUTPATIENT)
Dept: NEUROLOGY | Facility: CLINIC | Age: 66
End: 2021-06-25
Payer: COMMERCIAL

## 2021-06-25 DIAGNOSIS — M54.12 CERVICAL RADICULOPATHY: Primary | ICD-10-CM

## 2021-06-25 PROCEDURE — 95886 MUSC TEST DONE W/N TEST COMP: CPT | Mod: 59 | Performed by: PHYSICAL MEDICINE & REHABILITATION

## 2021-06-25 PROCEDURE — 95912 NRV CNDJ TEST 11-12 STUDIES: CPT | Performed by: PHYSICAL MEDICINE & REHABILITATION

## 2021-06-25 PROCEDURE — 95886 MUSC TEST DONE W/N TEST COMP: CPT | Performed by: PHYSICAL MEDICINE & REHABILITATION

## 2021-06-25 NOTE — LETTER
6/25/2021       RE: Phan Luque  [address]  [facility] [address]     Dear Colleague,    Thank you for referring your patient, Phan Luque, to the [facility] EM[facility] [place] at [facility]. Please see a copy of my visit note below.            [facility]  Electrodiagnostic Laboratory    Nerve Conduction & EMG Report          Patient:       Phan Luque  Patient ID:    [id number]  Gender:        Female  YOB: 1955  Age:           65 Years 8 Months        History & Examination: Phan Luque is a 66 yo female who presents with neck pain and radicular symptoms in bilateral upper extremities, left worse than right. Query cervical radiculopathy.       Techniques: Motor and sensory conduction studies were done with surface recording electrodes. EMG was done with a concentric needle electrode.        Results:  Bilateral median, bilateral ulnar, and right radial antidromic sensory NCSs were normal.     Bilateral palmar median and ulnar orthodromic mixed NCSs were normal.     Bilateral median and ulnar motor NCSs were normal.    Bilateral median and right ulnar F-wave minimal latencies were normal.     EMG showed fibrillations and positive sharp waves in the bilateral biceps muscles, and complex repetitive discharges at the bilateral biceps and left triceps muscles. Otherwise, no muscle showed abnormal spontaneous activity. .    There were several muscles with large, long-duration MUPs including the left biceps, left triceps, right deltoid and right biceps muscles. MUP recruitment in those muscles was mildly to severely reduced as tabulated below.  Few polyphasic were detected at the right deltoid muscle      Interpretation:  Abnormal study.     --There is electrodiagnostic evidence of cervical radiculopathies, affecting right C5, right C6 and left C5 through C7 segments. The needle EMG changes seen are predominantly  chronic (large, long duration motor units with reduced recruitment and CRDs). Fibrillations / positive sharp waves to suggest active or recent denervation were seen in the bilateral biecps muscles.    --There is no electrodiagnostic evidence of median or ulnar mononeuropathy in either upper extremity.      EMG Physician:  Gayla Jacobo MD  Physical Medicine & Rehabilitation            Sensory NCS      Nerve / Sites Rec. Site Onset Peak Ref. NP Amp Ref. PP Amp Dist Louis Ref. Temp     ms ms ms  V  V  V cm m/s m/s  C   R MEDIAN - Dig II Anti      Wrist Dig II 2.81 3.70  20.3 10.0 32.3 14 49.8 48.0 32.4   L MEDIAN - Dig II Anti      Wrist Dig II 2.92 3.85  22.1 10.0 45.9 14 48.0 48.0 32   R ULNAR - Dig V Anti      Wrist Dig V 2.55 3.59  9.7 8.0 11.7 12.5 49.0 48.0 32.4   L ULNAR - Dig V Anti      Wrist Dig V 2.60 3.85  13.3 8.0 12.2 12.5 48.0 48.0 32.1   R RADIAL - Snuff      Forearm Snuff 1.51 1.93  21.3 15.0 29.5 10 66.2 48.0 32.2   R MEDIAN - Ulnar - Palmar      Median Wrist 1.77 2.24 2.40 46.6  66.2 8 45.2  32.2      Ulnar Wrist 1.46 2.14 2.40 20.6  25.8 8 54.9  32.2   L MEDIAN - Ulnar - Palmar      Median Wrist 1.67 2.14 2.40 56.9  114.0 8 48.0  32.2      Ulnar Wrist 1.41 1.98 2.40 15.4  21.7 8 56.9  32.4       Motor NCS      Nerve / Sites Rec. Site Lat Ref. Amp Ref. Rel Amp Dist Louis Ref. Dur. Area Temp.     ms ms mV mV % cm m/s m/s ms %  C   R MEDIAN - APB      Wrist APB 3.65 4.40 6.5 5.0 100 8   6.56 100 32      Elbow APB 7.92  5.8  89.4 22 51.5 48.0 6.93 94.2 32.2   L MEDIAN - APB      Wrist APB 3.49 4.40 5.9 5.0 100 8   7.97 100 32.2      Elbow APB 8.07  6.0  101 34 74.2 48.0 7.76 107 32.2   R ULNAR - ADM      Wrist ADM 2.66 3.50 10.5 5.0 100 8   7.97 100 32.5      B.Elbow ADM 5.68  9.5  90.5 19 62.9 48.0 8.59 95.3 32.5      A.Elbow ADM 7.14  9.4  89.6 9.5 65.1 48.0 8.49 95.1 32.5   L ULNAR - ADM      Wrist ADM 2.50 3.50 9.5 5.0 100 8   7.71 100 32.1      B.Elbow ADM 6.09  9.0  94 20 55.7 48.0 7.81 95.4 32.1       A.Elbow ADM 7.50  8.8  92.6 8 56.9 48.0 7.71 94.3 32.1       F  Wave      Nerve Min F Lat Max F Lat Mean FLat Temp.    ms ms ms  C   R MEDIAN 29.06 30.83 30.12 32.4   R ULNAR 26.98 29.27 28.15 32.6   L MEDIAN 27.81 32.45 29.86 32.2       EMG Summary Table     Spontaneous MUAP Recruitment    IA Fib/PSW Fasc H.F. Amp Dur. PPP Pattern   L. DELTOID N None None None N N N Normal   L. BICEPS N 1+ None CRD 1+ 1+ N Mildly Reduced   L. TRICEPS Increased None None CRD 1+ 1+ N Severely reduced   L. PRON TERES N None None None N N N Normal   L. FIRST D INTEROSS N None None None N N N Normal   L. EXT INDICIS N None None None N N N Normal   R. DELTOID N None None None 1+ 1+ 1+ Mildly Reduced   R. BICEPS N 2+ None CRD 1+ 1+ N Severely reduced   R. TRICEPS N None None None N N N Normal   R. PRON TERES N None None None N N N Normal   R. FIRST D INTEROSS N None None None N N N Normal   R. EXT INDICIS N None None None N N N Normal                                          Again, thank you for allowing me to participate in the care of your patient.      Sincerely,    Gayla Jacobo MD

## 2021-06-25 NOTE — PROGRESS NOTES
[facility]  Electrodiagnostic Laboratory    Nerve Conduction & EMG Report          Patient:       Phan Luque  Patient ID:    [id number]  Gender:        Female  YOB: 1955  Age:           65 Years 8 Months        History & Examination: Phan Luque is a 66 yo female who presents with neck pain and radicular symptoms in bilateral upper extremities, left worse than right. Query cervical radiculopathy.       Techniques: Motor and sensory conduction studies were done with surface recording electrodes. EMG was done with a concentric needle electrode.        Results:  Bilateral median, bilateral ulnar, and right radial antidromic sensory NCSs were normal.     Bilateral palmar median and ulnar orthodromic mixed NCSs were normal.     Bilateral median and ulnar motor NCSs were normal.    Bilateral median and right ulnar F-wave minimal latencies were normal.     EMG showed fibrillations and positive sharp waves in the bilateral biceps muscles, and complex repetitive discharges at the bilateral biceps and left triceps muscles. Otherwise, no muscle showed abnormal spontaneous activity. .    There were several muscles with large, long-duration MUPs including the left biceps, left triceps, right deltoid and right biceps muscles. MUP recruitment in those muscles was mildly to severely reduced as tabulated below.  Few polyphasic were detected at the right deltoid muscle      Interpretation:  Abnormal study.     --There is electrodiagnostic evidence of cervical radiculopathies, affecting right C5, right C6 and left C5 through C7 segments. The needle EMG changes seen are predominantly chronic (large, long duration motor units with reduced recruitment and CRDs). Fibrillations / positive sharp waves to suggest active or recent denervation were seen in the bilateral biecps muscles.    --There is no electrodiagnostic evidence of median or ulnar mononeuropathy in either upper extremity.      EMG  Physician:  Gayla Jacobo MD  Physical Medicine & Rehabilitation            Sensory NCS      Nerve / Sites Rec. Site Onset Peak Ref. NP Amp Ref. PP Amp Dist Louis Ref. Temp     ms ms ms  V  V  V cm m/s m/s  C   R MEDIAN - Dig II Anti      Wrist Dig II 2.81 3.70  20.3 10.0 32.3 14 49.8 48.0 32.4   L MEDIAN - Dig II Anti      Wrist Dig II 2.92 3.85  22.1 10.0 45.9 14 48.0 48.0 32   R ULNAR - Dig V Anti      Wrist Dig V 2.55 3.59  9.7 8.0 11.7 12.5 49.0 48.0 32.4   L ULNAR - Dig V Anti      Wrist Dig V 2.60 3.85  13.3 8.0 12.2 12.5 48.0 48.0 32.1   R RADIAL - Snuff      Forearm Snuff 1.51 1.93  21.3 15.0 29.5 10 66.2 48.0 32.2   R MEDIAN - Ulnar - Palmar      Median Wrist 1.77 2.24 2.40 46.6  66.2 8 45.2  32.2      Ulnar Wrist 1.46 2.14 2.40 20.6  25.8 8 54.9  32.2   L MEDIAN - Ulnar - Palmar      Median Wrist 1.67 2.14 2.40 56.9  114.0 8 48.0  32.2      Ulnar Wrist 1.41 1.98 2.40 15.4  21.7 8 56.9  32.4       Motor NCS      Nerve / Sites Rec. Site Lat Ref. Amp Ref. Rel Amp Dist Louis Ref. Dur. Area Temp.     ms ms mV mV % cm m/s m/s ms %  C   R MEDIAN - APB      Wrist APB 3.65 4.40 6.5 5.0 100 8   6.56 100 32      Elbow APB 7.92  5.8  89.4 22 51.5 48.0 6.93 94.2 32.2   L MEDIAN - APB      Wrist APB 3.49 4.40 5.9 5.0 100 8   7.97 100 32.2      Elbow APB 8.07  6.0  101 34 74.2 48.0 7.76 107 32.2   R ULNAR - ADM      Wrist ADM 2.66 3.50 10.5 5.0 100 8   7.97 100 32.5      B.Elbow ADM 5.68  9.5  90.5 19 62.9 48.0 8.59 95.3 32.5      A.Elbow ADM 7.14  9.4  89.6 9.5 65.1 48.0 8.49 95.1 32.5   L ULNAR - ADM      Wrist ADM 2.50 3.50 9.5 5.0 100 8   7.71 100 32.1      B.Elbow ADM 6.09  9.0  94 20 55.7 48.0 7.81 95.4 32.1      A.Elbow ADM 7.50  8.8  92.6 8 56.9 48.0 7.71 94.3 32.1       F  Wave      Nerve Min F Lat Max F Lat Mean FLat Temp.    ms ms ms  C   R MEDIAN 29.06 30.83 30.12 32.4   R ULNAR 26.98 29.27 28.15 32.6   L MEDIAN 27.81 32.45 29.86 32.2       EMG Summary Table     Spontaneous MUAP Recruitment    IA Fib/PSW Fasc  H.F. Amp Dur. PPP Pattern   L. DELTOID N None None None N N N Normal   L. BICEPS N 1+ None CRD 1+ 1+ N Mildly Reduced   L. TRICEPS Increased None None CRD 1+ 1+ N Severely reduced   L. PRON TERES N None None None N N N Normal   L. FIRST D INTEROSS N None None None N N N Normal   L. EXT INDICIS N None None None N N N Normal   R. DELTOID N None None None 1+ 1+ 1+ Mildly Reduced   R. BICEPS N 2+ None CRD 1+ 1+ N Severely reduced   R. TRICEPS N None None None N N N Normal   R. PRON TERES N None None None N N N Normal   R. FIRST D INTEROSS N None None None N N N Normal   R. EXT INDICIS N None None None N N N Normal

## 2021-06-28 DIAGNOSIS — Z94.4 LIVER TRANSPLANTED (H): ICD-10-CM

## 2021-06-28 RX ORDER — TACROLIMUS 0.5 MG/1
CAPSULE ORAL
Qty: 150 CAPSULE | Refills: 11 | Status: SHIPPED | OUTPATIENT
Start: 2021-06-28 | End: 2021-07-23

## 2021-06-28 ASSESSMENT — ENCOUNTER SYMPTOMS
HOARSE VOICE: 0
STIFFNESS: 0
EYE PAIN: 0
BACK PAIN: 0
JOINT SWELLING: 0
DECREASED CONCENTRATION: 1
ARTHRALGIAS: 0
MYALGIAS: 0
PANIC: 0
SMELL DISTURBANCE: 0
INSOMNIA: 1
NERVOUS/ANXIOUS: 1
DEPRESSION: 1
NECK MASS: 0
EYE WATERING: 1
NECK PAIN: 1
EYE REDNESS: 1
SINUS PAIN: 1
TROUBLE SWALLOWING: 0
TASTE DISTURBANCE: 1
EYE IRRITATION: 1
SINUS CONGESTION: 0
DOUBLE VISION: 0
SORE THROAT: 1
MUSCLE WEAKNESS: 0
MUSCLE CRAMPS: 1

## 2021-07-01 ENCOUNTER — OFFICE VISIT (OUTPATIENT)
Dept: INTERNAL MEDICINE | Facility: CLINIC | Age: 66
End: 2021-07-01
Payer: COMMERCIAL

## 2021-07-01 VITALS
DIASTOLIC BLOOD PRESSURE: 92 MMHG | OXYGEN SATURATION: 97 % | SYSTOLIC BLOOD PRESSURE: 132 MMHG | HEART RATE: 79 BPM | BODY MASS INDEX: 28.32 KG/M2 | WEIGHT: 180.8 LBS

## 2021-07-01 DIAGNOSIS — I10 BENIGN ESSENTIAL HYPERTENSION: ICD-10-CM

## 2021-07-01 DIAGNOSIS — Z12.31 ENCOUNTER FOR SCREENING MAMMOGRAM FOR BREAST CANCER: Primary | ICD-10-CM

## 2021-07-01 DIAGNOSIS — H90.8 MIXED CONDUCTIVE AND SENSORINEURAL HEARING LOSS, UNSPECIFIED LATERALITY: ICD-10-CM

## 2021-07-01 DIAGNOSIS — Z23 NEED FOR VACCINATION: ICD-10-CM

## 2021-07-01 DIAGNOSIS — M54.12 CERVICAL RADICULOPATHY: ICD-10-CM

## 2021-07-01 DIAGNOSIS — Z78.0 ASYMPTOMATIC POSTMENOPAUSAL STATE: ICD-10-CM

## 2021-07-01 DIAGNOSIS — Z12.11 SCREENING FOR COLON CANCER: ICD-10-CM

## 2021-07-01 PROCEDURE — 90746 HEPB VACCINE 3 DOSE ADULT IM: CPT | Performed by: INTERNAL MEDICINE

## 2021-07-01 PROCEDURE — 90471 IMMUNIZATION ADMIN: CPT | Performed by: INTERNAL MEDICINE

## 2021-07-01 PROCEDURE — 99396 PREV VISIT EST AGE 40-64: CPT | Mod: 25 | Performed by: INTERNAL MEDICINE

## 2021-07-01 NOTE — PATIENT INSTRUCTIONS
To schedule a DEXA scan and mammogram (mammogram is not due until October), please call Radiology scheduling at: [phone]

## 2021-07-01 NOTE — PROGRESS NOTES
"    History of Present Illness:  Ms. Luque is a 65 year old female who presents for  Chief Complaint   Patient presents with     Physical     annual physical     Hearing Problem     some hearing difficulty, gradual     Shoulder Pain     left shoulder pain, hx of bilateral cervical radiculopathy     Jessica has a PMH notable for alcoholic hepatitis s/p liver transplant, anxiety, HTN, CKD 3a, IBS.    Recently dealing with L sided shoulder, neck, under arm, intermittent, some tingling in arms, feels weaker, some nights keeps her awake.  Did a course of PT through [facility]. Had MRI and EMG which confirmed R C6 and L C5/7 radiculopathy.  NSGY planning possible injections.    Working out, lost some weight--50 lbs(!)  She is doing an exercise program \"Grow Young\".  Cut out processed foods. Balance and strength improved, more energy.  BP is 120/70 at home.  Got COVID vaccine. Sterling said she had antibodies.  Due for dexa, colo, mammo in fall.      Routine Health Maintenence:  Immunizations (zoster, pneumovax, flu, Tdap, Hep A/B):           Most Recent Immunizations   Administered Date(s) Administered     HEPA 01/13/2009     Influenza Vaccine IM 3yrs+ 4 Valent IIV4 10/18/2017     Mantoux Tuberculin Skin Test 10/06/2016     Pneumo Conj 13-V (2010&after) 08/19/2016     TD (ADULT, 7+) 01/21/2004     TDAP Vaccine (Adacel) 08/18/2016      Lipids:               Recent Labs   Lab Test  12/20/17   1443  02/23/17   0850    08/06/16   0757   CHOL  147   --    --   Interfering substances, unable to perform test  LUPE RIOJAS NOTIFIED ON 6B,8/6/2016,0926,MJ      HDL  51   --    --   9*   LDL  49  108*   < >  13   TRIG  230*   --    --   Interfering substances, unable to perform test  LUPE RIOJAS NOTIFIED ON 6B,8/6/2016,0926,MJ       < > = values in this interval not displayed.      Lung Ca Screening (>30 pk age 55-79 or >20 py age 50-79 + RF): does not qualify, quit 1996  Colonoscopy (50-75 yrs): 6/18 TA x 2, due 6/21                      "  - Diverticulosis in the left colon.    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  Mammogram (40-75 yrs): 12/17 [facility], normal, 10/20 normal  Pap (21-65 yrs): MARKO for PAP, last 12/17, follows with outside doc  Pelvic/Breast: up to date  Safety/Lifestyle: reviewed  Tob/EtOH: screen neg  Depression: reviewed  Advanced Directive: deferred       Answers for HPI/ROS submitted by the patient on 6/28/2021   General Symptoms: No  Skin Symptoms: No  HENT Symptoms: Yes  EYE SYMPTOMS: Yes  HEART SYMPTOMS: No  LUNG SYMPTOMS: No  INTESTINAL SYMPTOMS: No  URINARY SYMPTOMS: No  GYNECOLOGIC SYMPTOMS: No  BREAST SYMPTOMS: No  SKELETAL SYMPTOMS: Yes  BLOOD SYMPTOMS: No  NERVOUS SYSTEM SYMPTOMS: No  MENTAL HEALTH SYMPTOMS: Yes  Ear pain: No  Ear discharge: Yes  Hearing loss: Yes  Tinnitus: No  Nosebleeds: No  Congestion: No  Sinus pain: Yes  Trouble swallowing: No   Voice hoarseness: No  Mouth sores: No  Sore throat: Yes  Tooth pain: No  Gum tenderness: Yes  Bleeding gums: No  Change in taste: Yes  Change in sense of smell: No  Dry mouth: Yes  Hearing aid used: No  Neck lump: No  Eye pain: No  Vision loss: No  Dry eyes: Yes  Watery eyes: Yes  Eye bulging: No  Double vision: No  Flashing of lights: No  Spots: No  Floaters: Yes  Redness: Yes  Crossed eyes: No  Tunnel Vision: No  Yellowing of eyes: No  Eye irritation: Yes  Back pain: No  Muscle aches: No  Neck pain: Yes  Swollen joints: No  Joint pain: No  Bone pain: No  Muscle cramps: Yes  Muscle weakness: No  Joint stiffness: No  Bone fracture: No  Nervous or Anxious: Yes  Depression: Yes  Trouble sleeping: Yes  Trouble thinking or concentrating: Yes  Mood changes: Yes  Panic attacks: No        Review of external notes as documented above                   A detailed Review of Systems was performed, verified and is  negative except as documented in the HPI.  All health questionnaires were reviewed, verified and relevant information documented above.    Past Medical History:  Past Medical History:   Diagnosis Date     AION (acute ischemic optic neuropathy)      Asthma      Cervical spinal stenosis      Chronic kidney disease      Chronic kidney disease, stage 3      Dizziness and giddiness     Created by Conversion      End stage liver disease (H)     2/2 Alcohol Abuse     End stage liver disease (H)     Alcohol-related     GERD (gastroesophageal reflux disease)      Hypertension      Liver transplant recipient (H) 2016    [facility]     Liver transplanted (H)      Migraine headache      Migraines      Osteoporosis      PFO (patent foramen ovale) 2016    Noted on echo at [facility]     Recurrent UTI      Spinal stenosis in cervical region      Strabismus      Unspecified asthma(493.90)     Created by Conversion        Past Surgical History:  Past Surgical History:   Procedure Laterality Date     APPENDECTOMY           BENCH LIVER N/A 2016    Procedure: BENCH LIVER;  Surgeon: Larry Dhillon MD;  Location: [facility] OR     CATARACT IOL, RT/LT       COLONOSCOPY       ESOPHAGOSCOPY, GASTROSCOPY, DUODENOSCOPY (EGD), COMBINED N/A 2016    Procedure: COMBINED ESOPHAGOSCOPY, GASTROSCOPY, DUODENOSCOPY (EGD);  Surgeon: Tao Alfonso MD;  Location:  GI     ESOPHAGOSCOPY, GASTROSCOPY, DUODENOSCOPY (EGD), COMBINED N/A 10/31/2016    Procedure: COMBINED ESOPHAGOSCOPY, GASTROSCOPY, DUODENOSCOPY (EGD), BIOPSY SINGLE OR MULTIPLE;  Surgeon: Ronald Bojorquez MD;  Location:  GI     sphincteroplasty       TRANSPLANT LIVER RECIPIENT  DONOR N/A 2016    Procedure: TRANSPLANT LIVER RECIPIENT  DONOR;  Surgeon: Larry Dhillon MD;  Location:  OR     TUBAL LIGATION      laparoscopic       Active Meds:  Current Outpatient Medications   Medication     acetaminophen  (TYLENOL) 325 MG tablet     albuterol (VENTOLIN HFA) 108 (90 Base) MCG/ACT inhaler     amLODIPine (NORVASC) 5 MG tablet     calcipotriene (DOVONOX) 0.005 % external solution     chlorthalidone (HYGROTON) 25 MG tablet     cholecalciferol (VITAMIN D3) 400 UNIT TABS tablet     clobetasol (TEMOVATE) 0.05 % external ointment     clobetasol (TEMOVATE) 0.05 % external solution     clobetasol propionate (CLOBEX) 0.05 % external shampoo     cyanocobalamin (VITAMIN  B-12) 1000 MCG tablet     ferrous sulfate (IRON) 325 (65 FE) MG tablet     folic acid (FOLVITE) 1 MG tablet     gabapentin (NEURONTIN) 100 MG capsule     hydrOXYzine (ATARAX) 25 MG tablet     levothyroxine (SYNTHROID/LEVOTHROID) 88 MCG tablet     MAGNESIUM OXIDE PO     melatonin 5 MG tablet     multivitamin, therapeutic (THERA-VIT) TABS tablet     order for DME     pantoprazole (PROTONIX) 40 MG EC tablet     psyllium 0.52 G capsule     tacrolimus (GENERIC EQUIVALENT) 0.5 MG capsule     traMADol (ULTRAM) 50 MG tablet     triamcinolone (KENALOG) 0.025 % external ointment     triamcinolone (KENALOG) 0.1 % external ointment     ursodiol (ACTIGALL) 300 MG capsule     ursodiol (ACTIGALL) 300 MG capsule     No current facility-administered medications for this visit.         Allergies:  Codeine, Benadryl [diphenhydramine], Cefaclor, Hydrocodone-acetaminophen, Oxycodone, Penicillins, and Sulfa drugs    Family History:  family history includes Family History Negative in an other family member; Heart Disease in her father; Melanoma in her mother; Skin Cancer in her sister.    Social History:  Social History     Tobacco Use     Smoking status: Former Smoker     Packs/day: 2.50     Years: 20.00     Pack years: 50.00     Quit date: 1996     Years since quittin.5     Smokeless tobacco: Never Used   Substance Use Topics     Alcohol use: No     Alcohol/week: 7.0 standard drinks     Types: 7 Standard drinks or equivalent per week     Comment: heavy use (750ml/2 days) up  until 1 year ago; had cut down to 1 drink/day; none since 7/18/16     Drug use: No       Physical Exam:  Vitals: BP (!) 132/92   Pulse 79   Wt 82 kg (180 lb 12.8 oz)   LMP  (LMP Unknown)   SpO2 97%   Breastfeeding No   BMI 28.32 kg/m    Constitutional: Alert, oriented, pleasant, no acute distress  Head: Normocephalic, atraumatic  Eyes: Extra-ocular movements intact, pupils equally round and reactive bilaterally, no scleral icterus  ENT: Oropharynx clear, moist mucus membranes, good dentition  Neck: Supple, no lymphadenopathy  Cardiovascular: Regular rate and rhythm, no murmurs, rubs or gallops, peripheral pulses full/symmetric  Respiratory: Good air movement bilaterally, lungs clear, no wheezes/rales/rhonchi  GI: Abdomen soft, bowel sounds present, nondistended, nontender, no organomegaly or masses, no rebound/guarding  Musculoskeletal: No edema, normal muscle tone, normal gait  Neurologic: Alert and oriented, cranial nerves 2-12 intact, grossly non-focal  Skin: No rashes/lesions  Psychiatric: normal mentation, affect and mood      Diagnostics:  Labs reviewed in Epic          Assessment and Plan:  Phan was seen today for physical, hearing problem and shoulder pain.    Phan was seen today for physical, hearing problem and shoulder pain.    Diagnoses and all orders for this visit:    Encounter for screening mammogram for breast cancer  -     Mammogram, screening w sanjay (3D); Future    Asymptomatic postmenopausal state  -     Dexa hip/pelvis/spine*; Future    Need for vaccination  -     HEPATITIS B VACCINE, ADULT    Screening for colon cancer  -     GASTROENTEROLOGY ADULT REF PROCEDURE ONLY; Future    Mixed conductive and sensorineural hearing loss, unspecified laterality  -     AUDIOLOGY ADULT REFERRAL; Future    Cervical Radiculopathy  Advised f/u with NSGY to consider traction and/or injections.    Elevated BP  Well controlled at home      Arlyn Sanchez MD  Internal Medicine

## 2021-07-01 NOTE — NURSING NOTE
Chief Complaint   Patient presents with     Physical     annual physical     Hearing Problem     some hearing difficulty, gradual     Shoulder Pain     left shoulder pain, hx of bilateral cervical radiculopathy       Ange Armstrong, EMT at 10:32 AM on 7/1/2021

## 2021-07-01 NOTE — PROGRESS NOTES
Patient received hepatitis B vaccine. Vaccine was given under the supervision of Dr. Arlyn Sanchez. See immunization list for administration details. Pt was advised to remain in [facility] lobby for 15 minutes in case of an averse reaction. Nurse visit for 2nd dose on 8/2 made.     Given by COLE Verdugo at 11:27 AM on 7/1/2021

## 2021-07-01 NOTE — PROGRESS NOTES
Phan Luque is a 65 year old female who is being evaluated via a billable video visit.      The patient has consented to a video visit and informed that video and telephone visits are being performed during the COVID-19 pandemic in order to mitigate the risk of an in office visit for appropriate candidates/issues. If during the course of the call the physician/provider feels a video visit is not appropriate, you will not be charged for this service. If the provider feels that they are unable to assess your concerns without an in person visit, you will be advised of this limitation and depending on the nature of the concern, advised to seek in person care if your provider feels you need urgent evaluation.      Subjective     Phan Luque is a 65 year old female who presents to clinic today for the following health issues:    Chief Complaint   Patient presents with     Physical     annual physical     Hearing Problem     some hearing difficulty, gradual     Shoulder Pain     left shoulder pain, hx of bilateral cervical radiculopathy       HPI  Jessica has a PMH notable for alcoholic hepatitis s/p liver transplant, anxiety, HTN, CKD, IBS.  Jessica is doing well overall.  Working from home. Her chronic issues are stable.  She does reports some foot pain and nail issues.  Her liver is doing well.  She has continued to work on exercise and diet and weight is down 30 lbs. She got her flu shot.  No concerns about mental health. No fevers or recent illnesses.      Routine Health Maintenence:  Immunizations (zoster, pneumovax, flu, Tdap, Hep A/B):           Most Recent Immunizations   Administered Date(s) Administered     HEPA 01/13/2009     Influenza Vaccine IM 3yrs+ 4 Valent IIV4 10/18/2017     Mantoux Tuberculin Skin Test 10/06/2016     Pneumo Conj 13-V (2010&after) 08/19/2016     TD (ADULT, 7+) 01/21/2004     TDAP Vaccine (Adacel) 08/18/2016      Lipids:               Recent Labs   Lab Test  12/20/17   1443   02/23/17   0850    08/06/16   0757   CHOL  147   --    --   Interfering substances, unable to perform test  LUPE RIOJAS NOTIFIED ON 6B,8/6/2016,0926,MJ      HDL  51   --    --   9*   LDL  49  108*   < >  13   TRIG  230*   --    --   Interfering substances, unable to perform test  LUPE RIOJAS NOTIFIED ON 6B,8/6/2016,0926,MJ       < > = values in this interval not displayed.      Lung Ca Screening (>30 pk age 55-79 or >20 py age 50-79 + RF): does not qualify, quit 1996  Colonoscopy (50-75 yrs): 6/18 TA x 2, due 6/23                       - Diverticulosis in the left colon.    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  Mammogram (40-75 yrs): 12/17 [facility], normal, 4/19 normal  Pap (21-65 yrs): MARKO for PAP, last 12/17, due 12/20  Pelvic/Breast: up to date  Safety/Lifestyle: reviewed  Tob/EtOH: screen neg  Depression: reviewed  Advanced Directive: deferred     Answers for HPI/ROS submitted by the patient on 6/28/2021   General Symptoms: No  Skin Symptoms: No  HENT Symptoms: Yes  EYE SYMPTOMS: Yes  HEART SYMPTOMS: No  LUNG SYMPTOMS: No  INTESTINAL SYMPTOMS: No  URINARY SYMPTOMS: No  GYNECOLOGIC SYMPTOMS: No  BREAST SYMPTOMS: No  SKELETAL SYMPTOMS: Yes  BLOOD SYMPTOMS: No  NERVOUS SYSTEM SYMPTOMS: No  MENTAL HEALTH SYMPTOMS: Yes  Ear pain: No  Ear discharge: Yes  Hearing loss: Yes  Tinnitus: No  Nosebleeds: No  Congestion: No  Sinus pain: Yes  Trouble swallowing: No   Voice hoarseness: No  Mouth sores: No  Sore throat: Yes  Tooth pain: No  Gum tenderness: Yes  Bleeding gums: No  Change in taste: Yes  Change in sense of smell: No  Dry mouth: Yes  Hearing aid used: No  Neck lump: No  Eye pain: No  Vision loss: No  Dry eyes: Yes  Watery eyes: Yes  Eye bulging: No  Double vision: No  Flashing of lights: No  Spots: No  Floaters: Yes  Redness: Yes  Crossed eyes:  No  Tunnel Vision: No  Yellowing of eyes: No  Eye irritation: Yes  Back pain: No  Muscle aches: No  Neck pain: Yes  Swollen joints: No  Joint pain: No  Bone pain: No  Muscle cramps: Yes  Muscle weakness: No  Joint stiffness: No  Bone fracture: No  Nervous or Anxious: Yes  Depression: Yes  Trouble sleeping: Yes  Trouble thinking or concentrating: Yes  Mood changes: Yes  Panic attacks: No

## 2021-07-03 NOTE — ADDENDUM NOTE
Addendum Note by Santosh Salgado MD at 1/20/2017  5:26 PM     Author: Santosh Salgado MD Service: -- Author Type: Physician    Filed: 1/20/2017  5:26 PM Encounter Date: 1/19/2017 Status: Signed    : Santosh Salgado MD (Physician)    Addended by: SANTOSH SALGADO on: 1/20/2017 05:26 PM        Modules accepted: Orders

## 2021-07-12 ENCOUNTER — ANCILLARY PROCEDURE (OUTPATIENT)
Dept: BONE DENSITY | Facility: CLINIC | Age: 66
End: 2021-07-12
Attending: INTERNAL MEDICINE
Payer: COMMERCIAL

## 2021-07-12 DIAGNOSIS — Z78.0 ASYMPTOMATIC POSTMENOPAUSAL STATE: ICD-10-CM

## 2021-07-12 PROCEDURE — 77080 DXA BONE DENSITY AXIAL: CPT | Performed by: INTERNAL MEDICINE

## 2021-07-13 ENCOUNTER — TELEPHONE (OUTPATIENT)
Dept: GASTROENTEROLOGY | Facility: CLINIC | Age: 66
End: 2021-07-13

## 2021-07-13 DIAGNOSIS — Z11.59 ENCOUNTER FOR SCREENING FOR OTHER VIRAL DISEASES: ICD-10-CM

## 2021-07-13 NOTE — TELEPHONE ENCOUNTER
Screening Questions  1. Are you active on mychart?    2. What insurance is in the chart? BCBS    2.  Ordering/Referring Provider: LAURA AUCÑA MD    3. BMI 27.4    4. Are you on daily home oxygen? NO    5. Do you have a history of difficult airway? NO    6. Have you had a heart, lung, or liver transplant? NO    7. Are you currently on dialysis? NO    8. Have you had a stroke or Transient ischemic atttack (TIA) within 6 months? NO    9. In the past 6 months, have you had any heart related issues including cardiomyopathy or heart attack?         If yes, did it require cardiac stenting or other implantable device?NO    10. Do you have any implantable devices in your body (pacemaker, defib, LVAD)? NO    11. Do you take nitroglycerin? If yes, how often? NO    12. Are you currently taking any blood thinners?NO    13. Are you a diabetic? NO    14. (Females) Are you currently pregnant? NO  If yes, how many weeks?    15. Have you had a procedure in the past that was difficult to tolerate with conscious sedation? Any allergies to Fentanyl or Versed NO    16. Are you taking any scheduled prescription narcotics more than once daily? NO    17. Do you have any chemical dependencies such as alcohol, street drugs, or methadone? NO    18. Do you have any history of post-traumatic stress syndrome or mental health issues? NO    19. Do you transfer independently? YES    20.  Do you have any issues with constipation? NO    21. Preferred Pharmacy for Pre Prescription NA    Scheduling Details    Procedure Scheduled: COLONOSCOPY  Provider/Surgeon: MADDIE  Date of Procedure: 08/12/2021  [facility]: [facility]  Caller (Please ask for phone number if not scheduled by patient): ANN      Sedation Type: CS  Conscious Sedation- Needs  for 6 hours after the procedure  MAC/General-Needs  for 24 hours after procedure    Pre-op Required at [facility], [place], and OR for MAC sedation:   (if yes advise patient they will need a pre-op prior to  procedure)      Is patient on blood thinners? -NO (If yes- inform patient to follow up with PCP or provider for follow up instructions)     Informed patient they will need an adult  YES  Cannot take any type of public or medical transportation alone    Informed Patient of COVID Test Requirement YES    Confirmed Nurse will call to complete assessment YES    Additional comments: NA

## 2021-07-14 PROBLEM — N17.9 ACUTE RENAL FAILURE (H): Status: RESOLVED | Noted: 2017-01-09 | Resolved: 2017-01-19

## 2021-07-15 ENCOUNTER — MYC MEDICAL ADVICE (OUTPATIENT)
Dept: INTERNAL MEDICINE | Facility: CLINIC | Age: 66
End: 2021-07-15

## 2021-07-16 ENCOUNTER — TELEPHONE (OUTPATIENT)
Dept: NEUROSURGERY | Facility: CLINIC | Age: 66
End: 2021-07-16

## 2021-07-16 NOTE — TELEPHONE ENCOUNTER
[facility] Call Center    Phone Message    May a detailed message be left on voicemail: yes     Reason for Call: Other: Pt called to schedule follow up appt with Nette Staley to re=view MRI and EMG.      Please call Pt back to schedule.    Action Taken: Message routed to:  [facility] Neurosurgery    Travel Screening: Not Applicable

## 2021-07-19 ENCOUNTER — VIRTUAL VISIT (OUTPATIENT)
Dept: NEUROSURGERY | Facility: CLINIC | Age: 66
End: 2021-07-19
Payer: COMMERCIAL

## 2021-07-19 DIAGNOSIS — R20.0 NUMBNESS AND TINGLING OF BOTH UPPER EXTREMITIES: ICD-10-CM

## 2021-07-19 DIAGNOSIS — M48.02 SPINAL STENOSIS IN CERVICAL REGION: ICD-10-CM

## 2021-07-19 DIAGNOSIS — R20.2 NUMBNESS AND TINGLING OF BOTH UPPER EXTREMITIES: ICD-10-CM

## 2021-07-19 DIAGNOSIS — M54.2 NECK PAIN: Primary | ICD-10-CM

## 2021-07-19 PROCEDURE — 99214 OFFICE O/P EST MOD 30 MIN: CPT | Mod: 95 | Performed by: NURSE PRACTITIONER

## 2021-07-19 NOTE — PROGRESS NOTES
"Jessica is a 65 year old who is being evaluated via a billable telephone visit.      This is a video/telephone visit conducted due to [facility] restrictions on non-urgent clinic visits due to risk of the spread of COVID-19 pandemic virus. The patient did not identify any urgent or red-flag symptoms that would require in-person evaluation.    What phone number would you like to be contacted at? [phone]  How would you like to obtain your AVS? To     Phone call duration:  15   minutes    Chief Complaint   Patient presents with     RECHECK     TELEPHONE VISIT RETURN        NEUROSURGERY CLINIC NOTE     Reason for Visit:              Follow Up neck pain and bilateral upper extremity symptoms      History of Presenting Illness:   Jessica Luque is seen in follow up for her neck pain and bilateral upper extremity symptoms.   She presented with Shooting pain down the neck into upper back.   Can get to pain rating of 8/10   Increased pain/symptoms w/certain neck positions  Can get radiating pain down both arms just to elbow (L > R)  She has baseline neuropathy  But no new increased numbness /tingling in hands /fingers.     Today,   \"Doing okay\"  Neck pain   She states that most of her pain is in her neck  She can have some numbness/tingling in hands,  But not down the arms.  \"Cramping in her hands bilaterally\"  And her hand can \"go numb\"   She has neuropathy in lower extremities (confirmed on EMG)  Takes Gabapentin, (200 mg) at night only, as medication makes her sleepy       Last seen in the Neurosurgery Clinic by myself on 6/08/2021 with the following recommendations:   Plan:   ~Will obtain bilateral upper extremity EMG to evaluate for cervical nerve involvement   ~Wishes to continue to avoid surgical intervention   ~Can consider spinal injections, and/or repeat/continued PT (cervical traction), or home unit  ~Will follow up by phone following EMG nerve study        Current Outpatient " Medications   Medication     acetaminophen (TYLENOL) 325 MG tablet     albuterol (VENTOLIN HFA) 108 (90 Base) MCG/ACT inhaler     amLODIPine (NORVASC) 5 MG tablet     calcipotriene (DOVONOX) 0.005 % external solution     chlorthalidone (HYGROTON) 25 MG tablet     cholecalciferol (VITAMIN D3) 400 UNIT TABS tablet     clobetasol (TEMOVATE) 0.05 % external ointment     clobetasol propionate (CLOBEX) 0.05 % external shampoo     cyanocobalamin (VITAMIN  B-12) 1000 MCG tablet     ferrous sulfate (IRON) 325 (65 FE) MG tablet     folic acid (FOLVITE) 1 MG tablet     gabapentin (NEURONTIN) 100 MG capsule     hydrOXYzine (ATARAX) 25 MG tablet     levothyroxine (SYNTHROID/LEVOTHROID) 88 MCG tablet     MAGNESIUM OXIDE PO     melatonin 5 MG tablet     multivitamin, therapeutic (THERA-VIT) TABS tablet     pantoprazole (PROTONIX) 40 MG EC tablet     psyllium 0.52 G capsule     tacrolimus (GENERIC EQUIVALENT) 0.5 MG capsule     traMADol (ULTRAM) 50 MG tablet       -    Occas takes for any significant pain      ursodiol (ACTIGALL) 300 MG capsule     No current facility-administered medications for this visit.       Examination:   There were no vitals taken for this visit.    Neurological Assessment:    Video examination  General    Alert, cooperative.  No acute distress  Pulmonary:   Breathing comfortably No cough, or shortness of breath  Speech is fluent    EMG NERVE STUDY    6/25/2021  Interpretation:  Abnormal study.      --There is electrodiagnostic evidence of cervical radiculopathies, affecting right C5, right C6 and left C5 through C7 segments. The needle EMG changes seen are predominantly chronic (large, long duration motor units with reduced recruitment and CRDs). Fibrillations / positive sharp waves to suggest active or recent denervation were seen in the bilateral biecps muscles.   --There is no electrodiagnostic evidence of median or ulnar mononeuropathy in either upper extremity.     EMG Physician:  Gayla Jacobo  MD  Physical Medicine & Rehabilitation       IMAGING:  MR CERVICAL SPINE W/O CONTRAST 6/4/2021 5:34 PM     Comparison: Cervical spine MRI dated 9/5/2018     Findings:  Straightening of cervical necrosis. The cervical vertebrae are  normally aligned. Degenerative changes of cervical spine multilevel  osteophytic spurring and disc space narrowing extending from C3-4  through C6-7.  There is likely artifactual T2 signal within the spinal  cord at the level of C3-4, C4-5 and C5-6. Focal fat deposition at C7  and T1, not significant changed from prior. Degenerative bone marrow  edema noted in the opposing endplate of C3-4. The findings on a level  by level basis are as follows:     C2-3:  Facet arthropathy bilaterally. No significant spinal canal  stenosis. Mild left neural foraminal narrowing. Right neuroforamen is  patent.     C3-4:  Uncovertebral hypertrophy and facet arthropathy bilaterally.  Moderate spinal canal stenosis. Moderate to severe bilateral  neuroforaminal narrowing. Not significantly changed from 9/5/2018.     C4-5:  Disc osteophyte complex, uncovertebral hypertrophy and facet  arthropathy bilaterally. Moderate spinal canal stenosis. Moderate  right greater than left neural foraminal narrowing. Overall not  significantly changed from 9/5/2018.     C5-6:  Disc osteophyte complex, uncovertebral hypertrophy and facet  arthropathy bilaterally. Moderate spinal canal stenosis. Severe right,  moderate left neural foraminal narrowing. Overall not significantly  changed from 9/5/2018.      C6-7:  Disc osteophyte complex, uncovertebral hypertrophy and facet  arthropathy bilaterally. Mild spinal canal stenosis. Mild to moderate  bilateral neuroforaminal narrowing. Overall not significantly changed  from 9/5/2018.     C7-T1:  Disc osteophyte complex, uncovertebral hypertrophy and facet  arthropathy bilaterally. Mild spinal canal stenosis. Mild to moderate  right neuroforaminal narrowing. Overall not significantly  changed from  9/5/2018.     No abnormality of the paraspinous soft tissues.                                                                 Impression: Little change in mid cervical spine cervical spondylosis  with moderate spinal canal stenosis at C3-4 through C5-6. Severe right  neuroforaminal narrowing at C5-6. Additional neuroforaminal narrowing  detailed as above. No convincing myelopathic signal.     I have personally reviewed the examination and initial interpretation  and I agree with the findings.     DENISE WALLACE MD       Assessment:    Facet arthropathy bilaterally. No significant spinal canal  stenosis. Mild left neural foraminal narrowing. Right neuroforamen is  patent.    ASSESSMENT:  ~Neck pain   ~Numbness/tingling in bilateral hands  ~Cervical radiculopathy   ~C3-4, C4-C5  Facet arthropathy   ~C3-C4, C4-C5, C5-C6 Moderate spinal canal stenosis   ~C5-C6 Severe right neuroforaminal narrowing      Plan:   ~Physical therapy focusing on cervical traction for neuro-foraminol stenosis (Narrowing)   ~Referral to pain management for epidural steroid injection of cervical spine   ~Return for virtual video /telephone clinic appointment to update progress. If she wishes to have surgical consultation/discussion, she can schedule directly with Dr. Still.       At the end of the visit, all the patient's questions and concerns had been addressed and the patient was agreeable with the plan of care as outlined above. The patient has our office contact information at [phone], and knows to call with any questions, concerns, or changes in condition.       Nette Staley DNP  Neurosurgery Nurse Practitioner  [facility]  [phone]

## 2021-07-19 NOTE — LETTER
"7/19/2021       RE: Phan Luque  [address]  [facility] [address]     Dear Colleague,    Thank you for referring your patient, Phan Luque, to the [facility] NEUROSURGERY CLINIC [place] [facility]. Please see a copy of my visit note below.    Chief Complaint   Patient presents with     RECHECK     TELEPHONE VISIT RETURN        NEUROSURGERY CLINIC NOTE     Reason for Visit:              Follow Up neck pain and bilateral upper extremity symptoms      History of Presenting Illness:   Jessica Lquue is seen in follow up for her neck pain and bilateral upper extremity symptoms.   She presented with Shooting pain down the neck into upper back.   Can get to pain rating of 8/10   Increased pain/symptoms w/certain neck positions  Can get radiating pain down both arms just to elbow (L > R)  She has baseline neuropathy  But no new increased numbness /tingling in hands /fingers.     Today,   \"Doing okay\"  Neck pain   She states that most of her pain is in her neck  She can have some numbness/tingling in hands,  But not down the arms.  \"Cramping in her hands bilaterally\"  And her hand can \"go numb\"   She has neuropathy in lower extremities (confirmed on EMG)  Takes Gabapentin, (200 mg) at night only, as medication makes her sleepy       Last seen in the Neurosurgery Clinic by myself on 6/08/2021 with the following recommendations:   Plan:   ~Will obtain bilateral upper extremity EMG to evaluate for cervical nerve involvement   ~Wishes to continue to avoid surgical intervention   ~Can consider spinal injections, and/or repeat/continued PT (cervical traction), or home unit  ~Will follow up by phone following EMG nerve study        Current Outpatient Medications   Medication     acetaminophen (TYLENOL) 325 MG tablet     albuterol (VENTOLIN HFA) 108 (90 Base) MCG/ACT inhaler     amLODIPine (NORVASC) 5 MG tablet     calcipotriene (DOVONOX) 0.005 % external " solution     chlorthalidone (HYGROTON) 25 MG tablet     cholecalciferol (VITAMIN D3) 400 UNIT TABS tablet     clobetasol (TEMOVATE) 0.05 % external ointment     clobetasol propionate (CLOBEX) 0.05 % external shampoo     cyanocobalamin (VITAMIN  B-12) 1000 MCG tablet     ferrous sulfate (IRON) 325 (65 FE) MG tablet     folic acid (FOLVITE) 1 MG tablet     gabapentin (NEURONTIN) 100 MG capsule     hydrOXYzine (ATARAX) 25 MG tablet     levothyroxine (SYNTHROID/LEVOTHROID) 88 MCG tablet     MAGNESIUM OXIDE PO     melatonin 5 MG tablet     multivitamin, therapeutic (THERA-VIT) TABS tablet     pantoprazole (PROTONIX) 40 MG EC tablet     psyllium 0.52 G capsule     tacrolimus (GENERIC EQUIVALENT) 0.5 MG capsule     traMADol (ULTRAM) 50 MG tablet       -    Occas takes for any significant pain      ursodiol (ACTIGALL) 300 MG capsule     No current facility-administered medications for this visit.       Examination:   There were no vitals taken for this visit.    Neurological Assessment:    Video examination  General    Alert, cooperative.  No acute distress  Pulmonary:   Breathing comfortably No cough, or shortness of breath  Speech is fluent    EMG NERVE STUDY    6/25/2021  Interpretation:  Abnormal study.      --There is electrodiagnostic evidence of cervical radiculopathies, affecting right C5, right C6 and left C5 through C7 segments. The needle EMG changes seen are predominantly chronic (large, long duration motor units with reduced recruitment and CRDs). Fibrillations / positive sharp waves to suggest active or recent denervation were seen in the bilateral biecps muscles.   --There is no electrodiagnostic evidence of median or ulnar mononeuropathy in either upper extremity.     EMG Physician:  Gayla Jacobo MD  Physical Medicine & Rehabilitation       IMAGING:  MR CERVICAL SPINE W/O CONTRAST 6/4/2021 5:34 PM     Comparison: Cervical spine MRI dated 9/5/2018     Findings:  Straightening of cervical necrosis. The  cervical vertebrae are  normally aligned. Degenerative changes of cervical spine multilevel  osteophytic spurring and disc space narrowing extending from C3-4  through C6-7.  There is likely artifactual T2 signal within the spinal  cord at the level of C3-4, C4-5 and C5-6. Focal fat deposition at C7  and T1, not significant changed from prior. Degenerative bone marrow  edema noted in the opposing endplate of C3-4. The findings on a level  by level basis are as follows:     C2-3:  Facet arthropathy bilaterally. No significant spinal canal  stenosis. Mild left neural foraminal narrowing. Right neuroforamen is  patent.     C3-4:  Uncovertebral hypertrophy and facet arthropathy bilaterally.  Moderate spinal canal stenosis. Moderate to severe bilateral  neuroforaminal narrowing. Not significantly changed from 9/5/2018.     C4-5:  Disc osteophyte complex, uncovertebral hypertrophy and facet  arthropathy bilaterally. Moderate spinal canal stenosis. Moderate  right greater than left neural foraminal narrowing. Overall not  significantly changed from 9/5/2018.     C5-6:  Disc osteophyte complex, uncovertebral hypertrophy and facet  arthropathy bilaterally. Moderate spinal canal stenosis. Severe right,  moderate left neural foraminal narrowing. Overall not significantly  changed from 9/5/2018.      C6-7:  Disc osteophyte complex, uncovertebral hypertrophy and facet  arthropathy bilaterally. Mild spinal canal stenosis. Mild to moderate  bilateral neuroforaminal narrowing. Overall not significantly changed  from 9/5/2018.     C7-T1:  Disc osteophyte complex, uncovertebral hypertrophy and facet  arthropathy bilaterally. Mild spinal canal stenosis. Mild to moderate  right neuroforaminal narrowing. Overall not significantly changed from  9/5/2018.     No abnormality of the paraspinous soft tissues.                                                                 Impression: Little change in mid cervical spine cervical  spondylosis  with moderate spinal canal stenosis at C3-4 through C5-6. Severe right  neuroforaminal narrowing at C5-6. Additional neuroforaminal narrowing  detailed as above. No convincing myelopathic signal.     I have personally reviewed the examination and initial interpretation  and I agree with the findings.     DENISE WALLACE MD     Assessment:    Facet arthropathy bilaterally. No significant spinal canal  stenosis. Mild left neural foraminal narrowing. Right neuroforamen is  patent.    ASSESSMENT:  ~Neck pain   ~Numbness/tingling in bilateral hands  ~Cervical radiculopathy   ~C3-4, C4-C5  Facet arthropathy   ~C3-C4, C4-C5, C5-C6 Moderate spinal canal stenosis   ~C5-C6 Severe right neuroforaminal narrowing    Plan:   ~Physical therapy focusing on cervical traction for neuro-foraminol stenosis (Narrowing)   ~Referral to pain management for epidural steroid injection of cervical spine   ~Return for virtual video /telephone clinic appointment to update progress. If she wishes to have surgical consultation/discussion, she can schedule directly with Dr. Still.     At the end of the visit, all the patient's questions and concerns had been addressed and the patient was agreeable with the plan of care as outlined above. The patient has our office contact information at [phone], and knows to call with any questions, concerns, or changes in condition.       Nette Staley DNP  Neurosurgery Nurse Practitioner  [facility]  [phone]      Again, thank you for allowing me to participate in the care of your patient.      Sincerely,    DONOVAN Sage CNP

## 2021-07-20 ENCOUNTER — LAB (OUTPATIENT)
Dept: LAB | Facility: CLINIC | Age: 66
End: 2021-07-20
Payer: COMMERCIAL

## 2021-07-20 DIAGNOSIS — Z94.4 LIVER REPLACED BY TRANSPLANT (H): ICD-10-CM

## 2021-07-20 PROCEDURE — 80197 ASSAY OF TACROLIMUS: CPT

## 2021-07-21 ENCOUNTER — RECORDS - HEALTHEAST (OUTPATIENT)
Dept: ADMINISTRATIVE | Facility: CLINIC | Age: 66
End: 2021-07-21

## 2021-07-21 ENCOUNTER — LAB REQUISITION (OUTPATIENT)
Dept: LAB | Facility: CLINIC | Age: 66
End: 2021-07-21
Payer: COMMERCIAL

## 2021-07-21 DIAGNOSIS — Z79.899 OTHER LONG TERM (CURRENT) DRUG THERAPY: ICD-10-CM

## 2021-07-21 DIAGNOSIS — Z94.4 LIVER TRANSPLANT STATUS (H): ICD-10-CM

## 2021-07-21 LAB
ALBUMIN SERPL-MCNC: 4 G/DL (ref 3.5–5)
ALP SERPL-CCNC: 53 U/L (ref 45–120)
ALT SERPL W P-5'-P-CCNC: 21 U/L (ref 0–45)
ANION GAP SERPL CALCULATED.3IONS-SCNC: 9 MMOL/L (ref 5–18)
AST SERPL W P-5'-P-CCNC: 22 U/L (ref 0–40)
BILIRUB DIRECT SERPL-MCNC: 0.3 MG/DL
BILIRUB SERPL-MCNC: 1.1 MG/DL (ref 0–1)
BUN SERPL-MCNC: 20 MG/DL (ref 8–22)
CALCIUM SERPL-MCNC: 9.3 MG/DL (ref 8.5–10.5)
CHLORIDE BLD-SCNC: 106 MMOL/L (ref 98–107)
CO2 SERPL-SCNC: 25 MMOL/L (ref 22–31)
CREAT SERPL-MCNC: 1.37 MG/DL (ref 0.6–1.1)
ERYTHROCYTE [DISTWIDTH] IN BLOOD BY AUTOMATED COUNT: 13.8 % (ref 10–15)
GFR SERPL CREATININE-BSD FRML MDRD: 41 ML/MIN/1.73M2
GLUCOSE BLD-MCNC: 108 MG/DL (ref 70–125)
HCT VFR BLD AUTO: 39.2 % (ref 35–47)
HGB BLD-MCNC: 13.3 G/DL (ref 11.7–15.7)
MAGNESIUM SERPL-MCNC: 1.8 MG/DL (ref 1.8–2.6)
MCH RBC QN AUTO: 30.4 PG (ref 26.5–33)
MCHC RBC AUTO-ENTMCNC: 33.9 G/DL (ref 31.5–36.5)
MCV RBC AUTO: 90 FL (ref 78–100)
PHOSPHATE SERPL-MCNC: 4.2 MG/DL (ref 2.5–4.5)
PLATELET # BLD AUTO: 255 10E3/UL (ref 150–450)
POTASSIUM BLD-SCNC: 4.2 MMOL/L (ref 3.5–5)
PROT SERPL-MCNC: 7.3 G/DL (ref 6–8)
RBC # BLD AUTO: 4.38 10E6/UL (ref 3.8–5.2)
SODIUM SERPL-SCNC: 140 MMOL/L (ref 136–145)
WBC # BLD AUTO: 6.8 10E3/UL (ref 4–11)

## 2021-07-21 PROCEDURE — 83735 ASSAY OF MAGNESIUM: CPT | Mod: ORL | Performed by: INTERNAL MEDICINE

## 2021-07-21 PROCEDURE — 85027 COMPLETE CBC AUTOMATED: CPT | Mod: ORL | Performed by: INTERNAL MEDICINE

## 2021-07-21 PROCEDURE — 36415 COLL VENOUS BLD VENIPUNCTURE: CPT | Mod: ORL | Performed by: INTERNAL MEDICINE

## 2021-07-21 PROCEDURE — 84100 ASSAY OF PHOSPHORUS: CPT | Mod: ORL | Performed by: INTERNAL MEDICINE

## 2021-07-21 PROCEDURE — 82248 BILIRUBIN DIRECT: CPT | Mod: ORL | Performed by: INTERNAL MEDICINE

## 2021-07-21 PROCEDURE — 80053 COMPREHEN METABOLIC PANEL: CPT | Mod: ORL | Performed by: INTERNAL MEDICINE

## 2021-07-21 NOTE — PATIENT INSTRUCTIONS
~Physical therapy focusing on cervical traction for neuro-foraminol stenosis (Narrowing)   ~Referral to pain management for epidural steroid injection of cervical spine   ~Return for virtual video /telephone clinic appointment to update progress. If she wishes to have surgical consultation/discussion, she can schedule directly with Dr. Still.       At the end of the visit, all the patient's questions and concerns had been addressed and the patient was agreeable with the plan of care as outlined above. The patient has our office contact information at [phone], and knows to call with any questions, concerns, or changes in condition.

## 2021-07-22 LAB
TACROLIMUS BLD-MCNC: <3 UG/L (ref 5–15)
TME LAST DOSE: ABNORMAL H
TME LAST DOSE: ABNORMAL H

## 2021-07-23 ENCOUNTER — TELEPHONE (OUTPATIENT)
Dept: TRANSPLANT | Facility: CLINIC | Age: 66
End: 2021-07-23

## 2021-07-23 DIAGNOSIS — Z94.4 LIVER TRANSPLANTED (H): ICD-10-CM

## 2021-07-23 RX ORDER — TACROLIMUS 0.5 MG/1
1.5 CAPSULE ORAL EVERY 12 HOURS
Qty: 180 CAPSULE | Refills: 11 | Status: SHIPPED | OUTPATIENT
Start: 2021-07-23 | End: 2022-08-08

## 2021-07-23 NOTE — TELEPHONE ENCOUNTER
Spoke with patient. She has not been drinking her usual water intake.   Pt has been taking 1.5 mg AM and 1 mg PM. Pt had a 12 hour level.  Pt will increase tacrolimus to  1.5 mg BID

## 2021-07-26 NOTE — LETTER
"    11/19/2020         RE: Phan Luque  [address] [facility] [address]        Dear Colleague,    Thank you for referring your patient, Phan Luque, to the [facility] HEPATOLOGY CLINIC [place]. Please see a copy of my visit note below.    Phan Luque is a 65 year old female who is being evaluated via a billable video visit.      The patient has been notified of following:     \"This video visit will be conducted via a call between you and your physician/provider. We have found that certain health care needs can be provided without the need for an in-person physical exam.  This service lets us provide the care you need with a video conversation.  If a prescription is necessary we can send it directly to your pharmacy.  If lab work is needed we can place an order for that and you can then stop by our lab to have the test done at a later time.    Video visits are billed at different rates depending on your insurance coverage.  Please reach out to your insurance provider with any questions.    If during the course of the call the physician/provider feels a video visit is not appropriate, you will not be charged for this service.\"    Patient has given verbal consent for Video visit? Yes  How would you like to obtain your AVS? MyChart  If you are dropped from the video visit, the video invite should be resent to: Text to cell phone: [phone]  Will anyone else be joining your video visit? No        Video-Visit Details    I had the pleasure of seeing Phan Luque for followup in the Liver Transplant Clinic at the [facility] on  November 19, 2020.  Ms. 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      She denies any abdominal pain, itching or skin rash or fatigue.  She is working full time.  She denies any increased abdominal " girth or lower extremity edema.      She denies any fevers or chills, cough or shortness of breath.  She denies any nausea, vomiting, diarrhea or constipation.  Her appetite is good.  She has gained some weight sheltering in place.    Current Outpatient Medications   Medication     acetaminophen (TYLENOL) 325 MG tablet     albuterol (PROAIR HFA/PROVENTIL HFA/VENTOLIN HFA) 108 (90 Base) MCG/ACT inhaler     amLODIPine (NORVASC) 5 MG tablet     chlorthalidone (HYGROTON) 25 MG tablet     cholecalciferol (VITAMIN D3) 400 UNIT TABS tablet     clobetasol (TEMOVATE) 0.05 % external ointment     clobetasol (TEMOVATE) 0.05 % external solution     clobetasol propionate (CLOBEX) 0.05 % external shampoo     cyanocobalamin (VITAMIN  B-12) 1000 MCG tablet     ferrous sulfate (IRON) 325 (65 FE) MG tablet     folic acid (FOLVITE) 1 MG tablet     gabapentin (NEURONTIN) 100 MG capsule     hydrOXYzine (ATARAX) 25 MG tablet     levothyroxine (SYNTHROID/LEVOTHROID) 88 MCG tablet     MAGNESIUM OXIDE PO     melatonin 5 MG tablet     multivitamin, therapeutic (THERA-VIT) TABS tablet     nystatin (MYCOSTATIN) 296250 UNIT/GM external cream     order for DME     pantoprazole (PROTONIX) 40 MG EC tablet     psyllium 0.52 G capsule     tacrolimus (GENERIC EQUIVALENT) 0.5 MG capsule     traMADol (ULTRAM) 50 MG tablet     triamcinolone (KENALOG) 0.1 % external ointment     ursodiol (ACTIGALL) 300 MG capsule     amLODIPine (NORVASC) 5 MG tablet     ursodiol (ACTIGALL) 300 MG capsule     No current facility-administered medications for this visit.      On physical examination, she looks well.  HEENT exam shows no scleral icterus or temporal muscle wasting.  Neurologic exam nonfocal.      Recent Results (from the past 168 hour(s))   Magnesium    Collection Time: 11/18/20  7:28 AM   Result Value Ref Range    Magnesium 1.9 1.6 - 2.3 mg/dL   Hepatic panel    Collection Time: 11/18/20  7:28 AM   Result Value Ref Range    Bilirubin Direct 0.1 0.0 - 0.2  mg/dL    Bilirubin Total 0.8 0.2 - 1.3 mg/dL    Albumin 3.8 3.4 - 5.0 g/dL    Protein Total 7.6 6.8 - 8.8 g/dL    Alkaline Phosphatase 58 40 - 150 U/L    ALT 26 0 - 50 U/L    AST 25 0 - 45 U/L   CBC with platelets    Collection Time: 11/18/20  7:28 AM   Result Value Ref Range    WBC 6.9 4.0 - 11.0 10e9/L    RBC Count 4.66 3.8 - 5.2 10e12/L    Hemoglobin 14.0 11.7 - 15.7 g/dL    Hematocrit 42.7 35.0 - 47.0 %    MCV 92 78 - 100 fl    MCH 30.0 26.5 - 33.0 pg    MCHC 32.8 31.5 - 36.5 g/dL    RDW 13.3 10.0 - 15.0 %    Platelet Count 268 150 - 450 10e9/L   Basic metabolic panel    Collection Time: 11/18/20  7:28 AM   Result Value Ref Range    Sodium 135 133 - 144 mmol/L    Potassium 4.4 3.4 - 5.3 mmol/L    Chloride 103 94 - 109 mmol/L    Carbon Dioxide 26 20 - 32 mmol/L    Anion Gap 6 3 - 14 mmol/L    Glucose 102 (H) 70 - 99 mg/dL    Urea Nitrogen 23 7 - 30 mg/dL    Creatinine 1.27 (H) 0.52 - 1.04 mg/dL    GFR Estimate 45 (L) >60 mL/min/[1.73_m2]    GFR Estimate If Black 52 (L) >60 mL/min/[1.73_m2]    Calcium 9.3 8.5 - 10.1 mg/dL   Tacrolimus level    Collection Time: 11/18/20  7:29 AM   Result Value Ref Range    Tacrolimus Last Dose 11/17/2020 at 7:30pm     Tacrolimus Level 3.2 (L) 5.0 - 15.0 ug/L      My impression is that Ms. Luque is more than 4 years status post liver transplantation for alcoholic cirrhosis.  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  Her laboratory tests are excellent.   She is up to date with regard to other vaccinations.  She is up to date with regard to cancer screening including skin cancer screening and her last bone density study showed only osteopenia.  My plan will be to see her back in the clinic in 6 months.      Thank you very much for allowing me to participate in the care of this patient.  If you have any questions regarding my recommendations, please do not hesitate to contact me.         Hussein Banegas MD      Professor of  Medicine  [employer]      Executive Medical Director, Solid Organ Transplant Program  [facility]    Type of service:  Video Visit    Video Start Time: 9:59 AM  Video End Time: 10:15 AM    Originating Location (pt. Location): Clinic    Distant Location (provider location):  [facility] HEPATOLOGY CLINIC [place]     Platform used for Video Visit: AndreaWell            Again, thank you for allowing me to participate in the care of your patient.        Sincerely,        Hussein Banegas MD     No

## 2021-07-31 DIAGNOSIS — L30.0 NUMMULAR ECZEMA: ICD-10-CM

## 2021-08-02 ENCOUNTER — ALLIED HEALTH/NURSE VISIT (OUTPATIENT)
Dept: INTERNAL MEDICINE | Facility: CLINIC | Age: 66
End: 2021-08-02
Payer: COMMERCIAL

## 2021-08-02 DIAGNOSIS — Z23 NEED FOR HEPATITIS B VACCINATION: Primary | ICD-10-CM

## 2021-08-02 PROCEDURE — 90746 HEPB VACCINE 3 DOSE ADULT IM: CPT

## 2021-08-02 PROCEDURE — 90471 IMMUNIZATION ADMIN: CPT

## 2021-08-02 NOTE — PROGRESS NOTES
Phan Luque comes into clinic today at the request of Dr. Sanchez Ordering Provider for the hepatitis B vaccine.    Patient tolerated the vaccination well. No redness or swelling at the injection site. Patient feels well after administration of the vaccine.    This service provided today was under the supervising provider of the day Dr. Olivia, who was available if needed.    Paramjit Dunne MA

## 2021-08-04 ENCOUNTER — TELEPHONE (OUTPATIENT)
Dept: GASTROENTEROLOGY | Facility: CLINIC | Age: 66
End: 2021-08-04

## 2021-08-04 RX ORDER — CLOBETASOL PROPIONATE 0.5 MG/G
OINTMENT TOPICAL 2 TIMES DAILY
Qty: 60 G | Refills: 1 | Status: SHIPPED | OUTPATIENT
Start: 2021-08-04 | End: 2021-11-24

## 2021-08-04 NOTE — TELEPHONE ENCOUNTER
Instructions, policy, conscious sedation plan reviewed. Instructed patient to have someone stay with them for 6 hours post exam.Covid test 8-9 [facility] lab

## 2021-08-04 NOTE — TELEPHONE ENCOUNTER
CLOBETASOL PROPIONATE 0.05% OINT      Last Written Prescription Date:  3-24-21  Last Fill Quantity: 60 g,   # refills: 1  Last Office Visit : 3-17-21  Future Office visit:  None  Derm Process #3  Last clinic note: RTC 6 Months    Routing refill request to provider for review/approval because:  Derm process and clinic note plan - do not match  ? Authorize RF

## 2021-08-09 ENCOUNTER — LAB (OUTPATIENT)
Dept: LAB | Facility: CLINIC | Age: 66
End: 2021-08-09
Attending: STUDENT IN AN ORGANIZED HEALTH CARE EDUCATION/TRAINING PROGRAM
Payer: COMMERCIAL

## 2021-08-09 DIAGNOSIS — Z11.59 ENCOUNTER FOR SCREENING FOR OTHER VIRAL DISEASES: ICD-10-CM

## 2021-08-09 LAB — SARS-COV-2 RNA RESP QL NAA+PROBE: NEGATIVE

## 2021-08-09 PROCEDURE — U0003 INFECTIOUS AGENT DETECTION BY NUCLEIC ACID (DNA OR RNA); SEVERE ACUTE RESPIRATORY SYNDROME CORONAVIRUS 2 (SARS-COV-2) (CORONAVIRUS DISEASE [COVID-19]), AMPLIFIED PROBE TECHNIQUE, MAKING USE OF HIGH THROUGHPUT TECHNOLOGIES AS DESCRIBED BY CMS-2020-01-R: HCPCS

## 2021-08-09 PROCEDURE — U0005 INFEC AGEN DETEC AMPLI PROBE: HCPCS

## 2021-08-09 NOTE — PROGRESS NOTES
AUDIOLOGY REPORT    SUBJECTIVE:  Phan Luque is a 65 year old female who was seen on 8/18/2021 in the Audiology Clinic at the [facility] for audiologic evaluation, ordered by Arlyn Sanchez MD.  The patient reports a gradual bilateral hearing loss.  She notes difficulty understanding one co-worker with an accent on meetings from home.  She prefers captions on TV.  The patient denies ear pain, drainage from the ears, dizziness, or history of ear surgery.   She notes balance concerns which she attributes to vision loss in one eye.  Noise exposure history includes loud music.  She had bilateral perforated tympanic membranes as a result of an ear infection during childhood.  These resolved without surgical intervention.      OBJECTIVE:    Fall Risk Screen:  1. Have you fallen two or more times in the past year? No  2. Have you fallen and had an injury in the past year? No    Timed Up and Go Score (in seconds): not tested  Is patient a fall risk? No  Referral initiated: No  Fall Risk Assessment Completed by Audiology    Otoscopic exam indicates ears are clear of cerumen bilaterally.     Pure Tone Thresholds assessed using conventional audiometry with good  reliability from 250-8000 Hz bilaterally using insert earphones and circumaural headphones.     RIGHT:  Normal/borderline normal hearing through 2000 Hz, sloping to a mild to moderately severe sensorineural hearing loss at 4715-6223 Hz.     LEFT:  Normal hearing through 2000 Hz, sloping to a mild to moderate sensorineural hearing loss at 5956-8566 Hz.     Tympanogram:    RIGHT: normal eardrum mobility    LEFT:   normal eardrum mobility    Reflexes (reported by stimulus ear):  RIGHT: Ipsilateral is present at normal levels  RIGHT: Contralateral is present at normal levels  LEFT:   Ipsilateral is present at normal levels  LEFT:   Contralateral is present at normal levels      Speech Reception Threshold:    RIGHT: 10 dB  HL    LEFT:   10 dB HL  Word Recognition Score:     RIGHT: 100% at 65 dB HL using NU-6 recorded word list.    LEFT:   96% at 65 dB HL using NU-6 recorded word list.      ASSESSMENT:   Sensorineural hearing loss in the high frequencies bilaterally     Today s results were discussed with the patient in detail.     PLAN:    1. Discussed that patient is a borderline candidate for hearing aid trial.  She reports she is not interested at this time since most of her difficulties understanding are with one specific individual.  2. Patient should return in 1-2 years for repeat testing or sooner if changes are noted.    3. Hearing protection should be used in noise.  4. Patient will follow up with Dr. Sanchez regarding today's results and recommendations.    The patient expressed understanding and agreement with this plan.    Lyndsay Chaudhry, CCC-A, [facility]  Licensed Audiologist  [place] #[id number]    Enclosure: audiogram    Cc Arlyn Sanchez MD

## 2021-08-12 ENCOUNTER — HOSPITAL ENCOUNTER (OUTPATIENT)
Facility: AMBULATORY SURGERY CENTER | Age: 66
Discharge: HOME | End: 2021-08-12
Attending: STUDENT IN AN ORGANIZED HEALTH CARE EDUCATION/TRAINING PROGRAM | Admitting: STUDENT IN AN ORGANIZED HEALTH CARE EDUCATION/TRAINING PROGRAM
Payer: COMMERCIAL

## 2021-08-12 VITALS
SYSTOLIC BLOOD PRESSURE: 111 MMHG | WEIGHT: 175 LBS | HEART RATE: 78 BPM | DIASTOLIC BLOOD PRESSURE: 66 MMHG | TEMPERATURE: 98.9 F | RESPIRATION RATE: 13 BRPM | HEIGHT: 67 IN | OXYGEN SATURATION: 98 % | BODY MASS INDEX: 27.47 KG/M2

## 2021-08-12 LAB — COLONOSCOPY: NORMAL

## 2021-08-12 PROCEDURE — 88305 TISSUE EXAM BY PATHOLOGIST: CPT | Mod: GC | Performed by: PATHOLOGY

## 2021-08-12 PROCEDURE — 45380 COLONOSCOPY AND BIOPSY: CPT | Mod: 33

## 2021-08-12 RX ORDER — LIDOCAINE 40 MG/G
CREAM TOPICAL
Status: DISCONTINUED | OUTPATIENT
Start: 2021-08-12 | End: 2021-08-12 | Stop reason: HOSPADM

## 2021-08-12 RX ORDER — PROCHLORPERAZINE MALEATE 10 MG
10 TABLET ORAL EVERY 6 HOURS PRN
Status: DISCONTINUED | OUTPATIENT
Start: 2021-08-12 | End: 2021-08-13 | Stop reason: HOSPADM

## 2021-08-12 RX ORDER — ONDANSETRON 2 MG/ML
4 INJECTION INTRAMUSCULAR; INTRAVENOUS EVERY 6 HOURS PRN
Status: DISCONTINUED | OUTPATIENT
Start: 2021-08-12 | End: 2021-08-13 | Stop reason: HOSPADM

## 2021-08-12 RX ORDER — ONDANSETRON 4 MG/1
4 TABLET, ORALLY DISINTEGRATING ORAL EVERY 6 HOURS PRN
Status: DISCONTINUED | OUTPATIENT
Start: 2021-08-12 | End: 2021-08-13 | Stop reason: HOSPADM

## 2021-08-12 RX ORDER — SODIUM CHLORIDE, SODIUM LACTATE, POTASSIUM CHLORIDE, CALCIUM CHLORIDE 600; 310; 30; 20 MG/100ML; MG/100ML; MG/100ML; MG/100ML
INJECTION, SOLUTION INTRAVENOUS CONTINUOUS
Status: DISCONTINUED | OUTPATIENT
Start: 2021-08-12 | End: 2021-08-12 | Stop reason: HOSPADM

## 2021-08-12 RX ORDER — NALOXONE HYDROCHLORIDE 0.4 MG/ML
0.2 INJECTION, SOLUTION INTRAMUSCULAR; INTRAVENOUS; SUBCUTANEOUS
Status: DISCONTINUED | OUTPATIENT
Start: 2021-08-12 | End: 2021-08-13 | Stop reason: HOSPADM

## 2021-08-12 RX ORDER — NALOXONE HYDROCHLORIDE 0.4 MG/ML
0.4 INJECTION, SOLUTION INTRAMUSCULAR; INTRAVENOUS; SUBCUTANEOUS
Status: DISCONTINUED | OUTPATIENT
Start: 2021-08-12 | End: 2021-08-13 | Stop reason: HOSPADM

## 2021-08-12 RX ORDER — FLUMAZENIL 0.1 MG/ML
0.2 INJECTION, SOLUTION INTRAVENOUS
Status: DISCONTINUED | OUTPATIENT
Start: 2021-08-12 | End: 2021-08-13 | Stop reason: HOSPADM

## 2021-08-12 RX ORDER — ONDANSETRON 2 MG/ML
4 INJECTION INTRAMUSCULAR; INTRAVENOUS
Status: DISCONTINUED | OUTPATIENT
Start: 2021-08-12 | End: 2021-08-12 | Stop reason: HOSPADM

## 2021-08-12 RX ORDER — FENTANYL CITRATE 50 UG/ML
INJECTION, SOLUTION INTRAMUSCULAR; INTRAVENOUS PRN
Status: DISCONTINUED | OUTPATIENT
Start: 2021-08-12 | End: 2021-08-12 | Stop reason: HOSPADM

## 2021-08-12 ASSESSMENT — MIFFLIN-ST. JEOR: SCORE: 1376.42

## 2021-08-12 NOTE — LETTER
August 16, 2021      Phan Luque  [address]  [place] [address]        Dear ,    We are writing to inform you of your test results.    All of the potential polyps removed during your recent colonoscopy were found to be benign polyps (inflammatory polyp, hyperplastic polyps) or normal colonic mucosa.  There was no evidence of pre-cancerous polyps/    Given these findings I recommend that you undergo a repeat colonoscopy in ten years.  However, a sooner examination might be necessary if you start developing any symptoms such as rectal bleeding, change in bowel habits, anemia, etc.  Please discuss this with your primary physician at the time of your next office appointment.  Your primary physician will schedule this repeat colonoscopy at the appropriate time.    It has been a pleasure to participate in your care.  Please call our clinic if you have any questions or concerns.        Resulted Orders   Surgical Pathology Exam   Result Value Ref Range    Case Report       Surgical Pathology Report                         Case: [id number]                                  Authorizing Provider:  Arlyn Beckford MD          Collected:           08/12/2021 12:08 PM          Ordering Location:     [facility] OR  Received:            08/12/2021 01:22 PM                                 [place]                                                                  Pathologist:           Dontao Mcmahan MD                                                             Specimens:   A) - Large Intestine, Colon, Cecum, Cecal Polyp                                                     B) - Other, Descending Polyp                                                                        C) - Other, Sigmoid Polyp x 3                                                              Final Diagnosis       A. COLON, CECAL POLYP:  - Inflammatory polyp    B. DESCENDING COLON, POLYP:  -Colonic mucosa with no significant  "histopathologic abnormality  -No evidence of neoplastic polyp     C. SIGMOID POLYP X 3:   -Hyperplastic Polyp      Comment       Case was reviewed by the following resident: JEANETTE ANDRADE      Clinical Information       Screening for colon cancer      Case Images      Gross Description       A. Large Intestine, Colon, Cecum, Cecal Polyp:  The specimen is received in formalin with proper patient identification, labeled \"cecal polyp\", are 3 pieces of tan-pink tissue ranging from 0.2-0.3 cm in greatest dimension, submitted in toto in cassette A1.     B. Other, Descending Polyp:  The specimen is received in formalin with proper patient identification, labeled \"descending polyp\", is a a 0.4 x 0.2 x 0.2 cm tan-pink piece of tissue submitted in toto in cassette B1.     C. Other, Sigmoid Polyp x 3:  The specimen is received in formalin with proper patient identification, labeled \"sigmoid polyp x3\", are 3 pieces of tan-pink tissue ranging from 0.3-0.4 cm in greatest dimension, submitted in toto in cassette C1.         Microscopic Description       Microscopic examination was performed.      Performing Labs       The technical component of this testing was completed at [facility]         If you have any questions or concerns, please call the clinic at the number listed above.       Sincerely,      Arlyn Beckford MD          "

## 2021-08-12 NOTE — LETTER
"August 16, 2021      Phan Luque  [address] [facility] [address]        Dear ,    We are writing to inform you of your test results.    {results letter list:[id number]}    Resulted Orders   Surgical Pathology Exam   Result Value Ref Range    Case Report       Surgical Pathology Report                         Case: [id number]                                  Authorizing Provider:  Arlyn Beckford MD          Collected:           08/12/2021 12:08 PM          Ordering Location:     [facility] OR  Received:            08/12/2021 01:22 PM                                 [place]                                                                  Pathologist:           Donato Mcmahan MD                                                             Specimens:   A) - Large Intestine, Colon, Cecum, Cecal Polyp                                                     B) - Other, Descending Polyp                                                                        C) - Other, Sigmoid Polyp x 3                                                              Final Diagnosis       A. COLON, CECAL POLYP:  - Inflammatory polyp    B. DESCENDING COLON, POLYP:  -Colonic mucosa with no significant histopathologic abnormality  -No evidence of neoplastic polyp     C. SIGMOID POLYP X 3:   -Hyperplastic Polyp      Comment       Case was reviewed by the following resident: JEANETTE ANDRADE      Clinical Information       Screening for colon cancer      Case Images      Gross Description       A. Large Intestine, Colon, Cecum, Cecal Polyp:  The specimen is received in formalin with proper patient identification, labeled \"cecal polyp\", are 3 pieces of tan-pink tissue ranging from 0.2-0.3 cm in greatest dimension, submitted in toto in cassette A1.     B. Other, Descending Polyp:  The specimen is received in formalin with proper patient identification, labeled \"descending polyp\", is a a 0.4 x 0.2 x 0.2 cm tan-pink piece of " "tissue submitted in toto in cassette B1.     C. Other, Sigmoid Polyp x 3:  The specimen is received in formalin with proper patient identification, labeled \"sigmoid polyp x3\", are 3 pieces of tan-pink tissue ranging from 0.3-0.4 cm in greatest dimension, submitted in toto in cassette C1.         Microscopic Description       Microscopic examination was performed.      Performing Labs       The technical component of this testing was completed at [facility]         If you have any questions or concerns, please call the clinic at the number listed above.       Sincerely,      Arlyn Beckford MD          "

## 2021-08-16 LAB
PATH REPORT.COMMENTS IMP SPEC: NORMAL
PATH REPORT.FINAL DX SPEC: NORMAL
PATH REPORT.GROSS SPEC: NORMAL
PATH REPORT.MICROSCOPIC SPEC OTHER STN: NORMAL
PATH REPORT.RELEVANT HX SPEC: NORMAL
PHOTO IMAGE: NORMAL

## 2021-08-18 ENCOUNTER — OFFICE VISIT (OUTPATIENT)
Dept: AUDIOLOGY | Facility: CLINIC | Age: 66
End: 2021-08-18
Attending: INTERNAL MEDICINE
Payer: COMMERCIAL

## 2021-08-18 DIAGNOSIS — H90.8 MIXED CONDUCTIVE AND SENSORINEURAL HEARING LOSS, UNSPECIFIED LATERALITY: ICD-10-CM

## 2021-08-18 DIAGNOSIS — H90.3 SENSORY HEARING LOSS, BILATERAL: Primary | ICD-10-CM

## 2021-08-18 PROCEDURE — 92557 COMPREHENSIVE HEARING TEST: CPT | Performed by: AUDIOLOGIST-HEARING AID FITTER

## 2021-08-18 PROCEDURE — 92550 TYMPANOMETRY & REFLEX THRESH: CPT | Performed by: AUDIOLOGIST-HEARING AID FITTER

## 2021-08-26 ENCOUNTER — HOSPITAL ENCOUNTER (OUTPATIENT)
Dept: PHYSICAL THERAPY | Facility: REHABILITATION | Age: 66
End: 2021-08-26
Attending: NURSE PRACTITIONER
Payer: COMMERCIAL

## 2021-08-26 DIAGNOSIS — R20.0 NUMBNESS AND TINGLING OF BOTH UPPER EXTREMITIES: ICD-10-CM

## 2021-08-26 DIAGNOSIS — M54.2 NECK PAIN: ICD-10-CM

## 2021-08-26 DIAGNOSIS — R20.2 NUMBNESS AND TINGLING OF BOTH UPPER EXTREMITIES: ICD-10-CM

## 2021-08-26 DIAGNOSIS — M48.02 SPINAL STENOSIS IN CERVICAL REGION: ICD-10-CM

## 2021-08-26 PROCEDURE — 97110 THERAPEUTIC EXERCISES: CPT | Mod: GP | Performed by: PHYSICAL THERAPIST

## 2021-08-26 PROCEDURE — 97161 PT EVAL LOW COMPLEX 20 MIN: CPT | Mod: GP | Performed by: PHYSICAL THERAPIST

## 2021-08-26 PROCEDURE — 97012 MECHANICAL TRACTION THERAPY: CPT | Mod: GP | Performed by: PHYSICAL THERAPIST

## 2021-08-26 NOTE — PROGRESS NOTES
[facility]          OUTPATIENT PHYSICAL THERAPY ORTHOPEDIC EVALUATION  PLAN OF TREATMENT FOR OUTPATIENT REHABILITATION  (COMPLETE FOR INITIAL CLAIMS ONLY)  Patient's Last Name, First Name, M.I.  YOB: 1955  EnePhan ariza    Provider s Name:  [facility]   Medical Record No.  [id number]   Start of Care Date:  08/26/21   Onset Date:  03/01/21   Type:     _X__PT   ___OT   ___SLP Medical Diagnosis:  cervical stenosis and cervical pain     PT Diagnosis:  cervical stenosis, cervical pain   Visits from SOC:  1      _________________________________________________________________________________  Plan of Treatment/Functional Goals:  joint mobilization, manual therapy, neuromuscular re-education, ROM, strengthening, stretching     Electrical stimulation, TENS, Traction     Goals  Goal Identifier: sleep  Goal Description: able to sleep through the night without waking due to pain  Target Date: 11/23/21                                                                                 Therapy Frequency:  2 times/Week  Predicted Duration of Therapy Intervention:  12 visits    Alexandra Ellis, PT                 I CERTIFY THE NEED FOR THESE SERVICES FURNISHED UNDER        THIS PLAN OF TREATMENT AND WHILE UNDER MY CARE     (Physician co-signature of this document indicates review and certification of the therapy plan).                       Certification Date From:  08/26/21   Certification Date To:  11/23/21    Referring Provider:  DONOVAN Delgado CNP    Initial Assessment        See Epic Evaluation Start of Care Date: 08/26/21 08/26/21 0836   General Information   Type of Visit Initial OP Ortho PT Evaluation   Start of Care Date 08/26/21   Referring Physician DONOVAN Delgado CNP   Orders Evaluate and Treat   Certification Required? Yes   Medical Diagnosis  cervical stenosis, neck pain   Surgical/Medical history reviewed Yes   Precautions/Limitations no known precautions/limitations   Body Part(s)   Body Part(s) Cervical Spine   Presentation and Etiology   Impairments A. Pain;D. Decreased ROM;E. Decreased flexibility;F. Decreased strength and endurance   Functional Limitations perform activities of daily living;perform desired leisure / sports activities   Symptom Location left neck and shoulder   How/Where did it occur From insidious onset   Onset date of current episode/exacerbation 03/01/21   Chronicity Chronic   Pain rating (0-10 point scale) Best (/10);Worst (/10)   Best (/10) 0   Worst (/10) 8   Pain quality A. Sharp;B. Dull;C. Aching;F. Stabbing   Frequency of pain/symptoms B. Intermittent   Pain/symptoms are: Worse during the night   Pain/symptoms exacerbated by M. Other   Pain exacerbation comment sleep/laying down, some exercises with left arm   Pain/symptoms eased by E. Changing positions   Progression of symptoms since onset: Improved   Prior Level of Function   Prior Level of Function-Mobility could do all tasks   Functional Level Prior Comment saw PT for shld in 3/21 and most of the pain resolved.  Still has some pain around her collar bone which can limit her.     Fall Risk Screen   Fall screen completed by PT   Have you fallen 2 or more times in the past year? No   Have you fallen and had an injury in the past year? No   Is patient a fall risk? No   Abuse Screen (yes response referral indicated)   Feels Unsafe at Home or Work/School no   Feels Threatened by Someone no   Does Anyone Try to Keep You From Having Contact with Others or Doing Things Outside Your Home? no   Physical Signs of Abuse Present no   Functional Scales   Functional Scales Other   Other Scales  NDI: 14   Cervical Spine   Posture mild to moderate forward head, mild thoracic kyphosis   Cervical Flexion ROM WNL, slight pain at end of range   Cervical Extension ROM WNL   Cervical Right  Rotation ROM 34   Cervical Left Rotation ROM 64   Shoulder Shrug (C2-C4) Strength 5/5 B   Shoulder Abd (C5) Strength 5/5 B   Elbow Flexion (C5, C6) Strength 5/5 B   Elbow Extension (C7) Strength 5/5 B   Wrist Extension (C6) Strength 5/5 B   Upper Trapezius Flexibility fair   Levator Scapula Flexibility fair   Vertebral Artery Test neg   Alar Ligament Test neg   Spurling Test no change today   Cervical Distraction Test no change today   Palpation slight pain over left SO region, cervical extensors, pec minor and major, scalene   Planned Therapy Interventions   Planned Therapy Interventions joint mobilization;manual therapy;neuromuscular re-education;ROM;strengthening;stretching   Planned Modality Interventions   Planned Modality Interventions Electrical stimulation;TENS;Traction   Clinical Impression   Criteria for Skilled Therapeutic Interventions Met yes, treatment indicated   PT Diagnosis cervical stenosis, cervical pain   Influenced by the following impairments pain, decrease AROM   Clinical Presentation Stable/Uncomplicated   Clinical Decision Making (Complexity) Low complexity   Therapy Frequency 2 times/Week   Predicted Duration of Therapy Intervention (days/wks) 12 visits   Risk & Benefits of therapy have been explained Yes   Patient, Family & other staff in agreement with plan of care Yes   Clinical Impression Comments Pt demonstrates decrease AROM, and pain to palpation and will beneift to help improve functional abilities   ORTHO GOALS   PT Ortho Eval Goals 1;2   Ortho Goal 1   Goal Identifier sleep   Goal Description able to sleep through the night without waking due to pain   Target Date 11/23/21   Total Evaluation Time   PT Eval, Low Complexity Minutes (29802) 30   Therapy Certification   Certification date from 08/26/21   Certification date to 11/23/21   Medical Diagnosis cervical stenosis and cervical pain

## 2021-08-30 ENCOUNTER — IMMUNIZATION (OUTPATIENT)
Dept: NURSING | Facility: CLINIC | Age: 66
End: 2021-08-30
Payer: COMMERCIAL

## 2021-08-30 PROCEDURE — 91300 PR COVID VAC PFIZER DIL RECON 30 MCG/0.3 ML IM: CPT

## 2021-08-30 PROCEDURE — 0003A PR COVID VAC PFIZER DIL RECON 30 MCG/0.3 ML IM: CPT

## 2021-09-11 DIAGNOSIS — K21.9 GASTROESOPHAGEAL REFLUX DISEASE: ICD-10-CM

## 2021-09-11 DIAGNOSIS — R94.6 THYROID FUNCTION TEST ABNORMAL: ICD-10-CM

## 2021-09-13 ENCOUNTER — OFFICE VISIT (OUTPATIENT)
Dept: FAMILY MEDICINE | Facility: CLINIC | Age: 66
End: 2021-09-13
Payer: COMMERCIAL

## 2021-09-13 ENCOUNTER — OFFICE VISIT (OUTPATIENT)
Dept: ANESTHESIOLOGY | Facility: CLINIC | Age: 66
End: 2021-09-13
Payer: COMMERCIAL

## 2021-09-13 VITALS
HEART RATE: 85 BPM | BODY MASS INDEX: 28.82 KG/M2 | DIASTOLIC BLOOD PRESSURE: 80 MMHG | SYSTOLIC BLOOD PRESSURE: 119 MMHG | OXYGEN SATURATION: 97 % | WEIGHT: 184 LBS | RESPIRATION RATE: 20 BRPM | TEMPERATURE: 98.2 F

## 2021-09-13 VITALS — DIASTOLIC BLOOD PRESSURE: 85 MMHG | HEART RATE: 71 BPM | SYSTOLIC BLOOD PRESSURE: 131 MMHG | OXYGEN SATURATION: 96 %

## 2021-09-13 DIAGNOSIS — M48.02 SPINAL STENOSIS IN CERVICAL REGION: ICD-10-CM

## 2021-09-13 DIAGNOSIS — R20.2 NUMBNESS AND TINGLING OF BOTH UPPER EXTREMITIES: ICD-10-CM

## 2021-09-13 DIAGNOSIS — R20.0 NUMBNESS AND TINGLING OF BOTH UPPER EXTREMITIES: ICD-10-CM

## 2021-09-13 DIAGNOSIS — L03.012 CELLULITIS OF FINGER OF LEFT HAND: Primary | ICD-10-CM

## 2021-09-13 DIAGNOSIS — M54.2 NECK PAIN: ICD-10-CM

## 2021-09-13 PROCEDURE — 99213 OFFICE O/P EST LOW 20 MIN: CPT | Performed by: PHYSICIAN ASSISTANT

## 2021-09-13 PROCEDURE — 99204 OFFICE O/P NEW MOD 45 MIN: CPT | Mod: GC | Performed by: ANESTHESIOLOGY

## 2021-09-13 RX ORDER — CLINDAMYCIN HCL 300 MG
300 CAPSULE ORAL 3 TIMES DAILY
Qty: 30 CAPSULE | Refills: 0 | Status: SHIPPED | OUTPATIENT
Start: 2021-09-13 | End: 2021-09-23

## 2021-09-13 ASSESSMENT — ENCOUNTER SYMPTOMS
SEIZURES: 0
NUMBNESS: 0
PARALYSIS: 0
HEADACHES: 1
MEMORY LOSS: 0
LOSS OF CONSCIOUSNESS: 0
DIZZINESS: 0
WEAKNESS: 1
TREMORS: 0
DISTURBANCES IN COORDINATION: 0
SPEECH CHANGE: 0
TINGLING: 1

## 2021-09-13 ASSESSMENT — PAIN SCALES - GENERAL: PAINLEVEL: NO PAIN (1)

## 2021-09-13 NOTE — NURSING NOTE
Patient presents with:  Consult: UMP NEW, RM 12, pt reports 1/10 pain in her neck and shoulders.      No Pain (1)     Pain Medications     Analgesics Other Refills Start End     acetaminophen (TYLENOL) 325 MG tablet    0 2/2/2017     Sig - Route: Take 2 tablets (650 mg) by mouth every 6 hours as needed for mild pain Or fevers. Use sparingly. Limit use to no more than 2 grams (2000 mg) in 24 hours. **Further refills must be obtained by primary care provider** - Oral    Class: Fax    Opioid Agonists Refills Start End     traMADol (ULTRAM) 50 MG tablet    0 5/31/2019     Sig - Route: Take 1 tablet (50 mg) by mouth every 6 hours as needed for severe pain - Oral    Class: Local Print          What medications are you using for pain?   Acetaminophen and tramadol.    (New patients only) Have you been seen by another pain clinic/ provider? N/A    Pt has no questions currently    Pt is doing Pt and looking for more things to help manage pain.    Sanju Reddy, EMT

## 2021-09-13 NOTE — PROGRESS NOTES
Chief Complaint   Patient presents with     Finger Problem     Right left pointer and left rign finger hurting for a  wk  (left ring finger laceration 1 wk ago)       ASSESSMENT/PLAN:  Phan was seen today for finger problem.    Diagnoses and all orders for this visit:    Cellulitis of finger of left hand  -     clindamycin (CLEOCIN) 300 MG capsule; Take 1 capsule (300 mg) by mouth 3 times daily for 10 days    The erythema of patient's fingers were outlined by a pen today.  Discussed continued use of Epsom salt soaks and topical antibiotics.  If the redness spreads or develops any fevers, chills or purulent drainage start clindamycin as above.  If she takes clindamycin I recommend she take a probiotic with it to reduce risks of C. difficile    Ant Mary PA-C  20 minutes spent on the date of the encounter doing chart review, history and exam, documentation and further activities per the note    SUBJECTIVE:  Phan is a 65 year old female who presents to urgent care with possible finger infection.  She cut her left index finger about a week ago and has developed some swelling and redness near the cuticle.  She has been doing Epson salt soaks which seem to have improved her symptoms but they are not fully going away.  No purulent drainage.  She also noticed some redness above the nail on the middle finger as well.  Both areas are slightly tender.  Patient is immunosuppressed post solid organ transplant    ROS: Pertinent ROS neg other than the symptoms noted above in the HPI.     OBJECTIVE:  /80   Pulse 85   Temp 98.2  F (36.8  C)   Resp 20   Wt 83.5 kg (184 lb)   LMP  (LMP Unknown)   SpO2 97%   BMI 28.82 kg/m     GENERAL: healthy, alert and no distress  MS: Left hand: Erythema of the left index finger around the cuticle with some eroded skin but no abscess or drainage.  Tender.  Left middle finger some slight redness proximal to the cuticle with minimal to no tenderness.    DIAGNOSTICS    No  results found for any visits on 09/13/21.     Current Outpatient Medications   Medication     acetaminophen (TYLENOL) 325 MG tablet     albuterol (VENTOLIN HFA) 108 (90 Base) MCG/ACT inhaler     amLODIPine (NORVASC) 5 MG tablet     calcipotriene (DOVONOX) 0.005 % external solution     chlorthalidone (HYGROTON) 25 MG tablet     cholecalciferol (VITAMIN D3) 400 UNIT TABS tablet     clobetasol (TEMOVATE) 0.05 % external ointment     clobetasol (TEMOVATE) 0.05 % external solution     clobetasol propionate (CLOBEX) 0.05 % external shampoo     cyanocobalamin (VITAMIN  B-12) 1000 MCG tablet     ferrous sulfate (IRON) 325 (65 FE) MG tablet     folic acid (FOLVITE) 1 MG tablet     gabapentin (NEURONTIN) 100 MG capsule     hydrOXYzine (ATARAX) 25 MG tablet     levothyroxine (SYNTHROID/LEVOTHROID) 88 MCG tablet     MAGNESIUM OXIDE PO     melatonin 5 MG tablet     multivitamin, therapeutic (THERA-VIT) TABS tablet     order for DME     pantoprazole (PROTONIX) 40 MG EC tablet     psyllium 0.52 G capsule     tacrolimus (GENERIC EQUIVALENT) 0.5 MG capsule     traMADol (ULTRAM) 50 MG tablet     triamcinolone (KENALOG) 0.025 % external ointment     triamcinolone (KENALOG) 0.1 % external ointment     ursodiol (ACTIGALL) 300 MG capsule     No current facility-administered medications for this visit.      Patient Active Problem List   Diagnosis     Decompensation of cirrhosis of liver (H)     Liver transplanted (H)     Alcoholic hepatitis     Immunosuppression (H)     Alcoholic hepatitis without ascites     Enterococcus UTI     Liver transplant status (H)     Nausea & vomiting     Malnutrition (H)     Anemia     ACP (advance care planning)     Diarrhea     Hypothyroidism     Esophageal reflux     Recurrent major depression in partial remission (H)     Insomnia     CKD (chronic kidney disease) stage 3, GFR 30-59 ml/min     Anemia in stage 3 chronic kidney disease     Osteopenia     Alcohol abuse, uncomplicated     Psoriasis      Past  Medical History:   Diagnosis Date     AION (acute ischemic optic neuropathy)      Asthma      Cervical spinal stenosis      Chronic kidney disease      Chronic kidney disease, stage 3      Dizziness and giddiness     Created by Conversion      End stage liver disease (H)     2/2 Alcohol Abuse     End stage liver disease (H)     Alcohol-related     GERD (gastroesophageal reflux disease)      Hypertension      Liver transplant recipient (H) 2016    [facility]     Liver transplanted (H)      Migraine headache      Migraines      Osteoporosis     frax 11/1.7%      PFO (patent foramen ovale) 2016    Noted on echo at [facility]     Recurrent UTI      Spinal stenosis in cervical region      Strabismus      Unspecified asthma(493.90)     Created by Conversion      Past Surgical History:   Procedure Laterality Date     APPENDECTOMY           APPENDECTOMY       BENCH LIVER N/A 2016    Procedure: BENCH LIVER;  Surgeon: Larry Dhillon MD;  Location: [facility] OR     CATARACT IOL, RT/LT       COLONOSCOPY       COLONOSCOPY N/A 2021    Procedure: COLONOSCOPY, WITH POLYPECTOMY;  Surgeon: Arlyn Beckford MD;  [place] [facility] [place]     ESOPHAGOSCOPY, GASTROSCOPY, DUODENOSCOPY (EGD), COMBINED N/A 2016    Procedure: COMBINED ESOPHAGOSCOPY, GASTROSCOPY, DUODENOSCOPY (EGD);  Surgeon: Tao Alfonso MD;  Location:  GI     ESOPHAGOSCOPY, GASTROSCOPY, DUODENOSCOPY (EGD), COMBINED N/A 10/31/2016    Procedure: COMBINED ESOPHAGOSCOPY, GASTROSCOPY, DUODENOSCOPY (EGD), BIOPSY SINGLE OR MULTIPLE;  Surgeon: Ronald Bojorquez MD;  Location:  GI     LIVER TRANSPLANTATION  2016    [facility]     RECTAL SURGERY       sphincteroplasty       TRANSPLANT LIVER RECIPIENT  DONOR N/A 2016    Procedure: TRANSPLANT LIVER RECIPIENT  DONOR;  Surgeon: Larry Dhillon MD;  Location:  OR     TUBAL LIGATION      laparoscopic     TUBAL  LIGATION       Family History   Problem Relation Age of Onset     Family History Negative Other      Melanoma Mother              Heart Disease Father         CHF     Skin Cancer Sister      Cirrhosis Paternal Uncle         not alcohol related     Heart Failure Mother      Heart Failure Father      Chronic Obstructive Pulmonary Disease Father      Social History     Tobacco Use     Smoking status: Former Smoker     Packs/day: 2.50     Years: 20.00     Pack years: 50.00     Quit date: 1996     Years since quittin.7     Smokeless tobacco: Never Used   Substance Use Topics     Alcohol use: No     Alcohol/week: 7.0 standard drinks     Types: 7 Standard drinks or equivalent per week     Comment: heavy use (750ml/2 days) up until 1 year ago; had cut down to 1 drink/day; none since 16              The plan of care was discussed with the patient. They understand and agree with the course of treatment prescribed. A printed summary was given including instructions and medications.  The use of Dragon/Cerephex dictation services may have been used to construct the content in this note; any grammatical or spelling errors are non-intentional. Please contact the author of this note directly if you are in need of any clarification.

## 2021-09-13 NOTE — PROGRESS NOTES
Pain Clinic New Patient Consult Note:    Referring Provider: Rebeka   Primary care provider: Arlyn Sanchez.    Phan Luque is a 65 year old y.o. old female who presents to the pain clinic with neck pain    HPI:  Patient's main complaint is left-sided neck pain.  Pain started 20-25 years ago and has been progressive since then.  Patient denies any inciting factors that began her pain.  Pain is described as dull, sharp, and throbbing at times.  Patient reports the pain radiates down her left arm to the middle of her forearm on the outside.  Pain is intermittent.  She has pain 30 minutes to couple of hours about 2 times a week.  Lying down makes it worse.  Sometimes it takes her up to an hour to sleep.        Patient was seen by neurosurgery and they recommended nonsurgical management.  Patient is referred for physical therapy and to our clinic for pain management.  She has been taking gabapentin however finds it sedating.  We discussed possibly taking a larger dose at night rather than twice a day dosing.  Patient also has prescription for tramadol.  She reports this provides good relief of her pain however she only takes it about once a month because it causes sedation.    Patient has been participating in physical therapy.  Patient states they have been doing stretching exercises and traction.  Patient reports this has helped and plans to continue treatment.    Patient has a history of chronic kidney disease her last GFR was 41.  She also has a history of liver transplant 5 years ago.  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  LFTs have been stable since her transplant.        Patient Supplied Answers To the [facility] Pain Questionnaire  [facility] -  Patient Entered Questionnaire/Answers 9/13/2021   What number best describes your pain right now:  0 = No pain  to  10 = Worst pain imaginable 1   How would you describe the pain? burning, sharp, dull, aching, throbbing   Which of the following worsen your pain? lying down,  exercise   Which of the following improve or reduce your pain?  medication   What number best describes your average pain for the past week:  0 = No pain  to  10 = Worst pain imaginable 1   What number best describes your LOWEST pain in past 24 hours:  0 = No pain  to  10 = Worst pain imaginable 1   What number best describes your WORST pain in past 24 hours:  0 = No pain  to  10 = Worst pain imaginable 1   When is your pain worst? Constant   What non-medicine treatments have you already had for your pain? physical therapy   Have you tried treating your pain with medication?  Yes   Are you currently taking medications for your pain? No           Pain treatments:    Herbal medicines: denies   Physical therapy: currently enrolled   Chiropractor: armando  Pain physician: briannaies  Surgery: appendectomy   Biofeedback: denies   Acupuncture: armando     Tests/Imaging reviewed with the patient:    MR CERVICAL SPINE W/O CONTRAST 6/4/2021 5:34 PM   Findings:  Straightening of cervical necrosis. The cervical vertebrae are  normally aligned. Degenerative changes of cervical spine multilevel  osteophytic spurring and disc space narrowing extending from C3-4  through C6-7.  There is likely artifactual T2 signal within the spinal  cord at the level of C3-4, C4-5 and C5-6. Focal fat deposition at C7  and T1, not significant changed from prior. Degenerative bone marrow  edema noted in the opposing endplate of C3-4. The findings on a level  by level basis are as follows:     C2-3:  Facet arthropathy bilaterally. No significant spinal canal  stenosis. Mild left neural foraminal narrowing. Right neuroforamen is  patent.     C3-4:  Uncovertebral hypertrophy and facet arthropathy bilaterally.  Moderate spinal canal stenosis. Moderate to severe bilateral  neuroforaminal narrowing. Not significantly changed from 9/5/2018.     C4-5:  Disc osteophyte complex, uncovertebral hypertrophy and facet  arthropathy bilaterally. Moderate spinal canal  stenosis. Moderate  right greater than left neural foraminal narrowing. Overall not  significantly changed from 9/5/2018.     C5-6:  Disc osteophyte complex, uncovertebral hypertrophy and facet  arthropathy bilaterally. Moderate spinal canal stenosis. Severe right,  moderate left neural foraminal narrowing. Overall not significantly  changed from 9/5/2018.      C6-7:  Disc osteophyte complex, uncovertebral hypertrophy and facet  arthropathy bilaterally. Mild spinal canal stenosis. Mild to moderate  bilateral neuroforaminal narrowing. Overall not significantly changed  from 9/5/2018.     C7-T1:  Disc osteophyte complex, uncovertebral hypertrophy and facet  arthropathy bilaterally. Mild spinal canal stenosis. Mild to moderate  right neuroforaminal narrowing. Overall not significantly changed from  9/5/2018.    Impression: Little change in mid cervical spine cervical spondylosis  with moderate spinal canal stenosis at C3-4 through C5-6. Severe right  neuroforaminal narrowing at C5-6. Additional neuroforaminal narrowing  detailed as above. No convincing myelopathic signal.      Significant Medical History:   Past Medical History:   Diagnosis Date     AION (acute ischemic optic neuropathy)      Asthma      Cervical spinal stenosis      Chronic kidney disease      Chronic kidney disease, stage 3      Dizziness and giddiness     Created by Conversion      End stage liver disease (H)     2/2 Alcohol Abuse     End stage liver disease (H)     Alcohol-related     GERD (gastroesophageal reflux disease)      Hypertension      Liver transplant recipient (H) 08/25/2016    [facility]     Liver transplanted (H)      Migraine headache      Migraines      Osteoporosis     frax 11/1.7% 2021     PFO (patent foramen ovale) 08/08/2016    Noted on echo at [facility]     Recurrent UTI      Spinal stenosis in cervical region      Strabismus      Unspecified asthma(493.90)     Created by Conversion            Past Surgical History:  Past Surgical History:   Procedure Laterality Date     APPENDECTOMY           APPENDECTOMY       BENCH LIVER N/A 2016    Procedure: BENCH LIVER;  Surgeon: Larry Dhillon MD;  Location: [facility] OR     CATARACT IOL, RT/LT       COLONOSCOPY       COLONOSCOPY N/A 2021    Procedure: COLONOSCOPY, WITH POLYPECTOMY;  Surgeon: Arlyn Beckford MD;  Location: [facility] OR     ESOPHAGOSCOPY, GASTROSCOPY, DUODENOSCOPY (EGD), COMBINED N/A 2016    Procedure: COMBINED ESOPHAGOSCOPY, GASTROSCOPY, DUODENOSCOPY (EGD);  Surgeon: Tao Alfonso MD;  Location:  GI     ESOPHAGOSCOPY, GASTROSCOPY, DUODENOSCOPY (EGD), COMBINED N/A 10/31/2016    Procedure: COMBINED ESOPHAGOSCOPY, GASTROSCOPY, DUODENOSCOPY (EGD), BIOPSY SINGLE OR MULTIPLE;  Surgeon: Ronald Bojorquez MD;  Location:  GI     LIVER TRANSPLANTATION  2016    [facility]     RECTAL SURGERY       sphincteroplasty       TRANSPLANT LIVER RECIPIENT  DONOR N/A 2016    Procedure: TRANSPLANT LIVER RECIPIENT  DONOR;  Surgeon: Larry Dhillon MD;  Location: [facility] OR     TUBAL LIGATION      laparoscopic     TUBAL LIGATION            Family History:  Family History   Problem Relation Age of Onset     Family History Negative Other      Melanoma Mother              Heart Disease Father         CHF     Skin Cancer Sister      Cirrhosis Paternal Uncle         not alcohol related     Heart Failure Mother      Heart Failure Father      Chronic Obstructive Pulmonary Disease Father           Social History:  Social History     Socioeconomic History     Marital status:      Spouse name: Not on file     Number of children: 3     Years of education: Not on file     Highest education level: Not on file   Occupational History     Occupation:    Tobacco Use     Smoking status: Former Smoker     Packs/day: 2.50     Years: 20.00     Pack years: 50.00     Quit date: 1996      Years since quittin.7     Smokeless tobacco: Never Used   Substance and Sexual Activity     Alcohol use: No     Alcohol/week: 7.0 standard drinks     Types: 7 Standard drinks or equivalent per week     Comment: heavy use (750ml/2 days) up until 1 year ago; had cut down to 1 drink/day; none since 17     Drug use: No     Sexual activity: Not on file   Other Topics Concern     Parent/sibling w/ CABG, MI or angioplasty before 65F 55M? 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    Lives alone     Social Determinants of Health     Financial Resource Strain:      Difficulty of Paying Living Expenses:    Food Insecurity:      Worried About Running Out of Food in the Last Year:      Ran Out of Food in the Last Year:    Transportation Needs:      Lack of Transportation (Medical):      Lack of Transportation (Non-Medical):    Physical Activity:      Days of Exercise per Week:      Minutes of Exercise per Session:    Stress:      Feeling of Stress :    Social Connections:      Frequency of Communication with Friends and Family:      Frequency of Social Gatherings with Friends and Family:      Attends Yazdanism Services:      Active Member of Clubs or Organizations:      Attends Club or Organization Meetings:      Marital Status:    Intimate Partner Violence:      Fear of Current or Ex-Partner:      Emotionally Abused:      Physically Abused:      Sexually Abused:      Social History     Social History Narrative    Works as  for [employer], pharmacy tech background    Son and Daughter    Lives alone          Allergies:  Allergies   Allergen Reactions     Codeine Hives     Benadryl [Diphenhydramine] Hives     Cefaclor Hives     Hydrocodone-Acetaminophen Itching     Oxycodone Itching     Penicillins Hives     Sulfa Drugs Hives       Current Medications:   Current Outpatient Medications    Medication Sig Dispense Refill     acetaminophen (TYLENOL) 325 MG tablet Take 2 tablets (650 mg) by mouth every 6 hours as needed for mild pain Or fevers. Use sparingly. Limit use to no more than 2 grams (2000 mg) in 24 hours. **Further refills must be obtained by primary care provider** 100 tablet 0     albuterol (VENTOLIN HFA) 108 (90 Base) MCG/ACT inhaler Inhale 2 puffs into the lungs every 6 hours as needed for shortness of breath / dyspnea or wheezing 18 g 8     amLODIPine (NORVASC) 5 MG tablet Take 1 tablet (5 mg) by mouth daily 90 tablet 1     calcipotriene (DOVONOX) 0.005 % external solution Apply 1 mL topically daily To the scalp 180 mL 3     chlorthalidone (HYGROTON) 25 MG tablet Take 0.5 tablets (12.5 mg) by mouth daily 45 tablet 1     cholecalciferol (VITAMIN D3) 400 UNIT TABS tablet Take 400 Units by mouth daily       clobetasol (TEMOVATE) 0.05 % external ointment Apply topically 2 times daily To areas of eczema on the lower legs, until areas resolve. For  refills, please schedule appointment, appt due 9-2021 60 g 1     clobetasol (TEMOVATE) 0.05 % external solution Apply topically 2 times daily To the scalp as needed for itching and flaking, on the days you don't use the clobetasol shampoo. 150 mL 3     clobetasol propionate (CLOBEX) 0.05 % external shampoo Apply topically daily To dry scalp x 15 min and rinse ,when psoriasis is present. 354 mL 3     cyanocobalamin (VITAMIN  B-12) 1000 MCG tablet Take 1,000 mcg by mouth daily       ferrous sulfate (IRON) 325 (65 FE) MG tablet Take 1 tablet (325 mg) by mouth 2 times daily 100 tablet      folic acid (FOLVITE) 1 MG tablet Take 1 tablet (1 mg) by mouth daily 30 tablet 1     gabapentin (NEURONTIN) 100 MG capsule Take 2 capsules (200 mg) by mouth At Bedtime 180 capsule 1     hydrOXYzine (ATARAX) 25 MG tablet Take 1-2 tablets (25-50 mg) by mouth every 6 hours as needed for itching 120 tablet 10     levothyroxine (SYNTHROID/LEVOTHROID) 88 MCG tablet Take 1  tablet (88 mcg) by mouth daily 90 tablet 3     MAGNESIUM OXIDE PO Take 400 mg by mouth daily       melatonin 5 MG tablet Take 1 tablet (5 mg) by mouth nightly as needed for sleep       multivitamin, therapeutic (THERA-VIT) TABS tablet Take 1 tablet by mouth every 24 hours 30 tablet 11     order for DME Equipment being ordered: Trilok ankle brace 1 Device 0     pantoprazole (PROTONIX) 40 MG EC tablet TAKE 1 TABLET BY MOUTH EVERY MORNING BEFORE BREAKFAST 90 tablet 3     psyllium 0.52 G capsule Take 2 capsules (1.04 g) by mouth daily With a full glass of water. 60 capsule 1     tacrolimus (GENERIC EQUIVALENT) 0.5 MG capsule Take 3 capsules (1.5 mg) by mouth every 12 hours 180 capsule 11     traMADol (ULTRAM) 50 MG tablet Take 1 tablet (50 mg) by mouth every 6 hours as needed for severe pain 30 tablet 0     triamcinolone (KENALOG) 0.025 % external ointment Apply topically 2 times daily To affected areas in the axilla as needed. 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 Negative for dizziness, tremors, speech change, seizures and loss of consciousness.   Psychiatric/Behavioral: Negative for memory loss.   All other systems reviewed and are negative.        Physical Exam:     Vitals:    09/13/21 1346   BP: 131/85   Pulse: 71    SpO2: 96%       General Appearance: No distress, seated comfortably  Mood: Euthymic  HE ENT: Non constricted pupils  Respiratory: Non labored breathing  CVS: Regular rate and rhythm  GI: Soft, non distended, no TTP  Skin: No rashes over exposed skin  MS: Range of motion full in all limbs, neck, and back.  Neuro: Muscle strength 5 out of 5 in all limbs.  Reflexes 2+.  Sensation normal in all dermatomes.  Gait: Ambulates without assistance.      Pain specific exam:    Spurling's on the right caused radicular pain on the left.    Laboratory results:  Recent Labs   Lab Test 07/21/21  0730 05/17/21  0727    134   POTASSIUM 4.2 4.3   CHLORIDE 106 101   CO2 25 26   ANIONGAP 9 7    94   BUN 20 19   CR 1.37* 1.26*   ERIC 9.3 8.8       CBC RESULTS:   Recent Labs   Lab Test 07/21/21  0730   WBC 6.8   RBC 4.38   HGB 13.3   HCT 39.2   MCV 90   MCH 30.4   MCHC 33.9   RDW 13.8            Imaging:       ASSESSMENT AND PLAN:     Encounter Diagnosis:  Cervical radiculopathy      Phan Luque is a 65 year old y.o. old female who presents to the pain clinic with neck pain.     I have summarized the patient s past medical history, discussed their clinical findings and the potential differential diagnosis with the patient. Significant past medical history pertinent to the patient s current condition includes a history of alcohol abuse and status post liver transplant.  The clinical findings reveal positive Spurling test. The differential diagnosis discussed with the patient are listed above. I have discussed anatomy and possible sources of the pain using models and/or pictures (diagrams). I have discussed multi- disciplinary pain management options withthe patient as pertaining to their case as detailed above. The pain management options we discussed included, but were not limited to the recommendations below.  I also discussed with patient the risks, benefits and alternatives to each pain management option.  All  of the patient s questions and concerns were answered to the best of my ability.    RECOMMENDATIONS:     1. Medications: We are not prescribing medications.  Can consider titrating gabapentin in the evening if sedation is an issue.  Dosing, side effects, risks/benefits/alternatives were discussed with the patient in detail.    2. Procedure: We are not scheduling a procedure.  Patient's pain is intermittent, only a couple times a week for short time, patient is not the best candidate for cervical epidural at this time.  We discussed that if her symptoms become more regular we could reconsider a cervical procedure.  Patient agreed and will contact our clinic if needed.  Risks/benefits/alternatives were discussed.     I also discussed with the patient that the possible risks involved with interventional treatment included, but are not limited to, no pain control, worsened pain, stroke,seizure, spinal headache, allergic reactions, introduction of infection or bleeding which may lead to emergent spine surgery, nerve damage, paralysis oreven death.    3. Physical therapy: Currently enrolled  The patient will also discuss spine care and posture. Pool therapy and stretches can be considered if available.    Follow up: As needed    Answers for HPI/ROS submitted by the patient on 9/13/2021  General Symptoms: No  Skin Symptoms: No  HENT Symptoms: No  EYE SYMPTOMS: No  HEART SYMPTOMS: No  LUNG SYMPTOMS: No  INTESTINAL SYMPTOMS: No  URINARY SYMPTOMS: No  GYNECOLOGIC SYMPTOMS: No  BREAST SYMPTOMS: No  SKELETAL SYMPTOMS: No  BLOOD SYMPTOMS: No  NERVOUS SYSTEM SYMPTOMS: Yes  MENTAL HEALTH SYMPTOMS: No  Trouble with coordination: No  Difficulty walking: No  Paralysis: No  Numbness: No

## 2021-09-13 NOTE — LETTER
9/13/2021       RE: Phan Luque  [address]  [facility] [address]     Dear Colleague,    Thank you for referring your patient, Phan Luque, to the [facility] CLINIC FOR COMPREHENSIVE PAIN MANAGEMENT [place] at [facility]. Please see a copy of my visit note below.      Pain Clinic New Patient Consult Note:    Referring Provider: Rebeka   Primary care provider: Arlyn Sanchez.    Phan Luque is a 65 year old y.o. old female who presents to the pain clinic with neck pain    HPI:  Patient's main complaint is left-sided neck pain.  Pain started 20-25 years ago and has been progressive since then.  Patient denies any inciting factors that began her pain.  Pain is described as dull, sharp, and throbbing at times.  Patient reports the pain radiates down her left arm to the middle of her forearm on the outside.  Pain is intermittent.  She has pain 30 minutes to couple of hours about 2 times a week.  Lying down makes it worse.  Sometimes it takes her up to an hour to sleep.        Patient was seen by neurosurgery and they recommended nonsurgical management.  Patient is referred for physical therapy and to our clinic for pain management.  She has been taking gabapentin however finds it sedating.  We discussed possibly taking a larger dose at night rather than twice a day dosing.  Patient also has prescription for tramadol.  She reports this provides good relief of her pain however she only takes it about once a month because it causes sedation.    Patient has been participating in physical therapy.  Patient states they have been doing stretching exercises and traction.  Patient reports this has helped and plans to continue treatment.    Patient has a history of chronic kidney disease her last GFR was 41.  She also has a history of liver transplant 5 years ago.  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  LFTs have been stable since her transplant.         Patient Supplied Answers To the  Pain Questionnaire  [facility] -  Patient Entered Questionnaire/Answers 9/13/2021   What number best describes your pain right now:  0 = No pain  to  10 = Worst pain imaginable 1   How would you describe the pain? burning, sharp, dull, aching, throbbing   Which of the following worsen your pain? lying down, exercise   Which of the following improve or reduce your pain?  medication   What number best describes your average pain for the past week:  0 = No pain  to  10 = Worst pain imaginable 1   What number best describes your LOWEST pain in past 24 hours:  0 = No pain  to  10 = Worst pain imaginable 1   What number best describes your WORST pain in past 24 hours:  0 = No pain  to  10 = Worst pain imaginable 1   When is your pain worst? Constant   What non-medicine treatments have you already had for your pain? physical therapy   Have you tried treating your pain with medication?  Yes   Are you currently taking medications for your pain? No           Pain treatments:    Herbal medicines: denies   Physical therapy: currently enrolled   Chiropractor: armando  Pain physician: denies  Surgery: appendectomy   Biofeedback: denies   Acupuncture: denies     Tests/Imaging reviewed with the patient:    MR CERVICAL SPINE W/O CONTRAST 6/4/2021 5:34 PM   Findings:  Straightening of cervical necrosis. The cervical vertebrae are  normally aligned. Degenerative changes of cervical spine multilevel  osteophytic spurring and disc space narrowing extending from C3-4  through C6-7.  There is likely artifactual T2 signal within the spinal  cord at the level of C3-4, C4-5 and C5-6. Focal fat deposition at C7  and T1, not significant changed from prior. Degenerative bone marrow  edema noted in the opposing endplate of C3-4. The findings on a level  by level basis are as follows:     C2-3:  Facet arthropathy bilaterally. No significant spinal canal  stenosis. Mild left neural foraminal narrowing. Right  neuroforamen is  patent.     C3-4:  Uncovertebral hypertrophy and facet arthropathy bilaterally.  Moderate spinal canal stenosis. Moderate to severe bilateral  neuroforaminal narrowing. Not significantly changed from 9/5/2018.     C4-5:  Disc osteophyte complex, uncovertebral hypertrophy and facet  arthropathy bilaterally. Moderate spinal canal stenosis. Moderate  right greater than left neural foraminal narrowing. Overall not  significantly changed from 9/5/2018.     C5-6:  Disc osteophyte complex, uncovertebral hypertrophy and facet  arthropathy bilaterally. Moderate spinal canal stenosis. Severe right,  moderate left neural foraminal narrowing. Overall not significantly  changed from 9/5/2018.      C6-7:  Disc osteophyte complex, uncovertebral hypertrophy and facet  arthropathy bilaterally. Mild spinal canal stenosis. Mild to moderate  bilateral neuroforaminal narrowing. Overall not significantly changed  from 9/5/2018.     C7-T1:  Disc osteophyte complex, uncovertebral hypertrophy and facet  arthropathy bilaterally. Mild spinal canal stenosis. Mild to moderate  right neuroforaminal narrowing. Overall not significantly changed from  9/5/2018.    Impression: Little change in mid cervical spine cervical spondylosis  with moderate spinal canal stenosis at C3-4 through C5-6. Severe right  neuroforaminal narrowing at C5-6. Additional neuroforaminal narrowing  detailed as above. No convincing myelopathic signal.      Significant Medical History:   Past Medical History:   Diagnosis Date     AION (acute ischemic optic neuropathy)      Asthma      Cervical spinal stenosis      Chronic kidney disease      Chronic kidney disease, stage 3      Dizziness and giddiness     Created by Conversion      End stage liver disease (H)     2/2 Alcohol Abuse     End stage liver disease (H)     Alcohol-related     GERD (gastroesophageal reflux disease)      Hypertension      Liver transplant recipient (H) 08/25/2016    [place]  [facility]     Liver transplanted (H)      Migraine headache      Migraines      Osteoporosis     frax 1.7%      PFO (patent foramen ovale) 2016    Noted on echo at [facility]     Recurrent UTI      Spinal stenosis in cervical region      Strabismus      Unspecified asthma(493.90)     Created by Conversion           Past Surgical History:  Past Surgical History:   Procedure Laterality Date     APPENDECTOMY           APPENDECTOMY       BENCH LIVER N/A 2016    Procedure: BENCH LIVER;  Surgeon: Larry Dhillon MD;  Location: [facility] OR     CATARACT IOL, RT/LT       COLONOSCOPY       COLONOSCOPY N/A 2021    Procedure: COLONOSCOPY, WITH POLYPECTOMY;  Surgeon: Arlyn Beckford MD;  Location: [facility] OR     ESOPHAGOSCOPY, GASTROSCOPY, DUODENOSCOPY (EGD), COMBINED N/A 2016    Procedure: COMBINED ESOPHAGOSCOPY, GASTROSCOPY, DUODENOSCOPY (EGD);  Surgeon: Tao Alfonso MD;  Location:  GI     ESOPHAGOSCOPY, GASTROSCOPY, DUODENOSCOPY (EGD), COMBINED N/A 10/31/2016    Procedure: COMBINED ESOPHAGOSCOPY, GASTROSCOPY, DUODENOSCOPY (EGD), BIOPSY SINGLE OR MULTIPLE;  Surgeon: Ronald Bojorquez MD;  Location:  GI     LIVER TRANSPLANTATION  2016    [facility]     RECTAL SURGERY       sphincteroplasty       TRANSPLANT LIVER RECIPIENT  DONOR N/A 2016    Procedure: TRANSPLANT LIVER RECIPIENT  DONOR;  Surgeon: Larry Dhillon MD;  Location:  OR     TUBAL LIGATION      laparoscopic     TUBAL LIGATION            Family History:  Family History   Problem Relation Age of Onset     Family History Negative Other      Melanoma Mother              Heart Disease Father         CHF     Skin Cancer Sister      Cirrhosis Paternal Uncle         not alcohol related     Heart Failure Mother      Heart Failure Father      Chronic Obstructive Pulmonary Disease Father           Social History:  Social History     Socioeconomic History      Marital status:      Spouse name: Not on file     Number of children: 3     Years of education: Not on file     Highest education level: Not on file   Occupational History     Occupation:    Tobacco Use     Smoking status: Former Smoker     Packs/day: 2.50     Years: 20.00     Pack years: 50.00     Quit date: 1996     Years since quittin.7     Smokeless tobacco: Never Used   Substance and Sexual Activity     Alcohol use: No     Alcohol/week: 7.0 standard drinks     Types: 7 Standard drinks or equivalent per week     Comment: heavy use (750ml/2 days) up until 1 year ago; had cut down to 1 drink/day; none since 17     Drug use: No     Sexual activity: Not on file   Other Topics Concern     Parent/sibling w/ CABG, MI or angioplasty before 65F 55M? 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    Lives alone     Social Determinants of Health     Financial Resource Strain:      Difficulty of Paying Living Expenses:    Food Insecurity:      Worried About Running Out of Food in the Last Year:      Ran Out of Food in the Last Year:    Transportation Needs:      Lack of Transportation (Medical):      Lack of Transportation (Non-Medical):    Physical Activity:      Days of Exercise per Week:      Minutes of Exercise per Session:    Stress:      Feeling of Stress :    Social Connections:      Frequency of Communication with Friends and Family:      Frequency of Social Gatherings with Friends and Family:      Attends Denominational Services:      Active Member of Clubs or Organizations:      Attends Club or Organization Meetings:      Marital Status:    Intimate Partner Violence:      Fear of Current or Ex-Partner:      Emotionally Abused:      Physically Abused:      Sexually Abused:      Social History     Social History Narrative    Works as  for [employer], pharmacy  tech background    Son and Daughter    Lives alone          Allergies:  Allergies   Allergen Reactions     Codeine Hives     Benadryl [Diphenhydramine] Hives     Cefaclor Hives     Hydrocodone-Acetaminophen Itching     Oxycodone Itching     Penicillins Hives     Sulfa Drugs Hives       Current Medications:   Current Outpatient Medications   Medication Sig Dispense Refill     acetaminophen (TYLENOL) 325 MG tablet Take 2 tablets (650 mg) by mouth every 6 hours as needed for mild pain Or fevers. Use sparingly. Limit use to no more than 2 grams (2000 mg) in 24 hours. **Further refills must be obtained by primary care provider** 100 tablet 0     albuterol (VENTOLIN HFA) 108 (90 Base) MCG/ACT inhaler Inhale 2 puffs into the lungs every 6 hours as needed for shortness of breath / dyspnea or wheezing 18 g 8     amLODIPine (NORVASC) 5 MG tablet Take 1 tablet (5 mg) by mouth daily 90 tablet 1     calcipotriene (DOVONOX) 0.005 % external solution Apply 1 mL topically daily To the scalp 180 mL 3     chlorthalidone (HYGROTON) 25 MG tablet Take 0.5 tablets (12.5 mg) by mouth daily 45 tablet 1     cholecalciferol (VITAMIN D3) 400 UNIT TABS tablet Take 400 Units by mouth daily       clobetasol (TEMOVATE) 0.05 % external ointment Apply topically 2 times daily To areas of eczema on the lower legs, until areas resolve. For  refills, please schedule appointment, appt due 9-2021 60 g 1     clobetasol (TEMOVATE) 0.05 % external solution Apply topically 2 times daily To the scalp as needed for itching and flaking, on the days you don't use the clobetasol shampoo. 150 mL 3     clobetasol propionate (CLOBEX) 0.05 % external shampoo Apply topically daily To dry scalp x 15 min and rinse ,when psoriasis is present. 354 mL 3     cyanocobalamin (VITAMIN  B-12) 1000 MCG tablet Take 1,000 mcg by mouth daily       ferrous sulfate (IRON) 325 (65 FE) MG tablet Take 1 tablet (325 mg) by mouth 2 times daily 100 tablet      folic acid (FOLVITE) 1 MG  tablet Take 1 tablet (1 mg) by mouth daily 30 tablet 1     gabapentin (NEURONTIN) 100 MG capsule Take 2 capsules (200 mg) by mouth At Bedtime 180 capsule 1     hydrOXYzine (ATARAX) 25 MG tablet Take 1-2 tablets (25-50 mg) by mouth every 6 hours as needed for itching 120 tablet 10     levothyroxine (SYNTHROID/LEVOTHROID) 88 MCG tablet Take 1 tablet (88 mcg) by mouth daily 90 tablet 3     MAGNESIUM OXIDE PO Take 400 mg by mouth daily       melatonin 5 MG tablet Take 1 tablet (5 mg) by mouth nightly as needed for sleep       multivitamin, therapeutic (THERA-VIT) TABS tablet Take 1 tablet by mouth every 24 hours 30 tablet 11     order for DME Equipment being ordered: Trilok ankle brace 1 Device 0     pantoprazole (PROTONIX) 40 MG EC tablet TAKE 1 TABLET BY MOUTH EVERY MORNING BEFORE BREAKFAST 90 tablet 3     psyllium 0.52 G capsule Take 2 capsules (1.04 g) by mouth daily With a full glass of water. 60 capsule 1     tacrolimus (GENERIC EQUIVALENT) 0.5 MG capsule Take 3 capsules (1.5 mg) by mouth every 12 hours 180 capsule 11     traMADol (ULTRAM) 50 MG tablet Take 1 tablet (50 mg) by mouth every 6 hours as needed for severe pain 30 tablet 0     triamcinolone (KENALOG) 0.025 % external ointment Apply topically 2 times daily To affected areas in the axilla as needed. 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 Negative for dizziness, tremors, speech change, seizures and loss of consciousness.   Psychiatric/Behavioral: Negative for memory loss.   All other systems reviewed and are negative.        Physical Exam:     Vitals:    09/13/21 1346   BP: 131/85   Pulse: 71   SpO2: 96%       General Appearance: No distress, seated comfortably  Mood: Euthymic  HE ENT: Non constricted pupils  Respiratory: Non labored breathing  CVS: Regular rate and rhythm  GI: Soft, non distended, no TTP  Skin: No rashes over exposed skin  MS: Range of motion full in all limbs, neck, and back.  Neuro: Muscle strength 5 out of 5 in all limbs.  Reflexes 2+.  Sensation normal in all dermatomes.  Gait: Ambulates without assistance.      Pain specific exam:    Spurling's on the right caused radicular pain on the left.    Laboratory results:  Recent Labs   Lab Test 07/21/21  0730 05/17/21  0727    134   POTASSIUM 4.2 4.3   CHLORIDE 106 101   CO2 25 26   ANIONGAP 9 7    94   BUN 20 19   CR 1.37* 1.26*   ERIC 9.3 8.8       CBC RESULTS:   Recent Labs   Lab Test 07/21/21  0730   WBC 6.8   RBC 4.38   HGB 13.3   HCT 39.2   MCV 90   MCH 30.4   MCHC 33.9   RDW 13.8            Imaging:       ASSESSMENT AND PLAN:     Encounter Diagnosis:  Cervical radiculopathy      Phan Luque is a 65 year old y.o. old female who presents to the pain clinic with neck pain.     I have summarized the patient s past medical history, discussed their clinical findings and the potential differential diagnosis with the patient. Significant past medical history pertinent to the patient s current condition includes a history of alcohol abuse and status post liver transplant.  The clinical findings reveal positive Spurling test. The differential diagnosis discussed with the patient are listed above. I have discussed anatomy and possible sources of the pain using models and/or  pictures (diagrams). I have discussed multi- disciplinary pain management options withthe patient as pertaining to their case as detailed above. The pain management options we discussed included, but were not limited to the recommendations below.  I also discussed with patient the risks, benefits and alternatives to each pain management option.  All of the patient s questions and concerns were answered to the best of my ability.    RECOMMENDATIONS:     1. Medications: We are not prescribing medications.  Can consider titrating gabapentin in the evening if sedation is an issue.  Dosing, side effects, risks/benefits/alternatives were discussed with the patient in detail.    2. Procedure: We are not scheduling a procedure.  Patient's pain is intermittent, only a couple times a week for short time, patient is not the best candidate for cervical epidural at this time.  We discussed that if her symptoms become more regular we could reconsider a cervical procedure.  Patient agreed and will contact our clinic if needed.  Risks/benefits/alternatives were discussed.     I also discussed with the patient that the possible risks involved with interventional treatment included, but are not limited to, no pain control, worsened pain, stroke,seizure, spinal headache, allergic reactions, introduction of infection or bleeding which may lead to emergent spine surgery, nerve damage, paralysis oreven death.    3. Physical therapy: Currently enrolled  The patient will also discuss spine care and posture. Pool therapy and stretches can be considered if available.    Follow up: As needed    Answers for HPI/ROS submitted by the patient on 9/13/2021  General Symptoms: No  Skin Symptoms: No  HENT Symptoms: No  EYE SYMPTOMS: No  HEART SYMPTOMS: No  LUNG SYMPTOMS: No  INTESTINAL SYMPTOMS: No  URINARY SYMPTOMS: No  GYNECOLOGIC SYMPTOMS: No  BREAST SYMPTOMS: No  SKELETAL SYMPTOMS: No  BLOOD SYMPTOMS: No  NERVOUS SYSTEM SYMPTOMS: Yes  MENTAL  HEALTH SYMPTOMS: No  Trouble with coordination: No  Difficulty walking: No  Paralysis: No  Numbness: No        ATTENDING ATTESTATION  I saw the patient along with the pain medicine fellow Dr. Anastasiya Ag. Please see his note above for full details. I was involved in gathering history, physical examination and development of the plan of care.     Briefly, Jessica I have not a consultation with our neurosurgery colleagues for chronic neck pain.  While waiting for her appointment here she has recently started physical therapy and has completed 1-2 sessions.  The patient has intermittent neck pain bothersome up to 2 times a week.  She also has some discomfort immediately when she lay supine however she is able to resolve the discomfort.  She finds it difficult to fall asleep.  The patient believes that the low-dose gabapentin helps her fall asleep.  The patient does not have difficulty maintaining her sleep.  The patient wakes up frequently in the night to go use the bathroom however does not have difficulty falling back to sleep.    The patient reports radiation of pain to the left arm.  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  She has a longstanding history of neuropathic pain in the hands and feet.  This is bothersome with some reduced sensation, tingling and burning sensation.  The patient has tried duloxetine in the past and did not find it helpful however she has not tried that medication for neuropathic pain in the hands.    She agrees that her symptoms are intermittent enough that she is willing to trial conservative care first and then consider medications and interventional management down the road.    We agreed to continue physical therapy since her symptoms are intermittent at this time.  A new referral was not placed as she has already established with a physical therapist and is satisfied with this center.      I discussed with the patient options of further  neuropathic medication titration.  She is currently on a very low dose of gabapentin 200 mg in the evening.  This can be titrated up to 300 mg for a week and then to 600 mg for a week and up to 900 mg as per her prescribing physician.  The patient states that she has not tried duloxetine for peripheral neuropathy.  This can also be trialed to give her improved pain control with her peripheral neuropathy symptoms    Lastly but not the least cervical epidural steroid injections are an option in her case and can be considered if she feels that she is not making any improvement or has exacerbation of symptoms in pain.  This was discussed with the patient she feels comfortable connecting with the clinic via phone call on my chart to return for any further interventions.    All questions and concerns were answered.  The patient was very satisfied with the conversations and the options outlined for her.              Again, thank you for allowing me to participate in the care of your patient.      Sincerely,    Frannie Chappell MD

## 2021-09-13 NOTE — PATIENT INSTRUCTIONS
Treatment planning:    Continue physical therapy.    Call if interested in injections.      Recommended Follow up:      Follow up as needed.       Please call [phone], option #1 to schedule your clinic appointment if you don't already have an appointment scheduled.        To speak with a nurse, schedule/reschedule/cancel a clinic appointment, or request a medication refill call: [phone], option #1.    You can also reach us by [employer]: [email or web address]

## 2021-09-13 NOTE — PATIENT INSTRUCTIONS
Patient Education     Cellulitis  Cellulitis is an infection of the deep layers of skin. A break in the skin, such as a cut or scratch, can let bacteria under the skin. If the bacteria get to deep layers of the skin, it can be serious. If not treated, cellulitis can get into the bloodstream and lymph nodes. The infection can then spread throughout the body. This causes serious illness.   Cellulitis causes the affected skin to become red, swollen, warm, and sore. The reddened areas have a visible border. An open sore may leak fluid (pus). You may have a fever, chills, and pain.   Cellulitis is treated with antibiotics taken for 7 to 10 days. An open sore may be cleaned and covered with cool wet gauze. Symptoms should get better 1 to 2 days after treatment is started. Make sure to take all the antibiotics for the full number of days until they are gone. Keep taking the medicine even if your symptoms go away.   Home care  Follow these tips:    Limit the use of the part of your body with cellulitis.     If the infection is on your leg, keep your leg raised while sitting. This helps reduce swelling.    Take all of the antibiotic medicine exactly as directed until it is gone. Don't miss any doses, especially during the first 7 days. Don t stop taking the medicine when your symptoms get better.    Keep the affected area clean and dry.    Wash your hands with soap and clean, running water before and after touching your skin. Anyone else who touches your skin should also wash his or her hands. Don't share towels.  Follow-up care  Follow up with your healthcare provider, or as advised. If your infection doesn't go away on the first antibiotic, your healthcare provider will prescribe a different one.   When to seek medical advice  Call your healthcare provider right away if any of these occur:    Red areas that spread    Swelling or pain that gets worse    Fluid leaking from the skin (pus)    Fever higher of 100.4  F (38.0   C) or higher after 2 days on antibiotics  [employer] last reviewed this educational content on 8/1/2019 2000-2021 [employer]. All rights reserved. This information is not intended as a substitute for professional medical care. Always follow your healthcare professional's instructions.

## 2021-09-14 RX ORDER — PANTOPRAZOLE SODIUM 40 MG/1
TABLET, DELAYED RELEASE ORAL
Qty: 90 TABLET | Refills: 2 | Status: SHIPPED | OUTPATIENT
Start: 2021-09-14 | End: 2022-05-25

## 2021-09-14 RX ORDER — LEVOTHYROXINE SODIUM 88 UG/1
88 TABLET ORAL DAILY
Qty: 90 TABLET | Refills: 0 | Status: SHIPPED | OUTPATIENT
Start: 2021-09-14 | End: 2021-10-20

## 2021-09-14 NOTE — TELEPHONE ENCOUNTER
levothyroxine (SYNTHROID/LEVOTHROID) 88 MCG tablet    Take 1 tablet (88 mcg) by mouth daily -    Last Written Prescription Date:  9/12/20  Last Fill Quantity: 90,   # refills: 3  Last Office Visit : 7/1/21  Future Office visit:  None      Routing because:  Overdue lab - TSH. 90 day lala to pharmacy. Clinic notified.  Lab Test 07/14/20  0936   TSH 3.88         pantoprazole (PROTONIX) 40 MG EC tablet  TAKE 1 TABLET BY MOUTH EVERY MORNING BEFORE BREAKFAST  Last Written Prescription Date:  9/12/20  Last Fill Quantity: 90,   # refills: 3    Refill to pharmacy.

## 2021-09-14 NOTE — PROGRESS NOTES
ATTENDING ATTESTATION  I saw the patient along with the pain medicine fellow Dr. Anastasiya Ag. Please see his note above for full details. I was involved in gathering history, physical examination and development of the plan of care.     Briefly, Jessica I have not a consultation with our neurosurgery colleagues for chronic neck pain.  While waiting for her appointment here she has recently started physical therapy and has completed 1-2 sessions.  The patient has intermittent neck pain bothersome up to 2 times a week.  She also has some discomfort immediately when she lay supine however she is able to resolve the discomfort.  She finds it difficult to fall asleep.  The patient believes that the low-dose gabapentin helps her fall asleep.  The patient does not have difficulty maintaining her sleep.  The patient wakes up frequently in the night to go use the bathroom however does not have difficulty falling back to sleep.    The patient reports radiation of pain to the left arm.  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  She has a longstanding history of neuropathic pain in the hands and feet.  This is bothersome with some reduced sensation, tingling and burning sensation.  The patient has tried duloxetine in the past and did not find it helpful however she has not tried that medication for neuropathic pain in the hands.    She agrees that her symptoms are intermittent enough that she is willing to trial conservative care first and then consider medications and interventional management down the road.    We agreed to continue physical therapy since her symptoms are intermittent at this time.  A new referral was not placed as she has already established with a physical therapist and is satisfied with this center.      I discussed with the patient options of further neuropathic medication titration.  She is currently on a very low dose of gabapentin 200 mg in the evening.   This can be titrated up to 300 mg for a week and then to 600 mg for a week and up to 900 mg as per her prescribing physician.  The patient states that she has not tried duloxetine for peripheral neuropathy.  This can also be trialed to give her improved pain control with her peripheral neuropathy symptoms    Lastly but not the least cervical epidural steroid injections are an option in her case and can be considered if she feels that she is not making any improvement or has exacerbation of symptoms in pain.  This was discussed with the patient she feels comfortable connecting with the clinic via phone call on my chart to return for any further interventions.    All questions and concerns were answered.  The patient was very satisfied with the conversations and the options outlined for her.

## 2021-09-15 ENCOUNTER — OFFICE VISIT (OUTPATIENT)
Dept: OPHTHALMOLOGY | Facility: CLINIC | Age: 66
End: 2021-09-15
Payer: COMMERCIAL

## 2021-09-15 DIAGNOSIS — H47.012 AION (ACUTE ISCHEMIC OPTIC NEUROPATHY), LEFT: Primary | ICD-10-CM

## 2021-09-15 DIAGNOSIS — H53.40 VISUAL FIELD DEFECT: Primary | ICD-10-CM

## 2021-09-15 DIAGNOSIS — H53.40 VISUAL FIELD DEFECT: ICD-10-CM

## 2021-09-15 DIAGNOSIS — L40.0 PSORIASIS VULGARIS: ICD-10-CM

## 2021-09-15 PROCEDURE — 92014 COMPRE OPH EXAM EST PT 1/>: CPT | Mod: GC | Performed by: OPHTHALMOLOGY

## 2021-09-15 PROCEDURE — 92083 EXTENDED VISUAL FIELD XM: CPT | Performed by: OPHTHALMOLOGY

## 2021-09-15 PROCEDURE — 92133 CPTRZD OPH DX IMG PST SGM ON: CPT | Performed by: OPHTHALMOLOGY

## 2021-09-15 RX ORDER — CLOBETASOL PROPIONATE 0.5 MG/ML
SOLUTION TOPICAL 2 TIMES DAILY
Qty: 150 ML | Refills: 3 | Status: SHIPPED | OUTPATIENT
Start: 2021-09-15 | End: 2021-11-24

## 2021-09-15 ASSESSMENT — REFRACTION_WEARINGRX
OS_SPHERE: PLANO
OD_ADD: +3.00
OD_SPHERE: PLANO
OS_ADD: +3.00

## 2021-09-15 ASSESSMENT — CONF VISUAL FIELD
OS_INFERIOR_NASAL_RESTRICTION: 1
OS_INFERIOR_TEMPORAL_RESTRICTION: 1
OS_SUPERIOR_NASAL_RESTRICTION: 3
OD_NORMAL: 1
OS_SUPERIOR_TEMPORAL_RESTRICTION: 3

## 2021-09-15 ASSESSMENT — TONOMETRY
OD_IOP_MMHG: 19
IOP_METHOD: ICARE
OS_IOP_MMHG: 18

## 2021-09-15 ASSESSMENT — CUP TO DISC RATIO
OD_RATIO: 0.1
OS_RATIO: 0.5

## 2021-09-15 ASSESSMENT — VISUAL ACUITY
METHOD: SNELLEN - LINEAR
OS_CC: 20/100
OD_CC: 20/20

## 2021-09-15 ASSESSMENT — EXTERNAL EXAM - LEFT EYE: OS_EXAM: NORMAL

## 2021-09-15 ASSESSMENT — SLIT LAMP EXAM - LIDS: COMMENTS: NORMAL

## 2021-09-15 ASSESSMENT — EXTERNAL EXAM - RIGHT EYE: OD_EXAM: NORMAL

## 2021-09-15 NOTE — PROGRESS NOTES
Assessment & Plan     Phan Luque is a 65 year old female with the following diagnoses:   1. AION (acute ischemic optic neuropathy), left    2. Visual field defect         Last visit 9/2/2020  64 year old woman presenting for follow up on Anterior ischemic optic neuropathy (AION) LEFT eye. Last follow up visit was in 8/14/2019. Vision has been stable. No better no worse since last year    Had lesion excised by Dr. López on 11/19/20. Biopsy as below:  Surgical Patho 11/19/20  SPECIMEN(S):   Right lower eyelid margin     FINAL DIAGNOSIS:   Right lower eyelid margin lesion, biopsy:    -Mild chronic inflammation. No evidence of malignancy. See comment.     Visual acuity with correction was 20/20 in the right eye and 20/100 in the left eye. IOP was 19 in the right eye and 18 in the left eye. Visual fields were full in both eyes. Ocular motility was full in all gaze directions. Color plates were 10/11 in the right eye and 0/11 in the left eye.  Pupils were equal, round and reactive to light with left APD.     Strabismus exam: full ortho    Anterior segment exam: PEE scattered OU  Dilated fundus exam: Very small nerves each eye, healthy right eye and severe diffuse pallor left eye. Normal macular and vessels.     Tests ordered and interpreted today:  OCT rNFL demonstrated a mean NFL thickness of 77 (from 77) in the right eye and 36 (from 34) in the left eye. Thickness profile demonstrated normal thickness in all quadrants in the right eye and diffuse thinning thinning in the left eye.   VF: Glaucoma TOP visual field was performed. Test was reliable.  Right eye was normal. Left eye  demonstrated deficits in all field(s) -23.6 from -22.7.      It is my impression that patient's nonarteritic anterior ischemic optic neuropathy is stable today. VERY small nerves each eye. Healthy appearance of right eye and pale left eye.  Follow up in 1 year or sooner as needed for worsening symptoms.            Attending  Physician Attestation:  Complete documentation of historical and exam elements from today's encounter can be found in the full encounter summary report (not reduplicated in this progress note).  I personally obtained the chief complaint(s) and history of present illness.  I confirmed and edited as necessary the review of systems, past medical/surgical history, family history, social history, and examination findings as documented by others; and I examined the patient myself.  I personally reviewed the relevant tests, images, and reports as documented above.  I formulated and edited as necessary the assessment and plan and discussed the findings and management plan with the patient and family. I personally reviewed the ophthalmic test(s) associated with this encounter, agree with the interpretation(s) as documented by the resident/fellow, and have edited the corresponding report(s) as necessary.  - Ambrose Erwin, DO   PGY-5 Neuro-Ophthalmology Fellow

## 2021-09-15 NOTE — NURSING NOTE
Chief Complaints and History of Present Illnesses   Patient presents with     Blurred Vision Follow-Up     Chief Complaint(s) and History of Present Illness(es)     Blurred Vision Follow-Up               Comments     Phan Luque is a 65 year old female who presents today for    1. NAION left eye  2. Lower lid lesion, s/p resection 11/2020    No significant change in vision since the last visit.     Jose RANDOLPH 9:51 AM September 15, 2021

## 2021-09-16 DIAGNOSIS — R60.9 FLUID RETENTION: ICD-10-CM

## 2021-09-16 DIAGNOSIS — I10 ESSENTIAL HYPERTENSION: ICD-10-CM

## 2021-09-16 DIAGNOSIS — G62.9 PERIPHERAL POLYNEUROPATHY: ICD-10-CM

## 2021-09-16 DIAGNOSIS — Z94.4 LIVER TRANSPLANTED (H): ICD-10-CM

## 2021-09-16 DIAGNOSIS — L30.0 NUMMULAR ECZEMA: ICD-10-CM

## 2021-09-16 DIAGNOSIS — R94.6 THYROID FUNCTION TEST ABNORMAL: ICD-10-CM

## 2021-09-16 DIAGNOSIS — N18.30 CKD (CHRONIC KIDNEY DISEASE) STAGE 3, GFR 30-59 ML/MIN (H): ICD-10-CM

## 2021-09-16 RX ORDER — URSODIOL 300 MG/1
CAPSULE ORAL
Qty: 60 CAPSULE | Refills: 11 | Status: SHIPPED | OUTPATIENT
Start: 2021-09-16 | End: 2022-08-08

## 2021-09-17 RX ORDER — CLOBETASOL PROPIONATE 0.5 MG/G
OINTMENT TOPICAL
Refills: 1 | OUTPATIENT
Start: 2021-09-17

## 2021-09-17 RX ORDER — AMLODIPINE BESYLATE 5 MG/1
TABLET ORAL
Qty: 90 TABLET | Refills: 1 | OUTPATIENT
Start: 2021-09-17

## 2021-09-17 RX ORDER — CHLORTHALIDONE 25 MG/1
12.5 TABLET ORAL DAILY
Qty: 45 TABLET | Refills: 1 | OUTPATIENT
Start: 2021-09-17

## 2021-09-17 RX ORDER — GABAPENTIN 100 MG/1
200 CAPSULE ORAL AT BEDTIME
Qty: 180 CAPSULE | Refills: 1 | OUTPATIENT
Start: 2021-09-17

## 2021-09-17 RX ORDER — LEVOTHYROXINE SODIUM 88 UG/1
88 TABLET ORAL DAILY
Qty: 90 TABLET | Refills: 0 | OUTPATIENT
Start: 2021-09-17

## 2021-09-17 NOTE — TELEPHONE ENCOUNTER
DUP  levothyroxine (SYNTHROID/LEVOTHROID) 88 MCG tablet  Last Rx: 9-14-21 for 90 tab w/0RF    chlorthalidone (HYGROTON) 25 MG tablet  Last Rx: 6-15-21 for 45 tab w/1 RF    amLODIPine (NORVASC) 5 MG tablet  Last Rx: 6-15-21 for 90 tab w/1 RF

## 2021-09-17 NOTE — TELEPHONE ENCOUNTER
Last seen: 5/24/21  RTC: 6 months  Cancel: none  No-show: none  Next appt: none     [facility] [employer]     Medication requested: gabapentin (NEURONTIN) 100 MG capsule  Directions: Take 2 capsules (200 mg) by mouth At Bedtime  Qty: 180  Last refilled: 9/14/21 (90 d/s), 6/15/21 (90 d/s), and 1/7/21 (90 d/s)     Will refuse request with note that refills will need to be addressed at future appt. Will ask scheduling to contact patient for an appointment

## 2021-09-17 NOTE — TELEPHONE ENCOUNTER
Pt needs appt  LCV: 3-17-21  Derm process #3    Scheduling has been notified to contact the pt for appointment.

## 2021-09-19 ENCOUNTER — HEALTH MAINTENANCE LETTER (OUTPATIENT)
Age: 66
End: 2021-09-19

## 2021-09-20 ENCOUNTER — LAB (OUTPATIENT)
Dept: LAB | Facility: CLINIC | Age: 66
End: 2021-09-20
Payer: COMMERCIAL

## 2021-09-20 DIAGNOSIS — Z94.4 LIVER REPLACED BY TRANSPLANT (H): ICD-10-CM

## 2021-09-20 LAB
ALBUMIN SERPL-MCNC: 3.9 G/DL (ref 3.5–5)
ALP SERPL-CCNC: 59 U/L (ref 45–120)
ALT SERPL W P-5'-P-CCNC: 12 U/L (ref 0–45)
ANION GAP SERPL CALCULATED.3IONS-SCNC: 11 MMOL/L (ref 5–18)
AST SERPL W P-5'-P-CCNC: 18 U/L (ref 0–40)
BILIRUB DIRECT SERPL-MCNC: 0.2 MG/DL
BILIRUB SERPL-MCNC: 0.6 MG/DL (ref 0–1)
BUN SERPL-MCNC: 33 MG/DL (ref 8–22)
CALCIUM SERPL-MCNC: 9.2 MG/DL (ref 8.5–10.5)
CHLORIDE BLD-SCNC: 102 MMOL/L (ref 98–107)
CO2 SERPL-SCNC: 25 MMOL/L (ref 22–31)
CREAT SERPL-MCNC: 1.29 MG/DL (ref 0.6–1.1)
ERYTHROCYTE [DISTWIDTH] IN BLOOD BY AUTOMATED COUNT: 13 % (ref 10–15)
GFR SERPL CREATININE-BSD FRML MDRD: 44 ML/MIN/1.73M2
GLUCOSE BLD-MCNC: 100 MG/DL (ref 70–125)
HCT VFR BLD AUTO: 41.2 % (ref 35–47)
HGB BLD-MCNC: 13.8 G/DL (ref 11.7–15.7)
MAGNESIUM SERPL-MCNC: 1.7 MG/DL (ref 1.8–2.6)
MCH RBC QN AUTO: 29.4 PG (ref 26.5–33)
MCHC RBC AUTO-ENTMCNC: 33.5 G/DL (ref 31.5–36.5)
MCV RBC AUTO: 88 FL (ref 78–100)
PLATELET # BLD AUTO: 296 10E3/UL (ref 150–450)
POTASSIUM BLD-SCNC: 4.2 MMOL/L (ref 3.5–5)
PROT SERPL-MCNC: 7 G/DL (ref 6–8)
RBC # BLD AUTO: 4.69 10E6/UL (ref 3.8–5.2)
SODIUM SERPL-SCNC: 138 MMOL/L (ref 136–145)
WBC # BLD AUTO: 8.9 10E3/UL (ref 4–11)

## 2021-09-20 PROCEDURE — 85027 COMPLETE CBC AUTOMATED: CPT

## 2021-09-20 PROCEDURE — 80197 ASSAY OF TACROLIMUS: CPT

## 2021-09-20 PROCEDURE — 80053 COMPREHEN METABOLIC PANEL: CPT

## 2021-09-20 PROCEDURE — 36415 COLL VENOUS BLD VENIPUNCTURE: CPT

## 2021-09-20 PROCEDURE — 83735 ASSAY OF MAGNESIUM: CPT

## 2021-09-20 PROCEDURE — 82248 BILIRUBIN DIRECT: CPT

## 2021-09-21 LAB
TACROLIMUS BLD-MCNC: 3.6 UG/L (ref 5–15)
TME LAST DOSE: ABNORMAL H
TME LAST DOSE: ABNORMAL H

## 2021-09-22 ENCOUNTER — HOSPITAL ENCOUNTER (OUTPATIENT)
Dept: PHYSICAL THERAPY | Facility: REHABILITATION | Age: 66
End: 2021-09-22
Payer: COMMERCIAL

## 2021-09-22 DIAGNOSIS — M48.02 SPINAL STENOSIS IN CERVICAL REGION: ICD-10-CM

## 2021-09-22 DIAGNOSIS — R20.2 NUMBNESS AND TINGLING OF BOTH UPPER EXTREMITIES: ICD-10-CM

## 2021-09-22 DIAGNOSIS — R20.0 NUMBNESS AND TINGLING OF BOTH UPPER EXTREMITIES: ICD-10-CM

## 2021-09-22 DIAGNOSIS — M54.2 NECK PAIN: Primary | ICD-10-CM

## 2021-09-22 PROCEDURE — 97110 THERAPEUTIC EXERCISES: CPT | Mod: GP | Performed by: PHYSICAL THERAPIST

## 2021-09-22 PROCEDURE — 97012 MECHANICAL TRACTION THERAPY: CPT | Mod: GP | Performed by: PHYSICAL THERAPIST

## 2021-09-22 NOTE — PROGRESS NOTES
Date 9/22/21   Exercise    Stretches:  Chin tuck, scapular retraction review   Stretches: UT, LS, scalene review   UBE 3'   Cervical isometrics:  Ext, SB and rot Hold 5-10 seconds, X 5-10 reps, moderate pressure                                     Assessment:   Pt returns for her first follow-up.  She has had many migraines so has not been able to do the exercises/stretches as much as she would have liked.  She likes the ability to see them on her ipad. No pain with the addition of cervical isometrics.  Utilized cervical mechanical traction again today to see if it will help her migraines.  She felt it did relieve tension on her last visit.

## 2021-09-29 ENCOUNTER — MYC MEDICAL ADVICE (OUTPATIENT)
Dept: INTERNAL MEDICINE | Facility: CLINIC | Age: 66
End: 2021-09-29

## 2021-10-12 NOTE — TELEPHONE ENCOUNTER
Pap results entered into patients chart.      Richard Walter CMA ([facility]) at 9:59 AM on 10/12/2021

## 2021-10-13 ENCOUNTER — HOSPITAL ENCOUNTER (OUTPATIENT)
Dept: PHYSICAL THERAPY | Facility: REHABILITATION | Age: 66
End: 2021-10-13
Payer: COMMERCIAL

## 2021-10-13 DIAGNOSIS — M54.2 NECK PAIN: Primary | ICD-10-CM

## 2021-10-13 DIAGNOSIS — M48.02 SPINAL STENOSIS IN CERVICAL REGION: ICD-10-CM

## 2021-10-13 DIAGNOSIS — R20.2 NUMBNESS AND TINGLING OF BOTH UPPER EXTREMITIES: ICD-10-CM

## 2021-10-13 DIAGNOSIS — R20.0 NUMBNESS AND TINGLING OF BOTH UPPER EXTREMITIES: ICD-10-CM

## 2021-10-13 PROCEDURE — 97012 MECHANICAL TRACTION THERAPY: CPT | Mod: GP | Performed by: PHYSICAL THERAPIST

## 2021-10-13 PROCEDURE — 97110 THERAPEUTIC EXERCISES: CPT | Mod: GP | Performed by: PHYSICAL THERAPIST

## 2021-10-13 NOTE — PROGRESS NOTES
Date 10/13/21 9/22/21   Exercise     Stretches:  Chin tuck, scapular retraction  review   Stretches: UT, LS, scalene  review   UBE 3' 3'   Cervical isometrics:  Ext, SB and rot discussed Hold 5-10 seconds, X 5-10 reps, moderate pressure   Doorway pec stretch Hold 30 seconds    Supine thoracic towel roll stretch Hold 30 seconds                                    Assessment:   Pt returns for a follow up.  She notes the headaches are less and neck is feeling better but has more symptoms into her upper chest/shoulder/posterior arm.  She did note she turns her head to the left to watch TV at times but unsure if this is the source of her pain.  Discussed her computer set up at home and she feels she could make one or two minor adjustments.  If the traction does not decrease her symptoms with todays treatment will re-evaluate for a different modality.

## 2021-10-16 ENCOUNTER — TELEPHONE (OUTPATIENT)
Dept: INTERNAL MEDICINE | Facility: CLINIC | Age: 66
End: 2021-10-16

## 2021-10-16 DIAGNOSIS — R94.6 THYROID FUNCTION TEST ABNORMAL: ICD-10-CM

## 2021-10-16 DIAGNOSIS — R60.9 FLUID RETENTION: ICD-10-CM

## 2021-10-16 DIAGNOSIS — I10 ESSENTIAL HYPERTENSION: ICD-10-CM

## 2021-10-16 DIAGNOSIS — N18.30 CKD (CHRONIC KIDNEY DISEASE) STAGE 3, GFR 30-59 ML/MIN (H): ICD-10-CM

## 2021-10-20 ENCOUNTER — HOSPITAL ENCOUNTER (OUTPATIENT)
Dept: PHYSICAL THERAPY | Facility: REHABILITATION | Age: 66
End: 2021-10-20
Payer: COMMERCIAL

## 2021-10-20 DIAGNOSIS — M48.02 SPINAL STENOSIS IN CERVICAL REGION: ICD-10-CM

## 2021-10-20 DIAGNOSIS — M54.2 NECK PAIN: Primary | ICD-10-CM

## 2021-10-20 DIAGNOSIS — R20.2 NUMBNESS AND TINGLING OF BOTH UPPER EXTREMITIES: ICD-10-CM

## 2021-10-20 DIAGNOSIS — R20.0 NUMBNESS AND TINGLING OF BOTH UPPER EXTREMITIES: ICD-10-CM

## 2021-10-20 PROCEDURE — 97110 THERAPEUTIC EXERCISES: CPT | Mod: GP | Performed by: PHYSICAL THERAPIST

## 2021-10-20 PROCEDURE — 97140 MANUAL THERAPY 1/> REGIONS: CPT | Mod: GP | Performed by: PHYSICAL THERAPIST

## 2021-10-20 RX ORDER — LEVOTHYROXINE SODIUM 88 UG/1
88 TABLET ORAL DAILY
Qty: 90 TABLET | Refills: 0 | Status: SHIPPED | OUTPATIENT
Start: 2021-10-20 | End: 2022-02-13

## 2021-10-20 RX ORDER — AMLODIPINE BESYLATE 5 MG/1
TABLET ORAL
Qty: 90 TABLET | Refills: 1 | Status: SHIPPED | OUTPATIENT
Start: 2021-10-20 | End: 2022-05-10

## 2021-10-20 RX ORDER — CHLORTHALIDONE 25 MG/1
12.5 TABLET ORAL DAILY
Qty: 45 TABLET | Refills: 1 | Status: SHIPPED | OUTPATIENT
Start: 2021-10-20 | End: 2022-05-10

## 2021-10-20 NOTE — PROGRESS NOTES
Date 10/20/21 10/13/21 9/22/21   Exercise      Stretches:  Chin tuck, scapular retraction   review   Stretches: UT, LS, scalene   review   UBE 3' 3' 3'   Cervical isometrics:  Ext, SB and rot  discussed Hold 5-10 seconds, X 5-10 reps, moderate pressure   Doorway pec stretch discussed Hold 30 seconds    Supine thoracic towel roll stretch discussed Hold 30 seconds                                          Assessment:   Pt returns for a follow up. Feels her neck has been feeling good but has had more pain in her chest near her clavicle-like last visit.  Did not do traction as we didn't feel she needed so added manual therapy.  She did feel it loosened the sore area around her clavicle.

## 2021-10-20 NOTE — TELEPHONE ENCOUNTER
amLODIPine (NORVASC) 5 MG tablet  Last Written Prescription Date: 6/15/21  Last Fill Quantity: 90,   # refills: 1  Last Office Visit : 7/1/21  Future Office visit:  None      levothyroxine (SYNTHROID/LEVOTHROID) 88 MCG tablet  Last Written Prescription Date: 9/14/21  Last Fill Quantity: 90   # refills: 0       chlorthalidone (HYGROTON) 25 MG tablet  Last Written Prescription Date: 6/15/21  Last Fill Quantity: 45,   # refills: 1        Routing refill request to provider for review/approval because: kade DESOUZA ordered by other provider. tsh past due, not due immediately.

## 2021-10-20 NOTE — TELEPHONE ENCOUNTER
Left message with to schedule lab only appointment for thyroid needed for meds. Lab ordered already placed.

## 2021-10-22 ENCOUNTER — TELEPHONE (OUTPATIENT)
Dept: TRANSPLANT | Facility: CLINIC | Age: 66
End: 2021-10-22

## 2021-10-22 NOTE — TELEPHONE ENCOUNTER
Patient Call: General  Route to LPN    Reason for call: Patient says that she was told all flu shots are quadrivalent & wants to know which one she should get    Call back needed? Yes    Return Call Needed  Same as documented in contacts section  When to return call?: Greater than one day: Route standard priority

## 2021-10-25 ENCOUNTER — TELEPHONE (OUTPATIENT)
Dept: INTERNAL MEDICINE | Facility: CLINIC | Age: 66
End: 2021-10-25

## 2021-10-25 NOTE — TELEPHONE ENCOUNTER
[facility] Call Center    Phone Message    May a detailed message be left on voicemail: yes     Reason for Call: Other: Patient calling and wondering if she needs to fast for her thyroid test ordered by Arlyn Sanchez MD. The labs are scheduled for 10/27/2021 at 8:30 AM at [facility]. Please call back to discuss further.     Action Taken: Message routed to:  [facility]    Travel Screening: Not Applicable

## 2021-10-25 NOTE — TELEPHONE ENCOUNTER
Called patient and left VM.  No fasting for TSH.        Richard Walter CMA ([facility]) at 1:35 PM on 10/25/2021

## 2021-10-26 ENCOUNTER — VIRTUAL VISIT (OUTPATIENT)
Dept: PSYCHIATRY | Facility: CLINIC | Age: 66
End: 2021-10-26
Attending: NURSE PRACTITIONER
Payer: COMMERCIAL

## 2021-10-26 DIAGNOSIS — G62.9 PERIPHERAL POLYNEUROPATHY: ICD-10-CM

## 2021-10-26 PROCEDURE — 99214 OFFICE O/P EST MOD 30 MIN: CPT | Mod: 95 | Performed by: NURSE PRACTITIONER

## 2021-10-26 RX ORDER — GABAPENTIN 100 MG/1
200 CAPSULE ORAL AT BEDTIME
Qty: 180 CAPSULE | Refills: 1 | Status: SHIPPED | OUTPATIENT
Start: 2021-10-26 | End: 2022-03-02

## 2021-10-26 ASSESSMENT — PAIN SCALES - GENERAL: PAINLEVEL: NO PAIN (0)

## 2021-10-26 NOTE — PROGRESS NOTES
"Video- Visit Details  Type of service:  video visit for medication management  Time of service:    Date:  10/26/2021    Video Start Time:  9:38am   Tech issues   Video End Time:  9:58am    Reason for video visit:  Patient unable to travel due to Covid-19  Originating Site (patient location):  [facility]   Location- Patient's home  Distant Site (provider location):  Remote location  Mode of Communication:  Video Conference via AmWell  Consent:  Patient has given verbal consent for video visit?: Yes     VIDEO VISIT  Phan Luque is a 66 year old patient that has consented to receive services via billable video visit.      The patient has been notified of following:   \"This video visit will be conducted via a call between you and your physician/provider. We have found that certain health care needs can be provided without the need for an in-person physical exam. This service lets us provide the care you need with a video conversation. If a prescription is necessary we can send it directly to your pharmacy. If lab work is needed we can place an order for that and you can then stop by our lab to have the test done at a later time. Insurers are generally covering virtual visits as they would in-office visits so billing should not be different than normal.  If for some reason you do get billed incorrectly, you should contact the billing office to correct it and that number is in the AVS .    Patient will join video visit via:  Toywheel (Patient / guardian confirmed to join via Toywheel)    If patient attempts to join the video via Toywheel at appointment start time, but is unable to, they would prefer that the provider send them a video invitation via:   Send to preferred e-mail: [email or web address]      How would patient like to obtain AVS?:  Toywheel    Psychiatry Clinic Progress Note                                                                   Phan Luque is a 66 year old female who returns to the clinic for " "continued care.   Therapist: None.    PCP: Santosh Salgado  Other Providers: Hussein Banegas MD    Pertinent Background:  Liver Transplant on 9/25/16 and Stage 3 kidney disease.  Psych critical item history includes SUBSTANCE USE: alcohol.     Interim History                                                                                                             4, 4     The patient is a good historian, reports good treatment adherence and was last seen 5/24/21. Since the last visit, Phan reports she is \"good.\"  Attended [employer] show on Sunday and enjoyed thoroughly.  No concerns with mental health.  Medical health continues to be closely followed.  Informed Phan that I would be leaving clinic in November.  She plans to request her PCP to manage her lone psychiatric medication which is Gabapentin    5/24/21: Phan reports that she is \"real good.\"  NO concerns with mood or anxiety.  Sleep continues to be \"not too bad.\"  Purchased a new bed which has been helpful with sleep.      10/5/20: Phan reports he mood is stable.  No significant concerns with depression and anxiety.  She is requesting her Gabapentin be decreased.  Discussed transferring care to PCP but Phan prefers to work with psychiatry to manage Gabapentin in case of any changes to mental health.     4/8/20: Working from home.  Phan being very cautious due to being immunocompromised.  No significant concerns with anxiety/depression.  Continues to use Gabapentin for sleep.  Will continue to check in with clinic every 6 months.     10/15/19: Phan reports continued situational depression (work, family).  When asked if she wanted to restart medications to manage depression, she declined.  Taking Gabapentin for sleep.  Tried doxepin for sleep but experienced increased RLS.  Currently taking Gabapentin 300mg and Mirapex 0.5mg at bedtime and is sleeping better.   No concerns with anxiety.  No history of seasonal component to mood.      7/16/19: " "Phan reports she is \"ok.\" She reports the Gabapentin has been helpful with sleep quality.  She did increase to 600mg at bedtime.   She has does report any concern with depression but feels anhedonic and attributes to overwhelmed with various responsibilities and tasks she currently is facing.      3/27/19: Phan reports she is \"hanging in there.\"  Phan reports her sleep has worsened since stopping Trazodone in December.  She does report her pain has improved since stopping Trazodone and is no longer experiencing the morning \"hangover.\"  Will start/restart Gabapentin for sleep.  Duloxetine stopped due to side effects and Venlafaxine ordered but she did not start due to fear it would have same adverse effects as duloxetine.  Recommended she try as it appears her depression may be worsening and if experiences concerning side effects, she should call clinic.  She will continue to consider.    9/26/18: Phan reports the following changes to medications:  Started Gabapentin 100mg TID which was started by orthopedics, prednisone was decreased to 5mg, and folic acid was stopped.  No significant psychiatric symptoms reported.  She does report increase restlessness at night but does not believe it is anxiety.  Restless legs have also gotten worse which has further impeded sleep.  Iron supplementation started to help with RLS.      6/26/18: Phan reports her mental health continues to be well managed in spite of forgetting to load Buspar into her weekly med reminder.  She has not noticed any increase in anxiety.  Buspar will not be restarted.  Phan continues to not have taken the Propranolol as it is unneeded. Sleep quality remains unchanged but she is unconcerned.  Sleep may be impacted by prednisone.  Continues to take Melatonin 5mg and Trazodone 150mg to help with sleep.  Phan reports the two year anniversary of her liver transplant is approaching with no concerns from providers.      3/30/18:  Phan tried " decreased Trazodone and the change significantly impacted her sleep.  She has resumed taking Trazodone 150mg qHS. Phna also recently saw nephrologist who prescribed Iron supplementation to possibly help manage restless leg syndrome.       Phan has not taken propranolol as she feels her anxiety is well managed.  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  Discussed longer duration between appointments due to symptoms being well managed.        Recent Symptoms:   Depression:  not endorsed today  Elevated:  none  Psychosis:  none  Anxiety:  anxiety intensifies when driving  Panic Attack:  none  Trauma Related:  none     Recent Substance Use:  none reported        Social/ Family History                                  [per patient report]                                     1ea, 1ea   CHILDREN- 3 adult children       TRAUMA HISTORY (self-report)- None  FEELS SAFE AT HOME- Yes    Medical / Surgical History                                                                                                                  Patient Active Problem List   Diagnosis     Decompensation of cirrhosis of liver (H)     Liver transplanted (H)     Alcoholic hepatitis     Immunosuppression (H)     Alcoholic hepatitis without ascites     Enterococcus UTI     Liver transplant status (H)     Nausea & vomiting     Malnutrition (H)     Anemia     ACP (advance care planning)     Diarrhea     Hypothyroidism     Esophageal reflux     Recurrent major depression in partial remission (H)     Insomnia     CKD (chronic kidney disease) stage 3, GFR 30-59 ml/min (H)     Anemia in stage 3 chronic kidney disease (H)     Osteopenia     Alcohol abuse, uncomplicated     Psoriasis       Past Surgical History:   Procedure Laterality Date     APPENDECTOMY      1962     APPENDECTOMY       BENCH LIVER N/A 8/25/2016    Procedure: BENCH LIVER;  Surgeon: Larry Dhillon MD;  Location: [facility] OR     CATARACT IOL, RT/LT        COLONOSCOPY       COLONOSCOPY N/A 2021    Procedure: COLONOSCOPY, WITH POLYPECTOMY;  Surgeon: Arlyn Beckford MD;  Location: [facility] OR     ESOPHAGOSCOPY, GASTROSCOPY, DUODENOSCOPY (EGD), COMBINED N/A 2016    Procedure: COMBINED ESOPHAGOSCOPY, GASTROSCOPY, DUODENOSCOPY (EGD);  Surgeon: Tao Alfonso MD;  Location:  GI     ESOPHAGOSCOPY, GASTROSCOPY, DUODENOSCOPY (EGD), COMBINED N/A 10/31/2016    Procedure: COMBINED ESOPHAGOSCOPY, GASTROSCOPY, DUODENOSCOPY (EGD), BIOPSY SINGLE OR MULTIPLE;  Surgeon: Ronald Bojorquez MD;  Location:  GI     LIVER TRANSPLANTATION  2016    [facility]     RECTAL SURGERY       sphincteroplasty       TRANSPLANT LIVER RECIPIENT  DONOR N/A 2016    Procedure: TRANSPLANT LIVER RECIPIENT  DONOR;  Surgeon: Larry Dhillon MD;  Location: [facility] OR     TUBAL LIGATION      laparoscopic     TUBAL LIGATION        Medical Review of Systems                                                                                                        2,10   A comprehensive review of systems was performed and is negative other than noted in the HPI.  Pregnant- No    Breastfeeding- No    Contraception- No  Allergy                                Codeine, Benadryl [diphenhydramine], Cefaclor, Hydrocodone-acetaminophen, Oxycodone, Penicillins, and Sulfa drugs  Current Medications                                                                                                       Current Outpatient Medications   Medication Sig Dispense Refill     albuterol (VENTOLIN HFA) 108 (90 Base) MCG/ACT inhaler Inhale 2 puffs into the lungs every 6 hours as needed for shortness of breath / dyspnea or wheezing 18 g 8     amLODIPine (NORVASC) 5 MG tablet TAKE ONE TABLET BY MOUTH ONCE DAILY 90 tablet 1     calcipotriene (DOVONOX) 0.005 % external solution Apply 1 mL topically daily To the scalp 180 mL 3     chlorthalidone (HYGROTON) 25 MG tablet TAKE 0.5 TABLETS  (12.5 MG) BY MOUTH DAILY 45 tablet 1     cholecalciferol (VITAMIN D3) 400 UNIT TABS tablet Take 400 Units by mouth daily       clobetasol (TEMOVATE) 0.05 % external ointment Apply topically 2 times daily To areas of eczema on the lower legs, until areas resolve. For  refills, please schedule appointment, appt due 9-2021 60 g 1     clobetasol (TEMOVATE) 0.05 % external solution Apply topically 2 times daily To the scalp as needed for itching and flaking, on the days you don't use the clobetasol shampoo. 150 mL 3     clobetasol propionate (CLOBEX) 0.05 % external shampoo Apply topically daily To dry scalp x 15 min and rinse ,when psoriasis is present. 354 mL 3     cyanocobalamin (VITAMIN  B-12) 1000 MCG tablet Take 1,000 mcg by mouth daily       ferrous sulfate (IRON) 325 (65 FE) MG tablet Take 1 tablet (325 mg) by mouth 2 times daily 100 tablet      folic acid (FOLVITE) 1 MG tablet Take 1 tablet (1 mg) by mouth daily 30 tablet 1     gabapentin (NEURONTIN) 100 MG capsule Take 2 capsules (200 mg) by mouth At Bedtime 180 capsule 1     hydrOXYzine (ATARAX) 25 MG tablet Take 1-2 tablets (25-50 mg) by mouth every 6 hours as needed for itching 120 tablet 10     levothyroxine (SYNTHROID/LEVOTHROID) 88 MCG tablet TAKE 1 TABLET (88 MCG) BY MOUTH DAILY 90 tablet 0     MAGNESIUM OXIDE PO Take 400 mg by mouth daily       melatonin 5 MG tablet Take 1 tablet (5 mg) by mouth nightly as needed for sleep       multivitamin, therapeutic (THERA-VIT) TABS tablet Take 1 tablet by mouth every 24 hours 30 tablet 11     order for DME Equipment being ordered: Trilok ankle brace 1 Device 0     pantoprazole (PROTONIX) 40 MG EC tablet TAKE ONE TABLET BY MOUTH EVERY MORNING BEFORE BREAKFAST 90 tablet 2     psyllium 0.52 G capsule Take 2 capsules (1.04 g) by mouth daily With a full glass of water. 60 capsule 1     tacrolimus (GENERIC EQUIVALENT) 0.5 MG capsule Take 3 capsules (1.5 mg) by mouth every 12 hours 180 capsule 11     traMADol (ULTRAM) 50  MG tablet Take 1 tablet (50 mg) by mouth every 6 hours as needed for severe pain 30 tablet 0     triamcinolone (KENALOG) 0.025 % external ointment Apply topically 2 times daily To affected areas in the axilla as needed. 80 g 1     triamcinolone (KENALOG) 0.1 % external ointment Apply topically 2 times daily To plaques behind the knee 80 g 1     ursodiol (ACTIGALL) 300 MG capsule TAKE ONE CAPSULE BY MOUTH TWICE A DAY 60 capsule 11     acetaminophen (TYLENOL) 325 MG tablet Take 2 tablets (650 mg) by mouth every 6 hours as needed for mild pain Or fevers. Use sparingly. Limit use to no more than 2 grams (2000 mg) in 24 hours. **Further refills must be obtained by primary care provider** (Patient not taking: Reported on 9/13/2021) 100 tablet 0     Vitals                                                                                                                            3, 3   LMP  (LMP Unknown)    Mental Status Exam                                                                                        9, 14 cog gs     Alertness: alert  and oriented  Appearance: casually groomed  Behavior/Demeanor: cooperative, pleasant and calm, with good  eye contact   Speech: regular rate and rhythm  Language: good  Psychomotor: normal or unremarkable  Mood: description consistent with euthymia  Affect: appropriate; was congruent to mood; was congruent to content  Thought Process/Associations: unremarkable  Thought Content:  Reports none;  Denies suicidal ideation and violent ideation  Perception:  Reports none;  Denies auditory hallucinations and visual hallucinations  Insight: good  Judgment: good  Cognition: (6) does  appear grossly intact; formal cognitive testing was not done  Gait/Station and/or Muscle Strength/Tone: unable to assess    Labs and Data                                                                                                                 Rating Scales:  PHQ9    PHQ9 Today:  Not completed  PHQ-9 SCORE  3/27/2019 7/16/2019 10/15/2019   PHQ-9 Total Score Adolpht - - -   PHQ-9 Total Score 10 6 5         Diagnosis and Assessment                                                                                  m2, h3     Today the following issues were addressed:    1) Major Depressive Disorder, recurrent, mild  2) Generalized Anxiety Disorder    [place] Prescription Monitoring Program [] was not checked today:  will be checked next visit.         Plan                                                                                                                         m2, h3      1) Medication Management      Conitnue Gabapentin 200mg at bedtime for sleep.      2) Therapy  Continues to contemplate need for therapist    RTC: Care to be transferred to another provider.        CRISIS NUMBERS:   Provided routinely in AVS.    Treatment Risk Statement:  The patient understands the risks, benefits, adverse effects and alternatives. Agrees to treatment with the capacity to do so. No medical contraindications to treatment. Agrees to call clinic for any problems. The patient understands to call 911 or go to the nearest ED if life threatening or urgent symptoms occur.      PROVIDER:  DONOVAN Hanna CNP

## 2021-10-26 NOTE — PROGRESS NOTES
"VIDEO VISIT  Phan Luque is a 66 year old patient that has consented to receive services via billable video visit.      The patient has been notified of following:   \"This video visit will be conducted via a call between you and your physician/provider. We have found that certain health care needs can be provided without the need for an in-person physical exam. This service lets us provide the care you need with a video conversation. If a prescription is necessary we can send it directly to your pharmacy. If lab work is needed we can place an order for that and you can then stop by our lab to have the test done at a later time. Insurers are generally covering virtual visits as they would in-office visits so billing should not be different than normal.  If for some reason you do get billed incorrectly, you should contact the billing office to correct it and that number is in the AVS .    Patient will join video visit via:  KangaDo (Patient / guardian confirmed to join via KangaDo)    If patient attempts to join the video via KangaDo at appointment start time, but is unable to, they would prefer that the provider send them a video invitation via:   Send to preferred e-mail: [email or web address]      How would patient like to obtain AVS?:  MyChart    "

## 2021-10-26 NOTE — PATIENT INSTRUCTIONS
**For crisis resources, please see the information at the end of this document**     Patient Education      Thank you for coming to the [facility] [place] [facility].    Lab Testing:  If you had lab testing today and your results are reassuring or normal they will be mailed to you or sent through [employer] within 7 days. If the lab tests need quick action we will call you with the results. The phone number we will call with results is # [phone] (home) . If this is not the best number please call our clinic and change the number.    Medication Refills:  If you need any refills please call your pharmacy and they will contact us. Our fax number for refills is [phone]. Please allow three business for refill processing. If you need to  your refill at a new pharmacy, please contact the new pharmacy directly. The new pharmacy will help you get your medications transferred.     Scheduling:  If you have any concerns about today's visit or wish to schedule another appointment please call our office during normal business hours [phone] (8-5:00 M-F)    Contact Us:  Please call [phone] during business hours (8-5:00 M-F).  If after clinic hours, or on the weekend, please call  [phone].    Financial Assistance [phone]  [facility] Billing [phone]  Central Billing Office, [facility]: [phone]  [facility] Billing [phone]  Medical Records [phone]  [facility] Patient Bill of Rights [email or web address]       MENTAL HEALTH CRISIS NUMBERS:  For a medical emergency please call  911 or go to the nearest ER.     [facility]:   [facility] -[phone]   Crisis Residence [facility] Residence -[phone]   Walk-In Counseling Center \[facility]\"" -[phone]   COPE 24/7 [place] Mobile Team -[phone] (adults)/[phone] (child)  CHILD: [facility] needs assessment  team - [phone]      [facility]:   [facility] - [phone]   Walk-in counseling [facility] - [phone]   Walk-in counseling [facility] - [phone]   Crisis Residence [facility] Res[facility] - [phone]  Urgent Care Adult Mental Mxtylr-[phone] mobile unit/ 24/7 crisis line    National Crisis Numbers:   National Suicide Prevention Lifeline: 5-157-840-TALK (084-548-1981)  Poison Control Center - 5-690-111-7644  [employer] Parent/resources for a list of additional resources (SOS)  Trans Lifeline a hotline for transgender people 1-506.605.9092  The [employer] Project a hotline for LGBT youth 2-302-086-0468  Crisis Text Line: For any crisis 24/7   To: 171658  see www.crisistextline.org  - IF MAKING A CALL FEELS TOO HARD, send a text!         Again thank you for choosing [facility] and please let us know how we can best partner with you to improve you and your family's health.    You may be receiving a survey regarding this appointment. We would love to have your feedback, both positive and negative. The survey is done by an external company, so your answers are anonymous.

## 2021-10-27 ENCOUNTER — LAB (OUTPATIENT)
Dept: LAB | Facility: CLINIC | Age: 66
End: 2021-10-27
Payer: COMMERCIAL

## 2021-10-27 ENCOUNTER — HOSPITAL ENCOUNTER (OUTPATIENT)
Dept: PHYSICAL THERAPY | Facility: REHABILITATION | Age: 66
End: 2021-10-27
Payer: COMMERCIAL

## 2021-10-27 ENCOUNTER — HOSPITAL ENCOUNTER (OUTPATIENT)
Dept: MAMMOGRAPHY | Facility: CLINIC | Age: 66
Discharge: HOME | End: 2021-10-27
Attending: INTERNAL MEDICINE | Admitting: INTERNAL MEDICINE
Payer: COMMERCIAL

## 2021-10-27 DIAGNOSIS — R94.6 THYROID FUNCTION TEST ABNORMAL: ICD-10-CM

## 2021-10-27 DIAGNOSIS — M48.02 SPINAL STENOSIS IN CERVICAL REGION: ICD-10-CM

## 2021-10-27 DIAGNOSIS — Z12.31 ENCOUNTER FOR SCREENING MAMMOGRAM FOR BREAST CANCER: ICD-10-CM

## 2021-10-27 DIAGNOSIS — R20.0 NUMBNESS AND TINGLING OF BOTH UPPER EXTREMITIES: ICD-10-CM

## 2021-10-27 DIAGNOSIS — M54.2 NECK PAIN: Primary | ICD-10-CM

## 2021-10-27 DIAGNOSIS — R20.2 NUMBNESS AND TINGLING OF BOTH UPPER EXTREMITIES: ICD-10-CM

## 2021-10-27 LAB — TSH SERPL DL<=0.005 MIU/L-ACNC: 2.9 UIU/ML (ref 0.3–5)

## 2021-10-27 PROCEDURE — 97110 THERAPEUTIC EXERCISES: CPT | Mod: GP | Performed by: PHYSICAL THERAPIST

## 2021-10-27 PROCEDURE — 36415 COLL VENOUS BLD VENIPUNCTURE: CPT

## 2021-10-27 PROCEDURE — 97140 MANUAL THERAPY 1/> REGIONS: CPT | Mod: GP | Performed by: PHYSICAL THERAPIST

## 2021-10-27 PROCEDURE — 84443 ASSAY THYROID STIM HORMONE: CPT

## 2021-10-27 PROCEDURE — 77063 BREAST TOMOSYNTHESIS BI: CPT

## 2021-10-27 NOTE — PROGRESS NOTES
Date 10/27/21 10/20/21 10/13/21 9/22/21   Exercise       Stretches:  Chin tuck, scapular retraction    review   Stretches: UT, LS, scalene    review   UBE  3' 3' 3'   Cervical isometrics:  Ext, SB and rot   discussed Hold 5-10 seconds, X 5-10 reps, moderate pressure   Doorway pec stretch  discussed Hold 30 seconds    Supine thoracic towel roll stretch  discussed Hold 30 seconds    reverse wall push up Hold 10 seconds X 6      Wall ebony   X 20                                    Assessment:   Pt returns for a follow up.  Her neck responded very well to manual therapy last visit but she had to stop suddenly while driving yesterday and her neck got yanni.  She felt the manual therapy today helped decrease those tight muscles and pain and made her neck feel looser.  She did not experience as much pain after.  Was able to add some postural re-training exercises today for her midback and did not have pain with them.

## 2021-11-01 ENCOUNTER — HOSPITAL ENCOUNTER (OUTPATIENT)
Dept: PHYSICAL THERAPY | Facility: REHABILITATION | Age: 66
End: 2021-11-01
Payer: COMMERCIAL

## 2021-11-01 DIAGNOSIS — M54.2 NECK PAIN: Primary | ICD-10-CM

## 2021-11-01 DIAGNOSIS — R20.0 NUMBNESS AND TINGLING OF BOTH UPPER EXTREMITIES: ICD-10-CM

## 2021-11-01 DIAGNOSIS — M48.02 SPINAL STENOSIS IN CERVICAL REGION: ICD-10-CM

## 2021-11-01 DIAGNOSIS — R20.2 NUMBNESS AND TINGLING OF BOTH UPPER EXTREMITIES: ICD-10-CM

## 2021-11-01 PROCEDURE — 97110 THERAPEUTIC EXERCISES: CPT | Mod: GP | Performed by: PHYSICAL THERAPIST

## 2021-11-01 PROCEDURE — 97140 MANUAL THERAPY 1/> REGIONS: CPT | Mod: GP | Performed by: PHYSICAL THERAPIST

## 2021-11-01 NOTE — PROGRESS NOTES
Date 11/1/21 10/27/21 10/20/21 10/13/21 9/22/21   Exercise        Stretches:  Chin tuck, scapular retraction     review   Stretches: UT, LS, scalene     review   UBE 3' 3' 3' 3' 3'   Cervical isometrics:  Ext, SB and rot    discussed Hold 5-10 seconds, X 5-10 reps, moderate pressure   Doorway pec stretch   discussed Hold 30 seconds    Supine thoracic towel roll stretch   discussed Hold 30 seconds    reverse wall push up discussed Hold 10 seconds X 6      Wall ebony   discussed X 20                                        Assessment:   Pt returns for a follow up.  The left side of her neck was a little sore again so performed manual therapy on that region.  She felt it was much looser after and turning did not hurt.  Discussed trying to decrease the manual therapy so we could focus on exercises and strengthening.  May try that next visit. .

## 2021-11-05 ENCOUNTER — IMMUNIZATION (OUTPATIENT)
Dept: FAMILY MEDICINE | Facility: CLINIC | Age: 66
End: 2021-11-05
Payer: COMMERCIAL

## 2021-11-05 PROCEDURE — 90662 IIV NO PRSV INCREASED AG IM: CPT

## 2021-11-05 PROCEDURE — 90471 IMMUNIZATION ADMIN: CPT

## 2021-11-06 ENCOUNTER — MYC MEDICAL ADVICE (OUTPATIENT)
Dept: INTERNAL MEDICINE | Facility: CLINIC | Age: 66
End: 2021-11-06
Payer: COMMERCIAL

## 2021-11-06 DIAGNOSIS — E46 MALNUTRITION (H): ICD-10-CM

## 2021-11-06 DIAGNOSIS — F41.1 GAD (GENERALIZED ANXIETY DISORDER): ICD-10-CM

## 2021-11-08 RX ORDER — HYDROXYZINE HYDROCHLORIDE 25 MG/1
25-50 TABLET, FILM COATED ORAL EVERY 6 HOURS PRN
Qty: 120 TABLET | Refills: 5 | Status: SHIPPED | OUTPATIENT
Start: 2021-11-08 | End: 2022-02-13

## 2021-11-09 DIAGNOSIS — M25.562 ARTHRALGIA OF BOTH KNEES: Primary | ICD-10-CM

## 2021-11-09 DIAGNOSIS — M25.561 ARTHRALGIA OF BOTH KNEES: Primary | ICD-10-CM

## 2021-11-10 DIAGNOSIS — Z94.4 LIVER REPLACED BY TRANSPLANT (H): ICD-10-CM

## 2021-11-10 DIAGNOSIS — Z13.220 LIPID SCREENING: ICD-10-CM

## 2021-11-16 ENCOUNTER — LAB (OUTPATIENT)
Dept: LAB | Facility: CLINIC | Age: 66
End: 2021-11-16
Payer: COMMERCIAL

## 2021-11-16 DIAGNOSIS — Z94.4 LIVER REPLACED BY TRANSPLANT (H): ICD-10-CM

## 2021-11-16 DIAGNOSIS — Z13.220 LIPID SCREENING: ICD-10-CM

## 2021-11-16 LAB
ALBUMIN SERPL-MCNC: 3.9 G/DL (ref 3.5–5)
ALP SERPL-CCNC: 58 U/L (ref 45–120)
ALT SERPL W P-5'-P-CCNC: 10 U/L (ref 0–45)
ANION GAP SERPL CALCULATED.3IONS-SCNC: 15 MMOL/L (ref 5–18)
AST SERPL W P-5'-P-CCNC: 19 U/L (ref 0–40)
BILIRUB DIRECT SERPL-MCNC: 0.2 MG/DL
BILIRUB SERPL-MCNC: 0.8 MG/DL (ref 0–1)
BUN SERPL-MCNC: 24 MG/DL (ref 8–22)
CALCIUM SERPL-MCNC: 9.6 MG/DL (ref 8.5–10.5)
CHLORIDE BLD-SCNC: 99 MMOL/L (ref 98–107)
CO2 SERPL-SCNC: 23 MMOL/L (ref 22–31)
CREAT SERPL-MCNC: 1.5 MG/DL (ref 0.6–1.1)
ERYTHROCYTE [DISTWIDTH] IN BLOOD BY AUTOMATED COUNT: 13.3 % (ref 10–15)
GFR SERPL CREATININE-BSD FRML MDRD: 36 ML/MIN/1.73M2
GLUCOSE BLD-MCNC: 97 MG/DL (ref 70–125)
HCT VFR BLD AUTO: 42.3 % (ref 35–47)
HGB BLD-MCNC: 14 G/DL (ref 11.7–15.7)
MCH RBC QN AUTO: 29.4 PG (ref 26.5–33)
MCHC RBC AUTO-ENTMCNC: 33.1 G/DL (ref 31.5–36.5)
MCV RBC AUTO: 89 FL (ref 78–100)
PLATELET # BLD AUTO: 306 10E3/UL (ref 150–450)
POTASSIUM BLD-SCNC: 4.6 MMOL/L (ref 3.5–5)
PROT SERPL-MCNC: 7.1 G/DL (ref 6–8)
RBC # BLD AUTO: 4.76 10E6/UL (ref 3.8–5.2)
SODIUM SERPL-SCNC: 137 MMOL/L (ref 136–145)
WBC # BLD AUTO: 7.8 10E3/UL (ref 4–11)

## 2021-11-16 PROCEDURE — 85027 COMPLETE CBC AUTOMATED: CPT

## 2021-11-16 PROCEDURE — 80197 ASSAY OF TACROLIMUS: CPT

## 2021-11-16 PROCEDURE — 82248 BILIRUBIN DIRECT: CPT

## 2021-11-16 PROCEDURE — 36415 COLL VENOUS BLD VENIPUNCTURE: CPT

## 2021-11-16 PROCEDURE — 80053 COMPREHEN METABOLIC PANEL: CPT

## 2021-11-17 PROBLEM — B37.9 CANDIDA INFECTION: Status: ACTIVE | Noted: 2020-11-11

## 2021-11-17 PROBLEM — L03.90 CELLULITIS: Status: ACTIVE | Noted: 2021-09-13

## 2021-11-17 PROBLEM — A49.9 BACTERIAL INFECTION: Status: ACTIVE | Noted: 2021-02-04

## 2021-11-17 LAB
TACROLIMUS BLD-MCNC: 3.8 UG/L (ref 5–15)
TME LAST DOSE: ABNORMAL H
TME LAST DOSE: ABNORMAL H

## 2021-11-18 ENCOUNTER — VIRTUAL VISIT (OUTPATIENT)
Dept: GASTROENTEROLOGY | Facility: CLINIC | Age: 66
End: 2021-11-18
Attending: INTERNAL MEDICINE
Payer: COMMERCIAL

## 2021-11-18 DIAGNOSIS — Z94.4 LIVER REPLACED BY TRANSPLANT (H): Primary | ICD-10-CM

## 2021-11-18 PROCEDURE — 99214 OFFICE O/P EST MOD 30 MIN: CPT | Mod: 95 | Performed by: INTERNAL MEDICINE

## 2021-11-18 ASSESSMENT — PAIN SCALES - GENERAL: PAINLEVEL: NO PAIN (0)

## 2021-11-18 NOTE — LETTER
11/18/2021     RE: Phan Luque  [address]  [facility] [address]    Dear Colleague,    Thank you for referring your patient, Phan Luque, to the [facility] [place]. Please see a copy of my visit note below.    Jessica is a 66 year old who is being evaluated via a billable video visit.      How would you like to obtain your AVS? Mail a copy  If the video visit is dropped, the invitation should be resent by: Text to cell phone: [phone]  Will anyone else be joining your video visit? No    Video Start Time: 8:57 AM    Subjective   Jessica is a 66 year old who presents for the following health issues: S/P liver transplantation    HPI: I had the pleasure of seeing Phan Luque for followup in the Liver Transplant Clinic at the [facility] on November 18, 2021.  Ms. Luque returns for followup now more than 5 years status post liver transplantation for alcoholic hepatitis.  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      She is doing well.  She denies any abdominal pain, or itching.  She does have eczema and psoriasis.  She denies any fatigue.  She is working full time.  She denies any increased abdominal girth or lower extremity edema.      She denies any fevers or chills, cough or shortness of breath.  She is fully vaccinated against COVID-19.  She denies any nausea, vomiting, diarrhea or constipation.  Her appetite is good. She has lost 58 pounds in weight.  She is exercising on a regular basis and eating healthy.     There have been no other new events since she was last seen.    Current Outpatient Medications   Medication     acetaminophen (TYLENOL) 325 MG tablet     albuterol (VENTOLIN HFA) 108 (90 Base) MCG/ACT inhaler     amLODIPine (NORVASC) 5 MG tablet     calcipotriene (DOVONOX) 0.005 % external solution     chlorthalidone (HYGROTON) 25 MG tablet     cholecalciferol (VITAMIN D3) 400 UNIT TABS  tablet     clobetasol (TEMOVATE) 0.05 % external ointment     clobetasol (TEMOVATE) 0.05 % external solution     clobetasol propionate (CLOBEX) 0.05 % external shampoo     cyanocobalamin (VITAMIN  B-12) 1000 MCG tablet     ferrous sulfate (IRON) 325 (65 FE) MG tablet     folic acid (FOLVITE) 1 MG tablet     gabapentin (NEURONTIN) 100 MG capsule     hydrOXYzine (ATARAX) 25 MG tablet     levothyroxine (SYNTHROID/LEVOTHROID) 88 MCG tablet     MAGNESIUM OXIDE PO     melatonin 5 MG tablet     multivitamin, therapeutic (THERA-VIT) TABS tablet     pantoprazole (PROTONIX) 40 MG EC tablet     psyllium 0.52 G capsule     tacrolimus (GENERIC EQUIVALENT) 0.5 MG capsule     traMADol (ULTRAM) 50 MG tablet     triamcinolone (KENALOG) 0.025 % external ointment     triamcinolone (KENALOG) 0.1 % external ointment     ursodiol (ACTIGALL) 300 MG capsule     order for DME     No current facility-administered medications for this visit.      Objective    Vitals - Patient Reported  Pain Score: No Pain (0)    Physical Exam: GENERAL: Healthy, alert and no distress. EYES: Eyes grossly normal to inspection.  No discharge or erythema, or obvious scleral/conjunctival abnormalities. RESP: No audible wheeze, cough, or visible cyanosis.  No visible retractions or increased work of breathing. SKIN: Visible skin clear. No significant rash, abnormal pigmentation or lesions. NEURO: Cranial nerves grossly intact.  Mentation and speech appropriate for age. PSYCH: Mentation appears normal, affect normal/bright, judgement and insight intact, normal speech and appearance well-groomed.    Recent Results (from the past 168 hour(s))   Basic metabolic panel    Collection Time: 11/16/21  7:57 AM   Result Value Ref Range    Sodium 137 136 - 145 mmol/L    Potassium 4.6 3.5 - 5.0 mmol/L    Chloride 99 98 - 107 mmol/L    Carbon Dioxide (CO2) 23 22 - 31 mmol/L    Anion Gap 15 5 - 18 mmol/L    Urea Nitrogen 24 (H) 8 - 22 mg/dL    Creatinine 1.50 (H) 0.60 - 1.10 mg/dL     Calcium 9.6 8.5 - 10.5 mg/dL    Glucose 97 70 - 125 mg/dL    GFR Estimate 36 (L) >60 mL/min/1.73m2   CBC with platelets    Collection Time: 11/16/21  7:57 AM   Result Value Ref Range    WBC Count 7.8 4.0 - 11.0 10e3/uL    RBC Count 4.76 3.80 - 5.20 10e6/uL    Hemoglobin 14.0 11.7 - 15.7 g/dL    Hematocrit 42.3 35.0 - 47.0 %    MCV 89 78 - 100 fL    MCH 29.4 26.5 - 33.0 pg    MCHC 33.1 31.5 - 36.5 g/dL    RDW 13.3 10.0 - 15.0 %    Platelet Count 306 150 - 450 10e3/uL   Hepatic panel    Collection Time: 11/16/21  7:57 AM   Result Value Ref Range    Bilirubin Total 0.8 0.0 - 1.0 mg/dL    Bilirubin Direct 0.2 <=0.5 mg/dL    Protein Total 7.1 6.0 - 8.0 g/dL    Albumin 3.9 3.5 - 5.0 g/dL    Alkaline Phosphatase 58 45 - 120 U/L    AST 19 0 - 40 U/L    ALT 10 0 - 45 U/L   Tacrolimus by Tandem Mass Spectrometry    Collection Time: 11/16/21  7:57 AM   Result Value Ref Range    Tacrolimus by Tandem Mass Spectrometry 3.8 (L) 5.0 - 15.0 ug/L    Tacrolimus Last Dose Date 11/15/2021     Tacrolimus Last Dose Time  7:30 PM       My impression is that Ms. Luque is  for than 5 years status post liver transplantation for alcoholic cirrhosis.  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  Her laboratory tests are excellent.   She is up to date with regard to other vaccinations.  She is up to date with regard to cancer screening including skin cancer screening and her last bone density study showed only osteopenia.  My plan will be to see her back in the clinic in 6 months.      She is fully vaccinated against COVID-19.  She is on single drug immunosuppression and thus I think her likelihood of responding to the vaccine is quite good.      Thank you very much for allowing me to participate in the care of this patient.  If you have any questions regarding my recommendations, please do not hesitate to contact me.         Hussein Banegas MD      Professor of Medicine  [facility]  [employer]      Executive Medical Director, Solid Organ Transplant Program  [facility]    Video-Visit Details  Type of service:  Video Visit  Video End Time:9:17 AM  Originating Location (pt. Location): Home  Distant Location (provider location):  [facility] [place]   Platform used for Video Visit: [employer]

## 2021-11-18 NOTE — PROGRESS NOTES
Jessica is a 66 year old who is being evaluated via a billable video visit.      How would you like to obtain your AVS? Mail a copy  If the video visit is dropped, the invitation should be resent by: Text to cell phone: [phone]  Will anyone else be joining your video visit? No    Video Start Time: 8:57 AM    Subjective   Jessica is a 66 year old who presents for the following health issues: S/P liver transplantation    HPI: I had the pleasure of seeing Phan Luque for followup in the Liver Transplant Clinic at the [facility] on November 18, 2021.  Ms. Luque returns for followup now more than 5 years status post liver transplantation for alcoholic hepatitis.  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      She is doing well.  She denies any abdominal pain, or itching.  She does have eczema and psoriasis.  She denies any fatigue.  She is working full time.  She denies any increased abdominal girth or lower extremity edema.      She denies any fevers or chills, cough or shortness of breath.  She is fully vaccinated against COVID-19.  She denies any nausea, vomiting, diarrhea or constipation.  Her appetite is good. She has lost 58 pounds in weight.  She is exercising on a regular basis and eating healthy.     There have been no other new events since she was last seen.    Current Outpatient Medications   Medication     acetaminophen (TYLENOL) 325 MG tablet     albuterol (VENTOLIN HFA) 108 (90 Base) MCG/ACT inhaler     amLODIPine (NORVASC) 5 MG tablet     calcipotriene (DOVONOX) 0.005 % external solution     chlorthalidone (HYGROTON) 25 MG tablet     cholecalciferol (VITAMIN D3) 400 UNIT TABS tablet     clobetasol (TEMOVATE) 0.05 % external ointment     clobetasol (TEMOVATE) 0.05 % external solution     clobetasol propionate (CLOBEX) 0.05 % external shampoo     cyanocobalamin (VITAMIN  B-12) 1000 MCG tablet     ferrous sulfate (IRON) 325 (65 FE) MG  tablet     folic acid (FOLVITE) 1 MG tablet     gabapentin (NEURONTIN) 100 MG capsule     hydrOXYzine (ATARAX) 25 MG tablet     levothyroxine (SYNTHROID/LEVOTHROID) 88 MCG tablet     MAGNESIUM OXIDE PO     melatonin 5 MG tablet     multivitamin, therapeutic (THERA-VIT) TABS tablet     pantoprazole (PROTONIX) 40 MG EC tablet     psyllium 0.52 G capsule     tacrolimus (GENERIC EQUIVALENT) 0.5 MG capsule     traMADol (ULTRAM) 50 MG tablet     triamcinolone (KENALOG) 0.025 % external ointment     triamcinolone (KENALOG) 0.1 % external ointment     ursodiol (ACTIGALL) 300 MG capsule     order for DME     No current facility-administered medications for this visit.      Objective    Vitals - Patient Reported  Pain Score: No Pain (0)    Physical Exam: GENERAL: Healthy, alert and no distress. EYES: Eyes grossly normal to inspection.  No discharge or erythema, or obvious scleral/conjunctival abnormalities. RESP: No audible wheeze, cough, or visible cyanosis.  No visible retractions or increased work of breathing. SKIN: Visible skin clear. No significant rash, abnormal pigmentation or lesions. NEURO: Cranial nerves grossly intact.  Mentation and speech appropriate for age. PSYCH: Mentation appears normal, affect normal/bright, judgement and insight intact, normal speech and appearance well-groomed.    Recent Results (from the past 168 hour(s))   Basic metabolic panel    Collection Time: 11/16/21  7:57 AM   Result Value Ref Range    Sodium 137 136 - 145 mmol/L    Potassium 4.6 3.5 - 5.0 mmol/L    Chloride 99 98 - 107 mmol/L    Carbon Dioxide (CO2) 23 22 - 31 mmol/L    Anion Gap 15 5 - 18 mmol/L    Urea Nitrogen 24 (H) 8 - 22 mg/dL    Creatinine 1.50 (H) 0.60 - 1.10 mg/dL    Calcium 9.6 8.5 - 10.5 mg/dL    Glucose 97 70 - 125 mg/dL    GFR Estimate 36 (L) >60 mL/min/1.73m2   CBC with platelets    Collection Time: 11/16/21  7:57 AM   Result Value Ref Range    WBC Count 7.8 4.0 - 11.0 10e3/uL    RBC Count 4.76 3.80 - 5.20 10e6/uL     Hemoglobin 14.0 11.7 - 15.7 g/dL    Hematocrit 42.3 35.0 - 47.0 %    MCV 89 78 - 100 fL    MCH 29.4 26.5 - 33.0 pg    MCHC 33.1 31.5 - 36.5 g/dL    RDW 13.3 10.0 - 15.0 %    Platelet Count 306 150 - 450 10e3/uL   Hepatic panel    Collection Time: 11/16/21  7:57 AM   Result Value Ref Range    Bilirubin Total 0.8 0.0 - 1.0 mg/dL    Bilirubin Direct 0.2 <=0.5 mg/dL    Protein Total 7.1 6.0 - 8.0 g/dL    Albumin 3.9 3.5 - 5.0 g/dL    Alkaline Phosphatase 58 45 - 120 U/L    AST 19 0 - 40 U/L    ALT 10 0 - 45 U/L   Tacrolimus by Tandem Mass Spectrometry    Collection Time: 11/16/21  7:57 AM   Result Value Ref Range    Tacrolimus by Tandem Mass Spectrometry 3.8 (L) 5.0 - 15.0 ug/L    Tacrolimus Last Dose Date 11/15/2021     Tacrolimus Last Dose Time  7:30 PM       My impression is that Ms. Luque is  for than 5 years status post liver transplantation for alcoholic cirrhosis.  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  Her laboratory tests are excellent.   She is up to date with regard to other vaccinations.  She is up to date with regard to cancer screening including skin cancer screening and her last bone density study showed only osteopenia.  My plan will be to see her back in the clinic in 6 months.      She is fully vaccinated against COVID-19.  She is on single drug immunosuppression and thus I think her likelihood of responding to the vaccine is quite good.      Thank you very much for allowing me to participate in the care of this patient.  If you have any questions regarding my recommendations, please do not hesitate to contact me.         Hussein Banegas MD      Professor of Medicine  [employer]      Executive Medical Director, Solid Organ Transplant Program  [facility]    Video-Visit Details    Type of service:  Video Visit    Video End Time:9:17 AM    Originating Location (pt. Location):  Home    Distant Location (provider location):  [facility] HEPATOLOGY CLIN[facility] [place]     Platform used for Video Visit: [employer]

## 2021-11-24 ENCOUNTER — OFFICE VISIT (OUTPATIENT)
Dept: DERMATOLOGY | Facility: CLINIC | Age: 66
End: 2021-11-24
Payer: COMMERCIAL

## 2021-11-24 DIAGNOSIS — L40.0 PSORIASIS VULGARIS: ICD-10-CM

## 2021-11-24 DIAGNOSIS — L20.82 FLEXURAL ECZEMA: ICD-10-CM

## 2021-11-24 DIAGNOSIS — L40.9 PSORIASIS: ICD-10-CM

## 2021-11-24 DIAGNOSIS — Z12.83 SKIN CANCER SCREENING: Primary | ICD-10-CM

## 2021-11-24 DIAGNOSIS — L40.9 SCALP PSORIASIS: ICD-10-CM

## 2021-11-24 DIAGNOSIS — L82.1 SEBORRHEIC KERATOSES: ICD-10-CM

## 2021-11-24 DIAGNOSIS — L30.0 NUMMULAR ECZEMA: ICD-10-CM

## 2021-11-24 PROCEDURE — 99213 OFFICE O/P EST LOW 20 MIN: CPT | Performed by: PHYSICIAN ASSISTANT

## 2021-11-24 RX ORDER — CLOBETASOL PROPIONATE 0.5 MG/G
OINTMENT TOPICAL 2 TIMES DAILY
Qty: 60 G | Refills: 1 | Status: SHIPPED | OUTPATIENT
Start: 2021-11-24 | End: 2024-08-29

## 2021-11-24 RX ORDER — TRIAMCINOLONE ACETONIDE 1 MG/G
OINTMENT TOPICAL 2 TIMES DAILY
Qty: 80 G | Refills: 1 | Status: SHIPPED | OUTPATIENT
Start: 2021-11-24 | End: 2022-12-23

## 2021-11-24 RX ORDER — MUPIROCIN 20 MG/G
OINTMENT TOPICAL
COMMUNITY
Start: 2021-09-28 | End: 2023-07-10

## 2021-11-24 RX ORDER — TRIAMCINOLONE ACETONIDE 0.25 MG/G
OINTMENT TOPICAL 2 TIMES DAILY
Qty: 80 G | Refills: 1 | Status: SHIPPED | OUTPATIENT
Start: 2021-11-24 | End: 2022-09-14

## 2021-11-24 RX ORDER — VALACYCLOVIR HYDROCHLORIDE 500 MG/1
TABLET, FILM COATED ORAL
COMMUNITY
Start: 2021-08-16 | End: 2023-07-10

## 2021-11-24 RX ORDER — CLOBETASOL PROPIONATE 0.05 G/100ML
SHAMPOO TOPICAL DAILY
Qty: 354 ML | Refills: 3 | Status: SHIPPED | OUTPATIENT
Start: 2021-11-24 | End: 2022-06-07

## 2021-11-24 RX ORDER — CALCIPOTRIENE 0.05 MG/ML
1 SOLUTION TOPICAL DAILY
Qty: 180 ML | Refills: 3 | Status: SHIPPED | OUTPATIENT
Start: 2021-11-24 | End: 2022-12-26

## 2021-11-24 RX ORDER — CLOBETASOL PROPIONATE 0.5 MG/ML
SOLUTION TOPICAL 2 TIMES DAILY
Qty: 150 ML | Refills: 3 | Status: SHIPPED | OUTPATIENT
Start: 2021-11-24 | End: 2022-12-23

## 2021-11-24 RX ORDER — CLINDAMYCIN HCL 300 MG
CAPSULE ORAL
COMMUNITY
Start: 2021-09-30 | End: 2022-05-25

## 2021-11-24 ASSESSMENT — PAIN SCALES - GENERAL: PAINLEVEL: NO PAIN (0)

## 2021-11-24 NOTE — NURSING NOTE
Chief Complaint   Patient presents with     Transplant Skin Check     annual     Pt is here for a full body skin check.  She has a spot on one of her legs she would like looked at has been there for not a long time.  Also has something on her fingers.    Daya Zhang LPN on 11/24/2021 at 1:53 PM

## 2021-11-24 NOTE — PROGRESS NOTES
[facility] Dermatology Note  Encounter Date: Nov 24, 2021  Office Visit     Dermatology Problem List:  1. History of liver transplant at [facility], 2016- currently on tacrolimus   2. Flexural eczema-trimcinolone 0.1% ointment BID to popliteal fossa bilaterally  3. AK s/p cryo  4. Skin cancer screening, 10/5/20,. 3/17/21, 11/24/2021   5. Scalp psoriasis clobetasol solution, shampoo,   6. Nail dystrophy, likely psoriasis- grew candida. Has been using clobetasol solution with resolution.    7. Facial asymmetry- cosmetic referral.   8. Family hx psoriasis (son)    ____________________________________________    Assessment & Plan:    # Psoriasis, most prominent on nails and scalp but now present on gluteal cleft. Fairly well controlled on topicals. New concerns for arthralgias in the hands and knees. Patient will be seeing rheumatology for further evaluation. Possible psoriatic arthritis.  - Continue clobetasol 0.05% solution twice daily to the nails and scalp, when psoriasis is present.   - Continue clobetasol 0.05% shampoo (typially every other day)  - Continue calcipotriene 0.005% solution bid after psoriasis is clear.   - Continue triamcinolone 0.025% ointment BID prn can use on gluteal cleft or axilla  - Continue triamcinolone 0.1% ointment BID to plaques behind knees    # History of liver transplant- currently on immunosuppressants   - Given patient is on immune suppressing drugs, and with family history of melanoma, recommended skin check every 6 months     # Cherry angiomas  # Seborrheic keratoses  - Reassured of benign nature, no treatment necessary    # Multiple benign nevi.   # Solar lentigines  - No concerning lesions today  - Counseled on ABCDEs of melanoma and sun protection - recommend SPF 30 or higher with frequent application   - Return sooner if noticing changing or symptomatic lesions    Procedures Performed:   None    Follow-up: 6 month(s) in-person, or earlier for new or changing  lesions    Staff and Scribe:     Scribe Disclosure:  I, Beny Rucker, am serving as a scribe to document services personally performed by Demetria Barger PA-C based on data collection and the provider's statements to me.     Provider Disclosure:   The documentation recorded by the scribe accurately reflects the services I personally performed and the decisions made by me.    All risks, benefits and alternatives were discussed with patient.  Patient is in agreement and understands the assessment and plan.  All questions were answered.  Sun Screen Education was given.   Return to Clinic in 6 months or sooner as needed.   Demetria Barger PA-C   [facility]ermatology [facility]   ____________________________________________    CC: Transplant Skin Check (annual)    HPI:  Ms. Phan Luque is a(n) 66 year old female who presents today as a return patient for FBSE. Last seen by me on 3/17/2021, at which time patient was started on calcipotriene 0.005% solution BID after psoriasis is clear and triamcinolone 0.025% ointment BID for treatment of scalp and nail psoriasis.    Today, patient reports that a joint in a finger on her left hand feels enlarged, and she has an appointment with rheumatology for that. She additionally reports that her scalp is itchy today. She also points to a spot on her left calf that she would like examined.    Patient is otherwise feeling well, without additional skin concerns.    Labs Reviewed:  N/A    Physical Exam:  Vitals: LMP  (LMP Unknown)   SKIN: Total skin excluding the undergarment areas was performed. The exam included the head/face, neck, both arms, chest, back, abdomen, both legs, digits and/or nails.   - Onycholysis noted to all fingernail plates.  - No significant scale to the scalp  - There is faint erythema with scale on the upper gluteal cleft  - There are dome shaped bright red papules on the trunk and extremities.   - Multiple regular brown pigmented  macules and papules are identified on the trunk and extremities.   - Scattered brown macules on sun exposed areas.  - There are waxy stuck on tan to brown papules on the trunk and extremities.   - No other lesions of concern on areas examined.     Medications:  Current Outpatient Medications   Medication     mupirocin (BACTROBAN) 2 % external ointment     acetaminophen (TYLENOL) 325 MG tablet     albuterol (VENTOLIN HFA) 108 (90 Base) MCG/ACT inhaler     amLODIPine (NORVASC) 5 MG tablet     calcipotriene (DOVONOX) 0.005 % external solution     chlorthalidone (HYGROTON) 25 MG tablet     cholecalciferol (VITAMIN D3) 400 UNIT TABS tablet     clindamycin (CLEOCIN) 300 MG capsule     clobetasol (TEMOVATE) 0.05 % external ointment     clobetasol (TEMOVATE) 0.05 % external solution     clobetasol propionate (CLOBEX) 0.05 % external shampoo     cyanocobalamin (VITAMIN  B-12) 1000 MCG tablet     ferrous sulfate (IRON) 325 (65 FE) MG tablet     folic acid (FOLVITE) 1 MG tablet     gabapentin (NEURONTIN) 100 MG capsule     hydrOXYzine (ATARAX) 25 MG tablet     levothyroxine (SYNTHROID/LEVOTHROID) 88 MCG tablet     MAGNESIUM OXIDE PO     melatonin 5 MG tablet     multivitamin, therapeutic (THERA-VIT) TABS tablet     order for DME     pantoprazole (PROTONIX) 40 MG EC tablet     psyllium 0.52 G capsule     tacrolimus (GENERIC EQUIVALENT) 0.5 MG capsule     traMADol (ULTRAM) 50 MG tablet     triamcinolone (KENALOG) 0.025 % external ointment     triamcinolone (KENALOG) 0.1 % external ointment     ursodiol (ACTIGALL) 300 MG capsule     valACYclovir (VALTREX) 500 MG tablet     No current facility-administered medications for this visit.      Past Medical History:   Patient Active Problem List   Diagnosis     Decompensation of cirrhosis of liver (H)     Liver transplanted (H)     Alcoholic hepatitis     Immunosuppression (H)     Alcoholic hepatitis without ascites     Enterococcus UTI     Liver transplant status (H)     Nausea &  vomiting     Malnutrition (H)     Anemia     ACP (advance care planning)     Diarrhea     Hypothyroidism     Esophageal reflux     Recurrent major depression in partial remission (H)     Insomnia     CKD (chronic kidney disease) stage 3, GFR 30-59 ml/min (H)     Anemia in stage 3 chronic kidney disease (H)     Osteopenia     Alcohol abuse, uncomplicated     Psoriasis     Candida infection     Bacterial infection     Cellulitis     Past Medical History:   Diagnosis Date     AION (acute ischemic optic neuropathy)      Asthma      Bacterial infection 02/04/2021     Candida infection 11/11/2020     Cellulitis 09/13/2021     Cervical spinal stenosis      Chronic kidney disease      Chronic kidney disease, stage 3 (H)      Dizziness and giddiness     Created by Conversion      End stage liver disease (H)     2/2 Alcohol Abuse     End stage liver disease (H)     Alcohol-related     GERD (gastroesophageal reflux disease)      Hypertension      Liver transplant recipient (H) 08/25/2016    [facility]     Liver transplanted (H)      Migraine headache      Migraines      Osteoporosis     frax 11/1.7% 2021     PFO (patent foramen ovale) 08/08/2016    Noted on echo at [facility]     Recurrent UTI      Spinal stenosis in cervical region      Strabismus      Unspecified asthma(493.90)     Created by Conversion         CC Referred Self, MD  No address on file on close of this encounter.

## 2021-11-24 NOTE — LETTER
11/24/2021       RE: Phan Luque  [address]  [facility] [address]     Dear Colleague,    Thank you for referring your patient, Phan Luque, to the [facility] [place] at [facility]. Please see a copy of my visit note below.    [facility] Dermatology Note  Encounter Date: Nov 24, 2021  Office Visit     Dermatology Problem List:  1. [facility]- currently on tacrolimus   2. Flexural eczema-trimcinolone 0.1% ointment BID to popliteal fossa bilaterally  3. AK s/p cryo  4. Skin cancer screening, 10/5/20,. 3/17/21, 11/24/2021   5. Scalp psoriasis clobetasol solution, shampoo,   6. Nail dystrophy, likely psoriasis- grew candida. Has been using clobetasol solution with resolution.    7. Facial asymmetry- cosmetic referral.   8. Family hx psoriasis (son)    ____________________________________________    Assessment & Plan:    # Psoriasis, most prominent on nails and scalp but now present on gluteal cleft. Fairly well controlled on topicals. New concerns for arthralgias in the hands and knees. Patient will be seeing rheumatology for further evaluation. Possible psoriatic arthritis.  - Continue clobetasol 0.05% solution twice daily to the nails and scalp, when psoriasis is present.   - Continue clobetasol 0.05% shampoo (typially every other day)  - Continue calcipotriene 0.005% solution bid after psoriasis is clear.   - Continue triamcinolone 0.025% ointment BID prn can use on gluteal cleft or axilla  - Continue triamcinolone 0.1% ointment BID to plaques behind knees    # History of liver transplant- currently on immunosuppressants   - Given patient is on immune suppressing drugs, and with family history of melanoma, recommended skin check every 6 months     # Cherry angiomas  # Seborrheic keratoses  - Reassured of benign nature, no treatment necessary    # Multiple benign nevi.   #  Solar lentigines  - No concerning lesions today  - Counseled on ABCDEs of melanoma and sun protection - recommend SPF 30 or higher with frequent application   - Return sooner if noticing changing or symptomatic lesions    Procedures Performed:   None    Follow-up: 6 month(s) in-person, or earlier for new or changing lesions    Staff and Scribe:     Scribe Disclosure:  I, Beny Rucker, am serving as a scribe to document services personally performed by Demetria Barger PA-C based on data collection and the provider's statements to me.     Provider Disclosure:   The documentation recorded by the scribe accurately reflects the services I personally performed and the decisions made by me.    All risks, benefits and alternatives were discussed with patient.  Patient is in agreement and understands the assessment and plan.  All questions were answered.  Sun Screen Education was given.   Return to Clinic in 6 months or sooner as needed.   Demetria Barger PA-C   [facility]   ____________________________________________    CC: Transplant Skin Check (annual)    HPI:  Ms. Phan Luque is a(n) 66 year old female who presents today as a return patient for FBSE. Last seen by me on 3/17/2021, at which time patient was started on calcipotriene 0.005% solution BID after psoriasis is clear and triamcinolone 0.025% ointment BID for treatment of scalp and nail psoriasis.    Today, patient reports that a joint in a finger on her left hand feels enlarged, and she has an appointment with rheumatology for that. She additionally reports that her scalp is itchy today. She also points to a spot on her left calf that she would like examined.    Patient is otherwise feeling well, without additional skin concerns.    Labs Reviewed:  N/A    Physical Exam:  Vitals: LMP  (LMP Unknown)   SKIN: Total skin excluding the undergarment areas was performed. The exam included the head/face, neck, both arms,  chest, back, abdomen, both legs, digits and/or nails.   - Onycholysis noted to all fingernail plates.  - No significant scale to the scalp  - There is faint erythema with scale on the upper gluteal cleft  - There are dome shaped bright red papules on the trunk and extremities.   - Multiple regular brown pigmented macules and papules are identified on the trunk and extremities.   - Scattered brown macules on sun exposed areas.  - There are waxy stuck on tan to brown papules on the trunk and extremities.   - No other lesions of concern on areas examined.     Medications:  Current Outpatient Medications   Medication     mupirocin (BACTROBAN) 2 % external ointment     acetaminophen (TYLENOL) 325 MG tablet     albuterol (VENTOLIN HFA) 108 (90 Base) MCG/ACT inhaler     amLODIPine (NORVASC) 5 MG tablet     calcipotriene (DOVONOX) 0.005 % external solution     chlorthalidone (HYGROTON) 25 MG tablet     cholecalciferol (VITAMIN D3) 400 UNIT TABS tablet     clindamycin (CLEOCIN) 300 MG capsule     clobetasol (TEMOVATE) 0.05 % external ointment     clobetasol (TEMOVATE) 0.05 % external solution     clobetasol propionate (CLOBEX) 0.05 % external shampoo     cyanocobalamin (VITAMIN  B-12) 1000 MCG tablet     ferrous sulfate (IRON) 325 (65 FE) MG tablet     folic acid (FOLVITE) 1 MG tablet     gabapentin (NEURONTIN) 100 MG capsule     hydrOXYzine (ATARAX) 25 MG tablet     levothyroxine (SYNTHROID/LEVOTHROID) 88 MCG tablet     MAGNESIUM OXIDE PO     melatonin 5 MG tablet     multivitamin, therapeutic (THERA-VIT) TABS tablet     order for DME     pantoprazole (PROTONIX) 40 MG EC tablet     psyllium 0.52 G capsule     tacrolimus (GENERIC EQUIVALENT) 0.5 MG capsule     traMADol (ULTRAM) 50 MG tablet     triamcinolone (KENALOG) 0.025 % external ointment     triamcinolone (KENALOG) 0.1 % external ointment     ursodiol (ACTIGALL) 300 MG capsule     valACYclovir (VALTREX) 500 MG tablet     No current facility-administered medications for  this visit.      Past Medical History:   Patient Active Problem List   Diagnosis     Decompensation of cirrhosis of liver (H)     Liver transplanted (H)     Alcoholic hepatitis     Immunosuppression (H)     Alcoholic hepatitis without ascites     Enterococcus UTI     Liver transplant status (H)     Nausea & vomiting     Malnutrition (H)     Anemia     ACP (advance care planning)     Diarrhea     Hypothyroidism     Esophageal reflux     Recurrent major depression in partial remission (H)     Insomnia     CKD (chronic kidney disease) stage 3, GFR 30-59 ml/min (H)     Anemia in stage 3 chronic kidney disease (H)     Osteopenia     Alcohol abuse, uncomplicated     Psoriasis     Candida infection     Bacterial infection     Cellulitis     Past Medical History:   Diagnosis Date     AION (acute ischemic optic neuropathy)      Asthma      Bacterial infection 02/04/2021     Candida infection 11/11/2020     Cellulitis 09/13/2021     Cervical spinal stenosis      Chronic kidney disease      Chronic kidney disease, stage 3 (H)      Dizziness and giddiness     Created by Conversion      End stage liver disease (H)     2/2 Alcohol Abuse     End stage liver disease (H)     Alcohol-related     GERD (gastroesophageal reflux disease)      Hypertension      Liver transplant recipient (H) 08/25/2016    [facility]     Liver transplanted (H)      Migraine headache      Migraines      Osteoporosis     frax 11/1.7% 2021     PFO (patent foramen ovale) 08/08/2016    Noted on echo at [facility]     Recurrent UTI      Spinal stenosis in cervical region      Strabismus      Unspecified asthma(493.90)     Created by Conversion         CC Referred Self, MD  No address on file on close of this encounter.

## 2021-12-01 ENCOUNTER — HOSPITAL ENCOUNTER (OUTPATIENT)
Dept: PHYSICAL THERAPY | Facility: REHABILITATION | Age: 66
End: 2021-12-01
Payer: COMMERCIAL

## 2021-12-01 DIAGNOSIS — M48.02 SPINAL STENOSIS IN CERVICAL REGION: ICD-10-CM

## 2021-12-01 DIAGNOSIS — R20.2 NUMBNESS AND TINGLING OF BOTH UPPER EXTREMITIES: ICD-10-CM

## 2021-12-01 DIAGNOSIS — R20.0 NUMBNESS AND TINGLING OF BOTH UPPER EXTREMITIES: ICD-10-CM

## 2021-12-01 DIAGNOSIS — M54.2 NECK PAIN: Primary | ICD-10-CM

## 2021-12-01 PROCEDURE — 97110 THERAPEUTIC EXERCISES: CPT | Mod: GP | Performed by: PHYSICAL THERAPIST

## 2021-12-01 NOTE — PROGRESS NOTES
Date 12/01/21 11/1/21 10/27/21 10/20/21 10/13/21 9/22/21   Exercise         Stretches:  Chin tuck, scapular retraction      review   Stretches: UT, LS, scalene      review   UBE 3' 3' 3' 3' 3' 3'   Cervical isometrics:  Ext, SB and rot     discussed Hold 5-10 seconds, X 5-10 reps, moderate pressure   Doorway pec stretch    discussed Hold 30 seconds    Supine thoracic towel roll stretch    discussed Hold 30 seconds    reverse wall push up  discussed Hold 10 seconds X 6      Wall ebony    discussed X 20      Green tband: scapular retraction X 20        Green tband shld ext X 20        Green tband: PNF D2 pattern X 20        Push up plus at wall  X 20          Assessment:   Pt returns for a follow up. She has been doing well and pain levels are currently under control.  Was able to add strengthening ex today with no difficulty.  If all continues to go well may discharge next visit.

## 2021-12-08 ENCOUNTER — HOSPITAL ENCOUNTER (OUTPATIENT)
Dept: PHYSICAL THERAPY | Facility: REHABILITATION | Age: 66
End: 2021-12-08
Payer: COMMERCIAL

## 2021-12-08 DIAGNOSIS — M48.02 SPINAL STENOSIS IN CERVICAL REGION: ICD-10-CM

## 2021-12-08 DIAGNOSIS — M54.2 NECK PAIN: Primary | ICD-10-CM

## 2021-12-08 DIAGNOSIS — R20.2 NUMBNESS AND TINGLING OF BOTH UPPER EXTREMITIES: ICD-10-CM

## 2021-12-08 DIAGNOSIS — R20.0 NUMBNESS AND TINGLING OF BOTH UPPER EXTREMITIES: ICD-10-CM

## 2021-12-08 PROCEDURE — 97110 THERAPEUTIC EXERCISES: CPT | Mod: GP | Performed by: PHYSICAL THERAPIST

## 2021-12-08 NOTE — PROGRESS NOTES
"[facility]    Outpatient Physical Therapy Discharge Note  Patient: Phan Luque  : 1955    Beginning/End Dates of Reporting Period:  21 to 21    Referring Provider: DONOVAN Acosta    Therapy Diagnosis: neck pain     Client Self Report: Everything has been going well. Have been able to do the ex.  Feel the symptoms may come and go but can manage.     Objective Measurements:  Objective Measure: pain today: 0/10, stiff but the norm for pt     Objective Measure: NDI on initial evaluation 21: 14   NDI 21: 14     Goals:  Goal Identifier sleep   Goal Description able to sleep through the night without waking due to pain   Target Date 21   Date Met   21   Progress (detail required for progress note):  Pt feels she can sleep through the night without waking due to pain       Plan:  Discharge from therapy.    Discharge:    Reason for Discharge: Patient has met all goals.    Equipment Issued:     Discharge Plan: Patient to continue home program.      Date 12/8/21 12/01/21 11/1/21 10/27/21 10/20/21 10/13/21 9/22/21   Exercise          Stretches:  Chin tuck, scapular retraction       review   Stretches: UT, LS, scalene       review   UBE  3' 3' 3' 3' 3' 3'   Cervical isometrics:  Ext, SB and rot      discussed Hold 5-10 seconds, X 5-10 reps, moderate pressure   Doorway pec stretch     discussed Hold 30 seconds    Supine thoracic towel roll stretch     discussed Hold 30 seconds    reverse wall push up   discussed Hold 10 seconds X 6      Wall ebony     discussed X 20      Green tband: scapular retraction  X 20        Green tband shld ext  X 20        Green tband: PNF D2 pattern  X 20        Push up plus at wall  discussed X 20        Prone:  Shoulder: \"I\", \"Y\", \"T\" and \"W\" X 10-20                               Assessment: Pt feels like she has made good progress and achieved " all her goals.  Feels she is ready to continue on her own.

## 2021-12-09 ENCOUNTER — OFFICE VISIT (OUTPATIENT)
Dept: INTERNAL MEDICINE | Facility: CLINIC | Age: 66
End: 2021-12-09
Payer: COMMERCIAL

## 2021-12-09 VITALS
DIASTOLIC BLOOD PRESSURE: 78 MMHG | HEART RATE: 83 BPM | WEIGHT: 184 LBS | BODY MASS INDEX: 28.88 KG/M2 | HEIGHT: 67 IN | RESPIRATION RATE: 16 BRPM | SYSTOLIC BLOOD PRESSURE: 142 MMHG | OXYGEN SATURATION: 98 %

## 2021-12-09 DIAGNOSIS — Z02.89 ENCOUNTER FOR COMPLETION OF FORM WITH PATIENT: Primary | ICD-10-CM

## 2021-12-09 PROCEDURE — 99212 OFFICE O/P EST SF 10 MIN: CPT | Mod: GC | Performed by: STUDENT IN AN ORGANIZED HEALTH CARE EDUCATION/TRAINING PROGRAM

## 2021-12-09 ASSESSMENT — PAIN SCALES - GENERAL: PAINLEVEL: NO PAIN (0)

## 2021-12-09 ASSESSMENT — ANXIETY QUESTIONNAIRES
5. BEING SO RESTLESS THAT IT IS HARD TO SIT STILL: NOT AT ALL
3. WORRYING TOO MUCH ABOUT DIFFERENT THINGS: NOT AT ALL
2. NOT BEING ABLE TO STOP OR CONTROL WORRYING: NOT AT ALL
7. FEELING AFRAID AS IF SOMETHING AWFUL MIGHT HAPPEN: NOT AT ALL
1. FEELING NERVOUS, ANXIOUS, OR ON EDGE: NOT AT ALL
6. BECOMING EASILY ANNOYED OR IRRITABLE: NOT AT ALL
GAD7 TOTAL SCORE: 0
IF YOU CHECKED OFF ANY PROBLEMS ON THIS QUESTIONNAIRE, HOW DIFFICULT HAVE THESE PROBLEMS MADE IT FOR YOU TO DO YOUR WORK, TAKE CARE OF THINGS AT HOME, OR GET ALONG WITH OTHER PEOPLE: NOT DIFFICULT AT ALL

## 2021-12-09 ASSESSMENT — PATIENT HEALTH QUESTIONNAIRE - PHQ9
SUM OF ALL RESPONSES TO PHQ QUESTIONS 1-9: 5
5. POOR APPETITE OR OVEREATING: NOT AT ALL

## 2021-12-09 ASSESSMENT — MIFFLIN-ST. JEOR: SCORE: 1412.25

## 2021-12-09 NOTE — NURSING NOTE
Phan Luque is a 66 year old female patient that presents today in clinic for the following:    Chief Complaint   Patient presents with     Wellness Visit     Patient wants a wellness form filled out.      The patient's allergies and medications were reviewed as noted. A set of vitals were recorded as noted without incident. The patient does not have any other questions for the provider.    Reno Acuna, EMT at 12:32 PM on 12/9/2021

## 2021-12-09 NOTE — PROGRESS NOTES
Medicare Annual Wellness Visit Previsit note    This 66 year old year old female presents for a  Medicare Wellness Exam.    What is your marital status:    Who lives in your household? Self    Does your home have loose rugs in the hallway?   No    Does your home have grab bars in the bathroom?   No    Does your home have handrails on the stairs?   Yes     Does your home have poorly lit areas?  No    How many times have you fallen in the past year? 0    How many times have you been in the Emergency Room in the last year? 0     How many times have you been hospitalized in the last year? 0    Do you feel threatened or controlled by a partner, ex-partner or anyone in your life? No    Has anyone hurt you physically, for example by pushing, hitting, slapping or kicking you   or forcing you to have sex? No    Are you sexually active? No     Have you had any sexually transmitted infections in the last year? No    Do you have any sexual concerns? No       FOR WOMEN ONLY:  1. What year did you stop having periods (approximate age)? 44    2.   Any vaginal bleeding in the last year? No    3.   Have you ever had an abnormal Pap smear? Yes     Do you need help with the phone, transportation, shopping, preparing meals, housework, laundry, medications or managing money? No     Have you noticed any hearing difficulties? Yes     Do you wear hearing aids? No    Have you seen a hearing professional such as an audiologist in the past year? Yes     Do you have vision difficulties? Yes     Do you wear glasses or contacts? No    Have you seen an eye doctor in the past year? Yes     How many servings of fruits and vegetables do you eat a day (1 serving is 1 cup)? 4    How often do you exercise in a week? 7    What kind of exercise do you do and how long? 30 minutes cardio    Do you wear a seatbelt when driving or riding in a vehicle? Yes    Do you use tobacco?  No    Do you use e-cigarettes?  No    Do you use any other drugs? No          Do you drink alcohol? No    If you drink alcohol, how many drinks per week? NA      PHQ-2: Over the past 2 weeks, how often have you been bothered by any of the following problems?  1) Little interest or pleasure in doing things: 1    2) Feeling down, depressed, or hopeless: 1    Total = 2    Have you completed an Advance Directives document? Yes     If yes, have you given a copy to the clinic? Yes     Would you like information on Advance Directives today? No    Ming Alvarez, EMT at 1:17 PM on 12/9/2021

## 2021-12-10 ASSESSMENT — ASTHMA QUESTIONNAIRES: ACT_TOTALSCORE: 24

## 2021-12-10 ASSESSMENT — ANXIETY QUESTIONNAIRES: GAD7 TOTAL SCORE: 0

## 2021-12-18 NOTE — PROGRESS NOTES
"Patient presented to have forms signed for health insurance. This was completed. She has no other complaints.     BP (!) 142/78 (BP Location: Right arm, Patient Position: Sitting, Cuff Size: Adult Regular)   Pulse 83   Resp 16   Ht 1.702 m (5' 7\")   Wt 83.5 kg (184 lb)   LMP  (LMP Unknown)   SpO2 98%   BMI 28.82 kg/m      Sitting up, talking in complete sentences, breathing comfortably on RA, heart RRR, normal affect, mentating appropriately.   "

## 2021-12-22 ENCOUNTER — TELEPHONE (OUTPATIENT)
Dept: SURGERY | Facility: CLINIC | Age: 66
End: 2021-12-22
Payer: COMMERCIAL

## 2021-12-22 NOTE — TELEPHONE ENCOUNTER
Discussed Jessica's symptoms with her.  She states she does not have a thrombosed hemorrhoid she has the same issue as before which was the prolapsing hemorrhoid.  She is not in any extreme pain.  The hemorrhoid is uncomfortable however and a bit hard.  We will plan to see her in February to discuss the possibility of a surgical hemorrhoidectomy for continued prolapsed hemorrhoids.

## 2021-12-22 NOTE — TELEPHONE ENCOUNTER
[facility] Call Center    Phone Message    May a detailed message be left on voicemail: yes     Reason for Call: Other: To request to be seen with Dr. Flor for worsening symptoms of thrombosed hemorrhoids - DOS 10/7/020      -Patient is scheduled for first available on 2/9 @ 2 PM, and on the wait list     -Please review for other options, if necessary     Thank you!     Action Taken: Message routed to:  [facility] Colorectal     Travel Screening: Not Applicable

## 2022-01-03 ENCOUNTER — ALLIED HEALTH/NURSE VISIT (OUTPATIENT)
Dept: INTERNAL MEDICINE | Facility: CLINIC | Age: 67
End: 2022-01-03
Payer: COMMERCIAL

## 2022-01-03 DIAGNOSIS — Z23 NEED FOR HEPATITIS B VACCINATION: Primary | ICD-10-CM

## 2022-01-03 PROCEDURE — 90471 IMMUNIZATION ADMIN: CPT

## 2022-01-03 PROCEDURE — 90746 HEPB VACCINE 3 DOSE ADULT IM: CPT

## 2022-01-03 NOTE — PROGRESS NOTES
Phan Luque received the Adult Hepatitis B immunization today in clinic at the request of the patient. The immunization was given under the supervision of Dr. Lux if assistance was needed. The immunization site was cleaned with an alcohol prep wipe. The immunization was given without incident--see immunization list for administration details. No swelling or redness was observed at the site of injection after the immunization was given. The patient was advised to remain in [facility] [place] for 15 minutes after the injection in case of an averse reaction.     Shaun Baron, EMT at 3:04 PM on 1/3/2022.

## 2022-01-05 NOTE — TELEPHONE ENCOUNTER
NOTES Status Details   OFFICE NOTE from referring provider Internal 11.09.2021 Demetria Barger PA-C   OFFICE NOTE from other specialist N/A    DISCHARGE SUMMARY from hospital N/A    DISCHARGE REPORT from the ER N/A    MEDICATION LIST Internal    LABS (Any and all labs)      Internal    Biopsy/pathology (Anything related to diagnoses I.e. fluid aspirations, lip biopsy, muscle biopsy)      N/A     N/A    Imaging (All imaging related to diagnoses)     Echo N/A    HRCT N/A    CXR N/A    EMG N/A                   Scleroderma/Dermatomyositis diagnoses     Previous Cardiology notes  N/A    Previous Pulmonary notes N/A    Previous Dermatology notes N/A    Previous GI notes N/A    Lupus diagnoses     Previous Nephrology notes N/A    Previous Dermatology notes N/A    Previous Cardiology notes N/A

## 2022-01-06 ENCOUNTER — LAB (OUTPATIENT)
Dept: LAB | Facility: CLINIC | Age: 67
End: 2022-01-06
Attending: INTERNAL MEDICINE
Payer: MEDICARE

## 2022-01-06 ENCOUNTER — PRE VISIT (OUTPATIENT)
Dept: RHEUMATOLOGY | Facility: CLINIC | Age: 67
End: 2022-01-06
Payer: COMMERCIAL

## 2022-01-06 ENCOUNTER — ANCILLARY PROCEDURE (OUTPATIENT)
Dept: GENERAL RADIOLOGY | Facility: CLINIC | Age: 67
End: 2022-01-06
Attending: INTERNAL MEDICINE
Payer: COMMERCIAL

## 2022-01-06 ENCOUNTER — OFFICE VISIT (OUTPATIENT)
Dept: RHEUMATOLOGY | Facility: CLINIC | Age: 67
End: 2022-01-06
Attending: INTERNAL MEDICINE
Payer: COMMERCIAL

## 2022-01-06 VITALS
DIASTOLIC BLOOD PRESSURE: 81 MMHG | OXYGEN SATURATION: 96 % | BODY MASS INDEX: 29.3 KG/M2 | SYSTOLIC BLOOD PRESSURE: 136 MMHG | WEIGHT: 187.1 LBS | HEART RATE: 75 BPM

## 2022-01-06 DIAGNOSIS — Z11.59 ENCOUNTER FOR SCREENING FOR OTHER VIRAL DISEASES: ICD-10-CM

## 2022-01-06 DIAGNOSIS — M35.00 SICCA SYNDROME (H): ICD-10-CM

## 2022-01-06 DIAGNOSIS — L40.50 PSORIATIC ARTHRITIS (H): ICD-10-CM

## 2022-01-06 DIAGNOSIS — L40.50 PSORIATIC ARTHRITIS (H): Primary | ICD-10-CM

## 2022-01-06 DIAGNOSIS — M25.561 ARTHRALGIA OF BOTH KNEES: ICD-10-CM

## 2022-01-06 DIAGNOSIS — M25.562 ARTHRALGIA OF BOTH KNEES: ICD-10-CM

## 2022-01-06 LAB
ALBUMIN SERPL-MCNC: 3.7 G/DL (ref 3.4–5)
ALP SERPL-CCNC: 63 U/L (ref 40–150)
ALT SERPL W P-5'-P-CCNC: 19 U/L (ref 0–50)
ANION GAP SERPL CALCULATED.3IONS-SCNC: 7 MMOL/L (ref 3–14)
AST SERPL W P-5'-P-CCNC: 16 U/L (ref 0–45)
BASOPHILS # BLD AUTO: 0 10E3/UL (ref 0–0.2)
BASOPHILS NFR BLD AUTO: 0 %
BILIRUB SERPL-MCNC: 0.7 MG/DL (ref 0.2–1.3)
BUN SERPL-MCNC: 23 MG/DL (ref 7–30)
C3 SERPL-MCNC: 122 MG/DL (ref 81–157)
C4 SERPL-MCNC: 31 MG/DL (ref 13–39)
CALCIUM SERPL-MCNC: 9.3 MG/DL (ref 8.5–10.1)
CHLORIDE BLD-SCNC: 106 MMOL/L (ref 94–109)
CO2 SERPL-SCNC: 28 MMOL/L (ref 20–32)
CREAT SERPL-MCNC: 1.24 MG/DL (ref 0.52–1.04)
CRP SERPL-MCNC: <2.9 MG/L (ref 0–8)
EOSINOPHIL # BLD AUTO: 0.2 10E3/UL (ref 0–0.7)
EOSINOPHIL NFR BLD AUTO: 2 %
ERYTHROCYTE [DISTWIDTH] IN BLOOD BY AUTOMATED COUNT: 14.1 % (ref 10–15)
ERYTHROCYTE [SEDIMENTATION RATE] IN BLOOD BY WESTERGREN METHOD: 99 MM/HR (ref 0–30)
GFR SERPL CREATININE-BSD FRML MDRD: 48 ML/MIN/1.73M2
GLUCOSE BLD-MCNC: 109 MG/DL (ref 70–99)
HBV CORE AB SERPL QL IA: NONREACTIVE
HCT VFR BLD AUTO: 39.1 % (ref 35–47)
HCV AB SERPL QL IA: NONREACTIVE
HGB BLD-MCNC: 12.8 G/DL (ref 11.7–15.7)
HIV 1+2 AB+HIV1 P24 AG SERPL QL IA: NONREACTIVE
IMM GRANULOCYTES # BLD: 0 10E3/UL
IMM GRANULOCYTES NFR BLD: 0 %
LYMPHOCYTES # BLD AUTO: 2.4 10E3/UL (ref 0.8–5.3)
LYMPHOCYTES NFR BLD AUTO: 21 %
MCH RBC QN AUTO: 29.4 PG (ref 26.5–33)
MCHC RBC AUTO-ENTMCNC: 32.7 G/DL (ref 31.5–36.5)
MCV RBC AUTO: 90 FL (ref 78–100)
MONOCYTES # BLD AUTO: 0.7 10E3/UL (ref 0–1.3)
MONOCYTES NFR BLD AUTO: 6 %
NEUTROPHILS # BLD AUTO: 8.3 10E3/UL (ref 1.6–8.3)
NEUTROPHILS NFR BLD AUTO: 71 %
NRBC # BLD AUTO: 0 10E3/UL
NRBC BLD AUTO-RTO: 0 /100
PLATELET # BLD AUTO: 336 10E3/UL (ref 150–450)
POTASSIUM BLD-SCNC: 3.7 MMOL/L (ref 3.4–5.3)
PROT SERPL-MCNC: 7.7 G/DL (ref 6.8–8.8)
RBC # BLD AUTO: 4.36 10E6/UL (ref 3.8–5.2)
SODIUM SERPL-SCNC: 141 MMOL/L (ref 133–144)
WBC # BLD AUTO: 11.6 10E3/UL (ref 4–11)

## 2022-01-06 PROCEDURE — 73130 X-RAY EXAM OF HAND: CPT | Mod: LT | Performed by: RADIOLOGY

## 2022-01-06 PROCEDURE — 99205 OFFICE O/P NEW HI 60 MIN: CPT | Performed by: INTERNAL MEDICINE

## 2022-01-06 PROCEDURE — G0463 HOSPITAL OUTPT CLINIC VISIT: HCPCS

## 2022-01-06 PROCEDURE — 36415 COLL VENOUS BLD VENIPUNCTURE: CPT | Performed by: PATHOLOGY

## 2022-01-06 NOTE — LETTER
1/6/2022      RE: Phan Luque  [address] [facility] [address]         Outpatient Rheumatology Consultation  In person    Name: Phan Luque    MRN [id number]   Today's date: 1/6/2022         Reason for consult:  Evaluation and treatment of psoriatic arthritis   Requesting physician: Demetria Barger             Assessment & Plan:   66 year old female with hx of liver transplant in 2016 on tacrolimus who is referred to rheumatology by dermatology for evaluation and treatment of psoriatic arthritis in the context of one episode of left then right hand distal digit redness, swelling, tenderness, pain. These symptoms did resolve and have not returned. No current synovitis of DIPs or other sites. No current dactylitis and she denies symptoms consistent with this. No history of inflammatory eye disease though follows with Dr Ortega here for AION. She does have nail involvement and inverse psoriasis both of which increase the likelihood for inflammatory arthritis associated with psoriasis. Psoriasis currently under excellent control without any active lesions on exam today with topicals only.     I discussed with Jessica that her nail involvement and inverse psoriasis both increase the risk of inflammatory arthritis associated with psoriasis. 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 We discussed that with this isolated episode, complete lack of symptoms before/since and good control of her cutaneous psoriasis that a reasonable approach at this time would be to monitor closely for return of inflammatory arthritis symptoms. Should they return, we could consider therapy to reduce the frequency and severity of attacks with steroid sparing therapy. She agrees with this conservative approach. The only thing that may change this plan would be the presence of  erosions/george etc on hand films which we would want to stop. Will also obtain pre-DMARD screening serologies for Hep B/C/HIV/quant gold should we need start DMARD therapy as they have not been done in the last 12 months.     In re: to her periungual mild light erythema would be a soft call to classify this as pernio associated with other CTD though will ensure none evident serologically with PATRICIO, dsDNA, c3, c4, KALPANA, ESR, CRP. She does have SICCA symptoms so also interested in SSA and SSB in KALPANA panel above.     We will plan for tentative follow-up in 3 months to ensure no return of inflammatory arthritis or worsening of cutaneous psoriasis. If none over the next 3 months will space appointments out further and continue to monitor. Jessica will let me know if symptoms return prior to that time and will see her sooner.    PLAN:  1) Labs  2) bilateral hand xray  3) Follow-up in 3 months    Bry Alex MD  Rheumatology     I spent a total of 60 minutes on the date of service on chart review (dermatology/imaging/labs), patient encounter, , documentation.     Subjective:   Initially developed scalp flaking/hair loss which started roughly one year ago. Did also have axillary and gluteal cleft and nails involved. Initially left hand 2nd digit then 3rd digit. Then the right hand involved. Then on Sept 13th developed left hand second digit then 3rd digit redness/swelling/pain of distal aspect of her fingers. She was treated with 10 days of clindamycin though due to continued pain/redness of predominantly the 3rd digit she went to the walk-in clinic at [facility] and was given clindamycin again for what was presumed cellulitis that had not resolved with the initial course.  During this time had left hand 2nd/3rd digit throbbing pain/swelling. Also experienced peeling of the skin of the distal aspects of her digits. Her pain then resolved. Since that time, she reports 0/10 pain at rest which does not increase with  use. Not much different with use. Has radiculopathy in neck/c spine involving the left upper extremity/clavical. No history consistent with dactylitis. 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 No history of inflammatory eye disease. Has intermittent ulcers in her mouth, a few weeks ago was most recent. Has been years prior to this since her last episode. Denies rectal or genital ulcers. Tried using clobetasol ointment on nails which she thought ws some helpful, though lasted for a few weeks. No symptoms of low back pain/stiffness which is worse in the AM and improves with use/exercise/stretching. No fevers/chills. No night sweats. Has intentionally lost some weight, no unintentional weight loss. Son with scalp involvement and recurrent ulcers. At this time she feels that her skin is currently under control with topical therapy without active lesions but knows that with stopping topicals that her psoriatic lesions return quickly.  She denies any other episodes of joint pain/redness/swelling/stiffness. Has baseline chronic knee pain worse with use/at the end of the day. Currently using clobetasol solution, clobetasol shampoo once per week, ointment, 2 strengths triamcinalone. No symptoms consistent with raynauds. No other rash. No photosensitive skin changes/ symptoms. ROS otherwise negative.    Past Medical History  Past Medical History:   Diagnosis Date     AION (acute ischemic optic neuropathy)      Asthma      Bacterial infection 02/04/2021     Candida infection 11/11/2020     Cellulitis 09/13/2021     Cervical spinal stenosis      Chronic kidney disease      Chronic kidney disease, stage 3 (H)      Dizziness and giddiness     Created by Conversion      End stage liver disease (H)     2/2 Alcohol Abuse     End stage liver disease (H)     Alcohol-related     GERD (gastroesophageal reflux disease)      Hypertension      Liver transplant recipient (H) 08/25/2016    [facility]  [place]     Liver transplanted (H)      Migraine headache      Migraines      Osteoporosis     frax 111.7%      PFO (patent foramen ovale) 2016    Noted on echo at [facility]     Recurrent UTI      Spinal stenosis in cervical region      Strabismus      Unspecified asthma(493.90)     Created by Conversion      Past Surgical History  Past Surgical History:   Procedure Laterality Date     APPENDECTOMY           APPENDECTOMY       BENCH LIVER N/A 2016    Procedure: BENCH LIVER;  Surgeon: Larry Dhillon MD;  Location: [facility] OR     CATARACT IOL, RT/LT       COLONOSCOPY       COLONOSCOPY N/A 2021    Procedure: COLONOSCOPY, WITH POLYPECTOMY;  Surgeon: Arlyn Beckford MD;  Location: [facility] OR     ESOPHAGOSCOPY, GASTROSCOPY, DUODENOSCOPY (EGD), COMBINED N/A 2016    Procedure: COMBINED ESOPHAGOSCOPY, GASTROSCOPY, DUODENOSCOPY (EGD);  Surgeon: Tao Alfonso MD;  Location: [facility] GI     ESOPHAGOSCOPY, GASTROSCOPY, DUODENOSCOPY (EGD), COMBINED N/A 10/31/2016    Procedure: COMBINED ESOPHAGOSCOPY, GASTROSCOPY, DUODENOSCOPY (EGD), BIOPSY SINGLE OR MULTIPLE;  Surgeon: Ronald Bojorquez MD;  Location: [facility] GI     LIVER TRANSPLANTATION  2016    [facility]     RECTAL SURGERY       sphincteroplasty       TRANSPLANT LIVER RECIPIENT  DONOR N/A 2016    Procedure: TRANSPLANT LIVER RECIPIENT  DONOR;  Surgeon: Larry Dhillon MD;  Location: [facility] OR     TUBAL LIGATION      laparoscopic     TUBAL LIGATION         Medications  Current Outpatient Medications   Medication     acetaminophen (TYLENOL) 325 MG tablet     albuterol (VENTOLIN HFA) 108 (90 Base) MCG/ACT inhaler     amLODIPine (NORVASC) 5 MG tablet     calcipotriene (DOVONOX) 0.005 % external solution     chlorthalidone (HYGROTON) 25 MG tablet     cholecalciferol (VITAMIN D3) 400 UNIT TABS tablet     clindamycin (CLEOCIN) 300 MG capsule     clobetasol (TEMOVATE) 0.05 % external ointment      clobetasol (TEMOVATE) 0.05 % external solution     clobetasol propionate (CLOBEX) 0.05 % external shampoo     cyanocobalamin (VITAMIN  B-12) 1000 MCG tablet     ferrous sulfate (IRON) 325 (65 FE) MG tablet     folic acid (FOLVITE) 1 MG tablet     gabapentin (NEURONTIN) 100 MG capsule     hydrOXYzine (ATARAX) 25 MG tablet     levothyroxine (SYNTHROID/LEVOTHROID) 88 MCG tablet     MAGNESIUM OXIDE PO     melatonin 5 MG tablet     multivitamin, therapeutic (THERA-VIT) TABS tablet     mupirocin (BACTROBAN) 2 % external ointment     order for DME     pantoprazole (PROTONIX) 40 MG EC tablet     psyllium 0.52 G capsule     tacrolimus (GENERIC EQUIVALENT) 0.5 MG capsule     traMADol (ULTRAM) 50 MG tablet     triamcinolone (KENALOG) 0.025 % external ointment     triamcinolone (KENALOG) 0.1 % external ointment     ursodiol (ACTIGALL) 300 MG capsule     valACYclovir (VALTREX) 500 MG tablet     No current facility-administered medications for this visit.       Allergies   Allergies   Allergen Reactions     Codeine Hives     Benadryl [Diphenhydramine] Hives     Cefaclor Hives     Hydrocodone-Acetaminophen Itching     Oxycodone Itching     Penicillins Hives     Sulfa Drugs Hives     Family History: Psoriasis in her son. No other family history of autoimmune diseases.    Social History: Works for [employer]. 3 kids. Dec 13th 1996 quit smoking. Quit ETOH prior to transplant. No drug use hx.      Objective:    Physical exam:  /81   Pulse 75   Wt 84.9 kg (187 lb 1.6 oz)   LMP  (LMP Unknown)   SpO2 96%   BMI 29.30 kg/m    GEN: sitting up unassisted, NAD  HEENT: No facial rash, sclera clear, no oral or nasal ulcers, no inflammatory nasal bridge/external ear changes, good saliva pool  CV: RRR, no m/r/g  Pulm: CTAB no crackles wheezing ronchi  Abdomen: soft, non tender, not distended, expected chronic post surgical transplant scaring  Extremities: Full active and passive ROM of bilateral shoulders, elbows, wrists, MCPs,  PIPs, DIPs, hips, knees, ankles. Able to make a full fist with good strength bilaterally. No synovitis of any joints. No dactylitis.  No sclerodactyly.  Skin: No acute cutaneous lesions. No current gluteal cleft or axillary cutaneous lesions consistent with psoriasis or other. No scalp psoriasis present. No umbilical psoriasis. No posterior auricular psoriasis. Has pink color surrounding cuticles potentially consistent with pernio though she says has not noticed this.     Labs:  WBC   Date Value Ref Range Status   05/17/2021 6.2 4.0 - 11.0 10e9/L Final     WBC Count   Date Value Ref Range Status   11/16/2021 7.8 4.0 - 11.0 10e3/uL Final     Hemoglobin   Date Value Ref Range Status   11/16/2021 14.0 11.7 - 15.7 g/dL Final   05/17/2021 13.3 11.7 - 15.7 g/dL Final     Platelet Count   Date Value Ref Range Status   11/16/2021 306 150 - 450 10e3/uL Final   05/17/2021 269 150 - 450 10e9/L Final     Creatinine   Date Value Ref Range Status   11/16/2021 1.50 (H) 0.60 - 1.10 mg/dL Final   05/17/2021 1.26 (H) 0.52 - 1.04 mg/dL Final     Lab Results   Component Value Date    ALKPHOS 58 11/16/2021    ALKPHOS 50 05/17/2021     AST   Date Value Ref Range Status   11/16/2021 19 0 - 40 U/L Final   05/17/2021 18 0 - 45 U/L Final     Lab Results   Component Value Date    ALT 10 11/16/2021    ALT 19 05/17/2021     No results found for: SED  CRP Inflammation   Date Value Ref Range Status   12/15/2020 <2.9 0.0 - 8.0 mg/L Final     UA RESULTS:  Recent Labs   Lab Test 03/25/21  0736 07/24/20  1258   COLOR Yellow Yellow   APPEARANCE Clear Clear   URINEGLC Negative Negative   URINEBILI Negative Negative   URINEKETONE Negative Negative   SG 1.013 1.010   UBLD Negative Trace*   URINEPH 7.0 5.5   PROTEIN Negative Negative   UROBILINOGEN  --  0.2 E.U./dL   NITRITE Negative Negative   LEUKEST Negative Small*   RBCU 2 0-2   WBCU <1 25-50*      No results found for: ANAIGG, ANAP1, CORY, DNA, ENASMI, RNPIGG, ENASSA, ENASSB, C3COM, C4COM, CKTOTAL,  ALDOLASE, RHF, CCPIGG, ANCA, MPOIGG, PR3IGG    Imaging:  MRI left knee without contrast 7/25/2018     IMPRESSION:  CONCLUSION:  1.  Area of acute microtrabecular fracture in the anterior portion of the lateral tibial plateau.  2.  Moderately advanced chondromalacia patella.  3.  Mild degenerative changes in the medial and lateral compartments.  4.  Small curvilinear subacute hematoma along the lateral and anterolateral aspects of the knee and proximal calf.    7/21/2018  X-ray left knee  XR KNEE LEFT 1 OR 2 VWS  7/21/2018 10:14 PM     INDICATION: Knee pain  COMPARISON: None.     FINDINGS: Mild tricompartment osteoarthritis.. No fracture or dislocation. Trace joint effusion.    MR LUMBAR SPINE W/O CONTRAST 5/15/2018 4:00 PM     Provided History: ; Lumbar radicular pain; Acute left ankle pain     ICD-10: Lumbar radicular pain; Acute left ankle pain     Comparison: Lumbar spine radiographs 5/8/2018.     Technique: Sagittal T1-weighted, sagittal STIR, 3D volumetric axial  and sagittal reconstructed T2-weighted images of the lumbar spine were  obtained without intravenous contrast.      Findings: There are 5 lumbar-type vertebrae convex left curvature of  the lumbar spine with apex at L3. The tip of the conus medullaris is  at L1. Normal lumbar alignment. Mild loss of intervertebral disc  height at L3-4. Schmorl's nodes in the opposing endplates at T12-L1.  Normal marrow signal.     On a level by level basis:     T12-L1: No spinal canal or neuroforaminal stenosis.     L1-2: No spinal canal or neuroforaminal stenosis.     L2-3: Bilateral facet hypertrophy. No spinal canal or neural foraminal  stenosis.     L3-4: Bilateral facet hypertrophy and ligamentum flavum thickening.  Mild spinal canal narrowing. No neural foraminal stenosis. Mild  bilateral facet joint effusions.     L4-5: Bilateral facet hypertrophy. Mild bilateral facet joint  effusions. No spinal canal or neural foraminal stenosis.     L5-S1: Bilateral facet  hypertrophy. Prominent epidural fat in the  spinal canal. No spinal canal stenosis. Mild left neural foraminal  narrowing. No right foraminal stenosis.     Mild atrophy of the paraspinous musculature in the partially  visualized gluteal muscles.                                                                    Impression: Lumbar spondylosis with mild spinal canal narrowing at  L3-4. Mild neural foraminal narrowing on the left at L5-S1.

## 2022-01-06 NOTE — NURSING NOTE
Chief Complaint   Patient presents with     Consult     Joint Pain; Psoriatic Arthritis     Blood pressure 136/81, pulse 75, weight 84.9 kg (187 lb 1.6 oz), SpO2 96 %, not currently breastfeeding.    Danuta Angela, CMA

## 2022-01-06 NOTE — PROGRESS NOTES
Outpatient Rheumatology Consultation  In person    Name: Phan Luque    MRN [id number]   Today's date: 1/6/2022         Reason for consult:  Evaluation and treatment of psoriatic arthritis   Requesting physician: Demetria Barger             Assessment & Plan:   66 year old female with hx of liver transplant in 2016 on tacrolimus who is referred to rheumatology by dermatology for evaluation and treatment of psoriatic arthritis in the context of one episode of left then right hand distal digit redness, swelling, tenderness, pain. These symptoms did resolve and have not returned. No current synovitis of DIPs or other sites. No current dactylitis and she denies symptoms consistent with this. No history of inflammatory eye disease though follows with Dr Ortega here for AION. She does have nail involvement and inverse psoriasis both of which increase the likelihood for inflammatory arthritis associated with psoriasis. Psoriasis currently under excellent control without any active lesions on exam today with topicals only.     I discussed with Jessica that her nail involvement and inverse psoriasis both increase the risk of inflammatory arthritis associated with psoriasis. 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 We discussed that with this isolated episode, complete lack of symptoms before/since and good control of her cutaneous psoriasis that a reasonable approach at this time would be to monitor closely for return of inflammatory arthritis symptoms. Should they return, we could consider therapy to reduce the frequency and severity of attacks with steroid sparing therapy. She agrees with this conservative approach. The only thing that may change this plan would be the presence of erosions/george etc on hand films which we would want to stop. Will also obtain pre-DMARD  screening serologies for Hep B/C/HIV/quant gold should we need start DMARD therapy as they have not been done in the last 12 months.     In re: to her periungual mild light erythema would be a soft call to classify this as pernio associated with other CTD though will ensure none evident serologically with PATRICIO, dsDNA, c3, c4, KALPANA, ESR, CRP. She does have SICCA symptoms so also interested in SSA and SSB in KALPANA panel above.     We will plan for tentative follow-up in 3 months to ensure no return of inflammatory arthritis or worsening of cutaneous psoriasis. If none over the next 3 months will space appointments out further and continue to monitor. Jessica will let me know if symptoms return prior to that time and will see her sooner.    PLAN:  1) Labs  2) bilateral hand xray  3) Follow-up in 3 months    Bry Alex MD  Rheumatology     I spent a total of 60 minutes on the date of service on chart review (dermatology/imaging/labs), patient encounter, , documentation.     Subjective:   Initially developed scalp flaking/hair loss which started roughly one year ago. Did also have axillary and gluteal cleft and nails involved. Initially left hand 2nd digit then 3rd digit. Then the right hand involved. Then on Sept 13th developed left hand second digit then 3rd digit redness/swelling/pain of distal aspect of her fingers. She was treated with 10 days of clindamycin though due to continued pain/redness of predominantly the 3rd digit she went to the walk-in clinic at [facility] and was given clindamycin again for what was presumed cellulitis that had not resolved with the initial course.  During this time had left hand 2nd/3rd digit throbbing pain/swelling. Also experienced peeling of the skin of the distal aspects of her digits. Her pain then resolved. Since that time, she reports 0/10 pain at rest which does not increase with use. Not much different with use. Has radiculopathy in neck/c spine involving the left  upper extremity/clavical. No history consistent with dactylitis. 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 No history of inflammatory eye disease. Has intermittent ulcers in her mouth, a few weeks ago was most recent. Has been years prior to this since her last episode. Denies rectal or genital ulcers. Tried using clobetasol ointment on nails which she thought ws some helpful, though lasted for a few weeks. No symptoms of low back pain/stiffness which is worse in the AM and improves with use/exercise/stretching. No fevers/chills. No night sweats. Has intentionally lost some weight, no unintentional weight loss. Son with scalp involvement and recurrent ulcers. At this time she feels that her skin is currently under control with topical therapy without active lesions but knows that with stopping topicals that her psoriatic lesions return quickly.  She denies any other episodes of joint pain/redness/swelling/stiffness. Has baseline chronic knee pain worse with use/at the end of the day. Currently using clobetasol solution, clobetasol shampoo once per week, ointment, 2 strengths triamcinalone. No symptoms consistent with raynauds. No other rash. No photosensitive skin changes/ symptoms. ROS otherwise negative.    Past Medical History  Past Medical History:   Diagnosis Date     AION (acute ischemic optic neuropathy)      Asthma      Bacterial infection 02/04/2021     Candida infection 11/11/2020     Cellulitis 09/13/2021     Cervical spinal stenosis      Chronic kidney disease      Chronic kidney disease, stage 3 (H)      Dizziness and giddiness     Created by Conversion      End stage liver disease (H)     2/2 Alcohol Abuse     End stage liver disease (H)     Alcohol-related     GERD (gastroesophageal reflux disease)      Hypertension      Liver transplant recipient (H) 08/25/2016    [facility]     Liver transplanted (H)      Migraine headache      Migraines       Osteoporosis     frax 11/1.7%      PFO (patent foramen ovale) 2016    Noted on echo at [facility]     Recurrent UTI      Spinal stenosis in cervical region      Strabismus      Unspecified asthma(493.90)     Created by Conversion      Past Surgical History  Past Surgical History:   Procedure Laterality Date     APPENDECTOMY           APPENDECTOMY       BENCH LIVER N/A 2016    Procedure: BENCH LIVER;  Surgeon: Larry Dhillon MD;  Location: [facility] OR     CATARACT IOL, RT/LT       COLONOSCOPY       COLONOSCOPY N/A 2021    Procedure: COLONOSCOPY, WITH POLYPECTOMY;  Surgeon: Arlyn Beckford MD;  Location: [facility] OR     ESOPHAGOSCOPY, GASTROSCOPY, DUODENOSCOPY (EGD), COMBINED N/A 2016    Procedure: COMBINED ESOPHAGOSCOPY, GASTROSCOPY, DUODENOSCOPY (EGD);  Surgeon: Tao Alfonso MD;  Location: [facility] GI     ESOPHAGOSCOPY, GASTROSCOPY, DUODENOSCOPY (EGD), COMBINED N/A 10/31/2016    Procedure: COMBINED ESOPHAGOSCOPY, GASTROSCOPY, DUODENOSCOPY (EGD), BIOPSY SINGLE OR MULTIPLE;  Surgeon: Ronald Bojorquez MD;  Location: [facility] GI     LIVER TRANSPLANTATION  2016    [facility]     RECTAL SURGERY       sphincteroplasty       TRANSPLANT LIVER RECIPIENT  DONOR N/A 2016    Procedure: TRANSPLANT LIVER RECIPIENT  DONOR;  Surgeon: Larry Dhillon MD;  Location: [facility] OR     TUBAL LIGATION      laparoscopic     TUBAL LIGATION         Medications  Current Outpatient Medications   Medication     acetaminophen (TYLENOL) 325 MG tablet     albuterol (VENTOLIN HFA) 108 (90 Base) MCG/ACT inhaler     amLODIPine (NORVASC) 5 MG tablet     calcipotriene (DOVONOX) 0.005 % external solution     chlorthalidone (HYGROTON) 25 MG tablet     cholecalciferol (VITAMIN D3) 400 UNIT TABS tablet     clindamycin (CLEOCIN) 300 MG capsule     clobetasol (TEMOVATE) 0.05 % external ointment     clobetasol (TEMOVATE) 0.05 % external solution     clobetasol propionate (CLOBEX)  0.05 % external shampoo     cyanocobalamin (VITAMIN  B-12) 1000 MCG tablet     ferrous sulfate (IRON) 325 (65 FE) MG tablet     folic acid (FOLVITE) 1 MG tablet     gabapentin (NEURONTIN) 100 MG capsule     hydrOXYzine (ATARAX) 25 MG tablet     levothyroxine (SYNTHROID/LEVOTHROID) 88 MCG tablet     MAGNESIUM OXIDE PO     melatonin 5 MG tablet     multivitamin, therapeutic (THERA-VIT) TABS tablet     mupirocin (BACTROBAN) 2 % external ointment     order for DME     pantoprazole (PROTONIX) 40 MG EC tablet     psyllium 0.52 G capsule     tacrolimus (GENERIC EQUIVALENT) 0.5 MG capsule     traMADol (ULTRAM) 50 MG tablet     triamcinolone (KENALOG) 0.025 % external ointment     triamcinolone (KENALOG) 0.1 % external ointment     ursodiol (ACTIGALL) 300 MG capsule     valACYclovir (VALTREX) 500 MG tablet     No current facility-administered medications for this visit.       Allergies   Allergies   Allergen Reactions     Codeine Hives     Benadryl [Diphenhydramine] Hives     Cefaclor Hives     Hydrocodone-Acetaminophen Itching     Oxycodone Itching     Penicillins Hives     Sulfa Drugs Hives     Family History: Psoriasis in her son. No other family history of autoimmune diseases.    Social History: Works for [employer]. 3 kids. Dec 13th 1996 quit smoking. Quit ETOH prior to transplant. No drug use hx.      Objective:    Physical exam:  /81   Pulse 75   Wt 84.9 kg (187 lb 1.6 oz)   LMP  (LMP Unknown)   SpO2 96%   BMI 29.30 kg/m    GEN: sitting up unassisted, NAD  HEENT: No facial rash, sclera clear, no oral or nasal ulcers, no inflammatory nasal bridge/external ear changes, good saliva pool  CV: RRR, no m/r/g  Pulm: CTAB no crackles wheezing ronchi  Abdomen: soft, non tender, not distended, expected chronic post surgical transplant scaring  Extremities: Full active and passive ROM of bilateral shoulders, elbows, wrists, MCPs, PIPs, DIPs, hips, knees, ankles. Able to make a full fist with good strength  bilaterally. No synovitis of any joints. No dactylitis.  No sclerodactyly.  Skin: No acute cutaneous lesions. No current gluteal cleft or axillary cutaneous lesions consistent with psoriasis or other. No scalp psoriasis present. No umbilical psoriasis. No posterior auricular psoriasis. Has pink color surrounding cuticles potentially consistent with pernio though she says has not noticed this.     Labs:  WBC   Date Value Ref Range Status   05/17/2021 6.2 4.0 - 11.0 10e9/L Final     WBC Count   Date Value Ref Range Status   11/16/2021 7.8 4.0 - 11.0 10e3/uL Final     Hemoglobin   Date Value Ref Range Status   11/16/2021 14.0 11.7 - 15.7 g/dL Final   05/17/2021 13.3 11.7 - 15.7 g/dL Final     Platelet Count   Date Value Ref Range Status   11/16/2021 306 150 - 450 10e3/uL Final   05/17/2021 269 150 - 450 10e9/L Final     Creatinine   Date Value Ref Range Status   11/16/2021 1.50 (H) 0.60 - 1.10 mg/dL Final   05/17/2021 1.26 (H) 0.52 - 1.04 mg/dL Final     Lab Results   Component Value Date    ALKPHOS 58 11/16/2021    ALKPHOS 50 05/17/2021     AST   Date Value Ref Range Status   11/16/2021 19 0 - 40 U/L Final   05/17/2021 18 0 - 45 U/L Final     Lab Results   Component Value Date    ALT 10 11/16/2021    ALT 19 05/17/2021     No results found for: SED  CRP Inflammation   Date Value Ref Range Status   12/15/2020 <2.9 0.0 - 8.0 mg/L Final     UA RESULTS:  Recent Labs   Lab Test 03/25/21  0736 07/24/20  1258   COLOR Yellow Yellow   APPEARANCE Clear Clear   URINEGLC Negative Negative   URINEBILI Negative Negative   URINEKETONE Negative Negative   SG 1.013 1.010   UBLD Negative Trace*   URINEPH 7.0 5.5   PROTEIN Negative Negative   UROBILINOGEN  --  0.2 E.U./dL   NITRITE Negative Negative   LEUKEST Negative Small*   RBCU 2 0-2   WBCU <1 25-50*      No results found for: ANAIGG, ANAP1, CORY, DNA, ENASMI, RNPIGG, ENASSA, ENASSB, C3COM, C4COM, CKTOTAL, ALDOLASE, RHF, CCPIGG, ANCA, MPOIGG, PR3IGG    Imaging:  MRI left knee without  contrast 7/25/2018     IMPRESSION:  CONCLUSION:  1.  Area of acute microtrabecular fracture in the anterior portion of the lateral tibial plateau.  2.  Moderately advanced chondromalacia patella.  3.  Mild degenerative changes in the medial and lateral compartments.  4.  Small curvilinear subacute hematoma along the lateral and anterolateral aspects of the knee and proximal calf.    7/21/2018  X-ray left knee  XR KNEE LEFT 1 OR 2 VWS  7/21/2018 10:14 PM     INDICATION: Knee pain  COMPARISON: None.     FINDINGS: Mild tricompartment osteoarthritis.. No fracture or dislocation. Trace joint effusion.    MR LUMBAR SPINE W/O CONTRAST 5/15/2018 4:00 PM     Provided History: ; Lumbar radicular pain; Acute left ankle pain     ICD-10: Lumbar radicular pain; Acute left ankle pain     Comparison: Lumbar spine radiographs 5/8/2018.     Technique: Sagittal T1-weighted, sagittal STIR, 3D volumetric axial  and sagittal reconstructed T2-weighted images of the lumbar spine were  obtained without intravenous contrast.      Findings: There are 5 lumbar-type vertebrae convex left curvature of  the lumbar spine with apex at L3. The tip of the conus medullaris is  at L1. Normal lumbar alignment. Mild loss of intervertebral disc  height at L3-4. Schmorl's nodes in the opposing endplates at T12-L1.  Normal marrow signal.     On a level by level basis:     T12-L1: No spinal canal or neuroforaminal stenosis.     L1-2: No spinal canal or neuroforaminal stenosis.     L2-3: Bilateral facet hypertrophy. No spinal canal or neural foraminal  stenosis.     L3-4: Bilateral facet hypertrophy and ligamentum flavum thickening.  Mild spinal canal narrowing. No neural foraminal stenosis. Mild  bilateral facet joint effusions.     L4-5: Bilateral facet hypertrophy. Mild bilateral facet joint  effusions. No spinal canal or neural foraminal stenosis.     L5-S1: Bilateral facet hypertrophy. Prominent epidural fat in the  spinal canal. No spinal canal  stenosis. Mild left neural foraminal  narrowing. No right foraminal stenosis.     Mild atrophy of the paraspinous musculature in the partially  visualized gluteal muscles.                                                                    Impression: Lumbar spondylosis with mild spinal canal narrowing at  L3-4. Mild neural foraminal narrowing on the left at L5-S1.

## 2022-01-07 LAB
ANA SER QL IF: NEGATIVE
DSDNA AB SER-ACNC: 1.2 IU/ML
ENA SM IGG SER IA-ACNC: 6 U/ML
ENA SM IGG SER IA-ACNC: NEGATIVE
ENA SS-A AB SER IA-ACNC: <0.5 U/ML
ENA SS-A AB SER IA-ACNC: NEGATIVE
ENA SS-B IGG SER IA-ACNC: <0.6 U/ML
ENA SS-B IGG SER IA-ACNC: NEGATIVE
GAMMA INTERFERON BACKGROUND BLD IA-ACNC: 0.03 IU/ML
HBV SURFACE AG SERPL QL IA: REACTIVE
M TB IFN-G BLD-IMP: NEGATIVE
M TB IFN-G CD4+ BCKGRND COR BLD-ACNC: 9.97 IU/ML
MITOGEN IGNF BCKGRD COR BLD-ACNC: 0.01 IU/ML
MITOGEN IGNF BCKGRD COR BLD-ACNC: 0.02 IU/ML
QUANTIFERON MITOGEN: 10 IU/ML
QUANTIFERON NIL TUBE: 0.03 IU/ML
QUANTIFERON TB1 TUBE: 0.05 IU/ML
QUANTIFERON TB2 TUBE: 0.04
U1 SNRNP IGG SER IA-ACNC: 2 U/ML
U1 SNRNP IGG SER IA-ACNC: NEGATIVE

## 2022-01-17 ENCOUNTER — LAB (OUTPATIENT)
Dept: LAB | Facility: CLINIC | Age: 67
End: 2022-01-17
Payer: COMMERCIAL

## 2022-01-17 DIAGNOSIS — Z79.899 ENCOUNTER FOR LONG-TERM (CURRENT) USE OF MEDICATIONS: ICD-10-CM

## 2022-01-17 DIAGNOSIS — Z94.4 LIVER REPLACED BY TRANSPLANT (H): ICD-10-CM

## 2022-01-17 DIAGNOSIS — Z13.220 LIPID SCREENING: ICD-10-CM

## 2022-01-17 LAB
ALBUMIN SERPL-MCNC: 3.8 G/DL (ref 3.5–5)
ALP SERPL-CCNC: 57 U/L (ref 45–120)
ALT SERPL W P-5'-P-CCNC: 12 U/L (ref 0–45)
ANION GAP SERPL CALCULATED.3IONS-SCNC: 14 MMOL/L (ref 5–18)
AST SERPL W P-5'-P-CCNC: 16 U/L (ref 0–40)
BILIRUB DIRECT SERPL-MCNC: 0.2 MG/DL
BILIRUB SERPL-MCNC: 0.7 MG/DL (ref 0–1)
BUN SERPL-MCNC: 23 MG/DL (ref 8–22)
CALCIUM SERPL-MCNC: 9.5 MG/DL (ref 8.5–10.5)
CHLORIDE BLD-SCNC: 105 MMOL/L (ref 98–107)
CO2 SERPL-SCNC: 22 MMOL/L (ref 22–31)
CREAT SERPL-MCNC: 1.26 MG/DL (ref 0.6–1.1)
ERYTHROCYTE [DISTWIDTH] IN BLOOD BY AUTOMATED COUNT: 13.5 % (ref 10–15)
GFR SERPL CREATININE-BSD FRML MDRD: 47 ML/MIN/1.73M2
GLUCOSE BLD-MCNC: 107 MG/DL (ref 70–125)
HCT VFR BLD AUTO: 41.3 % (ref 35–47)
HGB BLD-MCNC: 13.6 G/DL (ref 11.7–15.7)
MAGNESIUM SERPL-MCNC: 1.7 MG/DL (ref 1.8–2.6)
MCH RBC QN AUTO: 30.1 PG (ref 26.5–33)
MCHC RBC AUTO-ENTMCNC: 32.9 G/DL (ref 31.5–36.5)
MCV RBC AUTO: 91 FL (ref 78–100)
PLATELET # BLD AUTO: 276 10E3/UL (ref 150–450)
POTASSIUM BLD-SCNC: 4.3 MMOL/L (ref 3.5–5)
PROT SERPL-MCNC: 7.4 G/DL (ref 6–8)
RBC # BLD AUTO: 4.52 10E6/UL (ref 3.8–5.2)
SODIUM SERPL-SCNC: 141 MMOL/L (ref 136–145)
TACROLIMUS BLD-MCNC: 4.2 UG/L (ref 5–15)
TME LAST DOSE: ABNORMAL H
TME LAST DOSE: ABNORMAL H
WBC # BLD AUTO: 8.7 10E3/UL (ref 4–11)

## 2022-01-17 PROCEDURE — 36415 COLL VENOUS BLD VENIPUNCTURE: CPT

## 2022-01-17 PROCEDURE — 83735 ASSAY OF MAGNESIUM: CPT

## 2022-01-17 PROCEDURE — 82248 BILIRUBIN DIRECT: CPT

## 2022-01-17 PROCEDURE — 80197 ASSAY OF TACROLIMUS: CPT

## 2022-01-17 PROCEDURE — 80053 COMPREHEN METABOLIC PANEL: CPT

## 2022-01-17 PROCEDURE — 85027 COMPLETE CBC AUTOMATED: CPT

## 2022-01-31 ENCOUNTER — IMMUNIZATION (OUTPATIENT)
Dept: NURSING | Facility: CLINIC | Age: 67
End: 2022-01-31
Payer: COMMERCIAL

## 2022-01-31 PROCEDURE — 91305 COVID-19,PF,PFIZER (12+ YRS): CPT

## 2022-01-31 PROCEDURE — 0054A COVID-19,PF,PFIZER (12+ YRS): CPT

## 2022-02-07 NOTE — PROGRESS NOTES
Colon and Rectal Surgery Clinic Note    RE: Phan Luque.  : 1955.  ERICK: 2022.    Reason for visit: prolapsing anal tissue.     HPI (last seen in ): 63 yo F,Hx of liver transplantation in 2016 for EtOH hepatitis, doing well, followed by Dr. Banegas, CKD with last Cr 1.36.   Last colonoscopy , normal with exception of one 1 mm polyp (TA) in ascending colon and diverticulosis in descending colon.  Had 4 , first one complicated by episiotomy requiring primary repair. Notably had overlapping sphincteroplasty at [facility] in . Felt prolapsing anal tissue two weeks ago in shower. Only felt this once two weeks ago, one quarter of an inch out.  Eats a high fiber diet, stool is soft, good consistency. Able to feel when she needs to defecate. Able to control gas and stool. Able to generally control to get to the bathroom, and feels like she is able to completely evacuate. Very mild urinary incontinence. No need for depends. No pain or bleeding.   -I diagnosed a thrombosed right anterior grade 2 internal hemorrhoid at time of examination, and recommended increased fiber and water to reduce straining.    Interval Hx: Noted some protrusion and swelling on the right side, this has since subsided. This was accompanied by some burning as well. No bleeding, now feeling much better.    Medical history:  Past Medical History:   Diagnosis Date     AION (acute ischemic optic neuropathy)      Asthma      Bacterial infection 2021     Candida infection 2020     Cellulitis 2021     Cervical spinal stenosis      Chronic kidney disease      Chronic kidney disease, stage 3 (H)      Dizziness and giddiness     Created by Conversion      End stage liver disease (H)     2/2 Alcohol Abuse     End stage liver disease (H)     Alcohol-related     GERD (gastroesophageal reflux disease)      Hypertension      Liver transplant recipient (H) 2016    [facility]     Liver  transplanted (H)      Migraine headache      Migraines      Osteoporosis     frax 11/1.7%      PFO (patent foramen ovale) 2016    Noted on echo at [facility]     Recurrent UTI      Spinal stenosis in cervical region      Strabismus      Unspecified asthma(493.90)     Created by Conversion        Surgical history:  Past Surgical History:   Procedure Laterality Date     APPENDECTOMY           APPENDECTOMY       BENCH LIVER N/A 2016    Procedure: BENCH LIVER;  Surgeon: Larry Dhillon MD;  Location: [facility] OR     CATARACT IOL, RT/LT       COLONOSCOPY       COLONOSCOPY N/A 2021    Procedure: COLONOSCOPY, WITH POLYPECTOMY;  Surgeon: Arlyn Beckford MD;  Location: [facility] OR     ESOPHAGOSCOPY, GASTROSCOPY, DUODENOSCOPY (EGD), COMBINED N/A 2016    Procedure: COMBINED ESOPHAGOSCOPY, GASTROSCOPY, DUODENOSCOPY (EGD);  Surgeon: Tao Alfonso MD;  Location:  GI     ESOPHAGOSCOPY, GASTROSCOPY, DUODENOSCOPY (EGD), COMBINED N/A 10/31/2016    Procedure: COMBINED ESOPHAGOSCOPY, GASTROSCOPY, DUODENOSCOPY (EGD), BIOPSY SINGLE OR MULTIPLE;  Surgeon: Ronald Bojorquez MD;  Location:  GI     LIVER TRANSPLANTATION  2016    [facility]     RECTAL SURGERY       sphincteroplasty       TRANSPLANT LIVER RECIPIENT  DONOR N/A 2016    Procedure: TRANSPLANT LIVER RECIPIENT  DONOR;  Surgeon: Larry Dhillon MD;  Location:  OR     TUBAL LIGATION      laparoscopic     TUBAL LIGATION         Family history:  Family History   Problem Relation Age of Onset     Family History Negative Other      Melanoma Mother              Heart Disease Father         CHF     Skin Cancer Sister      Cirrhosis Paternal Uncle         not alcohol related     Heart Failure Mother      Heart Failure Father      Chronic Obstructive Pulmonary Disease Father      No Hx of IBD or GI malignancy    Medications:  Current Outpatient Medications   Medication Sig Dispense Refill      acetaminophen (TYLENOL) 325 MG tablet Take 2 tablets (650 mg) by mouth every 6 hours as needed for mild pain Or fevers. Use sparingly. Limit use to no more than 2 grams (2000 mg) in 24 hours. **Further refills must be obtained by primary care provider** 100 tablet 0     albuterol (VENTOLIN HFA) 108 (90 Base) MCG/ACT inhaler Inhale 2 puffs into the lungs every 6 hours as needed for shortness of breath / dyspnea or wheezing 18 g 8     amLODIPine (NORVASC) 5 MG tablet TAKE ONE TABLET BY MOUTH ONCE DAILY 90 tablet 1     calcipotriene (DOVONOX) 0.005 % external solution Apply 1 mL topically daily To the scalp 180 mL 3     chlorthalidone (HYGROTON) 25 MG tablet TAKE 0.5 TABLETS (12.5 MG) BY MOUTH DAILY 45 tablet 1     cholecalciferol (VITAMIN D3) 400 UNIT TABS tablet Take 400 Units by mouth daily       clindamycin (CLEOCIN) 300 MG capsule TAKE 1 CAPSULE BY MOUTH 3 TIMES DAILY FOR 7 DAYS       clobetasol (TEMOVATE) 0.05 % external ointment Apply topically 2 times daily To areas of eczema on the lower legs, until areas resolve. 60 g 1     clobetasol (TEMOVATE) 0.05 % external solution Apply topically 2 times daily To the scalp as needed for itching and flaking, on the days you don't use the clobetasol shampoo. 150 mL 3     clobetasol propionate (CLOBEX) 0.05 % external shampoo Apply topically daily To dry scalp x 15 min and rinse ,when psoriasis is present. 354 mL 3     cyanocobalamin (VITAMIN  B-12) 1000 MCG tablet Take 1,000 mcg by mouth daily       ferrous sulfate (IRON) 325 (65 FE) MG tablet Take 1 tablet (325 mg) by mouth 2 times daily 100 tablet      folic acid (FOLVITE) 1 MG tablet Take 1 tablet (1 mg) by mouth daily 30 tablet 1     gabapentin (NEURONTIN) 100 MG capsule Take 2 capsules (200 mg) by mouth At Bedtime 180 capsule 1     hydrOXYzine (ATARAX) 25 MG tablet Take 1-2 tablets (25-50 mg) by mouth every 6 hours as needed for itching 120 tablet 5     levothyroxine (SYNTHROID/LEVOTHROID) 88 MCG tablet TAKE 1  TABLET (88 MCG) BY MOUTH DAILY 90 tablet 0     MAGNESIUM OXIDE PO Take 400 mg by mouth daily       melatonin 5 MG tablet Take 1 tablet (5 mg) by mouth nightly as needed for sleep       multivitamin, therapeutic (THERA-VIT) TABS tablet Take 1 tablet by mouth every 24 hours 30 tablet 11     mupirocin (BACTROBAN) 2 % external ointment        order for DME Equipment being ordered: Trilok ankle brace 1 Device 0     pantoprazole (PROTONIX) 40 MG EC tablet TAKE ONE TABLET BY MOUTH EVERY MORNING BEFORE BREAKFAST 90 tablet 2     psyllium 0.52 G capsule Take 2 capsules (1.04 g) by mouth daily With a full glass of water. (Patient taking differently: Take 1 capsule by mouth 3 times daily With a full glass of water.) 60 capsule 1     tacrolimus (GENERIC EQUIVALENT) 0.5 MG capsule Take 3 capsules (1.5 mg) by mouth every 12 hours 180 capsule 11     traMADol (ULTRAM) 50 MG tablet Take 1 tablet (50 mg) by mouth every 6 hours as needed for severe pain 30 tablet 0     triamcinolone (KENALOG) 0.025 % external ointment Apply topically 2 times daily To affected areas in the axilla or gluteal cleft as needed until clear. 80 g 1     triamcinolone (KENALOG) 0.1 % external ointment Apply topically 2 times daily To plaques behind the knee 80 g 1     ursodiol (ACTIGALL) 300 MG capsule TAKE ONE CAPSULE BY MOUTH TWICE A DAY 60 capsule 11     valACYclovir (VALTREX) 500 MG tablet TAKE 1 TABLET BY MOUTH TWICE DAILY FOR 7 DAYS. START 2 DAYS BEFORE PROCEDURE         Allergies:  Allergies   Allergen Reactions     Codeine Hives     Benadryl [Diphenhydramine] Hives     Cefaclor Hives     Hydrocodone-Acetaminophen Itching     Oxycodone Itching     Penicillins Hives     Sulfa Drugs Hives       Social history:   Social History     Tobacco Use     Smoking status: Former Smoker     Packs/day: 2.50     Years: 20.00     Pack years: 50.00     Quit date: 1996     Years since quittin.1     Smokeless tobacco: Never Used   Substance Use Topics      "Alcohol use: No     Alcohol/week: 7.0 standard drinks     Types: 7 Standard drinks or equivalent per week     Comment: heavy use (750ml/2 days) up until 1 year ago; had cut down to 1 drink/day; none since 7/18/16     Marital status: .    ROS:  A complete review of systems was performed with the patient and all systems negative except as per HPI.    Physical Examination:  Exam was chaperoned by Aurora Pedroza RN  /80 (BP Location: Left arm, Patient Position: Sitting, Cuff Size: Adult Regular)   Pulse 91   Temp 97.9  F (36.6  C) (Oral)   Ht 1.702 m (5' 7\")   Wt 86.5 kg (190 lb 9.6 oz)   LMP  (LMP Unknown)   SpO2 96%   BMI 29.85 kg/m    General: Well hydrated. No acute distress.  Perianal external examination:  Perianal skin: Intact with no excoriation or lichenification.  Lesions: No evidence of an external lesion, nodularity, or induration in the perianal region.  Eversion of buttocks: There was not evidence of an anal fissure. Details: N/A.  Skin tags or external hemorrhoids: None.  Digital rectal examination: Was performed.   Sphincter tone: Good.  Palpable lesions: No.  Other: None.  Bimanual examination: was not performed.    Anoscopy: Was performed.   Hemorrhoids: Yes, grade 2 hemorrhoids right anterior, non inflamed at this time.  Lesions: No    Laboratory values reviewed:  Recent Labs   Lab Test 01/17/22  0739 06/22/18  0723 06/11/18  0720   WBC 8.7   < >  --    HGB 13.6   < >  --       < >  --    CR 1.26*   < > 1.44*   ALBUMIN 3.8   < > 3.9   BILITOTAL 0.7   < > 0.6   ALKPHOS 57   < > 47   ALT 12   < > 12   AST 16   < > 15   INR  --   --  0.98    < > = values in this interval not displayed.       ASSESSMENT  1. S/p overlapping sphincteroplasty, with likely recurrent prolapse of right anterior mixed hemorrhoid, resolved.  2. Hx of liver transplantation, doing well.  3. CKD, last Cr 1.36.    PLAN  1. Continue fiber, water. Follow up as needed    Risks, benefits, and alternatives of " operative treatment were thoroughly discussed with the patient, she understands these well and agrees to proceed.    Time spent: 15 minutes.  >50% spent in discussing, counseling and coordinating care.    Eliazar Flor M.D    Division of Colon and Rectal Surgery  [facility]    Referring Provider:  Referred Self, MD  No address on file     Primary Care Provider:  Arlyn Sanchez

## 2022-02-09 ENCOUNTER — OFFICE VISIT (OUTPATIENT)
Dept: SURGERY | Facility: CLINIC | Age: 67
End: 2022-02-09
Payer: COMMERCIAL

## 2022-02-09 VITALS
TEMPERATURE: 97.9 F | DIASTOLIC BLOOD PRESSURE: 80 MMHG | BODY MASS INDEX: 29.91 KG/M2 | HEART RATE: 91 BPM | OXYGEN SATURATION: 96 % | SYSTOLIC BLOOD PRESSURE: 125 MMHG | HEIGHT: 67 IN | WEIGHT: 190.6 LBS

## 2022-02-09 DIAGNOSIS — F41.1 GAD (GENERALIZED ANXIETY DISORDER): Primary | ICD-10-CM

## 2022-02-09 DIAGNOSIS — R94.6 THYROID FUNCTION TEST ABNORMAL: ICD-10-CM

## 2022-02-09 DIAGNOSIS — K64.4 EXTERNAL HEMORRHOIDS: Primary | ICD-10-CM

## 2022-02-09 PROCEDURE — 99202 OFFICE O/P NEW SF 15 MIN: CPT | Performed by: SURGERY

## 2022-02-09 ASSESSMENT — PAIN SCALES - GENERAL: PAINLEVEL: NO PAIN (0)

## 2022-02-09 ASSESSMENT — MIFFLIN-ST. JEOR: SCORE: 1442.19

## 2022-02-09 NOTE — LETTER
2022       RE: Phan Luque  [address]  [facility] [address]     Dear Colleague,    Thank you for referring your patient, Phan Luque, to the [facility] [place] [facility]. Please see a copy of my visit note below.    Colon and Rectal Surgery Clinic Note    RE: Phan Luque.  : 1955.  ERICK: 2022.    Reason for visit: prolapsing anal tissue.     HPI (last seen in ): 65 yo F,Hx of liver transplantation in 2016 for EtOH hepatitis, doing well, followed by Dr. Banegas, CKD with last Cr 1.36.   Last colonoscopy , normal with exception of one 1 mm polyp (TA) in ascending colon and diverticulosis in descending colon.  Had 4 , first one complicated by episiotomy requiring primary repair. Notably had overlapping sphincteroplasty at [facility] in . Felt prolapsing anal tissue two weeks ago in shower. Only felt this once two weeks ago, one quarter of an inch out.  Eats a high fiber diet, stool is soft, good consistency. Able to feel when she needs to defecate. Able to control gas and stool. Able to generally control to get to the bathroom, and feels like she is able to completely evacuate. Very mild urinary incontinence. No need for depends. No pain or bleeding.   -I diagnosed a thrombosed right anterior grade 2 internal hemorrhoid at time of examination, and recommended increased fiber and water to reduce straining.    Interval Hx: Noted some protrusion and swelling on the right side, this has since subsided. This was accompanied by some burning as well. No bleeding, now feeling much better.    Medical history:  Past Medical History:   Diagnosis Date     AION (acute ischemic optic neuropathy)      Asthma      Bacterial infection 2021     Candida infection 2020     Cellulitis 2021     Cervical spinal stenosis      Chronic kidney disease      Chronic kidney disease,  stage 3 (H)      Dizziness and giddiness     Created by Conversion      End stage liver disease (H)     2/2 Alcohol Abuse     End stage liver disease (H)     Alcohol-related     GERD (gastroesophageal reflux disease)      Hypertension      Liver transplant recipient (H) 2016    [facility]     Liver transplanted (H)      Migraine headache      Migraines      Osteoporosis     frax 1.7%      PFO (patent foramen ovale) 2016    Noted on echo at [facility]     Recurrent UTI      Spinal stenosis in cervical region      Strabismus      Unspecified asthma(493.90)     Created by Conversion        Surgical history:  Past Surgical History:   Procedure Laterality Date     APPENDECTOMY           APPENDECTOMY       BENCH LIVER N/A 2016    Procedure: BENCH LIVER;  Surgeon: Larry Dhillon MD;  Location:  OR     CATARACT IOL, RT/LT       COLONOSCOPY       COLONOSCOPY N/A 2021    Procedure: COLONOSCOPY, WITH POLYPECTOMY;  Surgeon: Arlyn Beckford MD;  Location: [facility] OR     ESOPHAGOSCOPY, GASTROSCOPY, DUODENOSCOPY (EGD), COMBINED N/A 2016    Procedure: COMBINED ESOPHAGOSCOPY, GASTROSCOPY, DUODENOSCOPY (EGD);  Surgeon: Tao Alfonso MD;  Location:  GI     ESOPHAGOSCOPY, GASTROSCOPY, DUODENOSCOPY (EGD), COMBINED N/A 10/31/2016    Procedure: COMBINED ESOPHAGOSCOPY, GASTROSCOPY, DUODENOSCOPY (EGD), BIOPSY SINGLE OR MULTIPLE;  Surgeon: Ronald Bojorquez MD;  Location:  GI     LIVER TRANSPLANTATION  2016    [facility]     RECTAL SURGERY       sphincteroplasty       TRANSPLANT LIVER RECIPIENT  DONOR N/A 2016    Procedure: TRANSPLANT LIVER RECIPIENT  DONOR;  Surgeon: Larry Dhillon MD;  Location:  OR     TUBAL LIGATION      laparoscopic     TUBAL LIGATION         Family history:  Family History   Problem Relation Age of Onset     Family History Negative Other      Melanoma Mother               Heart Disease Father         CHF     Skin Cancer Sister      Cirrhosis Paternal Uncle         not alcohol related     Heart Failure Mother      Heart Failure Father      Chronic Obstructive Pulmonary Disease Father      No Hx of IBD or GI malignancy    Medications:  Current Outpatient Medications   Medication Sig Dispense Refill     acetaminophen (TYLENOL) 325 MG tablet Take 2 tablets (650 mg) by mouth every 6 hours as needed for mild pain Or fevers. Use sparingly. Limit use to no more than 2 grams (2000 mg) in 24 hours. **Further refills must be obtained by primary care provider** 100 tablet 0     albuterol (VENTOLIN HFA) 108 (90 Base) MCG/ACT inhaler Inhale 2 puffs into the lungs every 6 hours as needed for shortness of breath / dyspnea or wheezing 18 g 8     amLODIPine (NORVASC) 5 MG tablet TAKE ONE TABLET BY MOUTH ONCE DAILY 90 tablet 1     calcipotriene (DOVONOX) 0.005 % external solution Apply 1 mL topically daily To the scalp 180 mL 3     chlorthalidone (HYGROTON) 25 MG tablet TAKE 0.5 TABLETS (12.5 MG) BY MOUTH DAILY 45 tablet 1     cholecalciferol (VITAMIN D3) 400 UNIT TABS tablet Take 400 Units by mouth daily       clindamycin (CLEOCIN) 300 MG capsule TAKE 1 CAPSULE BY MOUTH 3 TIMES DAILY FOR 7 DAYS       clobetasol (TEMOVATE) 0.05 % external ointment Apply topically 2 times daily To areas of eczema on the lower legs, until areas resolve. 60 g 1     clobetasol (TEMOVATE) 0.05 % external solution Apply topically 2 times daily To the scalp as needed for itching and flaking, on the days you don't use the clobetasol shampoo. 150 mL 3     clobetasol propionate (CLOBEX) 0.05 % external shampoo Apply topically daily To dry scalp x 15 min and rinse ,when psoriasis is present. 354 mL 3     cyanocobalamin (VITAMIN  B-12) 1000 MCG tablet Take 1,000 mcg by mouth daily       ferrous sulfate (IRON) 325 (65 FE) MG tablet Take 1 tablet (325 mg) by mouth 2 times daily 100 tablet      folic acid (FOLVITE) 1 MG tablet Take 1  tablet (1 mg) by mouth daily 30 tablet 1     gabapentin (NEURONTIN) 100 MG capsule Take 2 capsules (200 mg) by mouth At Bedtime 180 capsule 1     hydrOXYzine (ATARAX) 25 MG tablet Take 1-2 tablets (25-50 mg) by mouth every 6 hours as needed for itching 120 tablet 5     levothyroxine (SYNTHROID/LEVOTHROID) 88 MCG tablet TAKE 1 TABLET (88 MCG) BY MOUTH DAILY 90 tablet 0     MAGNESIUM OXIDE PO Take 400 mg by mouth daily       melatonin 5 MG tablet Take 1 tablet (5 mg) by mouth nightly as needed for sleep       multivitamin, therapeutic (THERA-VIT) TABS tablet Take 1 tablet by mouth every 24 hours 30 tablet 11     mupirocin (BACTROBAN) 2 % external ointment        order for DME Equipment being ordered: Trilok ankle brace 1 Device 0     pantoprazole (PROTONIX) 40 MG EC tablet TAKE ONE TABLET BY MOUTH EVERY MORNING BEFORE BREAKFAST 90 tablet 2     psyllium 0.52 G capsule Take 2 capsules (1.04 g) by mouth daily With a full glass of water. (Patient taking differently: Take 1 capsule by mouth 3 times daily With a full glass of water.) 60 capsule 1     tacrolimus (GENERIC EQUIVALENT) 0.5 MG capsule Take 3 capsules (1.5 mg) by mouth every 12 hours 180 capsule 11     traMADol (ULTRAM) 50 MG tablet Take 1 tablet (50 mg) by mouth every 6 hours as needed for severe pain 30 tablet 0     triamcinolone (KENALOG) 0.025 % external ointment Apply topically 2 times daily To affected areas in the axilla or gluteal cleft as needed until clear. 80 g 1     triamcinolone (KENALOG) 0.1 % external ointment Apply topically 2 times daily To plaques behind the knee 80 g 1     ursodiol (ACTIGALL) 300 MG capsule TAKE ONE CAPSULE BY MOUTH TWICE A DAY 60 capsule 11     valACYclovir (VALTREX) 500 MG tablet TAKE 1 TABLET BY MOUTH TWICE DAILY FOR 7 DAYS. START 2 DAYS BEFORE PROCEDURE         Allergies:  Allergies   Allergen Reactions     Codeine Hives     Benadryl [Diphenhydramine] Hives     Cefaclor Hives     Hydrocodone-Acetaminophen Itching      "Oxycodone Itching     Penicillins Hives     Sulfa Drugs Hives       Social history:   Social History     Tobacco Use     Smoking status: Former Smoker     Packs/day: 2.50     Years: 20.00     Pack years: 50.00     Quit date: 1996     Years since quittin.1     Smokeless tobacco: Never Used   Substance Use Topics     Alcohol use: No     Alcohol/week: 7.0 standard drinks     Types: 7 Standard drinks or equivalent per week     Comment: heavy use (750ml/2 days) up until 1 year ago; had cut down to 1 drink/day; none since 16     Marital status: .    ROS:  A complete review of systems was performed with the patient and all systems negative except as per HPI.    Physical Examination:  Exam was chaperoned by Aurora Pedroza RN  /80 (BP Location: Left arm, Patient Position: Sitting, Cuff Size: Adult Regular)   Pulse 91   Temp 97.9  F (36.6  C) (Oral)   Ht 1.702 m (5' 7\")   Wt 86.5 kg (190 lb 9.6 oz)   LMP  (LMP Unknown)   SpO2 96%   BMI 29.85 kg/m    General: Well hydrated. No acute distress.  Perianal external examination:  Perianal skin: Intact with no excoriation or lichenification.  Lesions: No evidence of an external lesion, nodularity, or induration in the perianal region.  Eversion of buttocks: There was not evidence of an anal fissure. Details: N/A.  Skin tags or external hemorrhoids: None.  Digital rectal examination: Was performed.   Sphincter tone: Good.  Palpable lesions: No.  Other: None.  Bimanual examination: was not performed.    Anoscopy: Was performed.   Hemorrhoids: Yes, grade 2 hemorrhoids right anterior, non inflamed at this time.  Lesions: No    Laboratory values reviewed:  Recent Labs   Lab Test 22  0739 18  0723 18  0720   WBC 8.7   < >  --    HGB 13.6   < >  --       < >  --    CR 1.26*   < > 1.44*   ALBUMIN 3.8   < > 3.9   BILITOTAL 0.7   < > 0.6   ALKPHOS 57   < > 47   ALT 12   < > 12   AST 16   < > 15   INR  --   --  0.98    < > = values " in this interval not displayed.       ASSESSMENT  1. S/p overlapping sphincteroplasty, with likely recurrent prolapse of right anterior mixed hemorrhoid, resolved.  2. Hx of liver transplantation, doing well.  3. CKD, last Cr 1.36.    PLAN  1. Continue fiber, water. Follow up as needed    Risks, benefits, and alternatives of operative treatment were thoroughly discussed with the patient, she understands these well and agrees to proceed.    Time spent: 15 minutes.  >50% spent in discussing, counseling and coordinating care.    Eliazar Flor M.D    Division of Colon and Rectal Surgery  [facility]    Referring Provider:  Referred Self, MD  No address on file     Primary Care Provider:  Arlyn Sanchez

## 2022-02-09 NOTE — NURSING NOTE
"Chief Complaint   Patient presents with     Follow Up     Follow up for hemorrhoids       Vitals:    02/09/22 1354   BP: 125/80   BP Location: Left arm   Patient Position: Sitting   Cuff Size: Adult Regular   Pulse: 91   Temp: 97.9  F (36.6  C)   TempSrc: Oral   SpO2: 96%   Weight: 190 lb 9.6 oz   Height: 5' 7\"       Body mass index is 29.85 kg/m .       Ara Bragg CMA    "

## 2022-02-13 RX ORDER — HYDROXYZINE HYDROCHLORIDE 25 MG/1
TABLET, FILM COATED ORAL
Qty: 120 TABLET | Refills: 5 | Status: SHIPPED | OUTPATIENT
Start: 2022-02-13 | End: 2022-11-16

## 2022-02-13 RX ORDER — LEVOTHYROXINE SODIUM 88 UG/1
88 TABLET ORAL DAILY
Qty: 90 TABLET | Refills: 2 | Status: SHIPPED | OUTPATIENT
Start: 2022-02-13 | End: 2022-11-16

## 2022-02-22 ENCOUNTER — MYC MEDICAL ADVICE (OUTPATIENT)
Dept: INTERNAL MEDICINE | Facility: CLINIC | Age: 67
End: 2022-02-22
Payer: COMMERCIAL

## 2022-02-22 DIAGNOSIS — G62.9 PERIPHERAL POLYNEUROPATHY: ICD-10-CM

## 2022-03-02 RX ORDER — GABAPENTIN 100 MG/1
200 CAPSULE ORAL AT BEDTIME
Qty: 180 CAPSULE | Refills: 2 | Status: SHIPPED | OUTPATIENT
Start: 2022-03-02 | End: 2023-04-05

## 2022-03-10 ENCOUNTER — TELEPHONE (OUTPATIENT)
Dept: NEPHROLOGY | Facility: CLINIC | Age: 67
End: 2022-03-10
Payer: COMMERCIAL

## 2022-03-10 DIAGNOSIS — N18.31 STAGE 3A CHRONIC KIDNEY DISEASE (H): Primary | ICD-10-CM

## 2022-03-10 NOTE — TELEPHONE ENCOUNTER
[facility] Call Center    Phone Message    May a detailed message be left on voicemail: yes     Reason for Call: Order(s): Other:   Reason for requested: labs  Date needed: 10/12/2022  Provider name: Dr. Gutierrez    Labs will be drawn at [facility] 10/12/22      Action Taken: Message routed to:  [facility] ([facility]): Neph    Travel Screening: Not Applicable

## 2022-03-14 ENCOUNTER — LAB (OUTPATIENT)
Dept: LAB | Facility: CLINIC | Age: 67
End: 2022-03-14
Payer: COMMERCIAL

## 2022-03-14 DIAGNOSIS — Z94.4 LIVER REPLACED BY TRANSPLANT (H): ICD-10-CM

## 2022-03-14 DIAGNOSIS — Z79.899 ENCOUNTER FOR LONG-TERM (CURRENT) USE OF MEDICATIONS: ICD-10-CM

## 2022-03-14 DIAGNOSIS — R76.8 HEPATITIS B SURFACE ANTIGEN POSITIVE: ICD-10-CM

## 2022-03-14 DIAGNOSIS — Z13.220 LIPID SCREENING: ICD-10-CM

## 2022-03-14 LAB
ALBUMIN MFR UR ELPH: 9.9 MG/DL
ALBUMIN SERPL-MCNC: 3.7 G/DL (ref 3.5–5)
ALBUMIN UR-MCNC: NEGATIVE MG/DL
ALP SERPL-CCNC: 59 U/L (ref 45–120)
ALT SERPL W P-5'-P-CCNC: 14 U/L (ref 0–45)
ANION GAP SERPL CALCULATED.3IONS-SCNC: 9 MMOL/L (ref 5–18)
APPEARANCE UR: CLEAR
AST SERPL W P-5'-P-CCNC: 17 U/L (ref 0–40)
BILIRUB DIRECT SERPL-MCNC: 0.2 MG/DL
BILIRUB SERPL-MCNC: 0.8 MG/DL (ref 0–1)
BILIRUB UR QL STRIP: NEGATIVE
BUN SERPL-MCNC: 36 MG/DL (ref 8–22)
CALCIUM SERPL-MCNC: 9.7 MG/DL (ref 8.5–10.5)
CHLORIDE BLD-SCNC: 103 MMOL/L (ref 98–107)
CHOLEST SERPL-MCNC: 185 MG/DL
CO2 SERPL-SCNC: 27 MMOL/L (ref 22–31)
COLOR UR AUTO: YELLOW
CREAT SERPL-MCNC: 1.2 MG/DL (ref 0.6–1.1)
CREAT UR-MCNC: 99 MG/DL
ERYTHROCYTE [DISTWIDTH] IN BLOOD BY AUTOMATED COUNT: 13.2 % (ref 10–15)
FASTING STATUS PATIENT QL REPORTED: YES
GFR SERPL CREATININE-BSD FRML MDRD: 50 ML/MIN/1.73M2
GLUCOSE BLD-MCNC: 106 MG/DL (ref 70–125)
GLUCOSE UR STRIP-MCNC: NEGATIVE MG/DL
HCT VFR BLD AUTO: 42.3 % (ref 35–47)
HDLC SERPL-MCNC: 48 MG/DL
HGB BLD-MCNC: 14.2 G/DL (ref 11.7–15.7)
HGB UR QL STRIP: NEGATIVE
KETONES UR STRIP-MCNC: NEGATIVE MG/DL
LDLC SERPL CALC-MCNC: 107 MG/DL
LEUKOCYTE ESTERASE UR QL STRIP: NEGATIVE
MAGNESIUM SERPL-MCNC: 1.9 MG/DL (ref 1.8–2.6)
MCH RBC QN AUTO: 29.7 PG (ref 26.5–33)
MCHC RBC AUTO-ENTMCNC: 33.6 G/DL (ref 31.5–36.5)
MCV RBC AUTO: 89 FL (ref 78–100)
NITRATE UR QL: NEGATIVE
PH UR STRIP: 5.5 [PH] (ref 5–8)
PLATELET # BLD AUTO: 289 10E3/UL (ref 150–450)
POTASSIUM BLD-SCNC: 4.3 MMOL/L (ref 3.5–5)
PROT SERPL-MCNC: 7 G/DL (ref 6–8)
PROT/CREAT 24H UR: 0.1 MG/MG CR
RBC # BLD AUTO: 4.78 10E6/UL (ref 3.8–5.2)
SODIUM SERPL-SCNC: 139 MMOL/L (ref 136–145)
SP GR UR STRIP: 1.01 (ref 1–1.03)
TACROLIMUS BLD-MCNC: 3.8 UG/L (ref 5–15)
TME LAST DOSE: ABNORMAL H
TME LAST DOSE: ABNORMAL H
TRIGL SERPL-MCNC: 152 MG/DL
UROBILINOGEN UR STRIP-ACNC: 0.2 E.U./DL
WBC # BLD AUTO: 6.7 10E3/UL (ref 4–11)

## 2022-03-14 PROCEDURE — 87517 HEPATITIS B DNA QUANT: CPT

## 2022-03-14 PROCEDURE — 86705 HEP B CORE ANTIBODY IGM: CPT

## 2022-03-14 PROCEDURE — 83735 ASSAY OF MAGNESIUM: CPT

## 2022-03-14 PROCEDURE — 85027 COMPLETE CBC AUTOMATED: CPT

## 2022-03-14 PROCEDURE — 36415 COLL VENOUS BLD VENIPUNCTURE: CPT

## 2022-03-14 PROCEDURE — 80197 ASSAY OF TACROLIMUS: CPT

## 2022-03-14 PROCEDURE — 84156 ASSAY OF PROTEIN URINE: CPT

## 2022-03-14 PROCEDURE — 82248 BILIRUBIN DIRECT: CPT

## 2022-03-14 PROCEDURE — 87340 HEPATITIS B SURFACE AG IA: CPT

## 2022-03-14 PROCEDURE — 81003 URINALYSIS AUTO W/O SCOPE: CPT

## 2022-03-14 PROCEDURE — 80061 LIPID PANEL: CPT

## 2022-03-14 PROCEDURE — 80053 COMPREHEN METABOLIC PANEL: CPT

## 2022-03-16 DIAGNOSIS — R76.8 HEPATITIS B SURFACE ANTIGEN POSITIVE: Primary | ICD-10-CM

## 2022-03-16 LAB — HBV SURFACE AG SERPL QL IA: NONREACTIVE

## 2022-03-17 LAB — HBV DNA SERPL NAA+PROBE-ACNC: NOT DETECTED IU/ML

## 2022-03-18 LAB — HBV CORE IGM SERPL QL IA: NEGATIVE

## 2022-03-19 DIAGNOSIS — J45.20 INTERMITTENT ASTHMA WITHOUT COMPLICATION, UNSPECIFIED ASTHMA SEVERITY: ICD-10-CM

## 2022-03-24 RX ORDER — ALBUTEROL SULFATE 90 UG/1
2 AEROSOL, METERED RESPIRATORY (INHALATION) EVERY 6 HOURS PRN
Qty: 18 G | Refills: 8 | Status: SHIPPED | OUTPATIENT
Start: 2022-03-24 | End: 2023-05-17

## 2022-03-24 NOTE — TELEPHONE ENCOUNTER
albuterol (VENTOLIN HFA) 108 (90 Base) MCG/ACT inhaler    12/9/2021  [facility] Internal Medicine [place]     Hussein Contreras MD    Internal Medicine

## 2022-04-05 ENCOUNTER — TELEPHONE (OUTPATIENT)
Dept: TRANSPLANT | Facility: CLINIC | Age: 67
End: 2022-04-05
Payer: COMMERCIAL

## 2022-04-05 NOTE — TELEPHONE ENCOUNTER
Patient Call: Voicemail  Date/Time: 04/04/22 2:28 pm  Reason for call: Patient wants to speak to Crystal about patient potentially relocating to [place] & if they had any recommendations for physicians.

## 2022-04-05 NOTE — TELEPHONE ENCOUNTER
Call to Jessica.  No answer, MITCH.  Told her I sent a message to Dr. Banegas asking for recommendation for follow-up in [place] if she moves.  Will get back to her when he responds.

## 2022-04-07 ENCOUNTER — OFFICE VISIT (OUTPATIENT)
Dept: RHEUMATOLOGY | Facility: CLINIC | Age: 67
End: 2022-04-07
Attending: INTERNAL MEDICINE
Payer: COMMERCIAL

## 2022-04-07 ENCOUNTER — TELEPHONE (OUTPATIENT)
Dept: TRANSPLANT | Facility: CLINIC | Age: 67
End: 2022-04-07
Payer: COMMERCIAL

## 2022-04-07 VITALS
DIASTOLIC BLOOD PRESSURE: 84 MMHG | TEMPERATURE: 97.9 F | SYSTOLIC BLOOD PRESSURE: 145 MMHG | HEART RATE: 80 BPM | BODY MASS INDEX: 31.08 KG/M2 | HEIGHT: 67 IN | WEIGHT: 198 LBS

## 2022-04-07 DIAGNOSIS — L40.50 PSORIASIS WITH ARTHROPATHY (H): Primary | ICD-10-CM

## 2022-04-07 DIAGNOSIS — L40.9 SCALP PSORIASIS: ICD-10-CM

## 2022-04-07 PROCEDURE — G0463 HOSPITAL OUTPT CLINIC VISIT: HCPCS

## 2022-04-07 PROCEDURE — 99213 OFFICE O/P EST LOW 20 MIN: CPT | Performed by: INTERNAL MEDICINE

## 2022-04-07 RX ORDER — FLUOCINOLONE ACETONIDE 0.11 MG/ML
OIL TOPICAL 2 TIMES DAILY
Qty: 118.28 ML | Refills: 3 | Status: SHIPPED | OUTPATIENT
Start: 2022-04-07 | End: 2022-07-06

## 2022-04-07 RX ORDER — BETAMETHASONE VALERATE 1.2 MG/G
1 AEROSOL, FOAM TOPICAL 2 TIMES DAILY
Qty: 50 G | Refills: 4 | Status: SHIPPED | OUTPATIENT
Start: 2022-04-07 | End: 2022-04-21

## 2022-04-07 ASSESSMENT — PAIN SCALES - GENERAL: PAINLEVEL: NO PAIN (0)

## 2022-04-07 NOTE — PROGRESS NOTES
Outpatient Rheumatology Consultation  In person    Name: Phan Luque    MRN [id number]   Today's date: 4/7/2022         Reason for follow-up:  Evaluation and treatment of psoriatic arthritis   Requesting physician: Demetria Barger             Assessment & Plan:   66 year old female with hx of liver transplant in 2016 on tacrolimus who was referred to rheumatology in January 2022 by dermatology for evaluation and treatment of psoriatic arthritis in the context of one episode of left then right hand distal digit redness, swelling, tenderness, pain of multiple fingers. These symptoms did resolve and have not returned. No current synovitis of DIPs or other sites. No current dactylitis and she denied symptoms consistent with this at the time of her initial consultation and again today. No history of inflammatory eye disease though follows with Dr Ortega here for AION. She does have nail involvement and inverse psoriasis both of which increase the likelihood for inflammatory arthritis associated with psoriasis. Psoriasis currently under excellent control without any active lesions on exam today with topicals only.     I discussed with Jessica that her nail involvement and inverse psoriasis both increase the risk of inflammatory arthritis associated with psoriasis. 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 We discussed that with this isolated episode, complete lack of symptoms before/since and good control of her cutaneous psoriasis that a reasonable approach at time of initial consultation and again today would be to monitor closely for return of inflammatory arthritis symptoms. Should they return, we could consider therapy to reduce the frequency and severity of attacks with steroid sparing therapy. She agrees with this conservative approach.  We did obtain hand films  in January 2022 which not show any evidence of erosions/george.    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    She did have quite an elevated ESR when I initially saw her in January.  We repeated this today and it was what I suspect is much closer to her baseline.  It was 39 today and close to 100, 3 months ago.  Her CRP is negative today.    Plan:  1.  Labs today (results above) with CMP, ESR, CRP  2.  Patient will reach out if she has return of inflammatory arthritis symptoms and I will see her at that time  3.  Derma-Smoothe ordered for scalp psoriasis given the alcohol-based solution she currently has giving her side effects of pain and irritation which has not been tolerable.    Bry Alex MD  Rheumatology    I spent a total of 20 minutes on the date of service on chart review, patient encounter, , documentation.     Subjective:   Interval history 4/7/2022  Jessica has felt well since her last visit with me in January.  She has not had return of her pain, swelling/redness/warmth of her DIP joints.  No other red hot swollen joints elsewhere.  No morning stiffness or pain outside of her chronic baseline pain.  No fevers chills night sweats.  Weight has been stable.  No interval infections.  Her psoriasis remains well controlled with topicals though there was a switch in coverage for her scalp psoriasis topical and so she was tried on an alcohol-based topical which was very irritating and painful to use.  No development of symptoms consistent with dactylitis.  No development of symptoms consistent with laboratory eye disease.  No oral ulcers.  No symptoms consistent with inflammatory bowel disease.  Her review systems otherwise negative.    HPI from initial consultation on 1/6/2022  Initially developed scalp  flaking/hair loss which started roughly one year ago. Did also have axillary and gluteal cleft and nails involved. Initially left hand 2nd digit then 3rd digit. Then the right hand involved. Then on Sept 13th developed left hand second digit then 3rd digit redness/swelling/pain of distal aspect of her fingers. She was treated with 10 days of clindamycin though due to continued pain/redness of predominantly the 3rd digit she went to the walk-in clinic at [facility] and was given clindamycin again for what was presumed cellulitis that had not resolved with the initial course.  During this time had left hand 2nd/3rd digit throbbing pain/swelling. Also experienced peeling of the skin of the distal aspects of her digits. Her pain then resolved. Since that time, she reports 0/10 pain at rest which does not increase with use. Not much different with use. Has radiculopathy in neck/c spine involving the left upper extremity/clavical. No history consistent with dactylitis. 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 No history of inflammatory eye disease. Has intermittent ulcers in her mouth, a few weeks ago was most recent. Has been years prior to this since her last episode. Denies rectal or genital ulcers. Tried using clobetasol ointment on nails which she thought ws some helpful, though lasted for a few weeks. No symptoms of low back pain/stiffness which is worse in the AM and improves with use/exercise/stretching. No fevers/chills. No night sweats. Has intentionally lost some weight, no unintentional weight loss. Son with scalp involvement and recurrent ulcers. At this time she feels that her skin is currently under control with topical therapy without active lesions but knows that with stopping topicals that her psoriatic lesions return quickly.  She denies any other episodes of joint pain/redness/swelling/stiffness. Has baseline chronic knee pain worse with use/at the end of the day. Currently using  clobetasol solution, clobetasol shampoo once per week, ointment, 2 strengths triamcinalone. No symptoms consistent with raynauds. No other rash. No photosensitive skin changes/ symptoms. ROS otherwise negative.    Past Medical History  Past Medical History:   Diagnosis Date     AION (acute ischemic optic neuropathy)      Asthma      Bacterial infection 02/04/2021     Candida infection 11/11/2020     Cellulitis 09/13/2021     Cervical spinal stenosis      Chronic kidney disease      Chronic kidney disease, stage 3 (H)      Dizziness and giddiness     Created by Conversion      End stage liver disease (H)     2/2 Alcohol Abuse     End stage liver disease (H)     Alcohol-related     GERD (gastroesophageal reflux disease)      Hypertension      Liver transplant recipient (H) 08/25/2016    [facility]     Liver transplanted (H)      Migraine headache      Migraines      Osteoporosis     frax 11/1.7% 2021     PFO (patent foramen ovale) 08/08/2016    Noted on echo at [facility]     Recurrent UTI      Spinal stenosis in cervical region      Strabismus      Unspecified asthma(493.90)     Created by Conversion      Past Surgical History  Past Surgical History:   Procedure Laterality Date     APPENDECTOMY      1962     APPENDECTOMY       BENCH LIVER N/A 8/25/2016    Procedure: BENCH LIVER;  Surgeon: Larry Dhiloln MD;  Location:  OR     CATARACT IOL, RT/LT       COLONOSCOPY       COLONOSCOPY N/A 8/12/2021    Procedure: COLONOSCOPY, WITH POLYPECTOMY;  Surgeon: Arlyn Beckford MD;  [facility]     ESOPHAGOSCOPY, GASTROSCOPY, DUODENOSCOPY (EGD), COMBINED N/A 8/17/2016    Procedure: COMBINED ESOPHAGOSCOPY, GASTROSCOPY, DUODENOSCOPY (EGD);  Surgeon: Tao Alfonso MD;  Location:  GI     ESOPHAGOSCOPY, GASTROSCOPY, DUODENOSCOPY (EGD), COMBINED N/A 10/31/2016    Procedure: COMBINED ESOPHAGOSCOPY, GASTROSCOPY, DUODENOSCOPY (EGD), BIOPSY SINGLE OR MULTIPLE;  Surgeon: Ronald Bojorquez,  MD;  Location:  GI     LIVER TRANSPLANTATION  2016    [facility]     RECTAL SURGERY       sphincteroplasty       TRANSPLANT LIVER RECIPIENT  DONOR N/A 2016    Procedure: TRANSPLANT LIVER RECIPIENT  DONOR;  Surgeon: Larry Dhillon MD;  Location:  OR     TUBAL LIGATION      laparoscopic     TUBAL LIGATION         Medications  Current Outpatient Medications   Medication     acetaminophen (TYLENOL) 325 MG tablet     albuterol (VENTOLIN HFA) 108 (90 Base) MCG/ACT inhaler     amLODIPine (NORVASC) 5 MG tablet     calcipotriene (DOVONOX) 0.005 % external solution     chlorthalidone (HYGROTON) 25 MG tablet     cholecalciferol (VITAMIN D3) 400 UNIT TABS tablet     clindamycin (CLEOCIN) 300 MG capsule     clobetasol (TEMOVATE) 0.05 % external ointment     clobetasol (TEMOVATE) 0.05 % external solution     clobetasol propionate (CLOBEX) 0.05 % external shampoo     cyanocobalamin (VITAMIN  B-12) 1000 MCG tablet     ferrous sulfate (IRON) 325 (65 FE) MG tablet     folic acid (FOLVITE) 1 MG tablet     gabapentin (NEURONTIN) 100 MG capsule     hydrOXYzine (ATARAX) 25 MG tablet     levothyroxine (SYNTHROID/LEVOTHROID) 88 MCG tablet     MAGNESIUM OXIDE PO     melatonin 5 MG tablet     multivitamin, therapeutic (THERA-VIT) TABS tablet     mupirocin (BACTROBAN) 2 % external ointment     order for DME     pantoprazole (PROTONIX) 40 MG EC tablet     psyllium 0.52 G capsule     tacrolimus (GENERIC EQUIVALENT) 0.5 MG capsule     traMADol (ULTRAM) 50 MG tablet     triamcinolone (KENALOG) 0.025 % external ointment     triamcinolone (KENALOG) 0.1 % external ointment     ursodiol (ACTIGALL) 300 MG capsule     valACYclovir (VALTREX) 500 MG tablet     No current facility-administered medications for this visit.       Allergies   Allergies   Allergen Reactions     Codeine Hives     Benadryl [Diphenhydramine] Hives     Cefaclor Hives     Hydrocodone-Acetaminophen Itching     Oxycodone  "Itching     Penicillins Hives     Sulfa Drugs Hives     Family History: Psoriasis in her son. No other family history of autoimmune diseases.    Social History: Works for [employer]. 3 kids. Dec 13th 1996 quit smoking. Quit ETOH prior to transplant. No drug use hx.      Objective:    Physical exam:  BP (!) 145/84   Pulse 80   Temp 97.9  F (36.6  C) (Oral)   Ht 1.702 m (5' 7\")   Wt 89.8 kg (198 lb)   LMP  (LMP Unknown)   BMI 31.01 kg/m    GEN: sitting up unassisted, NAD  HEENT: No facial rash, sclera clear, no oral or nasal ulcers  CV: RRR, no m/r/g, mildly hypertensive  Pulm: CTAB no crackles wheezing ronchi  Abdomen: soft, non tender, not distended, expected chronic post surgical transplant scaring  Extremities: Full active and passive ROM of bilateral shoulders, elbows, wrists, MCPs, PIPs, DIPs, hips, knees, ankles. Able to make a full fist with good strength bilaterally. No synovitis of any joints. No dactylitis.  No sclerodactyly.  Skin: No acute cutaneous lesions. No current gluteal cleft lesions.  Mild scaling in bilateral axilla.  Mild left pink abdominal scaling patch.  Mild scalp psoriasis/flaking.     Labs:  WBC   Date Value Ref Range Status   05/17/2021 6.2 4.0 - 11.0 10e9/L Final     WBC Count   Date Value Ref Range Status   03/14/2022 6.7 4.0 - 11.0 10e3/uL Final     Hemoglobin   Date Value Ref Range Status   03/14/2022 14.2 11.7 - 15.7 g/dL Final   05/17/2021 13.3 11.7 - 15.7 g/dL Final     Platelet Count   Date Value Ref Range Status   03/14/2022 289 150 - 450 10e3/uL Final   05/17/2021 269 150 - 450 10e9/L Final     Creatinine   Date Value Ref Range Status   04/08/2022 1.40 (H) 0.60 - 1.10 mg/dL Final   05/17/2021 1.26 (H) 0.52 - 1.04 mg/dL Final     Lab Results   Component Value Date    ALKPHOS 61 04/08/2022    ALKPHOS 50 05/17/2021     AST   Date Value Ref Range Status   04/08/2022 18 0 - 40 U/L Final   05/17/2021 18 0 - 45 U/L Final     Lab Results   Component Value Date    ALT 14 " 04/08/2022    ALT 19 05/17/2021     Erythrocyte Sedimentation Rate   Date Value Ref Range Status   04/08/2022 39 (H) 0 - 20 mm/hr Final     CRP Inflammation   Date Value Ref Range Status   12/15/2020 <2.9 0.0 - 8.0 mg/L Final     CRP   Date Value Ref Range Status   04/08/2022 <0.1 0.0-<0.8 mg/dL Final     UA RESULTS:  Recent Labs   Lab Test 03/14/22  0754 03/25/21  0736   COLOR Yellow Yellow   APPEARANCE Clear Clear   URINEGLC Negative Negative   URINEBILI Negative Negative   URINEKETONE Negative Negative   SG 1.010 1.013   UBLD Negative Negative   URINEPH 5.5 7.0   PROTEIN Negative Negative   UROBILINOGEN 0.2  --    NITRITE Negative Negative   LEUKEST Negative Negative   RBCU  --  2   WBCU  --  <1      DNA (ds) Antibody   Date Value Ref Range Status   01/06/2022 1.2 <10.0 IU/mL Final     Comment:     Negative     RNP Antibody IgG   Date Value Ref Range Status   01/06/2022 Negative Negative Final       Imaging:  MRI left knee without contrast 7/25/2018     IMPRESSION:  CONCLUSION:  1.  Area of acute microtrabecular fracture in the anterior portion of the lateral tibial plateau.  2.  Moderately advanced chondromalacia patella.  3.  Mild degenerative changes in the medial and lateral compartments.  4.  Small curvilinear subacute hematoma along the lateral and anterolateral aspects of the knee and proximal calf.    7/21/2018  X-ray left knee  XR KNEE LEFT 1 OR 2 VWS  7/21/2018 10:14 PM     INDICATION: Knee pain  COMPARISON: None.     FINDINGS: Mild tricompartment osteoarthritis.. No fracture or dislocation. Trace joint effusion.    MR LUMBAR SPINE W/O CONTRAST 5/15/2018 4:00 PM     Provided History: ; Lumbar radicular pain; Acute left ankle pain     ICD-10: Lumbar radicular pain; Acute left ankle pain     Comparison: Lumbar spine radiographs 5/8/2018.     Technique: Sagittal T1-weighted, sagittal STIR, 3D volumetric axial  and sagittal reconstructed T2-weighted images of the lumbar spine were  obtained without  intravenous contrast.      Findings: There are 5 lumbar-type vertebrae convex left curvature of  the lumbar spine with apex at L3. The tip of the conus medullaris is  at L1. Normal lumbar alignment. Mild loss of intervertebral disc  height at L3-4. Schmorl's nodes in the opposing endplates at T12-L1.  Normal marrow signal.     On a level by level basis:     T12-L1: No spinal canal or neuroforaminal stenosis.     L1-2: No spinal canal or neuroforaminal stenosis.     L2-3: Bilateral facet hypertrophy. No spinal canal or neural foraminal  stenosis.     L3-4: Bilateral facet hypertrophy and ligamentum flavum thickening.  Mild spinal canal narrowing. No neural foraminal stenosis. Mild  bilateral facet joint effusions.     L4-5: Bilateral facet hypertrophy. Mild bilateral facet joint  effusions. No spinal canal or neural foraminal stenosis.     L5-S1: Bilateral facet hypertrophy. Prominent epidural fat in the  spinal canal. No spinal canal stenosis. Mild left neural foraminal  narrowing. No right foraminal stenosis.     Mild atrophy of the paraspinous musculature in the partially  visualized gluteal muscles.                                                                    Impression: Lumbar spondylosis with mild spinal canal narrowing at  L3-4. Mild neural foraminal narrowing on the left at L5-S1.

## 2022-04-07 NOTE — TELEPHONE ENCOUNTER
Patient Call: General  Route to LPN    Reason for call: Patient called wanting to speak with Palak or Lea in regards to their assistance with their [employer]Â®hart message. Non-urgent.    Call back needed? Yes    Return Call Needed  Same as documented in contacts section  When to return call?: Greater than one day: Route standard priority

## 2022-04-07 NOTE — LETTER
4/7/2022       RE: Phan Luque  [address]  [facility] [address]     Dear Colleague,    Thank you for referring your patient, Phan Luque, to the [facility] [place] [facility]. Please see a copy of my visit note below.    Outpatient Rheumatology Consultation  In person    Name: Phan Luque    MRN [id number]   Today's date: 4/7/2022         Reason for follow-up:  Evaluation and treatment of psoriatic arthritis   Requesting physician: Demetria Barger             Assessment & Plan:   66 year old female with hx of liver transplant in 2016 on tacrolimus who was referred to rheumatology in January 2022 by dermatology for evaluation and treatment of psoriatic arthritis in the context of one episode of left then right hand distal digit redness, swelling, tenderness, pain of multiple fingers. These symptoms did resolve and have not returned. No current synovitis of DIPs or other sites. No current dactylitis and she denied symptoms consistent with this at the time of her initial consultation and again today. No history of inflammatory eye disease though follows with Dr Ortega here for NARINDER. She does have nail involvement and inverse psoriasis both of which increase the likelihood for inflammatory arthritis associated with psoriasis. Psoriasis currently under excellent control without any active lesions on exam today with topicals only.     I discussed with Jessica that her nail involvement and inverse psoriasis both increase the risk of inflammatory arthritis associated with psoriasis. 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 We discussed that with this isolated episode, complete lack of symptoms before/since and good control of her cutaneous psoriasis that a reasonable approach  at time of initial consultation and again today would be to monitor closely for return of inflammatory arthritis symptoms. Should they return, we could consider therapy to reduce the frequency and severity of attacks with steroid sparing therapy. She agrees with this conservative approach.  We did obtain hand films in January 2022 which not show any evidence of erosions/george.    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    She did have quite an elevated ESR when I initially saw her in January.  We repeated this today and it was what I suspect is much closer to her baseline.  It was 39 today and close to 100, 3 months ago.  Her CRP is negative today.    Plan:  1.  Labs today (results above) with CMP, ESR, CRP  2.  Patient will reach out if she has return of inflammatory arthritis symptoms and I will see her at that time  3.  Derma-Smoothe ordered for scalp psoriasis given the alcohol-based solution she currently has giving her side effects of pain and irritation which has not been tolerable.    Bry Alex MD  Rheumatology    I spent a total of 20 minutes on the date of service on chart review, patient encounter, , documentation.     Subjective:   Interval history 4/7/2022  Jessica has felt well since her last visit with me in January.  She has not had return of her pain, swelling/redness/warmth of her DIP joints.  No other red hot swollen joints elsewhere.  No morning stiffness or pain outside of her chronic baseline pain.  No fevers chills night sweats.  Weight has been stable.  No interval infections.  Her psoriasis remains well controlled with topicals though there was a switch in coverage for her scalp psoriasis topical and so she was tried on an alcohol-based topical which was very irritating and painful to  use.  No development of symptoms consistent with dactylitis.  No development of symptoms consistent with laboratory eye disease.  No oral ulcers.  No symptoms consistent with inflammatory bowel disease.  Her review systems otherwise negative.    HPI from initial consultation on 1/6/2022  Initially developed scalp flaking/hair loss which started roughly one year ago. Did also have axillary and gluteal cleft and nails involved. Initially left hand 2nd digit then 3rd digit. Then the right hand involved. Then on Sept 13th developed left hand second digit then 3rd digit redness/swelling/pain of distal aspect of her fingers. She was treated with 10 days of clindamycin though due to continued pain/redness of predominantly the 3rd digit she went to the walk-in clinic at [facility] and was given clindamycin again for what was presumed cellulitis that had not resolved with the initial course.  During this time had left hand 2nd/3rd digit throbbing pain/swelling. Also experienced peeling of the skin of the distal aspects of her digits. Her pain then resolved. Since that time, she reports 0/10 pain at rest which does not increase with use. Not much different with use. Has radiculopathy in neck/c spine involving the left upper extremity/clavical. No history consistent with dactylitis. 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 No history of inflammatory eye disease. Has intermittent ulcers in her mouth, a few weeks ago was most recent. Has been years prior to this since her last episode. Denies rectal or genital ulcers. Tried using clobetasol ointment on nails which she thought ws some helpful, though lasted for a few weeks. No symptoms of low back pain/stiffness which is worse in the AM and improves with use/exercise/stretching. No fevers/chills. No night sweats. Has intentionally lost some weight, no unintentional weight loss. Son with scalp involvement and recurrent ulcers. At this time she feels that her  skin is currently under control with topical therapy without active lesions but knows that with stopping topicals that her psoriatic lesions return quickly.  She denies any other episodes of joint pain/redness/swelling/stiffness. Has baseline chronic knee pain worse with use/at the end of the day. Currently using clobetasol solution, clobetasol shampoo once per week, ointment, 2 strengths triamcinalone. No symptoms consistent with raynauds. No other rash. No photosensitive skin changes/ symptoms. ROS otherwise negative.    Past Medical History  Past Medical History:   Diagnosis Date     AION (acute ischemic optic neuropathy)      Asthma      Bacterial infection 02/04/2021     Candida infection 11/11/2020     Cellulitis 09/13/2021     Cervical spinal stenosis      Chronic kidney disease      Chronic kidney disease, stage 3 (H)      Dizziness and giddiness     Created by Conversion      End stage liver disease (H)     2/2 Alcohol Abuse     End stage liver disease (H)     Alcohol-related     GERD (gastroesophageal reflux disease)      Hypertension      Liver transplant recipient (H) 08/25/2016    [facility]     Liver transplanted (H)      Migraine headache      Migraines      Osteoporosis     frax 11/1.7% 2021     PFO (patent foramen ovale) 08/08/2016    Noted on echo at [facility]     Recurrent UTI      Spinal stenosis in cervical region      Strabismus      Unspecified asthma(493.90)     Created by Conversion      Past Surgical History  Past Surgical History:   Procedure Laterality Date     APPENDECTOMY      1962     APPENDECTOMY       BENCH LIVER N/A 8/25/2016    Procedure: BENCH LIVER;  Surgeon: Larry Dhillon MD;  Location: [facility] OR     CATARACT IOL, RT/LT       COLONOSCOPY       COLONOSCOPY N/A 8/12/2021    Procedure: COLONOSCOPY, WITH POLYPECTOMY;  Surgeon: Arlyn Beckford MD;  Location: [facility] OR     ESOPHAGOSCOPY, GASTROSCOPY, DUODENOSCOPY (EGD), COMBINED N/A 8/17/2016     Procedure: COMBINED ESOPHAGOSCOPY, GASTROSCOPY, DUODENOSCOPY (EGD);  Surgeon: Tao Alfonso MD;  Location: [facility]     ESOPHAGOSCOPY, GASTROSCOPY, DUODENOSCOPY (EGD), COMBINED N/A 10/31/2016    Procedure: COMBINED ESOPHAGOSCOPY, GASTROSCOPY, DUODENOSCOPY (EGD), BIOPSY SINGLE OR MULTIPLE;  Surgeon: Ronald Bojorquez MD;  Location:  GI     LIVER TRANSPLANTATION  2016    [facility]     RECTAL SURGERY       sphincteroplasty       TRANSPLANT LIVER RECIPIENT  DONOR N/A 2016    Procedure: TRANSPLANT LIVER RECIPIENT  DONOR;  Surgeon: Larry Dhillon MD;  Location: [facility] OR     TUBAL LIGATION      laparoscopic     TUBAL LIGATION         Medications  Current Outpatient Medications   Medication     acetaminophen (TYLENOL) 325 MG tablet     albuterol (VENTOLIN HFA) 108 (90 Base) MCG/ACT inhaler     amLODIPine (NORVASC) 5 MG tablet     calcipotriene (DOVONOX) 0.005 % external solution     chlorthalidone (HYGROTON) 25 MG tablet     cholecalciferol (VITAMIN D3) 400 UNIT TABS tablet     clindamycin (CLEOCIN) 300 MG capsule     clobetasol (TEMOVATE) 0.05 % external ointment     clobetasol (TEMOVATE) 0.05 % external solution     clobetasol propionate (CLOBEX) 0.05 % external shampoo     cyanocobalamin (VITAMIN  B-12) 1000 MCG tablet     ferrous sulfate (IRON) 325 (65 FE) MG tablet     folic acid (FOLVITE) 1 MG tablet     gabapentin (NEURONTIN) 100 MG capsule     hydrOXYzine (ATARAX) 25 MG tablet     levothyroxine (SYNTHROID/LEVOTHROID) 88 MCG tablet     MAGNESIUM OXIDE PO     melatonin 5 MG tablet     multivitamin, therapeutic (THERA-VIT) TABS tablet     mupirocin (BACTROBAN) 2 % external ointment     order for DME     pantoprazole (PROTONIX) 40 MG EC tablet     psyllium 0.52 G capsule     tacrolimus (GENERIC EQUIVALENT) 0.5 MG capsule     traMADol (ULTRAM) 50 MG tablet     triamcinolone (KENALOG) 0.025 % external ointment     triamcinolone (KENALOG) 0.1 % external ointment      "ursodiol (ACTIGALL) 300 MG capsule     valACYclovir (VALTREX) 500 MG tablet     No current facility-administered medications for this visit.       Allergies   Allergies   Allergen Reactions     Codeine Hives     Benadryl [Diphenhydramine] Hives     Cefaclor Hives     Hydrocodone-Acetaminophen Itching     Oxycodone Itching     Penicillins Hives     Sulfa Drugs Hives     Family History: Psoriasis in her son. No other family history of autoimmune diseases.    Social History: Works for [employer]. 3 kids. Dec 13th 1996 quit smoking. Quit ETOH prior to transplant. No drug use hx.      Objective:    Physical exam:  BP (!) 145/84   Pulse 80   Temp 97.9  F (36.6  C) (Oral)   Ht 1.702 m (5' 7\")   Wt 89.8 kg (198 lb)   LMP  (LMP Unknown)   BMI 31.01 kg/m    GEN: sitting up unassisted, NAD  HEENT: No facial rash, sclera clear, no oral or nasal ulcers  CV: RRR, no m/r/g, mildly hypertensive  Pulm: CTAB no crackles wheezing ronchi  Abdomen: soft, non tender, not distended, expected chronic post surgical transplant scaring  Extremities: Full active and passive ROM of bilateral shoulders, elbows, wrists, MCPs, PIPs, DIPs, hips, knees, ankles. Able to make a full fist with good strength bilaterally. No synovitis of any joints. No dactylitis.  No sclerodactyly.  Skin: No acute cutaneous lesions. No current gluteal cleft lesions.  Mild scaling in bilateral axilla.  Mild left pink abdominal scaling patch.  Mild scalp psoriasis/flaking.     Labs:  WBC   Date Value Ref Range Status   05/17/2021 6.2 4.0 - 11.0 10e9/L Final     WBC Count   Date Value Ref Range Status   03/14/2022 6.7 4.0 - 11.0 10e3/uL Final     Hemoglobin   Date Value Ref Range Status   03/14/2022 14.2 11.7 - 15.7 g/dL Final   05/17/2021 13.3 11.7 - 15.7 g/dL Final     Platelet Count   Date Value Ref Range Status   03/14/2022 289 150 - 450 10e3/uL Final   05/17/2021 269 150 - 450 10e9/L Final     Creatinine   Date Value Ref Range Status   04/08/2022 1.40 " (H) 0.60 - 1.10 mg/dL Final   05/17/2021 1.26 (H) 0.52 - 1.04 mg/dL Final     Lab Results   Component Value Date    ALKPHOS 61 04/08/2022    ALKPHOS 50 05/17/2021     AST   Date Value Ref Range Status   04/08/2022 18 0 - 40 U/L Final   05/17/2021 18 0 - 45 U/L Final     Lab Results   Component Value Date    ALT 14 04/08/2022    ALT 19 05/17/2021     Erythrocyte Sedimentation Rate   Date Value Ref Range Status   04/08/2022 39 (H) 0 - 20 mm/hr Final     CRP Inflammation   Date Value Ref Range Status   12/15/2020 <2.9 0.0 - 8.0 mg/L Final     CRP   Date Value Ref Range Status   04/08/2022 <0.1 0.0-<0.8 mg/dL Final     UA RESULTS:  Recent Labs   Lab Test 03/14/22  0754 03/25/21  0736   COLOR Yellow Yellow   APPEARANCE Clear Clear   URINEGLC Negative Negative   URINEBILI Negative Negative   URINEKETONE Negative Negative   SG 1.010 1.013   UBLD Negative Negative   URINEPH 5.5 7.0   PROTEIN Negative Negative   UROBILINOGEN 0.2  --    NITRITE Negative Negative   LEUKEST Negative Negative   RBCU  --  2   WBCU  --  <1      DNA (ds) Antibody   Date Value Ref Range Status   01/06/2022 1.2 <10.0 IU/mL Final     Comment:     Negative     RNP Antibody IgG   Date Value Ref Range Status   01/06/2022 Negative Negative Final       Imaging:  MRI left knee without contrast 7/25/2018     IMPRESSION:  CONCLUSION:  1.  Area of acute microtrabecular fracture in the anterior portion of the lateral tibial plateau.  2.  Moderately advanced chondromalacia patella.  3.  Mild degenerative changes in the medial and lateral compartments.  4.  Small curvilinear subacute hematoma along the lateral and anterolateral aspects of the knee and proximal calf.    7/21/2018  X-ray left knee  XR KNEE LEFT 1 OR 2 VWS  7/21/2018 10:14 PM     INDICATION: Knee pain  COMPARISON: None.     FINDINGS: Mild tricompartment osteoarthritis.. No fracture or dislocation. Trace joint effusion.    MR LUMBAR SPINE W/O CONTRAST 5/15/2018 4:00 PM     Provided History: ; Lumbar  radicular pain; Acute left ankle pain     ICD-10: Lumbar radicular pain; Acute left ankle pain     Comparison: Lumbar spine radiographs 5/8/2018.     Technique: Sagittal T1-weighted, sagittal STIR, 3D volumetric axial  and sagittal reconstructed T2-weighted images of the lumbar spine were  obtained without intravenous contrast.      Findings: There are 5 lumbar-type vertebrae convex left curvature of  the lumbar spine with apex at L3. The tip of the conus medullaris is  at L1. Normal lumbar alignment. Mild loss of intervertebral disc  height at L3-4. Schmorl's nodes in the opposing endplates at T12-L1.  Normal marrow signal.     On a level by level basis:     T12-L1: No spinal canal or neuroforaminal stenosis.     L1-2: No spinal canal or neuroforaminal stenosis.     L2-3: Bilateral facet hypertrophy. No spinal canal or neural foraminal  stenosis.     L3-4: Bilateral facet hypertrophy and ligamentum flavum thickening.  Mild spinal canal narrowing. No neural foraminal stenosis. Mild  bilateral facet joint effusions.     L4-5: Bilateral facet hypertrophy. Mild bilateral facet joint  effusions. No spinal canal or neural foraminal stenosis.     L5-S1: Bilateral facet hypertrophy. Prominent epidural fat in the  spinal canal. No spinal canal stenosis. Mild left neural foraminal  narrowing. No right foraminal stenosis.     Mild atrophy of the paraspinous musculature in the partially  visualized gluteal muscles.                                                                    Impression: Lumbar spondylosis with mild spinal canal narrowing at  L3-4. Mild neural foraminal narrowing on the left at L5-S1.

## 2022-04-07 NOTE — NURSING NOTE
"Chief Complaint   Patient presents with     RECHECK     follow up with psoriatic arthritis     BP (!) 145/84   Pulse 80   Temp 97.9  F (36.6  C) (Oral)   Ht 1.702 m (5' 7\")   Wt 89.8 kg (198 lb)   LMP  (LMP Unknown)   BMI 31.01 kg/m    Jasmin Guardado CMA on 4/7/2022 at 10:37 AM    "

## 2022-04-08 ENCOUNTER — LAB (OUTPATIENT)
Dept: LAB | Facility: CLINIC | Age: 67
End: 2022-04-08
Payer: COMMERCIAL

## 2022-04-08 DIAGNOSIS — L40.50 PSORIASIS WITH ARTHROPATHY (H): ICD-10-CM

## 2022-04-08 LAB
ALBUMIN SERPL-MCNC: 3.7 G/DL (ref 3.5–5)
ALP SERPL-CCNC: 61 U/L (ref 45–120)
ALT SERPL W P-5'-P-CCNC: 14 U/L (ref 0–45)
ANION GAP SERPL CALCULATED.3IONS-SCNC: 11 MMOL/L (ref 5–18)
AST SERPL W P-5'-P-CCNC: 18 U/L (ref 0–40)
BILIRUB SERPL-MCNC: 0.9 MG/DL (ref 0–1)
BUN SERPL-MCNC: 31 MG/DL (ref 8–22)
C REACTIVE PROTEIN LHE: <0.1 MG/DL (ref 0–0.8)
CALCIUM SERPL-MCNC: 9.4 MG/DL (ref 8.5–10.5)
CHLORIDE BLD-SCNC: 103 MMOL/L (ref 98–107)
CO2 SERPL-SCNC: 25 MMOL/L (ref 22–31)
CREAT SERPL-MCNC: 1.4 MG/DL (ref 0.6–1.1)
ERYTHROCYTE [SEDIMENTATION RATE] IN BLOOD BY WESTERGREN METHOD: 39 MM/HR (ref 0–20)
GFR SERPL CREATININE-BSD FRML MDRD: 42 ML/MIN/1.73M2
GLUCOSE BLD-MCNC: 85 MG/DL (ref 70–125)
POTASSIUM BLD-SCNC: 4.4 MMOL/L (ref 3.5–5)
PROT SERPL-MCNC: 7 G/DL (ref 6–8)
SODIUM SERPL-SCNC: 139 MMOL/L (ref 136–145)

## 2022-04-08 PROCEDURE — 86140 C-REACTIVE PROTEIN: CPT

## 2022-04-08 PROCEDURE — 80053 COMPREHEN METABOLIC PANEL: CPT

## 2022-04-08 PROCEDURE — 36415 COLL VENOUS BLD VENIPUNCTURE: CPT

## 2022-04-08 PROCEDURE — 85652 RBC SED RATE AUTOMATED: CPT

## 2022-04-28 ENCOUNTER — TELEPHONE (OUTPATIENT)
Dept: TRANSPLANT | Facility: CLINIC | Age: 67
End: 2022-04-28

## 2022-04-28 ENCOUNTER — INFUSION THERAPY VISIT (OUTPATIENT)
Dept: NURSING | Facility: CLINIC | Age: 67
End: 2022-04-28
Payer: COMMERCIAL

## 2022-04-28 DIAGNOSIS — U07.1 INFECTION DUE TO 2019 NOVEL CORONAVIRUS: Primary | ICD-10-CM

## 2022-04-28 PROCEDURE — M0220 PR INJECTION TIXAGEVIMAB & CILGAVIMAB (EVUSHELD): HCPCS | Performed by: INTERNAL MEDICINE

## 2022-04-28 NOTE — PROGRESS NOTES
Evusheld Consent   Confirmed patient received the Emergency Use Authorization Fact Sheet for Patients, Parents and Caregivers prior to receiving medication. We discussed the following risks and benefits of receiving EVUSHELD, as well as alternative treatments and the patient wished to proceed with EVUSHELD.     Providers believe the benefits of Evusheld outweigh the risks for all moderately to severely immunocompromised patients.      Benefits:   Evusheld is a synthetic antibody that provides protection against COVID-19 for 6 months and is recommended for patients that have a weakened immune system that may not be able to make antibodies themselves.     Risks:    There is a very small risk of allergic reactions and you should notify us if you have any symptoms of an allergic reaction after the injection. There were also some extremely rare cardiac events reported in the initial trials in people that already had heart disease, but experts do not think these were caused by the medication. We will observe you for any chest pain or trouble breathing after the injections and you can reach out to your provider if any of these symptoms develop.     Alternatives:   Vaccines to prevent COVID-19 are approved or available under Emergency Use Authorization. Use of EVUSHELD does not replace vaccination against COVID-19. You can continue to mask and isolate to avoid infections. It is your choice to receive or not receive EVUSHELD. Should you decide not to receive EVUSHELD, it will not change your standard medical care.     Patient does consent to the injection.    EVUSHELD Administration Note:  Phan Luque presents today for EVUSHELD.   present during visit today: Not Applicable.    Consent:   Patient provided verbal consent  Post Injection Assessment:   Patient tolerated injection without incident.      Patient was observed in the room for a minimum of 10 minutes after injection per standard, then remained in the  buidling for a total 60 minute observation period.         Discharge Plan:    Patient discharged in stable condition accompanied by: self.     Joyce Mack RN

## 2022-04-28 NOTE — TELEPHONE ENCOUNTER
Left message for Jessica to return call. Pt is set up for gideon. Left message that it is safe for her to receive

## 2022-05-08 DIAGNOSIS — R60.9 FLUID RETENTION: ICD-10-CM

## 2022-05-08 DIAGNOSIS — I10 ESSENTIAL HYPERTENSION: ICD-10-CM

## 2022-05-08 DIAGNOSIS — N18.30 CKD (CHRONIC KIDNEY DISEASE) STAGE 3, GFR 30-59 ML/MIN (H): ICD-10-CM

## 2022-05-10 RX ORDER — CHLORTHALIDONE 25 MG/1
12.5 TABLET ORAL DAILY
Qty: 45 TABLET | Refills: 0 | Status: SHIPPED | OUTPATIENT
Start: 2022-05-10 | End: 2022-05-25

## 2022-05-10 RX ORDER — AMLODIPINE BESYLATE 5 MG/1
5 TABLET ORAL DAILY
Qty: 90 TABLET | Refills: 0 | Status: SHIPPED | OUTPATIENT
Start: 2022-05-10 | End: 2022-05-25

## 2022-05-10 NOTE — TELEPHONE ENCOUNTER
AMLODIPINE BESYLATE 5MG TABS      Last Written Prescription Date:  10/20/21  Last Fill Quantity: 90,   # refills: 1  Last Office Visit : 12/9/21  Future Office visit:  5/25/22    Routing refill request to provider for review/approval because:  Blood pressure out of range  BP Readings from Last 3 Encounters:   04/07/22 (!) 145/84   02/09/22 125/80   01/06/22 136/81      Abnormal creatinine - trends high    Lab Test 04/08/22  0838   CR 1.40*       CHLORTHALIDONE 25MG TABS      Last Written Prescription Date:  10/20/21  Last Fill Quantity: 45,   # refills: 1  Last Office Visit : 12/9/21  Routing refill request to provider for review/approval because:  Blood pressure out of range  BP Readings from Last 3 Encounters:   04/07/22 (!) 145/84   02/09/22 125/80   01/06/22 136/81      Abnormal creatinine - trends high    Lab Test 04/08/22  0838   CR 1.40*     90 day refills provided, routed to clinic for follow up

## 2022-05-17 ENCOUNTER — OFFICE VISIT (OUTPATIENT)
Dept: FAMILY MEDICINE | Facility: CLINIC | Age: 67
End: 2022-05-17
Payer: COMMERCIAL

## 2022-05-17 VITALS
RESPIRATION RATE: 16 BRPM | SYSTOLIC BLOOD PRESSURE: 144 MMHG | WEIGHT: 194 LBS | DIASTOLIC BLOOD PRESSURE: 86 MMHG | TEMPERATURE: 97.9 F | HEART RATE: 81 BPM | BODY MASS INDEX: 30.38 KG/M2 | OXYGEN SATURATION: 98 %

## 2022-05-17 DIAGNOSIS — J45.21 MILD INTERMITTENT ASTHMA WITH EXACERBATION: Primary | ICD-10-CM

## 2022-05-17 DIAGNOSIS — Z79.621 LONG-TERM CURRENT USE OF TACROLIMUS: ICD-10-CM

## 2022-05-17 DIAGNOSIS — D84.9 IMMUNOSUPPRESSION (H): ICD-10-CM

## 2022-05-17 PROCEDURE — 99214 OFFICE O/P EST MOD 30 MIN: CPT | Performed by: PHYSICIAN ASSISTANT

## 2022-05-17 RX ORDER — DOXYCYCLINE 100 MG/1
100 CAPSULE ORAL 2 TIMES DAILY
Qty: 20 CAPSULE | Refills: 0 | Status: SHIPPED | OUTPATIENT
Start: 2022-05-17 | End: 2022-05-25

## 2022-05-17 RX ORDER — PREDNISONE 20 MG/1
TABLET ORAL
COMMUNITY
Start: 2022-05-13 | End: 2023-07-10

## 2022-05-17 RX ORDER — BENZONATATE 100 MG/1
CAPSULE ORAL
COMMUNITY
Start: 2022-05-13 | End: 2022-05-25

## 2022-05-17 NOTE — PROGRESS NOTES
Patient presents with:  Cough: Has had cough congestion headache for 1 week was given prednisone had 2 negative COVID tests has barky cough with excertion and feels SOB      Clinical Decision Making:  Patient has immunocompromise status with history of organ transplant and tacrolimus therapy.  Patient is given a albuterol spacer in the office.  She will continue with the albuterol inhaler.  Respiratory antibiotic as written to help with her bronchitis. Expected course of resolution and indication for return was gone over and questions were answered to patient/parent's satisfaction before discharge.          ICD-10-CM    1. Mild intermittent asthma with exacerbation  J45.21 doxycycline hyclate (VIBRAMYCIN) 100 MG capsule       Patient Instructions   You were seen today for acute bronchitis.     Symptom management:  - Get plenty of rest  - Avoid smoking and second hand smoke  - May take tylenol or ibuprofen for fever/discomfort  - Drink plenty of non-caffeinated fluids  - Use nasal steroid spray for sinus congestion  - Albuterol inhaler may be used every 6 hours as needed for chest tightness      Reasons to be seen in the emergency room:  - Develop a fever of 100.4 or higher  - Cough changes, coughing up blood, or become short of breath  - Neck stiffness  - Chest pain  - Severe headache  - Unable to tolerate eating or drinking fluids    Otherwise, if no symptom improvement after 5 days, follow-up with your primary care provider.          HPI:  Phan Luque is a 66 year old female who has a past medical history for organ transplant is on tacrolimus who presents today for a sore throat headache myalgias arthralgias slight short of breath and chest congestion.  Patient had checked her cough with 2 COVID tests with the last one on Friday of last week.  Patient has developed some slight laryngitis in addition to the sore throat.  She was treated at home with a virtual visit with prednisone and benzonatate and an  albuterol inhaler every 4 hours.  Patient continues eat and drink normally does not have vomiting diarrhea or skin rash.    History obtained from chart review and the patient.    Problem List:  2021-09: Cellulitis  2021-03: Psoriasis  2021-02: Bacterial infection  2020-11: Candida infection  2019-10: Right foot pain  2017-06: Alcohol abuse, uncomplicated  2017-05: Osteopenia  2017-03: Anemia in stage 3 chronic kidney disease (H)  2017-03: CKD (chronic kidney disease) stage 3, GFR 30-59 ml/min (H)  2017-01: Acute renal failure (H)  2016-12: Recurrent major depression in partial remission (H)  2016-12: Insomnia  2016-11: Diarrhea  2016-11: Hypothyroidism  2016-11: Esophageal reflux  2016-10: ACP (advance care planning)  2016-10: Nausea & vomiting  2016-10: Malnutrition (H)  2016-10: Candidiasis of skin  2016-10: Anemia  2016-09: Immunosuppression (H)  2016-09: Acute kidney injury (H)  2016-09: Anxiety  2016-09: Alcoholic hepatitis without ascites  2016-09: Enterococcus UTI  2016-09: Liver transplant status (H)  2016-08: Alcoholic hepatitis  2016-08: Liver transplanted (H)  2016-07: Decompensation of cirrhosis of liver (H)  2016-07: Alcoholic hepatitis  2016-07: Sepsis (H)  Hepatitis, acute  Hypotension  Liver transplanted (H)      Past Medical History:   Diagnosis Date     AION (acute ischemic optic neuropathy)      Asthma      Bacterial infection 02/04/2021     Candida infection 11/11/2020     Cellulitis 09/13/2021     Cervical spinal stenosis      Chronic kidney disease      Chronic kidney disease, stage 3 (H)      Dizziness and giddiness     Created by Conversion      End stage liver disease (H)     2/2 Alcohol Abuse     End stage liver disease (H)     Alcohol-related     GERD (gastroesophageal reflux disease)      Hypertension      Liver transplant recipient (H) 08/25/2016    [facility]     Liver transplanted (H)      Migraine headache      Migraines      Osteoporosis     frax 11/1.7% 2021      PFO (patent foramen ovale) 2016    Noted on echo at [facility]     Recurrent UTI      Spinal stenosis in cervical region      Strabismus      Unspecified asthma(493.90)     Created by Conversion        Social History     Tobacco Use     Smoking status: Former Smoker     Packs/day: 2.50     Years: 20.00     Pack years: 50.00     Quit date: 1996     Years since quittin.4     Smokeless tobacco: Never Used   Substance Use Topics     Alcohol use: No     Alcohol/week: 7.0 standard drinks     Types: 7 Standard drinks or equivalent per week     Comment: heavy use (750ml/2 days) up until 1 year ago; had cut down to 1 drink/day; none since 16       Review of Systems  As above in HPI otherwise negative.    Vitals:    22 1801   BP: (!) 144/86   Pulse: 81   Resp: 16   Temp: 97.9  F (36.6  C)   TempSrc: Oral   SpO2: 98%   Weight: 88 kg (194 lb)       General: Patient is resting comfortably no acute distress is afebrile  HEENT: Head is normocephalic atraumatic   eyes are PERRL EOMI sclera anicteric   TMs are clear bilaterally  Throat is without pharyngeal wall erythema and no exudate  No cervical lymphadenopathy present  LUNGS: Slight prolonged expiratory wheezes in the mid to lower lung fields.  Normal respiratory effort and excursion  HEART: Regular rate and rhythm  Skin: Without rash non-diaphoretic    Physical Exam    At the end of the encounter, I discussed results, diagnosis, medications. Discussed red flags for immediate return to clinic/ER, as well as indications for follow up if no improvement. Patient understood and agreed to plan. Patient was stable for discharge.

## 2022-05-23 ASSESSMENT — ENCOUNTER SYMPTOMS
EYE REDNESS: 0
WHEEZING: 0
DOUBLE VISION: 0
NECK PAIN: 1
MEMORY LOSS: 0
TASTE DISTURBANCE: 1
TROUBLE SWALLOWING: 0
SMELL DISTURBANCE: 1
NUMBNESS: 0
SINUS CONGESTION: 1
JOINT SWELLING: 0
HEADACHES: 1
POSTURAL DYSPNEA: 1
DEPRESSION: 1
NERVOUS/ANXIOUS: 1
SHORTNESS OF BREATH: 1
MUSCLE WEAKNESS: 0
TREMORS: 0
DECREASED CONCENTRATION: 1
MYALGIAS: 1
BACK PAIN: 0
EYE PAIN: 0
EYE WATERING: 0
DYSPNEA ON EXERTION: 1
INSOMNIA: 1
SPEECH CHANGE: 0
SINUS PAIN: 1
DIZZINESS: 1
SPUTUM PRODUCTION: 1
COUGH DISTURBING SLEEP: 0
EYE IRRITATION: 0
SEIZURES: 0
HEMOPTYSIS: 0
COUGH: 1
MUSCLE CRAMPS: 1
PANIC: 1
TINGLING: 1
SORE THROAT: 1
SNORES LOUDLY: 0
NECK MASS: 0
DISTURBANCES IN COORDINATION: 0
LOSS OF CONSCIOUSNESS: 0
WEAKNESS: 0
PARALYSIS: 0
HOARSE VOICE: 1
ARTHRALGIAS: 1
STIFFNESS: 1

## 2022-05-23 ASSESSMENT — ACTIVITIES OF DAILY LIVING (ADL)
IN_THE_PAST_7_DAYS,_DID_YOU_NEED_HELP_FROM_OTHERS_TO_PERFORM_EVERYDAY_ACTIVITIES_SUCH_AS_EATING,_GETTING_DRESSED,_GROOMING,_BATHING,_WALKING,_OR_USING_THE_TOILET: N
IN_THE_PAST_7_DAYS,_DID_YOU_NEED_HELP_FROM_OTHERS_TO_TAKE_CARE_OF_THINGS_SUCH_AS_LAUNDRY_AND_HOUSEKEEPING,_BANKING,_SHOPPING,_USING_THE_TELEPHONE,_FOOD_PREPARATION,_TRANSPORTATION,_OR_TAKING_YOUR_OWN_MEDICATIONS?: N

## 2022-05-25 ENCOUNTER — MYC MEDICAL ADVICE (OUTPATIENT)
Dept: INTERNAL MEDICINE | Facility: CLINIC | Age: 67
End: 2022-05-25

## 2022-05-25 ENCOUNTER — OFFICE VISIT (OUTPATIENT)
Dept: INTERNAL MEDICINE | Facility: CLINIC | Age: 67
End: 2022-05-25
Payer: COMMERCIAL

## 2022-05-25 VITALS
RESPIRATION RATE: 12 BRPM | OXYGEN SATURATION: 97 % | HEIGHT: 67 IN | DIASTOLIC BLOOD PRESSURE: 76 MMHG | BODY MASS INDEX: 30.34 KG/M2 | WEIGHT: 193.3 LBS | TEMPERATURE: 97.6 F | SYSTOLIC BLOOD PRESSURE: 127 MMHG | HEART RATE: 84 BPM

## 2022-05-25 DIAGNOSIS — R60.9 FLUID RETENTION: ICD-10-CM

## 2022-05-25 DIAGNOSIS — K21.9 GASTROESOPHAGEAL REFLUX DISEASE WITHOUT ESOPHAGITIS: ICD-10-CM

## 2022-05-25 DIAGNOSIS — N18.30 STAGE 3 CHRONIC KIDNEY DISEASE, UNSPECIFIED WHETHER STAGE 3A OR 3B CKD (H): ICD-10-CM

## 2022-05-25 DIAGNOSIS — R94.6 THYROID FUNCTION TEST ABNORMAL: Primary | ICD-10-CM

## 2022-05-25 DIAGNOSIS — I10 ESSENTIAL HYPERTENSION: ICD-10-CM

## 2022-05-25 DIAGNOSIS — G43.809 OTHER MIGRAINE WITHOUT STATUS MIGRAINOSUS, NOT INTRACTABLE: ICD-10-CM

## 2022-05-25 PROCEDURE — 99397 PER PM REEVAL EST PAT 65+ YR: CPT | Performed by: INTERNAL MEDICINE

## 2022-05-25 PROCEDURE — 99213 OFFICE O/P EST LOW 20 MIN: CPT | Mod: 25 | Performed by: INTERNAL MEDICINE

## 2022-05-25 RX ORDER — PROPRANOLOL HYDROCHLORIDE 10 MG/1
10 TABLET ORAL 2 TIMES DAILY
Qty: 60 TABLET | Refills: 2 | Status: SHIPPED | OUTPATIENT
Start: 2022-05-25 | End: 2022-05-25

## 2022-05-25 RX ORDER — CHLORTHALIDONE 25 MG/1
12.5 TABLET ORAL DAILY
Qty: 45 TABLET | Refills: 3 | Status: SHIPPED | OUTPATIENT
Start: 2022-05-25 | End: 2022-05-25

## 2022-05-25 RX ORDER — PANTOPRAZOLE SODIUM 40 MG/1
TABLET, DELAYED RELEASE ORAL
Qty: 90 TABLET | Refills: 3 | Status: SHIPPED | OUTPATIENT
Start: 2022-05-25 | End: 2022-05-25

## 2022-05-25 RX ORDER — CHLORTHALIDONE 25 MG/1
12.5 TABLET ORAL DAILY
Qty: 45 TABLET | Refills: 3 | Status: SHIPPED | OUTPATIENT
Start: 2022-05-25 | End: 2023-08-08

## 2022-05-25 RX ORDER — PANTOPRAZOLE SODIUM 40 MG/1
TABLET, DELAYED RELEASE ORAL
Qty: 90 TABLET | Refills: 3 | Status: SHIPPED | OUTPATIENT
Start: 2022-05-25 | End: 2023-06-21

## 2022-05-25 RX ORDER — AMLODIPINE BESYLATE 5 MG/1
5 TABLET ORAL DAILY
Qty: 90 TABLET | Refills: 3 | Status: SHIPPED | OUTPATIENT
Start: 2022-05-25 | End: 2022-05-25

## 2022-05-25 RX ORDER — PROPRANOLOL HYDROCHLORIDE 10 MG/1
10 TABLET ORAL 2 TIMES DAILY
Qty: 60 TABLET | Refills: 2 | Status: SHIPPED | OUTPATIENT
Start: 2022-05-25 | End: 2022-11-16

## 2022-05-25 RX ORDER — AMLODIPINE BESYLATE 5 MG/1
5 TABLET ORAL DAILY
Qty: 90 TABLET | Refills: 3 | Status: SHIPPED | OUTPATIENT
Start: 2022-05-25 | End: 2023-06-21

## 2022-05-25 ASSESSMENT — PAIN SCALES - GENERAL: PAINLEVEL: NO PAIN (0)

## 2022-05-25 NOTE — TELEPHONE ENCOUNTER
Last Clinic Visit: 5/25/2022  [facility] [place]  Remaining refills sent to requested pharmacy.

## 2022-05-25 NOTE — PROGRESS NOTES
History of Present Illness:  Ms. Luque is a 66 year old female who presents for  Chief Complaint   Patient presents with     Wellness Visit     Patient comes in for a wellness exam.     Jessica has a PMH notable for alcoholic hepatitis s/p liver transplant, anxiety, migraines, HTN, CKD 3a, IBS.    She got Evusheld in April, due for 5th dose of covid vaccine, she didn't have covid as far as she knows  Was she dealing with sinus infection, URI, covid was negative  Has had an asthma flare with this, uses albuterol prn    Has seen Rheum for psorasis, has scalp psoriasis and likely arthritis  CRC for hemorrhoids/prolapse  Pain clinic and Neurosurgery for neck pain, she did PT and dry needling  Dealing with headaches lately, gets an aura, unilateral pain, stress triggers, used to be on propranolol  Trying to sell house  Was seeing ophtho for AION    Routine Health Maintenence:  Immunizations (zoster, pneumovax, flu, Tdap, Hep A/B):           Most Recent Immunizations   Administered Date(s) Administered     HEPA 01/13/2009     Influenza Vaccine IM 3yrs+ 4 Valent IIV4 10/18/2017     Mantoux Tuberculin Skin Test 10/06/2016     Pneumo Conj 13-V (2010&after) 08/19/2016     TD (ADULT, 7+) 01/21/2004     TDAP Vaccine (Adacel) 08/18/2016      Lipids:               Recent Labs   Lab Test  12/20/17   1443  02/23/17   0850    08/06/16   0757   CHOL  147   --    --   Interfering substances, unable to perform test  LUPE RIOJAS NOTIFIED ON 6B,8/6/2016,0926,MJ      HDL  51   --    --   9*   LDL  49  108*   < >  13   TRIG  230*   --    --   Interfering substances, unable to perform test  LUPE RIOJAS NOTIFIED ON 6B,8/6/2016,0926,MJ       < > = values in this interval not displayed.      Lung Ca Screening (>30 pk age 55-79 or >20 py age 50-79 + RF): does not qualify, quit 1996  Colonoscopy (50-75 yrs): 6/18 TA x 2, 8/21 no adenomas, 10 year follow up                       - Diverticulosis in the left colon.    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, T-score of -2.1 at the level of the left femoral neck corresponds with low bone density  Mammogram (40-75 yrs): 12/17 [facility], normal, 10/21 done  Pap (21-65 yrs): MARKO for PAP, last 12/17, did in last year within last year  Pelvic/Breast: up to date  Safety/Lifestyle: reviewed  Tob/EtOH: screen neg  Depression: reviewed  Advanced Directive: deferred           Answers for HPI/ROS submitted by the patient on 5/23/2022  General Symptoms: No  Skin Symptoms: No  HENT Symptoms: Yes  EYE SYMPTOMS: Yes  HEART SYMPTOMS: No  LUNG SYMPTOMS: Yes  INTESTINAL SYMPTOMS: No  URINARY SYMPTOMS: No  GYNECOLOGIC SYMPTOMS: No  BREAST SYMPTOMS: No  SKELETAL SYMPTOMS: Yes  BLOOD SYMPTOMS: No  NERVOUS SYSTEM SYMPTOMS: Yes  MENTAL HEALTH SYMPTOMS: Yes  Ear pain: Yes  Ear discharge: No  Hearing loss: No  Tinnitus: No  Nosebleeds: No  Congestion: Yes  Sinus pain: Yes  Trouble swallowing: No   Voice hoarseness: Yes  Mouth sores: No  Sore throat: Yes  Tooth pain: No  Gum tenderness: No  Bleeding gums: No  Change in taste: Yes  Change in sense of smell: Yes  Dry mouth: Yes  Hearing aid used: No  Neck lump: No  Eye pain: No  Vision loss: No  Dry eyes: Yes  Watery eyes: No  Eye bulging: No  Double vision: No  Flashing of lights: Yes  Spots: Yes  Floaters: Yes  Redness: No  Crossed eyes: No  Tunnel Vision: No  Yellowing of eyes: No  Eye irritation: No  Cough: Yes  Sputum or phlegm: Yes  Coughing up blood: No  Difficulty breating or shortness of breath: Yes  Snoring: No  Wheezing: No  Difficulty breathing on exertion: Yes  Nighttime Cough: No  Difficulty breathing when lying flat: Yes  Back pain: No  Muscle aches: Yes  Neck pain: Yes  Swollen joints: No  Joint pain: Yes  Bone pain: No  Muscle cramps: Yes  Muscle weakness: No  Joint stiffness: Yes  Bone fracture: No  Trouble with coordination: No  Dizziness or trouble with balance: Yes  Fainting or black-out spells: No  Memory loss:  No  Headache: Yes  Seizures: No  Speech problems: No  Tingling: Yes  Tremor: No  Weakness: No  Difficulty walking: No  Paralysis: No  Numbness: No  Nervous or Anxious: Yes  Depression: Yes  Trouble sleeping: Yes  Trouble thinking or concentrating: Yes  Mood changes: Yes  Panic attacks: Yes      Travel Screening     Question 5/23/2022  8:34 AM CDT - Filed by Patient    Do you have any of the following new or worsening symptoms? None of these    In the last 10 days, have you been in contact with someone who was confirmed or suspected to have Coronavirus / COVID-19? No / Unsure    Have you had a COVID-19 viral test in the last 10 days? Yes - Negative result      [facility] Review Of Systems     Question 5/23/2022  8:38 AM CDT - Filed by Patient    I understand that completing this form is intended to provide my doctor and/or care team with helpful information for my upcoming clinic visit. It is not to notify my doctor and/or care team of medical matters requiring urgent attention. If I have an urgent medical matter, I should call 911 or my doctor's office. Acknowledge    GENERAL HEALTH SYMPTOMS No    SKIN SYMPTOMS No    HEAD, EARS, NOSE AND THROAT SYMPTOMS Yes    EYE SYMPTOMS Yes    HEART SYMPTOMS No    LUNG SYMPTOMS Yes    INTESTINAL SYMPTOMS No    URINARY SYMPTOMS No    GYNECOLOGIC SYMPTOMS No    BREAST SYMPTOMS No    SKELETAL SYMPTOMS Yes    BLOOD SYMPTOMS No    NERVOUS SYSTEM SYMPTOMS Yes    MENTAL HEALTH SYMPTOMS Yes      Z [facility] Hent     Question 5/23/2022  8:38 AM CDT - Filed by Patient    Ear pain Yes    Ear discharge No    Hearing loss No    Ringing in your ears No    Nosebleeds No    Congestion Yes    Sinus pain Yes    Trouble swallowing No     Voice hoarseness Yes    Mouth sores No    Sore throat Yes    Tooth pain No    Gum tenderness No    Bleeding gums No    Change in taste Yes    Change in sense of smell Yes    Dry mouth Yes    Hearing aid used No    Neck lump No      [facility] Eye Symptoms     Question  5/23/2022  8:38 AM CDT - Filed by Patient    Eye pain No    Vision loss No    Dry eyes Yes    Watery eyes No    Eye bulging No    Double vision No    Flashing of lights Yes    Spots Yes    Floaters Yes    Redness No    Crossed eyes No    Tunnel Vision No    Yellowing of eyes No    Eye irritation No      [facility] Lungs Symptoms     Question 5/23/2022  8:38 AM CDT - Filed by Patient    Cough Yes    Sputum or phlegm Yes    Coughing up blood No    Difficulty breating or shortness of breath Yes    Snoring No    Wheezing No    Difficulty breathing on exertion Yes    Nighttime Cough No    Difficulty breathing when lying flat Yes      [facility] Bones-Joints-Muscles Symptoms     Question 5/23/2022  8:38 AM CDT - Filed by Patient    Back pain No    Muscle aches Yes    Neck pain Yes    Swollen joints No    Joint pain Yes    Bone pain No    Muscle cramps Yes    Muscle weakness No    Joint stiffness Yes    Bone fracture No      [facility] Nervous System Symptoms     Question 5/23/2022  8:38 AM CDT - Filed by Patient    Trouble with coordination No    Dizziness or trouble with balance Yes    Fainting or black-out spells No    Memory loss No    Headache Yes    Seizures No    Speech problems No    Tingling Yes    Tremor No    Weakness No    Difficulty walking No    Paralysis No    Numbness No      [facility] Mental Health Symptoms     Question 5/23/2022  8:38 AM CDT - Filed by Patient    Nervous or Anxious Yes    Depression Yes    Trouble sleeping Yes    Trouble thinking or concentrating Yes    Mood changes Yes    Panic attacks Yes      [facility] (Daily Health Habits )     Question 5/23/2022  8:41 AM CDT - Filed by Patient    Do you have a special diet?  Vegetarian     Other    Do you have a snack between meals or at bedtime?  Yes    How many servings of fruit do you consume daily? (1 serving = half cup) 0 - 2 Servings    How many servings of vegetables do you consume daily? (1 servings = half cup)   3 - 4 Servings    How many servings of high fiber or whole grain foods do you consume daily? (1 serving = 1 slice of whole wheat bread, 1 cup of whole grain cereal, or   cup brown rice or oatmeal)  0 - 2 Servings    How many servings of fried or high-fat foods do you consume daily? (Examples: fried chicken, potato chips, doughnuts)  0 - 2 servings    How many sugar-sweetened (not diet) beverages do you consume daily?  0 - 2 Servings    Do you get at least 3 servings of foods that have calcium each day (dairy, green leafy vegetables, etc)?   No    Do you take a Vitamin D supplement?  Yes    On average, how many days per week do you engage in moderate to strenuous exercise like a brisk walk? 7    Do you have any problems taking medications regularly?  No    How often is stress a problem for you in handling such things as your health, finances, relationships, or work?  Sometimes    How often do you get the social and emotional support you need?  Rarely    In a typical week, how many times do you talk on the phone or get together with friends or family? rarely    In a typical week, how many times do you attend events like Christianity/Muslim services, club meetings, or other organizational meetings? never    Do you have sleep apnea, excessive snoring or daytime drowsiness? (select all that apply) Daytime drowsiness    In the past 7 days, how many days did you drink alcohol? 0    On days when you drank alcohol, how often did you have 4 or more alcoholic drinks on one occasion? Never    Do you ever drive after drinking, or ride with a  who has been drinking?  No    Have you used a smokeless tobacco product? No    Have you smoked 100 cigarettes or more in your entire life? Yes    Do you always fasten your seat belt when you are in a car?  Yes    Annual wellness visits     In general, would you say your health is: Very good    How would you describe the condition of your mouth and teeth - including false teeth or  dentures? Fair    In the past 7 days, did you need help from others to perform everyday activities such as eating, getting dressed, grooming, bathing, walking, or using the toilet?  No    In the past 7 days, did you need help from others to take care of things such as laundry and housekeeping, banking, shopping, using the telephone, food preparation, transportation, or taking your own medications?  No      Exercise Activity     Question 5/23/2022  8:41 AM CDT - Filed by Patient    How intense is your typical exercise? Heavy (like jogging or swimming    On average how many minutes do you engage in exercise at this level? 30    Minutes per week doing moderate to strenuous exercise: (range: 0 - 16963) 0      [facility] Smoking Follow Up     Question 5/23/2022  8:41 AM CDT - Filed by Patient    How often do you smoke cigarettes? Not at all      [facility] Menstrual Hx     Question 5/23/2022  8:41 AM CDT - Filed by Patient    Are you currently having periods? No      [facility] Menstrual Hx No Periods     Question 5/23/2022  8:41 AM CDT - Filed by Patient    Please select any of the following: Postmenopausal              Review of external notes as documented above                   A detailed Review of Systems was performed, verified and is negative except as documented in the HPI.  All health questionnaires were reviewed, verified and relevant information documented above.    Past Medical History:  Past Medical History:   Diagnosis Date     AION (acute ischemic optic neuropathy)      Asthma      Bacterial infection 02/04/2021     Candida infection 11/11/2020     Cellulitis 09/13/2021     Cervical spinal stenosis      Chronic kidney disease      Chronic kidney disease, stage 3 (H)      Dizziness and giddiness     Created by Conversion      End stage liver disease (H)     2/2 Alcohol Abuse     End stage liver disease (H)     Alcohol-related     GERD (gastroesophageal reflux disease)      Hypertension      Liver  transplant recipient (H) 2016    [facility]     Liver transplanted (H)      Migraine headache      Migraines      Osteoporosis     frax .7%      PFO (patent foramen ovale) 2016    Noted on echo at [facility]     Recurrent UTI      Spinal stenosis in cervical region      Strabismus      Unspecified asthma(493.90)     Created by Conversion        Past Surgical History:  Past Surgical History:   Procedure Laterality Date     APPENDECTOMY           APPENDECTOMY       BENCH LIVER N/A 2016    Procedure: BENCH LIVER;  Surgeon: Larry Dhillon MD;  Location: [facility] OR     CATARACT IOL, RT/LT       COLONOSCOPY       COLONOSCOPY N/A 2021    Procedure: COLONOSCOPY, WITH POLYPECTOMY;  Surgeon: Arlyn Beckford MD;  Location: [facility] OR     ESOPHAGOSCOPY, GASTROSCOPY, DUODENOSCOPY (EGD), COMBINED N/A 2016    Procedure: COMBINED ESOPHAGOSCOPY, GASTROSCOPY, DUODENOSCOPY (EGD);  Surgeon: Tao Alfonso MD;  [facility]     ESOPHAGOSCOPY, GASTROSCOPY, DUODENOSCOPY (EGD), COMBINED N/A 10/31/2016    Procedure: COMBINED ESOPHAGOSCOPY, GASTROSCOPY, DUODENOSCOPY (EGD), BIOPSY SINGLE OR MULTIPLE;  Surgeon: Ronald Bojorquez MD;  Location: [facility]     LIVER TRANSPLANTATION  2016    [facility]     RECTAL SURGERY       sphincteroplasty       TRANSPLANT LIVER RECIPIENT  DONOR N/A 2016    Procedure: TRANSPLANT LIVER RECIPIENT  DONOR;  Surgeon: Larry Dhillon MD;  Location:  OR     TUBAL LIGATION      laparoscopic     TUBAL LIGATION         Active Meds:  Current Outpatient Medications   Medication     acetaminophen (TYLENOL) 325 MG tablet     albuterol (VENTOLIN HFA) 108 (90 Base) MCG/ACT inhaler     amLODIPine (NORVASC) 5 MG tablet     calcipotriene (DOVONOX) 0.005 % external solution     chlorthalidone (HYGROTON) 25 MG tablet     cholecalciferol (VITAMIN D3) 400 UNIT TABS tablet     clobetasol (TEMOVATE) 0.05 %  external ointment     clobetasol (TEMOVATE) 0.05 % external solution     clobetasol propionate (CLOBEX) 0.05 % external shampoo     cyanocobalamin (VITAMIN  B-12) 1000 MCG tablet     ferrous sulfate (IRON) 325 (65 FE) MG tablet     fluocinolone acetonide (DERMA SMOOTHE/FS BODY) 0.01 % external oil     folic acid (FOLVITE) 1 MG tablet     gabapentin (NEURONTIN) 100 MG capsule     hydrOXYzine (ATARAX) 25 MG tablet     levothyroxine (SYNTHROID/LEVOTHROID) 88 MCG tablet     MAGNESIUM OXIDE PO     melatonin 5 MG tablet     multivitamin, therapeutic (THERA-VIT) TABS tablet     mupirocin (BACTROBAN) 2 % external ointment     order for DME     pantoprazole (PROTONIX) 40 MG EC tablet     predniSONE (DELTASONE) 20 MG tablet     propranolol (INDERAL) 10 MG tablet     psyllium 0.52 G capsule     tacrolimus (GENERIC EQUIVALENT) 0.5 MG capsule     traMADol (ULTRAM) 50 MG tablet     triamcinolone (KENALOG) 0.025 % external ointment     triamcinolone (KENALOG) 0.1 % external ointment     ursodiol (ACTIGALL) 300 MG capsule     valACYclovir (VALTREX) 500 MG tablet     No current facility-administered medications for this visit.        Allergies:  Codeine, Benadryl [diphenhydramine], Cefaclor, Hydrocodone-acetaminophen, Oxycodone, Penicillins, and Sulfa drugs    Family History:  family history includes Chronic Obstructive Pulmonary Disease in her father; Cirrhosis in her paternal uncle; Family History Negative in an other family member; Heart Disease in her father; Heart Failure in her father and mother; Melanoma in her mother; Skin Cancer in her sister.    Social History:  Social History     Tobacco Use     Smoking status: Former Smoker     Packs/day: 2.50     Years: 20.00     Pack years: 50.00     Quit date: 1996     Years since quittin.4     Smokeless tobacco: Never Used   Substance Use Topics     Alcohol use: No     Alcohol/week: 7.0 standard drinks     Types: 7 Standard drinks or equivalent per week     Comment: heavy  "use (750ml/2 days) up until 1 year ago; had cut down to 1 drink/day; none since 7/18/16     Drug use: No       Physical Exam:  Vitals: /76   Pulse 84   Temp 97.6  F (36.4  C) (Oral)   Resp 12   Ht 1.702 m (5' 7\")   Wt 87.7 kg (193 lb 4.8 oz)   LMP  (LMP Unknown)   SpO2 97%   BMI 30.28 kg/m    Constitutional: Alert, oriented, pleasant, no acute distress  Head: Normocephalic, atraumatic  Eyes: Extra-ocular movements intact, pupils equally round and reactive bilaterally, no scleral icterus  ENT: Masked  Neck: Supple, no lymphadenopathy  Cardiovascular: Regular rate and rhythm, no murmurs, rubs or gallops, peripheral pulses full/symmetric  Respiratory: Good air movement bilaterally, lungs clear, no wheezes/rales/rhonchi  GI: Abdomen soft, bowel sounds present, nondistended, nontender, no organomegaly or masses, no rebound/guarding  Musculoskeletal: No edema, normal muscle tone, normal gait  Neurologic: Alert and oriented, cranial nerves 2-12 intact, grossly non-focal  Psychiatric: normal mentation, affect and mood      Diagnostics:  Labs reviewed in Epic          Assessment and Plan:  Phan was seen today for wellness visit.    Diagnoses and all orders for this visit:    Thyroid function test abnormal  -     TSH with free T4 reflex **(2 WKS); Future    Essential hypertension  -     amLODIPine (NORVASC) 5 MG tablet; Take 1 tablet (5 mg) by mouth daily    CKD (chronic kidney disease) stage 3, GFR 30-59 ml/min (H)  -     chlorthalidone (HYGROTON) 25 MG tablet; Take 0.5 tablets (12.5 mg) by mouth daily    Fluid retention  -     chlorthalidone (HYGROTON) 25 MG tablet; Take 0.5 tablets (12.5 mg) by mouth daily    Gastroesophageal reflux disease  -     pantoprazole (PROTONIX) 40 MG EC tablet; TAKE ONE TABLET BY MOUTH EVERY MORNING BEFORE BREAKFAST    Other migraine without status migrainosus, not intractable  She previously tolerated BB. I suspect migraines are stress triggered. Advised her to schedule ophtho " visit given visual obscurations.  If BB not effective, would consider crgp rather than triptan given vascular risk factors.  -     propranolol (INDERAL) 10 MG tablet; Take 1 tablet (10 mg) by mouth 2 times daily          Arlyn Sanchez MD  Internal Medicine

## 2022-05-25 NOTE — NURSING NOTE
Chief Complaint   Patient presents with     Wellness Visit     Patient comes in for a wellness exam.         Paramjit Dunne MA on 5/25/2022 at 9:53 AM

## 2022-05-26 ENCOUNTER — LAB (OUTPATIENT)
Dept: LAB | Facility: CLINIC | Age: 67
End: 2022-05-26
Payer: COMMERCIAL

## 2022-05-26 DIAGNOSIS — Z13.220 LIPID SCREENING: ICD-10-CM

## 2022-05-26 DIAGNOSIS — Z79.899 ENCOUNTER FOR LONG-TERM (CURRENT) USE OF MEDICATIONS: ICD-10-CM

## 2022-05-26 DIAGNOSIS — Z94.4 LIVER REPLACED BY TRANSPLANT (H): ICD-10-CM

## 2022-05-26 DIAGNOSIS — R94.6 THYROID FUNCTION TEST ABNORMAL: ICD-10-CM

## 2022-05-26 LAB
ALBUMIN SERPL-MCNC: 3.6 G/DL (ref 3.5–5)
ALP SERPL-CCNC: 61 U/L (ref 45–120)
ALT SERPL W P-5'-P-CCNC: 10 U/L (ref 0–45)
ANION GAP SERPL CALCULATED.3IONS-SCNC: 13 MMOL/L (ref 5–18)
AST SERPL W P-5'-P-CCNC: 18 U/L (ref 0–40)
BILIRUB DIRECT SERPL-MCNC: 0.2 MG/DL
BILIRUB SERPL-MCNC: 0.7 MG/DL (ref 0–1)
BUN SERPL-MCNC: 29 MG/DL (ref 8–22)
CALCIUM SERPL-MCNC: 9.7 MG/DL (ref 8.5–10.5)
CHLORIDE BLD-SCNC: 101 MMOL/L (ref 98–107)
CO2 SERPL-SCNC: 23 MMOL/L (ref 22–31)
CREAT SERPL-MCNC: 1.24 MG/DL (ref 0.6–1.1)
ERYTHROCYTE [DISTWIDTH] IN BLOOD BY AUTOMATED COUNT: 13.4 % (ref 10–15)
GFR SERPL CREATININE-BSD FRML MDRD: 48 ML/MIN/1.73M2
GLUCOSE BLD-MCNC: 107 MG/DL (ref 70–125)
HCT VFR BLD AUTO: 41.8 % (ref 35–47)
HGB BLD-MCNC: 13.8 G/DL (ref 11.7–15.7)
MAGNESIUM SERPL-MCNC: 1.5 MG/DL (ref 1.8–2.6)
MCH RBC QN AUTO: 29.2 PG (ref 26.5–33)
MCHC RBC AUTO-ENTMCNC: 33 G/DL (ref 31.5–36.5)
MCV RBC AUTO: 89 FL (ref 78–100)
PLATELET # BLD AUTO: 321 10E3/UL (ref 150–450)
POTASSIUM BLD-SCNC: 3.9 MMOL/L (ref 3.5–5)
PROT SERPL-MCNC: 6.7 G/DL (ref 6–8)
RBC # BLD AUTO: 4.72 10E6/UL (ref 3.8–5.2)
SODIUM SERPL-SCNC: 137 MMOL/L (ref 136–145)
TACROLIMUS BLD-MCNC: 5.7 UG/L (ref 5–15)
TME LAST DOSE: NORMAL H
TME LAST DOSE: NORMAL H
TSH SERPL DL<=0.005 MIU/L-ACNC: 2.63 UIU/ML (ref 0.3–5)
WBC # BLD AUTO: 10.2 10E3/UL (ref 4–11)

## 2022-05-26 PROCEDURE — 84443 ASSAY THYROID STIM HORMONE: CPT

## 2022-05-26 PROCEDURE — 80197 ASSAY OF TACROLIMUS: CPT

## 2022-05-26 PROCEDURE — 80053 COMPREHEN METABOLIC PANEL: CPT

## 2022-05-26 PROCEDURE — 82248 BILIRUBIN DIRECT: CPT

## 2022-05-26 PROCEDURE — 85027 COMPLETE CBC AUTOMATED: CPT

## 2022-05-26 PROCEDURE — 83735 ASSAY OF MAGNESIUM: CPT

## 2022-05-26 PROCEDURE — 36415 COLL VENOUS BLD VENIPUNCTURE: CPT

## 2022-05-31 ENCOUNTER — OFFICE VISIT (OUTPATIENT)
Dept: DERMATOLOGY | Facility: CLINIC | Age: 67
End: 2022-05-31
Payer: COMMERCIAL

## 2022-05-31 ENCOUNTER — OFFICE VISIT (OUTPATIENT)
Dept: GASTROENTEROLOGY | Facility: CLINIC | Age: 67
End: 2022-05-31
Attending: INTERNAL MEDICINE
Payer: COMMERCIAL

## 2022-05-31 VITALS
HEART RATE: 82 BPM | BODY MASS INDEX: 29.91 KG/M2 | RESPIRATION RATE: 18 BRPM | OXYGEN SATURATION: 98 % | WEIGHT: 191 LBS | SYSTOLIC BLOOD PRESSURE: 137 MMHG | DIASTOLIC BLOOD PRESSURE: 89 MMHG | TEMPERATURE: 97.8 F

## 2022-05-31 DIAGNOSIS — Z94.9 HISTORY OF ORGAN TRANSPLANTATION: ICD-10-CM

## 2022-05-31 DIAGNOSIS — D18.01 CHERRY ANGIOMA: ICD-10-CM

## 2022-05-31 DIAGNOSIS — L40.9 PSORIASIS: ICD-10-CM

## 2022-05-31 DIAGNOSIS — L82.1 SEBORRHEIC KERATOSIS: Primary | ICD-10-CM

## 2022-05-31 DIAGNOSIS — L81.4 LENTIGINES: ICD-10-CM

## 2022-05-31 DIAGNOSIS — D22.9 MULTIPLE BENIGN NEVI: ICD-10-CM

## 2022-05-31 DIAGNOSIS — L30.9 DERMATITIS: ICD-10-CM

## 2022-05-31 DIAGNOSIS — Z94.4 LIVER REPLACED BY TRANSPLANT (H): Primary | ICD-10-CM

## 2022-05-31 PROCEDURE — 99214 OFFICE O/P EST MOD 30 MIN: CPT | Performed by: DERMATOLOGY

## 2022-05-31 PROCEDURE — G0463 HOSPITAL OUTPT CLINIC VISIT: HCPCS

## 2022-05-31 PROCEDURE — 99214 OFFICE O/P EST MOD 30 MIN: CPT | Performed by: INTERNAL MEDICINE

## 2022-05-31 ASSESSMENT — PAIN SCALES - GENERAL
PAINLEVEL: NO PAIN (0)
PAINLEVEL: NO PAIN (0)

## 2022-05-31 NOTE — PATIENT INSTRUCTIONS
You have eczema on your right leg.  You have benign growths called a seborrheic keratosis on your left leg and arms  If you want a different option for a clobetasol shampoo, there is a compounding pharmacy that I can reach out to for a clobetasol liquid. Please send me a message if you would like me to order this.   Please follow-up in 1 year for a full body skin exam. Continue the good work with the sunscreen and yearly skin exams. Please schedule an appointment sooner if you have a new spot of concern. A trick to keep an eye on your back is to take a picture of your back every 6 months.       Patient Education     Checking for Skin Cancer  You can find cancer early by checking your skin each month. There are 3 kinds of skin cancer. They are melanoma, basal cell carcinoma, and squamous cell carcinoma. Doing monthly skin checks is the best way to find new marks or skin changes. Follow the instructions below for checking your skin.   The ABCDEs of checking moles for melanoma   Check your moles or growths for signs of melanoma using ABCDE:   Asymmetry: the sides of the mole or growth don t match  Border: the edges are ragged, notched, or blurred  Color: the color within the mole or growth varies  Diameter: the mole or growth is larger than 6 mm (size of a pencil eraser)  Evolving: the size, shape, or color of the mole or growth is changing (evolving is not shown in the images below)    Checking for other types of skin cancer  Basal cell carcinoma or squamous cell carcinoma have symptoms such as:     A spot or mole that looks different from all other marks on your skin  Changes in how an area feels, such as itching, tenderness, or pain  Changes in the skin's surface, such as oozing, bleeding, or scaliness  A sore that does not heal  New swelling or redness beyond the border of a mole    Who s at risk?  Anyone can get skin cancer. But you are at greater risk if you have:   Fair skin, light-colored hair, or light-colored  eyes  Many moles or abnormal moles on your skin  A history of sunburns from sunlight or tanning beds  A family history of skin cancer  A history of exposure to radiation or chemicals  A weakened immune system  If you have had skin cancer in the past, you are at risk for recurring skin cancer.   How to check your skin  Do your monthly skin checkups in front of a full-length mirror. Check all parts of your body, including your:   Head (ears, face, neck, and scalp)  Torso (front, back, and sides)  Arms (tops, undersides, upper, and lower armpits)  Hands (palms, backs, and fingers, including under the nails)  Buttocks and genitals  Legs (front, back, and sides)  Feet (tops, soles, toes, including under the nails, and between toes)  If you have a lot of moles, take digital photos of them each month. Make sure to take photos both up close and from a distance. These can help you see if any moles change over time.   Most skin changes are not cancer. But if you see any changes in your skin, call your doctor right away. Only he or she can diagnose a problem. If you have skin cancer, seeing your doctor can be the first step toward getting the treatment that could save your life.   [employer] last reviewed this educational content on 4/1/2019 2000-2020 [employer]. [address] [place] [address]. All rights reserved. This information is not intended as a substitute for professional medical care. Always follow your healthcare professional's instructions.       When should I call my doctor?  If you are worsening or not improving, please, contact us or seek urgent care as noted below.     Who should I call with questions (adults)?  [facility] (adult and pediatric): [phone]  [facility] (adult): [phone]  For urgent needs outside of business hours call the [facility] at [phone] and ask for the dermatology resident on call  to be paged  If this is a medical emergency and you are unable to reach an ER, Call 911    Who should I call with questions (pediatric)?  [facility]- Pediatric Dermatology  Dr. Sandhya Perera, Dr. Dorinda Nuñez, Dr. Beata Craft, GM Bridges, Dr. Chrystal Santo, Dr. Jo Ann Araiza & Dr. Donnell Chapa  Non-urgent nurse triage line; [phone]- Tiffanie and Leydi ROGER Care Coordinators   Evi (/Complex ) [phone]    If you need a prescription refill, please contact your pharmacy. Refills are approved or denied by our Physicians during normal business hours, Monday through Fridays  Per office policy, refills will not be granted if you have not been seen within the past year (or sooner depending on your child's condition)    Scheduling Information:  Pediatric Appointment Scheduling and Call Center [phone]  Radiology Scheduling- [phone]  Sedation Unit Scheduling- [phone]  [place] Scheduling- General [phone]; Pediatric Dermatology [phone]  Main  Services: [phone]  Thai: [phone]  Ecuadorean: [phone]  Hmong/South African/Justen: [phone]  Preadmission Nursing Department Fax Number: [phone] (Fax all pre-operative paperwork to this number)    For urgent matters arising during evenings, weekends, or holidays that cannot wait for normal business hours please call [phone] and ask for the dermatology resident on call to be paged.

## 2022-05-31 NOTE — NURSING NOTE
Dermatology Rooming Note    Phan Luque's goals for this visit include:   Chief Complaint   Patient presents with     Skin Check     Jessica is here for a skin check.     Alla Calvillo, Facilitator

## 2022-05-31 NOTE — LETTER
5/31/2022       RE: Phan Luque  [address]  [facility] [address]     Dear Colleague,    Thank you for referring your patient, Phan Luque, to the [facility] [place] at [facility]. Please see a copy of my visit note below.    [facility] Dermatology Note  Encounter Date: May 31, 2022  Office Visit     Dermatology Problem List:  #. [facility] - currently on tacrolimus   #. Flexural eczema - trimcinolone 0.1% ointment BID to popliteal fossa bilaterally  #. AK s/p cryo  #. Psoriasis with arthritis.  - Follows with rheum  - Topicals: clobetasol solution, shampoo  - Nail dystrophy, likely psoriasis- grew candida. Has been using clobetasol solution with resolution.    #. Facial asymmetry - cosmetic referral.   #. Family hx psoriasis (son)  ____________________________________________    Assessment & Plan:    # Psoriasis, chronic, active. most prominent on nails and scalp but now present on gluteal cleft. Overall fairly well controlled on topicals. One episode of arthralgias in bilateral DIPs. 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   - Continue clobetasol 0.05% solution twice daily to the nails and scalp, when psoriasis is present.   - Continue clobetasol 0.05% shampoo (typially every other day), patient may reach out for help with insurance  - Continue calcipotriene 0.005% solution bid after psoriasis is clear.   - Continue triamcinolone 0.025% ointment BID prn can use on gluteal cleft or axilla  - Continue triamcinolone 0.1% ointment BID to plaques behind knees  - Discussed potential for oral medications if psoriasis worsens   - Notify us for refills    # Multiple benign nevi.   - No concerning lesions today  - Monitor for ABCDEs of melanoma   - Continue sun protection - recommend SPF 30 or higher with  frequent application   - Return sooner if noticing changing or symptomatic lesions    # Seborrheic keratoses  # Solar lentigines  # Cherry angiomas   - Reassured of benign nature     # History of organ transplant. Patient aware that she is at higher risk for cutaneous malignancy given history of transplant. Monitor for changing or symptomatic lesions. Continue frequent skin checks and photoprotection.     # Dermatitis on leg - near resolved, possibly her psoriasis v other.  - Recommended gentle skin cares  - Notify for recurrence    Procedures Performed:   None    Follow-up: 1 year for FBSE, sooner if concerns.     Staff and Scribe:     Scribe Disclosure:  I, Beny Rucker, am serving as a scribe to document services personally performed by Zelda Pham MD based on data collection and the provider's statements to me.     IJoseline, MS4 saw and staffed the patient with Zelda Pham MD.   [employer]    Staff Physician:  I was present with the medical student who participated in the service and in the documentation of the note. I have verified the history and personally performed the physical exam and medical decision making. I agree with the assessment and plan of care as documented in the note.       Zelda Pham MD    Department of Dermatology  [facility]: Phone: [phone], Fax: [phone]  6/3/2022    ____________________________________________    CC: Skin Check (Jessica is here for a skin check.)    HPI:  Ms. Phan Luque is a(n) 66 year old female who presents today as a return patient for skin check and psoriasis followup. Last seen in dermatology by Demetria Barger PA-C, on 11/24/2021, at which time patient was continued on topicals for treatment of psoriasis.    Today, she has multiple 'rough spots' on her legs for many years. No change in size, painful,  burning, pruritis.    She saw rheumatology 4/7/2022 for possible psoriatic arthritis after an isolated episode of arthritis (redness, swelling of bilateral DIP). Rheumatology stated this is likely inflammatory DIP arthritis because of the DIP involvement, multiple digits involved, bilateral nature, lack of improvement with antibiotics. Hand films Jan 2022 without evidence of erosions/george. Rheumatology recommended a return visit and considering therapy if arthritis returns. She has not had any more episodes of arthritis since her last visit. She feels her psoriasis is well controlled. Her insurance says clobetasol 0.05% shampoo is not covered, and she may contact for help with this in the future.     Patient is otherwise feeling well, without additional skin concerns.    Labs Reviewed:  NA    Physical Exam:  Vitals: LMP  (LMP Unknown)   SKIN: Total skin excluding the undergarment areas was performed. The exam included the head/face, neck, both arms, chest, back, abdomen, both legs, digits and/or nails.   - 4mm thin waxy tan plaques on the arms and left leg  - Ill-defined pink scaly plaque on the right lower leg  - Nail: Onycholysis and vertical ridging of hand nails  - red macules/papules on trunk and extremities  - uniform brown macules on trunk and extremities, accentuated in sun-exposed locations.  - No other lesions of concern on areas examined.     Medications:  Current Outpatient Medications   Medication     acetaminophen (TYLENOL) 325 MG tablet     albuterol (VENTOLIN HFA) 108 (90 Base) MCG/ACT inhaler     amLODIPine (NORVASC) 5 MG tablet     calcipotriene (DOVONOX) 0.005 % external solution     chlorthalidone (HYGROTON) 25 MG tablet     cholecalciferol (VITAMIN D3) 400 UNIT TABS tablet     clobetasol (TEMOVATE) 0.05 % external ointment     clobetasol (TEMOVATE) 0.05 % external solution     clobetasol propionate (CLOBEX) 0.05 % external shampoo     cyanocobalamin (VITAMIN  B-12) 1000 MCG tablet     ferrous  sulfate (IRON) 325 (65 FE) MG tablet     fluocinolone acetonide (DERMA SMOOTHE/FS BODY) 0.01 % external oil     folic acid (FOLVITE) 1 MG tablet     gabapentin (NEURONTIN) 100 MG capsule     hydrOXYzine (ATARAX) 25 MG tablet     levothyroxine (SYNTHROID/LEVOTHROID) 88 MCG tablet     MAGNESIUM OXIDE PO     melatonin 5 MG tablet     multivitamin, therapeutic (THERA-VIT) TABS tablet     mupirocin (BACTROBAN) 2 % external ointment     order for DME     pantoprazole (PROTONIX) 40 MG EC tablet     predniSONE (DELTASONE) 20 MG tablet     propranolol (INDERAL) 10 MG tablet     psyllium 0.52 G capsule     tacrolimus (GENERIC EQUIVALENT) 0.5 MG capsule     traMADol (ULTRAM) 50 MG tablet     triamcinolone (KENALOG) 0.025 % external ointment     triamcinolone (KENALOG) 0.1 % external ointment     ursodiol (ACTIGALL) 300 MG capsule     valACYclovir (VALTREX) 500 MG tablet     No current facility-administered medications for this visit.      Past Medical History:   Patient Active Problem List   Diagnosis     Decompensation of cirrhosis of liver (H)     Liver transplanted (H)     Alcoholic hepatitis     Immunosuppression (H)     Alcoholic hepatitis without ascites     Enterococcus UTI     Liver transplant status (H)     Nausea & vomiting     Malnutrition (H)     Anemia     ACP (advance care planning)     Diarrhea     Hypothyroidism     Esophageal reflux     Recurrent major depression in partial remission (H)     Insomnia     CKD (chronic kidney disease) stage 3, GFR 30-59 ml/min (H)     Anemia in stage 3 chronic kidney disease (H)     Osteopenia     Alcohol abuse, uncomplicated     Psoriasis     Candida infection     Bacterial infection     Cellulitis     Past Medical History:   Diagnosis Date     AION (acute ischemic optic neuropathy)      Asthma      Bacterial infection 02/04/2021     Candida infection 11/11/2020     Cellulitis 09/13/2021     Cervical spinal stenosis      Chronic kidney disease      Chronic kidney disease, stage  3 (H)      Dizziness and giddiness     Created by Conversion      End stage liver disease (H)     2/2 Alcohol Abuse     End stage liver disease (H)     Alcohol-related     GERD (gastroesophageal reflux disease)      Hypertension      Liver transplant recipient (H) 08/25/2016    [facility]     Liver transplanted (H)      Migraine headache      Migraines      Osteoporosis     frax 11/1.7% 2021     PFO (patent foramen ovale) 08/08/2016    Noted on echo at [facility]     Recurrent UTI      Spinal stenosis in cervical region      Strabismus      Unspecified asthma(493.90)     Created by Conversion         CC Referred Self, MD  No address on file on close of this encounter.

## 2022-05-31 NOTE — NURSING NOTE
Chief Complaint   Patient presents with     RECHECK     Follow Up - General Liver Post Tx     /89   Pulse 82   Temp 97.8  F (36.6  C)   Resp 18   Wt 86.6 kg (191 lb)   LMP  (LMP Unknown)   SpO2 98%   BMI 29.91 kg/m    Alin Garcia on 5/31/2022 at 1:55 PM

## 2022-05-31 NOTE — LETTER
5/31/2022         RE: Phan Luque  [address] [facility] [address]      HISTORY OF PRESENT ILLNESS:  I had the pleasure of seeing Phan Luque for followup in the Liver Transplant Clinic at the [facility] on 05/31/2022.      Ms. Luque returns for followup now almost 6 years status post liver transplantation for alcoholic hepatitis.    She is doing well at this visit.  She denies any abdominal pain.  She does have some itching, which she relates to her psoriasis.  She also has some mild psoriatic arthritis with that.  It involves mostly her scalp.  She denies any fatigue.  She denies any increased abdominal girth or lower extremity edema.    She denies any fevers or chills, cough or shortness of breath.  She has had 4 doses of the COVID-19 vaccine and has had Evusheld as well.    She denies any nausea or vomiting, diarrhea or constipation.  Her appetite has been good and her weight has been stable.    There have been no other new events since she was last seen.    Current Outpatient Medications   Medication     acetaminophen (TYLENOL) 325 MG tablet     albuterol (VENTOLIN HFA) 108 (90 Base) MCG/ACT inhaler     amLODIPine (NORVASC) 5 MG tablet     calcipotriene (DOVONOX) 0.005 % external solution     chlorthalidone (HYGROTON) 25 MG tablet     cholecalciferol (VITAMIN D3) 400 UNIT TABS tablet     clobetasol (TEMOVATE) 0.05 % external ointment     clobetasol (TEMOVATE) 0.05 % external solution     clobetasol propionate (CLOBEX) 0.05 % external shampoo     cyanocobalamin (VITAMIN  B-12) 1000 MCG tablet     ferrous sulfate (IRON) 325 (65 FE) MG tablet     fluocinolone acetonide (DERMA SMOOTHE/FS BODY) 0.01 % external oil     folic acid (FOLVITE) 1 MG tablet     gabapentin (NEURONTIN) 100 MG capsule     hydrOXYzine (ATARAX) 25 MG tablet     levothyroxine (SYNTHROID/LEVOTHROID) 88 MCG tablet     MAGNESIUM OXIDE PO     melatonin 5 MG tablet     multivitamin, therapeutic  (THERA-VIT) TABS tablet     mupirocin (BACTROBAN) 2 % external ointment     order for DME     pantoprazole (PROTONIX) 40 MG EC tablet     predniSONE (DELTASONE) 20 MG tablet     propranolol (INDERAL) 10 MG tablet     psyllium 0.52 G capsule     tacrolimus (GENERIC EQUIVALENT) 0.5 MG capsule     traMADol (ULTRAM) 50 MG tablet     triamcinolone (KENALOG) 0.025 % external ointment     triamcinolone (KENALOG) 0.1 % external ointment     ursodiol (ACTIGALL) 300 MG capsule     valACYclovir (VALTREX) 500 MG tablet     No current facility-administered medications for this visit.     /89   Pulse 82   Temp 97.8  F (36.6  C)   Resp 18   Wt 86.6 kg (191 lb)   LMP  (LMP Unknown)   SpO2 98%   BMI 29.91 kg/m      PHYSICAL ASSESSMENT:    GENERAL:  She looks well.  HEENT:  Shows no scleral icterus or temporal muscle wasting.  CHEST:  Clear.  ABDOMEN:  No increase in girth.  No masses or tenderness to palpation are present.  Her liver is 10 cm in span without left lobe enlargement.  No spleen tip is palpable.    EXTREMITIES:  Show no edema.  SKIN:  No stigmata of chronic liver disease.    NEUROLOGIC:  Nonfocal.    Recent Results (from the past 168 hour(s))   Basic metabolic panel    Collection Time: 05/26/22  7:31 AM   Result Value Ref Range    Sodium 137 136 - 145 mmol/L    Potassium 3.9 3.5 - 5.0 mmol/L    Chloride 101 98 - 107 mmol/L    Carbon Dioxide (CO2) 23 22 - 31 mmol/L    Anion Gap 13 5 - 18 mmol/L    Urea Nitrogen 29 (H) 8 - 22 mg/dL    Creatinine 1.24 (H) 0.60 - 1.10 mg/dL    Calcium 9.7 8.5 - 10.5 mg/dL    Glucose 107 70 - 125 mg/dL    GFR Estimate 48 (L) >60 mL/min/1.73m2   CBC with platelets    Collection Time: 05/26/22  7:31 AM   Result Value Ref Range    WBC Count 10.2 4.0 - 11.0 10e3/uL    RBC Count 4.72 3.80 - 5.20 10e6/uL    Hemoglobin 13.8 11.7 - 15.7 g/dL    Hematocrit 41.8 35.0 - 47.0 %    MCV 89 78 - 100 fL    MCH 29.2 26.5 - 33.0 pg    MCHC 33.0 31.5 - 36.5 g/dL    RDW 13.4 10.0 - 15.0 %     Platelet Count 321 150 - 450 10e3/uL   Hepatic panel    Collection Time: 05/26/22  7:31 AM   Result Value Ref Range    Bilirubin Total 0.7 0.0 - 1.0 mg/dL    Bilirubin Direct 0.2 <=0.5 mg/dL    Protein Total 6.7 6.0 - 8.0 g/dL    Albumin 3.6 3.5 - 5.0 g/dL    Alkaline Phosphatase 61 45 - 120 U/L    AST 18 0 - 40 U/L    ALT 10 0 - 45 U/L   Tacrolimus by Tandem Mass Spectrometry    Collection Time: 05/26/22  7:31 AM   Result Value Ref Range    Tacrolimus by Tandem Mass Spectrometry 5.7 5.0 - 15.0 ug/L    Tacrolimus Last Dose Date 5/25/2022     Tacrolimus Last Dose Time  7:30 PM    Magnesium    Collection Time: 05/26/22  7:31 AM   Result Value Ref Range    Magnesium 1.5 (L) 1.8 - 2.6 mg/dL   TSH with free T4 reflex **(2 WKS)    Collection Time: 05/26/22  7:38 AM   Result Value Ref Range    TSH 2.63 0.30 - 5.00 uIU/mL      IMPRESSION/PLAN:  Ms. Luque is doing well.  Her liver tests are excellent and her kidney function is as well.  She does report her blood pressure has been under good control at home.  She is up to date with regard to vaccines including, as I mentioned, having a 4th dose of the COVID-19 vaccine, as well as a Evusheld.  She is up to date with regard to cancer screening.    My plan will be to see her back in the clinic again in 6 months' time.  I otherwise will not be making any other change to her medical regimen.    Thank you very much for allowing me to participate in the care of this patient.  If you have any questions regarding my recommendations, please do not hesitate to contact me.         Hussein Banegas MD      Professor of Medicine  [employer]      Executive Medical Director, Solid Organ Transplant Program  [facility]

## 2022-05-31 NOTE — PROGRESS NOTES
[facility] Dermatology Note  Encounter Date: May 31, 2022  Office Visit     Dermatology Problem List:  #. History of liver transplant at [facility], 2016 - currently on tacrolimus   #. Flexural eczema - trimcinolone 0.1% ointment BID to popliteal fossa bilaterally  #. AK s/p cryo  #. Psoriasis with arthritis.  - Follows with rheum  - Topicals: clobetasol solution, shampoo  - Nail dystrophy, likely psoriasis- grew candida. Has been using clobetasol solution with resolution.    #. Facial asymmetry - cosmetic referral.   #. Family hx psoriasis (son)  ____________________________________________    Assessment & Plan:    # Psoriasis, chronic, active. most prominent on nails and scalp but now present on gluteal cleft. Overall fairly well controlled on topicals. One episode of arthralgias in bilateral DIPs. 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   - Continue clobetasol 0.05% solution twice daily to the nails and scalp, when psoriasis is present.   - Continue clobetasol 0.05% shampoo (typially every other day), patient may reach out for help with insurance  - Continue calcipotriene 0.005% solution bid after psoriasis is clear.   - Continue triamcinolone 0.025% ointment BID prn can use on gluteal cleft or axilla  - Continue triamcinolone 0.1% ointment BID to plaques behind knees  - Discussed potential for oral medications if psoriasis worsens   - Notify us for refills    # Multiple benign nevi.   - No concerning lesions today  - Monitor for ABCDEs of melanoma   - Continue sun protection - recommend SPF 30 or higher with frequent application   - Return sooner if noticing changing or symptomatic lesions    # Seborrheic keratoses  # Solar lentigines  # Cherry angiomas   - Reassured of benign nature     # History of organ transplant. Patient aware that she is at higher risk for cutaneous malignancy given history of transplant. Monitor  for changing or symptomatic lesions. Continue frequent skin checks and photoprotection.     # Dermatitis on leg - near resolved, possibly her psoriasis v other.  - Recommended gentle skin cares  - Notify for recurrence    Procedures Performed:   None    Follow-up: 1 year for FBSE, sooner if concerns.     Staff and Scribe:     Scribe Disclosure:  I, Beny Rucker, am serving as a scribe to document services personally performed by Zelda Pham MD based on data collection and the provider's statements to me.     IJoseline, MS4 saw and staffed the patient with Zelda Pham MD.   [employer]    Staff Physician:  I was present with the medical student who participated in the service and in the documentation of the note. I have verified the history and personally performed the physical exam and medical decision making. I agree with the assessment and plan of care as documented in the note.       Zelda Pham MD    Department of Dermatology  [facility]: Phone: [phone], Fax: [phone]  6/3/2022    ____________________________________________    CC: Skin Check (Jessica is here for a skin check.)    HPI:  Ms. Phan Luque is a(n) 66 year old female who presents today as a return patient for skin check and psoriasis followup. Last seen in dermatology by Demetria Barger PA-C, on 11/24/2021, at which time patient was continued on topicals for treatment of psoriasis.    Today, she has multiple 'rough spots' on her legs for many years. No change in size, painful, burning, pruritis.    She saw rheumatology 4/7/2022 for possible psoriatic arthritis after an isolated episode of arthritis (redness, swelling of bilateral DIP). Rheumatology stated this is likely inflammatory DIP arthritis because of the DIP involvement, multiple digits involved, bilateral nature, lack of  improvement with antibiotics. Hand films Jan 2022 without evidence of erosions/george. Rheumatology recommended a return visit and considering therapy if arthritis returns. She has not had any more episodes of arthritis since her last visit. She feels her psoriasis is well controlled. Her insurance says clobetasol 0.05% shampoo is not covered, and she may contact for help with this in the future.     Patient is otherwise feeling well, without additional skin concerns.    Labs Reviewed:  NA    Physical Exam:  Vitals: LMP  (LMP Unknown)   SKIN: Total skin excluding the undergarment areas was performed. The exam included the head/face, neck, both arms, chest, back, abdomen, both legs, digits and/or nails.   - 4mm thin waxy tan plaques on the arms and left leg  - Ill-defined pink scaly plaque on the right lower leg  - Nail: Onycholysis and vertical ridging of hand nails  - red macules/papules on trunk and extremities  - uniform brown macules on trunk and extremities, accentuated in sun-exposed locations.  - No other lesions of concern on areas examined.     Medications:  Current Outpatient Medications   Medication     acetaminophen (TYLENOL) 325 MG tablet     albuterol (VENTOLIN HFA) 108 (90 Base) MCG/ACT inhaler     amLODIPine (NORVASC) 5 MG tablet     calcipotriene (DOVONOX) 0.005 % external solution     chlorthalidone (HYGROTON) 25 MG tablet     cholecalciferol (VITAMIN D3) 400 UNIT TABS tablet     clobetasol (TEMOVATE) 0.05 % external ointment     clobetasol (TEMOVATE) 0.05 % external solution     clobetasol propionate (CLOBEX) 0.05 % external shampoo     cyanocobalamin (VITAMIN  B-12) 1000 MCG tablet     ferrous sulfate (IRON) 325 (65 FE) MG tablet     fluocinolone acetonide (DERMA SMOOTHE/FS BODY) 0.01 % external oil     folic acid (FOLVITE) 1 MG tablet     gabapentin (NEURONTIN) 100 MG capsule     hydrOXYzine (ATARAX) 25 MG tablet     levothyroxine (SYNTHROID/LEVOTHROID) 88 MCG tablet     MAGNESIUM OXIDE PO      melatonin 5 MG tablet     multivitamin, therapeutic (THERA-VIT) TABS tablet     mupirocin (BACTROBAN) 2 % external ointment     order for DME     pantoprazole (PROTONIX) 40 MG EC tablet     predniSONE (DELTASONE) 20 MG tablet     propranolol (INDERAL) 10 MG tablet     psyllium 0.52 G capsule     tacrolimus (GENERIC EQUIVALENT) 0.5 MG capsule     traMADol (ULTRAM) 50 MG tablet     triamcinolone (KENALOG) 0.025 % external ointment     triamcinolone (KENALOG) 0.1 % external ointment     ursodiol (ACTIGALL) 300 MG capsule     valACYclovir (VALTREX) 500 MG tablet     No current facility-administered medications for this visit.      Past Medical History:   Patient Active Problem List   Diagnosis     Decompensation of cirrhosis of liver (H)     Liver transplanted (H)     Alcoholic hepatitis     Immunosuppression (H)     Alcoholic hepatitis without ascites     Enterococcus UTI     Liver transplant status (H)     Nausea & vomiting     Malnutrition (H)     Anemia     ACP (advance care planning)     Diarrhea     Hypothyroidism     Esophageal reflux     Recurrent major depression in partial remission (H)     Insomnia     CKD (chronic kidney disease) stage 3, GFR 30-59 ml/min (H)     Anemia in stage 3 chronic kidney disease (H)     Osteopenia     Alcohol abuse, uncomplicated     Psoriasis     Candida infection     Bacterial infection     Cellulitis     Past Medical History:   Diagnosis Date     AION (acute ischemic optic neuropathy)      Asthma      Bacterial infection 02/04/2021     Candida infection 11/11/2020     Cellulitis 09/13/2021     Cervical spinal stenosis      Chronic kidney disease      Chronic kidney disease, stage 3 (H)      Dizziness and giddiness     Created by Conversion      End stage liver disease (H)     2/2 Alcohol Abuse     End stage liver disease (H)     Alcohol-related     GERD (gastroesophageal reflux disease)      Hypertension      Liver transplant recipient (H) 08/25/2016    [facility]  [place]     Liver transplanted (H)      Migraine headache      Migraines      Osteoporosis     frax 11/1.7% 2021     PFO (patent foramen ovale) 08/08/2016    Noted on echo at [facility]     Recurrent UTI      Spinal stenosis in cervical region      Strabismus      Unspecified asthma(493.90)     Created by Conversion         CC Referred Self, MD  No address on file on close of this encounter.

## 2022-05-31 NOTE — PROGRESS NOTES
HISTORY OF PRESENT ILLNESS:  I had the pleasure of seeing Phan Luque for followup in the Liver Transplant Clinic at the [facility] on 05/31/2022.      Ms. Luque returns for followup now almost 6 years status post liver transplantation for alcoholic hepatitis.    She is doing well at this visit.  She denies any abdominal pain.  She does have some itching, which she relates to her psoriasis.  She also has some mild psoriatic arthritis with that.  It involves mostly her scalp.  She denies any fatigue.  She denies any increased abdominal girth or lower extremity edema.    She denies any fevers or chills, cough or shortness of breath.  She has had 4 doses of the COVID-19 vaccine and has had Evusheld as well.    She denies any nausea or vomiting, diarrhea or constipation.  Her appetite has been good and her weight has been stable.    There have been no other new events since she was last seen.    Current Outpatient Medications   Medication     acetaminophen (TYLENOL) 325 MG tablet     albuterol (VENTOLIN HFA) 108 (90 Base) MCG/ACT inhaler     amLODIPine (NORVASC) 5 MG tablet     calcipotriene (DOVONOX) 0.005 % external solution     chlorthalidone (HYGROTON) 25 MG tablet     cholecalciferol (VITAMIN D3) 400 UNIT TABS tablet     clobetasol (TEMOVATE) 0.05 % external ointment     clobetasol (TEMOVATE) 0.05 % external solution     clobetasol propionate (CLOBEX) 0.05 % external shampoo     cyanocobalamin (VITAMIN  B-12) 1000 MCG tablet     ferrous sulfate (IRON) 325 (65 FE) MG tablet     fluocinolone acetonide (DERMA SMOOTHE/FS BODY) 0.01 % external oil     folic acid (FOLVITE) 1 MG tablet     gabapentin (NEURONTIN) 100 MG capsule     hydrOXYzine (ATARAX) 25 MG tablet     levothyroxine (SYNTHROID/LEVOTHROID) 88 MCG tablet     MAGNESIUM OXIDE PO     melatonin 5 MG tablet     multivitamin, therapeutic (THERA-VIT) TABS tablet     mupirocin (BACTROBAN) 2 % external ointment     order for DME      pantoprazole (PROTONIX) 40 MG EC tablet     predniSONE (DELTASONE) 20 MG tablet     propranolol (INDERAL) 10 MG tablet     psyllium 0.52 G capsule     tacrolimus (GENERIC EQUIVALENT) 0.5 MG capsule     traMADol (ULTRAM) 50 MG tablet     triamcinolone (KENALOG) 0.025 % external ointment     triamcinolone (KENALOG) 0.1 % external ointment     ursodiol (ACTIGALL) 300 MG capsule     valACYclovir (VALTREX) 500 MG tablet     No current facility-administered medications for this visit.     /89   Pulse 82   Temp 97.8  F (36.6  C)   Resp 18   Wt 86.6 kg (191 lb)   LMP  (LMP Unknown)   SpO2 98%   BMI 29.91 kg/m      PHYSICAL ASSESSMENT:    GENERAL:  She looks well.  HEENT:  Shows no scleral icterus or temporal muscle wasting.  CHEST:  Clear.  ABDOMEN:  No increase in girth.  No masses or tenderness to palpation are present.  Her liver is 10 cm in span without left lobe enlargement.  No spleen tip is palpable.    EXTREMITIES:  Show no edema.  SKIN:  No stigmata of chronic liver disease.    NEUROLOGIC:  Nonfocal.    Recent Results (from the past 168 hour(s))   Basic metabolic panel    Collection Time: 05/26/22  7:31 AM   Result Value Ref Range    Sodium 137 136 - 145 mmol/L    Potassium 3.9 3.5 - 5.0 mmol/L    Chloride 101 98 - 107 mmol/L    Carbon Dioxide (CO2) 23 22 - 31 mmol/L    Anion Gap 13 5 - 18 mmol/L    Urea Nitrogen 29 (H) 8 - 22 mg/dL    Creatinine 1.24 (H) 0.60 - 1.10 mg/dL    Calcium 9.7 8.5 - 10.5 mg/dL    Glucose 107 70 - 125 mg/dL    GFR Estimate 48 (L) >60 mL/min/1.73m2   CBC with platelets    Collection Time: 05/26/22  7:31 AM   Result Value Ref Range    WBC Count 10.2 4.0 - 11.0 10e3/uL    RBC Count 4.72 3.80 - 5.20 10e6/uL    Hemoglobin 13.8 11.7 - 15.7 g/dL    Hematocrit 41.8 35.0 - 47.0 %    MCV 89 78 - 100 fL    MCH 29.2 26.5 - 33.0 pg    MCHC 33.0 31.5 - 36.5 g/dL    RDW 13.4 10.0 - 15.0 %    Platelet Count 321 150 - 450 10e3/uL   Hepatic panel    Collection Time: 05/26/22  7:31 AM   Result  Value Ref Range    Bilirubin Total 0.7 0.0 - 1.0 mg/dL    Bilirubin Direct 0.2 <=0.5 mg/dL    Protein Total 6.7 6.0 - 8.0 g/dL    Albumin 3.6 3.5 - 5.0 g/dL    Alkaline Phosphatase 61 45 - 120 U/L    AST 18 0 - 40 U/L    ALT 10 0 - 45 U/L   Tacrolimus by Tandem Mass Spectrometry    Collection Time: 05/26/22  7:31 AM   Result Value Ref Range    Tacrolimus by Tandem Mass Spectrometry 5.7 5.0 - 15.0 ug/L    Tacrolimus Last Dose Date 5/25/2022     Tacrolimus Last Dose Time  7:30 PM    Magnesium    Collection Time: 05/26/22  7:31 AM   Result Value Ref Range    Magnesium 1.5 (L) 1.8 - 2.6 mg/dL   TSH with free T4 reflex **(2 WKS)    Collection Time: 05/26/22  7:38 AM   Result Value Ref Range    TSH 2.63 0.30 - 5.00 uIU/mL      IMPRESSION/PLAN:  Ms. Luque is doing well.  Her liver tests are excellent and her kidney function is as well.  She does report her blood pressure has been under good control at home.  She is up to date with regard to vaccines including, as I mentioned, having a 4th dose of the COVID-19 vaccine, as well as a Evusheld.  She is up to date with regard to cancer screening.    My plan will be to see her back in the clinic again in 6 months' time.  I otherwise will not be making any other change to her medical regimen.    Thank you very much for allowing me to participate in the care of this patient.  If you have any questions regarding my recommendations, please do not hesitate to contact me.         Hussein Banegas MD      Professor of Medicine  [employer]      Executive Medical Director, Solid Organ Transplant Program  [facility]

## 2022-06-03 ENCOUNTER — TELEPHONE (OUTPATIENT)
Dept: DERMATOLOGY | Facility: CLINIC | Age: 67
End: 2022-06-03
Payer: COMMERCIAL

## 2022-06-03 DIAGNOSIS — L40.0 PSORIASIS VULGARIS: ICD-10-CM

## 2022-06-03 DIAGNOSIS — L40.9 SCALP PSORIASIS: ICD-10-CM

## 2022-06-03 NOTE — TELEPHONE ENCOUNTER
[facility] Call Center    Phone Message    May a detailed message be left on voicemail: yes     Reason for Call: Other: [facility] faxed over a medication request, was wondering if we have recvd it. I gave her clinic fax: [phone], she will try to fax it again.     Action Taken: Message routed to:  [facility] Dermatology    Travel Screening: Not Applicable

## 2022-06-06 NOTE — TELEPHONE ENCOUNTER
Not sure which [facility] pharmacy to send this to, it looks like there are a couple options. LVM for patient to call back with this information.    Renate Willingham, EMT

## 2022-06-06 NOTE — TELEPHONE ENCOUNTER
Per Pt Jessica she is having [facility] fill her prescription for clobetasol propionate (CLOBEX) 0.05 % external shampoo because it is cheaper there. It was previously submitted by Demetria Barger at the  pharmacy in 11/21 but will be filled by [facility] if we can forward the script information. Thank you.    [phone]

## 2022-06-06 NOTE — TELEPHONE ENCOUNTER
I am not seeing this medication request in her chart. LVM with call back number if she wants to give us more information about the medication she needs.    Renate Willingham, EMT

## 2022-06-07 RX ORDER — CLOBETASOL PROPIONATE 0.05 G/100ML
SHAMPOO TOPICAL DAILY
Qty: 354 ML | Refills: 3 | Status: SHIPPED | OUTPATIENT
Start: 2022-06-07 | End: 2022-12-23

## 2022-06-07 NOTE — TELEPHONE ENCOUNTER
" Centralized Medication Refill Team note:: refill sent as requested    5/31/2022 [facility] [place]      Per routing notes: Per Dr. Amos: \"Angie Amos MD Becker, Kristi, RN 17 hours ago (7:12 PM)     Okay to send new script as long as pharmacy is in the [place]\"   [id number] - [place], [place] - [address]    "

## 2022-07-06 ENCOUNTER — OFFICE VISIT (OUTPATIENT)
Dept: OPHTHALMOLOGY | Facility: CLINIC | Age: 67
End: 2022-07-06
Payer: COMMERCIAL

## 2022-07-06 DIAGNOSIS — H53.40 VISUAL FIELD DEFECT: Primary | ICD-10-CM

## 2022-07-06 DIAGNOSIS — H47.20 OPTIC ATROPHY: Primary | ICD-10-CM

## 2022-07-06 PROCEDURE — 92133 CPTRZD OPH DX IMG PST SGM ON: CPT | Mod: GC | Performed by: STUDENT IN AN ORGANIZED HEALTH CARE EDUCATION/TRAINING PROGRAM

## 2022-07-06 PROCEDURE — 92083 EXTENDED VISUAL FIELD XM: CPT | Mod: GC | Performed by: STUDENT IN AN ORGANIZED HEALTH CARE EDUCATION/TRAINING PROGRAM

## 2022-07-06 PROCEDURE — 92014 COMPRE OPH EXAM EST PT 1/>: CPT | Mod: GC | Performed by: STUDENT IN AN ORGANIZED HEALTH CARE EDUCATION/TRAINING PROGRAM

## 2022-07-06 ASSESSMENT — VISUAL ACUITY
OS_SC+: -1
METHOD: SNELLEN - LINEAR
OD_SC+: -2
OS_SC: 20/80
OD_SC: 20/20

## 2022-07-06 ASSESSMENT — REFRACTION_WEARINGRX
OS_SPHERE: PLANO
OS_ADD: +3.00
OD_ADD: +3.00
OD_SPHERE: PLANO
SPECS_TYPE: TRIFOCAL

## 2022-07-06 ASSESSMENT — EXTERNAL EXAM - LEFT EYE: OS_EXAM: NORMAL

## 2022-07-06 ASSESSMENT — CONF VISUAL FIELD
OS_INFERIOR_TEMPORAL_RESTRICTION: 1
OS_INFERIOR_NASAL_RESTRICTION: 1
METHOD: COUNTING FINGERS

## 2022-07-06 ASSESSMENT — TONOMETRY
OD_IOP_MMHG: 20
OS_IOP_MMHG: 19
IOP_METHOD: TONOPEN

## 2022-07-06 ASSESSMENT — CUP TO DISC RATIO
OS_RATIO: 0.7
OD_RATIO: 0.1

## 2022-07-06 ASSESSMENT — SLIT LAMP EXAM - LIDS
COMMENTS: LID TELANGIECTASIA
COMMENTS: LID TELANGIECTASIA

## 2022-07-06 ASSESSMENT — EXTERNAL EXAM - RIGHT EYE: OD_EXAM: NORMAL

## 2022-07-06 NOTE — NURSING NOTE
Chief Complaint(s) and History of Present Illness(es)     Blurred Vision Follow-Up     In left eye (NAION).  Context:  h/o migraines.  Since onset it is stable.  Associated symptoms include headache and floaters.  Negative for double vision, eye pain and flashes.  Pain was noted as 0/10. Additional comments: Feels like there is film over her eyes.  Has migraines with auras.     AGUSTÍN Gastelum 7/6/2022 8:01 AM

## 2022-07-06 NOTE — PROGRESS NOTES
Assessment & Plan     Phan Luque is a 66 year old female with the following diagnoses:   1. 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 At that time she had some RNFL thinning in the right eye but patient is not noticing any change in vision and visual field is completely normal so felt this was likely normal variation.      Today she presents with new complaint of intermittent blurry vision in right eye as well as intermittent flashes in right eye. Vision was at baseline until 4/2022 when she was leaving her house and suddenly developed blurry vision in the right eye. She did not do anything for it and it lasted about one hour when it was stably blurry and then suddenly resolved. It did not prevent her from driving. No accompanying pain or dyschromatopsia. It has happened 3 more times since then with similar description until two weeks ago when it lasted approximately six hours and then suddenly resolved. Uses artificial tears very infrequently. Does not feel that her eyes felt dry during the episodes. Denies scotomas during the episodes.    Flashes in right eye first noticed around 3/2022. She has migraines with visual aura longstanding occurring weekly. These new flashes are not associated with headaches at all and are much shorter lasting just a few seconds then resolving in only the right eye. Has longstanding many floaters in the right eye. Flashes occur a few times per week.     Today her visual acuity is 20/20 right eye 20/80 left eye. This is slightly better in the left eye than her last visit. Color vision is 11/11 in the right eye 1/11 left eye.     GTOP VF 07/06/22   right eye: reliable, nonspecific deficits, MD 0.5, stable compared to 9/2021  Left eye: reliable, central inferior scotoma, MD 24, PSD 5, stable compared to 9/2021    OCT RNFL 07/06/22   right eye: superior thinning, g78, stable  compared to 9/2021  left eye: global thinning, floor effect, g35, stable compared to 9/2021    My impression is that this patient's vision is stable in both eyes and there is no evidence of involvement anterior ischemic optic neuropathy of the right eye. Her new episodes of blurry vision in the right could be the result of dryness and she will trial increased artificial tears to see if there is improvement. The flashes are most consistent with posterior vitreous detachment and there are no signs of retinal tears or detachment on exam but she has been given return precautions for persistent flashes, increased floaters, or vision loss. She should follow up in this clinic in one year or sooner for any new or worsening symptoms.         Jena Clements MD  Resident Physician - PGY3  Department of Ophthalmology   [facility]     Attending Physician Attestation:  Complete documentation of historical and exam elements from today's encounter can be found in the full encounter summary report (not reduplicated in this progress note).  I personally obtained the chief complaint(s) and history of present illness.  I confirmed and edited as necessary the review of systems, past medical/surgical history, family history, social history, and examination findings as documented by others; and I examined the patient myself.  I personally reviewed the relevant tests, images, and reports as documented above.  I formulated and edited as necessary the assessment and plan and discussed the findings and management plan with the patient and family. I personally reviewed the ophthalmic test(s) associated with this encounter, agree with the interpretation(s) as documented by the resident/fellow, and have edited the corresponding report(s) as necessary.  - Ambrose Ortega MD

## 2022-07-13 ENCOUNTER — LAB (OUTPATIENT)
Dept: LAB | Facility: CLINIC | Age: 67
End: 2022-07-13
Payer: COMMERCIAL

## 2022-07-13 ENCOUNTER — OFFICE VISIT (OUTPATIENT)
Dept: NEPHROLOGY | Facility: CLINIC | Age: 67
End: 2022-07-13
Attending: INTERNAL MEDICINE
Payer: COMMERCIAL

## 2022-07-13 VITALS
SYSTOLIC BLOOD PRESSURE: 129 MMHG | HEIGHT: 67 IN | DIASTOLIC BLOOD PRESSURE: 72 MMHG | BODY MASS INDEX: 30.23 KG/M2 | WEIGHT: 192.6 LBS | OXYGEN SATURATION: 95 % | HEART RATE: 85 BPM

## 2022-07-13 DIAGNOSIS — N18.31 CHRONIC KIDNEY DISEASE, STAGE 3A (H): Primary | ICD-10-CM

## 2022-07-13 DIAGNOSIS — I15.1 HYPERTENSION SECONDARY TO OTHER RENAL DISORDERS: ICD-10-CM

## 2022-07-13 DIAGNOSIS — N18.31 STAGE 3A CHRONIC KIDNEY DISEASE (H): ICD-10-CM

## 2022-07-13 LAB
ALBUMIN SERPL-MCNC: 3.6 G/DL (ref 3.4–5)
ANION GAP SERPL CALCULATED.3IONS-SCNC: 9 MMOL/L (ref 3–14)
BUN SERPL-MCNC: 18 MG/DL (ref 7–30)
CALCIUM SERPL-MCNC: 8.9 MG/DL (ref 8.5–10.1)
CHLORIDE BLD-SCNC: 106 MMOL/L (ref 94–109)
CO2 SERPL-SCNC: 26 MMOL/L (ref 20–32)
CREAT SERPL-MCNC: 1.07 MG/DL (ref 0.52–1.04)
CREAT UR-MCNC: 11 MG/DL
DEPRECATED CALCIDIOL+CALCIFEROL SERPL-MC: 38 UG/L (ref 20–75)
GFR SERPL CREATININE-BSD FRML MDRD: 57 ML/MIN/1.73M2
GLUCOSE BLD-MCNC: 101 MG/DL (ref 70–99)
HGB BLD-MCNC: 12.6 G/DL (ref 11.7–15.7)
PHOSPHATE SERPL-MCNC: 2.4 MG/DL (ref 2.5–4.5)
POTASSIUM BLD-SCNC: 3.8 MMOL/L (ref 3.4–5.3)
PROT UR-MCNC: <0.05 G/L
PROT/CREAT 24H UR: NORMAL MG/G{CREAT}
PTH-INTACT SERPL-MCNC: 73 PG/ML (ref 15–65)
SODIUM SERPL-SCNC: 141 MMOL/L (ref 133–144)

## 2022-07-13 PROCEDURE — 36415 COLL VENOUS BLD VENIPUNCTURE: CPT | Performed by: PATHOLOGY

## 2022-07-13 PROCEDURE — 82306 VITAMIN D 25 HYDROXY: CPT | Performed by: PATHOLOGY

## 2022-07-13 PROCEDURE — 85018 HEMOGLOBIN: CPT | Performed by: PATHOLOGY

## 2022-07-13 PROCEDURE — 84156 ASSAY OF PROTEIN URINE: CPT | Performed by: PATHOLOGY

## 2022-07-13 PROCEDURE — 99214 OFFICE O/P EST MOD 30 MIN: CPT | Performed by: INTERNAL MEDICINE

## 2022-07-13 PROCEDURE — G0463 HOSPITAL OUTPT CLINIC VISIT: HCPCS

## 2022-07-13 PROCEDURE — 99000 SPECIMEN HANDLING OFFICE-LAB: CPT | Performed by: PATHOLOGY

## 2022-07-13 PROCEDURE — 83970 ASSAY OF PARATHORMONE: CPT | Performed by: PATHOLOGY

## 2022-07-13 PROCEDURE — 80069 RENAL FUNCTION PANEL: CPT | Performed by: PATHOLOGY

## 2022-07-13 ASSESSMENT — PAIN SCALES - GENERAL: PAINLEVEL: NO PAIN (0)

## 2022-07-13 NOTE — NURSING NOTE
"Chief Complaint   Patient presents with     RECHECK     CKD stage 3     /72   Pulse 85   Ht 1.702 m (5' 7\")   Wt 87.4 kg (192 lb 9.6 oz)   LMP  (LMP Unknown)   SpO2 95%   BMI 30.17 kg/m        Hayde Milligan MA  "

## 2022-07-13 NOTE — LETTER
2022       RE: Phan Luque  [address]  [facility] [address]     Dear Colleague,    Thank you for referring your patient, Phan Luque, to the [facility] [place] at [facility]. Please see a copy of my visit note below.      Nephrology Clinic    Name: Phan Luque  MRN: [id number]  : 1955  Referring provider: Leonel Salgado     Assessment and Plan:  # CKD stage 3a:    - creatinine 1.07 mg/dL with eGFR 57 2021   - stable   - no proteinuria  #. HTN:  Home BP log that was sent 2021 reviewed and BP at goal of <130/80 - usually 120-125 systolic   - On amlodipine 5 mg daily   - On chlorthalidone 12.5 mg daily -   - no change, continue with regular electrolyte monitoring  # Hypomagnesemia: on magnesium supplement, magnesium 1.5 on 22 (acceptable level)  - magnesium losses likely related to tacrolimus but could also be GI related to protonix - consider discussing weaning of PPI with PCP  # hypophosphatemia - mild, reviewed phosphorus sources - dairy products. She is lacto vegetarian and uses dairy/beans for protein  # S/p liver transplant: doing well, reviewed Dr. Banegas notes  - on tacrolimus  # psoriasis - scalp and psoriatic arthritis, currently using topical therapy  #. Overweight/obese - BMI 30 - previously reviewed  #. COVID vaccination x4 complete, s/p evusheld    Return in about 1 year (around 2023).     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   Last visit May 2021, since that visit she has been diagnosed with psoriatic arthritis, had pain in a couple joints of left hand. Working with   [facility] and managing with topical steroid and takes acetaminophen for pain.    Continues to be active with liver transplant support group.       Review of Systems:   Pertinent items are noted in HPI or as below, remainder of complete ROS is negative.      Active Medications:     Current Outpatient Medications:      acetaminophen (TYLENOL) 325 MG tablet, Take 2 tablets (650 mg) by mouth every 6 hours as needed for mild pain Or fevers. Use sparingly. Limit use to no more than 2 grams (2000 mg) in 24 hours. **Further refills must be obtained by primary care provider**, Disp: 100 tablet, Rfl: 0     albuterol (VENTOLIN HFA) 108 (90 Base) MCG/ACT inhaler, Inhale 2 puffs into the lungs every 6 hours as needed for shortness of breath / dyspnea or wheezing, Disp: 18 g, Rfl: 8     amLODIPine (NORVASC) 5 MG tablet, Take 1 tablet (5 mg) by mouth daily, Disp: 90 tablet, Rfl: 3     calcipotriene (DOVONOX) 0.005 % external solution, Apply 1 mL topically daily To the scalp, Disp: 180 mL, Rfl: 3     chlorthalidone (HYGROTON) 25 MG tablet, Take 0.5 tablets (12.5 mg) by mouth daily, Disp: 45 tablet, Rfl: 3     cholecalciferol (VITAMIN D3) 400 UNIT TABS tablet, Take 400 Units by mouth daily, Disp: , Rfl:      clobetasol (TEMOVATE) 0.05 % external ointment, Apply topically 2 times daily To areas of eczema on the lower legs, until areas resolve., Disp: 60 g, Rfl: 1     clobetasol (TEMOVATE) 0.05 % external solution, Apply topically 2 times daily To the scalp as needed for itching and flaking, on the days you don't use the clobetasol shampoo., Disp: 150 mL, Rfl: 3     clobetasol propionate (CLOBEX) 0.05 % external shampoo, Apply topically daily To dry scalp x 15 min and rinse ,when psoriasis is present., Disp: 354 mL, Rfl: 3     cyanocobalamin (VITAMIN  B-12) 1000 MCG tablet, Take 1,000 mcg by mouth daily, Disp: , Rfl:      ferrous sulfate (IRON) 325 (65 FE) MG tablet, Take 1 tablet (325 mg) by mouth 2 times daily, Disp: 100 tablet,  Rfl:      folic acid (FOLVITE) 1 MG tablet, Take 1 tablet (1 mg) by mouth daily, Disp: 30 tablet, Rfl: 1     gabapentin (NEURONTIN) 100 MG capsule, Take 2 capsules (200 mg) by mouth At Bedtime, Disp: 180 capsule, Rfl: 2     hydrOXYzine (ATARAX) 25 MG tablet, Take 1-2 tabs every 6 hrs prn itching, Disp: 120 tablet, Rfl: 5     levothyroxine (SYNTHROID/LEVOTHROID) 88 MCG tablet, Take 1 tablet (88 mcg) by mouth daily, Disp: 90 tablet, Rfl: 2     MAGNESIUM OXIDE PO, Take 400 mg by mouth daily, Disp: , Rfl:      melatonin 5 MG tablet, Take 1 tablet (5 mg) by mouth nightly as needed for sleep, Disp: , Rfl:      multivitamin, therapeutic (THERA-VIT) TABS tablet, Take 1 tablet by mouth every 24 hours, Disp: 30 tablet, Rfl: 11     mupirocin (BACTROBAN) 2 % external ointment, , Disp: , Rfl:      order for DME, Equipment being ordered: [employer] ankle brace, Disp: 1 Device, Rfl: 0     pantoprazole (PROTONIX) 40 MG EC tablet, TAKE ONE TABLET BY MOUTH EVERY MORNING BEFORE BREAKFAST, Disp: 90 tablet, Rfl: 3     predniSONE (DELTASONE) 20 MG tablet, TAKE 2 TABLETS BY MOUTH EVERY DAY FOR 5 DAYS, Disp: , Rfl:      propranolol (INDERAL) 10 MG tablet, Take 1 tablet (10 mg) by mouth 2 times daily, Disp: 60 tablet, Rfl: 2     psyllium 0.52 G capsule, Take 2 capsules (1.04 g) by mouth daily With a full glass of water. (Patient taking differently: Take 1 capsule by mouth 3 times daily With a full glass of water.), Disp: 60 capsule, Rfl: 1     tacrolimus (GENERIC EQUIVALENT) 0.5 MG capsule, Take 3 capsules (1.5 mg) by mouth every 12 hours, Disp: 180 capsule, Rfl: 11     traMADol (ULTRAM) 50 MG tablet, Take 1 tablet (50 mg) by mouth every 6 hours as needed for severe pain, Disp: 30 tablet, Rfl: 0     triamcinolone (KENALOG) 0.025 % external ointment, Apply topically 2 times daily To affected areas in the axilla or gluteal cleft as needed until clear., Disp: 80 g, Rfl: 1     triamcinolone (KENALOG) 0.1 % external ointment, Apply topically 2 times  daily To plaques behind the knee, Disp: 80 g, Rfl: 1     ursodiol (ACTIGALL) 300 MG capsule, TAKE ONE CAPSULE BY MOUTH TWICE A DAY, Disp: 60 capsule, Rfl: 11     valACYclovir (VALTREX) 500 MG tablet, TAKE 1 TABLET BY MOUTH TWICE DAILY FOR 7 DAYS. START 2 DAYS BEFORE PROCEDURE, Disp: , Rfl:      Allergies:   Codeine, Benadryl [diphenhydramine], Cefaclor, Hydrocodone-acetaminophen, Oxycodone, Penicillins, and Sulfa drugs      Past Medical History:  Past Medical History:   Diagnosis Date     AION (acute ischemic optic neuropathy)      Asthma      Bacterial infection 02/04/2021     Candida infection 11/11/2020     Cellulitis 09/13/2021     Cervical spinal stenosis      Chronic kidney disease      Chronic kidney disease, stage 3 (H)      Dizziness and giddiness     Created by Conversion      End stage liver disease (H)     2/2 Alcohol Abuse     End stage liver disease (H)     Alcohol-related     GERD (gastroesophageal reflux disease)      Hypertension      Liver transplant recipient (H) 08/25/2016    [facility]     Liver transplanted (H)      Migraine headache      Migraines      Osteoporosis     frax 11/1.7% 2021     PFO (patent foramen ovale) 08/08/2016    Noted on echo at [facility]     Recurrent UTI      Spinal stenosis in cervical region      Strabismus      Unspecified asthma(493.90)     Created by Conversion      Patient Active Problem List   Diagnosis     Decompensation of cirrhosis of liver (H)     Liver transplanted (H)     Alcoholic hepatitis     Immunosuppression (H)     Alcoholic hepatitis without ascites     Enterococcus UTI     Liver transplant status (H)     Nausea & vomiting     Malnutrition (H)     Anemia     ACP (advance care planning)     Diarrhea     Hypothyroidism     Esophageal reflux     Recurrent major depression in partial remission (H)     Insomnia     CKD (chronic kidney disease) stage 3, GFR 30-59 ml/min (H)     Anemia in stage 3 chronic kidney disease (H)      "Osteopenia     Alcohol abuse, uncomplicated     Psoriasis     Candida infection     Bacterial infection     Cellulitis     Past Surgical History:  Past Surgical History:   Procedure Laterality Date     APPENDECTOMY           APPENDECTOMY       BENCH LIVER N/A 2016    Procedure: BENCH LIVER;  Surgeon: Larry Dhillon MD;  Location: [facility] OR     CATARACT IOL, RT/LT       COLONOSCOPY       COLONOSCOPY N/A 2021    Procedure: COLONOSCOPY, WITH POLYPECTOMY;  Surgeon: Arlyn Beckford MD;  Location: [facility] OR     ESOPHAGOSCOPY, GASTROSCOPY, DUODENOSCOPY (EGD), COMBINED N/A 2016    Procedure: COMBINED ESOPHAGOSCOPY, GASTROSCOPY, DUODENOSCOPY (EGD);  Surgeon: Tao Alfonso MD;  Location:  GI     ESOPHAGOSCOPY, GASTROSCOPY, DUODENOSCOPY (EGD), COMBINED N/A 10/31/2016    Procedure: COMBINED ESOPHAGOSCOPY, GASTROSCOPY, DUODENOSCOPY (EGD), BIOPSY SINGLE OR MULTIPLE;  Surgeon: Ronald Bojorquez MD;  Location:  GI     LIVER TRANSPLANTATION  2016    [facility]     RECTAL SURGERY       sphincteroplasty       TRANSPLANT LIVER RECIPIENT  DONOR N/A 2016    Procedure: TRANSPLANT LIVER RECIPIENT  DONOR;  Surgeon: Larry Dhillon MD;  Location: [facility] OR     TUBAL LIGATION      laparoscopic     TUBAL LIGATION       Family History:   Daughter has CKD due to ureteral valves that were asymptomatic and not dx until around age 30  Family History   Problem Relation Age of Onset     Family History Negative Other      Melanoma Mother              Heart Disease Father         CHF     Skin Cancer Sister      Cirrhosis Paternal Uncle         not alcohol related     Heart Failure Mother      Heart Failure Father      Chronic Obstructive Pulmonary Disease Father       Social History:   Continues to work for [employer] and has been working from home  Selling house and moving to town home    Physical Exam:  /72   Pulse 85   Ht 1.702 m (5' 7\")   Wt 87.4 kg " (192 lb 9.6 oz)   LMP  (LMP Unknown)   SpO2 95%   BMI 30.17 kg/m     GENERAL APPEARANCE: alert and no distress   Pulmonary: normal work of breathing  NEURO: mentation intact and speech normal     Laboratory:  CMP  Recent Labs   Lab Test 07/13/22  1235 05/26/22  0731 04/08/22  0838 03/14/22  0734 01/17/22  0739 11/16/21  0757 09/20/21  0725 07/21/21  0730 05/17/21  0727 05/17/21  0724 03/25/21  0734 01/25/21  0725 09/14/20  0903 07/14/20  0936 04/29/20  0741    137 139 139 141   < > 138 140 134 135 135 132*   < > 135  --    POTASSIUM 3.8 3.9 4.4 4.3 4.3   < > 4.2 4.2 4.3 4.1 4.5 4.4   < > 4.1  --    CHLORIDE 106 101 103 103 105   < > 102 106 101 101 102 101   < > 104  --    CO2 26 23 25 27 22   < > 25 25 26 27 28 26   < > 26  --    ANIONGAP 9 13 11 9 14   < > 11 9 7 7 5 5   < > 5  --    * 107 85 106 107   < > 100 108 94 97 103* 99   < > 104*  --    BUN 18 29* 31* 36* 23*   < > 33* 20 19 19 36* 22   < > 23  --    CR 1.07* 1.24* 1.40* 1.20* 1.26*   < > 1.29* 1.37* 1.26* 1.30* 1.44* 1.38*   < > 1.49*  --    GFRESTIMATED 57* 48* 42* 50* 47*   < > 44* 41* 45* 43* 38* 40*   < > 37*   < >   GFRESTBLACK  --   --   --   --   --   --   --   --  52* 50* 44* 47*   < > 43*  --    ERIC 8.9 9.7 9.4 9.7 9.5   < > 9.2 9.3 8.8 8.9 8.8 9.0   < > 9.0  --    MAG  --  1.5*  --  1.9 1.7*  --  1.7* 1.8  --  2.1 2.0 2.0   < >  --   --    PHOS 2.4*  --   --   --   --   --   --  4.2 3.2  --   --   --   --  3.1  --    PROTTOTAL  --  6.7 7.0 7.0 7.4   < > 7.0 7.3  --  7.4 7.5 7.6   < > 7.8  --    ALBUMIN 3.6 3.6 3.7 3.7 3.8   < > 3.9 4.0 3.6 3.8 3.8 3.8   < > 3.8  --    BILITOTAL  --  0.7 0.9 0.8 0.7   < > 0.6 1.1*  --  0.6 0.5 0.7   < > 0.8  --    ALKPHOS  --  61 61 59 57   < > 59 53  --  50 67 50   < > 56  --    AST  --  18 18 17 16   < > 18 22  --  18 13 23   < > 23  --    ALT  --  10 14 14 12   < > 12 21  --  19 23 24   < > 39  --     < > = values in this interval not displayed.     CBC  Recent Labs   Lab Test 07/13/22  7207  05/26/22  0731 03/14/22  0734 01/17/22  0739 01/06/22  1052   HGB 12.6 13.8 14.2 13.6 12.8   WBC  --  10.2 6.7 8.7 11.6*   RBC  --  4.72 4.78 4.52 4.36   HCT  --  41.8 42.3 41.3 39.1   MCV  --  89 89 91 90   MCH  --  29.2 29.7 30.1 29.4   MCHC  --  33.0 33.6 32.9 32.7   RDW  --  13.4 13.2 13.5 14.1   PLT  --  321 289 276 336     INR  Recent Labs   Lab Test 06/11/18  0720 03/16/17  1728 09/13/16  1055 08/30/16  0740 08/25/16  1540 08/25/16  1230 08/25/16  1055 08/25/16  0850 08/25/16  0756   INR 0.98 1.15* 1.08 1.01   < > 1.76* 1.76* 1.87* 1.73*   PTT  --   --   --   --   --  48* 59* 58* 71*    < > = values in this interval not displayed.     ABG  Recent Labs   Lab Test 10/20/16  1216 10/19/16  0821 08/26/16  0510 08/26/16  0004 08/25/16  2000 08/25/16  1230 08/25/16  1055   PH  --   --   --  7.48* 7.51* 7.45 7.45   PCO2  --   --   --  38 35 44 43   PO2  --   --   --  77* 58* 164* 103   HCO3  --   --   --  28 28 30* 29*   O2PER 21.0 21 4L 4L 4L 100.0  100.0 0.58      URINE STUDIES  Recent Labs   Lab Test 03/14/22  0754 03/25/21  0736 07/24/20  1258 04/29/20  0741 11/29/18  0729 06/11/18  0619   COLOR Yellow Yellow Yellow Yellow Yellow Yellow   APPEARANCE Clear Clear Clear Clear Clear Clear   URINEGLC Negative Negative Negative Negative Negative Negative   URINEBILI Negative Negative Negative Negative Negative Negative   URINEKETONE Negative Negative Negative Negative Negative Negative   SG 1.010 1.013 1.010 1.015 1.015 1.010   UBLD Negative Negative Trace* Negative Negative Negative   URINEPH 5.5 7.0 5.5 6.0 6.0 6.0   PROTEIN Negative Negative Negative Negative Negative Negative   UROBILINOGEN 0.2  --  0.2 E.U./dL 0.2 E.U./dL  --  <2.0 E.U./dL   NITRITE Negative Negative Negative Negative Negative Negative   LEUKEST Negative Negative Small* Negative Negative Small*   RBCU  --  2 0-2  --  <1 0-2   WBCU  --  <1 25-50*  --  <1 5-10*     Recent Labs   Lab Test 05/17/21  0727 03/25/21  0736 07/14/20  0937 03/20/19  1525  11/29/18  0729 03/21/18  1550 12/20/17  1450 09/20/17  1214 06/05/17  1214 03/13/17  1550 12/12/16  1502 10/10/16  1457   UTPG 0.08 0.09 0.07 Unable to calculate due to low value 0.11 0.16 0.12 0.13 0.39* 0.13 0.20 0.39*     PTH  Recent Labs   Lab Test 07/13/22  1235 07/14/20  0936 03/20/19  1517 08/21/18  0739 04/20/18  0739 09/08/17  0723 04/05/17  1236 11/16/16  0640 10/29/16  1807 10/27/16  0705 10/10/16  1456   PTHI 73* 71 47 66 90* 117* 53 20 <3* Quantity not sufficient  ED GLOH NOTIFIED ON URTRTC,10/29/2016,1650,MJ   3*     IRON STUDIES   Recent Labs   Lab Test 08/21/18  0739 04/04/18  0823 03/21/18  1540 06/05/17  1211 04/05/17  1236 03/22/17  1607 09/06/16  0638 08/06/16  0757   IRON  --   --   --  59 56 62 18* 25*   FEB  --   --   --  248 179* 197* 118* 92*   IRONSAT  --   --   --  24 31 32 15 27   WILL 84 57 50 138 278* 320* 480* 528*         Again, thank you for allowing me to participate in the care of your patient.      Sincerely,    Daya Gutierrez MD

## 2022-07-13 NOTE — PROGRESS NOTES
Nephrology Clinic    Name: Phan Luque  MRN: [id number]  : 1955  Referring provider: Leonel Salgado     Assessment and Plan:  # CKD stage 3a:    - creatinine 1.07 mg/dL with eGFR 57 2021   - stable   - no proteinuria  #. HTN:  Home BP log that was sent 2021 reviewed and BP at goal of <130/80 - usually 120-125 systolic   - On amlodipine 5 mg daily   - On chlorthalidone 12.5 mg daily -   - no change, continue with regular electrolyte monitoring  # Hypomagnesemia: on magnesium supplement, magnesium 1.5 on 22 (acceptable level)  - magnesium losses likely related to tacrolimus but could also be GI related to protonix - consider discussing weaning of PPI with PCP  # hypophosphatemia - mild, reviewed phosphorus sources - dairy products. She is lacto vegetarian and uses dairy/beans for protein  # S/p liver transplant: doing well, reviewed Dr. Banegas notes  - on tacrolimus  # psoriasis - scalp and psoriatic arthritis, currently using topical therapy  #. Overweight/obese - BMI 30 - previously reviewed  #. COVID vaccination x4 complete, s/p evusheld    Return in about 1 year (around 2023).     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   Last visit May 2021, since that visit she has been diagnosed with psoriatic arthritis, had pain in a couple joints of left hand. Working with Dr. Alex and managing with topical steroid and takes acetaminophen for pain.    Continues to be active with liver transplant support group.       Review of Systems:   Pertinent items are noted in HPI or as below, remainder of complete ROS is negative.      Active Medications:     Current Outpatient Medications:       acetaminophen (TYLENOL) 325 MG tablet, Take 2 tablets (650 mg) by mouth every 6 hours as needed for mild pain Or fevers. Use sparingly. Limit use to no more than 2 grams (2000 mg) in 24 hours. **Further refills must be obtained by primary care provider**, Disp: 100 tablet, Rfl: 0     albuterol (VENTOLIN HFA) 108 (90 Base) MCG/ACT inhaler, Inhale 2 puffs into the lungs every 6 hours as needed for shortness of breath / dyspnea or wheezing, Disp: 18 g, Rfl: 8     amLODIPine (NORVASC) 5 MG tablet, Take 1 tablet (5 mg) by mouth daily, Disp: 90 tablet, Rfl: 3     calcipotriene (DOVONOX) 0.005 % external solution, Apply 1 mL topically daily To the scalp, Disp: 180 mL, Rfl: 3     chlorthalidone (HYGROTON) 25 MG tablet, Take 0.5 tablets (12.5 mg) by mouth daily, Disp: 45 tablet, Rfl: 3     cholecalciferol (VITAMIN D3) 400 UNIT TABS tablet, Take 400 Units by mouth daily, Disp: , Rfl:      clobetasol (TEMOVATE) 0.05 % external ointment, Apply topically 2 times daily To areas of eczema on the lower legs, until areas resolve., Disp: 60 g, Rfl: 1     clobetasol (TEMOVATE) 0.05 % external solution, Apply topically 2 times daily To the scalp as needed for itching and flaking, on the days you don't use the clobetasol shampoo., Disp: 150 mL, Rfl: 3     clobetasol propionate (CLOBEX) 0.05 % external shampoo, Apply topically daily To dry scalp x 15 min and rinse ,when psoriasis is present., Disp: 354 mL, Rfl: 3     cyanocobalamin (VITAMIN  B-12) 1000 MCG tablet, Take 1,000 mcg by mouth daily, Disp: , Rfl:      ferrous sulfate (IRON) 325 (65 FE) MG tablet, Take 1 tablet (325 mg) by mouth 2 times daily, Disp: 100 tablet, Rfl:      folic acid (FOLVITE) 1 MG tablet, Take 1 tablet (1 mg) by mouth daily, Disp: 30 tablet, Rfl: 1     gabapentin (NEURONTIN) 100 MG capsule, Take 2 capsules (200 mg) by mouth At Bedtime, Disp: 180 capsule, Rfl: 2     hydrOXYzine (ATARAX) 25 MG tablet, Take 1-2 tabs every 6 hrs prn itching, Disp: 120 tablet, Rfl:  5     levothyroxine (SYNTHROID/LEVOTHROID) 88 MCG tablet, Take 1 tablet (88 mcg) by mouth daily, Disp: 90 tablet, Rfl: 2     MAGNESIUM OXIDE PO, Take 400 mg by mouth daily, Disp: , Rfl:      melatonin 5 MG tablet, Take 1 tablet (5 mg) by mouth nightly as needed for sleep, Disp: , Rfl:      multivitamin, therapeutic (THERA-VIT) TABS tablet, Take 1 tablet by mouth every 24 hours, Disp: 30 tablet, Rfl: 11     mupirocin (BACTROBAN) 2 % external ointment, , Disp: , Rfl:      order for DME, Equipment being ordered: [employer] ankle brace, Disp: 1 Device, Rfl: 0     pantoprazole (PROTONIX) 40 MG EC tablet, TAKE ONE TABLET BY MOUTH EVERY MORNING BEFORE BREAKFAST, Disp: 90 tablet, Rfl: 3     predniSONE (DELTASONE) 20 MG tablet, TAKE 2 TABLETS BY MOUTH EVERY DAY FOR 5 DAYS, Disp: , Rfl:      propranolol (INDERAL) 10 MG tablet, Take 1 tablet (10 mg) by mouth 2 times daily, Disp: 60 tablet, Rfl: 2     psyllium 0.52 G capsule, Take 2 capsules (1.04 g) by mouth daily With a full glass of water. (Patient taking differently: Take 1 capsule by mouth 3 times daily With a full glass of water.), Disp: 60 capsule, Rfl: 1     tacrolimus (GENERIC EQUIVALENT) 0.5 MG capsule, Take 3 capsules (1.5 mg) by mouth every 12 hours, Disp: 180 capsule, Rfl: 11     traMADol (ULTRAM) 50 MG tablet, Take 1 tablet (50 mg) by mouth every 6 hours as needed for severe pain, Disp: 30 tablet, Rfl: 0     triamcinolone (KENALOG) 0.025 % external ointment, Apply topically 2 times daily To affected areas in the axilla or gluteal cleft as needed until clear., Disp: 80 g, Rfl: 1     triamcinolone (KENALOG) 0.1 % external ointment, Apply topically 2 times daily To plaques behind the knee, Disp: 80 g, Rfl: 1     ursodiol (ACTIGALL) 300 MG capsule, TAKE ONE CAPSULE BY MOUTH TWICE A DAY, Disp: 60 capsule, Rfl: 11     valACYclovir (VALTREX) 500 MG tablet, TAKE 1 TABLET BY MOUTH TWICE DAILY FOR 7 DAYS. START 2 DAYS BEFORE PROCEDURE, Disp: , Rfl:      Allergies:   Codeine,  Benadryl [diphenhydramine], Cefaclor, Hydrocodone-acetaminophen, Oxycodone, Penicillins, and Sulfa drugs      Past Medical History:  Past Medical History:   Diagnosis Date     AION (acute ischemic optic neuropathy)      Asthma      Bacterial infection 02/04/2021     Candida infection 11/11/2020     Cellulitis 09/13/2021     Cervical spinal stenosis      Chronic kidney disease      Chronic kidney disease, stage 3 (H)      Dizziness and giddiness     Created by Conversion      End stage liver disease (H)     2/2 Alcohol Abuse     End stage liver disease (H)     Alcohol-related     GERD (gastroesophageal reflux disease)      Hypertension      Liver transplant recipient (H) 08/25/2016    [facility]     Liver transplanted (H)      Migraine headache      Migraines      Osteoporosis     frax 11/1.7% 2021     PFO (patent foramen ovale) 08/08/2016    Noted on echo at [facility]     Recurrent UTI      Spinal stenosis in cervical region      Strabismus      Unspecified asthma(493.90)     Created by Conversion      Patient Active Problem List   Diagnosis     Decompensation of cirrhosis of liver (H)     Liver transplanted (H)     Alcoholic hepatitis     Immunosuppression (H)     Alcoholic hepatitis without ascites     Enterococcus UTI     Liver transplant status (H)     Nausea & vomiting     Malnutrition (H)     Anemia     ACP (advance care planning)     Diarrhea     Hypothyroidism     Esophageal reflux     Recurrent major depression in partial remission (H)     Insomnia     CKD (chronic kidney disease) stage 3, GFR 30-59 ml/min (H)     Anemia in stage 3 chronic kidney disease (H)     Osteopenia     Alcohol abuse, uncomplicated     Psoriasis     Candida infection     Bacterial infection     Cellulitis     Past Surgical History:  Past Surgical History:   Procedure Laterality Date     APPENDECTOMY      1962     APPENDECTOMY       BENCH LIVER N/A 8/25/2016    Procedure: BENCH LIVER;  Surgeon:  "Larry Dhillon MD;  Location: [facility] OR     CATARACT IOL, RT/LT       COLONOSCOPY       COLONOSCOPY N/A 2021    Procedure: COLONOSCOPY, WITH POLYPECTOMY;  Surgeon: Arlyn Beckford MD;  Location: [facility] OR     ESOPHAGOSCOPY, GASTROSCOPY, DUODENOSCOPY (EGD), COMBINED N/A 2016    Procedure: COMBINED ESOPHAGOSCOPY, GASTROSCOPY, DUODENOSCOPY (EGD);  Surgeon: Tao Alfonso MD;  [facility]     ESOPHAGOSCOPY, GASTROSCOPY, DUODENOSCOPY (EGD), COMBINED N/A 10/31/2016    Procedure: COMBINED ESOPHAGOSCOPY, GASTROSCOPY, DUODENOSCOPY (EGD), BIOPSY SINGLE OR MULTIPLE;  Surgeon: Ronald Bojorqeuz MD;  Location:  GI     LIVER TRANSPLANTATION  2016    [facility]     RECTAL SURGERY       sphincteroplasty       TRANSPLANT LIVER RECIPIENT  DONOR N/A 2016    Procedure: TRANSPLANT LIVER RECIPIENT  DONOR;  Surgeon: Larry Dhillon MD;  Location: [facility] OR     TUBAL LIGATION      laparoscopic     TUBAL LIGATION       Family History:   Daughter has CKD due to ureteral valves that were asymptomatic and not dx until around age 30  Family History   Problem Relation Age of Onset     Family History Negative Other      Melanoma Mother              Heart Disease Father         CHF     Skin Cancer Sister      Cirrhosis Paternal Uncle         not alcohol related     Heart Failure Mother      Heart Failure Father      Chronic Obstructive Pulmonary Disease Father       Social History:   Continues to work for [employer] and has been working from home  Selling house and moving to town home    Physical Exam:  /72   Pulse 85   Ht 1.702 m (5' 7\")   Wt 87.4 kg (192 lb 9.6 oz)   LMP  (LMP Unknown)   SpO2 95%   BMI 30.17 kg/m     GENERAL APPEARANCE: alert and no distress   Pulmonary: normal work of breathing  NEURO: mentation intact and speech normal     Laboratory:  CMP  Recent Labs   Lab Test 22  1235 22  0731 22  0838 22  0734 " 01/17/22  0739 11/16/21  0757 09/20/21  0725 07/21/21  0730 05/17/21  0727 05/17/21  0724 03/25/21  0734 01/25/21  0725 09/14/20  0903 07/14/20  0936 04/29/20  0741    137 139 139 141   < > 138 140 134 135 135 132*   < > 135  --    POTASSIUM 3.8 3.9 4.4 4.3 4.3   < > 4.2 4.2 4.3 4.1 4.5 4.4   < > 4.1  --    CHLORIDE 106 101 103 103 105   < > 102 106 101 101 102 101   < > 104  --    CO2 26 23 25 27 22   < > 25 25 26 27 28 26   < > 26  --    ANIONGAP 9 13 11 9 14   < > 11 9 7 7 5 5   < > 5  --    * 107 85 106 107   < > 100 108 94 97 103* 99   < > 104*  --    BUN 18 29* 31* 36* 23*   < > 33* 20 19 19 36* 22   < > 23  --    CR 1.07* 1.24* 1.40* 1.20* 1.26*   < > 1.29* 1.37* 1.26* 1.30* 1.44* 1.38*   < > 1.49*  --    GFRESTIMATED 57* 48* 42* 50* 47*   < > 44* 41* 45* 43* 38* 40*   < > 37*   < >   GFRESTBLACK  --   --   --   --   --   --   --   --  52* 50* 44* 47*   < > 43*  --    ERIC 8.9 9.7 9.4 9.7 9.5   < > 9.2 9.3 8.8 8.9 8.8 9.0   < > 9.0  --    MAG  --  1.5*  --  1.9 1.7*  --  1.7* 1.8  --  2.1 2.0 2.0   < >  --   --    PHOS 2.4*  --   --   --   --   --   --  4.2 3.2  --   --   --   --  3.1  --    PROTTOTAL  --  6.7 7.0 7.0 7.4   < > 7.0 7.3  --  7.4 7.5 7.6   < > 7.8  --    ALBUMIN 3.6 3.6 3.7 3.7 3.8   < > 3.9 4.0 3.6 3.8 3.8 3.8   < > 3.8  --    BILITOTAL  --  0.7 0.9 0.8 0.7   < > 0.6 1.1*  --  0.6 0.5 0.7   < > 0.8  --    ALKPHOS  --  61 61 59 57   < > 59 53  --  50 67 50   < > 56  --    AST  --  18 18 17 16   < > 18 22  --  18 13 23   < > 23  --    ALT  --  10 14 14 12   < > 12 21  --  19 23 24   < > 39  --     < > = values in this interval not displayed.     CBC  Recent Labs   Lab Test 07/13/22  1235 05/26/22  0731 03/14/22  0734 01/17/22  0739 01/06/22  1052   HGB 12.6 13.8 14.2 13.6 12.8   WBC  --  10.2 6.7 8.7 11.6*   RBC  --  4.72 4.78 4.52 4.36   HCT  --  41.8 42.3 41.3 39.1   MCV  --  89 89 91 90   MCH  --  29.2 29.7 30.1 29.4   MCHC  --  33.0 33.6 32.9 32.7   RDW  --  13.4 13.2 13.5 14.1    PLT  --  321 289 276 336     INR  Recent Labs   Lab Test 06/11/18  0720 03/16/17  1728 09/13/16  1055 08/30/16  0740 08/25/16  1540 08/25/16  1230 08/25/16  1055 08/25/16  0850 08/25/16  0756   INR 0.98 1.15* 1.08 1.01   < > 1.76* 1.76* 1.87* 1.73*   PTT  --   --   --   --   --  48* 59* 58* 71*    < > = values in this interval not displayed.     ABG  Recent Labs   Lab Test 10/20/16  1216 10/19/16  0821 08/26/16  0510 08/26/16  0004 08/25/16  2000 08/25/16  1230 08/25/16  1055   PH  --   --   --  7.48* 7.51* 7.45 7.45   PCO2  --   --   --  38 35 44 43   PO2  --   --   --  77* 58* 164* 103   HCO3  --   --   --  28 28 30* 29*   O2PER 21.0 21 4L 4L 4L 100.0  100.0 0.58      URINE STUDIES  Recent Labs   Lab Test 03/14/22  0754 03/25/21  0736 07/24/20  1258 04/29/20  0741 11/29/18  0729 06/11/18  0619   COLOR Yellow Yellow Yellow Yellow Yellow Yellow   APPEARANCE Clear Clear Clear Clear Clear Clear   URINEGLC Negative Negative Negative Negative Negative Negative   URINEBILI Negative Negative Negative Negative Negative Negative   URINEKETONE Negative Negative Negative Negative Negative Negative   SG 1.010 1.013 1.010 1.015 1.015 1.010   UBLD Negative Negative Trace* Negative Negative Negative   URINEPH 5.5 7.0 5.5 6.0 6.0 6.0   PROTEIN Negative Negative Negative Negative Negative Negative   UROBILINOGEN 0.2  --  0.2 E.U./dL 0.2 E.U./dL  --  <2.0 E.U./dL   NITRITE Negative Negative Negative Negative Negative Negative   LEUKEST Negative Negative Small* Negative Negative Small*   RBCU  --  2 0-2  --  <1 0-2   WBCU  --  <1 25-50*  --  <1 5-10*     Recent Labs   Lab Test 05/17/21  0727 03/25/21  0736 07/14/20  0937 03/20/19  1525 11/29/18  0729 03/21/18  1550 12/20/17  1450 09/20/17  1214 06/05/17  1214 03/13/17  1550 12/12/16  1502 10/10/16  1457   UTPG 0.08 0.09 0.07 Unable to calculate due to low value 0.11 0.16 0.12 0.13 0.39* 0.13 0.20 0.39*     PTH  Recent Labs   Lab Test 07/13/22  1235 07/14/20  0936 03/20/19  1517  08/21/18  0739 04/20/18  0739 09/08/17  0723 04/05/17  1236 11/16/16  0640 10/29/16  1807 10/27/16  0705 10/10/16  1456   PTHI 73* 71 47 66 90* 117* 53 20 <3* Quantity not sufficient  ED GLOH NOTIFIED ON URTRTC,10/29/2016,1650,MJ   3*     IRON STUDIES   Recent Labs   Lab Test 08/21/18  0739 04/04/18  0823 03/21/18  1540 06/05/17  1211 04/05/17  1236 03/22/17  1607 09/06/16  0638 08/06/16  0757   IRON  --   --   --  59 56 62 18* 25*   FEB  --   --   --  248 179* 197* 118* 92*   IRONSAT  --   --   --  24 31 32 15 27   WILL 84 57 50 138 278* 320* 480* 528*

## 2022-07-15 ENCOUNTER — VIRTUAL VISIT (OUTPATIENT)
Dept: RHEUMATOLOGY | Facility: CLINIC | Age: 67
End: 2022-07-15
Attending: INTERNAL MEDICINE
Payer: COMMERCIAL

## 2022-07-15 DIAGNOSIS — M70.61 GREATER TROCHANTERIC BURSITIS OF RIGHT HIP: ICD-10-CM

## 2022-07-15 DIAGNOSIS — L40.9 SCALP PSORIASIS: ICD-10-CM

## 2022-07-15 DIAGNOSIS — L40.50 PSORIATIC ARTHRITIS (H): Primary | ICD-10-CM

## 2022-07-15 PROCEDURE — 99214 OFFICE O/P EST MOD 30 MIN: CPT | Mod: 95 | Performed by: INTERNAL MEDICINE

## 2022-07-15 PROCEDURE — G0463 HOSPITAL OUTPT CLINIC VISIT: HCPCS | Mod: PN,RTG | Performed by: INTERNAL MEDICINE

## 2022-07-15 RX ORDER — TRAMADOL HYDROCHLORIDE 50 MG/1
50 TABLET ORAL EVERY 6 HOURS PRN
Qty: 15 TABLET | Refills: 0 | Status: SHIPPED | OUTPATIENT
Start: 2022-07-15 | End: 2023-07-10

## 2022-07-15 NOTE — LETTER
7/15/2022       RE: Phan Luque  [address]  [facility] [address]     Dear Colleague,    Thank you for referring your patient, Phan Luque, to the [facility] RHEUMATOLOGY [facility]INIC [place] at [facility]. Please see a copy of my visit note below.    Jessica is a 66 year old who is being evaluated via a billable video visit.      How would you like to obtain your AVS? MyChart  If the video visit is dropped, the invitation should be resent by:  Text link sent to patient.  Will anyone else be joining your video visit? No        Video-Visit Details    Video start time: 1:39pm  Video end time: 1:54pm    Type of service:  Video Visit    Originating Location (pt. Location): Home    Distant Location (provider location):  [facility] RHEUMATOLOGY [facility]IN[facility] [place]     Platform used for Video Visit: [employer]    Rheumatology Follow-up  Video visit  Name: Phan Luque  MRN [id number]   Date of last visit 4/7/2022       Date of service: 7/15/22  Reason for follow-up:  psoriatic arthritis   Requesting physician: Demetria Barger             Assessment & Plan:   66 year old female with hx of liver transplant in 2016 on tacrolimus who was referred to rheumatology in January 2022 by dermatology for evaluation and treatment of psoriatic arthritis in the context of one episode of left then right hand distal digit redness, swelling, tenderness, pain of multiple fingers. These symptoms did resolve and have not returned. No current dactylitis and she denied symptoms consistent with this at the time of her initial consultation and again today. No history of inflammatory eye disease though follows with Dr Ortega here for AION. She does have nail involvement and inverse psoriasis both of which increase the likelihood for inflammatory arthritis associated with psoriasis. Psoriasis currently under excellent control without any active lesions managed with topicals  only. At the time of her visit in April 2022, we discussed that her nail involvement and inverse psoriasis both increase the risk of inflammatory arthritis associated with psoriasis. 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 We discussed that with this isolated episode, complete lack of symptoms before/since and good control of her cutaneous psoriasis that a reasonable approach at time of initial consultation would be to monitor closely for return of inflammatory arthritis symptoms. Should they return, we could consider therapy to reduce the frequency and severity of attacks with steroid sparing therapy. She agreed with this conservative approach.  We did obtain hand films in January 2022 which not show any evidence of erosions/george.    She returns today for follow-up due to acute onset of right lateral hip pain which by her history is most consistent with right greater trochanteric bursitis. Pain is much improved, though stiffness remains. She still does have pain when laying on this side. Cannot use NSAIDs due to renal disease. Tylenol not helpful.   PLAN:  1) Ortho referral for greater trochanteric bursa injection  2) PT  3) ultram as bridge therapy until ortho/PT  4) Follow-up in 3 months to ensure resolution of symptoms/ no development of progressive DIP symptoms  5) continue topical treatment for intermittent scalp psoriasis    Bry Alex MD  Rheumatology    I spent a total of 30 minutes on the date of service on chart review, patient encounter, , documentation.      Subjective:   Interval History 7/15/22  Was sitting working at her desk and developed acute onset right lateral hip pain. During the course of that day, her pain progressed. Then worsened in the middle of the night. Then the next day, she woke up and the pain had suddenly  resolved. Is still stiff however. Her 3rd digit DIP is sore. No swelling/redness. No skin changes/peeling as was previously a predominant feature. This all started about 2 weeks ago. Cannot take NSAIDs with kidney function. Tylenol not very helpful. She is still able to get up and work out each morning. Her pain is on the side of her hip. When she lies on her side it is painful. When acutely painful, had radiation up her back on the right side, not distally. No dactylitis. No rash. No fevers/chills/night sweats. No interval infections. 14 point review of systems collected and negative if not documented above.    Interval history 4/7/2022  Jessica has felt well since her last visit with me in January.  She has not had return of her pain, swelling/redness/warmth of her DIP joints.  No other red hot swollen joints elsewhere.  No morning stiffness or pain outside of her chronic baseline pain.  No fevers chills night sweats.  Weight has been stable.  No interval infections.  Her psoriasis remains well controlled with topicals though there was a switch in coverage for her scalp psoriasis topical and so she was tried on an alcohol-based topical which was very irritating and painful to use.  No development of symptoms consistent with dactylitis.  No development of symptoms consistent with laboratory eye disease.  No oral ulcers.  No symptoms consistent with inflammatory bowel disease.  Her review systems otherwise negative.    HPI from initial consultation on 1/6/2022  Initially developed scalp flaking/hair loss which started roughly one year ago. Did also have axillary and gluteal cleft and nails involved. Initially left hand 2nd digit then 3rd digit. Then the right hand involved. Then on Sept 13th developed left hand second digit then 3rd digit redness/swelling/pain of distal aspect of her fingers. She was treated with 10 days of clindamycin though due to continued pain/redness of predominantly the 3rd digit she went to the  walk-in clinic at [facility] and was given clindamycin again for what was presumed cellulitis that had not resolved with the initial course.  During this time had left hand 2nd/3rd digit throbbing pain/swelling. Also experienced peeling of the skin of the distal aspects of her digits. Her pain then resolved. Since that time, she reports 0/10 pain at rest which does not increase with use. Not much different with use. Has radiculopathy in neck/c spine involving the left upper extremity/clavical. No history consistent with dactylitis. 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 No history of inflammatory eye disease. Has intermittent ulcers in her mouth, a few weeks ago was most recent. Has been years prior to this since her last episode. Denies rectal or genital ulcers. Tried using clobetasol ointment on nails which she thought ws some helpful, though lasted for a few weeks. No symptoms of low back pain/stiffness which is worse in the AM and improves with use/exercise/stretching. No fevers/chills. No night sweats. Has intentionally lost some weight, no unintentional weight loss. Son with scalp involvement and recurrent ulcers. At this time she feels that her skin is currently under control with topical therapy without active lesions but knows that with stopping topicals that her psoriatic lesions return quickly.  She denies any other episodes of joint pain/redness/swelling/stiffness. Has baseline chronic knee pain worse with use/at the end of the day. Currently using clobetasol solution, clobetasol shampoo once per week, ointment, 2 strengths triamcinalone. No symptoms consistent with raynauds. No other rash. No photosensitive skin changes/ symptoms. ROS otherwise negative.    Past Medical History  Past Medical History:   Diagnosis Date     AION (acute ischemic optic neuropathy)      Asthma      Bacterial infection 02/04/2021     Candida infection 11/11/2020     Cellulitis 09/13/2021     Cervical  spinal stenosis      Chronic kidney disease      Chronic kidney disease, stage 3 (H)      Dizziness and giddiness     Created by Conversion      End stage liver disease (H)     2/2 Alcohol Abuse     End stage liver disease (H)     Alcohol-related     GERD (gastroesophageal reflux disease)      Hypertension      Liver transplant recipient (H) 2016    [facility]     Liver transplanted (H)      Migraine headache      Migraines      Osteoporosis     frax 11/1.7%      PFO (patent foramen ovale) 2016    Noted on echo at [facility]     Recurrent UTI      Spinal stenosis in cervical region      Strabismus      Unspecified asthma(493.90)     Created by Conversion      Past Surgical History  Past Surgical History:   Procedure Laterality Date     APPENDECTOMY           APPENDECTOMY       BENCH LIVER N/A 2016    Procedure: BENCH LIVER;  Surgeon: Larry Dhillon MD;  Location: [facility] OR     CATARACT IOL, RT/LT       COLONOSCOPY       COLONOSCOPY N/A 2021    Procedure: COLONOSCOPY, WITH POLYPECTOMY;  Surgeon: Arlyn Beckford MD;  [facility]     ESOPHAGOSCOPY, GASTROSCOPY, DUODENOSCOPY (EGD), COMBINED N/A 2016    Procedure: COMBINED ESOPHAGOSCOPY, GASTROSCOPY, DUODENOSCOPY (EGD);  Surgeon: Tao Alfonso MD;  Location: [facility]     ESOPHAGOSCOPY, GASTROSCOPY, DUODENOSCOPY (EGD), COMBINED N/A 10/31/2016    Procedure: COMBINED ESOPHAGOSCOPY, GASTROSCOPY, DUODENOSCOPY (EGD), BIOPSY SINGLE OR MULTIPLE;  Surgeon: Ronald Bojorquez MD;  Location:  GI     LIVER TRANSPLANTATION  2016    [facility]     RECTAL SURGERY       sphincteroplasty       TRANSPLANT LIVER RECIPIENT  DONOR N/A 2016    Procedure: TRANSPLANT LIVER RECIPIENT  DONOR;  Surgeon: Larry Dhillon MD;  Location:  OR     TUBAL LIGATION      laparoscopic     TUBAL LIGATION         Medications  Current Outpatient Medications   Medication      acetaminophen (TYLENOL) 325 MG tablet     albuterol (VENTOLIN HFA) 108 (90 Base) MCG/ACT inhaler     amLODIPine (NORVASC) 5 MG tablet     calcipotriene (DOVONOX) 0.005 % external solution     chlorthalidone (HYGROTON) 25 MG tablet     cholecalciferol (VITAMIN D3) 400 UNIT TABS tablet     clobetasol (TEMOVATE) 0.05 % external ointment     clobetasol (TEMOVATE) 0.05 % external solution     clobetasol propionate (CLOBEX) 0.05 % external shampoo     cyanocobalamin (VITAMIN  B-12) 1000 MCG tablet     ferrous sulfate (IRON) 325 (65 FE) MG tablet     folic acid (FOLVITE) 1 MG tablet     gabapentin (NEURONTIN) 100 MG capsule     hydrOXYzine (ATARAX) 25 MG tablet     levothyroxine (SYNTHROID/LEVOTHROID) 88 MCG tablet     MAGNESIUM OXIDE PO     melatonin 5 MG tablet     multivitamin, therapeutic (THERA-VIT) TABS tablet     mupirocin (BACTROBAN) 2 % external ointment     order for DME     pantoprazole (PROTONIX) 40 MG EC tablet     predniSONE (DELTASONE) 20 MG tablet     propranolol (INDERAL) 10 MG tablet     psyllium 0.52 G capsule     tacrolimus (GENERIC EQUIVALENT) 0.5 MG capsule     traMADol (ULTRAM) 50 MG tablet     triamcinolone (KENALOG) 0.025 % external ointment     triamcinolone (KENALOG) 0.1 % external ointment     ursodiol (ACTIGALL) 300 MG capsule     valACYclovir (VALTREX) 500 MG tablet     No current facility-administered medications for this visit.       Allergies   Allergies   Allergen Reactions     Codeine Hives     Benadryl [Diphenhydramine] Hives     Cefaclor Hives     Hydrocodone-Acetaminophen Itching     Oxycodone Itching     Penicillins Hives     Sulfa Drugs Hives     Family History: Psoriasis in her son. No other family history of autoimmune diseases.    Social History: Works for [employer]. 3 kids. Dec 13th 1996 quit smoking. Quit ETOH prior to transplant. No drug use hx.      Objective:    Physical exam:  No vitals for this video visit. Vitals reviewed in Epic.  GEN: Sitting up at table.  NAD  HEENT: no facial rash, sclera clear, no inflammatory nose/ external ear changes  CV: no upper extremity dependent edema  Pulm: speaking in full sentences, no cough, no audible wheezing, no use of accessory muscles  Abdomen: not distended  Skin: no acute cutaneous lesions  MSK: full active ROM of bilateral shoulders, elbows, wrists, MCPs, PIPs, DIPs. Can make full fist without difficulty. No erythema or edema of these joints.    WBC   Date Value Ref Range Status   05/17/2021 6.2 4.0 - 11.0 10e9/L Final     WBC Count   Date Value Ref Range Status   05/26/2022 10.2 4.0 - 11.0 10e3/uL Final     Hemoglobin   Date Value Ref Range Status   07/13/2022 12.6 11.7 - 15.7 g/dL Final   05/17/2021 13.3 11.7 - 15.7 g/dL Final     Platelet Count   Date Value Ref Range Status   05/26/2022 321 150 - 450 10e3/uL Final   05/17/2021 269 150 - 450 10e9/L Final     Creatinine   Date Value Ref Range Status   07/13/2022 1.07 (H) 0.52 - 1.04 mg/dL Final   05/17/2021 1.26 (H) 0.52 - 1.04 mg/dL Final     Lab Results   Component Value Date    ALKPHOS 61 05/26/2022    ALKPHOS 50 05/17/2021     AST   Date Value Ref Range Status   05/26/2022 18 0 - 40 U/L Final   05/17/2021 18 0 - 45 U/L Final     Lab Results   Component Value Date    ALT 10 05/26/2022    ALT 19 05/17/2021     Erythrocyte Sedimentation Rate   Date Value Ref Range Status   04/08/2022 39 (H) 0 - 20 mm/hr Final     CRP Inflammation   Date Value Ref Range Status   12/15/2020 <2.9 0.0 - 8.0 mg/L Final     CRP   Date Value Ref Range Status   04/08/2022 <0.1 0.0-<0.8 mg/dL Final     [facility] RESULTS:  Recent Labs   Lab Test 03/14/22  0754 03/25/21  0736   COLOR Yellow Yellow   APPEARANCE Clear Clear   URINEGLC Negative Negative   URINEBILI Negative Negative   URINEKETONE Negative Negative   SG 1.010 1.013   UBLD Negative Negative   URINEPH 5.5 7.0   PROTEIN Negative Negative   UROBILINOGEN 0.2  --    NITRITE Negative Negative   LEUKEST Negative Negative   RBCU  --  2   WBCU  --  <1       DNA (ds) Antibody   Date Value Ref Range Status   01/06/2022 1.2 <10.0 IU/mL Final     Comment:     Negative     RNP Antibody IgG   Date Value Ref Range Status   01/06/2022 Negative Negative Final     Imaging:  MRI left knee without contrast 7/25/2018     IMPRESSION:  CONCLUSION:  1.  Area of acute microtrabecular fracture in the anterior portion of the lateral tibial plateau.  2.  Moderately advanced chondromalacia patella.  3.  Mild degenerative changes in the medial and lateral compartments.  4.  Small curvilinear subacute hematoma along the lateral and anterolateral aspects of the knee and proximal calf.    7/21/2018  X-ray left knee  XR KNEE LEFT 1 OR 2 VWS  7/21/2018 10:14 PM     INDICATION: Knee pain  COMPARISON: None.     FINDINGS: Mild tricompartment osteoarthritis.. No fracture or dislocation. Trace joint effusion.    MR LUMBAR SPINE W/O CONTRAST 5/15/2018 4:00 PM     Provided History: ; Lumbar radicular pain; Acute left ankle pain     ICD-10: Lumbar radicular pain; Acute left ankle pain     Comparison: Lumbar spine radiographs 5/8/2018.     Technique: Sagittal T1-weighted, sagittal STIR, 3D volumetric axial  and sagittal reconstructed T2-weighted images of the lumbar spine were  obtained without intravenous contrast.      Findings: There are 5 lumbar-type vertebrae convex left curvature of  the lumbar spine with apex at L3. The tip of the conus medullaris is  at L1. Normal lumbar alignment. Mild loss of intervertebral disc  height at L3-4. Schmorl's nodes in the opposing endplates at T12-L1.  Normal marrow signal.     On a level by level basis:     T12-L1: No spinal canal or neuroforaminal stenosis.     L1-2: No spinal canal or neuroforaminal stenosis.     L2-3: Bilateral facet hypertrophy. No spinal canal or neural foraminal  stenosis.     L3-4: Bilateral facet hypertrophy and ligamentum flavum thickening.  Mild spinal canal narrowing. No neural foraminal stenosis. Mild  bilateral facet joint  effusions.     L4-5: Bilateral facet hypertrophy. Mild bilateral facet joint  effusions. No spinal canal or neural foraminal stenosis.     L5-S1: Bilateral facet hypertrophy. Prominent epidural fat in the  spinal canal. No spinal canal stenosis. Mild left neural foraminal  narrowing. No right foraminal stenosis.     Mild atrophy of the paraspinous musculature in the partially  visualized gluteal muscles.                                                                    Impression: Lumbar spondylosis with mild spinal canal narrowing at  L3-4. Mild neural foraminal narrowing on the left at L5-S1.          Bry Alex MD

## 2022-07-15 NOTE — PROGRESS NOTES
Jessica is a 66 year old who is being evaluated via a billable video visit.      How would you like to obtain your AVS? MyChart  If the video visit is dropped, the invitation should be resent by:  Text link sent to patient.  Will anyone else be joining your video visit? No        Video-Visit Details    Video start time: 1:39pm  Video end time: 1:54pm    Type of service:  Video Visit    Originating Location (pt. Location): Home    Distant Location (provider location):  [facility] [place]     Platform used for Video Visit: [email or web address]    Rheumatology Follow-up  Video visit  Name: Phan Luque  MRN [id number]   Date of last visit 4/7/2022       Date of service: 7/15/22  Reason for follow-up:  psoriatic arthritis   Requesting physician: Demetria Barger             Assessment & Plan:   66 year old female with hx of liver transplant in 2016 on tacrolimus who was referred to rheumatology in January 2022 by dermatology for evaluation and treatment of psoriatic arthritis in the context of one episode of left then right hand distal digit redness, swelling, tenderness, pain of multiple fingers. These symptoms did resolve and have not returned. No current dactylitis and she denied symptoms consistent with this at the time of her initial consultation and again today. No history of inflammatory eye disease though follows with Dr Ortega here for NARINDER. She does have nail involvement and inverse psoriasis both of which increase the likelihood for inflammatory arthritis associated with psoriasis. Psoriasis currently under excellent control without any active lesions managed with topicals only. At the time of her visit in April 2022, we discussed that her nail involvement and inverse psoriasis both increase the risk of inflammatory arthritis associated with psoriasis. 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 We discussed that with this isolated episode, complete lack of symptoms before/since and good control of her cutaneous psoriasis that a reasonable approach at time of initial consultation would be to monitor closely for return of inflammatory arthritis symptoms. Should they return, we could consider therapy to reduce the frequency and severity of attacks with steroid sparing therapy. She agreed with this conservative approach.  We did obtain hand films in January 2022 which not show any evidence of erosions/george.    She returns today for follow-up due to acute onset of right lateral hip pain which by her history is most consistent with right greater trochanteric bursitis. Pain is much improved, though stiffness remains. She still does have pain when laying on this side. Cannot use NSAIDs due to renal disease. Tylenol not helpful.   PLAN:  1) Ortho referral for greater trochanteric bursa injection  2) PT  3) ultram as bridge therapy until ortho/PT  4) Follow-up in 3 months to ensure resolution of symptoms/ no development of progressive DIP symptoms  5) continue topical treatment for intermittent scalp psoriasis    Bry Alex MD  Rheumatology    I spent a total of 30 minutes on the date of service on chart review, patient encounter, , documentation.      Subjective:   Interval History 7/15/22  Was sitting working at her desk and developed acute onset right lateral hip pain. During the course of that day, her pain progressed. Then worsened in the middle of the night. Then the next day, she woke up and the pain had suddenly resolved. Is still stiff however. Her 3rd digit DIP is sore. No swelling/redness. No skin changes/peeling as was previously a predominant feature. This all started about 2 weeks ago. Cannot take NSAIDs with kidney function. Tylenol not very helpful. She is still able to get up and work out each morning. Her pain is on the side  of her hip. When she lies on her side it is painful. When acutely painful, had radiation up her back on the right side, not distally. No dactylitis. No rash. No fevers/chills/night sweats. No interval infections. 14 point review of systems collected and negative if not documented above.    Interval history 4/7/2022  Jessica has felt well since her last visit with me in January.  She has not had return of her pain, swelling/redness/warmth of her DIP joints.  No other red hot swollen joints elsewhere.  No morning stiffness or pain outside of her chronic baseline pain.  No fevers chills night sweats.  Weight has been stable.  No interval infections.  Her psoriasis remains well controlled with topicals though there was a switch in coverage for her scalp psoriasis topical and so she was tried on an alcohol-based topical which was very irritating and painful to use.  No development of symptoms consistent with dactylitis.  No development of symptoms consistent with laboratory eye disease.  No oral ulcers.  No symptoms consistent with inflammatory bowel disease.  Her review systems otherwise negative.    HPI from initial consultation on 1/6/2022  Initially developed scalp flaking/hair loss which started roughly one year ago. Did also have axillary and gluteal cleft and nails involved. Initially left hand 2nd digit then 3rd digit. Then the right hand involved. Then on Sept 13th developed left hand second digit then 3rd digit redness/swelling/pain of distal aspect of her fingers. She was treated with 10 days of clindamycin though due to continued pain/redness of predominantly the 3rd digit she went to the walk-in clinic at [facility] and was given clindamycin again for what was presumed cellulitis that had not resolved with the initial course.  During this time had left hand 2nd/3rd digit throbbing pain/swelling. Also experienced peeling of the skin of the distal aspects of her digits. Her pain then resolved. Since that time, she  reports 0/10 pain at rest which does not increase with use. Not much different with use. Has radiculopathy in neck/c spine involving the left upper extremity/clavical. No history consistent with dactylitis. 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 No history of inflammatory eye disease. Has intermittent ulcers in her mouth, a few weeks ago was most recent. Has been years prior to this since her last episode. Denies rectal or genital ulcers. Tried using clobetasol ointment on nails which she thought ws some helpful, though lasted for a few weeks. No symptoms of low back pain/stiffness which is worse in the AM and improves with use/exercise/stretching. No fevers/chills. No night sweats. Has intentionally lost some weight, no unintentional weight loss. Son with scalp involvement and recurrent ulcers. At this time she feels that her skin is currently under control with topical therapy without active lesions but knows that with stopping topicals that her psoriatic lesions return quickly.  She denies any other episodes of joint pain/redness/swelling/stiffness. Has baseline chronic knee pain worse with use/at the end of the day. Currently using clobetasol solution, clobetasol shampoo once per week, ointment, 2 strengths triamcinalone. No symptoms consistent with raynauds. No other rash. No photosensitive skin changes/ symptoms. ROS otherwise negative.    Past Medical History  Past Medical History:   Diagnosis Date     AION (acute ischemic optic neuropathy)      Asthma      Bacterial infection 02/04/2021     Candida infection 11/11/2020     Cellulitis 09/13/2021     Cervical spinal stenosis      Chronic kidney disease      Chronic kidney disease, stage 3 (H)      Dizziness and giddiness     Created by Conversion      End stage liver disease (H)     2/2 Alcohol Abuse     End stage liver disease (H)     Alcohol-related     GERD (gastroesophageal reflux disease)      Hypertension      Liver transplant  recipient (H) 2016    [facility]     Liver transplanted (H)      Migraine headache      Migraines      Osteoporosis     frax .7%      PFO (patent foramen ovale) 2016    Noted on echo at [facility]     Recurrent UTI      Spinal stenosis in cervical region      Strabismus      Unspecified asthma(493.90)     Created by Conversion      Past Surgical History  Past Surgical History:   Procedure Laterality Date     APPENDECTOMY           APPENDECTOMY       BENCH LIVER N/A 2016    Procedure: BENCH LIVER;  Surgeon: Larry Dhillon MD;  Location: [facility] OR     CATARACT IOL, RT/LT       COLONOSCOPY       COLONOSCOPY N/A 2021    Procedure: COLONOSCOPY, WITH POLYPECTOMY;  Surgeon: Arlyn Beckford MD;  Location: [facility] OR     ESOPHAGOSCOPY, GASTROSCOPY, DUODENOSCOPY (EGD), COMBINED N/A 2016    Procedure: COMBINED ESOPHAGOSCOPY, GASTROSCOPY, DUODENOSCOPY (EGD);  Surgeon: Tao Alfonso MD;  Location: [facility] GI     ESOPHAGOSCOPY, GASTROSCOPY, DUODENOSCOPY (EGD), COMBINED N/A 10/31/2016    Procedure: COMBINED ESOPHAGOSCOPY, GASTROSCOPY, DUODENOSCOPY (EGD), BIOPSY SINGLE OR MULTIPLE;  Surgeon: Ronald Bojorquez MD;  Location: [facility] GI     LIVER TRANSPLANTATION  2016    [facility]     RECTAL SURGERY       sphincteroplasty       TRANSPLANT LIVER RECIPIENT  DONOR N/A 2016    Procedure: TRANSPLANT LIVER RECIPIENT  DONOR;  Surgeon: Larry Dhillon MD;  Location: [facility] OR     TUBAL LIGATION      laparoscopic     TUBAL LIGATION         Medications  Current Outpatient Medications   Medication     acetaminophen (TYLENOL) 325 MG tablet     albuterol (VENTOLIN HFA) 108 (90 Base) MCG/ACT inhaler     amLODIPine (NORVASC) 5 MG tablet     calcipotriene (DOVONOX) 0.005 % external solution     chlorthalidone (HYGROTON) 25 MG tablet     cholecalciferol (VITAMIN D3) 400 UNIT TABS tablet     clobetasol (TEMOVATE) 0.05 % external  ointment     clobetasol (TEMOVATE) 0.05 % external solution     clobetasol propionate (CLOBEX) 0.05 % external shampoo     cyanocobalamin (VITAMIN  B-12) 1000 MCG tablet     ferrous sulfate (IRON) 325 (65 FE) MG tablet     folic acid (FOLVITE) 1 MG tablet     gabapentin (NEURONTIN) 100 MG capsule     hydrOXYzine (ATARAX) 25 MG tablet     levothyroxine (SYNTHROID/LEVOTHROID) 88 MCG tablet     MAGNESIUM OXIDE PO     melatonin 5 MG tablet     multivitamin, therapeutic (THERA-VIT) TABS tablet     mupirocin (BACTROBAN) 2 % external ointment     order for DME     pantoprazole (PROTONIX) 40 MG EC tablet     predniSONE (DELTASONE) 20 MG tablet     propranolol (INDERAL) 10 MG tablet     psyllium 0.52 G capsule     tacrolimus (GENERIC EQUIVALENT) 0.5 MG capsule     traMADol (ULTRAM) 50 MG tablet     triamcinolone (KENALOG) 0.025 % external ointment     triamcinolone (KENALOG) 0.1 % external ointment     ursodiol (ACTIGALL) 300 MG capsule     valACYclovir (VALTREX) 500 MG tablet     No current facility-administered medications for this visit.       Allergies   Allergies   Allergen Reactions     Codeine Hives     Benadryl [Diphenhydramine] Hives     Cefaclor Hives     Hydrocodone-Acetaminophen Itching     Oxycodone Itching     Penicillins Hives     Sulfa Drugs Hives     Family History: Psoriasis in her son. No other family history of autoimmune diseases.    Social History: Works for [employer]. 3 kids. Dec 13th 1996 quit smoking. Quit ETOH prior to transplant. No drug use hx.      Objective:    Physical exam:  No vitals for this video visit. Vitals reviewed in Epic.  GEN: Sitting up at table. NAD  HEENT: no facial rash, sclera clear, no inflammatory nose/ external ear changes  CV: no upper extremity dependent edema  Pulm: speaking in full sentences, no cough, no audible wheezing, no use of accessory muscles  Abdomen: not distended  Skin: no acute cutaneous lesions  MSK: full active ROM of bilateral shoulders, elbows,  wrists, MCPs, PIPs, DIPs. Can make full fist without difficulty. No erythema or edema of these joints.    WBC   Date Value Ref Range Status   05/17/2021 6.2 4.0 - 11.0 10e9/L Final     WBC Count   Date Value Ref Range Status   05/26/2022 10.2 4.0 - 11.0 10e3/uL Final     Hemoglobin   Date Value Ref Range Status   07/13/2022 12.6 11.7 - 15.7 g/dL Final   05/17/2021 13.3 11.7 - 15.7 g/dL Final     Platelet Count   Date Value Ref Range Status   05/26/2022 321 150 - 450 10e3/uL Final   05/17/2021 269 150 - 450 10e9/L Final     Creatinine   Date Value Ref Range Status   07/13/2022 1.07 (H) 0.52 - 1.04 mg/dL Final   05/17/2021 1.26 (H) 0.52 - 1.04 mg/dL Final     Lab Results   Component Value Date    ALKPHOS 61 05/26/2022    ALKPHOS 50 05/17/2021     AST   Date Value Ref Range Status   05/26/2022 18 0 - 40 U/L Final   05/17/2021 18 0 - 45 U/L Final     Lab Results   Component Value Date    ALT 10 05/26/2022    ALT 19 05/17/2021     Erythrocyte Sedimentation Rate   Date Value Ref Range Status   04/08/2022 39 (H) 0 - 20 mm/hr Final     CRP Inflammation   Date Value Ref Range Status   12/15/2020 <2.9 0.0 - 8.0 mg/L Final     CRP   Date Value Ref Range Status   04/08/2022 <0.1 0.0-<0.8 mg/dL Final     UA RESULTS:  Recent Labs   Lab Test 03/14/22  0754 03/25/21  0736   COLOR Yellow Yellow   APPEARANCE Clear Clear   URINEGLC Negative Negative   URINEBILI Negative Negative   URINEKETONE Negative Negative   SG 1.010 1.013   UBLD Negative Negative   URINEPH 5.5 7.0   PROTEIN Negative Negative   UROBILINOGEN 0.2  --    NITRITE Negative Negative   LEUKEST Negative Negative   RBCU  --  2   WBCU  --  <1      DNA (ds) Antibody   Date Value Ref Range Status   01/06/2022 1.2 <10.0 IU/mL Final     Comment:     Negative     RNP Antibody IgG   Date Value Ref Range Status   01/06/2022 Negative Negative Final     Imaging:  MRI left knee without contrast 7/25/2018     IMPRESSION:  CONCLUSION:  1.  Area of acute microtrabecular fracture in the  anterior portion of the lateral tibial plateau.  2.  Moderately advanced chondromalacia patella.  3.  Mild degenerative changes in the medial and lateral compartments.  4.  Small curvilinear subacute hematoma along the lateral and anterolateral aspects of the knee and proximal calf.    7/21/2018  X-ray left knee  XR KNEE LEFT 1 OR 2 VWS  7/21/2018 10:14 PM     INDICATION: Knee pain  COMPARISON: None.     FINDINGS: Mild tricompartment osteoarthritis.. No fracture or dislocation. Trace joint effusion.    MR LUMBAR SPINE W/O CONTRAST 5/15/2018 4:00 PM     Provided History: ; Lumbar radicular pain; Acute left ankle pain     ICD-10: Lumbar radicular pain; Acute left ankle pain     Comparison: Lumbar spine radiographs 5/8/2018.     Technique: Sagittal T1-weighted, sagittal STIR, 3D volumetric axial  and sagittal reconstructed T2-weighted images of the lumbar spine were  obtained without intravenous contrast.      Findings: There are 5 lumbar-type vertebrae convex left curvature of  the lumbar spine with apex at L3. The tip of the conus medullaris is  at L1. Normal lumbar alignment. Mild loss of intervertebral disc  height at L3-4. Schmorl's nodes in the opposing endplates at T12-L1.  Normal marrow signal.     On a level by level basis:     T12-L1: No spinal canal or neuroforaminal stenosis.     L1-2: No spinal canal or neuroforaminal stenosis.     L2-3: Bilateral facet hypertrophy. No spinal canal or neural foraminal  stenosis.     L3-4: Bilateral facet hypertrophy and ligamentum flavum thickening.  Mild spinal canal narrowing. No neural foraminal stenosis. Mild  bilateral facet joint effusions.     L4-5: Bilateral facet hypertrophy. Mild bilateral facet joint  effusions. No spinal canal or neural foraminal stenosis.     L5-S1: Bilateral facet hypertrophy. Prominent epidural fat in the  spinal canal. No spinal canal stenosis. Mild left neural foraminal  narrowing. No right foraminal stenosis.     Mild atrophy of the  paraspinous musculature in the partially  visualized gluteal muscles.                                                                    Impression: Lumbar spondylosis with mild spinal canal narrowing at  L3-4. Mild neural foraminal narrowing on the left at L5-S1.

## 2022-07-15 NOTE — PATIENT INSTRUCTIONS
1) Ultram sent to pharmacy  2) PT referral and ortho injection referral placed  3) Will see you back in 3 months

## 2022-07-18 ENCOUNTER — TELEPHONE (OUTPATIENT)
Dept: RHEUMATOLOGY | Facility: CLINIC | Age: 67
End: 2022-07-18

## 2022-07-27 ENCOUNTER — TELEPHONE (OUTPATIENT)
Dept: NEPHROLOGY | Facility: CLINIC | Age: 67
End: 2022-07-27

## 2022-07-27 DIAGNOSIS — N18.31 CHRONIC KIDNEY DISEASE, STAGE 3A (H): Primary | ICD-10-CM

## 2022-07-27 NOTE — TELEPHONE ENCOUNTER
[facility] Call Center    Phone Message    May a detailed message be left on voicemail: yes     Reason for Call: Order(s): Other:   Reason for requested: labs  Date needed: before 07/01/2023  Provider name: [place]      Action Taken: Other: Nephrology    Travel Screening: Not Applicable

## 2022-07-29 ENCOUNTER — LAB (OUTPATIENT)
Dept: LAB | Facility: CLINIC | Age: 67
End: 2022-07-29
Payer: COMMERCIAL

## 2022-07-29 DIAGNOSIS — Z79.899 ENCOUNTER FOR LONG-TERM (CURRENT) USE OF MEDICATIONS: ICD-10-CM

## 2022-07-29 DIAGNOSIS — Z13.220 LIPID SCREENING: ICD-10-CM

## 2022-07-29 DIAGNOSIS — Z94.4 LIVER REPLACED BY TRANSPLANT (H): ICD-10-CM

## 2022-07-29 LAB
ALBUMIN SERPL BCG-MCNC: 4.2 G/DL (ref 3.5–5.2)
ALP SERPL-CCNC: 54 U/L (ref 35–104)
ALT SERPL W P-5'-P-CCNC: 11 U/L (ref 10–35)
ANION GAP SERPL CALCULATED.3IONS-SCNC: 11 MMOL/L (ref 7–15)
AST SERPL W P-5'-P-CCNC: 25 U/L (ref 10–35)
BILIRUB DIRECT SERPL-MCNC: <0.2 MG/DL (ref 0–0.3)
BILIRUB SERPL-MCNC: 0.6 MG/DL
BUN SERPL-MCNC: 24.3 MG/DL (ref 8–23)
CALCIUM SERPL-MCNC: 9.3 MG/DL (ref 8.8–10.2)
CHLORIDE SERPL-SCNC: 103 MMOL/L (ref 98–107)
CREAT SERPL-MCNC: 1.21 MG/DL (ref 0.51–0.95)
DEPRECATED HCO3 PLAS-SCNC: 25 MMOL/L (ref 22–29)
ERYTHROCYTE [DISTWIDTH] IN BLOOD BY AUTOMATED COUNT: 13.2 % (ref 10–15)
GFR SERPL CREATININE-BSD FRML MDRD: 49 ML/MIN/1.73M2
GLUCOSE SERPL-MCNC: 109 MG/DL (ref 70–99)
HCT VFR BLD AUTO: 40.1 % (ref 35–47)
HGB BLD-MCNC: 13.2 G/DL (ref 11.7–15.7)
MAGNESIUM SERPL-MCNC: 2 MG/DL (ref 1.7–2.3)
MCH RBC QN AUTO: 29.7 PG (ref 26.5–33)
MCHC RBC AUTO-ENTMCNC: 32.9 G/DL (ref 31.5–36.5)
MCV RBC AUTO: 90 FL (ref 78–100)
PLATELET # BLD AUTO: 300 10E3/UL (ref 150–450)
POTASSIUM SERPL-SCNC: 4.3 MMOL/L (ref 3.4–5.3)
PROT SERPL-MCNC: 7.3 G/DL (ref 6.4–8.3)
RBC # BLD AUTO: 4.45 10E6/UL (ref 3.8–5.2)
SODIUM SERPL-SCNC: 139 MMOL/L (ref 136–145)
TACROLIMUS BLD-MCNC: 4.5 UG/L (ref 5–15)
TME LAST DOSE: ABNORMAL H
TME LAST DOSE: ABNORMAL H
WBC # BLD AUTO: 7.5 10E3/UL (ref 4–11)

## 2022-07-29 PROCEDURE — 80053 COMPREHEN METABOLIC PANEL: CPT

## 2022-07-29 PROCEDURE — 80197 ASSAY OF TACROLIMUS: CPT

## 2022-07-29 PROCEDURE — 36415 COLL VENOUS BLD VENIPUNCTURE: CPT

## 2022-07-29 PROCEDURE — 82248 BILIRUBIN DIRECT: CPT

## 2022-07-29 PROCEDURE — 85027 COMPLETE CBC AUTOMATED: CPT

## 2022-07-29 PROCEDURE — 83735 ASSAY OF MAGNESIUM: CPT

## 2022-08-02 ENCOUNTER — TELEPHONE (OUTPATIENT)
Dept: DERMATOLOGY | Facility: CLINIC | Age: 67
End: 2022-08-02

## 2022-08-02 NOTE — TELEPHONE ENCOUNTER
Prior Authorization Retail Medication Request    Medication/Dose: clobetasol propionate (CLOBEX) 0.05 % external shampoo  ICD code (if different than what is on RX):    Previously Tried and Failed:  - Continue clobetasol 0.05% solution twice daily to the nails and scalp, when psoriasis is present.   - Continue clobetasol 0.05% shampoo (typially every other day), patient may reach out for help with insurance  - Continue calcipotriene 0.005% solution bid after psoriasis is clear.   - Continue triamcinolone 0.025% ointment BID prn can use on gluteal cleft or axilla  - Continue triamcinolone 0.1% ointment BID to plaques behind knees  Rationale:  Applying to scalp    Insurance Name:  Not given  Insurance ID:  Not given

## 2022-08-02 NOTE — TELEPHONE ENCOUNTER
Prior Authorization Approval    Authorization Effective Date: 8/2/2022  Authorization Expiration Date: 8/2/2023  Medication: clobetasol propionate (CLOBEX) 0.05 % external shampoo - APPROVED  Approved Dose/Quantity:    Reference #:     Insurance Company:    Expected CoPay:       CoPay Card Available:      Foundation Assistance Needed:    Which Pharmacy is filling the prescription (Not needed for infusion/clinic administered): [facility] - [place], [place] - [address]  Pharmacy Notified: Yes  Patient Notified: Yes  VM LEFT FOR PATIENT TO CONTACT MAIL ORDER PHARMACY TO SET UP DELIVERY.

## 2022-08-02 NOTE — TELEPHONE ENCOUNTER
Central Prior Authorization Team   Phone: [phone]    PA Initiation    Medication: clobetasol propionate (CLOBEX) 0.05 % external shampoo  Insurance Company:    Pharmacy Filling the Rx: [facility] - [place], [place] - [address]  Filling Pharmacy Phone: [phone]  Filling Pharmacy Fax: [phone]  Start Date: 8/2/2022

## 2022-08-07 DIAGNOSIS — Z94.4 LIVER TRANSPLANTED (H): ICD-10-CM

## 2022-08-08 RX ORDER — TACROLIMUS 0.5 MG/1
1.5 CAPSULE ORAL EVERY 12 HOURS
Qty: 180 CAPSULE | Refills: 11 | Status: SHIPPED | OUTPATIENT
Start: 2022-08-08 | End: 2023-07-06

## 2022-08-08 RX ORDER — URSODIOL 300 MG/1
CAPSULE ORAL
Qty: 60 CAPSULE | Refills: 11 | Status: SHIPPED | OUTPATIENT
Start: 2022-08-08 | End: 2023-08-04

## 2022-09-06 ENCOUNTER — OFFICE VISIT (OUTPATIENT)
Dept: DERMATOLOGY | Facility: CLINIC | Age: 67
End: 2022-09-06
Payer: COMMERCIAL

## 2022-09-06 DIAGNOSIS — L30.4 INTERTRIGO: ICD-10-CM

## 2022-09-06 DIAGNOSIS — L40.0 PSORIASIS VULGARIS: Primary | ICD-10-CM

## 2022-09-06 PROCEDURE — 87205 SMEAR GRAM STAIN: CPT | Performed by: PATHOLOGY

## 2022-09-06 PROCEDURE — 99214 OFFICE O/P EST MOD 30 MIN: CPT | Performed by: DERMATOLOGY

## 2022-09-06 PROCEDURE — 87106 FUNGI IDENTIFICATION YEAST: CPT | Performed by: PATHOLOGY

## 2022-09-06 PROCEDURE — 87070 CULTURE OTHR SPECIMN AEROBIC: CPT | Performed by: PATHOLOGY

## 2022-09-06 PROCEDURE — 87077 CULTURE AEROBIC IDENTIFY: CPT | Performed by: PATHOLOGY

## 2022-09-06 PROCEDURE — 99000 SPECIMEN HANDLING OFFICE-LAB: CPT | Performed by: PATHOLOGY

## 2022-09-06 PROCEDURE — 87186 SC STD MICRODIL/AGAR DIL: CPT | Performed by: PATHOLOGY

## 2022-09-06 RX ORDER — KETOCONAZOLE 20 MG/G
CREAM TOPICAL DAILY
Qty: 60 G | Refills: 11 | Status: SHIPPED | OUTPATIENT
Start: 2022-09-06 | End: 2023-07-10

## 2022-09-06 ASSESSMENT — PAIN SCALES - GENERAL: PAINLEVEL: MILD PAIN (2)

## 2022-09-06 NOTE — PATIENT INSTRUCTIONS
Start using 0.1% triamcinolone in affected region up to twice daily as needed. If this causes irritation, can use 0.025% triamcinolone instead.    Start using 2% ketoconazole cream in affected region daily.    Recommend Aquaphor or Vaseline to provide barrier protection in the region.    Follow up in 3 months or sooner if needed.

## 2022-09-06 NOTE — PROGRESS NOTES
[facility] Dermatology Note  Encounter Date: Sep 6, 2022  Office Visit     Dermatology Problem List:  1. History of liver transplant at [facility], 2016 - currently on tacrolimus   2. Flexural eczema - trimcinolone 0.1% ointment BID to popliteal fossa bilaterally  3. AK s/p cryo  4. Psoriasis with arthritis.  - Follows with rheum  - Topicals: clobetasol solution, shampoo  - Nail dystrophy, likely psoriasis- grew candida. Has been using clobetasol solution with resolution.    5. Facial asymmetry - cosmetic referral.   6. Family hx psoriasis (son)    ____________________________________________    Assessment & Plan:    #Psoriasis, chronic, active, flare in gluteal cleft.   # Intertrigo.  For gluteal cleft flare, favor psoriasis +/- intertrigo. Will treat for both given lack of improvement with TMC. Also consider perianal strep but viewed less likely, will check bacterial culture. Also discussed potential escalation of psoriasis treatment but she understandably does not feel this is necessary at this time.  - Continue triamcinolone 0.1% ointment BID prn.  - Start ketoconazole 2% cream BID prn.  - Skin bacterial culture, to rule out group A strep infection.   - Recommended Vaseline or Aquaphor for barrier protection.   - Future: consider higher potency steroid, bx if no improvement      Procedures Performed:   None     Follow-up: 3 months , sooner if concerns.     Staff, Medical Student and Scribe:     Scribe Disclosure:  I, Татьяна Reinoso, am serving as a scribe to document services personally performed by Zelda Pham MD based on data collection and the provider's statements to me.     Velma Calhoun, MS3     Staff Physician:  I was present with the medical student who participated in the service and in the documentation of the note. I have verified the history and personally performed the physical exam and medical decision making. I agree with the assessment and plan of care as documented in  the note.       Zelda Pham MD    Department of Dermatology  [facility]: Phone: [phone], Fax: [phone]  9/8/2022    ____________________________________________    CC: Derm Problem (Jessica is here for irritation on gluteal cleft. States she believes it is psoriasis. Hx of psoriari in other locations.)    HPI:  Ms. Phan Luque is a(n) 66 year old female who presents today as a return patient for rash in her gluteal cleft. Last seen by me on 5/31/2022, at which time patient was continued on clobetasol solution and shampoo, calcipotriene solution, triamcinolone ointment 0.025% and 0.1% for treatment of psoriasis.    She reports that she has noticed the rash started a month ago. She believes that it is a flare of her psoriasis. She says the affected area has been itchy, burning, and sloughs skin. She has previously had flares in the gluteal cleft before. She typically has flares in the scalp. She has tried triple antibiotic cream, 0.025% triamcinolone, and 0.1% triamcinolone without any improvement in the area. She has a prescription for 0.05% clobetasol ointment but she has not used this on the area.     Patient is otherwise feeling well, without additional skin concerns.    Labs Reviewed:  N/A    Physical Exam:  Vitals: LMP  (LMP Unknown)   SKIN: Focused examination of gluteal cleft was performed.  - Erythematous, well demarcated patch in gluteal cleft.. Some areas of flaking skin.   - No other lesions of concern on areas examined.     Medications:  Current Outpatient Medications   Medication     acetaminophen (TYLENOL) 325 MG tablet     albuterol (VENTOLIN HFA) 108 (90 Base) MCG/ACT inhaler     amLODIPine (NORVASC) 5 MG tablet     calcipotriene (DOVONOX) 0.005 % external solution     chlorthalidone (HYGROTON) 25 MG tablet     cholecalciferol (VITAMIN D3) 400 UNIT TABS tablet     clobetasol (TEMOVATE) 0.05 %  external ointment     clobetasol (TEMOVATE) 0.05 % external solution     clobetasol propionate (CLOBEX) 0.05 % external shampoo     cyanocobalamin (VITAMIN  B-12) 1000 MCG tablet     ferrous sulfate (IRON) 325 (65 FE) MG tablet     folic acid (FOLVITE) 1 MG tablet     gabapentin (NEURONTIN) 100 MG capsule     hydrOXYzine (ATARAX) 25 MG tablet     ketoconazole (NIZORAL) 2 % external cream     levothyroxine (SYNTHROID/LEVOTHROID) 88 MCG tablet     MAGNESIUM OXIDE PO     melatonin 5 MG tablet     multivitamin, therapeutic (THERA-VIT) TABS tablet     pantoprazole (PROTONIX) 40 MG EC tablet     propranolol (INDERAL) 10 MG tablet     psyllium 0.52 G capsule     tacrolimus (GENERIC EQUIVALENT) 0.5 MG capsule     traMADol (ULTRAM) 50 MG tablet     traMADol (ULTRAM) 50 MG tablet     triamcinolone (KENALOG) 0.025 % external ointment     triamcinolone (KENALOG) 0.1 % external ointment     ursodiol (ACTIGALL) 300 MG capsule     mupirocin (BACTROBAN) 2 % external ointment     order for DME     predniSONE (DELTASONE) 20 MG tablet     valACYclovir (VALTREX) 500 MG tablet     No current facility-administered medications for this visit.      Past Medical History:   Patient Active Problem List   Diagnosis     Liver transplanted (H)     Alcoholic hepatitis     Immunosuppression (H)     Alcoholic hepatitis without ascites     Enterococcus UTI     Liver transplant status (H)     Nausea & vomiting     Anemia     ACP (advance care planning)     Diarrhea     Hypothyroidism     Esophageal reflux     Recurrent major depression in partial remission (H)     Insomnia     CKD (chronic kidney disease) stage 3, GFR 30-59 ml/min (H)     Anemia in stage 3 chronic kidney disease (H)     Osteopenia     Alcohol abuse, uncomplicated     Psoriasis     Candida infection     Bacterial infection     Cellulitis     Past Medical History:   Diagnosis Date     AION (acute ischemic optic neuropathy)      Asthma      Bacterial infection 02/04/2021     Candida  infection 11/11/2020     Cellulitis 09/13/2021     Cervical spinal stenosis      Chronic kidney disease      Chronic kidney disease, stage 3 (H)      Dizziness and giddiness     Created by Conversion      End stage liver disease (H)     2/2 Alcohol Abuse     End stage liver disease (H)     Alcohol-related     GERD (gastroesophageal reflux disease)      Hypertension      Liver transplant recipient (H) 08/25/2016    [facility]     Liver transplanted (H)      Migraine headache      Migraines      Osteoporosis     frax 11/1.7% 2021     PFO (patent foramen ovale) 08/08/2016    Noted on echo at [facility]     Recurrent UTI      Spinal stenosis in cervical region      Strabismus      Unspecified asthma(493.90)     Created by Conversion         CC Referred Self, MD  No address on file on close of this encounter.

## 2022-09-06 NOTE — NURSING NOTE
Dermatology Rooming Note    Phan Luque's goals for this visit include:   Chief Complaint   Patient presents with     Derm Problem     Jessica is here for irritation on gluteal cleft. States she believes it is psoriasis. Hx of psoriari in other locations.     Diaz Warren, EMT

## 2022-09-06 NOTE — LETTER
9/6/2022       RE: Phan Luque  [email or web address]  [place] [address]     Dear Colleague,    Thank you for referring your patient, Phan Luque, to the [facility] [place] at [facility]. Please see a copy of my visit note below.    [facility] Dermatology Note  Encounter Date: Sep 6, 2022  Office Visit     Dermatology Problem List:  1. [facility] - currently on tacrolimus   2. Flexural eczema - trimcinolone 0.1% ointment BID to popliteal fossa bilaterally  3. AK s/p cryo  4. Psoriasis with arthritis.  - Follows with rheum  - Topicals: clobetasol solution, shampoo  - Nail dystrophy, likely psoriasis- grew candida. Has been using clobetasol solution with resolution.    5. Facial asymmetry - cosmetic referral.   6. Family hx psoriasis (son)    ____________________________________________    Assessment & Plan:    #Psoriasis, chronic, active, flare in gluteal cleft.   # Intertrigo.  For gluteal cleft flare, favor psoriasis +/- intertrigo. Will treat for both given lack of improvement with TMC. Also consider perianal strep but viewed less likely, will check bacterial culture. Also discussed potential escalation of psoriasis treatment but she understandably does not feel this is necessary at this time.  - Continue triamcinolone 0.1% ointment BID prn.  - Start ketoconazole 2% cream BID prn.  - Skin bacterial culture, to rule out group A strep infection.   - Recommended Vaseline or Aquaphor for barrier protection.   - Future: consider higher potency steroid, bx if no improvement      Procedures Performed:   None     Follow-up: 3 months , sooner if concerns.     Staff, Medical Student and Scribe:     Scribe Disclosure:  Татьяна REDDING, am serving as a scribe to document services personally performed by Zelda Pham MD based on data collection and the provider's statements  to me.     Velma Calhoun, MS3     Staff Physician:  I was present with the medical student who participated in the service and in the documentation of the note. I have verified the history and personally performed the physical exam and medical decision making. I agree with the assessment and plan of care as documented in the note.       Zelda Pham MD    Department of Dermatology  Former[facility]: Phone: [phone], Fax: [phone]  9/8/2022    ____________________________________________    CC: Derm Problem (Jessica is here for irritation on gluteal cleft. States she believes it is psoriasis. Hx of psoriari in other locations.)    HPI:  Ms. Phan Luque is a(n) 66 year old female who presents today as a return patient for rash in her gluteal cleft. Last seen by me on 5/31/2022, at which time patient was continued on clobetasol solution and shampoo, calcipotriene solution, triamcinolone ointment 0.025% and 0.1% for treatment of psoriasis.    She reports that she has noticed the rash started a month ago. She believes that it is a flare of her psoriasis. She says the affected area has been itchy, burning, and sloughs skin. She has previously had flares in the gluteal cleft before. She typically has flares in the scalp. She has tried triple antibiotic cream, 0.025% triamcinolone, and 0.1% triamcinolone without any improvement in the area. She has a prescription for 0.05% clobetasol ointment but she has not used this on the area.     Patient is otherwise feeling well, without additional skin concerns.    Labs Reviewed:  N/A    Physical Exam:  Vitals: LMP  (LMP Unknown)   SKIN: Focused examination of gluteal cleft was performed.  - Erythematous, well demarcated patch in gluteal cleft.. Some areas of flaking skin.   - No other lesions of concern on areas examined.     Medications:  Current Outpatient Medications   Medication      acetaminophen (TYLENOL) 325 MG tablet     albuterol (VENTOLIN HFA) 108 (90 Base) MCG/ACT inhaler     amLODIPine (NORVASC) 5 MG tablet     calcipotriene (DOVONOX) 0.005 % external solution     chlorthalidone (HYGROTON) 25 MG tablet     cholecalciferol (VITAMIN D3) 400 UNIT TABS tablet     clobetasol (TEMOVATE) 0.05 % external ointment     clobetasol (TEMOVATE) 0.05 % external solution     clobetasol propionate (CLOBEX) 0.05 % external shampoo     cyanocobalamin (VITAMIN  B-12) 1000 MCG tablet     ferrous sulfate (IRON) 325 (65 FE) MG tablet     folic acid (FOLVITE) 1 MG tablet     gabapentin (NEURONTIN) 100 MG capsule     hydrOXYzine (ATARAX) 25 MG tablet     ketoconazole (NIZORAL) 2 % external cream     levothyroxine (SYNTHROID/LEVOTHROID) 88 MCG tablet     MAGNESIUM OXIDE PO     melatonin 5 MG tablet     multivitamin, therapeutic (THERA-VIT) TABS tablet     pantoprazole (PROTONIX) 40 MG EC tablet     propranolol (INDERAL) 10 MG tablet     psyllium 0.52 G capsule     tacrolimus (GENERIC EQUIVALENT) 0.5 MG capsule     traMADol (ULTRAM) 50 MG tablet     traMADol (ULTRAM) 50 MG tablet     triamcinolone (KENALOG) 0.025 % external ointment     triamcinolone (KENALOG) 0.1 % external ointment     ursodiol (ACTIGALL) 300 MG capsule     mupirocin (BACTROBAN) 2 % external ointment     order for DME     predniSONE (DELTASONE) 20 MG tablet     valACYclovir (VALTREX) 500 MG tablet     No current facility-administered medications for this visit.      Past Medical History:   Patient Active Problem List   Diagnosis     Liver transplanted (H)     Alcoholic hepatitis     Immunosuppression (H)     Alcoholic hepatitis without ascites     Enterococcus UTI     Liver transplant status (H)     Nausea & vomiting     Anemia     ACP (advance care planning)     Diarrhea     Hypothyroidism     Esophageal reflux     Recurrent major depression in partial remission (H)     Insomnia     CKD (chronic kidney disease) stage 3, GFR 30-59 ml/min (H)      Anemia in stage 3 chronic kidney disease (H)     Osteopenia     Alcohol abuse, uncomplicated     Psoriasis     Candida infection     Bacterial infection     Cellulitis     Past Medical History:   Diagnosis Date     AION (acute ischemic optic neuropathy)      Asthma      Bacterial infection 02/04/2021     Candida infection 11/11/2020     Cellulitis 09/13/2021     Cervical spinal stenosis      Chronic kidney disease      Chronic kidney disease, stage 3 (H)      Dizziness and giddiness     Created by Conversion      End stage liver disease (H)     2/2 Alcohol Abuse     End stage liver disease (H)     Alcohol-related     GERD (gastroesophageal reflux disease)      Hypertension      Liver transplant recipient (H) 08/25/2016    [facility]     Liver transplanted (H)      Migraine headache      Migraines      Osteoporosis     frax 11/1.7% 2021     PFO (patent foramen ovale) 08/08/2016    Noted on echo at [facility]     Recurrent UTI      Spinal stenosis in cervical region      Strabismus      Unspecified asthma(493.90)     Created by Conversion       CC Referred Self, MD  No address on file on close of this encounter.

## 2022-09-09 DIAGNOSIS — L40.9 PSORIASIS: ICD-10-CM

## 2022-09-09 LAB
BACTERIA SKIN AEROBE CULT: ABNORMAL
GRAM STAIN RESULT: ABNORMAL

## 2022-09-14 RX ORDER — TRIAMCINOLONE ACETONIDE 0.25 MG/G
OINTMENT TOPICAL 2 TIMES DAILY
Qty: 80 G | Refills: 0 | Status: SHIPPED | OUTPATIENT
Start: 2022-09-14 | End: 2023-07-10

## 2022-09-14 NOTE — TELEPHONE ENCOUNTER
Last Clinic Visit: 9/6/2022  [facility]atology [facility] [place]  Recommended 3 month follow up  [place] 12/6/22

## 2022-09-20 ENCOUNTER — IMMUNIZATION (OUTPATIENT)
Dept: FAMILY MEDICINE | Facility: CLINIC | Age: 67
End: 2022-09-20
Payer: COMMERCIAL

## 2022-09-20 PROCEDURE — 90662 IIV NO PRSV INCREASED AG IM: CPT

## 2022-09-20 PROCEDURE — 90471 IMMUNIZATION ADMIN: CPT

## 2022-09-29 ENCOUNTER — LAB (OUTPATIENT)
Dept: LAB | Facility: CLINIC | Age: 67
End: 2022-09-29
Payer: COMMERCIAL

## 2022-09-29 DIAGNOSIS — Z79.899 ENCOUNTER FOR LONG-TERM (CURRENT) USE OF MEDICATIONS: ICD-10-CM

## 2022-09-29 DIAGNOSIS — Z13.220 LIPID SCREENING: ICD-10-CM

## 2022-09-29 DIAGNOSIS — Z94.4 LIVER REPLACED BY TRANSPLANT (H): ICD-10-CM

## 2022-09-29 LAB
ALBUMIN SERPL BCG-MCNC: 4.2 G/DL (ref 3.5–5.2)
ALP SERPL-CCNC: 53 U/L (ref 35–104)
ALT SERPL W P-5'-P-CCNC: 11 U/L (ref 10–35)
ANION GAP SERPL CALCULATED.3IONS-SCNC: 12 MMOL/L (ref 7–15)
AST SERPL W P-5'-P-CCNC: 26 U/L (ref 10–35)
BILIRUB DIRECT SERPL-MCNC: <0.2 MG/DL (ref 0–0.3)
BILIRUB SERPL-MCNC: 0.6 MG/DL
BUN SERPL-MCNC: 30.4 MG/DL (ref 8–23)
CALCIUM SERPL-MCNC: 9.3 MG/DL (ref 8.8–10.2)
CHLORIDE SERPL-SCNC: 97 MMOL/L (ref 98–107)
CREAT SERPL-MCNC: 1.22 MG/DL (ref 0.51–0.95)
DEPRECATED HCO3 PLAS-SCNC: 27 MMOL/L (ref 22–29)
ERYTHROCYTE [DISTWIDTH] IN BLOOD BY AUTOMATED COUNT: 13.1 % (ref 10–15)
GFR SERPL CREATININE-BSD FRML MDRD: 49 ML/MIN/1.73M2
GLUCOSE SERPL-MCNC: 99 MG/DL (ref 70–99)
HCT VFR BLD AUTO: 41.5 % (ref 35–47)
HGB BLD-MCNC: 14.1 G/DL (ref 11.7–15.7)
MAGNESIUM SERPL-MCNC: 1.8 MG/DL (ref 1.7–2.3)
MCH RBC QN AUTO: 29.9 PG (ref 26.5–33)
MCHC RBC AUTO-ENTMCNC: 34 G/DL (ref 31.5–36.5)
MCV RBC AUTO: 88 FL (ref 78–100)
PLATELET # BLD AUTO: 296 10E3/UL (ref 150–450)
POTASSIUM SERPL-SCNC: 4.1 MMOL/L (ref 3.4–5.3)
PROT SERPL-MCNC: 7.3 G/DL (ref 6.4–8.3)
RBC # BLD AUTO: 4.72 10E6/UL (ref 3.8–5.2)
SODIUM SERPL-SCNC: 136 MMOL/L (ref 136–145)
TACROLIMUS BLD-MCNC: 4.2 UG/L (ref 5–15)
TME LAST DOSE: ABNORMAL H
TME LAST DOSE: ABNORMAL H
WBC # BLD AUTO: 7.3 10E3/UL (ref 4–11)

## 2022-09-29 PROCEDURE — 36415 COLL VENOUS BLD VENIPUNCTURE: CPT

## 2022-09-29 PROCEDURE — 83735 ASSAY OF MAGNESIUM: CPT

## 2022-09-29 PROCEDURE — 80197 ASSAY OF TACROLIMUS: CPT

## 2022-09-29 PROCEDURE — 85027 COMPLETE CBC AUTOMATED: CPT

## 2022-09-29 PROCEDURE — 80053 COMPREHEN METABOLIC PANEL: CPT

## 2022-09-29 PROCEDURE — 82248 BILIRUBIN DIRECT: CPT

## 2022-10-14 ENCOUNTER — VIRTUAL VISIT (OUTPATIENT)
Dept: RHEUMATOLOGY | Facility: CLINIC | Age: 67
End: 2022-10-14
Attending: INTERNAL MEDICINE
Payer: COMMERCIAL

## 2022-10-14 ENCOUNTER — IMMUNIZATION (OUTPATIENT)
Dept: NURSING | Facility: CLINIC | Age: 67
End: 2022-10-14
Payer: COMMERCIAL

## 2022-10-14 DIAGNOSIS — L40.50 PSORIATIC ARTHRITIS (H): Primary | ICD-10-CM

## 2022-10-14 PROCEDURE — 91312 COVID-19,PF,PFIZER BOOSTER BIVALENT: CPT

## 2022-10-14 PROCEDURE — 99213 OFFICE O/P EST LOW 20 MIN: CPT | Mod: 95 | Performed by: INTERNAL MEDICINE

## 2022-10-14 PROCEDURE — 0124A COVID-19,PF,PFIZER BOOSTER BIVALENT: CPT

## 2022-10-14 NOTE — LETTER
10/14/2022       RE: Phan Luque  [address]  [facility] [address]     Dear Colleague,    Thank you for referring your patient, Phan Luque, to the [facility] [place] [facility]. Please see a copy of my visit note below.    Phan Luque  is being evaluated via a billable video visit.      Patient denies any changes since echeck-in regarding medication and allergies and states all information entered during echeck-in remains accurate.    How would you like to obtain your AVS? MyChart  For the video visit, send the invitation by: Text to cell phone: [phone]  Will anyone else be joining your video visit? No    Video start time: 7:47AM  Video end time: 7:59 AM  Patient location: home  Physician location: home  Platform: breanna Alex MD  Rheumatology      Rheumatology Follow-up  Video visit  Name: Phan Luque  MRN [id number]   Date of service: 10/14/22         Reason for follow-up:  psoriatic arthritis   Requesting physician: Demetria Barger             Assessment & Plan:   66 year old female with hx of liver transplant in 2016 on tacrolimus who was referred to rheumatology in January 2022 by dermatology for evaluation and treatment of psoriatic arthritis in the context of one episode of left then right hand distal digit redness, swelling, tenderness, pain of multiple fingers. These symptoms did resolve and have not returned. No current dactylitis and she denied symptoms consistent with this at the time of her initial consultation and again today. No history of inflammatory eye disease though follows with Dr Ortega here for AION. She does have nail involvement and inverse psoriasis both of which increase the likelihood for inflammatory arthritis associated with psoriasis. Psoriasis currently well controled without any active lesions managed with topicals only. At the time of her visit in April  2022, we discussed that her nail involvement and inverse psoriasis both increase the risk of inflammatory arthritis associated with psoriasis. 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 We discussed that with this isolated episode, complete lack of symptoms before/since and good control of her cutaneous psoriasis that a reasonable approach at time of initial consultation would be to monitor closely for return of inflammatory arthritis symptoms. Should they return, we could consider therapy to reduce the frequency and severity of attacks with steroid sparing therapy. She agreed with this conservative approach.  We did obtain hand films in January 2022 which not show any evidence of erosions/george.     #psoriatic arthritis: no interval flares of inflammatory arthritis. Greater trochanteric bursitis which was acute and severe at last visit resolved without return. Saw Dr Marinelli on 9/6/22 and treated for gluteal cleft intertrigo with prompt complete resolution of symptoms with topicals. She feels well. We discussed plan to continue to monitor for return of inflammatory arthritis symptoms consistent with prior flares and Jessica will reach out to me should this occur and I will see her at that time. For now, will plan for follow-up in 9 months from now. No interval labs required for our follow-up.    Bry Alex MD  Rheumatology    I spent a total of 20 minutes on the date of service on chart review, patient encounter, , documentation.      Subjective:   Interval history October 14, 2022  -gluteal cleft intertrigo flare late august. Saw Dr Marinelli and treated with topicals with prompt resolution  -Fractured her toe during move out of her house  -No interval flare of peripheral inflammatory arthritis consistent with prior psoriatic arthritis.   -no interval  red/hot/swollen joints.  -no new rashes other than above. No new/progressive fevers/chills/night sweats.  -Symptoms of greater trochanteric bursitis which were severe and acute at the time of her last visit completely resolved without physical therapy or other intervention.  Has not returned since that time.    14 point review of systems collected and negative if not documented above.      Interval History 7/15/22  Was sitting working at her desk and developed acute onset right lateral hip pain. During the course of that day, her pain progressed. Then worsened in the middle of the night. Then the next day, she woke up and the pain had suddenly resolved. Is still stiff however. Her 3rd digit DIP is sore. No swelling/redness. No skin changes/peeling as was previously a predominant feature. This all started about 2 weeks ago. Cannot take NSAIDs with kidney function. Tylenol not very helpful. She is still able to get up and work out each morning. Her pain is on the side of her hip. When she lies on her side it is painful. When acutely painful, had radiation up her back on the right side, not distally. No dactylitis. No rash. No fevers/chills/night sweats. No interval infections. 14 point review of systems collected and negative if not documented above.    Interval history 4/7/2022  Jessica has felt well since her last visit with me in January.  She has not had return of her pain, swelling/redness/warmth of her DIP joints.  No other red hot swollen joints elsewhere.  No morning stiffness or pain outside of her chronic baseline pain.  No fevers chills night sweats.  Weight has been stable.  No interval infections.  Her psoriasis remains well controlled with topicals though there was a switch in coverage for her scalp psoriasis topical and so she was tried on an alcohol-based topical which was very irritating and painful to use.  No development of symptoms consistent with dactylitis.  No development of symptoms consistent with  laboratory eye disease.  No oral ulcers.  No symptoms consistent with inflammatory bowel disease.  Her review systems otherwise negative.    HPI from initial consultation on 1/6/2022  Initially developed scalp flaking/hair loss which started roughly one year ago. Did also have axillary and gluteal cleft and nails involved. Initially left hand 2nd digit then 3rd digit. Then the right hand involved. Then on Sept 13th developed left hand second digit then 3rd digit redness/swelling/pain of distal aspect of her fingers. She was treated with 10 days of clindamycin though due to continued pain/redness of predominantly the 3rd digit she went to the walk-in clinic at [facility] and was given clindamycin again for what was presumed cellulitis that had not resolved with the initial course.  During this time had left hand 2nd/3rd digit throbbing pain/swelling. Also experienced peeling of the skin of the distal aspects of her digits. Her pain then resolved. Since that time, she reports 0/10 pain at rest which does not increase with use. Not much different with use. Has radiculopathy in neck/c spine involving the left upper extremity/clavical. No history consistent with dactylitis. 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 No history of inflammatory eye disease. Has intermittent ulcers in her mouth, a few weeks ago was most recent. Has been years prior to this since her last episode. Denies rectal or genital ulcers. Tried using clobetasol ointment on nails which she thought ws some helpful, though lasted for a few weeks. No symptoms of low back pain/stiffness which is worse in the AM and improves with use/exercise/stretching. No fevers/chills. No night sweats. Has intentionally lost some weight, no unintentional weight loss. Son with scalp involvement and recurrent ulcers. At this time she feels that her skin is currently under control with topical therapy without active lesions but knows that with stopping  topicals that her psoriatic lesions return quickly.  She denies any other episodes of joint pain/redness/swelling/stiffness. Has baseline chronic knee pain worse with use/at the end of the day. Currently using clobetasol solution, clobetasol shampoo once per week, ointment, 2 strengths triamcinalone. No symptoms consistent with raynauds. No other rash. No photosensitive skin changes/ symptoms. ROS otherwise negative.    Past Medical History  Past Medical History:   Diagnosis Date     AION (acute ischemic optic neuropathy)      Asthma      Bacterial infection 02/04/2021     Candida infection 11/11/2020     Cellulitis 09/13/2021     Cervical spinal stenosis      Chronic kidney disease      Chronic kidney disease, stage 3 (H)      Dizziness and giddiness     Created by Conversion      End stage liver disease (H)     2/2 Alcohol Abuse     End stage liver disease (H)     Alcohol-related     GERD (gastroesophageal reflux disease)      Hypertension      Liver transplant recipient (H) 08/25/2016    [facility]     Liver transplanted (H)      Migraine headache      Migraines      Osteoporosis     frax 11/1.7% 2021     PFO (patent foramen ovale) 08/08/2016    Noted on echo at [facility]     Recurrent UTI      Spinal stenosis in cervical region      Strabismus      Unspecified asthma(493.90)     Created by Conversion      Past Surgical History  Past Surgical History:   Procedure Laterality Date     APPENDECTOMY      1962     APPENDECTOMY       BENCH LIVER N/A 8/25/2016    Procedure: BENCH LIVER;  Surgeon: Larry Dhillon MD;  Location:  OR     CATARACT IOL, RT/LT       COLONOSCOPY       COLONOSCOPY N/A 8/12/2021    Procedure: COLONOSCOPY, WITH POLYPECTOMY;  Surgeon: Arlyn Beckford MD;  [place] [facility] [place]     ESOPHAGOSCOPY, GASTROSCOPY, DUODENOSCOPY (EGD), COMBINED N/A 8/17/2016    Procedure: COMBINED ESOPHAGOSCOPY, GASTROSCOPY, DUODENOSCOPY (EGD);  Surgeon: Tao Alfonso MD;  Location:   GI     ESOPHAGOSCOPY, GASTROSCOPY, DUODENOSCOPY (EGD), COMBINED N/A 10/31/2016    Procedure: COMBINED ESOPHAGOSCOPY, GASTROSCOPY, DUODENOSCOPY (EGD), BIOPSY SINGLE OR MULTIPLE;  Surgeon: Ronald Bojorquez MD;  Location: [facility] GI     LIVER TRANSPLANTATION  2016    [facility]     RECTAL SURGERY       sphincteroplasty       TRANSPLANT LIVER RECIPIENT  DONOR N/A 2016    Procedure: TRANSPLANT LIVER RECIPIENT  DONOR;  Surgeon: Larry Dhillon MD;  Location:  OR     TUBAL LIGATION      laparoscopic     TUBAL LIGATION         Medications  Current Outpatient Medications   Medication     acetaminophen (TYLENOL) 325 MG tablet     albuterol (VENTOLIN HFA) 108 (90 Base) MCG/ACT inhaler     amLODIPine (NORVASC) 5 MG tablet     calcipotriene (DOVONOX) 0.005 % external solution     chlorthalidone (HYGROTON) 25 MG tablet     cholecalciferol (VITAMIN D3) 400 UNIT TABS tablet     clobetasol (TEMOVATE) 0.05 % external ointment     clobetasol (TEMOVATE) 0.05 % external solution     clobetasol propionate (CLOBEX) 0.05 % external shampoo     cyanocobalamin (VITAMIN  B-12) 1000 MCG tablet     ferrous sulfate (IRON) 325 (65 FE) MG tablet     folic acid (FOLVITE) 1 MG tablet     gabapentin (NEURONTIN) 100 MG capsule     hydrOXYzine (ATARAX) 25 MG tablet     ketoconazole (NIZORAL) 2 % external cream     levothyroxine (SYNTHROID/LEVOTHROID) 88 MCG tablet     MAGNESIUM OXIDE PO     melatonin 5 MG tablet     multivitamin, therapeutic (THERA-VIT) TABS tablet     mupirocin (BACTROBAN) 2 % external ointment     order for DME     pantoprazole (PROTONIX) 40 MG EC tablet     predniSONE (DELTASONE) 20 MG tablet     propranolol (INDERAL) 10 MG tablet     psyllium 0.52 G capsule     tacrolimus (GENERIC EQUIVALENT) 0.5 MG capsule     traMADol (ULTRAM) 50 MG tablet     traMADol (ULTRAM) 50 MG tablet     triamcinolone (KENALOG) 0.025 % external ointment     triamcinolone (KENALOG) 0.1 % external  ointment     ursodiol (ACTIGALL) 300 MG capsule     valACYclovir (VALTREX) 500 MG tablet     No current facility-administered medications for this visit.       Allergies   Allergies   Allergen Reactions     Codeine Hives     Benadryl [Diphenhydramine] Hives     Cefaclor Hives     Hydrocodone-Acetaminophen Itching     Oxycodone Itching     Penicillins Hives     Sulfa Drugs Hives     Family History: Psoriasis in her son. No other family history of autoimmune diseases.    Social History: Works for [employer]. 3 kids. Dec 13th 1996 quit smoking. Quit ETOH prior to transplant. No drug use hx.      Objective:    Physical exam:  No vitals for this video visit. Vitals reviewed in Epic.  GEN: Sitting up unassisted. NAD  HEENT: no facial rash, sclera clear, no inflammatory nose/ external ear changes  CV: no upper extremity dependent edema  Pulm: speaking in full sentences, no cough, no audible wheezing, no use of accessory muscles  Skin: no acute cutaneous lesions  MSK: full active ROM of bilateral shoulders, elbows, wrists, MCPs, PIPs, DIPs. Can make full fist without difficulty. No erythema or edema of these joints.    WBC   Date Value Ref Range Status   05/17/2021 6.2 4.0 - 11.0 10e9/L Final     WBC Count   Date Value Ref Range Status   09/29/2022 7.3 4.0 - 11.0 10e3/uL Final     Hemoglobin   Date Value Ref Range Status   09/29/2022 14.1 11.7 - 15.7 g/dL Final   05/17/2021 13.3 11.7 - 15.7 g/dL Final     Platelet Count   Date Value Ref Range Status   09/29/2022 296 150 - 450 10e3/uL Final   05/17/2021 269 150 - 450 10e9/L Final     Creatinine   Date Value Ref Range Status   09/29/2022 1.22 (H) 0.51 - 0.95 mg/dL Final   05/17/2021 1.26 (H) 0.52 - 1.04 mg/dL Final     Lab Results   Component Value Date    ALKPHOS 61 05/26/2022    ALKPHOS 50 05/17/2021     AST   Date Value Ref Range Status   09/29/2022 26 10 - 35 U/L Final   05/17/2021 18 0 - 45 U/L Final     Lab Results   Component Value Date    ALT 10 05/26/2022     ALT 19 05/17/2021     Erythrocyte Sedimentation Rate   Date Value Ref Range Status   04/08/2022 39 (H) 0 - 20 mm/hr Final     CRP Inflammation   Date Value Ref Range Status   12/15/2020 <2.9 0.0 - 8.0 mg/L Final     CRP   Date Value Ref Range Status   04/08/2022 <0.1 0.0-<0.8 mg/dL Final     UA RESULTS:  Recent Labs   Lab Test 03/14/22  0754 03/25/21  0736   COLOR Yellow Yellow   APPEARANCE Clear Clear   URINEGLC Negative Negative   URINEBILI Negative Negative   URINEKETONE Negative Negative   SG 1.010 1.013   UBLD Negative Negative   URINEPH 5.5 7.0   PROTEIN Negative Negative   UROBILINOGEN 0.2  --    NITRITE Negative Negative   LEUKEST Negative Negative   RBCU  --  2   WBCU  --  <1      DNA (ds) Antibody   Date Value Ref Range Status   01/06/2022 1.2 <10.0 IU/mL Final     Comment:     Negative     RNP Antibody IgG   Date Value Ref Range Status   01/06/2022 Negative Negative Final     Imaging:  MRI left knee without contrast 7/25/2018     IMPRESSION:  CONCLUSION:  1.  Area of acute microtrabecular fracture in the anterior portion of the lateral tibial plateau.  2.  Moderately advanced chondromalacia patella.  3.  Mild degenerative changes in the medial and lateral compartments.  4.  Small curvilinear subacute hematoma along the lateral and anterolateral aspects of the knee and proximal calf.    7/21/2018  X-ray left knee  XR KNEE LEFT 1 OR 2 VWS  7/21/2018 10:14 PM     INDICATION: Knee pain  COMPARISON: None.     FINDINGS: Mild tricompartment osteoarthritis.. No fracture or dislocation. Trace joint effusion.    MR LUMBAR SPINE W/O CONTRAST 5/15/2018 4:00 PM     Provided History: ; Lumbar radicular pain; Acute left ankle pain     ICD-10: Lumbar radicular pain; Acute left ankle pain     Comparison: Lumbar spine radiographs 5/8/2018.     Technique: Sagittal T1-weighted, sagittal STIR, 3D volumetric axial  and sagittal reconstructed T2-weighted images of the lumbar spine were  obtained without intravenous contrast.       Findings: There are 5 lumbar-type vertebrae convex left curvature of  the lumbar spine with apex at L3. The tip of the conus medullaris is  at L1. Normal lumbar alignment. Mild loss of intervertebral disc  height at L3-4. Schmorl's nodes in the opposing endplates at T12-L1.  Normal marrow signal.     On a level by level basis:     T12-L1: No spinal canal or neuroforaminal stenosis.     L1-2: No spinal canal or neuroforaminal stenosis.     L2-3: Bilateral facet hypertrophy. No spinal canal or neural foraminal  stenosis.     L3-4: Bilateral facet hypertrophy and ligamentum flavum thickening.  Mild spinal canal narrowing. No neural foraminal stenosis. Mild  bilateral facet joint effusions.     L4-5: Bilateral facet hypertrophy. Mild bilateral facet joint  effusions. No spinal canal or neural foraminal stenosis.     L5-S1: Bilateral facet hypertrophy. Prominent epidural fat in the  spinal canal. No spinal canal stenosis. Mild left neural foraminal  narrowing. No right foraminal stenosis.     Mild atrophy of the paraspinous musculature in the partially  visualized gluteal muscles.                                                                    Impression: Lumbar spondylosis with mild spinal canal narrowing at  L3-4. Mild neural foraminal narrowing on the left at L5-S1.

## 2022-10-14 NOTE — PROGRESS NOTES
Phan Luque  is being evaluated via a billable video visit.      Patient denies any changes since echeck-in regarding medication and allergies and states all information entered during echeck-in remains accurate.    How would you like to obtain your AVS? [employer]  For the video visit, send the invitation by: Text to cell phone: [phone]  Will anyone else be joining your video visit? No    Video start time: 7:47AM  Video end time: 7:59 AM  Patient location: home  Physician location: home  Platform: breanna Alex MD  Rheumatology      Rheumatology Follow-up  Video visit  Name: Phan Luque  MRN [id number]   Date of service: 10/14/22         Reason for follow-up:  psoriatic arthritis   Requesting physician: Demetria Barger             Assessment & Plan:   66 year old female with hx of liver transplant in 2016 on tacrolimus who was referred to rheumatology in January 2022 by dermatology for evaluation and treatment of psoriatic arthritis in the context of one episode of left then right hand distal digit redness, swelling, tenderness, pain of multiple fingers. These symptoms did resolve and have not returned. No current dactylitis and she denied symptoms consistent with this at the time of her initial consultation and again today. No history of inflammatory eye disease though follows with Dr Ortega here for NARINDER. She does have nail involvement and inverse psoriasis both of which increase the likelihood for inflammatory arthritis associated with psoriasis. Psoriasis currently well controled without any active lesions managed with topicals only. At the time of her visit in April 2022, we discussed that her nail involvement and inverse psoriasis both increase the risk of inflammatory arthritis associated with psoriasis. Also that her DIP involvement, multiple digits involved, bilateral nature, lack of improvement with ABX do also support that the episode she had in Sept 2021 was likely inflammatory  DIP arthritis. We discussed that with this isolated episode, complete lack of symptoms before/since and good control of her cutaneous psoriasis that a reasonable approach at time of initial consultation would be to monitor closely for return of inflammatory arthritis symptoms. Should they return, we could consider therapy to reduce the frequency and severity of attacks with steroid sparing therapy. She agreed with this conservative approach.  We did obtain hand films in January 2022 which not show any evidence of erosions/george.     #psoriatic arthritis: no interval flares of inflammatory arthritis. Greater trochanteric bursitis which was acute and severe at last visit resolved without return. Saw Dr Marinelli on 9/6/22 and treated for gluteal cleft intertrigo with prompt complete resolution of symptoms with topicals. She feels well. We discussed plan to continue to monitor for return of inflammatory arthritis symptoms consistent with prior flares and Jessica will reach out to me should this occur and I will see her at that time. For now, will plan for follow-up in 9 months from now. No interval labs required for our follow-up.    Bry Alex MD  Rheumatology    I spent a total of 20 minutes on the date of service on chart review, patient encounter, , documentation.      Subjective:   Interval history October 14, 2022  -gluteal cleft intertrigo flare late august. Saw Dr Marinelli and treated with topicals with prompt resolution  -Fractured her toe during move out of her house  -No interval flare of peripheral inflammatory arthritis consistent with prior psoriatic arthritis.   -no interval red/hot/swollen joints.  -no new rashes other than above. No new/progressive fevers/chills/night sweats.  -Symptoms of greater trochanteric bursitis which were severe and acute at the time of her last visit completely resolved without physical therapy or other intervention.  Has not returned since that time.    14 point review  of systems collected and negative if not documented above.      Interval History 7/15/22  Was sitting working at her desk and developed acute onset right lateral hip pain. During the course of that day, her pain progressed. Then worsened in the middle of the night. Then the next day, she woke up and the pain had suddenly resolved. Is still stiff however. Her 3rd digit DIP is sore. No swelling/redness. No skin changes/peeling as was previously a predominant feature. This all started about 2 weeks ago. Cannot take NSAIDs with kidney function. Tylenol not very helpful. She is still able to get up and work out each morning. Her pain is on the side of her hip. When she lies on her side it is painful. When acutely painful, had radiation up her back on the right side, not distally. No dactylitis. No rash. No fevers/chills/night sweats. No interval infections. 14 point review of systems collected and negative if not documented above.    Interval history 4/7/2022  Jessica has felt well since her last visit with me in January.  She has not had return of her pain, swelling/redness/warmth of her DIP joints.  No other red hot swollen joints elsewhere.  No morning stiffness or pain outside of her chronic baseline pain.  No fevers chills night sweats.  Weight has been stable.  No interval infections.  Her psoriasis remains well controlled with topicals though there was a switch in coverage for her scalp psoriasis topical and so she was tried on an alcohol-based topical which was very irritating and painful to use.  No development of symptoms consistent with dactylitis.  No development of symptoms consistent with laboratory eye disease.  No oral ulcers.  No symptoms consistent with inflammatory bowel disease.  Her review systems otherwise negative.    HPI from initial consultation on 1/6/2022  Initially developed scalp flaking/hair loss which started roughly one year ago. Did also have axillary and gluteal cleft and nails involved.  Initially left hand 2nd digit then 3rd digit. Then the right hand involved. Then on Sept 13th developed left hand second digit then 3rd digit redness/swelling/pain of distal aspect of her fingers. She was treated with 10 days of clindamycin though due to continued pain/redness of predominantly the 3rd digit she went to the walk-in clinic at [facility] and was given clindamycin again for what was presumed cellulitis that had not resolved with the initial course.  During this time had left hand 2nd/3rd digit throbbing pain/swelling. Also experienced peeling of the skin of the distal aspects of her digits. Her pain then resolved. Since that time, she reports 0/10 pain at rest which does not increase with use. Not much different with use. Has radiculopathy in neck/c spine involving the left upper extremity/clavical. No history consistent with dactylitis. 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 No history of inflammatory eye disease. Has intermittent ulcers in her mouth, a few weeks ago was most recent. Has been years prior to this since her last episode. Denies rectal or genital ulcers. Tried using clobetasol ointment on nails which she thought ws some helpful, though lasted for a few weeks. No symptoms of low back pain/stiffness which is worse in the AM and improves with use/exercise/stretching. No fevers/chills. No night sweats. Has intentionally lost some weight, no unintentional weight loss. Son with scalp involvement and recurrent ulcers. At this time she feels that her skin is currently under control with topical therapy without active lesions but knows that with stopping topicals that her psoriatic lesions return quickly.  She denies any other episodes of joint pain/redness/swelling/stiffness. Has baseline chronic knee pain worse with use/at the end of the day. Currently using clobetasol solution, clobetasol shampoo once per week, ointment, 2 strengths triamcinalone. No symptoms consistent  with raynauds. No other rash. No photosensitive skin changes/ symptoms. ROS otherwise negative.    Past Medical History  Past Medical History:   Diagnosis Date     AION (acute ischemic optic neuropathy)      Asthma      Bacterial infection 02/04/2021     Candida infection 11/11/2020     Cellulitis 09/13/2021     Cervical spinal stenosis      Chronic kidney disease      Chronic kidney disease, stage 3 (H)      Dizziness and giddiness     Created by Conversion      End stage liver disease (H)     2/2 Alcohol Abuse     End stage liver disease (H)     Alcohol-related     GERD (gastroesophageal reflux disease)      Hypertension      Liver transplant recipient (H) 08/25/2016    [facility]     Liver transplanted (H)      Migraine headache      Migraines      Osteoporosis     frax 11/1.7% 2021     PFO (patent foramen ovale) 08/08/2016    Noted on echo at [facility]     Recurrent UTI      Spinal stenosis in cervical region      Strabismus      Unspecified asthma(493.90)     Created by Conversion      Past Surgical History  Past Surgical History:   Procedure Laterality Date     APPENDECTOMY      1962     APPENDECTOMY       BENCH LIVER N/A 8/25/2016    Procedure: BENCH LIVER;  Surgeon: Larry Dhillon MD;  Location:  OR     CATARACT IOL, RT/LT       COLONOSCOPY       COLONOSCOPY N/A 8/12/2021    Procedure: COLONOSCOPY, WITH POLYPECTOMY;  Surgeon: Arlyn Bcekford MD;  Location: [facility] [place]     ESOPHAGOSCOPY, GASTROSCOPY, DUODENOSCOPY (EGD), COMBINED N/A 8/17/2016    Procedure: COMBINED ESOPHAGOSCOPY, GASTROSCOPY, DUODENOSCOPY (EGD);  Surgeon: Tao Alfonso MD;  Location:  GI     ESOPHAGOSCOPY, GASTROSCOPY, DUODENOSCOPY (EGD), COMBINED N/A 10/31/2016    Procedure: COMBINED ESOPHAGOSCOPY, GASTROSCOPY, DUODENOSCOPY (EGD), BIOPSY SINGLE OR MULTIPLE;  Surgeon: Ronald Bojorquez MD;  Location:  GI     LIVER TRANSPLANTATION  08/25/2016    [facility]     RECTAL SURGERY        sphincteroplasty       TRANSPLANT LIVER RECIPIENT  DONOR N/A 2016    Procedure: TRANSPLANT LIVER RECIPIENT  DONOR;  Surgeon: Larry Dhillon MD;  Location: [facility] OR     TUBAL LIGATION      laparoscopic     TUBAL LIGATION         Medications  Current Outpatient Medications   Medication     acetaminophen (TYLENOL) 325 MG tablet     albuterol (VENTOLIN HFA) 108 (90 Base) MCG/ACT inhaler     amLODIPine (NORVASC) 5 MG tablet     calcipotriene (DOVONOX) 0.005 % external solution     chlorthalidone (HYGROTON) 25 MG tablet     cholecalciferol (VITAMIN D3) 400 UNIT TABS tablet     clobetasol (TEMOVATE) 0.05 % external ointment     clobetasol (TEMOVATE) 0.05 % external solution     clobetasol propionate (CLOBEX) 0.05 % external shampoo     cyanocobalamin (VITAMIN  B-12) 1000 MCG tablet     ferrous sulfate (IRON) 325 (65 FE) MG tablet     folic acid (FOLVITE) 1 MG tablet     gabapentin (NEURONTIN) 100 MG capsule     hydrOXYzine (ATARAX) 25 MG tablet     ketoconazole (NIZORAL) 2 % external cream     levothyroxine (SYNTHROID/LEVOTHROID) 88 MCG tablet     MAGNESIUM OXIDE PO     melatonin 5 MG tablet     multivitamin, therapeutic (THERA-VIT) TABS tablet     mupirocin (BACTROBAN) 2 % external ointment     order for DME     pantoprazole (PROTONIX) 40 MG EC tablet     predniSONE (DELTASONE) 20 MG tablet     propranolol (INDERAL) 10 MG tablet     psyllium 0.52 G capsule     tacrolimus (GENERIC EQUIVALENT) 0.5 MG capsule     traMADol (ULTRAM) 50 MG tablet     traMADol (ULTRAM) 50 MG tablet     triamcinolone (KENALOG) 0.025 % external ointment     triamcinolone (KENALOG) 0.1 % external ointment     ursodiol (ACTIGALL) 300 MG capsule     valACYclovir (VALTREX) 500 MG tablet     No current facility-administered medications for this visit.       Allergies   Allergies   Allergen Reactions     Codeine Hives     Benadryl [Diphenhydramine] Hives     Cefaclor Hives     Hydrocodone-Acetaminophen Itching     Oxycodone  Itching     Penicillins Hives     Sulfa Drugs Hives     Family History: Psoriasis in her son. No other family history of autoimmune diseases.    Social History: Works for [employer]. 3 kids. Dec 13th 1996 quit smoking. Quit ETOH prior to transplant. No drug use hx.      Objective:    Physical exam:  No vitals for this video visit. Vitals reviewed in Epic.  GEN: Sitting up unassisted. NAD  HEENT: no facial rash, sclera clear, no inflammatory nose/ external ear changes  CV: no upper extremity dependent edema  Pulm: speaking in full sentences, no cough, no audible wheezing, no use of accessory muscles  Skin: no acute cutaneous lesions  MSK: full active ROM of bilateral shoulders, elbows, wrists, MCPs, PIPs, DIPs. Can make full fist without difficulty. No erythema or edema of these joints.    WBC   Date Value Ref Range Status   05/17/2021 6.2 4.0 - 11.0 10e9/L Final     WBC Count   Date Value Ref Range Status   09/29/2022 7.3 4.0 - 11.0 10e3/uL Final     Hemoglobin   Date Value Ref Range Status   09/29/2022 14.1 11.7 - 15.7 g/dL Final   05/17/2021 13.3 11.7 - 15.7 g/dL Final     Platelet Count   Date Value Ref Range Status   09/29/2022 296 150 - 450 10e3/uL Final   05/17/2021 269 150 - 450 10e9/L Final     Creatinine   Date Value Ref Range Status   09/29/2022 1.22 (H) 0.51 - 0.95 mg/dL Final   05/17/2021 1.26 (H) 0.52 - 1.04 mg/dL Final     Lab Results   Component Value Date    ALKPHOS 61 05/26/2022    ALKPHOS 50 05/17/2021     AST   Date Value Ref Range Status   09/29/2022 26 10 - 35 U/L Final   05/17/2021 18 0 - 45 U/L Final     Lab Results   Component Value Date    ALT 10 05/26/2022    ALT 19 05/17/2021     Erythrocyte Sedimentation Rate   Date Value Ref Range Status   04/08/2022 39 (H) 0 - 20 mm/hr Final     CRP Inflammation   Date Value Ref Range Status   12/15/2020 <2.9 0.0 - 8.0 mg/L Final     CRP   Date Value Ref Range Status   04/08/2022 <0.1 0.0-<0.8 mg/dL Final     UA RESULTS:  Recent Labs   Lab Test  03/14/22  0754 03/25/21  0736   COLOR Yellow Yellow   APPEARANCE Clear Clear   URINEGLC Negative Negative   URINEBILI Negative Negative   URINEKETONE Negative Negative   SG 1.010 1.013   UBLD Negative Negative   URINEPH 5.5 7.0   PROTEIN Negative Negative   UROBILINOGEN 0.2  --    NITRITE Negative Negative   LEUKEST Negative Negative   RBCU  --  2   WBCU  --  <1      DNA (ds) Antibody   Date Value Ref Range Status   01/06/2022 1.2 <10.0 IU/mL Final     Comment:     Negative     RNP Antibody IgG   Date Value Ref Range Status   01/06/2022 Negative Negative Final     Imaging:  MRI left knee without contrast 7/25/2018     IMPRESSION:  CONCLUSION:  1.  Area of acute microtrabecular fracture in the anterior portion of the lateral tibial plateau.  2.  Moderately advanced chondromalacia patella.  3.  Mild degenerative changes in the medial and lateral compartments.  4.  Small curvilinear subacute hematoma along the lateral and anterolateral aspects of the knee and proximal calf.    7/21/2018  X-ray left knee  XR KNEE LEFT 1 OR 2 VWS  7/21/2018 10:14 PM     INDICATION: Knee pain  COMPARISON: None.     FINDINGS: Mild tricompartment osteoarthritis.. No fracture or dislocation. Trace joint effusion.    MR LUMBAR SPINE W/O CONTRAST 5/15/2018 4:00 PM     Provided History: ; Lumbar radicular pain; Acute left ankle pain     ICD-10: Lumbar radicular pain; Acute left ankle pain     Comparison: Lumbar spine radiographs 5/8/2018.     Technique: Sagittal T1-weighted, sagittal STIR, 3D volumetric axial  and sagittal reconstructed T2-weighted images of the lumbar spine were  obtained without intravenous contrast.      Findings: There are 5 lumbar-type vertebrae convex left curvature of  the lumbar spine with apex at L3. The tip of the conus medullaris is  at L1. Normal lumbar alignment. Mild loss of intervertebral disc  height at L3-4. Schmorl's nodes in the opposing endplates at T12-L1.  Normal marrow signal.     On a level by level  basis:     T12-L1: No spinal canal or neuroforaminal stenosis.     L1-2: No spinal canal or neuroforaminal stenosis.     L2-3: Bilateral facet hypertrophy. No spinal canal or neural foraminal  stenosis.     L3-4: Bilateral facet hypertrophy and ligamentum flavum thickening.  Mild spinal canal narrowing. No neural foraminal stenosis. Mild  bilateral facet joint effusions.     L4-5: Bilateral facet hypertrophy. Mild bilateral facet joint  effusions. No spinal canal or neural foraminal stenosis.     L5-S1: Bilateral facet hypertrophy. Prominent epidural fat in the  spinal canal. No spinal canal stenosis. Mild left neural foraminal  narrowing. No right foraminal stenosis.     Mild atrophy of the paraspinous musculature in the partially  visualized gluteal muscles.                                                                    Impression: Lumbar spondylosis with mild spinal canal narrowing at  L3-4. Mild neural foraminal narrowing on the left at L5-S1.

## 2022-10-26 ENCOUNTER — HOSPITAL ENCOUNTER (OUTPATIENT)
Dept: MAMMOGRAPHY | Facility: CLINIC | Age: 67
Discharge: HOME | End: 2022-10-26
Attending: INTERNAL MEDICINE | Admitting: INTERNAL MEDICINE
Payer: COMMERCIAL

## 2022-10-26 DIAGNOSIS — Z12.31 VISIT FOR SCREENING MAMMOGRAM: ICD-10-CM

## 2022-10-26 PROCEDURE — 77067 SCR MAMMO BI INCL CAD: CPT

## 2022-10-28 ENCOUNTER — INFUSION THERAPY VISIT (OUTPATIENT)
Dept: NURSING | Facility: CLINIC | Age: 67
End: 2022-10-28
Payer: COMMERCIAL

## 2022-10-28 DIAGNOSIS — Z29.89 PROPHYLACTIC IMMUNOTHERAPY: Primary | ICD-10-CM

## 2022-10-28 PROCEDURE — M0220 PR INJECTION TIXAGEVIMAB & CILGAVIMAB (EVUSHELD): HCPCS | Performed by: INTERNAL MEDICINE

## 2022-10-28 NOTE — PROGRESS NOTES
[facility] Administration Note:  Phan Luque presents today for [facility].   present during visit today: Not Applicable.    Consent:   Informed Consent confirmed in chart, obtained on this date: 10/28/2022 KM    Post Injection Assessment:   Patient tolerated injection without incident.      Patient was observed in the room for a minimum of 10 minutes after injection per standard, then remained in the buidling for a total 60 minute observation period.         Discharge Plan:    Patient discharged in stable condition accompanied by: self.     Sarah Mckeon RN

## 2022-11-11 DIAGNOSIS — F41.1 GAD (GENERALIZED ANXIETY DISORDER): ICD-10-CM

## 2022-11-11 DIAGNOSIS — R94.6 THYROID FUNCTION TEST ABNORMAL: ICD-10-CM

## 2022-11-11 DIAGNOSIS — G43.809 OTHER MIGRAINE WITHOUT STATUS MIGRAINOSUS, NOT INTRACTABLE: ICD-10-CM

## 2022-11-16 RX ORDER — HYDROXYZINE HYDROCHLORIDE 25 MG/1
25-50 TABLET, FILM COATED ORAL EVERY 6 HOURS PRN
Qty: 120 TABLET | Refills: 6 | Status: SHIPPED | OUTPATIENT
Start: 2022-11-16 | End: 2023-07-10

## 2022-11-16 RX ORDER — PROPRANOLOL HYDROCHLORIDE 10 MG/1
10 TABLET ORAL 2 TIMES DAILY
Qty: 180 TABLET | Refills: 1 | Status: SHIPPED | OUTPATIENT
Start: 2022-11-16 | End: 2023-05-17

## 2022-11-16 RX ORDER — LEVOTHYROXINE SODIUM 88 UG/1
88 TABLET ORAL DAILY
Qty: 90 TABLET | Refills: 1 | Status: SHIPPED | OUTPATIENT
Start: 2022-11-16 | End: 2023-05-17

## 2022-11-16 NOTE — TELEPHONE ENCOUNTER
hydrOXYzine HCL 25MG TAB  Last Written Prescription Date:   2/13/2022  Last Fill Quantity: 120,   # refills: 5  Last Office Visit :  5/25/2022  Future Office visit:  None  120 Tabs, 6 Refills sent to pharm     PROPRANOLOL HCL 10MG TABS  Last Written Prescription Date:   5/25/2022  Last Fill Quantity: 60,   # refills: 2  Last Office Visit :  5/25/2022  Future Office visit:  None  180 Tabs, 1 Refill sent to pharm     LEVOTHYROXINE SODIUM 88MCG TABS  Last Written Prescription Date:   2/13/2022  Last Fill Quantity: 90,   # refills: 2  Last Office Visit :  5/25/2022  Future Office visit:  None  90 Tabs, 1 Refill sent to pharm    Lilibeth Juarez RN  Central Triage Red Flags/Med Refills

## 2022-11-28 ENCOUNTER — LAB (OUTPATIENT)
Dept: LAB | Facility: CLINIC | Age: 67
End: 2022-11-28
Payer: COMMERCIAL

## 2022-11-28 DIAGNOSIS — N18.31 CHRONIC KIDNEY DISEASE, STAGE 3A (H): ICD-10-CM

## 2022-11-28 DIAGNOSIS — Z94.4 LIVER REPLACED BY TRANSPLANT (H): ICD-10-CM

## 2022-11-28 DIAGNOSIS — Z79.899 ENCOUNTER FOR LONG-TERM (CURRENT) USE OF MEDICATIONS: ICD-10-CM

## 2022-11-28 LAB
ALBUMIN MFR UR ELPH: 7.7 MG/DL
ALBUMIN SERPL BCG-MCNC: 4 G/DL (ref 3.5–5.2)
ANION GAP SERPL CALCULATED.3IONS-SCNC: 17 MMOL/L (ref 7–15)
BUN SERPL-MCNC: 21.3 MG/DL (ref 8–23)
CALCIUM SERPL-MCNC: 9.2 MG/DL (ref 8.8–10.2)
CHLORIDE SERPL-SCNC: 101 MMOL/L (ref 98–107)
CREAT SERPL-MCNC: 1.21 MG/DL (ref 0.51–0.95)
CREAT UR-MCNC: 85.8 MG/DL
DEPRECATED CALCIDIOL+CALCIFEROL SERPL-MC: 42 UG/L (ref 20–75)
DEPRECATED HCO3 PLAS-SCNC: 20 MMOL/L (ref 22–29)
GFR SERPL CREATININE-BSD FRML MDRD: 49 ML/MIN/1.73M2
GLUCOSE SERPL-MCNC: 101 MG/DL (ref 70–99)
HGB BLD-MCNC: 13.6 G/DL (ref 11.7–15.7)
MAGNESIUM SERPL-MCNC: 1.5 MG/DL (ref 1.7–2.3)
PHOSPHATE SERPL-MCNC: 3.4 MG/DL (ref 2.5–4.5)
POTASSIUM SERPL-SCNC: 4.2 MMOL/L (ref 3.4–5.3)
PROT/CREAT 24H UR: 0.09 MG/MG CR (ref 0–0.2)
PTH-INTACT SERPL-MCNC: 19 PG/ML (ref 15–65)
SODIUM SERPL-SCNC: 138 MMOL/L (ref 136–145)

## 2022-11-28 PROCEDURE — 83735 ASSAY OF MAGNESIUM: CPT

## 2022-11-28 PROCEDURE — 84100 ASSAY OF PHOSPHORUS: CPT

## 2022-11-28 PROCEDURE — 84156 ASSAY OF PROTEIN URINE: CPT

## 2022-11-28 PROCEDURE — 82248 BILIRUBIN DIRECT: CPT

## 2022-11-28 PROCEDURE — 36415 COLL VENOUS BLD VENIPUNCTURE: CPT

## 2022-11-28 PROCEDURE — 99000 SPECIMEN HANDLING OFFICE-LAB: CPT

## 2022-11-28 PROCEDURE — 85018 HEMOGLOBIN: CPT

## 2022-11-28 PROCEDURE — 80321 ALCOHOLS BIOMARKERS 1OR 2: CPT | Mod: 90

## 2022-11-28 PROCEDURE — 83970 ASSAY OF PARATHORMONE: CPT

## 2022-11-28 PROCEDURE — 82306 VITAMIN D 25 HYDROXY: CPT

## 2022-11-28 PROCEDURE — 80053 COMPREHEN METABOLIC PANEL: CPT

## 2022-11-29 ENCOUNTER — TELEPHONE (OUTPATIENT)
Dept: TRANSPLANT | Facility: CLINIC | Age: 67
End: 2022-11-29

## 2022-11-29 DIAGNOSIS — Z79.899 ENCOUNTER FOR LONG-TERM (CURRENT) USE OF MEDICATIONS: ICD-10-CM

## 2022-11-29 DIAGNOSIS — Z94.4 LIVER TRANSPLANTED (H): Primary | ICD-10-CM

## 2022-11-29 DIAGNOSIS — Z94.4 LIVER REPLACED BY TRANSPLANT (H): Primary | ICD-10-CM

## 2022-11-29 LAB
ALBUMIN SERPL BCG-MCNC: 4 G/DL (ref 3.5–5.2)
ALP SERPL-CCNC: 49 U/L (ref 35–104)
ALT SERPL W P-5'-P-CCNC: 14 U/L (ref 10–35)
AST SERPL W P-5'-P-CCNC: 21 U/L (ref 10–35)
BILIRUB DIRECT SERPL-MCNC: <0.2 MG/DL (ref 0–0.3)
BILIRUB SERPL-MCNC: 0.7 MG/DL
PROT SERPL-MCNC: 6.6 G/DL (ref 6.4–8.3)

## 2022-11-29 NOTE — TELEPHONE ENCOUNTER
Jessica had her labs drawn 11/28/2022 and she thinks the lab missed a few tests  plus(Tacrolimus)     Please call Jessica

## 2022-11-29 NOTE — LETTER
OUTPATIENT OR TRANSITIONAL CARE  LABORATORY TEST ORDER    [facility]  Fax#[phone]     Patient Name: Phan Luque  Transplant Date: 8/25/2016   YOB: 1955  Issue Date: 11/29/22   [facility] MR#:[id number]  Expiration Date: 11/29/23      Diagnoses: [x]      Liver Transplant (ICD-10 Z94.4)    [x]      Long term use of medications (ICD-10 Z79.899)     Please fax results to [phone]    Frequency: every 2-3 months and as needed        [x] CBC with Platelets   [x] Basic Metabolic Panel (Sodium, Potassium, Chloride, CO2, Creatinine, Urea Nitrogen, Glucose, Calcium)  [x]  Magnesium  [x] Hepatic panel (Albumin, Alk Phos, ALT, AST, Direct and Total Bili)  [x] Tacrolimus drug level - 12 hour trough, please document time of last dose       Other Frequency: annually due March or April 2023  [x]      Fasting lipid panel  [x] Random urine protein  [x] Urinalysis with reflex to micro     Other Frequency: annually due November 2023  [x]      Phosphatidylethanol (PEth)    If you have questions, please call [phone] or [phone].    .

## 2022-11-30 LAB
PLPETH BLD-MCNC: <10 NG/ML
POPETH BLD-MCNC: <10 NG/ML

## 2022-12-04 ENCOUNTER — HOSPITAL ENCOUNTER (EMERGENCY)
Facility: CLINIC | Age: 67
Discharge: HOME | End: 2022-12-04
Attending: EMERGENCY MEDICINE | Admitting: EMERGENCY MEDICINE
Payer: COMMERCIAL

## 2022-12-04 VITALS
OXYGEN SATURATION: 97 % | BODY MASS INDEX: 29.35 KG/M2 | HEIGHT: 67 IN | SYSTOLIC BLOOD PRESSURE: 141 MMHG | DIASTOLIC BLOOD PRESSURE: 81 MMHG | HEART RATE: 100 BPM | TEMPERATURE: 98.1 F | WEIGHT: 187 LBS | RESPIRATION RATE: 18 BRPM

## 2022-12-04 DIAGNOSIS — E87.6 HYPOKALEMIA: ICD-10-CM

## 2022-12-04 DIAGNOSIS — R04.0 EPISTAXIS: ICD-10-CM

## 2022-12-04 LAB
ALBUMIN SERPL-MCNC: 4 G/DL (ref 3.5–5)
ALP SERPL-CCNC: 46 U/L (ref 45–120)
ALT SERPL W P-5'-P-CCNC: 13 U/L (ref 0–45)
ANION GAP SERPL CALCULATED.3IONS-SCNC: 9 MMOL/L (ref 5–18)
APTT PPP: 29 SECONDS (ref 22–38)
AST SERPL W P-5'-P-CCNC: 17 U/L (ref 0–40)
BILIRUB SERPL-MCNC: 1.1 MG/DL (ref 0–1)
BUN SERPL-MCNC: 29 MG/DL (ref 8–22)
CALCIUM SERPL-MCNC: 9.6 MG/DL (ref 8.5–10.5)
CHLORIDE BLD-SCNC: 103 MMOL/L (ref 98–107)
CO2 SERPL-SCNC: 27 MMOL/L (ref 22–31)
CREAT SERPL-MCNC: 1.29 MG/DL (ref 0.6–1.1)
ERYTHROCYTE [DISTWIDTH] IN BLOOD BY AUTOMATED COUNT: 13.7 % (ref 10–15)
GFR SERPL CREATININE-BSD FRML MDRD: 46 ML/MIN/1.73M2
GLUCOSE BLD-MCNC: 100 MG/DL (ref 70–125)
HCT VFR BLD AUTO: 40.2 % (ref 35–47)
HGB BLD-MCNC: 13.5 G/DL (ref 11.7–15.7)
INR PPP: 0.98 (ref 0.85–1.15)
MCH RBC QN AUTO: 30.2 PG (ref 26.5–33)
MCHC RBC AUTO-ENTMCNC: 33.6 G/DL (ref 31.5–36.5)
MCV RBC AUTO: 90 FL (ref 78–100)
PLATELET # BLD AUTO: 320 10E3/UL (ref 150–450)
POTASSIUM BLD-SCNC: 3.3 MMOL/L (ref 3.5–5)
PROT SERPL-MCNC: 7.6 G/DL (ref 6–8)
RBC # BLD AUTO: 4.47 10E6/UL (ref 3.8–5.2)
SODIUM SERPL-SCNC: 139 MMOL/L (ref 136–145)
WBC # BLD AUTO: 12.7 10E3/UL (ref 4–11)

## 2022-12-04 PROCEDURE — 85027 COMPLETE CBC AUTOMATED: CPT | Performed by: EMERGENCY MEDICINE

## 2022-12-04 PROCEDURE — 80053 COMPREHEN METABOLIC PANEL: CPT | Performed by: EMERGENCY MEDICINE

## 2022-12-04 PROCEDURE — 36415 COLL VENOUS BLD VENIPUNCTURE: CPT | Performed by: EMERGENCY MEDICINE

## 2022-12-04 PROCEDURE — 250N000009 HC RX 250: Performed by: EMERGENCY MEDICINE

## 2022-12-04 PROCEDURE — 85730 THROMBOPLASTIN TIME PARTIAL: CPT | Performed by: EMERGENCY MEDICINE

## 2022-12-04 PROCEDURE — 99284 EMERGENCY DEPT VISIT MOD MDM: CPT | Mod: 25

## 2022-12-04 PROCEDURE — 85610 PROTHROMBIN TIME: CPT | Performed by: EMERGENCY MEDICINE

## 2022-12-04 PROCEDURE — 30903 CONTROL OF NOSEBLEED: CPT | Mod: LT

## 2022-12-04 PROCEDURE — 250N000013 HC RX MED GY IP 250 OP 250 PS 637: Performed by: EMERGENCY MEDICINE

## 2022-12-04 RX ORDER — OXYMETAZOLINE HYDROCHLORIDE 0.05 G/100ML
2 SPRAY NASAL ONCE
Status: COMPLETED | OUTPATIENT
Start: 2022-12-04 | End: 2022-12-04

## 2022-12-04 RX ORDER — POTASSIUM CHLORIDE 1500 MG/1
20 TABLET, EXTENDED RELEASE ORAL ONCE
Status: COMPLETED | OUTPATIENT
Start: 2022-12-04 | End: 2022-12-04

## 2022-12-04 RX ORDER — AZITHROMYCIN 250 MG/1
TABLET, FILM COATED ORAL
Qty: 6 TABLET | Refills: 0 | Status: SHIPPED | OUTPATIENT
Start: 2022-12-04 | End: 2022-12-06

## 2022-12-04 RX ADMIN — POTASSIUM CHLORIDE 20 MEQ: 1500 TABLET, EXTENDED RELEASE ORAL at 16:58

## 2022-12-04 RX ADMIN — OXYMETAZOLINE HYDROCHLORIDE 2 SPRAY: 0.05 SPRAY NASAL at 15:25

## 2022-12-04 ASSESSMENT — ACTIVITIES OF DAILY LIVING (ADL)
ADLS_ACUITY_SCORE: 35
ADLS_ACUITY_SCORE: 35

## 2022-12-04 NOTE — ED PROVIDER NOTES
EMERGENCY DEPARTMENT ENCOUNTER      NAME: Phan Luque  AGE: 67 year old female  YOB: 1955  MRN: [id number]  EVALUATION DATE & TIME: 2022  2:22 PM    PCP: Arlyn Sanchez    ED PROVIDER: Sumi Cloud MD    Chief Complaint   Patient presents with     Epistaxis         FINAL IMPRESSION:  1. Hypokalemia    2. Epistaxis          ED COURSE & MEDICAL DECISION MAKIN:31 PM I met with the patient for the initial interview and physical examination. Discussed plan for treatment and workup in the ED.    4:00 PM I rechecked and updated the patient, upon removing nasal clip bleeding continued. I will insert nasal packing   5:21 PM I rechecked and updated the patient, epistaxis has halted. We discussed the plan for discharge and the patient is agreeable. Reviewed supportive cares, symptomatic treatment, outpatient follow up, and reasons to return to the Emergency Department. All questions and concerns were addressed. Patient to be discharged by ED RN.    Pertinent Labs & Imaging studies reviewed. (See chart for details)      67 year old female with history of previous liver transplant who presents to the Emergency Department for evaluation of epistaxis.  Left-sided.  On exam there is no evidence of posterior bleed or brisk arterial bleeding.  Consistent with anterior venous bleeding.    I have patient blow her nose, administering Afrin.  Nasal clip applied.  During which time we did obtain labs to check for platelets and coags as these had not been checked recently with her transplant.  Labs are unremarkable with baseline creatinine 1.29.  Potassium minimally low at 3.3 replaced orally.  After clip removed, patient almost immediately has slow steady venous bleeding therefore Rhino Rocket placed.  Observed thereafter for successful hemostasis and discharged home with outpatient ENT follow-up.  Given immunosuppression will cover with azithromycin given nasal packing.      ED Course as of 22 4530    Sun Dec 04, 2022   1559 Creatinine(!): 1.29  baseline   1559 Potassium(!): 3.3       Medical Decision Making    History:    Supplemental history from: N/A    External Record(s) reviewed: Documented in HPI, if applicable.    Work Up:    Chart documentation includes differential considered and any EKGs or imaging interpreted by provider.    In additional to work up documented, I considered the following work up: See chart documentation, if applicable.    External consultation:    Discussion of management with another provider: See chart documentation, if applicable    Complicating factors:    Care impacted by chronic illness: Other: Transplant patient    Care affected by social determinants of health: N/A    Disposition considerations: Discharge. I prescribed additional prescription strength medication(s) as charted. N/A.        At the conclusion of the encounter I discussed the results of all of the tests and the disposition. The questions were answered. The patient or family acknowledged understanding and was agreeable with the care plan.      MEDICATIONS GIVEN IN THE EMERGENCY:  Medications   oxymetazoline (AFRIN) 0.05 % spray 2 spray (2 sprays Nasal Given 12/4/22 3465)   potassium chloride ER (KLOR-CON M) CR tablet 20 mEq (20 mEq Oral Given 12/4/22 1658)       NEW PRESCRIPTIONS STARTED AT TODAY'S ER VISIT  Discharge Medication List as of 12/4/2022  5:23 PM             =================================================================    [facility]    Patient information was obtained from: Patient     Use of Intrepreter: N/A        Phan Luque is a 67 year old female with pertinent medical history of hypertension, transplanted liver, and immunosupression who presents with epistaxis.     Patient reports a persisting nosebleed that started this morning that began from the left nare, but then she notes bleeding bilaterally. Patient denies blood thinner medication. Patient denies additional symptoms or complaints at  this time.       REVIEW OF SYSTEMS  Constitutional:  Denies fever, chills, weight loss or weakness  HENT:  Denies sore throat, ear pain, congestion. Positive for epistaxis   All other systems negative unless noted in HPI.      PAST MEDICAL HISTORY:  Past Medical History:   Diagnosis Date     AION (acute ischemic optic neuropathy)      Asthma      Bacterial infection 02/04/2021     Candida infection 11/11/2020     Cellulitis 09/13/2021     Cervical spinal stenosis      Chronic kidney disease      Chronic kidney disease, stage 3 (H)      Dizziness and giddiness     Created by Conversion      End stage liver disease (H)     2/2 Alcohol Abuse     End stage liver disease (H)     Alcohol-related     GERD (gastroesophageal reflux disease)      Hypertension      Liver transplant recipient (H) 08/25/2016    [facility]     Liver transplanted (H)      Migraine headache      Migraines      Osteoporosis     frax 11/1.7% 2021     PFO (patent foramen ovale) 08/08/2016    Noted on echo at [facility]     Recurrent UTI      Spinal stenosis in cervical region      Strabismus      Unspecified asthma(493.90)     Created by Conversion        PAST SURGICAL HISTORY:  Past Surgical History:   Procedure Laterality Date     APPENDECTOMY      1962     APPENDECTOMY       BENCH LIVER N/A 8/25/2016    Procedure: BENCH LIVER;  Surgeon: Larry Dhillon MD;  Location:  OR     CATARACT IOL, RT/LT       COLONOSCOPY       COLONOSCOPY N/A 8/12/2021    Procedure: COLONOSCOPY, WITH POLYPECTOMY;  Surgeon: Arlyn Beckford MD;  [place]: [facility] [place]     ESOPHAGOSCOPY, GASTROSCOPY, DUODENOSCOPY (EGD), COMBINED N/A 8/17/2016    Procedure: COMBINED ESOPHAGOSCOPY, GASTROSCOPY, DUODENOSCOPY (EGD);  Surgeon: Tao Alfonso MD;  Location:  GI     ESOPHAGOSCOPY, GASTROSCOPY, DUODENOSCOPY (EGD), COMBINED N/A 10/31/2016    Procedure: COMBINED ESOPHAGOSCOPY, GASTROSCOPY, DUODENOSCOPY (EGD), BIOPSY SINGLE OR MULTIPLE;  Surgeon:  Ronald Bojorquez MD;  Location:  GI     LIVER TRANSPLANTATION  2016    [facility]     RECTAL SURGERY       sphincteroplasty       TRANSPLANT LIVER RECIPIENT  DONOR N/A 2016    Procedure: TRANSPLANT LIVER RECIPIENT  DONOR;  Surgeon: Larry Dhillon MD;  Location:  OR     TUBAL LIGATION      laparoscopic     TUBAL LIGATION         CURRENT MEDICATIONS:    Prior to Admission Medications   Prescriptions Last Dose Informant Patient Reported? Taking?   MAGNESIUM OXIDE PO   Yes No   Sig: Take 400 mg by mouth daily   acetaminophen (TYLENOL) 325 MG tablet   No No   Sig: Take 2 tablets (650 mg) by mouth every 6 hours as needed for mild pain Or fevers. Use sparingly. Limit use to no more than 2 grams (2000 mg) in 24 hours. **Further refills must be obtained by primary care provider**   albuterol (VENTOLIN HFA) 108 (90 Base) MCG/ACT inhaler   No No   Sig: Inhale 2 puffs into the lungs every 6 hours as needed for shortness of breath / dyspnea or wheezing   amLODIPine (NORVASC) 5 MG tablet   No No   Sig: Take 1 tablet (5 mg) by mouth daily   calcipotriene (DOVONOX) 0.005 % external solution   No No   Sig: Apply 1 mL topically daily To the scalp   chlorthalidone (HYGROTON) 25 MG tablet   No No   Sig: Take 0.5 tablets (12.5 mg) by mouth daily   cholecalciferol (VITAMIN D3) 400 UNIT TABS tablet   Yes No   Sig: Take 400 Units by mouth daily   clobetasol (TEMOVATE) 0.05 % external ointment   No No   Sig: Apply topically 2 times daily To areas of eczema on the lower legs, until areas resolve.   clobetasol (TEMOVATE) 0.05 % external solution   No No   Sig: Apply topically 2 times daily To the scalp as needed for itching and flaking, on the days you don't use the clobetasol shampoo.   clobetasol propionate (CLOBEX) 0.05 % external shampoo   No No   Sig: Apply topically daily To dry scalp x 15 min and rinse ,when psoriasis is present.   cyanocobalamin (VITAMIN  B-12) 1000 MCG  tablet   Yes No   Sig: Take 1,000 mcg by mouth daily   ferrous sulfate (IRON) 325 (65 FE) MG tablet   Yes No   Sig: Take 1 tablet (325 mg) by mouth 2 times daily   folic acid (FOLVITE) 1 MG tablet   No No   Sig: Take 1 tablet (1 mg) by mouth daily   gabapentin (NEURONTIN) 100 MG capsule   No No   Sig: Take 2 capsules (200 mg) by mouth At Bedtime   hydrOXYzine (ATARAX) 25 MG tablet   No No   Sig: Take 1-2 tablets (25-50 mg) by mouth every 6 hours as needed for itching   ketoconazole (NIZORAL) 2 % external cream   No No   Sig: Apply topically daily To buttocks with triamcinolone as needed   levothyroxine (SYNTHROID/LEVOTHROID) 88 MCG tablet   No No   Sig: Take 1 tablet (88 mcg) by mouth daily   melatonin 5 MG tablet   Yes No   Sig: Take 1 tablet (5 mg) by mouth nightly as needed for sleep   multivitamin, therapeutic (THERA-VIT) TABS tablet   No No   Sig: Take 1 tablet by mouth every 24 hours   mupirocin (BACTROBAN) 2 % external ointment   Yes No   Patient not taking: Reported on 9/6/2022   order for DME   No No   Sig: Equipment being ordered: [employer] ankle brace   Patient not taking: Reported on 9/6/2022   pantoprazole (PROTONIX) 40 MG EC tablet   No No   Sig: TAKE ONE TABLET BY MOUTH EVERY MORNING BEFORE BREAKFAST   predniSONE (DELTASONE) 20 MG tablet   Yes No   Sig: TAKE 2 TABLETS BY MOUTH EVERY DAY FOR 5 DAYS   Patient not taking: Reported on 9/6/2022   propranolol (INDERAL) 10 MG tablet   No No   Sig: Take 1 tablet (10 mg) by mouth 2 times daily   psyllium 0.52 G capsule   No No   Sig: Take 2 capsules (1.04 g) by mouth daily With a full glass of water.   Patient taking differently: Take 1 capsule by mouth 3 times daily With a full glass of water.   tacrolimus (GENERIC EQUIVALENT) 0.5 MG capsule   No No   Sig: TAKE 3 CAPSULES (1.5 MG) BY MOUTH EVERY 12 HOURS   traMADol (ULTRAM) 50 MG tablet   No No   Sig: Take 1 tablet (50 mg) by mouth every 6 hours as needed for severe pain   traMADol (ULTRAM) 50 MG tablet   No No    Sig: Take 1 tablet (50 mg) by mouth every 6 hours as needed for severe pain   triamcinolone (KENALOG) 0.025 % external ointment   No No   Sig: Apply topically 2 times daily To affected areas in the axilla or gluteal cleft as needed until clear   triamcinolone (KENALOG) 0.1 % external ointment   No No   Sig: Apply topically 2 times daily To plaques behind the knee   ursodiol (ACTIGALL) 300 MG capsule   No No   Sig: TAKE ONE CAPSULE BY MOUTH TWICE A DAY   valACYclovir (VALTREX) 500 MG tablet   Yes No   Sig: TAKE 1 TABLET BY MOUTH TWICE DAILY FOR 7 DAYS. START 2 DAYS BEFORE PROCEDURE   Patient not taking: Reported on 2022      Facility-Administered Medications: None       ALLERGIES:  Allergies   Allergen Reactions     Codeine Hives     Benadryl [Diphenhydramine] Hives     Cefaclor Hives     Hydrocodone-Acetaminophen Itching     Oxycodone Itching     Penicillins Hives     Sulfa Drugs Hives       FAMILY HISTORY:  Family History   Problem Relation Age of Onset     Family History Negative Other      Melanoma Mother              Heart Disease Father         CHF     Skin Cancer Sister      Cirrhosis Paternal Uncle         not alcohol related     Heart Failure Mother      Heart Failure Father      Chronic Obstructive Pulmonary Disease Father        SOCIAL HISTORY:  Social History     Tobacco Use     Smoking status: Former     Packs/day: 2.50     Years: 20.00     Pack years: 50.00     Types: Cigarettes     Quit date: 1996     Years since quittin.9     Smokeless tobacco: Never   Substance Use Topics     Alcohol use: No     Alcohol/week: 7.0 standard drinks     Types: 7 Standard drinks or equivalent per week     Comment: heavy use (750ml/2 days) up until 1 year ago; had cut down to 1 drink/day; none since 17     Drug use: No        VITALS:  Patient Vitals for the past 24 hrs:   BP Temp Temp src Pulse Resp SpO2 Height Weight   22 1700 -- -- -- 100 -- 97 % -- --   22 1427 (!) 141/81 98.1  F  "(36.7  C) Oral 103 18 96 % 1.702 m (5' 7\") 84.8 kg (187 lb)       PHYSICAL EXAM    General Appearance: Well-appearing, well-nourished, no acute distress, nasal clip on  Head:  Normocephalic  Eyes: conjunctiva/corneas clear  ENT:  Lips, mucosa, and tongue normal; membranes are moist without pallor, No bleeding down the posterior oropharynx, when nasal clip removed the right nare had blood tinged mucus with no active bleeding, the left nare with blood clot present and signs of mild active bleeding  Neck:  Supple  Cardio:  Regular rate and rhythm  Pulm:  No respiratory distress  Extremities: Moves all extremities normally, normal gait  Skin:  Skin warm, dry, no rashes  Neuro:  Alert and oriented ×3, moving all extremities, no gross sensory defects     RADIOLOGY/LABS:  Reviewed all pertinent imaging. Please see official radiology report. All pertinent labs reviewed and interpreted.    Results for orders placed or performed during the hospital encounter of 12/04/22   CBC (+ platelets, no diff)   Result Value Ref Range    WBC Count 12.7 (H) 4.0 - 11.0 10e3/uL    RBC Count 4.47 3.80 - 5.20 10e6/uL    Hemoglobin 13.5 11.7 - 15.7 g/dL    Hematocrit 40.2 35.0 - 47.0 %    MCV 90 78 - 100 fL    MCH 30.2 26.5 - 33.0 pg    MCHC 33.6 31.5 - 36.5 g/dL    RDW 13.7 10.0 - 15.0 %    Platelet Count 320 150 - 450 10e3/uL   Comprehensive metabolic panel   Result Value Ref Range    Sodium 139 136 - 145 mmol/L    Potassium 3.3 (L) 3.5 - 5.0 mmol/L    Chloride 103 98 - 107 mmol/L    Carbon Dioxide (CO2) 27 22 - 31 mmol/L    Anion Gap 9 5 - 18 mmol/L    Urea Nitrogen 29 (H) 8 - 22 mg/dL    Creatinine 1.29 (H) 0.60 - 1.10 mg/dL    Calcium 9.6 8.5 - 10.5 mg/dL    Glucose 100 70 - 125 mg/dL    Alkaline Phosphatase 46 45 - 120 U/L    AST 17 0 - 40 U/L    ALT 13 0 - 45 U/L    Protein Total 7.6 6.0 - 8.0 g/dL    Albumin 4.0 3.5 - 5.0 g/dL    Bilirubin Total 1.1 (H) 0.0 - 1.0 mg/dL    GFR Estimate 46 (L) >60 mL/min/1.73m2   Result Value Ref Range "    INR 0.98 0.85 - 1.15   Result Value Ref Range    aPTT 29 22 - 38 Seconds       PROCEDURES:  PROCEDURE: Nasal packing   INDICATIONS: Active bleeding from left nostril   PROCEDURE PROVIDER: Dr Sumi Cloud   MEDICATIONS: N/A, no sedation meds were given    DETAILS:  4.5 cm anterior Rhino Rocket placed in the left nare.  Patient tolerated this well.  Description of procedure   COMPLICATIONS: Patient tolerated procedure well, without complication       The creation of this record is based on the scribe s observations of the work being performed by Sumi Cloud MD and the provider s statements to them. It was created on her behalf by Reno Hernandez, a trained medical scribe. This document has been checked and approved by the attending provider.    Sumi Cloud MD  Emergency Medicine  [facility] EMERGENCY ROOM  [address] [id number] [phone]  Dept: [phone]      Sumi Cloud MD  12/04/22 7440

## 2022-12-04 NOTE — ED TRIAGE NOTES
Patient presents to the ED with complaints of nose bleed that started re-bleeding this am.     Triage Assessment     Row Name 12/04/22 7710       Triage Assessment (Adult)    Airway WDL WDL       Respiratory WDL    Respiratory WDL WDL       Skin Circulation/Temperature WDL    Skin Circulation/Temperature WDL WDL       Cardiac WDL    Cardiac WDL WDL       Peripheral/Neurovascular WDL    Peripheral Neurovascular WDL WDL       Cognitive/Neuro/Behavioral WDL    Cognitive/Neuro/Behavioral WDL WDL

## 2022-12-05 ENCOUNTER — TELEPHONE (OUTPATIENT)
Dept: DERMATOLOGY | Facility: CLINIC | Age: 67
End: 2022-12-05

## 2022-12-05 NOTE — TELEPHONE ENCOUNTER
[facility] Call Center    Phone Message    May a detailed message be left on voicemail: yes     Reason for Call: Other: Patient has an appt tomorrow 12/06/2022 with Dr. Mendoza and is having an intractable nosebleed and is concerned about driving in for her appointment. Patient would like to switch to a phone call appointment if possible.    Please call back   Thank you    Action Taken: Message routed to:  [facility] ([facility]): Derm    Travel Screening: Not Applicable

## 2022-12-06 ENCOUNTER — OFFICE VISIT (OUTPATIENT)
Dept: FAMILY MEDICINE | Facility: CLINIC | Age: 67
End: 2022-12-06
Payer: COMMERCIAL

## 2022-12-06 VITALS
SYSTOLIC BLOOD PRESSURE: 120 MMHG | OXYGEN SATURATION: 98 % | TEMPERATURE: 97.7 F | HEIGHT: 67 IN | HEART RATE: 92 BPM | DIASTOLIC BLOOD PRESSURE: 62 MMHG | BODY MASS INDEX: 29.66 KG/M2 | RESPIRATION RATE: 19 BRPM | WEIGHT: 189 LBS

## 2022-12-06 DIAGNOSIS — R04.0 EPISTAXIS: Primary | ICD-10-CM

## 2022-12-06 PROBLEM — J45.909 ASTHMA: Status: ACTIVE | Noted: 2022-12-06

## 2022-12-06 PROBLEM — L71.9 ROSACEA: Status: ACTIVE | Noted: 2020-07-24

## 2022-12-06 PROBLEM — L20.89 OTHER ATOPIC DERMATITIS AND RELATED CONDITIONS: Status: ACTIVE | Noted: 2020-07-24

## 2022-12-06 PROBLEM — S82.142A TIBIAL PLATEAU FRACTURE, LEFT: Status: ACTIVE | Noted: 2018-07-11

## 2022-12-06 PROBLEM — S82.892A AVULSION FRACTURE OF ANKLE, LEFT, CLOSED, INITIAL ENCOUNTER: Status: ACTIVE | Noted: 2017-05-18

## 2022-12-06 PROBLEM — M94.9 DISORDER OF BONE AND CARTILAGE: Status: ACTIVE | Noted: 2020-07-24

## 2022-12-06 PROBLEM — N95.1 SYMPTOMATIC MENOPAUSAL OR FEMALE CLIMACTERIC STATES: Status: ACTIVE | Noted: 2020-07-24

## 2022-12-06 PROBLEM — M89.9 DISORDER OF BONE AND CARTILAGE: Status: ACTIVE | Noted: 2020-07-24

## 2022-12-06 PROBLEM — J30.9 ALLERGIC RHINITIS: Status: ACTIVE | Noted: 2020-07-24

## 2022-12-06 PROCEDURE — 99213 OFFICE O/P EST LOW 20 MIN: CPT | Performed by: FAMILY MEDICINE

## 2022-12-06 NOTE — PROGRESS NOTES
"  Assessment & Plan     (R04.0) Epistaxis  (primary encounter diagnosis)  Comment: Left nasal epistaxis  Plan:      PLAN:  1.  Patient has follow-up with ENT tomorrow  2.  Patient will follow-up with nephrology  3.  Advised the patient not to fill the prescription for Zithromax and otherwise follow-up as needed for all of this.             BMI:   Estimated body mass index is 29.6 kg/m  as calculated from the following:    Height as of this encounter: 1.702 m (5' 7\").    Weight as of this encounter: 85.7 kg (189 lb).           No follow-ups on file.    Gurpreet Gallegos MD  [facility]    Kena Lopez is a 67 year old, presenting for the following health issues:    Patient was seen in the ER on December 4 she had significant epistaxis of the left nares, she had a Rhino Rocket placed, yesterday she was quite miserable in her own words with this but is tolerating it much better today and in general feeling well.  She has an appointment tomorrow with ENT and presumably will have this removed, of note she has in the past use Aquaphor in the nasal passages during the winter months has not started that yet but will do so.    She was also noted to have slightly low potassium in the ER 3.3 this was replaced, of note she does have stage III kidney disease and so is going to have follow-up with her nephrologist for her kidney test to be repeated.    She was given a prescription for Zithromax in the ER mainly for prophylaxis of a sinus infection, she is not feeling sick no systemic symptoms scheduled told the patient that I actually do not want her to take that      Recurrent Erosion Follow Up (12-04-22  ER for  Hypokalemia ,Epistaxis.//)      HPI           Review of Systems         Objective    /62   Pulse 92   Temp 97.7  F (36.5  C) (Temporal)   Resp 19   Ht 1.702 m (5' 7\")   Wt 85.7 kg (189 lb)   LMP  (LMP Unknown)   SpO2 98%   BMI 29.60 kg/m    Body mass index is 29.6 " kg/m .  Physical Exam

## 2022-12-07 ENCOUNTER — OFFICE VISIT (OUTPATIENT)
Dept: OTOLARYNGOLOGY | Facility: CLINIC | Age: 67
End: 2022-12-07
Payer: COMMERCIAL

## 2022-12-07 DIAGNOSIS — E87.6 HYPOKALEMIA: Primary | ICD-10-CM

## 2022-12-07 DIAGNOSIS — E83.42 HYPOMAGNESEMIA: ICD-10-CM

## 2022-12-07 DIAGNOSIS — Z48.00 ENCOUNTER FOR REMOVAL OF NASAL PACKING: Primary | ICD-10-CM

## 2022-12-07 DIAGNOSIS — R04.0 EPISTAXIS: ICD-10-CM

## 2022-12-07 DIAGNOSIS — J34.89 NASAL CRUSTING: ICD-10-CM

## 2022-12-07 PROCEDURE — 99203 OFFICE O/P NEW LOW 30 MIN: CPT | Performed by: OTOLARYNGOLOGY

## 2022-12-07 RX ORDER — MUPIROCIN 20 MG/G
OINTMENT TOPICAL 3 TIMES DAILY
Qty: 22 G | Refills: 0 | Status: SHIPPED | OUTPATIENT
Start: 2022-12-07 | End: 2022-12-17

## 2022-12-07 NOTE — LETTER
12/7/2022         RE: Phan Luque  [address]  [facility] [address]        Dear Colleague,    Thank you for referring your patient, Phan Luque, to the [facility]. Please see a copy of my visit note below.    CHIEF COMPLAINT: Patient presents with:  Consult: ED Follow up 12/4/22, Nose bleed,Rhino rocket in place          HISTORY OF PRESENT ILLNESS    Jessica was seen at the behest of Arlyn Sanchez MD for epistaxis.   She has a history of allergic rhinitis.  Has been on allergy shots in the past in [place].   History of chronic nosebleeds.  History of liver tranplant.  Also has chronic kidney disease.            REVIEW OF SYSTEMS    Review of Systems as per HPI and PMHx, otherwise 10 system review system are negative.       ALLERGIES    Codeine, Benadryl [diphenhydramine], Cefaclor, Hydrocodone-acetaminophen, Oxycodone, Penicillins, and Sulfa drugs    CURRENT MEDICATIONS      Current Outpatient Medications:      acetaminophen (TYLENOL) 325 MG tablet, Take 2 tablets (650 mg) by mouth every 6 hours as needed for mild pain Or fevers. Use sparingly. Limit use to no more than 2 grams (2000 mg) in 24 hours. **Further refills must be obtained by primary care provider**, Disp: 100 tablet, Rfl: 0     albuterol (VENTOLIN HFA) 108 (90 Base) MCG/ACT inhaler, Inhale 2 puffs into the lungs every 6 hours as needed for shortness of breath / dyspnea or wheezing, Disp: 18 g, Rfl: 8     amLODIPine (NORVASC) 5 MG tablet, Take 1 tablet (5 mg) by mouth daily, Disp: 90 tablet, Rfl: 3     calcipotriene (DOVONOX) 0.005 % external solution, Apply 1 mL topically daily To the scalp, Disp: 180 mL, Rfl: 3     chlorthalidone (HYGROTON) 25 MG tablet, Take 0.5 tablets (12.5 mg) by mouth daily, Disp: 45 tablet, Rfl: 3     cholecalciferol (VITAMIN D3) 400 UNIT TABS tablet, Take 400 Units by mouth daily, Disp: , Rfl:      clobetasol (TEMOVATE) 0.05 % external ointment, Apply topically 2 times daily  To areas of eczema on the lower legs, until areas resolve., Disp: 60 g, Rfl: 1     clobetasol (TEMOVATE) 0.05 % external solution, Apply topically 2 times daily To the scalp as needed for itching and flaking, on the days you don't use the clobetasol shampoo., Disp: 150 mL, Rfl: 3     clobetasol propionate (CLOBEX) 0.05 % external shampoo, Apply topically daily To dry scalp x 15 min and rinse ,when psoriasis is present., Disp: 354 mL, Rfl: 3     cyanocobalamin (VITAMIN  B-12) 1000 MCG tablet, Take 1,000 mcg by mouth daily, Disp: , Rfl:      ferrous sulfate (IRON) 325 (65 FE) MG tablet, Take 1 tablet (325 mg) by mouth 2 times daily, Disp: 100 tablet, Rfl:      folic acid (FOLVITE) 1 MG tablet, Take 1 tablet (1 mg) by mouth daily, Disp: 30 tablet, Rfl: 1     gabapentin (NEURONTIN) 100 MG capsule, Take 2 capsules (200 mg) by mouth At Bedtime, Disp: 180 capsule, Rfl: 2     hydrOXYzine (ATARAX) 25 MG tablet, Take 1-2 tablets (25-50 mg) by mouth every 6 hours as needed for itching, Disp: 120 tablet, Rfl: 6     ketoconazole (NIZORAL) 2 % external cream, Apply topically daily To buttocks with triamcinolone as needed, Disp: 60 g, Rfl: 11     levothyroxine (SYNTHROID/LEVOTHROID) 88 MCG tablet, Take 1 tablet (88 mcg) by mouth daily, Disp: 90 tablet, Rfl: 1     MAGNESIUM OXIDE PO, Take 400 mg by mouth daily, Disp: , Rfl:      melatonin 5 MG tablet, Take 1 tablet (5 mg) by mouth nightly as needed for sleep, Disp: , Rfl:      multivitamin, therapeutic (THERA-VIT) TABS tablet, Take 1 tablet by mouth every 24 hours, Disp: 30 tablet, Rfl: 11     mupirocin (BACTROBAN) 2 % external ointment, Apply topically 3 times daily for 10 days Apply a pea sized amount to inside of each nostril 3x daily with Qtip for 10 days, Disp: 22 g, Rfl: 0     mupirocin (BACTROBAN) 2 % external ointment, , Disp: , Rfl:      order for DME, Equipment being ordered: [employer] ankle brace, Disp: 1 Device, Rfl: 0     pantoprazole (PROTONIX) 40 MG EC tablet, TAKE ONE  TABLET BY MOUTH EVERY MORNING BEFORE BREAKFAST, Disp: 90 tablet, Rfl: 3     predniSONE (DELTASONE) 20 MG tablet, , Disp: , Rfl:      propranolol (INDERAL) 10 MG tablet, Take 1 tablet (10 mg) by mouth 2 times daily, Disp: 180 tablet, Rfl: 1     psyllium 0.52 G capsule, Take 2 capsules (1.04 g) by mouth daily With a full glass of water. (Patient taking differently: Take 1 capsule by mouth 3 times daily With a full glass of water.), Disp: 60 capsule, Rfl: 1     tacrolimus (GENERIC EQUIVALENT) 0.5 MG capsule, TAKE 3 CAPSULES (1.5 MG) BY MOUTH EVERY 12 HOURS, Disp: 180 capsule, Rfl: 11     traMADol (ULTRAM) 50 MG tablet, Take 1 tablet (50 mg) by mouth every 6 hours as needed for severe pain, Disp: 15 tablet, Rfl: 0     traMADol (ULTRAM) 50 MG tablet, Take 1 tablet (50 mg) by mouth every 6 hours as needed for severe pain, Disp: 30 tablet, Rfl: 0     triamcinolone (KENALOG) 0.025 % external ointment, Apply topically 2 times daily To affected areas in the axilla or gluteal cleft as needed until clear, Disp: 80 g, Rfl: 0     triamcinolone (KENALOG) 0.1 % external ointment, Apply topically 2 times daily To plaques behind the knee, Disp: 80 g, Rfl: 1     ursodiol (ACTIGALL) 300 MG capsule, TAKE ONE CAPSULE BY MOUTH TWICE A DAY, Disp: 60 capsule, Rfl: 11     valACYclovir (VALTREX) 500 MG tablet, , Disp: , Rfl:      PAST MEDICAL HISTORY    PAST MEDICAL HISTORY:   Past Medical History:   Diagnosis Date     AION (acute ischemic optic neuropathy)      Asthma      Bacterial infection 02/04/2021     Candida infection 11/11/2020     Cellulitis 09/13/2021     Cervical spinal stenosis      Chronic kidney disease      Chronic kidney disease, stage 3 (H)      Dizziness and giddiness     Created by Conversion      End stage liver disease (H)     2/2 Alcohol Abuse     End stage liver disease (H)     Alcohol-related     GERD (gastroesophageal reflux disease)      Hypertension      Liver transplant recipient (H) 08/25/2016    [place]  [facility]     Liver transplanted (H)      Migraine headache      Migraines      Osteoporosis     frax 111.7%      PFO (patent foramen ovale) 2016    Noted on echo at [facility]     Recurrent UTI      Spinal stenosis in cervical region      Strabismus      Unspecified asthma(493.90)     Created by Conversion        PAST SURGICAL HISTORY    PAST SURGICAL HISTORY:   Past Surgical History:   Procedure Laterality Date     APPENDECTOMY           APPENDECTOMY       BENCH LIVER N/A 2016    Procedure: BENCH LIVER;  Surgeon: Larry Dhillon MD;  Location: [facility] OR     CATARACT IOL, RT/LT       COLONOSCOPY       COLONOSCOPY N/A 2021    Procedure: COLONOSCOPY, WITH POLYPECTOMY;  Surgeon: Arlyn Beckford MD;  Location: [facility] OR     ESOPHAGOSCOPY, GASTROSCOPY, DUODENOSCOPY (EGD), COMBINED N/A 2016    Procedure: COMBINED ESOPHAGOSCOPY, GASTROSCOPY, DUODENOSCOPY (EGD);  Surgeon: Tao Alfonso MD;  Location:  GI     ESOPHAGOSCOPY, GASTROSCOPY, DUODENOSCOPY (EGD), COMBINED N/A 10/31/2016    Procedure: COMBINED ESOPHAGOSCOPY, GASTROSCOPY, DUODENOSCOPY (EGD), BIOPSY SINGLE OR MULTIPLE;  Surgeon: Ronald Bojorquez MD;  Location:  GI     LIVER TRANSPLANTATION  2016    [facility]     RECTAL SURGERY       sphincteroplasty       TRANSPLANT LIVER RECIPIENT  DONOR N/A 2016    Procedure: TRANSPLANT LIVER RECIPIENT  DONOR;  Surgeon: Larry Dhillon MD;  Location:  OR     TUBAL LIGATION      laparoscopic     TUBAL LIGATION         FAMILY  HISTORY    FAMILY HISTORY:   Family History   Problem Relation Age of Onset     Family History Negative Other      Melanoma Mother              Heart Disease Father         CHF     Skin Cancer Sister      Cirrhosis Paternal Uncle         not alcohol related     Heart Failure Mother      Heart Failure Father      Chronic Obstructive Pulmonary Disease Father        SOCIAL HISTORY    SOCIAL  HISTORY:   Social History     Tobacco Use     Smoking status: Former     Packs/day: 2.50     Years: 20.00     Pack years: 50.00     Types: Cigarettes     Quit date: 1996     Years since quittin.0     Smokeless tobacco: Never   Substance Use Topics     Alcohol use: No     Alcohol/week: 7.0 standard drinks     Types: 7 Standard drinks or equivalent per week     Comment: heavy use (750ml/2 days) up until 1 year ago; had cut down to 1 drink/day; none since 16        PHYSICAL EXAM    HEAD: Normal appearance and symmetry:  No cutaneous lesions.      NECK:  supple     EARS: normal TM, EACs    EYES:  EOMI    CN VII/XII:  intact     NOSE:     Dorsum:   straight  Septum:  midline  Mucosa:  moist        ORAL CAVITY/OROPHARYNX:     Lips:  Normal.  Tongue: normal, midline  Mucosa:   no lesions     NECK:  Trachea:  midline.              Thyroid:  normal              Adenopathy:  none        NEURO:   Alert and Oriented     GAIT AND STATION:  normal     RESPIRATORY:   Symmetry and Respiratory effort     PSYCH:  Normal mood and affect     SKIN:   warm and dry         IMPRESSION:    Encounter Diagnoses   Name Primary?     Encounter for removal of nasal packing Yes     Epistaxis      Nasal crusting           RECOMMENDATIONS:      Orders Placed This Encounter   Procedures     Adult Allergy/Asthma Referral      Orders Placed This Encounter   Medications     mupirocin (BACTROBAN) 2 % external ointment     Sig: Apply topically 3 times daily for 10 days Apply a pea sized amount to inside of each nostril 3x daily with Qtip for 10 days     Dispense:  22 g     Refill:  0     AYR nasal saline gel during winter months  Return as needed        Again, thank you for allowing me to participate in the care of your patient.        Sincerely,        Barber Reyna MD

## 2022-12-07 NOTE — PROGRESS NOTES
CHIEF COMPLAINT: Patient presents with:  Consult: ED Follow up 12/4/22, Nose bleed,Rhino rocket in place          HISTORY OF PRESENT ILLNESS    Jessica was seen at the behest of Arlyn Sanchez MD for epistaxis.   She has a history of allergic rhinitis.  Has been on allergy shots in the past in [place].   History of chronic nosebleeds.  History of liver tranplant.  Also has chronic kidney disease.            REVIEW OF SYSTEMS    Review of Systems as per HPI and PMHx, otherwise 10 system review system are negative.       ALLERGIES    Codeine, Benadryl [diphenhydramine], Cefaclor, Hydrocodone-acetaminophen, Oxycodone, Penicillins, and Sulfa drugs    CURRENT MEDICATIONS      Current Outpatient Medications:      acetaminophen (TYLENOL) 325 MG tablet, Take 2 tablets (650 mg) by mouth every 6 hours as needed for mild pain Or fevers. Use sparingly. Limit use to no more than 2 grams (2000 mg) in 24 hours. **Further refills must be obtained by primary care provider**, Disp: 100 tablet, Rfl: 0     albuterol (VENTOLIN HFA) 108 (90 Base) MCG/ACT inhaler, Inhale 2 puffs into the lungs every 6 hours as needed for shortness of breath / dyspnea or wheezing, Disp: 18 g, Rfl: 8     amLODIPine (NORVASC) 5 MG tablet, Take 1 tablet (5 mg) by mouth daily, Disp: 90 tablet, Rfl: 3     calcipotriene (DOVONOX) 0.005 % external solution, Apply 1 mL topically daily To the scalp, Disp: 180 mL, Rfl: 3     chlorthalidone (HYGROTON) 25 MG tablet, Take 0.5 tablets (12.5 mg) by mouth daily, Disp: 45 tablet, Rfl: 3     cholecalciferol (VITAMIN D3) 400 UNIT TABS tablet, Take 400 Units by mouth daily, Disp: , Rfl:      clobetasol (TEMOVATE) 0.05 % external ointment, Apply topically 2 times daily To areas of eczema on the lower legs, until areas resolve., Disp: 60 g, Rfl: 1     clobetasol (TEMOVATE) 0.05 % external solution, Apply topically 2 times daily To the scalp as needed for itching and flaking, on the days you don't use the clobetasol shampoo., Disp:  150 mL, Rfl: 3     clobetasol propionate (CLOBEX) 0.05 % external shampoo, Apply topically daily To dry scalp x 15 min and rinse ,when psoriasis is present., Disp: 354 mL, Rfl: 3     cyanocobalamin (VITAMIN  B-12) 1000 MCG tablet, Take 1,000 mcg by mouth daily, Disp: , Rfl:      ferrous sulfate (IRON) 325 (65 FE) MG tablet, Take 1 tablet (325 mg) by mouth 2 times daily, Disp: 100 tablet, Rfl:      folic acid (FOLVITE) 1 MG tablet, Take 1 tablet (1 mg) by mouth daily, Disp: 30 tablet, Rfl: 1     gabapentin (NEURONTIN) 100 MG capsule, Take 2 capsules (200 mg) by mouth At Bedtime, Disp: 180 capsule, Rfl: 2     hydrOXYzine (ATARAX) 25 MG tablet, Take 1-2 tablets (25-50 mg) by mouth every 6 hours as needed for itching, Disp: 120 tablet, Rfl: 6     ketoconazole (NIZORAL) 2 % external cream, Apply topically daily To buttocks with triamcinolone as needed, Disp: 60 g, Rfl: 11     levothyroxine (SYNTHROID/LEVOTHROID) 88 MCG tablet, Take 1 tablet (88 mcg) by mouth daily, Disp: 90 tablet, Rfl: 1     MAGNESIUM OXIDE PO, Take 400 mg by mouth daily, Disp: , Rfl:      melatonin 5 MG tablet, Take 1 tablet (5 mg) by mouth nightly as needed for sleep, Disp: , Rfl:      multivitamin, therapeutic (THERA-VIT) TABS tablet, Take 1 tablet by mouth every 24 hours, Disp: 30 tablet, Rfl: 11     mupirocin (BACTROBAN) 2 % external ointment, Apply topically 3 times daily for 10 days Apply a pea sized amount to inside of each nostril 3x daily with Qtip for 10 days, Disp: 22 g, Rfl: 0     mupirocin (BACTROBAN) 2 % external ointment, , Disp: , Rfl:      order for DME, Equipment being ordered: [employer] ankle brace, Disp: 1 Device, Rfl: 0     pantoprazole (PROTONIX) 40 MG EC tablet, TAKE ONE TABLET BY MOUTH EVERY MORNING BEFORE BREAKFAST, Disp: 90 tablet, Rfl: 3     predniSONE (DELTASONE) 20 MG tablet, , Disp: , Rfl:      propranolol (INDERAL) 10 MG tablet, Take 1 tablet (10 mg) by mouth 2 times daily, Disp: 180 tablet, Rfl: 1     psyllium 0.52 G  capsule, Take 2 capsules (1.04 g) by mouth daily With a full glass of water. (Patient taking differently: Take 1 capsule by mouth 3 times daily With a full glass of water.), Disp: 60 capsule, Rfl: 1     tacrolimus (GENERIC EQUIVALENT) 0.5 MG capsule, TAKE 3 CAPSULES (1.5 MG) BY MOUTH EVERY 12 HOURS, Disp: 180 capsule, Rfl: 11     traMADol (ULTRAM) 50 MG tablet, Take 1 tablet (50 mg) by mouth every 6 hours as needed for severe pain, Disp: 15 tablet, Rfl: 0     traMADol (ULTRAM) 50 MG tablet, Take 1 tablet (50 mg) by mouth every 6 hours as needed for severe pain, Disp: 30 tablet, Rfl: 0     triamcinolone (KENALOG) 0.025 % external ointment, Apply topically 2 times daily To affected areas in the axilla or gluteal cleft as needed until clear, Disp: 80 g, Rfl: 0     triamcinolone (KENALOG) 0.1 % external ointment, Apply topically 2 times daily To plaques behind the knee, Disp: 80 g, Rfl: 1     ursodiol (ACTIGALL) 300 MG capsule, TAKE ONE CAPSULE BY MOUTH TWICE A DAY, Disp: 60 capsule, Rfl: 11     valACYclovir (VALTREX) 500 MG tablet, , Disp: , Rfl:      PAST MEDICAL HISTORY    PAST MEDICAL HISTORY:   Past Medical History:   Diagnosis Date     AION (acute ischemic optic neuropathy)      Asthma      Bacterial infection 02/04/2021     Candida infection 11/11/2020     Cellulitis 09/13/2021     Cervical spinal stenosis      Chronic kidney disease      Chronic kidney disease, stage 3 (H)      Dizziness and giddiness     Created by Conversion      End stage liver disease (H)     2/2 Alcohol Abuse     End stage liver disease (H)     Alcohol-related     GERD (gastroesophageal reflux disease)      Hypertension      Liver transplant recipient (H) 08/25/2016    [facility]     Liver transplanted (H)      Migraine headache      Migraines      Osteoporosis     frax 11/1.7% 2021     PFO (patent foramen ovale) 08/08/2016    Noted on echo at [facility]     Recurrent UTI      Spinal stenosis in cervical  region      Strabismus      Unspecified asthma(493.90)     Created by Conversion        PAST SURGICAL HISTORY    PAST SURGICAL HISTORY:   Past Surgical History:   Procedure Laterality Date     APPENDECTOMY           APPENDECTOMY       BENCH LIVER N/A 2016    Procedure: BENCH LIVER;  Surgeon: Lrary Dhillon MD;  Location: [facility] OR     CATARACT IOL, RT/LT       COLONOSCOPY       COLONOSCOPY N/A 2021    Procedure: COLONOSCOPY, WITH POLYPECTOMY;  Surgeon: Arlyn Beckford MD;  Location: [facility] OR     ESOPHAGOSCOPY, GASTROSCOPY, DUODENOSCOPY (EGD), COMBINED N/A 2016    Procedure: COMBINED ESOPHAGOSCOPY, GASTROSCOPY, DUODENOSCOPY (EGD);  Surgeon: Tao Alfonso MD;  Location:  GI     ESOPHAGOSCOPY, GASTROSCOPY, DUODENOSCOPY (EGD), COMBINED N/A 10/31/2016    Procedure: COMBINED ESOPHAGOSCOPY, GASTROSCOPY, DUODENOSCOPY (EGD), BIOPSY SINGLE OR MULTIPLE;  Surgeon: Ronald Bojorquez MD;  Location:  GI     LIVER TRANSPLANTATION  2016    [facility]     RECTAL SURGERY       sphincteroplasty       TRANSPLANT LIVER RECIPIENT  DONOR N/A 2016    Procedure: TRANSPLANT LIVER RECIPIENT  DONOR;  Surgeon: Larry Dhillon MD;  Location:  OR     TUBAL LIGATION      laparoscopic     TUBAL LIGATION         FAMILY  HISTORY    FAMILY HISTORY:   Family History   Problem Relation Age of Onset     Family History Negative Other      Melanoma Mother              Heart Disease Father         CHF     Skin Cancer Sister      Cirrhosis Paternal Uncle         not alcohol related     Heart Failure Mother      Heart Failure Father      Chronic Obstructive Pulmonary Disease Father        SOCIAL HISTORY    SOCIAL HISTORY:   Social History     Tobacco Use     Smoking status: Former     Packs/day: 2.50     Years: 20.00     Pack years: 50.00     Types: Cigarettes     Quit date: 1996     Years since quittin.0     Smokeless tobacco: Never   Substance Use Topics      Alcohol use: No     Alcohol/week: 7.0 standard drinks     Types: 7 Standard drinks or equivalent per week     Comment: heavy use (750ml/2 days) up until 1 year ago; had cut down to 1 drink/day; none since 7/18/16        PHYSICAL EXAM    HEAD: Normal appearance and symmetry:  No cutaneous lesions.      NECK:  supple     EARS: normal TM, EACs    EYES:  EOMI    CN VII/XII:  intact     NOSE:     Dorsum:   straight  Septum:  midline  Mucosa:  moist        ORAL CAVITY/OROPHARYNX:     Lips:  Normal.  Tongue: normal, midline  Mucosa:   no lesions     NECK:  Trachea:  midline.              Thyroid:  normal              Adenopathy:  none        NEURO:   Alert and Oriented     GAIT AND STATION:  normal     RESPIRATORY:   Symmetry and Respiratory effort     PSYCH:  Normal mood and affect     SKIN:   warm and dry         IMPRESSION:    Encounter Diagnoses   Name Primary?     Encounter for removal of nasal packing Yes     Epistaxis      Nasal crusting           RECOMMENDATIONS:      Orders Placed This Encounter   Procedures     Adult Allergy/Asthma Referral      Orders Placed This Encounter   Medications     mupirocin (BACTROBAN) 2 % external ointment     Sig: Apply topically 3 times daily for 10 days Apply a pea sized amount to inside of each nostril 3x daily with Qtip for 10 days     Dispense:  22 g     Refill:  0     AYR nasal saline gel during winter months  Return as needed

## 2022-12-08 ENCOUNTER — OFFICE VISIT (OUTPATIENT)
Dept: DERMATOLOGY | Facility: CLINIC | Age: 67
End: 2022-12-08
Payer: COMMERCIAL

## 2022-12-08 ENCOUNTER — LAB (OUTPATIENT)
Dept: LAB | Facility: CLINIC | Age: 67
End: 2022-12-08
Payer: COMMERCIAL

## 2022-12-08 DIAGNOSIS — L40.9 PSORIASIS: Primary | ICD-10-CM

## 2022-12-08 DIAGNOSIS — E87.6 HYPOKALEMIA: ICD-10-CM

## 2022-12-08 DIAGNOSIS — E83.42 HYPOMAGNESEMIA: ICD-10-CM

## 2022-12-08 LAB
ANION GAP SERPL CALCULATED.3IONS-SCNC: 9 MMOL/L (ref 7–15)
BUN SERPL-MCNC: 23.6 MG/DL (ref 8–23)
CALCIUM SERPL-MCNC: 9.9 MG/DL (ref 8.8–10.2)
CHLORIDE SERPL-SCNC: 99 MMOL/L (ref 98–107)
CREAT SERPL-MCNC: 1.37 MG/DL (ref 0.51–0.95)
DEPRECATED HCO3 PLAS-SCNC: 30 MMOL/L (ref 22–29)
GFR SERPL CREATININE-BSD FRML MDRD: 42 ML/MIN/1.73M2
GLUCOSE SERPL-MCNC: 89 MG/DL (ref 70–99)
MAGNESIUM SERPL-MCNC: 1.8 MG/DL (ref 1.7–2.3)
POTASSIUM SERPL-SCNC: 4 MMOL/L (ref 3.4–5.3)
SODIUM SERPL-SCNC: 138 MMOL/L (ref 136–145)

## 2022-12-08 PROCEDURE — 36415 COLL VENOUS BLD VENIPUNCTURE: CPT

## 2022-12-08 PROCEDURE — 99214 OFFICE O/P EST MOD 30 MIN: CPT | Mod: GC | Performed by: DERMATOLOGY

## 2022-12-08 PROCEDURE — 80048 BASIC METABOLIC PNL TOTAL CA: CPT

## 2022-12-08 PROCEDURE — 83735 ASSAY OF MAGNESIUM: CPT

## 2022-12-08 ASSESSMENT — PAIN SCALES - GENERAL: PAINLEVEL: NO PAIN (0)

## 2022-12-08 NOTE — LETTER
12/8/2022       RE: Phan Luque  [address]  [facility] [address]     Dear Colleague,    Thank you for referring your patient, Phan Luque, to the [facility] DERMATOLOGY [facility] [place] at [facility]. Please see a copy of my visit note below.    [facility] Dermatology Note  Encounter Date: Dec 8, 2022  Office Visit     Dermatology Problem List:  1. [facility] - currently on tacrolimus   2. Flexural eczema - trimcinolone 0.1% ointment BID to popliteal fossa bilaterally  3. AK s/p cryo  4. Psoriasis with arthritis.  - Follows with rheum  - Topicals: clobetasol solution, shampoo  - Nail dystrophy, likely psoriasis- grew candida. Has been using clobetasol solution with resolution.   5. Facial asymmetry - cosmetic referral.   6. Family hx psoriasis (son)  ____________________________________________    Assessment & Plan:  #Psoriasis, chronic, active, stable  #Seborrheic Dermatitis   - Continue triamcinolone 0.1% ointment BID prn.  - Continue ketoconazole 2% cream BID prn for flare up  - Recommended Vaseline or Aquaphor for barrier protection.   - Future: consider higher potency steroid, bx if no improvement    Procedures Performed:   None    Follow-up: in 6 months for skin check    Staff and Resident: Dr. Pierce/Harish (PGY-2)    Germania Moreira MD  Plastic & Reconstructive Surgery Resident  Please see [facility] for on-call provider    I have seen and examined this patient and agree with the assessment and plan as documented in the resident's note.    Michael Pierce MD  Dermatology Attending    ____________________________________________    CC: Derm Problem (Jessica is here today for a psoriasis flare )      HPI:  Ms. Phan Luque is a(n) 67 year old female who presents today as a return patient for follow-up. Last seen in 9/2022 with Dr. Pham for acute psoriasis flare of the gluteal cleft.  Last full body skin check was 5/31/2022. She is on a yearly skin check schedule. Reports that the flare-up resolved with ketoconazole and triamcinolone. She has stopped using the daily ketoconazole, but will use for flare-ups PRN.     Patient is otherwise feeling well, without additional concerns.    Labs:  N/A    Physical Exam:  Vitals: LMP  (LMP Unknown)   SKIN: Focused examination of upper extremities, gluteal cleft was performed.  - Mild dryness/flaking, no erythema or patches noted.   - No other lesions of concern on areas examined.     Medications:  Current Outpatient Medications   Medication     acetaminophen (TYLENOL) 325 MG tablet     albuterol (VENTOLIN HFA) 108 (90 Base) MCG/ACT inhaler     amLODIPine (NORVASC) 5 MG tablet     calcipotriene (DOVONOX) 0.005 % external solution     chlorthalidone (HYGROTON) 25 MG tablet     cholecalciferol (VITAMIN D3) 400 UNIT TABS tablet     clobetasol (TEMOVATE) 0.05 % external ointment     clobetasol (TEMOVATE) 0.05 % external solution     clobetasol propionate (CLOBEX) 0.05 % external shampoo     cyanocobalamin (VITAMIN  B-12) 1000 MCG tablet     ferrous sulfate (IRON) 325 (65 FE) MG tablet     folic acid (FOLVITE) 1 MG tablet     gabapentin (NEURONTIN) 100 MG capsule     hydrOXYzine (ATARAX) 25 MG tablet     ketoconazole (NIZORAL) 2 % external cream     levothyroxine (SYNTHROID/LEVOTHROID) 88 MCG tablet     MAGNESIUM OXIDE PO     melatonin 5 MG tablet     multivitamin, therapeutic (THERA-VIT) TABS tablet     mupirocin (BACTROBAN) 2 % external ointment     mupirocin (BACTROBAN) 2 % external ointment     order for DME     pantoprazole (PROTONIX) 40 MG EC tablet     predniSONE (DELTASONE) 20 MG tablet     propranolol (INDERAL) 10 MG tablet     psyllium 0.52 G capsule     tacrolimus (GENERIC EQUIVALENT) 0.5 MG capsule     traMADol (ULTRAM) 50 MG tablet     traMADol (ULTRAM) 50 MG tablet     triamcinolone (KENALOG) 0.025 % external ointment     triamcinolone (KENALOG) 0.1  % external ointment     ursodiol (ACTIGALL) 300 MG capsule     valACYclovir (VALTREX) 500 MG tablet     No current facility-administered medications for this visit.      Past Medical/Surgical History:   Patient Active Problem List   Diagnosis     Liver transplanted (H)     Alcoholic hepatitis     Immunosuppression (H)     Alcoholic hepatitis without ascites     Enterococcus UTI     Liver transplant status (H)     Nausea & vomiting     Anemia     ACP (advance care planning)     Diarrhea     Hypothyroidism     Esophageal reflux     Recurrent major depression in partial remission (H)     Insomnia     CKD (chronic kidney disease) stage 3, GFR 30-59 ml/min (H)     Anemia in stage 3 chronic kidney disease (H)     Osteopenia     Alcohol abuse, uncomplicated     Psoriasis     Candida infection     Bacterial infection     Cellulitis     Allergic rhinitis     Disorder of bone and cartilage     Asthma     Avulsion fracture of ankle, left, closed, initial encounter     Incontinence of feces     Major depressive disorder, recurrent episode, moderate (H)     Other atopic dermatitis and related conditions     Rosacea     Symptomatic menopausal or female climacteric states     Tibial plateau fracture, left     Past Medical History:   Diagnosis Date     AION (acute ischemic optic neuropathy)      Asthma      Bacterial infection 02/04/2021     Candida infection 11/11/2020     Cellulitis 09/13/2021     Cervical spinal stenosis      Chronic kidney disease      Chronic kidney disease, stage 3 (H)      Dizziness and giddiness     Created by Conversion      End stage liver disease (H)     2/2 Alcohol Abuse     End stage liver disease (H)     Alcohol-related     GERD (gastroesophageal reflux disease)      Hypertension      Liver transplant recipient (H) 08/25/2016    [facility]     Liver transplanted (H)      Migraine headache      Migraines      Osteoporosis     frax 11/1.7% 2021     PFO (patent foramen ovale)  08/08/2016    Noted on echo at [facility]     Recurrent UTI      Spinal stenosis in cervical region      Strabismus      Unspecified asthma(493.90)     Created by Conversion        CC Referred Self, MD  No address on file on close of this encounter.

## 2022-12-08 NOTE — PROGRESS NOTES
[facility] Dermatology Note  Encounter Date: Dec 8, 2022  Office Visit     Dermatology Problem List:  1. History of liver transplant at [facility], 2016 - currently on tacrolimus   2. Flexural eczema - trimcinolone 0.1% ointment BID to popliteal fossa bilaterally  3. AK s/p cryo  4. Psoriasis with arthritis.  - Follows with rheum  - Topicals: clobetasol solution, shampoo  - Nail dystrophy, likely psoriasis- grew candida. Has been using clobetasol solution with resolution.   5. Facial asymmetry - cosmetic referral.   6. Family hx psoriasis (son)  ____________________________________________    Assessment & Plan:  #Psoriasis, chronic, active, stable  #Seborrheic Dermatitis   - Continue triamcinolone 0.1% ointment BID prn.  - Continue ketoconazole 2% cream BID prn for flare up  - Recommended Vaseline or Aquaphor for barrier protection.   - Future: consider higher potency steroid, bx if no improvement    Procedures Performed:   None    Follow-up: in 6 months for skin check    Staff and Resident: Dr. Pierce/Harish (PGY-2)    Germania Moreira MD  Plastic & Reconstructive Surgery Resident  Please see [facility] for on-call provider    I have seen and examined this patient and agree with the assessment and plan as documented in the resident's note.    Michael Pierce MD  Dermatology Attending    ____________________________________________    CC: Derm Problem (Jessica is here today for a psoriasis flare )      HPI:  Ms. Phan Luque is a(n) 67 year old female who presents today as a return patient for follow-up. Last seen in 9/2022 with Dr. Pham for acute psoriasis flare of the gluteal cleft. Last full body skin check was 5/31/2022. She is on a yearly skin check schedule. Reports that the flare-up resolved with ketoconazole and triamcinolone. She has stopped using the daily ketoconazole, but will use for flare-ups PRN.     Patient is otherwise feeling well, without additional concerns.    Labs:  N/A    Physical  Exam:  Vitals: LMP  (LMP Unknown)   SKIN: Focused examination of upper extremities, gluteal cleft was performed.  - Mild dryness/flaking, no erythema or patches noted.   - No other lesions of concern on areas examined.     Medications:  Current Outpatient Medications   Medication     acetaminophen (TYLENOL) 325 MG tablet     albuterol (VENTOLIN HFA) 108 (90 Base) MCG/ACT inhaler     amLODIPine (NORVASC) 5 MG tablet     calcipotriene (DOVONOX) 0.005 % external solution     chlorthalidone (HYGROTON) 25 MG tablet     cholecalciferol (VITAMIN D3) 400 UNIT TABS tablet     clobetasol (TEMOVATE) 0.05 % external ointment     clobetasol (TEMOVATE) 0.05 % external solution     clobetasol propionate (CLOBEX) 0.05 % external shampoo     cyanocobalamin (VITAMIN  B-12) 1000 MCG tablet     ferrous sulfate (IRON) 325 (65 FE) MG tablet     folic acid (FOLVITE) 1 MG tablet     gabapentin (NEURONTIN) 100 MG capsule     hydrOXYzine (ATARAX) 25 MG tablet     ketoconazole (NIZORAL) 2 % external cream     levothyroxine (SYNTHROID/LEVOTHROID) 88 MCG tablet     MAGNESIUM OXIDE PO     melatonin 5 MG tablet     multivitamin, therapeutic (THERA-VIT) TABS tablet     mupirocin (BACTROBAN) 2 % external ointment     mupirocin (BACTROBAN) 2 % external ointment     order for DME     pantoprazole (PROTONIX) 40 MG EC tablet     predniSONE (DELTASONE) 20 MG tablet     propranolol (INDERAL) 10 MG tablet     psyllium 0.52 G capsule     tacrolimus (GENERIC EQUIVALENT) 0.5 MG capsule     traMADol (ULTRAM) 50 MG tablet     traMADol (ULTRAM) 50 MG tablet     triamcinolone (KENALOG) 0.025 % external ointment     triamcinolone (KENALOG) 0.1 % external ointment     ursodiol (ACTIGALL) 300 MG capsule     valACYclovir (VALTREX) 500 MG tablet     No current facility-administered medications for this visit.      Past Medical/Surgical History:   Patient Active Problem List   Diagnosis     Liver transplanted (H)     Alcoholic hepatitis     Immunosuppression (H)      Alcoholic hepatitis without ascites     Enterococcus UTI     Liver transplant status (H)     Nausea & vomiting     Anemia     ACP (advance care planning)     Diarrhea     Hypothyroidism     Esophageal reflux     Recurrent major depression in partial remission (H)     Insomnia     CKD (chronic kidney disease) stage 3, GFR 30-59 ml/min (H)     Anemia in stage 3 chronic kidney disease (H)     Osteopenia     Alcohol abuse, uncomplicated     Psoriasis     Candida infection     Bacterial infection     Cellulitis     Allergic rhinitis     Disorder of bone and cartilage     Asthma     Avulsion fracture of ankle, left, closed, initial encounter     Incontinence of feces     Major depressive disorder, recurrent episode, moderate (H)     Other atopic dermatitis and related conditions     Rosacea     Symptomatic menopausal or female climacteric states     Tibial plateau fracture, left     Past Medical History:   Diagnosis Date     AION (acute ischemic optic neuropathy)      Asthma      Bacterial infection 02/04/2021     Candida infection 11/11/2020     Cellulitis 09/13/2021     Cervical spinal stenosis      Chronic kidney disease      Chronic kidney disease, stage 3 (H)      Dizziness and giddiness     Created by Conversion      End stage liver disease (H)     2/2 Alcohol Abuse     End stage liver disease (H)     Alcohol-related     GERD (gastroesophageal reflux disease)      Hypertension      Liver transplant recipient (H) 08/25/2016    [facility]     Liver transplanted (H)      Migraine headache      Migraines      Osteoporosis     frax 11/1.7% 2021     PFO (patent foramen ovale) 08/08/2016    Noted on echo at [facility]     Recurrent UTI      Spinal stenosis in cervical region      Strabismus      Unspecified asthma(493.90)     Created by Conversion        CC Referred Self, MD  No address on file on close of this encounter.

## 2022-12-08 NOTE — NURSING NOTE
Dermatology Rooming Note    Phan Luque's goals for this visit include:   Chief Complaint   Patient presents with     Derm Problem     Jessica is here today for a psoriasis flare      DOREEN Chacko

## 2022-12-22 ENCOUNTER — PATIENT OUTREACH (OUTPATIENT)
Dept: DERMATOLOGY | Facility: CLINIC | Age: 67
End: 2022-12-22

## 2022-12-22 DIAGNOSIS — L20.82 FLEXURAL ECZEMA: ICD-10-CM

## 2022-12-22 DIAGNOSIS — L30.0 NUMMULAR ECZEMA: ICD-10-CM

## 2022-12-22 DIAGNOSIS — L40.0 PSORIASIS VULGARIS: ICD-10-CM

## 2022-12-22 DIAGNOSIS — L40.9 SCALP PSORIASIS: ICD-10-CM

## 2022-12-22 NOTE — TELEPHONE ENCOUNTER
Attempted to reach patient to schedule follow up in the Dermatology Clinic.  No answer,  LM on VM to call office and [employer] message sent..    Schedule with Dr. Michael Pierce 6/2023.

## 2022-12-23 RX ORDER — CLOBETASOL PROPIONATE 0.05 G/100ML
SHAMPOO TOPICAL DAILY
Qty: 354 ML | Refills: 1 | Status: SHIPPED | OUTPATIENT
Start: 2022-12-23 | End: 2023-05-18

## 2022-12-23 RX ORDER — CLOBETASOL PROPIONATE 0.5 MG/ML
SOLUTION TOPICAL 2 TIMES DAILY
Qty: 150 ML | Refills: 1 | Status: SHIPPED | OUTPATIENT
Start: 2022-12-23 | End: 2023-04-10

## 2022-12-23 RX ORDER — TRIAMCINOLONE ACETONIDE 1 MG/G
OINTMENT TOPICAL 2 TIMES DAILY
Qty: 80 G | Refills: 1 | Status: SHIPPED | OUTPATIENT
Start: 2022-12-23 | End: 2024-01-03

## 2022-12-23 NOTE — TELEPHONE ENCOUNTER
clobetasol propionate (CLOBEX) 0.05 % external shampoo  Last Written Prescription Date:   6/7/2022  Last Fill Quantity: 354,   # refills: 3  Last Office Visit :  12/8/2022  Future Office visit:   6/22/2023  Refer to Provider for review and refills      triamcinolone (KENALOG) 0.1 % external ointment  Last Written Prescription Date:   11/24/2021  Last Fill Quantity: 80,   # refills:   Last Office Visit :  12/8/2022  Future Office visit:   6/22/2023  There is 2 different strengths of Kenalog ointment on file.    0.1% and 0.025%.   Which one would the Provider like for Pt care    clobetasol (TEMOVATE) 0.05 % external solution  Last Written Prescription Date:   11/24/2021  Last Fill Quantity: 150,   # refills: 3  Last Office Visit :  12/8/2022  Future Office visit:   6/22/2023  Refer to clinic for review    Lilibeth Juarez RN  Central Triage Red Flags/Med Refills

## 2022-12-23 NOTE — TELEPHONE ENCOUNTER
Received refill request as BRAD. Chart reviewed, refills appropriate. Attending Dr. Duque CC'd as an FYI.      Nina Mijares MD MPH  PGY4 Medicine/Dermatology Resident

## 2022-12-26 ENCOUNTER — TELEPHONE (OUTPATIENT)
Dept: TRANSPLANT | Facility: CLINIC | Age: 67
End: 2022-12-26

## 2022-12-26 RX ORDER — CLOBETASOL PROPIONATE 0.05 G/100ML
SHAMPOO TOPICAL
Qty: 354 ML | Refills: 3 | OUTPATIENT
Start: 2022-12-26

## 2022-12-26 RX ORDER — TRIAMCINOLONE ACETONIDE 1 MG/G
OINTMENT TOPICAL
Qty: 80 G | Refills: 1 | OUTPATIENT
Start: 2022-12-26

## 2022-12-26 RX ORDER — CALCIPOTRIENE 0.05 MG/ML
1 SOLUTION TOPICAL DAILY
Qty: 180 ML | Refills: 1 | Status: SHIPPED | OUTPATIENT
Start: 2022-12-26

## 2022-12-26 RX ORDER — CLOBETASOL PROPIONATE 0.5 MG/G
OINTMENT TOPICAL
Refills: 1 | OUTPATIENT
Start: 2022-12-26

## 2022-12-26 RX ORDER — URSODIOL 250 MG/1
250 TABLET, FILM COATED ORAL 2 TIMES DAILY
Qty: 60 TABLET | Refills: 11 | Status: CANCELLED | OUTPATIENT
Start: 2022-12-26

## 2022-12-26 NOTE — TELEPHONE ENCOUNTER
calcipotriene (DOVONOX) 0.005 % external solution  Last Written Prescription Date:  11/24/21  Last Fill Quantity: 180ml,   # refills: 3  Last Office Visit : 12/8/22  Future Office visit:  6/22/23    Process 2

## 2022-12-26 NOTE — TELEPHONE ENCOUNTER
Pt's insurance through [employer] [place] considers tacrolimus a  non-preferred generic , subject to a hefty 50% copay.  Their own website states that there is no therapeutic equivalent, and given that transplant patients can t just switch meds because of their formulary. Pt wondering about tier exception or nonformulary PA.    Message to Jo Ann.

## 2022-12-26 NOTE — TELEPHONE ENCOUNTER
Spoke with patient informed her that I will start on a tier exception 1/5/2023. I will let her know the outcome once I hear back, and since it will be refill too soon patient will have my direct line to call if any issues filling there after.

## 2022-12-30 ENCOUNTER — TELEPHONE (OUTPATIENT)
Dept: OPHTHALMOLOGY | Facility: CLINIC | Age: 67
End: 2022-12-30

## 2023-01-03 ENCOUNTER — VIRTUAL VISIT (OUTPATIENT)
Dept: GASTROENTEROLOGY | Facility: CLINIC | Age: 68
End: 2023-01-03
Attending: INTERNAL MEDICINE
Payer: COMMERCIAL

## 2023-01-03 VITALS — WEIGHT: 187 LBS | BODY MASS INDEX: 29.29 KG/M2

## 2023-01-03 DIAGNOSIS — Z94.4 LIVER REPLACED BY TRANSPLANT (H): Primary | ICD-10-CM

## 2023-01-03 PROCEDURE — 99214 OFFICE O/P EST MOD 30 MIN: CPT | Mod: 95 | Performed by: INTERNAL MEDICINE

## 2023-01-03 ASSESSMENT — PAIN SCALES - GENERAL: PAINLEVEL: NO PAIN (0)

## 2023-01-03 NOTE — PROGRESS NOTES
Jessica is a 67 year old who is being evaluated via a billable video visit.      How would you like to obtain your AVS? MyChart  If the video visit is dropped, the invitation should be resent by: NA  Will anyone else be joining your video visit? No    Subjective   Jessica is a 67 year old female presenting for the following health issues: S/P liver transplantation    HPI:  I had the pleasure of seeing Phan Luque for followup in the Liver Transplant Clinic at the [facility] on January 3, 2023.       Ms. Luque returns for followup now almost 6 and one half years status post liver transplantation for alcoholic hepatitis.     She is doing well at this visit.  She denies any abdominal pain.  She does have some itching, which she relates to her psoriasis and eczema.  She also has some mild psoriatic arthritis with that. She denies any fatigue.  She denies any increased abdominal girth or lower extremity edema.     She denies any fevers or chills, cough or shortness of breath.  She has had 5 doses of the COVID-19 vaccine and has had Evusheld as well.  She also has had a flu shot.     She denies any nausea or vomiting, diarrhea or constipation.  Her appetite has been good and her weight has been stable.     There have been no other new events since she was last seen.    Current Outpatient Medications   Medication     acetaminophen (TYLENOL) 325 MG tablet     albuterol (VENTOLIN HFA) 108 (90 Base) MCG/ACT inhaler     amLODIPine (NORVASC) 5 MG tablet     calcipotriene (DOVONOX) 0.005 % external solution     chlorthalidone (HYGROTON) 25 MG tablet     cholecalciferol (VITAMIN D3) 400 UNIT TABS tablet     clobetasol (TEMOVATE) 0.05 % external ointment     clobetasol (TEMOVATE) 0.05 % external solution     clobetasol propionate (CLOBEX) 0.05 % external shampoo     cyanocobalamin (VITAMIN  B-12) 1000 MCG tablet     ferrous sulfate (IRON) 325 (65 FE) MG tablet     folic acid (FOLVITE) 1 MG tablet      gabapentin (NEURONTIN) 100 MG capsule     hydrOXYzine (ATARAX) 25 MG tablet     ketoconazole (NIZORAL) 2 % external cream     levothyroxine (SYNTHROID/LEVOTHROID) 88 MCG tablet     MAGNESIUM OXIDE PO     melatonin 5 MG tablet     multivitamin, therapeutic (THERA-VIT) TABS tablet     mupirocin (BACTROBAN) 2 % external ointment     order for DME     pantoprazole (PROTONIX) 40 MG EC tablet     predniSONE (DELTASONE) 20 MG tablet     propranolol (INDERAL) 10 MG tablet     psyllium 0.52 G capsule     tacrolimus (GENERIC EQUIVALENT) 0.5 MG capsule     traMADol (ULTRAM) 50 MG tablet     traMADol (ULTRAM) 50 MG tablet     triamcinolone (KENALOG) 0.025 % external ointment     triamcinolone (KENALOG) 0.1 % external ointment     ursodiol (ACTIGALL) 300 MG capsule     valACYclovir (VALTREX) 500 MG tablet     No current facility-administered medications for this visit.     Objective    Vitals - Patient Reported  Pain Score: No Pain (0)    Physical Exam: GENERAL: Healthy, alert and no distress. EYES: Eyes grossly normal to inspection.  No discharge or erythema, or obvious scleral/conjunctival abnormalities. RESP: No audible wheeze, cough, or visible cyanosis.  No visible retractions or increased work of breathing.  SKIN: Visible skin clear. No significant rash, abnormal pigmentation or lesions. NEURO: Cranial nerves grossly intact.  Mentation and speech appropriate for age. PSYCH: Mentation appears normal, affect normal/bright, judgement and insight intact, normal speech and appearance well-groomed.    IMPRESSION/PLAN:  Ms. Luque is doing well.  Her liver tests are excellent and her kidney function is as well.  She does report her blood pressure has been under good control at home.  She is up to date with regard to vaccines including, as I mentioned, having a 5th dose of the COVID-19 vaccine, which was the bi-valent vaccine.  She is up to date with regard to cancer screening.     My plan will be to see her back in the clinic  again in 6 months' time.  I otherwise will not be making any other change to her medical regimen.     I did spend total of 30 minutes (on the date of the encounter), including 20 minutes of face-to-face clinic time including counseling.  The rest of the time was spent in documentation and review of records.     Thank you very much for allowing me to participate in the care of this patient.  If you have any questions regarding recommendations, please do not hesitate to contact me.         Hussein Banegas MD      Professor of Medicine  [employer]      Executive Medical Director, Solid Organ Transplant Program  [facility]    Video-Visit Details    Type of service:  Video Visit     Video start time:  8:54 AM    Video stop time: 9:22 AM    Originating Location (pt. Location): Home    Distant Location (provider location):  Clinic    Platform used for Video Visit: Nataliya

## 2023-01-03 NOTE — LETTER
1/3/2023         RE: Phan Luque  [address]  [facility] [address]        Dear Colleague,    Thank you for referring your patient, Phan Luque, to the [facility] [place]. Please see a copy of my visit note below.    Jessica is a 67 year old who is being evaluated via a billable video visit.      How would you like to obtain your AVS? MyChart  If the video visit is dropped, the invitation should be resent by: NA  Will anyone else be joining your video visit? No    Subjective   Jessica is a 67 year old female presenting for the following health issues: S/P liver transplantation    HPI:  I had the pleasure of seeing Phan Luque for followup in the Liver Transplant Clinic at the [facility] on January 3, 2023.       Ms. Luque returns for followup now almost 6 and one half years status post liver transplantation for alcoholic hepatitis.     She is doing well at this visit.  She denies any abdominal pain.  She does have some itching, which she relates to her psoriasis and eczema.  She also has some mild psoriatic arthritis with that. She denies any fatigue.  She denies any increased abdominal girth or lower extremity edema.     She denies any fevers or chills, cough or shortness of breath.  She has had 5 doses of the COVID-19 vaccine and has had Evusheld as well.  She also has had a flu shot.     She denies any nausea or vomiting, diarrhea or constipation.  Her appetite has been good and her weight has been stable.     There have been no other new events since she was last seen.    Current Outpatient Medications   Medication     acetaminophen (TYLENOL) 325 MG tablet     albuterol (VENTOLIN HFA) 108 (90 Base) MCG/ACT inhaler     amLODIPine (NORVASC) 5 MG tablet     calcipotriene (DOVONOX) 0.005 % external solution     chlorthalidone (HYGROTON) 25 MG tablet     cholecalciferol (VITAMIN D3) 400 UNIT TABS tablet     clobetasol (TEMOVATE) 0.05 %  external ointment     clobetasol (TEMOVATE) 0.05 % external solution     clobetasol propionate (CLOBEX) 0.05 % external shampoo     cyanocobalamin (VITAMIN  B-12) 1000 MCG tablet     ferrous sulfate (IRON) 325 (65 FE) MG tablet     folic acid (FOLVITE) 1 MG tablet     gabapentin (NEURONTIN) 100 MG capsule     hydrOXYzine (ATARAX) 25 MG tablet     ketoconazole (NIZORAL) 2 % external cream     levothyroxine (SYNTHROID/LEVOTHROID) 88 MCG tablet     MAGNESIUM OXIDE PO     melatonin 5 MG tablet     multivitamin, therapeutic (THERA-VIT) TABS tablet     mupirocin (BACTROBAN) 2 % external ointment     order for DME     pantoprazole (PROTONIX) 40 MG EC tablet     predniSONE (DELTASONE) 20 MG tablet     propranolol (INDERAL) 10 MG tablet     psyllium 0.52 G capsule     tacrolimus (GENERIC EQUIVALENT) 0.5 MG capsule     traMADol (ULTRAM) 50 MG tablet     traMADol (ULTRAM) 50 MG tablet     triamcinolone (KENALOG) 0.025 % external ointment     triamcinolone (KENALOG) 0.1 % external ointment     ursodiol (ACTIGALL) 300 MG capsule     valACYclovir (VALTREX) 500 MG tablet     No current facility-administered medications for this visit.     Objective    Vitals - Patient Reported  Pain Score: No Pain (0)    Physical Exam: GENERAL: Healthy, alert and no distress. EYES: Eyes grossly normal to inspection.  No discharge or erythema, or obvious scleral/conjunctival abnormalities. RESP: No audible wheeze, cough, or visible cyanosis.  No visible retractions or increased work of breathing.  SKIN: Visible skin clear. No significant rash, abnormal pigmentation or lesions. NEURO: Cranial nerves grossly intact.  Mentation and speech appropriate for age. PSYCH: Mentation appears normal, affect normal/bright, judgement and insight intact, normal speech and appearance well-groomed.    IMPRESSION/PLAN:  Ms. Luque is doing well.  Her liver tests are excellent and her kidney function is as well.  She does report her blood pressure has been under  good control at home.  She is up to date with regard to vaccines including, as I mentioned, having a 5th dose of the COVID-19 vaccine, which was the bi-valent vaccine.  She is up to date with regard to cancer screening.     My plan will be to see her back in the clinic again in 6 months' time.  I otherwise will not be making any other change to her medical regimen.     I did spend total of 30 minutes (on the date of the encounter), including 20 minutes of face-to-face clinic time including counseling.  The rest of the time was spent in documentation and review of records.     Thank you very much for allowing me to participate in the care of this patient.  If you have any questions regarding recommendations, please do not hesitate to contact me.         Hussein Banegas MD      Professor of Medicine  [employer] [facility] [employer]      Executive Medical Director, Solid Organ Transplant Program  [facility]

## 2023-01-25 ENCOUNTER — OFFICE VISIT (OUTPATIENT)
Dept: PODIATRY | Facility: CLINIC | Age: 68
End: 2023-01-25
Payer: COMMERCIAL

## 2023-01-25 VITALS — BODY MASS INDEX: 29.35 KG/M2 | WEIGHT: 187 LBS | HEART RATE: 83 BPM | OXYGEN SATURATION: 90 % | HEIGHT: 67 IN

## 2023-01-25 DIAGNOSIS — L60.1 TRAUMATIC ONYCHOLYSIS: ICD-10-CM

## 2023-01-25 DIAGNOSIS — B35.1 NAIL FUNGUS: Primary | ICD-10-CM

## 2023-01-25 PROCEDURE — 99203 OFFICE O/P NEW LOW 30 MIN: CPT | Performed by: PODIATRIST

## 2023-01-25 RX ORDER — CICLOPIROX 80 MG/ML
SOLUTION TOPICAL
Qty: 6.6 ML | Refills: 1 | Status: SHIPPED | OUTPATIENT
Start: 2023-01-25

## 2023-01-25 ASSESSMENT — PAIN SCALES - GENERAL: PAINLEVEL: MILD PAIN (2)

## 2023-01-25 NOTE — LETTER
1/25/2023         RE: Phan Luqeu  [address]  [facility] [address]        Dear Colleague,    Thank you for referring your patient, Phan Luque, to the [facility]. Please see a copy of my visit note below.    FOOT AND ANKLE SURGERY/PODIATRY CONSULT NOTE         ASSESSMENT:   Traumatic onycholysis left great toenail  Onychomycosis right great toenail      TREATMENT:  Remove the loosely attached toenail on the left great toe today.  Applied a sterile bandage.  The patient was informed that a new nail will regrow over the next several months.  She is to return to clinic if she notices any increased redness, swelling, drainage or bleeding.  She was given a prescription for Penlac to be applied to the fungal infection of the right great toenail.  She is to return to clinic as needed.         HPI:Phan Luque presented to the clinic today complaining of a loosely attached left great toenail.  The patient stated several days ago she injured the toe on a door at home.  She did have some mild to moderate pain.  She stated that she has very little pain at this time.  The nail is loosely attached.  She did not want to remove it herself.  She has not had any redness, swelling, drainage or bleeding.  She is able to wear shoes without discomfort.  She stated that she does have a history of fungal infection involving both great toenails.    Past Medical History:   Diagnosis Date     AION (acute ischemic optic neuropathy)      Asthma      Bacterial infection 02/04/2021     Candida infection 11/11/2020     Cellulitis 09/13/2021     Cervical spinal stenosis      Chronic kidney disease      Chronic kidney disease, stage 3 (H)      Dizziness and giddiness     Created by Conversion      End stage liver disease (H)     2/2 Alcohol Abuse     End stage liver disease (H)     Alcohol-related     GERD (gastroesophageal reflux disease)      Hypertension      Liver transplant  recipient (H) 2016    [facility]     Liver transplanted (H)      Migraine headache      Migraines      Osteoporosis     frax .7%      PFO (patent foramen ovale) 2016    Noted on echo at [facility]     Recurrent UTI      Spinal stenosis in cervical region      Strabismus      Unspecified asthma(493.90)     Created by Conversion        Social History     Socioeconomic History     Marital status:      Spouse name: Not on file     Number of children: 3     Years of education: Not on file     Highest education level: Not on file   Occupational History     Occupation:    Tobacco Use     Smoking status: Former     Packs/day: 2.50     Years: 20.00     Pack years: 50.00     Types: Cigarettes     Quit date: 1996     Years since quittin.1     Smokeless tobacco: Never   Substance and Sexual Activity     Alcohol use: No     Alcohol/week: 7.0 standard drinks     Types: 7 Standard drinks or equivalent per week     Comment: heavy use (750ml/2 days) up until 1 year ago; had cut down to 1 drink/day; none since 17     Drug use: No     Sexual activity: Not on file   Other Topics Concern     Parent/sibling w/ CABG, MI or angioplasty before 65F 55M? 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    Lives alone     Social Determinants of Health     Financial Resource Strain: Not on file   Food Insecurity: Not on file   Transportation Needs: Not on file   Physical Activity: Not on file   Stress: Not on file   Social Connections: Not on file   Intimate Partner Violence: Not At Risk     Fear of Current or Ex-Partner: No     Emotionally Abused: No     Physically Abused: No     Sexually Abused: No   Housing Stability: Not on file          Allergies   Allergen Reactions     Codeine Hives     Benadryl [Diphenhydramine] Hives     Cefaclor Hives      Hydrocodone-Acetaminophen Itching     Oxycodone Itching     Penicillins Hives     Sulfa Drugs Hives          Current Outpatient Medications:      acetaminophen (TYLENOL) 325 MG tablet, Take 2 tablets (650 mg) by mouth every 6 hours as needed for mild pain Or fevers. Use sparingly. Limit use to no more than 2 grams (2000 mg) in 24 hours. **Further refills must be obtained by primary care provider**, Disp: 100 tablet, Rfl: 0     albuterol (VENTOLIN HFA) 108 (90 Base) MCG/ACT inhaler, Inhale 2 puffs into the lungs every 6 hours as needed for shortness of breath / dyspnea or wheezing, Disp: 18 g, Rfl: 8     amLODIPine (NORVASC) 5 MG tablet, Take 1 tablet (5 mg) by mouth daily, Disp: 90 tablet, Rfl: 3     calcipotriene (DOVONOX) 0.005 % external solution, Apply 1 mL topically daily To the scalp, Disp: 180 mL, Rfl: 1     chlorthalidone (HYGROTON) 25 MG tablet, Take 0.5 tablets (12.5 mg) by mouth daily, Disp: 45 tablet, Rfl: 3     cholecalciferol (VITAMIN D3) 400 UNIT TABS tablet, Take 400 Units by mouth daily, Disp: , Rfl:      clobetasol (TEMOVATE) 0.05 % external ointment, Apply topically 2 times daily To areas of eczema on the lower legs, until areas resolve., Disp: 60 g, Rfl: 1     clobetasol (TEMOVATE) 0.05 % external solution, Apply topically 2 times daily To the scalp as needed for itching and flaking, on the days you don't use the clobetasol shampoo., Disp: 150 mL, Rfl: 1     clobetasol propionate (CLOBEX) 0.05 % external shampoo, Apply topically daily To dry scalp x 15 min and rinse ,when psoriasis is present., Disp: 354 mL, Rfl: 1     cyanocobalamin (VITAMIN  B-12) 1000 MCG tablet, Take 1,000 mcg by mouth daily, Disp: , Rfl:      ferrous sulfate (IRON) 325 (65 FE) MG tablet, Take 1 tablet (325 mg) by mouth 2 times daily, Disp: 100 tablet, Rfl:      folic acid (FOLVITE) 1 MG tablet, Take 1 tablet (1 mg) by mouth daily, Disp: 30 tablet, Rfl: 1     gabapentin (NEURONTIN) 100 MG capsule, Take 2 capsules (200 mg) by  mouth At Bedtime, Disp: 180 capsule, Rfl: 2     hydrOXYzine (ATARAX) 25 MG tablet, Take 1-2 tablets (25-50 mg) by mouth every 6 hours as needed for itching, Disp: 120 tablet, Rfl: 6     ketoconazole (NIZORAL) 2 % external cream, Apply topically daily To buttocks with triamcinolone as needed, Disp: 60 g, Rfl: 11     levothyroxine (SYNTHROID/LEVOTHROID) 88 MCG tablet, Take 1 tablet (88 mcg) by mouth daily, Disp: 90 tablet, Rfl: 1     MAGNESIUM OXIDE PO, Take 400 mg by mouth daily, Disp: , Rfl:      melatonin 5 MG tablet, Take 1 tablet (5 mg) by mouth nightly as needed for sleep, Disp: , Rfl:      multivitamin, therapeutic (THERA-VIT) TABS tablet, Take 1 tablet by mouth every 24 hours, Disp: 30 tablet, Rfl: 11     mupirocin (BACTROBAN) 2 % external ointment, , Disp: , Rfl:      order for DME, Equipment being ordered: [employer] ankle brace, Disp: 1 Device, Rfl: 0     pantoprazole (PROTONIX) 40 MG EC tablet, TAKE ONE TABLET BY MOUTH EVERY MORNING BEFORE BREAKFAST, Disp: 90 tablet, Rfl: 3     predniSONE (DELTASONE) 20 MG tablet, , Disp: , Rfl:      propranolol (INDERAL) 10 MG tablet, Take 1 tablet (10 mg) by mouth 2 times daily, Disp: 180 tablet, Rfl: 1     psyllium 0.52 G capsule, Take 2 capsules (1.04 g) by mouth daily With a full glass of water. (Patient taking differently: Take 1 capsule by mouth 3 times daily With a full glass of water.), Disp: 60 capsule, Rfl: 1     tacrolimus (GENERIC EQUIVALENT) 0.5 MG capsule, TAKE 3 CAPSULES (1.5 MG) BY MOUTH EVERY 12 HOURS, Disp: 180 capsule, Rfl: 11     traMADol (ULTRAM) 50 MG tablet, Take 1 tablet (50 mg) by mouth every 6 hours as needed for severe pain, Disp: 15 tablet, Rfl: 0     traMADol (ULTRAM) 50 MG tablet, Take 1 tablet (50 mg) by mouth every 6 hours as needed for severe pain, Disp: 30 tablet, Rfl: 0     triamcinolone (KENALOG) 0.025 % external ointment, Apply topically 2 times daily To affected areas in the axilla or gluteal cleft as needed until clear, Disp: 80 g, Rfl:  0     triamcinolone (KENALOG) 0.1 % external ointment, Apply topically 2 times daily To plaques behind the knee, Disp: 80 g, Rfl: 1     ursodiol (ACTIGALL) 300 MG capsule, TAKE ONE CAPSULE BY MOUTH TWICE A DAY, Disp: 60 capsule, Rfl: 11     valACYclovir (VALTREX) 500 MG tablet, , Disp: , Rfl:      Family History   Problem Relation Age of Onset     Family History Negative Other      Melanoma Mother              Heart Disease Father         CHF     Skin Cancer Sister      Cirrhosis Paternal Uncle         not alcohol related     Heart Failure Mother      Heart Failure Father      Chronic Obstructive Pulmonary Disease Father         Social History     Socioeconomic History     Marital status:      Spouse name: Not on file     Number of children: 3     Years of education: Not on file     Highest education level: Not on file   Occupational History     Occupation:    Tobacco Use     Smoking status: Former     Packs/day: 2.50     Years: 20.00     Pack years: 50.00     Types: Cigarettes     Quit date: 1996     Years since quittin.1     Smokeless tobacco: Never   Substance and Sexual Activity     Alcohol use: No     Alcohol/week: 7.0 standard drinks     Types: 7 Standard drinks or equivalent per week     Comment: heavy use (750ml/2 days) up until 1 year ago; had cut down to 1 drink/day; none since 17     Drug use: No     Sexual activity: Not on file   Other Topics Concern     Parent/sibling w/ CABG, MI or angioplasty before 65F 55M? 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    Lives alone     Social Determinants of Health     Financial Resource Strain: Not on file   Food Insecurity: Not on file   Transportation Needs: Not on file   Physical Activity: Not on file   Stress: Not on file   Social Connections: Not on file   Intimate Partner Violence: Not At Risk     Fear of Current or  Ex-Partner: No     Emotionally Abused: No     Physically Abused: No     Sexually Abused: No   Housing Stability: Not on file        Review of Systems - Patient denies fever, chills, rash, wound, stiffness, limping, numbness, weakness, heart burn, blood in stool, chest pain with activity, calf pain when walking, shortness of breath with activity, chronic cough, easy bleeding/bruising, swelling of ankles, excessive thirst, fatigue, depression, anxiety.  Patient admits to loosely attached left great toenail and thick discolored right great toenail..      OBJECTIVE:  Appearance: alert, well appearing, and in no distress.    LMP  (LMP Unknown)      There is no height or weight on file to calculate BMI.     General appearance: Patient is alert and fully cooperative with history & exam.  No sign of distress is noted during the visit.  Psychiatric: Affect is pleasant & appropriate.  Patient appears motivated to improve health.  Respiratory: Breathing is regular & unlabored while sitting.  HEENT: Hearing is intact to spoken word.  Speech is clear.  No gross evidence of visual impairment that would impact ambulation.    Vascular: Dorsalis pedis and posterior tibial pulses are palpable. There is pedal hair growth bilaterally.  CFT < 3 sec from anterior tibial surface to distal digits bilaterally. There is no appreciable edema noted.  Dermatologic: The left great toenail is loosened from the underlying nailbed.  The right great toenail is slightly thickened and severely discolored.  Turgor and texture are within normal limits. No coloration or temperature changes. No primary or secondary lesions noted.  Neurologic: All epicritic and proprioceptive sensations are grossly intact bilaterally.  Musculoskeletal: All active and passive ankle, subtalar, midtarsal, and 1st MPJ range of motion are grossly intact without pain or crepitus, with the exception of none. Manual muscle strength is within normal limits bilaterally. All  dorsiflexors, plantarflexors, invertors, evertors are intact bilaterally.  No tenderness present to both great toenails on palpation.  No tenderness to both great toes with range of motion. Calf is soft/non-tender without warmth/induration    Imaging:       No images are attached to the encounter or orders placed in the encounter.     No results found.   No results found.         Daniel Richards DPM  [facility] Foot [facility] Ankle Surgery/Podiatry         Again, thank you for allowing me to participate in the care of your patient.        Sincerely,        Daniel Sinha DPM

## 2023-01-25 NOTE — PROGRESS NOTES
FOOT AND ANKLE SURGERY/PODIATRY CONSULT NOTE         ASSESSMENT:   Traumatic onycholysis left great toenail  Onychomycosis right great toenail      TREATMENT:  Remove the loosely attached toenail on the left great toe today.  Applied a sterile bandage.  The patient was informed that a new nail will regrow over the next several months.  She is to return to clinic if she notices any increased redness, swelling, drainage or bleeding.  She was given a prescription for Penlac to be applied to the fungal infection of the right great toenail.  She is to return to clinic as needed.         HPI:Phan Luque presented to the clinic today complaining of a loosely attached left great toenail.  The patient stated several days ago she injured the toe on a door at home.  She did have some mild to moderate pain.  She stated that she has very little pain at this time.  The nail is loosely attached.  She did not want to remove it herself.  She has not had any redness, swelling, drainage or bleeding.  She is able to wear shoes without discomfort.  She stated that she does have a history of fungal infection involving both great toenails.    Past Medical History:   Diagnosis Date     AION (acute ischemic optic neuropathy)      Asthma      Bacterial infection 02/04/2021     Candida infection 11/11/2020     Cellulitis 09/13/2021     Cervical spinal stenosis      Chronic kidney disease      Chronic kidney disease, stage 3 (H)      Dizziness and giddiness     Created by Conversion      End stage liver disease (H)     2/2 Alcohol Abuse     End stage liver disease (H)     Alcohol-related     GERD (gastroesophageal reflux disease)      Hypertension      Liver transplant recipient (H) 08/25/2016    [facility]     Liver transplanted (H)      Migraine headache      Migraines      Osteoporosis     frax 11/1.7% 2021     PFO (patent foramen ovale) 08/08/2016    Noted on echo at [facility]     Recurrent UTI       Spinal stenosis in cervical region      Strabismus      Unspecified asthma(493.90)     Created by Conversion        Social History     Socioeconomic History     Marital status:      Spouse name: Not on file     Number of children: 3     Years of education: Not on file     Highest education level: Not on file   Occupational History     Occupation:    Tobacco Use     Smoking status: Former     Packs/day: 2.50     Years: 20.00     Pack years: 50.00     Types: Cigarettes     Quit date: 1996     Years since quittin.1     Smokeless tobacco: Never   Substance and Sexual Activity     Alcohol use: No     Alcohol/week: 7.0 standard drinks     Types: 7 Standard drinks or equivalent per week     Comment: heavy use (750ml/2 days) up until 1 year ago; had cut down to 1 drink/day; none since 17     Drug use: No     Sexual activity: Not on file   Other Topics Concern     Parent/sibling w/ CABG, MI or angioplasty before 65F 55M? 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    Lives alone     Social Determinants of Health     Financial Resource Strain: Not on file   Food Insecurity: Not on file   Transportation Needs: Not on file   Physical Activity: Not on file   Stress: Not on file   Social Connections: Not on file   Intimate Partner Violence: Not At Risk     Fear of Current or Ex-Partner: No     Emotionally Abused: No     Physically Abused: No     Sexually Abused: No   Housing Stability: Not on file          Allergies   Allergen Reactions     Codeine Hives     Benadryl [Diphenhydramine] Hives     Cefaclor Hives     Hydrocodone-Acetaminophen Itching     Oxycodone Itching     Penicillins Hives     Sulfa Drugs Hives          Current Outpatient Medications:      acetaminophen (TYLENOL) 325 MG tablet, Take 2 tablets (650 mg) by mouth every 6 hours as needed for mild pain Or fevers. Use sparingly. Limit  use to no more than 2 grams (2000 mg) in 24 hours. **Further refills must be obtained by primary care provider**, Disp: 100 tablet, Rfl: 0     albuterol (VENTOLIN HFA) 108 (90 Base) MCG/ACT inhaler, Inhale 2 puffs into the lungs every 6 hours as needed for shortness of breath / dyspnea or wheezing, Disp: 18 g, Rfl: 8     amLODIPine (NORVASC) 5 MG tablet, Take 1 tablet (5 mg) by mouth daily, Disp: 90 tablet, Rfl: 3     calcipotriene (DOVONOX) 0.005 % external solution, Apply 1 mL topically daily To the scalp, Disp: 180 mL, Rfl: 1     chlorthalidone (HYGROTON) 25 MG tablet, Take 0.5 tablets (12.5 mg) by mouth daily, Disp: 45 tablet, Rfl: 3     cholecalciferol (VITAMIN D3) 400 UNIT TABS tablet, Take 400 Units by mouth daily, Disp: , Rfl:      clobetasol (TEMOVATE) 0.05 % external ointment, Apply topically 2 times daily To areas of eczema on the lower legs, until areas resolve., Disp: 60 g, Rfl: 1     clobetasol (TEMOVATE) 0.05 % external solution, Apply topically 2 times daily To the scalp as needed for itching and flaking, on the days you don't use the clobetasol shampoo., Disp: 150 mL, Rfl: 1     clobetasol propionate (CLOBEX) 0.05 % external shampoo, Apply topically daily To dry scalp x 15 min and rinse ,when psoriasis is present., Disp: 354 mL, Rfl: 1     cyanocobalamin (VITAMIN  B-12) 1000 MCG tablet, Take 1,000 mcg by mouth daily, Disp: , Rfl:      ferrous sulfate (IRON) 325 (65 FE) MG tablet, Take 1 tablet (325 mg) by mouth 2 times daily, Disp: 100 tablet, Rfl:      folic acid (FOLVITE) 1 MG tablet, Take 1 tablet (1 mg) by mouth daily, Disp: 30 tablet, Rfl: 1     gabapentin (NEURONTIN) 100 MG capsule, Take 2 capsules (200 mg) by mouth At Bedtime, Disp: 180 capsule, Rfl: 2     hydrOXYzine (ATARAX) 25 MG tablet, Take 1-2 tablets (25-50 mg) by mouth every 6 hours as needed for itching, Disp: 120 tablet, Rfl: 6     ketoconazole (NIZORAL) 2 % external cream, Apply topically daily To buttocks with triamcinolone as  needed, Disp: 60 g, Rfl: 11     levothyroxine (SYNTHROID/LEVOTHROID) 88 MCG tablet, Take 1 tablet (88 mcg) by mouth daily, Disp: 90 tablet, Rfl: 1     MAGNESIUM OXIDE PO, Take 400 mg by mouth daily, Disp: , Rfl:      melatonin 5 MG tablet, Take 1 tablet (5 mg) by mouth nightly as needed for sleep, Disp: , Rfl:      multivitamin, therapeutic (THERA-VIT) TABS tablet, Take 1 tablet by mouth every 24 hours, Disp: 30 tablet, Rfl: 11     mupirocin (BACTROBAN) 2 % external ointment, , Disp: , Rfl:      order for DME, Equipment being ordered: Me[employer] Sharing ankle brace, Disp: 1 Device, Rfl: 0     pantoprazole (PROTONIX) 40 MG EC tablet, TAKE ONE TABLET BY MOUTH EVERY MORNING BEFORE BREAKFAST, Disp: 90 tablet, Rfl: 3     predniSONE (DELTASONE) 20 MG tablet, , Disp: , Rfl:      propranolol (INDERAL) 10 MG tablet, Take 1 tablet (10 mg) by mouth 2 times daily, Disp: 180 tablet, Rfl: 1     psyllium 0.52 G capsule, Take 2 capsules (1.04 g) by mouth daily With a full glass of water. (Patient taking differently: Take 1 capsule by mouth 3 times daily With a full glass of water.), Disp: 60 capsule, Rfl: 1     tacrolimus (GENERIC EQUIVALENT) 0.5 MG capsule, TAKE 3 CAPSULES (1.5 MG) BY MOUTH EVERY 12 HOURS, Disp: 180 capsule, Rfl: 11     traMADol (ULTRAM) 50 MG tablet, Take 1 tablet (50 mg) by mouth every 6 hours as needed for severe pain, Disp: 15 tablet, Rfl: 0     traMADol (ULTRAM) 50 MG tablet, Take 1 tablet (50 mg) by mouth every 6 hours as needed for severe pain, Disp: 30 tablet, Rfl: 0     triamcinolone (KENALOG) 0.025 % external ointment, Apply topically 2 times daily To affected areas in the axilla or gluteal cleft as needed until clear, Disp: 80 g, Rfl: 0     triamcinolone (KENALOG) 0.1 % external ointment, Apply topically 2 times daily To plaques behind the knee, Disp: 80 g, Rfl: 1     ursodiol (ACTIGALL) 300 MG capsule, TAKE ONE CAPSULE BY MOUTH TWICE A DAY, Disp: 60 capsule, Rfl: 11     valACYclovir (VALTREX) 500 MG tablet, ,  Disp: , Rfl:      Family History   Problem Relation Age of Onset     Family History Negative Other      Melanoma Mother              Heart Disease Father         CHF     Skin Cancer Sister      Cirrhosis Paternal Uncle         not alcohol related     Heart Failure Mother      Heart Failure Father      Chronic Obstructive Pulmonary Disease Father         Social History     Socioeconomic History     Marital status:      Spouse name: Not on file     Number of children: 3     Years of education: Not on file     Highest education level: Not on file   Occupational History     Occupation:    Tobacco Use     Smoking status: Former     Packs/day: 2.50     Years: 20.00     Pack years: 50.00     Types: Cigarettes     Quit date: 1996     Years since quittin.1     Smokeless tobacco: Never   Substance and Sexual Activity     Alcohol use: No     Alcohol/week: 7.0 standard drinks     Types: 7 Standard drinks or equivalent per week     Comment: heavy use (750ml/2 days) up until 1 year ago; had cut down to 1 drink/day; none since 17     Drug use: No     Sexual activity: Not on file   Other Topics Concern     Parent/sibling w/ CABG, MI or angioplasty before 65F 55M? 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    Lives alone     Social Determinants of Health     Financial Resource Strain: Not on file   Food Insecurity: Not on file   Transportation Needs: Not on file   Physical Activity: Not on file   Stress: Not on file   Social Connections: Not on file   Intimate Partner Violence: Not At Risk     Fear of Current or Ex-Partner: No     Emotionally Abused: No     Physically Abused: No     Sexually Abused: No   Housing Stability: Not on file        Review of Systems - Patient denies fever, chills, rash, wound, stiffness, limping, numbness, weakness, heart burn, blood in stool, chest pain with  activity, calf pain when walking, shortness of breath with activity, chronic cough, easy bleeding/bruising, swelling of ankles, excessive thirst, fatigue, depression, anxiety.  Patient admits to loosely attached left great toenail and thick discolored right great toenail..      OBJECTIVE:  Appearance: alert, well appearing, and in no distress.    LMP  (LMP Unknown)      There is no height or weight on file to calculate BMI.     General appearance: Patient is alert and fully cooperative with history & exam.  No sign of distress is noted during the visit.  Psychiatric: Affect is pleasant & appropriate.  Patient appears motivated to improve health.  Respiratory: Breathing is regular & unlabored while sitting.  HEENT: Hearing is intact to spoken word.  Speech is clear.  No gross evidence of visual impairment that would impact ambulation.    Vascular: Dorsalis pedis and posterior tibial pulses are palpable. There is pedal hair growth bilaterally.  CFT < 3 sec from anterior tibial surface to distal digits bilaterally. There is no appreciable edema noted.  Dermatologic: The left great toenail is loosened from the underlying nailbed.  The right great toenail is slightly thickened and severely discolored.  Turgor and texture are within normal limits. No coloration or temperature changes. No primary or secondary lesions noted.  Neurologic: All epicritic and proprioceptive sensations are grossly intact bilaterally.  Musculoskeletal: All active and passive ankle, subtalar, midtarsal, and 1st MPJ range of motion are grossly intact without pain or crepitus, with the exception of none. Manual muscle strength is within normal limits bilaterally. All dorsiflexors, plantarflexors, invertors, evertors are intact bilaterally.  No tenderness present to both great toenails on palpation.  No tenderness to both great toes with range of motion. Calf is soft/non-tender without warmth/induration    Imaging:       No images are attached to the  encounter or orders placed in the encounter.     No results found.   No results found.         Daniel Richards DPM  [facility] Foot & Ankle Surgery/Podiatry

## 2023-01-30 ENCOUNTER — LAB (OUTPATIENT)
Dept: LAB | Facility: CLINIC | Age: 68
End: 2023-01-30
Payer: COMMERCIAL

## 2023-01-30 DIAGNOSIS — Z94.4 LIVER TRANSPLANTED (H): ICD-10-CM

## 2023-01-30 DIAGNOSIS — Z79.899 ENCOUNTER FOR LONG-TERM (CURRENT) USE OF MEDICATIONS: ICD-10-CM

## 2023-01-30 LAB
ALBUMIN SERPL BCG-MCNC: 4.3 G/DL (ref 3.5–5.2)
ALP SERPL-CCNC: 45 U/L (ref 35–104)
ALT SERPL W P-5'-P-CCNC: 14 U/L (ref 10–35)
ANION GAP SERPL CALCULATED.3IONS-SCNC: 9 MMOL/L (ref 7–15)
AST SERPL W P-5'-P-CCNC: 28 U/L (ref 10–35)
BILIRUB DIRECT SERPL-MCNC: <0.2 MG/DL (ref 0–0.3)
BILIRUB SERPL-MCNC: 0.5 MG/DL
BUN SERPL-MCNC: 24.7 MG/DL (ref 8–23)
CALCIUM SERPL-MCNC: 9.2 MG/DL (ref 8.8–10.2)
CHLORIDE SERPL-SCNC: 100 MMOL/L (ref 98–107)
CREAT SERPL-MCNC: 1.17 MG/DL (ref 0.51–0.95)
DEPRECATED HCO3 PLAS-SCNC: 26 MMOL/L (ref 22–29)
ERYTHROCYTE [DISTWIDTH] IN BLOOD BY AUTOMATED COUNT: 13.1 % (ref 10–15)
GFR SERPL CREATININE-BSD FRML MDRD: 51 ML/MIN/1.73M2
GLUCOSE SERPL-MCNC: 110 MG/DL (ref 70–99)
HCT VFR BLD AUTO: 38.8 % (ref 35–47)
HGB BLD-MCNC: 13.1 G/DL (ref 11.7–15.7)
MAGNESIUM SERPL-MCNC: 1.9 MG/DL (ref 1.7–2.3)
MCH RBC QN AUTO: 30.5 PG (ref 26.5–33)
MCHC RBC AUTO-ENTMCNC: 33.8 G/DL (ref 31.5–36.5)
MCV RBC AUTO: 90 FL (ref 78–100)
PLATELET # BLD AUTO: 296 10E3/UL (ref 150–450)
POTASSIUM SERPL-SCNC: 4.2 MMOL/L (ref 3.4–5.3)
PROT SERPL-MCNC: 7.1 G/DL (ref 6.4–8.3)
RBC # BLD AUTO: 4.29 10E6/UL (ref 3.8–5.2)
SODIUM SERPL-SCNC: 135 MMOL/L (ref 136–145)
TACROLIMUS BLD-MCNC: 3.8 UG/L (ref 5–15)
TME LAST DOSE: ABNORMAL H
TME LAST DOSE: ABNORMAL H
WBC # BLD AUTO: 7.3 10E3/UL (ref 4–11)

## 2023-01-30 PROCEDURE — 80053 COMPREHEN METABOLIC PANEL: CPT

## 2023-01-30 PROCEDURE — 83735 ASSAY OF MAGNESIUM: CPT

## 2023-01-30 PROCEDURE — 80197 ASSAY OF TACROLIMUS: CPT

## 2023-01-30 PROCEDURE — 36415 COLL VENOUS BLD VENIPUNCTURE: CPT

## 2023-01-30 PROCEDURE — 82248 BILIRUBIN DIRECT: CPT

## 2023-01-30 PROCEDURE — 85027 COMPLETE CBC AUTOMATED: CPT

## 2023-02-13 ENCOUNTER — TELEPHONE (OUTPATIENT)
Dept: GASTROENTEROLOGY | Facility: CLINIC | Age: 68
End: 2023-02-13
Payer: COMMERCIAL

## 2023-03-27 ENCOUNTER — LAB (OUTPATIENT)
Dept: LAB | Facility: CLINIC | Age: 68
End: 2023-03-27
Payer: COMMERCIAL

## 2023-03-27 DIAGNOSIS — Z94.4 LIVER TRANSPLANTED (H): ICD-10-CM

## 2023-03-27 DIAGNOSIS — Z79.899 ENCOUNTER FOR LONG-TERM (CURRENT) USE OF MEDICATIONS: ICD-10-CM

## 2023-03-27 LAB
ALBUMIN MFR UR ELPH: 12.5 MG/DL (ref 1–14)
ALBUMIN SERPL BCG-MCNC: 4.2 G/DL (ref 3.5–5.2)
ALBUMIN UR-MCNC: NEGATIVE MG/DL
ALP SERPL-CCNC: 50 U/L (ref 35–104)
ALT SERPL W P-5'-P-CCNC: 12 U/L (ref 10–35)
ANION GAP SERPL CALCULATED.3IONS-SCNC: 12 MMOL/L (ref 7–15)
APPEARANCE UR: CLEAR
AST SERPL W P-5'-P-CCNC: 23 U/L (ref 10–35)
BACTERIA #/AREA URNS HPF: ABNORMAL /HPF
BILIRUB DIRECT SERPL-MCNC: <0.2 MG/DL (ref 0–0.3)
BILIRUB SERPL-MCNC: 0.5 MG/DL
BILIRUB UR QL STRIP: NEGATIVE
BUN SERPL-MCNC: 33.3 MG/DL (ref 8–23)
CALCIUM SERPL-MCNC: 9.8 MG/DL (ref 8.8–10.2)
CHLORIDE SERPL-SCNC: 101 MMOL/L (ref 98–107)
CHOLEST SERPL-MCNC: 170 MG/DL
COLOR UR AUTO: YELLOW
CREAT SERPL-MCNC: 1.27 MG/DL (ref 0.51–0.95)
CREAT UR-MCNC: 122 MG/DL
DEPRECATED HCO3 PLAS-SCNC: 25 MMOL/L (ref 22–29)
ERYTHROCYTE [DISTWIDTH] IN BLOOD BY AUTOMATED COUNT: 12.9 % (ref 10–15)
GFR SERPL CREATININE-BSD FRML MDRD: 46 ML/MIN/1.73M2
GLUCOSE SERPL-MCNC: 125 MG/DL (ref 70–99)
GLUCOSE UR STRIP-MCNC: NEGATIVE MG/DL
HCT VFR BLD AUTO: 40.8 % (ref 35–47)
HDLC SERPL-MCNC: 45 MG/DL
HGB BLD-MCNC: 13.6 G/DL (ref 11.7–15.7)
HGB UR QL STRIP: NEGATIVE
KETONES UR STRIP-MCNC: NEGATIVE MG/DL
LDLC SERPL CALC-MCNC: 97 MG/DL
LEUKOCYTE ESTERASE UR QL STRIP: ABNORMAL
MAGNESIUM SERPL-MCNC: 1.9 MG/DL (ref 1.7–2.3)
MCH RBC QN AUTO: 29.6 PG (ref 26.5–33)
MCHC RBC AUTO-ENTMCNC: 33.3 G/DL (ref 31.5–36.5)
MCV RBC AUTO: 89 FL (ref 78–100)
NITRATE UR QL: NEGATIVE
NONHDLC SERPL-MCNC: 125 MG/DL
PH UR STRIP: 6.5 [PH] (ref 5–8)
PLATELET # BLD AUTO: 326 10E3/UL (ref 150–450)
POTASSIUM SERPL-SCNC: 4.1 MMOL/L (ref 3.4–5.3)
PROT SERPL-MCNC: 7.3 G/DL (ref 6.4–8.3)
PROT/CREAT 24H UR: 0.1 MG/MG CR (ref 0–0.2)
RBC # BLD AUTO: 4.6 10E6/UL (ref 3.8–5.2)
RBC #/AREA URNS AUTO: ABNORMAL /HPF
SODIUM SERPL-SCNC: 138 MMOL/L (ref 136–145)
SP GR UR STRIP: 1.01 (ref 1–1.03)
SQUAMOUS #/AREA URNS AUTO: ABNORMAL /LPF
TACROLIMUS BLD-MCNC: 4.2 UG/L (ref 5–15)
TME LAST DOSE: ABNORMAL H
TME LAST DOSE: ABNORMAL H
TRANS CELLS #/AREA URNS HPF: ABNORMAL /HPF
TRIGL SERPL-MCNC: 138 MG/DL
UROBILINOGEN UR STRIP-ACNC: 0.2 E.U./DL
WBC # BLD AUTO: 8.1 10E3/UL (ref 4–11)
WBC #/AREA URNS AUTO: ABNORMAL /HPF

## 2023-03-27 PROCEDURE — 80061 LIPID PANEL: CPT

## 2023-03-27 PROCEDURE — 83735 ASSAY OF MAGNESIUM: CPT

## 2023-03-27 PROCEDURE — 81001 URINALYSIS AUTO W/SCOPE: CPT

## 2023-03-27 PROCEDURE — 84156 ASSAY OF PROTEIN URINE: CPT

## 2023-03-27 PROCEDURE — 36415 COLL VENOUS BLD VENIPUNCTURE: CPT

## 2023-03-27 PROCEDURE — 80197 ASSAY OF TACROLIMUS: CPT

## 2023-03-27 PROCEDURE — 80053 COMPREHEN METABOLIC PANEL: CPT

## 2023-03-27 PROCEDURE — 85027 COMPLETE CBC AUTOMATED: CPT

## 2023-03-27 PROCEDURE — 82248 BILIRUBIN DIRECT: CPT

## 2023-03-31 ENCOUNTER — OFFICE VISIT (OUTPATIENT)
Dept: ALLERGY | Facility: CLINIC | Age: 68
End: 2023-03-31
Payer: COMMERCIAL

## 2023-03-31 VITALS — WEIGHT: 190 LBS | HEIGHT: 66 IN | BODY MASS INDEX: 30.53 KG/M2 | HEART RATE: 80 BPM | OXYGEN SATURATION: 99 %

## 2023-03-31 DIAGNOSIS — J30.1 SEASONAL ALLERGIC RHINITIS DUE TO POLLEN: Primary | ICD-10-CM

## 2023-03-31 DIAGNOSIS — J30.89 ALLERGIC RHINITIS DUE TO MOLD: ICD-10-CM

## 2023-03-31 DIAGNOSIS — R04.0 EPISTAXIS: ICD-10-CM

## 2023-03-31 DIAGNOSIS — J30.81 ALLERGIC RHINITIS DUE TO DOGS: ICD-10-CM

## 2023-03-31 PROCEDURE — 95004 PERQ TESTS W/ALRGNC XTRCS: CPT | Performed by: ALLERGY & IMMUNOLOGY

## 2023-03-31 PROCEDURE — 99243 OFF/OP CNSLTJ NEW/EST LOW 30: CPT | Mod: 25 | Performed by: ALLERGY & IMMUNOLOGY

## 2023-03-31 NOTE — LETTER
3/31/2023         RE: Phan Luque  [address]  [place] [facility] [id number]        Dear Colleague,    Thank you for referring your patient, Phan Luque, to the [facility]. Please see a copy of my visit note below.          Kena Lopez is a 67 year old, presenting for the following health issues:  Allergy Consult (Nose bleeds, Dr Reyna referred for allergies)    HPI     Chief complaint:  Nose bleeds    History of present illness: This is a pleasant 67-year-old woman I was asked to see for evaluation of epistaxis by Dr. Reyna.  Patient states that she was seen by Dr. Reyna in December.  She had a profuse nosebleed that would not stop bleeding.  She had a Rhino Rocket for a few days.  She states that she was told that she should have her allergies reevaluated as this can predispose to nosebleeding.  Since she saw him that she had to see [facility] as well that she was not able to get back in with Dr. Reyna as the nosebleed returns.  She states that she then was cauterized.  She does note a longstanding history of allergies in fact was on allergy shots when she lived in [place].  She moved here in 2003.  She states allergy symptoms consist of itchy, watery eyes, itchy ears, sneezing, drainage and a runny nose.  She has a history of a liver transplant and has not taken hydroxyzine for itch since that time.  She does not take any nasal rinses or nasal sprays.  She has a history of very intermittent asthma and rarely needs an albuterol inhaler.  Denies any cough, wheeze or shortness of breath today.  She has not been allergy tested since she has been off allergy shots.    Past medical history: History of liver transplant and [place] tube placements in 2016, history of appendectomy, history of a tubal ligation and pneumonia    Social history: She has a dog, lives in apartment with central air, non-smoking environment, former smoker    Family history: Son with asthma, son  "daughter and mother with allergies      Review of Systems   Constitutional, HEENT, cardiovascular, pulmonary, GI, , musculoskeletal, neuro, skin, endocrine and psych systems are negative, except as otherwise noted.     Objective    Pulse 80   Ht 1.67 m (5' 5.75\")   Wt 86.2 kg (190 lb)   LMP  (LMP Unknown)   SpO2 99%   BMI 30.90 kg/m    Body mass index is 30.9 kg/m .  Physical Exam   Gen: Pleasant female not in acute distress  HEENT: Eyes no erythema of the bulbar or palpebral conjunctiva, no edema. Ears: TMs well visualized, no effusions. Nose: No congestion, mucosa normal. Mouth: Throat clear, no lip or tongue edema.   Cardiac: Regular rate and rhythm, no murmurs, rubs or gallops  Respiratory: Clear to auscultation bilaterally, no adventitious breath sounds  Lymph: No visible supraclavicular or cervical lymphadenopathy  Skin: No rashes or lesions  Psych: Alert and oriented times 3      At today s visit the patient/parent and I engaged in an informed consent discussion about allergy testing.  We discussed skin testing, blood testing,  and the alternative of not undergoing any testing. The patient has a preference for skin testing. We then discussed the risks and benefits of skin testing.  The patient understands skin testing risks can include, but are not limited to, urticaria, angioedema, shortness of breath, and severe anaphylaxis.  The benefits include, but are not limited, to evaluation for allergens causing symptoms.  After answering the patients/parents questions they have agreed to proceed with skin testing.       30 percutaneous test were undertaken today environmental skin test panel.  Positive histamine control with a positive test to tree pollen, penicillium, mugwort pollen and dog.  Please see scanned photograph.    Impression report and plan:  1.  Allergic rhinitis  2.  History of epistaxis    Reviewed environmental control.  Symptoms are under control currently.  Okay to continue with " hydroxyzine, however, if symptoms return could consider montelukast.  Follow-up as needed.            Again, thank you for allowing me to participate in the care of your patient.        Sincerely,        Faye MUÑOZ MD

## 2023-03-31 NOTE — PROGRESS NOTES
"      Kena Lopez is a 67 year old, presenting for the following health issues:  Allergy Consult (Nose bleeds, Dr Reyna referred for allergies)    HPI     Chief complaint:  Nose bleeds    History of present illness: This is a pleasant 67-year-old woman I was asked to see for evaluation of epistaxis by Dr. Reyna.  Patient states that she was seen by Dr. Reyna in December.  She had a profuse nosebleed that would not stop bleeding.  She had a Rhino Rocket for a few days.  She states that she was told that she should have her allergies reevaluated as this can predispose to nosebleeding.  Since she saw him that she had to see [facility] as well that she was not able to get back in with Dr. Reyna as the nosebleed returns.  She states that she then was cauterized.  She does note a longstanding history of allergies in fact was on allergy shots when she lived in [place].  She moved here in 2003.  She states allergy symptoms consist of itchy, watery eyes, itchy ears, sneezing, drainage and a runny nose.  She has a history of a liver transplant and has not taken hydroxyzine for itch since that time.  She does not take any nasal rinses or nasal sprays.  She has a history of very intermittent asthma and rarely needs an albuterol inhaler.  Denies any cough, wheeze or shortness of breath today.  She has not been allergy tested since she has been off allergy shots.    Past medical history: History of liver transplant and [place] tube placements in [place], history of appendectomy, history of a tubal ligation and pneumonia    Social history: She has a dog, lives in apartment with central air, non-smoking environment, former smoker    Family history: Son with asthma, son daughter and mother with allergies      Review of Systems   Constitutional, HEENT, cardiovascular, pulmonary, GI, , musculoskeletal, neuro, skin, endocrine and psych systems are negative, except as otherwise noted.      Objective    Pulse 80   Ht 1.67 m (5' 5.75\")  "  Wt 86.2 kg (190 lb)   LMP  (LMP Unknown)   SpO2 99%   BMI 30.90 kg/m    Body mass index is 30.9 kg/m .  Physical Exam   Gen: Pleasant female not in acute distress  HEENT: Eyes no erythema of the bulbar or palpebral conjunctiva, no edema. Ears: TMs well visualized, no effusions. Nose: No congestion, mucosa normal. Mouth: Throat clear, no lip or tongue edema.   Cardiac: Regular rate and rhythm, no murmurs, rubs or gallops  Respiratory: Clear to auscultation bilaterally, no adventitious breath sounds  Lymph: No visible supraclavicular or cervical lymphadenopathy  Skin: No rashes or lesions  Psych: Alert and oriented times 3      At today s visit the patient/parent and I engaged in an informed consent discussion about allergy testing.  We discussed skin testing, blood testing,  and the alternative of not undergoing any testing. The patient has a preference for skin testing. We then discussed the risks and benefits of skin testing.  The patient understands skin testing risks can include, but are not limited to, urticaria, angioedema, shortness of breath, and severe anaphylaxis.  The benefits include, but are not limited, to evaluation for allergens causing symptoms.  After answering the patients/parents questions they have agreed to proceed with skin testing.        30 percutaneous test were undertaken today environmental skin test panel.  Positive histamine control with a positive test to tree pollen, penicillium, mugwort pollen and dog.  Please see scanned photograph.    Impression report and plan:  1.  Allergic rhinitis  2.  History of epistaxis    Reviewed environmental control.  Symptoms are under control currently.  Okay to continue with hydroxyzine, however, if symptoms return could consider montelukast.  Follow-up as needed.

## 2023-03-31 NOTE — PATIENT INSTRUCTIONS
Spring, Fall allergies    Dog allergy    Keep dog well groomed, air purifier    Hydroxyzine    Could consider montelukast

## 2023-04-01 DIAGNOSIS — G62.9 PERIPHERAL POLYNEUROPATHY: ICD-10-CM

## 2023-04-05 RX ORDER — GABAPENTIN 100 MG/1
200 CAPSULE ORAL AT BEDTIME
Qty: 180 CAPSULE | Refills: 0 | Status: SHIPPED | OUTPATIENT
Start: 2023-04-05 | End: 2023-06-22

## 2023-04-05 NOTE — TELEPHONE ENCOUNTER
Pt was last seen in clinic on 5/25/22. Pt last had gabapentin (NEURONTIN) 100 MG capsule refilled on 3/2/22 for a 270 day supply by PCP, Per , 11/11/22 for a 90 day supply. Due for refill 2/9/23. Medication refill sent to pharmacy for a 90 day supply, reminder message for appointment sent to patient.     EDWINA WALSH RN on 4/5/2023 at 11:18 AM

## 2023-04-05 NOTE — TELEPHONE ENCOUNTER
GABAPENTIN 100MG CAPS      Last Written Prescription Date:  3-2-22  Last Fill Quantity: 180,   # refills: 2  Last Office Visit : 5-25-22  Future Office visit:  none    Routing refill request to provider for review/approval because:  Drug not on the [facility], P or [facility] refill protocol or controlled substance

## 2023-04-07 DIAGNOSIS — L40.0 PSORIASIS VULGARIS: ICD-10-CM

## 2023-04-10 RX ORDER — CLOBETASOL PROPIONATE 0.5 MG/ML
SOLUTION TOPICAL 2 TIMES DAILY
Qty: 150 ML | Refills: 0 | Status: SHIPPED | OUTPATIENT
Start: 2023-04-10 | End: 2023-04-10

## 2023-04-10 RX ORDER — CLOBETASOL PROPIONATE 0.5 MG/ML
SOLUTION TOPICAL 2 TIMES DAILY
Qty: 150 ML | Refills: 0 | Status: SHIPPED | OUTPATIENT
Start: 2023-04-10 | End: 2023-11-02

## 2023-05-13 DIAGNOSIS — F41.1 GAD (GENERALIZED ANXIETY DISORDER): ICD-10-CM

## 2023-05-13 DIAGNOSIS — R94.6 THYROID FUNCTION TEST ABNORMAL: ICD-10-CM

## 2023-05-13 DIAGNOSIS — J45.20 INTERMITTENT ASTHMA WITHOUT COMPLICATION, UNSPECIFIED ASTHMA SEVERITY: ICD-10-CM

## 2023-05-13 DIAGNOSIS — G43.809 OTHER MIGRAINE WITHOUT STATUS MIGRAINOSUS, NOT INTRACTABLE: ICD-10-CM

## 2023-05-17 RX ORDER — ALBUTEROL SULFATE 90 UG/1
2 AEROSOL, METERED RESPIRATORY (INHALATION) EVERY 6 HOURS PRN
Qty: 18 G | Refills: 2 | Status: SHIPPED | OUTPATIENT
Start: 2023-05-17 | End: 2023-07-10

## 2023-05-17 RX ORDER — LEVOTHYROXINE SODIUM 88 UG/1
88 TABLET ORAL DAILY
Qty: 90 TABLET | Refills: 0 | Status: SHIPPED | OUTPATIENT
Start: 2023-05-17 | End: 2023-08-08

## 2023-05-17 RX ORDER — PROPRANOLOL HYDROCHLORIDE 10 MG/1
10 TABLET ORAL 2 TIMES DAILY
Qty: 180 TABLET | Refills: 0 | Status: SHIPPED | OUTPATIENT
Start: 2023-05-17 | End: 2024-07-22

## 2023-05-17 NOTE — TELEPHONE ENCOUNTER
Last Clinic Visit: 5/25/2022  [facility] [place]  Scheduled for follow-up 7/10/23  90 day lala refill sent to the pharmacy -    Normal TSH on file in past 12 months        Recent Labs   Lab Test 05/26/22  0738   TSH 2.63

## 2023-05-18 DIAGNOSIS — L40.0 PSORIASIS VULGARIS: ICD-10-CM

## 2023-05-18 DIAGNOSIS — L40.9 SCALP PSORIASIS: ICD-10-CM

## 2023-05-18 RX ORDER — CLOBETASOL PROPIONATE 0.05 G/100ML
SHAMPOO TOPICAL DAILY
Qty: 354 ML | Refills: 1 | Status: SHIPPED | OUTPATIENT
Start: 2023-05-18 | End: 2023-06-22

## 2023-05-18 NOTE — TELEPHONE ENCOUNTER
12/8/2022  [facility] Michael Cohen MD  Dermatology   RTC 6 months.  Next appt 6/22/23. RF process 3.

## 2023-05-30 ENCOUNTER — LAB (OUTPATIENT)
Dept: LAB | Facility: CLINIC | Age: 68
End: 2023-05-30
Payer: COMMERCIAL

## 2023-05-30 DIAGNOSIS — Z94.4 LIVER REPLACED BY TRANSPLANT (H): ICD-10-CM

## 2023-05-30 DIAGNOSIS — Z94.4 LIVER TRANSPLANTED (H): ICD-10-CM

## 2023-05-30 DIAGNOSIS — Z79.899 ENCOUNTER FOR LONG-TERM (CURRENT) USE OF MEDICATIONS: ICD-10-CM

## 2023-05-30 LAB
ALBUMIN SERPL BCG-MCNC: 4.4 G/DL (ref 3.5–5.2)
ALP SERPL-CCNC: 49 U/L (ref 35–104)
ALT SERPL W P-5'-P-CCNC: 10 U/L (ref 10–35)
ANION GAP SERPL CALCULATED.3IONS-SCNC: 10 MMOL/L (ref 7–15)
AST SERPL W P-5'-P-CCNC: 22 U/L (ref 10–35)
BILIRUB DIRECT SERPL-MCNC: <0.2 MG/DL (ref 0–0.3)
BILIRUB SERPL-MCNC: 0.4 MG/DL
BUN SERPL-MCNC: 21.6 MG/DL (ref 8–23)
CALCIUM SERPL-MCNC: 9.5 MG/DL (ref 8.8–10.2)
CHLORIDE SERPL-SCNC: 102 MMOL/L (ref 98–107)
CHOLEST SERPL-MCNC: 152 MG/DL
CREAT SERPL-MCNC: 1.19 MG/DL (ref 0.51–0.95)
DEPRECATED HCO3 PLAS-SCNC: 25 MMOL/L (ref 22–29)
ERYTHROCYTE [DISTWIDTH] IN BLOOD BY AUTOMATED COUNT: 13.6 % (ref 10–15)
GFR SERPL CREATININE-BSD FRML MDRD: 50 ML/MIN/1.73M2
GLUCOSE SERPL-MCNC: 122 MG/DL (ref 70–99)
HCT VFR BLD AUTO: 41.2 % (ref 35–47)
HDLC SERPL-MCNC: 51 MG/DL
HGB BLD-MCNC: 13.8 G/DL (ref 11.7–15.7)
LDLC SERPL CALC-MCNC: 81 MG/DL
MAGNESIUM SERPL-MCNC: 2 MG/DL (ref 1.7–2.3)
MCH RBC QN AUTO: 30 PG (ref 26.5–33)
MCHC RBC AUTO-ENTMCNC: 33.5 G/DL (ref 31.5–36.5)
MCV RBC AUTO: 90 FL (ref 78–100)
NONHDLC SERPL-MCNC: 101 MG/DL
PLATELET # BLD AUTO: 295 10E3/UL (ref 150–450)
POTASSIUM SERPL-SCNC: 4.3 MMOL/L (ref 3.4–5.3)
PROT SERPL-MCNC: 7.3 G/DL (ref 6.4–8.3)
RBC # BLD AUTO: 4.6 10E6/UL (ref 3.8–5.2)
SODIUM SERPL-SCNC: 137 MMOL/L (ref 136–145)
TRIGL SERPL-MCNC: 100 MG/DL
WBC # BLD AUTO: 6.5 10E3/UL (ref 4–11)

## 2023-05-30 PROCEDURE — 80053 COMPREHEN METABOLIC PANEL: CPT

## 2023-05-30 PROCEDURE — 83735 ASSAY OF MAGNESIUM: CPT

## 2023-05-30 PROCEDURE — 82248 BILIRUBIN DIRECT: CPT

## 2023-05-30 PROCEDURE — 85027 COMPLETE CBC AUTOMATED: CPT

## 2023-05-30 PROCEDURE — 80061 LIPID PANEL: CPT

## 2023-05-30 PROCEDURE — 36415 COLL VENOUS BLD VENIPUNCTURE: CPT

## 2023-05-30 PROCEDURE — 80197 ASSAY OF TACROLIMUS: CPT

## 2023-05-31 LAB
TACROLIMUS BLD-MCNC: 3.8 UG/L (ref 5–15)
TME LAST DOSE: ABNORMAL H
TME LAST DOSE: ABNORMAL H

## 2023-06-17 DIAGNOSIS — K21.9 GASTROESOPHAGEAL REFLUX DISEASE WITHOUT ESOPHAGITIS: ICD-10-CM

## 2023-06-17 DIAGNOSIS — G62.9 PERIPHERAL POLYNEUROPATHY: ICD-10-CM

## 2023-06-17 DIAGNOSIS — I10 ESSENTIAL HYPERTENSION: ICD-10-CM

## 2023-06-19 DIAGNOSIS — N18.31 CHRONIC KIDNEY DISEASE, STAGE 3A (H): Primary | ICD-10-CM

## 2023-06-21 ENCOUNTER — MYC MEDICAL ADVICE (OUTPATIENT)
Dept: INTERNAL MEDICINE | Facility: CLINIC | Age: 68
End: 2023-06-21
Payer: COMMERCIAL

## 2023-06-21 DIAGNOSIS — G62.9 PERIPHERAL POLYNEUROPATHY: ICD-10-CM

## 2023-06-21 RX ORDER — PANTOPRAZOLE SODIUM 40 MG/1
40 TABLET, DELAYED RELEASE ORAL
Qty: 90 TABLET | Refills: 0 | Status: SHIPPED | OUTPATIENT
Start: 2023-06-21 | End: 2023-11-23

## 2023-06-21 RX ORDER — AMLODIPINE BESYLATE 5 MG/1
5 TABLET ORAL DAILY
Qty: 90 TABLET | Refills: 0 | Status: SHIPPED | OUTPATIENT
Start: 2023-06-21 | End: 2023-11-08

## 2023-06-21 RX ORDER — GABAPENTIN 100 MG/1
CAPSULE ORAL
Qty: 180 CAPSULE | Refills: 0 | OUTPATIENT
Start: 2023-06-21

## 2023-06-22 ENCOUNTER — OFFICE VISIT (OUTPATIENT)
Dept: DERMATOLOGY | Facility: CLINIC | Age: 68
End: 2023-06-22
Payer: COMMERCIAL

## 2023-06-22 DIAGNOSIS — L40.9 SCALP PSORIASIS: ICD-10-CM

## 2023-06-22 DIAGNOSIS — L40.0 PSORIASIS VULGARIS: ICD-10-CM

## 2023-06-22 DIAGNOSIS — L21.9 DERMATITIS, SEBORRHEIC: Primary | ICD-10-CM

## 2023-06-22 PROCEDURE — 99214 OFFICE O/P EST MOD 30 MIN: CPT | Performed by: DERMATOLOGY

## 2023-06-22 RX ORDER — CLOBETASOL PROPIONATE 0.05 G/100ML
SHAMPOO TOPICAL DAILY
Qty: 354 ML | Refills: 1 | Status: SHIPPED | OUTPATIENT
Start: 2023-06-22 | End: 2024-08-29

## 2023-06-22 RX ORDER — GABAPENTIN 100 MG/1
CAPSULE ORAL
Qty: 180 CAPSULE | Refills: 0 | OUTPATIENT
Start: 2023-06-22

## 2023-06-22 RX ORDER — GABAPENTIN 100 MG/1
200 CAPSULE ORAL AT BEDTIME
Qty: 180 CAPSULE | Refills: 0 | Status: SHIPPED | OUTPATIENT
Start: 2023-07-04 | End: 2023-09-25

## 2023-06-22 RX ORDER — KETOCONAZOLE 20 MG/ML
SHAMPOO TOPICAL DAILY PRN
Qty: 120 ML | Refills: 11 | Status: SHIPPED | OUTPATIENT
Start: 2023-06-22 | End: 2023-11-09

## 2023-06-22 ASSESSMENT — PAIN SCALES - GENERAL: PAINLEVEL: NO PAIN (0)

## 2023-06-22 NOTE — PATIENT INSTRUCTIONS
Patient Education     Checking for Skin Cancer  You can find cancer early by checking your skin each month. There are 3 kinds of skin cancer. They are melanoma, basal cell carcinoma, and squamous cell carcinoma. Doing monthly skin checks is the best way to find new marks or skin changes. Follow the instructions below for checking your skin.   The ABCDEs of checking moles for melanoma   Check your moles or growths for signs of melanoma using ABCDE:   Asymmetry: the sides of the mole or growth don t match  Border: the edges are ragged, notched, or blurred  Color: the color within the mole or growth varies  Diameter: the mole or growth is larger than 6 mm (size of a pencil eraser)  Evolving: the size, shape, or color of the mole or growth is changing (evolving is not shown in the images below)    Checking for other types of skin cancer  Basal cell carcinoma or squamous cell carcinoma have symptoms such as:     A spot or mole that looks different from all other marks on your skin  Changes in how an area feels, such as itching, tenderness, or pain  Changes in the skin's surface, such as oozing, bleeding, or scaliness  A sore that does not heal  New swelling or redness beyond the border of a mole    Who s at risk?  Anyone can get skin cancer. But you are at greater risk if you have:   Fair skin, light-colored hair, or light-colored eyes  Many moles or abnormal moles on your skin  A history of sunburns from sunlight or tanning beds  A family history of skin cancer  A history of exposure to radiation or chemicals  A weakened immune system  If you have had skin cancer in the past, you are at risk for recurring skin cancer.   How to check your skin  Do your monthly skin checkups in front of a full-length mirror. Check all parts of your body, including your:   Head (ears, face, neck, and scalp)  Torso (front, back, and sides)  Arms (tops, undersides, upper, and lower armpits)  Hands (palms, backs, and fingers, including  under the nails)  Buttocks and genitals  Legs (front, back, and sides)  Feet (tops, soles, toes, including under the nails, and between toes)  If you have a lot of moles, take digital photos of them each month. Make sure to take photos both up close and from a distance. These can help you see if any moles change over time.   Most skin changes are not cancer. But if you see any changes in your skin, call your doctor right away. Only he or she can diagnose a problem. If you have skin cancer, seeing your doctor can be the first step toward getting the treatment that could save your life.   [employer] last reviewed this educational content on 4/1/2019 2000-2020 [employer]. [address] [place] [address], [place] [address]. All rights reserved. This information is not intended as a substitute for professional medical care. Always follow your healthcare professional's instructions.       When should I call my doctor?  If you are worsening or not improving, please, contact us or seek urgent care as noted below.     Who should I call with questions (adults)?  [facility] (adult and pediatric): [phone]  [facility] (adult): [phone]  For urgent needs outside of business hours call the [facility] at [phone] and ask for the dermatology resident on call to be paged  If this is a medical emergency and you are unable to reach an ER, Call [phone]    Who should I call with questions (pediatric)?  [facility]- Pediatric Dermatology  Dr. Sandhya Perera, Dr. Dorinda Nuñez, Dr. Beata Craft, GM Bridges, Dr. Chrystal Santo, Dr. Jo Ann Araiza & Dr. Donnell Chapa  Non-urgent nurse triage line; [phone]- Tiffanie and Leydi ROGER Care Coordinatorfrancisco Steve (/Complex ) [phone]    If you need a prescription refill, please contact your pharmacy. Refills are approved or denied by our  Physicians during normal business hours, Monday through Fridays  Per office policy, refills will not be granted if you have not been seen within the past year (or sooner depending on your child's condition)    Scheduling Information:  Pediatric Appointment Scheduling and Call Center [phone]  Radiology Scheduling- [phone]  Sedation Unit Scheduling- [phone]  [place] Scheduling- General [phone]; Pediatric Dermatology [phone]  Main  Services: [phone]  English: [phone]  Beninese: [phone]  Hmong/Faroese/French: [phone]  Preadmission Nursing Department Fax Number: [phone] (Fax all pre-operative paperwork to this number)    For urgent matters arising during evenings, weekends, or holidays that cannot wait for normal business hours please call [phone] and ask for the dermatology resident on call to be paged.

## 2023-06-22 NOTE — TELEPHONE ENCOUNTER
Per  data, pt last had gabapentin (NEURONTIN) 100 MG capsule refilled for a 90 day supply on 4/5/23. Medication is due for refill on 7/4/23. Pt was last seen by provider on 5/25/22. Medication refill sent to pharmacy.     EDWINA WALSH RN on 6/22/2023 at 12:50 PM

## 2023-06-22 NOTE — LETTER
6/22/2023       RE: Phan Luque  [address]  [place] [place] [address]     Dear Colleague,    Thank you for referring your patient, Phan Luque, to the [facility] [place] at [facility]. Please see a copy of my visit note below.    [facility] Dermatology Note  Encounter Date: Jun 22, 2023  Office Visit     Dermatology Problem List:  1. [facility] - currently on tacrolimus   2. Flexural eczema - trimcinolone 0.1% ointment BID to popliteal fossa bilaterally PRN  3. AK s/p cryo  4. Psoriasis with arthritis.  - Follows with rheum  - Topicals: clobetasol solution, shampoo  - Nail dystrophy, likely psoriasis- grew candida.   5. Facial asymmetry - cosmetic referral.   6. Family hx psoriasis (son)  7. Family history of melanoma (mother)    ____________________________________________    Assessment & Plan:    #Psoriasis, chronic, active, stable  #Seborrheic Dermatitis   - Continue triamcinolone 0.1% ointment BID prn.  - Clobetasol solution PRN  - Continue clobetasol shampoo weekly  - Continue ketoconazole 2% cream BID prn for flare ups to help with itching  - Recommended Vaseline or Aquaphor for barrier protection.   - Future: consider higher potency steroid, bx if no improvement     # Multiple benign nevi  # Seborrheic keratoses  # Solar lentigines  # Cherry angiomas    # sebaceous hyperplasia, right dorsal nose  - No concerning lesions today  - Reassured of benign nature  - Monitor for ABCDEs of melanoma   - Continue sun protection - recommend SPF 30 or higher with frequent application   - Return sooner if noticing changing or symptomatic lesions    # History of organ transplant.  # Family history of melanoma, mother    - Patient aware that she is at higher risk for cutaneous malignancy given history of transplant.   - Monitor for changing or symptomatic lesions.   - Continue  frequent skin checks and photoprotection.    # NUB, central back  - Monitor for changes  - Photos taken today       Procedures Performed:   None    Follow-up: 1 year    Staff and Medical Student:     Purvi Saez, MS4  Patient seen and evaluated with attending physician.     I was present with the medical student who participated in the service and in the documentation.  I have verified the history and personally performed the physical exam and medical decision making.  I agree with the assessment and plan of care as documented in the note.      Michael Pierce MD  Dermatology Attending    ____________________________________________    CC: Psoriasis (Psoriasis follow up - patient reports that the flare up has improved.)    HPI:  Ms. Phan Luque is a(n) 67 year old female who presents today as a return patient for FBSE. Patient last seen 12/8/22 for psoriasis ad seborrheic dermatitis follow-up. Last FBSE was 5/31/22. Today, the patient reports that her psoriasis in the gluteal cleft and armpits have improved. She notes that her scalp has improved, no current plaques that she has noticed. She does note some pruritis in the vertex scalp and parietal scalp. She uses the clobetasol solution every other day and clobetasol shampoo weekly. Patient denies noticing any lesions that are itching, bleeding, growing, not healing or otherwise concerning.     Patient is otherwise feeling well, without additional skin concerns.    Labs:  Magnesium, Lipid panel, CBC , BMP  and Hepatic panel  reviewed.    Physical Exam:  Vitals: LMP  (LMP Unknown)   SKIN: Full skin, which includes the head/face, both arms, chest, back, abdomen,both legs, genitalia and/or groin buttocks, digits and/or nails, was examined.  - Right nasal dorsum with small flesh colored to yellow papule with crown blood vessels  - Dark brown macule on central back, pictured below  - There are dome shaped bright red papules on the trunk and extremities.   - There is  macular erythema of the scalp with mild flaky white perifollicular scale.   - Scattered brown macules on sun exposed areas.  - There are waxy stuck on tan to brown papules on the trunk and extremities.   - No other lesions of concern on areas examined.                 Medications:  Current Outpatient Medications   Medication    albuterol (VENTOLIN HFA) 108 (90 Base) MCG/ACT inhaler    amLODIPine (NORVASC) 5 MG tablet    calcipotriene (DOVONOX) 0.005 % external solution    chlorthalidone (HYGROTON) 25 MG tablet    cholecalciferol (VITAMIN D3) 400 UNIT TABS tablet    clobetasol (TEMOVATE) 0.05 % external ointment    clobetasol (TEMOVATE) 0.05 % external solution    clobetasol propionate (CLOBEX) 0.05 % external shampoo    cyanocobalamin (VITAMIN  B-12) 1000 MCG tablet    ferrous sulfate (IRON) 325 (65 FE) MG tablet    folic acid (FOLVITE) 1 MG tablet    [START ON 7/4/2023] gabapentin (NEURONTIN) 100 MG capsule    levothyroxine (SYNTHROID/LEVOTHROID) 88 MCG tablet    MAGNESIUM OXIDE PO    melatonin 5 MG tablet    multivitamin, therapeutic (THERA-VIT) TABS tablet    order for DME    pantoprazole (PROTONIX) 40 MG EC tablet    propranolol (INDERAL) 10 MG tablet    psyllium 0.52 G capsule    tacrolimus (GENERIC EQUIVALENT) 0.5 MG capsule    triamcinolone (KENALOG) 0.1 % external ointment    acetaminophen (TYLENOL) 325 MG tablet    ciclopirox (PENLAC) 8 % external solution    hydrOXYzine (ATARAX) 25 MG tablet    ketoconazole (NIZORAL) 2 % external cream    mupirocin (BACTROBAN) 2 % external ointment    predniSONE (DELTASONE) 20 MG tablet    traMADol (ULTRAM) 50 MG tablet    traMADol (ULTRAM) 50 MG tablet    triamcinolone (KENALOG) 0.025 % external ointment    ursodiol (ACTIGALL) 300 MG capsule    valACYclovir (VALTREX) 500 MG tablet     No current facility-administered medications for this visit.      Past Medical History:   Patient Active Problem List   Diagnosis    Liver transplanted (H)    Alcoholic hepatitis     Immunosuppression (H)    Alcoholic hepatitis without ascites    Enterococcus UTI    Liver transplant status (H)    Nausea & vomiting    Anemia    ACP (advance care planning)    Diarrhea    Hypothyroidism    Esophageal reflux    Recurrent major depression in partial remission (H)    Insomnia    CKD (chronic kidney disease) stage 3, GFR 30-59 ml/min (H)    Anemia in stage 3 chronic kidney disease (H)    Osteopenia    Alcohol abuse, uncomplicated    Psoriasis    Candida infection    Bacterial infection    Cellulitis    Allergic rhinitis    Disorder of bone and cartilage    Asthma    Avulsion fracture of ankle, left, closed, initial encounter    Incontinence of feces    Major depressive disorder, recurrent episode, moderate (H)    Other atopic dermatitis and related conditions    Rosacea    Symptomatic menopausal or female climacteric states    Tibial plateau fracture, left     Past Medical History:   Diagnosis Date    AION (acute ischemic optic neuropathy)     Asthma     Bacterial infection 02/04/2021    Candida infection 11/11/2020    Cellulitis 09/13/2021    Cervical spinal stenosis     Chronic kidney disease     Chronic kidney disease, stage 3 (H)     Dizziness and giddiness     Created by Conversion     End stage liver disease (H)     2/2 Alcohol Abuse    End stage liver disease (H)     Alcohol-related    GERD (gastroesophageal reflux disease)     Hypertension     Liver transplant recipient (H) 08/25/2016    [facility]    Liver transplanted (H)     Migraine headache     Migraines     Osteoporosis     frax 11/1.7% 2021    PFO (patent foramen ovale) 08/08/2016    Noted on echo at [facility]    Recurrent UTI     Spinal stenosis in cervical region     Strabismus     Unspecified asthma(493.90)     Created by Conversion         CHARLOTTE Sy MD  No address on file on close of this encounter.

## 2023-06-22 NOTE — PROGRESS NOTES
[facility] Dermatology Note  Encounter Date: Jun 22, 2023  Office Visit     Dermatology Problem List:  1. History of liver transplant at [facility], 2016 - currently on tacrolimus   2. Flexural eczema - trimcinolone 0.1% ointment BID to popliteal fossa bilaterally PRN  3. AK s/p cryo  4. Psoriasis with arthritis.  - Follows with rheum  - Topicals: clobetasol solution, shampoo  - Nail dystrophy, likely psoriasis- grew candida.   5. Facial asymmetry - cosmetic referral.   6. Family hx psoriasis (son)  7. Family history of melanoma (mother)    ____________________________________________    Assessment & Plan:    #Psoriasis, chronic, active, stable  #Seborrheic Dermatitis   - Continue triamcinolone 0.1% ointment BID prn.  - Clobetasol solution PRN  - Continue clobetasol shampoo weekly  - Continue ketoconazole 2% cream BID prn for flare ups to help with itching  - Recommended Vaseline or Aquaphor for barrier protection.   - Future: consider higher potency steroid, bx if no improvement     # Multiple benign nevi  # Seborrheic keratoses  # Solar lentigines  # Cherry angiomas    # sebaceous hyperplasia, right dorsal nose  - No concerning lesions today  - Reassured of benign nature  - Monitor for ABCDEs of melanoma   - Continue sun protection - recommend SPF 30 or higher with frequent application   - Return sooner if noticing changing or symptomatic lesions    # History of organ transplant.  # Family history of melanoma, mother    - Patient aware that she is at higher risk for cutaneous malignancy given history of transplant.   - Monitor for changing or symptomatic lesions.   - Continue frequent skin checks and photoprotection.    # NUB, central back  - Monitor for changes  - Photos taken today       Procedures Performed:   None    Follow-up: 1 year    Staff and Medical Student:     Purvi Saez, MS4  Patient seen and evaluated with attending physician.     I was present with the medical student who  participated in the service and in the documentation.  I have verified the history and personally performed the physical exam and medical decision making.  I agree with the assessment and plan of care as documented in the note.      Michael Pierce MD  Dermatology Attending    ____________________________________________    CC: Psoriasis (Psoriasis follow up - patient reports that the flare up has improved.)    HPI:  Ms. Phan Luque is a(n) 67 year old female who presents today as a return patient for FBSE. Patient last seen 12/8/22 for psoriasis ad seborrheic dermatitis follow-up. Last FBSE was 5/31/22. Today, the patient reports that her psoriasis in the gluteal cleft and armpits have improved. She notes that her scalp has improved, no current plaques that she has noticed. She does note some pruritis in the vertex scalp and parietal scalp. She uses the clobetasol solution every other day and clobetasol shampoo weekly. Patient denies noticing any lesions that are itching, bleeding, growing, not healing or otherwise concerning.     Patient is otherwise feeling well, without additional skin concerns.    Labs:  Magnesium, Lipid panel, CBC , BMP  and Hepatic panel  reviewed.    Physical Exam:  Vitals: LMP  (LMP Unknown)   SKIN: Full skin, which includes the head/face, both arms, chest, back, abdomen,both legs, genitalia and/or groin buttocks, digits and/or nails, was examined.  - Right nasal dorsum with small flesh colored to yellow papule with crown blood vessels  - Dark brown macule on central back, pictured below  - There are dome shaped bright red papules on the trunk and extremities.   - There is macular erythema of the scalp with mild flaky white perifollicular scale.   - Scattered brown macules on sun exposed areas.  - There are waxy stuck on tan to brown papules on the trunk and extremities.   - No other lesions of concern on areas examined.                 Medications:  Current Outpatient Medications    Medication     albuterol (VENTOLIN HFA) 108 (90 Base) MCG/ACT inhaler     amLODIPine (NORVASC) 5 MG tablet     calcipotriene (DOVONOX) 0.005 % external solution     chlorthalidone (HYGROTON) 25 MG tablet     cholecalciferol (VITAMIN D3) 400 UNIT TABS tablet     clobetasol (TEMOVATE) 0.05 % external ointment     clobetasol (TEMOVATE) 0.05 % external solution     clobetasol propionate (CLOBEX) 0.05 % external shampoo     cyanocobalamin (VITAMIN  B-12) 1000 MCG tablet     ferrous sulfate (IRON) 325 (65 FE) MG tablet     folic acid (FOLVITE) 1 MG tablet     [START ON 7/4/2023] gabapentin (NEURONTIN) 100 MG capsule     levothyroxine (SYNTHROID/LEVOTHROID) 88 MCG tablet     MAGNESIUM OXIDE PO     melatonin 5 MG tablet     multivitamin, therapeutic (THERA-VIT) TABS tablet     order for DME     pantoprazole (PROTONIX) 40 MG EC tablet     propranolol (INDERAL) 10 MG tablet     psyllium 0.52 G capsule     tacrolimus (GENERIC EQUIVALENT) 0.5 MG capsule     triamcinolone (KENALOG) 0.1 % external ointment     acetaminophen (TYLENOL) 325 MG tablet     ciclopirox (PENLAC) 8 % external solution     hydrOXYzine (ATARAX) 25 MG tablet     ketoconazole (NIZORAL) 2 % external cream     mupirocin (BACTROBAN) 2 % external ointment     predniSONE (DELTASONE) 20 MG tablet     traMADol (ULTRAM) 50 MG tablet     traMADol (ULTRAM) 50 MG tablet     triamcinolone (KENALOG) 0.025 % external ointment     ursodiol (ACTIGALL) 300 MG capsule     valACYclovir (VALTREX) 500 MG tablet     No current facility-administered medications for this visit.      Past Medical History:   Patient Active Problem List   Diagnosis     Liver transplanted (H)     Alcoholic hepatitis     Immunosuppression (H)     Alcoholic hepatitis without ascites     Enterococcus UTI     Liver transplant status (H)     Nausea & vomiting     Anemia     ACP (advance care planning)     Diarrhea     Hypothyroidism     Esophageal reflux     Recurrent major depression in partial remission  (H)     Insomnia     CKD (chronic kidney disease) stage 3, GFR 30-59 ml/min (H)     Anemia in stage 3 chronic kidney disease (H)     Osteopenia     Alcohol abuse, uncomplicated     Psoriasis     Candida infection     Bacterial infection     Cellulitis     Allergic rhinitis     Disorder of bone and cartilage     Asthma     Avulsion fracture of ankle, left, closed, initial encounter     Incontinence of feces     Major depressive disorder, recurrent episode, moderate (H)     Other atopic dermatitis and related conditions     Rosacea     Symptomatic menopausal or female climacteric states     Tibial plateau fracture, left     Past Medical History:   Diagnosis Date     AION (acute ischemic optic neuropathy)      Asthma      Bacterial infection 02/04/2021     Candida infection 11/11/2020     Cellulitis 09/13/2021     Cervical spinal stenosis      Chronic kidney disease      Chronic kidney disease, stage 3 (H)      Dizziness and giddiness     Created by Conversion      End stage liver disease (H)     2/2 Alcohol Abuse     End stage liver disease (H)     Alcohol-related     GERD (gastroesophageal reflux disease)      Hypertension      Liver transplant recipient (H) 08/25/2016    [facility]     Liver transplanted (H)      Migraine headache      Migraines      Osteoporosis     frax 11/1.7% 2021     PFO (patent foramen ovale) 08/08/2016    Noted on echo at [facility]     Recurrent UTI      Spinal stenosis in cervical region      Strabismus      Unspecified asthma(493.90)     Created by Conversion         CC Referred Self, MD  No address on file on close of this encounter.

## 2023-07-05 ENCOUNTER — VIRTUAL VISIT (OUTPATIENT)
Dept: RHEUMATOLOGY | Facility: CLINIC | Age: 68
End: 2023-07-05
Attending: INTERNAL MEDICINE
Payer: COMMERCIAL

## 2023-07-05 VITALS — HEIGHT: 67 IN | BODY MASS INDEX: 30.04 KG/M2 | WEIGHT: 191.4 LBS

## 2023-07-05 DIAGNOSIS — G56.03 BILATERAL CARPAL TUNNEL SYNDROME: ICD-10-CM

## 2023-07-05 DIAGNOSIS — L40.9 SCALP PSORIASIS: ICD-10-CM

## 2023-07-05 DIAGNOSIS — L40.50 PSORIASIS WITH ARTHROPATHY (H): Primary | ICD-10-CM

## 2023-07-05 PROCEDURE — 99214 OFFICE O/P EST MOD 30 MIN: CPT | Mod: VID | Performed by: INTERNAL MEDICINE

## 2023-07-05 RX ORDER — FLUOCINOLONE ACETONIDE 0.11 MG/ML
OIL TOPICAL 2 TIMES DAILY
Qty: 118.28 ML | Refills: 11 | Status: SHIPPED | OUTPATIENT
Start: 2023-07-05 | End: 2024-08-29

## 2023-07-05 ASSESSMENT — ENCOUNTER SYMPTOMS
MYALGIAS: 1
MUSCLE WEAKNESS: 0
MUSCLE CRAMPS: 0
NECK PAIN: 1
BACK PAIN: 1
STIFFNESS: 0
JOINT SWELLING: 0
ARTHRALGIAS: 0

## 2023-07-05 ASSESSMENT — PAIN SCALES - GENERAL: PAINLEVEL: NO PAIN (0)

## 2023-07-05 NOTE — NURSING NOTE
Patient reviewed medications and allergies in Mychart during e-check in and said everything looked correct.        Is the patient currently in the state of [place]? YES    Visit mode:VIDEO    If the visit is dropped, the patient can be reconnected by: VIDEO VISIT: Text to cell phone: [phone]    Will anyone else be joining the visit? NO      How would you like to obtain your AVS? MyChart    Are changes needed to the allergy or medication list? NO    Reason for visit: LAURA Red, VF

## 2023-07-05 NOTE — PROGRESS NOTES
Rheumatology Follow-up  Video visit  Name: Phan Luque  MRN [id number]   Date of service: July 5, 2023   Date of last visit: 10/14/22         Reason for follow-up:  psoriatic arthritis   Requesting physician: Demetria Barger             Assessment & Plan:   67 year old female with hx of liver transplant in 2016 on tacrolimus who was referred to rheumatology in January 2022 by dermatology for evaluation and treatment of psoriatic arthritis in the context of one episode of left then right hand distal digit redness, swelling, tenderness, pain of multiple fingers. These symptoms did resolve and have not returned. No current dactylitis and she denied symptoms consistent with this at the time of her initial consultation and again today. No history of inflammatory eye disease though follows with Dr Ortega here for NARINDER. She does have nail involvement and inverse psoriasis both of which increase the likelihood for inflammatory arthritis associated with psoriasis. Psoriasis currently well controled without any active lesions managed with topicals only. At the time of her visit in April 2022, we discussed that her nail involvement and inverse psoriasis both increase the risk of inflammatory arthritis associated with psoriasis. 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 We discussed that with this isolated episode, complete lack of symptoms before/since and good control of her cutaneous psoriasis that a reasonable approach at time of initial consultation would be to monitor closely for return of inflammatory arthritis symptoms. Should they return, we could consider therapy to reduce the frequency and severity of attacks with steroid sparing therapy. She agreed with this conservative approach.  We did obtain hand films in January 2022 which not show any evidence of  erosions/george. She presents today for follow-up as planned. Was last seen 9 months ago.     #psoriatic arthritis: saw Dr Pierce of dermatology for cutaneous manifestations which are overall well controlled with topical therapies. She requests refill of fluocinonide acetonide today, which I have filled. She had a single day of bilateral achilles tendon pain/inflammatory sounding symptoms which resolved with a single day of tramadol (tylenol not helpful and unable to take NSAIDs). No other signs/symptoms to suggest active/progressive inflammatory arthritis/tenosynovitis since her last visit. Her MSK symptoms overall have been quiet and no indication to start systemic immunosuppression at this time  -Continue close monitoring of MSK symptoms. Should recurrent/frequent flares occur, will let me know and I will see her sooner, otherwise will plan to follow-up in 1 year from now  -Fluocinonide acetonide oil refilled    #Likely carpal tunnel syndrome by history  -will pursue EMG as next step of bilateral upper extremities. Once dx confirmed, will refer to ortho hand for additional recommendations on treatment. Jessica will send me a [employer] message once the EMG is completed so I can review and get back to her with the results.   -EMG ordered    Bry Alex MD  Rheumatology    I spent a total of 36 minutes on the date of service on chart review, patient encounter, , documentation.      Subjective:   July 5, 2023  -has some itchiness of scalp. No flaking.   -had episode of achilles tendonitis, woke up right sided pain in the posterior upper ankle over the achilles. did not do anything day before to cause the symptoms. Took some tramadol which was helpful. Also affected the left side. Resolved by the following day.   -No joint pain in her hands or DIPs. No red/hot/swollen joints. Able to make a full fist upon waking in the AM.   -no fevers/chills/night sweats. No new/progressive unintentional weight loss  -no  other rashes/skin lesions other than her known psoriatic lesions that are being closely followed by Dr Pierce of dermatology and being addressed and controlled with topical therapy  -Having some trouble with her right hand, in the center of the right hand. Thinks possible CTS. Right hand 2nd digit MCP. Pain. Does have some numbness/tingling, but not a whole lot. Wakes her at night, falls asleep. Will improve with adjustment in position. Has been progressive.   -no symptoms consistent with dactylitis  14 point ROS collected and negative if not documented above.     Interval history October 14, 2022  -gluteal cleft intertrigo flare late august. Saw Dr Marinelli and treated with topicals with prompt resolution  -Fractured her toe during move out of her house  -No interval flare of peripheral inflammatory arthritis consistent with prior psoriatic arthritis.   -no interval red/hot/swollen joints.  -no new rashes other than above. No new/progressive fevers/chills/night sweats.  -Symptoms of greater trochanteric bursitis which were severe and acute at the time of her last visit completely resolved without physical therapy or other intervention.  Has not returned since that time.    14 point review of systems collected and negative if not documented above.      Interval History 7/15/22  Was sitting working at her desk and developed acute onset right lateral hip pain. During the course of that day, her pain progressed. Then worsened in the middle of the night. Then the next day, she woke up and the pain had suddenly resolved. Is still stiff however. Her 3rd digit DIP is sore. No swelling/redness. No skin changes/peeling as was previously a predominant feature. This all started about 2 weeks ago. Cannot take NSAIDs with kidney function. Tylenol not very helpful. She is still able to get up and work out each morning. Her pain is on the side of her hip. When she lies on her side it is painful. When acutely painful, had radiation  up her back on the right side, not distally. No dactylitis. No rash. No fevers/chills/night sweats. No interval infections. 14 point review of systems collected and negative if not documented above.    Interval history 4/7/2022  Jessica has felt well since her last visit with me in January.  She has not had return of her pain, swelling/redness/warmth of her DIP joints.  No other red hot swollen joints elsewhere.  No morning stiffness or pain outside of her chronic baseline pain.  No fevers chills night sweats.  Weight has been stable.  No interval infections.  Her psoriasis remains well controlled with topicals though there was a switch in coverage for her scalp psoriasis topical and so she was tried on an alcohol-based topical which was very irritating and painful to use.  No development of symptoms consistent with dactylitis.  No development of symptoms consistent with laboratory eye disease.  No oral ulcers.  No symptoms consistent with inflammatory bowel disease.  Her review systems otherwise negative.    HPI from initial consultation on 1/6/2022  Initially developed scalp flaking/hair loss which started roughly one year ago. Did also have axillary and gluteal cleft and nails involved. Initially left hand 2nd digit then 3rd digit. Then the right hand involved. Then on Sept 13th developed left hand second digit then 3rd digit redness/swelling/pain of distal aspect of her fingers. She was treated with 10 days of clindamycin though due to continued pain/redness of predominantly the 3rd digit she went to the walk-in clinic at [facility] and was given clindamycin again for what was presumed cellulitis that had not resolved with the initial course.  During this time had left hand 2nd/3rd digit throbbing pain/swelling. Also experienced peeling of the skin of the distal aspects of her digits. Her pain then resolved. Since that time, she reports 0/10 pain at rest which does not increase with use. Not much different with use.  Has radiculopathy in neck/c spine involving the left upper extremity/clavical. No history consistent with dactylitis. 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 No history of inflammatory eye disease. Has intermittent ulcers in her mouth, a few weeks ago was most recent. Has been years prior to this since her last episode. Denies rectal or genital ulcers. Tried using clobetasol ointment on nails which she thought ws some helpful, though lasted for a few weeks. No symptoms of low back pain/stiffness which is worse in the AM and improves with use/exercise/stretching. No fevers/chills. No night sweats. Has intentionally lost some weight, no unintentional weight loss. Son with scalp involvement and recurrent ulcers. At this time she feels that her skin is currently under control with topical therapy without active lesions but knows that with stopping topicals that her psoriatic lesions return quickly.  She denies any other episodes of joint pain/redness/swelling/stiffness. Has baseline chronic knee pain worse with use/at the end of the day. Currently using clobetasol solution, clobetasol shampoo once per week, ointment, 2 strengths triamcinalone. No symptoms consistent with raynauds. No other rash. No photosensitive skin changes/ symptoms. ROS otherwise negative.    Past Medical History  Past Medical History:   Diagnosis Date     AION (acute ischemic optic neuropathy)      Asthma      Bacterial infection 02/04/2021     Candida infection 11/11/2020     Cellulitis 09/13/2021     Cervical spinal stenosis      Chronic kidney disease      Chronic kidney disease, stage 3 (H)      Dizziness and giddiness     Created by Conversion      End stage liver disease (H)     2/2 Alcohol Abuse     End stage liver disease (H)     Alcohol-related     GERD (gastroesophageal reflux disease)      Hypertension      Liver transplant recipient (H) 08/25/2016    [facility]     Liver transplanted (H)       Migraine headache      Migraines      Osteoporosis     frax 11/1.7%      PFO (patent foramen ovale) 2016    Noted on echo at [facility]     Recurrent UTI      Spinal stenosis in cervical region      Strabismus      Unspecified asthma(493.90)     Created by Conversion      Past Surgical History  Past Surgical History:   Procedure Laterality Date     APPENDECTOMY           APPENDECTOMY       BENCH LIVER N/A 2016    Procedure: BENCH LIVER;  Surgeon: Larry Dhillon MD;  Location: [facility] OR     CATARACT IOL, RT/LT       COLONOSCOPY       COLONOSCOPY N/A 2021    Procedure: COLONOSCOPY, WITH POLYPECTOMY;  Surgeon: Arlyn Beckford MD;  Location: [facility] OR     ESOPHAGOSCOPY, GASTROSCOPY, DUODENOSCOPY (EGD), COMBINED N/A 2016    Procedure: COMBINED ESOPHAGOSCOPY, GASTROSCOPY, DUODENOSCOPY (EGD);  Surgeon: Tao Alfonso MD;  [facility]     ESOPHAGOSCOPY, GASTROSCOPY, DUODENOSCOPY (EGD), COMBINED N/A 10/31/2016    Procedure: COMBINED ESOPHAGOSCOPY, GASTROSCOPY, DUODENOSCOPY (EGD), BIOPSY SINGLE OR MULTIPLE;  Surgeon: Ronald Bojorquez MD;  Location: [facility]     LIVER TRANSPLANTATION  2016    [facility]     RECTAL SURGERY       sphincteroplasty       TRANSPLANT LIVER RECIPIENT  DONOR N/A 2016    Procedure: TRANSPLANT LIVER RECIPIENT  DONOR;  Surgeon: Larry Dhillon MD;  Location: [facility] OR     TUBAL LIGATION      laparoscopic     TUBAL LIGATION         Medications  Current Outpatient Medications   Medication     acetaminophen (TYLENOL) 325 MG tablet     albuterol (VENTOLIN HFA) 108 (90 Base) MCG/ACT inhaler     amLODIPine (NORVASC) 5 MG tablet     calcipotriene (DOVONOX) 0.005 % external solution     chlorthalidone (HYGROTON) 25 MG tablet     cholecalciferol (VITAMIN D3) 400 UNIT TABS tablet     ciclopirox (PENLAC) 8 % external solution     clobetasol (TEMOVATE) 0.05 % external ointment     clobetasol (TEMOVATE) 0.05 % external  solution     clobetasol propionate (CLOBEX) 0.05 % external shampoo     cyanocobalamin (VITAMIN  B-12) 1000 MCG tablet     ferrous sulfate (IRON) 325 (65 FE) MG tablet     folic acid (FOLVITE) 1 MG tablet     gabapentin (NEURONTIN) 100 MG capsule     hydrOXYzine (ATARAX) 25 MG tablet     ketoconazole (NIZORAL) 2 % external cream     ketoconazole (NIZORAL) 2 % external shampoo     levothyroxine (SYNTHROID/LEVOTHROID) 88 MCG tablet     MAGNESIUM OXIDE PO     melatonin 5 MG tablet     multivitamin, therapeutic (THERA-VIT) TABS tablet     mupirocin (BACTROBAN) 2 % external ointment     order for DME     pantoprazole (PROTONIX) 40 MG EC tablet     predniSONE (DELTASONE) 20 MG tablet     propranolol (INDERAL) 10 MG tablet     psyllium 0.52 G capsule     tacrolimus (GENERIC EQUIVALENT) 0.5 MG capsule     traMADol (ULTRAM) 50 MG tablet     traMADol (ULTRAM) 50 MG tablet     triamcinolone (KENALOG) 0.025 % external ointment     triamcinolone (KENALOG) 0.1 % external ointment     ursodiol (ACTIGALL) 300 MG capsule     valACYclovir (VALTREX) 500 MG tablet     No current facility-administered medications for this visit.       Allergies   Allergies   Allergen Reactions     Codeine Hives     Benadryl [Diphenhydramine] Hives     Cefaclor Hives     Hydrocodone-Acetaminophen Itching     Oxycodone Itching     Penicillins Hives     Sulfa Antibiotics Hives     Family History: Psoriasis in her son. No other family history of autoimmune diseases.    Social History: Works for [employer]. 3 kids. Dec 13th 1996 quit smoking. Quit ETOH prior to transplant. No drug use hx.      Objective:    Physical exam:  Sitting up unassisted NAD, pleasant and interactive as always  Sclera appear clear  Breathing comfortably, no use of accessory muscles, no audible wheeze, no cough  Fluid movement of bilateral shoulders, elbows, wrists, MCPs, PIPs, DIPs, throughout the encounter. Walking in her home during the encounter. Holding her dog, aide,  during the encounter.     WBC   Date Value Ref Range Status   05/17/2021 6.2 4.0 - 11.0 10e9/L Final     WBC Count   Date Value Ref Range Status   05/30/2023 6.5 4.0 - 11.0 10e3/uL Final     Hemoglobin   Date Value Ref Range Status   05/30/2023 13.8 11.7 - 15.7 g/dL Final   05/17/2021 13.3 11.7 - 15.7 g/dL Final     Platelet Count   Date Value Ref Range Status   05/30/2023 295 150 - 450 10e3/uL Final   05/17/2021 269 150 - 450 10e9/L Final     Creatinine   Date Value Ref Range Status   05/30/2023 1.19 (H) 0.51 - 0.95 mg/dL Final   05/17/2021 1.26 (H) 0.52 - 1.04 mg/dL Final     Lab Results   Component Value Date    ALKPHOS 61 05/26/2022    ALKPHOS 50 05/17/2021     AST   Date Value Ref Range Status   05/30/2023 22 10 - 35 U/L Final   05/17/2021 18 0 - 45 U/L Final     Lab Results   Component Value Date    ALT 10 05/26/2022    ALT 19 05/17/2021     Erythrocyte Sedimentation Rate   Date Value Ref Range Status   04/08/2022 39 (H) 0 - 20 mm/hr Final     CRP Inflammation   Date Value Ref Range Status   12/15/2020 <2.9 0.0 - 8.0 mg/L Final     CRP   Date Value Ref Range Status   04/08/2022 <0.1 0.0-<0.8 mg/dL Final     UA RESULTS:  Recent Labs   Lab Test 03/14/22  0754 03/25/21  0736   COLOR Yellow Yellow   APPEARANCE Clear Clear   URINEGLC Negative Negative   URINEBILI Negative Negative   URINEKETONE Negative Negative   SG 1.010 1.013   UBLD Negative Negative   URINEPH 5.5 7.0   PROTEIN Negative Negative   UROBILINOGEN 0.2  --    NITRITE Negative Negative   LEUKEST Negative Negative   RBCU  --  2   WBCU  --  <1      DNA (ds) Antibody   Date Value Ref Range Status   01/06/2022 1.2 <10.0 IU/mL Final     Comment:     Negative     RNP Antibody IgG   Date Value Ref Range Status   01/06/2022 Negative Negative Final     Imaging:  MRI left knee without contrast 7/25/2018     IMPRESSION:  CONCLUSION:  1.  Area of acute microtrabecular fracture in the anterior portion of the lateral tibial plateau.  2.  Moderately advanced  chondromalacia patella.  3.  Mild degenerative changes in the medial and lateral compartments.  4.  Small curvilinear subacute hematoma along the lateral and anterolateral aspects of the knee and proximal calf.    7/21/2018  X-ray left knee  XR KNEE LEFT 1 OR 2 VWS  7/21/2018 10:14 PM     INDICATION: Knee pain  COMPARISON: None.     FINDINGS: Mild tricompartment osteoarthritis.. No fracture or dislocation. Trace joint effusion.    MR LUMBAR SPINE W/O CONTRAST 5/15/2018 4:00 PM     Provided History: ; Lumbar radicular pain; Acute left ankle pain     ICD-10: Lumbar radicular pain; Acute left ankle pain     Comparison: Lumbar spine radiographs 5/8/2018.     Technique: Sagittal T1-weighted, sagittal STIR, 3D volumetric axial  and sagittal reconstructed T2-weighted images of the lumbar spine were  obtained without intravenous contrast.      Findings: There are 5 lumbar-type vertebrae convex left curvature of  the lumbar spine with apex at L3. The tip of the conus medullaris is  at L1. Normal lumbar alignment. Mild loss of intervertebral disc  height at L3-4. Schmorl's nodes in the opposing endplates at T12-L1.  Normal marrow signal.     On a level by level basis:     T12-L1: No spinal canal or neuroforaminal stenosis.     L1-2: No spinal canal or neuroforaminal stenosis.     L2-3: Bilateral facet hypertrophy. No spinal canal or neural foraminal  stenosis.     L3-4: Bilateral facet hypertrophy and ligamentum flavum thickening.  Mild spinal canal narrowing. No neural foraminal stenosis. Mild  bilateral facet joint effusions.     L4-5: Bilateral facet hypertrophy. Mild bilateral facet joint  effusions. No spinal canal or neural foraminal stenosis.     L5-S1: Bilateral facet hypertrophy. Prominent epidural fat in the  spinal canal. No spinal canal stenosis. Mild left neural foraminal  narrowing. No right foraminal stenosis.     Mild atrophy of the paraspinous musculature in the partially  visualized gluteal muscles.                                                                     Impression: Lumbar spondylosis with mild spinal canal narrowing at  L3-4. Mild neural foraminal narrowing on the left at L5-S1.

## 2023-07-05 NOTE — PROGRESS NOTES
Virtual Visit Details    Type of service:  Video Visit   Joined the call at 7/5/2023, 7:49:59?am.  Left the call at 7/5/2023, 8:12:04?am.  You were on the call for 22 minutes 5 seconds .  Originating Location (pt. Location): home    Distant Location (provider location):  on site  Platform used for Video Visit: breanna Alex MD  Rheumatology

## 2023-07-05 NOTE — LETTER
7/5/2023       RE: Phan Luque  [address]  [facility] [address]     Dear Colleague,    Thank you for referring your patient, Phan Luque, to the [facility] RHEUMATOLOGY CLINIC [place] at [facility]. Please see a copy of my visit note below.    Rheumatology Follow-up  Video visit  Name: Phan Luque  MRN [id number]   Date of service: July 5, 2023   Date of last visit: 10/14/22         Reason for follow-up:  psoriatic arthritis   Requesting physician: Demetria Barger             Assessment & Plan:   67 year old female with hx of liver transplant in 2016 on tacrolimus who was referred to rheumatology in January 2022 by dermatology for evaluation and treatment of psoriatic arthritis in the context of one episode of left then right hand distal digit redness, swelling, tenderness, pain of multiple fingers. These symptoms did resolve and have not returned. No current dactylitis and she denied symptoms consistent with this at the time of her initial consultation and again today. No history of inflammatory eye disease though follows with Dr Ortega here for AION. She does have nail involvement and inverse psoriasis both of which increase the likelihood for inflammatory arthritis associated with psoriasis. Psoriasis currently well controled without any active lesions managed with topicals only. At the time of her visit in April 2022, we discussed that her nail involvement and inverse psoriasis both increase the risk of inflammatory arthritis associated with psoriasis. 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 We discussed that with this isolated episode, complete lack of symptoms before/since and good control of her cutaneous psoriasis that a reasonable approach at time of initial  consultation would be to monitor closely for return of inflammatory arthritis symptoms. Should they return, we could consider therapy to reduce the frequency and severity of attacks with steroid sparing therapy. She agreed with this conservative approach.  We did obtain hand films in January 2022 which not show any evidence of erosions/george. She presents today for follow-up as planned. Was last seen 9 months ago.     #psoriatic arthritis: saw Dr Pierce of dermatology for cutaneous manifestations which are overall well controlled with topical therapies. She requests refill of fluocinonide acetonide today, which I have filled. She had a single day of bilateral achilles tendon pain/inflammatory sounding symptoms which resolved with a single day of tramadol (tylenol not helpful and unable to take NSAIDs). No other signs/symptoms to suggest active/progressive inflammatory arthritis/tenosynovitis since her last visit. Her MSK symptoms overall have been quiet and no indication to start systemic immunosuppression at this time  -Continue close monitoring of MSK symptoms. Should recurrent/frequent flares occur, will let me know and I will see her sooner, otherwise will plan to follow-up in 1 year from now  -Fluocinonide acetonide oil refilled    #Likely carpal tunnel syndrome by history  -will pursue EMG as next step of bilateral upper extremities. Once dx confirmed, will refer to ortho hand for additional recommendations on treatment. Jessica will send me a [email or web address] message once the EMG is completed so I can review and get back to her with the results.   -EMG ordered    Bry Alex MD  Rheumatology    I spent a total of 36 minutes on the date of service on chart review, patient encounter, , documentation.      Subjective:   July 5, 2023  -has some itchiness of scalp. No flaking.   -had episode of achilles tendonitis, woke up right sided pain in the posterior upper ankle over the achilles. did not do anything day  before to cause the symptoms. Took some tramadol which was helpful. Also affected the left side. Resolved by the following day.   -No joint pain in her hands or DIPs. No red/hot/swollen joints. Able to make a full fist upon waking in the AM.   -no fevers/chills/night sweats. No new/progressive unintentional weight loss  -no other rashes/skin lesions other than her known psoriatic lesions that are being closely followed by Dr Pierce of dermatology and being addressed and controlled with topical therapy  -Having some trouble with her right hand, in the center of the right hand. Thinks possible CTS. Right hand 2nd digit MCP. Pain. Does have some numbness/tingling, but not a whole lot. Wakes her at night, falls asleep. Will improve with adjustment in position. Has been progressive.   -no symptoms consistent with dactylitis  14 point ROS collected and negative if not documented above.     Interval history October 14, 2022  -gluteal cleft intertrigo flare late august. Saw Dr Marinelli and treated with topicals with prompt resolution  -Fractured her toe during move out of her house  -No interval flare of peripheral inflammatory arthritis consistent with prior psoriatic arthritis.   -no interval red/hot/swollen joints.  -no new rashes other than above. No new/progressive fevers/chills/night sweats.  -Symptoms of greater trochanteric bursitis which were severe and acute at the time of her last visit completely resolved without physical therapy or other intervention.  Has not returned since that time.    14 point review of systems collected and negative if not documented above.      Interval History 7/15/22  Was sitting working at her desk and developed acute onset right lateral hip pain. During the course of that day, her pain progressed. Then worsened in the middle of the night. Then the next day, she woke up and the pain had suddenly resolved. Is still stiff however. Her 3rd digit DIP is sore. No swelling/redness. No skin  changes/peeling as was previously a predominant feature. This all started about 2 weeks ago. Cannot take NSAIDs with kidney function. Tylenol not very helpful. She is still able to get up and work out each morning. Her pain is on the side of her hip. When she lies on her side it is painful. When acutely painful, had radiation up her back on the right side, not distally. No dactylitis. No rash. No fevers/chills/night sweats. No interval infections. 14 point review of systems collected and negative if not documented above.    Interval history 4/7/2022  Jessica has felt well since her last visit with me in January.  She has not had return of her pain, swelling/redness/warmth of her DIP joints.  No other red hot swollen joints elsewhere.  No morning stiffness or pain outside of her chronic baseline pain.  No fevers chills night sweats.  Weight has been stable.  No interval infections.  Her psoriasis remains well controlled with topicals though there was a switch in coverage for her scalp psoriasis topical and so she was tried on an alcohol-based topical which was very irritating and painful to use.  No development of symptoms consistent with dactylitis.  No development of symptoms consistent with laboratory eye disease.  No oral ulcers.  No symptoms consistent with inflammatory bowel disease.  Her review systems otherwise negative.    HPI from initial consultation on 1/6/2022  Initially developed scalp flaking/hair loss which started roughly one year ago. Did also have axillary and gluteal cleft and nails involved. Initially left hand 2nd digit then 3rd digit. Then the right hand involved. Then on Sept 13th developed left hand second digit then 3rd digit redness/swelling/pain of distal aspect of her fingers. She was treated with 10 days of clindamycin though due to continued pain/redness of predominantly the 3rd digit she went to the walk-in clinic at [facility] and was given clindamycin again for what was presumed cellulitis  that had not resolved with the initial course.  During this time had left hand 2nd/3rd digit throbbing pain/swelling. Also experienced peeling of the skin of the distal aspects of her digits. Her pain then resolved. Since that time, she reports 0/10 pain at rest which does not increase with use. Not much different with use. Has radiculopathy in neck/c spine involving the left upper extremity/clavical. No history consistent with dactylitis. 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 No history of inflammatory eye disease. Has intermittent ulcers in her mouth, a few weeks ago was most recent. Has been years prior to this since her last episode. Denies rectal or genital ulcers. Tried using clobetasol ointment on nails which she thought ws some helpful, though lasted for a few weeks. No symptoms of low back pain/stiffness which is worse in the AM and improves with use/exercise/stretching. No fevers/chills. No night sweats. Has intentionally lost some weight, no unintentional weight loss. Son with scalp involvement and recurrent ulcers. At this time she feels that her skin is currently under control with topical therapy without active lesions but knows that with stopping topicals that her psoriatic lesions return quickly.  She denies any other episodes of joint pain/redness/swelling/stiffness. Has baseline chronic knee pain worse with use/at the end of the day. Currently using clobetasol solution, clobetasol shampoo once per week, ointment, 2 strengths triamcinalone. No symptoms consistent with raynauds. No other rash. No photosensitive skin changes/ symptoms. ROS otherwise negative.    Past Medical History  Past Medical History:   Diagnosis Date    AION (acute ischemic optic neuropathy)     Asthma     Bacterial infection 02/04/2021    Candida infection 11/11/2020    Cellulitis 09/13/2021    Cervical spinal stenosis     Chronic kidney disease     Chronic kidney disease, stage 3 (H)     Dizziness  and giddiness     Created by Conversion     End stage liver disease (H)     2/2 Alcohol Abuse    End stage liver disease (H)     Alcohol-related    GERD (gastroesophageal reflux disease)     Hypertension     Liver transplant recipient (H) 2016    [facility]    Liver transplanted (H)     Migraine headache     Migraines     Osteoporosis     frax 11/1.7%     PFO (patent foramen ovale) 2016    Noted on echo at [facility]    Recurrent UTI     Spinal stenosis in cervical region     Strabismus     Unspecified asthma(493.90)     Created by Conversion      Past Surgical History  Past Surgical History:   Procedure Laterality Date    APPENDECTOMY          APPENDECTOMY      BENCH LIVER N/A 2016    Procedure: BENCH LIVER;  Surgeon: Larry Dhillon MD;  Location: [facility] OR    CATARACT IOL, RT/LT      COLONOSCOPY      COLONOSCOPY N/A 2021    Procedure: COLONOSCOPY, WITH POLYPECTOMY;  Surgeon: Arlyn Beckford MD;  Location: [facility] OR    ESOPHAGOSCOPY, GASTROSCOPY, DUODENOSCOPY (EGD), COMBINED N/A 2016    Procedure: COMBINED ESOPHAGOSCOPY, GASTROSCOPY, DUODENOSCOPY (EGD);  Surgeon: Tao Alfonso MD;  Location: [facility]    ESOPHAGOSCOPY, GASTROSCOPY, DUODENOSCOPY (EGD), COMBINED N/A 10/31/2016    Procedure: COMBINED ESOPHAGOSCOPY, GASTROSCOPY, DUODENOSCOPY (EGD), BIOPSY SINGLE OR MULTIPLE;  Surgeon: Ronald Bojorquez MD;  Location:  GI    LIVER TRANSPLANTATION  2016    [facility]    RECTAL SURGERY      sphincteroplasty      TRANSPLANT LIVER RECIPIENT  DONOR N/A 2016    Procedure: TRANSPLANT LIVER RECIPIENT  DONOR;  Surgeon: Larry Dhillon MD;  Location:  OR    TUBAL LIGATION      laparoscopic    TUBAL LIGATION         Medications  Current Outpatient Medications   Medication    acetaminophen (TYLENOL) 325 MG tablet    albuterol (VENTOLIN HFA) 108 (90 Base) MCG/ACT inhaler    amLODIPine (NORVASC) 5 MG tablet     calcipotriene (DOVONOX) 0.005 % external solution    chlorthalidone (HYGROTON) 25 MG tablet    cholecalciferol (VITAMIN D3) 400 UNIT TABS tablet    ciclopirox (PENLAC) 8 % external solution    clobetasol (TEMOVATE) 0.05 % external ointment    clobetasol (TEMOVATE) 0.05 % external solution    clobetasol propionate (CLOBEX) 0.05 % external shampoo    cyanocobalamin (VITAMIN  B-12) 1000 MCG tablet    ferrous sulfate (IRON) 325 (65 FE) MG tablet    folic acid (FOLVITE) 1 MG tablet    gabapentin (NEURONTIN) 100 MG capsule    hydrOXYzine (ATARAX) 25 MG tablet    ketoconazole (NIZORAL) 2 % external cream    ketoconazole (NIZORAL) 2 % external shampoo    levothyroxine (SYNTHROID/LEVOTHROID) 88 MCG tablet    MAGNESIUM OXIDE PO    melatonin 5 MG tablet    multivitamin, therapeutic (THERA-VIT) TABS tablet    mupirocin (BACTROBAN) 2 % external ointment    order for DME    pantoprazole (PROTONIX) 40 MG EC tablet    predniSONE (DELTASONE) 20 MG tablet    propranolol (INDERAL) 10 MG tablet    psyllium 0.52 G capsule    tacrolimus (GENERIC EQUIVALENT) 0.5 MG capsule    traMADol (ULTRAM) 50 MG tablet    traMADol (ULTRAM) 50 MG tablet    triamcinolone (KENALOG) 0.025 % external ointment    triamcinolone (KENALOG) 0.1 % external ointment    ursodiol (ACTIGALL) 300 MG capsule    valACYclovir (VALTREX) 500 MG tablet     No current facility-administered medications for this visit.       Allergies   Allergies   Allergen Reactions    Codeine Hives    Benadryl [Diphenhydramine] Hives    Cefaclor Hives    Hydrocodone-Acetaminophen Itching    Oxycodone Itching    Penicillins Hives    Sulfa Antibiotics Hives     Family History: Psoriasis in her son. No other family history of autoimmune diseases.    Social History: Works for [employer]. 3 kids. Dec 13th 1996 quit smoking. Quit ETOH prior to transplant. No drug use hx.      Objective:    Physical exam:  Sitting up unassisted NAD, pleasant and interactive as always  Sclera appear  clear  Breathing comfortably, no use of accessory muscles, no audible wheeze, no cough  Fluid movement of bilateral shoulders, elbows, wrists, MCPs, PIPs, DIPs, throughout the encounter. Walking in her home during the encounter. Holding her dog, aide, during the encounter.     WBC   Date Value Ref Range Status   05/17/2021 6.2 4.0 - 11.0 10e9/L Final     WBC Count   Date Value Ref Range Status   05/30/2023 6.5 4.0 - 11.0 10e3/uL Final     Hemoglobin   Date Value Ref Range Status   05/30/2023 13.8 11.7 - 15.7 g/dL Final   05/17/2021 13.3 11.7 - 15.7 g/dL Final     Platelet Count   Date Value Ref Range Status   05/30/2023 295 150 - 450 10e3/uL Final   05/17/2021 269 150 - 450 10e9/L Final     Creatinine   Date Value Ref Range Status   05/30/2023 1.19 (H) 0.51 - 0.95 mg/dL Final   05/17/2021 1.26 (H) 0.52 - 1.04 mg/dL Final     Lab Results   Component Value Date    ALKPHOS 61 05/26/2022    ALKPHOS 50 05/17/2021     AST   Date Value Ref Range Status   05/30/2023 22 10 - 35 U/L Final   05/17/2021 18 0 - 45 U/L Final     Lab Results   Component Value Date    ALT 10 05/26/2022    ALT 19 05/17/2021     Erythrocyte Sedimentation Rate   Date Value Ref Range Status   04/08/2022 39 (H) 0 - 20 mm/hr Final     CRP Inflammation   Date Value Ref Range Status   12/15/2020 <2.9 0.0 - 8.0 mg/L Final     CRP   Date Value Ref Range Status   04/08/2022 <0.1 0.0-<0.8 mg/dL Final     UA RESULTS:  Recent Labs   Lab Test 03/14/22  0754 03/25/21  0736   COLOR Yellow Yellow   APPEARANCE Clear Clear   URINEGLC Negative Negative   URINEBILI Negative Negative   URINEKETONE Negative Negative   SG 1.010 1.013   UBLD Negative Negative   URINEPH 5.5 7.0   PROTEIN Negative Negative   UROBILINOGEN 0.2  --    NITRITE Negative Negative   LEUKEST Negative Negative   RBCU  --  2   WBCU  --  <1      DNA (ds) Antibody   Date Value Ref Range Status   01/06/2022 1.2 <10.0 IU/mL Final     Comment:     Negative     RNP Antibody IgG   Date Value Ref Range  Status   01/06/2022 Negative Negative Final     Imaging:  MRI left knee without contrast 7/25/2018     IMPRESSION:  CONCLUSION:  1.  Area of acute microtrabecular fracture in the anterior portion of the lateral tibial plateau.  2.  Moderately advanced chondromalacia patella.  3.  Mild degenerative changes in the medial and lateral compartments.  4.  Small curvilinear subacute hematoma along the lateral and anterolateral aspects of the knee and proximal calf.    7/21/2018  X-ray left knee  XR KNEE LEFT 1 OR 2 VWS  7/21/2018 10:14 PM     INDICATION: Knee pain  COMPARISON: None.     FINDINGS: Mild tricompartment osteoarthritis.. No fracture or dislocation. Trace joint effusion.    MR LUMBAR SPINE W/O CONTRAST 5/15/2018 4:00 PM     Provided History: ; Lumbar radicular pain; Acute left ankle pain     ICD-10: Lumbar radicular pain; Acute left ankle pain     Comparison: Lumbar spine radiographs 5/8/2018.     Technique: Sagittal T1-weighted, sagittal STIR, 3D volumetric axial  and sagittal reconstructed T2-weighted images of the lumbar spine were  obtained without intravenous contrast.      Findings: There are 5 lumbar-type vertebrae convex left curvature of  the lumbar spine with apex at L3. The tip of the conus medullaris is  at L1. Normal lumbar alignment. Mild loss of intervertebral disc  height at L3-4. Schmorl's nodes in the opposing endplates at T12-L1.  Normal marrow signal.     On a level by level basis:     T12-L1: No spinal canal or neuroforaminal stenosis.     L1-2: No spinal canal or neuroforaminal stenosis.     L2-3: Bilateral facet hypertrophy. No spinal canal or neural foraminal  stenosis.     L3-4: Bilateral facet hypertrophy and ligamentum flavum thickening.  Mild spinal canal narrowing. No neural foraminal stenosis. Mild  bilateral facet joint effusions.     L4-5: Bilateral facet hypertrophy. Mild bilateral facet joint  effusions. No spinal canal or neural foraminal stenosis.     L5-S1: Bilateral facet  hypertrophy. Prominent epidural fat in the  spinal canal. No spinal canal stenosis. Mild left neural foraminal  narrowing. No right foraminal stenosis.     Mild atrophy of the paraspinous musculature in the partially  visualized gluteal muscles.                                                                    Impression: Lumbar spondylosis with mild spinal canal narrowing at  L3-4. Mild neural foraminal narrowing on the left at L5-S1.          Virtual Visit Details    Type of service:  Video Visit   Joined the call at 7/5/2023, 7:49:59?am.  Left the call at 7/5/2023, 8:12:04?am.  You were on the call for 22 minutes 5 seconds .  Originating Location (pt. Location): home    Distant Location (provider location):  on site  Platform used for Video Visit: breanna Alex MD  Rheumatology

## 2023-07-06 ENCOUNTER — VIRTUAL VISIT (OUTPATIENT)
Dept: GASTROENTEROLOGY | Facility: CLINIC | Age: 68
End: 2023-07-06
Attending: INTERNAL MEDICINE
Payer: COMMERCIAL

## 2023-07-06 ENCOUNTER — TELEPHONE (OUTPATIENT)
Dept: RHEUMATOLOGY | Facility: CLINIC | Age: 68
End: 2023-07-06

## 2023-07-06 VITALS — SYSTOLIC BLOOD PRESSURE: 112 MMHG | WEIGHT: 190 LBS | BODY MASS INDEX: 30.21 KG/M2 | DIASTOLIC BLOOD PRESSURE: 76 MMHG

## 2023-07-06 DIAGNOSIS — Z94.4 LIVER REPLACED BY TRANSPLANT (H): Primary | ICD-10-CM

## 2023-07-06 DIAGNOSIS — Z94.4 LIVER TRANSPLANTED (H): ICD-10-CM

## 2023-07-06 PROCEDURE — 99214 OFFICE O/P EST MOD 30 MIN: CPT | Mod: VID | Performed by: INTERNAL MEDICINE

## 2023-07-06 RX ORDER — TACROLIMUS 0.5 MG/1
1.5 CAPSULE ORAL EVERY 12 HOURS
Qty: 540 CAPSULE | Refills: 3 | Status: SHIPPED | OUTPATIENT
Start: 2023-07-06 | End: 2024-05-16

## 2023-07-06 ASSESSMENT — PAIN SCALES - GENERAL: PAINLEVEL: NO PAIN (0)

## 2023-07-06 NOTE — LETTER
7/6/2023         RE: Phan Luque  [address]  [facility] [address]        Dear Colleague,    Thank you for referring your patient, Pahn Luqeu, to the [facility] HEPATOLOGY CLINIC [place]. Please see a copy of my visit note below.    Jessica is a 67 year old who is being evaluated via a billable video visit.      How would you like to obtain your AVS? MyChart  If the video visit is dropped, the invitation should be resent by: NA  Will anyone else be joining your video visit? NA    Subjective   Jessica is a 67 year old, presenting for the following health issues: S/P liver transplantation    HPI: I had the pleasure of seeing Phan Luque for followup in the Liver Transplant Clinic at the [facility] on July 6, 2023.       Ms. Luque returns for followup now almost 7 years status post liver transplantation for alcoholic hepatitis.     She is doing well at this visit.  She denies any abdominal pain.  She does have some itching, which she relates to her psoriasis and eczema.  She also has some mild psoriatic arthritis with that. She denies any fatigue.  She denies any increased abdominal girth or lower extremity edema.     She denies any fevers or chills, cough or shortness of breath.  She has had 5 doses of the COVID-19 vaccine and had Evusheld as well.       She denies any nausea or vomiting, diarrhea or constipation.  Her appetite has been good and her weight has been stable.     There have been no other new events since she was last seen.     Current Outpatient Medications   Medication    tacrolimus (GENERIC EQUIVALENT) 0.5 MG capsule    acetaminophen (TYLENOL) 325 MG tablet    albuterol (VENTOLIN HFA) 108 (90 Base) MCG/ACT inhaler    amLODIPine (NORVASC) 5 MG tablet    calcipotriene (DOVONOX) 0.005 % external solution    chlorthalidone (HYGROTON) 25 MG tablet    cholecalciferol (VITAMIN D3) 400 UNIT TABS tablet    ciclopirox (PENLAC) 8 % external solution     clobetasol (TEMOVATE) 0.05 % external ointment    clobetasol (TEMOVATE) 0.05 % external solution    clobetasol propionate (CLOBEX) 0.05 % external shampoo    cyanocobalamin (VITAMIN  B-12) 1000 MCG tablet    ferrous sulfate (IRON) 325 (65 FE) MG tablet    fluocinolone acetonide (DERMA SMOOTHE/FS BODY) 0.01 % external oil    folic acid (FOLVITE) 1 MG tablet    gabapentin (NEURONTIN) 100 MG capsule    hydrOXYzine (ATARAX) 25 MG tablet    ketoconazole (NIZORAL) 2 % external cream    ketoconazole (NIZORAL) 2 % external shampoo    levothyroxine (SYNTHROID/LEVOTHROID) 88 MCG tablet    MAGNESIUM OXIDE PO    melatonin 5 MG tablet    multivitamin, therapeutic (THERA-VIT) TABS tablet    mupirocin (BACTROBAN) 2 % external ointment    order for DME    pantoprazole (PROTONIX) 40 MG EC tablet    predniSONE (DELTASONE) 20 MG tablet    propranolol (INDERAL) 10 MG tablet    psyllium 0.52 G capsule    traMADol (ULTRAM) 50 MG tablet    traMADol (ULTRAM) 50 MG tablet    triamcinolone (KENALOG) 0.025 % external ointment    triamcinolone (KENALOG) 0.1 % external ointment    ursodiol (ACTIGALL) 300 MG capsule    valACYclovir (VALTREX) 500 MG tablet     No current facility-administered medications for this visit.     Objective    Vitals - Patient Reported  Pain Score: No Pain (0)    Physical Exam: GENERAL: Healthy, alert and no distress. EYES: Eyes grossly normal to inspection.  No discharge or erythema, or obvious scleral/conjunctival abnormalities. RESP: No audible wheeze, cough, or visible cyanosis.  No visible retractions or increased work of breathing. SKIN: Visible skin clear. No significant rash, abnormal pigmentation or lesions. NEURO: Cranial nerves grossly intact.  Mentation and speech appropriate for age. PSYCH: Mentation appears normal, affect normal/bright, judgement and insight intact, normal speech and appearance well-groomed.    Her most recent laboratory tests show her white count is 6.5, hemoglobin is 13.8 platelets are  295,000.  Her sodium is 137 potassium is 4.3, BUN is 22 and creatinine is 1.19.  Her AST is 22, ALT is 10 and alkaline phosphatase is 49.  Her albumin is 4.0 with a total protein of 7.3 and total bilirubin is 0.4    IMPRESSION/PLAN:  Ms. Luque is doing well.  Her liver tests are excellent and her kidney function is as well.  She does report her blood pressure has been under good control at home.  She is up to date with regard to vaccines including, as I mentioned, having a 5th dose of the COVID-19 vaccine,  and I have recommended that she gets a new booster when it comes out in the fall.  She is due for a screening colonoscopy which I have gone ahead and ordered.     My plan will be to see her back in the clinic again in 6 months' time.  I otherwise will not be making any other change to her medical regimen.     I did spend total of 30 minutes (on the date of the encounter), including 20 minutes of face-to-face clinic time including counseling.  The rest of the time was spent in documentation and review of records.     Thank you very much for allowing me to participate in the care of this patient.  If you have any questions regarding recommendations, please do not hesitate to contact me.         Hussein Banegas MD      Professor of Medicine  [employer]      Executive Medical Director, Solid Organ Transplant Program  [facility]    Video-Visit Details    Type of service:  Video Visit     Video start:  8:08 AM  Video end: 8:28 AM    Originating Location (pt. Location): Home  Distant Location (provider location): Clinic  Platform used for Video Visit: Nataliya

## 2023-07-06 NOTE — TELEPHONE ENCOUNTER
Calling to schedule 1 year follow up. Appt is scheduled for Thursday July 11th, 2024 at 3:30 for in person.         Elizabeth Red, GALI

## 2023-07-06 NOTE — PROGRESS NOTES
Jessica is a 67 year old who is being evaluated via a billable video visit.      How would you like to obtain your AVS? MyChart  If the video visit is dropped, the invitation should be resent by: NA  Will anyone else be joining your video visit? NA    Kena Lopez is a 67 year old, presenting for the following health issues: S/P liver transplantation    HPI: I had the pleasure of seeing Phan Luque for followup in the Liver Transplant Clinic at the [facility] on July 6, 2023.       Ms. Luque returns for followup now almost 7 years status post liver transplantation for alcoholic hepatitis.     She is doing well at this visit.  She denies any abdominal pain.  She does have some itching, which she relates to her psoriasis and eczema.  She also has some mild psoriatic arthritis with that. She denies any fatigue.  She denies any increased abdominal girth or lower extremity edema.     She denies any fevers or chills, cough or shortness of breath.  She has had 5 doses of the COVID-19 vaccine and had Evusheld as well.       She denies any nausea or vomiting, diarrhea or constipation.  Her appetite has been good and her weight has been stable.     There have been no other new events since she was last seen.     Current Outpatient Medications   Medication     tacrolimus (GENERIC EQUIVALENT) 0.5 MG capsule     acetaminophen (TYLENOL) 325 MG tablet     albuterol (VENTOLIN HFA) 108 (90 Base) MCG/ACT inhaler     amLODIPine (NORVASC) 5 MG tablet     calcipotriene (DOVONOX) 0.005 % external solution     chlorthalidone (HYGROTON) 25 MG tablet     cholecalciferol (VITAMIN D3) 400 UNIT TABS tablet     ciclopirox (PENLAC) 8 % external solution     clobetasol (TEMOVATE) 0.05 % external ointment     clobetasol (TEMOVATE) 0.05 % external solution     clobetasol propionate (CLOBEX) 0.05 % external shampoo     cyanocobalamin (VITAMIN  B-12) 1000 MCG tablet     ferrous sulfate (IRON) 325 (65 FE) MG tablet      fluocinolone acetonide (DERMA SMOOTHE/FS BODY) 0.01 % external oil     folic acid (FOLVITE) 1 MG tablet     gabapentin (NEURONTIN) 100 MG capsule     hydrOXYzine (ATARAX) 25 MG tablet     ketoconazole (NIZORAL) 2 % external cream     ketoconazole (NIZORAL) 2 % external shampoo     levothyroxine (SYNTHROID/LEVOTHROID) 88 MCG tablet     MAGNESIUM OXIDE PO     melatonin 5 MG tablet     multivitamin, therapeutic (THERA-VIT) TABS tablet     mupirocin (BACTROBAN) 2 % external ointment     order for DME     pantoprazole (PROTONIX) 40 MG EC tablet     predniSONE (DELTASONE) 20 MG tablet     propranolol (INDERAL) 10 MG tablet     psyllium 0.52 G capsule     traMADol (ULTRAM) 50 MG tablet     traMADol (ULTRAM) 50 MG tablet     triamcinolone (KENALOG) 0.025 % external ointment     triamcinolone (KENALOG) 0.1 % external ointment     ursodiol (ACTIGALL) 300 MG capsule     valACYclovir (VALTREX) 500 MG tablet     No current facility-administered medications for this visit.     Objective    Vitals - Patient Reported  Pain Score: No Pain (0)    Physical Exam: GENERAL: Healthy, alert and no distress. EYES: Eyes grossly normal to inspection.  No discharge or erythema, or obvious scleral/conjunctival abnormalities. RESP: No audible wheeze, cough, or visible cyanosis.  No visible retractions or increased work of breathing. SKIN: Visible skin clear. No significant rash, abnormal pigmentation or lesions. NEURO: Cranial nerves grossly intact.  Mentation and speech appropriate for age. PSYCH: Mentation appears normal, affect normal/bright, judgement and insight intact, normal speech and appearance well-groomed.    Her most recent laboratory tests show her white count is 6.5, hemoglobin is 13.8 platelets are 295,000.  Her sodium is 137 potassium is 4.3, BUN is 22 and creatinine is 1.19.  Her AST is 22, ALT is 10 and alkaline phosphatase is 49.  Her albumin is 4.0 with a total protein of 7.3 and total bilirubin is 0.4    IMPRESSION/PLAN:   Ms. Luque is doing well.  Her liver tests are excellent and her kidney function is as well.  She does report her blood pressure has been under good control at home.  She is up to date with regard to vaccines including, as I mentioned, having a 5th dose of the COVID-19 vaccine,  and I have recommended that she gets a new booster when it comes out in the fall.  She is due for a screening colonoscopy which I have gone ahead and ordered.     My plan will be to see her back in the clinic again in 6 months' time.  I otherwise will not be making any other change to her medical regimen.     I did spend total of 30 minutes (on the date of the encounter), including 20 minutes of face-to-face clinic time including counseling.  The rest of the time was spent in documentation and review of records.     Thank you very much for allowing me to participate in the care of this patient.  If you have any questions regarding recommendations, please do not hesitate to contact me.         Hussein Banegas MD      Professor of Medicine  [employer]      Executive Medical Director, Solid Organ Transplant Program  [facility]    Video-Visit Details    Type of service:  Video Visit     Video start:  8:08 AM  Video end: 8:28 AM    Originating Location (pt. Location): Home  Distant Location (provider location): Clinic  Platform used for Video Visit: Nataliya

## 2023-07-06 NOTE — NURSING NOTE
Is the patient currently in the state of [place]? YES    Visit mode:VIDEO    If the visit is dropped, the patient can be reconnected by: VIDEO VISIT: Text to cell phone: [phone]    Will anyone else be joining the visit? NO      How would you like to obtain your AVS? MyChart    Are changes needed to the allergy or medication list? NO  Patient denies any changes since echeck-in regarding medication and allergies and states all information entered during echeck-in remains accurate.    Reason for visit: RECHECK    Me

## 2023-07-09 ENCOUNTER — HEALTH MAINTENANCE LETTER (OUTPATIENT)
Age: 68
End: 2023-07-09

## 2023-07-10 ENCOUNTER — OFFICE VISIT (OUTPATIENT)
Dept: INTERNAL MEDICINE | Facility: CLINIC | Age: 68
End: 2023-07-10
Payer: COMMERCIAL

## 2023-07-10 VITALS
DIASTOLIC BLOOD PRESSURE: 72 MMHG | HEART RATE: 71 BPM | OXYGEN SATURATION: 95 % | BODY MASS INDEX: 31.07 KG/M2 | WEIGHT: 195.4 LBS | SYSTOLIC BLOOD PRESSURE: 137 MMHG

## 2023-07-10 DIAGNOSIS — Z94.4 LIVER TRANSPLANTED (H): ICD-10-CM

## 2023-07-10 DIAGNOSIS — E03.9 HYPOTHYROIDISM, UNSPECIFIED TYPE: ICD-10-CM

## 2023-07-10 DIAGNOSIS — Z78.0 ASYMPTOMATIC POSTMENOPAUSAL STATE: ICD-10-CM

## 2023-07-10 DIAGNOSIS — J45.20 INTERMITTENT ASTHMA WITHOUT COMPLICATION, UNSPECIFIED ASTHMA SEVERITY: ICD-10-CM

## 2023-07-10 DIAGNOSIS — Z13.220 SCREENING FOR HYPERLIPIDEMIA: ICD-10-CM

## 2023-07-10 DIAGNOSIS — K70.10 ALCOHOLIC HEPATITIS WITHOUT ASCITES (H): ICD-10-CM

## 2023-07-10 DIAGNOSIS — Z82.49 FAMILY HISTORY OF CHF (CONGESTIVE HEART FAILURE): ICD-10-CM

## 2023-07-10 DIAGNOSIS — Z00.00 ENCOUNTER FOR MEDICARE ANNUAL WELLNESS EXAM: Primary | ICD-10-CM

## 2023-07-10 DIAGNOSIS — Z13.1 SCREENING FOR DIABETES MELLITUS: ICD-10-CM

## 2023-07-10 DIAGNOSIS — Z12.31 ENCOUNTER FOR SCREENING MAMMOGRAM FOR BREAST CANCER: ICD-10-CM

## 2023-07-10 PROCEDURE — 99397 PER PM REEVAL EST PAT 65+ YR: CPT | Performed by: INTERNAL MEDICINE

## 2023-07-10 RX ORDER — PRAVASTATIN SODIUM 10 MG
10 TABLET ORAL DAILY
Qty: 90 TABLET | Refills: 3 | Status: SHIPPED | OUTPATIENT
Start: 2023-07-10 | End: 2024-06-18

## 2023-07-10 RX ORDER — ALBUTEROL SULFATE 90 UG/1
2 AEROSOL, METERED RESPIRATORY (INHALATION) EVERY 6 HOURS PRN
Qty: 18 G | Refills: 2 | Status: SHIPPED | OUTPATIENT
Start: 2023-07-10 | End: 2024-07-22

## 2023-07-10 ASSESSMENT — ANXIETY QUESTIONNAIRES
1. FEELING NERVOUS, ANXIOUS, OR ON EDGE: SEVERAL DAYS
2. NOT BEING ABLE TO STOP OR CONTROL WORRYING: NOT AT ALL
GAD7 TOTAL SCORE: 1
GAD7 TOTAL SCORE: 1
5. BEING SO RESTLESS THAT IT IS HARD TO SIT STILL: NOT AT ALL
7. FEELING AFRAID AS IF SOMETHING AWFUL MIGHT HAPPEN: NOT AT ALL
6. BECOMING EASILY ANNOYED OR IRRITABLE: NOT AT ALL
3. WORRYING TOO MUCH ABOUT DIFFERENT THINGS: NOT AT ALL

## 2023-07-10 ASSESSMENT — PATIENT HEALTH QUESTIONNAIRE - PHQ9
SUM OF ALL RESPONSES TO PHQ QUESTIONS 1-9: 4
5. POOR APPETITE OR OVEREATING: NOT AT ALL

## 2023-07-10 ASSESSMENT — ASTHMA QUESTIONNAIRES: ACT_TOTALSCORE: 24

## 2023-07-10 NOTE — NURSING NOTE
Phan Luqeu is a 67 year old female patient that presents today in clinic for the following:    Chief Complaint   Patient presents with     Physical     The patient's allergies and medications were reviewed as noted. A set of vitals were recorded as noted without incident: /72 (BP Location: Right arm, Patient Position: Sitting, Cuff Size: Adult Regular)   Pulse 71   Wt 88.6 kg (195 lb 6.4 oz)   LMP  (LMP Unknown)   SpO2 95%   BMI 31.07 kg/m  . The patient does not have any other questions for the provider.    Monserrat Townsend, EMT at 7:46 AM on 7/10/2023

## 2023-07-10 NOTE — PROGRESS NOTES
History of Present Illness:  Ms. Luque is a 67 year old female who presents for  Chief Complaint   Patient presents with     Physical       Jessica has a PMH notable for alcoholic hepatitis s/p liver transplant, anxiety, migraines, HTN, CKD 3a, IBS, PsA, elevated ASCVD risk    New puppy, small aide  Works for [employer]  No major health concerns/changes  Has seen Rheum for psorasis, has scalp psoriasis and likely arthritis, under control  Pain clinic and Neurosurgery for neck pain, she did PT and dry needling  Dealing with headaches lately, gets an aura, unilateral pain, stress triggers, used to be on propranolol  She has osteopenia, takes Ca/D, no new fractures, had ONJ on reclast    Ophtho: Was seeing ophtho for AION  Hearing: Sees audiology  Mood/Cognition: No concerns    Routine Health Maintenence:  Immunizations (zoster, pneumovax, flu, Tdap, Hep A/B):           Most Recent Immunizations   Administered Date(s) Administered     HEPA 01/13/2009     Influenza Vaccine IM 3yrs+ 4 Valent IIV4 10/18/2017     Mantoux Tuberculin Skin Test 10/06/2016     Pneumo Conj 13-V (2010&after) 08/19/2016     TD (ADULT, 7+) 01/21/2004     TDAP Vaccine (Adacel) 08/18/2016      Lipids:   Recent Labs   Lab Test 05/30/23  0754 03/27/23  0720   CHOL 152 170   HDL 51 45*   LDL 81 97   TRIG 100 138     The 10-year ASCVD risk score (Sheree ULLOA, et al., 2019) is: 8.5%    Values used to calculate the score:      Age: 67 years      Sex: Female      Is Non- : No      Diabetic: No      Tobacco smoker: No      Systolic Blood Pressure: 137 mmHg      Is BP treated: Yes      HDL Cholesterol: 51 mg/dL      Total Cholesterol: 152 mg/dL    Lung Ca Screening (>30 pk age 55-79 or >20 py age 50-79 + RF): does not qualify, quit 1996  Colonoscopy (50-75 yrs): 6/18 TA x 2, 8/21 no adenomas, 10 year follow up                       - Diverticulosis in the left colon.    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  All health questionnaires were reviewed, verified and relevant information documented above.    Past Medical History:  Past Medical History:   Diagnosis Date     AION (acute ischemic optic neuropathy)      Asthma      Bacterial infection 02/04/2021     Candida infection 11/11/2020     Cellulitis 09/13/2021     Cervical spinal stenosis      Chronic kidney disease      Chronic kidney disease, stage 3 (H)      Dizziness and giddiness     Created by Conversion      End stage liver disease (H)     2/2 Alcohol Abuse     End stage liver disease (H)     Alcohol-related     GERD (gastroesophageal reflux disease)      Hypertension      Liver transplant recipient (H) 08/25/2016    [facility]     Liver transplanted (H)      Migraine headache      Migraines      Osteoporosis     frax 11/1.7% 2021     PFO (patent foramen ovale) 08/08/2016    Noted on echo at [facility]     Recurrent UTI      Spinal stenosis in cervical region      Strabismus      Unspecified asthma(493.90)     Created by Conversion        Past Surgical History:  Past Surgical History:   Procedure Laterality Date     APPENDECTOMY      1962     APPENDECTOMY       BENCH LIVER N/A 8/25/2016    Procedure: BENCH LIVER;  Surgeon: Larry Dhillon MD;  Location: [facility] OR     CATARACT IOL, RT/LT       COLONOSCOPY       COLONOSCOPY N/A 8/12/2021    Procedure: COLONOSCOPY, WITH POLYPECTOMY;  Surgeon: Arlyn Beckford MD;  [place] [facility] [place]     ESOPHAGOSCOPY, GASTROSCOPY,  DUODENOSCOPY (EGD), COMBINED N/A 2016    Procedure: COMBINED ESOPHAGOSCOPY, GASTROSCOPY, DUODENOSCOPY (EGD);  Surgeon: Tao Alfonso MD;  Location:  GI     ESOPHAGOSCOPY, GASTROSCOPY, DUODENOSCOPY (EGD), COMBINED N/A 10/31/2016    Procedure: COMBINED ESOPHAGOSCOPY, GASTROSCOPY, DUODENOSCOPY (EGD), BIOPSY SINGLE OR MULTIPLE;  Surgeon: Ronald Bojorquez MD;  Location:  GI     LIVER TRANSPLANTATION  2016    [facility]     RECTAL SURGERY       sphincteroplasty       TRANSPLANT LIVER RECIPIENT  DONOR N/A 2016    Procedure: TRANSPLANT LIVER RECIPIENT  DONOR;  Surgeon: Larry Dhillon MD;  Location:  OR     TUBAL LIGATION      laparoscopic     TUBAL LIGATION         Active Meds:  Current Outpatient Medications   Medication     albuterol (VENTOLIN HFA) 108 (90 Base) MCG/ACT inhaler     amLODIPine (NORVASC) 5 MG tablet     calcipotriene (DOVONOX) 0.005 % external solution     chlorthalidone (HYGROTON) 25 MG tablet     cholecalciferol (VITAMIN D3) 400 UNIT TABS tablet     clobetasol (TEMOVATE) 0.05 % external ointment     clobetasol (TEMOVATE) 0.05 % external solution     clobetasol propionate (CLOBEX) 0.05 % external shampoo     cyanocobalamin (VITAMIN  B-12) 1000 MCG tablet     ferrous sulfate (IRON) 325 (65 FE) MG tablet     fluocinolone acetonide (DERMA SMOOTHE/FS BODY) 0.01 % external oil     folic acid (FOLVITE) 1 MG tablet     gabapentin (NEURONTIN) 100 MG capsule     ketoconazole (NIZORAL) 2 % external shampoo     levothyroxine (SYNTHROID/LEVOTHROID) 88 MCG tablet     MAGNESIUM OXIDE PO     melatonin 5 MG tablet     multivitamin, therapeutic (THERA-VIT) TABS tablet     order for DME     pantoprazole (PROTONIX) 40 MG EC tablet     propranolol (INDERAL) 10 MG tablet     psyllium 0.52 G capsule     tacrolimus (GENERIC EQUIVALENT) 0.5 MG capsule     triamcinolone (KENALOG) 0.1 % external ointment     ursodiol (ACTIGALL) 300 MG capsule     acetaminophen  (TYLENOL) 325 MG tablet     ciclopirox (PENLAC) 8 % external solution     hydrOXYzine (ATARAX) 25 MG tablet     ketoconazole (NIZORAL) 2 % external cream     mupirocin (BACTROBAN) 2 % external ointment     predniSONE (DELTASONE) 20 MG tablet     traMADol (ULTRAM) 50 MG tablet     traMADol (ULTRAM) 50 MG tablet     triamcinolone (KENALOG) 0.025 % external ointment     valACYclovir (VALTREX) 500 MG tablet     No current facility-administered medications for this visit.        Allergies:  Codeine, Benadryl [diphenhydramine], Cefaclor, Hydrocodone-acetaminophen, Oxycodone, Penicillins, and Sulfa antibiotics    Family History:  family history includes Chronic Obstructive Pulmonary Disease in her father; Cirrhosis in her paternal uncle; Family History Negative in an other family member; Heart Disease in her father; Heart Failure in her father and mother; Melanoma in her mother; Skin Cancer in her sister.    Social History:  Social History     Tobacco Use     Smoking status: Former     Packs/day: 2.50     Years: 20.00     Pack years: 50.00     Types: Cigarettes     Quit date: 1996     Years since quittin.5     Smokeless tobacco: Never   Vaping Use     Vaping Use: Never used   Substance Use Topics     Alcohol use: No     Alcohol/week: 7.0 standard drinks of alcohol     Types: 7 Standard drinks or equivalent per week     Comment: heavy use (750ml/2 days) up until 1 year ago; had cut down to 1 drink/day; none since 17     Drug use: No       Physical Exam:  Vitals: /72 (BP Location: Right arm, Patient Position: Sitting, Cuff Size: Adult Regular)   Pulse 71   Wt 88.6 kg (195 lb 6.4 oz)   LMP  (LMP Unknown)   SpO2 95%   BMI 31.07 kg/m    Constitutional: Alert, oriented, pleasant, no acute distress  Head: Normocephalic, atraumatic  Eyes: Extra-ocular movements intact, pupils equally round and reactive bilaterally, no scleral icterus  ENT: Oropharynx clear, moist mucus membranes, good dentition  Neck:  Supple, no lymphadenopathy, no thyromegaly  Cardiovascular: Regular rate and rhythm, no murmurs, rubs or gallops, peripheral pulses full/symmetric  Respiratory: Good air movement bilaterally, lungs clear, no wheezes/rales/rhonchi  GI: Abdomen soft, bowel sounds present, nondistended, nontender, no organomegaly or masses, no rebound/guarding  Musculoskeletal: No edema, normal muscle tone, normal gait  Neurologic: Alert and oriented, cranial nerves 2-12 intact, grossly non-focal  Skin: No rashes/lesions  Psychiatric: normal mentation, affect and mood      Diagnostics:  Labs reviewed in Epic          Assessment and Plan:  Phan was seen today for physical.    Diagnoses and all orders for this visit:    Encounter for Medicare annual wellness exam    Intermittent asthma without complication, unspecified asthma severity  -     albuterol (VENTOLIN HFA) 108 (90 Base) MCG/ACT inhaler; Inhale 2 puffs into the lungs every 6 hours as needed for shortness of breath    Encounter for screening mammogram for breast cancer  -     MA Screen Bilateral w/Dario; Future    Screening for hyperlipidemia  Screening for diabetes mellitus  -     Hemoglobin A1c; Future, Lipids done earlier this year (reviewed)    Asymptomatic postmenopausal state  BP are relatively contraindicated but could consider anabolic agent if severe osteoporosis/fracture, continue weight bearing exercise and vitamin D  -     DX Hip/Pelvis/Spine; Future    Liver transplanted (H)  Alcoholic hepatitis without ascites  Stable    Hypothyroidism, unspecified type  -     TSH with free T4 reflex; Future    Family history of CHF (congestive heart failure)  Lipids are reasonable but elevated ASDVD risk score and family history of heart disease, discussed statin and she is amenable, has routine LFT monitoring per hepatology  -     pravastatin (PRAVACHOL) 10 MG tablet; Take 1 tablet (10 mg) by mouth daily            Arlyn Sanchez MD  Internal Medicine              Answers for  HPI/ROS submitted by the patient on 7/5/2023  General Symptoms: No  Skin Symptoms: No  HENT Symptoms: No  EYE SYMPTOMS: No  HEART SYMPTOMS: No  LUNG SYMPTOMS: No  INTESTINAL SYMPTOMS: No  URINARY SYMPTOMS: No  GYNECOLOGIC SYMPTOMS: No  BREAST SYMPTOMS: No  SKELETAL SYMPTOMS: Yes  BLOOD SYMPTOMS: No  NERVOUS SYSTEM SYMPTOMS: No  MENTAL HEALTH SYMPTOMS: No  Back pain: Yes  Muscle aches: Yes  Neck pain: Yes  Swollen joints: No  Joint pain: No  Bone pain: No  Muscle cramps: No  Muscle weakness: No  Joint stiffness: No  Bone fracture: No

## 2023-07-12 ENCOUNTER — LAB (OUTPATIENT)
Dept: LAB | Facility: CLINIC | Age: 68
End: 2023-07-12
Payer: COMMERCIAL

## 2023-07-12 ENCOUNTER — OFFICE VISIT (OUTPATIENT)
Dept: NEPHROLOGY | Facility: CLINIC | Age: 68
End: 2023-07-12
Attending: INTERNAL MEDICINE
Payer: COMMERCIAL

## 2023-07-12 VITALS
OXYGEN SATURATION: 95 % | BODY MASS INDEX: 30.78 KG/M2 | SYSTOLIC BLOOD PRESSURE: 132 MMHG | HEART RATE: 76 BPM | WEIGHT: 193.6 LBS | TEMPERATURE: 97.7 F | DIASTOLIC BLOOD PRESSURE: 83 MMHG

## 2023-07-12 DIAGNOSIS — N18.31 CHRONIC KIDNEY DISEASE, STAGE 3A (H): ICD-10-CM

## 2023-07-12 DIAGNOSIS — D84.9 IMMUNOSUPPRESSION (H): ICD-10-CM

## 2023-07-12 DIAGNOSIS — Z13.1 SCREENING FOR DIABETES MELLITUS: ICD-10-CM

## 2023-07-12 DIAGNOSIS — E03.9 HYPOTHYROIDISM, UNSPECIFIED TYPE: ICD-10-CM

## 2023-07-12 LAB
ALBUMIN MFR UR ELPH: <6 MG/DL
ALBUMIN SERPL BCG-MCNC: 4.4 G/DL (ref 3.5–5.2)
ANION GAP SERPL CALCULATED.3IONS-SCNC: 10 MMOL/L (ref 7–15)
BUN SERPL-MCNC: 24.9 MG/DL (ref 8–23)
CALCIUM SERPL-MCNC: 9.8 MG/DL (ref 8.8–10.2)
CHLORIDE SERPL-SCNC: 100 MMOL/L (ref 98–107)
CREAT SERPL-MCNC: 1.23 MG/DL (ref 0.51–0.95)
CREAT UR-MCNC: 20.6 MG/DL
DEPRECATED HCO3 PLAS-SCNC: 26 MMOL/L (ref 22–29)
GFR SERPL CREATININE-BSD FRML MDRD: 48 ML/MIN/1.73M2
GLUCOSE SERPL-MCNC: 108 MG/DL (ref 70–99)
HBA1C MFR BLD: 5.6 %
HGB BLD-MCNC: 14.2 G/DL (ref 11.7–15.7)
PHOSPHATE SERPL-MCNC: 3.8 MG/DL (ref 2.5–4.5)
POTASSIUM SERPL-SCNC: 4 MMOL/L (ref 3.4–5.3)
PROT/CREAT 24H UR: NORMAL MG/G{CREAT}
SODIUM SERPL-SCNC: 136 MMOL/L (ref 136–145)
TSH SERPL DL<=0.005 MIU/L-ACNC: 2.87 UIU/ML (ref 0.3–4.2)

## 2023-07-12 PROCEDURE — G0463 HOSPITAL OUTPT CLINIC VISIT: HCPCS | Performed by: INTERNAL MEDICINE

## 2023-07-12 PROCEDURE — 80069 RENAL FUNCTION PANEL: CPT | Performed by: PATHOLOGY

## 2023-07-12 PROCEDURE — 84443 ASSAY THYROID STIM HORMONE: CPT | Performed by: PATHOLOGY

## 2023-07-12 PROCEDURE — 83036 HEMOGLOBIN GLYCOSYLATED A1C: CPT | Performed by: INTERNAL MEDICINE

## 2023-07-12 PROCEDURE — 84156 ASSAY OF PROTEIN URINE: CPT | Performed by: PATHOLOGY

## 2023-07-12 PROCEDURE — 36415 COLL VENOUS BLD VENIPUNCTURE: CPT | Performed by: PATHOLOGY

## 2023-07-12 PROCEDURE — 99214 OFFICE O/P EST MOD 30 MIN: CPT | Performed by: INTERNAL MEDICINE

## 2023-07-12 PROCEDURE — 99000 SPECIMEN HANDLING OFFICE-LAB: CPT | Performed by: PATHOLOGY

## 2023-07-12 PROCEDURE — 85018 HEMOGLOBIN: CPT | Performed by: PATHOLOGY

## 2023-07-12 ASSESSMENT — PAIN SCALES - GENERAL: PAINLEVEL: NO PAIN (0)

## 2023-07-12 NOTE — PROGRESS NOTES
Nephrology Clinic    Name: Phan Luque  MRN: [id number]  : 1955  Referring provider: Leonel Salgado     Assessment and Plan:  # CKD stage 3a:    - creatinine 1.2 mg/dL with eGFR 50 23 and 23   - stable   - no proteinuria   - continue to monitor regularly  #. HTN:   - On amlodipine 5 mg daily   - On chlorthalidone 12.5 mg daily -    - no change, continue with regular electrolyte monitoring  # Hypomagnesemia: on magnesium supplement, magnesium 1.8-2 mg/dL and stable on supplement    # hypophosphatemia - resolved, previously reviewed phosphorus sources and lacto vegetarian diet with dairy/beans for protein  # S/p liver transplant: doing well, reviewed Dr. Banegas notes  - on tacrolimus  # psoriasis - scalp and psoriatic arthritis, currently using topical therapy  #. Overweight/obese - BMI 30 - previously reviewed    Return in about 1 year (around 2024).   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     Last visit 2022  Generally doing well, had one ED visit for epistaxis but otherwise no significant illness.  Continues to work, does not feel like retiring at this moment. Got a new puppy which is keeping her busy.  Psoriatic arthritis with skin manifestations stable.  Having migraines and taking propranolol, pain is getting less but seems to have more fatigue so may opt to stop.  Remains on chlorthalidone and amlodipine.  Liver function is stable.  Son is living in [place] and Jessica would like to go visit.       Review of Systems:   Pertinent items are noted in HPI or as below, remainder of complete ROS is negative.       Active Medications:     Current Outpatient Medications:      albuterol (VENTOLIN HFA) 108 (90 Base) MCG/ACT inhaler, Inhale 2 puffs into the lungs every 6 hours as needed for shortness of breath, Disp: 18 g, Rfl: 2     amLODIPine (NORVASC) 5 MG tablet, Take 1 tablet (5 mg) by mouth daily, Disp: 90 tablet, Rfl: 0     calcipotriene (DOVONOX) 0.005 % external solution, Apply 1 mL topically daily To the scalp, Disp: 180 mL, Rfl: 1     chlorthalidone (HYGROTON) 25 MG tablet, Take 0.5 tablets (12.5 mg) by mouth daily, Disp: 45 tablet, Rfl: 3     cholecalciferol (VITAMIN D3) 400 UNIT TABS tablet, Take 400 Units by mouth daily, Disp: , Rfl:      clobetasol (TEMOVATE) 0.05 % external ointment, Apply topically 2 times daily To areas of eczema on the lower legs, until areas resolve., Disp: 60 g, Rfl: 1     clobetasol (TEMOVATE) 0.05 % external solution, Apply topically 2 times daily To the scalp as needed for itching and flaking, on the days you don't use the clobetasol shampoo., Disp: 150 mL, Rfl: 0     clobetasol propionate (CLOBEX) 0.05 % external shampoo, Apply topically daily To dry scalp x 15 min and rinse ,when psoriasis is present., Disp: 354 mL, Rfl: 1     cyanocobalamin (VITAMIN  B-12) 1000 MCG tablet, Take 1,000 mcg by mouth daily, Disp: , Rfl:      ferrous sulfate (IRON) 325 (65 FE) MG tablet, Take 1 tablet (325 mg) by mouth 2 times daily, Disp: 100 tablet, Rfl:      fluocinolone acetonide (DERMA SMOOTHE/FS BODY) 0.01 % external oil, Apply topically 2 times daily, Disp: 118.28 mL, Rfl: 11     folic acid (FOLVITE) 1 MG tablet, Take 1 tablet (1 mg) by mouth daily, Disp: 30 tablet, Rfl: 1     gabapentin (NEURONTIN) 100 MG capsule, Take 2 capsules (200 mg) by mouth At Bedtime, Disp: 180 capsule, Rfl: 0     ketoconazole (NIZORAL) 2 % external shampoo, Apply topically daily as needed for itching or irritation Apply to scalp leave for a few minutes and then wash out daily, Disp: 120 mL, Rfl: 11     levothyroxine  (SYNTHROID/LEVOTHROID) 88 MCG tablet, Take 1 tablet (88 mcg) by mouth daily, Disp: 90 tablet, Rfl: 0     MAGNESIUM OXIDE PO, Take 400 mg by mouth daily, Disp: , Rfl:      melatonin 5 MG tablet, Take 1 tablet (5 mg) by mouth nightly as needed for sleep, Disp: , Rfl:      multivitamin, therapeutic (THERA-VIT) TABS tablet, Take 1 tablet by mouth every 24 hours, Disp: 30 tablet, Rfl: 11     order for DME, Equipment being ordered: [email or web address] ankle brace, Disp: 1 Device, Rfl: 0     pantoprazole (PROTONIX) 40 MG EC tablet, Take 1 tablet (40 mg) by mouth daily before breakfast, Disp: 90 tablet, Rfl: 0     pravastatin (PRAVACHOL) 10 MG tablet, Take 1 tablet (10 mg) by mouth daily, Disp: 90 tablet, Rfl: 3     propranolol (INDERAL) 10 MG tablet, Take 1 tablet (10 mg) by mouth 2 times daily, Disp: 180 tablet, Rfl: 0     psyllium 0.52 G capsule, Take 2 capsules (1.04 g) by mouth daily With a full glass of water. (Patient taking differently: Take 1 capsule by mouth 3 times daily With a full glass of water.), Disp: 60 capsule, Rfl: 1     tacrolimus (GENERIC EQUIVALENT) 0.5 MG capsule, Take 3 capsules (1.5 mg) by mouth every 12 hours, Disp: 540 capsule, Rfl: 3     triamcinolone (KENALOG) 0.1 % external ointment, Apply topically 2 times daily To plaques behind the knee, Disp: 80 g, Rfl: 1     ursodiol (ACTIGALL) 300 MG capsule, TAKE ONE CAPSULE BY MOUTH TWICE A DAY, Disp: 60 capsule, Rfl: 11     acetaminophen (TYLENOL) 325 MG tablet, Take 2 tablets (650 mg) by mouth every 6 hours as needed for mild pain Or fevers. Use sparingly. Limit use to no more than 2 grams (2000 mg) in 24 hours. **Further refills must be obtained by primary care provider** (Patient not taking: Reported on 3/31/2023), Disp: 100 tablet, Rfl: 0     ciclopirox (PENLAC) 8 % external solution, Apply to adjacent skin and affected nails daily.  Remove with alcohol every 7 days, then repeat. (Patient not taking: Reported on 3/31/2023), Disp: 6.6 mL, Rfl: 1     Allergies:    Codeine, Benadryl [diphenhydramine], Cefaclor, Hydrocodone-acetaminophen, Oxycodone, Penicillins, and Sulfa antibiotics      Past Medical History:  Past Medical History:   Diagnosis Date     AION (acute ischemic optic neuropathy)      Asthma      Bacterial infection 02/04/2021     Candida infection 11/11/2020     Cellulitis 09/13/2021     Cervical spinal stenosis      Chronic kidney disease      Chronic kidney disease, stage 3 (H)      Dizziness and giddiness     Created by Conversion      End stage liver disease (H)     2/2 Alcohol Abuse     End stage liver disease (H)     Alcohol-related     GERD (gastroesophageal reflux disease)      Hypertension      Liver transplant recipient (H) 08/25/2016    [facility]     Liver transplanted (H)      Migraine headache      Migraines      Osteoporosis     frax 11/1.7% 2021     PFO (patent foramen ovale) 08/08/2016    Noted on echo at [facility]     Recurrent UTI      Spinal stenosis in cervical region      Strabismus      Unspecified asthma(493.90)     Created by Conversion      Patient Active Problem List   Diagnosis     Liver transplanted (H)     Alcoholic hepatitis     Immunosuppression (H)     Alcoholic hepatitis without ascites     Enterococcus UTI     Liver transplant status (H)     Nausea & vomiting     Anemia     ACP (advance care planning)     Diarrhea     Hypothyroidism     Esophageal reflux     Recurrent major depression in partial remission (H)     Insomnia     CKD (chronic kidney disease) stage 3, GFR 30-59 ml/min (H)     Anemia in stage 3 chronic kidney disease (H)     Osteopenia     Alcohol abuse, uncomplicated     Psoriasis     Candida infection     Bacterial infection     Cellulitis     Allergic rhinitis     Disorder of bone and cartilage     Asthma     Avulsion fracture of ankle, left, closed, initial encounter     Incontinence of feces     Major depressive disorder, recurrent episode, moderate (H)     Other atopic dermatitis  and related conditions     Rosacea     Symptomatic menopausal or female climacteric states     Tibial plateau fracture, left     Past Surgical History:  Past Surgical History:   Procedure Laterality Date     APPENDECTOMY           APPENDECTOMY       BENCH LIVER N/A 2016    Procedure: BENCH LIVER;  Surgeon: Larry Dhillon MD;  Location: [facility] OR     CATARACT IOL, RT/LT       COLONOSCOPY       COLONOSCOPY N/A 2021    Procedure: COLONOSCOPY, WITH POLYPECTOMY;  Surgeon: Arlyn Beckford MD;  Location: [facility] OR     ESOPHAGOSCOPY, GASTROSCOPY, DUODENOSCOPY (EGD), COMBINED N/A 2016    Procedure: COMBINED ESOPHAGOSCOPY, GASTROSCOPY, DUODENOSCOPY (EGD);  Surgeon: Tao Alfonso MD;  Location:  GI     ESOPHAGOSCOPY, GASTROSCOPY, DUODENOSCOPY (EGD), COMBINED N/A 10/31/2016    Procedure: COMBINED ESOPHAGOSCOPY, GASTROSCOPY, DUODENOSCOPY (EGD), BIOPSY SINGLE OR MULTIPLE;  Surgeon: Ronald Bojorquez MD;  Location:  GI     LIVER TRANSPLANTATION  2016    [facility]     RECTAL SURGERY       sphincteroplasty       TRANSPLANT LIVER RECIPIENT  DONOR N/A 2016    Procedure: TRANSPLANT LIVER RECIPIENT  DONOR;  Surgeon: Larry Dhillon MD;  Location:  OR     TUBAL LIGATION      laparoscopic     TUBAL LIGATION       Family History:   Daughter has CKD due to ureteral valves that were asymptomatic and not dx until around age 30  Family History   Problem Relation Age of Onset     Family History Negative Other      Melanoma Mother              Heart Disease Father         CHF     Skin Cancer Sister      Cirrhosis Paternal Uncle         not alcohol related     Heart Failure Mother      Heart Failure Father      Chronic Obstructive Pulmonary Disease Father       Social History:   Continues to work for [employer] and has been working from home  Selling house and moving to town home    Physical Exam:  /83   Pulse 76   Temp 97.7  F (36.5  C)  (Oral)   Wt 87.8 kg (193 lb 9.6 oz)   LMP  (LMP Unknown)   SpO2 95%   BMI 30.78 kg/m     GENERAL APPEARANCE: alert and no distress  EYES: no scleral icterus, pupils equal  HENT: NC/AT  Pulmonary: normal work of breathing, no clubbing  CV: WWP   - Edema none  MS: no evidence of inflammation in joints, no muscle tenderness  : no Talbert  SKIN: no rash, warm, dry, no cyanosis  NEURO: mentation intact and speech normal     Laboratory:  CMP  Recent Labs   Lab Test 05/30/23  0754 03/27/23  0720 01/30/23  0744 12/08/22  1149 12/04/22  1529 11/28/22  0735 07/29/22  0732 07/13/22  1235 09/20/21  0725 07/21/21  0730 05/17/21  0727 05/17/21  0724 03/25/21  0734 01/25/21  0725    138 135* 138 139 138   < > 141   < > 140 134 135 135 132*   POTASSIUM 4.3 4.1 4.2 4.0 3.3* 4.2   < > 3.8   < > 4.2 4.3 4.1 4.5 4.4   CHLORIDE 102 101 100 99 103 101   < > 106   < > 106 101 101 102 101   CO2 25 25 26 30* 27 20*   < > 26   < > 25 26 27 28 26   ANIONGAP 10 12 9 9 9 17*   < > 9   < > 9 7 7 5 5   * 125* 110* 89 100 101*   < > 101*   < > 108 94 97 103* 99   BUN 21.6 33.3* 24.7* 23.6* 29* 21.3   < > 18   < > 20 19 19 36* 22   CR 1.19* 1.27* 1.17* 1.37* 1.29* 1.21*   < > 1.07*   < > 1.37* 1.26* 1.30* 1.44* 1.38*   GFRESTIMATED 50* 46* 51* 42* 46* 49*   < > 57*   < > 41* 45* 43* 38* 40*   GFRESTBLACK  --   --   --   --   --   --   --   --   --   --  52* 50* 44* 47*   ERIC 9.5 9.8 9.2 9.9 9.6 9.2   < > 8.9   < > 9.3 8.8 8.9 8.8 9.0   MAG 2.0 1.9 1.9 1.8  --  1.5*   < >  --    < > 1.8  --  2.1 2.0 2.0   PHOS  --   --   --   --   --  3.4  --  2.4*  --  4.2 3.2  --   --   --    PROTTOTAL 7.3 7.3 7.1  --  7.6 6.6   < >  --    < > 7.3  --  7.4 7.5 7.6   ALBUMIN 4.4 4.2 4.3  --  4.0 4.0  4.0   < > 3.6   < > 4.0 3.6 3.8 3.8 3.8   BILITOTAL 0.4 0.5 0.5  --  1.1* 0.7   < >  --    < > 1.1*  --  0.6 0.5 0.7   ALKPHOS 49 50 45  --  46 49   < >  --    < > 53  --  50 67 50   AST 22 23 28  --  17 21   < >  --    < > 22  --  18 13 23   ALT 10  12 14  --  13 14   < >  --    < > 21  --  19 23 24    < > = values in this interval not displayed.     CBC  Recent Labs   Lab Test 05/30/23  0754 03/27/23  0720 01/30/23  0744 12/04/22  1529   HGB 13.8 13.6 13.1 13.5   WBC 6.5 8.1 7.3 12.7*   RBC 4.60 4.60 4.29 4.47   HCT 41.2 40.8 38.8 40.2   MCV 90 89 90 90   MCH 30.0 29.6 30.5 30.2   MCHC 33.5 33.3 33.8 33.6   RDW 13.6 12.9 13.1 13.7    326 296 320     INR  Recent Labs   Lab Test 12/04/22  1529 06/11/18  0720 03/16/17  1728 09/13/16  1055 08/25/16  1540 08/25/16  1230 08/25/16  1055 08/25/16  0850   INR 0.98 0.98 1.15* 1.08   < > 1.76* 1.76* 1.87*   PTT 29  --   --   --   --  48* 59* 58*    < > = values in this interval not displayed.     ABG  Recent Labs   Lab Test 10/20/16  1216 10/19/16  0821 08/26/16  0510 08/26/16  0004 08/25/16  2000 08/25/16  1230 08/25/16  1055   PH  --   --   --  7.48* 7.51* 7.45 7.45   PCO2  --   --   --  38 35 44 43   PO2  --   --   --  77* 58* 164* 103   HCO3  --   --   --  28 28 30* 29*   O2PER 21.0 21 4L 4L 4L 100.0  100.0 0.58      URINE STUDIES  Recent Labs   Lab Test 03/27/23  0720 03/14/22  0754 03/25/21  0736 07/24/20  1258 04/29/20  0741 11/29/18  0729   COLOR Yellow Yellow Yellow Yellow Yellow Yellow   APPEARANCE Clear Clear Clear Clear Clear Clear   URINEGLC Negative Negative Negative Negative Negative Negative   URINEBILI Negative Negative Negative Negative Negative Negative   URINEKETONE Negative Negative Negative Negative Negative Negative   SG 1.010 1.010 1.013 1.010 1.015 1.015   UBLD Negative Negative Negative Trace* Negative Negative   URINEPH 6.5 5.5 7.0 5.5 6.0 6.0   PROTEIN Negative Negative Negative Negative Negative Negative   UROBILINOGEN 0.2 0.2  --  0.2 E.U./dL 0.2 E.U./dL  --    NITRITE Negative Negative Negative Negative Negative Negative   LEUKEST Trace* Negative Negative Small* Negative Negative   RBCU 2-5*  --  2 0-2  --  <1   WBCU 0-5  --  <1 25-50*  --  <1     Recent Labs   Lab Test  05/17/21  0727 03/25/21  0736 07/14/20  0937 03/20/19  1525 11/29/18  0729 03/21/18  1550 12/20/17  1450 09/20/17  1214 06/05/17  1214 03/13/17  1550 12/12/16  1502 10/10/16  1457   UTPG 0.08 0.09 0.07 Unable to calculate due to low value 0.11 0.16 0.12 0.13 0.39* 0.13 0.20 0.39*     PTH  Recent Labs   Lab Test 11/28/22  0735 07/13/22  1235 07/14/20  0936 03/20/19  1517 08/21/18  0739 04/20/18  0739 09/08/17  0723 04/05/17  1236 11/16/16  0640 10/29/16  1807 10/27/16  0705 10/10/16  1456   PTHI 19 73* 71 47 66 90* 117* 53 20 <3* Quantity not sufficient  ED GLO NOTIFIED ON [facility],10/29/2016,1650,MJ   3*     IRON STUDIES   Recent Labs   Lab Test 08/21/18  0739 04/04/18  0823 03/21/18  1540 06/05/17  1211 04/05/17  1236 03/22/17  1607 09/06/16  0638 08/06/16  0757   IRON  --   --   --  59 56 62 18* 25*   FEB  --   --   --  248 179* 197* 118* 92*   IRONSAT  --   --   --  24 31 32 15 27   WILL 84 57 50 138 278* 320* 480* 528*

## 2023-07-12 NOTE — NURSING NOTE
Chief Complaint   Patient presents with     RECHECK     Annual f/u       /83   Pulse 76   Temp 97.7  F (36.5  C) (Oral)   Wt 87.8 kg (193 lb 9.6 oz)   LMP  (LMP Unknown)   SpO2 95%   BMI 30.78 kg/m      Darion Salgado on 7/12/2023 at 1:00 PM

## 2023-07-12 NOTE — LETTER
2023       RE: Phan Luque   [address]  [facility] [address]     Dear Colleague,    Thank you for referring your patient, Phan Luque, to the [facility] NEPHROLOGY [facility]INIC [place] at [facility]. Please see a copy of my visit note below.      Nephrology Clinic    Name: Phan Luque  MRN: [id number]  : 1955  Referring provider: Leonel Salgado     Assessment and Plan:  # CKD stage 3a:    - creatinine 1.2 mg/dL with eGFR 50 23 and 23   - stable   - no proteinuria   - continue to monitor regularly  #. HTN:   - On amlodipine 5 mg daily   - On chlorthalidone 12.5 mg daily -    - no change, continue with regular electrolyte monitoring  # Hypomagnesemia: on magnesium supplement, magnesium 1.8-2 mg/dL and stable on supplement    # hypophosphatemia - resolved, previously reviewed phosphorus sources and lacto vegetarian diet with dairy/beans for protein  # S/p liver transplant: doing well, reviewed Dr. Banegas notes  - on tacrolimus  # psoriasis - scalp and psoriatic arthritis, currently using topical therapy  #. Overweight/obese - BMI 30 - previously reviewed    Return in about 1 year (around 2024).   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     Last visit 2022  Generally doing well, had one ED visit for epistaxis but otherwise no significant illness.  Continues to work, does not feel like retiring at this moment. Got a new puppy which is keeping her busy.  Psoriatic arthritis with skin manifestations stable.  Having migraines and taking  propranolol, pain is getting less but seems to have more fatigue so may opt to stop.  Remains on chlorthalidone and amlodipine.  Liver function is stable.  Son is living in [place] and Jessica would like to go visit.       Review of Systems:   Pertinent items are noted in HPI or as below, remainder of complete ROS is negative.      Active Medications:     Current Outpatient Medications:     albuterol (VENTOLIN HFA) 108 (90 Base) MCG/ACT inhaler, Inhale 2 puffs into the lungs every 6 hours as needed for shortness of breath, Disp: 18 g, Rfl: 2    amLODIPine (NORVASC) 5 MG tablet, Take 1 tablet (5 mg) by mouth daily, Disp: 90 tablet, Rfl: 0    calcipotriene (DOVONOX) 0.005 % external solution, Apply 1 mL topically daily To the scalp, Disp: 180 mL, Rfl: 1    chlorthalidone (HYGROTON) 25 MG tablet, Take 0.5 tablets (12.5 mg) by mouth daily, Disp: 45 tablet, Rfl: 3    cholecalciferol (VITAMIN D3) 400 UNIT TABS tablet, Take 400 Units by mouth daily, Disp: , Rfl:     clobetasol (TEMOVATE) 0.05 % external ointment, Apply topically 2 times daily To areas of eczema on the lower legs, until areas resolve., Disp: 60 g, Rfl: 1    clobetasol (TEMOVATE) 0.05 % external solution, Apply topically 2 times daily To the scalp as needed for itching and flaking, on the days you don't use the clobetasol shampoo., Disp: 150 mL, Rfl: 0    clobetasol propionate (CLOBEX) 0.05 % external shampoo, Apply topically daily To dry scalp x 15 min and rinse ,when psoriasis is present., Disp: 354 mL, Rfl: 1    cyanocobalamin (VITAMIN  B-12) 1000 MCG tablet, Take 1,000 mcg by mouth daily, Disp: , Rfl:     ferrous sulfate (IRON) 325 (65 FE) MG tablet, Take 1 tablet (325 mg) by mouth 2 times daily, Disp: 100 tablet, Rfl:     fluocinolone acetonide (DERMA SMOOTHE/FS BODY) 0.01 % external oil, Apply topically 2 times daily, Disp: 118.28 mL, Rfl: 11    folic acid (FOLVITE) 1 MG tablet, Take 1 tablet (1 mg) by mouth daily, Disp: 30 tablet, Rfl: 1    gabapentin  (NEURONTIN) 100 MG capsule, Take 2 capsules (200 mg) by mouth At Bedtime, Disp: 180 capsule, Rfl: 0    ketoconazole (NIZORAL) 2 % external shampoo, Apply topically daily as needed for itching or irritation Apply to scalp leave for a few minutes and then wash out daily, Disp: 120 mL, Rfl: 11    levothyroxine (SYNTHROID/LEVOTHROID) 88 MCG tablet, Take 1 tablet (88 mcg) by mouth daily, Disp: 90 tablet, Rfl: 0    MAGNESIUM OXIDE PO, Take 400 mg by mouth daily, Disp: , Rfl:     melatonin 5 MG tablet, Take 1 tablet (5 mg) by mouth nightly as needed for sleep, Disp: , Rfl:     multivitamin, therapeutic (THERA-VIT) TABS tablet, Take 1 tablet by mouth every 24 hours, Disp: 30 tablet, Rfl: 11    order for DME, Equipment being ordered: [employer] ankle brace, Disp: 1 Device, Rfl: 0    pantoprazole (PROTONIX) 40 MG EC tablet, Take 1 tablet (40 mg) by mouth daily before breakfast, Disp: 90 tablet, Rfl: 0    pravastatin (PRAVACHOL) 10 MG tablet, Take 1 tablet (10 mg) by mouth daily, Disp: 90 tablet, Rfl: 3    propranolol (INDERAL) 10 MG tablet, Take 1 tablet (10 mg) by mouth 2 times daily, Disp: 180 tablet, Rfl: 0    psyllium 0.52 G capsule, Take 2 capsules (1.04 g) by mouth daily With a full glass of water. (Patient taking differently: Take 1 capsule by mouth 3 times daily With a full glass of water.), Disp: 60 capsule, Rfl: 1    tacrolimus (GENERIC EQUIVALENT) 0.5 MG capsule, Take 3 capsules (1.5 mg) by mouth every 12 hours, Disp: 540 capsule, Rfl: 3    triamcinolone (KENALOG) 0.1 % external ointment, Apply topically 2 times daily To plaques behind the knee, Disp: 80 g, Rfl: 1    ursodiol (ACTIGALL) 300 MG capsule, TAKE ONE CAPSULE BY MOUTH TWICE A DAY, Disp: 60 capsule, Rfl: 11    acetaminophen (TYLENOL) 325 MG tablet, Take 2 tablets (650 mg) by mouth every 6 hours as needed for mild pain Or fevers. Use sparingly. Limit use to no more than 2 grams (2000 mg) in 24 hours. **Further refills must be obtained by primary care provider**  (Patient not taking: Reported on 3/31/2023), Disp: 100 tablet, Rfl: 0    ciclopirox (PENLAC) 8 % external solution, Apply to adjacent skin and affected nails daily.  Remove with alcohol every 7 days, then repeat. (Patient not taking: Reported on 3/31/2023), Disp: 6.6 mL, Rfl: 1     Allergies:   Codeine, Benadryl [diphenhydramine], Cefaclor, Hydrocodone-acetaminophen, Oxycodone, Penicillins, and Sulfa antibiotics      Past Medical History:  Past Medical History:   Diagnosis Date    AION (acute ischemic optic neuropathy)     Asthma     Bacterial infection 02/04/2021    Candida infection 11/11/2020    Cellulitis 09/13/2021    Cervical spinal stenosis     Chronic kidney disease     Chronic kidney disease, stage 3 (H)     Dizziness and giddiness     Created by Conversion     End stage liver disease (H)     2/2 Alcohol Abuse    End stage liver disease (H)     Alcohol-related    GERD (gastroesophageal reflux disease)     Hypertension     Liver transplant recipient (H) 08/25/2016    [facility]    Liver transplanted (H)     Migraine headache     Migraines     Osteoporosis     frax 11/1.7% 2021    PFO (patent foramen ovale) 08/08/2016    Noted on echo at [facility]    Recurrent UTI     Spinal stenosis in cervical region     Strabismus     Unspecified asthma(493.90)     Created by Conversion      Patient Active Problem List   Diagnosis    Liver transplanted (H)    Alcoholic hepatitis    Immunosuppression (H)    Alcoholic hepatitis without ascites    Enterococcus UTI    Liver transplant status (H)    Nausea & vomiting    Anemia    ACP (advance care planning)    Diarrhea    Hypothyroidism    Esophageal reflux    Recurrent major depression in partial remission (H)    Insomnia    CKD (chronic kidney disease) stage 3, GFR 30-59 ml/min (H)    Anemia in stage 3 chronic kidney disease (H)    Osteopenia    Alcohol abuse, uncomplicated    Psoriasis    Candida infection    Bacterial infection    Cellulitis     Allergic rhinitis    Disorder of bone and cartilage    Asthma    Avulsion fracture of ankle, left, closed, initial encounter    Incontinence of feces    Major depressive disorder, recurrent episode, moderate (H)    Other atopic dermatitis and related conditions    Rosacea    Symptomatic menopausal or female climacteric states    Tibial plateau fracture, left     Past Surgical History:  Past Surgical History:   Procedure Laterality Date    APPENDECTOMY          APPENDECTOMY      BENCH LIVER N/A 2016    Procedure: BENCH LIVER;  Surgeon: Larry Dhillon MD;  Location: [facility] OR    CATARACT IOL, RT/LT      COLONOSCOPY      COLONOSCOPY N/A 2021    Procedure: COLONOSCOPY, WITH POLYPECTOMY;  Surgeon: Arlyn Beckford MD;  Location: [facility] OR    ESOPHAGOSCOPY, GASTROSCOPY, DUODENOSCOPY (EGD), COMBINED N/A 2016    Procedure: COMBINED ESOPHAGOSCOPY, GASTROSCOPY, DUODENOSCOPY (EGD);  Surgeon: Tao Alfonso MD;  Location:  GI    ESOPHAGOSCOPY, GASTROSCOPY, DUODENOSCOPY (EGD), COMBINED N/A 10/31/2016    Procedure: COMBINED ESOPHAGOSCOPY, GASTROSCOPY, DUODENOSCOPY (EGD), BIOPSY SINGLE OR MULTIPLE;  Surgeon: Ronald Bojorquez MD;  Location:  GI    LIVER TRANSPLANTATION  2016    [facility]    RECTAL SURGERY      sphincteroplasty      TRANSPLANT LIVER RECIPIENT  DONOR N/A 2016    Procedure: TRANSPLANT LIVER RECIPIENT  DONOR;  Surgeon: Larry Dhillon MD;  Location:  OR    TUBAL LIGATION      laparoscopic    TUBAL LIGATION       Family History:   Daughter has CKD due to ureteral valves that were asymptomatic and not dx until around age 30  Family History   Problem Relation Age of Onset    Family History Negative Other     Melanoma Mother             Heart Disease Father         CHF    Skin Cancer Sister     Cirrhosis Paternal Uncle         not alcohol related    Heart Failure Mother     Heart Failure Father     Chronic Obstructive Pulmonary  Disease Father       Social History:   Continues to work for [employer] and has been working from home  Selling house and moving to town home    Physical Exam:  /83   Pulse 76   Temp 97.7  F (36.5  C) (Oral)   Wt 87.8 kg (193 lb 9.6 oz)   LMP  (LMP Unknown)   SpO2 95%   BMI 30.78 kg/m     GENERAL APPEARANCE: alert and no distress  EYES: no scleral icterus, pupils equal  HENT: NC/AT  Pulmonary: normal work of breathing, no clubbing  CV: WWP   - Edema none  MS: no evidence of inflammation in joints, no muscle tenderness  : no Talbert  SKIN: no rash, warm, dry, no cyanosis  NEURO: mentation intact and speech normal     Laboratory:  CMP  Recent Labs   Lab Test 05/30/23  0754 03/27/23  0720 01/30/23  0744 12/08/22  1149 12/04/22  1529 11/28/22  0735 07/29/22  0732 07/13/22  1235 09/20/21  0725 07/21/21  0730 05/17/21  0727 05/17/21  0724 03/25/21  0734 01/25/21  0725    138 135* 138 139 138   < > 141   < > 140 134 135 135 132*   POTASSIUM 4.3 4.1 4.2 4.0 3.3* 4.2   < > 3.8   < > 4.2 4.3 4.1 4.5 4.4   CHLORIDE 102 101 100 99 103 101   < > 106   < > 106 101 101 102 101   CO2 25 25 26 30* 27 20*   < > 26   < > 25 26 27 28 26   ANIONGAP 10 12 9 9 9 17*   < > 9   < > 9 7 7 5 5   * 125* 110* 89 100 101*   < > 101*   < > 108 94 97 103* 99   BUN 21.6 33.3* 24.7* 23.6* 29* 21.3   < > 18   < > 20 19 19 36* 22   CR 1.19* 1.27* 1.17* 1.37* 1.29* 1.21*   < > 1.07*   < > 1.37* 1.26* 1.30* 1.44* 1.38*   GFRESTIMATED 50* 46* 51* 42* 46* 49*   < > 57*   < > 41* 45* 43* 38* 40*   GFRESTBLACK  --   --   --   --   --   --   --   --   --   --  52* 50* 44* 47*   ERIC 9.5 9.8 9.2 9.9 9.6 9.2   < > 8.9   < > 9.3 8.8 8.9 8.8 9.0   MAG 2.0 1.9 1.9 1.8  --  1.5*   < >  --    < > 1.8  --  2.1 2.0 2.0   PHOS  --   --   --   --   --  3.4  --  2.4*  --  4.2 3.2  --   --   --    PROTTOTAL 7.3 7.3 7.1  --  7.6 6.6   < >  --    < > 7.3  --  7.4 7.5 7.6   ALBUMIN 4.4 4.2 4.3  --  4.0 4.0  4.0   < > 3.6   < > 4.0 3.6 3.8  3.8 3.8   BILITOTAL 0.4 0.5 0.5  --  1.1* 0.7   < >  --    < > 1.1*  --  0.6 0.5 0.7   ALKPHOS 49 50 45  --  46 49   < >  --    < > 53  --  50 67 50   AST 22 23 28  --  17 21   < >  --    < > 22  --  18 13 23   ALT 10 12 14  --  13 14   < >  --    < > 21  --  19 23 24    < > = values in this interval not displayed.     CBC  Recent Labs   Lab Test 05/30/23  0754 03/27/23  0720 01/30/23  0744 12/04/22  1529   HGB 13.8 13.6 13.1 13.5   WBC 6.5 8.1 7.3 12.7*   RBC 4.60 4.60 4.29 4.47   HCT 41.2 40.8 38.8 40.2   MCV 90 89 90 90   MCH 30.0 29.6 30.5 30.2   MCHC 33.5 33.3 33.8 33.6   RDW 13.6 12.9 13.1 13.7    326 296 320     INR  Recent Labs   Lab Test 12/04/22  1529 06/11/18  0720 03/16/17  1728 09/13/16  1055 08/25/16  1540 08/25/16  1230 08/25/16  1055 08/25/16  0850   INR 0.98 0.98 1.15* 1.08   < > 1.76* 1.76* 1.87*   PTT 29  --   --   --   --  48* 59* 58*    < > = values in this interval not displayed.     ABG  Recent Labs   Lab Test 10/20/16  1216 10/19/16  0821 08/26/16  0510 08/26/16  0004 08/25/16  2000 08/25/16  1230 08/25/16  1055   PH  --   --   --  7.48* 7.51* 7.45 7.45   PCO2  --   --   --  38 35 44 43   PO2  --   --   --  77* 58* 164* 103   HCO3  --   --   --  28 28 30* 29*   O2PER 21.0 21 4L 4L 4L 100.0  100.0 0.58      URINE STUDIES  Recent Labs   Lab Test 03/27/23  0720 03/14/22  0754 03/25/21  0736 07/24/20  1258 04/29/20  0741 11/29/18  0729   COLOR Yellow Yellow Yellow Yellow Yellow Yellow   APPEARANCE Clear Clear Clear Clear Clear Clear   URINEGLC Negative Negative Negative Negative Negative Negative   URINEBILI Negative Negative Negative Negative Negative Negative   URINEKETONE Negative Negative Negative Negative Negative Negative   SG 1.010 1.010 1.013 1.010 1.015 1.015   UBLD Negative Negative Negative Trace* Negative Negative   URINEPH 6.5 5.5 7.0 5.5 6.0 6.0   PROTEIN Negative Negative Negative Negative Negative Negative   UROBILINOGEN 0.2 0.2  --  0.2 E.U./dL 0.2 E.U./dL  --    NITRITE  Negative Negative Negative Negative Negative Negative   LEUKEST Trace* Negative Negative Small* Negative Negative   RBCU 2-5*  --  2 0-2  --  <1   WBCU 0-5  --  <1 25-50*  --  <1     Recent Labs   Lab Test 05/17/21  0727 03/25/21  0736 07/14/20  0937 03/20/19  1525 11/29/18  0729 03/21/18  1550 12/20/17  1450 09/20/17  1214 06/05/17  1214 03/13/17  1550 12/12/16  1502 10/10/16  1457   UTPG 0.08 0.09 0.07 Unable to calculate due to low value 0.11 0.16 0.12 0.13 0.39* 0.13 0.20 0.39*     PTH  Recent Labs   Lab Test 11/28/22  0735 07/13/22  1235 07/14/20  0936 03/20/19  1517 08/21/18  0739 04/20/18  0739 09/08/17  0723 04/05/17  1236 11/16/16  0640 10/29/16  1807 10/27/16  0705 10/10/16  1456   PTHI 19 73* 71 47 66 90* 117* 53 20 <3* Quantity not sufficient  ED GLOH NOTIFIED ON [facility],10/29/2016,1650,MJ   3*     IRON STUDIES   Recent Labs   Lab Test 08/21/18  0739 04/04/18  0823 03/21/18  1540 06/05/17  1211 04/05/17  1236 03/22/17  1607 09/06/16  0638 08/06/16  0757   IRON  --   --   --  59 56 62 18* 25*   FEB  --   --   --  248 179* 197* 118* 92*   IRONSAT  --   --   --  24 31 32 15 27   WILL 84 57 50 138 278* 320* 480* 528*           Again, thank you for allowing me to participate in the care of your patient.      Sincerely,    Daya Gutierrez MD

## 2023-07-17 ENCOUNTER — ANCILLARY PROCEDURE (OUTPATIENT)
Dept: BONE DENSITY | Facility: CLINIC | Age: 68
End: 2023-07-17
Attending: INTERNAL MEDICINE
Payer: COMMERCIAL

## 2023-07-17 ENCOUNTER — HOSPITAL ENCOUNTER (OUTPATIENT)
Dept: MAMMOGRAPHY | Facility: CLINIC | Age: 68
Discharge: HOME | End: 2023-07-17
Attending: INTERNAL MEDICINE | Admitting: INTERNAL MEDICINE
Payer: COMMERCIAL

## 2023-07-17 ENCOUNTER — MYC MEDICAL ADVICE (OUTPATIENT)
Dept: INTERNAL MEDICINE | Facility: CLINIC | Age: 68
End: 2023-07-17

## 2023-07-17 DIAGNOSIS — Z12.31 ENCOUNTER FOR SCREENING MAMMOGRAM FOR BREAST CANCER: ICD-10-CM

## 2023-07-17 DIAGNOSIS — Z78.0 ASYMPTOMATIC POSTMENOPAUSAL STATE: ICD-10-CM

## 2023-07-17 PROCEDURE — 77080 DXA BONE DENSITY AXIAL: CPT | Mod: TC | Performed by: PHYSICIAN ASSISTANT

## 2023-07-17 PROCEDURE — 77067 SCR MAMMO BI INCL CAD: CPT

## 2023-07-22 PROBLEM — L40.9 SCALP PSORIASIS: Status: ACTIVE | Noted: 2023-07-22

## 2023-07-22 PROBLEM — L21.9 DERMATITIS, SEBORRHEIC: Status: ACTIVE | Noted: 2023-07-22

## 2023-07-22 PROBLEM — L40.0 PSORIASIS VULGARIS: Status: ACTIVE | Noted: 2023-07-22

## 2023-07-26 ENCOUNTER — TELEPHONE (OUTPATIENT)
Dept: TRANSPLANT | Facility: CLINIC | Age: 68
End: 2023-07-26

## 2023-07-26 DIAGNOSIS — Z94.4 LIVER REPLACED BY TRANSPLANT (H): ICD-10-CM

## 2023-07-26 DIAGNOSIS — Z13.220 LIPID SCREENING: Primary | ICD-10-CM

## 2023-07-26 NOTE — TELEPHONE ENCOUNTER
Pt's starting lipitor 10 mg daily   CK and LFT's in 1 week, then CK q 2 months x2, recheck lipids in 3 months then annually. Pt aware of plan for labs

## 2023-07-28 ENCOUNTER — LAB (OUTPATIENT)
Dept: LAB | Facility: CLINIC | Age: 68
End: 2023-07-28
Payer: COMMERCIAL

## 2023-07-28 DIAGNOSIS — Z94.4 LIVER REPLACED BY TRANSPLANT (H): ICD-10-CM

## 2023-07-28 DIAGNOSIS — Z79.899 ENCOUNTER FOR LONG-TERM (CURRENT) USE OF MEDICATIONS: ICD-10-CM

## 2023-07-28 DIAGNOSIS — Z13.220 LIPID SCREENING: ICD-10-CM

## 2023-07-28 DIAGNOSIS — Z94.4 LIVER TRANSPLANTED (H): ICD-10-CM

## 2023-07-28 LAB
ALBUMIN SERPL BCG-MCNC: 4.3 G/DL (ref 3.5–5.2)
ALP SERPL-CCNC: 45 U/L (ref 35–104)
ALT SERPL W P-5'-P-CCNC: 10 U/L (ref 0–50)
ANION GAP SERPL CALCULATED.3IONS-SCNC: 12 MMOL/L (ref 7–15)
AST SERPL W P-5'-P-CCNC: 26 U/L (ref 0–45)
BILIRUB DIRECT SERPL-MCNC: <0.2 MG/DL (ref 0–0.3)
BILIRUB SERPL-MCNC: 0.6 MG/DL
BUN SERPL-MCNC: 37 MG/DL (ref 8–23)
CALCIUM SERPL-MCNC: 9.7 MG/DL (ref 8.8–10.2)
CHLORIDE SERPL-SCNC: 102 MMOL/L (ref 98–107)
CREAT SERPL-MCNC: 1.25 MG/DL (ref 0.51–0.95)
DEPRECATED HCO3 PLAS-SCNC: 25 MMOL/L (ref 22–29)
ERYTHROCYTE [DISTWIDTH] IN BLOOD BY AUTOMATED COUNT: 13.5 % (ref 10–15)
GFR SERPL CREATININE-BSD FRML MDRD: 47 ML/MIN/1.73M2
GLUCOSE SERPL-MCNC: 110 MG/DL (ref 70–99)
HCT VFR BLD AUTO: 42.7 % (ref 35–47)
HGB BLD-MCNC: 14.3 G/DL (ref 11.7–15.7)
MAGNESIUM SERPL-MCNC: 1.9 MG/DL (ref 1.7–2.3)
MCH RBC QN AUTO: 29.5 PG (ref 26.5–33)
MCHC RBC AUTO-ENTMCNC: 33.5 G/DL (ref 31.5–36.5)
MCV RBC AUTO: 88 FL (ref 78–100)
PLATELET # BLD AUTO: 285 10E3/UL (ref 150–450)
POTASSIUM SERPL-SCNC: 4.2 MMOL/L (ref 3.4–5.3)
PROT SERPL-MCNC: 7.3 G/DL (ref 6.4–8.3)
RBC # BLD AUTO: 4.84 10E6/UL (ref 3.8–5.2)
SODIUM SERPL-SCNC: 139 MMOL/L (ref 136–145)
TACROLIMUS BLD-MCNC: 3.9 UG/L (ref 5–15)
TME LAST DOSE: ABNORMAL H
TME LAST DOSE: ABNORMAL H
WBC # BLD AUTO: 6.3 10E3/UL (ref 4–11)

## 2023-07-28 PROCEDURE — 80197 ASSAY OF TACROLIMUS: CPT

## 2023-07-28 PROCEDURE — 36415 COLL VENOUS BLD VENIPUNCTURE: CPT

## 2023-07-28 PROCEDURE — 82248 BILIRUBIN DIRECT: CPT

## 2023-07-28 PROCEDURE — 83735 ASSAY OF MAGNESIUM: CPT

## 2023-07-28 PROCEDURE — 80053 COMPREHEN METABOLIC PANEL: CPT

## 2023-07-28 PROCEDURE — 85027 COMPLETE CBC AUTOMATED: CPT

## 2023-08-02 ENCOUNTER — OFFICE VISIT (OUTPATIENT)
Dept: PODIATRY | Facility: CLINIC | Age: 68
End: 2023-08-02
Payer: COMMERCIAL

## 2023-08-02 VITALS
HEART RATE: 79 BPM | BODY MASS INDEX: 30.22 KG/M2 | DIASTOLIC BLOOD PRESSURE: 84 MMHG | SYSTOLIC BLOOD PRESSURE: 134 MMHG | OXYGEN SATURATION: 95 % | WEIGHT: 188 LBS | HEIGHT: 66 IN

## 2023-08-02 DIAGNOSIS — B35.1 NAIL FUNGUS: Primary | ICD-10-CM

## 2023-08-02 PROCEDURE — 99213 OFFICE O/P EST LOW 20 MIN: CPT | Performed by: PODIATRIST

## 2023-08-02 NOTE — PROGRESS NOTES
FOOT AND ANKLE SURGERY/PODIATRY Progress Note        ASSESSMENT:   Onychomycosis right great toenail        TREATMENT:  I told the patient I do not recommend any more topical antifungal since they are highly ineffective in treating the fungal infection.  Told the patient that she may require total nail excision and matrixectomy if the toenail becomes difficult to treat or the start to cause pain.  I told the patient that she is should monitor her condition and if she decides to have the toenail removed permanently she should make a follow-up appointment at which time I  will perform the procedure            HPI:Phan Luque presented to the clinic today complaining of a thick discolored dry brittle loosely attached right great toenail.  The patient did use a full course of Penlac with no success.  She is aware she has a fungal infection of the right great toenail.  She denies any recent trauma to the nail.  The patient wanted to know if she had any other options in an attempt to get a more naturally appearing nail.   She has not had any redness, swelling surrounding the toenail.  She is able to wear shoes without any discomfort.  Past Medical History:   Diagnosis Date    AION (acute ischemic optic neuropathy)     Asthma     Bacterial infection 02/04/2021    Candida infection 11/11/2020    Cellulitis 09/13/2021    Cervical spinal stenosis     Chronic kidney disease     Chronic kidney disease, stage 3 (H)     Dizziness and giddiness     Created by Conversion     End stage liver disease (H)     2/2 Alcohol Abuse    End stage liver disease (H)     Alcohol-related    GERD (gastroesophageal reflux disease)     Hypertension     Liver transplant recipient (H) 08/25/2016    [facility]    Liver transplanted (H)     Migraine headache     Migraines     Osteoporosis     frax 11/1.7% 2021    PFO (patent foramen ovale) 08/08/2016    Noted on echo at [facility]    Recurrent UTI     Spinal  stenosis in cervical region     Strabismus     Unspecified asthma(493.90)     Created by Conversion         Past Surgical History:   Procedure Laterality Date    APPENDECTOMY          APPENDECTOMY      BENCH LIVER N/A 2016    Procedure: BENCH LIVER;  Surgeon: Larry Dhillon MD;  Location: [facility] OR    CATARACT IOL, RT/LT      COLONOSCOPY      COLONOSCOPY N/A 2021    Procedure: COLONOSCOPY, WITH POLYPECTOMY;  Surgeon: Arlyn Beckford MD;  Location: [facility] OR    ESOPHAGOSCOPY, GASTROSCOPY, DUODENOSCOPY (EGD), COMBINED N/A 2016    Procedure: COMBINED ESOPHAGOSCOPY, GASTROSCOPY, DUODENOSCOPY (EGD);  Surgeon: Tao Alfonso MD;  Location:  GI    ESOPHAGOSCOPY, GASTROSCOPY, DUODENOSCOPY (EGD), COMBINED N/A 10/31/2016    Procedure: COMBINED ESOPHAGOSCOPY, GASTROSCOPY, DUODENOSCOPY (EGD), BIOPSY SINGLE OR MULTIPLE;  Surgeon: Ronald Bojorquez MD;  Location: [facility]    LIVER TRANSPLANTATION  2016    [facility]    RECTAL SURGERY      sphincteroplasty      TRANSPLANT LIVER RECIPIENT  DONOR N/A 2016    Procedure: TRANSPLANT LIVER RECIPIENT  DONOR;  Surgeon: Larry Dhillon MD;  Location: [facility] OR    TUBAL LIGATION      laparoscopic    TUBAL LIGATION         Allergies   Allergen Reactions    Codeine Hives    Benadryl [Diphenhydramine] Hives    Cefaclor Hives    Hydrocodone-Acetaminophen Itching    Oxycodone Itching    Penicillins Hives    Sulfa Antibiotics Hives         Current Outpatient Medications:     albuterol (VENTOLIN HFA) 108 (90 Base) MCG/ACT inhaler, Inhale 2 puffs into the lungs every 6 hours as needed for shortness of breath, Disp: 18 g, Rfl: 2    amLODIPine (NORVASC) 5 MG tablet, Take 1 tablet (5 mg) by mouth daily, Disp: 90 tablet, Rfl: 0    chlorthalidone (HYGROTON) 25 MG tablet, Take 0.5 tablets (12.5 mg) by mouth daily, Disp: 45 tablet, Rfl: 3    cholecalciferol (VITAMIN D3) 400 UNIT TABS tablet, Take 400 Units by mouth daily, Disp: ,  Rfl:     clobetasol (TEMOVATE) 0.05 % external ointment, Apply topically 2 times daily To areas of eczema on the lower legs, until areas resolve., Disp: 60 g, Rfl: 1    clobetasol (TEMOVATE) 0.05 % external solution, Apply topically 2 times daily To the scalp as needed for itching and flaking, on the days you don't use the clobetasol shampoo., Disp: 150 mL, Rfl: 0    clobetasol propionate (CLOBEX) 0.05 % external shampoo, Apply topically daily To dry scalp x 15 min and rinse ,when psoriasis is present., Disp: 354 mL, Rfl: 1    cyanocobalamin (VITAMIN  B-12) 1000 MCG tablet, Take 1,000 mcg by mouth daily, Disp: , Rfl:     ferrous sulfate (IRON) 325 (65 FE) MG tablet, Take 1 tablet (325 mg) by mouth 2 times daily, Disp: 100 tablet, Rfl:     fluocinolone acetonide (DERMA SMOOTHE/FS BODY) 0.01 % external oil, Apply topically 2 times daily, Disp: 118.28 mL, Rfl: 11    folic acid (FOLVITE) 1 MG tablet, Take 1 tablet (1 mg) by mouth daily, Disp: 30 tablet, Rfl: 1    gabapentin (NEURONTIN) 100 MG capsule, Take 2 capsules (200 mg) by mouth At Bedtime, Disp: 180 capsule, Rfl: 0    ketoconazole (NIZORAL) 2 % external shampoo, Apply topically daily as needed for itching or irritation Apply to scalp leave for a few minutes and then wash out daily, Disp: 120 mL, Rfl: 11    levothyroxine (SYNTHROID/LEVOTHROID) 88 MCG tablet, Take 1 tablet (88 mcg) by mouth daily, Disp: 90 tablet, Rfl: 0    MAGNESIUM OXIDE PO, Take 400 mg by mouth daily, Disp: , Rfl:     melatonin 5 MG tablet, Take 1 tablet (5 mg) by mouth nightly as needed for sleep, Disp: , Rfl:     multivitamin, therapeutic (THERA-VIT) TABS tablet, Take 1 tablet by mouth every 24 hours, Disp: 30 tablet, Rfl: 11    order for DME, Equipment being ordered: Trilok ankle brace, Disp: 1 Device, Rfl: 0    pantoprazole (PROTONIX) 40 MG EC tablet, Take 1 tablet (40 mg) by mouth daily before breakfast, Disp: 90 tablet, Rfl: 0    pravastatin (PRAVACHOL) 10 MG tablet, Take 1 tablet (10 mg)  by mouth daily, Disp: 90 tablet, Rfl: 3    propranolol (INDERAL) 10 MG tablet, Take 1 tablet (10 mg) by mouth 2 times daily, Disp: 180 tablet, Rfl: 0    psyllium 0.52 G capsule, Take 2 capsules (1.04 g) by mouth daily With a full glass of water. (Patient taking differently: Take 1 capsule by mouth 3 times daily With a full glass of water.), Disp: 60 capsule, Rfl: 1    tacrolimus (GENERIC EQUIVALENT) 0.5 MG capsule, Take 3 capsules (1.5 mg) by mouth every 12 hours, Disp: 540 capsule, Rfl: 3    triamcinolone (KENALOG) 0.1 % external ointment, Apply topically 2 times daily To plaques behind the knee, Disp: 80 g, Rfl: 1    ursodiol (ACTIGALL) 300 MG capsule, TAKE ONE CAPSULE BY MOUTH TWICE A DAY, Disp: 60 capsule, Rfl: 11    acetaminophen (TYLENOL) 325 MG tablet, Take 2 tablets (650 mg) by mouth every 6 hours as needed for mild pain Or fevers. Use sparingly. Limit use to no more than 2 grams (2000 mg) in 24 hours. **Further refills must be obtained by primary care provider** (Patient not taking: Reported on 3/31/2023), Disp: 100 tablet, Rfl: 0    calcipotriene (DOVONOX) 0.005 % external solution, Apply 1 mL topically daily To the scalp (Patient not taking: Reported on 2023), Disp: 180 mL, Rfl: 1    ciclopirox (PENLAC) 8 % external solution, Apply to adjacent skin and affected nails daily.  Remove with alcohol every 7 days, then repeat. (Patient not taking: Reported on 3/31/2023), Disp: 6.6 mL, Rfl: 1    Family History   Problem Relation Age of Onset    Family History Negative Other     Melanoma Mother             Heart Disease Father         CHF    Skin Cancer Sister     Cirrhosis Paternal Uncle         not alcohol related    Heart Failure Mother     Heart Failure Father     Chronic Obstructive Pulmonary Disease Father        Social History     Socioeconomic History    Marital status:      Spouse name: Not on file    Number of children: 3    Years of education: Not on file    Highest education level: Not  "on file   Occupational History    Occupation:    Tobacco Use    Smoking status: Former     Packs/day: 2.50     Years: 20.00     Pack years: 50.00     Types: Cigarettes     Quit date: 1996     Years since quittin.6    Smokeless tobacco: Never   Vaping Use    Vaping Use: Never used   Substance and Sexual Activity    Alcohol use: No     Alcohol/week: 7.0 standard drinks of alcohol     Types: 7 Standard drinks or equivalent per week     Comment: heavy use (750ml/2 days) up until 1 year ago; had cut down to 1 drink/day; none since 17    Drug use: No    Sexual activity: Not on file   Other Topics Concern    Parent/sibling w/ CABG, MI or angioplasty before 65F 55M? 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    Lives alone     Social Determinants of Health     Financial Resource Strain: Not on file   Food Insecurity: Not on file   Transportation Needs: Not on file   Physical Activity: Not on file   Stress: Not on file   Social Connections: Not on file   Intimate Partner Violence: Not At Risk (9/15/2021)    Humiliation, Afraid, Rape, and Kick questionnaire     Fear of Current or Ex-Partner: No     Emotionally Abused: No     Physically Abused: No     Sexually Abused: No   Housing Stability: Not on file       10 point Review of Systems is negative      /84   Pulse 79   Ht 1.676 m (5' 6\")   Wt 85.3 kg (188 lb)   LMP  (LMP Unknown)   SpO2 95%   BMI 30.34 kg/m      BMI= Body mass index is 30.34 kg/m .    OBJECTIVE:  General appearance: Patient is alert and fully cooperative with history & exam.  No sign of distress is noted during the visit.  Vascular: Dorsalis pedis and posterior tibial pulses are palpable. There is pedal hair growth bilaterally.  CFT < 3 sec from anterior tibial surface to distal digits bilaterally. There is no appreciable edema noted.  Dermatologic: The right great " toenail is slightly thickened and severely discolored.  Turgor and texture are within normal limits. No coloration or temperature changes. No primary or secondary lesions noted.  Neurologic: All epicritic and proprioceptive sensations are grossly intact bilaterally.  Musculoskeletal: All active and passive ankle, subtalar, midtarsal, and 1st MPJ range of motion are grossly intact without pain or crepitus, with the exception of none. Manual muscle strength is within normal limits bilaterally. All dorsiflexors, plantarflexors, invertors, evertors are intact bilaterally.  No tenderness present to both great toenails on palpation.  No tenderness to both great toes with range of motion. Calf is soft/non-tender without warmth/induration    Imaging:         MA Screen Bilateral w/Dario    Result Date: 7/18/2023  BILATERAL FULL FIELD DIGITAL SCREENING MAMMOGRAM WITH TOMOSYNTHESIS Performed on: 7/17/23 Compared to: 10/26/2022, 10/27/2021, and 10/06/2020 Technique:  This study was evaluated with the assistance of Computer-Aided Detection.  Breast Tomosynthesis was used in interpretation. Findings: The breasts have scattered areas of fibroglandular density.  There is no radiographic evidence of malignancy.     IMPRESSION: ACR BI-RADS Category 1: Negative RECOMMENDED FOLLOW-UP: Annual routine screening mammogram The results and recommendations of this examination will be communicated to the patient. Brian Smith     DX Hip/Pelvis/Spine    Result Date: 7/17/2023  EXAM: DX HIP/PELVIS/SPINE LOCATION: [facility] DATE: 7/17/2023 INDICATION: BMD screening. Follow-up. DEMOGRAPHICS: Age- 67 years. Gender- Female. Menopausal status- Postmenopausal. COMPARISON: 07/13/2010 TECHNIQUE: Dual-energy x-ray absorptiometry (DXA) performed with routine technique.   Trabecular bone score (TBS) analysis performed. FINDINGS: DXA RESULTS -Lumbar Spine: L1-L2: BMD: 1.098 g/cm2. T-score: -0.6. Z-score: 0.3. L3 and L4 omitted  due to degenerative changes. -RIGHT Hip Total: BMD: 0.832 g/cm2. T-score: -1.4. Z-score: -0.7. -RIGHT Hip Femoral neck: BMD: 0.829 g/cm2. T-score: -1.5. Z-score: -0.5. -LEFT Hip Total: BMD: 0.840 g/cm2. T-score: -1.3. Z-score: -0.6. -LEFT Hip Femoral neck: BMD: 0.821 g/cm2. T-score: -1.6. Z-score: -0.5. WHO T-SCORE CRITERIA -Normal: T score at or above -1 SD -Osteopenia: T score between -1 and -2.5 SD -Osteoporosis: T score at or below -2.5 SD The World Health Organization (WHO) criteria is applicable to perimenopausal females, postmenopausal females, and men aged 50 years or older. TBS RESULTS -Lumbar Spine L1-L2: TBS: 1.301. TBS T-score: -2.0.TBS Z-score: 0.3. The TBS is a DXA derived measurement for fracture risk assessment, and reflects the structural condition of the bone microarchitecture. It can be used to adjust WHO Fracture Risk Assessment Tool (FRAX) probability of fracture in postmenopausal women and older men. The calculated probabilities of fracture have been shown to be more accurate when computed with the TBS. INTERVAL CHANGE -There has been a 9.0% decrease in lumbar spine BMD. -There has been a 2.7% increase in bilateral hip BMD. FRACTURE RISK -FRAX Results: The 10 year probability of major osteoporotic fracture is 23.0%, and of hip fracture is 3.2%, based on left femoral neck BMD. -TBS-adjusted FRAX Results: The 10 year probability of major osteoporotic fracture is 21.5%, and of hip fracture is 3.1%. RECOMMENDATIONS Consider treatment if major osteoporotic fracture score is greater than or equal to 20%, and if the hip fracture score is greater than or equal to 3%.     IMPRESSION: Low bone density (OSTEOPENIA). T score meets the WHO criteria for low bone density (osteopenia) at one or more measured sites. The risk of osteoporotic fracture increases approximately two-fold for each standard deviation decrease in T-score.               Daniel Bharath; DPM  [facility]

## 2023-08-02 NOTE — Clinical Note
8/2/2023         RE: Phan Luque  [address]  [facility] [address]        Dear Colleague,    Thank you for referring your patient, Phan Luque, to the [facility]. Please see a copy of my visit note below.    FOOT AND ANKLE SURGERY/PODIATRY Progress Note        ASSESSMENT:   ***    HPI: Phan Luque was seen again today  ***.      Past Medical History:   Diagnosis Date     AION (acute ischemic optic neuropathy)      Asthma      Bacterial infection 02/04/2021     Candida infection 11/11/2020     Cellulitis 09/13/2021     Cervical spinal stenosis      Chronic kidney disease      Chronic kidney disease, stage 3 (H)      Dizziness and giddiness     Created by Conversion      End stage liver disease (H)     2/2 Alcohol Abuse     End stage liver disease (H)     Alcohol-related     GERD (gastroesophageal reflux disease)      Hypertension      Liver transplant recipient (H) 08/25/2016    [facility]     Liver transplanted (H)      Migraine headache      Migraines      Osteoporosis     frax 11/1.7% 2021     PFO (patent foramen ovale) 08/08/2016    Noted on echo at [facility]     Recurrent UTI      Spinal stenosis in cervical region      Strabismus      Unspecified asthma(493.90)     Created by Conversion         Past Surgical History:   Procedure Laterality Date     APPENDECTOMY      1962     APPENDECTOMY       BENCH LIVER N/A 8/25/2016    Procedure: BENCH LIVER;  Surgeon: Larry Dhillon MD;  Location:  OR     CATARACT IOL, RT/LT       COLONOSCOPY       COLONOSCOPY N/A 8/12/2021    Procedure: COLONOSCOPY, WITH POLYPECTOMY;  Surgeon: Arlyn Beckford MD;  Location: [facility] [place]     ESOPHAGOSCOPY, GASTROSCOPY, DUODENOSCOPY (EGD), COMBINED N/A 8/17/2016    Procedure: COMBINED ESOPHAGOSCOPY, GASTROSCOPY, DUODENOSCOPY (EGD);  Surgeon: Tao Alfonso MD;  Location:  GI     ESOPHAGOSCOPY, GASTROSCOPY, DUODENOSCOPY (EGD), COMBINED N/A  10/31/2016    Procedure: COMBINED ESOPHAGOSCOPY, GASTROSCOPY, DUODENOSCOPY (EGD), BIOPSY SINGLE OR MULTIPLE;  Surgeon: Ronald Bojorquez MD;  Location: [facility]     LIVER TRANSPLANTATION  2016    [facility]     RECTAL SURGERY       sphincteroplasty       TRANSPLANT LIVER RECIPIENT  DONOR N/A 2016    Procedure: TRANSPLANT LIVER RECIPIENT  DONOR;  Surgeon: Larry Dhillon MD;  Location: [facility] OR     TUBAL LIGATION      laparoscopic     TUBAL LIGATION         Allergies   Allergen Reactions     Codeine Hives     Benadryl [Diphenhydramine] Hives     Cefaclor Hives     Hydrocodone-Acetaminophen Itching     Oxycodone Itching     Penicillins Hives     Sulfa Antibiotics Hives         Current Outpatient Medications:      albuterol (VENTOLIN HFA) 108 (90 Base) MCG/ACT inhaler, Inhale 2 puffs into the lungs every 6 hours as needed for shortness of breath, Disp: 18 g, Rfl: 2     amLODIPine (NORVASC) 5 MG tablet, Take 1 tablet (5 mg) by mouth daily, Disp: 90 tablet, Rfl: 0     chlorthalidone (HYGROTON) 25 MG tablet, Take 0.5 tablets (12.5 mg) by mouth daily, Disp: 45 tablet, Rfl: 3     cholecalciferol (VITAMIN D3) 400 UNIT TABS tablet, Take 400 Units by mouth daily, Disp: , Rfl:      clobetasol (TEMOVATE) 0.05 % external ointment, Apply topically 2 times daily To areas of eczema on the lower legs, until areas resolve., Disp: 60 g, Rfl: 1     clobetasol (TEMOVATE) 0.05 % external solution, Apply topically 2 times daily To the scalp as needed for itching and flaking, on the days you don't use the clobetasol shampoo., Disp: 150 mL, Rfl: 0     clobetasol propionate (CLOBEX) 0.05 % external shampoo, Apply topically daily To dry scalp x 15 min and rinse ,when psoriasis is present., Disp: 354 mL, Rfl: 1     cyanocobalamin (VITAMIN  B-12) 1000 MCG tablet, Take 1,000 mcg by mouth daily, Disp: , Rfl:      ferrous sulfate (IRON) 325 (65 FE) MG tablet, Take 1 tablet (325 mg) by mouth 2  times daily, Disp: 100 tablet, Rfl:      fluocinolone acetonide (DERMA SMOOTHE/FS BODY) 0.01 % external oil, Apply topically 2 times daily, Disp: 118.28 mL, Rfl: 11     folic acid (FOLVITE) 1 MG tablet, Take 1 tablet (1 mg) by mouth daily, Disp: 30 tablet, Rfl: 1     gabapentin (NEURONTIN) 100 MG capsule, Take 2 capsules (200 mg) by mouth At Bedtime, Disp: 180 capsule, Rfl: 0     ketoconazole (NIZORAL) 2 % external shampoo, Apply topically daily as needed for itching or irritation Apply to scalp leave for a few minutes and then wash out daily, Disp: 120 mL, Rfl: 11     levothyroxine (SYNTHROID/LEVOTHROID) 88 MCG tablet, Take 1 tablet (88 mcg) by mouth daily, Disp: 90 tablet, Rfl: 0     MAGNESIUM OXIDE PO, Take 400 mg by mouth daily, Disp: , Rfl:      melatonin 5 MG tablet, Take 1 tablet (5 mg) by mouth nightly as needed for sleep, Disp: , Rfl:      multivitamin, therapeutic (THERA-VIT) TABS tablet, Take 1 tablet by mouth every 24 hours, Disp: 30 tablet, Rfl: 11     order for DME, Equipment being ordered: Trilok ankle brace, Disp: 1 Device, Rfl: 0     pantoprazole (PROTONIX) 40 MG EC tablet, Take 1 tablet (40 mg) by mouth daily before breakfast, Disp: 90 tablet, Rfl: 0     pravastatin (PRAVACHOL) 10 MG tablet, Take 1 tablet (10 mg) by mouth daily, Disp: 90 tablet, Rfl: 3     propranolol (INDERAL) 10 MG tablet, Take 1 tablet (10 mg) by mouth 2 times daily, Disp: 180 tablet, Rfl: 0     psyllium 0.52 G capsule, Take 2 capsules (1.04 g) by mouth daily With a full glass of water. (Patient taking differently: Take 1 capsule by mouth 3 times daily With a full glass of water.), Disp: 60 capsule, Rfl: 1     tacrolimus (GENERIC EQUIVALENT) 0.5 MG capsule, Take 3 capsules (1.5 mg) by mouth every 12 hours, Disp: 540 capsule, Rfl: 3     triamcinolone (KENALOG) 0.1 % external ointment, Apply topically 2 times daily To plaques behind the knee, Disp: 80 g, Rfl: 1     ursodiol (ACTIGALL) 300 MG capsule, TAKE ONE CAPSULE BY MOUTH  TWICE A DAY, Disp: 60 capsule, Rfl: 11     acetaminophen (TYLENOL) 325 MG tablet, Take 2 tablets (650 mg) by mouth every 6 hours as needed for mild pain Or fevers. Use sparingly. Limit use to no more than 2 grams (2000 mg) in 24 hours. **Further refills must be obtained by primary care provider** (Patient not taking: Reported on 3/31/2023), Disp: 100 tablet, Rfl: 0     calcipotriene (DOVONOX) 0.005 % external solution, Apply 1 mL topically daily To the scalp (Patient not taking: Reported on 2023), Disp: 180 mL, Rfl: 1     ciclopirox (PENLAC) 8 % external solution, Apply to adjacent skin and affected nails daily.  Remove with alcohol every 7 days, then repeat. (Patient not taking: Reported on 3/31/2023), Disp: 6.6 mL, Rfl: 1    Family History   Problem Relation Age of Onset     Family History Negative Other      Melanoma Mother              Heart Disease Father         CHF     Skin Cancer Sister      Cirrhosis Paternal Uncle         not alcohol related     Heart Failure Mother      Heart Failure Father      Chronic Obstructive Pulmonary Disease Father        Social History     Socioeconomic History     Marital status:      Spouse name: Not on file     Number of children: 3     Years of education: Not on file     Highest education level: Not on file   Occupational History     Occupation:    Tobacco Use     Smoking status: Former     Packs/day: 2.50     Years: 20.00     Pack years: 50.00     Types: Cigarettes     Quit date: 1996     Years since quittin.6     Smokeless tobacco: Never   Vaping Use     Vaping Use: Never used   Substance and Sexual Activity     Alcohol use: No     Alcohol/week: 7.0 standard drinks of alcohol     Types: 7 Standard drinks or equivalent per week     Comment: heavy use (750ml/2 days) up until 1 year ago; had cut down to 1 drink/day; none since 17     Drug use: No     Sexual activity: Not on file   Other Topics Concern     Parent/sibling w/  "CABG, MI or angioplasty before 65F 55M? 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    Lives alone     Social Determinants of Health     Financial Resource Strain: Not on file   Food Insecurity: Not on file   Transportation Needs: Not on file   Physical Activity: Not on file   Stress: Not on file   Social Connections: Not on file   Intimate Partner Violence: Not At Risk (9/15/2021)    Humiliation, Afraid, Rape, and Kick questionnaire      Fear of Current or Ex-Partner: No      Emotionally Abused: No      Physically Abused: No      Sexually Abused: No   Housing Stability: Not on file       10 point Review of Systems is negative except for ***.     /84   Pulse 79   Ht 1.676 m (5' 6\")   Wt 85.3 kg (188 lb)   LMP  (LMP Unknown)   SpO2 95%   BMI 30.34 kg/m      BMI= Body mass index is 30.34 kg/m .    OBJECTIVE:  General appearance: Patient is alert and fully cooperative with history & exam.  No sign of distress is noted during the visit.  Vascular: Dorsalis pedis and posterior tibial pulses are palpable. There is pedal hair growth ***.  CFT < 3 sec from anterior tibial surface to distal digits ***. There is no appreciable edema noted.  Dermatologic: Turgor and texture are within normal limits. No coloration or temperature changes. No primary or secondary lesions noted.  Neurologic: All epicritic and proprioceptive sensations are grossly intact ***.  Musculoskeletal: All active and passive ankle, subtalar, midtarsal, and 1st MPJ range of motion are grossly intact without pain or crepitus, with the exception of ***. Manual muscle strength is ***. All dorsiflexors, plantarflexors, invertors, evertors are intact ***. Tenderness present to *** on palpation. Tenderness to *** with range of motion. Calf is soft/non-tender with/without warmth/induration    Imaging:     {Imaging order/result:23848}    MA Screen Bilateral " w/Dario    Result Date: 7/18/2023  BILATERAL FULL FIELD DIGITAL SCREENING MAMMOGRAM WITH TOMOSYNTHESIS Performed on: 7/17/23 Compared to: 10/26/2022, 10/27/2021, and 10/06/2020 Technique:  This study was evaluated with the assistance of Computer-Aided Detection.  Breast Tomosynthesis was used in interpretation. Findings: The breasts have scattered areas of fibroglandular density.  There is no radiographic evidence of malignancy.     IMPRESSION: ACR BI-RADS Category 1: Negative RECOMMENDED FOLLOW-UP: Annual routine screening mammogram The results and recommendations of this examination will be communicated to the patient. Brianfabio Smith     DX Hip/Pelvis/Spine    Result Date: 7/17/2023  EXAM: DX HIP/PELVIS/SPINE LOCATION: [facility] DATE: 7/17/2023 INDICATION: BMD screening. Follow-up. DEMOGRAPHICS: Age- 67 years. Gender- Female. Menopausal status- Postmenopausal. COMPARISON: 07/13/2010 TECHNIQUE: Dual-energy x-ray absorptiometry (DXA) performed with routine technique.   Trabecular bone score (TBS) analysis performed. FINDINGS: DXA RESULTS -Lumbar Spine: L1-L2: BMD: 1.098 g/cm2. T-score: -0.6. Z-score: 0.3. L3 and L4 omitted due to degenerative changes. -RIGHT Hip Total: BMD: 0.832 g/cm2. T-score: -1.4. Z-score: -0.7. -RIGHT Hip Femoral neck: BMD: 0.829 g/cm2. T-score: -1.5. Z-score: -0.5. -LEFT Hip Total: BMD: 0.840 g/cm2. T-score: -1.3. Z-score: -0.6. -LEFT Hip Femoral neck: BMD: 0.821 g/cm2. T-score: -1.6. Z-score: -0.5. WHO T-SCORE CRITERIA -Normal: T score at or above -1 SD -Osteopenia: T score between -1 and -2.5 SD -Osteoporosis: T score at or below -2.5 SD The World Health Organization (WHO) criteria is applicable to perimenopausal females, postmenopausal females, and men aged 50 years or older. TBS RESULTS -Lumbar Spine L1-L2: TBS: 1.301. TBS T-score: -2.0.TBS Z-score: 0.3. The TBS is a DXA derived measurement for fracture risk assessment, and reflects the structural condition of  the bone microarchitecture. It can be used to adjust WHO Fracture Risk Assessment Tool (FRAX) probability of fracture in postmenopausal women and older men. The calculated probabilities of fracture have been shown to be more accurate when computed with the TBS. INTERVAL CHANGE -There has been a 9.0% decrease in lumbar spine BMD. -There has been a 2.7% increase in bilateral hip BMD. FRACTURE RISK -FRAX Results: The 10 year probability of major osteoporotic fracture is 23.0%, and of hip fracture is 3.2%, based on left femoral neck BMD. -TBS-adjusted FRAX Results: The 10 year probability of major osteoporotic fracture is 21.5%, and of hip fracture is 3.1%. RECOMMENDATIONS Consider treatment if major osteoporotic fracture score is greater than or equal to 20%, and if the hip fracture score is greater than or equal to 3%.     IMPRESSION: Low bone density (OSTEOPENIA). T score meets the WHO criteria for low bone density (osteopenia) at one or more measured sites. The risk of osteoporotic fracture increases approximately two-fold for each standard deviation decrease in T-score.           TREATMENT:  ***        Daniel Richards DPM  [facility] Foot & Ankle Surgery/Podiatry         Again, thank you for allowing me to participate in the care of your patient.        Sincerely,        Daniel Sinha DPM

## 2023-08-04 ENCOUNTER — LAB (OUTPATIENT)
Dept: LAB | Facility: CLINIC | Age: 68
End: 2023-08-04
Payer: COMMERCIAL

## 2023-08-04 DIAGNOSIS — F41.1 GAD (GENERALIZED ANXIETY DISORDER): ICD-10-CM

## 2023-08-04 DIAGNOSIS — Z94.4 LIVER REPLACED BY TRANSPLANT (H): ICD-10-CM

## 2023-08-04 DIAGNOSIS — R94.6 THYROID FUNCTION TEST ABNORMAL: ICD-10-CM

## 2023-08-04 DIAGNOSIS — Z13.220 LIPID SCREENING: ICD-10-CM

## 2023-08-04 DIAGNOSIS — Z94.4 LIVER TRANSPLANTED (H): ICD-10-CM

## 2023-08-04 DIAGNOSIS — N18.30 STAGE 3 CHRONIC KIDNEY DISEASE, UNSPECIFIED WHETHER STAGE 3A OR 3B CKD (H): ICD-10-CM

## 2023-08-04 DIAGNOSIS — R60.9 FLUID RETENTION: ICD-10-CM

## 2023-08-04 LAB
ALBUMIN SERPL BCG-MCNC: 4.4 G/DL (ref 3.5–5.2)
ALP SERPL-CCNC: 46 U/L (ref 35–104)
ALT SERPL W P-5'-P-CCNC: 12 U/L (ref 0–50)
AST SERPL W P-5'-P-CCNC: 27 U/L (ref 0–45)
BILIRUB DIRECT SERPL-MCNC: <0.2 MG/DL (ref 0–0.3)
BILIRUB SERPL-MCNC: 0.6 MG/DL
CK SERPL-CCNC: 98 U/L (ref 26–192)
PROT SERPL-MCNC: 7.4 G/DL (ref 6.4–8.3)

## 2023-08-04 PROCEDURE — 82550 ASSAY OF CK (CPK): CPT

## 2023-08-04 PROCEDURE — 36415 COLL VENOUS BLD VENIPUNCTURE: CPT

## 2023-08-04 PROCEDURE — 80076 HEPATIC FUNCTION PANEL: CPT

## 2023-08-04 RX ORDER — URSODIOL 300 MG/1
CAPSULE ORAL
Qty: 60 CAPSULE | Refills: 11 | Status: SHIPPED | OUTPATIENT
Start: 2023-08-04 | End: 2023-09-06

## 2023-08-08 RX ORDER — LEVOTHYROXINE SODIUM 88 UG/1
88 TABLET ORAL DAILY
Qty: 90 TABLET | Refills: 3 | Status: SHIPPED | OUTPATIENT
Start: 2023-08-08 | End: 2024-07-22

## 2023-08-08 RX ORDER — CHLORTHALIDONE 25 MG/1
12.5 TABLET ORAL DAILY
Qty: 45 TABLET | Refills: 3 | Status: SHIPPED | OUTPATIENT
Start: 2023-08-08 | End: 2024-07-22

## 2023-08-08 NOTE — TELEPHONE ENCOUNTER
CHLORTHALIDONE 25MG TABS   Last Written Prescription Date:  5/25/2022  Last Fill Quantity: 45,   # refills: 3  Last Office Visit :  7/10/2023  Future Office visit:   7/22/2024    Okay to fill med as note in labs comments mentions labs okay  45 Tabs, 3 Refills sent to pharm         Recent Labs   Lab Test 07/28/23  0734   CR 1.25*         Result Notes     Hussein Banegas MD  8/6/2023  1:20 PM CDT Back to Top      Looks OK    Hussein Banegas MD  7/31/2023  2:34 PM CDT       Looks OK; stay well hydrated        Patient Communication     Append Comments   Seen Back to Top      Looks OK   Written by Hussein Banegas MD on 8/6/2023  1:20 PM CDT View Past Comments  Seen by patient Jessica Luque on 8/8/2023  2:20 PM     LEVOTHYROXINE SODIUM 88MCG TABS   Last Written Prescription Date:  5/17/2023  Last Fill Quantity: 90,   # refills: 3  Last Office Visit :  7/10/2023  Future Office visit:   7/22/2024  90 Tabs, 3 Refills sent to pharm.    Thyroid Protocol [id number]/08/2023 09:50 AM   Protocol Details Patient is 12 years or older    Recent (12 mo) or future (30 days) visit within the authorizing provider's specialty    Medication is active on med list    Normal TSH on file in past 12 months    No active pregnancy on record    No positive pregnancy test in past 12 months          Lilibeth Juarez RN  Central Triage Red Flags/Med Refills

## 2023-08-11 ENCOUNTER — TELEPHONE (OUTPATIENT)
Dept: GASTROENTEROLOGY | Facility: CLINIC | Age: 68
End: 2023-08-11
Payer: COMMERCIAL

## 2023-08-11 NOTE — TELEPHONE ENCOUNTER
"Endoscopy Scheduling Screen    Have you had a positive Covid test in the last 14 days?  No    Are you active on MyChart?   Yes    What insurance is in the chart?  BC/BS: Schedule in [facility] unless patient meets exclusion criteria.     Ordering/Referring Provider:     LIZ CONNOLLY      (If ordering provider performs procedure, schedule with ordering provider unless otherwise instructed. )    BMI: Estimated body mass index is 30.34 kg/m  as calculated from the following:    Height as of 8/2/23: 1.676 m (5' 6\").    Weight as of 8/2/23: 85.3 kg (188 lb).     Sedation Ordered  moderate sedation.   If patient BMI > 50 do not schedule in ASC.    Are you taking any prescription medications for pain?   No    Are you taking methadone or Suboxone?  No    Do you have a history of malignant hyperthermia or adverse reaction to anesthesia?  No    (Females) Are you currently pregnant?        Have you been diagnosed or told you have pulmonary hypertension?   No    Do you have an LVAD?  No    Have you been told you have moderate to severe sleep apnea?  No    Have you been told you have COPD, asthma, or any other lung disease?  Yes     What breathing problems do you have?  Asthma     Do you use home oxygen?  No    Have your breathing problems required an ED visit or hospitalization in the last year?  No    Do you have any heart conditions?  No     Have you ever had or are you awaiting a heart or lung transplant?   No    Have you had a stroke or transient ischemic attack (TIA aka \"mini stroke\" in the last 6 months?   No    Have you been diagnosed with or been told you have cirrhosis of the liver?   No    Are you currently on dialysis?   No    Do you need assistance transferring?   No    BMI: Estimated body mass index is 30.34 kg/m  as calculated from the following:    Height as of 8/2/23: 1.676 m (5' 6\").    Weight as of 8/2/23: 85.3 kg (188 lb).     Is patients BMI > 40 and scheduling location [facility]?  No    Do you take the medication " Phentermine, Ozempic or Wegovy?  No    Do you take the medication Naltrexone?  No    Do you take blood thinners?  No      Prep   Are you currently on dialysis or do you have chronic kidney disease?  Yes (Golytely Prep)    Do you have a diagnosis of diabetes?  No    Do you have a diagnosis of cystic fibrosis (CF)?  No    On a regular basis do you go 3 -5 days between bowel movements?  No    BMI > 40?  No    Preferred Pharmacy:      [id number] [id number] [facility] [address]  Phone: [phone] Fax: [phone]      Final Scheduling Details   Colonoscopy prep sent?  Standard Golytely    Procedure scheduled  Colonoscopy    Surgeon:  LEVENTHAL     Date of procedure:  11/15     Schedule PAC:   No    Location  [facility] - [facility]    Sedation   Moderate Sedation    Patient Reminders:   You will receive a call from a Nurse to review instructions and health history.  This assessment must be completed prior to your procedure.  Failure to complete the Nurse assessment may result in the procedure being cancelled.      On the day of your procedure, please designate an adult(s) who can drive you home stay with you for the next 24 hours. The medicines used in the exam will make you sleepy. You will not be able to drive.      You cannot take public transportation, ride share services, or non-medical taxi service without a responsible caregiver.  Medical transport services are allowed with the requirement that a responsible caregiver will receive you at your destination.  We require that drivers and caregivers are confirmed prior to your procedure.

## 2023-09-06 DIAGNOSIS — Z94.4 LIVER TRANSPLANTED (H): ICD-10-CM

## 2023-09-06 RX ORDER — URSODIOL 300 MG/1
CAPSULE ORAL
Qty: 60 CAPSULE | Refills: 11 | Status: SHIPPED | OUTPATIENT
Start: 2023-09-06 | End: 2023-09-22

## 2023-09-22 DIAGNOSIS — Z94.4 LIVER TRANSPLANTED (H): ICD-10-CM

## 2023-09-22 RX ORDER — URSODIOL 300 MG/1
CAPSULE ORAL
Qty: 60 CAPSULE | Refills: 11 | Status: SHIPPED | OUTPATIENT
Start: 2023-09-22 | End: 2023-10-31

## 2023-09-23 DIAGNOSIS — G62.9 PERIPHERAL POLYNEUROPATHY: ICD-10-CM

## 2023-09-25 RX ORDER — GABAPENTIN 100 MG/1
CAPSULE ORAL
Qty: 180 CAPSULE | Refills: 0 | Status: SHIPPED | OUTPATIENT
Start: 2023-09-25 | End: 2023-11-27

## 2023-09-25 NOTE — TELEPHONE ENCOUNTER
gabapentin (NEURONTIN) 100 MG capsule     180 capsule 0 7/4/2023       7/10/2023  [facility] Internal Medicine Arlyn Luke MD  Internal Medicine    Routed because: not on protocol

## 2023-09-28 ENCOUNTER — LAB (OUTPATIENT)
Dept: LAB | Facility: CLINIC | Age: 68
End: 2023-09-28
Payer: COMMERCIAL

## 2023-09-28 DIAGNOSIS — Z79.899 ENCOUNTER FOR LONG-TERM (CURRENT) USE OF MEDICATIONS: ICD-10-CM

## 2023-09-28 DIAGNOSIS — Z94.4 LIVER TRANSPLANTED (H): ICD-10-CM

## 2023-09-28 DIAGNOSIS — Z13.220 LIPID SCREENING: ICD-10-CM

## 2023-09-28 DIAGNOSIS — Z94.4 LIVER REPLACED BY TRANSPLANT (H): ICD-10-CM

## 2023-09-28 LAB
ALBUMIN SERPL BCG-MCNC: 4.2 G/DL (ref 3.5–5.2)
ALP SERPL-CCNC: 43 U/L (ref 35–104)
ALT SERPL W P-5'-P-CCNC: 7 U/L (ref 0–50)
ANION GAP SERPL CALCULATED.3IONS-SCNC: 13 MMOL/L (ref 7–15)
AST SERPL W P-5'-P-CCNC: 23 U/L (ref 0–45)
BILIRUB DIRECT SERPL-MCNC: <0.2 MG/DL (ref 0–0.3)
BILIRUB SERPL-MCNC: 0.5 MG/DL
BUN SERPL-MCNC: 14.2 MG/DL (ref 8–23)
CALCIUM SERPL-MCNC: 9 MG/DL (ref 8.8–10.2)
CHLORIDE SERPL-SCNC: 101 MMOL/L (ref 98–107)
CK SERPL-CCNC: 72 U/L (ref 26–192)
CREAT SERPL-MCNC: 1.27 MG/DL (ref 0.51–0.95)
EGFRCR SERPLBLD CKD-EPI 2021: 46 ML/MIN/1.73M2
ERYTHROCYTE [DISTWIDTH] IN BLOOD BY AUTOMATED COUNT: 13.4 % (ref 10–15)
GLUCOSE SERPL-MCNC: 109 MG/DL (ref 70–99)
HCO3 SERPL-SCNC: 24 MMOL/L (ref 22–29)
HCT VFR BLD AUTO: 39.8 % (ref 35–47)
HGB BLD-MCNC: 13.5 G/DL (ref 11.7–15.7)
MAGNESIUM SERPL-MCNC: 1.9 MG/DL (ref 1.7–2.3)
MCH RBC QN AUTO: 30.8 PG (ref 26.5–33)
MCHC RBC AUTO-ENTMCNC: 33.9 G/DL (ref 31.5–36.5)
MCV RBC AUTO: 91 FL (ref 78–100)
PLATELET # BLD AUTO: 289 10E3/UL (ref 150–450)
POTASSIUM SERPL-SCNC: 3.9 MMOL/L (ref 3.4–5.3)
PROT SERPL-MCNC: 7.1 G/DL (ref 6.4–8.3)
RBC # BLD AUTO: 4.39 10E6/UL (ref 3.8–5.2)
SODIUM SERPL-SCNC: 138 MMOL/L (ref 135–145)
TACROLIMUS BLD-MCNC: 4.5 UG/L (ref 5–15)
TME LAST DOSE: ABNORMAL H
TME LAST DOSE: ABNORMAL H
WBC # BLD AUTO: 7.4 10E3/UL (ref 4–11)

## 2023-09-28 PROCEDURE — 85027 COMPLETE CBC AUTOMATED: CPT

## 2023-09-28 PROCEDURE — 83735 ASSAY OF MAGNESIUM: CPT

## 2023-09-28 PROCEDURE — 80053 COMPREHEN METABOLIC PANEL: CPT

## 2023-09-28 PROCEDURE — 36415 COLL VENOUS BLD VENIPUNCTURE: CPT

## 2023-09-28 PROCEDURE — 80197 ASSAY OF TACROLIMUS: CPT

## 2023-09-28 PROCEDURE — 82550 ASSAY OF CK (CPK): CPT

## 2023-09-28 PROCEDURE — 82248 BILIRUBIN DIRECT: CPT

## 2023-10-11 ENCOUNTER — OFFICE VISIT (OUTPATIENT)
Dept: OPHTHALMOLOGY | Facility: CLINIC | Age: 68
End: 2023-10-11
Payer: COMMERCIAL

## 2023-10-11 DIAGNOSIS — H47.20 OPTIC ATROPHY: Primary | ICD-10-CM

## 2023-10-11 DIAGNOSIS — H53.40 VISUAL FIELD DEFECT: Primary | ICD-10-CM

## 2023-10-11 PROCEDURE — 92133 CPTRZD OPH DX IMG PST SGM ON: CPT | Performed by: OPHTHALMOLOGY

## 2023-10-11 PROCEDURE — 92014 COMPRE OPH EXAM EST PT 1/>: CPT | Performed by: OPHTHALMOLOGY

## 2023-10-11 PROCEDURE — 92083 EXTENDED VISUAL FIELD XM: CPT | Performed by: OPHTHALMOLOGY

## 2023-10-11 ASSESSMENT — REFRACTION_WEARINGRX
SPECS_TYPE: TRIFOCAL
OS_ADD: +3.00
OD_SPHERE: PLANO
OS_SPHERE: PLANO
OD_ADD: +3.00

## 2023-10-11 ASSESSMENT — CONF VISUAL FIELD
OS_INFERIOR_NASAL_RESTRICTION: 1
OD_INFERIOR_NASAL_RESTRICTION: 0
OS_INFERIOR_TEMPORAL_RESTRICTION: 1
OD_SUPERIOR_TEMPORAL_RESTRICTION: 0
OD_NORMAL: 1
OD_INFERIOR_TEMPORAL_RESTRICTION: 0
OD_SUPERIOR_NASAL_RESTRICTION: 0
METHOD: COUNTING FINGERS

## 2023-10-11 ASSESSMENT — VISUAL ACUITY
OD_SC+: -2
OD_SC: 20/20
METHOD: SNELLEN - LINEAR
OS_SC: 20/80

## 2023-10-11 ASSESSMENT — EXTERNAL EXAM - LEFT EYE: OS_EXAM: NORMAL

## 2023-10-11 ASSESSMENT — CUP TO DISC RATIO
OS_RATIO: 0.6
OD_RATIO: 0.2

## 2023-10-11 ASSESSMENT — SLIT LAMP EXAM - LIDS
COMMENTS: LID TELANGIECTASIA
COMMENTS: LID TELANGIECTASIA

## 2023-10-11 ASSESSMENT — TONOMETRY
IOP_METHOD: ICARE
OD_IOP_MMHG: 23
OS_IOP_MMHG: 22

## 2023-10-11 ASSESSMENT — EXTERNAL EXAM - RIGHT EYE: OD_EXAM: NORMAL

## 2023-10-11 NOTE — PROGRESS NOTES
Assessment & Plan     Phan Luque is a 67 year old female with the following diagnoses:   1. Optic atrophy         Follow up optic atrophy both eyes. 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  Last visit was 7/6/22.  At that time, she had a Posterior vitreous detachment (PVD) and mild optic atrophy both eyes.  She denies any changes to her vision.     Her visual acuity is 20/20 RIGHT eye and 20/80 LEFT eye (stable).  Her visual field is normal RIGHT eye and constricted LEFT eye (stable). Her retinal nerve fiber layer is thinned superiorly in the RIGHT eye and diffusely thin LEFT eye (stable).      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  Her intraocular pressure is slightly elevated at 23 and 22.  She does not have evidence of glaucoma but we will watch.  May consider a pressure lowering drop in the future if continues to be elevated.  Follow up in 1 year or sooner as needed for worsening symptoms.            Attending Physician Attestation:  Complete documentation of historical and exam elements from today's encounter can be found in the full encounter summary report (not reduplicated in this progress note).  I personally obtained the chief complaint(s) and history of present illness.  I confirmed and edited as necessary the review of systems, past medical/surgical history, family history, social history, and examination findings as documented by others; and I examined the patient myself.  I personally reviewed the relevant tests, images, and reports as documented above.  I formulated and edited as necessary the assessment and plan and discussed the findings and management plan with the patient and family. - Ambrose Ortega MD

## 2023-10-30 ENCOUNTER — LAB (OUTPATIENT)
Dept: LAB | Facility: CLINIC | Age: 68
End: 2023-10-30
Payer: COMMERCIAL

## 2023-10-30 DIAGNOSIS — Z94.4 LIVER REPLACED BY TRANSPLANT (H): ICD-10-CM

## 2023-10-30 DIAGNOSIS — Z94.4 LIVER TRANSPLANTED (H): ICD-10-CM

## 2023-10-30 DIAGNOSIS — Z13.220 LIPID SCREENING: ICD-10-CM

## 2023-10-30 LAB
CHOLEST SERPL-MCNC: 158 MG/DL
HDLC SERPL-MCNC: 45 MG/DL
LDLC SERPL CALC-MCNC: 79 MG/DL
NONHDLC SERPL-MCNC: 113 MG/DL
TRIGL SERPL-MCNC: 172 MG/DL

## 2023-10-30 PROCEDURE — 36415 COLL VENOUS BLD VENIPUNCTURE: CPT

## 2023-10-30 PROCEDURE — 80061 LIPID PANEL: CPT

## 2023-10-31 RX ORDER — URSODIOL 300 MG/1
CAPSULE ORAL
Qty: 60 CAPSULE | Refills: 11 | Status: SHIPPED | OUTPATIENT
Start: 2023-10-31 | End: 2024-08-12

## 2023-11-01 ENCOUNTER — IMMUNIZATION (OUTPATIENT)
Dept: NURSING | Facility: CLINIC | Age: 68
End: 2023-11-01
Payer: COMMERCIAL

## 2023-11-01 ENCOUNTER — TELEPHONE (OUTPATIENT)
Dept: GASTROENTEROLOGY | Facility: CLINIC | Age: 68
End: 2023-11-01

## 2023-11-01 DIAGNOSIS — Z94.4 STATUS POST LIVER TRANSPLANTATION (H): Primary | ICD-10-CM

## 2023-11-01 PROCEDURE — 90662 IIV NO PRSV INCREASED AG IM: CPT

## 2023-11-01 PROCEDURE — 91320 SARSCV2 VAC 30MCG TRS-SUC IM: CPT

## 2023-11-01 PROCEDURE — 90471 IMMUNIZATION ADMIN: CPT

## 2023-11-01 PROCEDURE — 90480 ADMN SARSCOV2 VAC 1/ONLY CMP: CPT

## 2023-11-01 RX ORDER — BISACODYL 5 MG/1
TABLET, DELAYED RELEASE ORAL
Qty: 4 TABLET | Refills: 0 | Status: SHIPPED | OUTPATIENT
Start: 2023-11-01 | End: 2024-07-22

## 2023-11-05 ENCOUNTER — MYC MEDICAL ADVICE (OUTPATIENT)
Dept: INTERNAL MEDICINE | Facility: CLINIC | Age: 68
End: 2023-11-05
Payer: COMMERCIAL

## 2023-11-05 ENCOUNTER — MYC MEDICAL ADVICE (OUTPATIENT)
Dept: DERMATOLOGY | Facility: CLINIC | Age: 68
End: 2023-11-05
Payer: COMMERCIAL

## 2023-11-05 DIAGNOSIS — I10 ESSENTIAL HYPERTENSION: ICD-10-CM

## 2023-11-05 DIAGNOSIS — F41.1 GAD (GENERALIZED ANXIETY DISORDER): Primary | ICD-10-CM

## 2023-11-05 DIAGNOSIS — L40.0 PSORIASIS VULGARIS: ICD-10-CM

## 2023-11-05 DIAGNOSIS — L21.9 DERMATITIS, SEBORRHEIC: ICD-10-CM

## 2023-11-05 DIAGNOSIS — L40.9 SCALP PSORIASIS: ICD-10-CM

## 2023-11-06 RX ORDER — HYDROXYZINE HYDROCHLORIDE 25 MG/1
25 TABLET, FILM COATED ORAL 3 TIMES DAILY PRN
Qty: 120 TABLET | Refills: 1 | Status: SHIPPED | OUTPATIENT
Start: 2023-11-06 | End: 2024-03-28

## 2023-11-06 NOTE — TELEPHONE ENCOUNTER
hydrOXYzine (ATARAX) 25 MG tablet       Last Written Prescription Date:  not on active med list  hydrOXYzine (ATARAX) 25 MG tablet (Discontinued)   120 tablet 6 11/16/2022 7/10/2023 No  Sig - Route: Take 1-2 tablets (25-50 mg) by mouth every 6 hours as needed for itching - Oral  Discontinued None Aryln Sanchez MD 7/10/23 0825      Last Office Visit : 7-10-23  Future Office visit:  7-22-24    Routing refill request to provider for review/approval because:  Drug not active on patient's medication list  Pt my chart request for refill,  Unable to find comment in clinic note- regarding discontinue of med  Message sent to pt - need pharmacy name/location

## 2023-11-07 NOTE — TELEPHONE ENCOUNTER
Second call attempt to complete pre assessment.     No answer.  Left message to return call to [phone] #4 by next business day prior to 4PM or procedure will be sent to cancel.     Additional information needed?  [employer] message sent      Chrystal Theodore RN  Endoscopy Procedure Pre Assessment RN

## 2023-11-08 ENCOUNTER — OFFICE VISIT (OUTPATIENT)
Dept: NEUROLOGY | Facility: CLINIC | Age: 68
End: 2023-11-08
Attending: INTERNAL MEDICINE
Payer: COMMERCIAL

## 2023-11-08 DIAGNOSIS — G56.03 BILATERAL CARPAL TUNNEL SYNDROME: Primary | ICD-10-CM

## 2023-11-08 PROCEDURE — 95886 MUSC TEST DONE W/N TEST COMP: CPT | Mod: RT | Performed by: PSYCHIATRY & NEUROLOGY

## 2023-11-08 PROCEDURE — 95911 NRV CNDJ TEST 9-10 STUDIES: CPT | Performed by: PSYCHIATRY & NEUROLOGY

## 2023-11-08 RX ORDER — AMLODIPINE BESYLATE 5 MG/1
5 TABLET ORAL DAILY
Qty: 90 TABLET | Refills: 1 | Status: SHIPPED | OUTPATIENT
Start: 2023-11-08 | End: 2024-05-03

## 2023-11-08 NOTE — LETTER
11/8/2023         RE: Phan uLque  [address] [facility]  [facility] [address]        Dear Colleague,    Thank you for referring your patient, Phan Luque, to the [facility] [place]. Please see a copy of my visit note below.    See EMG report      Again, thank you for allowing me to participate in the care of your patient.        Sincerely,        alana Toledo MD

## 2023-11-08 NOTE — TELEPHONE ENCOUNTER
amLODIPine (NORVASC) 5 MG tablet 90 tablet 0 6/21/2023  Last Office Visit : 7/10/2023   Future Office visit:  7/22/2024     Routing refill request to provider for review/approval because:  Cr elevated, previously ok'd by Dr Banegas in Hepatology. Rx PENDED  Creatinine   Date Value Ref Range Status   09/28/2023 1.27 (H) 0.51 - 0.95 mg/dL Final   05/17/2021 1.26 (H) 0.52 - 1.04 mg/dL Final          Looks OK   Written by Hussein Banegas MD on 9/30/2023  8:02 PM CDT

## 2023-11-08 NOTE — PROCEDURES
[facility]     Formerly [facility]  [address] [place] [address], [address]  [place] [address]  Tel: [phone]  Fax: [phone]          Full Name: Jessica Luque Gender: Female  Patient ID: [id number] YOB: 1955      Visit Date: 11/8/2023 13:55  Age: 68 Years 0 Months Old  Interpreted By: Satish Toledo M.D.   Ref Dr.: Bry Alex MD  Tech:    Height: 5 feet 5 inch  Reason for referral: Evaluate bilateral uppers. c/o pain in both hands/wrists > 2 years. Right > Left.  h/o right hand surgery, kidney disease, liver transplant.       Motor NCS      Nerve / Sites Lat Amp Dist Louis    ms mV cm m/s   R Median - APB      Wrist 3.96 6.2 7       Elbow 8.33 5.3 25 57   L Median - APB      Wrist 3.80 9.1 7       Elbow 8.54 8.8 23.5 50   R Ulnar - ADM      Wrist 2.71 8.9 7       B.Elbow 6.09 8.2 20 59      A.Elbow 8.49 7.9 14 58       F  Wave      Nerve Fmin    ms   R Ulnar - ADM 29.17       Sensory NCS      Nerve / Sites Pk Lat Amp.1-2 Dist Lat Diff    ms  V cm ms   R Median - II (Antidr)      Wrist 3.49 20.6 13    L Median - II (Antidr)      Wrist 3.39 28.2 13    R Ulnar - V (Antidr)      Wrist 3.39 12.1 11    R Median, Ulnar - Transcarpal comparison      Median Palm 2.34 74.8 8       Ulnar Palm 2.14 21.1 8        0.21   L Median, Ulnar - Transcarpal comparison      Median Palm 2.40 68.1 8       Ulnar Palm 2.29 22.0 8        0.10       EMG Summary Table     Spontaneous MUAP Rcmt Note   Muscle Fib PSW Fasc IA # Amp Dur PPP Rate Type   R. Brachioradialis None None None N N N N N N N   R. Pronator teres None None None N N N N N N N   R. Biceps brachii None None None N N N N N N N   R. Deltoid None None None N N N N N N N   R. Triceps brachii None None None N N N N N N N   R. Flexor carpi ulnaris None None None N N N N N N N   R. First dorsal interosseous None None None N N N N N N N   R. Abductor pollicis brevis None None None N N N N N N N   L.  Brachioradialis None None None N N N N N N N   L. Pronator teres None None None N N N N N N N   L. Biceps brachii None None None N N N N N N N   L. Deltoid None None None N N N N N N N   L. Triceps brachii None None None N N N N N N N   L. First dorsal interosseous None None None N N N N N N N   L. Abductor pollicis brevis None None None N N N N N N N       Right median motor conduction study is normal  Right ulnar motor conduction study is normal  Right ulnar F wave latency is normal    Left median motor conduction study is normal    Right median snap potential normal  Right ulnar sensory snap potential normal  Right median palmar latency normal  Right ulnar palmar latency normal    Left median snap potential normal  Left median palmar latency normal  Left ulnar palmar latency normal    Needle examination right upper extremity no active or chronic denervation  Needle examination left upper extremity no active or chronic denervation    Impression  Normal EMG of bilateral upper extremities.  No electrophysiologic evidence of carpal tunnel syndrome.

## 2023-11-08 NOTE — TELEPHONE ENCOUNTER
Pre assessment completed for upcoming procedure.   (Please see previous telephone encounter notes for complete details)    Patient  returned call.       Procedure details:    Arrival time and facility location reviewed.    Pre op exam needed? N/A    Designated  policy reviewed. Instructed to have someone stay 24 hours post procedure.     COVID policy reviewed.      Medication review:    Medications reviewed. Please see supporting documentation below. Holding recommendations discussed (if applicable).   Ferrous Sulfate (iron supplement): HOLD 7 days before procedure.    Psyllium x 7 days      Prep for procedure:     Procedure prep instructions reviewed.        Additional information needed?  N/A      Patient  verbalized understanding and had no questions or concerns at this time.      Reta Lopez RN  Endoscopy Procedure Pre Assessment RN  [phone] option 4

## 2023-11-09 RX ORDER — KETOCONAZOLE 20 MG/ML
SHAMPOO TOPICAL DAILY PRN
Qty: 120 ML | Refills: 11 | Status: SHIPPED | OUTPATIENT
Start: 2023-11-09 | End: 2024-09-26

## 2023-11-10 ENCOUNTER — ANESTHESIA EVENT (OUTPATIENT)
Dept: SURGERY | Facility: AMBULATORY SURGERY CENTER | Age: 68
End: 2023-11-10
Payer: COMMERCIAL

## 2023-11-14 NOTE — ANESTHESIA PREPROCEDURE EVALUATION
Anesthesia Pre-Procedure Evaluation    Patient: Phan Luque   MRN: [id number] : 1955        Procedure : Procedure(s):  Colonoscopy          Past Medical History:   Diagnosis Date     AION (acute ischemic optic neuropathy)      Asthma      Bacterial infection 2021     Candida infection 2020     Cellulitis 2021     Cervical spinal stenosis      Chronic kidney disease      Chronic kidney disease, stage 3 (H)      Dizziness and giddiness     Created by Conversion      End stage liver disease (H)     2/2 Alcohol Abuse     End stage liver disease (H)     Alcohol-related     GERD (gastroesophageal reflux disease)      Hypertension      Liver transplant recipient (H) 2016    [facility]     Liver transplanted (H)      Migraine headache      Migraines      Osteoporosis     frax .7%      PFO (patent foramen ovale) 2016    Noted on echo at [facility]     Recurrent UTI      Spinal stenosis in cervical region      Strabismus      Unspecified asthma(493.90)     Created by Conversion       Past Surgical History:   Procedure Laterality Date     APPENDECTOMY           APPENDECTOMY       BENCH LIVER N/A 2016    Procedure: BENCH LIVER;  Surgeon: Larry Dhillon MD;  Location:  OR     CATARACT IOL, RT/LT       COLONOSCOPY       COLONOSCOPY N/A 2021    Procedure: COLONOSCOPY, WITH POLYPECTOMY;  Surgeon: Arlyn Beckford MD;  Location: [facility] OR     ESOPHAGOSCOPY, GASTROSCOPY, DUODENOSCOPY (EGD), COMBINED N/A 2016    Procedure: COMBINED ESOPHAGOSCOPY, GASTROSCOPY, DUODENOSCOPY (EGD);  Surgeon: Tao Alfonso MD;  Location:  GI     ESOPHAGOSCOPY, GASTROSCOPY, DUODENOSCOPY (EGD), COMBINED N/A 10/31/2016    Procedure: COMBINED ESOPHAGOSCOPY, GASTROSCOPY, DUODENOSCOPY (EGD), BIOPSY SINGLE OR MULTIPLE;  Surgeon: Ronald Bojorquez MD;  Location:  GI     LIVER TRANSPLANTATION  2016    [facility]      RECTAL SURGERY       sphincteroplasty       TRANSPLANT LIVER RECIPIENT  DONOR N/A 2016    Procedure: TRANSPLANT LIVER RECIPIENT  DONOR;  Surgeon: Larry Dhillon MD;  Location: [facility] OR     TUBAL LIGATION      laparoscopic     TUBAL LIGATION        Allergies   Allergen Reactions     Codeine Hives     Benadryl [Diphenhydramine] Hives     Cefaclor Hives     Hydrocodone-Acetaminophen Itching     Oxycodone Itching     Penicillins Hives     Sulfa Antibiotics Hives      Social History     Tobacco Use     Smoking status: Former     Packs/day: 2.50     Years: 20.00     Additional pack years: 0.00     Total pack years: 50.00     Types: Cigarettes     Quit date: 1996     Years since quittin.9     Smokeless tobacco: Never   Substance Use Topics     Alcohol use: No     Alcohol/week: 7.0 standard drinks of alcohol     Types: 7 Standard drinks or equivalent per week     Comment: heavy use (750ml/2 days) up until 1 year ago; had cut down to 1 drink/day; none since 16      Wt Readings from Last 1 Encounters:   23 85.3 kg (188 lb)        Anesthesia Evaluation   Pt has had prior anesthetic. Type: General.        ROS/MED HX  ENT/Pulmonary:     (+)                    Intermittent, asthma                  Neurologic:     (+)      migraines,                          Cardiovascular:     (+)  hypertension- -   -  - -                              congenital heart disease PFO.       METS/Exercise Tolerance:     Hematologic:       Musculoskeletal: Comment: Cervical spinal stenosis      GI/Hepatic: Comment: Etoh liver cirrhosis s/p transplant in 2016    (+) GERD,    hepatitis resolved      hepatitis         Renal/Genitourinary:     (+) renal disease, type: CRI,            Endo:     (+)          thyroid problem, hypothyroidism,           Psychiatric/Substance Use:     (+) psychiatric history anxiety       Infectious Disease:  - neg infectious disease ROS     Malignancy:       Other:  - neg other ROS            OUTSIDE LABS:  CBC:   Lab Results   Component Value Date    WBC 7.4 09/28/2023    WBC 6.3 07/28/2023    HGB 13.5 09/28/2023    HGB 14.3 07/28/2023    HCT 39.8 09/28/2023    HCT 42.7 07/28/2023     09/28/2023     07/28/2023     BMP:   Lab Results   Component Value Date     09/28/2023     07/28/2023    POTASSIUM 3.9 09/28/2023    POTASSIUM 4.2 07/28/2023    CHLORIDE 101 09/28/2023    CHLORIDE 102 07/28/2023    CO2 24 09/28/2023    CO2 25 07/28/2023    BUN 14.2 09/28/2023    BUN 37.0 (H) 07/28/2023    CR 1.27 (H) 09/28/2023    CR 1.25 (H) 07/28/2023     (H) 09/28/2023     (H) 07/28/2023     COAGS:   Lab Results   Component Value Date    PTT 29 12/04/2022    INR 0.98 12/04/2022    FIBR 250 08/27/2016     POC:   Lab Results   Component Value Date     (H) 10/01/2016     HEPATIC:   Lab Results   Component Value Date    ALBUMIN 4.2 09/28/2023    PROTTOTAL 7.1 09/28/2023    ALT 7 09/28/2023    AST 23 09/28/2023    ALKPHOS 43 09/28/2023    BILITOTAL 0.5 09/28/2023    MIMI 21 07/31/2016     OTHER:   Lab Results   Component Value Date    PH 7.48 (H) 08/26/2016    LACT 0.6 (L) 03/16/2017    A1C 5.6 07/12/2023    ERIC 9.0 09/28/2023    PHOS 3.8 07/12/2023    MAG 1.9 09/28/2023    LIPASE 26 04/29/2018    AMYLASE 64 10/06/2016    TSH 2.87 07/12/2023    T4 1.15 04/20/2018    T3 90 04/05/2017    CRP <0.1 04/08/2022    SED 39 (H) 04/08/2022       Anesthesia Plan    ASA Status:  3       Anesthesia Type: MAC.              Consents            Postoperative Care            Comments:           H&P reviewed: Unable to attach H&P to encounter due to EHR limitations. H&P Update: appropriate H&P reviewed, patient examined. No interval changes since H&P (within 30 days).       Mary Ellen Boyle MD

## 2023-11-15 ENCOUNTER — HOSPITAL ENCOUNTER (OUTPATIENT)
Facility: AMBULATORY SURGERY CENTER | Age: 68
Discharge: HOME | End: 2023-11-15
Attending: INTERNAL MEDICINE | Admitting: INTERNAL MEDICINE
Payer: COMMERCIAL

## 2023-11-15 ENCOUNTER — ANESTHESIA (OUTPATIENT)
Dept: SURGERY | Facility: AMBULATORY SURGERY CENTER | Age: 68
End: 2023-11-15
Payer: COMMERCIAL

## 2023-11-15 VITALS
HEART RATE: 78 BPM | RESPIRATION RATE: 18 BRPM | OXYGEN SATURATION: 98 % | TEMPERATURE: 98.2 F | SYSTOLIC BLOOD PRESSURE: 122 MMHG | DIASTOLIC BLOOD PRESSURE: 51 MMHG

## 2023-11-15 VITALS — HEART RATE: 81 BPM

## 2023-11-15 DIAGNOSIS — Z12.11 COLON CANCER SCREENING: Primary | ICD-10-CM

## 2023-11-15 LAB — COLONOSCOPY: NORMAL

## 2023-11-15 PROCEDURE — 45378 DIAGNOSTIC COLONOSCOPY: CPT | Mod: 33 | Performed by: INTERNAL MEDICINE

## 2023-11-15 RX ORDER — FLUMAZENIL 0.1 MG/ML
0.2 INJECTION, SOLUTION INTRAVENOUS
Status: ACTIVE | OUTPATIENT
Start: 2023-11-15 | End: 2023-11-15

## 2023-11-15 RX ORDER — PROCHLORPERAZINE MALEATE 5 MG
5 TABLET ORAL EVERY 6 HOURS PRN
Status: CANCELLED | OUTPATIENT
Start: 2023-11-15

## 2023-11-15 RX ORDER — LIDOCAINE 40 MG/G
CREAM TOPICAL
Status: DISCONTINUED | OUTPATIENT
Start: 2023-11-15 | End: 2023-11-16 | Stop reason: HOSPADM

## 2023-11-15 RX ORDER — SODIUM CHLORIDE, SODIUM LACTATE, POTASSIUM CHLORIDE, CALCIUM CHLORIDE 600; 310; 30; 20 MG/100ML; MG/100ML; MG/100ML; MG/100ML
INJECTION, SOLUTION INTRAVENOUS CONTINUOUS PRN
Status: DISCONTINUED | OUTPATIENT
Start: 2023-11-15 | End: 2023-11-15

## 2023-11-15 RX ORDER — PROPOFOL 10 MG/ML
INJECTION, EMULSION INTRAVENOUS CONTINUOUS PRN
Status: DISCONTINUED | OUTPATIENT
Start: 2023-11-15 | End: 2023-11-15

## 2023-11-15 RX ORDER — ONDANSETRON 2 MG/ML
4 INJECTION INTRAMUSCULAR; INTRAVENOUS
Status: DISCONTINUED | OUTPATIENT
Start: 2023-11-15 | End: 2023-11-16 | Stop reason: HOSPADM

## 2023-11-15 RX ORDER — FLUMAZENIL 0.1 MG/ML
0.2 INJECTION, SOLUTION INTRAVENOUS
Status: CANCELLED | OUTPATIENT
Start: 2023-11-15 | End: 2023-11-15

## 2023-11-15 RX ORDER — LIDOCAINE HYDROCHLORIDE 20 MG/ML
INJECTION, SOLUTION INFILTRATION; PERINEURAL PRN
Status: DISCONTINUED | OUTPATIENT
Start: 2023-11-15 | End: 2023-11-15

## 2023-11-15 RX ORDER — NALOXONE HYDROCHLORIDE 0.4 MG/ML
0.2 INJECTION, SOLUTION INTRAMUSCULAR; INTRAVENOUS; SUBCUTANEOUS
Status: DISCONTINUED | OUTPATIENT
Start: 2023-11-15 | End: 2023-11-16 | Stop reason: HOSPADM

## 2023-11-15 RX ORDER — ONDANSETRON 2 MG/ML
4 INJECTION INTRAMUSCULAR; INTRAVENOUS EVERY 6 HOURS PRN
Status: CANCELLED | OUTPATIENT
Start: 2023-11-15

## 2023-11-15 RX ORDER — PROPOFOL 10 MG/ML
INJECTION, EMULSION INTRAVENOUS PRN
Status: DISCONTINUED | OUTPATIENT
Start: 2023-11-15 | End: 2023-11-15

## 2023-11-15 RX ORDER — ONDANSETRON 2 MG/ML
4 INJECTION INTRAMUSCULAR; INTRAVENOUS EVERY 6 HOURS PRN
Status: DISCONTINUED | OUTPATIENT
Start: 2023-11-15 | End: 2023-11-16 | Stop reason: HOSPADM

## 2023-11-15 RX ORDER — ONDANSETRON 4 MG/1
4 TABLET, ORALLY DISINTEGRATING ORAL EVERY 6 HOURS PRN
Status: CANCELLED | OUTPATIENT
Start: 2023-11-15

## 2023-11-15 RX ORDER — NALOXONE HYDROCHLORIDE 0.4 MG/ML
0.4 INJECTION, SOLUTION INTRAMUSCULAR; INTRAVENOUS; SUBCUTANEOUS
Status: DISCONTINUED | OUTPATIENT
Start: 2023-11-15 | End: 2023-11-16 | Stop reason: HOSPADM

## 2023-11-15 RX ORDER — SODIUM CHLORIDE, SODIUM LACTATE, POTASSIUM CHLORIDE, CALCIUM CHLORIDE 600; 310; 30; 20 MG/100ML; MG/100ML; MG/100ML; MG/100ML
INJECTION, SOLUTION INTRAVENOUS CONTINUOUS
Status: DISCONTINUED | OUTPATIENT
Start: 2023-11-15 | End: 2023-11-16 | Stop reason: HOSPADM

## 2023-11-15 RX ORDER — ONDANSETRON 4 MG/1
4 TABLET, ORALLY DISINTEGRATING ORAL EVERY 6 HOURS PRN
Status: DISCONTINUED | OUTPATIENT
Start: 2023-11-15 | End: 2023-11-16 | Stop reason: HOSPADM

## 2023-11-15 RX ORDER — PROCHLORPERAZINE MALEATE 5 MG
5 TABLET ORAL EVERY 6 HOURS PRN
Status: DISCONTINUED | OUTPATIENT
Start: 2023-11-15 | End: 2023-11-16 | Stop reason: HOSPADM

## 2023-11-15 RX ADMIN — PROPOFOL 150 MCG/KG/MIN: 10 INJECTION, EMULSION INTRAVENOUS at 09:41

## 2023-11-15 RX ADMIN — SODIUM CHLORIDE, SODIUM LACTATE, POTASSIUM CHLORIDE, CALCIUM CHLORIDE: 600; 310; 30; 20 INJECTION, SOLUTION INTRAVENOUS at 09:15

## 2023-11-15 RX ADMIN — LIDOCAINE HYDROCHLORIDE 100 MG: 20 INJECTION, SOLUTION INFILTRATION; PERINEURAL at 09:41

## 2023-11-15 RX ADMIN — PROPOFOL 50 MG: 10 INJECTION, EMULSION INTRAVENOUS at 09:41

## 2023-11-15 NOTE — H&P
Phan Luque  [id number]  female  68 year old      Reason for procedure/surgery: colonoscopy; surveillance. Poor prep 2021    Patient Active Problem List   Diagnosis    Liver transplanted (H)    Alcoholic hepatitis (H28)    Immunosuppression (H24)    Alcoholic hepatitis without ascites (H28)    Enterococcus UTI    Liver transplant status (H)    Nausea & vomiting    Anemia    ACP (advance care planning)    Diarrhea    Hypothyroidism    Esophageal reflux    Recurrent major depression in partial remission (H24)    Insomnia    CKD (chronic kidney disease) stage 3, GFR 30-59 ml/min (H)    Anemia in stage 3 chronic kidney disease (H)    Osteopenia    Alcohol abuse, uncomplicated    Psoriasis    Candida infection    Bacterial infection    Cellulitis    Allergic rhinitis    Disorder of bone and cartilage    Asthma    Avulsion fracture of ankle, left, closed, initial encounter    Incontinence of feces    Major depressive disorder, recurrent episode, moderate (H)    Other atopic dermatitis and related conditions    Rosacea    Symptomatic menopausal or female climacteric states    Tibial plateau fracture, left    Psoriasis vulgaris    Dermatitis, seborrheic    Scalp psoriasis       Past Surgical History:    Past Surgical History:   Procedure Laterality Date    APPENDECTOMY      1962    APPENDECTOMY      BENCH LIVER N/A 8/25/2016    Procedure: BENCH LIVER;  Surgeon: Larry Dhillon MD;  Location: [facility] OR    CATARACT IOL, RT/LT      COLONOSCOPY      COLONOSCOPY N/A 8/12/2021    Procedure: COLONOSCOPY, WITH POLYPECTOMY;  Surgeon: Arlyn Beckford MD;  Location: [facility] OR    ESOPHAGOSCOPY, GASTROSCOPY, DUODENOSCOPY (EGD), COMBINED N/A 8/17/2016    Procedure: COMBINED ESOPHAGOSCOPY, GASTROSCOPY, DUODENOSCOPY (EGD);  Surgeon: Tao Alfonso MD;  Location: [facility] GI    ESOPHAGOSCOPY, GASTROSCOPY, DUODENOSCOPY (EGD), COMBINED N/A 10/31/2016    Procedure: COMBINED ESOPHAGOSCOPY, GASTROSCOPY, DUODENOSCOPY (EGD), BIOPSY SINGLE OR  MULTIPLE;  Surgeon: Ronald Bojorquez MD;  Location: [facility] GI    LIVER TRANSPLANTATION  2016    [facility]    RECTAL SURGERY      sphincteroplasty      TRANSPLANT LIVER RECIPIENT  DONOR N/A 2016    Procedure: TRANSPLANT LIVER RECIPIENT  DONOR;  Surgeon: Larry Dhillon MD;  Location:  OR    TUBAL LIGATION      laparoscopic    TUBAL LIGATION         Past Medical History:   Past Medical History:   Diagnosis Date    AION (acute ischemic optic neuropathy)     Asthma     Bacterial infection 2021    Candida infection 2020    Cellulitis 2021    Cervical spinal stenosis     Chronic kidney disease     Chronic kidney disease, stage 3 (H)     Dizziness and giddiness     Created by Conversion     End stage liver disease (H)     2/2 Alcohol Abuse    End stage liver disease (H)     Alcohol-related    GERD (gastroesophageal reflux disease)     Hypertension     Liver transplant recipient (H) 2016    [facility]    Liver transplanted (H)     Migraine headache     Migraines     Osteoporosis     frax .7%     PFO (patent foramen ovale) 2016    Noted on echo at [facility]    Recurrent UTI     Spinal stenosis in cervical region     Strabismus     Unspecified asthma(493.90)     Created by Conversion        Social History:   Social History     Tobacco Use    Smoking status: Former     Packs/day: 2.50     Years: 20.00     Additional pack years: 0.00     Total pack years: 50.00     Types: Cigarettes     Quit date: 1996     Years since quittin.9    Smokeless tobacco: Never   Substance Use Topics    Alcohol use: No     Alcohol/week: 7.0 standard drinks of alcohol     Types: 7 Standard drinks or equivalent per week     Comment: heavy use (750ml/2 days) up until 1 year ago; had cut down to 1 drink/day; none since 16       Family History:   Family History   Problem Relation Age of Onset    Family History  Negative Other     Melanoma Mother             Heart Disease Father         CHF    Skin Cancer Sister     Cirrhosis Paternal Uncle         not alcohol related    Heart Failure Mother     Heart Failure Father     Chronic Obstructive Pulmonary Disease Father        Allergies:   Allergies   Allergen Reactions    Codeine Hives    Benadryl [Diphenhydramine] Hives    Cefaclor Hives    Hydrocodone-Acetaminophen Itching    Oxycodone Itching    Penicillins Hives    Sulfa Antibiotics Hives       Active Medications:   Current Outpatient Medications   Medication Sig Dispense Refill    acetaminophen (TYLENOL) 325 MG tablet Take 2 tablets (650 mg) by mouth every 6 hours as needed for mild pain Or fevers. Use sparingly. Limit use to no more than 2 grams (2000 mg) in 24 hours. **Further refills must be obtained by primary care provider** 100 tablet 0    albuterol (VENTOLIN HFA) 108 (90 Base) MCG/ACT inhaler Inhale 2 puffs into the lungs every 6 hours as needed for shortness of breath 18 g 2    amLODIPine (NORVASC) 5 MG tablet Take 1 tablet (5 mg) by mouth daily 90 tablet 1    chlorthalidone (HYGROTON) 25 MG tablet Take 0.5 tablets (12.5 mg) by mouth daily 45 tablet 3    clobetasol (TEMOVATE) 0.05 % external ointment Apply topically 2 times daily To areas of eczema on the lower legs, until areas resolve. 60 g 1    cyanocobalamin (VITAMIN  B-12) 1000 MCG tablet Take 1,000 mcg by mouth daily      ferrous sulfate (IRON) 325 (65 FE) MG tablet Take 1 tablet (325 mg) by mouth 2 times daily 100 tablet     folic acid (FOLVITE) 1 MG tablet Take 1 tablet (1 mg) by mouth daily 30 tablet 1    gabapentin (NEURONTIN) 100 MG capsule TAKE TWO CAPSULES BY MOUTH EVERY NIGHT AT BEDTIME 180 capsule 0    hydrOXYzine (ATARAX) 25 MG tablet Take 1 tablet (25 mg) by mouth 3 times daily as needed for itching 120 tablet 1    levothyroxine (SYNTHROID/LEVOTHROID) 88 MCG tablet Take 1 tablet (88 mcg) by mouth daily 90 tablet 3    MAGNESIUM OXIDE PO Take 400  mg by mouth daily      melatonin 5 MG tablet Take 1 tablet (5 mg) by mouth nightly as needed for sleep      multivitamin, therapeutic (THERA-VIT) TABS tablet Take 1 tablet by mouth every 24 hours 30 tablet 11    pantoprazole (PROTONIX) 40 MG EC tablet Take 1 tablet (40 mg) by mouth daily before breakfast 90 tablet 0    pravastatin (PRAVACHOL) 10 MG tablet Take 1 tablet (10 mg) by mouth daily 90 tablet 3    propranolol (INDERAL) 10 MG tablet Take 1 tablet (10 mg) by mouth 2 times daily 180 tablet 0    tacrolimus (GENERIC EQUIVALENT) 0.5 MG capsule Take 3 capsules (1.5 mg) by mouth every 12 hours 540 capsule 3    ursodiol (ACTIGALL) 300 MG capsule TAKE ONE CAPSULE BY MOUTH TWICE A DAY 60 capsule 11    bisacodyl (DULCOLAX) 5 MG EC tablet Take 2 tablets at 3 pm the day before your procedure. If your procedure is before 11 am, take 2 additional tablets at 11 pm. If your procedure is after 11 am, take 2 additional tablets at 6 am. For additional instructions refer to your colonoscopy prep instructions. 4 tablet 0    calcipotriene (DOVONOX) 0.005 % external solution Apply 1 mL topically daily To the scalp (Patient not taking: Reported on 8/2/2023) 180 mL 1    cholecalciferol (VITAMIN D3) 400 UNIT TABS tablet Take 400 Units by mouth daily      ciclopirox (PENLAC) 8 % external solution Apply to adjacent skin and affected nails daily.  Remove with alcohol every 7 days, then repeat. (Patient not taking: Reported on 3/31/2023) 6.6 mL 1    clobetasol (TEMOVATE) 0.05 % external solution Apply topically 2 times daily To the scalp as needed for itching and flaking, on the days you don't use the clobetasol shampoo. 150 mL 2    clobetasol propionate (CLOBEX) 0.05 % external shampoo Apply topically daily To dry scalp x 15 min and rinse ,when psoriasis is present. 354 mL 1    fluocinolone acetonide (DERMA SMOOTHE/FS BODY) 0.01 % external oil Apply topically 2 times daily 118.28 mL 11    ketoconazole (NIZORAL) 2 % external shampoo Apply  topically daily as needed for itching or irritation Apply to scalp leave for a few minutes and then wash out daily 120 mL 11    order for DME Equipment being ordered: Trilok ankle brace 1 Device 0    polyethylene glycol (GOLYTELY) 236 g suspension The night before the exam at 6 pm drink an 8-ounce glass every 15 minutes until the jug is half empty. If you arrive before 11 AM: Drink the other half of the Golytely jug at 11 PM night before procedure. If you arrive after 11 AM: Drink the other half of the Golytely jug at 6 AM day of procedure. For additional instructions refer to your colonoscopy prep instructions. 4000 mL 0    psyllium 0.52 G capsule Take 2 capsules (1.04 g) by mouth daily With a full glass of water. (Patient taking differently: Take 1 capsule by mouth 3 times daily With a full glass of water.) 60 capsule 1    triamcinolone (KENALOG) 0.1 % external ointment Apply topically 2 times daily To plaques behind the knee 80 g 1       Systemic Review:   CONSTITUTIONAL: NEGATIVE for fever, chills, change in weight  ENT/MOUTH: NEGATIVE for ear, mouth and throat problems  RESP: NEGATIVE for significant cough or SOB  CV: NEGATIVE for chest pain, palpitations or peripheral edema    Physical Examination:   Vital Signs: /80   Pulse 80   Temp 98.2  F (36.8  C) (Tympanic)   Resp 16   LMP  (LMP Unknown)   SpO2 96%   GENERAL: healthy, alert and no distress  RESP: Normal respiratory excursion   CV: regular rates and rhythm and no peripheral edema  ABDOMEN: soft, nontender and bowel sounds normal  MS: no gross musculoskeletal defects noted, no edema    Plan: Appropriate to proceed as scheduled.      Thomas M. Leventhal, MD  11/15/2023    PCP:  Arlyn Sanchez

## 2023-11-15 NOTE — DISCHARGE INSTRUCTIONS
Discharge Instructions after Colonoscopy  or Sigmoidoscopy    Today you had a Colonoscopy    Activity and Diet  You were given medicine for pain. You may be dizzy or sleepy.  For 24 hours:   Do not drive or use heavy equipment.   Do not make important decisions.   Do not drink any alcohol.  You may return to your normal diet and medicines.    Discomfort   Air was placed in your colon during the exam in order to see it. Walking helps to pass the air.   You may take Tylenol (acetaminophen) for pain unless your doctor has told you not to.  Do not take aspirin or ibuprofen (Advil, Motrin, or other anti-inflammatory  drugs) for _____ days.    Follow-up  ____ We took small tissue samples or polyps to study. Your doctor will call you with the results  within two weeks.    When to call:    Call right away if you have:   Unusual pain in belly or chest pain not relieved with passing air.   More than 1 to 2 Tablespoons of bleeding from your rectum.   Fever above 100.6  F (37.5  C).    If you have severe pain, bleeding, or shortness of breath, go to an emergency room.    If you have questions, call:  Monday to Friday, 8 a.m. to 4:30 p.m.  Central Scheduling Department: [phone]    After hours  Hospital: [phone] (Ask for the GI fellow on call)

## 2023-11-15 NOTE — ANESTHESIA POSTPROCEDURE EVALUATION
Patient: Phan Luque    Procedure: Procedure(s):  Colonoscopy       Anesthesia Type:  MAC    Note:  Disposition: Outpatient   Postop Pain Control: Uneventful            Sign Out: Well controlled pain   PONV: No   Neuro/Psych: Uneventful            Sign Out: Acceptable/Baseline neuro status   Airway/Respiratory: Uneventful            Sign Out: Acceptable/Baseline resp. status   CV/Hemodynamics: Uneventful            Sign Out: Acceptable CV status; No obvious hypovolemia; No obvious fluid overload   Other NRE: NONE   DID A NON-ROUTINE EVENT OCCUR? No       Last vitals:  Vitals Value Taken Time   /51 11/15/23 1015   Temp 36.8  C (98.2  F) 11/15/23 1015   Pulse 78 11/15/23 1015   Resp 18 11/15/23 1015   SpO2 98 % 11/15/23 1015       Electronically Signed By: Mary Ellen Boyle MD  November 15, 2023  10:35 AM

## 2023-11-15 NOTE — ANESTHESIA CARE TRANSFER NOTE
Patient: Phan Luque    Procedure: Procedure(s):  Colonoscopy       Diagnosis: Liver replaced by transplant (H) [Z94.4]  Diagnosis Additional Information: No value filed.    Anesthesia Type:   MAC     Note:    Oropharynx: oropharynx clear of all foreign objects and spontaneously breathing  Level of Consciousness: awake  Oxygen Supplementation: room air    Independent Airway: airway patency satisfactory and stable  Dentition: dentition unchanged  Vital Signs Stable: post-procedure vital signs reviewed and stable  Report to RN Given: handoff report given  Patient transferred to: Phase II    Handoff Report: Identifed the Patient, Identified the Reponsible Provider, Reviewed the pertinent medical history, Discussed the surgical course, Reviewed Intra-OP anesthesia mangement and issues during anesthesia, Set expectations for post-procedure period and Allowed opportunity for questions and acknowledgement of understanding      Vitals:  Vitals Value Taken Time   /51 11/15/23 1015   Temp 36.8  C (98.2  F) 11/15/23 1015   Pulse 78 11/15/23 1015   Resp 18 11/15/23 1015   SpO2 98 % 11/15/23 1015       Electronically Signed By: DONOVAN Pemberton CRNA  November 15, 2023  10:16 AM

## 2023-11-19 DIAGNOSIS — K21.9 GASTROESOPHAGEAL REFLUX DISEASE WITHOUT ESOPHAGITIS: ICD-10-CM

## 2023-11-19 DIAGNOSIS — G62.9 PERIPHERAL POLYNEUROPATHY: ICD-10-CM

## 2023-11-22 ENCOUNTER — LAB (OUTPATIENT)
Dept: LAB | Facility: CLINIC | Age: 68
End: 2023-11-22
Payer: COMMERCIAL

## 2023-11-22 DIAGNOSIS — Z94.4 LIVER TRANSPLANTED (H): ICD-10-CM

## 2023-11-22 DIAGNOSIS — Z13.220 LIPID SCREENING: ICD-10-CM

## 2023-11-22 DIAGNOSIS — Z79.899 ENCOUNTER FOR LONG-TERM (CURRENT) USE OF MEDICATIONS: ICD-10-CM

## 2023-11-22 DIAGNOSIS — Z94.4 LIVER REPLACED BY TRANSPLANT (H): ICD-10-CM

## 2023-11-22 LAB
ALBUMIN SERPL BCG-MCNC: 4.2 G/DL (ref 3.5–5.2)
ALP SERPL-CCNC: 41 U/L (ref 40–150)
ALT SERPL W P-5'-P-CCNC: 12 U/L (ref 0–50)
ANION GAP SERPL CALCULATED.3IONS-SCNC: 10 MMOL/L (ref 7–15)
AST SERPL W P-5'-P-CCNC: 21 U/L (ref 0–45)
BILIRUB DIRECT SERPL-MCNC: <0.2 MG/DL (ref 0–0.3)
BILIRUB SERPL-MCNC: 0.5 MG/DL
BUN SERPL-MCNC: 14.5 MG/DL (ref 8–23)
CALCIUM SERPL-MCNC: 9.6 MG/DL (ref 8.8–10.2)
CHLORIDE SERPL-SCNC: 103 MMOL/L (ref 98–107)
CK SERPL-CCNC: 83 U/L (ref 26–192)
CREAT SERPL-MCNC: 1.18 MG/DL (ref 0.51–0.95)
DEPRECATED HCO3 PLAS-SCNC: 26 MMOL/L (ref 22–29)
EGFRCR SERPLBLD CKD-EPI 2021: 50 ML/MIN/1.73M2
ERYTHROCYTE [DISTWIDTH] IN BLOOD BY AUTOMATED COUNT: 13.3 % (ref 10–15)
GLUCOSE SERPL-MCNC: 119 MG/DL (ref 70–99)
HCT VFR BLD AUTO: 41.3 % (ref 35–47)
HGB BLD-MCNC: 13.8 G/DL (ref 11.7–15.7)
MAGNESIUM SERPL-MCNC: 1.8 MG/DL (ref 1.7–2.3)
MCH RBC QN AUTO: 30.1 PG (ref 26.5–33)
MCHC RBC AUTO-ENTMCNC: 33.4 G/DL (ref 31.5–36.5)
MCV RBC AUTO: 90 FL (ref 78–100)
PLATELET # BLD AUTO: 328 10E3/UL (ref 150–450)
POTASSIUM SERPL-SCNC: 3.9 MMOL/L (ref 3.4–5.3)
PROT SERPL-MCNC: 7.3 G/DL (ref 6.4–8.3)
RBC # BLD AUTO: 4.58 10E6/UL (ref 3.8–5.2)
SODIUM SERPL-SCNC: 139 MMOL/L (ref 135–145)
TACROLIMUS BLD-MCNC: 4.9 UG/L (ref 5–15)
TME LAST DOSE: ABNORMAL H
TME LAST DOSE: ABNORMAL H
WBC # BLD AUTO: 7.1 10E3/UL (ref 4–11)

## 2023-11-22 PROCEDURE — 99000 SPECIMEN HANDLING OFFICE-LAB: CPT

## 2023-11-22 PROCEDURE — 82248 BILIRUBIN DIRECT: CPT

## 2023-11-22 PROCEDURE — 80197 ASSAY OF TACROLIMUS: CPT

## 2023-11-22 PROCEDURE — G0480 DRUG TEST DEF 1-7 CLASSES: HCPCS | Mod: 90

## 2023-11-22 PROCEDURE — 83735 ASSAY OF MAGNESIUM: CPT

## 2023-11-22 PROCEDURE — 36415 COLL VENOUS BLD VENIPUNCTURE: CPT

## 2023-11-22 PROCEDURE — 85027 COMPLETE CBC AUTOMATED: CPT

## 2023-11-22 PROCEDURE — 82550 ASSAY OF CK (CPK): CPT

## 2023-11-22 PROCEDURE — 80053 COMPREHEN METABOLIC PANEL: CPT

## 2023-11-23 RX ORDER — PANTOPRAZOLE SODIUM 40 MG/1
40 TABLET, DELAYED RELEASE ORAL
Qty: 90 TABLET | Refills: 2 | Status: SHIPPED | OUTPATIENT
Start: 2023-11-23 | End: 2024-07-22

## 2023-11-24 DIAGNOSIS — Z94.4 LIVER REPLACED BY TRANSPLANT (H): Primary | ICD-10-CM

## 2023-11-24 NOTE — TELEPHONE ENCOUNTER
6/22/2023Assessment & Plan:     #Psoriasis, chronic, active, stable  #Seborrheic Dermatitis   - Continue triamcinolone 0.1% ointment BID prn.  - Clobetasol solution PRN  - Continue clobetasol shampoo weekly  - Continue ketoconazole 2% cream BID prn for flare ups to help with itching  - Recommended Vaseline or Aquaphor for barrier protection.   - Future: consider higher potency steroid, bx if no improvement     # Multiple benign nevi  # Seborrheic keratoses  # Solar lentigines  # Cherry angiomas    # sebaceous hyperplasia, right dorsal nose  - No concerning lesions today  - Reassured of benign nature  - Monitor for ABCDEs of melanoma   - Continue sun protection - recommend SPF 30 or higher with frequent application   - Return sooner if noticing changing or symptomatic lesions     # History of organ transplant.  # Family history of melanoma, mother    - Patient aware that she is at higher risk for cutaneous malignancy given history of transplant.   - Monitor for changing or symptomatic lesions.   - Continue frequent skin checks and photoprotection.     # NUB, central back  - Monitor for changes  - Photos taken today

## 2023-11-24 NOTE — TELEPHONE ENCOUNTER
pantoprazole (PROTONIX) 40 MG   Filling per IM medication refill protocols      gabapentin (NEURONTIN) 100 MG     Last Written Prescription Date:  9/25/23  Last Fill Quantity: 180,   # refills: 0  Last Office Visit : 7/10/23  Future Office visit:  7/22/24  Routing refill request to provider for review/approval because: Drug not on the refill protocol

## 2023-11-25 LAB
LABORATORY REPORT: NORMAL
PETH INTERPRETATION: NORMAL
PLPETH BLD-MCNC: 11 NG/ML
POPETH BLD-MCNC: 25 NG/ML

## 2023-11-27 RX ORDER — GABAPENTIN 100 MG/1
CAPSULE ORAL
Qty: 180 CAPSULE | Refills: 0 | Status: SHIPPED | OUTPATIENT
Start: 2023-11-27 | End: 2024-03-29

## 2023-11-28 ENCOUNTER — TELEPHONE (OUTPATIENT)
Dept: TRANSPLANT | Facility: CLINIC | Age: 68
End: 2023-11-28
Payer: COMMERCIAL

## 2023-11-28 RX ORDER — KETOCONAZOLE 20 MG/G
CREAM TOPICAL 2 TIMES DAILY PRN
Qty: 60 G | Refills: 11 | Status: SHIPPED | OUTPATIENT
Start: 2023-11-28

## 2023-11-29 NOTE — TELEPHONE ENCOUNTER
"Spoke with patient. She is off work this week, but feels she is working still.  Discussed Positive Peth.    Jessica reports that [place] beach, but thought it was virgin as she asked for virgin. She stated \"I asked them to draw the PETH test.\"     Patient denied having any issue with alcohol and declined need for treatment or cravings.    Recommended abstinence of all alcohol.   "

## 2023-12-11 ENCOUNTER — TELEPHONE (OUTPATIENT)
Dept: NEPHROLOGY | Facility: CLINIC | Age: 68
End: 2023-12-11
Payer: COMMERCIAL

## 2023-12-11 ENCOUNTER — TELEPHONE (OUTPATIENT)
Dept: RHEUMATOLOGY | Facility: CLINIC | Age: 68
End: 2023-12-11
Payer: COMMERCIAL

## 2023-12-11 NOTE — TELEPHONE ENCOUNTER
ANA MARIA and sent [facility] to schedule follow up around 7.12.24 with Dr. Gutierrez// 12.11.23 KET

## 2023-12-13 ENCOUNTER — TELEPHONE (OUTPATIENT)
Dept: RHEUMATOLOGY | Facility: CLINIC | Age: 68
End: 2023-12-13
Payer: COMMERCIAL

## 2023-12-15 ENCOUNTER — TELEPHONE (OUTPATIENT)
Dept: NEPHROLOGY | Facility: CLINIC | Age: 68
End: 2023-12-15
Payer: COMMERCIAL

## 2023-12-15 NOTE — TELEPHONE ENCOUNTER
LVM // pt needs to schedule 1 year follow up (Return Neph) with Dr. Gutierrez around 7.12.24 // second attempt, AN 12.15.23     Appt notes:   1 year follow up per Dr. Gutierrez (last seen 7.12.23) // Immunosuppression (H), Chronic kidney disease, stage 3a (H) //

## 2023-12-30 DIAGNOSIS — L20.82 FLEXURAL ECZEMA: ICD-10-CM

## 2024-01-02 ENCOUNTER — TELEPHONE (OUTPATIENT)
Dept: DERMATOLOGY | Facility: CLINIC | Age: 69
End: 2024-01-02
Payer: COMMERCIAL

## 2024-01-02 DIAGNOSIS — L40.9 PSORIASIS: Primary | ICD-10-CM

## 2024-01-02 NOTE — TELEPHONE ENCOUNTER
Hello,    Pt was trying to fill her clobetasol shampoo and it is no longer covered.  Would you like to try another product?  If so please send new rx.  Thanks for the help, as always please reach out to us with any questions.  [phone]  Natalie

## 2024-01-03 RX ORDER — TRIAMCINOLONE ACETONIDE 1 MG/G
OINTMENT TOPICAL
Qty: 90 G | Refills: 1 | Status: SHIPPED | OUTPATIENT
Start: 2024-01-03 | End: 2024-08-29

## 2024-01-03 NOTE — NURSING NOTE
Assessment & Plan   (A68.9) Recurrent fever  (primary encounter diagnosis)  Comment:   Plan: Infectious Disease Referral, CBC and         Differential, Comprehensive Metabolic Panel,         Lyme Disease Ab with reflex to WB Serum,         Anaplasma phagocytoph Antibodies IgG IgM,         Babesia Species by PCR, Babesia antibody IgG         IgM, Sedimentation Rate (ESR), CRP         inflammation, Anti Nuclear Gunjan IgG by IFA with         Reflex          In the atypical nature of recurrent fevers that began following COVID-19 infection and have not resolved in 9 months but seem to be getting worse or more frequent we opted to send referral to infectious disease for evaluation.  Labs were also obtained today including CBC, CRP, ESR, CMP which returned normal.  Tick labs including Lyme, Anaplasma and Babesia as well as ANAs testing for possible rheumatologic source are still pending.  I asked been and his mom to keep a good record of his fevers and accompanying symptoms to see if we can identify a pattern.  Exam today is nonfocal.    (F90.0) Attention deficit hyperactivity disorder (ADHD), predominantly inattentive type  Comment:   Plan: lisdexamfetamine (VYVANSE) 40 MG capsule          We discussed consideration of transitioning to Vyvanse and would start at 40 mg.  Prescription was sent to the pharmacy and mom will check on cost with her insurance as sometimes this is prohibitive.  If so then we could consider going up to Concerta 72 mg which would be a 54+18 mg dose.  Asked mom to message with progress report in 1 month.    (F41.1) Generalized anxiety disorder  Comment:   Plan:     Ernie reports that his anxiety has not been as well controlled as it had previously.  He did have some issues taking his medication consistently for the last month or so so we opted to continue on fluoxetine 40 mg for another month making sure that he is taking medication consistently and if still feeling like he would like to change  Chief Complaint   Patient presents with     RECHECK     6 mos follow up         BP (!) 155/84 (BP Location: Right arm, Patient Position: Sitting, Cuff Size: Adult Regular)   Pulse 81   Temp 98.6  F (37  C)   Wt 100.8 kg (222 lb 3.2 oz)   LMP  (LMP Unknown)   SpO2 96%   BMI 34.80 kg/m        David Mancera, EMT     Dr. Maddox medication at that time then would transition to Lexapro 10 mg daily.    Follow Up    In one month with progress report    Alicia Bryant MD on 10/6/2021 at 8:23 AM         Subjective   Galindo is a 18 year old who presents for the following health issues  accompanied by his mother    HPI     Mental Health Follow-up Visit for Anxiety/ADHD and recurrent fevers    How is your mood today? good    Change in symptoms since last visit: worse    New symptoms since last visit:  Recurrent fevers occurring weekly to monthly    Problems taking medications: some issues forgetting to take fluoxetine in the last month    Who else is on your mental health care team? looking into restarting therapy    +++++++++++++++++++++++++++++++++++++++++++++++++++++++++++++++    PHQ 1/31/2020 8/17/2020 10/5/2021   PHQ-9 Total Score 6 4 4   Q9: Thoughts of better off dead/self-harm past 2 weeks Not at all Not at all Not at all     RILEY-7 SCORE 12/31/2019 1/31/2020 8/17/2020   Total Score 5 5 12         Home and School     Have there been any big changes at home? No    Are you having challenges at school?   Yes-  New school year, in person learning  Social Supports:     family  Sleep:    Hours of sleep on a school night: 8-10 hours  Substance abuse:    None  Maladaptive coping strategies:    None      Suicide Assessment Five-step Evaluation and Treatment (SAFE-T)    Ernie is a 17 yo male who presents with mom for follow up of anxiety as well as issues with recurrent fevers on a monthly basis since he had COVID in Dec 2020.  Ernie is currently on fluoxetine 40 mg daily and does feel that his anxiety is a little bit worse.  He had some difficulty missing doses in August/September which mom feels may be contributing to some of the anxiety exacerbation.  He feels overall that the fluoxetine had been working well for him however lately has wondered if perhaps a change in medication is in order.  He feels that the abdominal migraines and headaches that  he previously had have significantly improved and are a very rare occurrence.  He has history of ADHD as well and is currently on Concerta 54 mg.  He wonders if we could go up on his dose at all as sometimes he does not feel that he is as attentive as he could be.  He also stated that the Concerta sometimes makes him feel jittery and and that his heart races a bit and we discussed consideration of other options such as Vyvanse.  The Concerta also wears off by the end of school and we discussed that Vyvanse may provide some additional coverage into the evening hours for homework.    The recurrent fevers are a significant concern.  He had Covid 19 in late December 2020 and was fairly symptomatic but not requiring hospitalization.  He noted that he continued to have several days of low-grade fevers from 100-101 that would last anywhere for him 3 to 7 days.  This would occur for about a week or less and then cease.  During these episodes of fever he does feel more achy and complains of some joint stiffness.  He has not ever seen joint effusion.  Sometimes he has a sore throat or some congestion but that also resolves when the fevers disappear.  He feels more fatigued when the fevers are present.  He feels that the fevers are increasing in frequency over the last couple of months and are happening weekly rather than monthly.  He denies any abdominal pain, vomiting, diarrhea, rashes.  He feels fine today and is currently afebrile however took Tylenol a few hours prior to the office appointment.  He had been having fevers from 100.9 -101.5 this week.  No known family history of rheumatologic disease and no other family members are ill.    Review of Systems   Constitutional, eye, ENT, skin, respiratory, cardiac, and GI are normal except as otherwise noted.      Objective    /70 (BP Location: Right arm, Patient Position: Sitting, Cuff Size: Adult Regular)   Pulse 103   Temp 98.1  F (36.7  C) (Tympanic)   Resp 15    "Ht 5' 10.5\" (1.791 m)   Wt 139 lb 14.4 oz (63.5 kg)   SpO2 97%   BMI 19.79 kg/m    46 %ile (Z= -0.11) based on [facility] [place] [facility]Boys, 2-20 Years) weight-for-age data using vitals from 10/5/2021.  Blood pressure reading is in the normal blood pressure range based on the 2017 AAP Clinical Practice Guideline.    Physical Exam   GENERAL: Active, alert, in no acute distress.  SKIN: Clear. No significant rash, abnormal pigmentation or lesions  EYES:  No discharge or erythema. Normal pupils and EOM.  EARS: Normal canals. Tympanic membranes are normal; gray and translucent.  NOSE: Normal without discharge.  MOUTH/THROAT: Clear. No oral lesions. Teeth intact without obvious abnormalities.  NECK: Supple, no masses.  LYMPH NODES: No adenopathy  LUNGS: Clear. No rales, rhonchi, wheezing or retractions  HEART: Regular rhythm. Normal S1/S2. No murmurs.    Diagnostics:   Results for orders placed or performed in visit on 10/05/21 (from the past 24 hour(s))   CRP inflammation   Result Value Ref Range    CRP Inflammation <1.0 <10.0 mg/L   Sedimentation Rate (ESR)   Result Value Ref Range    Erythrocyte Sedimentation Rate 4 0 - 15 mm/hr   Comprehensive Metabolic Panel   Result Value Ref Range    Sodium 140 134 - 144 mmol/L    Potassium 3.8 3.5 - 5.1 mmol/L    Chloride 102 98 - 107 mmol/L    Carbon Dioxide (CO2) 31 21 - 31 mmol/L    Anion Gap 7 3 - 14 mmol/L    Urea Nitrogen 13 7 - 25 mg/dL    Creatinine 1.08 0.70 - 1.30 mg/dL    Calcium 9.6 8.6 - 10.3 mg/dL    Glucose 83 70 - 105 mg/dL    Alkaline Phosphatase 161 (H) 34 - 104 U/L    AST 19 13 - 39 U/L    ALT 19 7 - 52 U/L    Protein Total 7.0 6.4 - 8.9 g/dL    Albumin 4.6 3.5 - 5.7 g/dL    Bilirubin Total 0.9 0.3 - 1.0 mg/dL    GFR Estimate     CBC and Differential    Narrative    The following orders were created for panel order CBC and Differential.  Procedure                               Abnormality         Status                     ---------                               -----------     "     ------                     CBC with platelets and d...[[id number]]                      Final result                 Please view results for these tests on the individual orders.   CBC with platelets and differential   Result Value Ref Range    WBC Count 5.6 4.0 - 11.0 10e3/uL    RBC Count 4.73 3.70 - 5.30 10e6/uL    Hemoglobin 14.7 11.7 - 15.7 g/dL    Hematocrit 42.5 35.0 - 47.0 %    MCV 90 77 - 100 fL    MCH 31.1 26.5 - 33.0 pg    MCHC 34.6 31.5 - 36.5 g/dL    RDW 12.1 10.0 - 15.0 %    Platelet Count 282 150 - 450 10e3/uL    % Neutrophils 51 %    % Lymphocytes 35 %    % Monocytes 9 %    % Eosinophils 4 %    % Basophils 1 %    % Immature Granulocytes 0 %    NRBCs per 100 WBC 0 <1 /100    Absolute Neutrophils 2.9 1.3 - 7.0 10e3/uL    Absolute Lymphocytes 2.0 1.0 - 5.8 10e3/uL    Absolute Monocytes 0.5 0.0 - 1.3 10e3/uL    Absolute Eosinophils 0.2 0.0 - 0.7 10e3/uL    Absolute Basophils 0.0 0.0 - 0.2 10e3/uL    Absolute Immature Granulocytes 0.0 <=0.0 10e3/uL    Absolute NRBCs 0.0 10e3/uL

## 2024-01-04 RX ORDER — FLUOCINONIDE TOPICAL SOLUTION USP, 0.05% 0.5 MG/ML
SOLUTION TOPICAL 2 TIMES DAILY
Qty: 60 ML | Refills: 3 | Status: SHIPPED | OUTPATIENT
Start: 2024-01-04

## 2024-01-04 NOTE — TELEPHONE ENCOUNTER
6/22/2023  [facility] Michael Cohen MD  Dermatology   RTc one year RF process #4

## 2024-01-26 NOTE — PROGRESS NOTES
"Prior to immunization administration, verified patients identity using patient s name and date of birth. Please see Immunization Activity for additional information.     Screening Questionnaire for Adult Immunization    Are you sick today?   {:936595}   Do you have allergies to medications, food, a vaccine component or latex?   {:575142}   Have you ever had a serious reaction after receiving a vaccination?   {:181889}   Do you have a long-term health problem with heart, lung, kidney, or metabolic disease (e.g., diabetes), asthma, a blood disorder, no spleen, complement component deficiency, a cochlear implant, or a spinal fluid leak?  Are you on long-term aspirin therapy?   {:013668}   Do you have cancer, leukemia, HIV/AIDS, or any other immune system problem?   {:920333}   Do you have a parent, brother, or sister with an immune system problem?   {:224157}   In the past 3 months, have you taken medications that affect  your immune system, such as prednisone, other steroids, or anticancer drugs; drugs for the treatment of rheumatoid arthritis, Crohn s disease, or psoriasis; or have you had radiation treatments?   {:187749}   Have you had a seizure, or a brain or other nervous system problem?   {:866707}   During the past year, have you received a transfusion of blood or blood    products, or been given immune (gamma) globulin or antiviral drug?   {:411554}   For women: Are you pregnant or is there a chance you could become       pregnant during the next month?   {:134399}   Have you received any vaccinations in the past 4 weeks?   {:084804}     Immunization questionnaire { :241130::\"answers were all negative.\"}    I have reviewed the following standing orders: {Adult Vaccine Standing Order:[id number]}    Patient instructed to remain in clinic for 15 minutes afterwards, and to report any adverse reactions.     Screening performed by Jcarlos Clements MA on 1/26/2024 at 2:58 PM.    "

## 2024-01-29 ENCOUNTER — LAB (OUTPATIENT)
Dept: LAB | Facility: CLINIC | Age: 69
End: 2024-01-29
Payer: COMMERCIAL

## 2024-01-29 ENCOUNTER — IMMUNIZATION (OUTPATIENT)
Dept: FAMILY MEDICINE | Facility: CLINIC | Age: 69
End: 2024-01-29
Payer: COMMERCIAL

## 2024-01-29 DIAGNOSIS — Z23 ENCOUNTER FOR IMMUNIZATION: Primary | ICD-10-CM

## 2024-01-29 DIAGNOSIS — Z94.4 LIVER REPLACED BY TRANSPLANT (H): ICD-10-CM

## 2024-01-29 LAB
ALBUMIN MFR UR ELPH: 12.7 MG/DL
ALBUMIN SERPL BCG-MCNC: 4.1 G/DL (ref 3.5–5.2)
ALBUMIN UR-MCNC: NEGATIVE MG/DL
ALP SERPL-CCNC: 50 U/L (ref 40–150)
ALT SERPL W P-5'-P-CCNC: 8 U/L (ref 0–50)
ANION GAP SERPL CALCULATED.3IONS-SCNC: 11 MMOL/L (ref 7–15)
APPEARANCE UR: CLEAR
AST SERPL W P-5'-P-CCNC: 23 U/L (ref 0–45)
BILIRUB DIRECT SERPL-MCNC: <0.2 MG/DL (ref 0–0.3)
BILIRUB SERPL-MCNC: 0.4 MG/DL
BILIRUB UR QL STRIP: NEGATIVE
BUN SERPL-MCNC: 20.1 MG/DL (ref 8–23)
CALCIUM SERPL-MCNC: 9 MG/DL (ref 8.8–10.2)
CHLORIDE SERPL-SCNC: 103 MMOL/L (ref 98–107)
COLOR UR AUTO: YELLOW
CREAT SERPL-MCNC: 1.21 MG/DL (ref 0.51–0.95)
CREAT UR-MCNC: 131 MG/DL
DEPRECATED HCO3 PLAS-SCNC: 25 MMOL/L (ref 22–29)
EGFRCR SERPLBLD CKD-EPI 2021: 49 ML/MIN/1.73M2
ERYTHROCYTE [DISTWIDTH] IN BLOOD BY AUTOMATED COUNT: 13.7 % (ref 10–15)
GLUCOSE SERPL-MCNC: 102 MG/DL (ref 70–99)
GLUCOSE UR STRIP-MCNC: NEGATIVE MG/DL
HCT VFR BLD AUTO: 39 % (ref 35–47)
HGB BLD-MCNC: 13.1 G/DL (ref 11.7–15.7)
HGB UR QL STRIP: NEGATIVE
KETONES UR STRIP-MCNC: NEGATIVE MG/DL
LEUKOCYTE ESTERASE UR QL STRIP: NEGATIVE
MAGNESIUM SERPL-MCNC: 1.9 MG/DL (ref 1.7–2.3)
MCH RBC QN AUTO: 31 PG (ref 26.5–33)
MCHC RBC AUTO-ENTMCNC: 33.6 G/DL (ref 31.5–36.5)
MCV RBC AUTO: 92 FL (ref 78–100)
NITRATE UR QL: NEGATIVE
PH UR STRIP: 6 [PH] (ref 5–8)
PHOSPHATE SERPL-MCNC: 4.6 MG/DL (ref 2.5–4.5)
PLATELET # BLD AUTO: 278 10E3/UL (ref 150–450)
POTASSIUM SERPL-SCNC: 4.2 MMOL/L (ref 3.4–5.3)
PROT SERPL-MCNC: 6.9 G/DL (ref 6.4–8.3)
PROT/CREAT 24H UR: 0.1 MG/MG CR (ref 0–0.2)
RBC # BLD AUTO: 4.23 10E6/UL (ref 3.8–5.2)
SODIUM SERPL-SCNC: 139 MMOL/L (ref 135–145)
SP GR UR STRIP: 1.01 (ref 1–1.03)
TACROLIMUS BLD-MCNC: 4.3 UG/L (ref 5–15)
TME LAST DOSE: ABNORMAL H
TME LAST DOSE: ABNORMAL H
UROBILINOGEN UR STRIP-ACNC: 0.2 E.U./DL
WBC # BLD AUTO: 6.8 10E3/UL (ref 4–11)

## 2024-01-29 PROCEDURE — 83735 ASSAY OF MAGNESIUM: CPT

## 2024-01-29 PROCEDURE — 36415 COLL VENOUS BLD VENIPUNCTURE: CPT

## 2024-01-29 PROCEDURE — 81003 URINALYSIS AUTO W/O SCOPE: CPT | Mod: 59

## 2024-01-29 PROCEDURE — 84156 ASSAY OF PROTEIN URINE: CPT | Mod: 59

## 2024-01-29 PROCEDURE — 80053 COMPREHEN METABOLIC PANEL: CPT

## 2024-01-29 PROCEDURE — 99207 PR NO CHARGE NURSE ONLY: CPT

## 2024-01-29 PROCEDURE — 84100 ASSAY OF PHOSPHORUS: CPT

## 2024-01-29 PROCEDURE — G0480 DRUG TEST DEF 1-7 CLASSES: HCPCS | Mod: 90

## 2024-01-29 PROCEDURE — 82248 BILIRUBIN DIRECT: CPT

## 2024-01-29 PROCEDURE — 80197 ASSAY OF TACROLIMUS: CPT

## 2024-01-29 PROCEDURE — 90480 ADMN SARSCOV2 VAC 1/ONLY CMP: CPT

## 2024-01-29 PROCEDURE — 85027 COMPLETE CBC AUTOMATED: CPT

## 2024-01-29 PROCEDURE — 91320 SARSCV2 VAC 30MCG TRS-SUC IM: CPT

## 2024-02-01 LAB
LABORATORY REPORT: NORMAL
PETH INTERPRETATION: NORMAL
PLPETH BLD-MCNC: <10 NG/ML
POPETH BLD-MCNC: <10 NG/ML

## 2024-02-05 ENCOUNTER — TELEPHONE (OUTPATIENT)
Dept: DERMATOLOGY | Facility: CLINIC | Age: 69
End: 2024-02-05
Payer: COMMERCIAL

## 2024-03-23 DIAGNOSIS — F41.1 GAD (GENERALIZED ANXIETY DISORDER): ICD-10-CM

## 2024-03-23 DIAGNOSIS — G62.9 PERIPHERAL POLYNEUROPATHY: ICD-10-CM

## 2024-03-28 RX ORDER — HYDROXYZINE HYDROCHLORIDE 25 MG/1
TABLET, FILM COATED ORAL
Qty: 120 TABLET | Refills: 1 | Status: SHIPPED | OUTPATIENT
Start: 2024-03-28 | End: 2024-07-22

## 2024-03-28 NOTE — TELEPHONE ENCOUNTER
GABAPENTIN 100MG CAPS       Last Written Prescription Date:  11-27-23  Last Fill Quantity: 180,   # refills: 0  Last Office Visit : 7-10-23  Future Office visit:  7-22-24    Routing refill request to provider for review/approval because:  Drug not on the [facility] or [facility] refill protocol or controlled substance      hydrOXYzine HCl (ATARAX) 25 MG tablet       Last Written Prescription Date:  11-6-23  Last Fill Quantity: 120,   # refills: 1  RF 120t:1

## 2024-03-29 ENCOUNTER — LAB (OUTPATIENT)
Dept: LAB | Facility: CLINIC | Age: 69
End: 2024-03-29
Payer: COMMERCIAL

## 2024-03-29 DIAGNOSIS — Z94.4 LIVER REPLACED BY TRANSPLANT (H): ICD-10-CM

## 2024-03-29 LAB
ALBUMIN SERPL BCG-MCNC: 4.2 G/DL (ref 3.5–5.2)
ALP SERPL-CCNC: 49 U/L (ref 40–150)
ALT SERPL W P-5'-P-CCNC: 8 U/L (ref 0–50)
ANION GAP SERPL CALCULATED.3IONS-SCNC: 11 MMOL/L (ref 7–15)
AST SERPL W P-5'-P-CCNC: 21 U/L (ref 0–45)
BILIRUB DIRECT SERPL-MCNC: <0.2 MG/DL (ref 0–0.3)
BILIRUB SERPL-MCNC: 0.5 MG/DL
BUN SERPL-MCNC: 23.5 MG/DL (ref 8–23)
CALCIUM SERPL-MCNC: 9.2 MG/DL (ref 8.8–10.2)
CHLORIDE SERPL-SCNC: 99 MMOL/L (ref 98–107)
CREAT SERPL-MCNC: 1.27 MG/DL (ref 0.51–0.95)
DEPRECATED HCO3 PLAS-SCNC: 27 MMOL/L (ref 22–29)
EGFRCR SERPLBLD CKD-EPI 2021: 46 ML/MIN/1.73M2
ERYTHROCYTE [DISTWIDTH] IN BLOOD BY AUTOMATED COUNT: 12.9 % (ref 10–15)
GLUCOSE SERPL-MCNC: 98 MG/DL (ref 70–99)
HCT VFR BLD AUTO: 41.1 % (ref 35–47)
HGB BLD-MCNC: 13.8 G/DL (ref 11.7–15.7)
MCH RBC QN AUTO: 30.4 PG (ref 26.5–33)
MCHC RBC AUTO-ENTMCNC: 33.6 G/DL (ref 31.5–36.5)
MCV RBC AUTO: 91 FL (ref 78–100)
PLATELET # BLD AUTO: 291 10E3/UL (ref 150–450)
POTASSIUM SERPL-SCNC: 4.1 MMOL/L (ref 3.4–5.3)
PROT SERPL-MCNC: 7.3 G/DL (ref 6.4–8.3)
RBC # BLD AUTO: 4.54 10E6/UL (ref 3.8–5.2)
SODIUM SERPL-SCNC: 137 MMOL/L (ref 135–145)
TACROLIMUS BLD-MCNC: 3.9 UG/L (ref 5–15)
TME LAST DOSE: ABNORMAL H
TME LAST DOSE: ABNORMAL H
WBC # BLD AUTO: 7.1 10E3/UL (ref 4–11)

## 2024-03-29 PROCEDURE — 36415 COLL VENOUS BLD VENIPUNCTURE: CPT

## 2024-03-29 PROCEDURE — 80197 ASSAY OF TACROLIMUS: CPT

## 2024-03-29 PROCEDURE — 82248 BILIRUBIN DIRECT: CPT

## 2024-03-29 PROCEDURE — 85027 COMPLETE CBC AUTOMATED: CPT

## 2024-03-29 PROCEDURE — 80053 COMPREHEN METABOLIC PANEL: CPT

## 2024-03-29 RX ORDER — GABAPENTIN 100 MG/1
CAPSULE ORAL
Qty: 180 CAPSULE | Refills: 0 | Status: SHIPPED | OUTPATIENT
Start: 2024-03-29 | End: 2024-06-18

## 2024-04-04 ENCOUNTER — OFFICE VISIT (OUTPATIENT)
Dept: GASTROENTEROLOGY | Facility: CLINIC | Age: 69
End: 2024-04-04
Attending: INTERNAL MEDICINE
Payer: COMMERCIAL

## 2024-04-04 VITALS
BODY MASS INDEX: 27.31 KG/M2 | HEIGHT: 66 IN | SYSTOLIC BLOOD PRESSURE: 131 MMHG | WEIGHT: 169.9 LBS | TEMPERATURE: 98 F | HEART RATE: 78 BPM | DIASTOLIC BLOOD PRESSURE: 82 MMHG | OXYGEN SATURATION: 95 % | RESPIRATION RATE: 16 BRPM

## 2024-04-04 DIAGNOSIS — Z94.4 LIVER REPLACED BY TRANSPLANT (H): Primary | ICD-10-CM

## 2024-04-04 PROCEDURE — 99214 OFFICE O/P EST MOD 30 MIN: CPT | Performed by: INTERNAL MEDICINE

## 2024-04-04 RX ORDER — SEMAGLUTIDE 0.5 MG/.5ML
0.5 INJECTION, SOLUTION SUBCUTANEOUS
COMMUNITY
Start: 2024-03-28

## 2024-04-04 ASSESSMENT — PAIN SCALES - GENERAL: PAINLEVEL: NO PAIN (0)

## 2024-04-04 NOTE — PROGRESS NOTES
Solid Organ Transplant Hepatology Follow-Up Visit:     HISTORY OF PRESENT ILLNESS:   I had the pleasure of seeing Phan Luque for followup in the Liver Clinic at the [facility] on April 4, 2024. Ms. Luque returns for followup now more than 7-1/2 years status post liver transplantation for alcoholic hepatitis.     She is doing well at this visit.  She denies any abdominal pain.  She does have some itching, which she relates to her psoriasis and eczema.  She also has some mild psoriatic arthritis with that. She denies any fatigue.  She denies any increased abdominal girth or lower extremity edema.     She denies any fevers or chills, cough or shortness of breath.  She denies any nausea or vomiting, diarrhea or constipation.  Her appetite has been good and her weight has been stable.     There have been no other new events since she was last seen.    Medications:   Current Outpatient Medications   Medication Sig Dispense Refill    acetaminophen (TYLENOL) 325 MG tablet Take 2 tablets (650 mg) by mouth every 6 hours as needed for mild pain Or fevers. Use sparingly. Limit use to no more than 2 grams (2000 mg) in 24 hours. **Further refills must be obtained by primary care provider** 100 tablet 0    albuterol (VENTOLIN HFA) 108 (90 Base) MCG/ACT inhaler Inhale 2 puffs into the lungs every 6 hours as needed for shortness of breath 18 g 2    amLODIPine (NORVASC) 5 MG tablet Take 1 tablet (5 mg) by mouth daily 90 tablet 1    chlorthalidone (HYGROTON) 25 MG tablet Take 0.5 tablets (12.5 mg) by mouth daily 45 tablet 3    cholecalciferol (VITAMIN D3) 400 UNIT TABS tablet Take 400 Units by mouth daily      clobetasol (TEMOVATE) 0.05 % external ointment Apply topically 2 times daily To areas of eczema on the lower legs, until areas resolve. 60 g 1    clobetasol (TEMOVATE) 0.05 % external solution Apply topically 2 times daily To the scalp as needed for itching and flaking, on the days you don't  use the clobetasol shampoo. 150 mL 2    clobetasol propionate (CLOBEX) 0.05 % external shampoo Apply topically daily To dry scalp x 15 min and rinse ,when psoriasis is present. 354 mL 1    cyanocobalamin (VITAMIN  B-12) 1000 MCG tablet Take 1,000 mcg by mouth daily      ferrous sulfate (IRON) 325 (65 FE) MG tablet Take 1 tablet (325 mg) by mouth 2 times daily 100 tablet     fluocinolone acetonide (DERMA SMOOTHE/FS BODY) 0.01 % external oil Apply topically 2 times daily 118.28 mL 11    fluocinonide (LIDEX) 0.05 % external solution Apply topically 2 times daily To areas of rash and itch on scalp 60 mL 3    folic acid (FOLVITE) 1 MG tablet Take 1 tablet (1 mg) by mouth daily 30 tablet 1    gabapentin (NEURONTIN) 100 MG capsule TAKE TWO CAPSULES BY MOUTH EVERY NIGHT AT BEDTIME 180 capsule 0    hydrOXYzine HCl (ATARAX) 25 MG tablet TAKE 1 TABLET BY MOUTH 3 TIMES DAILY AS NEEDED FOR ITCHING 120 tablet 1    ketoconazole (NIZORAL) 2 % external cream Apply topically 2 times daily as needed for itching 60 g 11    ketoconazole (NIZORAL) 2 % external shampoo Apply topically daily as needed for itching or irritation Apply to scalp leave for a few minutes and then wash out daily 120 mL 11    levothyroxine (SYNTHROID/LEVOTHROID) 88 MCG tablet Take 1 tablet (88 mcg) by mouth daily 90 tablet 3    MAGNESIUM OXIDE PO Take 400 mg by mouth daily      melatonin 5 MG tablet Take 1 tablet (5 mg) by mouth nightly as needed for sleep      multivitamin, therapeutic (THERA-VIT) TABS tablet Take 1 tablet by mouth every 24 hours 30 tablet 11    pantoprazole (PROTONIX) 40 MG EC tablet TAKE 1 TABLET BY MOUTH DAILY BEFORE BREAKFAST 90 tablet 2    pravastatin (PRAVACHOL) 10 MG tablet Take 1 tablet (10 mg) by mouth daily 90 tablet 3    psyllium 0.52 G capsule Take 2 capsules (1.04 g) by mouth daily With a full glass of water. (Patient taking differently: Take 1 capsule by mouth 3 times daily With a full glass of water.) 60 capsule 1    tacrolimus  (GENERIC EQUIVALENT) 0.5 MG capsule Take 3 capsules (1.5 mg) by mouth every 12 hours 540 capsule 3    triamcinolone (KENALOG) 0.1 % external ointment APPLY TOPICALLY TWO TIMES A DAY TO PLAQUES BEHIND THE KNEE 90 g 1    ursodiol (ACTIGALL) 300 MG capsule TAKE ONE CAPSULE BY MOUTH TWICE A DAY 60 capsule 11    WEGOVY 0.5 MG/0.5ML pen Inject 0.5 mg Subcutaneous every 7 days      bisacodyl (DULCOLAX) 5 MG EC tablet Take 2 tablets at 3 pm the day before your procedure. If your procedure is before 11 am, take 2 additional tablets at 11 pm. If your procedure is after 11 am, take 2 additional tablets at 6 am. For additional instructions refer to your colonoscopy prep instructions. (Patient not taking: Reported on 4/4/2024) 4 tablet 0    calcipotriene (DOVONOX) 0.005 % external solution Apply 1 mL topically daily To the scalp (Patient not taking: Reported on 8/2/2023) 180 mL 1    ciclopirox (PENLAC) 8 % external solution Apply to adjacent skin and affected nails daily.  Remove with alcohol every 7 days, then repeat. (Patient not taking: Reported on 3/31/2023) 6.6 mL 1    order for DME Equipment being ordered: Trilok ankle brace (Patient not taking: Reported on 4/4/2024) 1 Device 0    polyethylene glycol (GOLYTELY) 236 g suspension The night before the exam at 6 pm drink an 8-ounce glass every 15 minutes until the jug is half empty. If you arrive before 11 AM: Drink the other half of the Golytely jug at 11 PM night before procedure. If you arrive after 11 AM: Drink the other half of the Golytely jug at 6 AM day of procedure. For additional instructions refer to your colonoscopy prep instructions. (Patient not taking: Reported on 4/4/2024) 4000 mL 0    propranolol (INDERAL) 10 MG tablet Take 1 tablet (10 mg) by mouth 2 times daily (Patient not taking: Reported on 4/4/2024) 180 tablet 0     No current facility-administered medications for this visit.      Vitals:   /82 (BP Location: Right arm, Patient Position: Sitting,  "Cuff Size: Adult Regular)   Pulse 78   Temp 98  F (36.7  C) (Oral)   Resp 16   Ht 1.676 m (5' 5.98\")   Wt 77.1 kg (169 lb 14.4 oz)   LMP  (LMP Unknown)   SpO2 95%   BMI 27.44 kg/m      Physical Exam:   In general she looks quite well. HEENT exam shows no scleral icterus or temporal muscle wasting. Chest is clear. Abdominal exam shows no increase in girth. No masses or tenderness to palpation are present. Liver is 10 cm in span without left lobe enlargement. No spleen tip is palpable.  Her incision is intact.  Extremity exam shows no edema. Skin exam shows no suspicious lesions and neurologic exam is nonfocal.     Labs:   Lab Results   Component Value Date     03/29/2024    POTASSIUM 4.1 03/29/2024    CHLORIDE 99 03/29/2024    ANIONGAP 11 03/29/2024    CO2 27 03/29/2024    BUN 23.5 (H) 03/29/2024    CR 1.27 (H) 03/29/2024    GFRESTIMATED 46 (L) 03/29/2024    ERIC 9.2 03/29/2024      Lab Results   Component Value Date    WBC 7.1 03/29/2024    HGB 13.8 03/29/2024    HCT 41.1 03/29/2024    MCV 91 03/29/2024    MCH 30.4 03/29/2024    MCHC 33.6 03/29/2024    RDW 12.9 03/29/2024     03/29/2024     Lab Results   Component Value Date    ALBUMIN 4.2 03/29/2024    ALKPHOS 49 03/29/2024    AST 21 03/29/2024     Lab Results   Component Value Date    INR 0.98 12/04/2022     Recent Labs   Lab Test 03/29/24  0737 01/29/24  0729 11/22/23  0727 09/28/23  0724 08/04/23  1611 07/28/23  0734 05/30/23  0754 03/27/23  0720 01/30/23  0744 12/04/22  1529   ALKPHOS 49 50 41 43 46 45 49 50 45 46   ALT 8 8 12 7 12 10 10 12 14 13   AST 21 23 21 23 27 26 22 23 28 17      Imaging:   No images are attached to the encounter.     Assessment/Plan:   IMPRESSION:   Ms. Luque is doing well.  Her liver tests are excellent and her kidney function is stable.  She does follow with Dr. Shrestha.  She does report her blood pressure has been under good control at home.  She is up to date with regard to vaccines including COVID and RSV " vaccines, she is up-to-date with regard to skin cancer and colorectal cancer screening     My plan will be to see her back in the clinic again in 6 months' time.  I otherwise will not be making any other change to her medical regimen.     I did spend total of 30 minutes (on the date of the encounter), including 20 minutes of face-to-face clinic time including counseling.  The rest of the time was spent in documentation and review of records.    Thank you very much for allowing me to participate in the care of this patient.  If you have any questions regarding my recommendations, please do not hesitate to contact me.         Hussein Banegas MD      Professor of Medicine  [employer]      Executive Medical Director, Solid Organ Transplant Program  [facility]

## 2024-04-04 NOTE — LETTER
4/4/2024         RE: Phan Luque  [address]  [facility] [address]        Dear Colleague,    Thank you for referring your patient, Phan Luque, to the [facility]. Please see a copy of my visit note below.    Solid Organ Transplant Hepatology Follow-Up Visit:     HISTORY OF PRESENT ILLNESS:   I had the pleasure of seeing Phan Luque for followup in the Liver Clinic at the [facility] on April 4, 2024. Ms. Luque returns for followup now more than 7-1/2 years status post liver transplantation for alcoholic hepatitis.     She is doing well at this visit.  She denies any abdominal pain.  She does have some itching, which she relates to her psoriasis and eczema.  She also has some mild psoriatic arthritis with that. She denies any fatigue.  She denies any increased abdominal girth or lower extremity edema.     She denies any fevers or chills, cough or shortness of breath.  She denies any nausea or vomiting, diarrhea or constipation.  Her appetite has been good and her weight has been stable.     There have been no other new events since she was last seen.    Medications:   Current Outpatient Medications   Medication Sig Dispense Refill     acetaminophen (TYLENOL) 325 MG tablet Take 2 tablets (650 mg) by mouth every 6 hours as needed for mild pain Or fevers. Use sparingly. Limit use to no more than 2 grams (2000 mg) in 24 hours. **Further refills must be obtained by primary care provider** 100 tablet 0     albuterol (VENTOLIN HFA) 108 (90 Base) MCG/ACT inhaler Inhale 2 puffs into the lungs every 6 hours as needed for shortness of breath 18 g 2     amLODIPine (NORVASC) 5 MG tablet Take 1 tablet (5 mg) by mouth daily 90 tablet 1     chlorthalidone (HYGROTON) 25 MG tablet Take 0.5 tablets (12.5 mg) by mouth daily 45 tablet 3     cholecalciferol (VITAMIN D3) 400 UNIT TABS tablet Take 400 Units by mouth daily       clobetasol  (TEMOVATE) 0.05 % external ointment Apply topically 2 times daily To areas of eczema on the lower legs, until areas resolve. 60 g 1     clobetasol (TEMOVATE) 0.05 % external solution Apply topically 2 times daily To the scalp as needed for itching and flaking, on the days you don't use the clobetasol shampoo. 150 mL 2     clobetasol propionate (CLOBEX) 0.05 % external shampoo Apply topically daily To dry scalp x 15 min and rinse ,when psoriasis is present. 354 mL 1     cyanocobalamin (VITAMIN  B-12) 1000 MCG tablet Take 1,000 mcg by mouth daily       ferrous sulfate (IRON) 325 (65 FE) MG tablet Take 1 tablet (325 mg) by mouth 2 times daily 100 tablet      fluocinolone acetonide (DERMA SMOOTHE/FS BODY) 0.01 % external oil Apply topically 2 times daily 118.28 mL 11     fluocinonide (LIDEX) 0.05 % external solution Apply topically 2 times daily To areas of rash and itch on scalp 60 mL 3     folic acid (FOLVITE) 1 MG tablet Take 1 tablet (1 mg) by mouth daily 30 tablet 1     gabapentin (NEURONTIN) 100 MG capsule TAKE TWO CAPSULES BY MOUTH EVERY NIGHT AT BEDTIME 180 capsule 0     hydrOXYzine HCl (ATARAX) 25 MG tablet TAKE 1 TABLET BY MOUTH 3 TIMES DAILY AS NEEDED FOR ITCHING 120 tablet 1     ketoconazole (NIZORAL) 2 % external cream Apply topically 2 times daily as needed for itching 60 g 11     ketoconazole (NIZORAL) 2 % external shampoo Apply topically daily as needed for itching or irritation Apply to scalp leave for a few minutes and then wash out daily 120 mL 11     levothyroxine (SYNTHROID/LEVOTHROID) 88 MCG tablet Take 1 tablet (88 mcg) by mouth daily 90 tablet 3     MAGNESIUM OXIDE PO Take 400 mg by mouth daily       melatonin 5 MG tablet Take 1 tablet (5 mg) by mouth nightly as needed for sleep       multivitamin, therapeutic (THERA-VIT) TABS tablet Take 1 tablet by mouth every 24 hours 30 tablet 11     pantoprazole (PROTONIX) 40 MG EC tablet TAKE 1 TABLET BY MOUTH DAILY BEFORE BREAKFAST 90 tablet 2      pravastatin (PRAVACHOL) 10 MG tablet Take 1 tablet (10 mg) by mouth daily 90 tablet 3     psyllium 0.52 G capsule Take 2 capsules (1.04 g) by mouth daily With a full glass of water. (Patient taking differently: Take 1 capsule by mouth 3 times daily With a full glass of water.) 60 capsule 1     tacrolimus (GENERIC EQUIVALENT) 0.5 MG capsule Take 3 capsules (1.5 mg) by mouth every 12 hours 540 capsule 3     triamcinolone (KENALOG) 0.1 % external ointment APPLY TOPICALLY TWO TIMES A DAY TO PLAQUES BEHIND THE KNEE 90 g 1     ursodiol (ACTIGALL) 300 MG capsule TAKE ONE CAPSULE BY MOUTH TWICE A DAY 60 capsule 11     WEGOVY 0.5 MG/0.5ML pen Inject 0.5 mg Subcutaneous every 7 days       bisacodyl (DULCOLAX) 5 MG EC tablet Take 2 tablets at 3 pm the day before your procedure. If your procedure is before 11 am, take 2 additional tablets at 11 pm. If your procedure is after 11 am, take 2 additional tablets at 6 am. For additional instructions refer to your colonoscopy prep instructions. (Patient not taking: Reported on 4/4/2024) 4 tablet 0     calcipotriene (DOVONOX) 0.005 % external solution Apply 1 mL topically daily To the scalp (Patient not taking: Reported on 8/2/2023) 180 mL 1     ciclopirox (PENLAC) 8 % external solution Apply to adjacent skin and affected nails daily.  Remove with alcohol every 7 days, then repeat. (Patient not taking: Reported on 3/31/2023) 6.6 mL 1     order for DME Equipment being ordered: Trilok ankle brace (Patient not taking: Reported on 4/4/2024) 1 Device 0     polyethylene glycol (GOLYTELY) 236 g suspension The night before the exam at 6 pm drink an 8-ounce glass every 15 minutes until the jug is half empty. If you arrive before 11 AM: Drink the other half of the Golytely jug at 11 PM night before procedure. If you arrive after 11 AM: Drink the other half of the Golytely jug at 6 AM day of procedure. For additional instructions refer to your colonoscopy prep instructions. (Patient not taking:  "Reported on 4/4/2024) 4000 mL 0     propranolol (INDERAL) 10 MG tablet Take 1 tablet (10 mg) by mouth 2 times daily (Patient not taking: Reported on 4/4/2024) 180 tablet 0     No current facility-administered medications for this visit.      Vitals:   /82 (BP Location: Right arm, Patient Position: Sitting, Cuff Size: Adult Regular)   Pulse 78   Temp 98  F (36.7  C) (Oral)   Resp 16   Ht 1.676 m (5' 5.98\")   Wt 77.1 kg (169 lb 14.4 oz)   LMP  (LMP Unknown)   SpO2 95%   BMI 27.44 kg/m      Physical Exam:   In general she looks quite well. HEENT exam shows no scleral icterus or temporal muscle wasting. Chest is clear. Abdominal exam shows no increase in girth. No masses or tenderness to palpation are present. Liver is 10 cm in span without left lobe enlargement. No spleen tip is palpable.  Her incision is intact.  Extremity exam shows no edema. Skin exam shows no suspicious lesions and neurologic exam is nonfocal.     Labs:   Lab Results   Component Value Date     03/29/2024    POTASSIUM 4.1 03/29/2024    CHLORIDE 99 03/29/2024    ANIONGAP 11 03/29/2024    CO2 27 03/29/2024    BUN 23.5 (H) 03/29/2024    CR 1.27 (H) 03/29/2024    GFRESTIMATED 46 (L) 03/29/2024    ERIC 9.2 03/29/2024      Lab Results   Component Value Date    WBC 7.1 03/29/2024    HGB 13.8 03/29/2024    HCT 41.1 03/29/2024    MCV 91 03/29/2024    MCH 30.4 03/29/2024    MCHC 33.6 03/29/2024    RDW 12.9 03/29/2024     03/29/2024     Lab Results   Component Value Date    ALBUMIN 4.2 03/29/2024    ALKPHOS 49 03/29/2024    AST 21 03/29/2024     Lab Results   Component Value Date    INR 0.98 12/04/2022     Recent Labs   Lab Test 03/29/24  0737 01/29/24  0729 11/22/23  0727 09/28/23  0724 08/04/23  1611 07/28/23  0734 05/30/23  0754 03/27/23  0720 01/30/23  0744 12/04/22  1529   ALKPHOS 49 50 41 43 46 45 49 50 45 46   ALT 8 8 12 7 12 10 10 12 14 13   AST 21 23 21 23 27 26 22 23 28 17      Imaging:   No images are attached to the " encounter.     Assessment/Plan:   IMPRESSION:   Ms. Luque is doing well.  Her liver tests are excellent and her kidney function is stable.  She does follow with Dr. Shrestha.  She does report her blood pressure has been under good control at home.  She is up to date with regard to vaccines including COVID and RSV vaccines, she is up-to-date with regard to skin cancer and colorectal cancer screening     My plan will be to see her back in the clinic again in 6 months' time.  I otherwise will not be making any other change to her medical regimen.     I did spend total of 30 minutes (on the date of the encounter), including 20 minutes of face-to-face clinic time including counseling.  The rest of the time was spent in documentation and review of records.    Thank you very much for allowing me to participate in the care of this patient.  If you have any questions regarding my recommendations, please do not hesitate to contact me.         Hussein Banegas MD      Professor of Medicine  [employer]      Executive Medical Director, Solid Organ Transplant Program  [facility]      Again, thank you for allowing me to participate in the care of your patient.        Sincerely,        Hussein Banegas MD

## 2024-05-01 DIAGNOSIS — I10 ESSENTIAL HYPERTENSION: ICD-10-CM

## 2024-05-03 RX ORDER — AMLODIPINE BESYLATE 5 MG/1
5 TABLET ORAL DAILY
Qty: 90 TABLET | Refills: 2 | Status: SHIPPED | OUTPATIENT
Start: 2024-05-03 | End: 2024-07-22

## 2024-05-16 DIAGNOSIS — Z94.4 LIVER TRANSPLANTED (H): ICD-10-CM

## 2024-05-16 RX ORDER — TACROLIMUS 0.5 MG/1
CAPSULE ORAL
Qty: 540 CAPSULE | Refills: 3 | Status: SHIPPED | OUTPATIENT
Start: 2024-05-16 | End: 2024-10-01

## 2024-05-29 ENCOUNTER — LAB (OUTPATIENT)
Dept: LAB | Facility: CLINIC | Age: 69
End: 2024-05-29
Payer: COMMERCIAL

## 2024-05-29 DIAGNOSIS — Z94.4 LIVER REPLACED BY TRANSPLANT (H): ICD-10-CM

## 2024-05-29 LAB
ALBUMIN SERPL BCG-MCNC: 4.1 G/DL (ref 3.5–5.2)
ALP SERPL-CCNC: 36 U/L (ref 40–150)
ALT SERPL W P-5'-P-CCNC: 8 U/L (ref 0–50)
ANION GAP SERPL CALCULATED.3IONS-SCNC: 11 MMOL/L (ref 7–15)
AST SERPL W P-5'-P-CCNC: 24 U/L (ref 0–45)
BILIRUB DIRECT SERPL-MCNC: <0.2 MG/DL (ref 0–0.3)
BILIRUB SERPL-MCNC: 0.7 MG/DL
BUN SERPL-MCNC: 19.4 MG/DL (ref 8–23)
CALCIUM SERPL-MCNC: 9.4 MG/DL (ref 8.8–10.2)
CHLORIDE SERPL-SCNC: 99 MMOL/L (ref 98–107)
CREAT SERPL-MCNC: 1.4 MG/DL (ref 0.51–0.95)
DEPRECATED HCO3 PLAS-SCNC: 27 MMOL/L (ref 22–29)
EGFRCR SERPLBLD CKD-EPI 2021: 41 ML/MIN/1.73M2
ERYTHROCYTE [DISTWIDTH] IN BLOOD BY AUTOMATED COUNT: 13.2 % (ref 10–15)
GLUCOSE SERPL-MCNC: 95 MG/DL (ref 70–99)
HCT VFR BLD AUTO: 39.2 % (ref 35–47)
HGB BLD-MCNC: 13.2 G/DL (ref 11.7–15.7)
MCH RBC QN AUTO: 30.3 PG (ref 26.5–33)
MCHC RBC AUTO-ENTMCNC: 33.7 G/DL (ref 31.5–36.5)
MCV RBC AUTO: 90 FL (ref 78–100)
PLATELET # BLD AUTO: 293 10E3/UL (ref 150–450)
POTASSIUM SERPL-SCNC: 3.8 MMOL/L (ref 3.4–5.3)
PROT SERPL-MCNC: 6.9 G/DL (ref 6.4–8.3)
RBC # BLD AUTO: 4.35 10E6/UL (ref 3.8–5.2)
SODIUM SERPL-SCNC: 137 MMOL/L (ref 135–145)
TACROLIMUS BLD-MCNC: 4.2 UG/L (ref 5–15)
TME LAST DOSE: ABNORMAL H
TME LAST DOSE: ABNORMAL H
WBC # BLD AUTO: 7.7 10E3/UL (ref 4–11)

## 2024-05-29 PROCEDURE — 80197 ASSAY OF TACROLIMUS: CPT

## 2024-05-29 PROCEDURE — 80053 COMPREHEN METABOLIC PANEL: CPT

## 2024-05-29 PROCEDURE — 36415 COLL VENOUS BLD VENIPUNCTURE: CPT

## 2024-05-29 PROCEDURE — 85027 COMPLETE CBC AUTOMATED: CPT

## 2024-05-29 PROCEDURE — 82248 BILIRUBIN DIRECT: CPT

## 2024-06-13 DIAGNOSIS — G62.9 PERIPHERAL POLYNEUROPATHY: ICD-10-CM

## 2024-06-13 DIAGNOSIS — Z82.49 FAMILY HISTORY OF CHF (CONGESTIVE HEART FAILURE): ICD-10-CM

## 2024-06-18 RX ORDER — PRAVASTATIN SODIUM 10 MG
10 TABLET ORAL DAILY
Qty: 90 TABLET | Refills: 3 | Status: SHIPPED | OUTPATIENT
Start: 2024-06-18

## 2024-06-18 RX ORDER — GABAPENTIN 100 MG/1
CAPSULE ORAL
Qty: 180 CAPSULE | Refills: 0 | Status: SHIPPED | OUTPATIENT
Start: 2024-06-18 | End: 2024-07-22

## 2024-07-12 ENCOUNTER — OFFICE VISIT (OUTPATIENT)
Dept: RHEUMATOLOGY | Facility: CLINIC | Age: 69
End: 2024-07-12
Attending: STUDENT IN AN ORGANIZED HEALTH CARE EDUCATION/TRAINING PROGRAM
Payer: COMMERCIAL

## 2024-07-12 VITALS
DIASTOLIC BLOOD PRESSURE: 72 MMHG | HEART RATE: 78 BPM | OXYGEN SATURATION: 97 % | WEIGHT: 159 LBS | BODY MASS INDEX: 25.68 KG/M2 | SYSTOLIC BLOOD PRESSURE: 121 MMHG

## 2024-07-12 DIAGNOSIS — L40.9 PSORIASIS: Primary | ICD-10-CM

## 2024-07-12 PROCEDURE — 99215 OFFICE O/P EST HI 40 MIN: CPT | Performed by: STUDENT IN AN ORGANIZED HEALTH CARE EDUCATION/TRAINING PROGRAM

## 2024-07-12 PROCEDURE — 99213 OFFICE O/P EST LOW 20 MIN: CPT | Performed by: STUDENT IN AN ORGANIZED HEALTH CARE EDUCATION/TRAINING PROGRAM

## 2024-07-12 ASSESSMENT — PAIN SCALES - GENERAL: PAINLEVEL: NO PAIN (0)

## 2024-07-12 NOTE — PROGRESS NOTES
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  Pictures of uploaded in media tab.  Appears more consistent with paronychia then with inflammatory arthritis.  Has not had any recurrent episodes.  At the time also had nail dystrophy which was felt to be psoriatic nail disease.  Nail clippings grew Candida. Hand x-rays 1/2022 without stigmata of psoriatic arthritis.  No longer having any nail dystrophy.  Has IBS but no diagnosis of IBD.  No history of inflammatory bowel disease.  Currently without any joint pain, swelling, or stiffness consistent with inflammatory arthritis.  Plan:  -Reassuring history and exam with no signs of active inflammatory arthritis  -Return to clinic as needed    No orders of the defined types were placed in this encounter.       40 minutes spent on the day of the encounter doing chart review, history and exam, counseling and documentation.     Subjective       HPI    Previous patient of Dr. Alex, transferring care to me today.    \"67 year old female with hx of liver transplant in 2016 on tacrolimus who was referred to rheumatology in January 2022 by dermatology for evaluation and treatment of psoriatic arthritis in the context of one episode of left then right hand distal digit redness, swelling, tenderness, pain of multiple fingers. These symptoms did resolve and have not returned. No current dactylitis and she denied symptoms consistent with this at the time of her initial consultation and again today. " "No history of inflammatory eye disease though follows with Dr Ortega here for NARINDER. She does have nail involvement and inverse psoriasis both of which increase the likelihood for inflammatory arthritis associated with psoriasis. Psoriasis currently well controled without any active lesions managed with topicals only. At the time of her visit in April 2022, we discussed that her nail involvement and inverse psoriasis both increase the risk of inflammatory arthritis associated with psoriasis. 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 We discussed that with this isolated episode, complete lack of symptoms before/since and good control of her cutaneous psoriasis that a reasonable approach at time of initial consultation would be to monitor closely for return of inflammatory arthritis symptoms. Should they return, we could consider therapy to reduce the frequency and severity of attacks with steroid sparing therapy. She agreed with this conservative approach.  We did obtain hand films in January 2022 which not show any evidence of erosions/george. She presents today for follow-up as planned. Was last seen 9 months ago.\"       Today confirms the above history with the reports of finger swelling of left second and third distal digits of redness around the nail and tenderness which lasted for about a month and then self resolved.  She has not had any recurrence of finger swelling or pain or any other joint swelling or pain or stiffness besides that she attributes to normal joint aches of aging.  Remainder of review of system is as follows:    +dry eyes,   IBS on and off diarrhea and constipation   Rosacea, eczema, psorisias  Left CMC OA s/p injections  No previous history of fever, fatigue, headache, blurry vision, weakness, no other joint swelling or pain, no history of " blood clots or miscarriages, no oral ulcers.       No alcohol use since diagnosis of alcohol liver disease   no tobacco use since Dec 1996 (2.5 packs a day for about 20 years previously)          Current Outpatient Medications:     acetaminophen (TYLENOL) 325 MG tablet, Take 2 tablets (650 mg) by mouth every 6 hours as needed for mild pain Or fevers. Use sparingly. Limit use to no more than 2 grams (2000 mg) in 24 hours. **Further refills must be obtained by primary care provider**, Disp: 100 tablet, Rfl: 0    albuterol (VENTOLIN HFA) 108 (90 Base) MCG/ACT inhaler, Inhale 2 puffs into the lungs every 6 hours as needed for shortness of breath, Disp: 18 g, Rfl: 2    amLODIPine (NORVASC) 5 MG tablet, TAKE 1 TABLET BY MOUTH DAILY, Disp: 90 tablet, Rfl: 2    bisacodyl (DULCOLAX) 5 MG EC tablet, Take 2 tablets at 3 pm the day before your procedure. If your procedure is before 11 am, take 2 additional tablets at 11 pm. If your procedure is after 11 am, take 2 additional tablets at 6 am. For additional instructions refer to your colonoscopy prep instructions. (Patient not taking: Reported on 4/4/2024), Disp: 4 tablet, Rfl: 0    calcipotriene (DOVONOX) 0.005 % external solution, Apply 1 mL topically daily To the scalp (Patient not taking: Reported on 8/2/2023), Disp: 180 mL, Rfl: 1    chlorthalidone (HYGROTON) 25 MG tablet, Take 0.5 tablets (12.5 mg) by mouth daily, Disp: 45 tablet, Rfl: 3    cholecalciferol (VITAMIN D3) 400 UNIT TABS tablet, Take 400 Units by mouth daily, Disp: , Rfl:     ciclopirox (PENLAC) 8 % external solution, Apply to adjacent skin and affected nails daily.  Remove with alcohol every 7 days, then repeat. (Patient not taking: Reported on 3/31/2023), Disp: 6.6 mL, Rfl: 1    clobetasol (TEMOVATE) 0.05 % external ointment, Apply topically 2 times daily To areas of eczema on the lower legs, until areas resolve., Disp: 60 g, Rfl: 1    clobetasol (TEMOVATE) 0.05 % external solution, Apply topically 2 times  daily To the scalp as needed for itching and flaking, on the days you don't use the clobetasol shampoo., Disp: 150 mL, Rfl: 2    clobetasol propionate (CLOBEX) 0.05 % external shampoo, Apply topically daily To dry scalp x 15 min and rinse ,when psoriasis is present., Disp: 354 mL, Rfl: 1    cyanocobalamin (VITAMIN  B-12) 1000 MCG tablet, Take 1,000 mcg by mouth daily, Disp: , Rfl:     ferrous sulfate (IRON) 325 (65 FE) MG tablet, Take 1 tablet (325 mg) by mouth 2 times daily, Disp: 100 tablet, Rfl:     fluocinolone acetonide (DERMA SMOOTHE/FS BODY) 0.01 % external oil, Apply topically 2 times daily, Disp: 118.28 mL, Rfl: 11    fluocinonide (LIDEX) 0.05 % external solution, Apply topically 2 times daily To areas of rash and itch on scalp, Disp: 60 mL, Rfl: 3    folic acid (FOLVITE) 1 MG tablet, Take 1 tablet (1 mg) by mouth daily, Disp: 30 tablet, Rfl: 1    gabapentin (NEURONTIN) 100 MG capsule, TAKE TWO CAPSULES BY MOUTH EVERY NIGHT AT BEDTIME, Disp: 180 capsule, Rfl: 0    hydrOXYzine HCl (ATARAX) 25 MG tablet, TAKE 1 TABLET BY MOUTH 3 TIMES DAILY AS NEEDED FOR ITCHING, Disp: 120 tablet, Rfl: 1    ketoconazole (NIZORAL) 2 % external cream, Apply topically 2 times daily as needed for itching, Disp: 60 g, Rfl: 11    ketoconazole (NIZORAL) 2 % external shampoo, Apply topically daily as needed for itching or irritation Apply to scalp leave for a few minutes and then wash out daily, Disp: 120 mL, Rfl: 11    levothyroxine (SYNTHROID/LEVOTHROID) 88 MCG tablet, Take 1 tablet (88 mcg) by mouth daily, Disp: 90 tablet, Rfl: 3    MAGNESIUM OXIDE PO, Take 400 mg by mouth daily, Disp: , Rfl:     melatonin 5 MG tablet, Take 1 tablet (5 mg) by mouth nightly as needed for sleep, Disp: , Rfl:     multivitamin, therapeutic (THERA-VIT) TABS tablet, Take 1 tablet by mouth every 24 hours, Disp: 30 tablet, Rfl: 11    order for DME, Equipment being ordered: Trilok ankle brace (Patient not taking: Reported on 4/4/2024), Disp: 1 Device, Rfl:  0    pantoprazole (PROTONIX) 40 MG EC tablet, TAKE 1 TABLET BY MOUTH DAILY BEFORE BREAKFAST, Disp: 90 tablet, Rfl: 2    polyethylene glycol (GOLYTELY) 236 g suspension, The night before the exam at 6 pm drink an 8-ounce glass every 15 minutes until the jug is half empty. If you arrive before 11 AM: Drink the other half of the Golytely jug at 11 PM night before procedure. If you arrive after 11 AM: Drink the other half of the Golytely jug at 6 AM day of procedure. For additional instructions refer to your colonoscopy prep instructions. (Patient not taking: Reported on 4/4/2024), Disp: 4000 mL, Rfl: 0    pravastatin (PRAVACHOL) 10 MG tablet, TAKE 1 TABLET BY MOUTH DAILY, Disp: 90 tablet, Rfl: 3    propranolol (INDERAL) 10 MG tablet, Take 1 tablet (10 mg) by mouth 2 times daily (Patient not taking: Reported on 4/4/2024), Disp: 180 tablet, Rfl: 0    psyllium 0.52 G capsule, Take 2 capsules (1.04 g) by mouth daily With a full glass of water. (Patient taking differently: Take 1 capsule by mouth 3 times daily With a full glass of water.), Disp: 60 capsule, Rfl: 1    tacrolimus (GENERIC-ACCORD BX-RATED) 0.5 MG capsule, TAKE 3 CAPSULES BY MOUTH EVERY 12 HOURS, Disp: 540 capsule, Rfl: 3    triamcinolone (KENALOG) 0.1 % external ointment, APPLY TOPICALLY TWO TIMES A DAY TO PLAQUES BEHIND THE KNEE, Disp: 90 g, Rfl: 1    ursodiol (ACTIGALL) 300 MG capsule, TAKE ONE CAPSULE BY MOUTH TWICE A DAY, Disp: 60 capsule, Rfl: 11    WEGOVY 0.5 MG/0.5ML pen, Inject 0.5 mg Subcutaneous every 7 days, Disp: , Rfl:   Allergies:  Allergies   Allergen Reactions    Codeine Hives    Benadryl [Diphenhydramine] Hives    Cefaclor Hives    Hydrocodone-Acetaminophen Itching    Oxycodone Itching    Penicillins Hives    Sulfa Antibiotics Hives     Medical Hx:  Past Medical History:   Diagnosis Date    AION (acute ischemic optic neuropathy)     Asthma     Bacterial infection 02/04/2021    Candida infection 11/11/2020    Cellulitis 09/13/2021    Cervical  spinal stenosis     Chronic kidney disease     Chronic kidney disease, stage 3 (H)     Dizziness and giddiness     Created by Conversion     End stage liver disease (H)     2/2 Alcohol Abuse    End stage liver disease (H)     Alcohol-related    GERD (gastroesophageal reflux disease)     Hypertension     Liver transplant recipient (H) 2016    [facility]    Liver transplanted (H)     Migraine headache     Migraines     Osteoporosis     frax 1.7%     PFO (patent foramen ovale) 2016    Noted on echo at [facility]    Recurrent UTI     Spinal stenosis in cervical region     Strabismus     Unspecified asthma(493.90)     Created by Conversion      Surgical Hx:  Past Surgical History:   Procedure Laterality Date    APPENDECTOMY          APPENDECTOMY      BENCH LIVER N/A 2016    Procedure: BENCH LIVER;  Surgeon: Larry Dhillon MD;  Location:  OR    CATARACT IOL, RT/LT      COLONOSCOPY      COLONOSCOPY N/A 2021    Procedure: COLONOSCOPY, WITH POLYPECTOMY;  Surgeon: Aryln Beckford MD;  [place] [facility] [place]    COLONOSCOPY N/A 11/15/2023    Procedure: Colonoscopy;  Surgeon: Leventhal, Thomas Michael, MD;  Location: [facility] [place]    ESOPHAGOSCOPY, GASTROSCOPY, DUODENOSCOPY (EGD), COMBINED N/A 2016    Procedure: COMBINED ESOPHAGOSCOPY, GASTROSCOPY, DUODENOSCOPY (EGD);  Surgeon: Tao Alfonso MD;  Location:  GI    ESOPHAGOSCOPY, GASTROSCOPY, DUODENOSCOPY (EGD), COMBINED N/A 10/31/2016    Procedure: COMBINED ESOPHAGOSCOPY, GASTROSCOPY, DUODENOSCOPY (EGD), BIOPSY SINGLE OR MULTIPLE;  Surgeon: Ronald Bojorquez MD;  Location:  GI    LIVER TRANSPLANTATION  2016    [facility]    RECTAL SURGERY      sphincteroplasty      TRANSPLANT LIVER RECIPIENT  DONOR N/A 2016    Procedure: TRANSPLANT LIVER RECIPIENT  DONOR;  Surgeon: Larry Dhillon MD;  Location:  OR    TUBAL LIGATION      laparoscopic    TUBAL  LIGATION       Family Hx:  Family History   Problem Relation Age of Onset    Family History Negative Other     Melanoma Mother             Heart Disease Father         CHF    Skin Cancer Sister     Cirrhosis Paternal Uncle         not alcohol related    Heart Failure Mother     Heart Failure Father     Chronic Obstructive Pulmonary Disease Father      Social Hx:  Social History     Tobacco Use    Smoking status: Former     Current packs/day: 0.00     Average packs/day: 2.5 packs/day for 20.0 years (50.0 ttl pk-yrs)     Types: Cigarettes     Start date: 1976     Quit date: 1996     Years since quittin.5    Smokeless tobacco: Never   Vaping Use    Vaping status: Never Used   Substance Use Topics    Alcohol use: No     Alcohol/week: 7.0 standard drinks of alcohol     Types: 7 Standard drinks or equivalent per week     Comment: heavy use (750ml/2 days) up until 1 year ago; had cut down to 1 drink/day; none since 17    Drug use: No        Objective   Physical Exam   /72 (BP Location: Right arm, Patient Position: Sitting, Cuff Size: Adult Regular)   Pulse 78   Wt 72.1 kg (159 lb)   LMP  (LMP Unknown)   SpO2 97%   BMI 25.68 kg/m    General: alert, well appearing, no distress  HEENT: no alopecia, clear conjunctiva,   Cardiac: Warm and well-perfused  Pulm: normal respiratory effort,   MSK: Hand, wrist, elbow,  joints without evidence of synovitis or effusion. Intact and nonpainful range of motion.   Skin: No rashes, warm and well-perfused. No nail pitting, normal appearing nails, no digital ulceration, no dactylitis,         Soraya Hightower MD  Rheumatology

## 2024-07-12 NOTE — NURSING NOTE
Chief Complaint   Patient presents with    Consult     Psoriasis with arthropathy (H); 1 year follow-up; Rescheduled per pt & TE 12/13/23; Previously seen by Bry Alex MD in July 2023     /72 (BP Location: Right arm, Patient Position: Sitting, Cuff Size: Adult Regular)   Pulse 78   Wt 72.1 kg (159 lb)   LMP  (LMP Unknown)   SpO2 97%   BMI 25.68 kg/m    [facility]

## 2024-07-12 NOTE — LETTER
"7/12/2024       RE: Phan Luque  [address]  [facility] [id number]     Dear Colleague,    Thank you for referring your patient, Phan Luque, to the [facility] RHEUMATOLOGY at [facility]. Please see a copy of my visit note below.    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  Pictures of uploaded in media tab.  Appears more consistent with paronychia then with inflammatory arthritis.  Has not had any recurrent episodes.  At the time also had nail dystrophy which was felt to be psoriatic nail disease.  Nail clippings grew Candida. Hand x-rays 1/2022 without stigmata of psoriatic arthritis.  No longer having any nail dystrophy.  Has IBS but no diagnosis of IBD.  No history of inflammatory bowel disease.  Currently without any joint pain, swelling, or stiffness consistent with inflammatory arthritis.  Plan:  -Reassuring history and exam with no signs of active inflammatory arthritis  -Return to clinic as needed    No orders of the defined types were placed in this encounter.       40 minutes spent on the day of the encounter doing chart review, history and exam, counseling and documentation.     Subjective      HPI    Previous patient of Dr. Alex, transferring care to me today.    \"67 year old female with hx of liver transplant in 2016 on tacrolimus who was referred to rheumatology in January 2022 by dermatology for evaluation and treatment of psoriatic " "arthritis in the context of one episode of left then right hand distal digit redness, swelling, tenderness, pain of multiple fingers. These symptoms did resolve and have not returned. No current dactylitis and she denied symptoms consistent with this at the time of her initial consultation and again today. No history of inflammatory eye disease though follows with Dr Ortega here for NARINDER. She does have nail involvement and inverse psoriasis both of which increase the likelihood for inflammatory arthritis associated with psoriasis. Psoriasis currently well controled without any active lesions managed with topicals only. At the time of her visit in April 2022, we discussed that her nail involvement and inverse psoriasis both increase the risk of inflammatory arthritis associated with psoriasis. 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 We discussed that with this isolated episode, complete lack of symptoms before/since and good control of her cutaneous psoriasis that a reasonable approach at time of initial consultation would be to monitor closely for return of inflammatory arthritis symptoms. Should they return, we could consider therapy to reduce the frequency and severity of attacks with steroid sparing therapy. She agreed with this conservative approach.  We did obtain hand films in January 2022 which not show any evidence of erosions/george. She presents today for follow-up as planned. Was last seen 9 months ago.\"       Today confirms the above history with the reports of finger swelling of left second and third distal digits of redness around the nail and tenderness which lasted for about a month and then self resolved.  She has not had any recurrence of finger swelling or pain or any other joint swelling or pain or stiffness besides that she attributes to normal " joint aches of aging.  Remainder of review of system is as follows:    +dry eyes,   IBS on and off diarrhea and constipation   Rosacea, eczema, psorisias  Left CMC OA s/p injections  No previous history of fever, fatigue, headache, blurry vision, weakness, no other joint swelling or pain, no history of blood clots or miscarriages, no oral ulcers.       No alcohol use since diagnosis of alcohol liver disease   no tobacco use since Dec 1996 (2.5 packs a day for about 20 years previously)          Current Outpatient Medications:     acetaminophen (TYLENOL) 325 MG tablet, Take 2 tablets (650 mg) by mouth every 6 hours as needed for mild pain Or fevers. Use sparingly. Limit use to no more than 2 grams (2000 mg) in 24 hours. **Further refills must be obtained by primary care provider**, Disp: 100 tablet, Rfl: 0    albuterol (VENTOLIN HFA) 108 (90 Base) MCG/ACT inhaler, Inhale 2 puffs into the lungs every 6 hours as needed for shortness of breath, Disp: 18 g, Rfl: 2    amLODIPine (NORVASC) 5 MG tablet, TAKE 1 TABLET BY MOUTH DAILY, Disp: 90 tablet, Rfl: 2    bisacodyl (DULCOLAX) 5 MG EC tablet, Take 2 tablets at 3 pm the day before your procedure. If your procedure is before 11 am, take 2 additional tablets at 11 pm. If your procedure is after 11 am, take 2 additional tablets at 6 am. For additional instructions refer to your colonoscopy prep instructions. (Patient not taking: Reported on 4/4/2024), Disp: 4 tablet, Rfl: 0    calcipotriene (DOVONOX) 0.005 % external solution, Apply 1 mL topically daily To the scalp (Patient not taking: Reported on 8/2/2023), Disp: 180 mL, Rfl: 1    chlorthalidone (HYGROTON) 25 MG tablet, Take 0.5 tablets (12.5 mg) by mouth daily, Disp: 45 tablet, Rfl: 3    cholecalciferol (VITAMIN D3) 400 UNIT TABS tablet, Take 400 Units by mouth daily, Disp: , Rfl:     ciclopirox (PENLAC) 8 % external solution, Apply to adjacent skin and affected nails daily.  Remove with alcohol every 7 days, then repeat.  (Patient not taking: Reported on 3/31/2023), Disp: 6.6 mL, Rfl: 1    clobetasol (TEMOVATE) 0.05 % external ointment, Apply topically 2 times daily To areas of eczema on the lower legs, until areas resolve., Disp: 60 g, Rfl: 1    clobetasol (TEMOVATE) 0.05 % external solution, Apply topically 2 times daily To the scalp as needed for itching and flaking, on the days you don't use the clobetasol shampoo., Disp: 150 mL, Rfl: 2    clobetasol propionate (CLOBEX) 0.05 % external shampoo, Apply topically daily To dry scalp x 15 min and rinse ,when psoriasis is present., Disp: 354 mL, Rfl: 1    cyanocobalamin (VITAMIN  B-12) 1000 MCG tablet, Take 1,000 mcg by mouth daily, Disp: , Rfl:     ferrous sulfate (IRON) 325 (65 FE) MG tablet, Take 1 tablet (325 mg) by mouth 2 times daily, Disp: 100 tablet, Rfl:     fluocinolone acetonide (DERMA SMOOTHE/FS BODY) 0.01 % external oil, Apply topically 2 times daily, Disp: 118.28 mL, Rfl: 11    fluocinonide (LIDEX) 0.05 % external solution, Apply topically 2 times daily To areas of rash and itch on scalp, Disp: 60 mL, Rfl: 3    folic acid (FOLVITE) 1 MG tablet, Take 1 tablet (1 mg) by mouth daily, Disp: 30 tablet, Rfl: 1    gabapentin (NEURONTIN) 100 MG capsule, TAKE TWO CAPSULES BY MOUTH EVERY NIGHT AT BEDTIME, Disp: 180 capsule, Rfl: 0    hydrOXYzine HCl (ATARAX) 25 MG tablet, TAKE 1 TABLET BY MOUTH 3 TIMES DAILY AS NEEDED FOR ITCHING, Disp: 120 tablet, Rfl: 1    ketoconazole (NIZORAL) 2 % external cream, Apply topically 2 times daily as needed for itching, Disp: 60 g, Rfl: 11    ketoconazole (NIZORAL) 2 % external shampoo, Apply topically daily as needed for itching or irritation Apply to scalp leave for a few minutes and then wash out daily, Disp: 120 mL, Rfl: 11    levothyroxine (SYNTHROID/LEVOTHROID) 88 MCG tablet, Take 1 tablet (88 mcg) by mouth daily, Disp: 90 tablet, Rfl: 3    MAGNESIUM OXIDE PO, Take 400 mg by mouth daily, Disp: , Rfl:     melatonin 5 MG tablet, Take 1 tablet (5  mg) by mouth nightly as needed for sleep, Disp: , Rfl:     multivitamin, therapeutic (THERA-VIT) TABS tablet, Take 1 tablet by mouth every 24 hours, Disp: 30 tablet, Rfl: 11    order for DME, Equipment being ordered: Trilok ankle brace (Patient not taking: Reported on 4/4/2024), Disp: 1 Device, Rfl: 0    pantoprazole (PROTONIX) 40 MG EC tablet, TAKE 1 TABLET BY MOUTH DAILY BEFORE BREAKFAST, Disp: 90 tablet, Rfl: 2    polyethylene glycol (GOLYTELY) 236 g suspension, The night before the exam at 6 pm drink an 8-ounce glass every 15 minutes until the jug is half empty. If you arrive before 11 AM: Drink the other half of the Golytely jug at 11 PM night before procedure. If you arrive after 11 AM: Drink the other half of the Golytely jug at 6 AM day of procedure. For additional instructions refer to your colonoscopy prep instructions. (Patient not taking: Reported on 4/4/2024), Disp: 4000 mL, Rfl: 0    pravastatin (PRAVACHOL) 10 MG tablet, TAKE 1 TABLET BY MOUTH DAILY, Disp: 90 tablet, Rfl: 3    propranolol (INDERAL) 10 MG tablet, Take 1 tablet (10 mg) by mouth 2 times daily (Patient not taking: Reported on 4/4/2024), Disp: 180 tablet, Rfl: 0    psyllium 0.52 G capsule, Take 2 capsules (1.04 g) by mouth daily With a full glass of water. (Patient taking differently: Take 1 capsule by mouth 3 times daily With a full glass of water.), Disp: 60 capsule, Rfl: 1    tacrolimus (GENERIC-ACCORD BX-RATED) 0.5 MG capsule, TAKE 3 CAPSULES BY MOUTH EVERY 12 HOURS, Disp: 540 capsule, Rfl: 3    triamcinolone (KENALOG) 0.1 % external ointment, APPLY TOPICALLY TWO TIMES A DAY TO PLAQUES BEHIND THE KNEE, Disp: 90 g, Rfl: 1    ursodiol (ACTIGALL) 300 MG capsule, TAKE ONE CAPSULE BY MOUTH TWICE A DAY, Disp: 60 capsule, Rfl: 11    WEGOVY 0.5 MG/0.5ML pen, Inject 0.5 mg Subcutaneous every 7 days, Disp: , Rfl:   Allergies:  Allergies   Allergen Reactions    Codeine Hives    Benadryl [Diphenhydramine] Hives    Cefaclor Hives     Hydrocodone-Acetaminophen Itching    Oxycodone Itching    Penicillins Hives    Sulfa Antibiotics Hives     Medical Hx:  Past Medical History:   Diagnosis Date    AION (acute ischemic optic neuropathy)     Asthma     Bacterial infection 02/04/2021    Candida infection 11/11/2020    Cellulitis 09/13/2021    Cervical spinal stenosis     Chronic kidney disease     Chronic kidney disease, stage 3 (H)     Dizziness and giddiness     Created by Conversion     End stage liver disease (H)     2/2 Alcohol Abuse    End stage liver disease (H)     Alcohol-related    GERD (gastroesophageal reflux disease)     Hypertension     Liver transplant recipient (H) 08/25/2016    [facility]    Liver transplanted (H)     Migraine headache     Migraines     Osteoporosis     frax 11/1.7% 2021    PFO (patent foramen ovale) 08/08/2016    Noted on echo at [facility]    Recurrent UTI     Spinal stenosis in cervical region     Strabismus     Unspecified asthma(493.90)     Created by Conversion      Surgical Hx:  Past Surgical History:   Procedure Laterality Date    APPENDECTOMY      1962    APPENDECTOMY      BENCH LIVER N/A 8/25/2016    Procedure: BENCH LIVER;  Surgeon: Larry Dhillon MD;  Location:  OR    CATARACT IOL, RT/LT      COLONOSCOPY      COLONOSCOPY N/A 8/12/2021    Procedure: COLONOSCOPY, WITH POLYPECTOMY;  Surgeon: Arlyn Beckford MD;  Location: [facility] OR    COLONOSCOPY N/A 11/15/2023    Procedure: Colonoscopy;  Surgeon: Leventhal, Thomas Michael, MD;  Location: [facility] OR    ESOPHAGOSCOPY, GASTROSCOPY, DUODENOSCOPY (EGD), COMBINED N/A 8/17/2016    Procedure: COMBINED ESOPHAGOSCOPY, GASTROSCOPY, DUODENOSCOPY (EGD);  Surgeon: Tao Alfonso MD;  Location:  GI    ESOPHAGOSCOPY, GASTROSCOPY, DUODENOSCOPY (EGD), COMBINED N/A 10/31/2016    Procedure: COMBINED ESOPHAGOSCOPY, GASTROSCOPY, DUODENOSCOPY (EGD), BIOPSY SINGLE OR MULTIPLE;  Surgeon: Ronald Bojorquez MD;  Location:  GI    LIVER  TRANSPLANTATION  2016    [facility]    RECTAL SURGERY      sphincteroplasty      TRANSPLANT LIVER RECIPIENT  DONOR N/A 2016    Procedure: TRANSPLANT LIVER RECIPIENT  DONOR;  Surgeon: Larry Dhillon MD;  Location: [facility] OR    TUBAL LIGATION      laparoscopic    TUBAL LIGATION       Family Hx:  Family History   Problem Relation Age of Onset    Family History Negative Other     Melanoma Mother             Heart Disease Father         CHF    Skin Cancer Sister     Cirrhosis Paternal Uncle         not alcohol related    Heart Failure Mother     Heart Failure Father     Chronic Obstructive Pulmonary Disease Father      Social Hx:  Social History     Tobacco Use    Smoking status: Former     Current packs/day: 0.00     Average packs/day: 2.5 packs/day for 20.0 years (50.0 ttl pk-yrs)     Types: Cigarettes     Start date: 1976     Quit date: 1996     Years since quittin.5    Smokeless tobacco: Never   Vaping Use    Vaping status: Never Used   Substance Use Topics    Alcohol use: No     Alcohol/week: 7.0 standard drinks of alcohol     Types: 7 Standard drinks or equivalent per week     Comment: heavy use (750ml/2 days) up until 1 year ago; had cut down to 1 drink/day; none since 17    Drug use: No        Objective  Physical Exam   /72 (BP Location: Right arm, Patient Position: Sitting, Cuff Size: Adult Regular)   Pulse 78   Wt 72.1 kg (159 lb)   LMP  (LMP Unknown)   SpO2 97%   BMI 25.68 kg/m    General: alert, well appearing, no distress  HEENT: no alopecia, clear conjunctiva,   Cardiac: Warm and well-perfused  Pulm: normal respiratory effort,   MSK: Hand, wrist, elbow,  joints without evidence of synovitis or effusion. Intact and nonpainful range of motion.   Skin: No rashes, warm and well-perfused. No nail pitting, normal appearing nails, no digital ulceration, no dactylitis,         Soraya Hightower MD  Rheumatology

## 2024-07-19 SDOH — HEALTH STABILITY: PHYSICAL HEALTH: ON AVERAGE, HOW MANY MINUTES DO YOU ENGAGE IN EXERCISE AT THIS LEVEL?: 30 MIN

## 2024-07-19 SDOH — HEALTH STABILITY: PHYSICAL HEALTH: ON AVERAGE, HOW MANY DAYS PER WEEK DO YOU ENGAGE IN MODERATE TO STRENUOUS EXERCISE (LIKE A BRISK WALK)?: 7 DAYS

## 2024-07-19 ASSESSMENT — ASTHMA QUESTIONNAIRES
QUESTION_1 LAST FOUR WEEKS HOW MUCH OF THE TIME DID YOUR ASTHMA KEEP YOU FROM GETTING AS MUCH DONE AT WORK, SCHOOL OR AT HOME: NONE OF THE TIME
QUESTION_3 LAST FOUR WEEKS HOW OFTEN DID YOUR ASTHMA SYMPTOMS (WHEEZING, COUGHING, SHORTNESS OF BREATH, CHEST TIGHTNESS OR PAIN) WAKE YOU UP AT NIGHT OR EARLIER THAN USUAL IN THE MORNING: NOT AT ALL
QUESTION_4 LAST FOUR WEEKS HOW OFTEN HAVE YOU USED YOUR RESCUE INHALER OR NEBULIZER MEDICATION (SUCH AS ALBUTEROL): ONCE A WEEK OR LESS
ACT_TOTALSCORE: 23
ACT_TOTALSCORE: 23
QUESTION_5 LAST FOUR WEEKS HOW WOULD YOU RATE YOUR ASTHMA CONTROL: WELL CONTROLLED
QUESTION_2 LAST FOUR WEEKS HOW OFTEN HAVE YOU HAD SHORTNESS OF BREATH: NOT AT ALL

## 2024-07-19 ASSESSMENT — SOCIAL DETERMINANTS OF HEALTH (SDOH): HOW OFTEN DO YOU GET TOGETHER WITH FRIENDS OR RELATIVES?: ONCE A WEEK

## 2024-07-21 DIAGNOSIS — J45.20 INTERMITTENT ASTHMA WITHOUT COMPLICATION, UNSPECIFIED ASTHMA SEVERITY: ICD-10-CM

## 2024-07-21 DIAGNOSIS — R94.6 THYROID FUNCTION TEST ABNORMAL: ICD-10-CM

## 2024-07-21 DIAGNOSIS — F41.1 GAD (GENERALIZED ANXIETY DISORDER): ICD-10-CM

## 2024-07-21 ASSESSMENT — PATIENT HEALTH QUESTIONNAIRE - PHQ9
SUM OF ALL RESPONSES TO PHQ QUESTIONS 1-9: 4
SUM OF ALL RESPONSES TO PHQ QUESTIONS 1-9: 4
10. IF YOU CHECKED OFF ANY PROBLEMS, HOW DIFFICULT HAVE THESE PROBLEMS MADE IT FOR YOU TO DO YOUR WORK, TAKE CARE OF THINGS AT HOME, OR GET ALONG WITH OTHER PEOPLE: NOT DIFFICULT AT ALL

## 2024-07-22 ENCOUNTER — OFFICE VISIT (OUTPATIENT)
Dept: INTERNAL MEDICINE | Facility: CLINIC | Age: 69
End: 2024-07-22
Payer: COMMERCIAL

## 2024-07-22 VITALS
HEIGHT: 66 IN | WEIGHT: 157.7 LBS | SYSTOLIC BLOOD PRESSURE: 127 MMHG | BODY MASS INDEX: 25.34 KG/M2 | HEART RATE: 77 BPM | OXYGEN SATURATION: 97 % | DIASTOLIC BLOOD PRESSURE: 76 MMHG

## 2024-07-22 DIAGNOSIS — R60.9 FLUID RETENTION: ICD-10-CM

## 2024-07-22 DIAGNOSIS — G62.9 PERIPHERAL POLYNEUROPATHY: ICD-10-CM

## 2024-07-22 DIAGNOSIS — Z12.31 ENCOUNTER FOR SCREENING MAMMOGRAM FOR BREAST CANCER: ICD-10-CM

## 2024-07-22 DIAGNOSIS — K21.9 GASTROESOPHAGEAL REFLUX DISEASE WITHOUT ESOPHAGITIS: ICD-10-CM

## 2024-07-22 DIAGNOSIS — F41.1 GAD (GENERALIZED ANXIETY DISORDER): ICD-10-CM

## 2024-07-22 DIAGNOSIS — E55.9 VITAMIN D DEFICIENCY: ICD-10-CM

## 2024-07-22 DIAGNOSIS — I10 ESSENTIAL HYPERTENSION: ICD-10-CM

## 2024-07-22 DIAGNOSIS — Z00.00 ROUTINE GENERAL MEDICAL EXAMINATION AT A HEALTH CARE FACILITY: ICD-10-CM

## 2024-07-22 DIAGNOSIS — Z13.220 SCREENING FOR HYPERLIPIDEMIA: ICD-10-CM

## 2024-07-22 DIAGNOSIS — N18.30 STAGE 3 CHRONIC KIDNEY DISEASE, UNSPECIFIED WHETHER STAGE 3A OR 3B CKD (H): ICD-10-CM

## 2024-07-22 DIAGNOSIS — G43.109 MIGRAINE WITH AURA AND WITHOUT STATUS MIGRAINOSUS, NOT INTRACTABLE: ICD-10-CM

## 2024-07-22 DIAGNOSIS — R94.6 THYROID FUNCTION TEST ABNORMAL: ICD-10-CM

## 2024-07-22 DIAGNOSIS — M85.89 OSTEOPENIA OF MULTIPLE SITES: ICD-10-CM

## 2024-07-22 DIAGNOSIS — E03.9 HYPOTHYROIDISM, UNSPECIFIED TYPE: Primary | ICD-10-CM

## 2024-07-22 DIAGNOSIS — Z00.00 ENCOUNTER FOR MEDICARE ANNUAL WELLNESS EXAM: ICD-10-CM

## 2024-07-22 DIAGNOSIS — J45.20 INTERMITTENT ASTHMA WITHOUT COMPLICATION, UNSPECIFIED ASTHMA SEVERITY: ICD-10-CM

## 2024-07-22 PROCEDURE — 90677 PCV20 VACCINE IM: CPT | Performed by: INTERNAL MEDICINE

## 2024-07-22 PROCEDURE — 90471 IMMUNIZATION ADMIN: CPT | Performed by: INTERNAL MEDICINE

## 2024-07-22 PROCEDURE — 99397 PER PM REEVAL EST PAT 65+ YR: CPT | Mod: 25 | Performed by: INTERNAL MEDICINE

## 2024-07-22 RX ORDER — PANTOPRAZOLE SODIUM 40 MG/1
40 TABLET, DELAYED RELEASE ORAL DAILY
Qty: 90 TABLET | Refills: 4 | Status: SHIPPED | OUTPATIENT
Start: 2024-07-22

## 2024-07-22 RX ORDER — RIZATRIPTAN BENZOATE 5 MG/1
5-10 TABLET ORAL
Qty: 20 TABLET | Refills: 2 | Status: SHIPPED | OUTPATIENT
Start: 2024-07-22

## 2024-07-22 RX ORDER — HYDROXYZINE HYDROCHLORIDE 25 MG/1
25 TABLET, FILM COATED ORAL EVERY 6 HOURS PRN
Qty: 120 TABLET | Refills: 3 | Status: SHIPPED | OUTPATIENT
Start: 2024-07-22 | End: 2024-07-24

## 2024-07-22 RX ORDER — AMLODIPINE BESYLATE 5 MG/1
5 TABLET ORAL DAILY
Qty: 90 TABLET | Refills: 4 | Status: SHIPPED | OUTPATIENT
Start: 2024-07-22

## 2024-07-22 RX ORDER — LEVOTHYROXINE SODIUM 88 UG/1
88 TABLET ORAL DAILY
Qty: 90 TABLET | Refills: 4 | Status: SHIPPED | OUTPATIENT
Start: 2024-07-22 | End: 2024-07-24

## 2024-07-22 RX ORDER — GABAPENTIN 100 MG/1
200 CAPSULE ORAL
Qty: 180 CAPSULE | Refills: 3 | Status: SHIPPED | OUTPATIENT
Start: 2024-07-22

## 2024-07-22 RX ORDER — CHLORTHALIDONE 25 MG/1
12.5 TABLET ORAL DAILY
Qty: 45 TABLET | Refills: 4 | Status: SHIPPED | OUTPATIENT
Start: 2024-07-22

## 2024-07-22 RX ORDER — ALBUTEROL SULFATE 90 UG/1
2 AEROSOL, METERED RESPIRATORY (INHALATION) EVERY 6 HOURS PRN
Qty: 18 G | Refills: 2 | Status: SHIPPED | OUTPATIENT
Start: 2024-07-22 | End: 2024-07-24

## 2024-07-22 NOTE — PROGRESS NOTES
Preventive Care Visit  [facility]  Arlyn Sanchez MD, Internal Medicine  Jul 22, 2024      Assessment & Plan     Routine General Exam  Routine health care reviewed and updated as appropriate. Counseling on health risks, including diet, exercise, substance use, sun protection, etc were discussed as relevant. Vaccinations due/eligible reviewed with patient and advised to get at pharmacy.      Hypothyroidism  - TSH WITH FREE T4 REFLEX; Future    Stage 3 chronic kidney disease, unspecified whether stage 3a or 3b CKD (H)  Consider SGLT2 if proteinuria present  - Albumin Random Urine Quantitative with Creat Ratio; Future  - chlorthalidone (HYGROTON) 25 MG tablet; Take 0.5 tablets (12.5 mg) by mouth daily  - Protein  random urine; Future    Fluid retention  - chlorthalidone (HYGROTON) 25 MG tablet; Take 0.5 tablets (12.5 mg) by mouth daily    Essential hypertension  - amLODIPine (NORVASC) 5 MG tablet; Take 1 tablet (5 mg) by mouth daily    WALTER (generalized anxiety disorder)  - hydrOXYzine HCl (ATARAX) 25 MG tablet; Take 1 tablet (25 mg) by mouth every 6 hours as needed for itching  - levothyroxine (SYNTHROID/LEVOTHROID) 88 MCG tablet; Take 1 tablet (88 mcg) by mouth daily    Thyroid function test abnormal  - levothyroxine (SYNTHROID/LEVOTHROID) 88 MCG tablet; Take 1 tablet (88 mcg) by mouth daily    Gastroesophageal reflux disease without esophagitis  - pantoprazole (PROTONIX) 40 MG EC tablet; Take 1 tablet (40 mg) by mouth daily    Peripheral polyneuropathy  - gabapentin (NEURONTIN) 100 MG capsule; Take 2 capsules (200 mg) by mouth nightly as needed for neuropathic pain TAKE TWO CAPSULES BY MOUTH EVERY NIGHT AT BEDTIME    Intermittent asthma without complication, unspecified asthma severity  - albuterol (VENTOLIN HFA) 108 (90 Base) MCG/ACT inhaler; Inhale 2 puffs into the lungs every 6 hours as needed for shortness of breath    Screening for hyperlipidemia  - Lipid panel reflex  "to direct LDL Fasting; Future    Migraine with aura and without status migrainosus, not intractable  Not frequent enough to warrant prophylactic therapy, has not tried a triptan, discussed.  - rizatriptan (MAXALT) 5 MG tablet; Take 1-2 tablets (5-10 mg) by mouth at onset of headache for migraine May repeat in 2 hours. Max 6 tablets/24 hours.    Encounter for screening mammogram for breast cancer  - Mammogram, screening w sanjay (3D); Future    Osteopenia of multiple sites  Anti-resorptive therapy contraindicated with hx of ONJ. Will continue to monitor BMD. Advised Ca/D intact, weight bearing exercise.  - Vitamin D Deficiency; Future    Vitamin D deficiency  - Vitamin D Deficiency; Future            BMI  Estimated body mass index is 25.56 kg/m  as calculated from the following:    Height as of this encounter: 1.673 m (5' 5.87\").    Weight as of this encounter: 71.5 kg (157 lb 11.2 oz).               Return in about 1 year (around 7/22/2025).    Kena Lopez is a 68 year old, presenting for the following:  Physical        7/22/2024     7:46 AM   Additional Questions   Roomed by YW, EMT        Health Care Directive  Patient does not have a Health Care Directive or Living Will: Patient states has Advance Directive and will bring in a copy to clinic.    DIMA Lopez has a PMH notable for alcoholic hepatitis s/p liver transplant, anxiety, migraines, HTN, CKD 3a, IBS, PsA, Osteopenia, bisphosphonate associated ONJ, hypothyroidism, elevated ASCVD risk    New puppy, small aide  Works for [employer]  Gets migraines, not as severe  No major health concerns/changes  Has seen Rheum for psorasis, has scalp psoriasis and likely arthritis, under control  She has osteopenia, takes Ca/D, no new fractures, had ONJ on reclast after complicated extraction  Started Wegovy earlier this year, on highest dose, Optiant, was able to lose weight    Ophtho: Was seeing ophtho for NARINDER  Hearing: Sees " audiology  Mood/Cognition: No concerns  PHQ-2 Score:         7/6/2023     7:41 AM 7/5/2023     7:40 AM   PHQ-2 ( 1999 Pfizer)   Q1: Little interest or pleasure in doing things 1 1   Q2: Feeling down, depressed or hopeless 0 0   PHQ-2 Score 1 1   PHQ-2 Total Score (12-17 Years)- Positive if 3 or more points; Administer PHQ-A if positive 1 1         Routine Health Maintenence:    Lipids:   Recent Labs   Lab Test 05/30/23  0754 03/27/23  0720   CHOL 152 170   HDL 51 45*   LDL 81 97   TRIG 100 138         Lung Ca Screening (>30 pk age 55-79 or >20 py age 50-79 + RF): does not qualify, quit 1996  Colonoscopy (50-75 yrs): 6/18 TA x 2, 8/21 no adenomas, 10 year follow up                       - Diverticulosis in the left colon.  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 Good   Feel stress (tense, anxious, or unable to sleep) To some extent      (!) STRESS CONCERN      7/19/2024   Nutrition   Diet: Regular (no restrictions)            7/19/2024   Exercise   Days per week of moderate/strenous exercise 7 days   Average minutes spent exercising at this level 30 min            7/19/2024   Social Factors   Frequency of gathering with friends or relatives Once a week   Worry food won't last until get money to buy more No   Food not last or not have enough money for food? No   Do you have housing? (Housing is defined as stable permanent housing and does not include staying ouside in a car, in a tent, in an abandoned building, in an overnight shelter, or couch-surfing.) Yes    Are you worried about losing your housing? No   Lack of transportation? No   Unable to get utilities (heat,electricity)? No            7/19/2024   Fall Risk   Fallen 2 or more times in the past year? No    No   Trouble with walking or balance? No    No       Multiple values from one day are sorted in reverse-chronological order          7/19/2024   Activities of Daily Living- Home Safety   Needs help with the following daily activites None of the above   Safety concerns in the home None of the above            7/19/2024   Dental   Dentist two times every year? Yes            7/19/2024   Hearing Screening   Hearing concerns? (!) I FEEL THAT PEOPLE ARE MUMBLING OR NOT SPEAKING CLEARLY.    (!) I NEED TO ASK PEOPLE TO SPEAK UP OR REPEAT THEMSELVES.    (!) IT'S HARDER TO UNDERSTAND WOMEN'S VOICES THAN MEN'S VOICES.    (!) IT'S HARD TO FOLLOW A CONVERSATION IN A NOISY RESTAURANT OR CROWDED ROOM.    (!) TROUBLE UNDESTANDING A SPEAKER IN A PUBLIC MEETING OR [employer] SERVICE.    (!) TROUBLE FOLLOWING DIALOGUE IN THE THEATHER.    (!) TROUBLE UNDERSTANDING SOFT OR WHISPERED SPEECH.    (!) TROUBLE UNDERSTANDING SPEECH ON THE TELEPHONE       Multiple values from one day are sorted in reverse-chronological order         7/19/2024   Driving Risk Screening   Patient/family members have concerns about driving No            7/19/2024   General Alertness/Fatigue Screening   Have you been more tired than usual lately? No            7/19/2024   Urinary Incontinence Screening   Bothered by leaking urine in past 6 months Yes             Today's PHQ-9 Score:       7/21/2024     9:41 AM   PHQ-9 SCORE   PHQ-9 Total Score MyChart 4 (Minimal depression)   PHQ-9 Total Score 4         7/19/2024   Substance Use   Alcohol more than 3/day or more than 7/wk Not Applicable   Do you have a current opioid prescription? No   How severe/bad is pain from 1 to 10? 0/10 (No Pain)   Do you use any other substances recreationally? No        Social History      Tobacco Use    Smoking status: Former     Current packs/day: 0.00     Average packs/day: 2.5 packs/day for 27.9 years (69.9 ttl pk-yrs)     Types: Cigarettes     Start date: 1969     Quit date: 1996     Years since quittin.6    Smokeless tobacco: Never   Vaping Use    Vaping status: Never Used   Substance Use Topics    Alcohol use: Not Currently     Alcohol/week: 7.0 standard drinks of alcohol     Types: 7 Standard drinks or equivalent per week     Comment: heavy use (750ml/2 days) up until 1 year ago; had cut down to 1 drink/day; none since 17    Drug use: No           2023   LAST FHS-7 RESULTS   1st degree relative breast or ovarian cancer No   Any relative bilateral breast cancer No   Any male have breast cancer No   Any ONE woman have BOTH breast AND ovarian cancer No   Any woman with breast cancer before 50yrs No   2 or more relatives with breast AND/OR ovarian cancer No   2 or more relatives with breast AND/OR bowel cancer No                 ASCVD Risk   The 10-year ASCVD risk score (Sheree ULLOA, et al., 2019) is: 8.7%    Values used to calculate the score:      Age: 68 years      Sex: Female      Is Non- : No      Diabetic: No      Tobacco smoker: No      Systolic Blood Pressure: 127 mmHg      Is BP treated: Yes      HDL Cholesterol: 45 mg/dL      Total Cholesterol: 158 mg/dL            Reviewed and updated as needed this visit by Provider                      Current providers sharing in care for this patient include:  Patient Care Team:  Arlyn Sanchez MD as PCP - General (Internal Medicine)  Hussein Banegas MD as MD (Gastroenterology)  Demetria Barger PA-C as Physician Assistant (Physician Assistant)  David Vu APRN CNP as Nurse Practitioner (Nurse Practitioner)  Julio Sharp MD as MD (Family Practice)  Arlyn Sanchez MD as Assigned PCP  Saumya Tijerina MD as Resident (Internal Medicine)  Daya Gutierrez MD as Assigned  "Nephrology Provider  Eliazar Flor MD as Surgeon (Surgery)  Bry Alex MD as Assigned Rheumatology Provider  Zelda Pham MD as MD (Dermatology)  Michael Pierce MD as MD (Dermapathology)  Faye Jimenez MD as Assigned Allergy Provider  Ambrose Ortega MD as MD (Ophthalmology)  Arlyn Sanchez MD as MD (Internal Medicine)  Satish Toledo MD as Assigned Neuroscience Provider  Soraya Hightower MD as Physician (Rheumatology)  Hussein Banegas MD as Assigned Surgical Provider    The following health maintenance items are reviewed in Epic and correct as of today:  Health Maintenance   Topic Date Due    MICROALBUMIN  Never done    ANNUAL REVIEW OF HM ORDERS  Never done    DEPRESSION ACTION PLAN  Never done    ADVANCE CARE PLANNING  10/31/2021    ASTHMA ACTION PLAN  05/25/2023    Pneumococcal Vaccine: 65+ Years (3 of 3 - PPSV23 or PCV20) 10/01/2023    COVID-19 Vaccine (8 - 2023-24 season) 03/25/2024    MEDICARE ANNUAL WELLNESS VISIT  07/10/2024    TSH W/FREE T4 REFLEX  07/12/2024    INFLUENZA VACCINE (1) 09/01/2024    LIPID  10/30/2024    ASTHMA CONTROL TEST  01/22/2025    PHQ-9  01/22/2025    BMP  05/29/2025    HEMOGLOBIN  05/29/2025    MAMMO SCREENING  07/17/2025    FALL RISK ASSESSMENT  07/22/2025    DTAP/TDAP/TD IMMUNIZATION (6 - Td or Tdap) 08/18/2026    GLUCOSE  05/29/2027    COLORECTAL CANCER SCREENING  11/15/2030    DEXA  07/17/2038    HEPATITIS C SCREENING  Completed    URINALYSIS  Completed    ZOSTER IMMUNIZATION  Completed    RSV VACCINE (Pregnancy & 60+)  Completed    IPV IMMUNIZATION  Aged Out    HPV IMMUNIZATION  Aged Out    MENINGITIS IMMUNIZATION  Aged Out    RSV MONOCLONAL ANTIBODY  Aged Out    PAP  Discontinued            Objective    Exam  /76 (BP Location: Right arm, Patient Position: Sitting, Cuff Size: Adult Regular)   Pulse 77   Ht 1.673 m (5' 5.87\")   Wt 71.5 kg (157 lb 11.2 oz)   LMP  (LMP Unknown)   SpO2 97%   BMI 25.56 kg/m   " "  Estimated body mass index is 25.56 kg/m  as calculated from the following:    Height as of this encounter: 1.673 m (5' 5.87\").    Weight as of this encounter: 71.5 kg (157 lb 11.2 oz).    Physical Exam  Constitutional: Alert, oriented, pleasant, no acute distress  Head: Normocephalic, atraumatic  Eyes: Extra-ocular movements intact, no scleral icterus  ENT: Oropharynx clear, moist mucus membranes  Neck: Supple, no lymphadenopathy  Cardiovascular: Regular rate and rhythm, no murmurs, rubs or gallops, peripheral pulses full/symmetric  Respiratory: Good air movement bilaterally, lungs clear, no wheezes/rales/rhonchi  GI: Abdomen soft, bowel sounds present, nondistended, nontender, no organomegaly or masses, no rebound/guarding  Musculoskeletal: No edema, normal muscle tone, normal gait  Neurologic: Alert and oriented, cranial nerves 2-12 intact, no focal deficits  Skin: No rashes/lesions  Psychiatric: normal mentation, affect and mood          7/22/2024   Mini Cog   Clock Draw Score 2 Normal   3 Item Recall 3 objects recalled   Mini Cog Total Score 5                Signed Electronically by: Arlyn Sanchez MD    "

## 2024-07-24 DIAGNOSIS — J45.20 INTERMITTENT ASTHMA WITHOUT COMPLICATION, UNSPECIFIED ASTHMA SEVERITY: ICD-10-CM

## 2024-07-24 DIAGNOSIS — R94.6 THYROID FUNCTION TEST ABNORMAL: ICD-10-CM

## 2024-07-24 DIAGNOSIS — F41.1 GAD (GENERALIZED ANXIETY DISORDER): ICD-10-CM

## 2024-07-24 RX ORDER — LEVOTHYROXINE SODIUM 88 UG/1
88 TABLET ORAL DAILY
Qty: 90 TABLET | Refills: 4 | Status: SHIPPED | OUTPATIENT
Start: 2024-07-24

## 2024-07-24 RX ORDER — HYDROXYZINE HYDROCHLORIDE 25 MG/1
25 TABLET, FILM COATED ORAL EVERY 6 HOURS PRN
Qty: 120 TABLET | Refills: 3 | Status: SHIPPED | OUTPATIENT
Start: 2024-07-24

## 2024-07-24 RX ORDER — ALBUTEROL SULFATE 90 UG/1
2 AEROSOL, METERED RESPIRATORY (INHALATION) EVERY 6 HOURS PRN
Qty: 18 G | Refills: 2 | Status: SHIPPED | OUTPATIENT
Start: 2024-07-24

## 2024-07-25 RX ORDER — ALBUTEROL SULFATE 90 UG/1
AEROSOL, METERED RESPIRATORY (INHALATION)
Qty: 18 G | Refills: 2 | OUTPATIENT
Start: 2024-07-25

## 2024-07-25 RX ORDER — LEVOTHYROXINE SODIUM 88 UG/1
88 TABLET ORAL DAILY
Qty: 90 TABLET | Refills: 3 | OUTPATIENT
Start: 2024-07-25

## 2024-07-25 RX ORDER — HYDROXYZINE HYDROCHLORIDE 25 MG/1
TABLET, FILM COATED ORAL
Qty: 120 TABLET | Refills: 1 | OUTPATIENT
Start: 2024-07-25

## 2024-07-29 ENCOUNTER — LAB (OUTPATIENT)
Dept: LAB | Facility: CLINIC | Age: 69
End: 2024-07-29
Payer: COMMERCIAL

## 2024-07-29 DIAGNOSIS — E55.9 VITAMIN D DEFICIENCY: ICD-10-CM

## 2024-07-29 DIAGNOSIS — N18.30 STAGE 3 CHRONIC KIDNEY DISEASE, UNSPECIFIED WHETHER STAGE 3A OR 3B CKD (H): ICD-10-CM

## 2024-07-29 DIAGNOSIS — M85.89 OSTEOPENIA OF MULTIPLE SITES: ICD-10-CM

## 2024-07-29 DIAGNOSIS — Z12.31 ENCOUNTER FOR SCREENING MAMMOGRAM FOR BREAST CANCER: ICD-10-CM

## 2024-07-29 DIAGNOSIS — Z13.220 SCREENING FOR HYPERLIPIDEMIA: ICD-10-CM

## 2024-07-29 DIAGNOSIS — Z94.4 LIVER REPLACED BY TRANSPLANT (H): ICD-10-CM

## 2024-07-29 DIAGNOSIS — E03.9 HYPOTHYROIDISM, UNSPECIFIED TYPE: ICD-10-CM

## 2024-07-29 LAB
ALBUMIN MFR UR ELPH: 8.7 MG/DL
ALBUMIN SERPL BCG-MCNC: 4.1 G/DL (ref 3.5–5.2)
ALP SERPL-CCNC: 36 U/L (ref 40–150)
ALT SERPL W P-5'-P-CCNC: 9 U/L (ref 0–50)
ANION GAP SERPL CALCULATED.3IONS-SCNC: 9 MMOL/L (ref 7–15)
AST SERPL W P-5'-P-CCNC: 22 U/L (ref 0–45)
BILIRUB DIRECT SERPL-MCNC: <0.2 MG/DL (ref 0–0.3)
BILIRUB SERPL-MCNC: 0.8 MG/DL
BUN SERPL-MCNC: 17.4 MG/DL (ref 8–23)
CALCIUM SERPL-MCNC: 9.1 MG/DL (ref 8.8–10.4)
CHLORIDE SERPL-SCNC: 99 MMOL/L (ref 98–107)
CHOLEST SERPL-MCNC: 132 MG/DL
CREAT SERPL-MCNC: 1.3 MG/DL (ref 0.51–0.95)
CREAT UR-MCNC: 109 MG/DL
CREAT UR-MCNC: 109 MG/DL
EGFRCR SERPLBLD CKD-EPI 2021: 45 ML/MIN/1.73M2
ERYTHROCYTE [DISTWIDTH] IN BLOOD BY AUTOMATED COUNT: 13.3 % (ref 10–15)
FASTING STATUS PATIENT QL REPORTED: YES
FASTING STATUS PATIENT QL REPORTED: YES
GLUCOSE SERPL-MCNC: 90 MG/DL (ref 70–99)
HCO3 SERPL-SCNC: 27 MMOL/L (ref 22–29)
HCT VFR BLD AUTO: 39.3 % (ref 35–47)
HDLC SERPL-MCNC: 42 MG/DL
HGB BLD-MCNC: 13.4 G/DL (ref 11.7–15.7)
LDLC SERPL CALC-MCNC: 70 MG/DL
MAGNESIUM SERPL-MCNC: 1.7 MG/DL (ref 1.7–2.3)
MCH RBC QN AUTO: 30.4 PG (ref 26.5–33)
MCHC RBC AUTO-ENTMCNC: 34.1 G/DL (ref 31.5–36.5)
MCV RBC AUTO: 89 FL (ref 78–100)
MICROALBUMIN UR-MCNC: <12 MG/L
MICROALBUMIN/CREAT UR: NORMAL MG/G{CREAT}
NONHDLC SERPL-MCNC: 90 MG/DL
PHOSPHATE SERPL-MCNC: 3.7 MG/DL (ref 2.5–4.5)
PLATELET # BLD AUTO: 255 10E3/UL (ref 150–450)
POTASSIUM SERPL-SCNC: 4.1 MMOL/L (ref 3.4–5.3)
PROT SERPL-MCNC: 6.9 G/DL (ref 6.4–8.3)
PROT/CREAT 24H UR: 0.08 MG/MG CR (ref 0–0.2)
RBC # BLD AUTO: 4.41 10E6/UL (ref 3.8–5.2)
SODIUM SERPL-SCNC: 135 MMOL/L (ref 135–145)
TACROLIMUS BLD-MCNC: 4 UG/L (ref 5–15)
TME LAST DOSE: ABNORMAL H
TME LAST DOSE: ABNORMAL H
TRIGL SERPL-MCNC: 101 MG/DL
TSH SERPL DL<=0.005 MIU/L-ACNC: 2.08 UIU/ML (ref 0.3–4.2)
VIT D+METAB SERPL-MCNC: 45 NG/ML (ref 20–50)
WBC # BLD AUTO: 7.1 10E3/UL (ref 4–11)

## 2024-07-29 PROCEDURE — 82570 ASSAY OF URINE CREATININE: CPT

## 2024-07-29 PROCEDURE — 83735 ASSAY OF MAGNESIUM: CPT

## 2024-07-29 PROCEDURE — 82043 UR ALBUMIN QUANTITATIVE: CPT

## 2024-07-29 PROCEDURE — 80053 COMPREHEN METABOLIC PANEL: CPT

## 2024-07-29 PROCEDURE — 84156 ASSAY OF PROTEIN URINE: CPT

## 2024-07-29 PROCEDURE — 80197 ASSAY OF TACROLIMUS: CPT

## 2024-07-29 PROCEDURE — 80061 LIPID PANEL: CPT

## 2024-07-29 PROCEDURE — 84100 ASSAY OF PHOSPHORUS: CPT

## 2024-07-29 PROCEDURE — 84443 ASSAY THYROID STIM HORMONE: CPT

## 2024-07-29 PROCEDURE — 85027 COMPLETE CBC AUTOMATED: CPT

## 2024-07-29 PROCEDURE — 82248 BILIRUBIN DIRECT: CPT

## 2024-07-29 PROCEDURE — 82306 VITAMIN D 25 HYDROXY: CPT

## 2024-07-29 PROCEDURE — 36415 COLL VENOUS BLD VENIPUNCTURE: CPT

## 2024-08-06 DIAGNOSIS — N18.31 CHRONIC KIDNEY DISEASE, STAGE 3A (H): Primary | ICD-10-CM

## 2024-08-11 DIAGNOSIS — Z94.4 LIVER TRANSPLANTED (H): ICD-10-CM

## 2024-08-12 RX ORDER — URSODIOL 300 MG/1
CAPSULE ORAL
Qty: 60 CAPSULE | Refills: 3 | Status: SHIPPED | OUTPATIENT
Start: 2024-08-12

## 2024-08-14 ENCOUNTER — LAB (OUTPATIENT)
Dept: LAB | Facility: CLINIC | Age: 69
End: 2024-08-14
Attending: INTERNAL MEDICINE
Payer: COMMERCIAL

## 2024-08-14 ENCOUNTER — OFFICE VISIT (OUTPATIENT)
Dept: NEPHROLOGY | Facility: CLINIC | Age: 69
End: 2024-08-14
Attending: INTERNAL MEDICINE
Payer: COMMERCIAL

## 2024-08-14 VITALS
WEIGHT: 153.3 LBS | TEMPERATURE: 98.3 F | SYSTOLIC BLOOD PRESSURE: 116 MMHG | HEART RATE: 83 BPM | OXYGEN SATURATION: 96 % | BODY MASS INDEX: 24.84 KG/M2 | DIASTOLIC BLOOD PRESSURE: 74 MMHG

## 2024-08-14 DIAGNOSIS — I15.1 HYPERTENSION SECONDARY TO OTHER RENAL DISORDERS: ICD-10-CM

## 2024-08-14 DIAGNOSIS — N18.31 CHRONIC KIDNEY DISEASE, STAGE 3A (H): ICD-10-CM

## 2024-08-14 DIAGNOSIS — N18.31 CHRONIC KIDNEY DISEASE, STAGE 3A (H): Primary | ICD-10-CM

## 2024-08-14 LAB
ALBUMIN MFR UR ELPH: 6.3 MG/DL
ALBUMIN SERPL BCG-MCNC: 4.3 G/DL (ref 3.5–5.2)
ANION GAP SERPL CALCULATED.3IONS-SCNC: 10 MMOL/L (ref 7–15)
BUN SERPL-MCNC: 17.1 MG/DL (ref 8–23)
CALCIUM SERPL-MCNC: 9.4 MG/DL (ref 8.8–10.4)
CHLORIDE SERPL-SCNC: 100 MMOL/L (ref 98–107)
CREAT SERPL-MCNC: 1.29 MG/DL (ref 0.51–0.95)
CREAT UR-MCNC: 73.3 MG/DL
EGFRCR SERPLBLD CKD-EPI 2021: 45 ML/MIN/1.73M2
GLUCOSE SERPL-MCNC: 88 MG/DL (ref 70–99)
HCO3 SERPL-SCNC: 28 MMOL/L (ref 22–29)
HGB BLD-MCNC: 13.5 G/DL (ref 11.7–15.7)
PHOSPHATE SERPL-MCNC: 3.1 MG/DL (ref 2.5–4.5)
POTASSIUM SERPL-SCNC: 3.4 MMOL/L (ref 3.4–5.3)
PROT/CREAT 24H UR: 0.09 MG/MG CR (ref 0–0.2)
PTH-INTACT SERPL-MCNC: 39 PG/ML (ref 15–65)
SODIUM SERPL-SCNC: 138 MMOL/L (ref 135–145)

## 2024-08-14 PROCEDURE — 85018 HEMOGLOBIN: CPT | Performed by: PATHOLOGY

## 2024-08-14 PROCEDURE — 80069 RENAL FUNCTION PANEL: CPT | Performed by: PATHOLOGY

## 2024-08-14 PROCEDURE — 99214 OFFICE O/P EST MOD 30 MIN: CPT | Performed by: INTERNAL MEDICINE

## 2024-08-14 PROCEDURE — 83970 ASSAY OF PARATHORMONE: CPT | Performed by: PATHOLOGY

## 2024-08-14 PROCEDURE — G2211 COMPLEX E/M VISIT ADD ON: HCPCS | Performed by: INTERNAL MEDICINE

## 2024-08-14 PROCEDURE — 99213 OFFICE O/P EST LOW 20 MIN: CPT | Performed by: INTERNAL MEDICINE

## 2024-08-14 PROCEDURE — 36415 COLL VENOUS BLD VENIPUNCTURE: CPT | Performed by: PATHOLOGY

## 2024-08-14 PROCEDURE — 84156 ASSAY OF PROTEIN URINE: CPT | Performed by: PATHOLOGY

## 2024-08-14 ASSESSMENT — PAIN SCALES - GENERAL: PAINLEVEL: NO PAIN (0)

## 2024-08-14 NOTE — LETTER
2024       RE: Phan Luque  [address]  [address]  [facility] [place] [address]     Dear Colleague,    Thank you for referring your patient, Phan Luque, to the [facility] NEPHROLOGY [facility]IC [place] at [facility]. Please see a copy of my visit note below.      Nephrology Clinic    Name: Phan Luque  MRN: [id number]  : 1955  Referring provider: Leonel Salgado     Assessment and Plan:  # CKD stage 3a:    - creatinine 1.3 mg/dL with eGFR 35 (2024)  and 1.29 2024   - stable   - no proteinuria (UPCR 0.08 g/g)   - continue to monitor regularly   - no SGLT2 inhibitor indicated with the lack of proteinuria, lack of diabetes and stable CKD after liver transplant    #. HTN:   - On amlodipine 5 mg daily   - On chlorthalidone 12.5 mg daily -    - no change, continue with regular electrolyte monitoring   - on pravastatin  # Hypomagnesemia: on magnesium supplement, magnesium 1.7 mg/dL and stable on supplement    # MBD - on cholecalciferol, PTH, calcium and phos at gal    # S/p liver transplant: doing well, reviewed Dr. Banegas notes  - on tacrolimus  # psoriasis - met with rheumatology 2024 and no e/o active arthritis  #. Overweight/obese - wegovy    Return in about 1 year (around 2025).     The longitudinal plan of care for the diagnosis(es)/condition(s) as documented were addressed during this visit. Due to the added complexity in care, I will continue to support Jessica in the subsequent management and with ongoing continuity of care.    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     Last visit 2023    Remains on chlorthalidone,  amlodipine - not checking consistently at home but when checked is at goal.  Moved from [place] to [place] to be closer to kids. Continues to work from home.    No leg edema. Feeling good.     Review of Systems:   Pertinent items are noted in HPI or as below, remainder of complete ROS is negative.      Active Medications:     Current Outpatient Medications:     acetaminophen (TYLENOL) 325 MG tablet, Take 2 tablets (650 mg) by mouth every 6 hours as needed for mild pain Or fevers. Use sparingly. Limit use to no more than 2 grams (2000 mg) in 24 hours. **Further refills must be obtained by primary care provider**, Disp: 100 tablet, Rfl: 0    albuterol (VENTOLIN HFA) 108 (90 Base) MCG/ACT inhaler, Inhale 2 puffs into the lungs every 6 hours as needed for shortness of breath, Disp: 18 g, Rfl: 2    amLODIPine (NORVASC) 5 MG tablet, Take 1 tablet (5 mg) by mouth daily, Disp: 90 tablet, Rfl: 4    calcipotriene (DOVONOX) 0.005 % external solution, Apply 1 mL topically daily To the scalp (Patient not taking: Reported on 8/2/2023), Disp: 180 mL, Rfl: 1    chlorthalidone (HYGROTON) 25 MG tablet, Take 0.5 tablets (12.5 mg) by mouth daily, Disp: 45 tablet, Rfl: 4    cholecalciferol (VITAMIN D3) 400 UNIT TABS tablet, Take 400 Units by mouth daily, Disp: , Rfl:     ciclopirox (PENLAC) 8 % external solution, Apply to adjacent skin and affected nails daily.  Remove with alcohol every 7 days, then repeat. (Patient not taking: Reported on 3/31/2023), Disp: 6.6 mL, Rfl: 1    clobetasol (TEMOVATE) 0.05 % external ointment, Apply topically 2 times daily To areas of eczema on the lower legs, until areas resolve., Disp: 60 g, Rfl: 1    clobetasol (TEMOVATE) 0.05 % external solution, Apply topically 2 times daily To the scalp as needed for itching and flaking, on the days you don't use the clobetasol shampoo., Disp: 150 mL, Rfl: 2    clobetasol propionate (CLOBEX) 0.05 % external shampoo, Apply topically daily To dry scalp x 15 min and  rinse ,when psoriasis is present., Disp: 354 mL, Rfl: 1    cyanocobalamin (VITAMIN  B-12) 1000 MCG tablet, Take 1,000 mcg by mouth daily, Disp: , Rfl:     ferrous sulfate (IRON) 325 (65 FE) MG tablet, Take 1 tablet (325 mg) by mouth 2 times daily, Disp: 100 tablet, Rfl:     fluocinolone acetonide (DERMA SMOOTHE/FS BODY) 0.01 % external oil, Apply topically 2 times daily, Disp: 118.28 mL, Rfl: 11    fluocinonide (LIDEX) 0.05 % external solution, Apply topically 2 times daily To areas of rash and itch on scalp, Disp: 60 mL, Rfl: 3    folic acid (FOLVITE) 1 MG tablet, Take 1 tablet (1 mg) by mouth daily, Disp: 30 tablet, Rfl: 1    gabapentin (NEURONTIN) 100 MG capsule, Take 2 capsules (200 mg) by mouth nightly as needed for neuropathic pain TAKE TWO CAPSULES BY MOUTH EVERY NIGHT AT BEDTIME, Disp: 180 capsule, Rfl: 3    hydrOXYzine HCl (ATARAX) 25 MG tablet, Take 1 tablet (25 mg) by mouth every 6 hours as needed for itching, Disp: 120 tablet, Rfl: 3    ketoconazole (NIZORAL) 2 % external cream, Apply topically 2 times daily as needed for itching, Disp: 60 g, Rfl: 11    ketoconazole (NIZORAL) 2 % external shampoo, Apply topically daily as needed for itching or irritation Apply to scalp leave for a few minutes and then wash out daily, Disp: 120 mL, Rfl: 11    levothyroxine (SYNTHROID/LEVOTHROID) 88 MCG tablet, Take 1 tablet (88 mcg) by mouth daily, Disp: 90 tablet, Rfl: 4    MAGNESIUM OXIDE PO, Take 400 mg by mouth daily, Disp: , Rfl:     melatonin 5 MG tablet, Take 1 tablet (5 mg) by mouth nightly as needed for sleep, Disp: , Rfl:     multivitamin, therapeutic (THERA-VIT) TABS tablet, Take 1 tablet by mouth every 24 hours, Disp: 30 tablet, Rfl: 11    order for DME, Equipment being ordered: [employer] ankle brace (Patient not taking: Reported on 4/4/2024), Disp: 1 Device, Rfl: 0    pantoprazole (PROTONIX) 40 MG EC tablet, Take 1 tablet (40 mg) by mouth daily, Disp: 90 tablet, Rfl: 4    pravastatin (PRAVACHOL) 10 MG tablet,  TAKE 1 TABLET BY MOUTH DAILY, Disp: 90 tablet, Rfl: 3    psyllium 0.52 G capsule, Take 2 capsules (1.04 g) by mouth daily With a full glass of water. (Patient taking differently: Take 1 capsule by mouth 3 times daily With a full glass of water.), Disp: 60 capsule, Rfl: 1    rizatriptan (MAXALT) 5 MG tablet, Take 1-2 tablets (5-10 mg) by mouth at onset of headache for migraine May repeat in 2 hours. Max 6 tablets/24 hours., Disp: 20 tablet, Rfl: 2    tacrolimus (GENERIC-ACCORD BX-RATED) 0.5 MG capsule, TAKE 3 CAPSULES BY MOUTH EVERY 12 HOURS, Disp: 540 capsule, Rfl: 3    triamcinolone (KENALOG) 0.1 % external ointment, APPLY TOPICALLY TWO TIMES A DAY TO PLAQUES BEHIND THE KNEE, Disp: 90 g, Rfl: 1    ursodiol (ACTIGALL) 300 MG capsule, TAKE ONE CAPSULE BY MOUTH TWICE A DAY, Disp: 60 capsule, Rfl: 3    WEGOVY 0.5 MG/0.5ML pen, Inject 0.5 mg Subcutaneous every 7 days, Disp: , Rfl:      Allergies:   Codeine, Benadryl [diphenhydramine], Cefaclor, Hydrocodone-acetaminophen, Oxycodone, Penicillins, and Sulfa antibiotics      Past Medical History:  Past Medical History:   Diagnosis Date    AION (acute ischemic optic neuropathy)     Arthritis 2003    chronic UTO    Asthma     Bacterial infection 02/04/2021    Candida infection 11/11/2020    Cellulitis 09/13/2021    Cervical spinal stenosis     Chronic kidney disease     Chronic kidney disease, stage 3 (H)     Depressive disorder July, 2016    Dizziness and giddiness     Created by Conversion     End stage liver disease (H)     2/2 Alcohol Abuse    End stage liver disease (H)     Alcohol-related    GERD (gastroesophageal reflux disease)     History of blood transfusion 2016    related to transplant    Hypertension     Liver transplant recipient (H) 08/25/2016    [facility]    Liver transplanted (H)     Migraine headache     Migraines     Osteoporosis     frax 11/1.7% 2021    PFO (patent foramen ovale) 08/08/2016    Noted on echo at [facility]     Recurrent UTI     Spinal stenosis in cervical region     Strabismus     Thyroid disease 2016    hypothyroidism    Unspecified asthma(493.90)     Created by Conversion      Patient Active Problem List   Diagnosis    Liver transplanted (H)    Alcoholic hepatitis (H28)    Immunosuppression (H24)    Alcoholic hepatitis without ascites (H28)    Enterococcus UTI    Liver transplant status (H)    Nausea & vomiting    Anemia    ACP (advance care planning)    Diarrhea    Hypothyroidism    Esophageal reflux    Recurrent major depression in partial remission (H24)    Insomnia    CKD (chronic kidney disease) stage 3, GFR 30-59 ml/min (H)    Anemia in stage 3 chronic kidney disease (H)    Osteopenia    Alcohol abuse, uncomplicated    Psoriasis    Candida infection    Bacterial infection    Cellulitis    Allergic rhinitis    Disorder of bone and cartilage    Asthma    Avulsion fracture of ankle, left, closed, initial encounter    Incontinence of feces    Major depressive disorder, recurrent episode, moderate (H)    Other atopic dermatitis and related conditions    Rosacea    Symptomatic menopausal or female climacteric states    Tibial plateau fracture, left    Psoriasis vulgaris    Dermatitis, seborrheic    Scalp psoriasis     Past Surgical History:  Past Surgical History:   Procedure Laterality Date    APPENDECTOMY      1962    APPENDECTOMY      BENCH LIVER N/A 08/25/2016    Procedure: BENCH LIVER;  Surgeon: Larry Dhillon MD;  Location:  OR    BIOPSY      CATARACT IOL, RT/LT      COLONOSCOPY      COLONOSCOPY N/A 08/12/2021    Procedure: COLONOSCOPY, WITH POLYPECTOMY;  Surgeon: Arlyn Beckford MD;  Location: [facility] OR    COLONOSCOPY N/A 11/15/2023    Procedure: Colonoscopy;  Surgeon: Leventhal, Thomas Michael, MD;  Location: [facility] OR    ESOPHAGOSCOPY, GASTROSCOPY, DUODENOSCOPY (EGD), COMBINED N/A 08/17/2016    Procedure: COMBINED ESOPHAGOSCOPY, GASTROSCOPY, DUODENOSCOPY (EGD);  Surgeon: Tao Alfonso MD;  [facility]     ESOPHAGOSCOPY, GASTROSCOPY, DUODENOSCOPY (EGD), COMBINED N/A 10/31/2016    Procedure: COMBINED ESOPHAGOSCOPY, GASTROSCOPY, DUODENOSCOPY (EGD), BIOPSY SINGLE OR MULTIPLE;  Surgeon: Ronald Bojorquez MD;  [facility]    LIVER TRANSPLANTATION  2016    [facility]    ORTHOPEDIC SURGERY  2005    right trapeziectomy    RECTAL SURGERY      sphincteroplasty      TRANSPLANT LIVER RECIPIENT  DONOR N/A 2016    Procedure: TRANSPLANT LIVER RECIPIENT  DONOR;  Surgeon: Larry Dhillon MD;  Location:  OR    TUBAL LIGATION      laparoscopic    TUBAL LIGATION       Family History:   Daughter has CKD due to ureteral valves that were asymptomatic and not dx until around age 30  Family History   Problem Relation Age of Onset    Family History Negative Other     Melanoma Mother             Heart Failure Mother     Hypertension Mother     Other Cancer Mother         melanoma    Substance Abuse Mother     Obesity Mother     Heart Disease Father         CHF    Heart Failure Father     Chronic Obstructive Pulmonary Disease Father     Hypertension Father     Skin Cancer Sister     Cirrhosis Paternal Uncle         not alcohol related    Asthma Son     Obesity Son       Social History:   Continues to work for [employer] and has been working from home  Selling house and moving to town home    Physical Exam:  /74   Pulse 83   Temp 98.3  F (36.8  C) (Oral)   Wt 69.5 kg (153 lb 4.8 oz)   LMP  (LMP Unknown)   SpO2 96%   BMI 24.84 kg/m     GENERAL APPEARANCE: alert and no distress  EYES: no scleral icterus, pupils equal  HENT: NC/AT  Pulmonary: normal work of breathing, no clubbing  CV: WWP   - Edema none  MS: no evidence of inflammation in joints, no muscle tenderness  : no Talbert  SKIN: no rash, warm, dry, no cyanosis  NEURO: mentation intact and speech normal     Laboratory:  CMP  Recent Labs   Lab Test 24  0733 24  0725 24  0737  01/29/24  0740 01/29/24  0729 11/22/23  0727 09/28/23  0724 07/28/23  0734 07/12/23  1226 12/04/22  1529 11/28/22  0735 07/21/21  0730 05/17/21  0727 05/17/21  0724 03/25/21  0734 01/25/21  0725    137 137  --  139 139 138   < > 136   < > 138   < > 134 135 135 132*   POTASSIUM 4.1 3.8 4.1  --  4.2 3.9 3.9   < > 4.0   < > 4.2   < > 4.3 4.1 4.5 4.4   CHLORIDE 99 99 99  --  103 103 101   < > 100   < > 101   < > 101 101 102 101   CO2 27 27 27  --  25 26 24   < > 26   < > 20*   < > 26 27 28 26   ANIONGAP 9 11 11  --  11 10 13   < > 10   < > 17*   < > 7 7 5 5   GLC 90 95 98  --  102* 119* 109*   < > 108*   < > 101*   < > 94 97 103* 99   BUN 17.4 19.4 23.5*  --  20.1 14.5 14.2   < > 24.9*   < > 21.3   < > 19 19 36* 22   CR 1.30* 1.40* 1.27*  --  1.21* 1.18* 1.27*   < > 1.23*   < > 1.21*   < > 1.26* 1.30* 1.44* 1.38*   GFRESTIMATED 45* 41* 46*  --  49* 50* 46*   < > 48*   < > 49*   < > 45* 43* 38* 40*   GFRESTBLACK  --   --   --   --   --   --   --   --   --   --   --   --  52* 50* 44* 47*   ERIC 9.1 9.4 9.2  --  9.0 9.6 9.0   < > 9.8   < > 9.2   < > 8.8 8.9 8.8 9.0   MAG 1.7  --   --  1.9  --  1.8 1.9   < >  --    < > 1.5*   < >  --  2.1 2.0 2.0   PHOS 3.7  --   --  4.6*  --   --   --   --  3.8  --  3.4   < > 3.2  --   --   --    PROTTOTAL 6.9 6.9 7.3  --  6.9 7.3 7.1   < >  --    < > 6.6   < >  --  7.4 7.5 7.6   ALBUMIN 4.1 4.1 4.2  --  4.1 4.2 4.2   < > 4.4   < > 4.0  4.0   < > 3.6 3.8 3.8 3.8   BILITOTAL 0.8 0.7 0.5  --  0.4 0.5 0.5   < >  --    < > 0.7   < >  --  0.6 0.5 0.7   ALKPHOS 36* 36* 49  --  50 41 43   < >  --    < > 49   < >  --  50 67 50   AST 22 24 21  --  23 21 23   < >  --    < > 21   < >  --  18 13 23   ALT 9 8 8  --  8 12 7   < >  --    < > 14   < >  --  19 23 24    < > = values in this interval not displayed.     CBC  Recent Labs   Lab Test 07/29/24  0733 05/29/24  0725 03/29/24  0737 01/29/24  0729   HGB 13.4 13.2 13.8 13.1   WBC 7.1 7.7 7.1 6.8   RBC 4.41 4.35 4.54 4.23   HCT 39.3 39.2 41.1  39.0   MCV 89 90 91 92   MCH 30.4 30.3 30.4 31.0   MCHC 34.1 33.7 33.6 33.6   RDW 13.3 13.2 12.9 13.7    293 291 278     INR  Recent Labs   Lab Test 12/04/22  1529 06/11/18  0720 03/16/17  1728 09/13/16  1055 08/25/16  1540 08/25/16  1230 08/25/16  1055 08/25/16  0850   INR 0.98 0.98 1.15* 1.08   < > 1.76* 1.76* 1.87*   PTT 29  --   --   --   --  48* 59* 58*    < > = values in this interval not displayed.     ABG  Recent Labs   Lab Test 10/20/16  1216 10/19/16  0821 08/26/16  0510 08/26/16  0004 08/25/16  2000 08/25/16  1230 08/25/16  1055   PH  --   --   --  7.48* 7.51* 7.45 7.45   PCO2  --   --   --  38 35 44 43   PO2  --   --   --  77* 58* 164* 103   HCO3  --   --   --  28 28 30* 29*   O2PER 21.0 21 4L 4L 4L 100.0  100.0 0.58      URINE STUDIES  Recent Labs   Lab Test 01/29/24  0729 03/27/23  0720 03/14/22  0754 03/25/21  0736 07/24/20  1258 04/29/20  0741 11/29/18  0729   COLOR Yellow Yellow Yellow Yellow Yellow   < > Yellow   APPEARANCE Clear Clear Clear Clear Clear   < > Clear   URINEGLC Negative Negative Negative Negative Negative   < > Negative   URINEBILI Negative Negative Negative Negative Negative   < > Negative   URINEKETONE Negative Negative Negative Negative Negative   < > Negative   SG 1.010 1.010 1.010 1.013 1.010   < > 1.015   UBLD Negative Negative Negative Negative Trace*   < > Negative   URINEPH 6.0 6.5 5.5 7.0 5.5   < > 6.0   PROTEIN Negative Negative Negative Negative Negative   < > Negative   UROBILINOGEN 0.2 0.2 0.2  --  0.2 E.U./dL   < >  --    NITRITE Negative Negative Negative Negative Negative   < > Negative   LEUKEST Negative Trace* Negative Negative Small*   < > Negative   RBCU  --  2-5*  --  2 0-2  --  <1   WBCU  --  0-5  --  <1 25-50*  --  <1    < > = values in this interval not displayed.     Recent Labs   Lab Test 05/17/21  0727 03/25/21  0736 07/14/20  0937 03/20/19  1525 11/29/18  0729 03/21/18  1550 12/20/17  1450 09/20/17  1214 06/05/17  1214 03/13/17  1550  12/12/16  1502 10/10/16  1457   UTPG 0.08 0.09 0.07 Unable to calculate due to low value 0.11 0.16 0.12 0.13 0.39* 0.13 0.20 0.39*     PTH  Recent Labs   Lab Test 11/28/22  0735 07/13/22  1235 07/14/20  0936 03/20/19  1517 08/21/18  0739 04/20/18  0739 09/08/17  0723 04/05/17  1236 11/16/16  0640 10/29/16  1807 10/27/16  0705 10/10/16  1456   PTHI 19 73* 71 47 66 90* 117* 53 20 <3* Quantity not sufficient  ED GLOH NOTIFIED ON [facility],10/29/2016,1650,MJ   3*     IRON STUDIES   Recent Labs   Lab Test 08/21/18  0739 04/04/18  0823 03/21/18  1540 06/05/17  1211 04/05/17  1236 03/22/17  1607 09/06/16  0638 08/06/16  0757   IRON  --   --   --  59 56 62 18* 25*   FEB  --   --   --  248 179* 197* 118* 92*   IRONSAT  --   --   --  24 31 32 15 27   WILL 84 57 50 138 278* 320* 480* 528*     Daya Gutierrez MD

## 2024-08-14 NOTE — NURSING NOTE
Chief Complaint   Patient presents with    RECHECK     Annual follow up.      Vitals:    08/14/24 1457 08/14/24 1459 08/14/24 1500 08/14/24 1501   BP: 108/68 118/76 121/77 116/74   BP Location: Right arm Right arm Right arm    Patient Position: Sitting Sitting Sitting    Cuff Size: Adult Regular Adult Regular Adult Regular    Pulse: 83      Temp: 98.3  F (36.8  C)      TempSrc: Oral      SpO2: 96%      Weight: 69.5 kg (153 lb 4.8 oz)          BP Readings from Last 3 Encounters:   08/14/24 116/74   07/22/24 127/76   07/12/24 121/72       /74   Pulse 83   Temp 98.3  F (36.8  C) (Oral)   Wt 69.5 kg (153 lb 4.8 oz)   LMP  (LMP Unknown)   SpO2 96%   BMI 24.84 kg/m       Sunshbeka nadeem

## 2024-08-14 NOTE — PROGRESS NOTES
Nephrology Clinic    Name: Phan Luque  MRN: [id number]  : 1955  Referring provider: Leonel Salgado     Assessment and Plan:  # CKD stage 3a:    - creatinine 1.3 mg/dL with eGFR 35 (2024)  and 1.29 2024   - stable   - no proteinuria (UPCR 0.08 g/g)   - continue to monitor regularly   - no SGLT2 inhibitor indicated with the lack of proteinuria, lack of diabetes and stable CKD after liver transplant    #. HTN:   - On amlodipine 5 mg daily   - On chlorthalidone 12.5 mg daily -    - no change, continue with regular electrolyte monitoring   - on pravastatin  # Hypomagnesemia: on magnesium supplement, magnesium 1.7 mg/dL and stable on supplement    # MBD - on cholecalciferol, PTH, calcium and phos at gal    # S/p liver transplant: doing well, reviewed Dr. Banegas notes  - on tacrolimus  # psoriasis - met with rheumatology 2024 and no e/o active arthritis  #. Overweight/obese - wegovy    Return in about 1 year (around 2025).     The longitudinal plan of care for the diagnosis(es)/condition(s) as documented were addressed during this visit. Due to the added complexity in care, I will continue to support Jessica in the subsequent management and with ongoing continuity of care.    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     Last visit 2023    Remains on chlorthalidone, amlodipine - not checking consistently at home but when checked is at goal.  Moved from [facility] to be closer to kids. Continues to work from home.    No leg edema. Feeling good.     Review of Systems:   Pertinent items are noted in HPI or as below, remainder of complete ROS is negative.      Active Medications:      Current Outpatient Medications:     acetaminophen (TYLENOL) 325 MG tablet, Take 2 tablets (650 mg) by mouth every 6 hours as needed for mild pain Or fevers. Use sparingly. Limit use to no more than 2 grams (2000 mg) in 24 hours. **Further refills must be obtained by primary care provider**, Disp: 100 tablet, Rfl: 0    albuterol (VENTOLIN HFA) 108 (90 Base) MCG/ACT inhaler, Inhale 2 puffs into the lungs every 6 hours as needed for shortness of breath, Disp: 18 g, Rfl: 2    amLODIPine (NORVASC) 5 MG tablet, Take 1 tablet (5 mg) by mouth daily, Disp: 90 tablet, Rfl: 4    calcipotriene (DOVONOX) 0.005 % external solution, Apply 1 mL topically daily To the scalp (Patient not taking: Reported on 8/2/2023), Disp: 180 mL, Rfl: 1    chlorthalidone (HYGROTON) 25 MG tablet, Take 0.5 tablets (12.5 mg) by mouth daily, Disp: 45 tablet, Rfl: 4    cholecalciferol (VITAMIN D3) 400 UNIT TABS tablet, Take 400 Units by mouth daily, Disp: , Rfl:     ciclopirox (PENLAC) 8 % external solution, Apply to adjacent skin and affected nails daily.  Remove with alcohol every 7 days, then repeat. (Patient not taking: Reported on 3/31/2023), Disp: 6.6 mL, Rfl: 1    clobetasol (TEMOVATE) 0.05 % external ointment, Apply topically 2 times daily To areas of eczema on the lower legs, until areas resolve., Disp: 60 g, Rfl: 1    clobetasol (TEMOVATE) 0.05 % external solution, Apply topically 2 times daily To the scalp as needed for itching and flaking, on the days you don't use the clobetasol shampoo., Disp: 150 mL, Rfl: 2    clobetasol propionate (CLOBEX) 0.05 % external shampoo, Apply topically daily To dry scalp x 15 min and rinse ,when psoriasis is present., Disp: 354 mL, Rfl: 1    cyanocobalamin (VITAMIN  B-12) 1000 MCG tablet, Take 1,000 mcg by mouth daily, Disp: , Rfl:     ferrous sulfate (IRON) 325 (65 FE) MG tablet, Take 1 tablet (325 mg) by mouth 2 times daily, Disp: 100 tablet, Rfl:     fluocinolone acetonide (DERMA SMOOTHE/FS BODY) 0.01 %  external oil, Apply topically 2 times daily, Disp: 118.28 mL, Rfl: 11    fluocinonide (LIDEX) 0.05 % external solution, Apply topically 2 times daily To areas of rash and itch on scalp, Disp: 60 mL, Rfl: 3    folic acid (FOLVITE) 1 MG tablet, Take 1 tablet (1 mg) by mouth daily, Disp: 30 tablet, Rfl: 1    gabapentin (NEURONTIN) 100 MG capsule, Take 2 capsules (200 mg) by mouth nightly as needed for neuropathic pain TAKE TWO CAPSULES BY MOUTH EVERY NIGHT AT BEDTIME, Disp: 180 capsule, Rfl: 3    hydrOXYzine HCl (ATARAX) 25 MG tablet, Take 1 tablet (25 mg) by mouth every 6 hours as needed for itching, Disp: 120 tablet, Rfl: 3    ketoconazole (NIZORAL) 2 % external cream, Apply topically 2 times daily as needed for itching, Disp: 60 g, Rfl: 11    ketoconazole (NIZORAL) 2 % external shampoo, Apply topically daily as needed for itching or irritation Apply to scalp leave for a few minutes and then wash out daily, Disp: 120 mL, Rfl: 11    levothyroxine (SYNTHROID/LEVOTHROID) 88 MCG tablet, Take 1 tablet (88 mcg) by mouth daily, Disp: 90 tablet, Rfl: 4    MAGNESIUM OXIDE PO, Take 400 mg by mouth daily, Disp: , Rfl:     melatonin 5 MG tablet, Take 1 tablet (5 mg) by mouth nightly as needed for sleep, Disp: , Rfl:     multivitamin, therapeutic (THERA-VIT) TABS tablet, Take 1 tablet by mouth every 24 hours, Disp: 30 tablet, Rfl: 11    order for DME, Equipment being ordered: CaseReader ankle brace (Patient not taking: Reported on 4/4/2024), Disp: 1 Device, Rfl: 0    pantoprazole (PROTONIX) 40 MG EC tablet, Take 1 tablet (40 mg) by mouth daily, Disp: 90 tablet, Rfl: 4    pravastatin (PRAVACHOL) 10 MG tablet, TAKE 1 TABLET BY MOUTH DAILY, Disp: 90 tablet, Rfl: 3    psyllium 0.52 G capsule, Take 2 capsules (1.04 g) by mouth daily With a full glass of water. (Patient taking differently: Take 1 capsule by mouth 3 times daily With a full glass of water.), Disp: 60 capsule, Rfl: 1    rizatriptan (MAXALT) 5 MG tablet, Take 1-2 tablets (5-10  mg) by mouth at onset of headache for migraine May repeat in 2 hours. Max 6 tablets/24 hours., Disp: 20 tablet, Rfl: 2    tacrolimus (GENERIC-ACCORD BX-RATED) 0.5 MG capsule, TAKE 3 CAPSULES BY MOUTH EVERY 12 HOURS, Disp: 540 capsule, Rfl: 3    triamcinolone (KENALOG) 0.1 % external ointment, APPLY TOPICALLY TWO TIMES A DAY TO PLAQUES BEHIND THE KNEE, Disp: 90 g, Rfl: 1    ursodiol (ACTIGALL) 300 MG capsule, TAKE ONE CAPSULE BY MOUTH TWICE A DAY, Disp: 60 capsule, Rfl: 3    WEGOVY 0.5 MG/0.5ML pen, Inject 0.5 mg Subcutaneous every 7 days, Disp: , Rfl:      Allergies:   Codeine, Benadryl [diphenhydramine], Cefaclor, Hydrocodone-acetaminophen, Oxycodone, Penicillins, and Sulfa antibiotics      Past Medical History:  Past Medical History:   Diagnosis Date    AION (acute ischemic optic neuropathy)     Arthritis 2003    chronic UTO    Asthma     Bacterial infection 02/04/2021    Candida infection 11/11/2020    Cellulitis 09/13/2021    Cervical spinal stenosis     Chronic kidney disease     Chronic kidney disease, stage 3 (H)     Depressive disorder July, 2016    Dizziness and giddiness     Created by Conversion     End stage liver disease (H)     2/2 Alcohol Abuse    End stage liver disease (H)     Alcohol-related    GERD (gastroesophageal reflux disease)     History of blood transfusion 2016    related to transplant    Hypertension     Liver transplant recipient (H) 08/25/2016    [facility]    Liver transplanted (H)     Migraine headache     Migraines     Osteoporosis     frax 11/1.7% 2021    PFO (patent foramen ovale) 08/08/2016    Noted on echo at [facility]    Recurrent UTI     Spinal stenosis in cervical region     Strabismus     Thyroid disease 2016    hypothyroidism    Unspecified asthma(493.90)     Created by Conversion      Patient Active Problem List   Diagnosis    Liver transplanted (H)    Alcoholic hepatitis (H28)    Immunosuppression (H24)    Alcoholic hepatitis without  ascites (H28)    Enterococcus UTI    Liver transplant status (H)    Nausea & vomiting    Anemia    ACP (advance care planning)    Diarrhea    Hypothyroidism    Esophageal reflux    Recurrent major depression in partial remission (H24)    Insomnia    CKD (chronic kidney disease) stage 3, GFR 30-59 ml/min (H)    Anemia in stage 3 chronic kidney disease (H)    Osteopenia    Alcohol abuse, uncomplicated    Psoriasis    Candida infection    Bacterial infection    Cellulitis    Allergic rhinitis    Disorder of bone and cartilage    Asthma    Avulsion fracture of ankle, left, closed, initial encounter    Incontinence of feces    Major depressive disorder, recurrent episode, moderate (H)    Other atopic dermatitis and related conditions    Rosacea    Symptomatic menopausal or female climacteric states    Tibial plateau fracture, left    Psoriasis vulgaris    Dermatitis, seborrheic    Scalp psoriasis     Past Surgical History:  Past Surgical History:   Procedure Laterality Date    APPENDECTOMY      1962    APPENDECTOMY      BENCH LIVER N/A 08/25/2016    Procedure: BENCH LIVER;  Surgeon: Larry Dhillon MD;  Location: [facility] OR    BIOPSY      CATARACT IOL, RT/LT      COLONOSCOPY      COLONOSCOPY N/A 08/12/2021    Procedure: COLONOSCOPY, WITH POLYPECTOMY;  Surgeon: Arlyn Beckford MD;  Location: [facility] OR    COLONOSCOPY N/A 11/15/2023    Procedure: Colonoscopy;  Surgeon: Leventhal, Thomas Michael, MD;  Location: [facility] OR    ESOPHAGOSCOPY, GASTROSCOPY, DUODENOSCOPY (EGD), COMBINED N/A 08/17/2016    Procedure: COMBINED ESOPHAGOSCOPY, GASTROSCOPY, DUODENOSCOPY (EGD);  Surgeon: Tao Alfonso MD;  Location:  GI    ESOPHAGOSCOPY, GASTROSCOPY, DUODENOSCOPY (EGD), COMBINED N/A 10/31/2016    Procedure: COMBINED ESOPHAGOSCOPY, GASTROSCOPY, DUODENOSCOPY (EGD), BIOPSY SINGLE OR MULTIPLE;  Surgeon: Ronald Bojorquez MD;  Location:  GI    LIVER TRANSPLANTATION  08/25/2016    [facility]    ORTHOPEDIC SURGERY   2005    right trapeziectomy    RECTAL SURGERY      sphincteroplasty      TRANSPLANT LIVER RECIPIENT  DONOR N/A 2016    Procedure: TRANSPLANT LIVER RECIPIENT  DONOR;  Surgeon: Larry Dhillon MD;  Location: [facility] OR    TUBAL LIGATION      laparoscopic    TUBAL LIGATION       Family History:   Daughter has CKD due to ureteral valves that were asymptomatic and not dx until around age 30  Family History   Problem Relation Age of Onset    Family History Negative Other     Melanoma Mother             Heart Failure Mother     Hypertension Mother     Other Cancer Mother         melanoma    Substance Abuse Mother     Obesity Mother     Heart Disease Father         CHF    Heart Failure Father     Chronic Obstructive Pulmonary Disease Father     Hypertension Father     Skin Cancer Sister     Cirrhosis Paternal Uncle         not alcohol related    Asthma Son     Obesity Son       Social History:   Continues to work for [employer] and has been working from home  Selling house and moving to town home    Physical Exam:  /74   Pulse 83   Temp 98.3  F (36.8  C) (Oral)   Wt 69.5 kg (153 lb 4.8 oz)   LMP  (LMP Unknown)   SpO2 96%   BMI 24.84 kg/m     GENERAL APPEARANCE: alert and no distress  EYES: no scleral icterus, pupils equal  HENT: NC/AT  Pulmonary: normal work of breathing, no clubbing  CV: WWP   - Edema none  MS: no evidence of inflammation in joints, no muscle tenderness  : no Talbert  SKIN: no rash, warm, dry, no cyanosis  NEURO: mentation intact and speech normal     Laboratory:  CMP  Recent Labs   Lab Test 24  0733 24  0725 24  0737 24  0740 24  0729 23  0727 23  0724 23  0734 23  1226 22  1529 22  0735 21  0730 21  0727 21  0724 21  0734 21  0725    137 137  --  139 139 138   < > 136   < > 138   < > 134 135 135 132*   POTASSIUM 4.1 3.8 4.1  --  4.2 3.9 3.9   < > 4.0   < >  4.2   < > 4.3 4.1 4.5 4.4   CHLORIDE 99 99 99  --  103 103 101   < > 100   < > 101   < > 101 101 102 101   CO2 27 27 27  --  25 26 24   < > 26   < > 20*   < > 26 27 28 26   ANIONGAP 9 11 11  --  11 10 13   < > 10   < > 17*   < > 7 7 5 5   GLC 90 95 98  --  102* 119* 109*   < > 108*   < > 101*   < > 94 97 103* 99   BUN 17.4 19.4 23.5*  --  20.1 14.5 14.2   < > 24.9*   < > 21.3   < > 19 19 36* 22   CR 1.30* 1.40* 1.27*  --  1.21* 1.18* 1.27*   < > 1.23*   < > 1.21*   < > 1.26* 1.30* 1.44* 1.38*   GFRESTIMATED 45* 41* 46*  --  49* 50* 46*   < > 48*   < > 49*   < > 45* 43* 38* 40*   GFRESTBLACK  --   --   --   --   --   --   --   --   --   --   --   --  52* 50* 44* 47*   ERIC 9.1 9.4 9.2  --  9.0 9.6 9.0   < > 9.8   < > 9.2   < > 8.8 8.9 8.8 9.0   MAG 1.7  --   --  1.9  --  1.8 1.9   < >  --    < > 1.5*   < >  --  2.1 2.0 2.0   PHOS 3.7  --   --  4.6*  --   --   --   --  3.8  --  3.4   < > 3.2  --   --   --    PROTTOTAL 6.9 6.9 7.3  --  6.9 7.3 7.1   < >  --    < > 6.6   < >  --  7.4 7.5 7.6   ALBUMIN 4.1 4.1 4.2  --  4.1 4.2 4.2   < > 4.4   < > 4.0  4.0   < > 3.6 3.8 3.8 3.8   BILITOTAL 0.8 0.7 0.5  --  0.4 0.5 0.5   < >  --    < > 0.7   < >  --  0.6 0.5 0.7   ALKPHOS 36* 36* 49  --  50 41 43   < >  --    < > 49   < >  --  50 67 50   AST 22 24 21  --  23 21 23   < >  --    < > 21   < >  --  18 13 23   ALT 9 8 8  --  8 12 7   < >  --    < > 14   < >  --  19 23 24    < > = values in this interval not displayed.     CBC  Recent Labs   Lab Test 07/29/24  0733 05/29/24  0725 03/29/24  0737 01/29/24  0729   HGB 13.4 13.2 13.8 13.1   WBC 7.1 7.7 7.1 6.8   RBC 4.41 4.35 4.54 4.23   HCT 39.3 39.2 41.1 39.0   MCV 89 90 91 92   MCH 30.4 30.3 30.4 31.0   MCHC 34.1 33.7 33.6 33.6   RDW 13.3 13.2 12.9 13.7    293 291 278     INR  Recent Labs   Lab Test 12/04/22  1529 06/11/18  0720 03/16/17  1728 09/13/16  1055 08/25/16  1540 08/25/16  1230 08/25/16  1055 08/25/16  0850   INR 0.98 0.98 1.15* 1.08   < > 1.76* 1.76* 1.87*   PTT  29  --   --   --   --  48* 59* 58*    < > = values in this interval not displayed.     ABG  Recent Labs   Lab Test 10/20/16  1216 10/19/16  0821 08/26/16  0510 08/26/16  0004 08/25/16  2000 08/25/16  1230 08/25/16  1055   PH  --   --   --  7.48* 7.51* 7.45 7.45   PCO2  --   --   --  38 35 44 43   PO2  --   --   --  77* 58* 164* 103   HCO3  --   --   --  28 28 30* 29*   O2PER 21.0 21 4L 4L 4L 100.0  100.0 0.58      URINE STUDIES  Recent Labs   Lab Test 01/29/24  0729 03/27/23  0720 03/14/22  0754 03/25/21  0736 07/24/20  1258 04/29/20  0741 11/29/18  0729   COLOR Yellow Yellow Yellow Yellow Yellow   < > Yellow   APPEARANCE Clear Clear Clear Clear Clear   < > Clear   URINEGLC Negative Negative Negative Negative Negative   < > Negative   URINEBILI Negative Negative Negative Negative Negative   < > Negative   URINEKETONE Negative Negative Negative Negative Negative   < > Negative   SG 1.010 1.010 1.010 1.013 1.010   < > 1.015   UBLD Negative Negative Negative Negative Trace*   < > Negative   URINEPH 6.0 6.5 5.5 7.0 5.5   < > 6.0   PROTEIN Negative Negative Negative Negative Negative   < > Negative   UROBILINOGEN 0.2 0.2 0.2  --  0.2 E.U./dL   < >  --    NITRITE Negative Negative Negative Negative Negative   < > Negative   LEUKEST Negative Trace* Negative Negative Small*   < > Negative   RBCU  --  2-5*  --  2 0-2  --  <1   WBCU  --  0-5  --  <1 25-50*  --  <1    < > = values in this interval not displayed.     Recent Labs   Lab Test 05/17/21  0727 03/25/21  0736 07/14/20  0937 03/20/19  1525 11/29/18  0729 03/21/18  1550 12/20/17  1450 09/20/17  1214 06/05/17  1214 03/13/17  1550 12/12/16  1502 10/10/16  1457   UTPG 0.08 0.09 0.07 Unable to calculate due to low value 0.11 0.16 0.12 0.13 0.39* 0.13 0.20 0.39*     PTH  Recent Labs   Lab Test 11/28/22  0735 07/13/22  1235 07/14/20  0936 03/20/19  1517 08/21/18  0739 04/20/18  0739 09/08/17  0723 04/05/17  1236 11/16/16  0640 10/29/16  1807 10/27/16  0705 10/10/16  1456    PTHI 19 73* 71 47 66 90* 117* 53 20 <3* Quantity not sufficient  ED GLO NOTIFIED ON URTRTC,10/29/2016,1650,MJ   3*     IRON STUDIES   Recent Labs   Lab Test 08/21/18  0739 04/04/18  0823 03/21/18  1540 06/05/17  1211 04/05/17  1236 03/22/17  1607 09/06/16  0638 08/06/16  0757   IRON  --   --   --  59 56 62 18* 25*   FEB  --   --   --  248 179* 197* 118* 92*   IRONSAT  --   --   --  24 31 32 15 27   WILL 84 57 50 138 278* 320* 480* 528*     Daya Gutierrez MD

## 2024-08-29 ENCOUNTER — OFFICE VISIT (OUTPATIENT)
Dept: DERMATOLOGY | Facility: CLINIC | Age: 69
End: 2024-08-29
Payer: COMMERCIAL

## 2024-08-29 ENCOUNTER — ANCILLARY PROCEDURE (OUTPATIENT)
Dept: MAMMOGRAPHY | Facility: CLINIC | Age: 69
End: 2024-08-29
Attending: INTERNAL MEDICINE
Payer: COMMERCIAL

## 2024-08-29 DIAGNOSIS — L40.9 SCALP PSORIASIS: ICD-10-CM

## 2024-08-29 DIAGNOSIS — L20.82 FLEXURAL ECZEMA: ICD-10-CM

## 2024-08-29 DIAGNOSIS — L30.0 NUMMULAR ECZEMA: ICD-10-CM

## 2024-08-29 DIAGNOSIS — L40.0 PSORIASIS VULGARIS: ICD-10-CM

## 2024-08-29 DIAGNOSIS — L21.9 DERMATITIS, SEBORRHEIC: ICD-10-CM

## 2024-08-29 PROCEDURE — 99214 OFFICE O/P EST MOD 30 MIN: CPT | Performed by: DERMATOLOGY

## 2024-08-29 PROCEDURE — 77063 BREAST TOMOSYNTHESIS BI: CPT | Mod: GC

## 2024-08-29 PROCEDURE — 77067 SCR MAMMO BI INCL CAD: CPT | Mod: GC

## 2024-08-29 RX ORDER — TRIAMCINOLONE ACETONIDE 1 MG/G
OINTMENT TOPICAL
Qty: 90 G | Refills: 1 | Status: SHIPPED | OUTPATIENT
Start: 2024-08-29

## 2024-08-29 RX ORDER — FLUOCINOLONE ACETONIDE 0.11 MG/ML
OIL TOPICAL 2 TIMES DAILY
Qty: 118.28 ML | Refills: 11 | Status: SHIPPED | OUTPATIENT
Start: 2024-08-29

## 2024-08-29 RX ORDER — CLOBETASOL PROPIONATE 0.05 G/100ML
SHAMPOO TOPICAL DAILY
Qty: 354 ML | Refills: 1 | Status: SHIPPED | OUTPATIENT
Start: 2024-08-29

## 2024-08-29 RX ORDER — CLOBETASOL PROPIONATE 0.5 MG/G
OINTMENT TOPICAL 2 TIMES DAILY
Qty: 60 G | Refills: 1 | Status: SHIPPED | OUTPATIENT
Start: 2024-08-29

## 2024-08-29 RX ORDER — CLOBETASOL PROPIONATE 0.5 MG/ML
SOLUTION TOPICAL 2 TIMES DAILY
Qty: 150 ML | Refills: 2 | Status: SHIPPED | OUTPATIENT
Start: 2024-08-29

## 2024-08-29 ASSESSMENT — PAIN SCALES - GENERAL: PAINLEVEL: NO PAIN (0)

## 2024-08-29 NOTE — PROGRESS NOTES
[facility] Dermatology Note  Encounter Date: August 29, 2024  Office Visit      Dermatology Problem List:  1. History of liver transplant at [facility], 2016 - currently on tacrolimus   2. Flexural eczema - trimcinolone 0.1% ointment BID to popliteal fossa bilaterally PRN  3. AK s/p cryo  4. Psoriasis with arthritis.  - Follows with rheum  - Topicals: clobetasol solution, shampoo  - Nail dystrophy, likely psoriasis- grew candida.   5. Facial asymmetry - cosmetic referral.   6. Family hx psoriasis (son)  7. Family history of melanoma (mother)     ____________________________________________     Assessment & Plan:     # Psoriasis, chronic, active, stable  # Seborrheic Dermatitis   - Continue triamcinolone 0.1% ointment BID prn.  - Clobetasol solution PRN  - Continue clobetasol shampoo weekly  - Continue ketoconazole 2% cream BID prn for flare ups to help with itching  - Recommended Vaseline or Aquaphor for barrier protection.     # Multiple benign nevi  # Seborrheic keratoses  # Solar lentigines  # Cherry angiomas    # sebaceous hyperplasia, right dorsal nose  - No concerning lesions today  - Reassured of benign nature  - Monitor for ABCDEs of melanoma   - Continue sun protection - recommend SPF 30 or higher with frequent application   - Return sooner if noticing changing or symptomatic lesions     # History of organ transplant.  # Family history of melanoma, mother    - Patient aware that she is at higher risk for cutaneous malignancy given history of transplant.   - Monitor for changing or symptomatic lesions.   - Continue frequent skin checks and photoprotection.    RTC one year     Michael Pierce MD  Dermatology Attending    _____________________    CC: Psoriasis (.)     HPI:  Ms. Phan Luque is a(n) 68 year old female who presents today as a return patient for FBSE. Patient last seen one year ago or psoriasis and seborrheic dermatitis follow-up. . Today, the patient reports that her psoriasis  in the gluteal cleft and armpits have improved and are stable.. Patient denies noticing any lesions that are itching, bleeding, growing, not healing or otherwise concerning.      Patient is otherwise feeling well, without additional skin concerns.       Physical Exam:  SKIN: Full skin, which includes the head/face, both arms, chest, back, abdomen,both legs, genitalia and/or groin buttocks, digits and/or nails, was examined.  - Right nasal dorsum with small flesh colored to yellow papule with crown blood vessels  - Dark brown macule on central back, pictured below  - There are dome shaped bright red papules on the trunk and extremities.   - There is macular erythema of the scalp with mild flaky white perifollicular scale.   - Scattered brown macules on sun exposed areas.  - There are waxy stuck on tan to brown papules on the trunk and extremities.

## 2024-08-29 NOTE — LETTER
8/29/2024       RE: Phan Luque  [address]  [address]  [place] [address]     Dear Colleague,    Thank you for referring your patient, Phan Luque, to the [facility] [place] at [facility]. Please see a copy of my visit note below.    [facility] Dermatology Note  Encounter Date: August 29, 2024  Office Visit      Dermatology Problem List:  1. [facility] - currently on tacrolimus   2. Flexural eczema - trimcinolone 0.1% ointment BID to popliteal fossa bilaterally PRN  3. AK s/p cryo  4. Psoriasis with arthritis.  - Follows with rheum  - Topicals: clobetasol solution, shampoo  - Nail dystrophy, likely psoriasis- grew candida.   5. Facial asymmetry - cosmetic referral.   6. Family hx psoriasis (son)  7. Family history of melanoma (mother)     ____________________________________________     Assessment & Plan:     # Psoriasis, chronic, active, stable  # Seborrheic Dermatitis   - Continue triamcinolone 0.1% ointment BID prn.  - Clobetasol solution PRN  - Continue clobetasol shampoo weekly  - Continue ketoconazole 2% cream BID prn for flare ups to help with itching  - Recommended Vaseline or Aquaphor for barrier protection.     # Multiple benign nevi  # Seborrheic keratoses  # Solar lentigines  # Cherry angiomas    # sebaceous hyperplasia, right dorsal nose  - No concerning lesions today  - Reassured of benign nature  - Monitor for ABCDEs of melanoma   - Continue sun protection - recommend SPF 30 or higher with frequent application   - Return sooner if noticing changing or symptomatic lesions     # History of organ transplant.  # Family history of melanoma, mother    - Patient aware that she is at higher risk for cutaneous malignancy given history of transplant.   - Monitor for changing or symptomatic lesions.   - Continue frequent skin checks and photoprotection.    RTC one year      Michael Pierce MD  Dermatology Attending    _____________________    CC: Psoriasis (.)     HPI:  Ms. Phan Luque is a(n) 68 year old female who presents today as a return patient for FBSE. Patient last seen one year ago or psoriasis and seborrheic dermatitis follow-up. . Today, the patient reports that her psoriasis in the gluteal cleft and armpits have improved and are stable.. Patient denies noticing any lesions that are itching, bleeding, growing, not healing or otherwise concerning.      Patient is otherwise feeling well, without additional skin concerns.       Physical Exam:  SKIN: Full skin, which includes the head/face, both arms, chest, back, abdomen,both legs, genitalia and/or groin buttocks, digits and/or nails, was examined.  - Right nasal dorsum with small flesh colored to yellow papule with crown blood vessels  - Dark brown macule on central back, pictured below  - There are dome shaped bright red papules on the trunk and extremities.   - There is macular erythema of the scalp with mild flaky white perifollicular scale.   - Scattered brown macules on sun exposed areas.  - There are waxy stuck on tan to brown papules on the trunk and extremities.       Again, thank you for allowing me to participate in the care of your patient.      Sincerely,    Michael Pierce MD

## 2024-09-20 ENCOUNTER — TELEPHONE (OUTPATIENT)
Dept: NEPHROLOGY | Facility: CLINIC | Age: 69
End: 2024-09-20
Payer: COMMERCIAL

## 2024-09-20 NOTE — TELEPHONE ENCOUNTER
Left Voicemail (1st Attempt) and Sent Mychart (1st Attempt) for the patient to call back and schedule the following:    Appointment type: Return Nephrology  Provider: Dr. Gutierrez  Return date: August 2025  Specialty phone number: [phone]  Additional appointment(s) needed: Labs 1hr prior  Additonal Notes: NA

## 2024-09-23 NOTE — MR AVS SNAPSHOT
After Visit Summary   9/8/2017    Phan Luque    MRN: [id number]           Patient Information     Date Of Birth          1955        Visit Information        Provider Department      9/8/2017 8:00 AM Hussein Banegas MD [facility] Hepatology        Today's Diagnoses     Restless leg syndrome    -  1       Follow-ups after your visit        Follow-up notes from your care team     Return in about 3 months (around 12/8/2017).      Your next 10 appointments already scheduled     Sep 20, 2017 12:00 PM CDT   Lab with  LAB   [facility] Lab ([facility])    [address] [place] [address]   [phone]            Sep 20, 2017  1:00 PM CDT   (Arrive by 12:30 PM)   Return Visit with Daya Gutierrez MD   [facility] Nephrology ([facility])    [address] [place] [address]   [phone]            Nov 20, 2017  3:30 PM CST   Lab with  LAB   [facility] Lab ([facility])    [address] [place] [address]   [phone]            Nov 20, 2017  4:30 PM CST   (Arrive by 4:15 PM)   RETURN ENDOCRINE with Ruth Oakley MD   [facility] Endocrinology ([facility])    [address] [place] [address]   [phone]            Dec 05, 2017 10:00 AM CST   RETURN NEURO with Ambrose Ortega MD   [facility] ([facility])    [address]  [address] [facility]  [address] [address]  [facility] [address]   [phone]            Dec 20, 2017  2:15 PM CST   Lab with  LAB   [facility] Lab ([facility])    [address] [place] [address]   [phone]            Dec 20, 2017  3:15 PM CST   (Arrive by 3:00 PM)   Return Liver Transplant with Hussein Banegas MD   [facility] Hepatology ([facility])    [address] [place] [address]  Intact "Se  3rd Floor  [facility] [address] [phone]            Dec 20, 2017  4:30 PM CST   (Arrive by 4:15 PM)   Return Visit with DONOVAN Castellanos CNP   [facility]enterology [facility] ([facility])    [address] [facility] [address]  4th Floor  [facility] [id number] [phone]              Who to contact     If you have questions or need follow up information about today's clinic visit or your schedule please contact [facility] HEPATOLOG[facility] directly at [phone].  Normal or non-critical lab and imaging results will be communicated to you by ideaForgehart, letter or phone within 4 business days after the clinic has received the results. If you do not hear from us within 7 days, please contact the clinic through [employer] [place] [employer] or phone. If you have a critical or abnormal lab result, we will notify you by phone as soon as possible.  Submit refill requests through [employer] or call your pharmacy and they will forward the refill request to us. Please allow 3 business days for your refill to be completed.          Additional Information About Your Visit        MyChart Information     [employer] gives you secure access to your electronic health record. If you see a primary care provider, you can also send messages to your care team and make appointments. If you have questions, please call your primary care clinic.  If you do not have a primary care provider, please call [phone] and they will assist you.        Care EveryWhere ID     This is your Care EveryWhere ID. This could be used by other organizations to access your [place] medical records  [id number]        Your Vitals Were     Pulse Height Pulse Oximetry BMI (Body Mass Index)          84 1.702 m (5' 7\") 96% 27.57 kg/m2         Blood Pressure from Last 3 Encounters:   09/08/17 156/84   08/14/17 136/79   08/11/17 148/70    Weight from Last 3 Encounters:   09/08/17 79.8 kg (176 lb)   08/14/17 80.2 kg (176 lb 11.2 oz)   08/11/17 80.3 kg " (177 lb)              Today, you had the following     No orders found for display         Today's Medication Changes          These changes are accurate as of: 9/8/17  8:21 AM.  If you have any questions, ask your nurse or doctor.               Start taking these medicines.        Dose/Directions    pramipexole 0.5 MG tablet   Commonly known as:  MIRAPEX   Used for:  Restless leg syndrome   Started by:  Hussein Banegas MD        Dose:  0.5 mg   Take 1 tablet (0.5 mg) by mouth At Bedtime   Quantity:  90 tablet   Refills:  3         These medicines have changed or have updated prescriptions.        Dose/Directions    predniSONE 5 MG tablet   Commonly known as:  DELTASONE   This may have changed:  how much to take   Used for:  Liver transplanted (H)        Dose:  10 mg   Take 2 tablets (10 mg) by mouth daily   Quantity:  180 tablet   Refills:  3            Where to get your medicines      These medications were sent to [facility] - [place] [address] [facility]  [address] [place] [address]    Hours:  Mon-Fri 8:30am-5:00pm Toll Free [phone] Phone:  [phone]     pramipexole 0.5 MG tablet                Primary Care Provider Office Phone # Fax #    Santosh SAAVEDRA Damian [phone]       [address]   [facility] [address]        Equal Access to Services     ALLY BERRY AH: Hadii aad ku hadasho Soomaali, waaxda luqadaha, qaybta kaalmada adeegyada, waxjose j nunez haydouglas schroeder. So [facility] [phone].    ATENCIÓN: Si habla español, tiene a reece disposición servicios gratuitos de asistencia lingüística. Llame al [phone].    We comply with applicable federal civil rights laws and [place] laws. We do not discriminate on the basis of race, color, national origin, age, disability sex, sexual orientation or gender identity.            Thank you!     Thank you for choosing [facility] HEPATOLOGY  for your care. Our goal is always  to provide you with excellent care. Hearing back from our patients is one way we can continue to improve our services. Please take a few minutes to complete the written survey that you may receive in the mail after your visit with us. Thank you!             Your Updated Medication List - Protect others around you: Learn how to safely use, store and throw away your medicines at www.disposemymeds.org.          This list is accurate as of: 9/8/17  8:21 AM.  Always use your most recent med list.                   Brand Name Dispense Instructions for use Diagnosis    acetaminophen 325 MG tablet    TYLENOL    100 tablet    Take 2 tablets (650 mg) by mouth every 6 hours as needed for mild pain Or fevers. Use sparingly. Limit use to no more than 2 grams (2000 mg) in 24 hours. **Further refills must be obtained by primary care provider**    Acute post-operative pain       amLODIPine 5 MG tablet    NORVASC    90 tablet    Take 1 tablet (5 mg) by mouth daily    Hypertension       azaTHIOprine 100 MG Tabs     90 tablet    Take 50 mg by mouth every evening    Liver transplanted (H)       betaxolol 0.5 % ophthalmic solution    BETOPTIC    3 mL    Place 1 drop into both eyes 2 times daily    Primary open angle glaucoma of both eyes, unspecified glaucoma stage       calcium Citrate-vitamin D 500-400 MG-UNIT Chew      Take 1 tablet by mouth daily        cyanocobalamin 1000 MCG Tabs     30 tablet    Take 1,000 mcg by mouth daily    Liver transplanted (H)       FISH OIL PO      Take 645 mg by mouth 2 times daily        folic acid 1 MG tablet    FOLVITE    30 tablet    Take 1 tablet (1 mg) by mouth daily    Malnutrition (H)       hydrOXYzine 25 MG tablet    ATARAX    60 tablet    Take 1-2 tablets (25-50 mg) by mouth every 6 hours as needed for itching    Itching       latanoprost 0.005 % ophthalmic solution    XALATAN    1 Bottle    Place 1 drop into both eyes At Bedtime    Steroid responders borderline glaucoma, bilateral        levothyroxine 88 MCG tablet    SYNTHROID/LEVOTHROID    30 tablet    Take 1 tablet (88 mcg) by mouth every morning (before breakfast)    Thyroid function test abnormal       loperamide 2 MG capsule    IMODIUM     Take 2 mg by mouth 4 times daily as needed for diarrhea Reported on 5/18/2017        magnesium oxide 400 (241.3 MG) MG tablet    MAG-OX    60 tablet    Take 1 tablet (400 mg) by mouth daily    CKD (chronic kidney disease) stage 3, GFR 30-59 ml/min, Hypomagnesemia       multivitamin, therapeutic Tabs tablet     30 tablet    Take 1 tablet by mouth every 24 hours    Malnutrition (H)       pantoprazole 40 MG EC tablet    PROTONIX    30 tablet    Take 1 tablet (40 mg) by mouth every morning (before breakfast)    Gastroesophageal reflux disease, esophagitis presence not specified       pramipexole 0.5 MG tablet    MIRAPEX    90 tablet    Take 1 tablet (0.5 mg) by mouth At Bedtime    Restless leg syndrome       predniSONE 5 MG tablet    DELTASONE    180 tablet    Take 2 tablets (10 mg) by mouth daily    Liver transplanted (H)       psyllium 0.52 G capsule     60 capsule    Take 2 capsules (1.04 g) by mouth daily With a full glass of water.    Loose stools       simethicone 80 MG chewable tablet    MYLICON    180 tablet    Take 1 tablet (80 mg) by mouth every 6 hours as needed for flatulence or cramping    Flatulence, eructation, and gas pain       tacrolimus 0.5 MG capsule    GENERIC EQUIVALENT    240 capsule    Take 4 capsules (2 mg) by mouth 2 times daily    Liver transplanted (H)       traMADol 50 MG tablet    ULTRAM    50 tablet    Take 1-2 tablets ( mg) by mouth every 6 hours as needed for pain    Abdominal pain, epigastric       traZODone 100 MG tablet    DESYREL    30 tablet    Take 1 tablet (100 mg) by mouth At Bedtime **Further refills must be obtained by primary care provider**    Insomnia, unspecified type       ursodiol 300 MG capsule    ACTIGALL    60 capsule    Take 1 capsule (300 mg) by mouth 2  times daily    Liver transplanted (H)

## 2024-09-25 ENCOUNTER — TELEPHONE (OUTPATIENT)
Dept: DERMATOLOGY | Facility: CLINIC | Age: 69
End: 2024-09-25

## 2024-09-25 DIAGNOSIS — L21.9 DERMATITIS, SEBORRHEIC: ICD-10-CM

## 2024-09-25 DIAGNOSIS — L40.9 SCALP PSORIASIS: ICD-10-CM

## 2024-09-25 DIAGNOSIS — L40.0 PSORIASIS VULGARIS: ICD-10-CM

## 2024-09-26 ENCOUNTER — TELEPHONE (OUTPATIENT)
Dept: NEPHROLOGY | Facility: CLINIC | Age: 69
End: 2024-09-26
Payer: COMMERCIAL

## 2024-09-26 RX ORDER — KETOCONAZOLE 20 MG/ML
SHAMPOO TOPICAL
Qty: 120 ML | Refills: 11 | Status: SHIPPED | OUTPATIENT
Start: 2024-09-26

## 2024-09-26 NOTE — TELEPHONE ENCOUNTER
Left Voicemail (1st Attempt) and Sent Mychart (1st Attempt) for the patient to call back and schedule the following:    Appointment type: Return Nephrology  Provider: Dr. Gutierrez  Return date: Approx Aug 2025  Specialty phone number: [phone]  Additional appointment(s) needed: Labs prior  Additonal Notes: 1yr follow up

## 2024-09-27 ENCOUNTER — LAB (OUTPATIENT)
Dept: LAB | Facility: CLINIC | Age: 69
End: 2024-09-27
Payer: COMMERCIAL

## 2024-09-27 DIAGNOSIS — Z94.4 LIVER REPLACED BY TRANSPLANT (H): ICD-10-CM

## 2024-09-27 LAB
ALBUMIN SERPL BCG-MCNC: 4.1 G/DL (ref 3.5–5.2)
ALP SERPL-CCNC: 43 U/L (ref 40–150)
ALT SERPL W P-5'-P-CCNC: 9 U/L (ref 0–50)
ANION GAP SERPL CALCULATED.3IONS-SCNC: 11 MMOL/L (ref 7–15)
AST SERPL W P-5'-P-CCNC: 20 U/L (ref 0–45)
BILIRUB DIRECT SERPL-MCNC: <0.2 MG/DL (ref 0–0.3)
BILIRUB SERPL-MCNC: 0.4 MG/DL
BUN SERPL-MCNC: 18.5 MG/DL (ref 8–23)
CALCIUM SERPL-MCNC: 9.5 MG/DL (ref 8.8–10.4)
CHLORIDE SERPL-SCNC: 101 MMOL/L (ref 98–107)
CREAT SERPL-MCNC: 1.22 MG/DL (ref 0.51–0.95)
EGFRCR SERPLBLD CKD-EPI 2021: 48 ML/MIN/1.73M2
ERYTHROCYTE [DISTWIDTH] IN BLOOD BY AUTOMATED COUNT: 13.9 % (ref 10–15)
GLUCOSE SERPL-MCNC: 92 MG/DL (ref 70–99)
HCO3 SERPL-SCNC: 28 MMOL/L (ref 22–29)
HCT VFR BLD AUTO: 38.9 % (ref 35–47)
HGB BLD-MCNC: 13.1 G/DL (ref 11.7–15.7)
MCH RBC QN AUTO: 30.8 PG (ref 26.5–33)
MCHC RBC AUTO-ENTMCNC: 33.7 G/DL (ref 31.5–36.5)
MCV RBC AUTO: 92 FL (ref 78–100)
PLATELET # BLD AUTO: 319 10E3/UL (ref 150–450)
POTASSIUM SERPL-SCNC: 4.3 MMOL/L (ref 3.4–5.3)
PROT SERPL-MCNC: 7 G/DL (ref 6.4–8.3)
RBC # BLD AUTO: 4.25 10E6/UL (ref 3.8–5.2)
SODIUM SERPL-SCNC: 140 MMOL/L (ref 135–145)
TACROLIMUS BLD-MCNC: 5.7 UG/L (ref 5–15)
TME LAST DOSE: NORMAL H
TME LAST DOSE: NORMAL H
WBC # BLD AUTO: 6.9 10E3/UL (ref 4–11)

## 2024-09-27 PROCEDURE — 82248 BILIRUBIN DIRECT: CPT

## 2024-09-27 PROCEDURE — 80053 COMPREHEN METABOLIC PANEL: CPT

## 2024-09-27 PROCEDURE — 85027 COMPLETE CBC AUTOMATED: CPT

## 2024-09-27 PROCEDURE — 80197 ASSAY OF TACROLIMUS: CPT

## 2024-09-27 PROCEDURE — 36415 COLL VENOUS BLD VENIPUNCTURE: CPT

## 2024-09-27 NOTE — TELEPHONE ENCOUNTER
LVD:  /29/2024  [facility] Michael Cohen MD  Dermatology     Refilled per protocol 1.

## 2024-09-30 ENCOUNTER — TELEPHONE (OUTPATIENT)
Dept: TRANSPLANT | Facility: CLINIC | Age: 69
End: 2024-09-30
Payer: COMMERCIAL

## 2024-09-30 DIAGNOSIS — Z94.4 LIVER REPLACED BY TRANSPLANT (H): Primary | ICD-10-CM

## 2024-09-30 DIAGNOSIS — Z94.4 LIVER TRANSPLANTED (H): ICD-10-CM

## 2024-09-30 NOTE — TELEPHONE ENCOUNTER
Central Prior Authorization Team   Phone: [phone]    PA Initiation    Medication: Clobetasol 0.05% shampoo  Insurance Company: [facility] - Phone [phone] Fax [phone]  Pharmacy Filling the Rx: [facility] - [place], [place] - [facility] [address]  Filling Pharmacy Phone: [phone]  Filling Pharmacy Fax:    Start Date: 9/30/2024

## 2024-09-30 NOTE — TELEPHONE ENCOUNTER
ISSUE:   Tacrolimus IR level 5.7 on 09/27/24, goal 3-5, dose 1.5 mg BID.    PLAN:   Call Patient and confirm this was an accurate 12-hour trough.   Verify Tacrolimus IR dose 1.5 mg BID.   Confirm no new medications or illness.   Confirm no missed doses.     If accurate trough and accurate dose, decrease Tacrolimus IR dose to 1 mg BID    Repeat labs in 1 months.    Jo Ann Rodrigues RN      OUTCOME:   Spoke with Patient, they confirm accurate trough level and current dose 1.5 mg BID.   Patient confirmed dose change to 1 mg BID.  Patient agreed to repeat labs in 1 months.   Orders sent to preferred pharmacy for dose change and lab for repeat labs.   Patient voiced understanding of plan.     Thuy Truong LPN

## 2024-10-01 RX ORDER — TACROLIMUS 0.5 MG/1
1 CAPSULE ORAL 2 TIMES DAILY
Qty: 360 CAPSULE | Refills: 3 | Status: SHIPPED | OUTPATIENT
Start: 2024-10-01

## 2024-10-02 NOTE — TELEPHONE ENCOUNTER
Prior Authorization Approval    Authorization Effective Date: 9/1/2024  Authorization Expiration Date: 10/1/2025  Medication: Clobetasol 0.05% shampoo-PA APPROVED   Approved Dose/Quantity: UP  ML PER 28 DAYS   Reference #:     Insurance Company: [employer] [place] - Phone [phone] Fax [phone]  Expected CoPay:       CoPay Card Available:      Foundation Assistance Needed:    Which Pharmacy is filling the prescription (Not needed for infusion/clinic administered): [facility] - [place], [place] - [address]  Pharmacy Notified:  Yes  Patient Notified:  No

## 2024-10-09 ENCOUNTER — IMMUNIZATION (OUTPATIENT)
Dept: FAMILY MEDICINE | Facility: CLINIC | Age: 69
End: 2024-10-09
Payer: COMMERCIAL

## 2024-10-09 VITALS — TEMPERATURE: 98.1 F

## 2024-10-09 DIAGNOSIS — Z23 ENCOUNTER FOR IMMUNIZATION: Primary | ICD-10-CM

## 2024-10-09 PROCEDURE — 99207 PR NO CHARGE NURSE ONLY: CPT

## 2024-10-09 PROCEDURE — 91320 SARSCV2 VAC 30MCG TRS-SUC IM: CPT

## 2024-10-09 PROCEDURE — 90480 ADMN SARSCOV2 VAC 1/ONLY CMP: CPT

## 2024-10-09 PROCEDURE — 90471 IMMUNIZATION ADMIN: CPT

## 2024-10-09 PROCEDURE — 90662 IIV NO PRSV INCREASED AG IM: CPT

## 2024-10-09 NOTE — PROGRESS NOTES
Prior to immunization administration, verified patients identity using patient s name and date of birth. Please see Immunization Activity for additional information.     Is the patient's temperature normal (100.5 or less)? Yes     Patient MEETS CRITERIA. PROCEED with vaccine administration.        10/9/2024   General Questionnaire    Do you have any questions for your care team about the vaccines you will be receiving today? which flu shot should i get?                10/9/2024   COVID   Have you had myocarditis or pericarditis (inflammation of or around the heart muscle) after getting a COVID-19 vaccine? No   Have you had a serious reaction to a COVID vaccine or something in a COVID vaccine, like polyethylene glycol (PEG) or polysorbate? No   Have you had multisystem inflammatory syndrome from COVID-19 in the past 90 days? No            Patient MEETS CRITERIA. PROCEED with vaccine administration.        10/9/2024   INFLUENZA   Would you like to receive the flu shot or the nasal flu vaccine today? Flu Shot   Have you had a serious reaction to a flu vaccine or something in a flu vaccine? No   Have you had Guillain-[place] syndrome within 6 weeks of getting a vaccine? No   Have you received a bone marrow transplant within the previous 6 months? No            Patient MEETS CRITERIA. PROCEED with vaccine administration.        Patient instructed to remain in clinic for 15 minutes afterwards, and to report any adverse reactions.      Link to Ancillary Visit Immunization Standing Orders SmartSet     Screening performed by Melita Stafford CMA on 10/9/2024 at 2:53 PM.

## 2024-10-14 ENCOUNTER — OFFICE VISIT (OUTPATIENT)
Dept: GASTROENTEROLOGY | Facility: CLINIC | Age: 69
End: 2024-10-14
Attending: INTERNAL MEDICINE
Payer: COMMERCIAL

## 2024-10-14 VITALS
SYSTOLIC BLOOD PRESSURE: 130 MMHG | BODY MASS INDEX: 23.77 KG/M2 | HEIGHT: 66 IN | HEART RATE: 67 BPM | DIASTOLIC BLOOD PRESSURE: 82 MMHG | OXYGEN SATURATION: 97 % | WEIGHT: 147.9 LBS

## 2024-10-14 DIAGNOSIS — Z94.4 LIVER REPLACED BY TRANSPLANT (H): Primary | ICD-10-CM

## 2024-10-14 PROCEDURE — 99214 OFFICE O/P EST MOD 30 MIN: CPT | Performed by: INTERNAL MEDICINE

## 2024-10-14 PROCEDURE — 99213 OFFICE O/P EST LOW 20 MIN: CPT | Performed by: INTERNAL MEDICINE

## 2024-10-14 ASSESSMENT — PAIN SCALES - GENERAL: PAINLEVEL: NO PAIN (0)

## 2024-10-14 NOTE — NURSING NOTE
"Chief Complaint   Patient presents with    RECHECK     Post liver follow up.      Vitals:    10/14/24 1428   BP: 130/82   BP Location: Right arm   Patient Position: Sitting   Cuff Size: Adult Regular   Pulse: 67   SpO2: 97%   Weight: 67.1 kg (147 lb 14.4 oz)   Height: 1.676 m (5' 6\")       BP Readings from Last 3 Encounters:   10/14/24 130/82   08/14/24 116/74   07/22/24 127/76       /82 (BP Location: Right arm, Patient Position: Sitting, Cuff Size: Adult Regular)   Pulse 67   Ht 1.676 m (5' 6\")   Wt 67.1 kg (147 lb 14.4 oz)   LMP  (LMP Unknown)   SpO2 97%   BMI 23.87 kg/m       Elizabethshbeka Kohler    "

## 2024-10-14 NOTE — LETTER
10/14/2024      Phan Luque  [address] [place] [address] [address]  [address]  [facility] [address]      Dear Colleague,    Thank you for referring your patient, Phan Luque, to the [facility] [place]. Please see a copy of my visit note below.    Solid Organ Transplant Hepatology Follow-Up Visit:     HISTORY OF PRESENT ILLNESS:   I had the pleasure of seeing Pahn Luque for followup in the Liver Clinic at the [facility] on October 14, 2024. Ms. Luque returns for followup now more than 8 years status post liver transplantation for alcohol-associated hepatitis.     She is doing well at this visit.  She denies any abdominal pain.  She does have some itching, which she relates to her psoriasis and eczema.  She also has some mild psoriatic arthritis with that. She denies any fatigue.  She denies any increased abdominal girth or lower extremity edema.     She denies any fevers or chills, cough or shortness of breath.  She denies any nausea or vomiting, diarrhea or constipation.  Her appetite has been good and her weight has been stable.     There have been no other new events since she was last seen.    Medications:   Current Outpatient Medications   Medication Sig Dispense Refill     acetaminophen (TYLENOL) 325 MG tablet Take 2 tablets (650 mg) by mouth every 6 hours as needed for mild pain Or fevers. Use sparingly. Limit use to no more than 2 grams (2000 mg) in 24 hours. **Further refills must be obtained by primary care provider** 100 tablet 0     albuterol (VENTOLIN HFA) 108 (90 Base) MCG/ACT inhaler Inhale 2 puffs into the lungs every 6 hours as needed for shortness of breath 18 g 2     amLODIPine (NORVASC) 5 MG tablet Take 1 tablet (5 mg) by mouth daily 90 tablet 4     calcipotriene (DOVONOX) 0.005 % external solution Apply 1 mL topically daily To the scalp (Patient not taking: Reported on 8/2/2023) 180 mL 1     chlorthalidone (HYGROTON) 25 MG tablet Take 0.5 tablets  (12.5 mg) by mouth daily 45 tablet 4     cholecalciferol (VITAMIN D3) 400 UNIT TABS tablet Take 400 Units by mouth daily       ciclopirox (PENLAC) 8 % external solution Apply to adjacent skin and affected nails daily.  Remove with alcohol every 7 days, then repeat. (Patient not taking: Reported on 3/31/2023) 6.6 mL 1     clobetasol (TEMOVATE) 0.05 % external ointment Apply topically 2 times daily. To areas of eczema on the lower legs, until areas resolve. 60 g 1     clobetasol (TEMOVATE) 0.05 % external solution Apply topically 2 times daily. To the scalp as needed for itching and flaking, on the days you don't use the clobetasol shampoo. 150 mL 2     clobetasol propionate (CLOBEX) 0.05 % external shampoo Apply topically daily. To dry scalp x 15 min and rinse ,when psoriasis is present. 354 mL 1     cyanocobalamin (VITAMIN  B-12) 1000 MCG tablet Take 1,000 mcg by mouth daily       ferrous sulfate (IRON) 325 (65 FE) MG tablet Take 1 tablet (325 mg) by mouth 2 times daily 100 tablet      fluocinolone acetonide (DERMA SMOOTHE/FS BODY) 0.01 % external oil Apply topically 2 times daily. 118.28 mL 11     fluocinonide (LIDEX) 0.05 % external solution Apply topically 2 times daily To areas of rash and itch on scalp 60 mL 3     folic acid (FOLVITE) 1 MG tablet Take 1 tablet (1 mg) by mouth daily 30 tablet 1     gabapentin (NEURONTIN) 100 MG capsule Take 2 capsules (200 mg) by mouth nightly as needed for neuropathic pain TAKE TWO CAPSULES BY MOUTH EVERY NIGHT AT BEDTIME 180 capsule 3     hydrOXYzine HCl (ATARAX) 25 MG tablet Take 1 tablet (25 mg) by mouth every 6 hours as needed for itching 120 tablet 3     ketoconazole (NIZORAL) 2 % external cream Apply topically 2 times daily as needed for itching 60 g 11     ketoconazole (NIZORAL) 2 % external shampoo APPLY TOPICALLY TO THE SCALP DAILY AS NEEDED FOR ITCHING OR IRRITATION. LEAVE ON FOR A FEW MINUTES AND THEN WASH OUT. 120 mL 11     levothyroxine (SYNTHROID/LEVOTHROID) 88  MCG tablet Take 1 tablet (88 mcg) by mouth daily 90 tablet 4     MAGNESIUM OXIDE PO Take 400 mg by mouth daily       melatonin 5 MG tablet Take 1 tablet (5 mg) by mouth nightly as needed for sleep       multivitamin, therapeutic (THERA-VIT) TABS tablet Take 1 tablet by mouth every 24 hours 30 tablet 11     order for DME Equipment being ordered: Trilok ankle brace (Patient not taking: Reported on 4/4/2024) 1 Device 0     pantoprazole (PROTONIX) 40 MG EC tablet Take 1 tablet (40 mg) by mouth daily 90 tablet 4     pravastatin (PRAVACHOL) 10 MG tablet TAKE 1 TABLET BY MOUTH DAILY 90 tablet 3     psyllium 0.52 G capsule Take 2 capsules (1.04 g) by mouth daily With a full glass of water. (Patient taking differently: Take 1 capsule by mouth 3 times daily With a full glass of water.) 60 capsule 1     rizatriptan (MAXALT) 5 MG tablet Take 1-2 tablets (5-10 mg) by mouth at onset of headache for migraine May repeat in 2 hours. Max 6 tablets/24 hours. (Patient not taking: Reported on 8/14/2024) 20 tablet 2     tacrolimus (GENERIC-ACCORD BX-RATED) 0.5 MG capsule Take 2 capsules (1 mg) by mouth 2 times daily. 360 capsule 3     triamcinolone (KENALOG) 0.1 % external ointment Twice daily for flare ups of psoriasis 90 g 1     ursodiol (ACTIGALL) 300 MG capsule TAKE ONE CAPSULE BY MOUTH TWICE A DAY 60 capsule 3     WEGOVY 0.5 MG/0.5ML pen Inject 0.5 mg Subcutaneous every 7 days       No current facility-administered medications for this visit.      Vitals:   LMP  (LMP Unknown)     Physical Exam:   In general she looks quite well. HEENT exam shows no scleral icterus or temporal muscle wasting. Chest is clear. Abdominal exam shows no increase in girth. No masses or tenderness to palpation are present. Liver is 10 cm in span without left lobe enlargement. No spleen tip is palpable.  Her incision is intact.  Extremity exam shows no edema. Skin exam shows no suspicious lesions and neurologic exam is nonfocal.     Labs:   Lab Results    Component Value Date     09/27/2024    POTASSIUM 4.3 09/27/2024    CHLORIDE 101 09/27/2024    ANIONGAP 11 09/27/2024    CO2 28 09/27/2024    BUN 18.5 09/27/2024    CR 1.22 (H) 09/27/2024    GFRESTIMATED 48 (L) 09/27/2024    ERIC 9.5 09/27/2024      Lab Results   Component Value Date    WBC 6.9 09/27/2024    HGB 13.1 09/27/2024    HCT 38.9 09/27/2024    MCV 92 09/27/2024    MCH 30.8 09/27/2024    MCHC 33.7 09/27/2024    RDW 13.9 09/27/2024     09/27/2024     Lab Results   Component Value Date    ALBUMIN 4.1 09/27/2024    ALKPHOS 43 09/27/2024    AST 20 09/27/2024     Lab Results   Component Value Date    INR 0.98 12/04/2022     Recent Labs   Lab Test 09/27/24  0730 07/29/24  0733 05/29/24  0725 03/29/24  0737 01/29/24  0729 11/22/23  0727 09/28/23  0724 08/04/23  1611 07/28/23  0734 05/30/23  0754   ALKPHOS 43 36* 36* 49 50 41 43 46 45 49   ALT 9 9 8 8 8 12 7 12 10 10   AST 20 22 24 21 23 21 23 27 26 22      Imaging:   No images are attached to the encounter.     Assessment/Plan:   IMPRESSION:   Ms. Luque is doing well.  Her liver tests are excellent and her kidney function is stable.  She does follow with Dr. Shrestha.  She does report her blood pressure has been under good control at home.  She is up to date with regard to vaccines including COVID and RSV vaccines, she is up-to-date with regard to skin cancer and colorectal cancer screening     My plan will be to see her back in the clinic again in 6 months' time.  I otherwise will not be making any other change to her medical regimen.     I did spend total of 30 minutes (on the date of the encounter), including 20 minutes of face-to-face clinic time including counseling. The rest of the time was spent in documentation and review of records.    Thank you very much for allowing me to participate in the care of this patient.  If you have any questions regarding my recommendations, please do not hesitate to contact me.         Hussein Banegas MD       Professor of Medicine  [employer]      Executive Medical Director, Solid Organ Transplant Program  [facility]      Again, thank you for allowing me to participate in the care of your patient.        Sincerely,        Hussein Banegas MD

## 2024-10-14 NOTE — PROGRESS NOTES
Solid Organ Transplant Hepatology Follow-Up Visit:     HISTORY OF PRESENT ILLNESS:   I had the pleasure of seeing Phan Luque for followup in the Liver Clinic at the [facility] on October 14, 2024. Ms. Luque returns for followup now more than 8 years status post liver transplantation for alcohol-associated hepatitis.     She is doing well at this visit.  She denies any abdominal pain.  She does have some itching, which she relates to her psoriasis and eczema.  She also has some mild psoriatic arthritis with that. She denies any fatigue.  She denies any increased abdominal girth or lower extremity edema.     She denies any fevers or chills, cough or shortness of breath.  She denies any nausea or vomiting, diarrhea or constipation.  Her appetite has been good and her weight has been stable.     There have been no other new events since she was last seen.    Medications:   Current Outpatient Medications   Medication Sig Dispense Refill    acetaminophen (TYLENOL) 325 MG tablet Take 2 tablets (650 mg) by mouth every 6 hours as needed for mild pain Or fevers. Use sparingly. Limit use to no more than 2 grams (2000 mg) in 24 hours. **Further refills must be obtained by primary care provider** 100 tablet 0    albuterol (VENTOLIN HFA) 108 (90 Base) MCG/ACT inhaler Inhale 2 puffs into the lungs every 6 hours as needed for shortness of breath 18 g 2    amLODIPine (NORVASC) 5 MG tablet Take 1 tablet (5 mg) by mouth daily 90 tablet 4    calcipotriene (DOVONOX) 0.005 % external solution Apply 1 mL topically daily To the scalp (Patient not taking: Reported on 8/2/2023) 180 mL 1    chlorthalidone (HYGROTON) 25 MG tablet Take 0.5 tablets (12.5 mg) by mouth daily 45 tablet 4    cholecalciferol (VITAMIN D3) 400 UNIT TABS tablet Take 400 Units by mouth daily      ciclopirox (PENLAC) 8 % external solution Apply to adjacent skin and affected nails daily.  Remove with alcohol every 7 days, then repeat.  (Patient not taking: Reported on 3/31/2023) 6.6 mL 1    clobetasol (TEMOVATE) 0.05 % external ointment Apply topically 2 times daily. To areas of eczema on the lower legs, until areas resolve. 60 g 1    clobetasol (TEMOVATE) 0.05 % external solution Apply topically 2 times daily. To the scalp as needed for itching and flaking, on the days you don't use the clobetasol shampoo. 150 mL 2    clobetasol propionate (CLOBEX) 0.05 % external shampoo Apply topically daily. To dry scalp x 15 min and rinse ,when psoriasis is present. 354 mL 1    cyanocobalamin (VITAMIN  B-12) 1000 MCG tablet Take 1,000 mcg by mouth daily      ferrous sulfate (IRON) 325 (65 FE) MG tablet Take 1 tablet (325 mg) by mouth 2 times daily 100 tablet     fluocinolone acetonide (DERMA SMOOTHE/FS BODY) 0.01 % external oil Apply topically 2 times daily. 118.28 mL 11    fluocinonide (LIDEX) 0.05 % external solution Apply topically 2 times daily To areas of rash and itch on scalp 60 mL 3    folic acid (FOLVITE) 1 MG tablet Take 1 tablet (1 mg) by mouth daily 30 tablet 1    gabapentin (NEURONTIN) 100 MG capsule Take 2 capsules (200 mg) by mouth nightly as needed for neuropathic pain TAKE TWO CAPSULES BY MOUTH EVERY NIGHT AT BEDTIME 180 capsule 3    hydrOXYzine HCl (ATARAX) 25 MG tablet Take 1 tablet (25 mg) by mouth every 6 hours as needed for itching 120 tablet 3    ketoconazole (NIZORAL) 2 % external cream Apply topically 2 times daily as needed for itching 60 g 11    ketoconazole (NIZORAL) 2 % external shampoo APPLY TOPICALLY TO THE SCALP DAILY AS NEEDED FOR ITCHING OR IRRITATION. LEAVE ON FOR A FEW MINUTES AND THEN WASH OUT. 120 mL 11    levothyroxine (SYNTHROID/LEVOTHROID) 88 MCG tablet Take 1 tablet (88 mcg) by mouth daily 90 tablet 4    MAGNESIUM OXIDE PO Take 400 mg by mouth daily      melatonin 5 MG tablet Take 1 tablet (5 mg) by mouth nightly as needed for sleep      multivitamin, therapeutic (THERA-VIT) TABS tablet Take 1 tablet by mouth every 24  hours 30 tablet 11    order for DME Equipment being ordered: Trilok ankle brace (Patient not taking: Reported on 4/4/2024) 1 Device 0    pantoprazole (PROTONIX) 40 MG EC tablet Take 1 tablet (40 mg) by mouth daily 90 tablet 4    pravastatin (PRAVACHOL) 10 MG tablet TAKE 1 TABLET BY MOUTH DAILY 90 tablet 3    psyllium 0.52 G capsule Take 2 capsules (1.04 g) by mouth daily With a full glass of water. (Patient taking differently: Take 1 capsule by mouth 3 times daily With a full glass of water.) 60 capsule 1    rizatriptan (MAXALT) 5 MG tablet Take 1-2 tablets (5-10 mg) by mouth at onset of headache for migraine May repeat in 2 hours. Max 6 tablets/24 hours. (Patient not taking: Reported on 8/14/2024) 20 tablet 2    tacrolimus (GENERIC-ACCORD BX-RATED) 0.5 MG capsule Take 2 capsules (1 mg) by mouth 2 times daily. 360 capsule 3    triamcinolone (KENALOG) 0.1 % external ointment Twice daily for flare ups of psoriasis 90 g 1    ursodiol (ACTIGALL) 300 MG capsule TAKE ONE CAPSULE BY MOUTH TWICE A DAY 60 capsule 3    WEGOVY 0.5 MG/0.5ML pen Inject 0.5 mg Subcutaneous every 7 days       No current facility-administered medications for this visit.      Vitals:   LMP  (LMP Unknown)     Physical Exam:   In general she looks quite well. HEENT exam shows no scleral icterus or temporal muscle wasting. Chest is clear. Abdominal exam shows no increase in girth. No masses or tenderness to palpation are present. Liver is 10 cm in span without left lobe enlargement. No spleen tip is palpable.  Her incision is intact.  Extremity exam shows no edema. Skin exam shows no suspicious lesions and neurologic exam is nonfocal.     Labs:   Lab Results   Component Value Date     09/27/2024    POTASSIUM 4.3 09/27/2024    CHLORIDE 101 09/27/2024    ANIONGAP 11 09/27/2024    CO2 28 09/27/2024    BUN 18.5 09/27/2024    CR 1.22 (H) 09/27/2024    GFRESTIMATED 48 (L) 09/27/2024    ERIC 9.5 09/27/2024      Lab Results   Component Value Date    WBC  6.9 09/27/2024    HGB 13.1 09/27/2024    HCT 38.9 09/27/2024    MCV 92 09/27/2024    MCH 30.8 09/27/2024    MCHC 33.7 09/27/2024    RDW 13.9 09/27/2024     09/27/2024     Lab Results   Component Value Date    ALBUMIN 4.1 09/27/2024    ALKPHOS 43 09/27/2024    AST 20 09/27/2024     Lab Results   Component Value Date    INR 0.98 12/04/2022     Recent Labs   Lab Test 09/27/24  0730 07/29/24  0733 05/29/24  0725 03/29/24  0737 01/29/24  0729 11/22/23  0727 09/28/23  0724 08/04/23  1611 07/28/23  0734 05/30/23  0754   ALKPHOS 43 36* 36* 49 50 41 43 46 45 49   ALT 9 9 8 8 8 12 7 12 10 10   AST 20 22 24 21 23 21 23 27 26 22      Imaging:   No images are attached to the encounter.     Assessment/Plan:   IMPRESSION:   Ms. Luque is doing well.  Her liver tests are excellent and her kidney function is stable.  She does follow with Dr. Shrestha.  She does report her blood pressure has been under good control at home.  She is up to date with regard to vaccines including COVID and RSV vaccines, she is up-to-date with regard to skin cancer and colorectal cancer screening     My plan will be to see her back in the clinic again in 6 months' time.  I otherwise will not be making any other change to her medical regimen.     I did spend total of 30 minutes (on the date of the encounter), including 20 minutes of face-to-face clinic time including counseling. The rest of the time was spent in documentation and review of records.    Thank you very much for allowing me to participate in the care of this patient.  If you have any questions regarding my recommendations, please do not hesitate to contact me.         Hussein Banegas MD      Professor of Medicine  [employer]      Executive Medical Director, Solid Organ Transplant Program  [facility]

## 2024-10-21 DIAGNOSIS — H53.40 VISUAL FIELD DEFECT: Primary | ICD-10-CM

## 2024-10-22 ASSESSMENT — ANXIETY QUESTIONNAIRES: GAD7 TOTAL SCORE: 0

## 2024-10-22 ASSESSMENT — PATIENT HEALTH QUESTIONNAIRE - PHQ9: SUM OF ALL RESPONSES TO PHQ QUESTIONS 1-9: 6

## 2024-10-23 ENCOUNTER — OFFICE VISIT (OUTPATIENT)
Dept: OPHTHALMOLOGY | Facility: CLINIC | Age: 69
End: 2024-10-23
Payer: COMMERCIAL

## 2024-10-23 DIAGNOSIS — H47.20 OPTIC ATROPHY: Primary | ICD-10-CM

## 2024-10-23 PROCEDURE — 92083 EXTENDED VISUAL FIELD XM: CPT | Mod: GC | Performed by: OPHTHALMOLOGY

## 2024-10-23 PROCEDURE — 92133 CPTRZD OPH DX IMG PST SGM ON: CPT | Mod: GC | Performed by: OPHTHALMOLOGY

## 2024-10-23 PROCEDURE — 92014 COMPRE OPH EXAM EST PT 1/>: CPT | Mod: GC | Performed by: OPHTHALMOLOGY

## 2024-10-23 ASSESSMENT — CONF VISUAL FIELD
OD_SUPERIOR_NASAL_RESTRICTION: 0
OD_SUPERIOR_TEMPORAL_RESTRICTION: 0
OS_SUPERIOR_TEMPORAL_RESTRICTION: 3
OS_SUPERIOR_NASAL_RESTRICTION: 3
OD_NORMAL: 1
METHOD: COUNTING FINGERS
OD_INFERIOR_TEMPORAL_RESTRICTION: 0
OD_INFERIOR_NASAL_RESTRICTION: 0
OS_INFERIOR_TEMPORAL_RESTRICTION: 1
OS_INFERIOR_NASAL_RESTRICTION: 1

## 2024-10-23 ASSESSMENT — SLIT LAMP EXAM - LIDS
COMMENTS: LID TELANGIECTASIA
COMMENTS: LID TELANGIECTASIA

## 2024-10-23 ASSESSMENT — EXTERNAL EXAM - RIGHT EYE: OD_EXAM: NORMAL

## 2024-10-23 ASSESSMENT — REFRACTION_WEARINGRX
OS_CYLINDER: SPHERE
OD_CYLINDER: SPHERE
OS_SPHERE: PLANO
OD_ADD: +3.00
OD_SPHERE: PLANO
OS_ADD: +3.00
SPECS_TYPE: TRIFOCAL

## 2024-10-23 ASSESSMENT — CUP TO DISC RATIO
OS_RATIO: 0.6
OD_RATIO: 0.2

## 2024-10-23 ASSESSMENT — EXTERNAL EXAM - LEFT EYE: OS_EXAM: NORMAL

## 2024-10-23 ASSESSMENT — TONOMETRY
IOP_METHOD: APPLANATION
OS_IOP_MMHG: 22
OD_IOP_MMHG: 21

## 2024-10-23 ASSESSMENT — VISUAL ACUITY
METHOD: SNELLEN - LINEAR
OD_SC+: -3
OS_SC: 20/70
OS_SC+: -2
CORRECTION_TYPE: GLASSES
OD_SC: 20/20

## 2024-10-23 NOTE — PROGRESS NOTES
Phan Luque is a 69 year old female with the following diagnoses:   1. 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    Since last visit on 10/11/2023 patient reports stable vision. Patient feels reading signs at night time has become harder. Patient still drives and has no concerns regarding it.       Exam:  Visual acuity 20/20 -3 right eye 20/70 -2 left eye.  Color vision 11/11 right eye and 0/11 left eye.  Pupils with LEFT afferent pupillary defect  Intraocular pressure 21 right eye and 22 left eye.  Anterior segment exam normal.  Fundus exam with left optic disc pallor.     Tests ordered and interpreted today:    OCT RNFL 10/23/24:  Right eye: mean thickness 80 from 79  Left eye: mean thickness 36 from 35     GTOP visual fields 10/23/24 :  Right eye:  reliable, mean deviation -0.5 dB, full field    Left eye: moderately reliable, mean deviation -23.9 dB, near complete visual field defect sparing superotemporal visual field     OCT Optic Nerve RNFL Spectralis OU (both eyes)          Performed by: TL   .     Right Eye  Reliability of the test: Good   . Test Findings: Normal   . Average Thickness Value: 80   . Interpretation: Normal   . Plan: Monitor   . Interval: Same   .     Left Eye  Test Findings: Abnormal   . Average Thickness Value: 36   . Interpretation: Diffuse loss   . Plan: Monitor   . Interval: Same   .          Glaucoma Top OU          Performed by: TH   . Patient cooperation: Reliable   .     Notes  Right eye:  reliable, mean deviation -0.5 dB, full field    Left eye: moderately reliable, mean deviation -23.9 dB, near complete visual field defect sparing superotemporal visual field              It is my impression that patient has optic atrophy secondary to non-arteritic anterior ischemic optic neuropathy of both eyes. Testing results are unchanged from prior year. Patient does note slight increase in  difficulty with night vision though does not get in the way of her driving. She has mild posterior capsular opacity (PCO) LEFT eye.  However, given poor vision in the left eye, I will not recommend YAG at this time.           Attending Physician Attestation:  Complete documentation of historical and exam elements from today's encounter can be found in the full encounter summary report (not reduplicated in this progress note).  I personally obtained the chief complaint(s) and history of present illness.  I confirmed and edited as necessary the review of systems, past medical/surgical history, family history, social history, and examination findings as documented by others; and I examined the patient myself.  I personally reviewed the relevant tests, images, and reports as documented above.  I formulated and edited as necessary the assessment and plan and discussed the findings and management plan with the patient and family. I personally reviewed the ophthalmic test(s) associated with this encounter, agree with the interpretation(s) as documented by the resident/fellow, and have edited the corresponding report(s) as necessary.  - Ambrose Robledo MD  PGY-3  Department of Ophthalmology  October 23, 2024 10:42 AM

## 2024-10-23 NOTE — NURSING NOTE
Chief Complaints and History of Present Illnesses   Patient presents with    Optic Atrophy Follow Up     1 year follow up optic atrophy     Chief Complaint(s) and History of Present Illness(es)       Optic Atrophy Follow Up              Associated symptoms: dryness and headache.  Negative for eye pain    Treatments tried: artificial tears    Pain scale: 0/10    Comments: 1 year follow up optic atrophy              Comments    Pt states her night vision has been difficult lately.   Denies eye pain, photophobia or discomfort.   Dryness both eyes, uses AT for relief.   She gets seasonal sinus headaches and migraines.     Cole Santo 10:04 AM October 23, 2024

## 2024-11-01 ENCOUNTER — LAB (OUTPATIENT)
Dept: LAB | Facility: CLINIC | Age: 69
End: 2024-11-01
Payer: COMMERCIAL

## 2024-11-01 DIAGNOSIS — Z94.4 LIVER REPLACED BY TRANSPLANT (H): ICD-10-CM

## 2024-11-01 LAB
ALBUMIN MFR UR ELPH: 8 MG/DL
ALBUMIN SERPL BCG-MCNC: 4.1 G/DL (ref 3.5–5.2)
ALBUMIN UR-MCNC: NEGATIVE MG/DL
ALP SERPL-CCNC: 42 U/L (ref 40–150)
ALT SERPL W P-5'-P-CCNC: 9 U/L (ref 0–50)
ANION GAP SERPL CALCULATED.3IONS-SCNC: 10 MMOL/L (ref 7–15)
APPEARANCE UR: CLEAR
AST SERPL W P-5'-P-CCNC: 19 U/L (ref 0–45)
BILIRUB DIRECT SERPL-MCNC: <0.2 MG/DL (ref 0–0.3)
BILIRUB SERPL-MCNC: 0.5 MG/DL
BILIRUB UR QL STRIP: NEGATIVE
BUN SERPL-MCNC: 16.4 MG/DL (ref 8–23)
CALCIUM SERPL-MCNC: 9.3 MG/DL (ref 8.8–10.4)
CHLORIDE SERPL-SCNC: 98 MMOL/L (ref 98–107)
CHOLEST SERPL-MCNC: 150 MG/DL
COLOR UR AUTO: YELLOW
CREAT SERPL-MCNC: 1.21 MG/DL (ref 0.51–0.95)
CREAT UR-MCNC: 84.9 MG/DL
EGFRCR SERPLBLD CKD-EPI 2021: 49 ML/MIN/1.73M2
ERYTHROCYTE [DISTWIDTH] IN BLOOD BY AUTOMATED COUNT: 13.4 % (ref 10–15)
FASTING STATUS PATIENT QL REPORTED: YES
FASTING STATUS PATIENT QL REPORTED: YES
GLUCOSE SERPL-MCNC: 94 MG/DL (ref 70–99)
GLUCOSE UR STRIP-MCNC: NEGATIVE MG/DL
HCO3 SERPL-SCNC: 28 MMOL/L (ref 22–29)
HCT VFR BLD AUTO: 39.1 % (ref 35–47)
HDLC SERPL-MCNC: 44 MG/DL
HGB BLD-MCNC: 13.1 G/DL (ref 11.7–15.7)
HGB UR QL STRIP: NEGATIVE
KETONES UR STRIP-MCNC: NEGATIVE MG/DL
LDLC SERPL CALC-MCNC: 85 MG/DL
LEUKOCYTE ESTERASE UR QL STRIP: NEGATIVE
MAGNESIUM SERPL-MCNC: 1.9 MG/DL (ref 1.7–2.3)
MCH RBC QN AUTO: 30.8 PG (ref 26.5–33)
MCHC RBC AUTO-ENTMCNC: 33.5 G/DL (ref 31.5–36.5)
MCV RBC AUTO: 92 FL (ref 78–100)
NITRATE UR QL: NEGATIVE
NONHDLC SERPL-MCNC: 106 MG/DL
PH UR STRIP: 7.5 [PH] (ref 5–7)
PHOSPHATE SERPL-MCNC: 4.5 MG/DL (ref 2.5–4.5)
PLATELET # BLD AUTO: 265 10E3/UL (ref 150–450)
POTASSIUM SERPL-SCNC: 4.3 MMOL/L (ref 3.4–5.3)
PROT SERPL-MCNC: 7.1 G/DL (ref 6.4–8.3)
PROT/CREAT 24H UR: 0.09 MG/MG CR (ref 0–0.2)
RBC # BLD AUTO: 4.26 10E6/UL (ref 3.8–5.2)
SODIUM SERPL-SCNC: 136 MMOL/L (ref 135–145)
SP GR UR STRIP: 1.01 (ref 1–1.03)
TACROLIMUS BLD-MCNC: 3.2 UG/L (ref 5–15)
TME LAST DOSE: ABNORMAL H
TME LAST DOSE: ABNORMAL H
TRIGL SERPL-MCNC: 104 MG/DL
UROBILINOGEN UR STRIP-ACNC: 0.2 E.U./DL
WBC # BLD AUTO: 9.9 10E3/UL (ref 4–11)

## 2024-11-01 PROCEDURE — 85027 COMPLETE CBC AUTOMATED: CPT

## 2024-11-01 PROCEDURE — 81003 URINALYSIS AUTO W/O SCOPE: CPT

## 2024-11-01 PROCEDURE — 80197 ASSAY OF TACROLIMUS: CPT

## 2024-11-01 PROCEDURE — 36415 COLL VENOUS BLD VENIPUNCTURE: CPT

## 2024-11-01 PROCEDURE — 82248 BILIRUBIN DIRECT: CPT

## 2024-11-01 PROCEDURE — 84156 ASSAY OF PROTEIN URINE: CPT

## 2024-11-01 PROCEDURE — 84100 ASSAY OF PHOSPHORUS: CPT

## 2024-11-01 PROCEDURE — 83735 ASSAY OF MAGNESIUM: CPT

## 2024-11-01 PROCEDURE — 80061 LIPID PANEL: CPT

## 2024-11-01 PROCEDURE — 80053 COMPREHEN METABOLIC PANEL: CPT

## 2024-11-10 DIAGNOSIS — J45.20 INTERMITTENT ASTHMA WITHOUT COMPLICATION, UNSPECIFIED ASTHMA SEVERITY: ICD-10-CM

## 2024-11-11 ENCOUNTER — OFFICE VISIT (OUTPATIENT)
Dept: DERMATOLOGY | Facility: CLINIC | Age: 69
End: 2024-11-11
Payer: COMMERCIAL

## 2024-11-11 DIAGNOSIS — R19.03 ABDOMINAL MASS, RIGHT LOWER QUADRANT: Primary | ICD-10-CM

## 2024-11-11 DIAGNOSIS — L90.5 SCAR OF ABDOMINAL SKIN: ICD-10-CM

## 2024-11-11 DIAGNOSIS — Z98.890 HISTORY OF SURGERY: ICD-10-CM

## 2024-11-11 ASSESSMENT — PAIN SCALES - GENERAL: PAINLEVEL_OUTOF10: NO PAIN (0)

## 2024-11-11 NOTE — PROGRESS NOTES
[facility] Dermatology Note  Encounter Date: November 11, 2024  Prior Visit: August 29, 2024 (Ever Pierce)     Dermatology Problem List:  1. History of liver transplant at [facility], 2016 - currently on tacrolimus   2. Flexural eczema - trimcinolone 0.1% ointment BID to popliteal fossa bilaterally PRN  3. AK s/p cryo  4. Psoriasis with arthritis.  - Follows with rheum  - Topicals: clobetasol solution, shampoo  - Nail dystrophy, likely psoriasis- grew candida.   5. Facial asymmetry - cosmetic referral.   6. Family hx psoriasis (son)  7. Family history of melanoma (mother)     ____________________________________________     Assessment & Plan:     # Lower abdominal mass  Reviewed likely differential diagnosis given location, patient history and physical exam findings.  Minimal palpable mass appreciated on exam today.  Location is in lower abdomen at juncture of groin and abdominal muscles.  No distinct, mobile or subcutaneous mass appreciated.  The location is not appreciably near lymph node basin.  She has a significant history of an abdominal wall surgery.  I favor she may have felt a hernia that has since reduced, although cannot be entirely certain given lack of exam findings today.    -Discussed continued patient monitoring follow-up and appreciation of site.   -Should the mass reappear, worsen or persist, discussed follow-up with primary care provider or abdominal surgeon.  Or more urgent evaluation in ED or urgent care if in pain  -No mass or discrete lesion superficial enough for biopsy evaluation today.  -Reviewed worrisome features of hernia progression including worsening pain.     # History of organ transplant, reviewed  # Family history of melanoma, mother    - Patient aware that she is at higher risk for cutaneous malignancy given history of transplant.   - Monitor for changing or symptomatic lesions.   - Continue frequent skin checks and photoprotection.    RTC one year for FBSE with   Stan, or yolanda TURNER.      Sydni Vazquez MD PhD  Dermatology, Dermatopathology     _____________________    CC: Area of concern in right lower abdomen     HPI:  Ms. Phan Luque is a(n) 68 year old female who presents today as a return patient for spotcheck for area of concern/bulging right lower abdomen.    She has a history with the dermatology clinic and sees Dr. Michael Pierce for total-body skin exam, psoriasis and seborrheic dermatitis.    She noticed a small soft mass in the right lower abdomen.  Not present on the left.  In reviewing online sources she is concerned about a lipoma or a cyst.  She has had a cyst in the past which ruptured, but this feels different.  She notes that the sensation and mass is somewhat positional.  It is not a discrete lesion.  It is not fixed or firm (as in a lymph node).    She has had prior abdominal surgeries but no personal history of hernia.     Patient is otherwise feeling well, without additional skin concerns.       Physical Exam:  SKIN: A focus skin exam of the lower abdomen, upper groin, and legs were performed.    Her bilateral lower abdomen demonstrates soft, somewhat redundant deep tissues.  No discrete mass was identified.  Symmetric attributes of the inguinal region were appreciated.  No fixed, firm nodules noted.  No surface change of scale or erythema of the overlying skin.   Right lower quadrant linear scar appreciated (at site of prior appendectomy per patient report.)  Dark black macule on left lateral ankle (tattoo)

## 2024-11-11 NOTE — NURSING NOTE
Dermatology Rooming Note    Phan Luque's goals for this visit include:   Chief Complaint   Patient presents with    Derm Problem     Growth on right groin area that appeared a couple of weeks ago      Benito SAAVEDRA CMA

## 2024-11-11 NOTE — LETTER
11/11/2024       RE: Phan Luque  [address]  [address]  [place] [address]     Dear Colleague,    Thank you for referring your patient, Phan Luque, to the [facility] [place] at [facility]. Please see a copy of my visit note below.    [facility] Dermatology Note  Encounter Date: November 11, 2024  Prior Visit: August 29, 2024 (Ever Pierce)     Dermatology Problem List:  1. History of liver transplant at [facility], 2016 - currently on tacrolimus   2. Flexural eczema - trimcinolone 0.1% ointment BID to popliteal fossa bilaterally PRN  3. AK s/p cryo  4. Psoriasis with arthritis.  - Follows with rheum  - Topicals: clobetasol solution, shampoo  - Nail dystrophy, likely psoriasis- grew candida.   5. Facial asymmetry - cosmetic referral.   6. Family hx psoriasis (son)  7. Family history of melanoma (mother)     ____________________________________________     Assessment & Plan:     # Lower abdominal mass  Reviewed likely differential diagnosis given location, patient history and physical exam findings.  Minimal palpable mass appreciated on exam today.  Location is in lower abdomen at juncture of groin and abdominal muscles.  No distinct, mobile or subcutaneous mass appreciated.  The location is not appreciably near lymph node basin.  She has a significant history of an abdominal wall surgery.  I favor she may have felt a hernia that has since reduced, although cannot be entirely certain given lack of exam findings today.    -Discussed continued patient monitoring follow-up and appreciation of site.   -Should the mass reappear, worsen or persist, discussed follow-up with primary care provider or abdominal surgeon.  Or more urgent evaluation in ED or urgent care if in pain  -No mass or discrete lesion superficial enough for biopsy evaluation today.  -Reviewed worrisome features of hernia progression including worsening  pain.     # History of organ transplant, reviewed  # Family history of melanoma, mother    - Patient aware that she is at higher risk for cutaneous malignancy given history of transplant.   - Monitor for changing or symptomatic lesions.   - Continue frequent skin checks and photoprotection.    RTC one year for FBSE with Dr. Pierce, or sooner PRN.      Sydni Vazquez MD PhD  Dermatology, Dermatopathology     _____________________    CC: Area of concern in right lower abdomen     HPI:  Ms. Phna Luque is a(n) 68 year old female who presents today as a return patient for spotcheck for area of concern/bulging right lower abdomen.    She has a history with the dermatology clinic and sees Dr. Michael Pierce for total-body skin exam, psoriasis and seborrheic dermatitis.    She noticed a small soft mass in the right lower abdomen.  Not present on the left.  In reviewing online sources she is concerned about a lipoma or a cyst.  She has had a cyst in the past which ruptured, but this feels different.  She notes that the sensation and mass is somewhat positional.  It is not a discrete lesion.  It is not fixed or firm (as in a lymph node).    She has had prior abdominal surgeries but no personal history of hernia.     Patient is otherwise feeling well, without additional skin concerns.       Physical Exam:  SKIN: A focus skin exam of the lower abdomen, upper groin, and legs were performed.    Her bilateral lower abdomen demonstrates soft, somewhat redundant deep tissues.  No discrete mass was identified.  Symmetric attributes of the inguinal region were appreciated.  No fixed, firm nodules noted.  No surface change of scale or erythema of the overlying skin.   Right lower quadrant linear scar appreciated (at site of prior appendectomy per patient report.)  Dark black macule on left lateral ankle (tattoo)      Again, thank you for allowing me to participate in the care of your patient.      Sincerely,    Sydni Vazquez  MD

## 2024-11-12 NOTE — PATIENT INSTRUCTIONS
Should the problem/mast recur or persist, we would recommend additional evaluation with your primary care doctor or surgeon.  Given the location and depth, we considered the possibility of a hernia.  There is nothing on the surface of the skin to biopsy today that could assist in evaluation of the problem.       Should you have worsening pain or any severe symptoms of pain, fever, vomiting, GI upset we will recommend your present urgently to emergency room to evaluate the area.      Please reach out should you have any additional questions or concerns!  We will forward to seeing you on your next total-body skin exam, or sooner if you have any questions or changing lesions.

## 2024-11-13 ENCOUNTER — ANCILLARY PROCEDURE (OUTPATIENT)
Dept: ULTRASOUND IMAGING | Facility: CLINIC | Age: 69
End: 2024-11-13
Payer: COMMERCIAL

## 2024-11-13 ENCOUNTER — OFFICE VISIT (OUTPATIENT)
Dept: INTERNAL MEDICINE | Facility: CLINIC | Age: 69
End: 2024-11-13
Payer: COMMERCIAL

## 2024-11-13 VITALS
TEMPERATURE: 97.8 F | WEIGHT: 141.6 LBS | DIASTOLIC BLOOD PRESSURE: 82 MMHG | OXYGEN SATURATION: 94 % | HEART RATE: 80 BPM | RESPIRATION RATE: 16 BRPM | BODY MASS INDEX: 22.76 KG/M2 | SYSTOLIC BLOOD PRESSURE: 128 MMHG | HEIGHT: 66 IN

## 2024-11-13 DIAGNOSIS — R19.03 RIGHT LOWER QUADRANT ABDOMINAL MASS: ICD-10-CM

## 2024-11-13 DIAGNOSIS — R19.03 RIGHT LOWER QUADRANT ABDOMINAL MASS: Primary | ICD-10-CM

## 2024-11-13 PROCEDURE — 76705 ECHO EXAM OF ABDOMEN: CPT | Mod: TC | Performed by: RADIOLOGY

## 2024-11-13 RX ORDER — ALBUTEROL SULFATE 90 UG/1
AEROSOL, METERED RESPIRATORY (INHALATION)
Qty: 18 G | Refills: 5 | Status: SHIPPED | OUTPATIENT
Start: 2024-11-13

## 2024-11-13 NOTE — PROGRESS NOTES
"  Assessment & Plan     Right lower quadrant abdominal mass  Slightly palpable bulge when standing. Will evaluate for hernia, if present will refer to general surgery for discussion of management.   - US Hernia Evaluation    No follow-ups on file.    Kena Lopez is a 69 year old, presenting for the following health issues:  Consult (Mass in bikini area on right side, derm thinks potentially a hernia)      11/13/2024     8:00 AM   Additional Questions   Roomed by [facility] EMT     History of Present Illness       Reason for visit:  Mass on abdomen  Symptom onset:  More than a month  Symptoms include:  Mass  Symptom intensity:  Mild  Symptom progression:  Worsening  Had these symptoms before:  No  What makes it worse:  Mass is smaller or absent when lying flat  What makes it better:  No   She is taking medications regularly.     Phan Luque presents to the clinic today for evaluation of an abdominal mass. Mass has been present for \"a while\" at least a few months. No pain, no erythema, no changes to bowel movements (has history of IBS, stable for her). Mass will come and go. She saw dermatology yesterday who recommended evaluation for a possible hernia.      Review of Systems  Constitutional, HEENT, cardiovascular, pulmonary, gi and gu systems are negative, except as otherwise noted.      Objective    /82 (BP Location: Right arm, Patient Position: Sitting, Cuff Size: Adult Regular)   Pulse 80   Temp 97.8  F (36.6  C) (Oral)   Resp 16   Ht 1.676 m (5' 6\")   Wt 64.2 kg (141 lb 9.6 oz)   LMP  (LMP Unknown)   SpO2 94%   BMI 22.85 kg/m    Body mass index is 22.85 kg/m .  Physical Exam   GENERAL: alert and no distress  ABDOMEN: slight RLQ deep abdominal bulge when standing, not palpable when lying flat, no tenderness with palpation, no erythema  PSYCH: mentation appears normal, affect normal/bright          Signed Electronically by: Lulú Cardenas NP    "

## 2024-11-15 ENCOUNTER — MYC MEDICAL ADVICE (OUTPATIENT)
Dept: INTERNAL MEDICINE | Facility: CLINIC | Age: 69
End: 2024-11-15
Payer: COMMERCIAL

## 2024-11-15 DIAGNOSIS — K40.90 NON-RECURRENT UNILATERAL INGUINAL HERNIA WITHOUT OBSTRUCTION OR GANGRENE: Primary | ICD-10-CM

## 2024-11-18 NOTE — TELEPHONE ENCOUNTER
REFERRAL INFORMATION:  Referring Provider:  Lulú Cardenas NP   Referring Clinic:  [facility]   Reason for Visit/Diagnosis: K40.90 (ICD-10-CM) - Non-recurrent unilateral inguinal hernia without obstruction or gangrene        FUTURE VISIT INFORMATION:  Appointment Date: 11/21/24  Appointment Time:      NOTES RECORD STATUS  DETAILS   OFFICE NOTE from Referring Provider Internal 11/13/24   OFFICE NOTE from Other Specialists Internal 11/11/24-Dr. Vazquez-Dermatology   OPERATIVE REPORT Internal 8/25/16-Liver Transplant    ENDOSCOPY (EGD)  Internal 10/31/16  8/17/16  7/28/16   PERTINENT LABS Internal    IMAGING (CT, MRI, US, XR)  Internal 11/13/24-US hernia Eval     Records Requested    Facility    Fax:    Outcome

## 2024-11-21 ENCOUNTER — OFFICE VISIT (OUTPATIENT)
Dept: SURGERY | Facility: CLINIC | Age: 69
End: 2024-11-21
Payer: COMMERCIAL

## 2024-11-21 ENCOUNTER — PATIENT OUTREACH (OUTPATIENT)
Dept: SURGERY | Facility: CLINIC | Age: 69
End: 2024-11-21

## 2024-11-21 VITALS
DIASTOLIC BLOOD PRESSURE: 77 MMHG | WEIGHT: 142.7 LBS | SYSTOLIC BLOOD PRESSURE: 135 MMHG | HEART RATE: 77 BPM | BODY MASS INDEX: 22.93 KG/M2 | HEIGHT: 66 IN | OXYGEN SATURATION: 98 %

## 2024-11-21 DIAGNOSIS — K40.90 RIGHT INGUINAL HERNIA: Primary | ICD-10-CM

## 2024-11-21 RX ORDER — CEFAZOLIN SODIUM 2 G/50ML
2 SOLUTION INTRAVENOUS SEE ADMIN INSTRUCTIONS
OUTPATIENT
Start: 2024-11-21

## 2024-11-21 RX ORDER — CEFAZOLIN SODIUM 2 G/50ML
2 SOLUTION INTRAVENOUS
OUTPATIENT
Start: 2024-11-21

## 2024-11-21 ASSESSMENT — PAIN SCALES - GENERAL: PAINLEVEL_OUTOF10: NO PAIN (0)

## 2024-11-21 NOTE — LETTER
11/21/2024       RE: Phan Luque  [address]  [address]  [place] [address]     Dear Colleague,    Thank you for referring your patient, Phan Luque, to the [facility] [place] at [facility]. Please see a copy of my visit note below.    Phan Luque is a 69 year old female with a 2 week history of a right inguinal mass with the following symptoms of lump.    Onset did not occur with lifting.  Obstructive symptoms:  no  Urinary difficulties:  no  Chronic cough: no  Current level of activity:  works from home.    Past medical and surgical history, medications, allergies, family history, and social history were reviewed with the patient.  Past Medical History:   Diagnosis Date     AION (acute ischemic optic neuropathy)      Arthritis 2003    chronic UTO     Asthma      Bacterial infection 02/04/2021     Candida infection 11/11/2020     Cellulitis 09/13/2021     Cervical spinal stenosis      Chronic kidney disease      Chronic kidney disease, stage 3 (H)      Depressive disorder July, 2016     Dizziness and giddiness     Created by Conversion      End stage liver disease (H)     2/2 Alcohol Abuse     End stage liver disease (H)     Alcohol-related     GERD (gastroesophageal reflux disease)      History of blood transfusion 2016    related to transplant     Hypertension      Liver transplant recipient (H) 08/25/2016    [facility]     Liver transplanted (H)      Migraine headache      Migraines      Osteoporosis     frax 11/1.7% 2021     PFO (patent foramen ovale) 08/08/2016    Noted on echo at [facility]     Recurrent UTI      Spinal stenosis in cervical region      Strabismus      Thyroid disease 2016    hypothyroidism     Unspecified asthma(493.90)     Created by Conversion      Past Surgical History:   Procedure Laterality Date     APPENDECTOMY      1962     APPENDECTOMY       BENCH LIVER N/A  2016    Procedure: BENCH LIVER;  Surgeon: Larry Dhillon MD;  Location: [facility] OR     BIOPSY       CATARACT IOL, RT/LT       COLONOSCOPY       COLONOSCOPY N/A 2021    Procedure: COLONOSCOPY, WITH POLYPECTOMY;  Surgeon: Arlyn Beckford MD;  Location: [facility] OR     COLONOSCOPY N/A 11/15/2023    Procedure: Colonoscopy;  Surgeon: Leventhal, Thomas Michael, MD;  Location: [facility] OR     ESOPHAGOSCOPY, GASTROSCOPY, DUODENOSCOPY (EGD), COMBINED N/A 2016    Procedure: COMBINED ESOPHAGOSCOPY, GASTROSCOPY, DUODENOSCOPY (EGD);  Surgeon: Tao Alfonso MD;  Location: [facility] GI     ESOPHAGOSCOPY, GASTROSCOPY, DUODENOSCOPY (EGD), COMBINED N/A 10/31/2016    Procedure: COMBINED ESOPHAGOSCOPY, GASTROSCOPY, DUODENOSCOPY (EGD), BIOPSY SINGLE OR MULTIPLE;  Surgeon: Ronald Bojorquez MD;  Location: [facility] GI     LIVER TRANSPLANTATION  2016    [facility]     ORTHOPEDIC SURGERY  2005    right trapeziectomy     RECTAL SURGERY       sphincteroplasty       TRANSPLANT LIVER RECIPIENT  DONOR N/A 2016    Procedure: TRANSPLANT LIVER RECIPIENT  DONOR;  Surgeon: Larry Dhillon MD;  Location: [facility] OR     TUBAL LIGATION      laparoscopic     TUBAL LIGATION       ROS: 10 point review of systems negative except noted in HPI  PHYSICAL EXAM  General appearance- healthy, alert, and in no distress.  Skin- Skin color and turgor normal.  No obvious rashes.  Lungs- Respiratory effort unlabored.  Gait- Normal.  Abdomen - soft non distended, non tender .  [facility]  Impression:  Inguinal hernia, right  Recommendation:  Right Inguinal Hernioplasty open mesh repair.    A full discussion regarding the alternatives, risks, goals, and potential complications for this surgery was completed today.  The patient understood I would use non recalled mesh and  that the potential problems included but are not limited to:  Infection, bleeding, hematoma, seroma, recurrence, injury to nerve/muscle or involved  testicle(if male), ischemic orchitis(if male), and chronic pain.    The patient verbally expressed understanding, was given the opportunity for questions, and gives full informed consent for the procedure.      The total time spent with this patient was 30 minutes.  The total time was spent on the date of encounter doing chart review, history and physical, documentation, patient education, and any further activity as noted above.              Again, thank you for allowing me to participate in the care of your patient.      Sincerely,    Chemo Elaine MD

## 2024-11-21 NOTE — PROGRESS NOTES
Phan Luque is a 69 year old female with a 2 week history of a right inguinal mass with the following symptoms of lump.    Onset did not occur with lifting.  Obstructive symptoms:  no  Urinary difficulties:  no  Chronic cough: no  Current level of activity:  works from home.    Past medical and surgical history, medications, allergies, family history, and social history were reviewed with the patient.  Past Medical History:   Diagnosis Date    AION (acute ischemic optic neuropathy)     Arthritis 2003    chronic UTO    Asthma     Bacterial infection 02/04/2021    Candida infection 11/11/2020    Cellulitis 09/13/2021    Cervical spinal stenosis     Chronic kidney disease     Chronic kidney disease, stage 3 (H)     Depressive disorder July, 2016    Dizziness and giddiness     Created by Conversion     End stage liver disease (H)     2/2 Alcohol Abuse    End stage liver disease (H)     Alcohol-related    GERD (gastroesophageal reflux disease)     History of blood transfusion 2016    related to transplant    Hypertension     Liver transplant recipient (H) 08/25/2016    [facility]    Liver transplanted (H)     Migraine headache     Migraines     Osteoporosis     frax 11/1.7% 2021    PFO (patent foramen ovale) 08/08/2016    Noted on echo at [facility]    Recurrent UTI     Spinal stenosis in cervical region     Strabismus     Thyroid disease 2016    hypothyroidism    Unspecified asthma(493.90)     Created by Conversion      Past Surgical History:   Procedure Laterality Date    APPENDECTOMY      1962    APPENDECTOMY      BENCH LIVER N/A 08/25/2016    Procedure: BENCH LIVER;  Surgeon: Larry Dhillon MD;  Location: [facility] OR    BIOPSY      CATARACT IOL, RT/LT      COLONOSCOPY      COLONOSCOPY N/A 08/12/2021    Procedure: COLONOSCOPY, WITH POLYPECTOMY;  Surgeon: Arlyn Beckford MD;  Location: [facility] OR    COLONOSCOPY N/A 11/15/2023    Procedure: Colonoscopy;  Surgeon: Leventhal, Thomas  MD Ambrose;  Location: [facility] OR    ESOPHAGOSCOPY, GASTROSCOPY, DUODENOSCOPY (EGD), COMBINED N/A 2016    Procedure: COMBINED ESOPHAGOSCOPY, GASTROSCOPY, DUODENOSCOPY (EGD);  Surgeon: Tao Alfonso MD;  Location:  GI    ESOPHAGOSCOPY, GASTROSCOPY, DUODENOSCOPY (EGD), COMBINED N/A 10/31/2016    Procedure: COMBINED ESOPHAGOSCOPY, GASTROSCOPY, DUODENOSCOPY (EGD), BIOPSY SINGLE OR MULTIPLE;  Surgeon: Ronald Bojorquez MD;  Location:  GI    LIVER TRANSPLANTATION  2016    [facility]    ORTHOPEDIC SURGERY  2005    right trapeziectomy    RECTAL SURGERY      sphincteroplasty      TRANSPLANT LIVER RECIPIENT  DONOR N/A 2016    Procedure: TRANSPLANT LIVER RECIPIENT  DONOR;  Surgeon: Larry Dhillon MD;  Location:  OR    TUBAL LIGATION      laparoscopic    TUBAL LIGATION       ROS: 10 point review of systems negative except noted in HPI  PHYSICAL EXAM  General appearance- healthy, alert, and in no distress.  Skin- Skin color and turgor normal.  No obvious rashes.  Lungs- Respiratory effort unlabored.  Gait- Normal.  Abdomen - soft non distended, non tender .  [facility]  Impression:  Inguinal hernia, right  Recommendation:  Right Inguinal Hernioplasty open mesh repair.    A full discussion regarding the alternatives, risks, goals, and potential complications for this surgery was completed today.  The patient understood I would use non recalled mesh and  that the potential problems included but are not limited to:  Infection, bleeding, hematoma, seroma, recurrence, injury to nerve/muscle or involved testicle(if male), ischemic orchitis(if male), and chronic pain.    The patient verbally expressed understanding, was given the opportunity for questions, and gives full informed consent for the procedure.      The total time spent with this patient was 30 minutes.  The total time was spent on the date of encounter doing chart review, history and physical, documentation,  patient education, and any further activity as noted above.

## 2024-11-21 NOTE — NURSING NOTE
Pre and Post op Patient Education/Teaching Flowsheet  Relevant Diagnosis:  Inguinal Hernia (K40.90)  Teaching Topic:  Pre and post op teaching  Person(s) Involved in teaching:  Patient     Motivation Level:  Asks Questions:  Yes  Eager to Learn:  Yes  Cooperative:  Yes  Receptive (willing/able to accept information):  Yes  Any cultural factors/B[employer] beliefs that may influence understanding or compliance?  No    Patient/caregiver/family demonstrates understanding of the following:  Reason for the appointment, diagnosis, and treatment plan:  Yes  Patient demonstrates understanding of the following:  Pre-op bowel prep:  No  Post-op pain management recommendations (medications, ice compress, binder/athletic supporter (if applicable), etc.:  Yes  Inguinal hernia patients:  Post-op urinary retention- discussed signs/symptoms and visit to ER for Talbert catheter placement and to stay in place for at least 48 hours:  NA  Restrictions:  Yes  Medications to take the day of surgery:  Per PCP  Blood thinner medications discussed and when to stop (if applicable):  Yes  Wound care:  Yes  Diabetes medication management (if applicable):  Per PCP  Which situations necessitate calling provider and whom to contact:  Discussed how to contact the hospital, nurse, and clinic scheduling staff if necessary    Infection Prevention: Patient demonstrates understanding of the following:  Patient instructed on hand hygiene:  Yes  Surgical procedure site care will be taught and will be reviewed at the time of discharge  Signs and symptoms of infection taught:  Yes  Wound care reviewed and will be taught at the time of discharge  Central venous catheter care will be taught at the time of discharge (if applicable)    Post-op follow-up:  Instructional materials used/given/mailed:  Surgical logistics, post op teaching sheet, and surgical scrub    Logistics:  Date and time of surgery:  TBD  Location of surgery: [facility]- 5th Floor  History and Physical and any other testing necessary prior to surgery:  Yes  Required time line for completion of History and Physical and any pre-op testing:  Yes  Discuss need for someone to drive patient home and stay with them for 24 hours:  Yes  Pre-op showering/scrub information with Surgical Scrub:  Yes  NPO Guidelines:  NPO per Anesthesia Guidelines

## 2024-11-21 NOTE — NURSING NOTE
"Chief Complaint   Patient presents with    New Patient     Unilateral inguinal hernia       Vitals:    11/21/24 1403   BP: 135/77   BP Location: Left arm   Patient Position: Sitting   Cuff Size: Adult Regular   Pulse: 77   SpO2: 98%   Weight: 64.7 kg (142 lb 11.2 oz)   Height: 1.676 m (5' 6\")       Body mass index is 23.03 kg/m .                          Shawn Toussaint, EMT    "

## 2024-11-21 NOTE — PATIENT INSTRUCTIONS
You met with Dr. Chemo Elaine.      Today's visit instructions:    Surgery Scheduling  Currently, there is a nationwide shortage of IV Fluids that is preventing us from scheduling any elective surgeries. We will call you to schedule as soon as this situation has been improved.      Reminder:  Surgery Requirements  Your surgery will be at [facility]- 5th Floor.  You will need to arrive 1.5  hours early.  You will need someone to drive you home (over 18 years old) and stay with you for 24 hours after the procedure.  You will need a preop physical with [facility] ([facility]) within 30 days of surgery- closer is always better.  Stop any blood thinners, vitamins, minerals, or herbal supplements 5 days before surgery.  If you are taking a prescribed blood thinner or weight loss medication please let us know for specific instructions. Do not take Wegovy for 7 days prior to surgery.  Fasting- a nurse from [facility] will call you 1-2 days before surgery to confirm your procedure and tell you when to stop eating and drinking. If you had you preoperative physical with the Anesthesia Team/PAC, you do not get this call as it is discussed at the time of your visit.  Wash with the soap given/mailed from the clinic the night before surgery and morning of surgery. See instructions in the Surgery Packet.  If you would like a procedure estimate please call Cost of Care at [phone] during the hours of 8 AM - 3 PM (option #2 for on-line request and option #3 for a representative).     If you have questions please contact Yenny ROGER or Krystyna ROGER during regular clinic hours, Monday through Friday 7:30 AM - 4:00 PM, or you can contact us via [employer] at anytime.       If you have urgent needs after-hours, weekends, or holidays please call the hospital at [phone] and ask to speak with our on-call General Surgery Team.    Appointment [phone]  Nurse Advice (Yenny or  Krystyna): [phone]   Surgery Scheduler (Kim): [phone]  Fax: [phone]    After Your Open Inguinal Hernia Repair         Incision care   You may take a shower the day after surgery. Carefully wash your incision with soap and water. Do not submerge yourself in water (bath, whirlpool, hot tub, pool, lake) for 14 days after surgery.   Remove the bandage 1-2 days after surgery but leave the medical tape (Steri-Strips) or glue in place. These will loosen and fall off on their own 1-2 weeks after surgery.    Always wash your hands before touching your incisions or removing bandages.   It is not unusual to form a collection of fluid or blood under your incision that may feel firm or squishy- it can take several weeks to months for your body to reabsorb it.  At times, it may even drain.  If that should happen keep the area clean with soap, water,  and cover with a clean gauze dressing. You can change this daily or as needed.  It is not unusual to develop swelling and/or bruising of the scrotum and penis and it can take several weeks or a month to improve.      Other medicines   Wait to start aspirin or blood thinners until the day after surgery. You can take any other regular medicines at your normal time the day after surgery.   Your pain medicine may cause constipation (hard, dry stools). To help with this, take the stool softener your doctor gave you or an over-the-counter stool softener or laxative. You can stop taking this when you are no longer taking pain medicine and your bowel movements are back to normal.      For pain or discomfort   Take the narcotic pain medicine your doctor gave you as needed and as instructed on the bottle. If you prefer to use over-the-counter medication, use acetaminophen (Tylenol) or ibuprofen (Advil, Motrin) as instructed on the box. Do not take Tylenol if it is in your narcotic pain medication.    Use an ice pack on your groin for 20 minutes at a time as needed for the  first 24 hours. Be sure to protect your skin by putting a cloth between the ice pack and your skin.   After 24 hours you can switch to heat for 20 minutes as needed. Be sure to protect your skin by putting a cloth between the heat pack and your skin.    It is normal to feel a lump in your groin after surgery. This lump may take up to 8 weeks to go away.      Activities   No driving until you feel it s safe to do so. Don t drive while taking narcotic pain medicine.   Don t lift anything heavier than 20 pounds for 3 weeks after surgery.      Special equipment   Male Patients:  We recommend that you wear scrotal support for the first 3 days after surgery; however, you can wear it longer if you wish.  We suggest using an athletic supporter (Jock Strap), snug briefs, or Spandex biker shorts.     Diet   You can eat your regular meals after surgery.     Dental Care  Please avoid dental care for two weeks prior to hernia repair and for 6 weeks after surgery due to the mesh that may be placed.     When to call the doctor   Call your doctor if you have:   A fever above 101 F (38.3 C) (taken under the tongue), or a fever or chills lasting more than a day.   Redness at the incision site.   Any fluid or blood draining from the incision, especially if it smells bad.    Severe pain that doesn t improve with pain medicine.      We will call you 2 to 4 days after surgery to review this handout, answer questions and help arrange after-surgery care. If you have questions or concerns, please call [phone] during regular office hours. If you need to call after business hours, call [phone] and ask to page the surgeon on-call.

## 2024-11-25 ENCOUNTER — TELEPHONE (OUTPATIENT)
Dept: SURGERY | Facility: CLINIC | Age: 69
End: 2024-11-25
Payer: COMMERCIAL

## 2024-11-25 PROBLEM — K40.90 RIGHT INGUINAL HERNIA: Status: ACTIVE | Noted: 2024-11-21

## 2024-11-25 NOTE — TELEPHONE ENCOUNTER
FUTURE VISIT INFORMATION      SURGERY INFORMATION:  Date: 12/6/24  [facility]  Surgeon:  Chemo Elaine MD   Anesthesia Type:  MAC with Local  Procedure: HERNIORRHAPHY, INGUINAL, OPEN   Consult: ov 11/21/24    RECORDS REQUESTED FROM:       Primary Care Provider: Arlyn Sanchez MD - [facility]

## 2024-11-25 NOTE — TELEPHONE ENCOUNTER
Called patient to schedule surgery with Dr. Elaine    Spoke with: Jessica    Date of Surgery: 12/6/2024    Approximate arrival time given:  No    Location of surgery: [facility]     Pre-Op H&P: PAC on 12/3/47067 at 2:15pm via video    Post-Op Appt Date: n/a     Imaging needed:  No    Discussed with patient PAC RN will provide arrival time and instructions for surgery at the time of the appointment: [[place] locations only]: Yes    Packet sent out: Yes 11/25/24  via [employer]      Additional Comments:  n/a    All patients questions were answered and was instructed to review surgical packet and call back with any questions or concerns.       Kim Salgado on 11/25/2024 at 9:49 AM

## 2024-12-02 DIAGNOSIS — Z94.4 LIVER REPLACED BY TRANSPLANT (H): Primary | ICD-10-CM

## 2024-12-03 ENCOUNTER — VIRTUAL VISIT (OUTPATIENT)
Dept: SURGERY | Facility: CLINIC | Age: 69
End: 2024-12-03
Payer: COMMERCIAL

## 2024-12-03 ENCOUNTER — PRE VISIT (OUTPATIENT)
Dept: SURGERY | Facility: CLINIC | Age: 69
End: 2024-12-03

## 2024-12-03 VITALS — HEIGHT: 66 IN | BODY MASS INDEX: 21.92 KG/M2 | WEIGHT: 136.4 LBS

## 2024-12-03 DIAGNOSIS — Z01.818 PRE-OPERATIVE EXAMINATION: Primary | ICD-10-CM

## 2024-12-03 DIAGNOSIS — K40.90 INGUINAL HERNIA WITHOUT OBSTRUCTION OR GANGRENE, RECURRENCE NOT SPECIFIED, UNSPECIFIED LATERALITY: ICD-10-CM

## 2024-12-03 DIAGNOSIS — K40.90 RIGHT INGUINAL HERNIA: ICD-10-CM

## 2024-12-03 PROCEDURE — 99203 OFFICE O/P NEW LOW 30 MIN: CPT | Mod: 95 | Performed by: PHYSICIAN ASSISTANT

## 2024-12-03 ASSESSMENT — ENCOUNTER SYMPTOMS: SEIZURES: 0

## 2024-12-03 ASSESSMENT — PAIN SCALES - GENERAL: PAINLEVEL_OUTOF10: NO PAIN (0)

## 2024-12-03 NOTE — PROGRESS NOTES
Jessica is a 69 year old who is being evaluated via a billable video visit.    How would you like to obtain your AVS? MyChart  If the video visit is dropped, the invitation should be resent by: Text to cell phone: [phone]    Subjective   Jessica is a 69 year old, presenting for the following health issues:  Pre-Op Exam    HPI         Physical Exam

## 2024-12-03 NOTE — PATIENT INSTRUCTIONS
Preparing for Your Surgery      Name:  Phan Lquue   MRN:  [id number]   :  1955   Today's Date:  12/3/2024         Arriving for surgery:  Surgery date:  24  Arrival time:  5:45 am    Restrictions due to COVID 19:    Please maintain social distance.  Masking is optional.      parking is available for anyone with mobility limitations or disabilities. (Monday- Friday 7 am- 5 pm)    Please come to:    [facility]  [address] [place] [id number]    Please check in on the 5th floor at the Ambulatory Surgery Center.      What can I eat or drink?    -  You may eat and drink normally until 8 hours prior to arrival  time. (Until 9:45 pm 24)  -  You may have clear liquids until 2 hours prior to arrival  time. (Until 3:45 am)    Examples of clear liquids:  Water  Clear broth  Juices (apple, white grape, white cranberry  and cider) without pulp  Noncarbonated, powder based beverages  (lemonade and Chase-Aid)  Sodas (Sprite, 7-Up, ginger ale and seltzer)  Coffee or tea (without milk or cream)  Gatorade      Which medicines can I take?    Hold Aspirin for 7 days before surgery.   Hold Multivitamins for 7 days before surgery. Reports Multivitamin is on hold.  Hold Supplements for 7 days before surgery. Reports supplements are on hold.  Hold Ibuprofen (Advil, Motrin) for 1 day before surgery--unless otherwise directed by surgeon.  Hold Naproxen (Aleve) for 4 days before surgery.  Acetaminophen (Tylenol) is okay to take if needed.    No alcohol or cannabis products for 24 hours prior to procedure.    Hold Wegovy for 7 days prior to surgery. Reported taken on . Reports last dose of Wegovy was 24.    -  DO NOT take the following medications the day of surgery:  Chlorthalidone (Hygroten)  Psyllium      -  PLEASE TAKE the following medications the day of surgery:   Amlodipine (Norvasc)  Levothyroxine (Synthroid)  Pantoprazole (Protonix)  Pravastatin  (Pravachol)  Tacrolimus  Ursodiol (Actigall)  Acetaminophen (Tylenol) if needed  Albuterol inhaler if needed    Bring Albuterol inhaler.    How do I prepare myself?  - Please take 2 showers (one the night prior to surgery and one the morning of surgery) using Scrubcare or Hibiclens soap.    Use this soap only from the neck to your toes. Avoid genital area      Leave the soap on your skin for one minute--then rinse thoroughly.      You may use your own shampoo and conditioner. No other hair products.   - Please remove all jewelry and body piercings.  - No lotions, deodorants or fragrance.  - No makeup or fingernail polish.   - Bring your ID and insurance card.    -If you have a Deep Brain Stimulator, a Spinal Cord Stimulator, or any implanted Neuro Device, you must bring the remote to the Surgery Center.         ALL PATIENTS ARE REQUIRED TO HAVE A RESPONSIBLE ADULT TO DRIVE AND BE IN ATTENDANCE WITH THEM FOR 24 HOURS FOLLOWING SURGERY.     Covid testing policy as of 12/06/2022  Your surgeon will notify and schedule you for a COVID test if one is needed before surgery--please direct any questions or COVID symptoms to your surgeon      Questions or Concerns:    -For questions regarding the day of surgery, please contact the Ambulatory Surgery Center at [phone].    -If you have health changes between today and your surgery, please contact your surgeon.     - For questions after surgery, please contact your surgeon's office.

## 2024-12-03 NOTE — H&P
Pre-Operative H & P     CC:  Preoperative exam to assess for increased cardiopulmonary risk while undergoing surgery and anesthesia.    Date of Encounter: 12/3/2024  Primary Care Physician:  Arlyn Sanchez     Reason for visit:   Encounter Diagnoses   Name Primary?    Pre-operative examination Yes    Inguinal hernia without obstruction or gangrene, recurrence not specified, unspecified laterality        HPI  Phan Luque is a 69 year old female who presents for pre-operative H & P in preparation for  Procedure Information       Case: [id number] Date/Time: 12/06/24 0715    Procedure: HERNIORRHAPHY, INGUINAL, OPEN (Right: Groin)    Anesthesia type: MAC with Local    Diagnosis: Right inguinal hernia [K40.90]    Pre-op diagnosis: Right inguinal hernia [K40.90]    Location: [place] [id number] / [facility]    Providers: Chemo Elaine MD            The patient is a 69 year old woman who has a past medical history significant for HTN, HLD, seasonal allergies, previous smoker, optic atrophy, optic neuropathy, migraine, history of anemia, hypothyroidism, history of alcohol hepatitis s/p liver transplant in 2016, GERD, history of GERMAIN, CKD stage 3, MRSA, VRE, psoriatic arthritis, psoriasis, eczema and a history of alcohol abuse now sober. She developed a right inguinal mass that was painful so was seen by Dr. Elaine on 11/21/24 and diagnosed with a right inguinal hernia. They discussed treatment options and she has been scheduled for the procedure as above.     History is obtained from the patient and chart review    Hx of abnormal bleeding or anti-platelet use: none    Menstrual history: No LMP recorded (lmp unknown). Patient is postmenopausal.:      Past Medical History  Past Medical History:   Diagnosis Date    AION (acute ischemic optic neuropathy)     Arthritis 2003    chronic UTO    Asthma     Bacterial infection 02/04/2021    Candida infection 11/11/2020    Cellulitis  2021    Cervical spinal stenosis     Chronic kidney disease, stage 3 (H)     Depressive disorder     Dizziness and giddiness     Created by Conversion     End stage liver disease (H)     2/2 Alcohol Abuse    GERD (gastroesophageal reflux disease)     History of blood transfusion     related to transplant    Hypertension     Inguinal hernia     Liver transplant recipient (H) 2016    [facility]    Migraine headache     Osteoporosis     frax 1.7%     PFO (patent foramen ovale) 2016    Noted on echo at [facility]    Recurrent UTI     Spinal stenosis in cervical region     Strabismus     Thyroid disease     hypothyroidism    Unspecified asthma(493.90)     Created by Conversion        Past Surgical History  Past Surgical History:   Procedure Laterality Date    APPENDECTOMY          BENCH LIVER N/A 2016    Procedure: BENCH LIVER;  Surgeon: Larry Dhillon MD;  Location: [facility] OR    BIOPSY      CATARACT IOL, RT/LT      COLONOSCOPY      COLONOSCOPY N/A 2021    Procedure: COLONOSCOPY, WITH POLYPECTOMY;  Surgeon: Arlyn Beckford MD;  Location: [facility] OR    COLONOSCOPY N/A 11/15/2023    Procedure: Colonoscopy;  Surgeon: Leventhal, Thomas Michael, MD;  Location: [facility] OR    ESOPHAGOSCOPY, GASTROSCOPY, DUODENOSCOPY (EGD), COMBINED N/A 2016    Procedure: COMBINED ESOPHAGOSCOPY, GASTROSCOPY, DUODENOSCOPY (EGD);  Surgeon: Tao Alfonso MD;  Location:  GI    ESOPHAGOSCOPY, GASTROSCOPY, DUODENOSCOPY (EGD), COMBINED N/A 10/31/2016    Procedure: COMBINED ESOPHAGOSCOPY, GASTROSCOPY, DUODENOSCOPY (EGD), BIOPSY SINGLE OR MULTIPLE;  Surgeon: Ronald Bojorquez MD;  Location:  GI    LIVER TRANSPLANTATION  2016    [facility]    ORTHOPEDIC SURGERY  2005    right trapeziectomy    RECTAL SURGERY      sphincteroplasty      TRANSPLANT LIVER RECIPIENT  DONOR N/A 2016    Procedure: TRANSPLANT LIVER  RECIPIENT  DONOR;  Surgeon: Larry Dhillon MD;  Location: [facility] OR    TUBAL LIGATION      laparoscopic       Prior to Admission Medications  Current Outpatient Medications   Medication Sig Dispense Refill    acetaminophen (TYLENOL) 325 MG tablet Take 2 tablets (650 mg) by mouth every 6 hours as needed for mild pain Or fevers. Use sparingly. Limit use to no more than 2 grams (2000 mg) in 24 hours. **Further refills must be obtained by primary care provider** 100 tablet 0    albuterol (VENTOLIN HFA) 108 (90 Base) MCG/ACT inhaler INHALE 2 PUFFS INTO THE LUNGS EVERY 6 HOURS AS NEEDED FOR SHORTNESS OF BREATH 18 g 5    amLODIPine (NORVASC) 5 MG tablet Take 1 tablet (5 mg) by mouth daily (Patient taking differently: Take 5 mg by mouth every morning.) 90 tablet 4    chlorthalidone (HYGROTON) 25 MG tablet Take 0.5 tablets (12.5 mg) by mouth daily (Patient taking differently: Take 12.5 mg by mouth every morning.) 45 tablet 4    clobetasol (TEMOVATE) 0.05 % external ointment Apply topically 2 times daily. To areas of eczema on the lower legs, until areas resolve. 60 g 1    clobetasol (TEMOVATE) 0.05 % external solution Apply topically 2 times daily. To the scalp as needed for itching and flaking, on the days you don't use the clobetasol shampoo. 150 mL 2    clobetasol propionate (CLOBEX) 0.05 % external shampoo Apply topically daily. To dry scalp x 15 min and rinse ,when psoriasis is present. 354 mL 1    fluocinolone acetonide (DERMA SMOOTHE/FS BODY) 0.01 % external oil Apply topically 2 times daily. 118.28 mL 11    fluocinonide (LIDEX) 0.05 % external solution Apply topically 2 times daily To areas of rash and itch on scalp 60 mL 3    gabapentin (NEURONTIN) 100 MG capsule Take 2 capsules (200 mg) by mouth nightly as needed for neuropathic pain TAKE TWO CAPSULES BY MOUTH EVERY NIGHT AT BEDTIME (Patient taking differently: Take 200 mg by mouth at bedtime. TAKE TWO CAPSULES BY MOUTH EVERY NIGHT AT BEDTIME) 180  capsule 3    hydrOXYzine HCl (ATARAX) 25 MG tablet Take 1 tablet (25 mg) by mouth every 6 hours as needed for itching (Patient taking differently: Take 25 mg by mouth at bedtime.) 120 tablet 3    ketoconazole (NIZORAL) 2 % external cream Apply topically 2 times daily as needed for itching 60 g 11    ketoconazole (NIZORAL) 2 % external shampoo APPLY TOPICALLY TO THE SCALP DAILY AS NEEDED FOR ITCHING OR IRRITATION. LEAVE ON FOR A FEW MINUTES AND THEN WASH OUT. 120 mL 11    levothyroxine (SYNTHROID/LEVOTHROID) 88 MCG tablet Take 1 tablet (88 mcg) by mouth daily (Patient taking differently: Take 88 mcg by mouth every morning (before breakfast).) 90 tablet 4    pantoprazole (PROTONIX) 40 MG EC tablet Take 1 tablet (40 mg) by mouth daily (Patient taking differently: Take 40 mg by mouth every morning.) 90 tablet 4    pravastatin (PRAVACHOL) 10 MG tablet TAKE 1 TABLET BY MOUTH DAILY (Patient taking differently: Take 10 mg by mouth every morning.) 90 tablet 3    psyllium 0.52 G capsule Take 2 capsules (1.04 g) by mouth daily With a full glass of water. (Patient taking differently: Take 1 capsule by mouth 3 times daily. With a full glass of water.) 60 capsule 1    tacrolimus (GENERIC-ACCORD BX-RATED) 0.5 MG capsule Take 2 capsules (1 mg) by mouth 2 times daily. 360 capsule 3    triamcinolone (KENALOG) 0.1 % external ointment Twice daily for flare ups of psoriasis 90 g 1    ursodiol (ACTIGALL) 300 MG capsule TAKE ONE CAPSULE BY MOUTH TWICE A DAY 60 capsule 3    WEGOVY 0.5 MG/0.5ML pen Inject 0.5 mg Subcutaneous every 7 days      calcipotriene (DOVONOX) 0.005 % external solution Apply 1 mL topically daily To the scalp (Patient not taking: Reported on 8/2/2023) 180 mL 1    cholecalciferol (VITAMIN D3) 400 UNIT TABS tablet Take 400 Units by mouth daily (with lunch).      ciclopirox (PENLAC) 8 % external solution Apply to adjacent skin and affected nails daily.  Remove with alcohol every 7 days, then repeat. (Patient not taking:  Reported on 3/31/2023) 6.6 mL 1    cyanocobalamin (VITAMIN  B-12) 1000 MCG tablet Take 1,000 mcg by mouth daily (with lunch).      ferrous sulfate (IRON) 325 (65 FE) MG tablet Take 1 tablet (325 mg) by mouth 2 times daily 100 tablet     folic acid (FOLVITE) 1 MG tablet Take 1 tablet (1 mg) by mouth daily 30 tablet 1    MAGNESIUM OXIDE PO Take 400 mg by mouth daily      melatonin 5 MG tablet Take 1 tablet (5 mg) by mouth nightly as needed for sleep      multivitamin, therapeutic (THERA-VIT) TABS tablet Take 1 tablet by mouth every 24 hours 30 tablet 11    order for DME Equipment being ordered: [email or web address] ankle brace (Patient not taking: Reported on 2024) 1 Device 0    rizatriptan (MAXALT) 5 MG tablet Take 1-2 tablets (5-10 mg) by mouth at onset of headache for migraine May repeat in 2 hours. Max 6 tablets/24 hours. (Patient not taking: Reported on 2024) 20 tablet 2       Allergies  Allergies   Allergen Reactions    Codeine Hives    Benadryl [Diphenhydramine] Hives    Cefaclor Hives    Hydrocodone-Acetaminophen Itching    Oxycodone Itching    Penicillins Hives    Sulfa Antibiotics Hives       Social History  Social History     Socioeconomic History    Marital status:      Spouse name: Not on file    Number of children: 3    Years of education: Not on file    Highest education level: Not on file   Occupational History    Occupation:    Tobacco Use    Smoking status: Former     Current packs/day: 0.00     Average packs/day: 2.5 packs/day for 27.9 years (69.9 ttl pk-yrs)     Types: Cigarettes     Start date: 1969     Quit date: 1996     Years since quittin.9    Smokeless tobacco: Never   Vaping Use    Vaping status: Never Used   Substance and Sexual Activity    Alcohol use: Not Currently     Comment: heavy use (750ml/2 days) up until 1 year ago; had cut down to 1 drink/day; none since 17    Drug use: No    Sexual activity: Not Currently   Other Topics Concern     Parent/sibling w/ CABG, MI or angioplasty before 65F 55M? No   Social History Narrative    Works as  for [employer], pharmacy tech background    Son and Daughter    Lives alone     Social Drivers of Health     Financial Resource Strain: Low Risk  (7/19/2024)    Financial Resource Strain     Within the past 12 months, have you or your family members you live with been unable to get utilities (heat, electricity) when it was really needed?: No   Food Insecurity: Low Risk  (7/19/2024)    Food Insecurity     Within the past 12 months, did you worry that your food would run out before you got money to buy more?: No     Within the past 12 months, did the food you bought just not last and you didn t have money to get more?: No   Transportation Needs: Low Risk  (7/19/2024)    Transportation Needs     Within the past 12 months, has lack of transportation kept you from medical appointments, getting your medicines, non-medical meetings or appointments, work, or from getting things that you need?: No   Physical Activity: Sufficiently Active (7/19/2024)    Exercise Vital Sign     Days of Exercise per Week: 7 days     Minutes of Exercise per Session: 30 min   Stress: Stress Concern Present (7/19/2024)    [employer] [place] [employer] - Occupational Stress Questionnaire     Feeling of Stress : To some extent   Social Connections: Unknown (7/19/2024)    Social Connection and Isolation Panel [NHANES]     Frequency of Communication with Friends and Family: Not on file     Frequency of Social Gatherings with Friends and Family: Once a week     Attends Sabianist Services: Not on file     Active Member of Clubs or Organizations: Not on file     Attends Club or Organization Meetings: Not on file     Marital Status: Not on file   Interpersonal Safety: Low Risk  (7/22/2024)    Interpersonal Safety     Do you feel physically and emotionally safe where you currently live?: Yes     Within the past 12  months, have you been hit, slapped, kicked or otherwise physically hurt by someone?: No     Within the past 12 months, have you been humiliated or emotionally abused in other ways by your partner or ex-partner?: No   Housing Stability: Low Risk  (2024)    Housing Stability     Do you have housing? : Yes     Are you worried about losing your housing?: No       Family History  Family History   Problem Relation Age of Onset    Melanoma Mother             Heart Failure Mother     Hypertension Mother     Other Cancer Mother         melanoma    Substance Abuse Mother     Obesity Mother     Heart Disease Father         CHF    Heart Failure Father     Chronic Obstructive Pulmonary Disease Father     Hypertension Father     Skin Cancer Sister     Asthma Son     Obesity Son     Cirrhosis Paternal Uncle         not alcohol related    Family History Negative Other     Anesthesia Reaction No family hx of     Deep Vein Thrombosis (DVT) No family hx of        Review of Systems  The complete review of systems is negative other than noted in the HPI or here.   Anesthesia Evaluation   Pt has had prior anesthetic. Type: General and MAC.    History of anesthetic complications  - spinal headache.  with saddle block in .    ROS/MED HX  ENT/Pulmonary: Comment: Optic atrophy/neuropathy since 2016 and stable      Patient has albuterol inhaler just in case and hasn't needed it in years    (+)           allergic rhinitis,                             Neurologic:     (+)    peripheral neuropathy,  migraines,                       (-) no seizures, no CVA and no TIA   Cardiovascular:     (+) Dyslipidemia hypertension- -   -  - -                                 Previous cardiac testing   Echo: Date: Results:    Stress Test:  Date:  Results:  There is no prior study available.    Stress electrocardiogram was negative for inducible ischemia.    ECG Reviewed:  Date:  Results:  Normal sinus rhythm   Normal ECG     Cath:  Date:  Results:      METS/Exercise Tolerance: >4 METS    Hematologic:     (+)       history of blood transfusion, no previous transfusion reaction,        Musculoskeletal: Comment: Psoriatic arthritis   (+)  arthritis,             GI/Hepatic: Comment: S/p liver transplant 2016    (+) GERD, Asymptomatic on medication,   hepatitis resolved      hepatitis type Alcoholic, liver disease,       Renal/Genitourinary:     (+) renal disease, type: CRI, Pt does not require dialysis,           Endo:     (+)          thyroid problem,            Psychiatric/Substance Use:     (+) psychiatric history anxiety, depression and other (comment) (insomnia) alcohol abuse (sober 2016)      Infectious Disease:     (+)   MRSA, VRE,        Malignancy:  - neg malignancy ROS     Other: Comment: Psoriasis  Eczema   Rosacea            Virtual visit -  No vitals were obtained    Physical Exam  Constitutional: Awake, alert, cooperative, no apparent distress, and appears stated age.  Eyes: Pupils equal, glasses  HENT: Normocephalic  Respiratory: non labored breathing   Neurologic: Awake, alert, oriented to name, place and time.   Neuropsychiatric: Calm, cooperative. Normal affect.      Prior Labs/Diagnostic Studies   All labs and imaging personally reviewed    Latest Reference Range & Units 11/01/24 07:24   Sodium 135 - 145 mmol/L 136   Potassium 3.4 - 5.3 mmol/L 4.3   Chloride 98 - 107 mmol/L 98   Carbon Dioxide (CO2) 22 - 29 mmol/L 28   Urea Nitrogen 8.0 - 23.0 mg/dL 16.4   Creatinine 0.51 - 0.95 mg/dL 1.21 (H)   GFR Estimate >60 mL/min/1.73m2 49 (L)   Calcium 8.8 - 10.4 mg/dL 9.3   Anion Gap 7 - 15 mmol/L 10   Magnesium 1.7 - 2.3 mg/dL 1.9   Phosphorus 2.5 - 4.5 mg/dL 4.5   Albumin 3.5 - 5.2 g/dL 4.1   Protein Total 6.4 - 8.3 g/dL 7.1   Alkaline Phosphatase 40 - 150 U/L 42   ALT 0 - 50 U/L 9   AST 0 - 45 U/L 19   Bilirubin Direct 0.00 - 0.30 mg/dL <0.20   Bilirubin Total <=1.2 mg/dL 0.5   Cholesterol <200 mg/dL 150   Creatinine Urine mg/dL 84.9    Patient Fasting?  Yes  Yes   Glucose 70 - 99 mg/dL 94   HDL Cholesterol >=50 mg/dL 44 (L)   LDL Cholesterol Calculated <100 mg/dL 85   Non HDL Cholesterol <130 mg/dL 106   Triglycerides <150 mg/dL 104   WBC 4.0 - 11.0 10e3/uL 9.9   Hemoglobin 11.7 - 15.7 g/dL 13.1   Hematocrit 35.0 - 47.0 % 39.1   Platelet Count 150 - 450 10e3/uL 265   RBC Count 3.80 - 5.20 10e6/uL 4.26   MCV 78 - 100 fL 92   MCH 26.5 - 33.0 pg 30.8   MCHC 31.5 - 36.5 g/dL 33.5   RDW 10.0 - 15.0 % 13.4       EKG/ stress test - if available please see in ROS above           The patient's records and results personally reviewed by this provider.     Outside records reviewed from: Care Everywhere      Assessment    Phan Luque is a 69 year old female seen as a PAC referral for risk assessment and optimization for anesthesia.    Plan/Recommendations  Pt will be optimized for the proposed procedure.  See below for details on the assessment, risk, and preoperative recommendations    NEUROLOGY  - No history of TIA, CVA or seizure  - migraine - patient gets aura with zig zag lines. Last headache 2 weeks ago. Hold maxalt DOS  ~ neuropathy   -Post Op delirium risk factors:  High co-morbid index    ENT  - No current airway concerns.  Will need to be reassessed day of surgery.  Mallampati: Unable to assess  TM: Unable to assess  ~ optic atrophy/ neuropathy - present since 2016 and stable.     CARDIAC  - No history of CAD and Afib  - Hypertension  Well controlled  - Hyperlipidemia  Well controlled on home regimen  - METS (Metabolic Equivalents)  Patient performs 4 or more METS exercise without symptoms             Total Score: 0      RCRI-Low risk: Class 2 0.9% complication rate             Total Score: 1    RCRI: High Risk Surgery    ~ The patient exercises with the elliptical and treadmill. She denies any cardiac symptoms.     PULMONARY  - Obstructive Sleep Apnea  JOSÉ MIGUEL Low Risk             Total Score: 2    JOSÉ MIGUEL: Hypertension    JOSÉ MIGUEL: Over 50 ys old      -  "Seasonal allergies   ~ The patient reports she has a rescue inhaler as needed but hasn't needed it in years  - Tobacco History    History   Smoking Status    Former    Types: Cigarettes   Smokeless Tobacco    Never       GI  - GERD  Controlled on medications: Proton Pump Inhibitor  - history of alcohol hepatitis s/p liver transplant in 2016. Following with Dr. Banegas and seen on 10/14/24. The patient had labs on 11/1/24 and normal liver function. She will continue tacrolimus and actigall  PONV Medium Risk  Total Score: 2           1 AN PONV: Pt is Female    1 AN PONV: Patient is not a current smoker        /RENAL  - Baseline Creatinine  1.2 - CKD stage 3a. History of GERMAIN needing dialysis in 2016.     ENDOCRINE    - BMI: Estimated body mass index is 22.02 kg/m  as calculated from the following:    Height as of this encounter: 1.676 m (5' 6\").    Weight as of this encounter: 61.9 kg (136 lb 6.4 oz).  Healthy Weight (BMI 18.5-24.9) - the patient is taking wegovy for weight loss. She last took on 11/25/24 and has been holding for surgery.   - Thyroid disorder  Continue home replacement     HEME  VTE Low Risk 0.26%             Total Score: 1    VTE: Greater than 59 yrs old      - No history of abnormal bleeding or antiplatelet use.      MSK  Patient is NOT Frail             Total Score: 0      ~ Psoriasis arthritis - in hands with one occurrence and no flares since then.     PSYCH  - depression, WALTER, insomnia - Patient is taking atarax, neurontin and melatonin at night for insomnia. Hasn't needed medications for anxiety or depression for a while.   ~ history of alcohol abuse - sober since 2016    DERM  ~ Psoriasis, eczema, rosacea - continue topical creams but don't use the day of surgery.     Different anesthesia methods/types have been discussed with the patient, but they are aware that the final plan will be decided by the assigned anesthesia provider on the date of service.    The patient is optimized for their " procedure. The patient's liver transplant was in 2016 and she has stable liver function. Okay to proceed as the ASC per exclusion criteria.       AVS with information on surgery time/arrival time, meds and NPO status given by nursing staff. No further diagnostic testing indicated.    Please refer to the physical examination documented by the anesthesiologist in the anesthesia record on the day of surgery.    Video-Visit Details    Type of service:  Video Visit    Provider received verbal consent for a Video Visit from the patient? Yes     Originating Location (pt. Location): Home    Distant Location (provider location):  Off-site  Mode of Communication:  Video Conference via [employer]  On the day of service:     Prep time: 14 minutes  Visit time: 11 minutes  Documentation time: 10 minutes  ------------------------------------------  Total time: 35 minutes      Bonita Lee PA-C  Preoperative Assessment Center  [facility]  Cl[facility] Center  Phone: [phone]  Fax: [phone]

## 2024-12-06 ENCOUNTER — HOSPITAL ENCOUNTER (OUTPATIENT)
Facility: AMBULATORY SURGERY CENTER | Age: 69
Discharge: HOME | End: 2024-12-06
Attending: SURGERY
Payer: COMMERCIAL

## 2024-12-06 VITALS
BODY MASS INDEX: 21.69 KG/M2 | RESPIRATION RATE: 16 BRPM | SYSTOLIC BLOOD PRESSURE: 107 MMHG | WEIGHT: 135 LBS | DIASTOLIC BLOOD PRESSURE: 54 MMHG | TEMPERATURE: 97.2 F | HEIGHT: 66 IN | HEART RATE: 64 BPM | OXYGEN SATURATION: 100 %

## 2024-12-06 DIAGNOSIS — K40.90 RIGHT INGUINAL HERNIA: Primary | ICD-10-CM

## 2024-12-06 DEVICE — MESH HERNIA ABDOMINAL SELF GRIPPING 15X9CM PROGRIP TEM1509G: Type: IMPLANTABLE DEVICE | Site: GROIN | Status: FUNCTIONAL

## 2024-12-06 RX ORDER — NALOXONE HYDROCHLORIDE 0.4 MG/ML
0.1 INJECTION, SOLUTION INTRAMUSCULAR; INTRAVENOUS; SUBCUTANEOUS
Status: DISCONTINUED | OUTPATIENT
Start: 2024-12-06 | End: 2024-12-07 | Stop reason: HOSPADM

## 2024-12-06 RX ORDER — CEFAZOLIN SODIUM 2 G/50ML
2 SOLUTION INTRAVENOUS SEE ADMIN INSTRUCTIONS
Status: DISCONTINUED | OUTPATIENT
Start: 2024-12-06 | End: 2024-12-06 | Stop reason: HOSPADM

## 2024-12-06 RX ORDER — DEXAMETHASONE SODIUM PHOSPHATE 10 MG/ML
4 INJECTION, SOLUTION INTRAMUSCULAR; INTRAVENOUS
Status: DISCONTINUED | OUTPATIENT
Start: 2024-12-06 | End: 2024-12-07 | Stop reason: HOSPADM

## 2024-12-06 RX ORDER — ONDANSETRON 4 MG/1
4 TABLET, ORALLY DISINTEGRATING ORAL EVERY 30 MIN PRN
Status: DISCONTINUED | OUTPATIENT
Start: 2024-12-06 | End: 2024-12-07 | Stop reason: HOSPADM

## 2024-12-06 RX ORDER — FENTANYL CITRATE 50 UG/ML
50 INJECTION, SOLUTION INTRAMUSCULAR; INTRAVENOUS EVERY 5 MIN PRN
Status: DISCONTINUED | OUTPATIENT
Start: 2024-12-06 | End: 2024-12-06 | Stop reason: HOSPADM

## 2024-12-06 RX ORDER — BUPIVACAINE HYDROCHLORIDE 2.5 MG/ML
INJECTION, SOLUTION INFILTRATION; PERINEURAL PRN
Status: DISCONTINUED | OUTPATIENT
Start: 2024-12-06 | End: 2024-12-06 | Stop reason: HOSPADM

## 2024-12-06 RX ORDER — DEXAMETHASONE SODIUM PHOSPHATE 10 MG/ML
4 INJECTION, SOLUTION INTRAMUSCULAR; INTRAVENOUS
Status: DISCONTINUED | OUTPATIENT
Start: 2024-12-06 | End: 2024-12-06 | Stop reason: HOSPADM

## 2024-12-06 RX ORDER — ONDANSETRON 2 MG/ML
4 INJECTION INTRAMUSCULAR; INTRAVENOUS EVERY 30 MIN PRN
Status: DISCONTINUED | OUTPATIENT
Start: 2024-12-06 | End: 2024-12-06 | Stop reason: HOSPADM

## 2024-12-06 RX ORDER — LIDOCAINE 40 MG/G
CREAM TOPICAL
Status: DISCONTINUED | OUTPATIENT
Start: 2024-12-06 | End: 2024-12-06 | Stop reason: HOSPADM

## 2024-12-06 RX ORDER — TRAMADOL HYDROCHLORIDE 50 MG/1
50 TABLET ORAL ONCE
Status: COMPLETED | OUTPATIENT
Start: 2024-12-06 | End: 2024-12-06

## 2024-12-06 RX ORDER — NALOXONE HYDROCHLORIDE 0.4 MG/ML
0.1 INJECTION, SOLUTION INTRAMUSCULAR; INTRAVENOUS; SUBCUTANEOUS
Status: DISCONTINUED | OUTPATIENT
Start: 2024-12-06 | End: 2024-12-06 | Stop reason: HOSPADM

## 2024-12-06 RX ORDER — TRAMADOL HYDROCHLORIDE 50 MG/1
50 TABLET ORAL EVERY 6 HOURS PRN
Qty: 10 TABLET | Refills: 0 | Status: SHIPPED | OUTPATIENT
Start: 2024-12-06 | End: 2024-12-09

## 2024-12-06 RX ORDER — FENTANYL CITRATE 50 UG/ML
25 INJECTION, SOLUTION INTRAMUSCULAR; INTRAVENOUS EVERY 5 MIN PRN
Status: DISCONTINUED | OUTPATIENT
Start: 2024-12-06 | End: 2024-12-06 | Stop reason: HOSPADM

## 2024-12-06 RX ORDER — CEFAZOLIN SODIUM 2 G/50ML
2 SOLUTION INTRAVENOUS
Status: COMPLETED | OUTPATIENT
Start: 2024-12-06 | End: 2024-12-06

## 2024-12-06 RX ORDER — ONDANSETRON 2 MG/ML
4 INJECTION INTRAMUSCULAR; INTRAVENOUS EVERY 30 MIN PRN
Status: DISCONTINUED | OUTPATIENT
Start: 2024-12-06 | End: 2024-12-07 | Stop reason: HOSPADM

## 2024-12-06 RX ORDER — ACETAMINOPHEN 325 MG/1
975 TABLET ORAL ONCE
Status: COMPLETED | OUTPATIENT
Start: 2024-12-06 | End: 2024-12-06

## 2024-12-06 RX ORDER — ONDANSETRON 4 MG/1
4 TABLET, ORALLY DISINTEGRATING ORAL EVERY 30 MIN PRN
Status: DISCONTINUED | OUTPATIENT
Start: 2024-12-06 | End: 2024-12-06 | Stop reason: HOSPADM

## 2024-12-06 RX ORDER — MAGNESIUM HYDROXIDE 1200 MG/15ML
LIQUID ORAL PRN
Status: DISCONTINUED | OUTPATIENT
Start: 2024-12-06 | End: 2024-12-06 | Stop reason: HOSPADM

## 2024-12-06 RX ADMIN — ACETAMINOPHEN 975 MG: 325 TABLET ORAL at 06:29

## 2024-12-06 RX ADMIN — ONDANSETRON 4 MG: 2 INJECTION INTRAMUSCULAR; INTRAVENOUS at 08:11

## 2024-12-06 RX ADMIN — TRAMADOL HYDROCHLORIDE 50 MG: 50 TABLET ORAL at 08:23

## 2024-12-06 NOTE — OR NURSING
Notified Dr. Elaine of increased bleeding from incision site.  Gauze, ice pack and manual pressure applied.  Bleeding subsided with 15 minutes of pressure.  Dr. Elaine came to bedside to assess site.  Ok to place gauze pressure dressing and discharge to home.  Pt and family aware that if this incision should start to bleed again then she should lie down, apply manual pressure and ice on site.  If bleeding does not stop after 15min then to con't pressure and call clinic.  If saturating ABD and gauze then be seen in emergency room, 911 call.  Dr. Elaine ok with discharging pt to home at this time.

## 2024-12-06 NOTE — DISCHARGE INSTRUCTIONS
[facility]  Home Care Following Anesthesia  For 24 hours after surgery:  Get plenty of rest.  A responsible adult must stay with you for at least 24 hours after you leave the surgery center.  Do not drive or use heavy equipment.  If you have weakness or tingling, don't drive or use heavy equipment until this feeling goes away.   Do not drink alcohol.   Avoid strenuous or risky activities.  Ask for help when climbing stairs.  You may feel lightheaded.  IF so, sit for a few minutes before standing.  Have someone help you get up.   If you have nausea (feel sick to your stomach): Drink only clear liquids such as apple juice, ginger ale, broth or 7-Up.  Rest may also help.  Be sure to drink enough fluids.  Move to a regular diet as you feel able.   You may have a slight fever.  Call the doctor if your fever is over 100 F (37.7 C) (taken under the tongue) or lasts longer than 24 hours.  You may have a dry mouth, a sore throat, muscle aches or trouble sleeping. These should go away after 24 hours.  Do not make important or legal decisions.   It is recommended to avoid smoking.               Tips for taking pain medications  To get the best pain relief possible, remember these points:  Take pain medications as directed, before pain becomes severe.  Pain medication can upset your stomach: taking it with food may help.  Constipation is a common side effect of pain medication. Drink plenty of  fluids.  Eat foods high in fiber. Take a stool softener if recommended by your doctor or pharmacist.  Do not drink alcohol, drive or operate machinery while taking pain medications.  Ask about other ways to control pain, such as with heat, ice or relaxation.    Tylenol/Acetaminophen Consumption    If you feel your pain relief is insufficient, you may take Tylenol/Acetaminophen in addition to your narcotic pain medication.   Be careful not to exceed 4,000 mg of Tylenol/Acetaminophen in a 24 hour  period from all sources.  If you are taking extra strength Tylenol/acetaminophen (500 mg), the maximum dose is 8 tablets in 24 hours.  If you are taking regular strength acetaminophen (325 mg), the maximum dose is 12 tablets in 24 hours.  Tylenol 975 mg given at 630am.   Ok to take more after 1230pm.       Call a doctor for any of the following:  Signs of infection (fever, growing tenderness at the surgery site, a large amount of drainage or bleeding, severe pain, foul-smelling drainage, redness, swelling).  It has been over 8 to 10 hours since surgery and you are still not able to urinate (pass water).  Headache for over 24 hours.  Signs of Covid-19 infection (temperature over 100 degrees, shortness of breath, cough, loss of taste/smell, generalized body aches, persistent headache, chills, sore throat, nausea/vomiting/diarrhea)  Your doctor is:  Dr. Chemo Elaine, General Surgery: [phone]                    Or dial [phone] and ask for the resident on call for:  General Surgery  For emergency care, call the:  [facility]:  [phone] (TTY for hearing impaired: [phone])

## 2024-12-07 DIAGNOSIS — F41.1 GAD (GENERALIZED ANXIETY DISORDER): ICD-10-CM

## 2024-12-07 DIAGNOSIS — Z94.4 LIVER TRANSPLANTED (H): ICD-10-CM

## 2024-12-09 ENCOUNTER — PATIENT OUTREACH (OUTPATIENT)
Dept: SURGERY | Facility: CLINIC | Age: 69
End: 2024-12-09
Payer: COMMERCIAL

## 2024-12-09 RX ORDER — URSODIOL 300 MG/1
CAPSULE ORAL
Qty: 180 CAPSULE | Refills: 3 | Status: SHIPPED | OUTPATIENT
Start: 2024-12-09

## 2024-12-09 NOTE — PROGRESS NOTES
RN Post-Op/Post-Discharge Care Coordination Note    Ms. Phan Luque is a 69 year old female who underwent open inguinal hernia repair on 12/6 with  Dr. Chemo Elaine.  Spoke with Patient.    Support  Patient able to care for self independently     Health Status  Nausea/Vomiting: Patient denies nausea/vomiting. and Patient reports a lack of appetite.  Eating/drinking: Patient is able to eat and drink without any complaints.  Diet:  Regular  Bowel habits: Patient reports constipation and has taken one stool softener.  Recommended OTC laxative per package instructions if needed  Drains (AMANDEEP): N/A  Fevers/chills: Patient denies any fever or chills.  Incisions: Patient denies any signs and symptoms of infection..  Wound closure:  Steri-strips  Pain: Improving and is medicating with Tylenol. She is unsure if it is very effective.  Minimal use of Ultram.  Patient will apply local heat to her groin PRN, protecting her skin from injury.  New Medications:  Ultram    Activity/Restrictions  No lifting in excess of 15-20 pounds for 3-4 weeks    Equipment  None    Pathology reviewed with patient:  N/A    Forms/Letters  No    All of her questions were answered including reviewing restrictions  and wound care.  She will call this office if she has any further questions and/or concerns.      In lieu of a post-op clinic patient that patient would like to be contacted in approximately 7-10 days via telephone.    A copy of this note was routed to the primary surgeon.      Whom and When to Call  Patient acknowledges understanding of how to manage any medication changes and   when to seek medical care.     Patient advised that if after hour medical concerns arise to please call [phone] and choose option 4 to speak to the physician on call.

## 2024-12-10 RX ORDER — HYDROXYZINE HYDROCHLORIDE 25 MG/1
25 TABLET, FILM COATED ORAL
Qty: 90 TABLET | Refills: 3 | Status: SHIPPED | OUTPATIENT
Start: 2024-12-10

## 2024-12-12 ENCOUNTER — TELEPHONE (OUTPATIENT)
Dept: TRANSPLANT | Facility: CLINIC | Age: 69
End: 2024-12-12

## 2024-12-12 ENCOUNTER — HOSPITAL ENCOUNTER (EMERGENCY)
Facility: CLINIC | Age: 69
Discharge: HOME | End: 2024-12-12
Attending: EMERGENCY MEDICINE | Admitting: EMERGENCY MEDICINE
Payer: COMMERCIAL

## 2024-12-12 VITALS
HEIGHT: 66 IN | OXYGEN SATURATION: 100 % | WEIGHT: 134 LBS | RESPIRATION RATE: 17 BRPM | BODY MASS INDEX: 21.53 KG/M2 | SYSTOLIC BLOOD PRESSURE: 109 MMHG | HEART RATE: 79 BPM | TEMPERATURE: 98.7 F | DIASTOLIC BLOOD PRESSURE: 71 MMHG

## 2024-12-12 DIAGNOSIS — R11.2 NAUSEA AND VOMITING, UNSPECIFIED VOMITING TYPE: ICD-10-CM

## 2024-12-12 LAB
ALBUMIN SERPL BCG-MCNC: 4.3 G/DL (ref 3.5–5.2)
ALP SERPL-CCNC: 46 U/L (ref 40–150)
ALT SERPL W P-5'-P-CCNC: 9 U/L (ref 0–50)
ANION GAP SERPL CALCULATED.3IONS-SCNC: 13 MMOL/L (ref 7–15)
AST SERPL W P-5'-P-CCNC: 18 U/L (ref 0–45)
BASOPHILS # BLD AUTO: 0 10E3/UL (ref 0–0.2)
BASOPHILS NFR BLD AUTO: 0 %
BILIRUB SERPL-MCNC: 0.7 MG/DL
BUN SERPL-MCNC: 22.5 MG/DL (ref 8–23)
CALCIUM SERPL-MCNC: 9.3 MG/DL (ref 8.8–10.4)
CHLORIDE SERPL-SCNC: 100 MMOL/L (ref 98–107)
CREAT SERPL-MCNC: 1.28 MG/DL (ref 0.51–0.95)
EGFRCR SERPLBLD CKD-EPI 2021: 45 ML/MIN/1.73M2
EOSINOPHIL # BLD AUTO: 0 10E3/UL (ref 0–0.7)
EOSINOPHIL NFR BLD AUTO: 0 %
ERYTHROCYTE [DISTWIDTH] IN BLOOD BY AUTOMATED COUNT: 13.5 % (ref 10–15)
GLUCOSE SERPL-MCNC: 115 MG/DL (ref 70–99)
HCO3 SERPL-SCNC: 28 MMOL/L (ref 22–29)
HCT VFR BLD AUTO: 43 % (ref 35–47)
HGB BLD-MCNC: 15 G/DL (ref 11.7–15.7)
IMM GRANULOCYTES # BLD: 0 10E3/UL
IMM GRANULOCYTES NFR BLD: 0 %
LIPASE SERPL-CCNC: 28 U/L (ref 13–60)
LYMPHOCYTES # BLD AUTO: 0.5 10E3/UL (ref 0.8–5.3)
LYMPHOCYTES NFR BLD AUTO: 4 %
MCH RBC QN AUTO: 30.6 PG (ref 26.5–33)
MCHC RBC AUTO-ENTMCNC: 34.9 G/DL (ref 31.5–36.5)
MCV RBC AUTO: 88 FL (ref 78–100)
MONOCYTES # BLD AUTO: 0.4 10E3/UL (ref 0–1.3)
MONOCYTES NFR BLD AUTO: 4 %
NEUTROPHILS # BLD AUTO: 11.1 10E3/UL (ref 1.6–8.3)
NEUTROPHILS NFR BLD AUTO: 92 %
NRBC # BLD AUTO: 0 10E3/UL
NRBC BLD AUTO-RTO: 0 /100
PLATELET # BLD AUTO: 335 10E3/UL (ref 150–450)
POTASSIUM SERPL-SCNC: 3.8 MMOL/L (ref 3.4–5.3)
PROT SERPL-MCNC: 7.8 G/DL (ref 6.4–8.3)
RBC # BLD AUTO: 4.9 10E6/UL (ref 3.8–5.2)
SODIUM SERPL-SCNC: 141 MMOL/L (ref 135–145)
WBC # BLD AUTO: 12 10E3/UL (ref 4–11)

## 2024-12-12 PROCEDURE — 82310 ASSAY OF CALCIUM: CPT | Performed by: EMERGENCY MEDICINE

## 2024-12-12 PROCEDURE — 96376 TX/PRO/DX INJ SAME DRUG ADON: CPT | Performed by: EMERGENCY MEDICINE

## 2024-12-12 PROCEDURE — 96361 HYDRATE IV INFUSION ADD-ON: CPT | Performed by: EMERGENCY MEDICINE

## 2024-12-12 PROCEDURE — 250N000013 HC RX MED GY IP 250 OP 250 PS 637: Performed by: EMERGENCY MEDICINE

## 2024-12-12 PROCEDURE — 85025 COMPLETE CBC W/AUTO DIFF WBC: CPT | Performed by: EMERGENCY MEDICINE

## 2024-12-12 PROCEDURE — 96374 THER/PROPH/DIAG INJ IV PUSH: CPT | Performed by: EMERGENCY MEDICINE

## 2024-12-12 PROCEDURE — 258N000003 HC RX IP 258 OP 636: Performed by: EMERGENCY MEDICINE

## 2024-12-12 PROCEDURE — 83690 ASSAY OF LIPASE: CPT | Performed by: EMERGENCY MEDICINE

## 2024-12-12 PROCEDURE — 99284 EMERGENCY DEPT VISIT MOD MDM: CPT | Mod: 25 | Performed by: EMERGENCY MEDICINE

## 2024-12-12 PROCEDURE — 99284 EMERGENCY DEPT VISIT MOD MDM: CPT | Performed by: EMERGENCY MEDICINE

## 2024-12-12 PROCEDURE — 36415 COLL VENOUS BLD VENIPUNCTURE: CPT | Performed by: EMERGENCY MEDICINE

## 2024-12-12 PROCEDURE — 250N000011 HC RX IP 250 OP 636: Performed by: EMERGENCY MEDICINE

## 2024-12-12 PROCEDURE — 82247 BILIRUBIN TOTAL: CPT | Performed by: EMERGENCY MEDICINE

## 2024-12-12 RX ORDER — ONDANSETRON 4 MG/1
4 TABLET, ORALLY DISINTEGRATING ORAL EVERY 8 HOURS PRN
Qty: 15 TABLET | Refills: 0 | Status: SHIPPED | OUTPATIENT
Start: 2024-12-12

## 2024-12-12 RX ORDER — TRAMADOL HYDROCHLORIDE 50 MG/1
50 TABLET ORAL ONCE
Status: COMPLETED | OUTPATIENT
Start: 2024-12-12 | End: 2024-12-12

## 2024-12-12 RX ORDER — ONDANSETRON 2 MG/ML
4 INJECTION INTRAMUSCULAR; INTRAVENOUS EVERY 30 MIN PRN
Status: DISCONTINUED | OUTPATIENT
Start: 2024-12-12 | End: 2024-12-12 | Stop reason: HOSPADM

## 2024-12-12 RX ADMIN — ONDANSETRON 4 MG: 2 INJECTION INTRAMUSCULAR; INTRAVENOUS at 17:35

## 2024-12-12 RX ADMIN — ONDANSETRON 4 MG: 2 INJECTION INTRAMUSCULAR; INTRAVENOUS at 14:04

## 2024-12-12 RX ADMIN — TRAMADOL HYDROCHLORIDE 50 MG: 50 TABLET, COATED ORAL at 17:07

## 2024-12-12 RX ADMIN — SODIUM CHLORIDE 1000 ML: 9 INJECTION, SOLUTION INTRAVENOUS at 14:04

## 2024-12-12 ASSESSMENT — ACTIVITIES OF DAILY LIVING (ADL)
ADLS_ACUITY_SCORE: 47

## 2024-12-12 ASSESSMENT — COLUMBIA-SUICIDE SEVERITY RATING SCALE - C-SSRS
1. IN THE PAST MONTH, HAVE YOU WISHED YOU WERE DEAD OR WISHED YOU COULD GO TO SLEEP AND NOT WAKE UP?: NO
6. HAVE YOU EVER DONE ANYTHING, STARTED TO DO ANYTHING, OR PREPARED TO DO ANYTHING TO END YOUR LIFE?: NO
2. HAVE YOU ACTUALLY HAD ANY THOUGHTS OF KILLING YOURSELF IN THE PAST MONTH?: NO

## 2024-12-12 NOTE — DISCHARGE INSTRUCTIONS
TODAY'S VISIT:  You were seen today for nausea and vomiting   -   - If you had any labs or imaging/radiology tests performed today, you should also discuss these tests with your usual provider.     FOLLOW-UP:  Please make an appointment to follow up with:  - Your Primary Care Provider. If you do not have a PCP, please call the Primary Care Center (phone: [phone] for an appointment    - Have your provider review the results from today's visit with you again to make sure no further follow-up or additional testing is needed based on those results.     PRESCRIPTIONS / MEDICATIONS:  - zofran as needed for nausea    OTHER INSTRUCTIONS:  - Make sure to eat a bland diet over the next several days until your symptoms improve, avoiding alcohol, caffeine, spicy, fatty, or acidic foods (such as citrus foods, tomato based sauces, etc.).  You should gradually reintroduce your usual foods into your diet as you start to feel better.      RETURN TO THE EMERGENCY DEPARTMENT  Return to the Emergency Department at any time for any new or worsening symptoms or any concerns.

## 2024-12-12 NOTE — ED TRIAGE NOTES
Onset at 0200 this AM for multiple episodes. Pt received 4mg zofran PO from medics en route. Pt reports a significant decrease in N/V following medication.      Triage Assessment (Adult)       Row Name 12/12/24 125          Triage Assessment    Airway WDL WDL        Respiratory WDL    Respiratory WDL WDL        Skin Circulation/Temperature WDL    Skin Circulation/Temperature WDL WDL        Cardiac WDL    Cardiac WDL WDL        Peripheral/Neurovascular WDL    Peripheral Neurovascular WDL WDL        Cognitive/Neuro/Behavioral WDL    Cognitive/Neuro/Behavioral WDL WDL

## 2024-12-12 NOTE — ED PROVIDER NOTES
History     Chief Complaint   Patient presents with    Nausea, Vomiting, & Diarrhea     Onset at 0200 this AM for multiple episodes. Pt received 4mg zofran PO from medics en route. Pt reports a significant decrease in N/V following medication.      HPI  Phan Luque is a 69 year old female with a past medical history of liver transplant for alcoholic hepatitis, anemia, depression, CKD 4, asthma who presents to the emergency department with a chief complaint of nausea, vomiting, diarrhea.  Started at 2 AM this morning.  Patient has had multiple episodes of vomiting since then.  Patient received 4 mg of Zofran from EMS en route to the hospital and had significant improvement after this.  The patient had inguinal hernia repair surgery on Friday.  She has had some pain secondary to this, but it has been as expected and has been improving.  Patient states she does not have any nausea medications at home.  She did take her antirejection medications last night, but did not take her morning dose as she did not believe she would be able to keep them down.  She has not been able to tolerate any p.o. until after the Zofran was given to her by EMS.    I have reviewed the Medications, Allergies, Past Medical and Surgical History, and Social History in the [employer] system.    Past Medical History:   Diagnosis Date    AION (acute ischemic optic neuropathy)     Arthritis 2003    chronic UTO    Asthma     Bacterial infection 02/04/2021    Candida infection 11/11/2020    Cellulitis 09/13/2021    Cervical spinal stenosis     Chronic kidney disease, stage 3 (H)     Depressive disorder July, 2016    Dizziness and giddiness     Created by Conversion     End stage liver disease (H)     2/2 Alcohol Abuse    GERD (gastroesophageal reflux disease)     History of blood transfusion 2016    related to transplant    Hypertension     Inguinal hernia     Liver transplant recipient (H) 08/25/2016    [facility]    Migraine  headache     Osteoporosis     frax 1.7%     PFO (patent foramen ovale) 2016    Noted on echo at [facility]    Recurrent UTI     Spinal stenosis in cervical region     Strabismus     Thyroid disease     hypothyroidism    Unspecified asthma(493.90)     Created by Conversion      Past Surgical History:   Procedure Laterality Date    APPENDECTOMY          BENCH LIVER N/A 2016    Procedure: BENCH LIVER;  Surgeon: Larry Dhillon MD;  Location: [facility] OR    BIOPSY      CATARACT IOL, RT/LT      COLONOSCOPY      COLONOSCOPY N/A 2021    Procedure: COLONOSCOPY, WITH POLYPECTOMY;  Surgeon: Arlyn Beckford MD;  Location: [facility] OR    COLONOSCOPY N/A 11/15/2023    Procedure: Colonoscopy;  Surgeon: Leventhal, Thomas Michael, MD;  Location: [facility] OR    ESOPHAGOSCOPY, GASTROSCOPY, DUODENOSCOPY (EGD), COMBINED N/A 2016    Procedure: COMBINED ESOPHAGOSCOPY, GASTROSCOPY, DUODENOSCOPY (EGD);  Surgeon: Tao Alfonso MD;  Location: [facility] GI    ESOPHAGOSCOPY, GASTROSCOPY, DUODENOSCOPY (EGD), COMBINED N/A 10/31/2016    Procedure: COMBINED ESOPHAGOSCOPY, GASTROSCOPY, DUODENOSCOPY (EGD), BIOPSY SINGLE OR MULTIPLE;  Surgeon: Ronald Bojorquez MD;  Location: [facility] GI    HERNIORRHAPHY INGUINAL Right 2024    Procedure: HERNIORRHAPHY, INGUINAL, OPEN;  Surgeon: Chemo Elaine MD;  Location: [facility] OR    LIVER TRANSPLANTATION  2016    [facility]    ORTHOPEDIC SURGERY  2005    right trapeziectomy    RECTAL SURGERY      sphincteroplasty      TRANSPLANT LIVER RECIPIENT  DONOR N/A 2016    Procedure: TRANSPLANT LIVER RECIPIENT  DONOR;  Surgeon: Larry Dhillon MD;  Location: [facility] OR    TUBAL LIGATION      laparoscopic     No current facility-administered medications for this encounter.     Current Outpatient Medications   Medication Sig Dispense Refill    acetaminophen (TYLENOL) 325 MG tablet Take 2 tablets (650 mg) by mouth every 6 hours as  needed for mild pain Or fevers. Use sparingly. Limit use to no more than 2 grams (2000 mg) in 24 hours. **Further refills must be obtained by primary care provider** 100 tablet 0    albuterol (VENTOLIN HFA) 108 (90 Base) MCG/ACT inhaler INHALE 2 PUFFS INTO THE LUNGS EVERY 6 HOURS AS NEEDED FOR SHORTNESS OF BREATH 18 g 5    amLODIPine (NORVASC) 5 MG tablet Take 1 tablet (5 mg) by mouth daily (Patient taking differently: Take 5 mg by mouth every morning.) 90 tablet 4    calcipotriene (DOVONOX) 0.005 % external solution Apply 1 mL topically daily To the scalp (Patient not taking: Reported on 8/2/2023) 180 mL 1    chlorthalidone (HYGROTON) 25 MG tablet Take 0.5 tablets (12.5 mg) by mouth daily (Patient taking differently: Take 12.5 mg by mouth every morning.) 45 tablet 4    cholecalciferol (VITAMIN D3) 400 UNIT TABS tablet Take 400 Units by mouth daily (with lunch).      ciclopirox (PENLAC) 8 % external solution Apply to adjacent skin and affected nails daily.  Remove with alcohol every 7 days, then repeat. (Patient not taking: Reported on 3/31/2023) 6.6 mL 1    clobetasol (TEMOVATE) 0.05 % external ointment Apply topically 2 times daily. To areas of eczema on the lower legs, until areas resolve. 60 g 1    clobetasol (TEMOVATE) 0.05 % external solution Apply topically 2 times daily. To the scalp as needed for itching and flaking, on the days you don't use the clobetasol shampoo. 150 mL 2    clobetasol propionate (CLOBEX) 0.05 % external shampoo Apply topically daily. To dry scalp x 15 min and rinse ,when psoriasis is present. 354 mL 1    cyanocobalamin (VITAMIN  B-12) 1000 MCG tablet Take 1,000 mcg by mouth daily (with lunch).      ferrous sulfate (IRON) 325 (65 FE) MG tablet Take 1 tablet (325 mg) by mouth 2 times daily 100 tablet     fluocinolone acetonide (DERMA SMOOTHE/FS BODY) 0.01 % external oil Apply topically 2 times daily. 118.28 mL 11    fluocinonide (LIDEX) 0.05 % external solution Apply topically 2 times  daily To areas of rash and itch on scalp 60 mL 3    folic acid (FOLVITE) 1 MG tablet Take 1 tablet (1 mg) by mouth daily 30 tablet 1    gabapentin (NEURONTIN) 100 MG capsule Take 2 capsules (200 mg) by mouth nightly as needed for neuropathic pain TAKE TWO CAPSULES BY MOUTH EVERY NIGHT AT BEDTIME (Patient taking differently: Take 200 mg by mouth at bedtime. TAKE TWO CAPSULES BY MOUTH EVERY NIGHT AT BEDTIME) 180 capsule 3    hydrOXYzine HCl (ATARAX) 25 MG tablet Take 1 tablet (25 mg) by mouth nightly as needed for other, anxiety or itching. 90 tablet 3    ketoconazole (NIZORAL) 2 % external cream Apply topically 2 times daily as needed for itching 60 g 11    ketoconazole (NIZORAL) 2 % external shampoo APPLY TOPICALLY TO THE SCALP DAILY AS NEEDED FOR ITCHING OR IRRITATION. LEAVE ON FOR A FEW MINUTES AND THEN WASH OUT. 120 mL 11    levothyroxine (SYNTHROID/LEVOTHROID) 88 MCG tablet Take 1 tablet (88 mcg) by mouth daily (Patient taking differently: Take 88 mcg by mouth every morning (before breakfast).) 90 tablet 4    MAGNESIUM OXIDE PO Take 400 mg by mouth daily      melatonin 5 MG tablet Take 1 tablet (5 mg) by mouth nightly as needed for sleep      multivitamin, therapeutic (THERA-VIT) TABS tablet Take 1 tablet by mouth every 24 hours 30 tablet 11    order for DME Equipment being ordered: [employer] ankle brace (Patient not taking: Reported on 4/4/2024) 1 Device 0    pantoprazole (PROTONIX) 40 MG EC tablet Take 1 tablet (40 mg) by mouth daily (Patient taking differently: Take 40 mg by mouth every morning.) 90 tablet 4    pravastatin (PRAVACHOL) 10 MG tablet TAKE 1 TABLET BY MOUTH DAILY (Patient taking differently: Take 10 mg by mouth every morning.) 90 tablet 3    psyllium 0.52 G capsule Take 2 capsules (1.04 g) by mouth daily With a full glass of water. (Patient taking differently: Take 1 capsule by mouth 3 times daily. With a full glass of water.) 60 capsule 1    rizatriptan (MAXALT) 5 MG tablet Take 1-2 tablets (5-10  mg) by mouth at onset of headache for migraine May repeat in 2 hours. Max 6 tablets/24 hours. (Patient not taking: Reported on 2024) 20 tablet 2    tacrolimus (GENERIC-ACCORD BX-RATED) 0.5 MG capsule Take 2 capsules (1 mg) by mouth 2 times daily. 360 capsule 3    triamcinolone (KENALOG) 0.1 % external ointment Twice daily for flare ups of psoriasis 90 g 1    ursodiol (ACTIGALL) 300 MG capsule TAKE ONE CAPSULE BY MOUTH TWICE A  capsule 3    WEGOVY 0.5 MG/0.5ML pen Inject 0.5 mg subcutaneously every 7 days.   Last dose 24       Allergies   Allergen Reactions    Codeine Hives    Benadryl [Diphenhydramine] Hives    Cefaclor Hives    Hydrocodone-Acetaminophen Itching    Oxycodone Itching    Penicillins Hives    Sulfa Antibiotics Hives     Past medical history, past surgical history, medications, and allergies were reviewed with the patient. Additional pertinent items: None    Social History     Socioeconomic History    Marital status:      Spouse name: Not on file    Number of children: 3    Years of education: Not on file    Highest education level: Not on file   Occupational History    Occupation:    Tobacco Use    Smoking status: Former     Current packs/day: 0.00     Average packs/day: 2.5 packs/day for 27.9 years (69.9 ttl pk-yrs)     Types: Cigarettes     Start date: 1969     Quit date: 1996     Years since quittin.0    Smokeless tobacco: Never   Vaping Use    Vaping status: Never Used   Substance and Sexual Activity    Alcohol use: Not Currently     Comment: heavy use (750ml/2 days) up until 1 year ago; had cut down to 1 drink/day; none since 17    Drug use: No    Sexual activity: Not Currently   Other Topics Concern    Parent/sibling w/ CABG, MI or angioplasty before 65F 55M? No   Social History Narrative    Works as  for [employer], pharmacy tech background    Son and Daughter    Lives alone     Social Drivers of  Health     Financial Resource Strain: Low Risk  (7/19/2024)    Financial Resource Strain     Within the past 12 months, have you or your family members you live with been unable to get utilities (heat, electricity) when it was really needed?: No   Food Insecurity: Low Risk  (7/19/2024)    Food Insecurity     Within the past 12 months, did you worry that your food would run out before you got money to buy more?: No     Within the past 12 months, did the food you bought just not last and you didn t have money to get more?: No   Transportation Needs: Low Risk  (7/19/2024)    Transportation Needs     Within the past 12 months, has lack of transportation kept you from medical appointments, getting your medicines, non-medical meetings or appointments, work, or from getting things that you need?: No   Physical Activity: Sufficiently Active (7/19/2024)    Exercise Vital Sign     Days of Exercise per Week: 7 days     Minutes of Exercise per Session: 30 min   Stress: Stress Concern Present (7/19/2024)    [employer] - Occupational Stress Questionnaire     Feeling of Stress : To some extent   Social Connections: Unknown (7/19/2024)    Social Connection and Isolation Panel [NHANES]     Frequency of Communication with Friends and Family: Not on file     Frequency of Social Gatherings with Friends and Family: Once a week     Attends Bahai Services: Not on file     Active Member of Clubs or Organizations: Not on file     Attends Club or Organization Meetings: Not on file     Marital Status: Not on file   Interpersonal Safety: Low Risk  (12/6/2024)    Interpersonal Safety     Do you feel physically and emotionally safe where you currently live?: Yes     Within the past 12 months, have you been hit, slapped, kicked or otherwise physically hurt by someone?: No     Within the past 12 months, have you been humiliated or emotionally abused in other ways by your partner or ex-partner?: No   Housing  "Stability: Low Risk  (7/19/2024)    Housing Stability     Do you have housing? : Yes     Are you worried about losing your housing?: No     Social history was reviewed with the patient. Additional pertinent items: None    Review of Systems  A medically appropriate review of systems was performed with pertinent positives and negatives noted in the HPI, and all other systems negative.    Physical Exam   BP: 106/59  Pulse: 112  Temp: 97.9  F (36.6  C)  Resp: 16  Height: 167.6 cm (5' 6\")  Weight: 60.8 kg (134 lb)  SpO2: 97 %      General: Well nourished, well developed, NAD  HEENT: EOMI, anicteric. NCAT, MMM  Neck: no jugular venous distension, supple, nl ROM  Cardiac: Regular rate and rhythm. No murmurs, rubs, or gallops. Normal S1, S2.  Intact peripheral pulses  Pulm: CTAB, no stridor, wheezes, rales, rhonchi  Abd: Soft, nontender, nondistended.  No masses palpated.  Incision from inguinal hernia repair clean/dry/intact, very mild tenderness and swelling directly surrounding incision  Skin: Warm and dry to the touch.  No rash.  Incision appears clean/dry/intact  Extremities: No LE edema, no cyanosis, w/w/p  Neuro: A&Ox3, no gross focal deficits    ED Course        Procedures                    Labs Ordered and Resulted from Time of ED Arrival to Time of ED Departure   COMPREHENSIVE METABOLIC PANEL - Abnormal       Result Value    Sodium 141      Potassium 3.8      Carbon Dioxide (CO2) 28      Anion Gap 13      Urea Nitrogen 22.5      Creatinine 1.28 (*)     GFR Estimate 45 (*)     Calcium 9.3      Chloride 100      Glucose 115 (*)     Alkaline Phosphatase 46      AST 18      ALT 9      Protein Total 7.8      Albumin 4.3      Bilirubin Total 0.7     CBC WITH PLATELETS AND DIFFERENTIAL - Abnormal    WBC Count 12.0 (*)     RBC Count 4.90      Hemoglobin 15.0      Hematocrit 43.0      MCV 88      MCH 30.6      MCHC 34.9      RDW 13.5      Platelet Count 335      % Neutrophils 92      % Lymphocytes 4      % Monocytes 4   "    % Eosinophils 0      % Basophils 0      % Immature Granulocytes 0      NRBCs per 100 WBC 0      Absolute Neutrophils 11.1 (*)     Absolute Lymphocytes 0.5 (*)     Absolute Monocytes 0.4      Absolute Eosinophils 0.0      Absolute Basophils 0.0      Absolute Immature Granulocytes 0.0      Absolute NRBCs 0.0     LIPASE - Normal    Lipase 28              Results for orders placed or performed during the hospital encounter of 12/12/24 (from the past 24 hours)   CBC with platelets differential    Narrative    The following orders were created for panel order CBC with platelets differential.  Procedure                               Abnormality         Status                     ---------                               -----------         ------                     CBC with platelets and d...[[id number]]  Abnormal            Final result                 Please view results for these tests on the individual orders.   Comprehensive metabolic panel   Result Value Ref Range    Sodium 141 135 - 145 mmol/L    Potassium 3.8 3.4 - 5.3 mmol/L    Carbon Dioxide (CO2) 28 22 - 29 mmol/L    Anion Gap 13 7 - 15 mmol/L    Urea Nitrogen 22.5 8.0 - 23.0 mg/dL    Creatinine 1.28 (H) 0.51 - 0.95 mg/dL    GFR Estimate 45 (L) >60 mL/min/1.73m2    Calcium 9.3 8.8 - 10.4 mg/dL    Chloride 100 98 - 107 mmol/L    Glucose 115 (H) 70 - 99 mg/dL    Alkaline Phosphatase 46 40 - 150 U/L    AST 18 0 - 45 U/L    ALT 9 0 - 50 U/L    Protein Total 7.8 6.4 - 8.3 g/dL    Albumin 4.3 3.5 - 5.2 g/dL    Bilirubin Total 0.7 <=1.2 mg/dL   Lipase   Result Value Ref Range    Lipase 28 13 - 60 U/L   CBC with platelets and differential   Result Value Ref Range    WBC Count 12.0 (H) 4.0 - 11.0 10e3/uL    RBC Count 4.90 3.80 - 5.20 10e6/uL    Hemoglobin 15.0 11.7 - 15.7 g/dL    Hematocrit 43.0 35.0 - 47.0 %    MCV 88 78 - 100 fL    MCH 30.6 26.5 - 33.0 pg    MCHC 34.9 31.5 - 36.5 g/dL    RDW 13.5 10.0 - 15.0 %    Platelet Count 335 150 - 450 10e3/uL    % Neutrophils  92 %    % Lymphocytes 4 %    % Monocytes 4 %    % Eosinophils 0 %    % Basophils 0 %    % Immature Granulocytes 0 %    NRBCs per 100 WBC 0 <1 /100    Absolute Neutrophils 11.1 (H) 1.6 - 8.3 10e3/uL    Absolute Lymphocytes 0.5 (L) 0.8 - 5.3 10e3/uL    Absolute Monocytes 0.4 0.0 - 1.3 10e3/uL    Absolute Eosinophils 0.0 0.0 - 0.7 10e3/uL    Absolute Basophils 0.0 0.0 - 0.2 10e3/uL    Absolute Immature Granulocytes 0.0 <=0.4 10e3/uL    Absolute NRBCs 0.0 10e3/uL     *Note: Due to a large number of results and/or encounters for the requested time period, some results have not been displayed. A complete set of results can be found in Results Review.       Labs, vital signs, and imaging studies were reviewed by me.    Medications   sodium chloride 0.9% BOLUS 1,000 mL (0 mLs Intravenous Stopped 12/12/24 1622)   traMADol (ULTRAM) tablet 50 mg (50 mg Oral $Given 12/12/24 1707)       Assessments & Plan (with Medical Decision Making)   Phan Luque is a 69 year old female who resents to the emergency department nausea and vomiting.  Differential diagnosis includes medication side effects, gastritis/gastroenteritis/viral syndrome.  Less likely small bowel obstruction or severe intra-abdominal infectious process as patient does not complain of any increasing abdominal pain and abdominal exam is benign with very mild appropriate tenderness around recent surgical incision.  Incision appears clean/dry/intact, no significant surrounding skin changes.  Patient is afebrile on arrival to the emergency department.  Medications were ordered for symptomatic relief in the ER.  Patient reports significant improvement after Zofran given by EMS.  Labs were ordered to further evaluate the patient.    [place] workup is remarkable for very mild white blood cell count elevation at 12.0, likely secondary to vomiting, hemoglobin is normal at 15.0, lipase within normal limits, CMP shows slight creatinine elevation at 1.28, consistent with  patient's baseline    Patient was able to tolerate p.o. without difficulty in the emergency department.  She would like to be discharged home at this time    Critical care was not performed.     Medical Decision Making  The patient's presentation was of low complexity (an acute and uncomplicated illness or injury).    The patient's evaluation involved:  ordering and/or review of 3+ test(s) in this encounter (see separate area of note for details)    The patient's management necessitated moderate risk (prescription drug management including medications given in the ED).    I have reviewed the nursing notes.    I have reviewed the findings, diagnosis, plan and need for follow up with the patient.    Patient to be discharged home. Advised to follow up with PCP within 1 week. To return to ER immediately with any new/worsening symptoms, particularly if she develops abdominal pain or is unable to tolerate p.o. Plan of care discussed with patient who expresses understanding and agrees with plan of care.    Discharge Medication List as of 12/12/2024  5:35 PM        START taking these medications    Details   ondansetron (ZOFRAN ODT) 4 MG ODT tab Take 1 tablet (4 mg) by mouth every 8 hours as needed for nausea., Disp-15 tablet, R-0, E-Prescribe             Final diagnoses:   Nausea and vomiting, unspecified vomiting type       JELLY MONTERO MD  12/12/2024   [facility] EMERGENCY DEPARTMENT       Jelly Montero MD  12/13/24 3856

## 2024-12-12 NOTE — TELEPHONE ENCOUNTER
Patient Call: General  Route to LPN    Reason for call: Pt having emesis since 2 am- unable to keep fluids down feels like her am meds will not stay down.  Needs to review     Call back needed? Yes    Return Call Needed  Same as documented in contacts section  When to return call?: Same day: Route [facility]

## 2024-12-12 NOTE — TELEPHONE ENCOUNTER
Patient has been vomiting every 15 minutes since 0200. States she is not able to keep fluids down. She did not take her morning medications. Denies diarrhea. Denies fever. Patient had a hernia repair on 12/6. Advised patient to go to ED.

## 2024-12-16 ENCOUNTER — VIRTUAL VISIT (OUTPATIENT)
Dept: INTERNAL MEDICINE | Facility: CLINIC | Age: 69
End: 2024-12-16
Payer: COMMERCIAL

## 2024-12-16 ENCOUNTER — TELEPHONE (OUTPATIENT)
Dept: INTERNAL MEDICINE | Facility: CLINIC | Age: 69
End: 2024-12-16

## 2024-12-16 DIAGNOSIS — I10 ESSENTIAL HYPERTENSION: ICD-10-CM

## 2024-12-16 DIAGNOSIS — E66.811 CLASS 1 OBESITY WITH SERIOUS COMORBIDITY AND BODY MASS INDEX (BMI) OF 31.0 TO 31.9 IN ADULT, UNSPECIFIED OBESITY TYPE: Primary | ICD-10-CM

## 2024-12-16 DIAGNOSIS — N18.30 STAGE 3 CHRONIC KIDNEY DISEASE, UNSPECIFIED WHETHER STAGE 3A OR 3B CKD (H): ICD-10-CM

## 2024-12-16 NOTE — PATIENT INSTRUCTIONS
Thank you for visiting the Primary Care Center today at the [facility]! The following is some information about our clinic:     Primary Care Center Frequently-Asked Questions    (1) How do I schedule appointments at the [facility]?     Primary Care--to schedule or make changes to an existing appointment, please call our primary care line at [phone].    Labs--to schedule a lab appointment at the [facility] you can use [employer] or call [phone]. If you have a [place] location that is closer to home, you can reach out to that location for scheduling options.     Imaging--if you need to schedule a CT, X-ray, MRI, ultrasound, or other imaging study you can call [phone] to schedule at the [facility] or any other [facility] imaging location.     Referrals--if a referral to another specialty was ordered you can expect a phone call from their scheduling team. If you have not heard from them in a week, please call us or send us a [employer] message to check the status or get a scheduling number. Please note that this only applies to internal [facility] referrals. If the referral is external you would need to contact their office for scheduling.     (2) I have a question about my visit, who do I contact?     You can call us at the primary care line at [phone] to ask questions about your visit. You can also send a secure message through [employer], which is reviewed by clinic staff. Please note that [employer] messages have a twenty-four to forty-eight business hour turnaround time and should not be used for urgent concerns.    (3) How will I get the results of my tests?    If you are signed up for [employer] all tests will be released to you within twenty-four hours of resulting. Please allow three to five days for your doctor to review your results and place a note interpreting the results. If you do not have [employer] you will receive your  results through mail seven to ten business days following the return of the tests. Please note that if there should be any urgent or concerning results that your doctor or their registered nurse will reach out to you the same day as the tests come back. If you have follow up questions about your results or would like to discuss the results in detail please schedule a follow up with your provider either in person or virtually.     (4) How do I get refills of my prescriptions?     You should always first contact your pharmacy for refills of your medications. If submitting a refill request on [employer], please be sure to submit the request only once--repeat requests can cause delays in refill. If you are requesting a NEW medication or a medication related to new symptoms you will need to schedule an appointment with a provider prior to approval. Please note: Routine medication refills have up to one to three business day turnaround whereas controlled substances refills have up to five to seven business day turnaround.    (5) I have new symptoms, what do I do?     If you are having an immediate medical emergency, you should dial 911 for assistance.   For anything urgent that needs to be seen within a few hours to one day you should visit a local urgent care for assistance.  For non-urgent symptoms that need to be seen within a few days to a week you can schedule with an available provider in primary care by going to [employer] or calling [phone].   If you are not sure how serious your symptoms are or you would like to receive medical advice you can always call [phone] to speak with a triage nurse.

## 2024-12-16 NOTE — Clinical Note
Pepito Stoddard, I have continued her Semaglutide at a lower dose as she is at goal weight and asked her to follow up with you in the next 2-3 months to see if further dose adjustment is indicated.  Thanks

## 2024-12-16 NOTE — PROGRESS NOTES
Jessica is a 69 year old who is being evaluated via a billable video visit.    How would you like to obtain your AVS? MyChart  If the video visit is dropped, the invitation should be resent by: Text to cell phone: [phone]  Will anyone else be joining your video visit? No      Are refills needed on medications prescribed by this physician? NO         Assessment & Plan     Class 1 obesity with serious comorbidity and body mass index (BMI) of 31.0 to 31.9 in adult, unspecified obesity type  Pt at goal weight currently, with an estimated BMI of 21.  We discussed the risks of further weight loss including loss of muscle mass, osteoporosis, risk of falls etc.  We will decrease her Semaglutide dose from 2.4 mg weekly to 1.7 mg weekly and have her follow up in the next 2-3 months for a weight check to see if further dose adjustment is indicated.  It is helping with her HTN as well   - Semaglutide-Weight Management (WEGOVY) 1.7 MG/0.75ML pen  Dispense: 3 mL; Refill: 2    Stage 3 chronic kidney disease, unspecified whether stage 3a or 3b CKD (H)  Stable, will be due for recheck of renal function in the next 3-6 months    Essential hypertension  Pt states it is stable on Amlodipine and Chlorthalidone, recheck at follow up.       Work on weight loss  Regular exercise    Return in about 3 months (around 3/16/2025), or Weight check and semaglutide dose adjustment, for Follow up.    Subjective   Jessica is a 69 year old, presenting for the following health issues:  RECHECK and Recheck Medication    History of Present Illness       Reason for visit:  Weight maintnance  Symptom onset:  More than a month  Symptoms include:  Concern over regain  Symptom intensity:  Mild  Symptom progression:  Staying the same  Had these symptoms before:  Yes  Has tried/received treatment for these symptoms:  Yes  Previous treatment was successful:  No   She is taking medications regularly.     69 you with PMHx of liver failure s/p liver tx, CKD, hx of  obesity.  Pt started weight watchers several years ago, started Semaglutide injection once weekly 2.4 mg weekly and has lost about 30 pounds since starting Feb 2024.  She has lost about 100 lbs overall with the weight loss plan.  Her current BMI is around 21.  She has a hx of osteopenia.   Her insurance is covering the Semaglutide at this time with prior authorization.   She denies current side effects with the Wegovy.  Her appetite is decreased.  Her blood pressure has decreased, currently on Chlorthalidone and Amlodipine 5 mg daily.  She has stage 3b CKD, related a hx of liver disease prior to a liver transplant.      As patient has achieved goal weight, her clinic would like to take her off the weight loss medication but she would like to continue so she doesn't increase her weight.      Review of Systems  Constitutional, HEENT, cardiovascular, pulmonary, gi and gu systems are negative, except as otherwise noted.      Objective    Vitals - Patient Reported  Pain Score: Mild Pain (2)  Pain Loc: Low Back      Vitals:  No vitals were obtained today due to virtual visit.    Physical Exam   GENERAL: alert and no distress  EYES: Eyes grossly normal to inspection.  No discharge or erythema, or obvious scleral/conjunctival abnormalities.  RESP: No audible wheeze, cough, or visible cyanosis.    SKIN: Visible skin clear. No significant rash, abnormal pigmentation or lesions.  NEURO: Cranial nerves grossly intact.  Mentation and speech appropriate for age.  PSYCH: Appropriate affect, tone, and pace of words    Admission on 12/12/2024, Discharged on 12/12/2024   Component Date Value Ref Range Status    Sodium 12/12/2024 141  135 - 145 mmol/L Final    Potassium 12/12/2024 3.8  3.4 - 5.3 mmol/L Final    Carbon Dioxide (CO2) 12/12/2024 28  22 - 29 mmol/L Final    Anion Gap 12/12/2024 13  7 - 15 mmol/L Final    Urea Nitrogen 12/12/2024 22.5  8.0 - 23.0 mg/dL Final    Creatinine 12/12/2024 1.28 (H)  0.51 - 0.95 mg/dL Final    GFR  Estimate 12/12/2024 45 (L)  >60 mL/min/1.73m2 Final    eGFR calculated using 2021 CKD-EPI equation.    Calcium 12/12/2024 9.3  8.8 - 10.4 mg/dL Final    Reference intervals for this test were updated on 7/16/2024 to reflect our healthy population more accurately. There may be differences in the flagging of prior results with similar values performed with this method. Those prior results can be interpreted in the context of the updated reference intervals.    Chloride 12/12/2024 100  98 - 107 mmol/L Final    Glucose 12/12/2024 115 (H)  70 - 99 mg/dL Final    Alkaline Phosphatase 12/12/2024 46  40 - 150 U/L Final    AST 12/12/2024 18  0 - 45 U/L Final    ALT 12/12/2024 9  0 - 50 U/L Final    Protein Total 12/12/2024 7.8  6.4 - 8.3 g/dL Final    Albumin 12/12/2024 4.3  3.5 - 5.2 g/dL Final    Bilirubin Total 12/12/2024 0.7  <=1.2 mg/dL Final    Lipase 12/12/2024 28  13 - 60 U/L Final    WBC Count 12/12/2024 12.0 (H)  4.0 - 11.0 10e3/uL Final    RBC Count 12/12/2024 4.90  3.80 - 5.20 10e6/uL Final    Hemoglobin 12/12/2024 15.0  11.7 - 15.7 g/dL Final    Hematocrit 12/12/2024 43.0  35.0 - 47.0 % Final    MCV 12/12/2024 88  78 - 100 fL Final    MCH 12/12/2024 30.6  26.5 - 33.0 pg Final    MCHC 12/12/2024 34.9  31.5 - 36.5 g/dL Final    RDW 12/12/2024 13.5  10.0 - 15.0 % Final    Platelet Count 12/12/2024 335  150 - 450 10e3/uL Final    % Neutrophils 12/12/2024 92  % Final    % Lymphocytes 12/12/2024 4  % Final    % Monocytes 12/12/2024 4  % Final    % Eosinophils 12/12/2024 0  % Final    % Basophils 12/12/2024 0  % Final    % Immature Granulocytes 12/12/2024 0  % Final    NRBCs per 100 WBC 12/12/2024 0  <1 /100 Final    Absolute Neutrophils 12/12/2024 11.1 (H)  1.6 - 8.3 10e3/uL Final    Absolute Lymphocytes 12/12/2024 0.5 (L)  0.8 - 5.3 10e3/uL Final    Absolute Monocytes 12/12/2024 0.4  0.0 - 1.3 10e3/uL Final    Absolute Eosinophils 12/12/2024 0.0  0.0 - 0.7 10e3/uL Final    Absolute Basophils 12/12/2024 0.0  0.0 - 0.2  10e3/uL Final    Absolute Immature Granulocytes 12/12/2024 0.0  <=0.4 10e3/uL Final    Absolute NRBCs 12/12/2024 0.0  10e3/uL Final         Video-Visit Details    Type of service:  Video Visit   Originating Location (pt. Location): Home    Distant Location (provider location):  On-site  Platform used for Video Visit: Nataliya  Signed Electronically by: Ezekiel Ogden MD

## 2024-12-17 DIAGNOSIS — G89.18 ACUTE POST-OPERATIVE PAIN: Primary | ICD-10-CM

## 2024-12-17 RX ORDER — TRAMADOL HYDROCHLORIDE 50 MG/1
50 TABLET ORAL EVERY 6 HOURS PRN
Qty: 5 TABLET | Refills: 0 | Status: SHIPPED | OUTPATIENT
Start: 2024-12-17 | End: 2024-12-20

## 2024-12-24 ENCOUNTER — LAB (OUTPATIENT)
Dept: LAB | Facility: CLINIC | Age: 69
End: 2024-12-24
Payer: COMMERCIAL

## 2024-12-24 DIAGNOSIS — Z94.4 LIVER REPLACED BY TRANSPLANT (H): ICD-10-CM

## 2024-12-24 LAB
ALBUMIN SERPL BCG-MCNC: 4 G/DL (ref 3.5–5.2)
ALP SERPL-CCNC: 41 U/L (ref 40–150)
ALT SERPL W P-5'-P-CCNC: 7 U/L (ref 0–50)
ANION GAP SERPL CALCULATED.3IONS-SCNC: 12 MMOL/L (ref 7–15)
AST SERPL W P-5'-P-CCNC: 20 U/L (ref 0–45)
BILIRUB DIRECT SERPL-MCNC: <0.2 MG/DL (ref 0–0.3)
BILIRUB SERPL-MCNC: 0.5 MG/DL
BUN SERPL-MCNC: 15 MG/DL (ref 8–23)
CALCIUM SERPL-MCNC: 9.1 MG/DL (ref 8.8–10.4)
CHLORIDE SERPL-SCNC: 102 MMOL/L (ref 98–107)
CREAT SERPL-MCNC: 1.22 MG/DL (ref 0.51–0.95)
EGFRCR SERPLBLD CKD-EPI 2021: 48 ML/MIN/1.73M2
ERYTHROCYTE [DISTWIDTH] IN BLOOD BY AUTOMATED COUNT: 13.2 % (ref 10–15)
GLUCOSE SERPL-MCNC: 94 MG/DL (ref 70–99)
HCO3 SERPL-SCNC: 26 MMOL/L (ref 22–29)
HCT VFR BLD AUTO: 40 % (ref 35–47)
HGB BLD-MCNC: 13.6 G/DL (ref 11.7–15.7)
MCH RBC QN AUTO: 30.3 PG (ref 26.5–33)
MCHC RBC AUTO-ENTMCNC: 34 G/DL (ref 31.5–36.5)
MCV RBC AUTO: 89 FL (ref 78–100)
PLATELET # BLD AUTO: 419 10E3/UL (ref 150–450)
POTASSIUM SERPL-SCNC: 3.9 MMOL/L (ref 3.4–5.3)
PROT SERPL-MCNC: 6.8 G/DL (ref 6.4–8.3)
RBC # BLD AUTO: 4.49 10E6/UL (ref 3.8–5.2)
SODIUM SERPL-SCNC: 140 MMOL/L (ref 135–145)
TACROLIMUS BLD-MCNC: 4.2 UG/L (ref 5–15)
TME LAST DOSE: ABNORMAL H
TME LAST DOSE: ABNORMAL H
WBC # BLD AUTO: 6.7 10E3/UL (ref 4–11)

## 2024-12-24 PROCEDURE — 80053 COMPREHEN METABOLIC PANEL: CPT

## 2024-12-24 PROCEDURE — 80197 ASSAY OF TACROLIMUS: CPT

## 2024-12-24 PROCEDURE — 36415 COLL VENOUS BLD VENIPUNCTURE: CPT

## 2024-12-24 PROCEDURE — 82248 BILIRUBIN DIRECT: CPT

## 2024-12-24 PROCEDURE — 85027 COMPLETE CBC AUTOMATED: CPT

## 2024-12-30 DIAGNOSIS — G43.109 MIGRAINE WITH AURA AND WITHOUT STATUS MIGRAINOSUS, NOT INTRACTABLE: ICD-10-CM

## 2025-01-04 NOTE — TELEPHONE ENCOUNTER
rizatriptan (MAXALT) 5 MG tablet   Disp-20 tablet, R-2,   Start: 07/22/2024 12/16/2024  [facility] Ezekiel Gallegos MD  Internal Medicine    Routed because:   Serotonin Agonists [id number]/02/2025 11:49 AM   Protocol Details Review patient's medical record. If the patient has used less than or equal to nine (9) tablets or injections for migraine a month the RN may authorize the refill request.

## 2025-01-06 RX ORDER — RIZATRIPTAN BENZOATE 5 MG/1
5-10 TABLET ORAL
Qty: 20 TABLET | Refills: 2 | Status: SHIPPED | OUTPATIENT
Start: 2025-01-06

## 2025-02-11 NOTE — TELEPHONE ENCOUNTER
clobetasol (TEMOVATE) 0.05 % external ointment  Last Written Prescription Date:  10/5/20  Last Fill Quantity: 60g,   # refills: 1  Last Office Visit : 3/17/21  Future Office visit:  None    Process 3    Routing refill request to provider for review/approval because: last order up to 2 wks.  #qty?  
4 years ago

## 2025-02-12 ENCOUNTER — TELEPHONE (OUTPATIENT)
Dept: DERMATOLOGY | Facility: CLINIC | Age: 70
End: 2025-02-12
Payer: COMMERCIAL

## 2025-02-12 NOTE — TELEPHONE ENCOUNTER
2/12 Left Voicemail (1st Attempt) and Sent Mychart (1st Attempt) for the patient to call back and schedule the following:    Appointment type: Return Dermatology  Provider: Stan  Return date: 9/4/25  Specialty phone number: [phone]  Additional appointment(s) needed: na  Additonal Notes: na

## 2025-02-14 ENCOUNTER — OFFICE VISIT (OUTPATIENT)
Dept: PODIATRY | Facility: CLINIC | Age: 70
End: 2025-02-14
Payer: COMMERCIAL

## 2025-02-14 VITALS — BODY MASS INDEX: 21.97 KG/M2 | HEIGHT: 66 IN | WEIGHT: 136.7 LBS

## 2025-02-14 DIAGNOSIS — B35.1 NAIL FUNGUS: Primary | ICD-10-CM

## 2025-02-14 NOTE — LETTER
2/14/2025      Phan Luque  [address]  [place] [address]      Dear Colleague,    Thank you for referring your patient, Phan Luque, to the [facility]. Please see a copy of my visit note below.    Phan Luque is a 69 year old female who presents with a chief complaint of a painful   toenail to the right great toe.  The patient relates pain when wearing shoes.  The patient denies any redness extending up the big toe into the foot.  The patient relates the condition has been getting worse over the past several weeks.         Pertinent medical, surgical and family history was reviewed in the chart.            LOWER EXTREMITY PHYSICAL EXAM    Dermatologic: One notes an inflamed nail border of the right great toe.  There is noted erythema and edema located around the labial fold and does not extend past the interphalangeal joint.  There is no apparent purulent drainage noted.  There is pain on palpation to the proximal aspect of the nail border.  Otherwise, the skin is intact to both lower extremities without significant lesions, rash or abrasion.           Vascular: DP & PT pulses are intact & regular on the right.   CFT and skin temperature is normal to the right lower extremities.     Neurologic: Lower extremity sensation is intact to light touch.  No evidence of weakness in the right lower extremities.        Musculoskeletal: Patient is ambulatory without assistive device or brace.  No gross ankle deformity noted.  No foot or ankle joint effusion is noted.         ASSESSMENT / PLAN:     ICD-10-CM    1. Nail fungus  B35.1           Plan:  I have explained to Phan about the condition.  At this point, the patient continue to care for the nail.  The patient may return to the office if she notices any redness, swelling or drainage.    Phan verbalized agreement with and understanding of the rational for the diagnosis and treatment plan.  All questions were  answered to best of my ability and the patient's satisfaction. The patient was advised to contact the clinic with any questions that may arise after the clinic visit.      Disclaimer: This note consists of symbols derived from keyboarding, dictation and/or voice recognition software. As a result, there may be errors in the script that have gone undetected. Please consider this when interpreting information found in this chart.      TYESHA Dunbar D.P.M., F.LUDIVINA.C.[email or web address].S.      Again, thank you for allowing me to participate in the care of your patient.        Sincerely,        Hussein Dunbar DPM    Electronically signed

## 2025-02-14 NOTE — NURSING NOTE
"Chief Complaint   Patient presents with    Consult     Rumba ran over right foot- broken toenail- great right       Initial Ht 1.676 m (5' 6\")   Wt 62 kg (136 lb 11.2 oz)   LMP  (LMP Unknown)   BMI 22.06 kg/m   Estimated body mass index is 22.06 kg/m  as calculated from the following:    Height as of this encounter: 1.676 m (5' 6\").    Weight as of this encounter: 62 kg (136 lb 11.2 oz).  Medications and allergies reviewed.      Faye JUSTICE MA    "

## 2025-02-14 NOTE — PROGRESS NOTES
Phan Luque is a 69 year old female who presents with a chief complaint of a painful   toenail to the right great toe.  The patient relates pain when wearing shoes.  The patient denies any redness extending up the big toe into the foot.  The patient relates the condition has been getting worse over the past several weeks.         Pertinent medical, surgical and family history was reviewed in the chart.            LOWER EXTREMITY PHYSICAL EXAM    Dermatologic: One notes an inflamed nail border of the right great toe.  There is noted erythema and edema located around the labial fold and does not extend past the interphalangeal joint.  There is no apparent purulent drainage noted.  There is pain on palpation to the proximal aspect of the nail border.  Otherwise, the skin is intact to both lower extremities without significant lesions, rash or abrasion.           Vascular: DP & PT pulses are intact & regular on the right.   CFT and skin temperature is normal to the right lower extremities.     Neurologic: Lower extremity sensation is intact to light touch.  No evidence of weakness in the right lower extremities.        Musculoskeletal: Patient is ambulatory without assistive device or brace.  No gross ankle deformity noted.  No foot or ankle joint effusion is noted.         ASSESSMENT / PLAN:     ICD-10-CM    1. Nail fungus  B35.1           Plan:  I have explained to Phan about the condition.  At this point, the patient continue to care for the nail.  The patient may return to the office if she notices any redness, swelling or drainage.    Phan verbalized agreement with and understanding of the rational for the diagnosis and treatment plan.  All questions were answered to best of my ability and the patient's satisfaction. The patient was advised to contact the clinic with any questions that may arise after the clinic visit.      Disclaimer: This note consists of symbols derived from keyboarding, dictation  and/or voice recognition software. As a result, there may be errors in the script that have gone undetected. Please consider this when interpreting information found in this chart.      TYESHA Dunbar D.P.M., [email or web address].

## 2025-02-17 ENCOUNTER — TELEPHONE (OUTPATIENT)
Dept: SURGERY | Facility: CLINIC | Age: 70
End: 2025-02-17
Payer: COMMERCIAL

## 2025-02-17 NOTE — TELEPHONE ENCOUNTER
02/17/25    9:09 AM     Attempted to return call to patient with no answer and VM is full, unable to LM.    Of note, patient underwent open RIH with Dr. Elaine on 12/6/24.    02/17/25    9:57 AM     Returned call to the patient. She reports swelling of her incision at the end of the day and also has been experiencing intermittent, self resolving twinges.  Discussed with the patient that both are very normal and should resolve over time. She can apply local heat to the area, protecting skin from injury.  She denies signs or symptoms of infection.  She will call the office with questions/concerns or if she would like to arrange a clinic visit.

## 2025-02-17 NOTE — TELEPHONE ENCOUNTER
[facility] Call Center    Phone Message    May a detailed message be left on voicemail: yes     Reason for Call: Other: Jessica is calling in asking for a call back from her care team, as she has started to experience some increased swelling and pain in the area of her surgery. Please call back as soon as possible to discuss.     Action Taken: Message routed to:  [facility] Gen [facility]urg    Travel Screening: Not Applicable     Date of Service:

## 2025-02-27 ENCOUNTER — MYC MEDICAL ADVICE (OUTPATIENT)
Dept: INTERNAL MEDICINE | Facility: CLINIC | Age: 70
End: 2025-02-27
Payer: COMMERCIAL

## 2025-02-27 NOTE — TELEPHONE ENCOUNTER
Left Voicemail (1st Attempt) and Sent Mychart (1st Attempt) for the patient to call back and schedule the following:    Appointment type: Physical   Provider: PCP  Return date: July   Specialty phone number: [phone]  Additonal Notes: per check out - : Return in about 1 year (around 7/22/2025)

## 2025-06-02 ENCOUNTER — TELEPHONE (OUTPATIENT)
Dept: RHEUMATOLOGY | Facility: CLINIC | Age: 70
End: 2025-06-02
Payer: COMMERCIAL

## 2025-06-02 NOTE — TELEPHONE ENCOUNTER
Called pt and LVM regarding appt on 7/11/25. Dr. Hightower is unavailable this day and will need to reschedule their appt. If pt calls back please help them reschedule for a RETURN pt appt.

## 2025-06-15 DIAGNOSIS — Z82.49 FAMILY HISTORY OF CHF (CONGESTIVE HEART FAILURE): ICD-10-CM

## 2025-06-17 RX ORDER — PRAVASTATIN SODIUM 10 MG
10 TABLET ORAL DAILY
Qty: 90 TABLET | Refills: 1 | Status: SHIPPED | OUTPATIENT
Start: 2025-06-17

## 2025-06-17 NOTE — TELEPHONE ENCOUNTER
Last Written Prescription:  pravastatin (PRAVACHOL) 10 MG tablet 90 tablet 3 6/18/2024     ----------------------  Last Visit Date: 1/31/25  Future Visit Date: 7/24/25  ----------------------    Refill decision: Medication refilled per  Medication Refill in Ambulatory Care  policy.    Request from pharmacy:  Requested Prescriptions   Pending Prescriptions Disp Refills    pravastatin (PRAVACHOL) 10 MG tablet [Pharmacy Med Name: PRAVASTATIN SODIUM 10MG TABS] 90 tablet 3     Sig: TAKE ONE TABLET BY MOUTH ONCE DAILY       Antihyperlipidemic agents Passed - 6/17/2025  3:51 PM        Passed - LDL on file in the past 12 months        Passed - Medication is active on med list and the sig matches. RN to manually verify dose and sig if red X/fail.     If the protocol passes (green check), you do not need to verify med dose and sig.    A prescription matches if they are the same clinical intention.    For Example: once daily and every morning are the same.    The protocol can not identify upper and lower case letters as matching and will fail.     For Example: Take 1 tablet (50 mg) by mouth daily     TAKE 1 TABLET (50 MG) BY MOUTH DAILY    For all fails (red x), verify dose and sig.    If the refill does match what is on file, the RN can still proceed to approve the refill request.       If they do not match, route to the appropriate provider.             Passed - Recent (12 month) or future (90 days) visit with authorizing provider's specialty (provided they have been seen in the past 15 months)     The patient must have completed an in-person or virtual visit within the past 12 months or has a future visit scheduled within the next 90 days with the authorizing provider s specialty.  Urgent care and e-visits do not qualify as an office visit for this protocol.          Passed - Patient is age 18 years or older        Passed - No active pregnancy on record        Passed - No positive pregnancy test in past 12 mos

## 2025-06-23 DIAGNOSIS — Z94.4 LIVER REPLACED BY TRANSPLANT (H): Primary | ICD-10-CM

## 2025-06-27 ENCOUNTER — RESULTS FOLLOW-UP (OUTPATIENT)
Dept: TRANSPLANT | Facility: CLINIC | Age: 70
End: 2025-06-27

## 2025-07-09 ENCOUNTER — PRE VISIT (OUTPATIENT)
Dept: INTERNAL MEDICINE | Facility: CLINIC | Age: 70
End: 2025-07-09
Payer: COMMERCIAL

## 2025-07-09 NOTE — TELEPHONE ENCOUNTER
Date of upcoming preventative visit: 7/24/25    Date of last preventative visit: 7/22/24    Relevant notes from last preventative visit:   Hypothyroidism  - TSH WITH FREE T4 REFLEX; Future     Stage 3 chronic kidney disease, unspecified whether stage 3a or 3b CKD (H)  Consider SGLT2 if proteinuria present  - Albumin Random Urine Quantitative with Creat Ratio; Future  - chlorthalidone (HYGROTON) 25 MG tablet; Take 0.5 tablets (12.5 mg) by mouth daily  - Protein  random urine; Future     Fluid retention  - chlorthalidone (HYGROTON) 25 MG tablet; Take 0.5 tablets (12.5 mg) by mouth daily     Essential hypertension  - amLODIPine (NORVASC) 5 MG tablet; Take 1 tablet (5 mg) by mouth daily     WALTER (generalized anxiety disorder)  - hydrOXYzine HCl (ATARAX) 25 MG tablet; Take 1 tablet (25 mg) by mouth every 6 hours as needed for itching  - levothyroxine (SYNTHROID/LEVOTHROID) 88 MCG tablet; Take 1 tablet (88 mcg) by mouth daily     Thyroid function test abnormal  - levothyroxine (SYNTHROID/LEVOTHROID) 88 MCG tablet; Take 1 tablet (88 mcg) by mouth daily     Gastroesophageal reflux disease without esophagitis  - pantoprazole (PROTONIX) 40 MG EC tablet; Take 1 tablet (40 mg) by mouth daily     Peripheral polyneuropathy  - gabapentin (NEURONTIN) 100 MG capsule; Take 2 capsules (200 mg) by mouth nightly as needed for neuropathic pain TAKE TWO CAPSULES BY MOUTH EVERY NIGHT AT BEDTIME     Intermittent asthma without complication, unspecified asthma severity  - albuterol (VENTOLIN HFA) 108 (90 Base) MCG/ACT inhaler; Inhale 2 puffs into the lungs every 6 hours as needed for shortness of breath     Screening for hyperlipidemia  - Lipid panel reflex to direct LDL Fasting; Future     Migraine with aura and without status migrainosus, not intractable  Not frequent enough to warrant prophylactic therapy, has not tried a triptan, discussed.  - rizatriptan (MAXALT) 5 MG tablet; Take 1-2 tablets (5-10 mg) by mouth at onset of headache for  migraine May repeat in 2 hours. Max 6 tablets/24 hours.     Encounter for screening mammogram for breast cancer  - Mammogram, screening w sanjay (3D); Future     Osteopenia of multiple sites  Anti-resorptive therapy contraindicated with hx of ONJ. Will continue to monitor BMD. Advised Ca/D intact, weight bearing exercise.  - Vitamin D Deficiency; Future     Vitamin D deficiency  - Vitamin D Deficiency; Future    Orders patient completed: Labs, mammogram    Orders patient needs to complete: NA    Vaccinations Due: COVID    Actions needed for upcoming preventative visit: COVID    Medications due for Refill: NA    Room Set up needed: RON Kolb, EMT at 12:32 PM on 7/9/2025

## 2025-07-19 DIAGNOSIS — G62.9 PERIPHERAL POLYNEUROPATHY: ICD-10-CM

## 2025-07-21 SDOH — HEALTH STABILITY: PHYSICAL HEALTH: ON AVERAGE, HOW MANY MINUTES DO YOU ENGAGE IN EXERCISE AT THIS LEVEL?: 30 MIN

## 2025-07-21 SDOH — HEALTH STABILITY: PHYSICAL HEALTH: ON AVERAGE, HOW MANY DAYS PER WEEK DO YOU ENGAGE IN MODERATE TO STRENUOUS EXERCISE (LIKE A BRISK WALK)?: 7 DAYS

## 2025-07-21 ASSESSMENT — SOCIAL DETERMINANTS OF HEALTH (SDOH): HOW OFTEN DO YOU GET TOGETHER WITH FRIENDS OR RELATIVES?: ONCE A WEEK

## 2025-07-22 RX ORDER — GABAPENTIN 100 MG/1
200 CAPSULE ORAL
Qty: 180 CAPSULE | Refills: 3 | Status: SHIPPED | OUTPATIENT
Start: 2025-07-22

## 2025-07-22 NOTE — TELEPHONE ENCOUNTER
"Last Visit Date: 1/31/2025 [facility]  Future Visit Date: 7/24/2025  ----------------------  Medication Requested: gabapentin (NEURONTIN) 100 MG capsule   Last Written Prescription: 7/22/2024 Disp:180 R:3  ---------------------  [x]  Refill decision: Medication unable to be refilled by RN due to:     []    Pt not seen within past 12 months;  No future visit or future office visit exceeds timeframe per protocol requirements  []    Compliance - lapse in therapy/gap in refills; No Shows; Cancellations  [x]    Verification -  clarification needed.  Requested as and last ordered \"Take 2 capsules (200 mg) by mouth nightly as needed for neuropathic pain TAKE TWO CAPSULES BY MOUTH EVERY NIGHT AT BEDTIME\".  Prior prescriptions ordered as scheduled dose \"TAKE TWO CAPSULES BY MOUTH EVERY NIGHT AT BEDTIME\".  []    Controlled medication  [x]    Medication not included in refill protocol policy  []    Abnormal labs/test:  []    Overdue labs/test:    []    Medication not active on patient's med list  []    Drug interaction Warning  []    Medication - Last script is Reported/Historical/Transitional  []    Advanced refill request  []    Review Needed: New medication; Medication adjusted within <= 30 days; Safety Alert; Lab monitoring required  []    Other:     Request from pharmacy:  Requested Prescriptions   Pending Prescriptions Disp Refills    gabapentin (NEURONTIN) 100 MG capsule [Pharmacy Med Name: GABAPENTIN 100MG CAPS] 180 capsule 3     Sig: TAKE 2 CAPSULES BY MOUTH NIGHTLY AS NEEDED FOR NEUROPATHIC PAIN TAKE TWO CAPSULES BY MOUTH EVERY NIGHT AT BEDTIME       There is no refill protocol information for this order                  "

## 2025-07-23 ASSESSMENT — PATIENT HEALTH QUESTIONNAIRE - PHQ9
SUM OF ALL RESPONSES TO PHQ QUESTIONS 1-9: 6
SUM OF ALL RESPONSES TO PHQ QUESTIONS 1-9: 6
10. IF YOU CHECKED OFF ANY PROBLEMS, HOW DIFFICULT HAVE THESE PROBLEMS MADE IT FOR YOU TO DO YOUR WORK, TAKE CARE OF THINGS AT HOME, OR GET ALONG WITH OTHER PEOPLE: NOT DIFFICULT AT ALL

## 2025-07-24 ENCOUNTER — OFFICE VISIT (OUTPATIENT)
Dept: INTERNAL MEDICINE | Facility: CLINIC | Age: 70
End: 2025-07-24
Payer: COMMERCIAL

## 2025-07-24 VITALS
HEART RATE: 71 BPM | HEIGHT: 66 IN | DIASTOLIC BLOOD PRESSURE: 69 MMHG | OXYGEN SATURATION: 100 % | SYSTOLIC BLOOD PRESSURE: 122 MMHG | WEIGHT: 135 LBS | TEMPERATURE: 97.9 F | BODY MASS INDEX: 21.69 KG/M2 | RESPIRATION RATE: 16 BRPM

## 2025-07-24 DIAGNOSIS — I10 ESSENTIAL HYPERTENSION: ICD-10-CM

## 2025-07-24 DIAGNOSIS — N18.30 ANEMIA IN STAGE 3 CHRONIC KIDNEY DISEASE, UNSPECIFIED WHETHER STAGE 3A OR 3B CKD (H): ICD-10-CM

## 2025-07-24 DIAGNOSIS — K21.9 GASTROESOPHAGEAL REFLUX DISEASE WITHOUT ESOPHAGITIS: ICD-10-CM

## 2025-07-24 DIAGNOSIS — E66.811 CLASS 1 OBESITY WITH SERIOUS COMORBIDITY AND BODY MASS INDEX (BMI) OF 31.0 TO 31.9 IN ADULT, UNSPECIFIED OBESITY TYPE: ICD-10-CM

## 2025-07-24 DIAGNOSIS — N18.30 STAGE 3 CHRONIC KIDNEY DISEASE, UNSPECIFIED WHETHER STAGE 3A OR 3B CKD (H): ICD-10-CM

## 2025-07-24 DIAGNOSIS — R60.9 FLUID RETENTION: ICD-10-CM

## 2025-07-24 DIAGNOSIS — Z12.31 ENCOUNTER FOR SCREENING MAMMOGRAM FOR BREAST CANCER: ICD-10-CM

## 2025-07-24 DIAGNOSIS — Z78.0 ASYMPTOMATIC POSTMENOPAUSAL STATE: ICD-10-CM

## 2025-07-24 DIAGNOSIS — E03.9 HYPOTHYROIDISM, UNSPECIFIED TYPE: ICD-10-CM

## 2025-07-24 DIAGNOSIS — Z00.00 ROUTINE GENERAL MEDICAL EXAMINATION AT A HEALTH CARE FACILITY: Primary | ICD-10-CM

## 2025-07-24 DIAGNOSIS — D63.1 ANEMIA IN STAGE 3 CHRONIC KIDNEY DISEASE, UNSPECIFIED WHETHER STAGE 3A OR 3B CKD (H): ICD-10-CM

## 2025-07-24 DIAGNOSIS — M85.89 OSTEOPENIA OF MULTIPLE SITES: ICD-10-CM

## 2025-07-24 RX ORDER — PANTOPRAZOLE SODIUM 40 MG/1
40 TABLET, DELAYED RELEASE ORAL DAILY
Qty: 90 TABLET | Refills: 4 | Status: SHIPPED | OUTPATIENT
Start: 2025-07-24

## 2025-07-24 RX ORDER — AMLODIPINE BESYLATE 5 MG/1
5 TABLET ORAL DAILY
Qty: 90 TABLET | Refills: 4 | Status: SHIPPED | OUTPATIENT
Start: 2025-07-24

## 2025-07-24 RX ORDER — CHLORTHALIDONE 25 MG/1
12.5 TABLET ORAL DAILY
Qty: 45 TABLET | Refills: 4 | Status: SHIPPED | OUTPATIENT
Start: 2025-07-24

## 2025-07-24 ASSESSMENT — ASTHMA QUESTIONNAIRES
QUESTION_4 LAST FOUR WEEKS HOW OFTEN HAVE YOU USED YOUR RESCUE INHALER OR NEBULIZER MEDICATION (SUCH AS ALBUTEROL): NOT AT ALL
ACT_TOTALSCORE: 25
ACT_TOTALSCORE: 25
QUESTION_1 LAST FOUR WEEKS HOW MUCH OF THE TIME DID YOUR ASTHMA KEEP YOU FROM GETTING AS MUCH DONE AT WORK, SCHOOL OR AT HOME: NONE OF THE TIME
QUESTION_2 LAST FOUR WEEKS HOW OFTEN HAVE YOU HAD SHORTNESS OF BREATH: NOT AT ALL
QUESTION_5 LAST FOUR WEEKS HOW WOULD YOU RATE YOUR ASTHMA CONTROL: COMPLETELY CONTROLLED
QUESTION_3 LAST FOUR WEEKS HOW OFTEN DID YOUR ASTHMA SYMPTOMS (WHEEZING, COUGHING, SHORTNESS OF BREATH, CHEST TIGHTNESS OR PAIN) WAKE YOU UP AT NIGHT OR EARLIER THAN USUAL IN THE MORNING: NOT AT ALL

## 2025-07-24 NOTE — PROGRESS NOTES
Preventive Care Visit  [facility]  Arlyn Sanchez MD, Internal Medicine  Jul 24, 2025      Assessment & Plan     Routine general medical examination at a health care facility  - REVIEW OF HEALTH MAINTENANCE PROTOCOL ORDERS    Anemia in stage 3 chronic kidney disease (H)  - Albumin Random Urine Quantitative with Creat Ratio; Future    Hypothyroidism  - TSH WITH FREE T4 REFLEX; Future    Essential hypertension  Well controlled. Advised she track at home prior to nephrology visit as if low, may wish to stop diuretic.    Class 1 obesity with serious comorbidity and body mass index (BMI) of 31.0 to 31.9 in adult, unspecified obesity type  Has lost significant weight on wegovy, on the order of  lbs. Discussed with patient that I do not think additional weight loss would be advantageous from a health standpoint and that getting nutrition/protein is important for health as well. Would agree with a taper down and perhaps off of GLP. It's being managed outside this system.    Osteopenia of multiple sites  Qualifies for treatment, though with hx of ONJ, I am not sure there are any available drugs without that risk. Consider Endo consult.  - DX Bone Density; Future    Encounter for screening mammogram for breast cancer  - Mammogram, screening w sanjay (3D); Future    Asymptomatic postmenopausal state  - DX Bone Density; Future    Meds refilled today        Counseling  Appropriate preventive services were addressed with this patient via screening, questionnaire, or discussion as appropriate for fall prevention, nutrition, physical activity, Tobacco-use cessation, social engagement, weight loss and cognition.  Checklist reviewing preventive services available has been given to the patient.  Reviewed patient's diet, addressing concerns and/or questions.   The patient was instructed to see the dentist every 6 months.   She is at risk for psychosocial distress and has been provided  with information to reduce risk.   Discussed possible causes of fatigue. The patient was provided with written information regarding signs of hearing loss.   Information on urinary incontinence and treatment options given to patient.           Kena Lopez is a 69 year old, presenting for the following:  Physical        7/24/2025     8:51 AM   Additional Questions   Roomed by EM          DIMA      Jessica has a PMH notable for alcoholic hepatitis s/p liver transplant, anxiety, migraines, HTN, CKD 3a, IBS, PsA, Osteopenia, bisphosphonate associated ONJ, hypothyroidism, AION, elevated ASCVD risk    Having behavioral issues with her pup aide, stressful  Works for [employer]    No major health concerns/changes  Gets migraines, not as severe, rizatriptan works  Has seen Rheum for psorasis, has scalp psoriasis and likely arthritis, under control  She has osteopenia, takes Ca/D, no new fractures, had ONJ on reclast after complicated extraction, no falls, last dexa in 7/23 with elevated FRAX  Started Wevy 2024 [employer], lost almost 100 lb and now tapering down on dose, down to 0.5 mg, plan to taper down to 0.25 mg  States its hard to get in protein      Routine Health Maintenence:    Lipids:   Recent Labs   Lab Test 02/28/25  0737 11/01/24  0724   CHOL 142 150   HDL 44* 44*   LDL 81 85   TRIG 84 104       Lung Ca Screening (>30 pk age 55-79 or >20 py age 50-79 + RF): does not qualify, quit 1996    Colonoscopy (50-75 yrs): 6/18 TA x 2, 8/21 no adenomas, 10 year follow up                       - Diverticulosis in the left colon.  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        7/21/2025   General Health   How would you rate your overall physical health? Good   Feel stress (tense, anxious, or unable to sleep) Very much   (!) STRESS CONCERN      7/21/2025   Nutrition   Diet: Regular (no restrictions)         7/21/2025   Exercise   Days per week of moderate/strenous exercise 7 days   Average minutes spent exercising at this level 30 min         7/21/2025   Social Factors   Frequency of gathering with friends or relatives Once a week   Worry food won't last until get money to buy more No   Food not last or not have enough money for food? No   Do you have housing? (Housing is defined as stable permanent housing and does not include staying outside in a car, in a tent, in an abandoned building, in an overnight shelter, or couch-surfing.) Yes   Are you worried about losing your housing? No   Lack of transportation? No   Unable to get utilities (heat,electricity)? No         7/21/2025   Fall Risk   Fallen 2 or more times in the past year? No    No   Trouble with walking or balance? No    No       Multiple values from one day are sorted in reverse-chronological order          7/21/2025   Activities of Daily Living- Home Safety   Needs help with the following daily activites None of the above   Safety concerns in the home None of the above         7/21/2025   Dental   Dentist two times every year? (!) NO         7/21/2025   Hearing Screening   Hearing concerns? (!) IT'S HARD TO FOLLOW A CONVERSATION IN A NOISY RESTAURANT OR CROWDED ROOM.    (!) TROUBLE FOLLOWING DIALOGUE IN THE THEATHER.   Would you like a referral for hearing testing? (!) YES       Multiple values from one day are sorted in reverse-chronological order         7/21/2025   Driving Risk Screening   Patient/family members have concerns about driving No         7/21/2025   General Alertness/Fatigue Screening   Have you been more  tired than usual lately? (!) YES         2025   Urinary Incontinence Screening   Bothered by leaking urine in past 6 months Yes       Today's PHQ-9 Score:       2025    10:48 AM   PHQ-9 SCORE   PHQ-9 Total Score MyChart 6 (Mild depression)   PHQ-9 Total Score 6        Patient-reported         2025   Substance Use   Alcohol more than 3/day or more than 7/wk Not Applicable   Do you have a current opioid prescription? No   How severe/bad is pain from 1 to 10? 0/10 (No Pain)   Do you use any other substances recreationally? No     Social History     Tobacco Use    Smoking status: Former     Current packs/day: 0.00     Average packs/day: 2.5 packs/day for 27.9 years (69.9 ttl pk-yrs)     Types: Cigarettes     Start date: 1969     Quit date: 1996     Years since quittin.6    Smokeless tobacco: Never   Vaping Use    Vaping status: Never Used   Substance Use Topics    Alcohol use: Not Currently     Comment: heavy use (750ml/2 days) up until 1 year ago; had cut down to 1 drink/day; none since 17    Drug use: No           2024   LAST FHS-7 RESULTS   1st degree relative breast or ovarian cancer No   Any relative bilateral breast cancer No   Any male have breast cancer No   Any ONE woman have BOTH breast AND ovarian cancer No   Any woman with breast cancer before 50yrs No   2 or more relatives with breast AND/OR ovarian cancer No   2 or more relatives with breast AND/OR bowel cancer No                 ASCVD Risk   The 10-year ASCVD risk score (Sheree ULLOA, et al., 2019) is: 8.7%    Values used to calculate the score:      Age: 69 years      Sex: Female      Is Non- : No      Diabetic: No      Tobacco smoker: No      Systolic Blood Pressure: 122 mmHg      Is BP treated: Yes      HDL Cholesterol: 44 mg/dL      Total Cholesterol: 142 mg/dL            Reviewed and updated as needed this visit by Provider                      Current providers sharing in care for  this patient include:  Patient Care Team:  Arlyn Sanchez MD as PCP - General (Internal Medicine)  Hussein Banegas MD as MD (Gastroenterology)  Demetria Barger PA-C as Physician Assistant (Physician Assistant)  David Vu APRN CNP as Nurse Practitioner (Nurse Practitioner)  Julio Sharp MD as MD (Family Practice)  Arlyn Sanhcez MD as Assigned PCP  Saumya Tijerina MD as Resident (Internal Medicine)  Daya Gutierrez MD as Assigned Nephrology Provider  Eliazar Flor MD as Surgeon (Surgery)  Zelda Pham MD as MD (Dermatology)  Michael Pierce MD as MD (Dermapathology)  Ambrose Ortega MD as MD (Ophthalmology)  Arlyn Sanchez MD as MD (Internal Medicine)  Soraya Hightower MD as Physician (Rheumatology)  Soraya Hightower MD as Assigned Rheumatology Provider  Sydni Vazquez MD as Assigned Dermatology Provider  Hussein Banegas MD as Assigned Surgical Provider    The following health maintenance items are reviewed in Epic and correct as of today:  Health Maintenance   Topic Date Due    DEPRESSION ACTION PLAN  Never done    ASTHMA ACTION PLAN  05/25/2023    COVID-19 VACCINE (9 - Pfizer risk 2024-25 season) 04/09/2025    ANNUAL REVIEW OF HM ORDERS  07/22/2025    MEDICARE ANNUAL WELLNESS VISIT  07/22/2025    MICROALBUMIN  07/29/2025    TSH W/FREE T4 REFLEX  07/29/2025    INFLUENZA VACCINE (1) 09/01/2025    ASTHMA CONTROL TEST  01/24/2026    PHQ-9  01/24/2026    LIPID  02/28/2026    BMP  06/27/2026    HEMOGLOBIN  06/27/2026    FALL RISK ASSESSMENT  07/24/2026    DTAP/TDAP/TD VACCINE (6 - Td or Tdap) 08/18/2026    MAMMO SCREENING  08/29/2026    DIABETES SCREENING  06/27/2028    ADVANCE CARE PLANNING  07/22/2029    COLORECTAL CANCER SCREENING  11/15/2030    DEXA  07/17/2038    HEPATITIS C SCREENING  Completed    PNEUMOCOCCAL VACCINE 50+ YEARS  Completed    URINALYSIS  Completed    HPV VACCINE (No Doses Required) Completed    ZOSTER VACCINE  Completed    RSV  "VACCINE  Completed    MENINGITIS VACCINE  Aged Out    PAP  Discontinued            Objective    Exam  /69 (BP Location: Right arm, Patient Position: Sitting, Cuff Size: Adult Regular)   Pulse 71   Temp 97.9  F (36.6  C) (Oral)   Resp 16   Ht 1.676 m (5' 5.98\")   Wt 61.2 kg (135 lb)   LMP  (LMP Unknown)   SpO2 100%   BMI 21.80 kg/m     Estimated body mass index is 21.8 kg/m  as calculated from the following:    Height as of this encounter: 1.676 m (5' 5.98\").    Weight as of this encounter: 61.2 kg (135 lb).    Physical Exam  Constitutional: Alert, oriented, pleasant, no acute distress  Head: Normocephalic, atraumatic  Eyes: Extra-ocular movements intact, no scleral icterus  ENT: Oropharynx clear, moist mucus membranes  Neck: Supple, no lymphadenopathy  Cardiovascular: Regular rate and rhythm, no murmurs, rubs or gallops, peripheral pulses full/symmetric  Respiratory: Good air movement bilaterally, lungs clear, no wheezes/rales/rhonchi  GI: Abdomen soft, bowel sounds present, nondistended, nontender, no organomegaly or masses, no rebound/guarding  Musculoskeletal: No edema, normal muscle tone, normal gait  Neurologic: Alert and oriented, cranial nerves 2-12 intact, no focal deficits  Skin: No rashes/lesions  Psychiatric: normal mentation, affect and mood          7/24/2025   Mini Cog   Clock Draw Score 2 Normal   3 Item Recall 3 objects recalled   Mini Cog Total Score 5             Signed Electronically by: Arlyn Sanchez MD    Answers submitted by the patient for this visit:  Patient Health Questionnaire (Submitted on 7/23/2025)  If you checked off any problems, how difficult have these problems made it for you to do your work, take care of things at home, or get along with other people?: Not difficult at all  PHQ9 TOTAL SCORE: 6    "

## 2025-07-27 ENCOUNTER — MYC MEDICAL ADVICE (OUTPATIENT)
Dept: INTERNAL MEDICINE | Facility: CLINIC | Age: 70
End: 2025-07-27
Payer: COMMERCIAL

## 2025-07-27 DIAGNOSIS — F41.1 GAD (GENERALIZED ANXIETY DISORDER): ICD-10-CM

## 2025-07-29 RX ORDER — HYDROXYZINE HYDROCHLORIDE 25 MG/1
25-50 TABLET, FILM COATED ORAL
Qty: 180 TABLET | Refills: 3 | Status: SHIPPED | OUTPATIENT
Start: 2025-07-29

## 2025-08-21 DIAGNOSIS — N18.31 CHRONIC KIDNEY DISEASE, STAGE 3A (H): Primary | ICD-10-CM

## 2025-08-27 ENCOUNTER — OFFICE VISIT (OUTPATIENT)
Dept: NEPHROLOGY | Facility: CLINIC | Age: 70
End: 2025-08-27
Attending: INTERNAL MEDICINE
Payer: COMMERCIAL

## 2025-08-27 ENCOUNTER — LAB (OUTPATIENT)
Dept: LAB | Facility: CLINIC | Age: 70
End: 2025-08-27
Payer: COMMERCIAL

## 2025-08-27 ENCOUNTER — ANCILLARY PROCEDURE (OUTPATIENT)
Dept: BONE DENSITY | Facility: CLINIC | Age: 70
End: 2025-08-27
Attending: INTERNAL MEDICINE
Payer: COMMERCIAL

## 2025-08-27 VITALS
TEMPERATURE: 97.8 F | DIASTOLIC BLOOD PRESSURE: 83 MMHG | WEIGHT: 136.6 LBS | SYSTOLIC BLOOD PRESSURE: 127 MMHG | HEART RATE: 72 BPM | OXYGEN SATURATION: 96 % | HEIGHT: 66 IN | BODY MASS INDEX: 21.95 KG/M2

## 2025-08-27 DIAGNOSIS — N18.30 ANEMIA IN STAGE 3 CHRONIC KIDNEY DISEASE, UNSPECIFIED WHETHER STAGE 3A OR 3B CKD (H): ICD-10-CM

## 2025-08-27 DIAGNOSIS — I15.1 HYPERTENSION SECONDARY TO OTHER RENAL DISORDERS: ICD-10-CM

## 2025-08-27 DIAGNOSIS — E83.42 HYPOMAGNESEMIA: ICD-10-CM

## 2025-08-27 DIAGNOSIS — D63.1 ANEMIA IN STAGE 3 CHRONIC KIDNEY DISEASE, UNSPECIFIED WHETHER STAGE 3A OR 3B CKD (H): ICD-10-CM

## 2025-08-27 DIAGNOSIS — Z78.0 ASYMPTOMATIC POSTMENOPAUSAL STATE: ICD-10-CM

## 2025-08-27 DIAGNOSIS — Z94.4 LIVER REPLACED BY TRANSPLANT (H): ICD-10-CM

## 2025-08-27 DIAGNOSIS — N18.31 CHRONIC KIDNEY DISEASE, STAGE 3A (H): ICD-10-CM

## 2025-08-27 DIAGNOSIS — E03.9 HYPOTHYROIDISM, UNSPECIFIED TYPE: ICD-10-CM

## 2025-08-27 DIAGNOSIS — N18.31 CHRONIC KIDNEY DISEASE, STAGE 3A (H): Primary | ICD-10-CM

## 2025-08-27 DIAGNOSIS — M85.89 OSTEOPENIA OF MULTIPLE SITES: ICD-10-CM

## 2025-08-27 LAB
ALBUMIN SERPL BCG-MCNC: 4.3 G/DL (ref 3.5–5.2)
ALBUMIN UR-MCNC: NEGATIVE MG/DL
ALP SERPL-CCNC: 44 U/L (ref 40–150)
ALT SERPL W P-5'-P-CCNC: 13 U/L (ref 0–50)
ANION GAP SERPL CALCULATED.3IONS-SCNC: 11 MMOL/L (ref 7–15)
APPEARANCE UR: CLEAR
AST SERPL W P-5'-P-CCNC: 24 U/L (ref 0–45)
BACTERIA #/AREA URNS HPF: ABNORMAL /HPF
BILIRUB SERPL-MCNC: 0.7 MG/DL
BILIRUB UR QL STRIP: NEGATIVE
BILIRUBIN DIRECT (ROCHE PRO & PURE): 0.26 MG/DL (ref 0–0.45)
BUN SERPL-MCNC: 20.1 MG/DL (ref 8–23)
CALCIUM SERPL-MCNC: 9.5 MG/DL (ref 8.8–10.4)
CHLORIDE SERPL-SCNC: 100 MMOL/L (ref 98–107)
COLOR UR AUTO: YELLOW
CREAT SERPL-MCNC: 1.19 MG/DL (ref 0.51–0.95)
CREAT UR-MCNC: 119 MG/DL
EGFRCR SERPLBLD CKD-EPI 2021: 50 ML/MIN/1.73M2
ERYTHROCYTE [DISTWIDTH] IN BLOOD BY AUTOMATED COUNT: 12.9 % (ref 10–15)
GLUCOSE SERPL-MCNC: 94 MG/DL (ref 70–99)
GLUCOSE UR STRIP-MCNC: NEGATIVE MG/DL
HCO3 SERPL-SCNC: 28 MMOL/L (ref 22–29)
HCT VFR BLD AUTO: 41.1 % (ref 35–47)
HGB BLD-MCNC: 13.7 G/DL (ref 11.7–15.7)
HGB UR QL STRIP: NEGATIVE
KETONES UR STRIP-MCNC: NEGATIVE MG/DL
LEUKOCYTE ESTERASE UR QL STRIP: NEGATIVE
MAGNESIUM SERPL-MCNC: 2 MG/DL (ref 1.7–2.3)
MCH RBC QN AUTO: 31.1 PG (ref 26.5–33)
MCHC RBC AUTO-ENTMCNC: 33.3 G/DL (ref 31.5–36.5)
MCV RBC AUTO: 93.2 FL (ref 78–100)
MICROALBUMIN UR-MCNC: <12 MG/L
MICROALBUMIN/CREAT UR: NORMAL MG/G{CREAT}
NITRATE UR QL: NEGATIVE
PH UR STRIP: 6.5 [PH] (ref 5–7)
PHOSPHATE SERPL-MCNC: 4.5 MG/DL (ref 2.5–4.5)
PLATELET # BLD AUTO: 279 10E3/UL (ref 150–450)
POTASSIUM SERPL-SCNC: 4.1 MMOL/L (ref 3.4–5.3)
PROT SERPL-MCNC: 7.2 G/DL (ref 6.4–8.3)
PTH-INTACT SERPL-MCNC: 32 PG/ML (ref 15–65)
RBC # BLD AUTO: 4.41 10E6/UL (ref 3.8–5.2)
RBC #/AREA URNS AUTO: ABNORMAL /HPF
SODIUM SERPL-SCNC: 139 MMOL/L (ref 135–145)
SP GR UR STRIP: 1.01 (ref 1–1.03)
SQUAMOUS #/AREA URNS AUTO: ABNORMAL /LPF
TACROLIMUS BLD-MCNC: 2.4 UG/L (ref 5–15)
TME LAST DOSE: ABNORMAL H
TME LAST DOSE: ABNORMAL H
TSH SERPL DL<=0.005 MIU/L-ACNC: 2.45 UIU/ML (ref 0.3–4.2)
UROBILINOGEN UR STRIP-ACNC: 0.2 E.U./DL
WBC # BLD AUTO: 6.95 10E3/UL (ref 4–11)
WBC #/AREA URNS AUTO: ABNORMAL /HPF

## 2025-08-27 PROCEDURE — 83970 ASSAY OF PARATHORMONE: CPT

## 2025-08-27 PROCEDURE — 99213 OFFICE O/P EST LOW 20 MIN: CPT | Performed by: INTERNAL MEDICINE

## 2025-08-27 PROCEDURE — 84100 ASSAY OF PHOSPHORUS: CPT

## 2025-08-27 PROCEDURE — 1126F AMNT PAIN NOTED NONE PRSNT: CPT | Performed by: INTERNAL MEDICINE

## 2025-08-27 PROCEDURE — 85027 COMPLETE CBC AUTOMATED: CPT

## 2025-08-27 PROCEDURE — 82248 BILIRUBIN DIRECT: CPT

## 2025-08-27 PROCEDURE — 80197 ASSAY OF TACROLIMUS: CPT

## 2025-08-27 PROCEDURE — 83735 ASSAY OF MAGNESIUM: CPT

## 2025-08-27 PROCEDURE — 84443 ASSAY THYROID STIM HORMONE: CPT

## 2025-08-27 PROCEDURE — 82043 UR ALBUMIN QUANTITATIVE: CPT

## 2025-08-27 PROCEDURE — 77080 DXA BONE DENSITY AXIAL: CPT | Performed by: INTERNAL MEDICINE

## 2025-08-27 PROCEDURE — 36415 COLL VENOUS BLD VENIPUNCTURE: CPT

## 2025-08-27 PROCEDURE — 80053 COMPREHEN METABOLIC PANEL: CPT

## 2025-08-27 PROCEDURE — 82570 ASSAY OF URINE CREATININE: CPT

## 2025-08-27 PROCEDURE — 3074F SYST BP LT 130 MM HG: CPT | Performed by: INTERNAL MEDICINE

## 2025-08-27 PROCEDURE — 81001 URINALYSIS AUTO W/SCOPE: CPT

## 2025-08-27 PROCEDURE — 3079F DIAST BP 80-89 MM HG: CPT | Performed by: INTERNAL MEDICINE

## 2025-08-27 ASSESSMENT — PAIN SCALES - GENERAL: PAINLEVEL_OUTOF10: NO PAIN (0)

## 2025-08-28 ENCOUNTER — TELEPHONE (OUTPATIENT)
Dept: TRANSPLANT | Facility: CLINIC | Age: 70
End: 2025-08-28
Payer: COMMERCIAL

## 2025-08-28 DIAGNOSIS — Z94.4 LIVER TRANSPLANTED (H): ICD-10-CM

## 2025-08-28 RX ORDER — TACROLIMUS 0.5 MG/1
CAPSULE ORAL
Qty: 450 CAPSULE | Refills: 3 | Status: SHIPPED | OUTPATIENT
Start: 2025-08-28

## (undated) DEVICE — GOWN IMPERVIOUS 2XL BLUE

## (undated) DEVICE — SUCTION MANIFOLD NEPTUNE 2 SYS 1 PORT 702-025-000

## (undated) DEVICE — PREP CHLORAPREP 26ML TINTED HI-LITE ORANGE 930815

## (undated) DEVICE — LINEN ORTHO PACK 5446

## (undated) DEVICE — ENDO CONNECTOR ENDOGATOR AUX WATER JET FOR OLYMPUS SCOPE

## (undated) DEVICE — KIT ENDO TURNOVER/PROCEDURE CARRY-ON 101822

## (undated) DEVICE — PACK MINOR CUSTOM ASC

## (undated) DEVICE — DRSG STERI STRIP 1/2X4" R1547

## (undated) DEVICE — ENDO FORCEP SPIKED SERRATED SHAFT JUMBO 239CM G56998

## (undated) DEVICE — DRAPE LAP W/ARMBOARD 29410

## (undated) DEVICE — SU MONOCRYL 4-0 PS-2 27" UND Y426H

## (undated) DEVICE — WIPE PREMOIST CLEANSING WASHCLOTHS 7988

## (undated) DEVICE — DRSG GAUZE 4X4" 3033

## (undated) DEVICE — SOL NACL 0.9% IRRIG 500ML BOTTLE 2F7123

## (undated) DEVICE — TUBING SUCTION MEDI-VAC 1/4"X20' N620A

## (undated) DEVICE — SUCTION MANIFOLD NEPTUNE 2 SYS 4 PORT 0702-020-000

## (undated) DEVICE — SPECIMEN CONTAINER 3OZ W/FORMALIN 59901

## (undated) DEVICE — ENDO FORCEP ENDOJAW BIOPSY 2.8MMX230CM FB-220U

## (undated) DEVICE — ESU GROUND PAD ADULT W/CORD E7507

## (undated) DEVICE — KIT ENDO FIRST STEP DISINFECTANT 200ML W/POUCH EP-4

## (undated) DEVICE — GLOVE BIOGEL PI MICRO SZ 7.5 48575

## (undated) DEVICE — SU VICRYL 2-0 CT-2 27" UND J269H

## (undated) DEVICE — TAPE DURAPORE 3" SILK 1538-3

## (undated) DEVICE — SOL WATER IRRIG 500ML BOTTLE 2F7113

## (undated) DEVICE — DRAIN PENROSE 0.25"X18" LATEX FREE GR201

## (undated) DEVICE — SNARE CAPIVATOR ROUND COLD SNR BX10 M00561101

## (undated) DEVICE — TUBING SUCTION 12"X1/4" N612

## (undated) DEVICE — ENDO CAP AND TUBING STERILE FOR ENDOGATOR  100130

## (undated) DEVICE — BLADE KNIFE SURG 10 371110

## (undated) DEVICE — SOL WATER IRRIG 1000ML BOTTLE 2F7114

## (undated) DEVICE — SU VICRYL+ 0 27IN CT-2 VLT VCP334H

## (undated) RX ORDER — ACETAMINOPHEN 325 MG/1
TABLET ORAL
Status: DISPENSED
Start: 2024-12-06

## (undated) RX ORDER — FENTANYL CITRATE 50 UG/ML
INJECTION, SOLUTION INTRAMUSCULAR; INTRAVENOUS
Status: DISPENSED
Start: 2021-08-12

## (undated) RX ORDER — ERYTHROMYCIN 5 MG/G
OINTMENT OPHTHALMIC
Status: DISPENSED
Start: 2020-11-19

## (undated) RX ORDER — ONDANSETRON 2 MG/ML
INJECTION INTRAMUSCULAR; INTRAVENOUS
Status: DISPENSED
Start: 2024-12-06

## (undated) RX ORDER — DEXMEDETOMIDINE HYDROCHLORIDE 4 UG/ML
INJECTION, SOLUTION INTRAVENOUS
Status: DISPENSED
Start: 2024-12-06

## (undated) RX ORDER — LIDOCAINE HYDROCHLORIDE 10 MG/ML
INJECTION, SOLUTION EPIDURAL; INFILTRATION; INTRACAUDAL; PERINEURAL
Status: DISPENSED
Start: 2018-06-20

## (undated) RX ORDER — PROPOFOL 10 MG/ML
INJECTION, EMULSION INTRAVENOUS
Status: DISPENSED
Start: 2024-12-06

## (undated) RX ORDER — LIDOCAINE HYDROCHLORIDE 10 MG/ML
INJECTION, SOLUTION INFILTRATION; PERINEURAL
Status: DISPENSED
Start: 2018-04-03

## (undated) RX ORDER — METHYLPREDNISOLONE ACETATE 80 MG/ML
INJECTION, SUSPENSION INTRA-ARTICULAR; INTRALESIONAL; INTRAMUSCULAR; SOFT TISSUE
Status: DISPENSED
Start: 2018-06-20

## (undated) RX ORDER — BUPIVACAINE HYDROCHLORIDE 5 MG/ML
INJECTION, SOLUTION EPIDURAL; INTRACAUDAL
Status: DISPENSED
Start: 2024-12-06

## (undated) RX ORDER — BUPIVACAINE HYDROCHLORIDE 5 MG/ML
INJECTION, SOLUTION EPIDURAL; INTRACAUDAL
Status: DISPENSED
Start: 2018-06-20

## (undated) RX ORDER — CEFAZOLIN SODIUM 2 G/50ML
SOLUTION INTRAVENOUS
Status: DISPENSED
Start: 2024-12-06

## (undated) RX ORDER — TRIAMCINOLONE ACETONIDE 40 MG/ML
INJECTION, SUSPENSION INTRA-ARTICULAR; INTRAMUSCULAR
Status: DISPENSED
Start: 2018-04-03

## (undated) RX ORDER — FENTANYL CITRATE 50 UG/ML
INJECTION, SOLUTION INTRAMUSCULAR; INTRAVENOUS
Status: DISPENSED
Start: 2024-12-06

## (undated) RX ORDER — FENTANYL CITRATE 50 UG/ML
INJECTION, SOLUTION INTRAMUSCULAR; INTRAVENOUS
Status: DISPENSED
Start: 2018-06-15

## (undated) RX ORDER — TRAMADOL HYDROCHLORIDE 50 MG/1
TABLET ORAL
Status: DISPENSED
Start: 2024-12-06

## (undated) RX ORDER — BUPIVACAINE HYDROCHLORIDE 2.5 MG/ML
INJECTION, SOLUTION EPIDURAL; INFILTRATION; INTRACAUDAL
Status: DISPENSED
Start: 2024-12-06

## (undated) RX ORDER — SIMETHICONE 40MG/0.6ML
SUSPENSION, DROPS(FINAL DOSAGE FORM)(ML) ORAL
Status: DISPENSED
Start: 2018-06-15